# Patient Record
Sex: MALE | Race: WHITE
[De-identification: names, ages, dates, MRNs, and addresses within clinical notes are randomized per-mention and may not be internally consistent; named-entity substitution may affect disease eponyms.]

---

## 2018-08-15 ENCOUNTER — HOSPITAL ENCOUNTER (EMERGENCY)
Dept: HOSPITAL 97 - ER | Age: 58
Discharge: HOME | End: 2018-08-15
Payer: MEDICARE

## 2018-08-15 DIAGNOSIS — F17.210: ICD-10-CM

## 2018-08-15 DIAGNOSIS — J44.1: Primary | ICD-10-CM

## 2018-08-15 LAB
ALBUMIN SERPL BCP-MCNC: 3.5 G/DL (ref 3.4–5)
ALP SERPL-CCNC: 120 U/L (ref 45–117)
ALT SERPL W P-5'-P-CCNC: 35 U/L (ref 12–78)
AST SERPL W P-5'-P-CCNC: 49 U/L (ref 15–37)
BUN BLD-MCNC: 9 MG/DL (ref 7–18)
GLUCOSE SERPLBLD-MCNC: 108 MG/DL (ref 74–106)
HCT VFR BLD CALC: 45.9 % (ref 39.6–49)
INR BLD: 1.3
LYMPHOCYTES # SPEC AUTO: 1.3 K/UL (ref 0.7–4.9)
MAGNESIUM SERPL-MCNC: 1.6 MG/DL (ref 1.8–2.4)
MCH RBC QN AUTO: 33.2 PG (ref 27–35)
MCV RBC: 93.8 FL (ref 80–100)
NT-PROBNP SERPL-MCNC: 80 PG/ML (ref ?–125)
PMV BLD: 8.8 FL (ref 7.6–11.3)
POTASSIUM SERPL-SCNC: 4.1 MMOL/L (ref 3.5–5.1)
RBC # BLD: 4.89 M/UL (ref 4.33–5.43)

## 2018-08-15 PROCEDURE — 71045 X-RAY EXAM CHEST 1 VIEW: CPT

## 2018-08-15 PROCEDURE — 80048 BASIC METABOLIC PNL TOTAL CA: CPT

## 2018-08-15 PROCEDURE — 96374 THER/PROPH/DIAG INJ IV PUSH: CPT

## 2018-08-15 PROCEDURE — 83880 ASSAY OF NATRIURETIC PEPTIDE: CPT

## 2018-08-15 PROCEDURE — 94640 AIRWAY INHALATION TREATMENT: CPT

## 2018-08-15 PROCEDURE — 85610 PROTHROMBIN TIME: CPT

## 2018-08-15 PROCEDURE — 85025 COMPLETE CBC W/AUTO DIFF WBC: CPT

## 2018-08-15 PROCEDURE — 83735 ASSAY OF MAGNESIUM: CPT

## 2018-08-15 PROCEDURE — 84484 ASSAY OF TROPONIN QUANT: CPT

## 2018-08-15 PROCEDURE — 36415 COLL VENOUS BLD VENIPUNCTURE: CPT

## 2018-08-15 PROCEDURE — 99285 EMERGENCY DEPT VISIT HI MDM: CPT

## 2018-08-15 PROCEDURE — 93005 ELECTROCARDIOGRAM TRACING: CPT

## 2018-08-15 PROCEDURE — 80076 HEPATIC FUNCTION PANEL: CPT

## 2018-08-15 NOTE — EDPHYS
Physician Documentation                                                                           

 [facility]                                                                

Name: Omkar Chung                                                                                  

Age: 58 yrs                                                                                       

Sex: Male                                                                                         

: 1960                                                                                   

MRN: [id number]                                                                                   

Arrival Date: 08/15/2018                                                                          

Time: 06:10                                                                                       

Account#: [id number]                                                                            

Bed 16                                                                                            

Private MD:                                                                                       

ED Physician Bandar Sears                                                                             

HPI:                                                                                              

08/15                                                                                             

06:24 This 58 yrs old  Male presents to ER via Ambulatory with complaints of COPD    jr8 

      Exacerbation.                                                                               

06:24 The patient has shortness of breath at rest. Onset: The symptoms/episode began/occurred jr8 

      acutely, 2 day(s) ago. Duration: The symptoms are continuous. The patient's shortness       

      of breath is aggravated by talking, walking. Associated signs and symptoms: Pertinent       

      positives: productive cough. Severity of symptoms: At their worst the symptoms were         

      moderate in the emergency department the symptoms are unchanged. The patient has            

      experienced similar episodes in the past, a few times. The patient has not recently         

      seen a physician.                                                                           

                                                                                                  

Historical:                                                                                       

- Allergies:                                                                                      

06:13 No Known Allergies;                                                                     ak1 

- PMHx:                                                                                           

06:13 Cirrhosis; COPD;                                                                        ak1 

- PSHx:                                                                                           

06:13 Hernia repair;                                                                          ak1 

                                                                                                  

- Immunization history:: Adult Immunizations unknown.                                             

- Social history:: Smoking status: Patient uses tobacco products, smokes one-half pack            

  cigarettes per day.                                                                             

- Ebola Screening: : No symptoms or risks identified at this time.                                

                                                                                                  

                                                                                                  

ROS:                                                                                              

06:24 Eyes: Negative for injury, pain, redness, and discharge, ENT: Negative for injury,      jr8 

      pain, and discharge, Neck: Negative for injury, pain, and swelling, Cardiovascular:         

      Negative for chest pain, palpitations, and edema, Abdomen/GI: Negative for abdominal        

      pain, nausea, vomiting, diarrhea, and constipation, Back: Negative for injury and pain,     

      MS/Extremity: Negative for injury and deformity, Skin: Negative for injury, rash, and       

      discoloration, Neuro: Negative for headache, weakness, numbness, tingling, and seizure.     

06:24 Respiratory: Positive for cough, dyspnea on exertion, shortness of breath, wheezing.        

                                                                                                  

Exam:                                                                                             

06:24 Eyes:  Pupils equal round and reactive to light, extra-ocular motions intact.  Lids and jr8 

      lashes normal.  Conjunctiva and sclera are non-icteric and not injected.  Cornea within     

      normal limits.  Periorbital areas with no swelling, redness, or edema. ENT:  Nares          

      patent. No nasal discharge, no septal abnormalities noted.  Tympanic membranes are          

      normal and external auditory canals are clear.  Oropharynx with no redness, swelling,       

      or masses, exudates, or evidence of obstruction, uvula midline.  Mucous membranes           

      moist. Neck:  Trachea midline, no thyromegaly or masses palpated, and no cervical           

      lymphadenopathy.  Supple, full range of motion without nuchal rigidity, or vertebral        

      point tenderness.  No Meningismus. Cardiovascular:  Regular rate and rhythm with a          

      normal S1 and S2.  No gallops, murmurs, or rubs.  Normal PMI, no JVD.  No pulse             

      deficits. Abdomen/GI:  Soft, non-tender, with normal bowel sounds.  No distension or        

      tympany.  No guarding or rebound.  No evidence of tenderness throughout. Back:  No          

      spinal tenderness.  No costovertebral tenderness.  Full range of motion. Skin:  Warm,       

      dry with normal turgor.  Normal color with no rashes, no lesions, and no evidence of        

      cellulitis. MS/ Extremity:  Pulses equal, no cyanosis.  Neurovascular intact.  Full,        

      normal range of motion. Neuro:  Awake and alert, GCS 15, oriented to person, place,         

      time, and situation.  Cranial nerves II-XII grossly intact.  Motor strength 5/5 in all      

      extremities.  Sensory grossly intact.  Cerebellar exam normal.  Normal gait.                

06:24 Respiratory: the patient does not display signs of respiratory distress,  Respirations:     

      labored breathing, that is mild, Breath sounds: wheezing: expiratory that is moderate,      

      is heard diffusely.                                                                         

                                                                                                  

Vital Signs:                                                                                      

06:11  / 88; Pulse 104; Resp 22; Temp 98.; Pulse Ox 86% ; Weight 99.79 kg (R); Height 6 ak1 

      ft. 2 in. (187.96 cm); Pain 0/10;                                                           

06:11 Pulse Ox 92% on 2 lpm NC;                                                               ak1 

07:30  / 89; Pulse 101; Resp 20; Pulse Ox 95% on 2 lpm NC;                              ph  

08:40  / 87; Pulse 101; Resp 18; Temp 97.8; Pulse Ox 93% on R/A; Pain 0/10;             ph  

06:11 Body Mass Index 28.25 (99.79 kg, 187.96 cm)                                             ak1 

                                                                                                  

MDM:                                                                                              

06:11 Patient medically screened.                                                             jr8 

08:13 Differential diagnosis: Bronchitis CHF exacerbation, Chronic Obstructive Pulmonary      jr8 

      Disease Myocardial Infarction pneumonia, Pneumothorax pulmonary edema, Pulmonary            

      Embolism Sepsis. Data reviewed: vital signs, nurses notes, lab test result(s), EKG,         

      radiologic studies, plain films, and as a result, I will discharge patient. Data            

      interpreted: Pulse oximetry: on room air is 95 %. Interpretation: acceptable.               

      Counseling: I had a detailed discussion with the patient and/or guardian regarding: the     

      historical points, exam findings, and any diagnostic results supporting the                 

      discharge/admit diagnosis, lab results, radiology results, the need for outpatient          

      follow up, a family practitioner, to return to the emergency department if symptoms         

      worsen or persist or if there are any questions or concerns that arise at home.             

      Response to treatment: the patient's symptoms have resolved after treatment.                

                                                                                                  

08/15                                                                                             

06:24 Order name: Basic Metabolic Panel                                                       jr8 

08/15                                                                                             

06:24 Order name: CBC with Diff; Complete Time: 07:45                                         jr8 

08/15                                                                                             

06:24 Order name: LFT's                                                                       jr8 

08/15                                                                                             

06:24 Order name: Magnesium                                                                   jr8 

08/15                                                                                             

06:24 Order name: NT PRO-BNP; Complete Time: 08:15                                            jr8 

08/15                                                                                             

06:24 Order name: PT-INR; Complete Time: 07:45                                                jr8 

08/15                                                                                             

06:11 Order name: EKG; Complete Time: 06:11                                                   ak1 

08/15                                                                                             

06:24 Order name: Troponin (emerg Dept Use Only); Complete Time: 08:15                        jr8 

08/15                                                                                             

06:24 Order name: XRAY Chest (1 view); Complete Time: 08:28                                   jr8 

08/15                                                                                             

06:24 Order name: Basic Metabolic Panel; Complete Time: 08:15                                 EDMS

08/15                                                                                             

06:24 Order name: Liver (Hepatic) Function; Complete Time: 08:15                              EDMS

08/15                                                                                             

06:24 Order name: Magnesium; Complete Time: 08:15                                             EDMS

08/15                                                                                             

06:11 Order name: EKG - Nurse/Tech; Complete Time: 06:11                                      ak1 

08/15                                                                                             

06:24 Order name: Cardiac monitoring; Complete Time: 06:25                                    jr8 

08/15                                                                                             

06:24 Order name: IV Saline Lock; Complete Time: 06:45                                        jr8 

08/15                                                                                             

06:24 Order name: Labs collected and sent; Complete Time: 06:45                               jr8 

08/15                                                                                             

06:24 Order name: O2 Per Protocol; Complete Time: 06:25                                       jr8 

08/15                                                                                             

06:24 Order name: O2 Sat Monitoring; Complete Time:  

                                                                                                  

EC: Rate is 100 beats/min. Rhythm is regular, Normal Sinus Rhythm with Occasional PVCs. QRS jr8 

      Axis is Normal. AZ interval is normal at 180 msec. QRS interval is normal at 78 msec.       

      QT interval is normal at 454 msec. Q waves are Present in leads V1, V2, V3. T waves are     

      Normal. No ST changes noted. Clinical impression: NSR w/ Non-specific ST/T Changes.         

      Interpreted by me. Reviewed by me.                                                          

                                                                                                  

Administered Medications:                                                                         

06:26 Drug: Albuterol - atroVENT (3:1) (2.5 mg - 0.5 mg) 3 ml Route: Nebulizer;               ea  

08:41 Follow up: Response: No adverse reaction                                                ph  

06:40 Drug: SOLU-Medrol 125 mg Route: IVP; Site: left antecubital;                            ea  

08:41 Follow up: Response: No adverse reaction                                                ph  

                                                                                                  

                                                                                                  

Disposition:                                                                                      

08/15/18 08:14 Discharged to Home. Impression: Chronic obstructive pulmonary disease with         

  (acute) exacerbation.                                                                           

- Condition is Stable.                                                                            

- Discharge Instructions: Chronic Bronchitis, Chronic Obstructive Pulmonary Disease               

  Exacerbation.                                                                                   

- Prescriptions for ipratropium- albuterol 0.5 mg-3 mg(2.5 mg base)/3 mL Inhalation               

  solution for nebulization - inhale 3 milliliter by NEBULIZATION route 4 times per               

  day; 1 box. Prednisone 20 mg Oral Tablet - take 1 tablet by ORAL route once daily for           

  5 days; 5 tablet.                                                                               

- Medication Reconciliation Form, Thank You Letter, Antibiotic Education, Prescription            

  Opioid Use form.                                                                                

- Follow up: Private Physician; When: 5 - 6 days; Reason: Recheck today's complaints,             

  Continuance of care, Re-evaluation by your physician.                                           

- Problem is new.                                                                                 

- Symptoms have improved.                                                                         

                                                                                                  

                                                                                                  

                                                                                                  

Addendum:                                                                                         

2018                                                                                        

     19:04 Co-signature as Attending Physician, Bandar howe

                                                                                                  

Signatures:                                                                                       

Dispatcher [employer]                           EDMS                                                 

Bandar Sears MD MD pkl Roszak, Josh, PA PA   jr8                                                  

Mary Jane Pham RN                       RN   ak1                                                  

Dania Hudson, RN                      RN   Juli Valentine RN                      CAROL   ea                                                   

                                                                                                  

Corrections: (The following items were deleted from the chart)                                    

08/15                                                                                             

08:43 08:14 08/15/2018 08:14 Discharged to Home. Impression: Chronic obstructive pulmonary    ph  

      disease with (acute) exacerbation. Condition is Stable. Forms are Medication                

      Reconciliation Form, Thank You Letter, Antibiotic Education, Prescription Opioid Use.       

      Follow up: Private Physician; When: 5 - 6 days; Reason: Recheck today's complaints,         

      Continuance of care, Re-evaluation by your physician. Problem is new. Symptoms have         

      improved. jr8                                                                               

                                                                                                  

**************************************************************************************************

## 2018-08-15 NOTE — RAD REPORT
EXAM DESCRIPTION:  Markos Single View8/15/2018 6:47 am

 

CLINICAL HISTORY:  Shortness of breath

 

COMPARISON:  August 2017

 

FINDINGS:   The lungs are moderately hyperaerated consistent with COPD. The lungs appear clear of acu
te infiltrate. The heart is normal size

 

Blunting of the right lateral costophrenic sulcus probably represent pleural thickening. Small pleura
l effusion could be present.

## 2018-08-15 NOTE — ER
Nurse's Notes                                                                                     

 [facility]                                                                

Name: Omakr Chung                                                                                  

Age: 58 yrs                                                                                       

Sex: Male                                                                                         

: 1960                                                                                   

MRN: [id number]                                                                                   

Arrival Date: 08/15/2018                                                                          

Time: 06:10                                                                                       

Account#: [id number]                                                                            

Bed 16                                                                                            

Private MD:                                                                                       

Diagnosis: Chronic obstructive pulmonary disease with (acute) exacerbation                        

                                                                                                  

Presentation:                                                                                     

08/15                                                                                             

06:12 Presenting complaint: Patient states: productive cough and SOB x2 weeks PTA. pt with hx ak1 

      COPD. pt used albuterol neb treatment at home with no relief. Transition of care:           

      patient was not received from another setting of care. Onset of symptoms is unknown.        

      Risk Assessment: Do you want to hurt yourself or someone else? Patient reports no           

      desire to harm self or others. Care prior to arrival: None.                                 

06:12 Method Of Arrival: Ambulatory                                                           ak1 

06:12 Acuity: BROCK 3                                                                           ak1 

06:15 Initial Sepsis Screen: Does the patient meet any 2 criteria? No. Patient's initial      ea  

      sepsis screen is negative. Does the patient have a suspected source of infection? No.       

      Patient's initial sepsis screen is negative.                                                

                                                                                                  

Triage Assessment:                                                                                

06:13 General: Appears in no apparent distress. slender, Behavior is calm, cooperative. Pain: ak1 

      Denies pain. Respiratory: Reports shortness of breath cough that is productive.             

                                                                                                  

Historical:                                                                                       

- Allergies:                                                                                      

06:13 No Known Allergies;                                                                     ak1 

- PMHx:                                                                                           

06:13 Cirrhosis; COPD;                                                                        ak1 

- PSHx:                                                                                           

06:13 Hernia repair;                                                                          ak1 

                                                                                                  

- Immunization history:: Adult Immunizations unknown.                                             

- Social history:: Smoking status: Patient uses tobacco products, smokes one-half pack            

  cigarettes per day.                                                                             

- Ebola Screening: : No symptoms or risks identified at this time.                                

                                                                                                  

                                                                                                  

Screenin:14 Abuse screen: Denies threats or abuse. Denies injuries from another. Nutritional        ak1 

      screening: No deficits noted. Tuberculosis screening: No symptoms or risk factors           

      identified. Fall Risk None identified.                                                      

                                                                                                  

Assessment:                                                                                       

06:46 General: Appears uncomfortable, Behavior is calm, cooperative, appropriate for age.     ea  

      Pain: Denies pain. Neuro: Level of Consciousness is awake, alert, obeys commands,           

      Oriented to person, place, time, situation. Cardiovascular: Heart tones S1 S2 present       

      Patient's skin is warm and dry. Respiratory: Airway is patent Respiratory effort is         

      even, unlabored, Respiratory pattern is regular, symmetrical, Breath sounds are             

      diminished bilaterally. GI: Abdomen is flat, Bowel sounds present X 4 quads. : No         

      signs and/or symptoms were reported regarding the genitourinary system. Derm: Skin is       

      pink, warm \T\ dry.                                                                         

07:34 General: Appears in no apparent distress. comfortable, slender, Behavior is calm,       ph  

      cooperative, appropriate for age. Pain: Denies pain. Neuro: Level of Consciousness is       

      awake, alert, obeys commands, Oriented to person, place, time, situation.                   

      Cardiovascular: Capillary refill < 3 seconds Patient's skin is warm and dry.                

      Respiratory: Reports shortness of breath at rest but states that shortness of breath        

      has improved after nebulizer treatment. Airway is patent Respiratory effort is even,        

      unlabored, Respiratory pattern is regular, symmetrical, Breath sounds are clear             

      bilaterally. GI: No signs and/or symptoms were reported involving the gastrointestinal      

      system. Derm: Skin is intact, is healthy with good turgor, Skin is pink, warm \T\ dry.      

      Musculoskeletal: Circulation, motion, and sensation intact. Range of motion: intact in      

      all extremities.                                                                            

08:39 Reassessment: Patient appears in no apparent distress at this time. Patient and/or      ph  

      family updated on plan of care and expected duration. Pain level reassessed. Patient is     

      alert, oriented x 3, equal unlabored respirations, skin warm/dry/pink. Pt instructed on     

      use of new nebulizer medication and d/c home w/ SO Patient denies pain at this time.        

      Patient states symptoms have improved.                                                      

                                                                                                  

Vital Signs:                                                                                      

06:11  / 88; Pulse 104; Resp 22; Temp 98.; Pulse Ox 86% ; Weight 99.79 kg (R); Height 6 ak1 

      ft. 2 in. (187.96 cm); Pain 0/10;                                                           

06:11 Pulse Ox 92% on 2 lpm NC;                                                               ak1 

07:30  / 89; Pulse 101; Resp 20; Pulse Ox 95% on 2 lpm NC;                              ph  

08:40  / 87; Pulse 101; Resp 18; Temp 97.8; Pulse Ox 93% on R/A; Pain 0/10;             ph  

06:11 Body Mass Index 28.25 (99.79 kg, 187.96 cm)                                             ak1 

                                                                                                  

ED Course:                                                                                        

06:10 Patient arrived in ED.                                                                  ak1 

06:11 Jorge David PA is PHCP.                                                               [id number] 

06:11 Bandar Sears MD is Attending Physician.                                                    [id number] 

06:11 Arm band placed on Patient placed in an exam room, on a stretcher, on oxygen, on        ak1 

      cardiac monitor, on pulse oximetry, Patient notified of wait time.                          

06:13 Triage completed.                                                                       ak1 

06:14 Patient has correct armband on for positive identification. Bed in low position. Call   ak1 

      light in reach. Side rails up X 1. Adult w/ patient. Cardiac monitor on. Pulse ox on.       

      NIBP on.                                                                                    

06:30 Inserted saline lock: 20 gauge in left antecubital area, using aseptic technique. Blood ea  

      collected.                                                                                  

06:47 XRAY Chest (1 view) In Process Unspecified.                                             EDMS

07:06 Report given to Dania MEDINA.                                                            ea  

07:33 Dania Hudson, RN is Primary Nurse.                                                    ph  

07:36 No provider procedures requiring assistance completed.                                  ph  

08:42 IV discontinued.                                                                        ph  

                                                                                                  

Administered Medications:                                                                         

06:26 Drug: Albuterol - atroVENT (3:1) (2.5 mg - 0.5 mg) 3 ml Route: Nebulizer;               ea  

08:41 Follow up: Response: No adverse reaction                                                ph  

06:40 Drug: SOLU-Medrol 125 mg Route: IVP; Site: left antecubital;                            ea  

08:41 Follow up: Response: No adverse reaction                                                ph  

                                                                                                  

                                                                                                  

Outcome:                                                                                          

08:14 Discharge ordered by MD.                                                                [id number] 

08:42 Discharged to home ambulatory, with significant other.                                  ph  

08:42 Condition: improved                                                                         

08:42 Discharge instructions given to patient, significant other, Instructed on discharge         

      instructions, follow up and referral plans. medication usage, Demonstrated                  

      understanding of instructions, follow-up care, medications, Prescriptions given X 2.        

08:43 Patient left the ED.                                                                    ph  

                                                                                                  

Signatures:                                                                                       

Dispatcher [employer]                           EDMS                                                 

Jorge David PA PA   [id number]                                                  

Mary Jane Pham RN                       RN   akDania Byers, Juli Rubalcava RN, ph, RN RN ea                                                   

                                                                                                  

**************************************************************************************************

## 2018-08-15 NOTE — EKG
Test Date:    2018-08-15               Test Time:    06:05:27

Technician:   DERRICK                                    

                                                     

MEASUREMENT RESULTS:                                       

Intervals:                                           

Rate:         100                                    

LA:           180                                    

QRSD:         78                                     

QT:           352                                    

QTc:          454                                    

Axis:                                                

P:            87                                     

LA:           180                                    

QRS:          68                                     

T:            89                                     

                                                     

INTERPRETIVE STATEMENTS:                                       

                                                     

Sinus rhythm with frequent premature ventricular complexes

Anteroseptal infarct, age undetermined

Abnormal ECG

Compared to ECG 08/14/2017 17:02:47

Ventricular premature complex(es) now present

Myocardial infarct finding still present



Electronically Signed On 08-15-18 10:43:56 CDT by Otis Nichols

## 2018-10-01 ENCOUNTER — HOSPITAL ENCOUNTER (EMERGENCY)
Dept: HOSPITAL 97 - ER | Age: 58
LOS: 1 days | Discharge: HOME | End: 2018-10-02
Payer: MEDICARE

## 2018-10-01 DIAGNOSIS — J44.1: Primary | ICD-10-CM

## 2018-10-01 LAB
ALBUMIN SERPL BCP-MCNC: 3.3 G/DL (ref 3.4–5)
ALP SERPL-CCNC: 135 U/L (ref 45–117)
ALT SERPL W P-5'-P-CCNC: 37 U/L (ref 12–78)
AST SERPL W P-5'-P-CCNC: 39 U/L (ref 15–37)
BUN BLD-MCNC: 11 MG/DL (ref 7–18)
GLUCOSE SERPLBLD-MCNC: 86 MG/DL (ref 74–106)
HCT VFR BLD CALC: 42.6 % (ref 39.6–49)
INR BLD: 1.2
LYMPHOCYTES # SPEC AUTO: 1.6 K/UL (ref 0.7–4.9)
MAGNESIUM SERPL-MCNC: 1.8 MG/DL (ref 1.8–2.4)
MCH RBC QN AUTO: 33.2 PG (ref 27–35)
MCV RBC: 95 FL (ref 80–100)
NT-PROBNP SERPL-MCNC: 194 PG/ML (ref ?–125)
PMV BLD: 8.7 FL (ref 7.6–11.3)
POTASSIUM SERPL-SCNC: 3.6 MMOL/L (ref 3.5–5.1)
RBC # BLD: 4.49 M/UL (ref 4.33–5.43)
TROPONIN (EMERG DEPT USE ONLY): 0.02 NG/ML (ref 0–0.04)

## 2018-10-01 PROCEDURE — 80076 HEPATIC FUNCTION PANEL: CPT

## 2018-10-01 PROCEDURE — 83735 ASSAY OF MAGNESIUM: CPT

## 2018-10-01 PROCEDURE — 84484 ASSAY OF TROPONIN QUANT: CPT

## 2018-10-01 PROCEDURE — 99285 EMERGENCY DEPT VISIT HI MDM: CPT

## 2018-10-01 PROCEDURE — 36415 COLL VENOUS BLD VENIPUNCTURE: CPT

## 2018-10-01 PROCEDURE — 80048 BASIC METABOLIC PNL TOTAL CA: CPT

## 2018-10-01 PROCEDURE — 85025 COMPLETE CBC W/AUTO DIFF WBC: CPT

## 2018-10-01 PROCEDURE — 96374 THER/PROPH/DIAG INJ IV PUSH: CPT

## 2018-10-01 PROCEDURE — 85610 PROTHROMBIN TIME: CPT

## 2018-10-01 PROCEDURE — 94640 AIRWAY INHALATION TREATMENT: CPT

## 2018-10-01 PROCEDURE — 83880 ASSAY OF NATRIURETIC PEPTIDE: CPT

## 2018-10-01 PROCEDURE — 93005 ELECTROCARDIOGRAM TRACING: CPT

## 2018-10-01 PROCEDURE — 71045 X-RAY EXAM CHEST 1 VIEW: CPT

## 2018-10-02 NOTE — RAD REPORT
EXAM DESCRIPTION:  Karynat Single View10/1/2018 11:28 pm

 

CLINICAL HISTORY:  Shortness of breath

 

COMPARISON:  August 2018

 

FINDINGS:  The lungs are markedly hyperaerated.

 

Prominent interstitial markings are seen without significant change.

 

The heart is normal size

 

IMPRESSION:  COPD

 

Prominent interstitial markings may all be chronic or combination of acute process such as pneumoniti
s/atypical pneumonia superimposed over chronic changes

## 2018-10-02 NOTE — EKG
Test Date:    2018-10-01               Test Time:    23:31:21

Technician:                                          

                                                     

MEASUREMENT RESULTS:                                       

Intervals:                                           

Rate:         93                                     

MD:           184                                    

QRSD:         86                                     

QT:           358                                    

QTc:          445                                    

Axis:                                                

P:            75                                     

MD:           184                                    

QRS:          54                                     

T:            88                                     

                                                     

INTERPRETIVE STATEMENTS:                                       

                                                     

Sinus rhythm with occasional premature ventricular complexes

Septal infarct, age undetermined

Abnormal ECG

Compared to ECG 08/15/2018 06:05:27

No significant changes



Electronically Signed On 10-02-18 06:20:15 CDT by Otis Nichols

## 2018-10-02 NOTE — EDPHYS
Physician Documentation                                                                           

 [facility]                                                                

Name: Omkar Chung                                                                                  

Age: 58 yrs                                                                                       

Sex: Male                                                                                         

: 1960                                                                                   

MRN: [id number]                                                                                   

Arrival Date: 10/01/2018                                                                          

Time: 23:05                                                                                       

Account#: [id number]                                                                            

Bed 23                                                                                            

Private MD:                                                                                       

ED Physician Bandar Sears                                                                             

HPI:                                                                                              

10/01                                                                                             

23:38 This 58 yrs old  Male presents to ER via [facility] with complaints of         pm1 

      Shortness Of Breath.                                                                        

23:38 The patient has shortness of breath at rest. Onset: The symptoms/episode began/occurred pm1 

      this morning. Duration: The symptoms are continuous. The patient's shortness of breath      

      is aggravated by nothing, is alleviated by nothing. Associated signs and symptoms:          

      Pertinent positives: productive cough, no change in sputum from baseline COPD and           

      sputum, Pertinent negatives: chest pain, fever. Severity of symptoms: in the emergency      

      department the symptoms are worse. The patient has experienced similar episodes in the      

      past, several times, and the symptoms today are exactly the same, to previous COPD          

      exacerbation .                                                                              

                                                                                                  

Historical:                                                                                       

- Allergies:                                                                                      

23:49 No Known Allergies;                                                                     rv  

- PMHx:                                                                                           

23:49 Cirrhosis; COPD; Hernia;                                                                rv  

- PSHx:                                                                                           

23:49 Hernia repair;                                                                          rv  

                                                                                                  

- Immunization history:: Adult Immunizations up to date.                                          

- Social history:: Smoking status: Patient/guardian denies using tobacco, the patient             

  reports quitting approximately 15 years ago.                                                    

- Ebola Screening: : Patient negative for fever greater than or equal to 101.5 degrees            

  Fahrenheit, and additional compatible Ebola Virus Disease symptoms Patient denies               

  exposure to infectious person Patient denies travel to an Ebola-affected area in the            

  21 days before illness onset.                                                                   

                                                                                                  

                                                                                                  

ROS:                                                                                              

23:38 Constitutional: Negative for fever, chills, and weight loss, Eyes: Negative for injury, pm1 

      pain, redness, and discharge, ENT: Negative for injury, pain, and discharge, Neck:          

      Negative for injury, pain, and swelling, Cardiovascular: Negative for chest pain,           

      palpitations, and edema.                                                                    

23:38 Abdomen/GI: Negative for abdominal pain, nausea, vomiting, diarrhea, and constipation,      

      Back: Negative for injury and pain, : Negative for injury, bleeding, discharge, and       

      swelling, MS/Extremity: Negative for injury and deformity, Skin: Negative for injury,       

      rash, and discoloration, Neuro: Negative for headache, weakness, numbness, tingling,        

      and seizure.                                                                                

23:38 Respiratory: Positive for cough, with white sputum, shortness of breath.                    

                                                                                                  

Exam:                                                                                             

23:38 Constitutional:  This is a well developed, well nourished patient who is awake, alert,  pm1 

      and in no acute distress. Head/Face:  Normocephalic, atraumatic. Eyes:  Pupils equal        

      round and reactive to light, extra-ocular motions intact.  Lids and lashes normal.          

      Conjunctiva and sclera are non-icteric and not injected.  Cornea within normal limits.      

      Periorbital areas with no swelling, redness, or edema. ENT:  Nares patent. No nasal         

      discharge, no septal abnormalities noted.  Tympanic membranes are normal and external       

      auditory canals are clear.  Oropharynx with no redness, swelling, or masses, exudates,      

      or evidence of obstruction, uvula midline.  Mucous membranes moist. Neck:  Trachea          

      midline, no thyromegaly or masses palpated, and no cervical lymphadenopathy.  Supple,       

      full range of motion without nuchal rigidity, or vertebral point tenderness.  No            

      Meningismus. Chest/axilla:  Normal chest wall appearance and motion.  Nontender with no     

      deformity.  No lesions are appreciated. Cardiovascular:  Regular rate and rhythm with a     

      normal S1 and S2.  No gallops, murmurs, or rubs.  Normal PMI, no JVD.  No pulse             

      deficits.                                                                                   

23:38 Abdomen/GI:  Soft, non-tender, with normal bowel sounds.  No distension or tympany.  No     

      guarding or rebound.  No evidence of tenderness throughout. Back:  No spinal                

      tenderness.  No costovertebral tenderness.  Full range of motion. Skin:  Warm, dry with     

      normal turgor.  Normal color with no rashes, no lesions, and no evidence of cellulitis.     

      MS/ Extremity:  Pulses equal, no cyanosis.  Neurovascular intact.  Full, normal range       

      of motion.                                                                                  

23:38 Respiratory: the patient does not display signs of respiratory distress,  Respirations:     

      normal, Breath sounds: wheezing: that is mild, is heard diffusely.                          

23:38 Neuro: Orientation: is normal, Motor: moves all fours, strength is normal.                  

                                                                                                  

Vital Signs:                                                                                      

23:05  / 74; Pulse 99; Weight 92.99 kg (R);                                             rv  

23:05  / 88; Pulse 92; Pulse Ox 96% on 2 lpm NC;                                        rv  

1002                                                                                             

00:03  / 83; Pulse 89; Pulse Ox 100% ;                                                  rv  

                                                                                                  

MDM:                                                                                              

10/01                                                                                             

23:11 Patient medically screened.                                                             pm1 

10/02                                                                                             

00:36 Data reviewed: vital signs. Data interpreted: Pulse oximetry: on room air is 100 %.     pm1 

      Interpretation: normal. Counseling: I had a detailed discussion with the patient and/or     

      guardian regarding: the historical points, exam findings, and any diagnostic results        

      supporting the discharge/admit diagnosis, lab results, radiology results, the need for      

      outpatient follow up, to return to the emergency department if symptoms worsen or           

      persist or if there are any questions or concerns that arise at home.                       

                                                                                                  

10/01                                                                                             

23:12 Order name: Basic Metabolic Panel; Complete Time: 00:02                                 pm1 

10/01                                                                                             

23:12 Order name: CBC with Diff; Complete Time: 23:57                                         pm1 

10/01                                                                                             

23:12 Order name: LFT's; Complete Time: 00:02                                                 pm1 

10/01                                                                                             

23:12 Order name: Magnesium; Complete Time: 00:02                                             pm1 

10/01                                                                                             

23:12 Order name: NT PRO-BNP; Complete Time: 00:02                                            pm1 

10/01                                                                                             

23:12 Order name: PT-INR; Complete Time: 23:57                                                pm1 

10/01                                                                                             

23:12 Order name: Troponin (emerg Dept Use Only); Complete Time: 00:02                        pm1 

10/01                                                                                             

23:12 Order name: XRAY Chest (1 view)                                                         pm1 

10/01                                                                                             

23:12 Order name: EKG; Complete Time: 23:13                                                   pm1 

10/01                                                                                             

23:12 Order name: Cardiac monitoring; Complete Time: 23:50                                    pm1 

10/01                                                                                             

23:12 Order name: EKG - Nurse/Tech; Complete Time: 23:50                                      pm1 

10/01                                                                                             

23:12 Order name: IV Saline Lock; Complete Time: 23:22                                        pm1 

10/01                                                                                             

23:12 Order name: Labs collected and sent; Complete Time: 23:22                               pm1 

10/01                                                                                             

23:12 Order name: O2 Per Protocol; Complete Time: 23:22                                       pm1 

10/01                                                                                             

23:12 Order name: O2 Sat Monitoring; Complete Time: 23:22                                     pm1 

                                                                                                  

Administered Medications:                                                                         

10/01                                                                                             

23:42 Drug: SOLU-Medrol 125 mg Route: IVP; Site: right forearm;                               rv  

10/02                                                                                             

00:55 Follow up: Response: No adverse reaction                                                rv  

10/01                                                                                             

23:42 Drug: Albuterol - atroVENT (3:1) (2.5 mg - 0.5 mg) 3 ml Route: Nebulizer;               rv  

10/02                                                                                             

00:54 Follow up: Response: No adverse reaction                                                rv  

                                                                                                  

                                                                                                  

Disposition:                                                                                      

01:10 Co-signature as Attending Physician, Bandar Sears MD.                                        jessica 

                                                                                                  

Disposition:                                                                                      

10/02/18 00:37 Discharged to Home. Impression: Chronic obstructive pulmonary disease with         

  (acute) exacerbation.                                                                           

- Condition is Stable.                                                                            

- Discharge Instructions: Chronic Obstructive Pulmonary Disease Exacerbation.                     

- Prescriptions for Prednisone 20 mg Oral Tablet - take 2 tablet by ORAL route once               

  daily for 5 days; 10 tablet. ipratropium- albuterol 0.5 mg-3 mg(2.5 mg base)/3 mL               

  Inhalation solution for nebulization - inhale 3 milliliter by NEBULIZATION route 4              

  times per day As needed; 1 box.                                                                 

- Medication Reconciliation Form, Thank You Letter, Antibiotic Education, Prescription            

  Opioid Use form.                                                                                

- Follow up: Emergency Department; When: As needed; Reason: Worsening of condition.               

  Follow up: Private Physician; When: 2 - 3 days; Reason: Recheck today's complaints,             

  Continuance of care, Re-evaluation by your physician.                                           

- Problem is new.                                                                                 

- Symptoms have improved.                                                                         

                                                                                                  

                                                                                                  

                                                                                                  

Signatures:                                                                                       

Dispatcher [employer]                           EDBandar Preciado MD MD   pkl                                                  

Joon Gerard, NP                    NP   pm1                                                  

Carlyle Ghosh RN                    RN   rv                                                   

                                                                                                  

Corrections: (The following items were deleted from the chart)                                    

00:56 00:37 10/02/2018 00:37 Discharged to Home. Impression: Chronic obstructive pulmonary    rv  

      disease with (acute) exacerbation. Condition is Stable. Forms are Medication                

      Reconciliation Form, Thank You Letter, Antibiotic Education, Prescription Opioid Use.       

      Follow up: Emergency Department; When: As needed; Reason: Worsening of condition.           

      Follow up: Private Physician; When: 2 - 3 days; Reason: Recheck today's complaints,         

      Continuance of care, Re-evaluation by your physician. Problem is new. Symptoms have         

      improved. pm1                                                                               

                                                                                                  

**************************************************************************************************

## 2018-10-02 NOTE — ER
Nurse's Notes                                                                                     

 [facility]                                                                

Name: Omkar Chung                                                                                  

Age: 58 yrs                                                                                       

Sex: Male                                                                                         

: 1960                                                                                   

MRN: [id number]                                                                                   

Arrival Date: 10/01/2018                                                                          

Time: 23:05                                                                                       

Account#: [id number]                                                                            

Bed 23                                                                                            

Private MD:                                                                                       

Diagnosis: Chronic obstructive pulmonary disease with (acute) exacerbation                        

                                                                                                  

Presentation:                                                                                     

10/01                                                                                             

23:45 Presenting complaint: Patient states: "I AM HAVING DIFFICULTY OF BREATHING. I HAVE      rv  

      COPD.". Transition of care: patient was not received from another setting of care.          

      Onset of symptoms was 2018 at 08:00. Risk Assessment: Do you want to hurt       

      yourself or someone else? Patient reports no desire to harm self or others. Initial         

      Sepsis Screen: Does the patient meet any 2 criteria? No. Patient's initial sepsis           

      screen is negative. Does the patient have a suspected source of infection? No.              

      Patient's initial sepsis screen is negative. Care prior to arrival: None.                   

23:45 Method Of Arrival: Ambulatory                                                           rv  

23:45 Acuity: BROCK 3                                                                           rv  

                                                                                                  

Triage Assessment:                                                                                

23:50 General: Appears in no apparent distress. uncomfortable, Behavior is calm, cooperative. rv  

      Respiratory: Reports shortness of breath on exertion Onset: The symptoms/episode            

      began/occurred gradually, the patient has moderate shortness of breath.                     

                                                                                                  

Historical:                                                                                       

- Allergies:                                                                                      

23:49 No Known Allergies;                                                                     rv  

- PMHx:                                                                                           

23:49 Cirrhosis; COPD; Hernia;                                                                rv  

- PSHx:                                                                                           

23:49 Hernia repair;                                                                          rv  

                                                                                                  

- Immunization history:: Adult Immunizations up to date.                                          

- Social history:: Smoking status: Patient/guardian denies using tobacco, the patient             

  reports quitting approximately 15 years ago.                                                    

- Ebola Screening: : Patient negative for fever greater than or equal to 101.5 degrees            

  Fahrenheit, and additional compatible Ebola Virus Disease symptoms Patient denies               

  exposure to infectious person Patient denies travel to an Ebola-affected area in the            

  21 days before illness onset.                                                                   

                                                                                                  

                                                                                                  

Screenin:44 Abuse screen: Denies threats or abuse. Denies injuries from another. Nutritional        rv  

      screening: No deficits noted. Tuberculosis screening: No symptoms or risk factors           

      identified. Fall Risk None identified.                                                      

                                                                                                  

Assessment:                                                                                       

23:42 General: Appears in no apparent distress. uncomfortable, Behavior is calm, cooperative. rv  

      Pain: Denies pain. Neuro: Level of Consciousness is awake, alert, obeys commands,           

      Oriented to person, place, time, situation. Cardiovascular: Rhythm is regular.              

      Respiratory: Airway is compromised Respiratory effort is labored, Breath sounds with        

      wheezes bilaterally. GI: No signs and/or symptoms were reported involving the               

      gastrointestinal system. : No signs and/or symptoms were reported regarding the           

      genitourinary system. EENT: No signs and/or symptoms were reported regarding the EENT       

      system. Derm: Skin is intact.                                                               

10/02                                                                                             

00:03 Reassessment: Patient appears in no apparent distress at this time. Patient and/or      rv  

      family updated on plan of care and expected duration. Pain level reassessed. Patient is     

      alert, oriented x 3, equal unlabored respirations, skin warm/dry/pink.                      

                                                                                                  

Vital Signs:                                                                                      

10/01                                                                                             

23:05  / 74; Pulse 99; Weight 92.99 kg (R);                                             rv  

23:05  / 88; Pulse 92; Pulse Ox 96% on 2 lpm NC;                                        rv  

10/02                                                                                             

00:03  / 83; Pulse 89; Pulse Ox 100% ;                                                  rv  

                                                                                                  

ED Course:                                                                                        

10/01                                                                                             

23:05 Patient arrived in ED.                                                                  ag3 

23:07 Joon Gerard, MAYTE is PHCP.                                                           pm1 

23:07 Bandar Sears MD is Attending Physician.                                                    pm1 

23:15 Inserted saline lock: 20 gauge in right forearm, using aseptic technique. Blood         rv  

      collected.                                                                                  

23:26 X-ray completed. Portable x-ray completed in exam room. Patient tolerated procedure     kw  

      well.                                                                                       

23:28 XRAY Chest (1 view) In Process Unspecified.                                             EDMS

23:46 Triage completed.                                                                       rv  

23:48 Arm band placed on right wrist. EKG completed in triage. Results shown to MD.           rv  

10/02                                                                                             

00:03 Initial Neb Treatment Given as ordered Patient was instructed and evaluated on          rv  

      procedure.                                                                                  

00:04 Patient has correct armband on for positive identification. Bed in low position. Call   rv  

      light in reach. Side rails up X 1. Adult w/ patient. Cardiac monitor on. Pulse ox on.       

      NIBP on.                                                                                    

00:55 No provider procedures requiring assistance completed. IV discontinued, bleeding        rv  

      controlled, No redness/swelling at site. Pressure dressing applied.                         

                                                                                                  

Administered Medications:                                                                         

10/01                                                                                             

23:42 Drug: SOLU-Medrol 125 mg Route: IVP; Site: right forearm;                               rv  

10/02                                                                                             

00:55 Follow up: Response: No adverse reaction                                                rv  

10/01                                                                                             

23:42 Drug: Albuterol - atroVENT (3:1) (2.5 mg - 0.5 mg) 3 ml Route: Nebulizer;               rv  

10/02                                                                                             

00:54 Follow up: Response: No adverse reaction                                                rv  

                                                                                                  

                                                                                                  

Outcome:                                                                                          

00:37 Discharge ordered by MD.                                                                pm1 

00:55 Discharged to home ambulatory.                                                          rv  

00:55 Condition: improved                                                                         

00:55 Discharge instructions given to patient, family, Instructed on discharge instructions,      

      follow up and referral plans. medication usage, Demonstrated understanding of               

      instructions, follow-up care, medications, Prescriptions given X 2.                         

00:56 Patient left the ED.                                                                    rv  

                                                                                                  

Signatures:                                                                                       

Dispatcher [employer]                           EDMS                                                 

Kaitlin Hernandez Patrick, NP                    NP   pm1                                                  

Carlyle Ghosh, CAROL                    RN                                                      

Jaye Davis                                 3                                                  

                                                                                                  

**************************************************************************************************

## 2019-08-30 ENCOUNTER — HOSPITAL ENCOUNTER (INPATIENT)
Dept: HOSPITAL 97 - ER | Age: 59
LOS: 2 days | Discharge: HOME | DRG: 192 | End: 2019-09-01
Attending: HOSPITALIST | Admitting: HOSPITALIST
Payer: MEDICARE

## 2019-08-30 VITALS — BODY MASS INDEX: 26.6 KG/M2

## 2019-08-30 DIAGNOSIS — Z87.891: ICD-10-CM

## 2019-08-30 DIAGNOSIS — R09.02: ICD-10-CM

## 2019-08-30 DIAGNOSIS — J44.1: Primary | ICD-10-CM

## 2019-08-30 LAB
ALBUMIN SERPL BCP-MCNC: 3.9 G/DL (ref 3.4–5)
ALP SERPL-CCNC: 107 U/L (ref 45–117)
ALT SERPL W P-5'-P-CCNC: 31 U/L (ref 12–78)
AST SERPL W P-5'-P-CCNC: 33 U/L (ref 15–37)
BUN BLD-MCNC: 9 MG/DL (ref 7–18)
GLUCOSE SERPLBLD-MCNC: 135 MG/DL (ref 74–106)
HCT VFR BLD CALC: 48.6 % (ref 39.6–49)
INR BLD: 1.17
LYMPHOCYTES # SPEC AUTO: 1.5 K/UL (ref 0.7–4.9)
MAGNESIUM SERPL-MCNC: 2 MG/DL (ref 1.8–2.4)
NT-PROBNP SERPL-MCNC: 70 PG/ML (ref ?–125)
PMV BLD: 8.5 FL (ref 7.6–11.3)
POTASSIUM SERPL-SCNC: 4.3 MMOL/L (ref 3.5–5.1)
RBC # BLD: 5.11 M/UL (ref 4.33–5.43)
TROPONIN (EMERG DEPT USE ONLY): < 0.02 NG/ML (ref 0–0.04)

## 2019-08-30 PROCEDURE — 36415 COLL VENOUS BLD VENIPUNCTURE: CPT

## 2019-08-30 PROCEDURE — 93005 ELECTROCARDIOGRAM TRACING: CPT

## 2019-08-30 PROCEDURE — 81003 URINALYSIS AUTO W/O SCOPE: CPT

## 2019-08-30 PROCEDURE — 71045 X-RAY EXAM CHEST 1 VIEW: CPT

## 2019-08-30 PROCEDURE — 85610 PROTHROMBIN TIME: CPT

## 2019-08-30 PROCEDURE — 96374 THER/PROPH/DIAG INJ IV PUSH: CPT

## 2019-08-30 PROCEDURE — 82805 BLOOD GASES W/O2 SATURATION: CPT

## 2019-08-30 PROCEDURE — 87205 SMEAR GRAM STAIN: CPT

## 2019-08-30 PROCEDURE — 80053 COMPREHEN METABOLIC PANEL: CPT

## 2019-08-30 PROCEDURE — 83880 ASSAY OF NATRIURETIC PEPTIDE: CPT

## 2019-08-30 PROCEDURE — 80061 LIPID PANEL: CPT

## 2019-08-30 PROCEDURE — 87040 BLOOD CULTURE FOR BACTERIA: CPT

## 2019-08-30 PROCEDURE — 87184 SC STD DISK METHOD PER PLATE: CPT

## 2019-08-30 PROCEDURE — 87077 CULTURE AEROBIC IDENTIFY: CPT

## 2019-08-30 PROCEDURE — 80076 HEPATIC FUNCTION PANEL: CPT

## 2019-08-30 PROCEDURE — 94640 AIRWAY INHALATION TREATMENT: CPT

## 2019-08-30 PROCEDURE — 94760 N-INVAS EAR/PLS OXIMETRY 1: CPT

## 2019-08-30 PROCEDURE — 84484 ASSAY OF TROPONIN QUANT: CPT

## 2019-08-30 PROCEDURE — 87186 SC STD MICRODIL/AGAR DIL: CPT

## 2019-08-30 PROCEDURE — 99285 EMERGENCY DEPT VISIT HI MDM: CPT

## 2019-08-30 PROCEDURE — 84145 PROCALCITONIN (PCT): CPT

## 2019-08-30 PROCEDURE — 83735 ASSAY OF MAGNESIUM: CPT

## 2019-08-30 PROCEDURE — 83605 ASSAY OF LACTIC ACID: CPT

## 2019-08-30 PROCEDURE — 87070 CULTURE OTHR SPECIMN AEROBIC: CPT

## 2019-08-30 PROCEDURE — 84100 ASSAY OF PHOSPHORUS: CPT

## 2019-08-30 PROCEDURE — 80048 BASIC METABOLIC PNL TOTAL CA: CPT

## 2019-08-30 PROCEDURE — 85025 COMPLETE CBC W/AUTO DIFF WBC: CPT

## 2019-08-30 NOTE — EDPHYS
Physician Documentation                                                                           

 [facility]                                                                 

Name: Omkar Chung                                                                                  

Age: 59 yrs                                                                                       

Sex: Male                                                                                         

: 1960                                                                                   

MRN: [id number]                                                                                   

Arrival Date: 2019                                                                          

Time: 21:53                                                                                       

Account#: [id number]                                                                            

Bed 3                                                                                             

Private MD:                                                                                       

ED Physician Shawn Landa                                                                         

HPI:                                                                                              

                                                                                             

22:07 This 59 yrs old  Male presents to ER via Un[facility] with complaints of         jr8 

      Breathing Difficulty.                                                                       

22:07 The patient has shortness of breath at rest. Onset: The symptoms/episode began/occurred jr8 

      gradually, 2 month(s) ago, and became worse and became persistent. Duration: The            

      symptoms are continuous, and are steadily getting worse. The patient's shortness of         

      breath is aggravated by coughing, walking. Associated signs and symptoms: The patient       

      has no apparent associated signs or symptoms. Severity of symptoms: At their worst the      

      symptoms were moderate in the emergency department the symptoms are unchanged. The          

      patient has experienced similar episodes in the past, a few times. The patient has not      

      recently seen a physician. History of COPD. Stated that his breathing is now to the         

      point where his home medications are no longer helping. Patient on NC with an oxygen        

      saturation of 87%.                                                                          

                                                                                                  

Historical:                                                                                       

- Allergies:                                                                                      

22:12 No Known Allergies;                                                                     aj1 

- PMHx:                                                                                           

22:12 Cirrhosis; COPD; Hernia;                                                                aj1 

                                                                                                  

- Immunization history:: Flu vaccine is not up to date.                                           

- Social history:: Smoking status: Patient/guardian denies using tobacco.                         

- Ebola Screening: : Patient denies travel to an Ebola-affected area in the 21 days               

  before illness onset.                                                                           

                                                                                                  

                                                                                                  

ROS:                                                                                              

22:07 Eyes: Negative for injury, pain, redness, and discharge, ENT: Negative for injury,      jr8 

      pain, and discharge, Neck: Negative for injury, pain, and swelling, Cardiovascular:         

      Negative for chest pain, palpitations, and edema, Abdomen/GI: Negative for abdominal        

      pain, nausea, vomiting, diarrhea, and constipation, Back: Negative for injury and pain,     

      MS/Extremity: Negative for injury and deformity, Skin: Negative for injury, rash, and       

      discoloration, Neuro: Negative for headache, weakness, numbness, tingling, and seizure.     

22:07 Respiratory: Positive for cough, dyspnea on exertion, shortness of breath, wheezing.        

                                                                                                  

Exam:                                                                                             

22:07 Eyes:  Pupils equal round and reactive to light, extra-ocular motions intact.  Lids and jr8 

      lashes normal.  Conjunctiva and sclera are non-icteric and not injected.  Cornea within     

      normal limits.  Periorbital areas with no swelling, redness, or edema. ENT:  Nares          

      patent. No nasal discharge, no septal abnormalities noted.  Tympanic membranes are          

      normal and external auditory canals are clear.  Oropharynx with no redness, swelling,       

      or masses, exudates, or evidence of obstruction, uvula midline.  Mucous membranes           

      moist. Neck:  Trachea midline, no thyromegaly or masses palpated, and no cervical           

      lymphadenopathy.  Supple, full range of motion without nuchal rigidity, or vertebral        

      point tenderness.  No Meningismus. Abdomen/GI:  Soft, non-tender, with normal bowel         

      sounds.  No distension or tympany.  No guarding or rebound.  No evidence of tenderness      

      throughout. Back:  No spinal tenderness.  No costovertebral tenderness.  Full range of      

      motion. Skin:  Warm, dry with normal turgor.  Normal color with no rashes, no lesions,      

      and no evidence of cellulitis. MS/ Extremity:  Pulses equal, no cyanosis.                   

      Neurovascular intact.  Full, normal range of motion. Neuro:  Awake and alert, GCS 15,       

      oriented to person, place, time, and situation.  Cranial nerves II-XII grossly intact.      

      Motor strength 5/5 in all extremities.  Sensory grossly intact.  Cerebellar exam            

      normal.  Normal gait.                                                                       

22:07 Cardiovascular: Rate: tachycardic, Rhythm: regular, Pulses: Pulses are 2+ in right          

      radial artery and left radial artery. Heart sounds: normal, normal S1and S2, no S3 or       

      S4, no murmur, no rub, no gallop, Edema: is not appreciated, JVD: is not appreciated.       

22:07 Respiratory: mild respiratory distress is noted,  Respirations: intercostal                 

      retractions, that is mild, tachypnea, that is mild, Breath sounds: wheezing: expiratory     

      that is moderate, is heard diffusely.                                                       

                                                                                                  

Vital Signs:                                                                                      

22:00  / 84; Pulse 116; Resp 23; Temp 98.6(TE); Pulse Ox 82% on R/A; Weight 90.72 kg    aj1 

      (R); Height 6 ft. 2 in. (187.96 cm) (R); Pain 0/10;                                         

22:05 Pulse Ox 93% on 2 lpm NC;                                                               aj1 

22:15  / 87; Pulse 106; Resp 23; Pulse Ox 94% on 2 lpm NC;                              lp1 

22:30  / 89; Pulse 104; Resp 18; Pulse Ox 98% on 2 lpm NC;                              lp1 

23:00  / 80; Pulse 100; Resp 22; Pulse Ox 97% on Nebulizer Mask;                        lp1 

23:30  / 93; Pulse 101; Resp 19; Pulse Ox 96% on 2 lpm NC;                              lp1 

                                                                                             

00:00  / 77; Pulse 97; Resp 12; Pulse Ox 92% on R/A;                                    lp1 

00:30  / 75; Pulse 94; Resp 16; Pulse Ox 93% on R/A;                                    lp1 

01:02  / 79; Pulse 96; Resp 18; Pulse Ox 94% on R/A;                                    lp1 

                                                                                             

22:00 Body Mass Index 25.68 (90.72 kg, 187.96 cm)                                             aj1 

                                                                                                  

MDM:                                                                                              

                                                                                             

22:02 Patient medically screened.                                                             [facility] 

23:35 Data reviewed: vital signs, nurses notes, lab test result(s), radiologic studies, plain jr8 

      films. Data interpreted: Pulse oximetry: on room air is 83 %. Interpretation: hypoxia.      

      Counseling: I had a detailed discussion with the patient and/or guardian regarding: the     

      historical points, exam findings, and any diagnostic results supporting the                 

      discharge/admit diagnosis, lab results, radiology results, the need for further work-up     

      and treatment in the hospital. Physician consultation: Dara Houston MD was called at       

      23:40, was contacted at 23:40, regarding admission, to the telemetry unit. consult,         

      patient's condition, and will see patient. ED course: Patient feeling better after          

      breathing treatment. Still with light wheezing diffusely and will continue to drop his      

      oxygen saturation to around 87% off of oxygen. Patient does not have home oxygen .          

                                                                                                  

                                                                                             

22:02 Order name: Basic Metabolic Panel                                                       [facility] 

                                                                                             

22:02 Order name: CBC with Diff                                                               [facility] 

                                                                                             

22:02 Order name: LFT's                                                                       [facility] 

                                                                                             

22:02 Order name: Magnesium; Complete Time: 23:15                                             [facility] 

                                                                                             

22:02 Order name: NT PRO-BNP; Complete Time: 23:15                                            [facility] 

                                                                                             

22:02 Order name: PT-INR; Complete Time: 23:15                                                [facility] 

                                                                                             

22:02 Order name: Troponin (emerg Dept Use Only); Complete Time: 23:15                        8 

                                                                                             

22:02 Order name: Blood Culture Adult (2)                                                                                                                                                  

22:02 Order name: Procalcitonin; Complete Time: 23:35                                         8 

                                                                                             

22:02 Order name: Lactate; Complete Time: 23:15                                               8 

                                                                                             

22:04 Order name: Basic Metabolic Panel; Complete Time: 23:15                                 [facility]

                                                                                             

22:04 Order name: CBC with Automated Diff; Complete Time: 23:05                               [facility]

                                                                                             

22:04 Order name: Liver (Hepatic) Function; Complete Time: 23:15                              [facility]

                                                                                             

00:44 Order name: CBC with Automated Diff                                                     [facility]

                                                                                             

22:02 Order name: XRAY Chest (1 view)                                                          

                                                                                             

22:02 Order name: EKG; Complete Time: 22:05                                                   [facility] 

                                                                                             

00:43 Order name: Regular                                                                     [facility]

                                                                                             

00:44 Order name: CBC with Automated Diff                                                     [facility]

                                                                                             

00:44 Order name: Comprehensive Metabolic Panel                                               [facility]

                                                                                             

00:44 Order name: Comprehensive Metabolic Panel                                               [facility]

                                                                                             

00:44 Order name: Lactate                                                                     [facility]

                                                                                             

00:44 Order name: Lactate                                                                     [facility]

                                                                                             

00:44 Order name: Lipid Profile                                                               [facility]

                                                                                             

00:44 Order name: Lipid Profile                                                               [facility]

                                                                                             

00:44 Order name: Magnesium                                                                   [facility]

                                                                                             

00:44 Order name: Magnesium                                                                   [facility]

                                                                                             

00:44 Order name: Phosphorus                                                                  [facility]

                                                                                             

00:44 Order name: Phosphorus                                                                  [facility]

                                                                                             

22:02 Order name: Cardiac monitoring; Complete Time: 22:59                                    [facility] 

                                                                                             

22:02 Order name: EKG - Nurse/Tech; Complete Time: 22:59                                      [facility] 

                                                                                             

22:02 Order name: IV Saline Lock; Complete Time: 22:41                                        [facility] 

                                                                                             

22:02 Order name: Labs collected and sent; Complete Time: 22:41                               [facility] 

                                                                                             

22:02 Order name: O2 Per Protocol; Complete Time: 22:59                                       [facility] 

                                                                                             

22:02 Order name: O2 Sat Monitoring; Complete Time: 22:59                                      

                                                                                                  

Administered Medications:                                                                         

22:25 Drug: Albuterol - atroVENT (3:1) (2.5 mg - 0.5 mg) 3 ml Route: Nebulizer;               lp1 

                                                                                             

00:15 Follow up: Response: No adverse reaction; Marked relief of symptoms                     lp1 

                                                                                             

22:45 Drug: SOLU-Medrol 125 mg Route: IVP; Site: left antecubital;                            lp1 

                                                                                             

00:15 Follow up: Response: No adverse reaction                                                lp1 

                                                                                                  

                                                                                                  

Disposition:                                                                                      

02:34 Co-signature as Attending Physician, Shawn Landa MD.                                    rn  

                                                                                                  

Disposition:                                                                                      

19 23:41 Hospitalization ordered by Dara Houston for Observation. Preliminary             

  diagnosis is Chronic obstructive pulmonary disease with (acute) exacerbation.                   

- Bed requested for Telemetry/MedSurg (observation).                                              

- Status is Observation.                                                                      lp1 

- Condition is Stable.                                                                            

- Problem is new.                                                                                 

- Symptoms have improved.                                                                         

UTI on Admission? No                                                                              

                                                                                                  

                                                                                                  

                                                                                                  

Signatures:                                                                                       

Dispatcher [employer]                           EDMS                                                 

Ana Cody RN                     RN   aj1                                                  

Carolyn Grullon RN                        CAROL                                                      

Shawn Landa MD MD rn Pena, Laura, RN RN   lp1                                                  

Jorge David PA PA   jr8                                                  

                                                                                                  

Corrections: (The following items were deleted from the chart)                                    

00:18  23:41 Hospitalization Ordered by Dara Houston MD for Observation. Preliminary    mw  

      diagnosis is Chronic obstructive pulmonary disease with (acute) exacerbation. Bed           

      requested for Telemetry/MedSurg (observation). Status is Observation. Condition is          

      Stable. Problem is new. Symptoms have improved. UTI on Admission? No. jr8                   

                                                                                             

01:07 00:18 2019 23:41 Hospitalization Ordered by Dara Houston MD for Observation.     lp1 

      Preliminary diagnosis is Chronic obstructive pulmonary disease with (acute)                 

      exacerbation. Bed requested for Telemetry/MedSurg (observation). Status is Observation.     

      Condition is Stable. Problem is new. Symptoms have improved. UTI on Admission? No. mw       

                                                                                                  

**************************************************************************************************

## 2019-08-30 NOTE — ER
Nurse's Notes                                                                                     

 [facility]                                                                 

Name: Omkar Chung                                                                                  

Age: 59 yrs                                                                                       

Sex: Male                                                                                         

: 1960                                                                                   

MRN: [id number]                                                                                   

Arrival Date: 2019                                                                          

Time: 21:53                                                                                       

Account#: [id number]                                                                            

Bed 3                                                                                             

Private MD:                                                                                       

Diagnosis: Chronic obstructive pulmonary disease with (acute) exacerbation                        

                                                                                                  

Presentation:                                                                                     

                                                                                             

21:54 Presenting complaint:.                                                                  aj1 

22:09 Presenting complaint: Patient states: Shortness of breath that has gotten progressively aj1 

      worse over the past 2 to 3 weeks. Also reports productive cough. Patient is 82% on room     

      air. Transition of care: patient was not received from another setting of care. Onset       

      of symptoms was 2019. Risk Assessment: Do you want to hurt yourself or           

      someone else? Patient reports no desire to harm self or others. Initial Sepsis Screen:      

      Does the patient meet any 2 criteria? RR > 20 per min. HR > 90 bpm. Yes Does the            

      patient have a suspected source of infection? Yes: Productive cough/pneumonia. Care         

      prior to arrival: None.                                                                     

22:09 Method Of Arrival: Wheelchair                                                           aj1 

22:09 Acuity: BROCK 2                                                                           aj1 

                                                                                                  

Triage Assessment:                                                                                

22:00 General: Appears uncomfortable, Behavior is cooperative, anxious. Pain: Denies pain.    aj1 

      Neuro: Level of Consciousness is awake, alert, obeys commands, Oriented to person,          

      place, time, situation. Cardiovascular: Patient's skin is warm and dry. Respiratory:        

      Reports shortness of breath at rest cough that is productive, Airway is patent              

      Respiratory effort is even, labored, Respiratory pattern is regular, symmetrical,           

      tachypnea Breath sounds with wheezes bilaterally. Onset: The symptoms/episode               

      began/occurred gradually, the patient has moderate shortness of breath.                     

                                                                                                  

Historical:                                                                                       

- Allergies:                                                                                      

22:12 No Known Allergies;                                                                     aj1 

- PMHx:                                                                                           

22:12 Cirrhosis; COPD; Hernia;                                                                aj1 

                                                                                                  

- Immunization history:: Flu vaccine is not up to date.                                           

- Social history:: Smoking status: Patient/guardian denies using tobacco.                         

- Ebola Screening: : Patient denies travel to an Ebola-affected area in the 21 days               

  before illness onset.                                                                           

                                                                                                  

                                                                                                  

Screenin:01 Abuse screen: Denies threats or abuse. Denies injuries from another. Nutritional        lp1 

      screening: No deficits noted. Tuberculosis screening: No symptoms or risk factors           

      identified. Fall Risk None identified.                                                      

                                                                                                  

Assessment:                                                                                       

22:15 General: Appears uncomfortable, Behavior is appropriate for age. Pain: Denies pain.     lp1 

      Neuro: Level of Consciousness is awake, alert, obeys commands, Oriented to person,          

      place, time, situation. Cardiovascular: Patient's skin is warm and dry. Rhythm is sinus     

      tachycardia. Respiratory: Airway is patent Trachea midline Respiratory effort is            

      labored, Respiratory pattern is tachypnea Breath sounds are diminished in left              

      posterior lower lobe Breath sounds with wheezes in right posterior middle lobe and          

      right posterior lower lobe. GI: No signs and/or symptoms were reported involving the        

      gastrointestinal system. : No signs and/or symptoms were reported regarding the           

      genitourinary system. EENT: No deficits noted. Derm: Skin is intact, is thin, Skin is       

      dry, Skin is normal. Musculoskeletal: No deficits noted.                                    

23:18 Reassessment: Patient O2 sat at 89% on RA after nebulizer tx; O2 via NC at 2L applied,  lp1 

      O2 at 95%; Patient states "I felt short of breath again"; relief noted with NC in place.    

                                                                                             

00:30 Reassessment: Patient is alert, oriented x 3, equal unlabored respirations, skin        lp1 

      warm/dry/pink. Patient took off NC, states "I wanted to see how I would feel"; O2 at        

      93% on RA, unlabored respirations Patient states feeling better. Patient states             

      symptoms have improved.                                                                     

                                                                                                  

Vital Signs:                                                                                      

                                                                                             

22:00  / 84; Pulse 116; Resp 23; Temp 98.6(TE); Pulse Ox 82% on R/A; Weight 90.72 kg    aj1 

      (R); Height 6 ft. 2 in. (187.96 cm) (R); Pain 0/10;                                         

22:05 Pulse Ox 93% on 2 lpm NC;                                                               aj1 

22:15  / 87; Pulse 106; Resp 23; Pulse Ox 94% on 2 lpm NC;                              lp1 

22:30  / 89; Pulse 104; Resp 18; Pulse Ox 98% on 2 lpm NC;                              lp1 

23:00  / 80; Pulse 100; Resp 22; Pulse Ox 97% on Nebulizer Mask;                        lp1 

23:30  / 93; Pulse 101; Resp 19; Pulse Ox 96% on 2 lpm NC;                              lp1 

                                                                                             

00:00  / 77; Pulse 97; Resp 12; Pulse Ox 92% on R/A;                                    lp1 

00:30  / 75; Pulse 94; Resp 16; Pulse Ox 93% on R/A;                                    lp1 

01:02  / 79; Pulse 96; Resp 18; Pulse Ox 94% on R/A;                                    lp1 

                                                                                             

22:00 Body Mass Index 25.68 (90.72 kg, 187.96 cm)                                             aj1 

                                                                                                  

ED Course:                                                                                        

                                                                                             

21:53 Patient arrived in ED.                                                                  ag3 

22:00 Arm band placed on.                                                                     aj1 

22:02 Jorge David PA is PHCP.                                                               [id number] 

22:02 Shawn Landa MD is Attending Physician.                                                [id number] 

22:10 Triage completed.                                                                       aj1 

22:15 Patient has correct armband on for positive identification. Placed in gown. Bed in low  lp1 

      position. Call light in reach. Cardiac monitor on. Pulse ox on. NIBP on.                    

22:26 XRAY Chest (1 view) In Process Unspecified.                                             EDMS

22:40 Inserted saline lock: 20 gauge in left antecubital area, using aseptic technique. Blood oe  

      collected.                                                                                  

22:45 Earnestine Perkins, RN is Primary Nurse.                                                       lp1 

23:41 Dara Houston MD is Hospitalizing Provider.                                           [id number] 

                                                                                             

00:54 No provider procedures requiring assistance completed. Patient admitted, IV remains in  lp1 

      place.                                                                                      

                                                                                                  

Administered Medications:                                                                         

                                                                                             

22:25 Drug: Albuterol - atroVENT (3:1) (2.5 mg - 0.5 mg) 3 ml Route: Nebulizer;               lp1 

                                                                                             

00:15 Follow up: Response: No adverse reaction; Marked relief of symptoms                     lp1 

                                                                                             

22:45 Drug: SOLU-Medrol 125 mg Route: IVP; Site: left antecubital;                            lp1 

                                                                                             

00:15 Follow up: Response: No adverse reaction                                                lp1 

                                                                                                  

                                                                                                  

Outcome:                                                                                          

                                                                                             

23:41 Decision to Hospitalize by Provider.                                                    [id number] 

                                                                                             

00:53 Admitted to Tele accompanied by tech, via wheelchair, room 431, with oxygen, with       lp1 

      chart, Report called to  Griselda Sullivan RN                                                   

      Condition: stable                                                                           

      Instructed on the need for admit.                                                           

01:07 Patient left the ED.                                                                    lp1 

                                                                                                  

Signatures:                                                                                       

Dispatcher [employer]                           Ana Mullen RN                     RN   aj1                                                  

Earnestine Perkins RN                         RN   lp1                                                  

Jorge David PA PA   jr8                                                  

Neptali Ferrari Alice                                 ag3                                                  

                                                                                                  

**************************************************************************************************

## 2019-08-31 LAB
COHGB MFR BLDA: 1.6 % (ref 0–1.5)
OXYHGB MFR BLDA: 88.9 % (ref 94–97)
SAO2 % BLDA: 90.8 % (ref 92–98.5)
UA DIPSTICK W REFLEX MICRO PNL UR: (no result)

## 2019-08-31 RX ADMIN — METHYLPREDNISOLONE SODIUM SUCCINATE SCH MG: 40 INJECTION, POWDER, FOR SOLUTION INTRAMUSCULAR; INTRAVENOUS at 15:43

## 2019-08-31 RX ADMIN — ALBUTEROL SULFATE SCH MG: 2.5 SOLUTION RESPIRATORY (INHALATION) at 06:05

## 2019-08-31 RX ADMIN — ALBUTEROL SULFATE SCH MG: 2.5 SOLUTION RESPIRATORY (INHALATION) at 13:15

## 2019-08-31 RX ADMIN — IPRATROPIUM BROMIDE SCH MG: 0.5 SOLUTION RESPIRATORY (INHALATION) at 19:45

## 2019-08-31 RX ADMIN — Medication SCH ML: at 20:50

## 2019-08-31 RX ADMIN — ALBUTEROL SULFATE SCH MG: 2.5 SOLUTION RESPIRATORY (INHALATION) at 02:20

## 2019-08-31 RX ADMIN — IPRATROPIUM BROMIDE SCH: 0.5 SOLUTION RESPIRATORY (INHALATION) at 01:00

## 2019-08-31 RX ADMIN — IPRATROPIUM BROMIDE SCH MG: 0.5 SOLUTION RESPIRATORY (INHALATION) at 13:15

## 2019-08-31 RX ADMIN — IPRATROPIUM BROMIDE SCH MG: 0.5 SOLUTION RESPIRATORY (INHALATION) at 02:20

## 2019-08-31 RX ADMIN — IPRATROPIUM BROMIDE SCH MG: 0.5 SOLUTION RESPIRATORY (INHALATION) at 07:45

## 2019-08-31 RX ADMIN — ARFORMOTEROL TARTRATE SCH MCG: 15 SOLUTION RESPIRATORY (INHALATION) at 19:45

## 2019-08-31 RX ADMIN — Medication SCH ML: at 08:39

## 2019-08-31 RX ADMIN — NICOTINE SCH MG: 21 PATCH TRANSDERMAL at 08:39

## 2019-08-31 RX ADMIN — NICOTINE SCH MG: 21 PATCH TRANSDERMAL at 02:38

## 2019-08-31 RX ADMIN — IPRATROPIUM BROMIDE SCH MG: 0.5 SOLUTION RESPIRATORY (INHALATION) at 06:05

## 2019-08-31 NOTE — RAD REPORT
EXAM DESCRIPTION:  Markos Single View8/30/2019 10:27 pm

 

CLINICAL HISTORY:  sob

 

COMPARISON:  10/2018

 

FINDINGS:  Lungs are hyperaerated. The lungs appear clear of acute infiltrate. The heart is normal si
ze

 

IMPRESSION:   COPD without visualization acute abnormality

## 2019-08-31 NOTE — P.HP
Certification for Inpatient


Patient admitted to: Inpatient


With expected LOS: >2 Midnights


Patient will require the following post-hospital care: None


Practitioner: I am a practitioner with admitting privileges, knowledge of 

patient current condition, hospital course, and medical plan of care.


Services: Services provided to patient in accordance with Admission 

requirements found in Title 42 Section 412.3 of the Code of Federal Regulations





Patient History


Date of Service: 08/31/19


Reason for admission:  shortness of breath


History of Present Illness: 


.  Patient is a 59-year-old gentleman has a history of COPD.  He started 

drinking and smoking around the age of 15.  However, he decided to stop a 

couple years ago as his COPD progressively worsen.  He had a pulmonary function 

test and was told that he only had 25% of his lungs functioning.  He has severe 

COPD.  He takes nebulizer treatments including albuterol and Atrovent as well 

as daily Symbicort.  He  has a PCP in [place].  He also was seeing a 

pulmonologist in [place].  He has not followed up and about a year.  He wants 

to try the get set up with someone locally in [place].  As patient was 

hypoxic on arrival satting 80% on room air.  Patient was placed on 2 L and 

satting 91%.  Patient will probably need home oxygen arranged.  He will be 

admitted to the hospital for treatment of his COPD.


Allergies





No Known Allergies Allergy (Verified 08/14/17 16:33)


 





Home Medications: 








Fluticasone [Flovent Hfa 110] 2 sprays IH Q4HP PRN 08/14/17 


Furosemide [Lasix] 40 mg PO DAILY 08/14/17 


Spironolactone [Aldactone] 100 mg PO DAILY 08/14/17 


Albuterol Sulfate [Albuterol Sulfate 0.083% Neb Soln] 2.5 mg IH Q4HP PRN 08/31/ 19 


Ipratropium/Albuterol Sulfate [Iprat-Albut 0.5-3(2.5) mg/3 ml] 3 ml IH Q6HP PRN 

08/31/19 








- Past Medical/Surgical History


Has patient received pneumonia vaccine in the past: Yes


Diabetic: No


-: Cirrohsis


-: Copd


-: Asthma


-: Hernia sx





- Family History


  ** Father


Medical History: Hypertension, Lung disease





  ** Mother


Medical History: Diabetes





- Social History


Smoking Status: Former smoker


Alcohol use: No


CD- Drugs: No


Caffeine use: Yes


Place of Residence: Home





Review of Systems


10-point ROS is otherwise unremarkable





Physical Examination





- Vital Signs


Temperature: 98.3 F


Blood Pressure: 135/79


Pulse: 108


Respirations: 16


Pulse Ox (%): 91





- Physical Exam


General: Alert, In no apparent distress, Oriented x3


HEENT: Atraumatic, PERRLA, Mucous membr. moist/pink, EOMI, Sclerae nonicteric


Neck: Supple, 2+ carotid pulse no bruit, No LAD, Without JVD or thyroid 

abnormality


Respiratory: Diminished, Expiratory wheezes


Cardiovascular: Regular rate/rhythm, Normal S1 S2, No murmurs


Gastrointestinal: Normal bowel sounds, Soft and benign, Non-distended, No 

tenderness


Musculoskeletal: No swelling, No tenderness, Clubbing


Integumentary: No rashes


Neurological: Normal gait, Normal speech, Normal strength at 5/5 x4 extr, 

Normal tone, Sensation intact, Cranial nerves 3-12 intact, Normal affect


Lymphatics: No axilla or inguinal lymphadenopathy





- Studies


Laboratory Data (last 24 hrs)





08/30/19 22:35: PT 13.7 H, INR 1.17


08/30/19 22:35: WBC 8.2, Hgb 16.9, Hct 48.6, Plt Count 135 L


08/30/19 22:35: Sodium 138, Potassium 4.3, BUN 9, Creatinine 1.21, Glucose 135 H

, Magnesium 2.0, Total Bilirubin 2.0 H, AST 33, ALT 31, Alkaline Phosphatase 107








Assessment & Plan





- Problems (Diagnosis)


(1) COPD with acute exacerbation


Current Visit: Yes   Status: Acute   





(2) Hypoxemia


Current Visit: Yes   Status: Acute   





(3) Alcoholic cirrhosis of liver


Current Visit: Yes   Status: Acute   





- Plan





-nebs, steroids, and antibiotics


-O2 per protocol.


-outpatient spirometry or pulmonary function testing


-repeat chest x-ray


-pulmonary consultation





Discharge Plan: Home


Plan to discharge in: Greater than 2 days





- Advance Directives


Does patient have a Living Will: No


Does patient have a Durable POA for Healthcare: No





- Code Status/Comfort Care


Code Status Assessed: Yes


Code Status: Full Code


Critical Care: No


Time Spent Managing PTS Care (In Minutes): 45

## 2019-09-01 VITALS — TEMPERATURE: 98.2 F | SYSTOLIC BLOOD PRESSURE: 126 MMHG | DIASTOLIC BLOOD PRESSURE: 67 MMHG

## 2019-09-01 VITALS — OXYGEN SATURATION: 90 %

## 2019-09-01 LAB
ALBUMIN SERPL BCP-MCNC: 3.5 G/DL (ref 3.4–5)
ALP SERPL-CCNC: 89 U/L (ref 45–117)
ALT SERPL W P-5'-P-CCNC: 34 U/L (ref 12–78)
AST SERPL W P-5'-P-CCNC: 30 U/L (ref 15–37)
BLD SMEAR INTERP: (no result)
BUN BLD-MCNC: 15 MG/DL (ref 7–18)
GLUCOSE SERPLBLD-MCNC: 172 MG/DL (ref 74–106)
HCT VFR BLD CALC: 43.1 % (ref 39.6–49)
HDLC SERPL-MCNC: 107 MG/DL (ref 40–60)
LDLC SERPL CALC-MCNC: 68 MG/DL (ref ?–130)
LYMPHOCYTES # SPEC AUTO: 0.6 K/UL (ref 0.7–4.9)
MAGNESIUM SERPL-MCNC: 2 MG/DL (ref 1.8–2.4)
MORPHOLOGY BLD-IMP: (no result)
PMV BLD: 8.5 FL (ref 7.6–11.3)
POTASSIUM SERPL-SCNC: 4.3 MMOL/L (ref 3.5–5.1)
RBC # BLD: 4.54 M/UL (ref 4.33–5.43)

## 2019-09-01 RX ADMIN — METHYLPREDNISOLONE SODIUM SUCCINATE SCH MG: 40 INJECTION, POWDER, FOR SOLUTION INTRAMUSCULAR; INTRAVENOUS at 09:14

## 2019-09-01 RX ADMIN — IPRATROPIUM BROMIDE SCH MG: 0.5 SOLUTION RESPIRATORY (INHALATION) at 02:00

## 2019-09-01 RX ADMIN — SODIUM CHLORIDE SCH MG: 0.9 INJECTION, SOLUTION INTRAVENOUS at 09:03

## 2019-09-01 RX ADMIN — Medication SCH ML: at 09:15

## 2019-09-01 RX ADMIN — ARFORMOTEROL TARTRATE SCH: 15 SOLUTION RESPIRATORY (INHALATION) at 08:00

## 2019-09-01 RX ADMIN — ARFORMOTEROL TARTRATE SCH MCG: 15 SOLUTION RESPIRATORY (INHALATION) at 06:20

## 2019-09-01 RX ADMIN — IPRATROPIUM BROMIDE SCH MG: 0.5 SOLUTION RESPIRATORY (INHALATION) at 06:20

## 2019-09-01 RX ADMIN — IPRATROPIUM BROMIDE SCH: 0.5 SOLUTION RESPIRATORY (INHALATION) at 08:00

## 2019-09-01 RX ADMIN — SODIUM CHLORIDE SCH MG: 0.9 INJECTION, SOLUTION INTRAVENOUS at 01:54

## 2019-09-01 RX ADMIN — NICOTINE SCH MG: 21 PATCH TRANSDERMAL at 09:03

## 2019-09-01 RX ADMIN — METHYLPREDNISOLONE SODIUM SUCCINATE SCH MG: 40 INJECTION, POWDER, FOR SOLUTION INTRAMUSCULAR; INTRAVENOUS at 01:20

## 2019-09-01 NOTE — EKG
Test Date:    2019-08-30               Test Time:    22:33:10

Technician:   CATERINA                                     

                                                     

MEASUREMENT RESULTS:                                       

Intervals:                                           

Rate:         100                                    

AK:           176                                    

QRSD:         92                                     

QT:           354                                    

QTc:          456                                    

Axis:                                                

P:            84                                     

AK:           176                                    

QRS:          51                                     

T:            91                                     

                                                     

INTERPRETIVE STATEMENTS:                                       

                                                     

Normal sinus rhythm

Cannot rule out Anterior infarct, age undetermined

Abnormal ECG

Compared to ECG 10/01/2018 23:31:21

Ventricular premature complex(es) no longer present

Myocardial infarct finding still present



Electronically Signed On 09-01-19 08:07:13 CDT by Drew Stanley

## 2019-09-09 ENCOUNTER — HOSPITAL ENCOUNTER (INPATIENT)
Dept: HOSPITAL 97 - ER | Age: 59
LOS: 9 days | Discharge: ANOTHER HOSPITAL | DRG: 871 | End: 2019-09-18
Attending: FAMILY MEDICINE | Admitting: FAMILY MEDICINE
Payer: MEDICARE

## 2019-09-09 VITALS — BODY MASS INDEX: 26.6 KG/M2

## 2019-09-09 DIAGNOSIS — B96.5: ICD-10-CM

## 2019-09-09 DIAGNOSIS — A41.9: Primary | ICD-10-CM

## 2019-09-09 DIAGNOSIS — J15.1: ICD-10-CM

## 2019-09-09 DIAGNOSIS — E87.1: ICD-10-CM

## 2019-09-09 DIAGNOSIS — J96.21: ICD-10-CM

## 2019-09-09 DIAGNOSIS — D69.6: ICD-10-CM

## 2019-09-09 DIAGNOSIS — J91.8: ICD-10-CM

## 2019-09-09 DIAGNOSIS — J44.0: ICD-10-CM

## 2019-09-09 DIAGNOSIS — J44.1: ICD-10-CM

## 2019-09-09 DIAGNOSIS — N30.00: ICD-10-CM

## 2019-09-09 DIAGNOSIS — K70.30: ICD-10-CM

## 2019-09-09 LAB
ALBUMIN SERPL BCP-MCNC: 3 G/DL (ref 3.4–5)
ALP SERPL-CCNC: 106 U/L (ref 45–117)
ALT SERPL W P-5'-P-CCNC: 40 U/L (ref 12–78)
AST SERPL W P-5'-P-CCNC: 16 U/L (ref 15–37)
BUN BLD-MCNC: 36 MG/DL (ref 7–18)
GLUCOSE SERPLBLD-MCNC: 300 MG/DL (ref 74–106)
HCT VFR BLD CALC: 50.9 % (ref 39.6–49)
LIPASE SERPL-CCNC: 165 U/L (ref 73–393)
LYMPHOCYTES # SPEC AUTO: 0.5 K/UL (ref 0.7–4.9)
MORPHOLOGY BLD-IMP: (no result)
NT-PROBNP SERPL-MCNC: 336 PG/ML (ref ?–125)
PMV BLD: 8.9 FL (ref 7.6–11.3)
POTASSIUM SERPL-SCNC: 4.7 MMOL/L (ref 3.5–5.1)
RBC # BLD: 5.41 M/UL (ref 4.33–5.43)
TROPONIN (EMERG DEPT USE ONLY): < 0.02 NG/ML (ref 0–0.04)
UA COMPLETE W REFLEX CULTURE PNL UR: (no result)

## 2019-09-09 PROCEDURE — 94668 MNPJ CHEST WALL SBSQ: CPT

## 2019-09-09 PROCEDURE — 97530 THERAPEUTIC ACTIVITIES: CPT

## 2019-09-09 PROCEDURE — 87070 CULTURE OTHR SPECIMN AEROBIC: CPT

## 2019-09-09 PROCEDURE — 36415 COLL VENOUS BLD VENIPUNCTURE: CPT

## 2019-09-09 PROCEDURE — 84484 ASSAY OF TROPONIN QUANT: CPT

## 2019-09-09 PROCEDURE — 85025 COMPLETE CBC W/AUTO DIFF WBC: CPT

## 2019-09-09 PROCEDURE — 80053 COMPREHEN METABOLIC PANEL: CPT

## 2019-09-09 PROCEDURE — 71046 X-RAY EXAM CHEST 2 VIEWS: CPT

## 2019-09-09 PROCEDURE — 99285 EMERGENCY DEPT VISIT HI MDM: CPT

## 2019-09-09 PROCEDURE — 81001 URINALYSIS AUTO W/SCOPE: CPT

## 2019-09-09 PROCEDURE — 87186 SC STD MICRODIL/AGAR DIL: CPT

## 2019-09-09 PROCEDURE — 85610 PROTHROMBIN TIME: CPT

## 2019-09-09 PROCEDURE — 87088 URINE BACTERIA CULTURE: CPT

## 2019-09-09 PROCEDURE — 85027 COMPLETE CBC AUTOMATED: CPT

## 2019-09-09 PROCEDURE — 80048 BASIC METABOLIC PNL TOTAL CA: CPT

## 2019-09-09 PROCEDURE — 87804 INFLUENZA ASSAY W/OPTIC: CPT

## 2019-09-09 PROCEDURE — 83880 ASSAY OF NATRIURETIC PEPTIDE: CPT

## 2019-09-09 PROCEDURE — 76705 ECHO EXAM OF ABDOMEN: CPT

## 2019-09-09 PROCEDURE — 97161 PT EVAL LOW COMPLEX 20 MIN: CPT

## 2019-09-09 PROCEDURE — 93005 ELECTROCARDIOGRAM TRACING: CPT

## 2019-09-09 PROCEDURE — 71045 X-RAY EXAM CHEST 1 VIEW: CPT

## 2019-09-09 PROCEDURE — 83690 ASSAY OF LIPASE: CPT

## 2019-09-09 PROCEDURE — 96361 HYDRATE IV INFUSION ADD-ON: CPT

## 2019-09-09 PROCEDURE — 76604 US EXAM CHEST: CPT

## 2019-09-09 PROCEDURE — 82248 BILIRUBIN DIRECT: CPT

## 2019-09-09 PROCEDURE — 83605 ASSAY OF LACTIC ACID: CPT

## 2019-09-09 PROCEDURE — 84145 PROCALCITONIN (PCT): CPT

## 2019-09-09 PROCEDURE — 87077 CULTURE AEROBIC IDENTIFY: CPT

## 2019-09-09 PROCEDURE — 87086 URINE CULTURE/COLONY COUNT: CPT

## 2019-09-09 PROCEDURE — 82947 ASSAY GLUCOSE BLOOD QUANT: CPT

## 2019-09-09 PROCEDURE — 97116 GAIT TRAINING THERAPY: CPT

## 2019-09-09 PROCEDURE — 82962 GLUCOSE BLOOD TEST: CPT

## 2019-09-09 PROCEDURE — 80076 HEPATIC FUNCTION PANEL: CPT

## 2019-09-09 PROCEDURE — 94640 AIRWAY INHALATION TREATMENT: CPT

## 2019-09-09 PROCEDURE — 94760 N-INVAS EAR/PLS OXIMETRY 1: CPT

## 2019-09-09 PROCEDURE — 96375 TX/PRO/DX INJ NEW DRUG ADDON: CPT

## 2019-09-09 PROCEDURE — 94667 MNPJ CHEST WALL 1ST: CPT

## 2019-09-09 PROCEDURE — 87205 SMEAR GRAM STAIN: CPT

## 2019-09-09 PROCEDURE — 85730 THROMBOPLASTIN TIME PARTIAL: CPT

## 2019-09-09 PROCEDURE — 96365 THER/PROPH/DIAG IV INF INIT: CPT

## 2019-09-09 PROCEDURE — 87040 BLOOD CULTURE FOR BACTERIA: CPT

## 2019-09-09 RX ADMIN — RANITIDINE HYDROCHLORIDE SCH MG: 150 TABLET, FILM COATED ORAL at 21:17

## 2019-09-09 RX ADMIN — IPRATROPIUM BROMIDE PRN MG: 0.5 SOLUTION RESPIRATORY (INHALATION) at 20:15

## 2019-09-09 RX ADMIN — TEMAZEPAM PRN MG: 15 CAPSULE ORAL at 23:13

## 2019-09-09 RX ADMIN — SODIUM CHLORIDE SCH MLS: 9 INJECTION, SOLUTION INTRAVENOUS at 13:47

## 2019-09-09 RX ADMIN — Medication SCH ML: at 21:17

## 2019-09-09 RX ADMIN — SODIUM CHLORIDE SCH MLS: 0.9 INJECTION, SOLUTION INTRAVENOUS at 12:06

## 2019-09-09 RX ADMIN — RANITIDINE HYDROCHLORIDE SCH MG: 150 TABLET, FILM COATED ORAL at 14:39

## 2019-09-09 RX ADMIN — PIPERACILLIN SODIUM AND TAZOBACTAM SODIUM SCH MLS: 3; .375 INJECTION, POWDER, LYOPHILIZED, FOR SOLUTION INTRAVENOUS at 17:51

## 2019-09-09 RX ADMIN — ONDANSETRON PRN MG: 2 INJECTION INTRAMUSCULAR; INTRAVENOUS at 13:47

## 2019-09-09 RX ADMIN — ARFORMOTEROL TARTRATE SCH MCG: 15 SOLUTION RESPIRATORY (INHALATION) at 20:15

## 2019-09-09 NOTE — EDPHYS
Physician Documentation                                                                           

 [facility]                                                                 

Name: Omkar Chung                                                                                  

Age: 59 yrs                                                                                       

Sex: Male                                                                                         

: 1960                                                                                   

MRN: [id number]                                                                                   

Arrival Date: 2019                                                                          

Time: 07:49                                                                                       

Account#: [id number]                                                                            

Bed 20                                                                                            

Private MD:                                                                                       

ED Physician Shawn Landa                                                                         

HPI:                                                                                              

                                                                                             

08:06 This 59 yrs old  Male presents to ER via Un[facility] with complaints of         rn  

      Shortness Of Breath, Cough.                                                                 

08:06 The patient has shortness of breath at rest, with light activity. Onset: The            rn  

      symptoms/episode began/occurred 3 day(s) ago. Duration: The symptoms are continuous.        

      The patient's shortness of breath is aggravated by coughing, exertion, light activity.      

      Severity of symptoms: At their worst the symptoms were moderate in the emergency            

      department the symptoms are unchanged. The patient has experienced similar episodes in      

      the past. Reports cough, sob, worse over last 2-3 days, recently admitted for COPD          

      exacerbation, states told did not have pneumonia, finished steroids. + pink sputum.         

      Decreased appetite and + malaise..                                                          

                                                                                                  

Historical:                                                                                       

- Allergies:                                                                                      

08:08 No Known Allergies;                                                                     ss  

- PMHx:                                                                                           

08:08 Cirrhosis; COPD; Hernia;                                                                ss  

                                                                                                  

- Immunization history:: Adult Immunizations up to date.                                          

- Family history:: not pertinent.                                                                 

- Ebola Screening: : Patient denies exposure to infectious person Patient denies travel           

  to an Ebola-affected area in the 21 days before illness onset.                                  

- Social history:: Smoking status: Patient uses tobacco products, unknown amount.                 

- Hospitalizations: : The patient was recently seen at [facility].                                                                                         

                                                                                                  

                                                                                                  

ROS:                                                                                              

08:06 Constitutional: Negative for fever, chills, and weight loss, Eyes: Negative for injury, rn  

      pain, redness, and discharge, Neck: Negative for injury, pain, and swelling,                

      Cardiovascular: Negative for chest pain, palpitations, and edema, Respiratory: Negative     

      for pleuritic chest pain Abdomen/GI: Negative for abdominal pain, nausea, vomiting,         

      diarrhea, and constipation, MS/Extremity: Negative for injury and deformity, Skin:          

      Negative for injury, rash, and discoloration, Neuro: Negative for headache, weakness,       

      numbness, tingling, and seizure.                                                            

                                                                                                  

Exam:                                                                                             

08:06 Constitutional:  This is a well developed, well nourished patient who is awake, alert,  rn  

      and in no acute distress. Head/Face:  Normocephalic, atraumatic. ENT:  dry MM, no           

      stridor Respiratory:  + diminished bilateral breath sounds Abdomen/GI:  soft,               

      non-tender Skin:  Warm, dry MS/ Extremity:  Pulses equal, no cyanosis.  Neurovascular       

      intact.  Full, normal range of motion.  Equal circumference. Neuro:  Awake and alert,       

      GCS 15, oriented to person, place, time, and situation. Able to slowly ambulate to bed      

      from wheelchair                                                                             

                                                                                                  

Vital Signs:                                                                                      

08:05  / 88; Pulse 89; Resp 21; Temp 98.2(TE); Pulse Ox 81% on R/A;                     ss  

08:05 Pulse Ox 89% on R/A;                                                                    ss  

08:40  / 66; Pulse 85; Resp 20; Temp 98.1(TE); Pulse Ox 92% on 2 lpm NC;                mh5 

09:52  / 59; Pulse 87; Resp 22; Temp 98.4(TE); Pulse Ox 93% on 2 lpm NC;                mh5 

                                                                                                  

MDM:                                                                                              

07:50 Patient medically screened.                                                             rn  

09:00 Differential diagnosis: Chronic Obstructive Pulmonary Disease pneumonia, Pneumothorax   rn  

      pulmonary edema. Data reviewed: vital signs, nurses notes, lab test result(s), EKG,         

      radiologic studies, plain films. Test interpretation: by ED physician or midlevel           

      provider: ECG, plain radiologic studies, CXR with developing left basilar pneumonia, no     

      pneumothorax. Counseling: I had a detailed discussion with the patient and/or guardian      

      regarding: the historical points, exam findings, and any diagnostic results supporting      

      the discharge/admit diagnosis, radiology results, the need for further work-up and          

      treatment in the hospital. Response to treatment: the patient's symptoms have mildly        

      improved after treatment. Admission orders: after a detailed discussion of the              

      patient's condition and case, the admit orders are written by me.                           

                                                                                                  

                                                                                             

08:06 Order name: Hepatic Function; Complete Time: :28                                      rn  

                                                                                             

08:06 Order name: Blood Culture Adult (2)                                                     rn  

                                                                                             

08:06 Order name: BMP; Complete Time: :28                                                   rn  

                                                                                             

08:06 Order name: CBC with Diff; Complete Time: 10:48                                         rn  

                                                                                             

08:06 Order name: Lipase; Complete Time: :                                                rn  

                                                                                             

08:06 Order name: NT PRO-BNP; Complete Time: 09:28                                            rn  

                                                                                             

08:06 Order name: XRAY CXR (1 view); Complete Time: 08:53                                     rn  

                                                                                             

08:06 Order name: Troponin (emerg Dept Use Only); Complete Time: 09:28                        rn  

                                                                                             

08:06 Order name: Flu; Complete Time: 09:07                                                   rn  

                                                                                             

08:11 Order name: Procalcitonin; Complete Time: 09:41                                         rn  

                                                                                             

09:28 Order name: US Abdomen Limited                                                          rn  

                                                                                             

10:40 Order name: Manual Differential; Complete Time: 10:48                                   EDMS

                                                                                             

08:06 Order name: IV Start; Complete Time: 08:52                                              rn  

                                                                                             

08:06 Order name: EKG; Complete Time: 08:08                                                   rn  

                                                                                             

08:06 Order name: Cardiac monitoring; Complete Time: 08:08                                    rn  

                                                                                             

08:06 Order name: EKG - Nurse/Tech; Complete Time: 08:52                                      rn  

                                                                                             

08:06 Order name: Labs collected and sent; Complete Time: 08:52                               rn  

                                                                                             

08:06 Order name: O2 Per Protocol; Complete Time: 08:09                                       rn  

                                                                                             

08:06 Order name: O2 Sat Monitoring; Complete Time: 08:09                                     rn  

                                                                                             

09:32 Order name: CONS Physician Consult                                                      EDMS

                                                                                             

09:32 Order name: Dr Peggy Consult                                                          EDMS

                                                                                                  

Administered Medications:                                                                         

08:30 Drug: SOLU-Medrol 125 mg Route: IVP; Site: left antecubital;                            bp  

10:47 Follow up: Response: No adverse reaction                                                bp  

08:30 Drug: NS 0.9% 500 ml Route: IV; Rate: bolus; Site: left antecubital;                    bp  

09:00 Follow up: IV Intake: 500ml                                                             bp  

10:02 Follow up: IV Status: Completed infusion                                                bp  

08:30 Drug: Xopenex (3) 1.25 mg Route: Inhalation;                                            bp  

09:25 Drug: Rocephin - (cefTRIAXone) 1 grams Route: IVPB; Infused Over: 30 mins; Site: left   bp  

      antecubital;                                                                                

10:02 Follow up: IV Status: Completed infusion; IV Intake: 50ml                               bp  

                                                                                                  

                                                                                                  

Disposition:                                                                                      

19 09:03 Hospitalization ordered by Lou Newell for Inpatient Admission. Preliminary      

  diagnosis are Chronic obstructive pulmonary disease with acute lower                            

  respiratory infection, Chronic obstructive pulmonary disease with (acute)                       

  exacerbation, Pneumonia.                                                                        

- Bed requested for Telemetry/MedSurg (Inpatient).                                                

- Status is Inpatient Admission.                                                              bp  

- Condition is Stable.                                                                            

- Problem is new.                                                                                 

- Symptoms have improved.                                                                         

UTI on Admission? No                                                                              

                                                                                                  

                                                                                                  

                                                                                                  

Signatures:                                                                                       

Dispatcher [employer]                           EDMS                                                 

Kelsey Flowers                                                                                 

Shawn Landa MD MD rn Smirch, Shelby, RN RN   ss                                                   

Kvng Parekh RN                      RN   bp                                                   

                                                                                                  

Corrections: (The following items were deleted from the chart)                                    

09:04 09:03 Hospitalization Ordered by Jovany Ovalles DO for Inpatient Admission. Preliminary  rn  

      diagnosis is Chronic obstructive pulmonary disease with acute lower respiratory             

      infection; Chronic obstructive pulmonary disease with (acute) exacerbation; Pneumonia.      

      Bed requested for Telemetry/MedSurg (Inpatient). Status is Inpatient Admission.             

      Condition is Stable. Problem is new. Symptoms have improved. UTI on Admission? No. rn       

09:46 09:04 2019 09:03 Hospitalization Ordered by Lou Newell MD for Inpatient         bd  

      Admission. Preliminary diagnosis is Chronic obstructive pulmonary disease with acute        

      lower respiratory infection; Chronic obstructive pulmonary disease with (acute)             

      exacerbation; Pneumonia. Bed requested for Telemetry/MedSurg (Inpatient). Status is         

      Inpatient Admission. Condition is Stable. Problem is new. Symptoms have improved. UTI       

      on Admission? No. rn                                                                        

10:48 09:46 2019 09:03 Hospitalization Ordered by Lou Newell MD for Inpatient         bp  

      Admission. Preliminary diagnosis is Chronic obstructive pulmonary disease with acute        

      lower respiratory infection; Chronic obstructive pulmonary disease with (acute)             

      exacerbation; Pneumonia. Bed requested for Telemetry/MedSurg (Inpatient). Status is         

      Inpatient Admission. Condition is Stable. Problem is new. Symptoms have improved. UTI       

      on Admission? No. bd                                                                        

                                                                                                  

**************************************************************************************************

## 2019-09-09 NOTE — P.HP
Certification for Inpatient


Patient admitted to: Inpatient


With expected LOS: >2 Midnights


Patient will require the following post-hospital care: None


Practitioner: I am a practitioner with admitting privileges, knowledge of 

patient current condition, hospital course, and medical plan of care.


Services: Services provided to patient in accordance with Admission 

requirements found in Title 42 Section 412.3 of the Code of Federal Regulations





<Finn David - Last Filed: 09/09/19 09:46>





Patient History


Date of Service: 09/09/19


Primary Care Provider: None


Reason for admission: COPD, Pneumonia, Hypoxia


History of Present Illness: 





This is a 58 y/o M that presented to ED in respiratory distress. Stated that he 

has been feeling worse over the past few days. Could not take the shortness of 

breath any longer. Was admitted 10 days ago for COPD exacerbation at [facility]. Had 

improved and was seen by Dr. Kay who is his pulmonologist. Had started him 

on Trelegy Ellipta and a course of Dexamethasone at that time with improvement. 

Patient today was found to have 34 WBC count and a developing Left Lower Lobe 

pneumonia with brown productive sputum. Patient currently does not meet septic 

criteria although blood cultures were drawn. Elevated Bilirubin was also noted 

that is worse then his previous recent visit.


Home medications list reviewed: Yes





- Past Medical/Surgical History


Diabetic: No


-: Cirrohsis


-: Copd


-: Asthma


-: Hernia sx





- Family History


  ** Father


-: Hypertension, Lung disease





  ** Mother


-: Diabetes





- Social History


Smoking Status: Former smoker


Alcohol use: No


CD- Drugs: No


Caffeine use: Yes


Place of Residence: Home





<Leatha Davidshua - Last Filed: 09/09/19 09:46>


Date of Service: 09/09/19





<Lou Newell - Last Filed: 09/09/19 13:28>


Allergies





No Known Allergies Allergy (Verified 08/14/17 16:33)


 





Home Medications: 








Albuterol Neb [Proventil 0.083% Neb Soln] 2 inh IH Q4HP PRN 09/09/19 


Fluticasone/Umeclidin/Vilanter [Trelegy Ellipta 100-62.5-25] 1 puff IH DAILY 09/ 09/19 


Folic Acid 0.4 mg PO DAILY 09/09/19 


Furosemide 40 mg PO DAILY 09/09/19 


Spironolactone 100 mg PO DAILY 09/09/19 


dexAMETHasone [Dexamethasone] 4 mg PO BID 09/09/19 








Review of Systems


General: Unremarkable


Eyes: Unremarkable


ENT: Unremarkable


Respiratory: Cough, Shortness of Breath, SOB with Excertion, Pleuritic Pain, 

Sputum, Wheezing


Cardiovascular: Edema


Gastrointestinal: Unremarkable


Musculoskeletal: Unremarkable


Integumentary: Unremarkable


Neurological: Unremarkable


Lymphatics: Unremarkable





<Finn David - Last Filed: 09/09/19 09:46>





Physical Examination





- Vital Signs


Temperature: 98.2 F


Blood Pressure: 157/88


Pulse: 88


Respirations: 22


Pulse Ox (%): 81 (Initial RA saturation )





- Physical Exam


General: Alert, In no apparent distress, Oriented x3


HEENT: Normocephalic, PERRLA, Mucous membr. moist/pink, EOMI


Neck: Supple, 2+ carotid pulse no bruit, JVD not distended, No Thyromegaly, No 

LAD


Respiratory: Expiratory wheezes


Cardiovascular: Normal pulses, Regular rate/rhythm, Normal S1 S2, No gallops, 

No rubs, No murmurs, Edema


Capillary refill: <2 Seconds


Gastrointestinal: Normal bowel sounds, Soft and benign, Non-distended, No 

ascites, No tenderness, No masses, No rebound, No guarding


Musculoskeletal: No clubbing, No swelling, No contractures, No erythema, No 

tenderness, No warmth


Integumentary: No rashes, No breakdown, No significant lesion, No tenderness/

swelling, No erythema, No warmth, No cyanosis


Neurological: Normal speech, Normal strength at 5/5 x4 extr, Normal tone, 

Sensation intact, Cranial nerves 3-12 intact, Normal reflexes 2+, Normal affect


Lymphatics: No axilla or inguinal lymphadenopathy





- Studies


Laboratory Data (last 24 hrs)





09/09/19 08:45: WBC 34.9 H* D, Hgb 18.1 H D, Hct 50.9 H D, Plt Count 107 L D


09/09/19 08:45: Sodium 128 L, Potassium 4.7, BUN 36 H D, Creatinine 1.38 H, 

Glucose 300 H, Total Bilirubin 6.3 H*, AST 16, ALT 40, Alkaline Phosphatase 106

, Lipase 165





Microbiology Data (last 24 hrs): 








09/09/19 08:15   Nasopharnyx   Influenza Type A Antigen Screen - Final


09/09/19 08:15   Nasopharnyx   Influenza Type B Antigen Screen - Final








<Finn David - Last Filed: 09/09/19 09:46>





- Studies


Laboratory Data (last 24 hrs)





09/09/19 08:45: WBC 34.9 H* D, Hgb 18.1 H D, Hct 50.9 H D, Plt Count 107 L D


09/09/19 08:45: Sodium 128 L, Potassium 4.7, BUN 36 H D, Creatinine 1.38 H, 

Glucose 300 H, Total Bilirubin 6.3 H*, AST 16, ALT 40, Alkaline Phosphatase 106

, Lipase 165





Microbiology Data (last 24 hrs): 








09/09/19 08:15   Nasopharnyx   Influenza Type A Antigen Screen - Final


09/09/19 08:15   Nasopharnyx   Influenza Type B Antigen Screen - Final








<Lou Newell - Last Filed: 09/09/19 13:28>





Assessment and Plan





- Problems (Diagnosis)


(1) Pneumonia


Current Visit: Yes   Status: Acute   


Qualifiers: 


   Pneumonia type: due to unspecified organism   Laterality: left   Lung 

location: lower lobe of lung   Qualified Code(s): J18.1 - Lobar pneumonia, 

unspecified organism   





(2) Alcoholic cirrhosis of liver


Current Visit: No   Status: Chronic   


Qualifiers: 


   Ascites presence: without ascites   Qualified Code(s): K70.30 - Alcoholic 

cirrhosis of liver without ascites   





(3) COPD with acute exacerbation


Current Visit: No   Status: Acute   





(4) Hypoxemia


Current Visit: No   Status: Acute   





- Plan





Patient will be admitted with consult to both Dr. Kay for the COPD and 

Pneumonia along with Dr. Woods for elevated Bili. US ordered to assess liver. 

Abx initiated along with continuous breathing treatments and steroid therapy. 

Patient will have labs daily to assess WBC, H/H, and LFT's. WBC count most 

likely drastically elevated due to recent and continued steroid administration. 

Breathing and pneumonia will be monitored closely. Patient will continue his 

spironolactone and lasix for chronic cirrhosis of the liver. BP meds as needed 

for slight HTN. Patient will be on NC for stability of SPO2. 


Discharge Plan: Home


Plan to discharge in: Greater than 2 days





- Advance Directives


Does patient have a Living Will: No


Does patient have a Durable POA for Healthcare: No





- Code Status/Comfort Care


Code Status Assessed: Yes


Code Status: Full Code


Critical Care: No


Time Spent Managing Pts Care (In Minutes): 30





<Finn David - Last Filed: 09/09/19 09:46>


Physician Review Additional Text: 





Patient seen and examined. Agree with above plan. 





<Lou Newell - Last Filed: 09/09/19 13:28>

## 2019-09-09 NOTE — XMS REPORT
Patient Summary Document

 Created on:2019



Patient:ANAYELI HUDSON

Sex:Male

:1960

External Reference #:[id number]





Demographics







 Address  [address] [place] [address]

 

 Home Phone  [phone]

 

 Email Address  DECLINED

 

 Preferred Language  Unknown

 

 Marital Status  Unknown

 

 [employer] Affiliation  Unknown

 

 Race  Unknown

 

 Additional Race(s)  Unavailable

 

 Ethnic Group  Unknown









Author







 Organization  [facility]

 

 Address  [address]



   [place] [address]

 

 Phone  [phone]









Care Team Providers







 Name  Role  Phone

 

 Unavailable  Unavailable  Unavailable









Problems

This patient has no known problems.



Allergies, Adverse Reactions, Alerts

This patient has no known allergies or adverse reactions.



Medications

This patient has no known medications.

## 2019-09-09 NOTE — RAD REPORT
EXAM DESCRIPTION:  RAD - Chest Single View - 9/9/2019 8:24 am

 

CLINICAL HISTORY:  COPD;Cough

Chest pain.

 

COMPARISON:  Chest Single View dated 8/30/2019; Chest Single View dated 10/1/2018; Chest Single View 
dated 8/15/2018; Chest Single View dated 1/2/2017

 

FINDINGS:  Portable technique limits examination quality.

 

Prominent emphysematous changes are present throughout the lungs. Poorly defined left mid lung opacit
y has developed since the prior study, suspicious for pneumonia. The heart is normal in size. No disp
laced fractures.

 

IMPRESSION:  COPD with developing left mid lung pneumonia suspected.

## 2019-09-09 NOTE — ER
Nurse's Notes                                                                                     

 [facility]                                                                 

Name: Omkar Chung                                                                                  

Age: 59 yrs                                                                                       

Sex: Male                                                                                         

: 1960                                                                                   

MRN: [id number]                                                                                   

Arrival Date: 2019                                                                          

Time: 07:49                                                                                       

Account#: [id number]                                                                            

Bed 20                                                                                            

Private MD:                                                                                       

Diagnosis: Chronic obstructive pulmonary disease with acute lower respiratory infection;Chronic   

  obstructive pulmonary disease with (acute) exacerbation;Pneumonia                               

                                                                                                  

Presentation:                                                                                     

                                                                                             

07:50 Presenting complaint: Patient states: Admitted recently for COPD exacerbation. Patient  ss  

      states that he is not feeling much better and he feels like he has a "knot in my L          

      lung.". Transition of care: patient was not received from another setting of care.          

      Onset of symptoms is unknown. Risk Assessment: Do you want to hurt yourself or someone      

      else? Patient reports no desire to harm self or others. Initial Sepsis Screen: Does the     

      patient meet any 2 criteria? No. Patient's initial sepsis screen is negative. Does the      

      patient have a suspected source of infection? No. Patient's initial sepsis screen is        

      negative. Care prior to arrival: None.                                                      

07:50 Method Of Arrival: Ambulatory                                                           ss  

07:50 Acuity: BROCK 2                                                                           ss  

                                                                                                  

Triage Assessment:                                                                                

08:05 General: Appears in no apparent distress. comfortable, ill, slender, Behavior is        bp  

      cooperative, appropriate for age, anxious. Pain: Complains of pain in chest. EENT: No       

      deficits noted. Neuro: Level of Consciousness is awake, alert, obeys commands, Oriented     

      to Appropriate for age. Cardiovascular: No deficits noted. Respiratory: Reports             

      shortness of breath Onset: The symptoms/episode began/occurred suddenly, the patient        

      has moderate shortness of breath. GI: No signs and/or symptoms were reported involving      

      the gastrointestinal system. : No signs and/or symptoms were reported regarding the       

      genitourinary system. Derm: No deficits noted. Musculoskeletal: No deficits noted.          

                                                                                                  

Historical:                                                                                       

- Allergies:                                                                                      

08:08 No Known Allergies;                                                                     ss  

- PMHx:                                                                                           

08:08 Cirrhosis; COPD; Hernia;                                                                ss  

                                                                                                  

- Immunization history:: Adult Immunizations up to date.                                          

- Family history:: not pertinent.                                                                 

- Ebola Screening: : Patient denies exposure to infectious person Patient denies travel           

  to an Ebola-affected area in the 21 days before illness onset.                                  

- Social history:: Smoking status: Patient uses tobacco products, unknown amount.                 

- Hospitalizations: : The patient was recently seen at [facility].                                                                                         

                                                                                                  

                                                                                                  

Screenin:30 Abuse screen: Denies threats or abuse. Denies injuries from another. Nutritional        bp  

      screening: No deficits noted. Tuberculosis screening: No symptoms or risk factors           

      identified. Fall Risk None identified.                                                      

                                                                                                  

Assessment:                                                                                       

08:05 General: SEE TRIAGE NOTE. Cardiovascular: Rhythm is sinus rhythm. Respiratory: Airway   bp  

      is patent Respiratory effort is even, labored, Breath sounds are coarse bilaterally.        

09:00 Reassessment: ALL CURRENT ORDERS COMPLETED, RESULTS PENDING FOR DISPO.                  bp  

10:00 Reassessment: PT TO U/S, ADMIT ON HOLD.                                                 bp  

10:46 Reassessment: PT RETURNED FROM U/S, [place] WITH TECH.                                      bp  

                                                                                                  

Vital Signs:                                                                                      

08:05  / 88; Pulse 89; Resp 21; Temp 98.2(TE); Pulse Ox 81% on R/A;                     ss  

08:05 Pulse Ox 89% on R/A;                                                                    ss  

08:40  / 66; Pulse 85; Resp 20; Temp 98.1(TE); Pulse Ox 92% on 2 lpm NC;                mh5 

09:52  / 59; Pulse 87; Resp 22; Temp 98.4(TE); Pulse Ox 93% on 2 lpm NC;                mh5 

                                                                                                  

ED Course:                                                                                        

07:49 Patient arrived in ED.                                                                  mr  

07:50 Shawn Landa MD is Attending Physician.                                                rn  

07:50 Kvng Parekh, RN is Primary Nurse.                                                    bp  

08:05 Arm band placed on right wrist.                                                         ss  

08:11 Triage completed.                                                                       ss  

08:25 XRAY CXR (1 view) In Process Unspecified.                                               EDMS

08:38 Missed attempt(s): 20 gauge in left.                                                    mh5 

08:39 Patient has correct armband on for positive identification. Placed in gown. Bed in low  mh5 

      position. Call light in reach. Side rails up X2. Adult w/ patient. Warm blanket given.      

      Cardiac monitor on. Pulse ox on. NIBP on.                                                   

08:39 Flu Sent.                                                                               mh5 

08:40 Flu and/or RSV swab sent to lab.                                                        mh5 

09:03 Jovany Ovalles DO is Hospitalizing Provider.                                           rn  

09:04 Lou Newell MD is Hospitalizing Provider.                                            rn  

10:19 No provider procedures requiring assistance completed. Patient admitted, IV remains in  bp  

      place.                                                                                      

                                                                                                  

Administered Medications:                                                                         

08:30 Drug: SOLU-Medrol 125 mg Route: IVP; Site: left antecubital;                            bp  

10:47 Follow up: Response: No adverse reaction                                                bp  

08:30 Drug: NS 0.9% 500 ml Route: IV; Rate: bolus; Site: left antecubital;                    bp  

09:00 Follow up: IV Intake: 500ml                                                             bp  

10:02 Follow up: IV Status: Completed infusion                                                bp  

08:30 Drug: Xopenex (3) 1.25 mg Route: Inhalation;                                            bp  

09:25 Drug: Rocephin - (cefTRIAXone) 1 grams Route: IVPB; Infused Over: 30 mins; Site: left   bp  

      antecubital;                                                                                

10:02 Follow up: IV Status: Completed infusion; IV Intake: 50ml                               bp  

                                                                                                  

                                                                                                  

Intake:                                                                                           

09:00 IV: 500ml; Total: 500ml.                                                                bp  

10:02 IV: 50ml; Total: 550ml.                                                                 bp  

                                                                                                  

Outcome:                                                                                          

09:03 Decision to Hospitalize by Provider.                                                    rn  

10:18 Admitted to Med/surg accompanied by tech, via wheelchair, room 407, with oxygen, with   bp  

      chart, Report called to  OPAL MEDINA                                                           

10:19 Condition: stable                                                                       bp  

10:19 Instructed on the need for admit.                                                           

10:48 Patient left the ED.                                                                    bp  

                                                                                                  

Signatures:                                                                                       

Dispatcher [employer]                           EDMS                                                 

Kanchan Cristobal Roman, MD MD rn Smirch, Shelby, RN RN ss Martinez, Maria                              [facility]                                                  

Kvng Parekh RN                      RN   bp                                                   

                                                                                                  

**************************************************************************************************

## 2019-09-09 NOTE — EKG
Test Date:    2019-09-09               Test Time:    08:42:51

Technician:   KIERRA                                     

                                                     

MEASUREMENT RESULTS:                                       

Intervals:                                           

Rate:         82                                     

OH:           180                                    

QRSD:         84                                     

QT:           372                                    

QTc:          434                                    

Axis:                                                

P:            77                                     

OH:           180                                    

QRS:          51                                     

T:            87                                     

                                                     

INTERPRETIVE STATEMENTS:                                       

                                                     

Normal sinus rhythm

Possible Left atrial enlargement

Low voltage QRS

Septal infarct, age undetermined

Abnormal ECG

Compared to ECG 08/30/2019 22:33:10

Low QRS voltage now present

Myocardial infarct finding still present



Electronically Signed On 09-09-19 16:55:15 CDT by Drew Stanley

## 2019-09-09 NOTE — P.CNS
Date of Consult: 09/09/19


Primary Care Provider: None


Chief Complaint: Pneumonia


History of Present Illness: 





Patient is 59 years of age was recently admitted with an exacerbation of his 

underlying COPD according to the wife has not done any better in fact worse 

since discharge complaining of fever chills cough came back here to the 

emergency room was found to have a left lung pneumonia elevated white count 

patient is compliant with his medication has cirrhosis of the liver


Allergies





No Known Allergies Allergy (Verified 08/14/17 16:33)


 





Home Medications: 








Albuterol Neb [Proventil 0.083% Neb Soln] 2 inh IH Q4HP PRN 09/09/19 


Fluticasone/Umeclidin/Vilanter [Trelegy Ellipta 100-62.5-25] 1 puff IH DAILY 09/ 09/19 


Folic Acid 0.4 mg PO DAILY 09/09/19 


Furosemide 40 mg PO DAILY 09/09/19 


Spironolactone 100 mg PO DAILY 09/09/19 


dexAMETHasone [Dexamethasone] 4 mg PO BID 09/09/19 








- Past Medical/Surgical History


Diabetic: No


-: Liver Cirrohsis


-: COPD


-: Asthma


-: Paracentesis


-: Right Inguinal Hernia and Umbilical Hernia Repair





- Family History


  ** Father


Medical History: Hypertension, Lung disease





  ** Mother


Medical History: Diabetes





- Social History


Smoking Status: Current every day smoker


Alcohol use: No


CD- Drugs: No


Caffeine use: Yes


Place of Residence: Home





Review of Systems


10-point ROS is otherwise unremarkable


General: Weakness


Respiratory: Cough, Shortness of Breath





Physical Examination














Temp Pulse Resp BP Pulse Ox


 


 98.5 F   88   20   149/70 H  93 


 


 09/09/19 12:00  09/09/19 12:00  09/09/19 12:00  09/09/19 12:00  09/09/19 12:00








General: Alert, In no apparent distress, Oriented x3, Mild distress


HEENT: Atraumatic


Neck: Supple


Respiratory: Clear to auscultation bilaterally, Diminished


Cardiovascular: No edema, Regular rate/rhythm, Normal S1 S2


Gastrointestinal: Normal bowel sounds, Soft and benign


Laboratory Data (last 24 hrs)





09/09/19 08:45: WBC 34.9 H* D, Hgb 18.1 H D, Hct 50.9 H D, Plt Count 107 L D


09/09/19 08:45: Sodium 128 L, Potassium 4.7, BUN 36 H D, Creatinine 1.38 H, 

Glucose 300 H, Total Bilirubin 6.3 H*, AST 16, ALT 40, Alkaline Phosphatase 106

, Lipase 165








- Problems


(1) Pneumonia


Current Visit: Yes   Status: Acute   


Plan: 


Patient is 59 years of age with a history of COPD cirrhosis of the liver 

admitted with left lung pneumonia patient's white count is significantly 

elevated is also [id number] worsening I have also added vancomycin he is 

high risk for resistant infection patient is hypoxic Dc steroids cultures 

pending


Qualifiers: 


   Pneumonia type: due to unspecified organism   Laterality: left   Lung 

location: lower lobe of lung   Qualified Code(s): J18.1 - Lobar pneumonia, 

unspecified organism

## 2019-09-10 LAB
ALBUMIN SERPL BCP-MCNC: 2.5 G/DL (ref 3.4–5)
ALP SERPL-CCNC: 100 U/L (ref 45–117)
ALT SERPL W P-5'-P-CCNC: 34 U/L (ref 12–78)
AST SERPL W P-5'-P-CCNC: 12 U/L (ref 15–37)
BUN BLD-MCNC: 36 MG/DL (ref 7–18)
BUN BLD-MCNC: 40 MG/DL (ref 7–18)
GLUCOSE SERPLBLD-MCNC: 467 MG/DL (ref 74–106)
GLUCOSE SERPLBLD-MCNC: 495 MG/DL (ref 74–106)
HCT VFR BLD CALC: 44.7 % (ref 39.6–49)
LYMPHOCYTES # SPEC AUTO: 0.4 K/UL (ref 0.7–4.9)
NT-PROBNP SERPL-MCNC: 323 PG/ML (ref ?–125)
PMV BLD: 9.7 FL (ref 7.6–11.3)
POTASSIUM SERPL-SCNC: 5.4 MMOL/L (ref 3.5–5.1)
POTASSIUM SERPL-SCNC: 5.6 MMOL/L (ref 3.5–5.1)
RBC # BLD: 4.68 M/UL (ref 4.33–5.43)

## 2019-09-10 RX ADMIN — ONDANSETRON PRN MG: 2 INJECTION INTRAMUSCULAR; INTRAVENOUS at 16:30

## 2019-09-10 RX ADMIN — IPRATROPIUM BROMIDE PRN MG: 0.5 SOLUTION RESPIRATORY (INHALATION) at 08:00

## 2019-09-10 RX ADMIN — Medication SCH ML: at 08:44

## 2019-09-10 RX ADMIN — TRAMADOL HYDROCHLORIDE PRN MG: 50 TABLET, COATED ORAL at 11:14

## 2019-09-10 RX ADMIN — FUROSEMIDE SCH MG: 40 TABLET ORAL at 08:43

## 2019-09-10 RX ADMIN — ARFORMOTEROL TARTRATE SCH MCG: 15 SOLUTION RESPIRATORY (INHALATION) at 08:00

## 2019-09-10 RX ADMIN — ONDANSETRON PRN MG: 2 INJECTION INTRAMUSCULAR; INTRAVENOUS at 08:42

## 2019-09-10 RX ADMIN — TRAMADOL HYDROCHLORIDE PRN MG: 50 TABLET, COATED ORAL at 20:53

## 2019-09-10 RX ADMIN — TEMAZEPAM PRN MG: 15 CAPSULE ORAL at 21:33

## 2019-09-10 RX ADMIN — HUMAN INSULIN SCH UNIT: 100 INJECTION, SOLUTION SUBCUTANEOUS at 20:54

## 2019-09-10 RX ADMIN — SODIUM CHLORIDE SCH MLS: 9 INJECTION, SOLUTION INTRAVENOUS at 06:04

## 2019-09-10 RX ADMIN — SODIUM CHLORIDE SCH: 0.9 INJECTION, SOLUTION INTRAVENOUS at 06:04

## 2019-09-10 RX ADMIN — PIPERACILLIN SODIUM AND TAZOBACTAM SODIUM SCH MLS: 3; .375 INJECTION, POWDER, LYOPHILIZED, FOR SOLUTION INTRAVENOUS at 08:44

## 2019-09-10 RX ADMIN — PIPERACILLIN SODIUM AND TAZOBACTAM SODIUM SCH MLS: 3; .375 INJECTION, POWDER, LYOPHILIZED, FOR SOLUTION INTRAVENOUS at 00:41

## 2019-09-10 RX ADMIN — RANITIDINE HYDROCHLORIDE SCH MG: 150 TABLET, FILM COATED ORAL at 08:42

## 2019-09-10 RX ADMIN — RANITIDINE HYDROCHLORIDE SCH MG: 150 TABLET, FILM COATED ORAL at 20:54

## 2019-09-10 RX ADMIN — FOLIC ACID SCH MG: 1 TABLET ORAL at 08:42

## 2019-09-10 RX ADMIN — ARFORMOTEROL TARTRATE SCH MCG: 15 SOLUTION RESPIRATORY (INHALATION) at 20:00

## 2019-09-10 RX ADMIN — PIPERACILLIN SODIUM AND TAZOBACTAM SODIUM SCH MLS: 3; .375 INJECTION, POWDER, LYOPHILIZED, FOR SOLUTION INTRAVENOUS at 16:25

## 2019-09-10 RX ADMIN — HUMAN INSULIN SCH UNIT: 100 INJECTION, SOLUTION SUBCUTANEOUS at 17:13

## 2019-09-10 RX ADMIN — Medication SCH ML: at 20:54

## 2019-09-10 NOTE — RAD REPORT
EXAM DESCRIPTION:  US - Abdomen Exam Limited - 9/10/2019 7:01 am

 

CLINICAL HISTORY:  elevated bilirubin, known cirrhosis

 

Due to technical issues, images were delayed in availability.

 

COMPARISON:  No comparisons

 

FINDINGS:  Gallbladder is only partially filled. No gallstones, sludge or other abnormalities within 
the gallbladder lumen. Gallbladder wall is accentuated by the partially filled state. This could mask
 mild gallbladder wall thickening. No pericholecystic fluid. Active gallbladder process is not suspec
froilan.

 

No common duct stone or biliary tree dilatation identified.

 

IMPRESSION:  No acute or significant gallbladder or biliary tree finding.

## 2019-09-10 NOTE — P.PN
Subjective


Date of Service: 09/10/19


Primary Care Provider: None


Chief Complaint: Pneumonia


Subjective: Improving (Patient is improving still complaining of cough 

congestion)





Review of Systems


General: Weakness


Respiratory: Cough, Shortness of Breath





Physical Examination





- Vital Signs


Temperature: 98 F


Blood Pressure: 138/69


Pulse: 83


Respirations: 18


Pulse Ox (%): 94





- Physical Exam


General: Alert, Oriented x3


Respiratory: Clear to auscultation bilaterally


Cardiovascular: No edema, Regular rate/rhythm, Normal S1 S2


Gastrointestinal: Normal bowel sounds





- Studies


Microbiology Data (last 24 hrs): 








09/09/19 08:15   Nasopharnyx   Influenza Type A Antigen Screen - Final


09/09/19 08:15   Nasopharnyx   Influenza Type B Antigen Screen - Final








Assessment & Plan





- Problems (Diagnosis)


(1) Pneumonia


Current Visit: Yes   Status: Acute   


Plan: 


Patient admitted with pneumonia doing better patient has 3+ gram-negative rods 

in the sputum blood cultures are pending id pending continue with Zosyn vanc a 

probably Pseudomonas white count declining vital signs oxygenation stable 

hyperkalemia repeat Chem 7


Qualifiers: 


   Pneumonia type: due to unspecified organism   Laterality: left   Lung 

location: lower lobe of lung   Qualified Code(s): J18.1 - Lobar pneumonia, 

unspecified organism   


Physician Review Additional Text: 





Patient seen and examined. Agree with above plan.

## 2019-09-10 NOTE — P.PN
Subjective


Date of Service: 09/10/19


Primary Care Provider: None


Chief Complaint: Pneumonia


Subjective: Improving





Patient seen and examined at bedside. Chart reviewed and case discussed with 

nursing staff and Dr. Kay. 


Patient reports feeling better but continues to have reddish phlegm. Also 

complaining of left sided pain that is intermittent. 


No other complaints this am.





Review of Systems


10-point ROS is otherwise unremarkable





Physical Examination





- Vital Signs


Temperature: 98 F


Blood Pressure: 138/69


Pulse: 83


Respirations: 18


Pulse Ox (%): 94





- Physical Exam


General: Alert, In no apparent distress, Oriented x3


HEENT: Atraumatic, PERRLA, EOMI


Neck: Supple, JVD not distended


Respiratory: Clear to auscultation bilaterally, Normal air movement


Cardiovascular: Regular rate/rhythm, Normal S1 S2


Gastrointestinal: Normal bowel sounds, No tenderness


Musculoskeletal: No tenderness


Integumentary: No rashes


Neurological: Normal speech, Normal tone, Normal affect


Lymphatics: No axilla or inguinal lymphadenopathy





Assessment And Plan





- Current Problems (Diagnosis)


(1) Pneumonia


Current Visit: Yes   Status: Acute   


Plan: 


CXR with pneumonia


-Sputum cultures pending. Will adjust antibiotics accordingly. 


-Continue IV antibiotics; pt at high risk for resistant pna


-Continue steroids


-Pulmonology consulted.Recommendations appreciated


-Provide oxygen as needed. Patient currently on home oxygen


Qualifiers: 


   Pneumonia type: due to unspecified organism   Laterality: left   Lung 

location: lower lobe of lung   Qualified Code(s): J18.1 - Lobar pneumonia, 

unspecified organism   





(2) UTI (urinary tract infection)


Current Visit: Yes   Status: Acute   


Plan: 


Urine cultures pending


- Will continue antibiotics at this time. 


Qualifiers: 


   Urinary tract infection type: acute cystitis   Hematuria presence: without 

hematuria   Qualified Code(s): N30.00 - Acute cystitis without hematuria   





(3) Bacteremia


Current Visit: Yes   Status: Acute   


Plan: 


Cultures pending - will adjust antibiotics accordingly. 








(4) COPD with acute exacerbation


Current Visit: No   Status: Acute   





(5) Hypoxemia


Current Visit: No   Status: Acute   





(6) Alcoholic cirrhosis of liver


Current Visit: No   Status: Chronic   


Plan: 


Improving LFTs


-Pending GI recommendations. 


-He will likely need continued outpatient GI follow up





Qualifiers: 


   Ascites presence: without ascites   Qualified Code(s): K70.30 - Alcoholic 

cirrhosis of liver without ascites   


Physician Review Additional Text: 


.

## 2019-09-11 LAB
ALBUMIN SERPL BCP-MCNC: 2.6 G/DL (ref 3.4–5)
ALP SERPL-CCNC: 113 U/L (ref 45–117)
ALT SERPL W P-5'-P-CCNC: 32 U/L (ref 12–78)
AST SERPL W P-5'-P-CCNC: 17 U/L (ref 15–37)
HCT VFR BLD CALC: 46.1 % (ref 39.6–49)
INR BLD: 1.25
LYMPHOCYTES # SPEC AUTO: 0.7 K/UL (ref 0.7–4.9)
MORPHOLOGY BLD-IMP: (no result)
PMV BLD: 8.9 FL (ref 7.6–11.3)
RBC # BLD: 4.89 M/UL (ref 4.33–5.43)

## 2019-09-11 RX ADMIN — HUMAN INSULIN SCH UNIT: 100 INJECTION, SOLUTION SUBCUTANEOUS at 12:11

## 2019-09-11 RX ADMIN — TRAMADOL HYDROCHLORIDE PRN MG: 50 TABLET, COATED ORAL at 12:10

## 2019-09-11 RX ADMIN — Medication SCH ML: at 21:09

## 2019-09-11 RX ADMIN — PIPERACILLIN SODIUM AND TAZOBACTAM SODIUM SCH MLS: 3; .375 INJECTION, POWDER, LYOPHILIZED, FOR SOLUTION INTRAVENOUS at 00:07

## 2019-09-11 RX ADMIN — SPIRONOLACTONE SCH MG: 25 TABLET, FILM COATED ORAL at 08:48

## 2019-09-11 RX ADMIN — TRAMADOL HYDROCHLORIDE PRN MG: 50 TABLET, COATED ORAL at 04:36

## 2019-09-11 RX ADMIN — HUMAN INSULIN SCH UNIT: 100 INJECTION, SOLUTION SUBCUTANEOUS at 08:36

## 2019-09-11 RX ADMIN — RANITIDINE HYDROCHLORIDE SCH MG: 150 TABLET, FILM COATED ORAL at 21:04

## 2019-09-11 RX ADMIN — ARFORMOTEROL TARTRATE SCH MCG: 15 SOLUTION RESPIRATORY (INHALATION) at 12:07

## 2019-09-11 RX ADMIN — FUROSEMIDE SCH MG: 40 TABLET ORAL at 08:37

## 2019-09-11 RX ADMIN — LEVOFLOXACIN SCH MLS: 5 INJECTION, SOLUTION INTRAVENOUS at 08:48

## 2019-09-11 RX ADMIN — Medication SCH: at 08:38

## 2019-09-11 RX ADMIN — FOLIC ACID SCH MG: 1 TABLET ORAL at 08:37

## 2019-09-11 RX ADMIN — RANITIDINE HYDROCHLORIDE SCH MG: 150 TABLET, FILM COATED ORAL at 08:38

## 2019-09-11 RX ADMIN — SODIUM CHLORIDE SCH MLS: 0.9 INJECTION, SOLUTION INTRAVENOUS at 06:53

## 2019-09-11 RX ADMIN — HUMAN INSULIN SCH UNIT: 100 INJECTION, SOLUTION SUBCUTANEOUS at 17:16

## 2019-09-11 RX ADMIN — TRAMADOL HYDROCHLORIDE PRN MG: 50 TABLET, COATED ORAL at 21:04

## 2019-09-11 RX ADMIN — ARFORMOTEROL TARTRATE SCH: 15 SOLUTION RESPIRATORY (INHALATION) at 20:00

## 2019-09-11 RX ADMIN — HUMAN INSULIN SCH UNIT: 100 INJECTION, SOLUTION SUBCUTANEOUS at 21:05

## 2019-09-11 RX ADMIN — IPRATROPIUM BROMIDE PRN MG: 0.5 SOLUTION RESPIRATORY (INHALATION) at 01:27

## 2019-09-11 RX ADMIN — SODIUM CHLORIDE SCH MLS: 9 INJECTION, SOLUTION INTRAVENOUS at 01:08

## 2019-09-11 RX ADMIN — TEMAZEPAM PRN MG: 15 CAPSULE ORAL at 21:08

## 2019-09-11 NOTE — RAD REPORT
EXAM DESCRIPTION:  Markos Single View9/11/2019 6:39 am

 

CLINICAL HISTORY:  Chest pain

 

COMPARISON:  September 9, 2019

 

FINDINGS:  Mild right basilar opacities have developed. No significant change in the left lung opacit
ies.

 

Heart is normal size.

 

Lungs are hyperaerated

 

IMPRESSION:   Bilateral pneumonia

## 2019-09-11 NOTE — P.PN
Subjective


Date of Service: 09/11/19


Primary Care Provider: None


Chief Complaint: Pneumonia





Patient seen and examined at bedside. Chart reviewed and case discussed with 

nursing staff and Dr. Kay. 


Patient reports feeling better but continues to have reddish phlegm. Also 

complaining of left sided pain that is intermittent. 


No other complaints this am.





Review of Systems


10-point ROS is otherwise unremarkable





Physical Examination





- Vital Signs


Temperature: 98.3 F


Blood Pressure: 120/64


Pulse: 91


Respirations: 17


Pulse Ox (%): 92





- Physical Exam


General: Alert, In no apparent distress, Moderate distress


HEENT: Atraumatic, PERRLA, EOMI


Neck: Supple, JVD not distended


Respiratory: Clear to auscultation bilaterally, Normal air movement


Cardiovascular: Regular rate/rhythm, Normal S1 S2


Gastrointestinal: Normal bowel sounds, No tenderness


Musculoskeletal: No tenderness


Integumentary: No rashes


Neurological: Normal speech, Normal tone, Normal affect


Lymphatics: No axilla or inguinal lymphadenopathy





Assessment And Plan





- Current Problems (Diagnosis)


(1) Pneumonia


Current Visit: Yes   Status: Acute   


Plan: 


CXR with pneumonia


-Sputum cultures with Klebsiella pneumonia, sensitive to Levaquin.  Antibiotics 

adjusted. 


-Continue IV antibiotics; pt at high risk for resistant pna


-Continue steroids


-Pulmonology consulted.Recommendations appreciated


-Provide oxygen as needed. Patient currently on home oxygen


Qualifiers: 


   Pneumonia type: due to unspecified organism   Laterality: left   Lung 

location: lower lobe of lung   Qualified Code(s): J18.1 - Lobar pneumonia, 

unspecified organism   





(2) UTI (urinary tract infection)


Current Visit: Yes   Status: Acute   


Plan: 


Urine cultures pending


- Will continue antibiotics at this time. 


Qualifiers: 


   Urinary tract infection type: acute cystitis   Hematuria presence: without 

hematuria   Qualified Code(s): N30.00 - Acute cystitis without hematuria   





(3) Bacteremia


Current Visit: Yes   Status: Acute   


Plan: 


Cultures pending - will adjust antibiotics accordingly. 








(4) COPD with acute exacerbation


Current Visit: No   Status: Acute   





(5) Hypoxemia


Current Visit: No   Status: Acute   





(6) Alcoholic cirrhosis of liver


Current Visit: No   Status: Chronic   


Plan: 


Improving LFTs


-Pending GI recommendations. 


-He will likely need continued outpatient GI follow up





Qualifiers: 


   Ascites presence: without ascites   Qualified Code(s): K70.30 - Alcoholic 

cirrhosis of liver without ascites   





- Plan





Disposition:  Pending symptomatic improvement


Physician Review Additional Text: 


.

## 2019-09-12 LAB
ALBUMIN SERPL BCP-MCNC: 2.4 G/DL (ref 3.4–5)
ALP SERPL-CCNC: 105 U/L (ref 45–117)
ALT SERPL W P-5'-P-CCNC: 29 U/L (ref 12–78)
AST SERPL W P-5'-P-CCNC: 17 U/L (ref 15–37)
BUN BLD-MCNC: 23 MG/DL (ref 7–18)
GLUCOSE SERPLBLD-MCNC: 195 MG/DL (ref 74–106)
HCT VFR BLD CALC: 45.5 % (ref 39.6–49)
LYMPHOCYTES # SPEC AUTO: 0.9 K/UL (ref 0.7–4.9)
PMV BLD: 9.3 FL (ref 7.6–11.3)
POTASSIUM SERPL-SCNC: 4.8 MMOL/L (ref 3.5–5.1)
RBC # BLD: 4.81 M/UL (ref 4.33–5.43)

## 2019-09-12 RX ADMIN — IPRATROPIUM BROMIDE PRN MG: 0.5 SOLUTION RESPIRATORY (INHALATION) at 02:35

## 2019-09-12 RX ADMIN — Medication SCH: at 21:00

## 2019-09-12 RX ADMIN — FOLIC ACID SCH MG: 1 TABLET ORAL at 08:12

## 2019-09-12 RX ADMIN — TRAMADOL HYDROCHLORIDE PRN MG: 50 TABLET, COATED ORAL at 12:55

## 2019-09-12 RX ADMIN — RANITIDINE HYDROCHLORIDE SCH MG: 150 TABLET, FILM COATED ORAL at 08:11

## 2019-09-12 RX ADMIN — RANITIDINE HYDROCHLORIDE SCH MG: 150 TABLET, FILM COATED ORAL at 21:31

## 2019-09-12 RX ADMIN — LEVOFLOXACIN SCH MLS: 5 INJECTION, SOLUTION INTRAVENOUS at 08:12

## 2019-09-12 RX ADMIN — TRAMADOL HYDROCHLORIDE PRN MG: 50 TABLET, COATED ORAL at 04:29

## 2019-09-12 RX ADMIN — TRAMADOL HYDROCHLORIDE PRN MG: 50 TABLET, COATED ORAL at 21:30

## 2019-09-12 RX ADMIN — ARFORMOTEROL TARTRATE SCH: 15 SOLUTION RESPIRATORY (INHALATION) at 08:00

## 2019-09-12 RX ADMIN — HUMAN INSULIN SCH UNIT: 100 INJECTION, SOLUTION SUBCUTANEOUS at 17:00

## 2019-09-12 RX ADMIN — INSULIN GLARGINE SCH UNITS: 100 INJECTION, SOLUTION SUBCUTANEOUS at 08:13

## 2019-09-12 RX ADMIN — SODIUM CHLORIDE SCH MLS: 0.9 INJECTION, SOLUTION INTRAVENOUS at 12:41

## 2019-09-12 RX ADMIN — HUMAN INSULIN SCH UNIT: 100 INJECTION, SOLUTION SUBCUTANEOUS at 12:41

## 2019-09-12 RX ADMIN — Medication SCH ML: at 08:14

## 2019-09-12 RX ADMIN — HUMAN INSULIN SCH UNIT: 100 INJECTION, SOLUTION SUBCUTANEOUS at 08:13

## 2019-09-12 RX ADMIN — SPIRONOLACTONE SCH MG: 25 TABLET, FILM COATED ORAL at 08:16

## 2019-09-12 RX ADMIN — HUMAN INSULIN SCH: 100 INJECTION, SOLUTION SUBCUTANEOUS at 21:00

## 2019-09-12 RX ADMIN — FUROSEMIDE SCH MG: 40 TABLET ORAL at 08:12

## 2019-09-12 RX ADMIN — SODIUM CHLORIDE SCH MLS: 0.9 INJECTION, SOLUTION INTRAVENOUS at 21:32

## 2019-09-12 RX ADMIN — TEMAZEPAM PRN MG: 15 CAPSULE ORAL at 21:31

## 2019-09-12 RX ADMIN — ALBUTEROL SULFATE PRN MG: 2.5 SOLUTION RESPIRATORY (INHALATION) at 02:35

## 2019-09-12 RX ADMIN — ARFORMOTEROL TARTRATE SCH MCG: 15 SOLUTION RESPIRATORY (INHALATION) at 20:05

## 2019-09-12 NOTE — P.PN
Subjective


Date of Service: 09/12/19


Primary Care Provider: None


Chief Complaint: Pneumonia


Subjective: Improving





Patient seen and examined at bedside. Chart reviewed and case discussed with 

nursing staff and Dr. Kay. 


Patient reports feeling better, phelgm has decreased. cough has improved. Also 

complaining of left sided pain that is intermittent. 


No other complaints this am.





Review of Systems


10-point ROS is otherwise unremarkable





Physical Examination





- Vital Signs


Temperature: 98.0 F


Blood Pressure: 119/72


Pulse: 72


Respirations: 17


Pulse Ox (%): 98





- Physical Exam


General: Alert, In no apparent distress, Oriented x3


HEENT: Atraumatic, PERRLA, EOMI


Neck: Supple, JVD not distended


Respiratory: Clear to auscultation bilaterally, Normal air movement


Cardiovascular: Regular rate/rhythm, Normal S1 S2


Gastrointestinal: Normal bowel sounds, No tenderness


Musculoskeletal: No tenderness


Integumentary: No rashes


Neurological: Normal speech, Normal tone, Normal affect


Lymphatics: No axilla or inguinal lymphadenopathy





Assessment And Plan





- Current Problems (Diagnosis)


(1) Pneumonia


Current Visit: Yes   Status: Acute   


Plan: 


CXR with pneumonia


-Sputum cultures with Klebsiella pneumonia, sensitive to Levaquin.  Antibiotics 

adjusted. 


-Continue IV antibiotics; pt at high risk for resistant pna


-Continue steroids


-Pulmonology consulted. Recommendations appreciated


-Provide oxygen as needed. Patient currently on home oxygen


Qualifiers: 


   Pneumonia type: due to unspecified organism   Laterality: left   Lung 

location: lower lobe of lung   Qualified Code(s): J18.1 - Lobar pneumonia, 

unspecified organism   





(2) UTI (urinary tract infection)


Current Visit: Yes   Status: Acute   


Plan: 


Urine cultures positive for klebiella pneumonia.


- Will continue antibiotics at this time. 


Qualifiers: 


   Urinary tract infection type: acute cystitis   Hematuria presence: without 

hematuria   Qualified Code(s): N30.00 - Acute cystitis without hematuria   





(3) Bacteremia


Current Visit: Yes   Status: Acute   


Plan: 


Cultures with Klebsiella pneumonia. Continue levaquin. 








(4) COPD with acute exacerbation


Current Visit: No   Status: Acute   





(5) Hypoxemia


Current Visit: No   Status: Acute   





(6) Alcoholic cirrhosis of liver


Current Visit: No   Status: Chronic   


Plan: 


Improving LFTs


-Pending GI recommendations. 


-He will likely need continued outpatient GI follow up





Qualifiers: 


   Ascites presence: without ascites   Qualified Code(s): K70.30 - Alcoholic 

cirrhosis of liver without ascites   





- Plan





Disposition:  Pending symptomatic improvement


Physician Review Additional Text: 


.

## 2019-09-13 LAB
ALBUMIN SERPL BCP-MCNC: 2.1 G/DL (ref 3.4–5)
ALP SERPL-CCNC: 109 U/L (ref 45–117)
ALT SERPL W P-5'-P-CCNC: 26 U/L (ref 12–78)
AST SERPL W P-5'-P-CCNC: 18 U/L (ref 15–37)
BUN BLD-MCNC: 22 MG/DL (ref 7–18)
GLUCOSE SERPLBLD-MCNC: 189 MG/DL (ref 74–106)
HCT VFR BLD CALC: 45.7 % (ref 39.6–49)
LYMPHOCYTES # SPEC AUTO: 0.8 K/UL (ref 0.7–4.9)
MORPHOLOGY BLD-IMP: (no result)
PMV BLD: 9.2 FL (ref 7.6–11.3)
POTASSIUM SERPL-SCNC: 4.6 MMOL/L (ref 3.5–5.1)
RBC # BLD: 4.81 M/UL (ref 4.33–5.43)

## 2019-09-13 RX ADMIN — ARFORMOTEROL TARTRATE SCH MCG: 15 SOLUTION RESPIRATORY (INHALATION) at 09:00

## 2019-09-13 RX ADMIN — TEMAZEPAM PRN MG: 15 CAPSULE ORAL at 22:12

## 2019-09-13 RX ADMIN — INSULIN GLARGINE SCH UNITS: 100 INJECTION, SOLUTION SUBCUTANEOUS at 09:01

## 2019-09-13 RX ADMIN — HUMAN INSULIN SCH: 100 INJECTION, SOLUTION SUBCUTANEOUS at 07:30

## 2019-09-13 RX ADMIN — IPRATROPIUM BROMIDE PRN MG: 0.5 SOLUTION RESPIRATORY (INHALATION) at 09:00

## 2019-09-13 RX ADMIN — SODIUM CHLORIDE SCH MLS: 9 INJECTION, SOLUTION INTRAVENOUS at 17:49

## 2019-09-13 RX ADMIN — LEVOFLOXACIN SCH MLS: 5 INJECTION, SOLUTION INTRAVENOUS at 09:06

## 2019-09-13 RX ADMIN — TRAMADOL HYDROCHLORIDE PRN MG: 50 TABLET, COATED ORAL at 22:12

## 2019-09-13 RX ADMIN — HUMAN INSULIN SCH UNIT: 100 INJECTION, SOLUTION SUBCUTANEOUS at 17:49

## 2019-09-13 RX ADMIN — SPIRONOLACTONE SCH MG: 25 TABLET, FILM COATED ORAL at 10:43

## 2019-09-13 RX ADMIN — FOLIC ACID SCH MG: 1 TABLET ORAL at 09:02

## 2019-09-13 RX ADMIN — INSULIN GLARGINE SCH UNITS: 100 INJECTION, SOLUTION SUBCUTANEOUS at 12:14

## 2019-09-13 RX ADMIN — HUMAN INSULIN SCH UNIT: 100 INJECTION, SOLUTION SUBCUTANEOUS at 22:57

## 2019-09-13 RX ADMIN — SODIUM CHLORIDE SCH: 0.9 INJECTION, SOLUTION INTRAVENOUS at 07:00

## 2019-09-13 RX ADMIN — RANITIDINE HYDROCHLORIDE SCH MG: 150 TABLET, FILM COATED ORAL at 09:01

## 2019-09-13 RX ADMIN — Medication SCH: at 09:00

## 2019-09-13 RX ADMIN — SODIUM CHLORIDE SCH MLS: 9 INJECTION, SOLUTION INTRAVENOUS at 10:40

## 2019-09-13 RX ADMIN — RANITIDINE HYDROCHLORIDE SCH MG: 150 TABLET, FILM COATED ORAL at 22:12

## 2019-09-13 RX ADMIN — ARFORMOTEROL TARTRATE SCH: 15 SOLUTION RESPIRATORY (INHALATION) at 20:00

## 2019-09-13 RX ADMIN — Medication SCH: at 21:00

## 2019-09-13 RX ADMIN — TRAMADOL HYDROCHLORIDE PRN MG: 50 TABLET, COATED ORAL at 09:02

## 2019-09-13 RX ADMIN — FUROSEMIDE SCH MG: 40 TABLET ORAL at 09:02

## 2019-09-13 RX ADMIN — HUMAN INSULIN SCH UNIT: 100 INJECTION, SOLUTION SUBCUTANEOUS at 11:30

## 2019-09-13 NOTE — P.PN
Subjective


Date of Service: 09/13/19


Primary Care Provider: None


Chief Complaint: Pseudomonas bacteremia and pneumonia, EtOH cirrhosis


Subjective: No new changes (Septic with Klebsiella pneumonia and urosepsis.  

WBC 22k today despite days of antibiotics.  INR 1.3)





Review of Systems


10-point ROS is otherwise unremarkable


General: Weakness


Respiratory: Cough, Shortness of Breath





Physical Examination





- Vital Signs


Temperature: 98.4 F


Blood Pressure: 114/59


Pulse: 101


Respirations: 18


Pulse Ox (%): 92





- Physical Exam


General: Alert, In no apparent distress, Oriented x3, Cooperative


HEENT: Atraumatic, Normocephalic, PERRLA, EOMI


Neck: Supple


Respiratory: Diminished


Cardiovascular: Normal pulses


Gastrointestinal: Soft and benign (obese), No tenderness, No rebound, No 

guarding


Neurological: Normal speech, Normal strength at 5/5 x4 extr





- Studies


Microbiology Data (last 24 hrs): 








09/09/19 08:30   Blood  - Blood   Aerobic Blood Culture - Final


                            Pseudomonas Aeruginosa


09/09/19 08:30   Blood  - Blood   Gram Stain - Final


09/09/19 08:30   Blood  - Blood   Anaerobic Blood Culture - Final


                            Pseudomonas Aeruginosa


09/09/19 08:30   Blood  - Blood   Gram Stain - Final








Assessment And Plan





- Current Problems (Diagnosis)


(1) Sepsis


Current Visit: Yes   Status: Acute   





(2) Hyponatremia


Current Visit: Yes   Status: Acute   





(3) Pneumonia


Current Visit: Yes   Status: Acute   


Qualifiers: 


   Pneumonia type: due to Pseudomonas   Laterality: left   Lung location: lower 

lobe of lung   Qualified Code(s): J15.1 - Pneumonia due to Pseudomonas   





(4) UTI (urinary tract infection)


Current Visit: Yes   Status: Acute   


Qualifiers: 


   Urinary tract infection type: acute cystitis   Hematuria presence: without 

hematuria   Qualified Code(s): N30.00 - Acute cystitis without hematuria   





(5) Hypoxemia


Current Visit: No   Status: Acute   





(6) Alcoholic cirrhosis of liver


Current Visit: No   Status: Chronic   


Qualifiers: 


   Ascites presence: without ascites   Qualified Code(s): K70.30 - Alcoholic 

cirrhosis of liver without ascites   





- Plan


REC: 1) respiratory therapy with percussion 


2) continue IV antibiotics 


3) DT precautions 


4) po Thiamine & Folate 


5) prn BDZs if needed 


6) AA or rehab on discharge 





Physician Review Additional Text: 


.

## 2019-09-13 NOTE — P.PN
Subjective


Date of Service: 09/13/19


Primary Care Provider: None


Chief Complaint: Pseudomonas bacteremia and pneumonia


Subjective: Improving (Patient is improving still feeling a little weak white 

count elevated)





Review of Systems


General: Weakness


Respiratory: Cough, Shortness of Breath





Physical Examination





- Vital Signs


Temperature: 98.9 F


Blood Pressure: 118/64


Pulse: 85


Respirations: 20


Pulse Ox (%): 93





- Physical Exam


General: Alert, In no apparent distress, Oriented x3


Neck: Supple


Respiratory: Clear to auscultation bilaterally


Cardiovascular: No edema, Regular rate/rhythm, Normal S1 S2





- Studies


Microbiology Data (last 24 hrs): 








09/09/19 08:30   Blood  - Blood   Aerobic Blood Culture - Final


                            Pseudomonas Aeruginosa


09/09/19 08:30   Blood  - Blood   Gram Stain - Final


09/09/19 08:30   Blood  - Blood   Anaerobic Blood Culture - Final


                            Pseudomonas Aeruginosa


09/09/19 08:30   Blood  - Blood   Gram Stain - Final








Assessment & Plan





- Problems (Diagnosis)


(1) Pneumonia


Current Visit: Yes   Status: Acute   


Plan: 


Patient is 59 years of age admitted with a Pseudomonas bacteremia and pneumonia 

also cultured in the urine white count is still elevated I have started him on 

meropenem continue with levofloxacin labs reviewed hyperglycemia


Qualifiers: 


   Pneumonia type: due to Pseudomonas   Laterality: left   Lung location: lower 

lobe of lung   Qualified Code(s): J15.1 - Pneumonia due to Pseudomonas   


Physician Review Additional Text: 


.

## 2019-09-13 NOTE — P.PN
Subjective


Date of Service: 09/13/19


Primary Care Provider: None


Chief Complaint: Pseudomonas bacteremia and pneumonia


Subjective: Improving





Patient seen and examined at bedside. Chart reviewed and case discussed with 

nursing staff and Dr. Kay. 


Patient reports feeling better, phelgm has decreased. cough has improved. Also 

complaining of left sided pain that is intermittent. 


No other complaints this am.





Review of Systems


10-point ROS is otherwise unremarkable





Physical Examination





- Vital Signs


Temperature: 98.9 F


Blood Pressure: 118/64


Pulse: 85


Respirations: 20


Pulse Ox (%): 93





- Physical Exam


General: Alert, Oriented x3, Mild distress, Other (ill appearing)


HEENT: Atraumatic, PERRLA, EOMI


Neck: Supple, JVD not distended


Respiratory: Diminished, Crackles/rales


Cardiovascular: Regular rate/rhythm, Normal S1 S2


Gastrointestinal: Normal bowel sounds, No tenderness


Musculoskeletal: No tenderness


Integumentary: No rashes


Neurological: Normal speech, Normal tone, Normal affect


Lymphatics: No axilla or inguinal lymphadenopathy





- Studies


Microbiology Data (last 24 hrs): 








09/09/19 08:30   Blood  - Blood   Aerobic Blood Culture - Final


                            Pseudomonas Aeruginosa


09/09/19 08:30   Blood  - Blood   Gram Stain - Final


09/09/19 08:30   Blood  - Blood   Anaerobic Blood Culture - Final


                            Pseudomonas Aeruginosa


09/09/19 08:30   Blood  - Blood   Gram Stain - Final








Assessment And Plan





- Current Problems (Diagnosis)


(1) Pneumonia


Current Visit: Yes   Status: Acute   


Plan: 


CXR with pneumonia


-Sputum cultures with Klebsiella pneumonia.  Antibiotics adjusted to vancomycin 

and meropenem.


-Continue IV antibiotics; pt at high risk for resistant pna


-Continue steroids


-Pulmonology consulted. Recommendations appreciated


-Provide oxygen as needed. Patient currently on home oxygen


Qualifiers: 


   Pneumonia type: due to Pseudomonas   Laterality: left   Lung location: lower 

lobe of lung   Qualified Code(s): J15.1 - Pneumonia due to Pseudomonas   





(2) UTI (urinary tract infection)


Current Visit: Yes   Status: Acute   


Plan: 


Urine cultures positive for klebiella pneumonia.


- Will continue antibiotics at this time. 


Qualifiers: 


   Urinary tract infection type: acute cystitis   Hematuria presence: without 

hematuria   Qualified Code(s): N30.00 - Acute cystitis without hematuria   





(3) Bacteremia


Current Visit: Yes   Status: Acute   


Plan: 


Cultures with Klebsiella pneumonia. Continue antibiotics as above 








(4) COPD with acute exacerbation


Current Visit: No   Status: Acute   





(5) Hypoxemia


Current Visit: No   Status: Acute   





(6) Alcoholic cirrhosis of liver


Current Visit: No   Status: Chronic   


Plan: 


Improving LFTs


-Pending GI recommendations. 


-He will likely need continued outpatient GI follow up





Qualifiers: 


   Ascites presence: without ascites   Qualified Code(s): K70.30 - Alcoholic 

cirrhosis of liver without ascites   





- Plan





Disposition:  Pending symptomatic improvement


Physician Review Additional Text: 


.

## 2019-09-14 LAB
ALBUMIN SERPL BCP-MCNC: 1.9 G/DL (ref 3.4–5)
ALP SERPL-CCNC: 105 U/L (ref 45–117)
ALT SERPL W P-5'-P-CCNC: 23 U/L (ref 12–78)
AST SERPL W P-5'-P-CCNC: 22 U/L (ref 15–37)
BUN BLD-MCNC: 19 MG/DL (ref 7–18)
GLUCOSE SERPLBLD-MCNC: 180 MG/DL (ref 74–106)
HCT VFR BLD CALC: 43.1 % (ref 39.6–49)
LYMPHOCYTES # SPEC AUTO: 0.8 K/UL (ref 0.7–4.9)
PMV BLD: 8.9 FL (ref 7.6–11.3)
POTASSIUM SERPL-SCNC: 4.6 MMOL/L (ref 3.5–5.1)
RBC # BLD: 4.55 M/UL (ref 4.33–5.43)

## 2019-09-14 RX ADMIN — ALBUTEROL SULFATE PRN MG: 2.5 SOLUTION RESPIRATORY (INHALATION) at 14:42

## 2019-09-14 RX ADMIN — SODIUM CHLORIDE SCH MLS: 9 INJECTION, SOLUTION INTRAVENOUS at 17:37

## 2019-09-14 RX ADMIN — IPRATROPIUM BROMIDE PRN MG: 0.5 SOLUTION RESPIRATORY (INHALATION) at 14:42

## 2019-09-14 RX ADMIN — RANITIDINE HYDROCHLORIDE SCH MG: 150 TABLET, FILM COATED ORAL at 08:54

## 2019-09-14 RX ADMIN — TEMAZEPAM PRN MG: 15 CAPSULE ORAL at 21:44

## 2019-09-14 RX ADMIN — HUMAN INSULIN SCH UNIT: 100 INJECTION, SOLUTION SUBCUTANEOUS at 17:37

## 2019-09-14 RX ADMIN — TRAMADOL HYDROCHLORIDE PRN MG: 50 TABLET, COATED ORAL at 14:23

## 2019-09-14 RX ADMIN — ARFORMOTEROL TARTRATE SCH: 15 SOLUTION RESPIRATORY (INHALATION) at 20:00

## 2019-09-14 RX ADMIN — SODIUM CHLORIDE SCH MLS: 9 INJECTION, SOLUTION INTRAVENOUS at 09:08

## 2019-09-14 RX ADMIN — ARFORMOTEROL TARTRATE SCH MCG: 15 SOLUTION RESPIRATORY (INHALATION) at 09:30

## 2019-09-14 RX ADMIN — RANITIDINE HYDROCHLORIDE SCH MG: 150 TABLET, FILM COATED ORAL at 21:44

## 2019-09-14 RX ADMIN — Medication SCH ML: at 08:56

## 2019-09-14 RX ADMIN — Medication SCH ML: at 21:46

## 2019-09-14 RX ADMIN — IPRATROPIUM BROMIDE PRN MG: 0.5 SOLUTION RESPIRATORY (INHALATION) at 09:30

## 2019-09-14 RX ADMIN — LEVOFLOXACIN SCH MLS: 5 INJECTION, SOLUTION INTRAVENOUS at 08:54

## 2019-09-14 RX ADMIN — HUMAN INSULIN SCH: 100 INJECTION, SOLUTION SUBCUTANEOUS at 07:30

## 2019-09-14 RX ADMIN — TRAMADOL HYDROCHLORIDE PRN MG: 50 TABLET, COATED ORAL at 07:41

## 2019-09-14 RX ADMIN — HUMAN INSULIN SCH UNIT: 100 INJECTION, SOLUTION SUBCUTANEOUS at 21:43

## 2019-09-14 RX ADMIN — SPIRONOLACTONE SCH MG: 25 TABLET, FILM COATED ORAL at 08:55

## 2019-09-14 RX ADMIN — INSULIN GLARGINE SCH UNITS: 100 INJECTION, SOLUTION SUBCUTANEOUS at 09:06

## 2019-09-14 RX ADMIN — TRAMADOL HYDROCHLORIDE PRN MG: 50 TABLET, COATED ORAL at 21:44

## 2019-09-14 RX ADMIN — FOLIC ACID SCH MG: 1 TABLET ORAL at 08:54

## 2019-09-14 RX ADMIN — FUROSEMIDE SCH MG: 40 TABLET ORAL at 08:55

## 2019-09-14 RX ADMIN — SODIUM CHLORIDE SCH MLS: 9 INJECTION, SOLUTION INTRAVENOUS at 01:00

## 2019-09-14 RX ADMIN — ENOXAPARIN SODIUM SCH MG: 40 INJECTION SUBCUTANEOUS at 17:37

## 2019-09-14 RX ADMIN — ALBUTEROL SULFATE PRN MG: 2.5 SOLUTION RESPIRATORY (INHALATION) at 09:30

## 2019-09-14 RX ADMIN — HUMAN INSULIN SCH UNIT: 100 INJECTION, SOLUTION SUBCUTANEOUS at 12:40

## 2019-09-14 NOTE — P.PN
Subjective


Date of Service: 09/14/19


Primary Care Provider: None


Chief Complaint: Pseudomonas bacteremia and pneumonia, EtOH cirrhosis





Patient seen and examined at bedside. Chart reviewed and case discussed with 

nursing staff and Dr. Kay. 


Patient reports feeling better, phelgm has decreased. Phlegm less pink in 

color. cough has improved. Also complaining of left sided pain that is 

intermittent. 


Complains of feeling tired. 





Review of Systems


10-point ROS is otherwise unremarkable





Physical Examination





- Vital Signs


Temperature: 98.0 F


Blood Pressure: 108/60


Pulse: 101


Respirations: 18


Pulse Ox (%): 92





- Physical Exam


General: Alert, In no apparent distress, Oriented x3


HEENT: Atraumatic, PERRLA, EOMI


Neck: Supple, JVD not distended


Respiratory: Clear to auscultation bilaterally, Normal air movement


Cardiovascular: Normal S1 S2, Irregular heart rate/rhythm (Tachycardic)


Gastrointestinal: Normal bowel sounds, No tenderness


Musculoskeletal: No tenderness


Integumentary: No rashes


Neurological: Normal speech, Normal tone, Normal affect


Lymphatics: No axilla or inguinal lymphadenopathy





- Studies


Microbiology Data (last 24 hrs): 








09/09/19 08:45   Blood  - Blood   Aerobic Blood Culture - Final


                            No growth in 5 days.


09/09/19 08:45   Blood  - Blood   Anaerobic Blood Culture - Final


                            No growth in 5 days.


09/09/19 08:30   Blood  - Blood   Aerobic Blood Culture - Final


                            Pseudomonas Aeruginosa


09/09/19 08:30   Blood  - Blood   Gram Stain - Final


09/09/19 08:30   Blood  - Blood   Anaerobic Blood Culture - Final


                            Pseudomonas Aeruginosa


09/09/19 08:30   Blood  - Blood   Gram Stain - Final








Assessment And Plan





- Current Problems (Diagnosis)


(1) Pneumonia


Current Visit: Yes   Status: Acute   


Plan: 


CXR with pneumonia


-Sputum cultures with Klebsiella pneumonia.  Antibiotics adjusted to vancomycin 

and meropenem.


-Continue IV antibiotics; pt at high risk for resistant pna


-Continue steroids


-Pulmonology consulted. Recommendations appreciated


-Provide oxygen as needed. Patient currently on home oxygen


Qualifiers: 


   Pneumonia type: due to Pseudomonas   Laterality: left   Lung location: lower 

lobe of lung   Qualified Code(s): J15.1 - Pneumonia due to Pseudomonas   





(2) UTI (urinary tract infection)


Current Visit: Yes   Status: Acute   


Plan: 


Urine cultures positive for klebiella pneumonia.


- Will continue antibiotics at this time. 


Qualifiers: 


   Urinary tract infection type: acute cystitis   Hematuria presence: without 

hematuria   Qualified Code(s): N30.00 - Acute cystitis without hematuria   





(3) Bacteremia


Current Visit: Yes   Status: Acute   


Plan: 


Cultures with Klebsiella pneumonia. Continue antibiotics as above 








(4) COPD with acute exacerbation


Current Visit: No   Status: Acute   





(5) Hypoxemia


Current Visit: No   Status: Acute   





(6) Alcoholic cirrhosis of liver


Current Visit: No   Status: Chronic   


Plan: 


Improving LFTs


-GI recommendations appreciated.


-He will likely need continued outpatient GI follow up, AA on discharge for 

alcohol cessation. 





Qualifiers: 


   Ascites presence: without ascites   Qualified Code(s): K70.30 - Alcoholic 

cirrhosis of liver without ascites   





- Plan





Disposition:  Pending symptomatic improvement


Physician Review Additional Text: 


.

## 2019-09-14 NOTE — P.PN
Subjective


Date of Service: 09/14/19


Primary Care Provider: None


Chief Complaint: Pseudomonas bacteremia and pneumonia, EtOH cirrhosis


Subjective: Improving (Feels better today.)





Review of Systems


10-point ROS is otherwise unremarkable


General: Weakness, Malaise


Respiratory: Shortness of Breath (Improved.)





Physical Examination





- Vital Signs


Temperature: 97.5 F


Blood Pressure: 103/52


Pulse: 94


Respirations: 16


Pulse Ox (%): 91





- Physical Exam


General: Alert, In no apparent distress, Oriented x3, Cooperative


HEENT: Atraumatic, Normocephalic, PERRLA, EOMI


Neck: Supple


Respiratory: Normal air movement


Cardiovascular: Normal pulses


Gastrointestinal: Soft and benign (obese ), Ascites


Neurological: Normal speech, Normal strength at 5/5 x4 extr





- Studies


Microbiology Data (last 24 hrs): 








09/09/19 08:45   Blood  - Blood   Aerobic Blood Culture - Final


                            No growth in 5 days.


09/09/19 08:45   Blood  - Blood   Anaerobic Blood Culture - Final


                            No growth in 5 days.








Assessment And Plan





- Current Problems (Diagnosis)


(1) Sepsis


Current Visit: Yes   Status: Acute   





(2) Hyponatremia


Current Visit: Yes   Status: Acute   





(3) Pneumonia


Current Visit: Yes   Status: Acute   


Qualifiers: 


   Pneumonia type: due to Pseudomonas   Laterality: left   Lung location: lower 

lobe of lung   Qualified Code(s): J15.1 - Pneumonia due to Pseudomonas   





(4) UTI (urinary tract infection)


Current Visit: Yes   Status: Acute   


Qualifiers: 


   Urinary tract infection type: acute cystitis   Hematuria presence: without 

hematuria   Qualified Code(s): N30.00 - Acute cystitis without hematuria   





(5) Hypoxemia


Current Visit: No   Status: Acute   





(6) Alcoholic cirrhosis of liver


Current Visit: No   Status: Chronic   


Qualifiers: 


   Ascites presence: without ascites   Qualified Code(s): K70.30 - Alcoholic 

cirrhosis of liver without ascites   





- Plan


REC: 1) respiratory therapy with percussion 


2) continue IV antibiotics 


3) DT precautions 


4) po Thiamine & Folate 


5) prn BDZs if needed 


6) AA or rehab on discharge 





Physician Review Additional Text: 


.

## 2019-09-15 LAB
HCT VFR BLD CALC: 44.7 % (ref 39.6–49)
PMV BLD: 8.1 FL (ref 7.6–11.3)
RBC # BLD: 4.72 M/UL (ref 4.33–5.43)

## 2019-09-15 RX ADMIN — RANITIDINE HYDROCHLORIDE SCH MG: 150 TABLET, FILM COATED ORAL at 21:22

## 2019-09-15 RX ADMIN — TEMAZEPAM PRN MG: 15 CAPSULE ORAL at 21:22

## 2019-09-15 RX ADMIN — TRAMADOL HYDROCHLORIDE PRN MG: 50 TABLET, COATED ORAL at 03:28

## 2019-09-15 RX ADMIN — INSULIN GLARGINE SCH UNITS: 100 INJECTION, SOLUTION SUBCUTANEOUS at 09:00

## 2019-09-15 RX ADMIN — IPRATROPIUM BROMIDE PRN MG: 0.5 SOLUTION RESPIRATORY (INHALATION) at 08:39

## 2019-09-15 RX ADMIN — SPIRONOLACTONE SCH MG: 25 TABLET, FILM COATED ORAL at 09:00

## 2019-09-15 RX ADMIN — HUMAN INSULIN SCH UNIT: 100 INJECTION, SOLUTION SUBCUTANEOUS at 17:05

## 2019-09-15 RX ADMIN — ARFORMOTEROL TARTRATE SCH MCG: 15 SOLUTION RESPIRATORY (INHALATION) at 20:00

## 2019-09-15 RX ADMIN — Medication SCH ML: at 09:02

## 2019-09-15 RX ADMIN — TRAMADOL HYDROCHLORIDE PRN MG: 50 TABLET, COATED ORAL at 21:22

## 2019-09-15 RX ADMIN — ENOXAPARIN SODIUM SCH MG: 40 INJECTION SUBCUTANEOUS at 16:12

## 2019-09-15 RX ADMIN — TRAMADOL HYDROCHLORIDE PRN MG: 50 TABLET, COATED ORAL at 16:13

## 2019-09-15 RX ADMIN — Medication SCH ML: at 21:23

## 2019-09-15 RX ADMIN — RANITIDINE HYDROCHLORIDE SCH MG: 150 TABLET, FILM COATED ORAL at 08:59

## 2019-09-15 RX ADMIN — HUMAN INSULIN SCH: 100 INJECTION, SOLUTION SUBCUTANEOUS at 11:30

## 2019-09-15 RX ADMIN — ARFORMOTEROL TARTRATE SCH MCG: 15 SOLUTION RESPIRATORY (INHALATION) at 03:39

## 2019-09-15 RX ADMIN — SODIUM CHLORIDE SCH MLS: 9 INJECTION, SOLUTION INTRAVENOUS at 01:00

## 2019-09-15 RX ADMIN — SODIUM CHLORIDE SCH MLS: 9 INJECTION, SOLUTION INTRAVENOUS at 16:13

## 2019-09-15 RX ADMIN — FOLIC ACID SCH MG: 1 TABLET ORAL at 09:00

## 2019-09-15 RX ADMIN — TRAMADOL HYDROCHLORIDE PRN MG: 50 TABLET, COATED ORAL at 08:59

## 2019-09-15 RX ADMIN — HUMAN INSULIN SCH: 100 INJECTION, SOLUTION SUBCUTANEOUS at 07:30

## 2019-09-15 RX ADMIN — FUROSEMIDE SCH MG: 40 TABLET ORAL at 09:00

## 2019-09-15 RX ADMIN — HUMAN INSULIN SCH: 100 INJECTION, SOLUTION SUBCUTANEOUS at 21:00

## 2019-09-15 RX ADMIN — SODIUM CHLORIDE SCH MLS: 9 INJECTION, SOLUTION INTRAVENOUS at 09:01

## 2019-09-15 RX ADMIN — ARFORMOTEROL TARTRATE SCH MCG: 15 SOLUTION RESPIRATORY (INHALATION) at 08:39

## 2019-09-15 RX ADMIN — ALBUTEROL SULFATE PRN MG: 2.5 SOLUTION RESPIRATORY (INHALATION) at 08:39

## 2019-09-15 RX ADMIN — LEVOFLOXACIN SCH MLS: 5 INJECTION, SOLUTION INTRAVENOUS at 09:01

## 2019-09-15 NOTE — P.PN
Subjective


Date of Service: 09/15/19


Primary Care Provider: None


Chief Complaint: Pseudomonas bacteremia and pneumonia, EtOH cirrhosis





Patient seen and examined at bedside. Chart reviewed and case discussed with 

nursing staff and Dr. Kay. 


Patient reports feeling better, phelgm has decreased. Phlegm less pink in 

color. cough has improved. Also complaining of left sided pain that is 

intermittent. 


Complains of feeling tired. 





Review of Systems


10-point ROS is otherwise unremarkable





Physical Examination





- Vital Signs


Temperature: 98.0 F


Blood Pressure: 111/63


Pulse: 98


Respirations: 20


Pulse Ox (%): 96





- Physical Exam


General: Alert, In no apparent distress, Oriented x3


HEENT: Atraumatic, PERRLA, EOMI


Neck: Supple, JVD not distended


Respiratory: Clear to auscultation bilaterally, Normal air movement


Cardiovascular: Regular rate/rhythm, Normal S1 S2


Gastrointestinal: Normal bowel sounds, No tenderness


Musculoskeletal: No tenderness


Integumentary: No rashes


Neurological: Normal speech, Normal tone, Normal affect


Lymphatics: No axilla or inguinal lymphadenopathy





- Studies


Microbiology Data (last 24 hrs): 








09/09/19 08:45   Blood  - Blood   Aerobic Blood Culture - Final


                            No growth in 5 days.


09/09/19 08:45   Blood  - Blood   Anaerobic Blood Culture - Final


                            No growth in 5 days.








Assessment And Plan





- Current Problems (Diagnosis)


(1) Pneumonia


Current Visit: Yes   Status: Acute   


Plan: 


CXR with pneumonia


-Sputum cultures with Klebsiella pneumonia.  Antibiotics adjusted to vancomycin 

and meropenem.


-Continue IV antibiotics; pt at high risk for resistant pna


-Continue steroids


-Pulmonology consulted. Recommendations appreciated


-Provide oxygen as needed. Patient currently on home oxygen


Qualifiers: 


   Pneumonia type: due to Pseudomonas   Laterality: left   Lung location: lower 

lobe of lung   Qualified Code(s): J15.1 - Pneumonia due to Pseudomonas   





(2) UTI (urinary tract infection)


Current Visit: Yes   Status: Acute   


Plan: 


Urine cultures positive for klebiella pneumonia.


- Will continue antibiotics at this time. 


Qualifiers: 


   Urinary tract infection type: acute cystitis   Hematuria presence: without 

hematuria   Qualified Code(s): N30.00 - Acute cystitis without hematuria   





(3) Bacteremia


Current Visit: Yes   Status: Acute   


Plan: 


Cultures with Klebsiella pneumonia. Continue antibiotics as above 








(4) COPD with acute exacerbation


Current Visit: No   Status: Acute   





(5) Hypoxemia


Current Visit: No   Status: Acute   





(6) Alcoholic cirrhosis of liver


Current Visit: No   Status: Chronic   


Plan: 


Improving LFTs


-GI recommendations appreciated.


-He will likely need continued outpatient GI follow up, AA on discharge for 

alcohol cessation. 





Qualifiers: 


   Ascites presence: without ascites   Qualified Code(s): K70.30 - Alcoholic 

cirrhosis of liver without ascites   





- Plan





Disposition:  Pending symptomatic improvement


Physician Review Additional Text: 


.

## 2019-09-15 NOTE — P.PN
Subjective


Date of Service: 09/15/19


Primary Care Provider: None


Chief Complaint: Pseudomonas bacteremia and pneumonia, EtOH cirrhosis


Subjective: No new changes (Still with sepsis and elevated WBC at 28k.  Resp 

therapy still working with patient for pneumonia.)





Review of Systems


10-point ROS is otherwise unremarkable


General: Weakness, Malaise


Respiratory: Shortness of Breath





Physical Examination





- Vital Signs


Temperature: 98.0 F


Blood Pressure: 111/63


Pulse: 98


Respirations: 20


Pulse Ox (%): 96





- Physical Exam


General: Alert, In no apparent distress, Oriented x3, Cooperative


HEENT: Atraumatic, Normocephalic, PERRLA, EOMI


Neck: Supple


Respiratory: Normal air movement


Cardiovascular: Normal pulses


Gastrointestinal: Soft and benign, No tenderness, No rebound, No guarding


Neurological: Normal speech, Normal strength at 5/5 x4 extr





- Studies


Microbiology Data (last 24 hrs): 








09/09/19 08:45   Blood  - Blood   Aerobic Blood Culture - Final


                            No growth in 5 days.


09/09/19 08:45   Blood  - Blood   Anaerobic Blood Culture - Final


                            No growth in 5 days.








Assessment And Plan





- Current Problems (Diagnosis)


(1) Sepsis


Current Visit: Yes   Status: Acute   





(2) Hyponatremia


Current Visit: Yes   Status: Acute   





(3) Pneumonia


Current Visit: Yes   Status: Acute   


Qualifiers: 


   Pneumonia type: due to Pseudomonas   Laterality: left   Lung location: lower 

lobe of lung   Qualified Code(s): J15.1 - Pneumonia due to Pseudomonas   





(4) UTI (urinary tract infection)


Current Visit: Yes   Status: Acute   


Qualifiers: 


   Urinary tract infection type: acute cystitis   Hematuria presence: without 

hematuria   Qualified Code(s): N30.00 - Acute cystitis without hematuria   





(5) Hypoxemia


Current Visit: No   Status: Acute   





(6) Alcoholic cirrhosis of liver


Current Visit: No   Status: Chronic   


Qualifiers: 


   Ascites presence: without ascites   Qualified Code(s): K70.30 - Alcoholic 

cirrhosis of liver without ascites   





- Plan


REC: 1) respiratory therapy with percussion 


2) continue IV antibiotics 


3) DT precautions 


4) po Thiamine & Folate 


5) prn BDZs if needed 


6) AA or rehab on discharge 





Physician Review Additional Text: 


.

## 2019-09-16 LAB
HCT VFR BLD CALC: 41.4 % (ref 39.6–49)
PMV BLD: 8.3 FL (ref 7.6–11.3)
RBC # BLD: 4.43 M/UL (ref 4.33–5.43)

## 2019-09-16 PROCEDURE — 02HV33Z INSERTION OF INFUSION DEVICE INTO SUPERIOR VENA CAVA, PERCUTANEOUS APPROACH: ICD-10-PCS

## 2019-09-16 RX ADMIN — RANITIDINE HYDROCHLORIDE SCH MG: 150 TABLET, FILM COATED ORAL at 21:38

## 2019-09-16 RX ADMIN — TRAMADOL HYDROCHLORIDE PRN MG: 50 TABLET, COATED ORAL at 03:32

## 2019-09-16 RX ADMIN — FUROSEMIDE SCH MG: 40 TABLET ORAL at 09:15

## 2019-09-16 RX ADMIN — ALBUTEROL SULFATE PRN MG: 2.5 SOLUTION RESPIRATORY (INHALATION) at 04:20

## 2019-09-16 RX ADMIN — FOLIC ACID SCH MG: 1 TABLET ORAL at 09:15

## 2019-09-16 RX ADMIN — ENOXAPARIN SODIUM SCH MG: 40 INJECTION SUBCUTANEOUS at 18:01

## 2019-09-16 RX ADMIN — HUMAN INSULIN SCH: 100 INJECTION, SOLUTION SUBCUTANEOUS at 11:30

## 2019-09-16 RX ADMIN — SPIRONOLACTONE SCH MG: 25 TABLET, FILM COATED ORAL at 09:15

## 2019-09-16 RX ADMIN — Medication SCH ML: at 21:40

## 2019-09-16 RX ADMIN — Medication SCH ML: at 09:16

## 2019-09-16 RX ADMIN — HUMAN INSULIN SCH: 100 INJECTION, SOLUTION SUBCUTANEOUS at 16:30

## 2019-09-16 RX ADMIN — ALBUTEROL SULFATE PRN MG: 2.5 SOLUTION RESPIRATORY (INHALATION) at 18:50

## 2019-09-16 RX ADMIN — SODIUM CHLORIDE SCH MLS: 9 INJECTION, SOLUTION INTRAVENOUS at 18:01

## 2019-09-16 RX ADMIN — ARFORMOTEROL TARTRATE SCH MCG: 15 SOLUTION RESPIRATORY (INHALATION) at 21:00

## 2019-09-16 RX ADMIN — IPRATROPIUM BROMIDE PRN MG: 0.5 SOLUTION RESPIRATORY (INHALATION) at 18:50

## 2019-09-16 RX ADMIN — INSULIN GLARGINE SCH UNITS: 100 INJECTION, SOLUTION SUBCUTANEOUS at 09:14

## 2019-09-16 RX ADMIN — TEMAZEPAM PRN MG: 15 CAPSULE ORAL at 23:45

## 2019-09-16 RX ADMIN — LEVOFLOXACIN SCH MLS: 5 INJECTION, SOLUTION INTRAVENOUS at 09:15

## 2019-09-16 RX ADMIN — ARFORMOTEROL TARTRATE SCH: 15 SOLUTION RESPIRATORY (INHALATION) at 08:00

## 2019-09-16 RX ADMIN — TRAMADOL HYDROCHLORIDE PRN MG: 50 TABLET, COATED ORAL at 21:38

## 2019-09-16 RX ADMIN — HUMAN INSULIN SCH: 100 INJECTION, SOLUTION SUBCUTANEOUS at 21:00

## 2019-09-16 RX ADMIN — SODIUM CHLORIDE SCH MLS: 9 INJECTION, SOLUTION INTRAVENOUS at 01:14

## 2019-09-16 RX ADMIN — RANITIDINE HYDROCHLORIDE SCH MG: 150 TABLET, FILM COATED ORAL at 09:15

## 2019-09-16 RX ADMIN — SODIUM CHLORIDE SCH MLS: 9 INJECTION, SOLUTION INTRAVENOUS at 09:16

## 2019-09-16 RX ADMIN — TRAMADOL HYDROCHLORIDE PRN MG: 50 TABLET, COATED ORAL at 15:49

## 2019-09-16 RX ADMIN — HUMAN INSULIN SCH: 100 INJECTION, SOLUTION SUBCUTANEOUS at 07:30

## 2019-09-16 RX ADMIN — IPRATROPIUM BROMIDE PRN MG: 0.5 SOLUTION RESPIRATORY (INHALATION) at 04:20

## 2019-09-16 NOTE — RAD REPORT
EXAM DESCRIPTION:  RAD - Chest Single View - 9/16/2019 6:23 pm

 

CLINICAL HISTORY:  Picc line placement

 

COMPARISON:  Chest Single View dated 9/11/2019; Chest Single View dated 9/9/2019; Chest Single View d
ated 8/30/2019; Chest Single View dated 10/1/2018

 

FINDINGS:  Portable chest was obtained following placement of a right upper extremity PICC line. The 
catheter tip projects over the SVC..

## 2019-09-16 NOTE — P.PN
Subjective


Date of Service: 09/16/19


Primary Care Provider: None


Chief Complaint: Pseudomonas bacteremia and pneumonia, EtOH cirrhosis





Patient seen and examined at bedside. Chart reviewed and case discussed with 

nursing staff and Dr. Kay. 


Patient reports feeling better, phelgm has decreased. Phlegm less pink in 

color. cough has improved. Also complaining of left sided pain that is 

intermittent. 


Complains of feeling tired but worked with PT today. 





Review of Systems


10-point ROS is otherwise unremarkable





Physical Examination





- Vital Signs


Temperature: 97.6 F


Blood Pressure: 115/62


Pulse: 101


Respirations: 20


Pulse Ox (%): 94





- Physical Exam


General: Alert, In no apparent distress, Oriented x3


HEENT: Atraumatic, PERRLA, EOMI


Neck: Supple, JVD not distended


Respiratory: Clear to auscultation bilaterally, Normal air movement


Cardiovascular: Regular rate/rhythm, Normal S1 S2


Gastrointestinal: Normal bowel sounds, No tenderness


Musculoskeletal: No tenderness


Integumentary: No rashes


Neurological: Normal speech, Normal tone, Normal affect


Lymphatics: No axilla or inguinal lymphadenopathy





Assessment And Plan





- Current Problems (Diagnosis)


(1) Pneumonia


Current Visit: Yes   Status: Acute   


Plan: 


CXR with pneumonia


-Sputum cultures with Klebsiella pneumonia.  Antibiotics adjusted to vancomycin 

and meropenem.


-Continue IV antibiotics; pt at high risk for resistant pna


-Continue steroids


-Pulmonology consulted. Recommendations appreciated


-Provide oxygen as needed. Patient currently on home oxygen


Qualifiers: 


   Pneumonia type: due to Pseudomonas   Laterality: left   Lung location: lower 

lobe of lung   Qualified Code(s): J15.1 - Pneumonia due to Pseudomonas   





(2) UTI (urinary tract infection)


Current Visit: Yes   Status: Acute   


Plan: 


Urine cultures positive for klebiella pneumonia.


- Will continue antibiotics at this time. 


Qualifiers: 


   Urinary tract infection type: acute cystitis   Hematuria presence: without 

hematuria   Qualified Code(s): N30.00 - Acute cystitis without hematuria   





(3) Bacteremia


Current Visit: Yes   Status: Acute   


Plan: 


Cultures with Klebsiella pneumonia. Continue antibiotics as above 








(4) COPD with acute exacerbation


Current Visit: No   Status: Acute   





(5) Hypoxemia


Current Visit: No   Status: Acute   





(6) Alcoholic cirrhosis of liver


Current Visit: No   Status: Chronic   


Plan: 


Improving LFTs


-GI recommendations appreciated.


-He will likely need continued outpatient GI follow up, AA on discharge for 

alcohol cessation. 





Qualifiers: 


   Ascites presence: without ascites   Qualified Code(s): K70.30 - Alcoholic 

cirrhosis of liver without ascites   





- Plan





Disposition:  Pending symptomatic improvement


Physician Review Additional Text: 


.

## 2019-09-17 LAB
HCT VFR BLD CALC: 40.9 % (ref 39.6–49)
PMV BLD: 7.5 FL (ref 7.6–11.3)
RBC # BLD: 4.35 M/UL (ref 4.33–5.43)

## 2019-09-17 RX ADMIN — ARFORMOTEROL TARTRATE SCH MCG: 15 SOLUTION RESPIRATORY (INHALATION) at 08:23

## 2019-09-17 RX ADMIN — HUMAN INSULIN SCH: 100 INJECTION, SOLUTION SUBCUTANEOUS at 07:30

## 2019-09-17 RX ADMIN — FUROSEMIDE SCH MG: 40 TABLET ORAL at 08:08

## 2019-09-17 RX ADMIN — TRAMADOL HYDROCHLORIDE PRN MG: 50 TABLET, COATED ORAL at 21:44

## 2019-09-17 RX ADMIN — RANITIDINE HYDROCHLORIDE SCH MG: 150 TABLET, FILM COATED ORAL at 08:07

## 2019-09-17 RX ADMIN — Medication SCH ML: at 21:45

## 2019-09-17 RX ADMIN — ARFORMOTEROL TARTRATE SCH MCG: 15 SOLUTION RESPIRATORY (INHALATION) at 20:45

## 2019-09-17 RX ADMIN — RANITIDINE HYDROCHLORIDE SCH MG: 150 TABLET, FILM COATED ORAL at 21:44

## 2019-09-17 RX ADMIN — HUMAN INSULIN SCH: 100 INJECTION, SOLUTION SUBCUTANEOUS at 21:00

## 2019-09-17 RX ADMIN — LEVOFLOXACIN SCH MLS: 5 INJECTION, SOLUTION INTRAVENOUS at 08:06

## 2019-09-17 RX ADMIN — ENOXAPARIN SODIUM SCH MG: 40 INJECTION SUBCUTANEOUS at 17:53

## 2019-09-17 RX ADMIN — FOLIC ACID SCH MG: 1 TABLET ORAL at 08:07

## 2019-09-17 RX ADMIN — HUMAN INSULIN SCH: 100 INJECTION, SOLUTION SUBCUTANEOUS at 11:12

## 2019-09-17 RX ADMIN — INSULIN GLARGINE SCH UNITS: 100 INJECTION, SOLUTION SUBCUTANEOUS at 08:07

## 2019-09-17 RX ADMIN — SODIUM CHLORIDE SCH MLS: 9 INJECTION, SOLUTION INTRAVENOUS at 08:06

## 2019-09-17 RX ADMIN — ALBUTEROL SULFATE PRN MG: 2.5 SOLUTION RESPIRATORY (INHALATION) at 08:23

## 2019-09-17 RX ADMIN — TEMAZEPAM PRN MG: 15 CAPSULE ORAL at 21:45

## 2019-09-17 RX ADMIN — IPRATROPIUM BROMIDE PRN MG: 0.5 SOLUTION RESPIRATORY (INHALATION) at 08:23

## 2019-09-17 RX ADMIN — Medication SCH ML: at 08:09

## 2019-09-17 RX ADMIN — TRAMADOL HYDROCHLORIDE PRN MG: 50 TABLET, COATED ORAL at 08:15

## 2019-09-17 RX ADMIN — SODIUM CHLORIDE SCH MLS: 9 INJECTION, SOLUTION INTRAVENOUS at 00:54

## 2019-09-17 RX ADMIN — SPIRONOLACTONE SCH MG: 25 TABLET, FILM COATED ORAL at 08:08

## 2019-09-17 RX ADMIN — HUMAN INSULIN SCH: 100 INJECTION, SOLUTION SUBCUTANEOUS at 15:50

## 2019-09-17 RX ADMIN — TRAMADOL HYDROCHLORIDE PRN MG: 50 TABLET, COATED ORAL at 15:54

## 2019-09-17 NOTE — PN
Date of Progress Note:  09/17/2019



Subjective:  Patient seen and examined.  Chart reviewed and case discussed with RN.  Patient states vitaly villegas is continuing to spit up and is on oxygen at home, appears more lethargic.



Medications:  List reviewed.



Physical Examination:

Vital Signs:  Temperature 97.4, heart rate 98, blood pressure 111/63, respirations 18, O2 of 98% on 3
 L via nasal cannula. 

General:  Awake, alert, oriented x3, not in any acute distress, ill-appearing male. 

CV:  S1, S2.  Regular rate and rhythm.  Peripheral pulses present. 

Respiratory:  Diminished breath sounds.  No wheezing or stridor. 

Gastrointestinal:  Abdomen is soft, nontender, nondistended.  Positive bowel sounds.  No guarding or 
rigidity. 

Extremities:  No clubbing, cyanosis, or edema. 

Neuro:  Cranial nerves 2 through 12 intact grossly.  No focal neurological deficit.  Speech is normal
. 

Skin:  No rashes.  Normal skin turgor.



Laboratory Data:  WBC 21.2, H and H 14.6 and 40.9, platelets 121.  Blood culture, pseudomonas.  Sputu
m culture, pseudomonas and urine culture also growing pseudomonas.



Assessment:  A 59-year-old male with:

1.Pneumonia secondary to pseudomonas.  We will continue with IV antibiotics recently adjusted.  Pulm
onology on board.  Left lower lobe pneumonia.

2.Acute cystitis without hematuria secondary to pseudomonas.  Continue IV antibiotics.

3.Bacteremia secondary to pseudomonas.  Continue antibiotics.

4.Chronic obstructive pulmonary disease with acute exacerbation.  Continue breathing treatments.  We
an off steroids.  Patient is on oxygen at home.

5.Acute on chronic respiratory failure.  Patient currently on 3 L via nasal cannula.

6.Alcoholic liver cirrhosis.  Patient does have low platelets.  We will continue to monitor.

7.Thrombocytopenia secondary to liver cirrhosis.  No indication for transfusion at this time.

8.Deep vein thrombosis prophylaxis addressed.



Plan:  Set up IV antibiotics at home for bacteremia.  Discharged once antibiotics set up and cleared 
by Pulmonology.





/MELVI

DD:  09/17/2019 15:09:15Voice ID:  [id number]

DT:  09/17/2019 21:07:42Report ID:  [id number]

## 2019-09-18 VITALS — TEMPERATURE: 98.3 F | SYSTOLIC BLOOD PRESSURE: 97 MMHG | DIASTOLIC BLOOD PRESSURE: 58 MMHG

## 2019-09-18 VITALS — OXYGEN SATURATION: 96 %

## 2019-09-18 LAB
ALBUMIN SERPL BCP-MCNC: 1.5 G/DL (ref 3.4–5)
ALBUMIN SERPL BCP-MCNC: 1.5 G/DL (ref 3.4–5)
ALP SERPL-CCNC: 121 U/L (ref 45–117)
ALP SERPL-CCNC: 122 U/L (ref 45–117)
ALT SERPL W P-5'-P-CCNC: 20 U/L (ref 12–78)
ALT SERPL W P-5'-P-CCNC: 21 U/L (ref 12–78)
AST SERPL W P-5'-P-CCNC: 27 U/L (ref 15–37)
AST SERPL W P-5'-P-CCNC: 31 U/L (ref 15–37)
BUN BLD-MCNC: 20 MG/DL (ref 7–18)
BUN BLD-MCNC: 20 MG/DL (ref 7–18)
GLUCOSE SERPLBLD-MCNC: 126 MG/DL (ref 74–106)
GLUCOSE SERPLBLD-MCNC: 144 MG/DL (ref 74–106)
HCT VFR BLD CALC: 39 % (ref 39.6–49)
LYMPHOCYTES # SPEC AUTO: 0.6 K/UL (ref 0.7–4.9)
MORPHOLOGY BLD-IMP: (no result)
PMV BLD: 7.8 FL (ref 7.6–11.3)
POTASSIUM SERPL-SCNC: 5 MMOL/L (ref 3.5–5.1)
POTASSIUM SERPL-SCNC: 5.2 MMOL/L (ref 3.5–5.1)
RBC # BLD: 4.17 M/UL (ref 4.33–5.43)

## 2019-09-18 RX ADMIN — TRAMADOL HYDROCHLORIDE PRN MG: 50 TABLET, COATED ORAL at 03:22

## 2019-09-18 RX ADMIN — FOLIC ACID SCH MG: 1 TABLET ORAL at 07:45

## 2019-09-18 RX ADMIN — TRAMADOL HYDROCHLORIDE PRN MG: 50 TABLET, COATED ORAL at 18:06

## 2019-09-18 RX ADMIN — INSULIN GLARGINE SCH UNITS: 100 INJECTION, SOLUTION SUBCUTANEOUS at 07:43

## 2019-09-18 RX ADMIN — Medication SCH ML: at 07:46

## 2019-09-18 RX ADMIN — RANITIDINE HYDROCHLORIDE SCH MG: 150 TABLET, FILM COATED ORAL at 07:45

## 2019-09-18 RX ADMIN — FUROSEMIDE SCH MG: 40 TABLET ORAL at 07:45

## 2019-09-18 RX ADMIN — SODIUM CHLORIDE SCH MLS: 9 INJECTION, SOLUTION INTRAVENOUS at 16:19

## 2019-09-18 RX ADMIN — LEVOFLOXACIN SCH MLS: 5 INJECTION, SOLUTION INTRAVENOUS at 07:46

## 2019-09-18 RX ADMIN — ENOXAPARIN SODIUM SCH: 40 INJECTION SUBCUTANEOUS at 17:00

## 2019-09-18 RX ADMIN — HUMAN INSULIN SCH: 100 INJECTION, SOLUTION SUBCUTANEOUS at 16:29

## 2019-09-18 RX ADMIN — IPRATROPIUM BROMIDE PRN MG: 0.5 SOLUTION RESPIRATORY (INHALATION) at 10:18

## 2019-09-18 RX ADMIN — ARFORMOTEROL TARTRATE SCH MCG: 15 SOLUTION RESPIRATORY (INHALATION) at 10:18

## 2019-09-18 RX ADMIN — SPIRONOLACTONE SCH MG: 25 TABLET, FILM COATED ORAL at 07:44

## 2019-09-18 RX ADMIN — ALBUTEROL SULFATE PRN MG: 2.5 SOLUTION RESPIRATORY (INHALATION) at 10:18

## 2019-09-18 RX ADMIN — HUMAN INSULIN SCH: 100 INJECTION, SOLUTION SUBCUTANEOUS at 07:30

## 2019-09-18 RX ADMIN — HUMAN INSULIN SCH: 100 INJECTION, SOLUTION SUBCUTANEOUS at 11:30

## 2019-09-18 RX ADMIN — SODIUM CHLORIDE SCH MLS: 9 INJECTION, SOLUTION INTRAVENOUS at 09:00

## 2019-09-18 NOTE — RAD REPORT
EXAM DESCRIPTION:  RADChest Lateral Decubitus9/18/2019 10:05 am

 

CLINICAL HISTORY:  Pleural effusions/pneumonia

 

COMPARISON:  September 18th chest x-ray

 

FINDINGS:  Decubiti films obtained

 

Partial  layering of a moderate left pleural effusion

 

IMPRESSION:  Moderate loculated left pleural effusion

## 2019-09-18 NOTE — P.PN
Subjective


Date of Service: 09/18/19


Primary Care Provider: None


Chief Complaint: Pseudomonas bacteremia and pneumonia, left-sided pleural 

effusion


Subjective: New changes (Probable infected loculated pleural effusion with 

persistent elevated WBC at 19k.  To have thoracentesis and possible transfer to 

[facility].)





Review of Systems


10-point ROS is otherwise unremarkable


General: Weakness, Malaise


Respiratory: Shortness of Breath





Physical Examination





- Vital Signs


Temperature: 98.3 F


Blood Pressure: 97/58


Pulse: 98


Respirations: 17


Pulse Ox (%): 96





- Physical Exam


General: Alert, In no apparent distress, Oriented x3, Cooperative


HEENT: Atraumatic, Normocephalic, PERRLA, EOMI


Neck: Supple


Respiratory: Diminished


Cardiovascular: Normal pulses


Gastrointestinal: No tenderness, No rebound, No guarding


Neurological: Normal speech





Assessment And Plan





- Current Problems (Diagnosis)


(1) Sepsis


Current Visit: Yes   Status: Acute   





(2) Hyponatremia


Current Visit: Yes   Status: Acute   





(3) Pneumonia


Current Visit: Yes   Status: Acute   


Qualifiers: 


   Pneumonia type: due to Pseudomonas   Laterality: left   Lung location: lower 

lobe of lung   Qualified Code(s): J15.1 - Pneumonia due to Pseudomonas   





(4) UTI (urinary tract infection)


Current Visit: Yes   Status: Acute   


Qualifiers: 


   Urinary tract infection type: acute cystitis   Hematuria presence: without 

hematuria   Qualified Code(s): N30.00 - Acute cystitis without hematuria   





(5) Hypoxemia


Current Visit: No   Status: Acute   





(6) Alcoholic cirrhosis of liver


Current Visit: No   Status: Chronic   


Qualifiers: 


   Ascites presence: without ascites   Qualified Code(s): K70.30 - Alcoholic 

cirrhosis of liver without ascites   





- Plan


REC: 1) respiratory therapy with percussion & await thoracentesis


2) continue IV antibiotics 


3) DT precautions 


4) po Thiamine & Folate 


5) prn BDZs if needed 


6) AA or rehab on discharge 





Physician Review Additional Text: 


.

## 2019-09-18 NOTE — RAD REPORT
EXAM DESCRIPTION:  US - Chest - 9/18/2019 10:27 am

 

CLINICAL HISTORY:  Pleural effusion

 

COMPARISON:   x-ray September 18, 2019

 

FINDINGS:  Moderate left pleural effusion is present. It contains many thick septations

 

IMPRESSION:  Moderate loculated left pleural effusion

## 2019-09-18 NOTE — P.PN
Subjective


Date of Service: 09/18/19


Primary Care Provider: None


Chief Complaint: Pseudomonas bacteremia and pneumonia, left-sided pleural 

effusion





No changes still has some discomfort chest x-ray shows worsening with a 

loculated left-sided pleural effusion confirmed by decubitus x-rays and 

ultrasound possible he does loculated white count is declining is this patient 

still has lower extremity edema





Review of Systems


General: Weakness


Respiratory: Cough, Shortness of Breath


Cardiovascular: Chest Pain





Physical Examination





- Vital Signs


Temperature: 97.9 F


Blood Pressure: 99/54


Pulse: 88


Respirations: 20


Pulse Ox (%): 98





- Physical Exam


General: Alert, Oriented x3


Respiratory: Diminished (Diminished air entry in the left base)


Cardiovascular: Normal S1 S2, Edema (2+ lower extremity edema)





Assessment & Plan





- Problems (Diagnosis)


(1) Pneumonia


Current Visit: Yes   Status: Acute   


Plan: 


Patient is 59 years of age admitted with a Pseudomonas bacteremia and pneumonia 

also cultured in the urine white count is still elevated I have started him on 

meropenem continue with levofloxacin labs reviewed hyperglycemia


Qualifiers: 


   Pneumonia type: due to Pseudomonas   Laterality: left   Lung location: lower 

lobe of lung   Qualified Code(s): J15.1 - Pneumonia due to Pseudomonas   





(2) Pleural effusion


Current Visit: Yes   Status: Acute   


Plan: 


Patient now has a loculated left-sided pleural effusion confirmed by ultrasound 

recommend thoracentesis the pleural effusion is if infected he will need to be 

transferred to [place] of change him to meropenem Dc levofloxacin





Physician Review Additional Text: 


.

## 2019-09-18 NOTE — RAD REPORT
EXAM DESCRIPTION:  Markos Pa And Lat (2 Views)9/18/2019 10:06 am

 

CLINICAL HISTORY:  Cough

 

COMPARISON:  September 16th

 

FINDINGS:  Moderate left pleural effusion is mildly enlarged. Left basilar opacities could represent 
pneumonia or atelectasis.

 

Mild right lung opacities

 

Bb the heart remains enlarged. A PICC line remains in place

## 2019-09-18 NOTE — PN
Date of Progress Note:  09/18/2019



Subjective:  Patient seen and examined.  Chart reviewed and case discussed with 
RN and Dr. Kay.  Patient still appears somewhat lethargic, not working too 
well with physical therapy.



Medications:  List reviewed.



Physical Examination:

Vital Signs:  Temperature 97.8, heart rate 94, blood pressure 93/65, 
respirations 20, O2 96% on 3 L via nasal cannula. 

General:  Awake, alert, oriented x3.  Appears older than stated age, ill-
appearing male. 

CV:  S1, S2.  Regular rate and rhythm.  Peripheral pulses weak. 

Respiratory:  Diminished breath sounds.  No wheezing or stridor. 

Gastrointestinal:  Abdomen is soft, nontender, nondistended.  Positive bowel 
sounds. 

Extremities:  No clubbing or cyanosis.  Patient has trace pedal edema. 

Neurologic:  Nonfocal.



Laboratory Data:  Sodium 121, potassium 5.2, chloride 86, CO2 28, BUN 20, 
creatinine 0.84, glucose 126, lactate 1.6, calcium 7.5, albumin 1.5.  WBC 19.1, 
H and H 13.6 and 39, platelets 88, neutrophils 84%.  Blood cultures, 
pseudomonas.  Sputum culture, pseudomonas.  Urine culture, pseudomonas.  Chest x
-ray, moderate loculated left pleural effusion on the decubitus film.  PA and 
lateral view shows moderate left pleural effusion.  Mildly enlarged left 
basilar opacity could represent pneumonia or atelectasis.  Mild right lung 
opacities.  Heart remains enlarged.  PICC line remains in place.  Ultrasound of 
the chest shows moderate loculated left pleural effusion.



Assessment:  A 59-year-old male with;

1.   Pneumonia secondary to Pseudomonas.  IV antibiotics have been adjusted.  
Appreciate Dr. Kay's input.  We will continue to monitor WBC count, 
trending down, however, still very elevated, flagging for sepsis.  Lactate was 
checked this morning, was negative or rather less than 2.

2.   Moderate left pleural effusion.  Patient will need thoracentesis, has been 
ordered.  We will contact radiologist on-call.  If unable to be done at this 
facility, we will need to be transferred for CT Surgery or Interventional 
Radiology.

3.   Acute cystitis without hematuria secondary to pseudomonas.  Continue IV 
antibiotics.

4.   Bacteremia secondary to pseudomonas.  We will continue meropenem.

5.   Chronic obstructive pulmonary disease with acute exacerbation.  Continue 
breathing treatments.  Wean off steroids.  Patient is O2 dependent.

6.   Acute on chronic respiratory failure, currently on 3 L via nasal cannula 
secondary to chronic obstructive pulmonary disease and pleural effusion.

7.   Alcoholic liver cirrhosis.  Continue to monitor.  Place on fluid 
restrictions and sodium restriction.

8.   Thrombocytopenia secondary to alcoholic liver cirrhosis.  We will continue 
to monitor.

9.   Deep venous thrombosis prophylaxis with SCDs.  No chemical anticoagulation 
due to thrombocytopenia.



Plan:  Thoracentesis and subsequent fluid studies, pending Radiology approval.  
If not, may need to be transferred to [place].



17:13 ADDENDUM: Spoke to Dr. Cheng - unable to do thoracentesis due to 
loculations. [facility]. Patient and wife updated. 
They agree to transfer. 

Accepted by Dr. Burciaga. Also spoke to Dr. Shepard with CT Surgery. 



SA/MODL

DD:  09/18/2019 13:01:45   Voice ID:  [id number]

DT:  09/18/2019 16:46:19   Report ID:  [id number]

MTDD

## 2019-09-21 NOTE — DS
Date of Discharge:  09/18/2019



Consultants:  

1.Dr. Kay with Pulmonology.

2.Dr. Woods with GI.



Procedures:  None.



Admitting Diagnoses:  

1.Pneumonia, left lower lobe.

2.Alcoholic liver cirrhosis.

3.Chronic obstructive pulmonary disease with acute exacerbation.

4.Hypoxemia.



Discharge Diagnoses:  

1.Pneumonia secondary to pseudomonas.

2.Moderate left pleural effusion requiring thoracentesis or chest tube.

3.Bacteremia secondary to pseudomonas.

4.Acute cystitis without hematuria secondary to pseudomonas.

5.Chronic obstructive pulmonary disease with acute exacerbation.

6.Acute on chronic respiratory failure due to chronic obstructive pulmonary disease.

7.Alcoholic liver cirrhosis.

8.Thrombocytopenia secondary to liver cirrhosis.



Hospital Course:  The patient is a 59-year-old male with past medical history of alcoholic liver cirr
hosis, COPD, came in with respiratory distress.  Patient was found to have COPD exacerbation, had shayy
vated white blood cell count of 34,000, had recently been discharged from the hospital.  Patient was 
started on IV antibiotics.  Pulmonology was consulted.  Patient did have slow response to treatment d
ue to his liver cirrhosis.  GI was also consulted.  Abdominal ultrasound showed no significant gallbl
adder or biliary tree finding.  There was no common duct stone.  The patient's repeat chest x-ray clarissa
wed significant loculated left pleural effusion with septations on ultrasound.  Therefore, thoracente
sis was required, however, unable to be done at this facility.  Did speak with radiologist, Dr. Elisha sorto.  Loculated effusions were not able to be drained by IR.  Therefore, patient was referred to [facility] for CT surgery services for possible chest tube placement versus IR wi
th drainage.  Patient was accepted by hospitalist, Dr. Diego, also with consulting was CT surgeon on
-call.  The patient's cultures grew out pseudomonas from urine blood and sputum.  He was switched ove
r to meropenem.  Overall, the patient has a poor prognosis.  He did have multiple electrolyte abnorma
lities due to his liver dysfunction, namely his low sodium.  His potassium was also corrected.  He ha
d hyperkalemia.  Patient was counseled regarding discontinuing alcohol completely.  His white blood c
ell count did improve to 19,000, initially was 34.9.  His COPD exacerbation also improved.  His stero
ids were weaned.  Patient was then transferred to [facility] for higher level of care.  For physical 
exam findings please see progress note dictated on day of discharge. 



Total time transferring patient was 47 minutes.





/MELVI

DD:  09/20/2019 16:38:35Voice ID:  [id number]

DT:  09/21/2019 15:19:16Report ID:  [id number]

## 2019-11-08 ENCOUNTER — HOSPITAL ENCOUNTER (INPATIENT)
Dept: HOSPITAL 97 - ER | Age: 59
LOS: 7 days | Discharge: HOME | DRG: 190 | End: 2019-11-15
Attending: HOSPITALIST | Admitting: HOSPITALIST
Payer: MEDICARE

## 2019-11-08 VITALS — BODY MASS INDEX: 23.5 KG/M2

## 2019-11-08 DIAGNOSIS — Z91.19: ICD-10-CM

## 2019-11-08 DIAGNOSIS — J44.1: Primary | ICD-10-CM

## 2019-11-08 DIAGNOSIS — J90: ICD-10-CM

## 2019-11-08 DIAGNOSIS — F41.9: ICD-10-CM

## 2019-11-08 DIAGNOSIS — J96.01: ICD-10-CM

## 2019-11-08 DIAGNOSIS — K70.30: ICD-10-CM

## 2019-11-08 DIAGNOSIS — J96.02: ICD-10-CM

## 2019-11-08 LAB
ALBUMIN SERPL BCP-MCNC: 2.8 G/DL (ref 3.4–5)
ALP SERPL-CCNC: 130 U/L (ref 45–117)
ALT SERPL W P-5'-P-CCNC: 17 U/L (ref 12–78)
AST SERPL W P-5'-P-CCNC: 31 U/L (ref 15–37)
BUN BLD-MCNC: 4 MG/DL (ref 7–18)
COHGB MFR BLDA: 1.5 % (ref 0–1.5)
GLUCOSE SERPLBLD-MCNC: 125 MG/DL (ref 74–106)
HCT VFR BLD CALC: 42.7 % (ref 39.6–49)
INR BLD: 1.35
LYMPHOCYTES # SPEC AUTO: 1.9 K/UL (ref 0.7–4.9)
MAGNESIUM SERPL-MCNC: 1.6 MG/DL (ref 1.8–2.4)
MORPHOLOGY BLD-IMP: (no result)
NT-PROBNP SERPL-MCNC: 137 PG/ML (ref ?–125)
OXYHGB MFR BLDA: 92 % (ref 94–97)
PMV BLD: 7.2 FL (ref 7.6–11.3)
POTASSIUM SERPL-SCNC: 4.1 MMOL/L (ref 3.5–5.1)
RBC # BLD: 4.64 M/UL (ref 4.33–5.43)
SAO2 % BLDA: 94 % (ref 92–98.5)
TROPONIN (EMERG DEPT USE ONLY): < 0.02 NG/ML (ref 0–0.04)

## 2019-11-08 PROCEDURE — 96375 TX/PRO/DX INJ NEW DRUG ADDON: CPT

## 2019-11-08 PROCEDURE — 87040 BLOOD CULTURE FOR BACTERIA: CPT

## 2019-11-08 PROCEDURE — 93005 ELECTROCARDIOGRAM TRACING: CPT

## 2019-11-08 PROCEDURE — 96365 THER/PROPH/DIAG IV INF INIT: CPT

## 2019-11-08 PROCEDURE — 82805 BLOOD GASES W/O2 SATURATION: CPT

## 2019-11-08 PROCEDURE — 80048 BASIC METABOLIC PNL TOTAL CA: CPT

## 2019-11-08 PROCEDURE — 36415 COLL VENOUS BLD VENIPUNCTURE: CPT

## 2019-11-08 PROCEDURE — 97161 PT EVAL LOW COMPLEX 20 MIN: CPT

## 2019-11-08 PROCEDURE — 82947 ASSAY GLUCOSE BLOOD QUANT: CPT

## 2019-11-08 PROCEDURE — 85025 COMPLETE CBC W/AUTO DIFF WBC: CPT

## 2019-11-08 PROCEDURE — 85610 PROTHROMBIN TIME: CPT

## 2019-11-08 PROCEDURE — 87077 CULTURE AEROBIC IDENTIFY: CPT

## 2019-11-08 PROCEDURE — 93306 TTE W/DOPPLER COMPLETE: CPT

## 2019-11-08 PROCEDURE — 71275 CT ANGIOGRAPHY CHEST: CPT

## 2019-11-08 PROCEDURE — 84484 ASSAY OF TROPONIN QUANT: CPT

## 2019-11-08 PROCEDURE — 71045 X-RAY EXAM CHEST 1 VIEW: CPT

## 2019-11-08 PROCEDURE — 94760 N-INVAS EAR/PLS OXIMETRY 1: CPT

## 2019-11-08 PROCEDURE — 83605 ASSAY OF LACTIC ACID: CPT

## 2019-11-08 PROCEDURE — 87070 CULTURE OTHR SPECIMN AEROBIC: CPT

## 2019-11-08 PROCEDURE — 87205 SMEAR GRAM STAIN: CPT

## 2019-11-08 PROCEDURE — 80076 HEPATIC FUNCTION PANEL: CPT

## 2019-11-08 PROCEDURE — 94660 CPAP INITIATION&MGMT: CPT

## 2019-11-08 PROCEDURE — 84100 ASSAY OF PHOSPHORUS: CPT

## 2019-11-08 PROCEDURE — 83735 ASSAY OF MAGNESIUM: CPT

## 2019-11-08 PROCEDURE — 94640 AIRWAY INHALATION TREATMENT: CPT

## 2019-11-08 PROCEDURE — 99285 EMERGENCY DEPT VISIT HI MDM: CPT

## 2019-11-08 PROCEDURE — 87186 SC STD MICRODIL/AGAR DIL: CPT

## 2019-11-08 PROCEDURE — 84145 PROCALCITONIN (PCT): CPT

## 2019-11-08 PROCEDURE — 83880 ASSAY OF NATRIURETIC PEPTIDE: CPT

## 2019-11-08 RX ADMIN — IPRATROPIUM BROMIDE SCH MG: 0.5 SOLUTION RESPIRATORY (INHALATION) at 20:00

## 2019-11-08 RX ADMIN — HUMAN INSULIN SCH UNIT: 100 INJECTION, SOLUTION SUBCUTANEOUS at 21:42

## 2019-11-08 RX ADMIN — ALBUTEROL SULFATE SCH MG: 2.5 SOLUTION RESPIRATORY (INHALATION) at 13:10

## 2019-11-08 RX ADMIN — ARFORMOTEROL TARTRATE SCH MCG: 15 SOLUTION RESPIRATORY (INHALATION) at 20:00

## 2019-11-08 RX ADMIN — Medication SCH ML: at 21:31

## 2019-11-08 RX ADMIN — IPRATROPIUM BROMIDE SCH: 0.5 SOLUTION RESPIRATORY (INHALATION) at 14:00

## 2019-11-08 RX ADMIN — HUMAN INSULIN SCH: 100 INJECTION, SOLUTION SUBCUTANEOUS at 16:30

## 2019-11-08 RX ADMIN — METHYLPREDNISOLONE SODIUM SUCCINATE SCH MG: 40 INJECTION, POWDER, FOR SOLUTION INTRAMUSCULAR; INTRAVENOUS at 12:00

## 2019-11-08 RX ADMIN — HUMAN INSULIN SCH: 100 INJECTION, SOLUTION SUBCUTANEOUS at 11:30

## 2019-11-08 RX ADMIN — ALBUTEROL SULFATE SCH MG: 2.5 SOLUTION RESPIRATORY (INHALATION) at 20:00

## 2019-11-08 RX ADMIN — METHYLPREDNISOLONE SODIUM SUCCINATE SCH MG: 40 INJECTION, POWDER, FOR SOLUTION INTRAMUSCULAR; INTRAVENOUS at 17:11

## 2019-11-08 NOTE — EDPHYS
Physician Documentation                                                                           

 [facility]                                                                 

Name: Omkar Chung                                                                                  

Age: 59 yrs                                                                                       

Sex: Male                                                                                         

: 1960                                                                                   

MRN: [id number]                                                                                   

Arrival Date: 2019                                                                          

Time: 08:46                                                                                       

Account#: [id number]                                                                            

Bed 3                                                                                             

Private MD:                                                                                       

ED Physician Crhis Canales                                                                      

HPI:                                                                                              

                                                                                             

08:53 This 59 yrs old  Male presents to ER via EMS with complaints of copd           juliet 

      exacerbation.                                                                               

08:53 The patient has shortness of breath at rest, with light activity. Onset: The            juliet 

      symptoms/episode began/occurred 3 day(s) ago. Duration: The symptoms are continuous,        

      and are steadily getting worse. The patient's shortness of breath is aggravated by          

      nothing, is alleviated by nothing. The patient or guardian reports airway noise, cough,     

      difficulty breathing. Modifying factors: The symptoms are alleviated by changing            

      position, elevating head, remaining still, the symptoms are aggravated by activity,         

      cold environment, lying flat, talking. Associated signs and symptoms: Pertinent             

      positives: non-productive cough. Severity of symptoms: At their worst the symptoms were     

      moderate this morning.                                                                      

                                                                                                  

Historical:                                                                                       

- Allergies:                                                                                      

08:50 No Known Allergies;                                                                     bp  

- Home Meds:                                                                                      

08:50 Albuterol Nebulizer [Active]; Furosemide Oral [Active]; Spironolactone Oral [Active];   bp  

- PMHx:                                                                                           

08:50 Cirrhosis; COPD; Hernia;                                                                bp  

                                                                                                  

- Immunization history:: Adult Immunizations up to date.                                          

- Social history:: Smoking status: Patient/guardian denies using tobacco, but has a               

  distant history of tobacco abuse.                                                               

- Ebola Screening: : No symptoms or risks identified at this time.                                

- Family history:: not pertinent.                                                                 

                                                                                                  

                                                                                                  

ROS:                                                                                              

08:53 Constitutional: Negative for fever, chills, and weight loss, Eyes: Negative for injury, juliet 

      pain, redness, and discharge, ENT: Negative for injury, pain, and discharge, Neck:          

      Negative for injury, pain, and swelling, Abdomen/GI: Negative for abdominal pain,           

      nausea, vomiting, diarrhea, and constipation, Back: Negative for injury and pain, :       

      Negative for injury, bleeding, discharge, and swelling, MS/Extremity: Negative for          

      injury and deformity, Skin: Negative for injury, rash, and discoloration, Neuro:            

      Negative for headache, weakness, numbness, tingling, and seizure, Psych: Negative for       

      depression, anxiety, suicide ideation, homicidal ideation, and hallucinations,              

      Allergy/Immunology: Negative for hives, rash, and allergies, Endocrine: Negative for        

      neck swelling, polydipsia, polyuria, polyphagia, and marked weight changes,                 

      Hematologic/Lymphatic: Negative for swollen nodes, abnormal bleeding, and unusual           

      bruising.                                                                                   

08:53 Cardiovascular: Positive for palpitations.                                                  

08:53 Respiratory: Positive for cough, shortness of breath, wheezing, inspiratory, expiratory.    

                                                                                                  

Exam:                                                                                             

08:53 Constitutional:  This is a well developed, well nourished patient who is awake, alert,  juliet 

      and in no acute distress. Head/Face:  Normocephalic, atraumatic. Eyes:  Pupils equal        

      round and reactive to light, extra-ocular motions intact.  Lids and lashes normal.          

      Conjunctiva and sclera are non-icteric and not injected.  Cornea within normal limits.      

      Periorbital areas with no swelling, redness, or edema. ENT:  Nares patent. No nasal         

      discharge, no septal abnormalities noted.  Tympanic membranes are normal and external       

      auditory canals are clear.  Oropharynx with no redness, swelling, or masses, exudates,      

      or evidence of obstruction, uvula midline.  Mucous membranes moist. Neck:  Trachea          

      midline, no thyromegaly or masses palpated, and no cervical lymphadenopathy.  Supple,       

      full range of motion without nuchal rigidity, or vertebral point tenderness.  No            

      Meningismus. Chest/axilla:  Normal chest wall appearance and motion.  Nontender with no     

      deformity.  No lesions are appreciated. Abdomen/GI:  Soft, non-tender, with normal          

      bowel sounds.  No distension or tympany.  No guarding or rebound.  No evidence of           

      tenderness throughout. Back:  No spinal tenderness.  No costovertebral tenderness.          

      Full range of motion. Male :  Normal genitalia with no discharge or lesions. Skin:        

      Warm, dry with normal turgor.  Normal color with no rashes, no lesions, and no evidence     

      of cellulitis. MS/ Extremity:  Pulses equal, no cyanosis.  Neurovascular intact.  Full,     

      normal range of motion. Neuro:  Awake and alert, GCS 15, oriented to person, place,         

      time, and situation.  Cranial nerves II-XII grossly intact.  Motor strength 5/5 in all      

      extremities.  Sensory grossly intact.  Cerebellar exam normal.  Normal gait. Psych:         

      Awake, alert, with orientation to person, place and time.  Behavior, mood, and affect       

      are within normal limits.                                                                   

08:53 Cardiovascular: Rate: tachycardic, Rhythm: regular, Heart sounds: normal, Edema: is not     

      appreciated, JVD: is not appreciated.                                                       

                                                                                                  

Vital Signs:                                                                                      

08:50  / 103; Pulse 124; Resp 25; Temp 97.3; Pulse Ox 97% ; Weight 92.99 kg; Height 6   bp  

      ft. 2 in. (187.96 cm);                                                                      

09:11  / 95; Pulse 121; Resp 28; Pulse Ox 97% ;                                         bp  

10:04  / 84; Pulse 116; Resp 25; Pulse Ox 94% on 40% BiPAP;                             bp  

10:54  / 83; Pulse 107; Resp 18; Pulse Ox 95% ;                                         bp  

11:15  / 81; Pulse 104; Resp 16; Pulse Ox 95% ;                                         bp  

08:50 Body Mass Index 26.32 (92.99 kg, 187.96 cm)                                             bp  

                                                                                                  

MDM:                                                                                              

08:47 Patient medically screened.                                                             [facility] 

08:56 Data reviewed: vital signs, nurses notes, lab test result(s), EKG, radiologic studies,  juliet 

      plain films.                                                                                

                                                                                                  

                                                                                             

08:51 Order name: Basic Metabolic Panel; Complete Time: 10:09                                 [facility] 

                                                                                             

08:51 Order name: CBC with Diff                                                               juliet 

                                                                                             

08:51 Order name: LFT's; Complete Time: 10:                                                 [facility] 

                                                                                             

08:51 Order name: Magnesium; Complete Time: 10:                                             [facility] 

                                                                                             

08:51 Order name: NT PRO-BNP; Complete Time: 10:09                                            [facility] 

                                                                                             

08:51 Order name: PT-INR; Complete Time: 10:09                                                [facility] 

                                                                                             

08:51 Order name: Troponin (emerg Dept Use Only); Complete Time: 10:09                        [facility] 

                                                                                             

08:51 Order name: XRAY Chest (1 view)                                                         [facility] 

                                                                                             

08:51 Order name: Lactate; Complete Time: 10:09                                               [facility] 

                                                                                             

08:51 Order name: Procalcitonin; Complete Time: 10:09                                         [facility] 

                                                                                             

08:51 Order name: ABG; Complete Time: 10:09                                                   [facility] 

                                                                                             

08:51 Order name: BIPAP                                                                       [facility] 

                                                                                             

08:56 Order name: Blood Culture Adult (2)                                                     bp  

                                                                                             

08:51 Order name: EKG; Complete Time: 08:52                                                   [facility] 

                                                                                             

08:51 Order name: Cardiac monitoring; Complete Time: 09:02                                    [facility] 

                                                                                             

08:51 Order name: EKG - Nurse/Tech; Complete Time: 09:02                                      [facility] 

                                                                                             

08:51 Order name: IV Saline Lock; Complete Time: 09:02                                        [facility] 

                                                                                             

08:51 Order name: Labs collected and sent; Complete Time: 09:01                               [facility] 

                                                                                             

08:51 Order name: O2 Per Protocol; Complete Time: 09:                                       [facility] 

                                                                                             

08:51 Order name: O2 Sat Monitoring; Complete Time: 09:01                                     [facility] 

                                                                                                  

Administered Medications:                                                                         

09:04 Drug: NS 0.9% 1000 ml Route: IV; Rate: 1 bolus; Site: left antecubital;                 ss  

10:11 Follow up: IV Status: Completed infusion; IV Intake: 1000ml                             bp  

10:46 Follow up: IV Status: Completed infusion; IV Intake: 1000ml                             bp  

09:06 Drug: Decadron - Dexamethasone 10 mg Route: IVP; Site: left antecubital;                ss  

10:10 Follow up: Response: No adverse reaction; Marked relief of symptoms                     bp  

09:09 Drug: Pepcid 20 mg Route: IVP; Site: left antecubital;                                  ss  

10:10 Follow up: Response: No adverse reaction                                                bp  

09:12 Drug: Albuterol - atroVENT (3:1) (2.5 mg - 0.5 mg) 3 ml Route: Nebulizer;               ss  

10:10 Follow up: Response: No adverse reaction                                                bp  

09:30 Drug: LevaQUIN 500 mg Volume: 100 ml; Route: IVPB; Infused Over: 60 mins; Site: left    ss  

      antecubital;                                                                                

10:46 Follow up: IV Status: Completed infusion; IV Intake: 100ml                              bp  

10:46 Drug: Magnesium Sulfate 2 grams Route: IVPB; Infused Over: 2 hrs; Site: right           bp  

      antecubital;                                                                                

10:47 Follow up: IV Status: Infusion continued upon admission                                 bp  

                                                                                                  

                                                                                                  

Disposition:                                                                                      

19 08:57 Hospitalization ordered by Hal Jimenez for Inpatient Admission. Preliminary     

  diagnosis are Dyspnea, unspecified, Hypoxemia, Chronic obstructive pulmonary                    

  disease with (acute) exacerbation, Hypomagnesemia, Respiratory failure,                         

  unspecified with hypercapnia.                                                                   

- Bed requested for Intensive Care Unit.                                                          

- Status is Inpatient Admission.                                                              bp  

- Condition is Stable.                                                                            

- Problem is new.                                                                                 

- Symptoms have improved.                                                                         

UTI on Admission? No                                                                              

                                                                                                  

                                                                                                  

                                                                                                  

Signatures:                                                                                       

Dispatcher [employer]                           EDMS                                                 

Chris Canales MD MD cha Smirch, Shelby, RN                      RN                                                      

Kvng Parekh RN RN bp Botello, Elizabeth eb                                                   

                                                                                                  

Corrections: (The following items were deleted from the chart)                                    

10:03 08:57 Hospitalization Ordered by Hal Jimenez for Inpatient Admission. Preliminary     eb  

      diagnosis is Dyspnea, unspecified; Hypoxemia; Chronic obstructive pulmonary disease         

      with (acute) exacerbation. Bed requested for Telemetry/MedSurg (Inpatient). Status is       

      Inpatient Admission. Condition is Stable. Problem is new. Symptoms have improved. UTI       

      on Admission? No. juliet                                                                       

10:09 10:03 2019 08:57 Hospitalization Ordered by Hal Jimenez for Inpatient           juliet 

      Admission. Preliminary diagnosis is Dyspnea, unspecified; Hypoxemia; Chronic                

      obstructive pulmonary disease with (acute) exacerbation. Bed requested for Intensive        

      Care Unit. Status is Inpatient Admission. Condition is Stable. Problem is new. Symptoms     

      have improved. UTI on Admission? No. eb                                                     

10:13 10:09 2019 08:57 Hospitalization Ordered by Hal Jimenez for Inpatient           juliet 

      Admission. Preliminary diagnosis is Dyspnea, unspecified; Hypoxemia; Chronic                

      obstructive pulmonary disease with (acute) exacerbation; Hypomagnesemia. Bed requested      

      for Intensive Care Unit. Status is Inpatient Admission. Condition is Stable. Problem is     

      new. Symptoms have improved. UTI on Admission? No. juliet                                      

11:26 10:13 2019 08:57 Hospitalization Ordered by Hal Jimenez for Inpatient           bp  

      Admission. Preliminary diagnosis is Dyspnea, unspecified; Hypoxemia; Chronic                

      obstructive pulmonary disease with (acute) exacerbation; Hypomagnesemia; Respiratory        

      failure, unspecified with hypercapnia. Bed requested for Intensive Care Unit. Status is     

      Inpatient Admission. Condition is Stable. Problem is new. Symptoms have improved. UTI       

      on Admission? No. juliet                                                                       

                                                                                                  

**************************************************************************************************

## 2019-11-08 NOTE — RAD REPORT
EXAM DESCRIPTION:  CT - Chest Angio - 11/8/2019 2:32 pm

 

CLINICAL HISTORY:   Rule out PEchest pain, shortness of breath

 

COMPARISON:  Chest Single View dated 11/8/2019; Chest dated 9/18/2019; Chest Lateral Decubitus dated 
9/18/2019

 

TECHNIQUE:  Dynamically enhanced 3 mm thick images of the chest were obtained during administration o
f approximately 150mL Isovue 370 IV contrast. Coronal and oblique MIP reconstruction images were gene
rated and reviewed. Exam utilizes a protocol to evaluate the pulmonary arterial tree.

 

All CT scans are performed using dose optimization technique as appropriate and may include automated
 exposure control or mA/KV adjustment according to patient size.

 

FINDINGS:  No pulmonary emboli are identified.

 

The aorta as imaged shows no acute or suspicious finding. No pericardial thickening or effusion.

 

No infiltrate or acute right lung field finding. No right-sided pleural effusion or pleural thickenin
g. Both lung fields have scarring changes. In the posterior lower left lung field the patient has a 1
0 cm CC x 8 cm TR x 4 cm AP air-filled cystic cavity in the pleural space. There is surrounding pleur
al thickening. At the anterior superior aspect there is lung parenchymal opacification that could be 
chronic atelectasis or infiltrate. Trace left-sided pleural effusion.

 

No mediastinal or hilar suspicious masses. No chest wall masses or abnormal axillary lymphadenopathy.


 

IMPRESSION:  No pulmonary emboli identified.

 

The patient has a 10 x 8 x 4 centimeter air-filled cystic cavity in the posterior lower left lung fie
ld pleural space.Cystic cavity has a rim of thickened pleura. There is left lower lobe parenchyma at 
the anterior margin that could be infiltrate, chronic atelectasis or a combination.

 

There is a trace amount of pleural fluid in the left base as well.

 

Left base finding may be a chronic pneumothorax from prior intervention.

## 2019-11-08 NOTE — P.HP
Certification for Inpatient


Patient admitted to: Inpatient


With expected LOS: >2 Midnights


Practitioner: I am a practitioner with admitting privileges, knowledge of 

patient current condition, hospital course, and medical plan of care.


Services: Services provided to patient in accordance with Admission 

requirements found in Title 42 Section 412.3 of the Code of Federal Regulations





Patient History


Date of Service: 11/10/19


Reason for admission: Shortness of breath


History of Present Illness: 


 58 y/o man with a PMH of COPD, alcohol liver cirrhosis, hospitalized about 6 

weeks ago for COPD exacerbation and noted to have loculated pleural effusion 

which was drained after transfer to an outside facility.  Patient presents to 

the ED today with complaint of progressively worsening shortness of breath 

which has been present for 3 days.  Symptoms associated  with wheezing and 

nonproductive cough.  He states that he used his inhalers and nebulizers 

without improvement.  Per patient, he finished a course of IV antibiotics about 

2 weeks ago.


In the ED, patient was noted to be dyspneic, desaturated to the 60s and 70s.  

Noted to be tachypneic.  Patient placed on BiPAP and admitted to the ICU for 

further management of COPD exacerbation.





Allergies





No Known Allergies Allergy (Verified 11/08/19 11:36)


 





Home Medications: 








Albuterol Neb [Proventil 0.083% Neb Soln] 2 inh IH Q4HP PRN 09/09/19 


Folic Acid 0.4 mg PO DAILY 09/09/19 


Furosemide 40 mg PO DAILY 09/09/19 


Spironolactone 100 mg PO DAILY 09/09/19 


Tiotropium [Spiriva Handihaler*] 1 puff PO DAILY 11/08/19 








- Past Medical/Surgical History


Diabetic: No


-: Liver Cirrohsis


-: COPD


-: Asthma


-: Paracentesis


-: Right Inguinal Hernia and Umbilical Hernia Repair





- Family History


  ** Father


-: Hypertension, Lung disease





  ** Mother


-: Diabetes





- Social History


Alcohol use: No


CD- Drugs: No


Caffeine use: Yes





Review of Systems


Other: 


General:  No fever, no malaise, no unintentional weight loss.


Eyes:  No eye discharge, 


CVS:  No chest pain, no palpitation, no lightheadedness.


GI:  No abdominal pain, no nausea no vomit, no constipation, no diarrhea.


Genitourinary:  No dysuria, no urinary frequency, no incontinence, no hematuria.


Musculoskeletal:  No joint pains, or joint swelling, no gait instability.


Neurology:  No headache, no asymmetric, weakness, no problem with swallowing.





Except as documented, all other systems reviewed and negative.











Physical Examination





- Physical Exam


General: Alert, Oriented x3, Moderate distress (Secondary to labored breathing)


HEENT: Atraumatic, Normocephalic, PERRLA, Mucous membr. moist/pink, Sclerae 

nonicteric


Neck: Supple, JVD not distended, No Thyromegaly


Respiratory: Diminished (Diffuse), Expiratory wheezes (Diffuse)


Cardiovascular: No edema, Normal pulses, Normal S1 S2, Other (Tachycardic)


Capillary refill: <2 Seconds


Gastrointestinal: Normal bowel sounds, Soft and benign, Non-distended (

Periumbilical hernia), No tenderness


Musculoskeletal: No swelling, No erythema


Integumentary: No rashes, No erythema


Neurological: Normal speech, Normal strength at 5/5 x4 extr, Cranial nerves 3-

12 intact





- Studies


Laboratory Data (last 24 hrs)





11/08/19 09:00: WBC 8.5, Hgb 14.8, Hct 42.7, Plt Count 175








Assessment and Plan





- Problems (Diagnosis)


(1) Acute respiratory failure with hypoxemia


Current Visit: Yes   Status: Acute   





(2) COPD with acute exacerbation


Current Visit: No   Status: Acute   





(3) Pleural effusion


Current Visit: No   Status: Acute   





(4) Alcoholic cirrhosis of liver


Current Visit: No   Status: Chronic   


Qualifiers: 


   Ascites presence: without ascites   Qualified Code(s): K70.30 - Alcoholic 

cirrhosis of liver without ascites   





- Plan


Admit patient to the ICU


BiPAP therapy


Scheduled albuterol and Ipratropium nebulizers


IV Solu-Medrol


IV Levaquin


Titrate oxygen


Watch for steroid induced hyperglycemia.


Follow cultures.


Consult to pulmonary requested.








- Advance Directives


Does patient have a Living Will: No


Does patient have a Durable POA for Healthcare: No

## 2019-11-08 NOTE — ER
Nurse's Notes                                                                                     

 [facility]                                                                 

Name: Omkar Chung                                                                                  

Age: 59 yrs                                                                                       

Sex: Male                                                                                         

: 1960                                                                                   

MRN: [id number]                                                                                   

Arrival Date: 2019                                                                          

Time: 08:46                                                                                       

Account#: [id number]                                                                            

Bed 3                                                                                             

Private MD:                                                                                       

Diagnosis: Dyspnea, unspecified;Hypoxemia;Chronic obstructive pulmonary disease with (acute)      

  exacerbation;Hypomagnesemia;Respiratory failure, unspecified with hypercapnia                   

                                                                                                  

Presentation:                                                                                     

                                                                                             

08:48 Presenting complaint: EMS states: SOB x 3 DAYS, RECENT H/O PNA. Transition of care:     bp  

      patient was not received from another setting of care. Onset of symptoms is unknown.        

      Risk Assessment: Do you want to hurt yourself or someone else? Patient reports no           

      desire to harm self or others. Initial Sepsis Screen: Does the patient meet any 2           

      criteria? RR > 20 per min. HR > 90 bpm. Yes Does the patient have a suspected source of     

      infection? Yes: Productive cough/pneumonia. Care prior to arrival: IV initiated. 20 GA,     

      in the left antecubital area, Med neb given.                                                

08:48 Method Of Arrival: EMS: [facility]                                                    bp  

08:48 Acuity: BROCK 2                                                                           bp  

                                                                                                  

Triage Assessment:                                                                                

08:50 General: Appears distressed, uncomfortable, ill, Behavior is cooperative, appropriate   bp  

      for age, anxious. Pain: Denies pain. EENT: No deficits noted. Neuro: Level of               

      Consciousness is awake, alert, obeys commands, Oriented to person, place, time,             

      situation, Appropriate for age. Cardiovascular: Rhythm is sinus tachycardia.                

      Respiratory: Airway is patent Respiratory effort is labored, gasping, with retractions,     

      Respiratory pattern is tachypnea Breath sounds are coarse bilaterally. Breath sounds        

      with wheezes bilaterally. GI: No signs and/or symptoms were reported involving the          

      gastrointestinal system. : No signs and/or symptoms were reported regarding the           

      genitourinary system. Derm: No deficits noted. Musculoskeletal: No deficits noted.          

08:50 General: CODE SEPSIS STARTED.                                                           bp  

                                                                                                  

Historical:                                                                                       

- Allergies:                                                                                      

08:50 No Known Allergies;                                                                     bp  

- Home Meds:                                                                                      

08:50 Albuterol Nebulizer [Active]; Furosemide Oral [Active]; Spironolactone Oral [Active];   bp  

- PMHx:                                                                                           

08:50 Cirrhosis; COPD; Hernia;                                                                bp  

                                                                                                  

- Immunization history:: Adult Immunizations up to date.                                          

- Social history:: Smoking status: Patient/guardian denies using tobacco, but has a               

  distant history of tobacco abuse.                                                               

- Ebola Screening: : No symptoms or risks identified at this time.                                

- Family history:: not pertinent.                                                                 

                                                                                                  

                                                                                                  

Screenin:50 Abuse screen: Denies threats or abuse. Denies injuries from another. Nutritional        bp  

      screening: No deficits noted. Tuberculosis screening: No symptoms or risk factors           

      identified. Fall Risk None identified.                                                      

                                                                                                  

Assessment:                                                                                       

08:50 General: SEE TRIAGE NOTE.                                                               bp  

09:12 Reassessment: SECOND BLOOD CX DRAWN, PHLEBOTOMY AT B/S.                                 bp  

10:03 Reassessment: PT TBA ICU.                                                               bp  

                                                                                                  

Vital Signs:                                                                                      

08:50  / 103; Pulse 124; Resp 25; Temp 97.3; Pulse Ox 97% ; Weight 92.99 kg; Height 6   bp  

      ft. 2 in. (187.96 cm);                                                                      

09:11  / 95; Pulse 121; Resp 28; Pulse Ox 97% ;                                         bp  

10:04  / 84; Pulse 116; Resp 25; Pulse Ox 94% on 40% BiPAP;                             bp  

10:54  / 83; Pulse 107; Resp 18; Pulse Ox 95% ;                                         bp  

11:15  / 81; Pulse 104; Resp 16; Pulse Ox 95% ;                                         bp  

08:50 Body Mass Index 26.32 (92.99 kg, 187.96 cm)                                             bp  

                                                                                                  

ED Course:                                                                                        

08:46 Patient arrived in ED.                                                                  snw 

08:47 Chris Canales MD is Attending Physician.                                             juilet 

08:48 Kvng Parekh, RN is Primary Nurse.                                                    bp  

08:49 Triage completed.                                                                       bp  

08:50 Arm band placed on left wrist.                                                          bp  

08:50 Patient has correct armband on for positive identification. Bed in low position. Call   bp  

      light in reach. Side rails up X2. Cardiac monitor on. Pulse ox on. NIBP on.                 

08:50 Maintain EMS IV. Dressing intact. Good blood return noted. Site clean \T\ dry. Gauge \T\    bp

      site: 20 G LEFT AC.                                                                         

08:56 Hal Jimenez is Hospitalizing Provider.                                               juliet 

08:58 EKG done, by EKG tech. reviewed by Chris Canales MD.                                   [id number] 

09:27 XRAY Chest (1 view) In Process Unspecified.                                             EDMS

10:59 No provider procedures requiring assistance completed. Patient admitted, IV remains in  bp  

      place.                                                                                      

                                                                                                  

Administered Medications:                                                                         

09:04 Drug: NS 0.9% 1000 ml Route: IV; Rate: 1 bolus; Site: left antecubital;                 ss  

10:11 Follow up: IV Status: Completed infusion; IV Intake: 1000ml                             bp  

10:46 Follow up: IV Status: Completed infusion; IV Intake: 1000ml                             bp  

09:06 Drug: Decadron - Dexamethasone 10 mg Route: IVP; Site: left antecubital;                ss  

10:10 Follow up: Response: No adverse reaction; Marked relief of symptoms                     bp  

09:09 Drug: Pepcid 20 mg Route: IVP; Site: left antecubital;                                  ss  

10:10 Follow up: Response: No adverse reaction                                                bp  

09:12 Drug: Albuterol - atroVENT (3:1) (2.5 mg - 0.5 mg) 3 ml Route: Nebulizer;               ss  

10:10 Follow up: Response: No adverse reaction                                                bp  

09:30 Drug: LevaQUIN 500 mg Volume: 100 ml; Route: IVPB; Infused Over: 60 mins; Site: left    ss  

      antecubital;                                                                                

10:46 Follow up: IV Status: Completed infusion; IV Intake: 100ml                              bp  

10:46 Drug: Magnesium Sulfate 2 grams Route: IVPB; Infused Over: 2 hrs; Site: right           bp  

      antecubital;                                                                                

10:47 Follow up: IV Status: Infusion continued upon admission                                 bp  

                                                                                                  

                                                                                                  

Intake:                                                                                           

10:11 IV: 1000ml; Total: 1000ml.                                                              bp  

10:46 IV: 100ml; Total: 1100ml.                                                               bp  

10:46 IV: 1000ml; Total: 2100ml.                                                              bp  

                                                                                                  

Outcome:                                                                                          

08:57 Decision to Hospitalize by Provider.                                                    juliet 

10:56 Admitted to ICU accompanied by nurse, via stretcher, room 7, with oxygen, on monitor,   bp  

      with chart, Report called to  DARLENE MEDINA                                                      

10:56 Condition: stable                                                                           

10:56 Instructed on the need for admit.                                                           

11:26 Patient left the ED.                                                                    bp  

                                                                                                  

Signatures:                                                                                       

Dispatcher [employer]                           EDChris Silveira MD MD cha Therrien, Shelly, TOBYP-C                 TOBYP-Ofelia Baron, RN                      RN   ss                                                   

Kvng Parekh, CAROL                      RN   Laura Zambrano                              [facility]                                                  

                                                                                                  

**************************************************************************************************

## 2019-11-08 NOTE — P.CNS
Date of Consult: 11/08/19


Chief Complaint: Shortness of breath


History of Present Illness: 





Patient is 59 years of age with a history of COPD status post chest tube 

insertion for loculated pleural effusion on the left side.  In addition he 

received a PICC line with IV therapy for Pseudomonas infection.  He suddenly 

became worse started having more shortness of breath ended appeared the room is 

feeling much better he is not coughing up any productive mucoid to sputum no 

chest pain


Allergies





No Known Allergies Allergy (Verified 11/08/19 11:36)


 





Home Medications: 








Albuterol Neb [Proventil 0.083% Neb Soln] 2 inh IH Q4HP PRN 09/09/19 


Folic Acid 0.4 mg PO DAILY 09/09/19 


Furosemide 40 mg PO DAILY 09/09/19 


Spironolactone 100 mg PO DAILY 09/09/19 


Tiotropium [Spiriva Handihaler*] 1 puff PO DAILY 11/08/19 








- Past Medical/Surgical History


Diabetic: No


-: Liver Cirrohsis


-: COPD


-: Asthma


-: Paracentesis


-: History of Pseudomonas bacteremia


-: Right Inguinal Hernia and Umbilical Hernia Repair





- Family History


  ** Father


Medical History: Hypertension, Lung disease





  ** Mother


Medical History: Diabetes





- Social History


Smoking Status: Former smoker


Alcohol use: No


CD- Drugs: No


Caffeine use: Yes


Place of Residence: Home





Review of Systems


General: Weakness


Respiratory: Cough, Shortness of Breath





Physical Examination














Temp Pulse Resp BP Pulse Ox


 


 97.3 F   104 H  16   116/81    


 


 11/08/19 08:50  11/08/19 11:15  11/08/19 11:15  11/08/19 11:15   








General: Alert, Oriented x3


HEENT: Atraumatic


Neck: Supple


Respiratory: Clear to auscultation bilaterally, Diminished


Cardiovascular: No edema, Regular rate/rhythm, Normal S1 S2


Laboratory Data (last 24 hrs)





11/08/19 09:16: PT 15.8 H, INR 1.35


11/08/19 09:16: Sodium 138, Potassium 4.1, BUN 4 L, Creatinine 0.74, Glucose 

125 H, Magnesium 1.6 L, Total Bilirubin 2.1 H, AST 31, ALT 17, Alkaline 

Phosphatase 130 H


11/08/19 09:00: WBC 8.5, Hgb 14.8, Hct 42.7, Plt Count 175








- Problems


(1) COPD with acute exacerbation


Current Visit: No   Status: Acute   


Plan: 


Patient is 59 years of age admitted with worsening shortness of breath he was 

just recently admitted and diagnosed with a loculated pleural effusion on the 

left side was transferred to [place] subsequently treated with IV antibiotics 

for 28 days Pseudomonas was isolated as currently doing much better patient is 

mildly hypoxic hypercapnic chest x-ray shows minimal effusion on the left side 

he has severe COPD changes on the x-ray for calcitonin level negative he was 

very hypoxic I recommend a CT angiogram rule out PE and has Spiriva at home 

once repeat cultures and negative can Dc levofloxacin pro calcitonin level is 

negative

## 2019-11-09 LAB
BUN BLD-MCNC: 9 MG/DL (ref 7–18)
GLUCOSE SERPLBLD-MCNC: 168 MG/DL (ref 74–106)
HCT VFR BLD CALC: 36.1 % (ref 39.6–49)
LYMPHOCYTES # SPEC AUTO: 0.6 K/UL (ref 0.7–4.9)
MAGNESIUM SERPL-MCNC: 1.8 MG/DL (ref 1.8–2.4)
PMV BLD: 7.4 FL (ref 7.6–11.3)
POTASSIUM SERPL-SCNC: 3.7 MMOL/L (ref 3.5–5.1)
RBC # BLD: 3.96 M/UL (ref 4.33–5.43)

## 2019-11-09 RX ADMIN — METHYLPREDNISOLONE SODIUM SUCCINATE SCH MG: 40 INJECTION, POWDER, FOR SOLUTION INTRAMUSCULAR; INTRAVENOUS at 12:02

## 2019-11-09 RX ADMIN — ALBUTEROL SULFATE SCH MG: 2.5 SOLUTION RESPIRATORY (INHALATION) at 20:00

## 2019-11-09 RX ADMIN — ALBUTEROL SULFATE SCH MG: 2.5 SOLUTION RESPIRATORY (INHALATION) at 02:00

## 2019-11-09 RX ADMIN — Medication SCH ML: at 21:34

## 2019-11-09 RX ADMIN — ARFORMOTEROL TARTRATE SCH MCG: 15 SOLUTION RESPIRATORY (INHALATION) at 08:20

## 2019-11-09 RX ADMIN — Medication SCH ML: at 11:13

## 2019-11-09 RX ADMIN — IPRATROPIUM BROMIDE SCH MG: 0.5 SOLUTION RESPIRATORY (INHALATION) at 02:00

## 2019-11-09 RX ADMIN — IPRATROPIUM BROMIDE SCH MG: 0.5 SOLUTION RESPIRATORY (INHALATION) at 13:46

## 2019-11-09 RX ADMIN — Medication SCH: at 09:00

## 2019-11-09 RX ADMIN — ACETAMINOPHEN PRN MG: 500 TABLET, FILM COATED ORAL at 18:44

## 2019-11-09 RX ADMIN — SPIRONOLACTONE SCH MG: 100 TABLET, FILM COATED ORAL at 09:39

## 2019-11-09 RX ADMIN — METHYLPREDNISOLONE SODIUM SUCCINATE SCH MG: 40 INJECTION, POWDER, FOR SOLUTION INTRAMUSCULAR; INTRAVENOUS at 06:47

## 2019-11-09 RX ADMIN — METHYLPREDNISOLONE SODIUM SUCCINATE SCH MG: 40 INJECTION, POWDER, FOR SOLUTION INTRAMUSCULAR; INTRAVENOUS at 00:45

## 2019-11-09 RX ADMIN — METHYLPREDNISOLONE SODIUM SUCCINATE SCH MG: 40 INJECTION, POWDER, FOR SOLUTION INTRAMUSCULAR; INTRAVENOUS at 23:48

## 2019-11-09 RX ADMIN — HUMAN INSULIN SCH: 100 INJECTION, SOLUTION SUBCUTANEOUS at 20:08

## 2019-11-09 RX ADMIN — METHYLPREDNISOLONE SODIUM SUCCINATE SCH MG: 40 INJECTION, POWDER, FOR SOLUTION INTRAMUSCULAR; INTRAVENOUS at 17:39

## 2019-11-09 RX ADMIN — IPRATROPIUM BROMIDE SCH MG: 0.5 SOLUTION RESPIRATORY (INHALATION) at 08:20

## 2019-11-09 RX ADMIN — HUMAN INSULIN SCH UNIT: 100 INJECTION, SOLUTION SUBCUTANEOUS at 09:39

## 2019-11-09 RX ADMIN — IPRATROPIUM BROMIDE SCH MG: 0.5 SOLUTION RESPIRATORY (INHALATION) at 20:00

## 2019-11-09 RX ADMIN — ALBUTEROL SULFATE SCH MG: 2.5 SOLUTION RESPIRATORY (INHALATION) at 08:20

## 2019-11-09 RX ADMIN — FUROSEMIDE SCH MG: 40 TABLET ORAL at 09:40

## 2019-11-09 RX ADMIN — HUMAN INSULIN SCH: 100 INJECTION, SOLUTION SUBCUTANEOUS at 16:30

## 2019-11-09 RX ADMIN — ARFORMOTEROL TARTRATE SCH MCG: 15 SOLUTION RESPIRATORY (INHALATION) at 20:00

## 2019-11-09 RX ADMIN — LEVOFLOXACIN SCH MLS: 5 INJECTION, SOLUTION INTRAVENOUS at 09:41

## 2019-11-09 RX ADMIN — ALBUTEROL SULFATE SCH MG: 2.5 SOLUTION RESPIRATORY (INHALATION) at 13:46

## 2019-11-09 RX ADMIN — HUMAN INSULIN SCH UNIT: 100 INJECTION, SOLUTION SUBCUTANEOUS at 12:04

## 2019-11-09 RX ADMIN — ENOXAPARIN SODIUM SCH MG: 40 INJECTION SUBCUTANEOUS at 09:40

## 2019-11-09 NOTE — P.PN
Subjective


Date of Service: 11/09/19


Chief Complaint: COPD exacerbation


Subjective: Improving (Patient is doing much better and no evidence of 

pulmonary emboli evidence of sepsis)





Review of Systems


General: Weakness


Respiratory: Shortness of Breath





Physical Examination





- Vital Signs


Temperature: 97.9 F


Blood Pressure: 122/74


Pulse: 108


Respirations: 22


Pulse Ox (%): 91





- Physical Exam


General: Alert, In no apparent distress, Oriented x3


Respiratory: Clear to auscultation bilaterally, Diminished


Cardiovascular: No edema, Normal S1 S2





- Studies


Microbiology Data (last 24 hrs): 








11/08/19 09:16   Blood  - Blood   Anaerobic Blood Culture - Final








Assessment & Plan





- Problems (Diagnosis)


(1) COPD with acute exacerbation


Current Visit: No   Status: Acute   


Plan: 


Patient admitted with COPD exacerbation CT scan shows no evidence of pulmonary 

emboli he does have some chronic changes in his left lower lobe with a small 

pneumothorax from his prior procedure vital signs oxygenation satisfactory no 

evidence of sepsis can Dc levofloxacin patient has already being treated with 10

-14 days of IV therapy for a Pseudomonas infection change to p.o. prednisone 10 

mg twice a day consider discharge blood cultures and negative pro calcitonin 

level negative

## 2019-11-09 NOTE — P.PN
Subjective


Date of Service: 11/09/19


Chief Complaint: Shortness of breath


Patient doing much better today.  He has been weaned off BiPAP, breathing is 

not labored.  He reports occasional nonproductive cough.  He has been afebrile.








Physical Examination





- Vital Signs


Temperature: 98.0 F


Blood Pressure: 135/79


Pulse: 114


Respirations: 20


Pulse Ox (%): 90





- Physical Exam


General: Alert, In no apparent distress, Oriented x3


HEENT: Mucous membr. moist/pink


Neck: Supple, JVD not distended


Respiratory: Normal air movement, Expiratory wheezes (Mild scattered rhonchi)


Cardiovascular: No edema, Regular rate/rhythm, Normal S1 S2, No murmurs


Capillary refill: <2 Seconds


Gastrointestinal: Normal bowel sounds, Soft and benign, Non-distended, No 

tenderness


Musculoskeletal: No swelling


Integumentary: No rashes


Neurological: Normal speech, Normal strength at 5/5 x4 extr





- Studies


Laboratory Data (last 24 hrs)





11/08/19 09:00: WBC 8.5, Hgb 14.8, Hct 42.7, Plt Count 175





Microbiology Data (last 24 hrs): 








11/08/19 09:16   Blood  - Blood   Anaerobic Blood Culture - Final








Assessment And Plan





- Current Problems (Diagnosis)


(1) Acute respiratory failure with hypoxemia


Current Visit: Yes   Status: Acute   





(2) COPD with acute exacerbation


Current Visit: No   Status: Acute   





(3) Pleural effusion


Current Visit: No   Status: Acute   





(4) Alcoholic cirrhosis of liver


Current Visit: No   Status: Chronic   


Qualifiers: 


   Ascites presence: without ascites   Qualified Code(s): K70.30 - Alcoholic 

cirrhosis of liver without ascites   





- Plan


CTA thorax:  No PE, left posterior air filled cyst-likely from prior loculated 

pleural effusion status chest tube drainage


Continue scheduled albuterol and Ipratropium nebulizers


IV Solu-Medrol


IV Levaquin


Titrate oxygen.


Watch for steroid induced hyperglycemia.


Cultures:  No growth to date


Dr. Kay input appreciated.

## 2019-11-10 LAB
BUN BLD-MCNC: 13 MG/DL (ref 7–18)
GLUCOSE SERPLBLD-MCNC: 185 MG/DL (ref 74–106)
MAGNESIUM SERPL-MCNC: 2 MG/DL (ref 1.8–2.4)
POTASSIUM SERPL-SCNC: 3.5 MMOL/L (ref 3.5–5.1)

## 2019-11-10 RX ADMIN — ALBUTEROL SULFATE SCH MG: 2.5 SOLUTION RESPIRATORY (INHALATION) at 08:10

## 2019-11-10 RX ADMIN — HUMAN INSULIN SCH: 100 INJECTION, SOLUTION SUBCUTANEOUS at 16:30

## 2019-11-10 RX ADMIN — ENOXAPARIN SODIUM SCH MG: 40 INJECTION SUBCUTANEOUS at 09:14

## 2019-11-10 RX ADMIN — IPRATROPIUM BROMIDE SCH MG: 0.5 SOLUTION RESPIRATORY (INHALATION) at 14:00

## 2019-11-10 RX ADMIN — ALBUTEROL SULFATE SCH MG: 2.5 SOLUTION RESPIRATORY (INHALATION) at 02:00

## 2019-11-10 RX ADMIN — ALBUTEROL SULFATE SCH MG: 2.5 SOLUTION RESPIRATORY (INHALATION) at 20:00

## 2019-11-10 RX ADMIN — HUMAN INSULIN SCH: 100 INJECTION, SOLUTION SUBCUTANEOUS at 11:30

## 2019-11-10 RX ADMIN — LEVOFLOXACIN SCH MLS: 5 INJECTION, SOLUTION INTRAVENOUS at 09:18

## 2019-11-10 RX ADMIN — ACETAMINOPHEN PRN MG: 500 TABLET, FILM COATED ORAL at 02:35

## 2019-11-10 RX ADMIN — HUMAN INSULIN SCH UNIT: 100 INJECTION, SOLUTION SUBCUTANEOUS at 21:01

## 2019-11-10 RX ADMIN — POTASSIUM & SODIUM PHOSPHATES POWDER PACK 280-160-250 MG SCH PKT: 280-160-250 PACK at 09:18

## 2019-11-10 RX ADMIN — ARFORMOTEROL TARTRATE SCH MCG: 15 SOLUTION RESPIRATORY (INHALATION) at 08:10

## 2019-11-10 RX ADMIN — Medication SCH ML: at 21:02

## 2019-11-10 RX ADMIN — ARFORMOTEROL TARTRATE SCH MCG: 15 SOLUTION RESPIRATORY (INHALATION) at 20:00

## 2019-11-10 RX ADMIN — ROFLUMILAST SCH MCG: 500 TABLET ORAL at 11:38

## 2019-11-10 RX ADMIN — FUROSEMIDE SCH MG: 40 TABLET ORAL at 09:15

## 2019-11-10 RX ADMIN — Medication SCH: at 09:00

## 2019-11-10 RX ADMIN — Medication SCH ML: at 09:18

## 2019-11-10 RX ADMIN — ALBUTEROL SULFATE SCH MG: 2.5 SOLUTION RESPIRATORY (INHALATION) at 14:00

## 2019-11-10 RX ADMIN — METHYLPREDNISOLONE SODIUM SUCCINATE SCH MG: 40 INJECTION, POWDER, FOR SOLUTION INTRAMUSCULAR; INTRAVENOUS at 05:54

## 2019-11-10 RX ADMIN — ACETAMINOPHEN PRN MG: 500 TABLET, FILM COATED ORAL at 19:19

## 2019-11-10 RX ADMIN — POTASSIUM & SODIUM PHOSPHATES POWDER PACK 280-160-250 MG SCH PKT: 280-160-250 PACK at 11:09

## 2019-11-10 RX ADMIN — IPRATROPIUM BROMIDE SCH MG: 0.5 SOLUTION RESPIRATORY (INHALATION) at 02:00

## 2019-11-10 RX ADMIN — HUMAN INSULIN SCH: 100 INJECTION, SOLUTION SUBCUTANEOUS at 07:30

## 2019-11-10 RX ADMIN — IPRATROPIUM BROMIDE SCH MG: 0.5 SOLUTION RESPIRATORY (INHALATION) at 20:00

## 2019-11-10 RX ADMIN — POTASSIUM & SODIUM PHOSPHATES POWDER PACK 280-160-250 MG SCH PKT: 280-160-250 PACK at 12:29

## 2019-11-10 RX ADMIN — SPIRONOLACTONE SCH MG: 100 TABLET, FILM COATED ORAL at 09:15

## 2019-11-10 RX ADMIN — IPRATROPIUM BROMIDE SCH MG: 0.5 SOLUTION RESPIRATORY (INHALATION) at 08:10

## 2019-11-10 NOTE — P.PN
Subjective


Date of Service: 11/10/19


Chief Complaint: COPD exacerbation





No changes still very hypoxic requiring BiPAP no cough sputum hemoptysis





Review of Systems


General: Weakness


Respiratory: Shortness of Breath





Physical Examination





- Vital Signs


Temperature: 97.5 F


Blood Pressure: 137/77


Pulse: 127


Respirations: 21


Pulse Ox (%): 94





- Physical Exam


General: Alert, In no apparent distress, Oriented x3


Respiratory: Clear to auscultation bilaterally, Diminished





- Studies


Microbiology Data (last 24 hrs): 








11/08/19 09:16   Blood  - Blood   Anaerobic Blood Culture - Final








Assessment & Plan





- Problems (Diagnosis)


(1) COPD with acute exacerbation


Current Visit: No   Status: Acute   


Plan: 


Patient admitted with COPD exacerbation is still very hypoxic no evidence of 

thromboembolism 2D echo ordered Dc levofloxacin no evidence of sepsis change 

over to p.o. prednisone added p.o. Dalresp he is on maximum bronchodilator 

therapy vital signs stable

## 2019-11-10 NOTE — EKG
Test Date:    2019-11-08               Test Time:    08:52:21

Technician:   DHARA                                     

                                                     

MEASUREMENT RESULTS:                                       

Intervals:                                           

Rate:         117                                    

IA:           168                                    

QRSD:         90                                     

QT:           304                                    

QTc:          424                                    

Axis:                                                

P:            86                                     

IA:           168                                    

QRS:          50                                     

T:            100                                    

                                                     

INTERPRETIVE STATEMENTS:                                       

                                                     

Sinus tachycardia with premature atrial complexes

Nonspecific ST and T wave abnormality

Abnormal ECG

Compared to ECG 09/09/2019 08:42:51

Atrial premature complex(es) now present

ST (T wave) deviation now present

Sinus rhythm no longer present

Myocardial infarct finding no longer present



Electronically Signed On 11-10-19 12:45:57 CST by Drew Stanley

## 2019-11-10 NOTE — P.PN
Subjective


Date of Service: 11/10/19


Chief Complaint: COPD exacerbation


Noted to desaturate on Ventimask today.  He was placed in BiPAP briefly and 

weaned back to Ventimask.  He still wheezing significantly.  Breathing is not 

labored.








Physical Examination





- Vital Signs


Temperature: 97.5 F


Blood Pressure: 137/77


Pulse: 127


Respirations: 21


Pulse Ox (%): 94





- Physical Exam


General: Alert, In no apparent distress, Oriented x3


HEENT: Mucous membr. moist/pink, Sclerae nonicteric


Neck: Supple, JVD not distended


Respiratory: Expiratory wheezes


Cardiovascular: No edema, No murmurs, Other (Tachycardic)


Gastrointestinal: Normal bowel sounds, Soft and benign, Non-distended, No 

tenderness


Musculoskeletal: No swelling


Integumentary: No rashes


Neurological: Normal speech, Normal strength at 5/5 x4 extr





- Studies


Microbiology Data (last 24 hrs): 








11/08/19 09:16   Blood  - Blood   Anaerobic Blood Culture - Final








Assessment And Plan





- Current Problems (Diagnosis)


(1) Acute respiratory failure with hypoxemia


Current Visit: Yes   Status: Acute   





(2) COPD with acute exacerbation


Current Visit: No   Status: Acute   





(3) Pleural effusion


Current Visit: No   Status: Acute   





(4) Alcoholic cirrhosis of liver


Current Visit: No   Status: Chronic   


Qualifiers: 


   Ascites presence: without ascites   Qualified Code(s): K70.30 - Alcoholic 

cirrhosis of liver without ascites   





- Plan


CTA thorax:  No PE, left posterior air filled cyst-likely from prior loculated 

pleural effusion status chest tube drainage


Continue scheduled albuterol and Ipratropium nebulizers


Patient transitioned from IV Solu-Medrol to oral prednisone and Daliresp added 

by Pulmonologist.


Antibiotics discontinued


Titrate oxygen.


Cultures:  No growth to date


Insulin sliding scale for steroid induced hyperglycemia.


Dr. Kay input appreciated.

## 2019-11-11 LAB
BUN BLD-MCNC: 15 MG/DL (ref 7–18)
COHGB MFR BLDA: 1.6 % (ref 0–1.5)
GLUCOSE SERPLBLD-MCNC: 177 MG/DL (ref 74–106)
MAGNESIUM SERPL-MCNC: 1.7 MG/DL (ref 1.8–2.4)
OXYHGB MFR BLDA: 87.2 % (ref 94–97)
POTASSIUM SERPL-SCNC: 3.4 MMOL/L (ref 3.5–5.1)
SAO2 % BLDA: 89.1 % (ref 92–98.5)

## 2019-11-11 RX ADMIN — Medication SCH ML: at 20:27

## 2019-11-11 RX ADMIN — IPRATROPIUM BROMIDE SCH: 0.5 SOLUTION RESPIRATORY (INHALATION) at 14:00

## 2019-11-11 RX ADMIN — IPRATROPIUM BROMIDE SCH MG: 0.5 SOLUTION RESPIRATORY (INHALATION) at 02:00

## 2019-11-11 RX ADMIN — IPRATROPIUM BROMIDE SCH MG: 0.5 SOLUTION RESPIRATORY (INHALATION) at 07:45

## 2019-11-11 RX ADMIN — ALBUTEROL SULFATE SCH MG: 2.5 SOLUTION RESPIRATORY (INHALATION) at 02:00

## 2019-11-11 RX ADMIN — SPIRONOLACTONE SCH MG: 100 TABLET, FILM COATED ORAL at 08:10

## 2019-11-11 RX ADMIN — HUMAN INSULIN SCH: 100 INJECTION, SOLUTION SUBCUTANEOUS at 11:30

## 2019-11-11 RX ADMIN — AZITHROMYCIN SCH MG: 250 TABLET, FILM COATED ORAL at 13:46

## 2019-11-11 RX ADMIN — Medication SCH: at 08:11

## 2019-11-11 RX ADMIN — ALBUTEROL SULFATE SCH: 2.5 SOLUTION RESPIRATORY (INHALATION) at 14:00

## 2019-11-11 RX ADMIN — FUROSEMIDE SCH MG: 40 TABLET ORAL at 08:10

## 2019-11-11 RX ADMIN — IPRATROPIUM BROMIDE SCH MG: 0.5 SOLUTION RESPIRATORY (INHALATION) at 20:00

## 2019-11-11 RX ADMIN — ARFORMOTEROL TARTRATE SCH MCG: 15 SOLUTION RESPIRATORY (INHALATION) at 20:00

## 2019-11-11 RX ADMIN — ACETAMINOPHEN PRN MG: 500 TABLET, FILM COATED ORAL at 03:20

## 2019-11-11 RX ADMIN — Medication SCH ML: at 08:12

## 2019-11-11 RX ADMIN — ENOXAPARIN SODIUM SCH MG: 40 INJECTION SUBCUTANEOUS at 08:11

## 2019-11-11 RX ADMIN — ARFORMOTEROL TARTRATE SCH MCG: 15 SOLUTION RESPIRATORY (INHALATION) at 07:45

## 2019-11-11 RX ADMIN — HUMAN INSULIN SCH: 100 INJECTION, SOLUTION SUBCUTANEOUS at 07:30

## 2019-11-11 RX ADMIN — ROFLUMILAST SCH MCG: 500 TABLET ORAL at 08:10

## 2019-11-11 RX ADMIN — ALBUTEROL SULFATE SCH MG: 2.5 SOLUTION RESPIRATORY (INHALATION) at 20:00

## 2019-11-11 RX ADMIN — ALBUTEROL SULFATE SCH MG: 2.5 SOLUTION RESPIRATORY (INHALATION) at 07:45

## 2019-11-11 NOTE — PN
Date of Progress Note:  11/11/2019



Subjective:  Code status, full.  The patient is seen and examined.  Chart 
reviewed and case discussed with RN.  Patient has been desaturating as he takes 
his BiPAP mask on.  Has poor health literacy.  Does not believe in steroids.  
Case also discussed with Dr. Kay, Pulmonology.



Medications:  List reviewed.



Physical Examination:

Vital Signs:  Temperature 97.9, heart rate 113, respirations 22, blood pressure 
142/87, O2 saturations 92% on 15 L Venturi mask. 

General:  Awake, alert, oriented x3, in some mild respiratory distress.  
Appears older than stated age. 

CVS:  S1, S2.  Sinus tachycardia. 

Respiratory:  Diminished breath sounds, diffuse wheezing. 

Gastrointestinal:  Abdomen is soft, nontender, nondistended.  Positive bowel 
sounds. 

Extremities:  No clubbing, cyanosis, or edema. 

Neurologic:  Cranial nerves 2 through 12 intact grossly.  No focal neurological 
deficit.  Speech is normal.



Laboratory Data:  Sodium 135, potassium 3.4, chloride 98, CO2 of 31, BUN 15, 
creatinine 0.81, glucose 177, calcium 8.2, phosphorus 2.9, magnesium 1.7.  WBC 
pending.  ABG:  PH 7.52, pCO2 is 35.4, pO2 is 56.9, bicarb is 28.5. 



Blood cultures, no growth to date.  Chest x-ray shows stable chest, lungs are 
emphysematous with small left pleural effusion unchanged.  Echocardiogram shows 
EF of 60% to 69%, impaired left ventricular relaxation, technically difficult 
study.  No parasternal views.



Assessment And Plan:  A 59-year-old male with:

1.   Acute respiratory failure, currently on BiPAP with hypercapnia and hypoxia 
secondary to chronic obstructive pulmonary disease.  We will continue to wean 
off as tolerated, however, currently requiring BiPAP.  This switch to Ventimask 
as tolerated.  ABG shows in some improvement in hypercapnia with metabolic 
compensation.

2.   Acute chronic obstructive pulmonary disease exacerbation.  The patient now 
switched over to oral steroids.  We will continue nebulizer treatments.  
Appreciate Pulmonology input.  Still having significant amount of wheezing.  No 
growth on blood cultures.

3.   Pleural effusion, small, left-sided.  No significant change from previous 
chest x-ray.  We will continue to monitor.  Echocardiogram shows normal EF.

4.   Alcoholic liver cirrhosis, without ascites or coma, chronic.  We will 
continue home medications.

5.   Noncompliance, counseled.





SA/MODL

DD:  11/11/2019 12:34:41   Voice ID:  [id number]

DT:  11/11/2019 17:50:23   Report ID:  [id number]

[email or web address]

## 2019-11-11 NOTE — XMS REPORT
Patient Summary Document

 Created on:2019



Patient:ANAYELI HUDSON

Sex:Male

:1960

External Reference #:[id number]





Demographics







 Address  [address] [place] [address]

 

 Home Phone  [phone]

 

 Preferred Language  Unknown

 

 Marital Status  Unknown

 

 Scientologist Affiliation  Unknown

 

 Race  Unknown

 

 Additional Race(s)  Unavailable

 

 Ethnic Group  Unknown









Author







 Organization  [facility]

 

 Address  [address] [place] [address] [address]. Nails [address] [place] [address]

 

 Phone  [phone]









Care Team Providers







 Name  Role  Phone

 

 MARICHUYMEI ELVIN CASE  Unavailable  Unavailable









Problems

This patient has no known problems.



Allergies, Adverse Reactions, Alerts

This patient has no known allergies or adverse reactions.



Medications

This patient has no known medications.



Results







 Test Description  Test Time  Test Comments  Text Results  Atomic Results  
Result Comments









 (CELLAVISION MANUAL DIFF)  2019 09:14:00    









   Test Item  Value  Reference Range  Comments









 NEUTROPHILS - REL (CELLAVISION)(BEAKER) (test code=2816)  81 %    

 

 LYMPHOCYTES - REL (CELLAVISION)(BEAKER) (test code=2817)  7 %    

 

 MONOCYTES - REL (CELLAVISION)(BEAKER) (test code=2818)  7 %    

 

 EOSINOPHILS - REL (CELLAVISION)(BEAKER) (test code=2819)  2 %    

 

 MYELOCYTES - REL (CELLAVISION)(BEAKER) (test code=2822)  2 %  0-0  

 

 ATYPICAL LYMPHOCYTES - REL (CELLAVISION)(BEAKER) (test  1 %  0-0  



 code=2829)      

 

 NEUTROPHILS - ABS (CELLAVISION)(BEAKER) (test code=2830)  10.85 K/ul  1.78-
5.38  

 

 LYMPHOCYTES - ABS (CELLAVISION)(BEAKER) (test code=2831)  0.94 K/ul  1.32-3.57
  

 

 MONOCYTES - ABS (CELLAVISION)(BEAKER) (test code=2832)  0.94 K/uL  0.30-0.82  

 

 EOSINOPHILS - ABS (CELLAVISION)(BEAKER) (test code=2834)  0.27 K/uL  0.04-0.54
  

 

 MYELOCYTES-ABS (CELLAVISION)(BEAKER) (test code=2837)  0.27 K/uL  0.00-0.00  

 

 ATYPICAL LYMPHOCYTES - ABS (CELLAVISION)(BEAKER) (test  0.13 K/uL  0.00-0.00  



 code=2858)      

 

 TOTAL COUNTED (BEAKER) (test code=1351)  100    

 

 RBC MORPHOLOGY (BEAKER) (test code=762)  Normal    

 

 SMUDGE CELLS (BEAKER) (test code=1371)  Present    

 

 GIANT PLATELETS (BEAKER) (test code=313)  Present    

 

 PLATELET CONCENTRATION (CELLAVISION)(BEAKER) (test code=3438)  Decreased    



Received comment: User comments: Slide comments:RAD, CHEST, 1 VIEW, NON [id number]
-09-25 08:25:00Reason for exam:-&gt;left effusionShould this be performed at 
the bedside?-&gt;YesFINAL REPORT PATIENT ID:   [id number] Chest AP portable 
Comparison exam: 2019 History provided: Follow-up pleural effusion Left 
chest tube unchanged in place. No pneumothorax. No significant effusions are 
evident. Nonspecific coarsening of markings in the left lower lobe. PICC line 
in good position. Signed: Lobo Morris MDReport Verified Date/Time:  2019 
08:25:12 Reading Location: [facility] Radiology Reading Room        
Electronically signed by: LOBO MORRIS on 2019 08:25 AMCOMPREHENSIVE 
METABOLIC [id number] 06:40:00





 Test Item  Value  Reference Range  Comments

 

 TOTAL PROTEIN (BEAKER)  4.8 gm/dL  6.0-8.3  



 (test code=770)      

 

 ALBUMIN (BEAKER) (test  2.4 g/dL  3.5-5.0  



 code=1145)      

 

 ALKALINE PHOSPHATASE  141 U/L    



 (BEAKER) (test code=346)      

 

 BILIRUBIN TOTAL (BEAKER)  3.9 mg/dL  0.2-1.2  



 (test code=377)      

 

 SODIUM (BEAKER) (test  123 meq/L  136-145  



 jizm=058)      

 

 POTASSIUM (BEAKER) (test  3.6 meq/L  3.5-5.1  



 code=379)      

 

 CHLORIDE (BEAKER) (test  90 meq/L    



 code=382)      

 

 CO2 (BEAKER) (test  29 meq/L  22-29  



 code=355)      

 

 BLOOD UREA NITROGEN  14 mg/dL  7-21  



 (BEAKER) (test code=354)      

 

 CREATININE (BEAKER) (test  0.80 mg/dL  0.57-1.25  



 code=358)      

 

 GLUCOSE RANDOM (BEAKER)  152 mg/dL    



 (test code=652)      

 

 CALCIUM (BEAKER) (test  7.3 mg/dL  8.4-10.2  



 code=697)      

 

 AST (SGOT) (BEAKER) (test  39 U/L  5-34  



 code=353)      

 

 ALT (SGPT) (BEAKER) (test  23 U/L  6-55  



 code=347)      

 

 EGFR (BEAKER) (test  99 mL/min/1.73 sq m    ESTIMATED GFR IS NOT AS



 code=1092)      ACCURATE AS CREATININE



       CLEARANCE IN PREDICTING



       GLOMERULAR FILTRATION



       RATE. ESTIMATED GFR IS



       NOT APPLICABLE FOR



       DIALYSIS PATIENTS.



Specimen slightly [id number] 06:39:00





 Test Item  Value  Reference Range  Comments

 

 PHOSPHORUS (BEAKER) (test code=604)  2.1 mg/dL  2.3-4.7  



[id number] 06:39:00





 Test Item  Value  Reference Range  Comments

 

 MAGNESIUM (BEAKER) (test code=627)  1.8 mg/dL  1.6-2.6  



B-TYPE NATRIURETIC FACTOR (BNP)2019 06:04:00





 Test Item  Value  Reference Range  Comments

 

 B-TYPE NATRIURETIC PEPTIDE (BEAKER) (test code=700)  97 pg/mL  0-100  



CBC W/PLT COUNT &amp; AUTO [id number] 05:57:00





 Test Item  Value  Reference Range  Comments

 

 WHITE BLOOD CELL COUNT (BEAKER) (test code=775)  13.4 K/ L  3.5-10.5  

 

 RED BLOOD CELL COUNT (BEAKER) (test code=761)  3.17 M/ L  4.63-6.08  

 

 HEMOGLOBIN (BEAKER) (test code=410)  10.3 GM/DL  13.7-17.5  

 

 HEMATOCRIT (BEAKER) (test code=411)  29.4 %  40.1-51.0  

 

 MEAN CORPUSCULAR VOLUME (BEAKER) (test code=753)  92.7 fL  79.0-92.2  

 

 MEAN CORPUSCULAR HEMOGLOBIN (BEAKER) (test  32.5 pg  25.7-32.2  



 zsms=194)      

 

 MEAN CORPUSCULAR HEMOGLOBIN CONC (BEAKER) (test  35.0 GM/DL  32.3-36.5  



 code=752)      

 

 RED CELL DISTRIBUTION WIDTH (BEAKER) (test  13.9 %  11.6-14.4  



 code=412)      

 

 PLATELET COUNT (BEAKER) (test code=756)  75 K/CU MM  150-450  

 

 MEAN PLATELET VOLUME (BEAKER) (test code=754)  8.9 fL  9.4-12.4  

 

 NUCLEATED RED BLOOD CELLS (BEAKER) (test  0 /100 WBC  0-0  



 code=413)      



CALCIUM, [id number] 05:44:00





 Test Item  Value  Reference Range  Comments

 

 CALCIUM IONIZED (BEAKER) (test code=698)  0.97 mmol/L  1.12-1.27  

 

 PH, BLOOD (BEAKER) (test code=1810)  7.50    



BODY FLUID CULTURE + GRAM [id number] 11:10:00





 Test Item  Value  Reference Range  Comments

 

 CULTURE (BEAKER) (test      <1+ Coagulase negative



 code=1095)      Staphylococcus

 

 CULTURE (BEAKER) (test  COAGULASE NEGATIVE    <1+ Pseudomonas



 code=1095)  STAPHYLOCOCCUS    aeruginosa

 

 Clindamycin (test      



 code=10)      

 

 Erythromycin (test      



 code=4)      

 

 Linezolid (test code=40)      

 

 Oxacillin (test code=14)      

 

 Rifampin (test code=43)      

 

 Tetracycline (test      



 code=2)      

 

 Trimethoprim +      



 Sulfamethoxazole (test      



 code=47)      

 

 Vancomycin (test code=13)      

 

 GRAM STAIN RESULT  3+ WBCs    



 (BEAKER) (test code=1123)      

 

 GRAM STAIN RESULT  <1+ gram positive    



 (BEAKER) (test  cocci in pairs and    



 code=112319)  clusters    



CBC W/PLT COUNT &amp; AUTO [id number] 09:58:00





 Test Item  Value  Reference Range  Comments

 

 WHITE BLOOD CELL COUNT (BEAKER) (test code=775)  10.7 K/ L  3.5-10.5  

 

 RED BLOOD CELL COUNT (BEAKER) (test code=761)  3.15 M/ L  4.63-6.08  

 

 HEMOGLOBIN (BEAKER) (test code=410)  10.2 GM/DL  13.7-17.5  

 

 HEMATOCRIT (BEAKER) (test code=411)  28.9 %  40.1-51.0  

 

 MEAN CORPUSCULAR VOLUME (BEAKER) (test code=753)  91.7 fL  79.0-92.2  

 

 MEAN CORPUSCULAR HEMOGLOBIN (BEAKER) (test  32.4 pg  25.7-32.2  



 vqsq=346)      

 

 MEAN CORPUSCULAR HEMOGLOBIN CONC (BEAKER) (test  35.3 GM/DL  32.3-36.5  



 code=752)      

 

 RED CELL DISTRIBUTION WIDTH (BEAKER) (test  13.7 %  11.6-14.4  



 code=412)      

 

 PLATELET COUNT (BEAKER) (test code=756)  57 K/CU MM  150-450  

 

 MEAN PLATELET VOLUME (BEAKER) (test code=754)  8.8 fL  9.4-12.4  

 

 NUCLEATED RED BLOOD CELLS (BEAKER) (test  0 /100 WBC  0-0  



 code=413)      



(CELLAVISION MANUAL DIFF)2019 09:58:00





 Test Item  Value  Reference Range  Comments

 

 NEUTROPHILS - REL (CELLAVISION)(BEAKER) (test  78 %    



 code=2816)      

 

 LYMPHOCYTES - REL (CELLAVISION)(BEAKER) (test  5 %    



 code=2817)      

 

 MONOCYTES - REL (CELLAVISION)(BEAKER) (test  9 %    



 code=2818)      

 

 EOSINOPHILS - REL (CELLAVISION)(BEAKER) (test  2 %    



 code=2819)      

 

 METAMYELOCYTES - REL (CELLAVISION)(BEAKER) (test  1 %  0-0  



 code=2821)      

 

 BANDS - REL (CELLAVISION)(BEAKER) (test code=2826)  4 %  0-10  

 

 BLASTS - REL (CELLAVISION)(BEAKER) (test  1 %  0-0  



 code=2827)      

 

 NEUTROPHILS - ABS (CELLAVISION)(BEAKER) (test  8.35 K/ul  1.78-5.38  



 code=2830)      

 

 LYMPHOCYTES - ABS (CELLAVISION)(BEAKER) (test  0.54 K/ul  1.32-3.57  



 code=2831)      

 

 MONOCYTES - ABS (CELLAVISION)(BEAKER) (test  0.96 K/uL  0.30-0.82  



 code=2832)      

 

 EOSINOPHILS - ABS (CELLAVISION)(BEAKER) (test  0.21 K/uL  0.04-0.54  



 code=2834)      

 

 METAMYELOCYTES - ABS (CELLAVISION)(BEAKER) (test  0.11 K/uL  0.00-0.00  



 code=2836)      

 

 BANDS - ABS (CELLAVISION)(BEAKER) (test code=2840)  0.43 K/uL  0.00-0.80  

 

 BLASTS - ABS (CELLAVISION)(BEAKER) (test  0.11 K/uL  0.00-0.00  



 code=2845)      

 

 TOTAL COUNTED (BEAKER) (test code=1351)  100    

 

 WBC MORPHOLOGY (BEAKER) (test code=487)  Normal    

 

 PLT MORPHOLOGY (BEAKER) (test code=486)  Normal    

 

 ANISOCYTOSIS (BEAKER) (test code=961)  1+ few    

 

 POIKILOCYTES (BEAKER) (test code=966)  1+ few    

 

 OVALOCYTES (BEAKER) (test code=477)  1+ few    

 

 BASOPHILIC STIPPLING (BEAKER) (test code=473)  Present    

 

 ARTIFACT (CELLAVISION)(BEAKER) (test code=3432)  Present    

 

 PLATELET CONCENTRATION (CELLAVISION)(BEAKER) (test  Decreased    



 code=3438)      



Received comment: User comments: Slide comments: WBC: SEGMENTED WITH TOXIC 
GRANULATIONS [id number] 08:30:00





 Test Item  Value  Reference Range  Comments

 

 PHOSPHORUS (BEAKER) (test code=604)  2.0 mg/dL  2.3-4.7  



RAD, CHEST, 1 VIEW, NON [id number] 08:21:00Reason for exam:-&gt;left 
effusionShould this be performed at the bedside?-&gt;YesFINAL REPORT PATIENT ID
:   [id number]  TECHNIQUE: Frontal chest radiograph dated 2019. CLINICAL 
HISTORY: Left effusion COMPARISON STUDY: Chest radiographs and chest CT both 
dated 2019  IMPRESSION:Right-sided PICC and left-sided chest tube are 
unchanged. No change in small left hydropneumothorax. Multiple rounded 
densities project over the left lower lobe of unclear etiology possibly 
somethingexternal to the patient. Cardiomediastinal silhouette is normal in 
size. No pulmonary edema. No fracture.  Signed: Ann Kingeport 
Verified Date/Time:  2019 08:21:33 Reading Location:[facility] Reading 
Room  Electronically signed by: ANN KING MD on 2019 08:
21 AMCOMPREHENSIVE METABOLIC [id number] 05:34:00





 Test Item  Value  Reference Range  Comments

 

 TOTAL PROTEIN (BEAKER)  4.9 gm/dL  6.0-8.3  



 (test code=770)      

 

 ALBUMIN (BEAKER) (test  2.7 g/dL  3.5-5.0  



 code=1145)      

 

 ALKALINE PHOSPHATASE  137 U/L    



 (BEAKER) (test code=346)      

 

 BILIRUBIN TOTAL (BEAKER)  5.5 mg/dL  0.2-1.2  



 (test code=377)      

 

 SODIUM (BEAKER) (test  123 meq/L  136-145  



 qhst=885)      

 

 POTASSIUM (BEAKER) (test  3.9 meq/L  3.5-5.1  



 code=379)      

 

 CHLORIDE (BEAKER) (test  89 meq/L    



 code=382)      

 

 CO2 (BEAKER) (test  31 meq/L  22-29  



 code=355)      

 

 BLOOD UREA NITROGEN  15 mg/dL  7-21  



 (BEAKER) (test code=354)      

 

 CREATININE (BEAKER) (test  0.68 mg/dL  0.57-1.25  



 code=358)      

 

 GLUCOSE RANDOM (BEAKER)  104 mg/dL    



 (test code=652)      

 

 CALCIUM (BEAKER) (test  7.7 mg/dL  8.4-10.2  



 code=697)      

 

 AST (SGOT) (BEAKER) (test  47 U/L  5-34  



 code=353)      

 

 ALT (SGPT) (BEAKER) (test  25 U/L  6-55  



 code=347)      

 

 EGFR (BEAKER) (test  119 mL/min/1.73 sq    ESTIMATED GFR IS NOT AS



 code=1092)  m    ACCURATE AS CREATININE



       CLEARANCE IN PREDICTING



       GLOMERULAR FILTRATION



       RATE. ESTIMATED GFR IS



       NOT APPLICABLE FOR



       DIALYSIS PATIENTS.



Specimen moderately [id number] 05:04:00





 Test Item  Value  Reference Range  Comments

 

 MAGNESIUM (BEAKER) (test code=627)  1.8 mg/dL  1.6-2.6  



CT, CHEST, WITH [id number] 15:37:00Reason for exam:-&gt;reevaluate 
pleural effusion and left lung abscessFINAL REPORT PATIENT ID:   [id number]  CT 
of the Chest dated 2019 COMPARISON: 2019 CLINICAL INFORMATION
: Pleural effusionreevaluate pleural effusion and left lung abscess Comment:  
Axial images of the chest were obtained from thoracic inlet to the upper 
abdomen with intravenous contrast.     This exam was performed according to our 
departmental dose-optimization program, which includes automated exposure 
control, adjustment of the mA and/or kV according to patient size and/or use 
ofinteractive reconstruction technique. Heart is normal in size.  There is 
small pericardial effusion.Great vessels are unremarkable. No adenopathy in the 
mediastinum or perihilar region. Trachea and mainstem bronchi are patent.  
Trace bilateral pleural effusion is present. There is interval significant 
decrease in the amount of left pleural effusion. There is small amount of air 
present in the left pleural space. The pigtail catheter is seen in the left 
pleural space. Atelectasis is seen in the lingula, right mid, and both lower 
lobes. Cavitary lesion is seen in the superior segment of the left lower lobe 
measuring approximately 2.8 x 3.3 cm. Visualized upper abdomen demonstrates 
cirrhotic liver with trace ascites. Spleen is minimally enlarged. Impression: 
1. Interval decrease in the amount of left pleural effusion with residual left 
hydropneumothorax.2. Trace right pleural effusion.3. Cavitary lesion in the 
superior segment of the left lower lobe may represent abscess.4. Cirrhosis with 
splenomegaly and ascites. Signed: Lucita Nails MDReport Verified Date/Time:   15:37:27 Reading Location: [facility] C013Y CT Body Reading Room      
Electronically signed by: LUCITA NAILS M.D. on 2019 03:37 
[id number] 13:41:00





 Test Item  Value  Reference Range  Comments

 

 SODIUM (BEAKER) (test code=381)  119 meq/L  136-145  

 

 POTASSIUM (BEAKER) (test code=379)  4.1 meq/L  3.5-5.1  

 

 CHLORIDE (BEAKER) (test code=382)  85 meq/L    

 

 CO2 (BEAKER) (test code=355)  29 meq/L  22-29  



Page [phone] with results. Thank you.CBC W/PLT COUNT &amp; AUTO 
[id number] 10:30:00





 Test Item  Value  Reference Range  Comments

 

 WHITE BLOOD CELL COUNT (BEAKER) (test code=775)  11.3 K/ L  3.5-10.5  

 

 RED BLOOD CELL COUNT (BEAKER) (test code=761)  3.25 M/ L  4.63-6.08  

 

 HEMOGLOBIN (BEAKER) (test code=410)  10.6 GM/DL  13.7-17.5  

 

 HEMATOCRIT (BEAKER) (test code=411)  29.8 %  40.1-51.0  

 

 MEAN CORPUSCULAR VOLUME (BEAKER) (test code=753)  91.7 fL  79.0-92.2  

 

 MEAN CORPUSCULAR HEMOGLOBIN (BEAKER) (test  32.6 pg  25.7-32.2  



 aopn=623)      

 

 MEAN CORPUSCULAR HEMOGLOBIN CONC (BEAKER) (test  35.6 GM/DL  32.3-36.5  



 code=752)      

 

 RED CELL DISTRIBUTION WIDTH (BEAKER) (test  13.5 %  11.6-14.4  



 code=412)      

 

 PLATELET COUNT (BEAKER) (test code=756)  51 K/CU MM  150-450  

 

 MEAN PLATELET VOLUME (BEAKER) (test code=754)  8.3 fL  9.4-12.4  

 

 NUCLEATED RED BLOOD CELLS (BEAKER) (test  0 /100 WBC  0-0  



 code=413)      



(CELLAVISION MANUAL DIFF)2019 10:30:00





 Test Item  Value  Reference Range  Comments

 

 NEUTROPHILS - REL (CELLAVISION)(BEAKER) (test  82 %    



 code=2816)      

 

 LYMPHOCYTES - REL (CELLAVISION)(BEAKER) (test  2 %    



 code=2817)      

 

 MONOCYTES - REL (CELLAVISION)(BEAKER) (test  9 %    



 code=2818)      

 

 METAMYELOCYTES - REL (CELLAVISION)(BEAKER) (test  2 %  0-0  



 code=2821)      

 

 PROMYELOCYTES - REL (CELLAVSION)(BEAKER) (test  2 %  0-0  



 code=2825)      

 

 BANDS - REL (CELLAVISION)(BEAKER) (test code=2826)  2 %  0-10  

 

 ATYPICAL LYMPHOCYTES - REL (CELLAVISION)(BEAKER)  1 %  0-0  



 (test code=2829)      

 

 NEUTROPHILS - ABS (CELLAVISION)(BEAKER) (test  9.27 K/ul  1.78-5.38  



 code=2830)      

 

 LYMPHOCYTES - ABS (CELLAVISION)(BEAKER) (test  0.23 K/ul  1.32-3.57  



 code=2831)      

 

 MONOCYTES - ABS (CELLAVISION)(BEAKER) (test  1.02 K/uL  0.30-0.82  



 code=2832)      

 

 METAMYELOCYTES - ABS (CELLAVISION)(BEAKER) (test  0.23 K/uL  0.00-0.00  



 code=2836)      

 

 PROMYELOCYTES - ABS (CELLAVISION)(BEAKER) (test  0.23 K/uL  0.00-0.00  



 code=2838)      

 

 BANDS - ABS (CELLAVISION)(BEAKER) (test code=2840)  0.23 K/uL  0.00-0.80  

 

 ATYPICAL LYMPHOCYTES - ABS (CELLAVISION)(BEAKER)  0.11 K/uL  0.00-0.00  



 (test code=2858)      

 

 TOTAL COUNTED (BEAKER) (test code=1351)  100    

 

 WBC MORPHOLOGY (BEAKER) (test code=487)  Normal    

 

 LARGE PLT(BEAKER) (test code=2156)  Present    

 

 BASOPHILIC STIPPLING (BEAKER) (test code=473)  Present    

 

 ARTIFACT (CELLAVISION)(BEAKER) (test code=3432)  Present    

 

 PLATELET CONCENTRATION (CELLAVISION)(BEAKER) (test  Decreased    



 code=3438)      



Received comment: User comments: Slide comments: WBC: SEGMENTED WITH TOXIC 
GRANULATIONS PRESENTRAD, CHEST, 1 VIEW, NON [id number] 07:48:00Reason for 
exam:-&gt;left effusionShould this be performed at the bedside?-&gt;YesFINAL 
REPORT PATIENT ID:   [id number] RAD, CHEST, 1 VIEW, NON DEPT INDICATION: left 
effusion COMPARISON: Prior day's exam FINDINGS: Portable frontal view of the 
chest.   IMPRESSION:  Support Lines: PICC tip overlies the SVC.  Left pleural 
catheter is again noted. Lungs and pleura: Bibasilar airspace disease is 
unchanged.  Superimposed trace left effusion. Left apical pneumothorax is 
decreased in the interim.    Heart and mediastinum: Stable contours.  
Additional findings: None. Signed: Sasha CrockerMDReport Verified Date/Time:  
2019 07:48:14 Reading Location: [facility] Radiology Reading Room  
Electronically signed by: SASHA CROCKER MD on 2019 07:48 
AMCALCIUM, [id number] 05:58:00





 Test Item  Value  Reference Range  Comments

 

 CALCIUM IONIZED (BEAKER) (test code=698)  0.96 mmol/L  1.12-1.27  

 

 PH, BLOOD (BEAKER) (test code=1810)  7.53    



COMPREHENSIVE METABOLIC [id number] 05:51:00





 Test Item  Value  Reference Range  Comments

 

 TOTAL PROTEIN (BEAKER)  5.2 gm/dL  6.0-8.3  



 (test code=770)      

 

 ALBUMIN (BEAKER) (test  2.6 g/dL  3.5-5.0  



 code=1145)      

 

 ALKALINE PHOSPHATASE  144 U/L    



 (BEAKER) (test code=346)      

 

 BILIRUBIN TOTAL (BEAKER)  6.3 mg/dL  0.2-1.2  



 (test code=377)      

 

 SODIUM (BEAKER) (test  119 meq/L  136-145  



 fsla=651)      

 

 POTASSIUM (BEAKER) (test  4.2 meq/L  3.5-5.1  



 code=379)      

 

 CHLORIDE (BEAKER) (test  86 meq/L    



 code=382)      

 

 CO2 (BEAKER) (test  26 meq/L  22-29  



 code=355)      

 

 BLOOD UREA NITROGEN  23 mg/dL  7-21  



 (BEAKER) (test code=354)      

 

 CREATININE (BEAKER) (test  0.81 mg/dL  0.57-1.25  



 code=358)      

 

 GLUCOSE RANDOM (BEAKER)  103 mg/dL    



 (test code=652)      

 

 CALCIUM (BEAKER) (test  7.6 mg/dL  8.4-10.2  



 code=697)      

 

 AST (SGOT) (BEAKER) (test  46 U/L  5-34  



 code=353)      

 

 ALT (SGPT) (BEAKER) (test  21 U/L  6-55  



 code=347)      

 

 EGFR (BEAKER) (test  97 mL/min/1.73 sq m    ESTIMATED GFR IS NOT AS



 code=1092)      ACCURATE AS CREATININE



       CLEARANCE IN PREDICTING



       GLOMERULAR FILTRATION



       RATE. ESTIMATED GFR IS



       NOT APPLICABLE FOR



       DIALYSIS PATIENTS.



Specimen moderately [id number] 05:07:00





 Test Item  Value  Reference Range  Comments

 

 CORTISOL, TOTAL (BEAKER) (test code=2755)  9.6 ug/dL  3.7-19.4  



[id number] 04:57:00





 Test Item  Value  Reference Range  Comments

 

 PHOSPHORUS (BEAKER) (test code=604)  2.0 mg/dL  2.3-4.7  



[id number] 04:57:00





 Test Item  Value  Reference Range  Comments

 

 MAGNESIUM (BEAKER) (test code=627)  1.7 mg/dL  1.6-2.6  



[id number] 22:08:00





 Test Item  Value  Reference Range  Comments

 

 SODIUM (BEAKER) (test code=381)  119 meq/L  136-145  

 

 POTASSIUM (BEAKER) (test  4.6 meq/L  3.5-5.1  Specimen slightly hemolyzed



 code=379)      

 

 CHLORIDE (BEAKER) (test  86 meq/L    



 code=382)      

 

 CO2 (BEAKER) (test code=355)  27 meq/L  22-29  



Call K &gt; 5.5POCT-GLUCOSE [id number] 12:59:00





 Test Item  Value  Reference Range  Comments

 

 POC-GLUCOSE METER (BEAKER)  95 mg/dL    TESTED AT [facility] [address] [place] [address]



 (test gauk=9252)      [facility] [address]



T4, [id number] 10:25:00





 Test Item  Value  Reference Range  Comments

 

 FREE T4 (BEAKER) (test code=655)  0.65 ng/dL  0.70-1.48  



CBC W/PLT COUNT &amp; AUTO [id number] 10:21:00





 Test Item  Value  Reference Range  Comments

 

 WHITE BLOOD CELL COUNT (BEAKER) (test code=775)  11.8 K/ L  3.5-10.5  

 

 RED BLOOD CELL COUNT (BEAKER) (test code=761)  3.74 M/ L  4.63-6.08  

 

 HEMOGLOBIN (BEAKER) (test code=410)  12.2 GM/DL  13.7-17.5  

 

 HEMATOCRIT (BEAKER) (test code=411)  33.9 %  40.1-51.0  

 

 MEAN CORPUSCULAR VOLUME (BEAKER) (test code=753)  90.6 fL  79.0-92.2  

 

 MEAN CORPUSCULAR HEMOGLOBIN (BEAKER) (test  32.6 pg  25.7-32.2  



 zxll=513)      

 

 MEAN CORPUSCULAR HEMOGLOBIN CONC (BEAKER) (test  36.0 GM/DL  32.3-36.5  



 code=752)      

 

 RED CELL DISTRIBUTION WIDTH (BEAKER) (test  13.7 %  11.6-14.4  



 code=412)      

 

 PLATELET COUNT (BEAKER) (test code=756)  79 K/CU MM  150-450  

 

 MEAN PLATELET VOLUME (BEAKER) (test code=754)  8.9 fL  9.4-12.4  

 

 NUCLEATED RED BLOOD CELLS (BEAKER) (test  0 /100 WBC  0-0  



 code=413)      



(CELLAVISION MANUAL DIFF)2019 10:21:00





 Test Item  Value  Reference Range  Comments

 

 NEUTROPHILS - REL (CELLAVISION)(BEAKER) (test  82 %    



 code=2816)      

 

 LYMPHOCYTES - REL (CELLAVISION)(BEAKER) (test  4 %    



 code=2817)      

 

 MONOCYTES - REL (CELLAVISION)(BEAKER) (test  13 %    



 code=2818)      

 

 ATYPICAL LYMPHOCYTES - REL (CELLAVISION)(BEAKER)  1 %  0-0  



 (test code=2829)      

 

 NEUTROPHILS - ABS (CELLAVISION)(BEAKER) (test  9.68 K/ul  1.78-5.38  



 code=2830)      

 

 LYMPHOCYTES - ABS (CELLAVISION)(BEAKER) (test  0.47 K/ul  1.32-3.57  



 code=2831)      

 

 MONOCYTES - ABS (CELLAVISION)(BEAKER) (test  1.53 K/uL  0.30-0.82  



 code=2832)      

 

 ATYPICAL LYMPHOCYTES - ABS (CELLAVISION)(BEAKER)  0.12 K/uL  0.00-0.00  



 (test code=2858)      

 

 TOTAL COUNTED (BEAKER) (test code=1351)  100    

 

 RBC MORPHOLOGY (BEAKER) (test code=762)  Normal    

 

 WBC MORPHOLOGY (BEAKER) (test code=487)  Normal    

 

 PLT MORPHOLOGY (BEAKER) (test code=486)  Normal    

 

 ARTIFACT (CELLAVISION)(BEAKER) (test code=3432)  Present    

 

 PLATELET CONCENTRATION (CELLAVISION)(BEAKER) (test  Decreased    



 code=3438)      



Received comment: User comments: Slide comments:POCT-GLUCOSE [id number] 09:
58:00





 Test Item  Value  Reference Range  Comments

 

 POC-GLUCOSE METER (BEAKER)  104 mg/dL    TESTED AT [address]



 (test jytu=7587)      [place] [address]



RAD, CHEST, 1 VIEW, NON [id number] 09:33:00Reason for exam:-&gt;
effusionShould this be performed at the bedside?-&gt;YesFINAL REPORT PATIENT ID
:   [id number] Comparison: 2019 TECHNIQUE: Single view of the chest FINDINGS
: Bilateral interstitial and airspace opacities are stable. Small left pleural 
thickening with adjacent airspace disease identified. A previous left-sided 
pneumothorax has decreased. Left pleural drainage catheters again noted. Right-
sided PICC line is stable. Signed: Ronaldo Tim MDReport Verified Date/Time:   09:33:21 Reading Location: [address] Transitional Reading Room      
Electronically signed by: RONALDO TIM M.D. on 2019 09:33 
AMCOMPREHENSIVE METABOLIC [id number] 08:59:00





 Test Item  Value  Reference Range  Comments

 

 TOTAL PROTEIN (BEAKER)  5.4 gm/dL  6.0-8.3  Specimen slightly



 (test code=770)      hemolyzed

 

 ALBUMIN (BEAKER) (test  2.1 g/dL  3.5-5.0  Specimen slightly



 code=1145)      hemolyzed

 

 ALKALINE PHOSPHATASE  153 U/L    



 (BEAKER) (test code=346)      

 

 BILIRUBIN TOTAL (BEAKER)  5.0 mg/dL  0.2-1.2  Specimen slightly



 (test code=377)      hemolyzed

 

 SODIUM (BEAKER) (test  117 meq/L  136-145  



 xxel=050)      

 

 POTASSIUM (BEAKER) (test  5.5 meq/L  3.5-5.1  Specimen slightly



 code=379)      hemolyzed

 

 CHLORIDE (BEAKER) (test  84 meq/L    



 code=382)      

 

 CO2 (BEAKER) (test  26 meq/L  22-29  



 code=355)      

 

 BLOOD UREA NITROGEN  32 mg/dL  7-21  



 (BEAKER) (test code=354)      

 

 CREATININE (BEAKER) (test  0.85 mg/dL  0.57-1.25  Specimen slightly



 code=358)      hemolyzed

 

 GLUCOSE RANDOM (BEAKER)  104 mg/dL    



 (test code=652)      

 

 CALCIUM (BEAKER) (test  7.6 mg/dL  8.4-10.2  



 code=697)      

 

 AST (SGOT) (BEAKER) (test  51 U/L  5-34  Specimen slightly



 code=353)      hemolyzed

 

 ALT (SGPT) (BEAKER) (test  23 U/L  6-55  Specimen slightly



 code=347)      hemolyzed

 

 EGFR (BEAKER) (test  92 mL/min/1.73 sq m    ESTIMATED GFR IS NOT AS



 code=1092)      ACCURATE AS CREATININE



       CLEARANCE IN PREDICTING



       GLOMERULAR FILTRATION



       RATE. ESTIMATED GFR IS



       NOT APPLICABLE FOR



       DIALYSIS PATIENTS.



Specimen moderately [id number] 08:55:00





 Test Item  Value  Reference Range  Comments

 

 MAGNESIUM (BEAKER) (test  1.9 mg/dL  1.6-2.6  Specimen slightly hemolyzed



 code=627)      



[id number] 08:55:00





 Test Item  Value  Reference Range  Comments

 

 PHOSPHORUS (BEAKER) (test  2.5 mg/dL  2.3-4.7  Specimen slightly hemolyzed



 code=604)      



TSH/FREE T4 IF [id number] 08:39:00





 Test Item  Value  Reference Range  Comments

 

 THYROID STIMULATING HORMONE (BEAKER) (test  8.07 uIU/mL  0.35-4.94  



 code=772)      



CALCIUM, [id number] 08:06:00





 Test Item  Value  Reference Range  Comments

 

 CALCIUM IONIZED (BEAKER) (test code=698)  1.00 mmol/L  1.12-1.27  

 

 PH, BLOOD (BEAKER) (test code=1810)  7.48    



[id number] 23:47:00





 Test Item  Value  Reference Range  Comments

 

 SODIUM (BEAKER) (test code=381)  117 meq/L  136-145  

 

 POTASSIUM (BEAKER) (test code=379)  5.3 meq/L  3.5-5.1  

 

 CHLORIDE (BEAKER) (test code=382)  84 meq/L    

 

 CO2 (BEAKER) (test code=355)  27 meq/L  22-29  



Call K &gt; [id number]-GLUCOSE [id number] 21:18:00





 Test Item  Value  Reference Range  Comments

 

 POC-GLUCOSE METER (BEAKER)  145 mg/dL    TESTED AT [facility] [address] [facility]



 (test guso=2852)      [facility] [address]



[id number] 18:57:00





 Test Item  Value  Reference Range  Comments

 

 CORTISOL, TOTAL (BEAKER) (test code=2755)  18.1 ug/dL  3.7-19.4  



[id number] 18:34:00





 Test Item  Value  Reference Range  Comments

 

 SODIUM (BEAKER) (test code=381)  116 meq/L  136-145  

 

 POTASSIUM (BEAKER) (test code=379)  6.0 meq/L  3.5-5.1  

 

 CHLORIDE (BEAKER) (test code=382)  82 meq/L    

 

 CO2 (BEAKER) (test code=355)  30 meq/L  22-29  



Call [id number]-GLUCOSE [id number] 18:20:00





 Test Item  Value  Reference Range  Comments

 

 POC-GLUCOSE METER (BEAKER)  141 mg/dL    TESTED AT [address]



 (test qcnb=8038)      [place] [id number]



POCT-GLUCOSE [id number] 13:00:00





 Test Item  Value  Reference Range  Comments

 

 POC-GLUCOSE METER (BEAKER)  122 mg/dL    TESTED AT [address]



 (test mtci=7794)      [facility] [address]



CBC W/PLT COUNT &amp; AUTO [id number] 10:34:00





 Test Item  Value  Reference Range  Comments

 

 WHITE BLOOD CELL COUNT (BEAKER) (test code=775)  15.5 K/ L  3.5-10.5  

 

 RED BLOOD CELL COUNT (BEAKER) (test code=761)  4.04 M/ L  4.63-6.08  

 

 HEMOGLOBIN (BEAKER) (test code=410)  13.3 GM/DL  13.7-17.5  

 

 HEMATOCRIT (BEAKER) (test code=411)  36.5 %  40.1-51.0  

 

 MEAN CORPUSCULAR VOLUME (BEAKER) (test code=753)  90.3 fL  79.0-92.2  

 

 MEAN CORPUSCULAR HEMOGLOBIN (BEAKER) (test  32.9 pg  25.7-32.2  



 bviy=888)      

 

 MEAN CORPUSCULAR HEMOGLOBIN CONC (BEAKER) (test  36.4 GM/DL  32.3-36.5  



 code=752)      

 

 RED CELL DISTRIBUTION WIDTH (BEAKER) (test  13.5 %  11.6-14.4  



 code=412)      

 

 PLATELET COUNT (BEAKER) (test code=756)  65 K/CU MM  150-450  

 

 MEAN PLATELET VOLUME (BEAKER) (test code=754)  9.0 fL  9.4-12.4  

 

 NUCLEATED RED BLOOD CELLS (BEAKER) (test  0 /100 WBC  0-0  



 code=413)      



(CELLAVISION MANUAL DIFF)2019 10:34:00





 Test Item  Value  Reference Range  Comments

 

 NEUTROPHILS - REL (CELLAVISION)(BEAKER) (test  78 %    



 code=2816)      

 

 LYMPHOCYTES - REL (CELLAVISION)(BEAKER) (test  4 %    



 code=2817)      

 

 MONOCYTES - REL (CELLAVISION)(BEAKER) (test  14 %    



 code=2818)      

 

 EOSINOPHILS - REL (CELLAVISION)(BEAKER) (test  2 %    



 code=2819)      

 

 BANDS - REL (CELLAVISION)(BEAKER) (test  2 %  0-10  



 code=2826)      

 

 NEUTROPHILS - ABS (CELLAVISION)(BEAKER) (test  12.09 K/ul  1.78-5.38  



 code=2830)      

 

 LYMPHOCYTES - ABS (CELLAVISION)(BEAKER) (test  0.62 K/ul  1.32-3.57  



 code=2831)      

 

 MONOCYTES - ABS (CELLAVISION)(BEAKER) (test  2.17 K/uL  0.30-0.82  



 code=2832)      

 

 EOSINOPHILS - ABS (CELLAVISION)(BEAKER) (test  0.31 K/uL  0.04-0.54  



 code=2834)      

 

 BANDS - ABS (CELLAVISION)(BEAKER) (test  0.31 K/uL  0.00-0.80  



 code=2840)      

 

 TOTAL COUNTED (BEAKER) (test code=1351)  100    

 

 WBC MORPHOLOGY (BEAKER) (test code=487)  Normal    

 

 PLT MORPHOLOGY (BEAKER) (test code=486)  Normal    

 

 POLYCHROMATOPHILLIC RBCS(BEAKER) (test code=478)  1+ few    

 

 POIKILOCYTES (BEAKER) (test code=966)  2+ moderate    

 

 KARISSA CELLS (BEAKER) (test code=474)  2+ moderate    

 

 BASOPHILIC STIPPLING (BEAKER) (test code=473)  Present    

 

 ARTIFACT (CELLAVISION)(BEAKER) (test code=3432)  Present    

 

 PLATELET CONCENTRATION (CELLAVISION)(BEAKER)  Decreased    



 (test code=3438)      



Received comment: User comments: Slide comments:RAD, CHEST, 1 VIEW, NON [id number] 08:40:00Reason for exam:-&gt;left effusionShould this be performed at 
the bedside?-&gt;YesFINAL REPORT PATIENT ID:   [id number] Portable chest. 
CLINICAL HISTORY: left effusion. COMPARISON STUDY: Chest x-ray from yesterday. 
FINDINGS: The cardiac silhouette is unremarkable. The pulmonary parenchyma 
demonstrates increased interstitial markings with atelectatic changes in the 
lung bases. A left-sided pigtail catheter chest tube remains and there has been 
development of a loculated small basilarpneumothorax. A right PICC line 
remains. Degenerative changes are noted. IMPRESSION: Development a small left-
sided basilar pneumothorax. No other significant change. Signed: Mk Martinezeport Verified Date/Time:  2019 08:40:30 Reading Location: [place] [id number] Ortho Consult Reading Room Electronically signed by: MK MARTINEZ M.D. 
on 2019 08:40 AMPOCT-GLUCOSE [id number] 08:39:00





 Test Item  Value  Reference Range  Comments

 

 POC-GLUCOSE METER (BEAKER)  110 mg/dL    TESTED AT [address]



 (test mxwu=9561)      [facility] [address]



COMPREHENSIVE METABOLIC [id number] 06:32:00





 Test Item  Value  Reference Range  Comments

 

 TOTAL PROTEIN (BEAKER)  5.5 gm/dL  6.0-8.3  



 (test code=770)      

 

 ALBUMIN (BEAKER) (test  1.9 g/dL  3.5-5.0  



 code=1145)      

 

 ALKALINE PHOSPHATASE  134 U/L    



 (BEAKER) (test code=346)      

 

 BILIRUBIN TOTAL (BEAKER)  4.4 mg/dL  0.2-1.2  



 (test code=377)      

 

 SODIUM (BEAKER) (test  116 meq/L  136-145  



 xjrw=107)      

 

 POTASSIUM (BEAKER) (test  5.6 meq/L  3.5-5.1  



 code=379)      

 

 CHLORIDE (BEAKER) (test  83 meq/L    



 code=382)      

 

 CO2 (BEAKER) (test  27 meq/L  22-29  



 code=355)      

 

 BLOOD UREA NITROGEN  34 mg/dL  7-21  



 (BEAKER) (test code=354)      

 

 CREATININE (BEAKER) (test  0.89 mg/dL  0.57-1.25  



 code=358)      

 

 GLUCOSE RANDOM (BEAKER)  109 mg/dL    



 (test code=652)      

 

 CALCIUM (BEAKER) (test  7.4 mg/dL  8.4-10.2  



 code=697)      

 

 AST (SGOT) (BEAKER) (test  31 U/L  5-34  



 code=353)      

 

 ALT (SGPT) (BEAKER) (test  18 U/L  6-55  



 code=347)      

 

 EGFR (BEAKER) (test  87 mL/min/1.73 sq m    ESTIMATED GFR IS NOT AS



 code=1092)      ACCURATE AS CREATININE



       CLEARANCE IN PREDICTING



       GLOMERULAR FILTRATION



       RATE. ESTIMATED GFR IS



       NOT APPLICABLE FOR



       DIALYSIS PATIENTS.



Specimen moderately ictericPOCT-GLUCOSE [id number] 21:12:00





 Test Item  Value  Reference Range  Comments

 

 POC-GLUCOSE METER (BEAKER)  114 mg/dL    TESTED AT [address]



 (test naxm=2035)      [facility] [address]



OSMOLALITY, [id number] 18:43:00





 Test Item  Value  Reference Range  Comments

 

 OSMOLALITY URINE (BEAKER) (test code=614)  694 mOsm/kg  40-1,400  



SODIUM, RANDOM [id number] 18:02:00





 Test Item  Value  Reference Range  Comments

 

 SODIUM URINE (BEAKER) (test code=243)  < meq/L    



Reference Range: No NormalsPOCT-GLUCOSE [id number] 16:06:00





 Test Item  Value  Reference Range  Comments

 

 POC-GLUCOSE METER (BEAKER)  145 mg/dL    TESTED AT [address]



 (test xqfg=1174)      [facility] [address]



RAD, ABDOMEN/KUB, 1 VIEW [id number] 12:51:00Reason for exam:-&gt;no BM in 13 
days. abdominal distension.  concern for ileusFINAL REPORT PATIENT ID:   
[id number] RAD, ABDOMEN/KUB, 1 VIEW AP CLINICAL INDICATION:  no BM in 13 days. 
abdominal distension.  concern for ileus   COMPARISON: None TECHNIQUE: 24 
radiographs of the abdomen. IMPRESSION:  The bowel gas pattern demonstrates 
gaseous distention without dilation. No pneumatosis. Appearance may reflect 
ileus. Excreted contrast is present in the urinary bladder. The regional 
skeleton is intact. Signed: JR Mendoza Robert H[facility] Divisioneport Verified Date/Time
:  2019 12:51:21 Reading Location: [facility] Reading [facility]  
  Electronically signed by: KULWINDER MENDOZA on 2019 12:51 
PMOSMOLALITY, [id number] 12:47:00





 Test Item  Value  Reference Range  Comments

 

 OSMOLALITY, SERUM (BEAKER) (test code=615)  258 mOsm/kg  275-295  



POCT-GLUCOSE [id number] 12:35:00





 Test Item  Value  Reference Range  Comments

 

 POC-GLUCOSE METER (BEAKER)  93 mg/dL    TESTED AT [facility] [address] [facility]



 (test qnio=6059)      [facility] [address]



CBC W/PLT COUNT &amp; AUTO [id number] 10:10:00





 Test Item  Value  Reference Range  Comments

 

 WHITE BLOOD CELL COUNT (BEAKER) (test code=775)  19.5 K/ L  3.5-10.5  

 

 RED BLOOD CELL COUNT (BEAKER) (test code=761)  4.18 M/ L  4.63-6.08  

 

 HEMOGLOBIN (BEAKER) (test code=410)  13.3 GM/DL  13.7-17.5  

 

 HEMATOCRIT (BEAKER) (test code=411)  37.8 %  40.1-51.0  

 

 MEAN CORPUSCULAR VOLUME (BEAKER) (test code=753)  90.4 fL  79.0-92.2  

 

 MEAN CORPUSCULAR HEMOGLOBIN (BEAKER) (test  31.8 pg  25.7-32.2  



 dkgd=866)      

 

 MEAN CORPUSCULAR HEMOGLOBIN CONC (BEAKER) (test  35.2 GM/DL  32.3-36.5  



 code=752)      

 

 RED CELL DISTRIBUTION WIDTH (BEAKER) (test  13.5 %  11.6-14.4  



 code=412)      

 

 PLATELET COUNT (BEAKER) (test code=756)  60 K/CU MM  150-450  

 

 MEAN PLATELET VOLUME (BEAKER) (test code=754)  9.4 fL  9.4-12.4  

 

 NUCLEATED RED BLOOD CELLS (BEAKER) (test  0 /100 WBC  0-0  



 code=413)      



(CELLAVISION MANUAL DIFF)2019 10:10:00





 Test Item  Value  Reference Range  Comments

 

 NEUTROPHILS - REL (CELLAVISION)(BEAKER) (test  86 %    



 code=2816)      

 

 LYMPHOCYTES - REL (CELLAVISION)(BEAKER) (test  1 %    



 code=2817)      

 

 MONOCYTES - REL (CELLAVISION)(BEAKER) (test  6 %    



 code=2818)      

 

 EOSINOPHILS - REL (CELLAVISION)(BEAKER) (test  3 %    



 code=2819)      

 

 BANDS - REL (CELLAVISION)(BEAKER) (test  3 %  0-10  



 code=2826)      

 

 BLASTS - REL (CELLAVISION)(BEAKER) (test  1 %  0-0  



 code=2827)      

 

 NEUTROPHILS - ABS (CELLAVISION)(BEAKER) (test  16.77 K/ul  1.78-5.38  



 code=2830)      

 

 LYMPHOCYTES - ABS (CELLAVISION)(BEAKER) (test  0.20 K/ul  1.32-3.57  



 code=2831)      

 

 MONOCYTES - ABS (CELLAVISION)(BEAKER) (test  1.17 K/uL  0.30-0.82  



 code=2832)      

 

 EOSINOPHILS - ABS (CELLAVISION)(BEAKER) (test  0.59 K/uL  0.04-0.54  



 code=2834)      

 

 BANDS - ABS (CELLAVISION)(BEAKER) (test  0.59 K/uL  0.00-0.80  



 code=2840)      

 

 BLASTS - ABS (CELLAVISION)(BEAKER) (test  0.20 K/uL  0.00-0.00  



 code=2845)      

 

 TOTAL COUNTED (BEAKER) (test code=1351)  100    

 

 PLT MORPHOLOGY (BEAKER) (test code=486)  Normal    

 

 SMUDGE CELLS (BEAKER) (test code=1371)  Present    

 

 POIKILOCYTES (BEAKER) (test code=966)  1+ few    

 

 PLATELET CONCENTRATION (CELLAVISION)(BEAKER)  Decreased    



 (test code=3438)      



Received comment: User comments: Slide comments:RAD, CHEST, 1 VIEW, NON [id number]
-[id number]- 08:53:00Reason for exam:-&gt;left effusionShould this be performed at 
the bedside?-&gt;YesFINAL REPORT PATIENT ID:   [id number] RAD, CHEST, 1 VIEW, NON 
DEPT INDICATION: left effusion COMPARISON: Prior day's exam FINDINGS: Portable 
frontal view of the chest.   IMPRESSION: Limited by patient positioning.Support 
Lines: Stable. Lungs and pleura: Interstitial congestion on the left slightly 
greater than the right and unchanged from prior. Small left effusion. No 
visible pneumothorax.Heart and mediastinum: Stable contours. Additional findings
: None. Signed: JR Reji, Kulwinder MCKEONeport Verified Date/Time:  2019 08:53:19 Reading Location: [facility] Radiology Reading Room    
Electronically signed by: KULWINDER MENDOZA on 2019 08:53 AMPOCT-
GLUCOSE [id number] 07:58:00





 Test Item  Value  Reference Range  Comments

 

 POC-GLUCOSE METER (BEAKER)  95 mg/dL    TESTED AT [facility] [address] [facility]



 (test tfzm=3623)      [facility] [address]



COMPREHENSIVE METABOLIC [id number] 05:41:00





 Test Item  Value  Reference Range  Comments

 

 TOTAL PROTEIN (BEAKER)  5.4 gm/dL  6.0-8.3  



 (test code=770)      

 

 ALBUMIN (BEAKER) (test  1.9 g/dL  3.5-5.0  



 code=1145)      

 

 ALKALINE PHOSPHATASE  126 U/L    



 (BEAKER) (test code=346)      

 

 BILIRUBIN TOTAL (BEAKER)  4.9 mg/dL  0.2-1.2  



 (test code=377)      

 

 SODIUM (BEAKER) (test  117 meq/L  136-145  



 xbni=035)      

 

 POTASSIUM (BEAKER) (test  5.3 meq/L  3.5-5.1  



 code=379)      

 

 CHLORIDE (BEAKER) (test  83 meq/L    



 code=382)      

 

 CO2 (BEAKER) (test  28 meq/L  22-29  



 code=355)      

 

 BLOOD UREA NITROGEN  29 mg/dL  7-21  



 (BEAKER) (test code=354)      

 

 CREATININE (BEAKER) (test  0.90 mg/dL  0.57-1.25  



 code=358)      

 

 GLUCOSE RANDOM (BEAKER)  99 mg/dL    



 (test code=652)      

 

 CALCIUM (BEAKER) (test  7.7 mg/dL  8.4-10.2  



 code=697)      

 

 AST (SGOT) (BEAKER) (test  28 U/L  5-34  



 code=353)      

 

 ALT (SGPT) (BEAKER) (test  18 U/L  6-55  



 code=347)      

 

 EGFR (BEAKER) (test  86 mL/min/1.73 sq m    ESTIMATED GFR IS NOT AS



 code=1092)      ACCURATE AS CREATININE



       CLEARANCE IN PREDICTING



       GLOMERULAR FILTRATION



       RATE. ESTIMATED GFR IS



       NOT APPLICABLE FOR



       DIALYSIS PATIENTS.



Specimen moderately ictericPOCT-GLUCOSE [id number] 21:08:00





 Test Item  Value  Reference Range  Comments

 

 POC-GLUCOSE METER (BEAKER)  92 mg/dL    TESTED AT [address]



 (test gymf=8342)      [place] [address]



RAD, CHEST, 1 VIEW, NON [id number] 17:40:00Reason for exam:-&gt;post chest 
tube placementFINAL REPORT PATIENT ID:   [id number] INDICATION: post chest tube 
placement TECHNIQUE: Chest radiograph, single view, portable technique. 
FINDINGS / IMPRESSION: Left pleural effusion has decreased in size, since 10:25 
AM, after pleural tube placement. No pneumothorax demonstrated. Right PICC line 
again noted terminating at the cavoatrial junction. Signed: Giovani Ordonez 
MDReport Verified Date/Time:  2019 17:40:02 Reading Location: [address] Consult Reading Room Electronically signed by: GIOVANI ORDONEZ M.D. on 2019 05:40 PMPOCT-GLUCOSE [id number] 17:33:00





 Test Item  Value  Reference Range  Comments

 

 POC-GLUCOSE METER (BEAKER)  116 mg/dL    TESTED AT [address]



 (test xycj=9350)      [place] [address]



POCT-GLUCOSE [id number] 15:19:00





 Test Item  Value  Reference Range  Comments

 

 POC-GLUCOSE METER (BEAKER)  85 mg/dL    TESTED AT [address]



 (test zzqq=3143)      [place] [address]



CT, DRAINAGE, CHEST TUBE [id number] 14:49:00Reason for exam:-&gt;
loculated left pleural effusion, please place large bore pigtail if effusion 
noted on CT scanAnesthesia:-&gt;NoneFINAL REPORT PATIENT ID:   [id number] 
PROCEDURE: CT-guided placement of a left pleural catheter. Dose modulation, 
iterative reconstruction, and/or weight-based adjustment of the mA/kV was 
utilized to reduce the radiation dose to as low as reasonably achievable. 
INDICATION: Loculated left pleural effusion. COMPARISON: CT from earlier today. 
SEDATION: Intravenous moderate sedation was administered by radiology nursing 
and monitored under the direction of the undersigned radiologist. The patient's 
vital signs were monitored throughout the procedure and recorded in the patient'
s medical record by radiology nursing. Total intraservice time of sedation was 
25 minutes. MEDICATIONS: 0.5 mg Versed, 25 mcg fentanyl DESCRIPTION: After 
obtaining informed written consent, the patient was brought to the procedure 
room and placed in the supine position.  Preliminary CT scan revealed a large 
left pleural effusion. A clear path to the collection was identified. The 
overlying skin was prepped and draped in the usual, sterile fashion and local 2
% lidocaine anesthesia was administered. Under CT guidance, a 19-gaugeneedle 
was placed. After placement of a wire and serial dilation, a 12 Vincentian catheter 
was placed into the pleural fluid. The catheter was affixed to the skin and 
connected to a vacuum container. 1000 mL of clear red fluid was aspirated. 
Samples were placed on this side table and no clotting was noted. Samples were 
sent for analysis. Post procedure scan showed decrease in size with left 
effusion and no immediate complications. IMPRESSION: Successful placement of a 
12 Vincentian left chest tube. Signed: Abner Aguilera MDReport Verified Date/Time:   14:49:30 Reading Location: [place] [address]Y CT Body Reading Room      
Electronically signed by: ABNER AGUILERA MD on 2019 02:49 PMCT, CHEST, 
WITH [id number] 13:11:00Reason for exam:-&gt;evaluate loculated left 
pleural effusion seen on xrayFINAL REPORT PATIENT ID:   [id number] TECHNIQUE: CT 
scan of the chest WITH intravenous contrast. Dose modulation, iterative 
reconstruction, and/or weight-based adjustment of the mA/kV was utilized to 
reduce the radiation dose to as low as reasonably achievable. INDICATION: 
evaluate loculated left pleural effusion seen on xrayevaluate loculated left 
pleural effusion seen on xray. COMPARISON: None.  FINDINGS:  LINES/TUBES: A 
right PICC has its tip at the superior cavoatrial junction. LUNGS AND AIRWAYS: 
Mild right basilar subsegmental atelectasis. There is an area of cavitation 
with a fluid fluid level in superior segment of the left lower lobe which 
measures 4 cm PLEURA: Trace right pleural effusion. HEART AND MEDIASTINUM: The 
visualized thyroid gland is normal. No significant mediastinal, hilar, or 
axillary lymphadenopathy. The heart and pericardium are within normal limits. 
Severe coronary arterial calcifications. SOFT TISSUES AND BONES: Bilateral 
gynecomastia. Old, healed right 10th and 12th ribfractures. Old, healed left 
10th rib fracture. UPPER ABDOMEN: Nodular, cirrhotic liver. Partially 
visualized upper abdominal varices. A periumbilical vein is recanalized.  
IMPRESSION: 1.There is a large left sided loculated pleural effusion with a 
mildly thickened pleura and some intermixed gas. This is concerning for an 
empyema. 2.The air-fluid level with surrounding lung in the superior segment of 
the left lower lobe is most likely a lung abscess. A cavitary lung nodule (
either from malignancy or fungal infection) is considered less likely. A follow-
up chest CT is recommended in three months to document decrease in size and 
exclude cavitary malignancy. 3.Cirrhosis with findings of portal hypertension. 
Signed: Abner Aguilera Verified Date/Time:  2019 13:11:18 Reading 
Location: [facility] C0[facility] CT Body Reading Room      Electronically signed by: ABNER AGUILERA MD on 2019 01:11 PMPOCT-GLUCOSE [id number] 11:22:00





 Test Item  Value  Reference Range  Comments

 

 POC-GLUCOSE METER (BEAKER)  81 mg/dL    TESTED AT [address]



 (test ikpf=6163)      [facility] [address]



RAD, CHEST, 1 VIEW, NON [id number] 10:59:00Reason for exam:-&gt;eval 
effusionShould this be performed at the bedside?-&gt;YesFINAL REPORT PATIENT ID
:   [id number] RAD, CHEST, 1 VIEW, NON DEPT INDICATION: eval effusion COMPARISON: 
Prior day's exam FINDINGS: Portable frontal view of the chest.   IMPRESSION: 
Limited by patient rotation.Support Lines: A right PICC terminates over the 
superior vena cava. Lungs and pleura: Large left effusion. Right costophrenic 
sulcus is excluded from view. No pneumothorax.Heart and mediastinum: 
Unremarkable where visible.Additional findings: None. A CT evaluation is 
currently pending. Signed: JR Mendoza Robert MDReport Verified Date/Time
:  2019 10:59:34 Reading Location: [facility] Radiology Reading Room  
  Electronically signed by: KULWINDER MENDOZA on 2019 10:59 
AMCOMPREHENSIVE METABOLIC [id number] 07:33:00





 Test Item  Value  Reference Range  Comments

 

 TOTAL PROTEIN (BEAKER)  5.8 gm/dL  6.0-8.3  Specimen slightly



 (test code=770)      hemolyzed

 

 ALBUMIN (BEAKER) (test  2.0 g/dL  3.5-5.0  Specimen slightly



 code=1145)      hemolyzed

 

 ALKALINE PHOSPHATASE  130 U/L    



 (BEAKER) (test code=346)      

 

 BILIRUBIN TOTAL (BEAKER)  4.6 mg/dL  0.2-1.2  Specimen slightly



 (test code=377)      hemolyzed

 

 SODIUM (BEAKER) (test  117 meq/L  136-145  



 tgwu=673)      

 

 POTASSIUM (BEAKER) (test  5.3 meq/L  3.5-5.1  Specimen slightly



 code=379)      hemolyzed

 

 CHLORIDE (BEAKER) (test  84 meq/L    



 code=382)      

 

 CO2 (BEAKER) (test  28 meq/L  22-29  



 code=355)      

 

 BLOOD UREA NITROGEN  22 mg/dL  7-21  



 (BEAKER) (test code=354)      

 

 CREATININE (BEAKER) (test  0.86 mg/dL  0.57-1.25  Specimen slightly



 code=358)      hemolyzed

 

 GLUCOSE RANDOM (BEAKER)  90 mg/dL    



 (test code=652)      

 

 CALCIUM (BEAKER) (test  7.9 mg/dL  8.4-10.2  



 code=697)      

 

 AST (SGOT) (BEAKER) (test  33 U/L  5-34  Specimen slightly



 code=353)      hemolyzed

 

 ALT (SGPT) (BEAKER) (test  20 U/L  6-55  Specimen slightly



 code=347)      hemolyzed

 

 EGFR (BEAKER) (test  91 mL/min/1.73 sq m    ESTIMATED GFR IS NOT AS



 code=1092)      ACCURATE AS CREATININE



       CLEARANCE IN PREDICTING



       GLOMERULAR FILTRATION



       RATE. ESTIMATED GFR IS



       NOT APPLICABLE FOR



       DIALYSIS PATIENTS.



Specimen moderately ictericPOCT-GLUCOSE [id number] 05:46:00





 Test Item  Value  Reference Range  Comments

 

 POC-GLUCOSE METER (BEAKER)  93 mg/dL    TESTED AT [facility] [address] [facility] [address] [facility]



 (test meml=3837)      [place] [address]



PROTHROMBIN TIME/[id number] 05:30:00





 Test Item  Value  Reference Range  Comments

 

 PROTIME (BEAKER) (test code=759)  16.9 seconds  11.9-14.2  

 

 INR (BEAKER) (test code=370)  1.4  <=5.9  



Effective 2019: PT Reference Range ChangeNew: 11.9-14.2  Previous: 11.7-
14.7RECOMMENDED COUMADIN/WARFARIN INR THERAPY RANGESSTANDARD DOSE: 2.0-3.0  
Includes: PROPHYLAXIS for venous thrombosis, systemic embolization; TREATMENT 
for venous thrombosis and/or pulmonary embolus.HIGH RISK: Target INR is2.5-3.5 
for patients wiht mechanical heart valves.CBC W/PLT COUNT &amp; AUTO 
[id number] 05:27:00





 Test Item  Value  Reference Range  Comments

 

 WHITE BLOOD CELL COUNT (BEAKER) (test code=775)  23.2 K/ L  3.5-10.5  

 

 RED BLOOD CELL COUNT (BEAKER) (test code=761)  4.75 M/ L  4.63-6.08  

 

 HEMOGLOBIN (BEAKER) (test code=410)  15.3 GM/DL  13.7-17.5  

 

 HEMATOCRIT (BEAKER) (test code=411)  43.1 %  40.1-51.0  

 

 MEAN CORPUSCULAR VOLUME (BEAKER) (test code=753)  90.7 fL  79.0-92.2  

 

 MEAN CORPUSCULAR HEMOGLOBIN (BEAKER) (test  32.2 pg  25.7-32.2  



 aowq=156)      

 

 MEAN CORPUSCULAR HEMOGLOBIN CONC (BEAKER) (test  35.5 GM/DL  32.3-36.5  



 code=752)      

 

 RED CELL DISTRIBUTION WIDTH (BEAKER) (test  13.3 %  11.6-14.4  



 code=412)      

 

 PLATELET COUNT (BEAKER) (test code=756)  87 K/CU MM  150-450  

 

 MEAN PLATELET VOLUME (BEAKER) (test code=754)  8.9 fL  9.4-12.4  

 

 NUCLEATED RED BLOOD CELLS (BEAKER) (test  0 /100 WBC  0-0  



 code=413)      

 

 NEUTROPHILS RELATIVE PERCENT (BEAKER) (test  86 %    



 code=429)      

 

 LYMPHOCYTES RELATIVE PERCENT (BEAKER) (test  3 %    



 code=430)      

 

 MONOCYTES RELATIVE PERCENT (BEAKER) (test  9 %    



 code=431)      

 

 EOSINOPHILS RELATIVE PERCENT (BEAKER) (test  0 %    



 code=432)      

 

 BASOPHILS RELATIVE PERCENT (BEAKER) (test  0 %    



 code=437)      

 

 NEUTROPHILS ABSOLUTE COUNT (BEAKER) (test  19.87 K/ L  1.78-5.38  



 code=670)      

 

 LYMPHOCYTES ABSOLUTE COUNT (BEAKER) (test  0.73 K/ L  1.32-3.57  



 code=414)      

 

 MONOCYTES ABSOLUTE COUNT (BEAKER) (test code=415)  2.16 K/ L  0.30-0.82  

 

 EOSINOPHILS ABSOLUTE COUNT (BEAKER) (test  0.06 K/ L  0.04-0.54  



 code=416)      

 

 BASOPHILS ABSOLUTE COUNT (BEAKER) (test code=417)  0.04 K/ L  0.01-0.08  

 

 IMMATURE GRANULOCYTES-RELATIVE PERCENT (BEAKER)  1 %  0-1  



 (test code=2801)      



POCT-GLUCOSE [id number] 23:34:00





 Test Item  Value  Reference Range  Comments

 

 POC-GLUCOSE METER (BEAKER)  94 mg/dL    TESTED AT [facility] [address]



 (test itwr=6558)      [facility] [address]

## 2019-11-11 NOTE — ECHO
HEIGHT: 6 ft 2 in   WEIGHT: 184 lb 0 oz   DATE OF STUDY: 11/11/2019   REFER DR: Geraldo Kay MD

2-DIMENSIONAL: YES

     M.MODE: YES

 DOPPLER: YES

COLOR FLOW: YES



                    TDS:  YES

PORTABLE: YES

 DEFINITY:  NO

BUBBLE STUDY: NO





DIAGNOSIS:  RULE OUT CONGESTIVE HEART FAILURE



CARDIAC HISTORY:  

CATHERIZATION: NO

SURGERY: NO

PROSTHETIC VALVE: NO

PACEMAKER: NO





MEASUREMENTS (cm)

    DIASTOLIC (NORMALS)                 SYSTOLIC (NORMALS)

IVSd                       (0.6-1.2)                    LA Diam  (1.9-4.0)     LVEF        
 60-69%  

LVIDd                     (3.5-5.7)                        LVIDs       (2.0-3.5)     %FS   
       %

LVPWd                   (0.6-1.2)

Ao Diam           2.5 (2.0-3.7)



2 DIMENSIONAL ASSESSMENT:

RIGHT ATRIUM:                   NORMAL

LEFT ATRIUM:       NORMAL



RIGHT VENTRICLE:            NORMAL

LEFT VENTRICLE: NORMAL



TRICUSPID VALVE:             NORMAL

MITRAL VALVE:     NORMAL



PULMONIC VALVE:             NORMAL

AORTIC VALVE:     NORMAL



PERICARDIAL EFFUSION: NONE

AORTIC ROOT:      NORMAL





LEFT VENTRICULAR WALL MOTION:     NORMAL



DOPPLER/COLOR FLOW:     IMPAIRED LEFT VENTRICULAR RELAXATION.



COMMENTS:      NORMAL 2D ECHOCARDIOGRAM. IMPAIRED LEFT VENTRICULAR RELAXATION. TECHNICALLY 
DIFFICULT STUDY NO PARASTERNAL VIEWS.



TECHNOLOGIST:   JUSTIN HUGHES

## 2019-11-11 NOTE — P.PN
Subjective


Date of Service: 11/11/19


Chief Complaint: Respiratory failure


No change still complaining of shortness of breath he has hypoxic requiring 

BiPAP





Review of Systems


General: Weakness


Respiratory: Shortness of Breath





Physical Examination





- Vital Signs


Temperature: 97.9 F


Blood Pressure: 142/87


Pulse: 113


Respirations: 22


Pulse Ox (%): 92





- Physical Exam


General: Alert, In no apparent distress, Oriented x3


Respiratory: Clear to auscultation bilaterally, Diminished





Assessment & Plan





- Problems (Diagnosis)


(1) COPD with acute exacerbation


Current Visit: No   Status: Acute   


Plan: 


Patient admitted with respiratory failure still requiring BiPAP at maximum 

bronchodilator therapy and low-dose Zithromax for anti inflammatory component 

titrate sat to 90% repeat ABGs tomorrow chest x-ray no change no evidence of 

sepsis

## 2019-11-11 NOTE — RAD REPORT
EXAM DESCRIPTION:  RAD - Chest Single View - 11/11/2019 8:52 am

 

CLINICAL HISTORY:  Resp Distress

Chest pain.

 

COMPARISON:  Chest Single View dated 11/8/2019; Chest Pa And Lat (2 Views) dated 9/18/2019; Chest Sin
gle View dated 9/16/2019; Chest Single View dated 9/11/2019; Chest Angio dated 11/8/2019

 

FINDINGS:  Portable technique limits examination quality.

 

The lungs are emphysematous with a small left pleural effusion, unchanged. The heart is normal in siz
e. No displaced fractures.

 

IMPRESSION:  Stable chest.

## 2019-11-12 LAB
BUN BLD-MCNC: 15 MG/DL (ref 7–18)
COHGB MFR BLDA: 1.6 % (ref 0–1.5)
GLUCOSE SERPLBLD-MCNC: 167 MG/DL (ref 74–106)
HCT VFR BLD CALC: 38.8 % (ref 39.6–49)
LYMPHOCYTES # SPEC AUTO: 0.9 K/UL (ref 0.7–4.9)
MAGNESIUM SERPL-MCNC: 2 MG/DL (ref 1.8–2.4)
OXYHGB MFR BLDA: 91.8 % (ref 94–97)
PMV BLD: 7.7 FL (ref 7.6–11.3)
POTASSIUM SERPL-SCNC: 4.3 MMOL/L (ref 3.5–5.1)
RBC # BLD: 4.28 M/UL (ref 4.33–5.43)
SAO2 % BLDA: 93.8 % (ref 92–98.5)

## 2019-11-12 RX ADMIN — ALBUTEROL SULFATE SCH MG: 2.5 SOLUTION RESPIRATORY (INHALATION) at 08:12

## 2019-11-12 RX ADMIN — IPRATROPIUM BROMIDE SCH MG: 0.5 SOLUTION RESPIRATORY (INHALATION) at 08:12

## 2019-11-12 RX ADMIN — SPIRONOLACTONE SCH MG: 100 TABLET, FILM COATED ORAL at 08:42

## 2019-11-12 RX ADMIN — Medication SCH ML: at 21:11

## 2019-11-12 RX ADMIN — IPRATROPIUM BROMIDE SCH: 0.5 SOLUTION RESPIRATORY (INHALATION) at 14:28

## 2019-11-12 RX ADMIN — IPRATROPIUM BROMIDE SCH MG: 0.5 SOLUTION RESPIRATORY (INHALATION) at 20:00

## 2019-11-12 RX ADMIN — POTASSIUM & SODIUM PHOSPHATES POWDER PACK 280-160-250 MG SCH PKT: 280-160-250 PACK at 08:43

## 2019-11-12 RX ADMIN — POTASSIUM & SODIUM PHOSPHATES POWDER PACK 280-160-250 MG SCH PKT: 280-160-250 PACK at 13:26

## 2019-11-12 RX ADMIN — FUROSEMIDE SCH MG: 40 TABLET ORAL at 08:43

## 2019-11-12 RX ADMIN — ARFORMOTEROL TARTRATE SCH MCG: 15 SOLUTION RESPIRATORY (INHALATION) at 08:27

## 2019-11-12 RX ADMIN — IPRATROPIUM BROMIDE SCH MG: 0.5 SOLUTION RESPIRATORY (INHALATION) at 02:00

## 2019-11-12 RX ADMIN — FOLIC ACID SCH MG: 1 TABLET ORAL at 08:43

## 2019-11-12 RX ADMIN — ROFLUMILAST SCH MCG: 500 TABLET ORAL at 08:43

## 2019-11-12 RX ADMIN — ENOXAPARIN SODIUM SCH MG: 40 INJECTION SUBCUTANEOUS at 08:42

## 2019-11-12 RX ADMIN — Medication SCH ML: at 08:44

## 2019-11-12 RX ADMIN — POTASSIUM & SODIUM PHOSPHATES POWDER PACK 280-160-250 MG SCH PKT: 280-160-250 PACK at 12:47

## 2019-11-12 RX ADMIN — AZITHROMYCIN SCH MG: 250 TABLET, FILM COATED ORAL at 08:43

## 2019-11-12 RX ADMIN — ALBUTEROL SULFATE SCH MG: 2.5 SOLUTION RESPIRATORY (INHALATION) at 02:00

## 2019-11-12 RX ADMIN — ARFORMOTEROL TARTRATE SCH MCG: 15 SOLUTION RESPIRATORY (INHALATION) at 20:00

## 2019-11-12 NOTE — P.PN
Subjective


Date of Service: 11/12/19


Chief Complaint: Respiratory failure


Patient is improving slowly his arterial blood gases have improved no was 

rounding this morning patient was fast asleep





Review of Systems


is unable to be obtained





Physical Examination





- Vital Signs


Temperature: 98.4 F


Blood Pressure: 128/88


Pulse: 97


Respirations: 15


Pulse Ox (%): 93





- Physical Exam


General: Other (Patient is sleeping)


Respiratory: Clear to auscultation bilaterally, Diminished


Cardiovascular: No edema, Regular rate/rhythm





Assessment & Plan





- Problems (Diagnosis)


(1) COPD with acute exacerbation


Current Visit: No   Status: Acute   


Plan: 


Admitted with respiratory failure improving slowly maximum bronchodilator 

therapy oxygenation has improved titrate sat to 90% repeat another arterial 

blood gas currently on BiPAP white count mildly elevated chemistries 

unremarkable

## 2019-11-12 NOTE — PN
Date of Progress Note:  11/12/2019



Subjective:  Patient seen and examined.  Chart reviewed and case discussed with 
RN.  Patient is doing well this morning, still on BiPAP, was weaned down to non-
rebreather, however.  After breathing treatment, the patient went into the 130s 
to 140s heart rate, became tachypneic, and was turned up to 80% FiO2 on the 
BiPAP.  Did receive some Ativan, improved shortly thereafter.  Albuterol will 
be switched to Xopenex.



Medications List:  Reviewed.



Physical Examination:

Vital Signs:  Temperature 98.4, heart rate 97, blood pressure 128/88, 
respirations 15, O2 93%.  Vital signs during the acute episode showed 
tachycardia and tachypnea. 

General:  Awake, alert, and oriented x3.  Some mild respiratory distress.  Ill-
appearing male, appears older than stated age. 

CV:  S1, S2.  Sinus tachycardia.  Peripheral pulses present. 

Respiratory:  Diminished breath sounds.  Wheezing heard.  No stridor.  Patient 
is tachypneic with use of accessory muscles. 

Gastrointestinal:  Abdomen is soft, nontender, nondistended.  Positive bowel 
sounds. 

Extremities:  No clubbing, cyanosis, or edema. 

Neurologic:  Nonfocal.



Laboratory Data:  Sodium 135, potassium 4.3, chloride 100, CO2 of 30, BUN 15, 
creatinine 0.72, glucose 167, calcium 8.4, phosphorus 2.2, magnesium 2.  WBC 
11.2, H and H of 13.3 and 38.8, platelets 150, neutrophils 80%.  Blood cultures
, no growth to date.



Assessment And Plan:  A 59-year-old male with:

1.   Acute respiratory failure with hypoxia and hypercapnia secondary to 
chronic obstructive pulmonary disease.  ABGs are improving.  We will try to 
wean off BiPAP, had to be turned up to 80% while the patient was tachycardic 
after breathing treatment, now back down to 50%, still was very tachypneic with 
use of accessory muscles.  Patient is agreeable to intubation as a very last 
resort.  He understands that if his respiratory status declines any further, he 
may need to be intubated.

2.   Acute chronic obstructive pulmonary disease exacerbation.  Patient on oral 
steroids.  Continue ipratropium.  We will switch albuterol to Xopenex to avoid 
tachycardia.  Appreciate Dr. Kay's input.  No growth from cultures.

3.   Small left pleural effusion.  We will continue to monitor.  Patient has 
normal EF on echocardiogram.

4.   Alcoholic liver cirrhosis without ascites, chronic.

5.   Noncompliance.  Counseled.  Patient is able to tolerate BiPAP better and 
keeping it on.  Repeat ABG.  Wean off BiPAP.  Likely discharge in the next 48 
hours or so depending on clinical response.  Continue to monitor in ICU setting.

6.   Sinus tachycardia. Likely worsening from side effect of albuterol nebs. 
Switch to xopenex. 





/MELVI

DD:  11/12/2019 11:42:15   Voice ID:  [id number]

DT:  11/12/2019 15:47:54   Report ID:  [id number]

[email or web address]

## 2019-11-12 NOTE — EKG
Test Date:    2019-11-12               Test Time:    09:46:15

Technician:   KIERRA                                     

                                                     

MEASUREMENT RESULTS:                                       

Intervals:                                           

Rate:         135                                    

PA:           150                                    

QRSD:         96                                     

QT:           282                                    

QTc:          423                                    

Axis:                                                

P:            86                                     

PA:           150                                    

QRS:          37                                     

T:            97                                     

                                                     

INTERPRETIVE STATEMENTS:                                       

                                                     

Sinus tachycardia with fusion complexes

Nonspecific T wave abnormality

Abnormal ECG

Compared to ECG 11/08/2019 08:52:21

Fusion complex(es) now present

T-wave abnormality now present

Atrial premature complex(es) no longer present

ST (T wave) deviation no longer present



Electronically Signed On 11-12-19 11:52:40 CST by Drew Stanley

## 2019-11-13 LAB
BUN BLD-MCNC: 18 MG/DL (ref 7–18)
GLUCOSE SERPLBLD-MCNC: 154 MG/DL (ref 74–106)
HCT VFR BLD CALC: 39.5 % (ref 39.6–49)
LYMPHOCYTES # SPEC AUTO: 0.9 K/UL (ref 0.7–4.9)
PMV BLD: 7.8 FL (ref 7.6–11.3)
POTASSIUM SERPL-SCNC: 4.2 MMOL/L (ref 3.5–5.1)
RBC # BLD: 4.37 M/UL (ref 4.33–5.43)

## 2019-11-13 RX ADMIN — RANITIDINE HYDROCHLORIDE SCH MG: 150 TABLET, FILM COATED ORAL at 11:36

## 2019-11-13 RX ADMIN — FOLIC ACID SCH MG: 1 TABLET ORAL at 08:33

## 2019-11-13 RX ADMIN — ENOXAPARIN SODIUM SCH MG: 40 INJECTION SUBCUTANEOUS at 08:33

## 2019-11-13 RX ADMIN — IPRATROPIUM BROMIDE SCH MG: 0.5 SOLUTION RESPIRATORY (INHALATION) at 13:40

## 2019-11-13 RX ADMIN — Medication SCH ML: at 20:18

## 2019-11-13 RX ADMIN — ARFORMOTEROL TARTRATE SCH MCG: 15 SOLUTION RESPIRATORY (INHALATION) at 19:50

## 2019-11-13 RX ADMIN — AZITHROMYCIN SCH MG: 250 TABLET, FILM COATED ORAL at 08:32

## 2019-11-13 RX ADMIN — IPRATROPIUM BROMIDE SCH MG: 0.5 SOLUTION RESPIRATORY (INHALATION) at 19:50

## 2019-11-13 RX ADMIN — IPRATROPIUM BROMIDE SCH MG: 0.5 SOLUTION RESPIRATORY (INHALATION) at 07:58

## 2019-11-13 RX ADMIN — IPRATROPIUM BROMIDE SCH MG: 0.5 SOLUTION RESPIRATORY (INHALATION) at 02:00

## 2019-11-13 RX ADMIN — Medication SCH ML: at 08:33

## 2019-11-13 RX ADMIN — FUROSEMIDE SCH MG: 40 TABLET ORAL at 08:33

## 2019-11-13 RX ADMIN — ROFLUMILAST SCH MCG: 500 TABLET ORAL at 08:33

## 2019-11-13 RX ADMIN — RANITIDINE HYDROCHLORIDE SCH MG: 150 TABLET, FILM COATED ORAL at 11:39

## 2019-11-13 RX ADMIN — SPIRONOLACTONE SCH MG: 100 TABLET, FILM COATED ORAL at 08:32

## 2019-11-13 RX ADMIN — ARFORMOTEROL TARTRATE SCH MCG: 15 SOLUTION RESPIRATORY (INHALATION) at 07:58

## 2019-11-13 NOTE — P.PN
Subjective


Date of Service: 11/13/19


Chief Complaint: Respiratory failure


Patient has improved since the nebulized the schedule albuterol was changed to 

Xopenex he is doing better





Review of Systems


General: Weakness


Respiratory: Shortness of Breath





Physical Examination





- Vital Signs


Temperature: 98.7 F


Blood Pressure: 123/74


Pulse: 111


Respirations: 18


Pulse Ox (%): 93





- Physical Exam


General: Alert, In no apparent distress, Oriented x3


Neck: Supple


Respiratory: Clear to auscultation bilaterally, Diminished


Cardiovascular: No edema, Regular rate/rhythm, Normal S1 S2





- Studies


Microbiology Data (last 24 hrs): 








11/08/19 09:16   Blood  - Blood   Aerobic Blood Culture - Final


                            No growth in 5 days.


11/08/19 09:16   Blood  - Blood   Anaerobic Blood Culture - Final


11/08/19 09:00   Blood  - Blood   Aerobic Blood Culture - Final


                            No growth in 5 days.


11/08/19 09:00   Blood  - Blood   Anaerobic Blood Culture - Final


                            No growth in 5 days.








Assessment & Plan





- Problems (Diagnosis)


(1) COPD with acute exacerbation


Current Visit: No   Status: Acute   


Plan: 


Patient admitted with respiratory failure is currently improving off nebulize 

albuterol patient is hypoxic is not retaining CO2 no change in current 

medication titrate sat to 90%

## 2019-11-13 NOTE — PN
Date of Progress Note:  11/13/2019



Subjective:  Patient seen and examined.  Chart reviewed and case discussed with 
RN and Dr. Kay.  Patient is doing better.  Heart rate under better control
, yesterday had an episode of elevated heart rate and sinus tachycardia after 
breathing treatment, now improved with being switched over to Xopenex. 



Medications list reviewed.



Physical Examination:

Vital Signs:  Temperature 98.7, heart rate 111, blood pressure 123/74, 
respirations 18, O2 98% on BiPAP, 50% FiO2. 

General:  Awake, alert, oriented x3, ill-appearing male in some mild 
respiratory distress. 

CV:  S1, S2.  Sinus tachycardia.  Peripheral pulses present. 

Respiratory:  Diminished breath sounds, some wheezing heard throughout.  No 
stridor.  No use of accessory muscles. 

Gastrointestinal:  Abdomen is soft, nontender, nondistended.  Positive bowel 
sounds.  No guarding or rigidity. 

Extremities:  No clubbing, cyanosis, or edema. 

Neurologic:  Nonfocal. 

Skin:  No rashes.  Normal skin turgor.



Laboratory Data:  Sodium 134, potassium 4.2, chloride 97, CO2 of 30, BUN 18, 
creatinine 0.86, glucose 154, calcium 8.6.  WBC 10.2, H and H 13.9 and 39.5, 
platelets 153, neutrophils 80%.  Blood cultures, no growth to date.  Final 
sputum cultures pending.



Assessment And Plan:  A 59-year-old male with.

1.   Acute respiratory failure with hypoxia and hypercapnia secondary to 
chronic obstructive pulmonary disease, improving.  We will continue to wean off 
BiPAP.  Appreciate Dr. Kay's input.

2.   Acute chronic obstructive pulmonary disease exacerbation.  Continue 
steroids, breathing treatments, has been switched over to Xopenex.  Cultures 
are negative to date, improving, still requiring BiPAP.

3.   Small left pleural effusion, improving.

4.   Alcoholic liver cirrhosis without ascites, chronic.

5.   Deep vein thrombosis prophylaxis with Lovenox.



Plan:  Step down from ICU.  Continue azithromycin and Aldactone as well as 
Lasix.  Continue with continuous pulse oximetry monitoring, likely discharge in 
the next 24 to 48 hours once off BiPAP.





SA/MODL

DD:  11/13/2019 10:36:40   Voice ID:  [id number]

DT:  11/13/2019 15:17:20   Report ID:  [id number]

TAMIKO

## 2019-11-14 LAB
BLD SMEAR INTERP: (no result)
BUN BLD-MCNC: 20 MG/DL (ref 7–18)
GLUCOSE SERPLBLD-MCNC: 155 MG/DL (ref 74–106)
HCT VFR BLD CALC: 41.3 % (ref 39.6–49)
LYMPHOCYTES # SPEC AUTO: 1 K/UL (ref 0.7–4.9)
MORPHOLOGY BLD-IMP: (no result)
PMV BLD: 7.7 FL (ref 7.6–11.3)
POTASSIUM SERPL-SCNC: 3.8 MMOL/L (ref 3.5–5.1)
RBC # BLD: 4.57 M/UL (ref 4.33–5.43)

## 2019-11-14 RX ADMIN — IPRATROPIUM BROMIDE SCH MG: 0.5 SOLUTION RESPIRATORY (INHALATION) at 14:05

## 2019-11-14 RX ADMIN — AZITHROMYCIN SCH MG: 250 TABLET, FILM COATED ORAL at 09:35

## 2019-11-14 RX ADMIN — Medication SCH ML: at 09:37

## 2019-11-14 RX ADMIN — FOLIC ACID SCH MG: 1 TABLET ORAL at 09:35

## 2019-11-14 RX ADMIN — ARFORMOTEROL TARTRATE SCH MCG: 15 SOLUTION RESPIRATORY (INHALATION) at 20:00

## 2019-11-14 RX ADMIN — SPIRONOLACTONE SCH: 100 TABLET, FILM COATED ORAL at 09:00

## 2019-11-14 RX ADMIN — IPRATROPIUM BROMIDE SCH MG: 0.5 SOLUTION RESPIRATORY (INHALATION) at 02:00

## 2019-11-14 RX ADMIN — IPRATROPIUM BROMIDE SCH MG: 0.5 SOLUTION RESPIRATORY (INHALATION) at 08:00

## 2019-11-14 RX ADMIN — ARFORMOTEROL TARTRATE SCH MCG: 15 SOLUTION RESPIRATORY (INHALATION) at 08:00

## 2019-11-14 RX ADMIN — ROFLUMILAST SCH MCG: 500 TABLET ORAL at 09:35

## 2019-11-14 RX ADMIN — Medication SCH ML: at 20:44

## 2019-11-14 RX ADMIN — ENOXAPARIN SODIUM SCH MG: 40 INJECTION SUBCUTANEOUS at 09:36

## 2019-11-14 RX ADMIN — ACETAMINOPHEN PRN MG: 500 TABLET, FILM COATED ORAL at 20:54

## 2019-11-14 RX ADMIN — FUROSEMIDE SCH MG: 40 TABLET ORAL at 09:33

## 2019-11-14 RX ADMIN — RANITIDINE HYDROCHLORIDE SCH MG: 150 TABLET, FILM COATED ORAL at 21:14

## 2019-11-14 RX ADMIN — IPRATROPIUM BROMIDE SCH MG: 0.5 SOLUTION RESPIRATORY (INHALATION) at 20:00

## 2019-11-14 NOTE — PN
Date of Progress Note:  11/14/2019



Subjective:  Patient is seen and examined.  Chart reviewed and case discussed with RN.  Patient was s
tepped down from the ICU yesterday, doing well.  Has been off BiPAP for over 24 hours.  Medications l
ist reviewed.



Physical Examination:

Vital Signs:  Temperature 97.7, heart rate 101, blood pressure 97/65, respirations 20, O2 95% via Kirill
turi mask. 

General:  Awake, alert, oriented x3.  Appears older than stated age, in some mild respiratory distres
s. 

CV:  S1, S2.  Sinus tachycardia.  Peripheral pulses present. 

Respiratory:  Diminished breath sounds, wheezing heard.  No stridor.  No use of accessory muscles. 

Gastrointestinal:  Abdomen is soft, nontender, nondistended.  Positive bowel sounds. 

Extremities:  No clubbing, cyanosis, or edema. 

Neurologic:  Nonfocal.



Laboratory Data:  Sodium 135, potassium 3.8, chloride 99, CO2 of 30, BUN 20, creatinine 1.15, glucose
 155, calcium 8.6, phosphorus 2.9.  WBC 11.2, H and H 14.3 and 41.3, platelets 156, neutrophils 74%. 
 Cultures are negative.



Assessment:  59-year-old male with:

1.Acute respiratory failure with hypoxia and hypercapnia secondary to chronic obstructive pulmonary 
disease, improving off BiPAP, currently on Venturi mask.  Pulmonology on board.  Appreciate Dr. Valencia fatima's input.  Continue to wean as tolerated.

2.Acute chronic obstructive pulmonary disease exacerbation.  Continue breathing treatments.  Patient
 is now on Xopenex.  Continue oral steroids.  Cultures are negative.

3.Small left pleural effusion.

4.Alcoholic liver cirrhosis without ascites, chronic.

5.Deep venous thrombosis prophylaxis with Lovenox.



Plan:  Up to chair at bedside.  Continue to wean as tolerated.  We will follow up with Pulmonology re
commendations.  Likely discharge in the next 24 to 48 hours depending on clinical response.





SA/MODL

DD:  11/14/2019 13:54:04Voice ID:  [id number]

DT:  11/14/2019 18:35:39Report ID:  [id number]

## 2019-11-14 NOTE — P.PN
Subjective


Date of Service: 11/14/19


Chief Complaint: Respiratory failure


Improving. still requiring high conc of O2. Feeling better





Review of Systems


General: Weakness


Respiratory: Shortness of Breath





Physical Examination





- Vital Signs


Temperature: 97.6 F


Blood Pressure: 112/63


Pulse: 102


Respirations: 20


Pulse Ox (%): 95





- Physical Exam


General: Alert, Oriented x3


Respiratory: Clear to auscultation bilaterally, Diminished


Cardiovascular: No edema, Regular rate/rhythm





- Studies


Microbiology Data (last 24 hrs): 








11/08/19 09:16   Blood  - Blood   Aerobic Blood Culture - Final


                            No growth in 5 days.


11/08/19 09:16   Blood  - Blood   Anaerobic Blood Culture - Final


11/08/19 09:00   Blood  - Blood   Aerobic Blood Culture - Final


                            No growth in 5 days.


11/08/19 09:00   Blood  - Blood   Anaerobic Blood Culture - Final


                            No growth in 5 days.








Assessment & Plan





- Problems (Diagnosis)


(1) COPD with acute exacerbation


Current Visit: No   Status: Acute   


Plan: 


Resp failure. Requiring high conc of O2. Feeling better. Bedside PT. Titrate O2 

sat 88-90%

## 2019-11-15 VITALS — TEMPERATURE: 97.6 F | DIASTOLIC BLOOD PRESSURE: 75 MMHG | SYSTOLIC BLOOD PRESSURE: 128 MMHG

## 2019-11-15 VITALS — OXYGEN SATURATION: 88 %

## 2019-11-15 LAB
BUN BLD-MCNC: 19 MG/DL (ref 7–18)
GLUCOSE SERPLBLD-MCNC: 143 MG/DL (ref 74–106)
POTASSIUM SERPL-SCNC: 3.9 MMOL/L (ref 3.5–5.1)

## 2019-11-15 RX ADMIN — FUROSEMIDE SCH MG: 40 TABLET ORAL at 09:25

## 2019-11-15 RX ADMIN — IPRATROPIUM BROMIDE SCH MG: 0.5 SOLUTION RESPIRATORY (INHALATION) at 10:29

## 2019-11-15 RX ADMIN — ARFORMOTEROL TARTRATE SCH MCG: 15 SOLUTION RESPIRATORY (INHALATION) at 10:29

## 2019-11-15 RX ADMIN — FOLIC ACID SCH MG: 1 TABLET ORAL at 09:24

## 2019-11-15 RX ADMIN — Medication SCH ML: at 09:25

## 2019-11-15 RX ADMIN — ROFLUMILAST SCH MCG: 500 TABLET ORAL at 09:23

## 2019-11-15 RX ADMIN — SPIRONOLACTONE SCH MG: 100 TABLET, FILM COATED ORAL at 09:24

## 2019-11-15 RX ADMIN — IPRATROPIUM BROMIDE SCH: 0.5 SOLUTION RESPIRATORY (INHALATION) at 01:44

## 2019-11-15 RX ADMIN — AZITHROMYCIN SCH MG: 250 TABLET, FILM COATED ORAL at 09:23

## 2019-11-15 RX ADMIN — RANITIDINE HYDROCHLORIDE SCH MG: 150 TABLET, FILM COATED ORAL at 09:24

## 2019-11-15 RX ADMIN — ENOXAPARIN SODIUM SCH MG: 40 INJECTION SUBCUTANEOUS at 09:25

## 2019-11-15 NOTE — P.PN
Subjective


Date of Service: 11/15/19


Chief Complaint: COPD exacerbation


Patient's condition is improving he requests to go home.  On 4 L he is deciding 

on 88-90% denies any fever chills sputum culture positive for strep toe monos





Review of Systems


Respiratory: Shortness of Breath





Physical Examination





- Vital Signs


Temperature: 97.7 F


Blood Pressure: 124/73


Pulse: 99


Respirations: 24


Pulse Ox (%): 88





- Physical Exam


General: Alert, In no apparent distress, Oriented x3


Neck: No Thyromegaly


Respiratory: Diminished


Cardiovascular: No edema, Regular rate/rhythm





Assessment & Plan





- Problems (Diagnosis)


(1) COPD with acute exacerbation


Current Visit: No   Status: Acute   


Plan: 


Patient admitted with COPD exacerbation he has improved patient really wants to 

go home and be discharged home on 2-4 L of nasal cannula oxygen Daliresp, low-

dose prednisone Dc albuterol change him over to ipratropium nebulizer continue 

with Spiriva he has S. maltophilia in his sputum.  Sensitive to Bactrim


.  Resume is diuretics plan to follow up with me in 1-2 weeks does not qualify 

for a BiPAP patient is not hypercapnic

## 2019-11-16 NOTE — DS
Date of Discharge:  11/15/2019



Consultants:  Dr. Kay with Pulmonology.



Admitting Diagnoses:  

1.Acute respiratory failure with hypoxemia.

2.Chronic obstructive pulmonary disease with acute exacerbation.

3.Pleural effusion.

4.Alcoholic liver cirrhosis.



Discharge Diagnoses:  

1.Acute respiratory failure with hypoxia and hypercapnia secondary to chronic obstructive pulmonary 
disease, improved.

2.Acute chronic obstructive pulmonary disease exacerbation, improving.

3.Small left pleural effusion.

4.Alcoholic liver cirrhosis without ascites, chronic.



Hospital Course:  Patient is a 59-year-old male with past medical history of COPD, alcoholic liver ci
rrhosis, comes in with COPD exacerbation, pleural effusion, which was drained previously at an [facility] facility.  Patient was in acute respiratory distress, desaturating 60% to 70%, tachypneic, tachycar
dic.  Patient was admitted to the hospital for further evaluation.  His CT angio chest was negative f
or PE, it did show a 10 x 8 x 4 cm cavity in the posterior lower left lung, pleural space, possibly i
nfiltrate, atelectasis, or combination.  Patient was also seen by Pulmonology, Dr. Kay.  Patient
 initially required BiPAP and was difficult to wean.  He also became very tachycardic.  Echocardiogra
m was checked, which showed EF of 60% to 69%.  Patient's repeat imaging did show some improvement, ho
wever the small left pleural effusion was unchanged.  Patient had some mild electrolyte abnormalities
, which were corrected including potassium, magnesium, and phosphorus.  His white count remained stab
le.  He did not show any signs of sepsis.  Patient had blood cultures that were negative.  His sputum
 cultures grew out Stenotrophomonas maltophilia, which was sensitive to Bactrim.  The patient's medic
ations were adjusted.  He was switched over to Xopenex due to the sinus tachycardia, this started on 
steroids and Daliresp.  He also had significant amount of anxiety, which responded to benzodiazepines
.  For long-term anxiety, he will be on BuSpar.  Patient was then able to be weaned off the BiPAP to 
Venturi mask and then subsequently to 4 L of nasal cannula.  He was still desaturating and was around
 86% to 87% on 4 L.  However, he was stable for discharge.  He was cleared for discharge from Pulmono
logy standpoint.  His wheezing had significantly improved.  He was able to go to the bathroom without
 significant dyspnea and ambulating well.  Patient was then discharged home in a stable condition.



Activity:  No strenuous activity.



Diet:  Low-sodium, 1500 mL fluid restriction.



Followup:  Follow up with primary care physician in 2-3 days.  Follow up with pulmonologist, Dr. Elmo garrett in 2 weeks.  Return to ER for worsening condition.



Medications:  As per medication reconciliation list.



Physical Examination:

General:  Awake, alert, oriented x3.  Appears older than stated age. 

CV:  S1, S2.  Sinus tachycardia. 

Respiratory:  Diminished breath sounds at the left base.  Minimal wheezing. 

Gastrointestinal:  Abdomen is soft, nontender, nondistended.  Positive bowel sounds. 

Extremities:  No clubbing, cyanosis, or edema. 

Neurologic:  Nonfocal. 



Total time spent discharging the patient was 41 minutes.





/MELVI

DD:  11/15/2019 12:26:04Voice ID:  [id number]

DT:  11/16/2019 03:57:03Report ID:  [id number]

## 2019-11-25 ENCOUNTER — HOSPITAL ENCOUNTER (EMERGENCY)
Dept: HOSPITAL 97 - ER | Age: 59
Discharge: LEFT BEFORE DISCHARGE | End: 2019-11-25
Payer: MEDICARE

## 2019-11-25 VITALS — TEMPERATURE: 97.6 F

## 2019-11-25 VITALS — SYSTOLIC BLOOD PRESSURE: 146 MMHG | DIASTOLIC BLOOD PRESSURE: 86 MMHG

## 2019-11-25 VITALS — OXYGEN SATURATION: 97 %

## 2019-11-25 DIAGNOSIS — R42: ICD-10-CM

## 2019-11-25 DIAGNOSIS — K74.60: ICD-10-CM

## 2019-11-25 DIAGNOSIS — R00.0: ICD-10-CM

## 2019-11-25 DIAGNOSIS — E72.20: Primary | ICD-10-CM

## 2019-11-25 DIAGNOSIS — J44.9: ICD-10-CM

## 2019-11-25 LAB
ALBUMIN SERPL BCP-MCNC: 3.2 G/DL (ref 3.4–5)
ALP SERPL-CCNC: 119 U/L (ref 45–117)
ALT SERPL W P-5'-P-CCNC: 35 U/L (ref 12–78)
ANISOCYTOSIS BLD QL: (no result)
AST SERPL W P-5'-P-CCNC: 22 U/L (ref 15–37)
BLD SMEAR INTERP: (no result)
BUN BLD-MCNC: 16 MG/DL (ref 7–18)
GLUCOSE SERPLBLD-MCNC: 236 MG/DL (ref 74–106)
HCT VFR BLD CALC: 43.4 % (ref 39.6–49)
INR BLD: 1.23
LYMPHOCYTES # SPEC AUTO: 0.5 K/UL (ref 0.7–4.9)
MORPHOLOGY BLD-IMP: (no result)
PMV BLD: 7.9 FL (ref 7.6–11.3)
POTASSIUM SERPL-SCNC: 4.3 MMOL/L (ref 3.5–5.1)
RBC # BLD: 4.75 M/UL (ref 4.33–5.43)

## 2019-11-25 PROCEDURE — 85610 PROTHROMBIN TIME: CPT

## 2019-11-25 PROCEDURE — 80329 ANALGESICS NON-OPIOID 1 OR 2: CPT

## 2019-11-25 PROCEDURE — 82947 ASSAY GLUCOSE BLOOD QUANT: CPT

## 2019-11-25 PROCEDURE — 80320 DRUG SCREEN QUANTALCOHOLS: CPT

## 2019-11-25 PROCEDURE — 71045 X-RAY EXAM CHEST 1 VIEW: CPT

## 2019-11-25 PROCEDURE — 80048 BASIC METABOLIC PNL TOTAL CA: CPT

## 2019-11-25 PROCEDURE — 85025 COMPLETE CBC W/AUTO DIFF WBC: CPT

## 2019-11-25 PROCEDURE — 82140 ASSAY OF AMMONIA: CPT

## 2019-11-25 PROCEDURE — 36415 COLL VENOUS BLD VENIPUNCTURE: CPT

## 2019-11-25 PROCEDURE — 99285 EMERGENCY DEPT VISIT HI MDM: CPT

## 2019-11-25 PROCEDURE — 80076 HEPATIC FUNCTION PANEL: CPT

## 2019-11-25 PROCEDURE — 93005 ELECTROCARDIOGRAM TRACING: CPT

## 2019-11-25 PROCEDURE — 96360 HYDRATION IV INFUSION INIT: CPT

## 2019-11-25 PROCEDURE — 96361 HYDRATE IV INFUSION ADD-ON: CPT

## 2019-11-25 NOTE — RAD REPORT
EXAM DESCRIPTION:  RAD - Chest Single View - 11/25/2019 8:27 am

 

CLINICAL HISTORY:  Altered mental status, weakness, shortness of breath, cirrhosis and COPD history

 

COMPARISON:  November 11

 

TECHNIQUE:  AP portable chest image was obtained 0822 hours .

 

FINDINGS:  Diaphragm is flattened. Pleural fluid or pleural thickening changes are present in the lef
t lung base similar to comparison. Interstitial pattern is not significantly different. No peripheral
 mass or consolidation. Heart size and upper lobe vasculature within normal limits. Trachea is midlin
e. No pneumothorax. No acute bony abnormality seen. No acute aortic findings suspected.

 

IMPRESSION:  COPD changes are present similar to comparison.

 

Left costophrenic angle blunting has not changed since November 11 and could be pleural fluid or 
benjamin left base pleural changes.

## 2019-11-25 NOTE — EKG
Test Date:    2019-11-25               Test Time:    08:21:17

Technician:   DHARA                                     

                                                     

MEASUREMENT RESULTS:                                       

Intervals:                                           

Rate:         110                                    

AR:           154                                    

QRSD:         74                                     

QT:           344                                    

QTc:          465                                    

Axis:                                                

P:            33                                     

AR:           154                                    

QRS:          1                                      

T:            72                                     

                                                     

INTERPRETIVE STATEMENTS:                                       

                                                     

Sinus tachycardia

Low voltage QRS

Septal infarct, age undetermined

Abnormal ECG

Compared to ECG 11/12/2019 09:46:15

Low QRS voltage now present

Myocardial infarct finding now present

Fusion complex(es) no longer present

T-wave abnormality no longer present



Electronically Signed On 11-25-19 09:31:40 CST by Otis Nichols

## 2019-11-25 NOTE — XMS REPORT
Patient Summary Document

 Created on:2019



Patient:ANAYELI HUDSON

Sex:Male

:1960

External Reference #:[id number]





Demographics







 Address  [address] [place] [address]

 

 Home Phone  [phone]

 

 Email Address  NONE

 

 Preferred Language  Unknown

 

 Marital Status  Unknown

 

 Jewish Affiliation  Unknown

 

 Race  Unknown

 

 Additional Race(s)  Unavailable

 

 Ethnic Group  Unknown









Author







 Organization  [facility]

 

 Address  [facility] [address] [place] [id number]

 

 Phone  [phone]









Care Team Providers







 Name  Role  Phone

 

 ELVIN HUGO  Unavailable  Unavailable









Problems

This patient has no known problems.



Allergies, Adverse Reactions, Alerts

This patient has no known allergies or adverse reactions.



Medications

This patient has no known medications.



Results







 Test Description  Test Time  Test Comments  Text Results  Atomic Results  
Result Comments









 (CELLAVISION MANUAL DIFF)  2019 09:14:00    









   Test Item  Value  Reference Range  Comments









 NEUTROPHILS - REL (CELLAVISION)(BEAKER) (test code=2816)  81 %    

 

 LYMPHOCYTES - REL (CELLAVISION)(BEAKER) (test code=2817)  7 %    

 

 MONOCYTES - REL (CELLAVISION)(BEAKER) (test code=2818)  7 %    

 

 EOSINOPHILS - REL (CELLAVISION)(BEAKER) (test code=2819)  2 %    

 

 MYELOCYTES - REL (CELLAVISION)(BEAKER) (test code=2822)  2 %  0-0  

 

 ATYPICAL LYMPHOCYTES - REL (CELLAVISION)(BEAKER) (test  1 %  0-0  



 code=2829)      

 

 NEUTROPHILS - ABS (CELLAVISION)(BEAKER) (test code=2830)  10.85 K/ul  1.78-
5.38  

 

 LYMPHOCYTES - ABS (CELLAVISION)(BEAKER) (test code=2831)  0.94 K/ul  1.32-3.57
  

 

 MONOCYTES - ABS (CELLAVISION)(BEAKER) (test code=2832)  0.94 K/uL  0.30-0.82  

 

 EOSINOPHILS - ABS (CELLAVISION)(BEAKER) (test code=2834)  0.27 K/uL  0.04-0.54
  

 

 MYELOCYTES-ABS (CELLAVISION)(BEAKER) (test code=2837)  0.27 K/uL  0.00-0.00  

 

 ATYPICAL LYMPHOCYTES - ABS (CELLAVISION)(BEAKER) (test  0.13 K/uL  0.00-0.00  



 code=2858)      

 

 TOTAL COUNTED (BEAKER) (test code=1351)  100    

 

 RBC MORPHOLOGY (BEAKER) (test code=762)  Normal    

 

 SMUDGE CELLS (BEAKER) (test code=1371)  Present    

 

 GIANT PLATELETS (BEAKER) (test code=313)  Present    

 

 PLATELET CONCENTRATION (CELLAVISION)(BEAKER) (test code=3438)  Decreased    



Received comment: User comments: Slide comments:RAD, CHEST, 1 VIEW, NON [id number]
-09-25 08:25:00Reason for exam:-&gt;left effusionShould this be performed at 
the bedside?-&gt;YesFINAL REPORT PATIENT ID:   [id number] Chest AP portable 
Comparison exam: 2019 History provided: Follow-up pleural effusion Left 
chest tube unchanged in place. No pneumothorax. No significant effusions are 
evident. Nonspecific coarsening of markings in the left lower lobe. PICC line 
in good position. Signed: Lobo Morris MDReport Verified Date/Time:  2019 
08:25:12 Reading Location: [facility] Radiology Reading Room        
Electronically signed by: LOBO MORRIS on 2019 08:25 AMCOMPREHENSIVE 
METABOLIC [id number] 06:40:00





 Test Item  Value  Reference Range  Comments

 

 TOTAL PROTEIN (BEAKER)  4.8 gm/dL  6.0-8.3  



 (test code=770)      

 

 ALBUMIN (BEAKER) (test  2.4 g/dL  3.5-5.0  



 code=1145)      

 

 ALKALINE PHOSPHATASE  141 U/L    



 (BEAKER) (test code=346)      

 

 BILIRUBIN TOTAL (BEAKER)  3.9 mg/dL  0.2-1.2  



 (test code=377)      

 

 SODIUM (BEAKER) (test  123 meq/L  136-145  



 nhla=078)      

 

 POTASSIUM (BEAKER) (test  3.6 meq/L  3.5-5.1  



 code=379)      

 

 CHLORIDE (BEAKER) (test  90 meq/L    



 code=382)      

 

 CO2 (BEAKER) (test  29 meq/L  22-29  



 code=355)      

 

 BLOOD UREA NITROGEN  14 mg/dL  7-21  



 (BEAKER) (test code=354)      

 

 CREATININE (BEAKER) (test  0.80 mg/dL  0.57-1.25  



 code=358)      

 

 GLUCOSE RANDOM (BEAKER)  152 mg/dL    



 (test code=652)      

 

 CALCIUM (BEAKER) (test  7.3 mg/dL  8.4-10.2  



 code=697)      

 

 AST (SGOT) (BEAKER) (test  39 U/L  5-34  



 code=353)      

 

 ALT (SGPT) (BEAKER) (test  23 U/L  6-55  



 code=347)      

 

 EGFR (BEAKER) (test  99 mL/min/1.73 sq m    ESTIMATED GFR IS NOT AS



 code=1092)      ACCURATE AS CREATININE



       CLEARANCE IN PREDICTING



       GLOMERULAR FILTRATION



       RATE. ESTIMATED GFR IS



       NOT APPLICABLE FOR



       DIALYSIS PATIENTS.



Specimen slightly [id number] 06:39:00





 Test Item  Value  Reference Range  Comments

 

 PHOSPHORUS (BEAKER) (test code=604)  2.1 mg/dL  2.3-4.7  



[id number] 06:39:00





 Test Item  Value  Reference Range  Comments

 

 MAGNESIUM (BEAKER) (test code=627)  1.8 mg/dL  1.6-2.6  



B-TYPE NATRIURETIC FACTOR (BNP)2019 06:04:00





 Test Item  Value  Reference Range  Comments

 

 B-TYPE NATRIURETIC PEPTIDE (BEAKER) (test code=700)  97 pg/mL  0-100  



CBC W/PLT COUNT &amp; AUTO [id number] 05:57:00





 Test Item  Value  Reference Range  Comments

 

 WHITE BLOOD CELL COUNT (BEAKER) (test code=775)  13.4 K/ L  3.5-10.5  

 

 RED BLOOD CELL COUNT (BEAKER) (test code=761)  3.17 M/ L  4.63-6.08  

 

 HEMOGLOBIN (BEAKER) (test code=410)  10.3 GM/DL  13.7-17.5  

 

 HEMATOCRIT (BEAKER) (test code=411)  29.4 %  40.1-51.0  

 

 MEAN CORPUSCULAR VOLUME (BEAKER) (test code=753)  92.7 fL  79.0-92.2  

 

 MEAN CORPUSCULAR HEMOGLOBIN (BEAKER) (test  32.5 pg  25.7-32.2  



 ixek=308)      

 

 MEAN CORPUSCULAR HEMOGLOBIN CONC (BEAKER) (test  35.0 GM/DL  32.3-36.5  



 code=752)      

 

 RED CELL DISTRIBUTION WIDTH (BEAKER) (test  13.9 %  11.6-14.4  



 code=412)      

 

 PLATELET COUNT (BEAKER) (test code=756)  75 K/CU MM  150-450  

 

 MEAN PLATELET VOLUME (BEAKER) (test code=754)  8.9 fL  9.4-12.4  

 

 NUCLEATED RED BLOOD CELLS (BEAKER) (test  0 /100 WBC  0-0  



 code=413)      



CALCIUM, [id number] 05:44:00





 Test Item  Value  Reference Range  Comments

 

 CALCIUM IONIZED (BEAKER) (test code=698)  0.97 mmol/L  1.12-1.27  

 

 PH, BLOOD (BEAKER) (test code=1810)  7.50    



BODY FLUID CULTURE + GRAM [id number] 11:10:00





 Test Item  Value  Reference Range  Comments

 

 CULTURE (BEAKER) (test      <1+ Coagulase negative



 code=1095)      Staphylococcus

 

 CULTURE (BEAKER) (test  COAGULASE NEGATIVE    <1+ Pseudomonas



 code=1095)  STAPHYLOCOCCUS    aeruginosa

 

 Clindamycin (test      



 code=10)      

 

 Erythromycin (test      



 code=4)      

 

 Linezolid (test code=40)      

 

 Oxacillin (test code=14)      

 

 Rifampin (test code=43)      

 

 Tetracycline (test      



 code=2)      

 

 Trimethoprim +      



 Sulfamethoxazole (test      



 code=47)      

 

 Vancomycin (test code=13)      

 

 GRAM STAIN RESULT  3+ WBCs    



 (BEAKER) (test code=1123)      

 

 GRAM STAIN RESULT  <1+ gram positive    



 (BEAKER) (test  cocci in pairs and    



 code=112319)  clusters    



CBC W/PLT COUNT &amp; AUTO [id number] 09:58:00





 Test Item  Value  Reference Range  Comments

 

 WHITE BLOOD CELL COUNT (BEAKER) (test code=775)  10.7 K/ L  3.5-10.5  

 

 RED BLOOD CELL COUNT (BEAKER) (test code=761)  3.15 M/ L  4.63-6.08  

 

 HEMOGLOBIN (BEAKER) (test code=410)  10.2 GM/DL  13.7-17.5  

 

 HEMATOCRIT (BEAKER) (test code=411)  28.9 %  40.1-51.0  

 

 MEAN CORPUSCULAR VOLUME (BEAKER) (test code=753)  91.7 fL  79.0-92.2  

 

 MEAN CORPUSCULAR HEMOGLOBIN (BEAKER) (test  32.4 pg  25.7-32.2  



 ixxk=542)      

 

 MEAN CORPUSCULAR HEMOGLOBIN CONC (BEAKER) (test  35.3 GM/DL  32.3-36.5  



 code=752)      

 

 RED CELL DISTRIBUTION WIDTH (BEAKER) (test  13.7 %  11.6-14.4  



 code=412)      

 

 PLATELET COUNT (BEAKER) (test code=756)  57 K/CU MM  150-450  

 

 MEAN PLATELET VOLUME (BEAKER) (test code=754)  8.8 fL  9.4-12.4  

 

 NUCLEATED RED BLOOD CELLS (BEAKER) (test  0 /100 WBC  0-0  



 code=413)      



(CELLAVISION MANUAL DIFF)2019 09:58:00





 Test Item  Value  Reference Range  Comments

 

 NEUTROPHILS - REL (CELLAVISION)(BEAKER) (test  78 %    



 code=2816)      

 

 LYMPHOCYTES - REL (CELLAVISION)(BEAKER) (test  5 %    



 code=2817)      

 

 MONOCYTES - REL (CELLAVISION)(BEAKER) (test  9 %    



 code=2818)      

 

 EOSINOPHILS - REL (CELLAVISION)(BEAKER) (test  2 %    



 code=2819)      

 

 METAMYELOCYTES - REL (CELLAVISION)(BEAKER) (test  1 %  0-0  



 code=2821)      

 

 BANDS - REL (CELLAVISION)(BEAKER) (test code=2826)  4 %  0-10  

 

 BLASTS - REL (CELLAVISION)(BEAKER) (test  1 %  0-0  



 code=2827)      

 

 NEUTROPHILS - ABS (CELLAVISION)(BEAKER) (test  8.35 K/ul  1.78-5.38  



 code=2830)      

 

 LYMPHOCYTES - ABS (CELLAVISION)(BEAKER) (test  0.54 K/ul  1.32-3.57  



 code=2831)      

 

 MONOCYTES - ABS (CELLAVISION)(BEAKER) (test  0.96 K/uL  0.30-0.82  



 code=2832)      

 

 EOSINOPHILS - ABS (CELLAVISION)(BEAKER) (test  0.21 K/uL  0.04-0.54  



 code=2834)      

 

 METAMYELOCYTES - ABS (CELLAVISION)(BEAKER) (test  0.11 K/uL  0.00-0.00  



 code=2836)      

 

 BANDS - ABS (CELLAVISION)(BEAKER) (test code=2840)  0.43 K/uL  0.00-0.80  

 

 BLASTS - ABS (CELLAVISION)(BEAKER) (test  0.11 K/uL  0.00-0.00  



 code=2845)      

 

 TOTAL COUNTED (BEAKER) (test code=1351)  100    

 

 WBC MORPHOLOGY (BEAKER) (test code=487)  Normal    

 

 PLT MORPHOLOGY (BEAKER) (test code=486)  Normal    

 

 ANISOCYTOSIS (BEAKER) (test code=961)  1+ few    

 

 POIKILOCYTES (BEAKER) (test code=966)  1+ few    

 

 OVALOCYTES (BEAKER) (test code=477)  1+ few    

 

 BASOPHILIC STIPPLING (BEAKER) (test code=473)  Present    

 

 ARTIFACT (CELLAVISION)(BEAKER) (test code=3432)  Present    

 

 PLATELET CONCENTRATION (CELLAVISION)(BEAKER) (test  Decreased    



 code=3438)      



Received comment: User comments: Slide comments: WBC: SEGMENTED WITH TOXIC 
GRANULATIONS [id number] 08:30:00





 Test Item  Value  Reference Range  Comments

 

 PHOSPHORUS (BEAKER) (test code=604)  2.0 mg/dL  2.3-4.7  



RAD, CHEST, 1 VIEW, NON [id number] 08:21:00Reason for exam:-&gt;left 
effusionShould this be performed at the bedside?-&gt;YesFINAL REPORT PATIENT ID
:   [id number]  TECHNIQUE: Frontal chest radiograph dated 2019. CLINICAL 
HISTORY: Left effusion COMPARISON STUDY: Chest radiographs and chest CT both 
dated 2019  IMPRESSION:Right-sided PICC and left-sided chest tube are 
unchanged. No change in small left hydropneumothorax. Multiple rounded 
densities project over the left lower lobe of unclear etiology possibly 
somethingexternal to the patient. Cardiomediastinal silhouette is normal in 
size. No pulmonary edema. No fracture.  Signed: Ann King MDReport 
Verified Date/Time:  2019 08:21:33 Reading Location:[facility] Reading 
Room  Electronically signed by: ANN KING MD on 2019 08:
21 AMCOMPREHENSIVE METABOLIC [id number] 05:34:00





 Test Item  Value  Reference Range  Comments

 

 TOTAL PROTEIN (BEAKER)  4.9 gm/dL  6.0-8.3  



 (test code=770)      

 

 ALBUMIN (BEAKER) (test  2.7 g/dL  3.5-5.0  



 code=1145)      

 

 ALKALINE PHOSPHATASE  137 U/L    



 (BEAKER) (test code=346)      

 

 BILIRUBIN TOTAL (BEAKER)  5.5 mg/dL  0.2-1.2  



 (test code=377)      

 

 SODIUM (BEAKER) (test  123 meq/L  136-145  



 hrxv=273)      

 

 POTASSIUM (BEAKER) (test  3.9 meq/L  3.5-5.1  



 code=379)      

 

 CHLORIDE (BEAKER) (test  89 meq/L    



 code=382)      

 

 CO2 (BEAKER) (test  31 meq/L  22-29  



 code=355)      

 

 BLOOD UREA NITROGEN  15 mg/dL  7-21  



 (BEAKER) (test code=354)      

 

 CREATININE (BEAKER) (test  0.68 mg/dL  0.57-1.25  



 code=358)      

 

 GLUCOSE RANDOM (BEAKER)  104 mg/dL    



 (test code=652)      

 

 CALCIUM (BEAKER) (test  7.7 mg/dL  8.4-10.2  



 code=697)      

 

 AST (SGOT) (BEAKER) (test  47 U/L  5-34  



 code=353)      

 

 ALT (SGPT) (BEAKER) (test  25 U/L  6-55  



 code=347)      

 

 EGFR (BEAKER) (test  119 mL/min/1.73 sq    ESTIMATED GFR IS NOT AS



 code=1092)  m    ACCURATE AS CREATININE



       CLEARANCE IN PREDICTING



       GLOMERULAR FILTRATION



       RATE. ESTIMATED GFR IS



       NOT APPLICABLE FOR



       DIALYSIS PATIENTS.



Specimen moderately [id number] 05:04:00





 Test Item  Value  Reference Range  Comments

 

 MAGNESIUM (BEAKER) (test code=627)  1.8 mg/dL  1.6-2.6  



CT, CHEST, WITH [id number] 15:37:00Reason for exam:-&gt;reevaluate 
pleural effusion and left lung abscessFINAL REPORT PATIENT ID:   [id number]  CT 
of the Chest dated 2019 COMPARISON: 2019 CLINICAL INFORMATION
: Pleural effusionreevaluate pleural effusion and left lung abscess Comment:  
Axial images of the chest were obtained from thoracic inlet to the upper 
abdomen with intravenous contrast.     This exam was performed according to our 
departmental dose-optimization program, which includes automated exposure 
control, adjustment of the mA and/or kV according to patient size and/or use 
ofinteractive reconstruction technique. Heart is normal in size.  There is 
small pericardial effusion.Great vessels are unremarkable. No adenopathy in the 
mediastinum or perihilar region. Trachea and mainstem bronchi are patent.  
Trace bilateral pleural effusion is present. There is interval significant 
decrease in the amount of left pleural effusion. There is small amount of air 
present in the left pleural space. The pigtail catheter is seen in the left 
pleural space. Atelectasis is seen in the lingula, right mid, and both lower 
lobes. Cavitary lesion is seen in the superior segment of the left lower lobe 
measuring approximately 2.8 x 3.3 cm. Visualized upper abdomen demonstrates 
cirrhotic liver with trace ascites. Spleen is minimally enlarged. Impression: 
1. Interval decrease in the amount of left pleural effusion with residual left 
hydropneumothorax.2. Trace right pleural effusion.3. Cavitary lesion in the 
superior segment of the left lower lobe may represent abscess.4. Cirrhosis with 
splenomegaly and ascites. Signed: Lucita Nails MDReport Verified Date/Time:   15:37:27 Reading Location: [facility] C013Y CT Body Reading Room      
Electronically signed by: LUCITA NAILS M.D. on 2019 03:37 
[id number] 13:41:00





 Test Item  Value  Reference Range  Comments

 

 SODIUM (BEAKER) (test code=381)  119 meq/L  136-145  

 

 POTASSIUM (BEAKER) (test code=379)  4.1 meq/L  3.5-5.1  

 

 CHLORIDE (BEAKER) (test code=382)  85 meq/L    

 

 CO2 (BEAKER) (test code=355)  29 meq/L  22-  



Page [phone] with results. Thank you.CBC W/PLT COUNT &amp; AUTO 
[id number] 10:30:00





 Test Item  Value  Reference Range  Comments

 

 WHITE BLOOD CELL COUNT (BEAKER) (test code=775)  11.3 K/ L  3.5-10.5  

 

 RED BLOOD CELL COUNT (BEAKER) (test code=761)  3.25 M/ L  4.63-6.08  

 

 HEMOGLOBIN (BEAKER) (test code=410)  10.6 GM/DL  13.7-17.5  

 

 HEMATOCRIT (BEAKER) (test code=411)  29.8 %  40.1-51.0  

 

 MEAN CORPUSCULAR VOLUME (BEAKER) (test code=753)  91.7 fL  79.0-92.2  

 

 MEAN CORPUSCULAR HEMOGLOBIN (BEAKER) (test  32.6 pg  25.7-32.2  



 sjli=049)      

 

 MEAN CORPUSCULAR HEMOGLOBIN CONC (BEAKER) (test  35.6 GM/DL  32.3-36.5  



 code=752)      

 

 RED CELL DISTRIBUTION WIDTH (BEAKER) (test  13.5 %  11.6-14.4  



 code=412)      

 

 PLATELET COUNT (BEAKER) (test code=756)  51 K/CU MM  150-450  

 

 MEAN PLATELET VOLUME (BEAKER) (test code=754)  8.3 fL  9.4-12.4  

 

 NUCLEATED RED BLOOD CELLS (BEAKER) (test  0 /100 WBC  0-0  



 code=413)      



(CELLAVISION MANUAL DIFF)2019 10:30:00





 Test Item  Value  Reference Range  Comments

 

 NEUTROPHILS - REL (CELLAVISION)(BEAKER) (test  82 %    



 code=2816)      

 

 LYMPHOCYTES - REL (CELLAVISION)(BEAKER) (test  2 %    



 code=2817)      

 

 MONOCYTES - REL (CELLAVISION)(BEAKER) (test  9 %    



 code=2818)      

 

 METAMYELOCYTES - REL (CELLAVISION)(BEAKER) (test  2 %  0-0  



 code=2821)      

 

 PROMYELOCYTES - REL (CELLAVSION)(BEAKER) (test  2 %  0-0  



 code=2825)      

 

 BANDS - REL (CELLAVISION)(BEAKER) (test code=2826)  2 %  0-10  

 

 ATYPICAL LYMPHOCYTES - REL (CELLAVISION)(BEAKER)  1 %  0-0  



 (test code=2829)      

 

 NEUTROPHILS - ABS (CELLAVISION)(BEAKER) (test  9.27 K/ul  1.78-5.38  



 code=2830)      

 

 LYMPHOCYTES - ABS (CELLAVISION)(BEAKER) (test  0.23 K/ul  1.32-3.57  



 code=2831)      

 

 MONOCYTES - ABS (CELLAVISION)(BEAKER) (test  1.02 K/uL  0.30-0.82  



 code=2832)      

 

 METAMYELOCYTES - ABS (CELLAVISION)(BEAKER) (test  0.23 K/uL  0.00-0.00  



 code=2836)      

 

 PROMYELOCYTES - ABS (CELLAVISION)(BEAKER) (test  0.23 K/uL  0.00-0.00  



 code=2838)      

 

 BANDS - ABS (CELLAVISION)(BEAKER) (test code=2840)  0.23 K/uL  0.00-0.80  

 

 ATYPICAL LYMPHOCYTES - ABS (CELLAVISION)(BEAKER)  0.11 K/uL  0.00-0.00  



 (test code=2858)      

 

 TOTAL COUNTED (BEAKER) (test code=1351)  100    

 

 WBC MORPHOLOGY (BEAKER) (test code=487)  Normal    

 

 LARGE PLT(BEAKER) (test code=2156)  Present    

 

 BASOPHILIC STIPPLING (BEAKER) (test code=473)  Present    

 

 ARTIFACT (CELLAVISION)(BEAKER) (test code=3432)  Present    

 

 PLATELET CONCENTRATION (CELLAVISION)(BEAKER) (test  Decreased    



 code=3438)      



Received comment: User comments: Slide comments: WBC: SEGMENTED WITH TOXIC 
GRANULATIONS PRESENTRAD, CHEST, 1 VIEW, NON [id number] 07:48:00Reason for 
exam:-&gt;left effusionShould this be performed at the bedside?-&gt;YesFINAL 
REPORT PATIENT ID:   [id number] RAD, CHEST, 1 VIEW, NON DEPT INDICATION: left 
effusion COMPARISON: Prior day's exam FINDINGS: Portable frontal view of the 
chest.   IMPRESSION:  Support Lines: PICC tip overlies the SVC.  Left pleural 
catheter is again noted. Lungs and pleura: Bibasilar airspace disease is 
unchanged.  Superimposed trace left effusion. Left apical pneumothorax is 
decreased in the interim.    Heart and mediastinum: Stable contours.  
Additional findings: None. Signed: Sasha CrockerMDReport Verified Date/Time:  
2019 07:48:14 Reading Location: [facility] Radiology Reading Room  
Electronically signed by: SASHA CROCKER MD on 2019 07:48 
AMCALCIUM, [id number] 05:58:00





 Test Item  Value  Reference Range  Comments

 

 CALCIUM IONIZED (BEAKER) (test code=698)  0.96 mmol/L  1.12-1.27  

 

 PH, BLOOD (BEAKER) (test code=1810)  7.53    



COMPREHENSIVE METABOLIC [id number] 05:51:00





 Test Item  Value  Reference Range  Comments

 

 TOTAL PROTEIN (BEAKER)  5.2 gm/dL  6.0-8.3  



 (test code=770)      

 

 ALBUMIN (BEAKER) (test  2.6 g/dL  3.5-5.0  



 code=1145)      

 

 ALKALINE PHOSPHATASE  144 U/L    



 (BEAKER) (test code=346)      

 

 BILIRUBIN TOTAL (BEAKER)  6.3 mg/dL  0.2-1.2  



 (test code=377)      

 

 SODIUM (BEAKER) (test  119 meq/L  136-145  



 sfpq=312)      

 

 POTASSIUM (BEAKER) (test  4.2 meq/L  3.5-5.1  



 code=379)      

 

 CHLORIDE (BEAKER) (test  86 meq/L    



 code=382)      

 

 CO2 (BEAKER) (test  26 meq/L  22-29  



 code=355)      

 

 BLOOD UREA NITROGEN  23 mg/dL  7-21  



 (BEAKER) (test code=354)      

 

 CREATININE (BEAKER) (test  0.81 mg/dL  0.57-1.25  



 code=358)      

 

 GLUCOSE RANDOM (BEAKER)  103 mg/dL    



 (test code=652)      

 

 CALCIUM (BEAKER) (test  7.6 mg/dL  8.4-10.2  



 code=697)      

 

 AST (SGOT) (BEAKER) (test  46 U/L  5-34  



 code=353)      

 

 ALT (SGPT) (BEAKER) (test  21 U/L  6-55  



 code=347)      

 

 EGFR (BEAKER) (test  97 mL/min/1.73 sq m    ESTIMATED GFR IS NOT AS



 code=1092)      ACCURATE AS CREATININE



       CLEARANCE IN PREDICTING



       GLOMERULAR FILTRATION



       RATE. ESTIMATED GFR IS



       NOT APPLICABLE FOR



       DIALYSIS PATIENTS.



Specimen moderately [id number] 05:07:00





 Test Item  Value  Reference Range  Comments

 

 CORTISOL, TOTAL (BEAKER) (test code=2755)  9.6 ug/dL  3.7-19.4  



[id number] 04:57:00





 Test Item  Value  Reference Range  Comments

 

 PHOSPHORUS (BEAKER) (test code=604)  2.0 mg/dL  2.3-4.7  



[id number] 04:57:00





 Test Item  Value  Reference Range  Comments

 

 MAGNESIUM (BEAKER) (test code=627)  1.7 mg/dL  1.6-2.6  



[id number] 22:08:00





 Test Item  Value  Reference Range  Comments

 

 SODIUM (BEAKER) (test code=381)  119 meq/L  136-145  

 

 POTASSIUM (BEAKER) (test  4.6 meq/L  3.5-5.1  Specimen slightly hemolyzed



 code=379)      

 

 CHLORIDE (BEAKER) (test  86 meq/L    



 code=382)      

 

 CO2 (BEAKER) (test code=355)  27 meq/L  22-29  



Call K &gt; 5.5POCT-GLUCOSE [id number] 12:59:00





 Test Item  Value  Reference Range  Comments

 

 POC-GLUCOSE METER (BEAKER)  95 mg/dL    TESTED AT [address]



 (test tzdu=8793)      [facility] [address]



T4, [id number] 10:25:00





 Test Item  Value  Reference Range  Comments

 

 FREE T4 (BEAKER) (test code=655)  0.65 ng/dL  0.70-1.48  



CBC W/PLT COUNT &amp; AUTO [id number] 10:21:00





 Test Item  Value  Reference Range  Comments

 

 WHITE BLOOD CELL COUNT (BEAKER) (test code=775)  11.8 K/ L  3.5-10.5  

 

 RED BLOOD CELL COUNT (BEAKER) (test code=761)  3.74 M/ L  4.63-6.08  

 

 HEMOGLOBIN (BEAKER) (test code=410)  12.2 GM/DL  13.7-17.5  

 

 HEMATOCRIT (BEAKER) (test code=411)  33.9 %  40.1-51.0  

 

 MEAN CORPUSCULAR VOLUME (BEAKER) (test code=753)  90.6 fL  79.0-92.2  

 

 MEAN CORPUSCULAR HEMOGLOBIN (BEAKER) (test  32.6 pg  25.7-32.2  



 tvje=124)      

 

 MEAN CORPUSCULAR HEMOGLOBIN CONC (BEAKER) (test  36.0 GM/DL  32.3-36.5  



 code=752)      

 

 RED CELL DISTRIBUTION WIDTH (BEAKER) (test  13.7 %  11.6-14.4  



 code=412)      

 

 PLATELET COUNT (BEAKER) (test code=756)  79 K/CU MM  150-450  

 

 MEAN PLATELET VOLUME (BEAKER) (test code=754)  8.9 fL  9.4-12.4  

 

 NUCLEATED RED BLOOD CELLS (BEAKER) (test  0 /100 WBC  0-0  



 code=413)      



(CELLAVISION MANUAL DIFF)2019 10:21:00





 Test Item  Value  Reference Range  Comments

 

 NEUTROPHILS - REL (CELLAVISION)(BEAKER) (test  82 %    



 code=2816)      

 

 LYMPHOCYTES - REL (CELLAVISION)(BEAKER) (test  4 %    



 code=2817)      

 

 MONOCYTES - REL (CELLAVISION)(BEAKER) (test  13 %    



 code=2818)      

 

 ATYPICAL LYMPHOCYTES - REL (CELLAVISION)(BEAKER)  1 %  0-0  



 (test code=2829)      

 

 NEUTROPHILS - ABS (CELLAVISION)(BEAKER) (test  9.68 K/ul  1.78-5.38  



 code=2830)      

 

 LYMPHOCYTES - ABS (CELLAVISION)(BEAKER) (test  0.47 K/ul  1.32-3.57  



 code=2831)      

 

 MONOCYTES - ABS (CELLAVISION)(BEAKER) (test  1.53 K/uL  0.30-0.82  



 code=2832)      

 

 ATYPICAL LYMPHOCYTES - ABS (CELLAVISION)(BEAKER)  0.12 K/uL  0.00-0.00  



 (test code=2858)      

 

 TOTAL COUNTED (BEAKER) (test code=1351)  100    

 

 RBC MORPHOLOGY (BEAKER) (test code=762)  Normal    

 

 WBC MORPHOLOGY (BEAKER) (test code=487)  Normal    

 

 PLT MORPHOLOGY (BEAKER) (test code=486)  Normal    

 

 ARTIFACT (CELLAVISION)(BEAKER) (test code=3432)  Present    

 

 PLATELET CONCENTRATION (CELLAVISION)(BEAKER) (test  Decreased    



 code=3438)      



Received comment: User comments: Slide comments:POCT-GLUCOSE [id number] 09:
58:00





 Test Item  Value  Reference Range  Comments

 

 POC-GLUCOSE METER (BEAKER)  104 mg/dL    TESTED AT [address]



 (test uqrr=2269)      [place] [address]



RAD, CHEST, 1 VIEW, NON [id number] 09:33:00Reason for exam:-&gt;
effusionShould this be performed at the bedside?-&gt;YesFINAL REPORT PATIENT ID
:   [id number] Comparison: 2019 TECHNIQUE: Single view of the chest FINDINGS
: Bilateral interstitial and airspace opacities are stable. Small left pleural 
thickening with adjacent airspace disease identified. A previous left-sided 
pneumothorax has decreased. Left pleural drainage catheters again noted. Right-
sided PICC line is stable. Signed: Ronaldo Tim MDReport Verified Date/Time:   09:33:21 Reading Location: [address] Transitional Reading Room      
Electronically signed by: RONALDO TIM M.D. on 2019 09:33 
AMCOMPREHENSIVE METABOLIC [id number] 08:59:00





 Test Item  Value  Reference Range  Comments

 

 TOTAL PROTEIN (BEAKER)  5.4 gm/dL  6.0-8.3  Specimen slightly



 (test code=770)      hemolyzed

 

 ALBUMIN (BEAKER) (test  2.1 g/dL  3.5-5.0  Specimen slightly



 code=1145)      hemolyzed

 

 ALKALINE PHOSPHATASE  153 U/L    



 (BEAKER) (test code=346)      

 

 BILIRUBIN TOTAL (BEAKER)  5.0 mg/dL  0.2-1.2  Specimen slightly



 (test code=377)      hemolyzed

 

 SODIUM (BEAKER) (test  117 meq/L  136-145  



 nizf=492)      

 

 POTASSIUM (BEAKER) (test  5.5 meq/L  3.5-5.1  Specimen slightly



 code=379)      hemolyzed

 

 CHLORIDE (BEAKER) (test  84 meq/L    



 code=382)      

 

 CO2 (BEAKER) (test  26 meq/L  22-29  



 code=355)      

 

 BLOOD UREA NITROGEN  32 mg/dL  7-21  



 (BEAKER) (test code=354)      

 

 CREATININE (BEAKER) (test  0.85 mg/dL  0.57-1.25  Specimen slightly



 code=358)      hemolyzed

 

 GLUCOSE RANDOM (BEAKER)  104 mg/dL    



 (test code=652)      

 

 CALCIUM (BEAKER) (test  7.6 mg/dL  8.4-10.2  



 code=697)      

 

 AST (SGOT) (BEAKER) (test  51 U/L  5-34  Specimen slightly



 code=353)      hemolyzed

 

 ALT (SGPT) (BEAKER) (test  23 U/L  6-55  Specimen slightly



 code=347)      hemolyzed

 

 EGFR (BEAKER) (test  92 mL/min/1.73 sq m    ESTIMATED GFR IS NOT AS



 code=1092)      ACCURATE AS CREATININE



       CLEARANCE IN PREDICTING



       GLOMERULAR FILTRATION



       RATE. ESTIMATED GFR IS



       NOT APPLICABLE FOR



       DIALYSIS PATIENTS.



Specimen moderately [id number] 08:55:00





 Test Item  Value  Reference Range  Comments

 

 MAGNESIUM (BEAKER) (test  1.9 mg/dL  1.6-2.6  Specimen slightly hemolyzed



 code=627)      



[id number] 08:55:00





 Test Item  Value  Reference Range  Comments

 

 PHOSPHORUS (BEAKER) (test  2.5 mg/dL  2.3-4.7  Specimen slightly hemolyzed



 code=604)      



TSH/FREE T4 IF [id number] 08:39:00





 Test Item  Value  Reference Range  Comments

 

 THYROID STIMULATING HORMONE (BEAKER) (test  8.07 uIU/mL  0.35-4.94  



 code=772)      



CALCIUM, [id number] 08:06:00





 Test Item  Value  Reference Range  Comments

 

 CALCIUM IONIZED (BEAKER) (test code=698)  1.00 mmol/L  1.12-1.27  

 

 PH, BLOOD (BEAKER) (test code=1810)  7.48    



[id number] 23:47:00





 Test Item  Value  Reference Range  Comments

 

 SODIUM (BEAKER) (test code=381)  117 meq/L  136-145  

 

 POTASSIUM (BEAKER) (test code=379)  5.3 meq/L  3.5-5.1  

 

 CHLORIDE (BEAKER) (test code=382)  84 meq/L    

 

 CO2 (BEAKER) (test code=355)  27 meq/L  22-29  



Call K &gt; [id number]-GLUCOSE [id number] 21:18:00





 Test Item  Value  Reference Range  Comments

 

 POC-GLUCOSE METER (BEAKER)  145 mg/dL    TESTED AT [facility] [address] [facility]



 (test egde=6470)      [facility] [address]



[id number] 18:57:00





 Test Item  Value  Reference Range  Comments

 

 CORTISOL, TOTAL (BEAKER) (test code=2755)  18.1 ug/dL  3.7-19.4  



[id number] 18:34:00





 Test Item  Value  Reference Range  Comments

 

 SODIUM (BEAKER) (test code=381)  116 meq/L  136-145  

 

 POTASSIUM (BEAKER) (test code=379)  6.0 meq/L  3.5-5.1  

 

 CHLORIDE (BEAKER) (test code=382)  82 meq/L    

 

 CO2 (BEAKER) (test code=355)  30 meq/L  22-29  



Call [id number]-GLUCOSE [id number] 18:20:00





 Test Item  Value  Reference Range  Comments

 

 POC-GLUCOSE METER (BEAKER)  141 mg/dL    TESTED AT [address]



 (test acxx=9646)      [place] [id number]



POCT-GLUCOSE [id number] 13:00:00





 Test Item  Value  Reference Range  Comments

 

 POC-GLUCOSE METER (BEAKER)  122 mg/dL    TESTED AT [address]



 (test shal=1287)      [facility] [address]



CBC W/PLT COUNT &amp; AUTO [id number] 10:34:00





 Test Item  Value  Reference Range  Comments

 

 WHITE BLOOD CELL COUNT (BEAKER) (test code=775)  15.5 K/ L  3.5-10.5  

 

 RED BLOOD CELL COUNT (BEAKER) (test code=761)  4.04 M/ L  4.63-6.08  

 

 HEMOGLOBIN (BEAKER) (test code=410)  13.3 GM/DL  13.7-17.5  

 

 HEMATOCRIT (BEAKER) (test code=411)  36.5 %  40.1-51.0  

 

 MEAN CORPUSCULAR VOLUME (BEAKER) (test code=753)  90.3 fL  79.0-92.2  

 

 MEAN CORPUSCULAR HEMOGLOBIN (BEAKER) (test  32.9 pg  25.7-32.2  



 ilpb=629)      

 

 MEAN CORPUSCULAR HEMOGLOBIN CONC (BEAKER) (test  36.4 GM/DL  32.3-36.5  



 code=752)      

 

 RED CELL DISTRIBUTION WIDTH (BEAKER) (test  13.5 %  11.6-14.4  



 code=412)      

 

 PLATELET COUNT (BEAKER) (test code=756)  65 K/CU MM  150-450  

 

 MEAN PLATELET VOLUME (BEAKER) (test code=754)  9.0 fL  9.4-12.4  

 

 NUCLEATED RED BLOOD CELLS (BEAKER) (test  0 /100 WBC  0-0  



 code=413)      



(CELLAVISION MANUAL DIFF)2019 10:34:00





 Test Item  Value  Reference Range  Comments

 

 NEUTROPHILS - REL (CELLAVISION)(BEAKER) (test  78 %    



 code=2816)      

 

 LYMPHOCYTES - REL (CELLAVISION)(BEAKER) (test  4 %    



 code=2817)      

 

 MONOCYTES - REL (CELLAVISION)(BEAKER) (test  14 %    



 code=2818)      

 

 EOSINOPHILS - REL (CELLAVISION)(BEAKER) (test  2 %    



 code=2819)      

 

 BANDS - REL (CELLAVISION)(BEAKER) (test  2 %  0-10  



 code=2826)      

 

 NEUTROPHILS - ABS (CELLAVISION)(BEAKER) (test  12.09 K/ul  1.78-5.38  



 code=2830)      

 

 LYMPHOCYTES - ABS (CELLAVISION)(BEAKER) (test  0.62 K/ul  1.32-3.57  



 code=2831)      

 

 MONOCYTES - ABS (CELLAVISION)(BEAKER) (test  2.17 K/uL  0.30-0.82  



 code=2832)      

 

 EOSINOPHILS - ABS (CELLAVISION)(BEAKER) (test  0.31 K/uL  0.04-0.54  



 code=2834)      

 

 BANDS - ABS (CELLAVISION)(BEAKER) (test  0.31 K/uL  0.00-0.80  



 code=2840)      

 

 TOTAL COUNTED (BEAKER) (test code=1351)  100    

 

 WBC MORPHOLOGY (BEAKER) (test code=487)  Normal    

 

 PLT MORPHOLOGY (BEAKER) (test code=486)  Normal    

 

 POLYCHROMATOPHILLIC RBCS(BEAKER) (test code=478)  1+ few    

 

 POIKILOCYTES (BEAKER) (test code=966)  2+ moderate    

 

 KARISSA CELLS (BEAKER) (test code=474)  2+ moderate    

 

 BASOPHILIC STIPPLING (BEAKER) (test code=473)  Present    

 

 ARTIFACT (CELLAVISION)(BEAKER) (test code=3432)  Present    

 

 PLATELET CONCENTRATION (CELLAVISION)(BEAKER)  Decreased    



 (test code=3438)      



Received comment: User comments: Slide comments:RAD, CHEST, 1 VIEW, NON [id number]
-09-21 08:40:00Reason for exam:-&gt;left effusionShould this be performed at 
the bedside?-&gt;YesFINAL REPORT PATIENT ID:   [id number] Portable chest. 
CLINICAL HISTORY: left effusion. COMPARISON STUDY: Chest x-ray from yesterday. 
FINDINGS: The cardiac silhouette is unremarkable. The pulmonary parenchyma 
demonstrates increased interstitial markings with atelectatic changes in the 
lung bases. A left-sided pigtail catheter chest tube remains and there has been 
development of a loculated small basilarpneumothorax. A right PICC line 
remains. Degenerative changes are noted. IMPRESSION: Development a small left-
sided basilar pneumothorax. No other significant change. Signed: Mk Martinez MDReport Verified Date/Time:  2019 08:40:30 Reading Location: [place] [id number] Ortho Consult Reading Room Electronically signed by: MK MARTINEZ M.D. 
on 2019 08:40 AMPOCT-GLUCOSE [id number] 08:39:00





 Test Item  Value  Reference Range  Comments

 

 POC-GLUCOSE METER (BEAKER)  110 mg/dL    TESTED AT [address]



 (test qhzc=3434)      [facility] [address]



COMPREHENSIVE METABOLIC [id number] 06:32:00





 Test Item  Value  Reference Range  Comments

 

 TOTAL PROTEIN (BEAKER)  5.5 gm/dL  6.0-8.3  



 (test code=770)      

 

 ALBUMIN (BEAKER) (test  1.9 g/dL  3.5-5.0  



 code=1145)      

 

 ALKALINE PHOSPHATASE  134 U/L    



 (BEAKER) (test code=346)      

 

 BILIRUBIN TOTAL (BEAKER)  4.4 mg/dL  0.2-1.2  



 (test code=377)      

 

 SODIUM (BEAKER) (test  116 meq/L  136-145  



 txaf=725)      

 

 POTASSIUM (BEAKER) (test  5.6 meq/L  3.5-5.1  



 code=379)      

 

 CHLORIDE (BEAKER) (test  83 meq/L    



 code=382)      

 

 CO2 (BEAKER) (test  27 meq/L  22-29  



 code=355)      

 

 BLOOD UREA NITROGEN  34 mg/dL  7-21  



 (BEAKER) (test code=354)      

 

 CREATININE (BEAKER) (test  0.89 mg/dL  0.57-1.25  



 code=358)      

 

 GLUCOSE RANDOM (BEAKER)  109 mg/dL    



 (test code=652)      

 

 CALCIUM (BEAKER) (test  7.4 mg/dL  8.4-10.2  



 code=697)      

 

 AST (SGOT) (BEAKER) (test  31 U/L  5-34  



 code=353)      

 

 ALT (SGPT) (BEAKER) (test  18 U/L  6-55  



 code=347)      

 

 EGFR (BEAKER) (test  87 mL/min/1.73 sq m    ESTIMATED GFR IS NOT AS



 code=1092)      ACCURATE AS CREATININE



       CLEARANCE IN PREDICTING



       GLOMERULAR FILTRATION



       RATE. ESTIMATED GFR IS



       NOT APPLICABLE FOR



       DIALYSIS PATIENTS.



Specimen moderately ictericPOCT-GLUCOSE [id number] 21:12:00





 Test Item  Value  Reference Range  Comments

 

 POC-GLUCOSE METER (BEAKER)  114 mg/dL    TESTED AT [address]



 (test wfla=0329)      [facility] [address]



OSMOLALITY, [id number] 18:43:00





 Test Item  Value  Reference Range  Comments

 

 OSMOLALITY URINE (BEAKER) (test code=614)  694 mOsm/kg  40-1,400  



SODIUM, RANDOM [id number] 18:02:00





 Test Item  Value  Reference Range  Comments

 

 SODIUM URINE (BEAKER) (test code=243)  < meq/L    



Reference Range: No NormalsPOCT-GLUCOSE [id number] 16:06:00





 Test Item  Value  Reference Range  Comments

 

 POC-GLUCOSE METER (BEAKER)  145 mg/dL    TESTED AT [address]



 (test vira=5727)      [facility] [address]



RAD, ABDOMEN/KUB, 1 VIEW [id number] 12:51:00Reason for exam:-&gt;no BM in 13 
days. abdominal distension.  concern for ileusFINAL REPORT PATIENT ID:   
[id number] RAD, ABDOMEN/KUB, 1 VIEW AP CLINICAL INDICATION:  no BM in 13 days. 
abdominal distension.  concern for ileus   COMPARISON: None TECHNIQUE: 24 
radiographs of the abdomen. IMPRESSION:  The bowel gas pattern demonstrates 
gaseous distention without dilation. No pneumatosis. Appearance may reflect 
ileus. Excreted contrast is present in the urinary bladder. The regional 
skeleton is intact. Signed: JR Mendoza Robert MDReport Verified Date/Time
:  2019 12:51:21 Reading Location: [facility] Reading [facility]  
  Electronically signed by: KULWINDER MENDOZA on 2019 12:51 
PMOSMOLALITY, [id number] 12:47:00





 Test Item  Value  Reference Range  Comments

 

 OSMOLALITY, SERUM (BEAKER) (test code=615)  258 mOsm/kg  275-295  



POCT-GLUCOSE [id number] 12:35:00





 Test Item  Value  Reference Range  Comments

 

 POC-GLUCOSE METER (BEAKER)  93 mg/dL    TESTED AT [facility] [address] [facility]



 (test esmf=2269)      [place] [address]



CBC W/PLT COUNT &amp; AUTO [id number] 10:10:00





 Test Item  Value  Reference Range  Comments

 

 WHITE BLOOD CELL COUNT (BEAKER) (test code=775)  19.5 K/ L  3.5-10.5  

 

 RED BLOOD CELL COUNT (BEAKER) (test code=761)  4.18 M/ L  4.63-6.08  

 

 HEMOGLOBIN (BEAKER) (test code=410)  13.3 GM/DL  13.7-17.5  

 

 HEMATOCRIT (BEAKER) (test code=411)  37.8 %  40.1-51.0  

 

 MEAN CORPUSCULAR VOLUME (BEAKER) (test code=753)  90.4 fL  79.0-92.2  

 

 MEAN CORPUSCULAR HEMOGLOBIN (BEAKER) (test  31.8 pg  25.7-32.2  



 wazk=245)      

 

 MEAN CORPUSCULAR HEMOGLOBIN CONC (BEAKER) (test  35.2 GM/DL  32.3-36.5  



 code=752)      

 

 RED CELL DISTRIBUTION WIDTH (BEAKER) (test  13.5 %  11.6-14.4  



 code=412)      

 

 PLATELET COUNT (BEAKER) (test code=756)  60 K/CU MM  150-450  

 

 MEAN PLATELET VOLUME (BEAKER) (test code=754)  9.4 fL  9.4-12.4  

 

 NUCLEATED RED BLOOD CELLS (BEAKER) (test  0 /100 WBC  0-0  



 code=413)      



(CELLAVISION MANUAL DIFF)2019 10:10:00





 Test Item  Value  Reference Range  Comments

 

 NEUTROPHILS - REL (CELLAVISION)(BEAKER) (test  86 %    



 code=2816)      

 

 LYMPHOCYTES - REL (CELLAVISION)(BEAKER) (test  1 %    



 code=2817)      

 

 MONOCYTES - REL (CELLAVISION)(BEAKER) (test  6 %    



 code=2818)      

 

 EOSINOPHILS - REL (CELLAVISION)(BEAKER) (test  3 %    



 code=2819)      

 

 BANDS - REL (CELLAVISION)(BEAKER) (test  3 %  0-10  



 code=2826)      

 

 BLASTS - REL (CELLAVISION)(BEAKER) (test  1 %  0-0  



 code=2827)      

 

 NEUTROPHILS - ABS (CELLAVISION)(BEAKER) (test  16.77 K/ul  1.78-5.38  



 code=2830)      

 

 LYMPHOCYTES - ABS (CELLAVISION)(BEAKER) (test  0.20 K/ul  1.32-3.57  



 code=2831)      

 

 MONOCYTES - ABS (CELLAVISION)(BEAKER) (test  1.17 K/uL  0.30-0.82  



 code=2832)      

 

 EOSINOPHILS - ABS (CELLAVISION)(BEAKER) (test  0.59 K/uL  0.04-0.54  



 code=2834)      

 

 BANDS - ABS (CELLAVISION)(BEAKER) (test  0.59 K/uL  0.00-0.80  



 code=2840)      

 

 BLASTS - ABS (CELLAVISION)(BEAKER) (test  0.20 K/uL  0.00-0.00  



 code=2845)      

 

 TOTAL COUNTED (BEAKER) (test code=1351)  100    

 

 PLT MORPHOLOGY (BEAKER) (test code=486)  Normal    

 

 SMUDGE CELLS (BEAKER) (test code=1371)  Present    

 

 POIKILOCYTES (BEAKER) (test code=966)  1+ few    

 

 PLATELET CONCENTRATION (CELLAVISION)(BEAKER)  Decreased    



 (test code=3438)      



Received comment: User comments: Slide comments:RAD, CHEST, 1 VIEW, NON [id number]
-09- 08:53:00Reason for exam:-&gt;left effusionShould this be performed at 
the bedside?-&gt;YesFINAL REPORT PATIENT ID:   [id number] RAD, CHEST, 1 VIEW, NON 
DEPT INDICATION: left effusion COMPARISON: Prior day's exam FINDINGS: Portable 
frontal view of the chest.   IMPRESSION: Limited by patient positioning.Support 
Lines: Stable. Lungs and pleura: Interstitial congestion on the left slightly 
greater than the right and unchanged from prior. Small left effusion. No 
visible pneumothorax.Heart and mediastinum: Stable contours. Additional findings
: None. Signed: JR Mendoza Robert MDReport Verified Date/Time:  2019 08:53:19 Reading Location: [facility] Radiology Reading Room    
Electronically signed by: KULWINDER MENDOZA on 2019 08:53 AMPOCT-
GLUCOSE [id number] 07:58:00





 Test Item  Value  Reference Range  Comments

 

 POC-GLUCOSE METER (BEAKER)  95 mg/dL    TESTED AT [facility] [address] [facility]



 (test fjpd=9284)      [facility] [address]



COMPREHENSIVE METABOLIC [id number] 05:41:00





 Test Item  Value  Reference Range  Comments

 

 TOTAL PROTEIN (BEAKER)  5.4 gm/dL  6.0-8.3  



 (test code=770)      

 

 ALBUMIN (BEAKER) (test  1.9 g/dL  3.5-5.0  



 code=1145)      

 

 ALKALINE PHOSPHATASE  126 U/L    



 (BEAKER) (test code=346)      

 

 BILIRUBIN TOTAL (BEAKER)  4.9 mg/dL  0.2-1.2  



 (test code=377)      

 

 SODIUM (BEAKER) (test  117 meq/L  136-145  



 xvgk=820)      

 

 POTASSIUM (BEAKER) (test  5.3 meq/L  3.5-5.1  



 code=379)      

 

 CHLORIDE (BEAKER) (test  83 meq/L    



 code=382)      

 

 CO2 (BEAKER) (test  28 meq/L  22-29  



 code=355)      

 

 BLOOD UREA NITROGEN  29 mg/dL  7-21  



 (BEAKER) (test code=354)      

 

 CREATININE (BEAKER) (test  0.90 mg/dL  0.57-1.25  



 code=358)      

 

 GLUCOSE RANDOM (BEAKER)  99 mg/dL    



 (test code=652)      

 

 CALCIUM (BEAKER) (test  7.7 mg/dL  8.4-10.2  



 code=697)      

 

 AST (SGOT) (BEAKER) (test  28 U/L  5-34  



 code=353)      

 

 ALT (SGPT) (BEAKER) (test  18 U/L  6-55  



 code=347)      

 

 EGFR (BEAKER) (test  86 mL/min/1.73 sq m    ESTIMATED GFR IS NOT AS



 code=1092)      ACCURATE AS CREATININE



       CLEARANCE IN PREDICTING



       GLOMERULAR FILTRATION



       RATE. ESTIMATED GFR IS



       NOT APPLICABLE FOR



       DIALYSIS PATIENTS.



Specimen moderately ictericPOCT-GLUCOSE [id number] 21:08:00





 Test Item  Value  Reference Range  Comments

 

 POC-GLUCOSE METER (BEAKER)  92 mg/dL    TESTED AT [address]



 (test gsaj=6001)      [place] [address]



RAD, CHEST, 1 VIEW, NON [id number] 17:40:00Reason for exam:-&gt;post chest 
tube placementFINAL REPORT PATIENT ID:   [id number] INDICATION: post chest tube 
placement TECHNIQUE: Chest radiograph, single view, portable technique. 
FINDINGS / IMPRESSION: Left pleural effusion has decreased in size, since 10:25 
AM, after pleural tube placement. No pneumothorax demonstrated. Right PICC line 
again noted terminating at the cavoatrial junction. Signed: Giovani Ordonez 
MDReport Verified Date/Time:  2019 17:40:02 Reading Location: [address] Consult Reading Room Electronically signed by: GIOVANI ORDONEZ M.D. on 2019 05:40 PMPOCT-GLUCOSE [id number] 17:33:00





 Test Item  Value  Reference Range  Comments

 

 POC-GLUCOSE METER (BEAKER)  116 mg/dL    TESTED AT [address]



 (test gneq=2049)      [place] [address]



POCT-GLUCOSE [id number] 15:19:00





 Test Item  Value  Reference Range  Comments

 

 POC-GLUCOSE METER (BEAKER)  85 mg/dL    TESTED AT [address]



 (test tcoz=4705)      [place] [address]



CT, DRAINAGE, CHEST TUBE [id number] 14:49:00Reason for exam:-&gt;
loculated left pleural effusion, please place large bore pigtail if effusion 
noted on CT scanAnesthesia:-&gt;NoneFINAL REPORT PATIENT ID:   [id number] 
PROCEDURE: CT-guided placement of a left pleural catheter. Dose modulation, 
iterative reconstruction, and/or weight-based adjustment of the mA/kV was 
utilized to reduce the radiation dose to as low as reasonably achievable. 
INDICATION: Loculated left pleural effusion. COMPARISON: CT from earlier today. 
SEDATION: Intravenous moderate sedation was administered by radiology nursing 
and monitored under the direction of the undersigned radiologist. The patient's 
vital signs were monitored throughout the procedure and recorded in the patient'
s medical record by radiology nursing. Total intraservice time of sedation was 
25 minutes. MEDICATIONS: 0.5 mg Versed, 25 mcg fentanyl DESCRIPTION: After 
obtaining informed written consent, the patient was brought to the procedure 
room and placed in the supine position.  Preliminary CT scan revealed a large 
left pleural effusion. A clear path to the collection was identified. The 
overlying skin was prepped and draped in the usual, sterile fashion and local 2
% lidocaine anesthesia was administered. Under CT guidance, a 19-gaugeneedle 
was placed. After placement of a wire and serial dilation, a 12 Azeri catheter 
was placed into the pleural fluid. The catheter was affixed to the skin and 
connected to a vacuum container. 1000 mL of clear red fluid was aspirated. 
Samples were placed on this side table and no clotting was noted. Samples were 
sent for analysis. Post procedure scan showed decrease in size with left 
effusion and no immediate complications. IMPRESSION: Successful placement of a 
12 Azeri left chest tube. Signed: Abner Aguilera MDReport Verified Date/Time:   14:49:30 Reading Location: [address] CT Body Reading Room      
Electronically signed by: ABNER AGUILERA MD on 2019 02:49 PMCT, CHEST, 
WITH [id number] 13:11:00Reason for exam:-&gt;evaluate loculated left 
pleural effusion seen on xrayFINAL REPORT PATIENT ID:   [id number] TECHNIQUE: CT 
scan of the chest WITH intravenous contrast. Dose modulation, iterative 
reconstruction, and/or weight-based adjustment of the mA/kV was utilized to 
reduce the radiation dose to as low as reasonably achievable. INDICATION: 
evaluate loculated left pleural effusion seen on xrayevaluate loculated left 
pleural effusion seen on xray. COMPARISON: None.  FINDINGS:  LINES/TUBES: A 
right PICC has its tip at the superior cavoatrial junction. LUNGS AND AIRWAYS: 
Mild right basilar subsegmental atelectasis. There is an area of cavitation 
with a fluid fluid level in superior segment of the left lower lobe which 
measures 4 cm PLEURA: Trace right pleural effusion. HEART AND MEDIASTINUM: The 
visualized thyroid gland is normal. No significant mediastinal, hilar, or 
axillary lymphadenopathy. The heart and pericardium are within normal limits. 
Severe coronary arterial calcifications. SOFT TISSUES AND BONES: Bilateral 
gynecomastia. Old, healed right 10th and 12th ribfractures. Old, healed left 
10th rib fracture. UPPER ABDOMEN: Nodular, cirrhotic liver. Partially 
visualized upper abdominal varices. A periumbilical vein is recanalized.  
IMPRESSION: 1.There is a large left sided loculated pleural effusion with a 
mildly thickened pleura and some intermixed gas. This is concerning for an 
empyema. 2.The air-fluid level with surrounding lung in the superior segment of 
the left lower lobe is most likely a lung abscess. A cavitary lung nodule (
either from malignancy or fungal infection) is considered less likely. A follow-
up chest CT is recommended in three months to document decrease in size and 
exclude cavitary malignancy. 3.Cirrhosis with findings of portal hypertension. 
Signed: Abner Aguilera Verified Date/Time:  2019 13:11:18 Reading 
Location: [facility] C013Y CT Body Reading Room      Electronically signed by: ABNER AGUILERA MD on 2019 01:11 PMPOCT-GLUCOSE [id number] 11:22:00





 Test Item  Value  Reference Range  Comments

 

 POC-GLUCOSE METER (BEAKER)  81 mg/dL    TESTED AT [address]



 (test iktj=1198)      [facility] [address]



RAD, CHEST, 1 VIEW, NON [id number] 10:59:00Reason for exam:-&gt;eval 
effusionShould this be performed at the bedside?-&gt;YesFINAL REPORT PATIENT ID
:   [id number] RAD, CHEST, 1 VIEW, NON DEPT INDICATION: eval effusion COMPARISON: 
Prior day's exam FINDINGS: Portable frontal view of the chest.   IMPRESSION: 
Limited by patient rotation.Support Lines: A right PICC terminates over the 
superior vena cava. Lungs and pleura: Large left effusion. Right costophrenic 
sulcus is excluded from view. No pneumothorax.Heart and mediastinum: 
Unremarkable where visible.Additional findings: None. A CT evaluation is 
currently pending. Signed: JR Mendoza Robert MDReport Verified Date/Time
:  2019 10:59:34 Reading Location: [facility] Radiology Reading Room  
  Electronically signed by: KULWINDER MENDOZA on 2019 10:59 
AMCOMPREHENSIVE METABOLIC [id number] 07:33:00





 Test Item  Value  Reference Range  Comments

 

 TOTAL PROTEIN (BEAKER)  5.8 gm/dL  6.0-8.3  Specimen slightly



 (test code=770)      hemolyzed

 

 ALBUMIN (BEAKER) (test  2.0 g/dL  3.5-5.0  Specimen slightly



 code=1145)      hemolyzed

 

 ALKALINE PHOSPHATASE  130 U/L    



 (BEAKER) (test code=346)      

 

 BILIRUBIN TOTAL (BEAKER)  4.6 mg/dL  0.2-1.2  Specimen slightly



 (test code=377)      hemolyzed

 

 SODIUM (BEAKER) (test  117 meq/L  136-145  



 yehk=992)      

 

 POTASSIUM (BEAKER) (test  5.3 meq/L  3.5-5.1  Specimen slightly



 code=379)      hemolyzed

 

 CHLORIDE (BEAKER) (test  84 meq/L    



 code=382)      

 

 CO2 (BEAKER) (test  28 meq/L  22-29  



 code=355)      

 

 BLOOD UREA NITROGEN  22 mg/dL  7-21  



 (BEAKER) (test code=354)      

 

 CREATININE (BEAKER) (test  0.86 mg/dL  0.57-1.25  Specimen slightly



 code=358)      hemolyzed

 

 GLUCOSE RANDOM (BEAKER)  90 mg/dL    



 (test code=652)      

 

 CALCIUM (BEAKER) (test  7.9 mg/dL  8.4-10.2  



 code=697)      

 

 AST (SGOT) (BEAKER) (test  33 U/L  5-34  Specimen slightly



 code=353)      hemolyzed

 

 ALT (SGPT) (BEAKER) (test  20 U/L  6-55  Specimen slightly



 code=347)      hemolyzed

 

 EGFR (BEAKER) (test  91 mL/min/1.73 sq m    ESTIMATED GFR IS NOT AS



 code=1092)      ACCURATE AS CREATININE



       CLEARANCE IN PREDICTING



       GLOMERULAR FILTRATION



       RATE. ESTIMATED GFR IS



       NOT APPLICABLE FOR



       DIALYSIS PATIENTS.



Specimen moderately ictericPOCT-GLUCOSE [id number] 05:46:00





 Test Item  Value  Reference Range  Comments

 

 POC-GLUCOSE METER (BEAKER)  93 mg/dL    TESTED AT [facility] [address]



 (test safl=0982)      [place] [address]



PROTHROMBIN [id number] 05:30:00





 Test Item  Value  Reference Range  Comments

 

 PROTIME (BEAKER) (test code=759)  16.9 seconds  11.9-14.2  

 

 INR (BEAKER) (test code=370)  1.4  <=5.9  



Effective 2019: PT Reference Range ChangeNew: 11.9-14.2  Previous: 11.7-
14.7RECOMMENDED COUMADIN/WARFARIN INR THERAPY RANGESSTANDARD DOSE: 2.0-3.0  
Includes: PROPHYLAXIS for venous thrombosis, systemic embolization; TREATMENT 
for venous thrombosis and/or pulmonary embolus.HIGH RISK: Target INR is2.5-3.5 
for patients wiht mechanical heart valves.CBC W/PLT COUNT &amp; AUTO 
[id number] 05:27:00





 Test Item  Value  Reference Range  Comments

 

 WHITE BLOOD CELL COUNT (BEAKER) (test code=775)  23.2 K/ L  3.5-10.5  

 

 RED BLOOD CELL COUNT (BEAKER) (test code=761)  4.75 M/ L  4.63-6.08  

 

 HEMOGLOBIN (BEAKER) (test code=410)  15.3 GM/DL  13.7-17.5  

 

 HEMATOCRIT (BEAKER) (test code=411)  43.1 %  40.1-51.0  

 

 MEAN CORPUSCULAR VOLUME (BEAKER) (test code=753)  90.7 fL  79.0-92.2  

 

 MEAN CORPUSCULAR HEMOGLOBIN (BEAKER) (test  32.2 pg  25.7-32.2  



 ksze=884)      

 

 MEAN CORPUSCULAR HEMOGLOBIN CONC (BEAKER) (test  35.5 GM/DL  32.3-36.5  



 code=752)      

 

 RED CELL DISTRIBUTION WIDTH (BEAKER) (test  13.3 %  11.6-14.4  



 code=412)      

 

 PLATELET COUNT (BEAKER) (test code=756)  87 K/CU MM  150-450  

 

 MEAN PLATELET VOLUME (BEAKER) (test code=754)  8.9 fL  9.4-12.4  

 

 NUCLEATED RED BLOOD CELLS (BEAKER) (test  0 /100 WBC  0-0  



 code=413)      

 

 NEUTROPHILS RELATIVE PERCENT (BEAKER) (test  86 %    



 code=429)      

 

 LYMPHOCYTES RELATIVE PERCENT (BEAKER) (test  3 %    



 code=430)      

 

 MONOCYTES RELATIVE PERCENT (BEAKER) (test  9 %    



 code=431)      

 

 EOSINOPHILS RELATIVE PERCENT (BEAKER) (test  0 %    



 code=432)      

 

 BASOPHILS RELATIVE PERCENT (BEAKER) (test  0 %    



 code=437)      

 

 NEUTROPHILS ABSOLUTE COUNT (BEAKER) (test  19.87 K/ L  1.78-5.38  



 code=670)      

 

 LYMPHOCYTES ABSOLUTE COUNT (BEAKER) (test  0.73 K/ L  1.32-3.57  



 code=414)      

 

 MONOCYTES ABSOLUTE COUNT (BEAKER) (test code=415)  2.16 K/ L  0.30-0.82  

 

 EOSINOPHILS ABSOLUTE COUNT (BEAKER) (test  0.06 K/ L  0.04-0.54  



 code=416)      

 

 BASOPHILS ABSOLUTE COUNT (BEAKER) (test code=417)  0.04 K/ L  0.01-0.08  

 

 IMMATURE GRANULOCYTES-RELATIVE PERCENT (BEAKER)  1 %  0-1  



 (test code=2801)      



POCT-GLUCOSE [id number] 23:34:00





 Test Item  Value  Reference Range  Comments

 

 POC-GLUCOSE METER (BEAKER)  94 mg/dL    TESTED AT [facility] [address]



 (test pnwf=5158)      [facility] [address]

## 2019-11-25 NOTE — EDPHYS
Physician Documentation                                                                           

 [facility]                                                                 

Name: Omkar Chung                                                                                  

Age: 59 yrs                                                                                       

Sex: Male                                                                                         

: 1960                                                                                   

MRN: [id number]                                                                                   

Arrival Date: 2019                                                                          

Time: 07:39                                                                                       

Account#: [id number]                                                                            

Bed 19                                                                                            

Private MD:                                                                                       

ED Physician Joshua Gill                                                                      

HPI:                                                                                              

                                                                                             

07:56 This 59 yrs old  Male presents to ER via EMS with complaints of Dizziness.     ps1 

07:56 Patient has a history ofr DM2, COPD, Cirrhosis. States that he has been lightheaded,    ps1 

      dizzy, and fatigued. Appears to be somnolent. States that he takes lactulose and has        

      been regular. Reportedly had BM this morning. No other complaints. .                        

                                                                                                  

Historical:                                                                                       

- Allergies:                                                                                      

07:42 No Known Allergies;                                                                     bp  

- Home Meds:                                                                                      

07:42 Furosemide Oral [Active];                                                               bp  

- PMHx:                                                                                           

07:42 Cirrhosis; COPD; Hernia;                                                                bp  

                                                                                                  

- Immunization history:: Adult Immunizations up to date.                                          

- Social history:: Smoking status: unknown.                                                       

- Ebola Screening: : No symptoms or risks identified at this time.                                

                                                                                                  

                                                                                                  

ROS:                                                                                              

07:56 Constitutional: Negative for fever, chills, and weight loss, Eyes: Negative for injury, ps1 

      pain, redness, and discharge, Cardiovascular: Negative for chest pain, palpitations,        

      and edema, Respiratory: Negative for shortness of breath, cough, wheezing, and              

      pleuritic chest pain, MS/Extremity: Negative for injury and deformity, Skin: Negative       

      for injury, rash, and discoloration.                                                        

07:56 Neuro: Positive for fatigue, lightheaded, and dizziness.                                    

                                                                                                  

Exam:                                                                                             

10:19 Constitutional:  This is a well developed, well nourished patient who is awake, alert,  ps1 

      and in no acute distress. Head/Face:  Normocephalic, atraumatic. Eyes:  Pupils equal        

      round and reactive to light, extra-ocular motions intact.  Lids and lashes normal.          

      Conjunctiva and sclera are non-icteric and not injected. Chest/axilla:  Normal chest        

      wall appearance and motion.  Nontender with no deformity.  No lesions are appreciated.      

10:19 Cardiovascular: Rate: tachycardic, Rhythm: regular, Pulses: no pulse deficits are           

      appreciated.                                                                                

10:19 Abdomen/GI: Inspection: distension, that is mild, Bowel sounds: normal, Palpation:          

      abdomen is soft and non-tender, Liver: is enlarged.                                         

                                                                                                  

Vital Signs:                                                                                      

07:43  / 86; Pulse 107; Resp 15; Temp 97.6(O); Pulse Ox 96% on R/A; Weight 81.65 kg;    dh3 

      Height 6 ft. 2 in. (187.96 cm); Pain 0/10;                                                  

09:30  / 82; Pulse 109; Resp 17; Pulse Ox 97% ;                                         bp  

10:35  / 88; Pulse 108; Resp 23; Pulse Ox 97% ;                                         bp  

11:32  / 86; Pulse 108; Resp 20; Pulse Ox 97% ;                                         bp  

07:43 Body Mass Index 23.11 (81.65 kg, 187.96 cm)                                             3 

                                                                                                  

MDM:                                                                                              

08:51 Patient medically screened.                                                             ps1 

10:41 Data reviewed: vital signs, nurses notes, lab test result(s), and as a result, I will   ps1 

      give lactulose. Attempt to get urine. Patient refusing to give sample. Tried multiple       

      times to address patient refusal but not giving good answer. Will give fluids and           

      reassess. .                                                                                 

12:37 Counseling: I had a detailed discussion with the patient and/or guardian regarding: the ps1 

      historical points, exam findings, and any diagnostic results supporting the                 

      discharge/admit diagnosis, the presence of at least one elevated blood pressure reading     

      (>120/80) during this emergency department visit, lab results, the need for further         

      work-up and treatment in the hospital, to return to the emergency department if             

      symptoms worsen or persist or if there are any questions or concerns that arise at          

      home. Refusal of service: The patient/guardian displays adequate decision making            

      capability and despite a detailed discussion of alternatives, benefits, risks, and          

      consequences refuses: Admission to the hospital for further work-up and treatment, all      

      lab tests, Had Dr. Canales evaluate the patient for capacity. Patient is leaving [facility]       

      despite discussing at length disability up and to death from medical conditions. Family     

      at bedside to take patient. Encouraged the patient to return if he changes his mind. .      

                                                                                                  

                                                                                             

07:52 Order name: Glucose, Ancillary Testing; Complete Time: 07:55                            EDMS

                                                                                             

07:55 Order name: Acetaminophen                                                               ps1 

                                                                                             

07:55 Order name: Basic Metabolic Panel                                                       ps1 

                                                                                             

07:55 Order name: CBC with Diff                                                               ps1 

                                                                                             

07:55 Order name: ETOH Level; Complete Time: 10:38                                            ps1 

                                                                                             

07:55 Order name: Hepatic Function; Complete Time: 09:33                                      [facility] [place] 

                                                                                             

07:55 Order name: PT-INR; Complete Time: 08:50                                                [facility] [place] 

                                                                                             

07:55 Order name: Salicylate; Complete Time: 09:33                                            ps1 

                                                                                             

07:56 Order name: AMMONIA; Complete Time: 10:38                                               ps1 

                                                                                             

07:57 Order name: Acetaminophen Level; Complete Time: 09:33                                   EDMS

                                                                                             

07:57 Order name: Basic Metabolic Panel; Complete Time: 09:33                                 EDMS

                                                                                             

07:57 Order name: CBC with Automated Diff                                                     EDMS

                                                                                             

08:51 Order name: CBC Smear Scan                                                              EDMS

                                                                                             

07:55 Order name: EKG; Complete Time: 07:57                                                   [facility] [place] 

                                                                                             

07:55 Order name: EKG - Nurse/Tech; Complete Time: 08:18                                      ps1 

                                                                                             

07:55 Order name: IV Saline Lock; Complete Time: 08:18                                        [facility] [place] 

                                                                                             

07:55 Order name: Labs collected and sent; Complete Time: 08:18                               [facility] [place] 

                                                                                             

07:56 Order name: CXR XRAY; Complete Time: 10:49                                              ps1 

                                                                                                  

Administered Medications:                                                                         

10:34 Drug: NS 0.9% 1000 ml Route: IV; Rate: 1000 ml; Site: right antecubital;                bp  

12:41 Follow up: IV Status: Completed infusion; IV Intake: 1000ml                             la1 

10:35 Drug: Lactulose 20 grams Volume: 30 ml; Route: PO;                                      bp  

12:41 Follow up: Response: No adverse reaction                                                la1 

                                                                                                  

                                                                                                  

Disposition:                                                                                      

19 12:41 Patient has left against medical advice. Impression: Lightheaded, Dizziness,       

  Elevated Ammonia Level, Tachycardia. - Patients states they are going to Home.                  

- Condition is Fair.                                                                              

- Discharge Instructions: Hepatic Encephalopathy.                                                 

                                                                                                  

                                                                                                  

Follow up: Private Physician; When: As needed; Reason: Recheck today's complaints,                

  Continuance of care, Re-evaluation by your physician. Follow up: Emergency                      

  Department; When: As needed; Reason: Worsening of condition, Further diagnostic                 

  work-up, Continuance of care.                                                                   

- Problem is an ongoing problem.                                                                  

- Symptoms are unchanged.                                                                         

                                                                                                  

                                                                                                  

                                                                                                  

Signatures:                                                                                       

[facility]                           EDMS                                                 

Vipul Nunn RN                         RN   la1                                                  

Kvng Parekh RN                      RN   bp                                                   

Joshua Gill MD MD   ps1                                                  

                                                                                                  

Corrections: (The following items were deleted from the chart)                                    

12:42 12:41 2019 12:41 Patients has left against medical advice. Impression:            la1 

      Lightheaded; Dizziness; Elevated Ammonia Level; Tachycardia. Patient states they are        

      going to Home. Condition is Fair. Follow up: Private Physician; When: As needed;            

      Reason: Recheck today's complaints, Continuance of care, Re-evaluation by your              

      physician. Follow up: Emergency Department; When: As needed; Reason: Worsening of           

      condition, Further diagnostic work-up, Continuance of care. Problem is an ongoing           

      problem. Symptoms are unchanged. ps1                                                        

                                                                                                  

**************************************************************************************************

## 2019-11-25 NOTE — ER
Nurse's Notes                                                                                     

 [facility]                                                                 

Name: Omkar Chung                                                                                  

Age: 59 yrs                                                                                       

Sex: Male                                                                                         

: 1960                                                                                   

MRN: [id number]                                                                                   

Arrival Date: 2019                                                                          

Time: 07:39                                                                                       

Account#: [id number]                                                                            

Bed 19                                                                                            

Private MD:                                                                                       

Diagnosis: Lightheaded;Dizziness;Elevated Ammonia Level;Tachycardia                               

                                                                                                  

Presentation:                                                                                     

                                                                                             

07:39 Presenting complaint: EMS states: SUBJECTIVELY LOW BGL, GENERALIZED WEAKNESS.           bp  

      Transition of care: patient was not received from another setting of care. Onset of         

      symptoms was 2019 at 06:00. Risk Assessment: Do you want to hurt yourself      

      or someone else? Patient reports no desire to harm self or others. Initial Sepsis           

      Screen: Does the patient meet any 2 criteria? HR > 90 bpm. Does the patient have a          

      suspected source of infection? Yes: Productive cough/pneumonia. Care prior to arrival:      

      Glucose check: 225.                                                                         

07:39 Method Of Arrival: EMS: [facility]                                                    bp  

07:39 Acuity: BROCK 3                                                                           bp  

                                                                                                  

Triage Assessment:                                                                                

07:42 General: Appears in no apparent distress. comfortable, Behavior is cooperative,         bp  

      appropriate for age, anxious. Pain: Denies pain. EENT: No deficits noted. Neuro: Level      

      of Consciousness is awake, alert, obeys commands, Oriented to person, place, time,          

      situation, Appropriate for age Reports dizziness, weakness GENERALIZED. Cardiovascular:     

      No deficits noted. Respiratory: No deficits noted. GI: No signs and/or symptoms were        

      reported involving the gastrointestinal system. : No signs and/or symptoms were           

      reported regarding the genitourinary system. Derm: No deficits noted. Musculoskeletal:      

      No deficits noted.                                                                          

                                                                                                  

Historical:                                                                                       

- Allergies:                                                                                      

07:42 No Known Allergies;                                                                     bp  

- Home Meds:                                                                                      

07:42 Furosemide Oral [Active];                                                               bp  

- PMHx:                                                                                           

07:42 Cirrhosis; COPD; Hernia;                                                                bp  

                                                                                                  

- Immunization history:: Adult Immunizations up to date.                                          

- Social history:: Smoking status: unknown.                                                       

- Ebola Screening: : No symptoms or risks identified at this time.                                

                                                                                                  

                                                                                                  

Screenin:44 Abuse screen: Denies threats or abuse. Denies injuries from another. Nutritional        bp  

      screening: No deficits noted. Tuberculosis screening: No symptoms or risk factors           

      identified. Fall Risk None identified.                                                      

                                                                                                  

Assessment:                                                                                       

07:44 General: SEE TRIAGE NOTE.                                                               bp  

10:07 Reassessment: PT REFUSING MEDICAL CARE AT THIS TIME, MD AT B/S.                         bp  

10:37 Reassessment: PT CONTINUING TO REFUSE TO PROVIDE URINE SPECIMEN, IVF INFUSING.          bp  

11:31 Reassessment: PT REFUSING VERBAL REDIRECTION, EXITED BED AND D/C OWN PIV AND MONITOR.   bp  

11:46 Reassessment: PT REFUSING TO STAY IN BED AND ROOM, REQUIRING REDIRECTION.               bp  

12:12 Reassessment: PT REFUSING FURTHER HEALTH CARE, MD INFORMED. PT MOTHER CONTACTED FOR     bp  

      RIDE HOME.                                                                                  

12:38 Reassessment: PER ATTENDING, PT HAS CAPACITY TO REFUSE TREATMENT. PT SIGNED OUT AMA,    la1 

      REFUSING FURTHER TREATMENT. PT URGED TO STAY BUT REFUSED. PT AMBULATED TO FAMILY WITH       

      STEADY GAIT, AOx4, NO ATAXIA.                                                               

                                                                                                  

Vital Signs:                                                                                      

07:43  / 86; Pulse 107; Resp 15; Temp 97.6(O); Pulse Ox 96% on R/A; Weight 81.65 kg;    3 

      Height 6 ft. 2 in. (187.96 cm); Pain 0/10;                                                  

09:30  / 82; Pulse 109; Resp 17; Pulse Ox 97% ;                                         bp  

10:35  / 88; Pulse 108; Resp 23; Pulse Ox 97% ;                                         bp  

11:32  / 86; Pulse 108; Resp 20; Pulse Ox 97% ;                                         bp  

07:43 Body Mass Index 23.11 (81.65 kg, 187.96 cm)                                             3 

                                                                                                  

ED Course:                                                                                        

07:39 Patient arrived in ED.                                                                  bp  

07:40 Joshua Gill MD is Attending Physician.                                             ps1 

07:41 Triage completed.                                                                       bp  

07:42 Arm band placed on.                                                                     bp  

07:44 Patient has correct armband on for positive identification. Bed in low position. Call   bp  

      light in reach. Side rails up X2. Cardiac monitor on. Pulse ox on. NIBP on.                 

08:08 Kvng Parekh, RN is Primary Nurse.                                                    bp  

08:15 Initial lab(s) drawn, by me, sent to lab. Inserted saline lock: 20 gauge in right       3 

      antecubital area, using aseptic technique. Blood collected.                                 

08:28 CXR XRAY In Process Unspecified.                                                        EDMS

08:35 EKG done, by EKG tech. reviewed by Joshua Gill MD.                                   3 

08:40 Missed attempt(s): 20 gauge in left antecubital area. Bleeding controlled, band aid     dh3 

      applied, catheter tip intact.                                                               

10:20 Bladder scan completed. 347mL.                                                          dh3 

10:36 IV discontinued, intact, bleeding controlled, No redness/swelling at site. Pressure     bp  

      dressing applied, INFILTRATED.                                                              

10:44 Inserted saline lock: 22 gauge in right hand, using aseptic technique.                  bp  

12:40 No provider procedures requiring assistance completed. IV discontinued, intact,         la1 

      bleeding controlled, No redness/swelling at site. Pressure dressing applied.                

                                                                                                  

Administered Medications:                                                                         

10:34 Drug: NS 0.9% 1000 ml Route: IV; Rate: 1000 ml; Site: right antecubital;                bp  

12:41 Follow up: IV Status: Completed infusion; IV Intake: 1000ml                             la1 

10:35 Drug: Lactulose 20 grams Volume: 30 ml; Route: PO;                                      bp  

12:41 Follow up: Response: No adverse reaction                                                la1 

                                                                                                  

                                                                                                  

Intake:                                                                                           

12:41 IV: 1000ml; Total: 1000ml.                                                              la1 

                                                                                                  

Outcome:                                                                                          

12:40 AMA AMA form signed                                                                     la1 

12:40 Condition: stable                                                                           

12:40 Instructed on follow up and referral plans.                                                 

12:42 Patient left the ED.                                                                    la1 

                                                                                                  

Signatures:                                                                                       

Dispatcher [employer]                           EDMS                                                 

Vipul Nunn RN                         RN   la1                                                  

Elodia Whiting                              3                                                  

Kvng Parekh RN                      RN   bp                                                   

Joshua Gill MD MD   [facility]                                                  

Laura Guzman                              3                                                  

                                                                                                  

Corrections: (The following items were deleted from the chart)                                    

09:16 08:15 Inserted saline lock: 20 gauge in left antecubital area, using aseptic technique. dh3 

      Blood collected. dh3                                                                        

                                                                                                  

**************************************************************************************************

## 2019-11-26 NOTE — CON
Reason For Consultation:  Patient is a 26-year-old  male, who was brought to the ER on accou
nt of worsening bizarre behavior, hallucination, and __________ change.  Psychiatry has been consulte
d to evaluate patient for possible first psychotic break.



History Of Present Illness:  This is a young 26-year-old  male __________ by mom, who states
 that probably about 6 months ago, she started observing changes in patient's personality.  She state
s that patient has become more withdrawn, spending time all by himself in his room with bizarre behav
ior.  At times, she observed __________ to imaginary friends when he is in his room __________ rebecca nunes the room.  She states that patient hardly goes out of the house __________ socially.  Patient is __
________.  She states that twice at night while she was asleep she was woken up __________ by the bed
side and that has really freaked her out.  She states she is worried and afraid noting that her young
est son __________ mom and dad  mysteriously __________ and his brother was __________ diagnosed 
with __________ psychiatric physician and is on medication.  Patient __________ psychiatric issues.  
Denies auditory/visual hallucinations.  Denies depressive symptoms.  __________ history of __________
 symptoms.  Patient denies substance abuse __________ no history of head injury.  No history of _____
_____ medical conditions.  Patient is not on any psychrotrophic medication.  __________ with his brot
her.  He was admitted as acute psychiatric inpatient __________ and was started on __________, which 
he refused to take and was discharged.  No history of __________.  Patient is not , has no kid
s, lives with his mom.



Physical Examination:

Vital Signs:  Blood pressure 147/97, respiratory rate is 18, pulse rate is 102, temperature is 98.3, 
O2 saturation on room air is 90%.  Weight is 81.65 kg, height is 5 feet __________.



Laboratory Data:  CT brain negative for any acute bleed.  __________ is negative for __________.



Mental Status Examination:  Patient is a well-nourished  male __________ laughing inappropri
ately __________ fasciculations __________ alert and oriented x3 __________ eye contact.  No stereoty
pic movements observed.  Concentration and memory limited.  Mood __________.  Affect is dysphoric and
 congruent.  Thought process is linear, tangential thoughts.  Thought content __________ thought cont
ent __________.  Patient also has bizarre behavior __________ with no acute change __________ prior t
o discharge __________ he had diagnosis of schizophrenia __________.



Diagnoses:  Schizophrenia __________ psychotic disorder, unspecified.



Plan:  

1.Recommend hospitalization for __________.

2.Recommend __________.

3.Discussed recommendation with __________ physician as well as patient's mom __________.





JILLIAN/MODL

DD:  2019 20:28:09Voice ID:  [id number]

DT:  2019 00:01:27Report ID:  [id number]

## 2019-11-28 ENCOUNTER — HOSPITAL ENCOUNTER (INPATIENT)
Dept: HOSPITAL 97 - ER | Age: 59
LOS: 2 days | Discharge: HOME | DRG: 442 | End: 2019-11-30
Attending: INTERNAL MEDICINE | Admitting: FAMILY MEDICINE
Payer: MEDICARE

## 2019-11-28 VITALS — BODY MASS INDEX: 25.3 KG/M2

## 2019-11-28 DIAGNOSIS — D69.6: ICD-10-CM

## 2019-11-28 DIAGNOSIS — R00.0: ICD-10-CM

## 2019-11-28 DIAGNOSIS — R73.03: ICD-10-CM

## 2019-11-28 DIAGNOSIS — K72.00: Primary | ICD-10-CM

## 2019-11-28 DIAGNOSIS — Z79.52: ICD-10-CM

## 2019-11-28 DIAGNOSIS — F41.9: ICD-10-CM

## 2019-11-28 DIAGNOSIS — K70.30: ICD-10-CM

## 2019-11-28 DIAGNOSIS — J44.1: ICD-10-CM

## 2019-11-28 DIAGNOSIS — N39.0: ICD-10-CM

## 2019-11-28 LAB
ALBUMIN SERPL BCP-MCNC: 3.1 G/DL (ref 3.4–5)
ALP SERPL-CCNC: 116 U/L (ref 45–117)
ALT SERPL W P-5'-P-CCNC: 43 U/L (ref 12–78)
AST SERPL W P-5'-P-CCNC: 28 U/L (ref 15–37)
BLD SMEAR INTERP: (no result)
BUN BLD-MCNC: 13 MG/DL (ref 7–18)
CKMB CREATINE KINASE MB: 11.4 NG/ML (ref 0.3–3.6)
COHGB MFR BLDA: 1.6 % (ref 0–1.5)
GLUCOSE SERPLBLD-MCNC: 179 MG/DL (ref 74–106)
HCT VFR BLD CALC: 42.5 % (ref 39.6–49)
INR BLD: 1.24
LIPASE SERPL-CCNC: 221 U/L (ref 73–393)
LYMPHOCYTES # SPEC AUTO: 0.7 K/UL (ref 0.7–4.9)
METHAMPHET UR QL SCN: NEGATIVE
MORPHOLOGY BLD-IMP: (no result)
OXYHGB MFR BLDA: 93.7 % (ref 94–97)
PMV BLD: 8 FL (ref 7.6–11.3)
POTASSIUM SERPL-SCNC: 3.9 MMOL/L (ref 3.5–5.1)
RBC # BLD: 4.67 M/UL (ref 4.33–5.43)
SAO2 % BLDA: 95.6 % (ref 92–98.5)
THC SERPL-MCNC: NEGATIVE NG/ML
TROPONIN (EMERG DEPT USE ONLY): < 0.02 NG/ML (ref 0–0.04)
UA COMPLETE W REFLEX CULTURE PNL UR: (no result)
UA DIPSTICK W REFLEX MICRO PNL UR: (no result)

## 2019-11-28 PROCEDURE — 83690 ASSAY OF LIPASE: CPT

## 2019-11-28 PROCEDURE — 71045 X-RAY EXAM CHEST 1 VIEW: CPT

## 2019-11-28 PROCEDURE — 80048 BASIC METABOLIC PNL TOTAL CA: CPT

## 2019-11-28 PROCEDURE — 96361 HYDRATE IV INFUSION ADD-ON: CPT

## 2019-11-28 PROCEDURE — 84484 ASSAY OF TROPONIN QUANT: CPT

## 2019-11-28 PROCEDURE — 80320 DRUG SCREEN QUANTALCOHOLS: CPT

## 2019-11-28 PROCEDURE — 96365 THER/PROPH/DIAG IV INF INIT: CPT

## 2019-11-28 PROCEDURE — 83605 ASSAY OF LACTIC ACID: CPT

## 2019-11-28 PROCEDURE — 96366 THER/PROPH/DIAG IV INF ADDON: CPT

## 2019-11-28 PROCEDURE — 80307 DRUG TEST PRSMV CHEM ANLYZR: CPT

## 2019-11-28 PROCEDURE — 84439 ASSAY OF FREE THYROXINE: CPT

## 2019-11-28 PROCEDURE — 93005 ELECTROCARDIOGRAM TRACING: CPT

## 2019-11-28 PROCEDURE — 81015 MICROSCOPIC EXAM OF URINE: CPT

## 2019-11-28 PROCEDURE — 80329 ANALGESICS NON-OPIOID 1 OR 2: CPT

## 2019-11-28 PROCEDURE — 85730 THROMBOPLASTIN TIME PARTIAL: CPT

## 2019-11-28 PROCEDURE — 80076 HEPATIC FUNCTION PANEL: CPT

## 2019-11-28 PROCEDURE — 81003 URINALYSIS AUTO W/O SCOPE: CPT

## 2019-11-28 PROCEDURE — 80053 COMPREHEN METABOLIC PANEL: CPT

## 2019-11-28 PROCEDURE — 87040 BLOOD CULTURE FOR BACTERIA: CPT

## 2019-11-28 PROCEDURE — 36415 COLL VENOUS BLD VENIPUNCTURE: CPT

## 2019-11-28 PROCEDURE — 94640 AIRWAY INHALATION TREATMENT: CPT

## 2019-11-28 PROCEDURE — 84145 PROCALCITONIN (PCT): CPT

## 2019-11-28 PROCEDURE — 99285 EMERGENCY DEPT VISIT HI MDM: CPT

## 2019-11-28 PROCEDURE — 82947 ASSAY GLUCOSE BLOOD QUANT: CPT

## 2019-11-28 PROCEDURE — 96375 TX/PRO/DX INJ NEW DRUG ADDON: CPT

## 2019-11-28 PROCEDURE — 96360 HYDRATION IV INFUSION INIT: CPT

## 2019-11-28 PROCEDURE — 82550 ASSAY OF CK (CPK): CPT

## 2019-11-28 PROCEDURE — 83735 ASSAY OF MAGNESIUM: CPT

## 2019-11-28 PROCEDURE — 83010 ASSAY OF HAPTOGLOBIN QUANT: CPT

## 2019-11-28 PROCEDURE — 80061 LIPID PANEL: CPT

## 2019-11-28 PROCEDURE — 84443 ASSAY THYROID STIM HORMONE: CPT

## 2019-11-28 PROCEDURE — 82805 BLOOD GASES W/O2 SATURATION: CPT

## 2019-11-28 PROCEDURE — 85610 PROTHROMBIN TIME: CPT

## 2019-11-28 PROCEDURE — 83615 LACTATE (LD) (LDH) ENZYME: CPT

## 2019-11-28 PROCEDURE — 82553 CREATINE MB FRACTION: CPT

## 2019-11-28 PROCEDURE — 87205 SMEAR GRAM STAIN: CPT

## 2019-11-28 PROCEDURE — 70450 CT HEAD/BRAIN W/O DYE: CPT

## 2019-11-28 PROCEDURE — 83036 HEMOGLOBIN GLYCOSYLATED A1C: CPT

## 2019-11-28 PROCEDURE — 85025 COMPLETE CBC W/AUTO DIFF WBC: CPT

## 2019-11-28 PROCEDURE — 82140 ASSAY OF AMMONIA: CPT

## 2019-11-28 RX ADMIN — IPRATROPIUM BROMIDE SCH MG: 0.5 SOLUTION RESPIRATORY (INHALATION) at 15:10

## 2019-11-28 RX ADMIN — LACTULOSE SCH GM: 20 SOLUTION ORAL at 22:01

## 2019-11-28 RX ADMIN — ALBUTEROL SULFATE SCH MG: 2.5 SOLUTION RESPIRATORY (INHALATION) at 19:56

## 2019-11-28 RX ADMIN — LACTULOSE SCH: 20 SOLUTION ORAL at 14:00

## 2019-11-28 RX ADMIN — SODIUM CHLORIDE SCH MLS: 0.45 INJECTION, SOLUTION INTRAVENOUS at 14:55

## 2019-11-28 RX ADMIN — ALBUTEROL SULFATE SCH MG: 2.5 SOLUTION RESPIRATORY (INHALATION) at 15:10

## 2019-11-28 RX ADMIN — LACTULOSE SCH GM: 20 SOLUTION ORAL at 17:41

## 2019-11-28 RX ADMIN — METHYLPREDNISOLONE SODIUM SUCCINATE SCH MG: 40 INJECTION, POWDER, FOR SOLUTION INTRAMUSCULAR; INTRAVENOUS at 17:42

## 2019-11-28 RX ADMIN — IPRATROPIUM BROMIDE SCH MG: 0.5 SOLUTION RESPIRATORY (INHALATION) at 19:56

## 2019-11-28 RX ADMIN — MOMETASONE FUROATE AND FORMOTEROL FUMARATE DIHYDRATE SCH PUFF: 100; 5 AEROSOL RESPIRATORY (INHALATION) at 20:53

## 2019-11-28 RX ADMIN — CEFEPIME SCH MLS: 1 INJECTION, POWDER, FOR SOLUTION INTRAMUSCULAR; INTRAVENOUS at 20:53

## 2019-11-28 RX ADMIN — Medication SCH ML: at 20:54

## 2019-11-28 NOTE — RAD REPORT
EXAM DESCRIPTION:  Markos Single View11/28/2019 9:44 am

 

CLINICAL HISTORY:  Chest pain

 

COMPARISON:  November 25, 2019

 

FINDINGS:  No change in a small left pleural effusion with left basilar atelectasis

 

Lungs are hyperaerated.

 

Right lung appears clear

 

The heart is normal size

## 2019-11-28 NOTE — ER
Nurse's Notes                                                                                     

 [facility]                                                                 

Name: Omkar Chung                                                                                  

Age: 59 yrs                                                                                       

Sex: Male                                                                                         

: 1960                                                                                   

MRN: [id number]                                                                                   

Arrival Date: 2019                                                                          

Time: 09:05                                                                                       

Account#: [id number]                                                                            

Bed 3                                                                                             

Private MD:                                                                                       

Diagnosis: Altered mental status, unspecified;Alkalosis                                           

                                                                                                  

Presentation:                                                                                     

                                                                                             

09:05 Presenting complaint: EMS states: pt from home, wife states this morning he is altered. tw2 

      pt refused to come with us at first, after 20 minutes he agreed to come with us, he is      

      alert to name only. hx: liver disease, copd, hernia, does use home 02 at home but was       

      not on it when we arrived, o2 sat 97%, bgl 178. Transition of care: patient was not         

      received from another setting of care. Onset of symptoms was 2019. Risk        

      Assessment: Do you want to hurt yourself or someone else? Patient reports no desire to      

      harm self or others. Care prior to arrival: None.                                           

09:05 Method Of Arrival: EMS: [facility]                                                    tw2 

09:05 Acuity: BROCK 2                                                                           tw2 

09:08 Initial Sepsis Screen: Does the patient meet any 2 criteria? Altered Mental Status. HR  tw2 

      > 90 bpm. Initial Sepsis Screen: Does the patient meet any 2 criteria? Does the patient     

      have a suspected source of infection?                                                       

                                                                                                  

Triage Assessment:                                                                                

09:09 General: Appears in no apparent distress. Behavior is calm. Pain: Denies pain. Neuro:   tw2 

      Level of Consciousness is awake, Oriented to person.                                        

                                                                                                  

Historical:                                                                                       

- Allergies:                                                                                      

09:20 No Known Allergies;                                                                     tw2 

- Home Meds:                                                                                      

09:20 Furosemide Oral [Active];                                                               tw2 

- PMHx:                                                                                           

09:20 COPD; Hernia; Cirrhosis;                                                                tw2 

                                                                                                  

- Immunization history:: Adult Immunizations.                                                     

- Social history:: Smoking status: .                                                              

- Ebola Screening: : Patient denies exposure to infectious person.                                

                                                                                                  

                                                                                                  

Screenin:38 Abuse screen: Denies threats or abuse. Nutritional screening: No deficits noted.        tw2 

      Tuberculosis screening: No symptoms or risk factors identified. Fall Risk Secondary         

      diagnosis (15 points) impaired mobility.                                                    

                                                                                                  

Assessment:                                                                                       

09:15 General: Appears ill, well groomed, well developed, well nourished, Behavior is         sg  

      cooperative, drowsy, quiet. Pain: Denies pain. Neuro: Level of Consciousness is awake,      

      obeys commands, confused, obtunded, Oriented to person, situation. Cardiovascular:          

      Heart tones S1 S2 present Patient's skin is warm and dry. Chest pain is denied.             

      Respiratory: Airway is patent Respiratory effort is even, unlabored, Respiratory            

      pattern is regular, symmetrical. Respiratory: Reports cough that is productive, dry,        

      Breath sounds are diminished in right posterior lower lobe. GI: Abdomen is round            

      distended, Parent/caregiver reports the patient having tolerance of food, tolerance of      

      fluids. : No signs and/or symptoms were reported regarding the genitourinary system.      

      EENT: No signs and/or symptoms were reported regarding the EENT system. Derm: Skin is       

      pale, bruised to BUE,BLE. Derm: Bruising that is dark purple, green, yellow,                

      Parent/caregiver reports the patient having frequent falls at home for a week or two,       

      has abrasions and skin tears to feet, hands, and arms, dressings noted applied by           

      family member and EMS at home per pt family. Musculoskeletal: Circulation, motion, and      

      sensation intact.                                                                           

10:01 Reassessment: Patient appears in no apparent distress at this time. Patient and/or      tw2 

      family updated on plan of care and expected duration. Pain level reassessed.                

11:10 Reassessment: Patient appears in no apparent distress at this time. Katelin MEDINA at        sg  

      bedside for updated on need for straight cath to obtain urine sample, pt reports " I do     

      not want a tube in my penis. That's not happening, I dont need it, I can pee when Im        

      ready." Chavo BA notified, pt receiving IV fluids at this time will attempt to           

      obtain a urine specimen when pt reports needing to pee.                                     

11:14 Reassessment: Patient appears in no apparent distress at this time. RT at bedside.      ca1 

11:49 Reassessment: Patient appears in no apparent distress at this time. pt reports feeling  sg  

      anxious and would like to leave, pt offered medication, pt medicated, reports will try      

      and lay in bed, will continue to monitor. General: Behavior is cooperative, agitated,       

      anxious.                                                                                    

12:30 Reassessment: Patient appears in no apparent distress at this time. pt drowsy but       sg  

      reports feeling relaxed, awaiting a bed assignment at this time.                            

12:50 Reassessment: Patient appears in no apparent distress at this time. pt spouse reports   sg  

      need to leave for errands, contact number for Olga is [phone].                        

13:30 Reassessment: Patient appears in no apparent distress at this time. Patient and/or      sg  

      family updated on plan of care and expected duration. Pain level reassessed. pt supine      

      in bed at this time, eyes closed with resp that are even and unlabored, pt tolerating       

      IV abx at this time, no s/s of reaction noted, pt to go to ICU.                             

                                                                                                  

Vital Signs:                                                                                      

09:05  / 90; Pulse 103; Resp 20; Temp 97.5(TE); Pulse Ox 96% on R/A; Weight 83.91 kg    tw2 

      (R);                                                                                        

10:00  / 88; Pulse 100; Resp 17; Pulse Ox 98% on R/A;                                   tw2 

11:10  / 74; Pulse 100; Resp 22; Pulse Ox 99% on R/A;                                   ca1 

12:56 BP 98 / 64; Pulse 94; Resp 17; Pulse Ox 97% on R/A;                                     sg  

13:32 BP 92 / 69; Pulse 92; Resp 16; Pulse Ox 97% on R/A;                                     sg  

                                                                                                  

ED Course:                                                                                        

09:05 Patient arrived in ED.                                                                  ss  

09:05 Bed in low position. Call light in reach. Side rails up X2. Cardiac monitor on. Pulse   tw2 

      ox on. NIBP on. Warm blanket given.                                                         

09:07 Triage completed.                                                                       tw2 

09:07 Arm band placed on.                                                                     tw2 

09:08 Jorge David PA is PHCP.                                                               jr8 

09:08 Chris Canales MD is Attending Physician.                                             jr8 

09:12 Missed attempt(s): 22 gauge in right forearm. Bleeding controlled, band aid applied,    sg  

      catheter tip intact.                                                                        

09:13 Missed attempt(s): 20 gauge in left antecubital area. Bleeding controlled, band aid     sg  

      applied, catheter tip intact.                                                               

09:35 CT completed. Patient tolerated procedure well. Patient moved back from CT.             mw3 

09:35 EKG done, by ED staff, reviewed by Jorge BA.                                      jb1 

09:39 Chidi Oneill, CAROL is Primary Nurse.                                                       sg  

10:05 Missed attempt(s): 22 gauge in left hand. Bleeding controlled, band aid applied,        tw2 

      catheter tip intact. Inserted saline lock: 20 gauge in left antecubital area, using         

      aseptic technique. ,using aseptic technique. flushes well, no blood return, charge CAROL Gilbert RN notified of need for blood at this time.                                          

11:15 Jovany Ovalles DO is Hospitalizing Provider.                                           jr8 

12:56 Admitting physician to see patient.  at bedside with patient at this time.     sg  

14:00 Notified primary nurse of 2 hr follow up lactate is 3.1.                                sg  

14:13 No provider procedures requiring assistance completed. Patient admitted, IV remains in  ss  

      place. intact, No redness/swelling at site.                                                 

                                                                                                  

Administered Medications:                                                                         

09:09 Not Given (Physician Discretion): NS 0.9% (30 ml/kg) 30 ml/kg IV at bolus once; Sepsis  jr8 

      Protocol                                                                                    

11:00 Drug: NS 0.9% (30 ml/kg) 30 ml/kg Route: IV; Rate: bolus; Site: left antecubital;       ca1 

13:45 Follow up: Response: No adverse reaction; IV Status: Completed infusion; IV Intake:     sg  

      2517ml                                                                                      

11:02 Drug: Lactulose 20 grams Volume: 30 ml; Route: PO;                                      ca1 

12:30 Follow up: Response: No adverse reaction                                                sg  

11:25 Drug: Cefepime 1 grams Route: IVPB; Rate: 200 ml/hr; Infused Over: 30 mins; Site: left  sg  

      antecubital;                                                                                

11:30 Follow up: Response: No adverse reaction; IV Status: Completed infusion; given slow IVP sg  

      as instructed per pharmacy                                                                  

11:42 Drug: vancoMYCIN 1 grams Route: IVPB; Infused Over: 2 hrs; Site: left antecubital;      sg  

13:45 Follow up: Response: No adverse reaction; IV Status: Completed infusion                 sg  

11:42 Drug: Ativan 2 mg Route: IVP; Site: left antecubital;                                   sg  

12:43 Follow up: Response: No adverse reaction                                                sg  

                                                                                                  

                                                                                                  

Intake:                                                                                           

13:45 IV: 2517ml; Total: 2517ml.                                                              sg  

                                                                                                  

Outcome:                                                                                          

11:16 Decision to Hospitalize by Provider.                                                    jr8 

14:13 Admitted to ICU accompanied by nurse, family with patient, via stretcher, room 2, on    ss  

      monitor, with chart, Report called to  Farzaneh MEDINA                                              

14:13 Condition: good                                                                             

14:13 Instructed on the need for admit, safety practices, Demonstrated understanding of           

      instructions.                                                                               

14:38 Patient left the ED.                                                                    sg  

                                                                                                  

Signatures:                                                                                       

Michael Nicolas                                 jb1                                                  

Chidi Oneill, RN                         RN   sg                                                   

Ofelia Yañez RN                      RN   ss                                                   

Jorge David PA PA   jr8                                                  

Dianne Monroe RN                          RN   tw2                                                  

Leslie Rosas                             mw3                                                  

Kaetlin Villatoro RN                        RN   ca1                                                  

                                                                                                  

Corrections: (The following items were deleted from the chart)                                    

09:07 09:05 Acuity: BROCK 3 tw2                                                                 tw2 

09:09 09:05 Initial Sepsis Screen: Does the patient meet any 2 criteria? No. Patient's        tw2 

      initial sepsis screen is negative. Does the patient have a suspected source of              

      infection? No. Patient's initial sepsis screen is negative. tw2                             

09:40 09:05 Presenting complaint: EMS states: pt from home, wife states this morning he is    tw2 

      altered. pt refused to come with us at first, after 20 minutes he agreed to come with       

      us, he is alert to name only. hx: liver disease, copd, hernia, does use home 02 at home     

      but was not on it when we arrived, o2 sat 97% tw2                                           

                                                                                                  

**************************************************************************************************

## 2019-11-28 NOTE — RAD REPORT
EXAM DESCRIPTION:  CT - Head Brain Wo Cont - 11/28/2019 9:32 am

 

CLINICAL HISTORY:  Alteration of awareness/confusion

 

COMPARISON:  None

 

TECHNIQUE:  Computed axial tomography of the head was obtained. IV contrast was not requested.

 

All CT scans are performed using dose optimization technique as appropriate and may include automated
 exposure control or mA/KV adjustment according to patient size.

 

FINDINGS:  An intracranial  bleed is not seen .

 

The ventricles are normal in caliber.

 

No extra-axial fluid collection is noted.

 

Low-density area within the left basal ganglia may indicate an old lacunar infarction or prominent Vi
rchow-Tom space.

 

Mild opacification left mastoid. Fluid within the sinuses is not seen

 

IMPRESSION:  No acute intracranial abnormality is seen. If patient's symptoms persist  MRI of the bra
in would be recommended.

 

Mild opacification left mastoid may indicate chronic mastoiditis

## 2019-11-28 NOTE — XMS REPORT
Patient Summary Document

 Created on:2019



Patient:ANAYELI HUDSON

Sex:Male

:1960

External Reference #:[id number]





Demographics







 Address  [address] [place] [address]

 

 Home Phone  [phone]

 

 Email Address  NONE

 

 Preferred Language  Unknown

 

 Marital Status  Unknown

 

 Amish Affiliation  Unknown

 

 Race  Unknown

 

 Additional Race(s)  Unavailable

 

 Ethnic Group  Unknown









Author







 Organization  [facility]

 

 Address  [facility] [address] [place] [address]

 

 Phone  [phone]









Care Team Providers







 Name  Role  Phone

 

 ELVIN HUGO  Unavailable  Unavailable









Problems

This patient has no known problems.



Allergies, Adverse Reactions, Alerts

This patient has no known allergies or adverse reactions.



Medications

This patient has no known medications.



Results







 Test Description  Test Time  Test Comments  Text Results  Atomic Results  
Result Comments









 (CELLAVISION MANUAL DIFF)  2019 09:14:00    









   Test Item  Value  Reference Range  Comments









 NEUTROPHILS - REL (CELLAVISION)(BEAKER) (test code=2816)  81 %    

 

 LYMPHOCYTES - REL (CELLAVISION)(BEAKER) (test code=2817)  7 %    

 

 MONOCYTES - REL (CELLAVISION)(BEAKER) (test code=2818)  7 %    

 

 EOSINOPHILS - REL (CELLAVISION)(BEAKER) (test code=2819)  2 %    

 

 MYELOCYTES - REL (CELLAVISION)(BEAKER) (test code=2822)  2 %  0-0  

 

 ATYPICAL LYMPHOCYTES - REL (CELLAVISION)(BEAKER) (test  1 %  0-0  



 code=2829)      

 

 NEUTROPHILS - ABS (CELLAVISION)(BEAKER) (test code=2830)  10.85 K/ul  1.78-
5.38  

 

 LYMPHOCYTES - ABS (CELLAVISION)(BEAKER) (test code=2831)  0.94 K/ul  1.32-3.57
  

 

 MONOCYTES - ABS (CELLAVISION)(BEAKER) (test code=2832)  0.94 K/uL  0.30-0.82  

 

 EOSINOPHILS - ABS (CELLAVISION)(BEAKER) (test code=2834)  0.27 K/uL  0.04-0.54
  

 

 MYELOCYTES-ABS (CELLAVISION)(BEAKER) (test code=2837)  0.27 K/uL  0.00-0.00  

 

 ATYPICAL LYMPHOCYTES - ABS (CELLAVISION)(BEAKER) (test  0.13 K/uL  0.00-0.00  



 code=2858)      

 

 TOTAL COUNTED (BEAKER) (test code=1351)  100    

 

 RBC MORPHOLOGY (BEAKER) (test code=762)  Normal    

 

 SMUDGE CELLS (BEAKER) (test code=1371)  Present    

 

 GIANT PLATELETS (BEAKER) (test code=313)  Present    

 

 PLATELET CONCENTRATION (CELLAVISION)(BEAKER) (test code=3438)  Decreased    



Received comment: User comments: Slide comments:RAD, CHEST, 1 VIEW, NON [id number]
-09-25 08:25:00Reason for exam:-&gt;left effusionShould this be performed at 
the bedside?-&gt;YesFINAL REPORT PATIENT ID:   [id number] Chest AP portable 
Comparison exam: 2019 History provided: Follow-up pleural effusion Left 
chest tube unchanged in place. No pneumothorax. No significant effusions are 
evident. Nonspecific coarsening of markings in the left lower lobe. PICC line 
in good position. Signed: Lobo Morris MDReport Verified Date/Time:  2019 
08:25:12 Reading Location: [facility] Radiology Reading Room        
Electronically signed by: LOBO MORRIS on 2019 08:25 AMCOMPREHENSIVE 
METABOLIC [id number] 06:40:00





 Test Item  Value  Reference Range  Comments

 

 TOTAL PROTEIN (BEAKER)  4.8 gm/dL  6.0-8.3  



 (test code=770)      

 

 ALBUMIN (BEAKER) (test  2.4 g/dL  3.5-5.0  



 code=1145)      

 

 ALKALINE PHOSPHATASE  141 U/L    



 (BEAKER) (test code=346)      

 

 BILIRUBIN TOTAL (BEAKER)  3.9 mg/dL  0.2-1.2  



 (test code=377)      

 

 SODIUM (BEAKER) (test  123 meq/L  136-145  



 pgqo=713)      

 

 POTASSIUM (BEAKER) (test  3.6 meq/L  3.5-5.1  



 code=379)      

 

 CHLORIDE (BEAKER) (test  90 meq/L    



 code=382)      

 

 CO2 (BEAKER) (test  29 meq/L  22-29  



 code=355)      

 

 BLOOD UREA NITROGEN  14 mg/dL  7-21  



 (BEAKER) (test code=354)      

 

 CREATININE (BEAKER) (test  0.80 mg/dL  0.57-1.25  



 code=358)      

 

 GLUCOSE RANDOM (BEAKER)  152 mg/dL    



 (test code=652)      

 

 CALCIUM (BEAKER) (test  7.3 mg/dL  8.4-10.2  



 code=697)      

 

 AST (SGOT) (BEAKER) (test  39 U/L  5-34  



 code=353)      

 

 ALT (SGPT) (BEAKER) (test  23 U/L  6-55  



 code=347)      

 

 EGFR (BEAKER) (test  99 mL/min/1.73 sq m    ESTIMATED GFR IS NOT AS



 code=1092)      ACCURATE AS CREATININE



       CLEARANCE IN PREDICTING



       GLOMERULAR FILTRATION



       RATE. ESTIMATED GFR IS



       NOT APPLICABLE FOR



       DIALYSIS PATIENTS.



Specimen slightly [id number] 06:39:00





 Test Item  Value  Reference Range  Comments

 

 PHOSPHORUS (BEAKER) (test code=604)  2.1 mg/dL  2.3-4.7  



[id number] 06:39:00





 Test Item  Value  Reference Range  Comments

 

 MAGNESIUM (BEAKER) (test code=627)  1.8 mg/dL  1.6-2.6  



B-TYPE NATRIURETIC FACTOR (BNP)2019 06:04:00





 Test Item  Value  Reference Range  Comments

 

 B-TYPE NATRIURETIC PEPTIDE (BEAKER) (test code=700)  97 pg/mL  0-100  



CBC W/PLT COUNT &amp; AUTO [id number] 05:57:00





 Test Item  Value  Reference Range  Comments

 

 WHITE BLOOD CELL COUNT (BEAKER) (test code=775)  13.4 K/ L  3.5-10.5  

 

 RED BLOOD CELL COUNT (BEAKER) (test code=761)  3.17 M/ L  4.63-6.08  

 

 HEMOGLOBIN (BEAKER) (test code=410)  10.3 GM/DL  13.7-17.5  

 

 HEMATOCRIT (BEAKER) (test code=411)  29.4 %  40.1-51.0  

 

 MEAN CORPUSCULAR VOLUME (BEAKER) (test code=753)  92.7 fL  79.0-92.2  

 

 MEAN CORPUSCULAR HEMOGLOBIN (BEAKER) (test  32.5 pg  25.7-32.2  



 zetw=562)      

 

 MEAN CORPUSCULAR HEMOGLOBIN CONC (BEAKER) (test  35.0 GM/DL  32.3-36.5  



 code=752)      

 

 RED CELL DISTRIBUTION WIDTH (BEAKER) (test  13.9 %  11.6-14.4  



 code=412)      

 

 PLATELET COUNT (BEAKER) (test code=756)  75 K/CU MM  150-450  

 

 MEAN PLATELET VOLUME (BEAKER) (test code=754)  8.9 fL  9.4-12.4  

 

 NUCLEATED RED BLOOD CELLS (BEAKER) (test  0 /100 WBC  0-0  



 code=413)      



CALCIUM, [id number] 05:44:00





 Test Item  Value  Reference Range  Comments

 

 CALCIUM IONIZED (BEAKER) (test code=698)  0.97 mmol/L  1.12-1.27  

 

 PH, BLOOD (BEAKER) (test code=1810)  7.50    



BODY FLUID CULTURE + GRAM [id number] 11:10:00





 Test Item  Value  Reference Range  Comments

 

 CULTURE (BEAKER) (test      <1+ Coagulase negative



 code=1095)      Staphylococcus

 

 CULTURE (BEAKER) (test  COAGULASE NEGATIVE    <1+ Pseudomonas



 code=1095)  STAPHYLOCOCCUS    aeruginosa

 

 Clindamycin (test      



 code=10)      

 

 Erythromycin (test      



 code=4)      

 

 Linezolid (test code=40)      

 

 Oxacillin (test code=14)      

 

 Rifampin (test code=43)      

 

 Tetracycline (test      



 code=2)      

 

 Trimethoprim +      



 Sulfamethoxazole (test      



 code=47)      

 

 Vancomycin (test code=13)      

 

 GRAM STAIN RESULT  3+ WBCs    



 (BEAKER) (test code=1123)      

 

 GRAM STAIN RESULT  <1+ gram positive    



 (BEAKER) (test  cocci in pairs and    



 code=112319)  clusters    



CBC W/PLT COUNT &amp; AUTO [id number] 09:58:00





 Test Item  Value  Reference Range  Comments

 

 WHITE BLOOD CELL COUNT (BEAKER) (test code=775)  10.7 K/ L  3.5-10.5  

 

 RED BLOOD CELL COUNT (BEAKER) (test code=761)  3.15 M/ L  4.63-6.08  

 

 HEMOGLOBIN (BEAKER) (test code=410)  10.2 GM/DL  13.7-17.5  

 

 HEMATOCRIT (BEAKER) (test code=411)  28.9 %  40.1-51.0  

 

 MEAN CORPUSCULAR VOLUME (BEAKER) (test code=753)  91.7 fL  79.0-92.2  

 

 MEAN CORPUSCULAR HEMOGLOBIN (BEAKER) (test  32.4 pg  25.7-32.2  



 nnmz=013)      

 

 MEAN CORPUSCULAR HEMOGLOBIN CONC (BEAKER) (test  35.3 GM/DL  32.3-36.5  



 code=752)      

 

 RED CELL DISTRIBUTION WIDTH (BEAKER) (test  13.7 %  11.6-14.4  



 code=412)      

 

 PLATELET COUNT (BEAKER) (test code=756)  57 K/CU MM  150-450  

 

 MEAN PLATELET VOLUME (BEAKER) (test code=754)  8.8 fL  9.4-12.4  

 

 NUCLEATED RED BLOOD CELLS (BEAKER) (test  0 /100 WBC  0-0  



 code=413)      



(CELLAVISION MANUAL DIFF)2019 09:58:00





 Test Item  Value  Reference Range  Comments

 

 NEUTROPHILS - REL (CELLAVISION)(BEAKER) (test  78 %    



 code=2816)      

 

 LYMPHOCYTES - REL (CELLAVISION)(BEAKER) (test  5 %    



 code=2817)      

 

 MONOCYTES - REL (CELLAVISION)(BEAKER) (test  9 %    



 code=2818)      

 

 EOSINOPHILS - REL (CELLAVISION)(BEAKER) (test  2 %    



 code=2819)      

 

 METAMYELOCYTES - REL (CELLAVISION)(BEAKER) (test  1 %  0-0  



 code=2821)      

 

 BANDS - REL (CELLAVISION)(BEAKER) (test code=2826)  4 %  0-10  

 

 BLASTS - REL (CELLAVISION)(BEAKER) (test  1 %  0-0  



 code=2827)      

 

 NEUTROPHILS - ABS (CELLAVISION)(BEAKER) (test  8.35 K/ul  1.78-5.38  



 code=2830)      

 

 LYMPHOCYTES - ABS (CELLAVISION)(BEAKER) (test  0.54 K/ul  1.32-3.57  



 code=2831)      

 

 MONOCYTES - ABS (CELLAVISION)(BEAKER) (test  0.96 K/uL  0.30-0.82  



 code=2832)      

 

 EOSINOPHILS - ABS (CELLAVISION)(BEAKER) (test  0.21 K/uL  0.04-0.54  



 code=2834)      

 

 METAMYELOCYTES - ABS (CELLAVISION)(BEAKER) (test  0.11 K/uL  0.00-0.00  



 code=2836)      

 

 BANDS - ABS (CELLAVISION)(BEAKER) (test code=2840)  0.43 K/uL  0.00-0.80  

 

 BLASTS - ABS (CELLAVISION)(BEAKER) (test  0.11 K/uL  0.00-0.00  



 code=2845)      

 

 TOTAL COUNTED (BEAKER) (test code=1351)  100    

 

 WBC MORPHOLOGY (BEAKER) (test code=487)  Normal    

 

 PLT MORPHOLOGY (BEAKER) (test code=486)  Normal    

 

 ANISOCYTOSIS (BEAKER) (test code=961)  1+ few    

 

 POIKILOCYTES (BEAKER) (test code=966)  1+ few    

 

 OVALOCYTES (BEAKER) (test code=477)  1+ few    

 

 BASOPHILIC STIPPLING (BEAKER) (test code=473)  Present    

 

 ARTIFACT (CELLAVISION)(BEAKER) (test code=3432)  Present    

 

 PLATELET CONCENTRATION (CELLAVISION)(BEAKER) (test  Decreased    



 code=3438)      



Received comment: User comments: Slide comments: WBC: SEGMENTED WITH TOXIC 
GRANULATIONS [id number] 08:30:00





 Test Item  Value  Reference Range  Comments

 

 PHOSPHORUS (BEAKER) (test code=604)  2.0 mg/dL  2.3-4.7  



RAD, CHEST, 1 VIEW, NON [id number] 08:21:00Reason for exam:-&gt;left 
effusionShould this be performed at the bedside?-&gt;YesFINAL REPORT PATIENT ID
:   [id number]  TECHNIQUE: Frontal chest radiograph dated 2019. CLINICAL 
HISTORY: Left effusion COMPARISON STUDY: Chest radiographs and chest CT both 
dated 2019  IMPRESSION:Right-sided PICC and left-sided chest tube are 
unchanged. No change in small left hydropneumothorax. Multiple rounded 
densities project over the left lower lobe of unclear etiology possibly 
somethingexternal to the patient. Cardiomediastinal silhouette is normal in 
size. No pulmonary edema. No fracture.  Signed: Ann King MDReport 
Verified Date/Time:  2019 08:21:33 Reading Location:[facility] Reading 
Room  Electronically signed by: ANN KIGN MD on 2019 08:
21 AMCOMPREHENSIVE METABOLIC [id number] 05:34:00





 Test Item  Value  Reference Range  Comments

 

 TOTAL PROTEIN (BEAKER)  4.9 gm/dL  6.0-8.3  



 (test code=770)      

 

 ALBUMIN (BEAKER) (test  2.7 g/dL  3.5-5.0  



 code=1145)      

 

 ALKALINE PHOSPHATASE  137 U/L    



 (BEAKER) (test code=346)      

 

 BILIRUBIN TOTAL (BEAKER)  5.5 mg/dL  0.2-1.2  



 (test code=377)      

 

 SODIUM (BEAKER) (test  123 meq/L  136-145  



 pxog=968)      

 

 POTASSIUM (BEAKER) (test  3.9 meq/L  3.5-5.1  



 code=379)      

 

 CHLORIDE (BEAKER) (test  89 meq/L    



 code=382)      

 

 CO2 (BEAKER) (test  31 meq/L  22-29  



 code=355)      

 

 BLOOD UREA NITROGEN  15 mg/dL  7-21  



 (BEAKER) (test code=354)      

 

 CREATININE (BEAKER) (test  0.68 mg/dL  0.57-1.25  



 code=358)      

 

 GLUCOSE RANDOM (BEAKER)  104 mg/dL    



 (test code=652)      

 

 CALCIUM (BEAKER) (test  7.7 mg/dL  8.4-10.2  



 code=697)      

 

 AST (SGOT) (BEAKER) (test  47 U/L  5-34  



 code=353)      

 

 ALT (SGPT) (BEAKER) (test  25 U/L  6-55  



 code=347)      

 

 EGFR (BEAKER) (test  119 mL/min/1.73 sq    ESTIMATED GFR IS NOT AS



 code=1092)  m    ACCURATE AS CREATININE



       CLEARANCE IN PREDICTING



       GLOMERULAR FILTRATION



       RATE. ESTIMATED GFR IS



       NOT APPLICABLE FOR



       DIALYSIS PATIENTS.



Specimen moderately [id number] 05:04:00





 Test Item  Value  Reference Range  Comments

 

 MAGNESIUM (BEAKER) (test code=627)  1.8 mg/dL  1.6-2.6  



CT, CHEST, WITH [id number] 15:37:00Reason for exam:-&gt;reevaluate 
pleural effusion and left lung abscessFINAL REPORT PATIENT ID:   [id number]  CT 
of the Chest dated 2019 COMPARISON: 2019 CLINICAL INFORMATION
: Pleural effusionreevaluate pleural effusion and left lung abscess Comment:  
Axial images of the chest were obtained from thoracic inlet to the upper 
abdomen with intravenous contrast.     This exam was performed according to our 
departmental dose-optimization program, which includes automated exposure 
control, adjustment of the mA and/or kV according to patient size and/or use 
ofinteractive reconstruction technique. Heart is normal in size.  There is 
small pericardial effusion.Great vessels are unremarkable. No adenopathy in the 
mediastinum or perihilar region. Trachea and mainstem bronchi are patent.  
Trace bilateral pleural effusion is present. There is interval significant 
decrease in the amount of left pleural effusion. There is small amount of air 
present in the left pleural space. The pigtail catheter is seen in the left 
pleural space. Atelectasis is seen in the lingula, right mid, and both lower 
lobes. Cavitary lesion is seen in the superior segment of the left lower lobe 
measuring approximately 2.8 x 3.3 cm. Visualized upper abdomen demonstrates 
cirrhotic liver with trace ascites. Spleen is minimally enlarged. Impression: 
1. Interval decrease in the amount of left pleural effusion with residual left 
hydropneumothorax.2. Trace right pleural effusion.3. Cavitary lesion in the 
superior segment of the left lower lobe may represent abscess.4. Cirrhosis with 
splenomegaly and ascites. Signed: Lucita Nails MDReport Verified Date/Time:   15:37:27 Reading Location: [facility] C013Y CT Body Reading Room      
Electronically signed by: LUCITA NAILS M.D. on 2019 03:37 
[id number] 13:41:00





 Test Item  Value  Reference Range  Comments

 

 SODIUM (BEAKER) (test code=381)  119 meq/L  136-145  

 

 POTASSIUM (BEAKER) (test code=379)  4.1 meq/L  3.5-5.1  

 

 CHLORIDE (BEAKER) (test code=382)  85 meq/L    

 

 CO2 (BEAKER) (test code=355)  29 meq/L  22-  



Page [phone] with results. Thank you.CBC W/PLT COUNT &amp; AUTO 
[id number] 10:30:00





 Test Item  Value  Reference Range  Comments

 

 WHITE BLOOD CELL COUNT (BEAKER) (test code=775)  11.3 K/ L  3.5-10.5  

 

 RED BLOOD CELL COUNT (BEAKER) (test code=761)  3.25 M/ L  4.63-6.08  

 

 HEMOGLOBIN (BEAKER) (test code=410)  10.6 GM/DL  13.7-17.5  

 

 HEMATOCRIT (BEAKER) (test code=411)  29.8 %  40.1-51.0  

 

 MEAN CORPUSCULAR VOLUME (BEAKER) (test code=753)  91.7 fL  79.0-92.2  

 

 MEAN CORPUSCULAR HEMOGLOBIN (BEAKER) (test  32.6 pg  25.7-32.2  



 pjhp=582)      

 

 MEAN CORPUSCULAR HEMOGLOBIN CONC (BEAKER) (test  35.6 GM/DL  32.3-36.5  



 code=752)      

 

 RED CELL DISTRIBUTION WIDTH (BEAKER) (test  13.5 %  11.6-14.4  



 code=412)      

 

 PLATELET COUNT (BEAKER) (test code=756)  51 K/CU MM  150-450  

 

 MEAN PLATELET VOLUME (BEAKER) (test code=754)  8.3 fL  9.4-12.4  

 

 NUCLEATED RED BLOOD CELLS (BEAKER) (test  0 /100 WBC  0-0  



 code=413)      



(CELLAVISION MANUAL DIFF)2019 10:30:00





 Test Item  Value  Reference Range  Comments

 

 NEUTROPHILS - REL (CELLAVISION)(BEAKER) (test  82 %    



 code=2816)      

 

 LYMPHOCYTES - REL (CELLAVISION)(BEAKER) (test  2 %    



 code=2817)      

 

 MONOCYTES - REL (CELLAVISION)(BEAKER) (test  9 %    



 code=2818)      

 

 METAMYELOCYTES - REL (CELLAVISION)(BEAKER) (test  2 %  0-0  



 code=2821)      

 

 PROMYELOCYTES - REL (CELLAVSION)(BEAKER) (test  2 %  0-0  



 code=2825)      

 

 BANDS - REL (CELLAVISION)(BEAKER) (test code=2826)  2 %  0-10  

 

 ATYPICAL LYMPHOCYTES - REL (CELLAVISION)(BEAKER)  1 %  0-0  



 (test code=2829)      

 

 NEUTROPHILS - ABS (CELLAVISION)(BEAKER) (test  9.27 K/ul  1.78-5.38  



 code=2830)      

 

 LYMPHOCYTES - ABS (CELLAVISION)(BEAKER) (test  0.23 K/ul  1.32-3.57  



 code=2831)      

 

 MONOCYTES - ABS (CELLAVISION)(BEAKER) (test  1.02 K/uL  0.30-0.82  



 code=2832)      

 

 METAMYELOCYTES - ABS (CELLAVISION)(BEAKER) (test  0.23 K/uL  0.00-0.00  



 code=2836)      

 

 PROMYELOCYTES - ABS (CELLAVISION)(BEAKER) (test  0.23 K/uL  0.00-0.00  



 code=2838)      

 

 BANDS - ABS (CELLAVISION)(BEAKER) (test code=2840)  0.23 K/uL  0.00-0.80  

 

 ATYPICAL LYMPHOCYTES - ABS (CELLAVISION)(BEAKER)  0.11 K/uL  0.00-0.00  



 (test code=2858)      

 

 TOTAL COUNTED (BEAKER) (test code=1351)  100    

 

 WBC MORPHOLOGY (BEAKER) (test code=487)  Normal    

 

 LARGE PLT(BEAKER) (test code=2156)  Present    

 

 BASOPHILIC STIPPLING (BEAKER) (test code=473)  Present    

 

 ARTIFACT (CELLAVISION)(BEAKER) (test code=3432)  Present    

 

 PLATELET CONCENTRATION (CELLAVISION)(BEAKER) (test  Decreased    



 code=3438)      



Received comment: User comments: Slide comments: WBC: SEGMENTED WITH TOXIC 
GRANULATIONS PRESENTRAD, CHEST, 1 VIEW, NON [id number] 07:48:00Reason for 
exam:-&gt;left effusionShould this be performed at the bedside?-&gt;YesFINAL 
REPORT PATIENT ID:   [id number] RAD, CHEST, 1 VIEW, NON DEPT INDICATION: left 
effusion COMPARISON: Prior day's exam FINDINGS: Portable frontal view of the 
chest.   IMPRESSION:  Support Lines: PICC tip overlies the SVC.  Left pleural 
catheter is again noted. Lungs and pleura: Bibasilar airspace disease is 
unchanged.  Superimposed trace left effusion. Left apical pneumothorax is 
decreased in the interim.    Heart and mediastinum: Stable contours.  
Additional findings: None. Signed: Sasha CrockerMDReport Verified Date/Time:  
2019 07:48:14 Reading Location: [facility] Radiology Reading Room  
Electronically signed by: SASHA CROCKER MD on 2019 07:48 
AMCALCIUM, [id number] 05:58:00





 Test Item  Value  Reference Range  Comments

 

 CALCIUM IONIZED (BEAKER) (test code=698)  0.96 mmol/L  1.12-1.27  

 

 PH, BLOOD (BEAKER) (test code=1810)  7.53    



COMPREHENSIVE METABOLIC [id number] 05:51:00





 Test Item  Value  Reference Range  Comments

 

 TOTAL PROTEIN (BEAKER)  5.2 gm/dL  6.0-8.3  



 (test code=770)      

 

 ALBUMIN (BEAKER) (test  2.6 g/dL  3.5-5.0  



 code=1145)      

 

 ALKALINE PHOSPHATASE  144 U/L    



 (BEAKER) (test code=346)      

 

 BILIRUBIN TOTAL (BEAKER)  6.3 mg/dL  0.2-1.2  



 (test code=377)      

 

 SODIUM (BEAKER) (test  119 meq/L  136-145  



 jskh=422)      

 

 POTASSIUM (BEAKER) (test  4.2 meq/L  3.5-5.1  



 code=379)      

 

 CHLORIDE (BEAKER) (test  86 meq/L    



 code=382)      

 

 CO2 (BEAKER) (test  26 meq/L  22-29  



 code=355)      

 

 BLOOD UREA NITROGEN  23 mg/dL  7-21  



 (BEAKER) (test code=354)      

 

 CREATININE (BEAKER) (test  0.81 mg/dL  0.57-1.25  



 code=358)      

 

 GLUCOSE RANDOM (BEAKER)  103 mg/dL    



 (test code=652)      

 

 CALCIUM (BEAKER) (test  7.6 mg/dL  8.4-10.2  



 code=697)      

 

 AST (SGOT) (BEAKER) (test  46 U/L  5-34  



 code=353)      

 

 ALT (SGPT) (BEAKER) (test  21 U/L  6-55  



 code=347)      

 

 EGFR (BEAKER) (test  97 mL/min/1.73 sq m    ESTIMATED GFR IS NOT AS



 code=1092)      ACCURATE AS CREATININE



       CLEARANCE IN PREDICTING



       GLOMERULAR FILTRATION



       RATE. ESTIMATED GFR IS



       NOT APPLICABLE FOR



       DIALYSIS PATIENTS.



Specimen moderately [id number] 05:07:00





 Test Item  Value  Reference Range  Comments

 

 CORTISOL, TOTAL (BEAKER) (test code=2755)  9.6 ug/dL  3.7-19.4  



[id number] 04:57:00





 Test Item  Value  Reference Range  Comments

 

 PHOSPHORUS (BEAKER) (test code=604)  2.0 mg/dL  2.3-4.7  



[id number] 04:57:00





 Test Item  Value  Reference Range  Comments

 

 MAGNESIUM (BEAKER) (test code=627)  1.7 mg/dL  1.6-2.6  



[id number] 22:08:00





 Test Item  Value  Reference Range  Comments

 

 SODIUM (BEAKER) (test code=381)  119 meq/L  136-145  

 

 POTASSIUM (BEAKER) (test  4.6 meq/L  3.5-5.1  Specimen slightly hemolyzed



 code=379)      

 

 CHLORIDE (BEAKER) (test  86 meq/L    



 code=382)      

 

 CO2 (BEAKER) (test code=355)  27 meq/L  22-29  



Call K &gt; 5.5POCT-GLUCOSE [id number] 12:59:00





 Test Item  Value  Reference Range  Comments

 

 POC-GLUCOSE METER (BEAKER)  95 mg/dL    TESTED AT [address]



 (test lsoe=2006)      [facility] [address]



T4, [id number] 10:25:00





 Test Item  Value  Reference Range  Comments

 

 FREE T4 (BEAKER) (test code=655)  0.65 ng/dL  0.70-1.48  



CBC W/PLT COUNT &amp; AUTO [id number] 10:21:00





 Test Item  Value  Reference Range  Comments

 

 WHITE BLOOD CELL COUNT (BEAKER) (test code=775)  11.8 K/ L  3.5-10.5  

 

 RED BLOOD CELL COUNT (BEAKER) (test code=761)  3.74 M/ L  4.63-6.08  

 

 HEMOGLOBIN (BEAKER) (test code=410)  12.2 GM/DL  13.7-17.5  

 

 HEMATOCRIT (BEAKER) (test code=411)  33.9 %  40.1-51.0  

 

 MEAN CORPUSCULAR VOLUME (BEAKER) (test code=753)  90.6 fL  79.0-92.2  

 

 MEAN CORPUSCULAR HEMOGLOBIN (BEAKER) (test  32.6 pg  25.7-32.2  



 eqid=057)      

 

 MEAN CORPUSCULAR HEMOGLOBIN CONC (BEAKER) (test  36.0 GM/DL  32.3-36.5  



 code=752)      

 

 RED CELL DISTRIBUTION WIDTH (BEAKER) (test  13.7 %  11.6-14.4  



 code=412)      

 

 PLATELET COUNT (BEAKER) (test code=756)  79 K/CU MM  150-450  

 

 MEAN PLATELET VOLUME (BEAKER) (test code=754)  8.9 fL  9.4-12.4  

 

 NUCLEATED RED BLOOD CELLS (BEAKER) (test  0 /100 WBC  0-0  



 code=413)      



(CELLAVISION MANUAL DIFF)2019 10:21:00





 Test Item  Value  Reference Range  Comments

 

 NEUTROPHILS - REL (CELLAVISION)(BEAKER) (test  82 %    



 code=2816)      

 

 LYMPHOCYTES - REL (CELLAVISION)(BEAKER) (test  4 %    



 code=2817)      

 

 MONOCYTES - REL (CELLAVISION)(BEAKER) (test  13 %    



 code=2818)      

 

 ATYPICAL LYMPHOCYTES - REL (CELLAVISION)(BEAKER)  1 %  0-0  



 (test code=2829)      

 

 NEUTROPHILS - ABS (CELLAVISION)(BEAKER) (test  9.68 K/ul  1.78-5.38  



 code=2830)      

 

 LYMPHOCYTES - ABS (CELLAVISION)(BEAKER) (test  0.47 K/ul  1.32-3.57  



 code=2831)      

 

 MONOCYTES - ABS (CELLAVISION)(BEAKER) (test  1.53 K/uL  0.30-0.82  



 code=2832)      

 

 ATYPICAL LYMPHOCYTES - ABS (CELLAVISION)(BEAKER)  0.12 K/uL  0.00-0.00  



 (test code=2858)      

 

 TOTAL COUNTED (BEAKER) (test code=1351)  100    

 

 RBC MORPHOLOGY (BEAKER) (test code=762)  Normal    

 

 WBC MORPHOLOGY (BEAKER) (test code=487)  Normal    

 

 PLT MORPHOLOGY (BEAKER) (test code=486)  Normal    

 

 ARTIFACT (CELLAVISION)(BEAKER) (test code=3432)  Present    

 

 PLATELET CONCENTRATION (CELLAVISION)(BEAKER) (test  Decreased    



 code=3438)      



Received comment: User comments: Slide comments:POCT-GLUCOSE [id number] 09:
58:00





 Test Item  Value  Reference Range  Comments

 

 POC-GLUCOSE METER (BEAKER)  104 mg/dL    TESTED AT [address]



 (test qnzb=3605)      [place] [address]



RAD, CHEST, 1 VIEW, NON [id number] 09:33:00Reason for exam:-&gt;
effusionShould this be performed at the bedside?-&gt;YesFINAL REPORT PATIENT ID
:   [id number] Comparison: 2019 TECHNIQUE: Single view of the chest FINDINGS
: Bilateral interstitial and airspace opacities are stable. Small left pleural 
thickening with adjacent airspace disease identified. A previous left-sided 
pneumothorax has decreased. Left pleural drainage catheters again noted. Right-
sided PICC line is stable. Signed: Ronaldo Tim MDReport Verified Date/Time:   09:33:21 Reading Location: [address] Transitional Reading Room      
Electronically signed by: RONALDO TIM M.D. on 2019 09:33 
AMCOMPREHENSIVE METABOLIC [id number] 08:59:00





 Test Item  Value  Reference Range  Comments

 

 TOTAL PROTEIN (BEAKER)  5.4 gm/dL  6.0-8.3  Specimen slightly



 (test code=770)      hemolyzed

 

 ALBUMIN (BEAKER) (test  2.1 g/dL  3.5-5.0  Specimen slightly



 code=1145)      hemolyzed

 

 ALKALINE PHOSPHATASE  153 U/L    



 (BEAKER) (test code=346)      

 

 BILIRUBIN TOTAL (BEAKER)  5.0 mg/dL  0.2-1.2  Specimen slightly



 (test code=377)      hemolyzed

 

 SODIUM (BEAKER) (test  117 meq/L  136-145  



 gtbr=632)      

 

 POTASSIUM (BEAKER) (test  5.5 meq/L  3.5-5.1  Specimen slightly



 code=379)      hemolyzed

 

 CHLORIDE (BEAKER) (test  84 meq/L    



 code=382)      

 

 CO2 (BEAKER) (test  26 meq/L  22-29  



 code=355)      

 

 BLOOD UREA NITROGEN  32 mg/dL  7-21  



 (BEAKER) (test code=354)      

 

 CREATININE (BEAKER) (test  0.85 mg/dL  0.57-1.25  Specimen slightly



 code=358)      hemolyzed

 

 GLUCOSE RANDOM (BEAKER)  104 mg/dL    



 (test code=652)      

 

 CALCIUM (BEAKER) (test  7.6 mg/dL  8.4-10.2  



 code=697)      

 

 AST (SGOT) (BEAKER) (test  51 U/L  5-34  Specimen slightly



 code=353)      hemolyzed

 

 ALT (SGPT) (BEAKER) (test  23 U/L  6-55  Specimen slightly



 code=347)      hemolyzed

 

 EGFR (BEAKER) (test  92 mL/min/1.73 sq m    ESTIMATED GFR IS NOT AS



 code=1092)      ACCURATE AS CREATININE



       CLEARANCE IN PREDICTING



       GLOMERULAR FILTRATION



       RATE. ESTIMATED GFR IS



       NOT APPLICABLE FOR



       DIALYSIS PATIENTS.



Specimen moderately [id number] 08:55:00





 Test Item  Value  Reference Range  Comments

 

 MAGNESIUM (BEAKER) (test  1.9 mg/dL  1.6-2.6  Specimen slightly hemolyzed



 code=627)      



[id number] 08:55:00





 Test Item  Value  Reference Range  Comments

 

 PHOSPHORUS (BEAKER) (test  2.5 mg/dL  2.3-4.7  Specimen slightly hemolyzed



 code=604)      



TSH/FREE T4 IF [id number] 08:39:00





 Test Item  Value  Reference Range  Comments

 

 THYROID STIMULATING HORMONE (BEAKER) (test  8.07 uIU/mL  0.35-4.94  



 code=772)      



CALCIUM, [id number] 08:06:00





 Test Item  Value  Reference Range  Comments

 

 CALCIUM IONIZED (BEAKER) (test code=698)  1.00 mmol/L  1.12-1.27  

 

 PH, BLOOD (BEAKER) (test code=1810)  7.48    



[id number] 23:47:00





 Test Item  Value  Reference Range  Comments

 

 SODIUM (BEAKER) (test code=381)  117 meq/L  136-145  

 

 POTASSIUM (BEAKER) (test code=379)  5.3 meq/L  3.5-5.1  

 

 CHLORIDE (BEAKER) (test code=382)  84 meq/L    

 

 CO2 (BEAKER) (test code=355)  27 meq/L  22-29  



Call K &gt; [id number]-GLUCOSE [id number] 21:18:00





 Test Item  Value  Reference Range  Comments

 

 POC-GLUCOSE METER (BEAKER)  145 mg/dL    TESTED AT [facility] [address] [facility]



 (test wdkf=9343)      [facility] [address]



[id number] 18:57:00





 Test Item  Value  Reference Range  Comments

 

 CORTISOL, TOTAL (BEAKER) (test code=2755)  18.1 ug/dL  3.7-19.4  



[id number] 18:34:00





 Test Item  Value  Reference Range  Comments

 

 SODIUM (BEAKER) (test code=381)  116 meq/L  136-145  

 

 POTASSIUM (BEAKER) (test code=379)  6.0 meq/L  3.5-5.1  

 

 CHLORIDE (BEAKER) (test code=382)  82 meq/L    

 

 CO2 (BEAKER) (test code=355)  30 meq/L  22-29  



Call [id number]-GLUCOSE [id number] 18:20:00





 Test Item  Value  Reference Range  Comments

 

 POC-GLUCOSE METER (BEAKER)  141 mg/dL    TESTED AT [address]



 (test cjki=8795)      [place] [id number]



POCT-GLUCOSE [id number] 13:00:00





 Test Item  Value  Reference Range  Comments

 

 POC-GLUCOSE METER (BEAKER)  122 mg/dL    TESTED AT [address]



 (test nxeu=8626)      [facility] [address]



CBC W/PLT COUNT &amp; AUTO [id number] 10:34:00





 Test Item  Value  Reference Range  Comments

 

 WHITE BLOOD CELL COUNT (BEAKER) (test code=775)  15.5 K/ L  3.5-10.5  

 

 RED BLOOD CELL COUNT (BEAKER) (test code=761)  4.04 M/ L  4.63-6.08  

 

 HEMOGLOBIN (BEAKER) (test code=410)  13.3 GM/DL  13.7-17.5  

 

 HEMATOCRIT (BEAKER) (test code=411)  36.5 %  40.1-51.0  

 

 MEAN CORPUSCULAR VOLUME (BEAKER) (test code=753)  90.3 fL  79.0-92.2  

 

 MEAN CORPUSCULAR HEMOGLOBIN (BEAKER) (test  32.9 pg  25.7-32.2  



 szhw=232)      

 

 MEAN CORPUSCULAR HEMOGLOBIN CONC (BEAKER) (test  36.4 GM/DL  32.3-36.5  



 code=752)      

 

 RED CELL DISTRIBUTION WIDTH (BEAKER) (test  13.5 %  11.6-14.4  



 code=412)      

 

 PLATELET COUNT (BEAKER) (test code=756)  65 K/CU MM  150-450  

 

 MEAN PLATELET VOLUME (BEAKER) (test code=754)  9.0 fL  9.4-12.4  

 

 NUCLEATED RED BLOOD CELLS (BEAKER) (test  0 /100 WBC  0-0  



 code=413)      



(CELLAVISION MANUAL DIFF)2019 10:34:00





 Test Item  Value  Reference Range  Comments

 

 NEUTROPHILS - REL (CELLAVISION)(BEAKER) (test  78 %    



 code=2816)      

 

 LYMPHOCYTES - REL (CELLAVISION)(BEAKER) (test  4 %    



 code=2817)      

 

 MONOCYTES - REL (CELLAVISION)(BEAKER) (test  14 %    



 code=2818)      

 

 EOSINOPHILS - REL (CELLAVISION)(BEAKER) (test  2 %    



 code=2819)      

 

 BANDS - REL (CELLAVISION)(BEAKER) (test  2 %  0-10  



 code=2826)      

 

 NEUTROPHILS - ABS (CELLAVISION)(BEAKER) (test  12.09 K/ul  1.78-5.38  



 code=2830)      

 

 LYMPHOCYTES - ABS (CELLAVISION)(BEAKER) (test  0.62 K/ul  1.32-3.57  



 code=2831)      

 

 MONOCYTES - ABS (CELLAVISION)(BEAKER) (test  2.17 K/uL  0.30-0.82  



 code=2832)      

 

 EOSINOPHILS - ABS (CELLAVISION)(BEAKER) (test  0.31 K/uL  0.04-0.54  



 code=2834)      

 

 BANDS - ABS (CELLAVISION)(BEAKER) (test  0.31 K/uL  0.00-0.80  



 code=2840)      

 

 TOTAL COUNTED (BEAKER) (test code=1351)  100    

 

 WBC MORPHOLOGY (BEAKER) (test code=487)  Normal    

 

 PLT MORPHOLOGY (BEAKER) (test code=486)  Normal    

 

 POLYCHROMATOPHILLIC RBCS(BEAKER) (test code=478)  1+ few    

 

 POIKILOCYTES (BEAKER) (test code=966)  2+ moderate    

 

 KARISSA CELLS (BEAKER) (test code=474)  2+ moderate    

 

 BASOPHILIC STIPPLING (BEAKER) (test code=473)  Present    

 

 ARTIFACT (CELLAVISION)(BEAKER) (test code=3432)  Present    

 

 PLATELET CONCENTRATION (CELLAVISION)(BEAKER)  Decreased    



 (test code=3438)      



Received comment: User comments: Slide comments:RAD, CHEST, 1 VIEW, NON [id number]
-09-21 08:40:00Reason for exam:-&gt;left effusionShould this be performed at 
the bedside?-&gt;YesFINAL REPORT PATIENT ID:   [id number] Portable chest. 
CLINICAL HISTORY: left effusion. COMPARISON STUDY: Chest x-ray from yesterday. 
FINDINGS: The cardiac silhouette is unremarkable. The pulmonary parenchyma 
demonstrates increased interstitial markings with atelectatic changes in the 
lung bases. A left-sided pigtail catheter chest tube remains and there has been 
development of a loculated small basilarpneumothorax. A right PICC line 
remains. Degenerative changes are noted. IMPRESSION: Development a small left-
sided basilar pneumothorax. No other significant change. Signed: Mk Martinez MDReport Verified Date/Time:  2019 08:40:30 Reading Location: [place] [id number] Ortho Consult Reading Room Electronically signed by: MK MARTINEZ M.D. 
on 2019 08:40 AMPOCT-GLUCOSE [id number] 08:39:00





 Test Item  Value  Reference Range  Comments

 

 POC-GLUCOSE METER (BEAKER)  110 mg/dL    TESTED AT [address]



 (test tynv=8702)      [facility] [address]



COMPREHENSIVE METABOLIC [id number] 06:32:00





 Test Item  Value  Reference Range  Comments

 

 TOTAL PROTEIN (BEAKER)  5.5 gm/dL  6.0-8.3  



 (test code=770)      

 

 ALBUMIN (BEAKER) (test  1.9 g/dL  3.5-5.0  



 code=1145)      

 

 ALKALINE PHOSPHATASE  134 U/L    



 (BEAKER) (test code=346)      

 

 BILIRUBIN TOTAL (BEAKER)  4.4 mg/dL  0.2-1.2  



 (test code=377)      

 

 SODIUM (BEAKER) (test  116 meq/L  136-145  



 annq=165)      

 

 POTASSIUM (BEAKER) (test  5.6 meq/L  3.5-5.1  



 code=379)      

 

 CHLORIDE (BEAKER) (test  83 meq/L    



 code=382)      

 

 CO2 (BEAKER) (test  27 meq/L  22-29  



 code=355)      

 

 BLOOD UREA NITROGEN  34 mg/dL  7-21  



 (BEAKER) (test code=354)      

 

 CREATININE (BEAKER) (test  0.89 mg/dL  0.57-1.25  



 code=358)      

 

 GLUCOSE RANDOM (BEAKER)  109 mg/dL    



 (test code=652)      

 

 CALCIUM (BEAKER) (test  7.4 mg/dL  8.4-10.2  



 code=697)      

 

 AST (SGOT) (BEAKER) (test  31 U/L  5-34  



 code=353)      

 

 ALT (SGPT) (BEAKER) (test  18 U/L  6-55  



 code=347)      

 

 EGFR (BEAKER) (test  87 mL/min/1.73 sq m    ESTIMATED GFR IS NOT AS



 code=1092)      ACCURATE AS CREATININE



       CLEARANCE IN PREDICTING



       GLOMERULAR FILTRATION



       RATE. ESTIMATED GFR IS



       NOT APPLICABLE FOR



       DIALYSIS PATIENTS.



Specimen moderately ictericPOCT-GLUCOSE [id number] 21:12:00





 Test Item  Value  Reference Range  Comments

 

 POC-GLUCOSE METER (BEAKER)  114 mg/dL    TESTED AT [address]



 (test ezip=7062)      [facility] [address]



OSMOLALITY, [id number] 18:43:00





 Test Item  Value  Reference Range  Comments

 

 OSMOLALITY URINE (BEAKER) (test code=614)  694 mOsm/kg  40-1,400  



SODIUM, RANDOM [id number] 18:02:00





 Test Item  Value  Reference Range  Comments

 

 SODIUM URINE (BEAKER) (test code=243)  < meq/L    



Reference Range: No NormalsPOCT-GLUCOSE [id number] 16:06:00





 Test Item  Value  Reference Range  Comments

 

 POC-GLUCOSE METER (BEAKER)  145 mg/dL    TESTED AT [address]



 (test ists=5794)      [facility] [address]



RAD, ABDOMEN/KUB, 1 VIEW [id number] 12:51:00Reason for exam:-&gt;no BM in 13 
days. abdominal distension.  concern for ileusFINAL REPORT PATIENT ID:   
[id number] RAD, ABDOMEN/KUB, 1 VIEW AP CLINICAL INDICATION:  no BM in 13 days. 
abdominal distension.  concern for ileus   COMPARISON: None TECHNIQUE: 24 
radiographs of the abdomen. IMPRESSION:  The bowel gas pattern demonstrates 
gaseous distention without dilation. No pneumatosis. Appearance may reflect 
ileus. Excreted contrast is present in the urinary bladder. The regional 
skeleton is intact. Signed: JR Mendoza Robert MDReport Verified Date/Time
:  2019 12:51:21 Reading Location: [facility] Reading [facility]  
  Electronically signed by: KULWINDER MENDOZA on 2019 12:51 
PMOSMOLALITY, [id number] 12:47:00





 Test Item  Value  Reference Range  Comments

 

 OSMOLALITY, SERUM (BEAKER) (test code=615)  258 mOsm/kg  275-295  



POCT-GLUCOSE [id number] 12:35:00





 Test Item  Value  Reference Range  Comments

 

 POC-GLUCOSE METER (BEAKER)  93 mg/dL    TESTED AT [facility] [address] [facility]



 (test gghy=1692)      [place] [address]



CBC W/PLT COUNT &amp; AUTO [id number] 10:10:00





 Test Item  Value  Reference Range  Comments

 

 WHITE BLOOD CELL COUNT (BEAKER) (test code=775)  19.5 K/ L  3.5-10.5  

 

 RED BLOOD CELL COUNT (BEAKER) (test code=761)  4.18 M/ L  4.63-6.08  

 

 HEMOGLOBIN (BEAKER) (test code=410)  13.3 GM/DL  13.7-17.5  

 

 HEMATOCRIT (BEAKER) (test code=411)  37.8 %  40.1-51.0  

 

 MEAN CORPUSCULAR VOLUME (BEAKER) (test code=753)  90.4 fL  79.0-92.2  

 

 MEAN CORPUSCULAR HEMOGLOBIN (BEAKER) (test  31.8 pg  25.7-32.2  



 gums=646)      

 

 MEAN CORPUSCULAR HEMOGLOBIN CONC (BEAKER) (test  35.2 GM/DL  32.3-36.5  



 code=752)      

 

 RED CELL DISTRIBUTION WIDTH (BEAKER) (test  13.5 %  11.6-14.4  



 code=412)      

 

 PLATELET COUNT (BEAKER) (test code=756)  60 K/CU MM  150-450  

 

 MEAN PLATELET VOLUME (BEAKER) (test code=754)  9.4 fL  9.4-12.4  

 

 NUCLEATED RED BLOOD CELLS (BEAKER) (test  0 /100 WBC  0-0  



 code=413)      



(CELLAVISION MANUAL DIFF)2019 10:10:00





 Test Item  Value  Reference Range  Comments

 

 NEUTROPHILS - REL (CELLAVISION)(BEAKER) (test  86 %    



 code=2816)      

 

 LYMPHOCYTES - REL (CELLAVISION)(BEAKER) (test  1 %    



 code=2817)      

 

 MONOCYTES - REL (CELLAVISION)(BEAKER) (test  6 %    



 code=2818)      

 

 EOSINOPHILS - REL (CELLAVISION)(BEAKER) (test  3 %    



 code=2819)      

 

 BANDS - REL (CELLAVISION)(BEAKER) (test  3 %  0-10  



 code=2826)      

 

 BLASTS - REL (CELLAVISION)(BEAKER) (test  1 %  0-0  



 code=2827)      

 

 NEUTROPHILS - ABS (CELLAVISION)(BEAKER) (test  16.77 K/ul  1.78-5.38  



 code=2830)      

 

 LYMPHOCYTES - ABS (CELLAVISION)(BEAKER) (test  0.20 K/ul  1.32-3.57  



 code=2831)      

 

 MONOCYTES - ABS (CELLAVISION)(BEAKER) (test  1.17 K/uL  0.30-0.82  



 code=2832)      

 

 EOSINOPHILS - ABS (CELLAVISION)(BEAKER) (test  0.59 K/uL  0.04-0.54  



 code=2834)      

 

 BANDS - ABS (CELLAVISION)(BEAKER) (test  0.59 K/uL  0.00-0.80  



 code=2840)      

 

 BLASTS - ABS (CELLAVISION)(BEAKER) (test  0.20 K/uL  0.00-0.00  



 code=2845)      

 

 TOTAL COUNTED (BEAKER) (test code=1351)  100    

 

 PLT MORPHOLOGY (BEAKER) (test code=486)  Normal    

 

 SMUDGE CELLS (BEAKER) (test code=1371)  Present    

 

 POIKILOCYTES (BEAKER) (test code=966)  1+ few    

 

 PLATELET CONCENTRATION (CELLAVISION)(BEAKER)  Decreased    



 (test code=3438)      



Received comment: User comments: Slide comments:RAD, CHEST, 1 VIEW, NON [id number]
-09- 08:53:00Reason for exam:-&gt;left effusionShould this be performed at 
the bedside?-&gt;YesFINAL REPORT PATIENT ID:   [id number] RAD, CHEST, 1 VIEW, NON 
DEPT INDICATION: left effusion COMPARISON: Prior day's exam FINDINGS: Portable 
frontal view of the chest.   IMPRESSION: Limited by patient positioning.Support 
Lines: Stable. Lungs and pleura: Interstitial congestion on the left slightly 
greater than the right and unchanged from prior. Small left effusion. No 
visible pneumothorax.Heart and mediastinum: Stable contours. Additional findings
: None. Signed: JR Mendoza Robert MDReport Verified Date/Time:  2019 08:53:19 Reading Location: [facility] Radiology Reading Room    
Electronically signed by: KULWINDER MENDOZA on 2019 08:53 AMPOCT-
GLUCOSE [id number] 07:58:00





 Test Item  Value  Reference Range  Comments

 

 POC-GLUCOSE METER (BEAKER)  95 mg/dL    TESTED AT [facility] [address] [facility]



 (test jrac=5167)      [facility] [address]



COMPREHENSIVE METABOLIC [id number] 05:41:00





 Test Item  Value  Reference Range  Comments

 

 TOTAL PROTEIN (BEAKER)  5.4 gm/dL  6.0-8.3  



 (test code=770)      

 

 ALBUMIN (BEAKER) (test  1.9 g/dL  3.5-5.0  



 code=1145)      

 

 ALKALINE PHOSPHATASE  126 U/L    



 (BEAKER) (test code=346)      

 

 BILIRUBIN TOTAL (BEAKER)  4.9 mg/dL  0.2-1.2  



 (test code=377)      

 

 SODIUM (BEAKER) (test  117 meq/L  136-145  



 enru=640)      

 

 POTASSIUM (BEAKER) (test  5.3 meq/L  3.5-5.1  



 code=379)      

 

 CHLORIDE (BEAKER) (test  83 meq/L    



 code=382)      

 

 CO2 (BEAKER) (test  28 meq/L  22-29  



 code=355)      

 

 BLOOD UREA NITROGEN  29 mg/dL  7-21  



 (BEAKER) (test code=354)      

 

 CREATININE (BEAKER) (test  0.90 mg/dL  0.57-1.25  



 code=358)      

 

 GLUCOSE RANDOM (BEAKER)  99 mg/dL    



 (test code=652)      

 

 CALCIUM (BEAKER) (test  7.7 mg/dL  8.4-10.2  



 code=697)      

 

 AST (SGOT) (BEAKER) (test  28 U/L  5-34  



 code=353)      

 

 ALT (SGPT) (BEAKER) (test  18 U/L  6-55  



 code=347)      

 

 EGFR (BEAKER) (test  86 mL/min/1.73 sq m    ESTIMATED GFR IS NOT AS



 code=1092)      ACCURATE AS CREATININE



       CLEARANCE IN PREDICTING



       GLOMERULAR FILTRATION



       RATE. ESTIMATED GFR IS



       NOT APPLICABLE FOR



       DIALYSIS PATIENTS.



Specimen moderately ictericPOCT-GLUCOSE [id number] 21:08:00





 Test Item  Value  Reference Range  Comments

 

 POC-GLUCOSE METER (BEAKER)  92 mg/dL    TESTED AT [address]



 (test gfqh=6709)      [place] [address]



RAD, CHEST, 1 VIEW, NON [id number] 17:40:00Reason for exam:-&gt;post chest 
tube placementFINAL REPORT PATIENT ID:   [id number] INDICATION: post chest tube 
placement TECHNIQUE: Chest radiograph, single view, portable technique. 
FINDINGS / IMPRESSION: Left pleural effusion has decreased in size, since 10:25 
AM, after pleural tube placement. No pneumothorax demonstrated. Right PICC line 
again noted terminating at the cavoatrial junction. Signed: Giovani Ordonez 
MDReport Verified Date/Time:  2019 17:40:02 Reading Location: [address] Consult Reading Room Electronically signed by: GIOVANI ORDONEZ M.D. on 2019 05:40 PMPOCT-GLUCOSE [id number] 17:33:00





 Test Item  Value  Reference Range  Comments

 

 POC-GLUCOSE METER (BEAKER)  116 mg/dL    TESTED AT [address]



 (test pidw=3026)      [place] [address]



POCT-GLUCOSE [id number] 15:19:00





 Test Item  Value  Reference Range  Comments

 

 POC-GLUCOSE METER (BEAKER)  85 mg/dL    TESTED AT [address]



 (test ufoa=4635)      [place] [address]



CT, DRAINAGE, CHEST TUBE [id number] 14:49:00Reason for exam:-&gt;
loculated left pleural effusion, please place large bore pigtail if effusion 
noted on CT scanAnesthesia:-&gt;NoneFINAL REPORT PATIENT ID:   [id number] 
PROCEDURE: CT-guided placement of a left pleural catheter. Dose modulation, 
iterative reconstruction, and/or weight-based adjustment of the mA/kV was 
utilized to reduce the radiation dose to as low as reasonably achievable. 
INDICATION: Loculated left pleural effusion. COMPARISON: CT from earlier today. 
SEDATION: Intravenous moderate sedation was administered by radiology nursing 
and monitored under the direction of the undersigned radiologist. The patient's 
vital signs were monitored throughout the procedure and recorded in the patient'
s medical record by radiology nursing. Total intraservice time of sedation was 
25 minutes. MEDICATIONS: 0.5 mg Versed, 25 mcg fentanyl DESCRIPTION: After 
obtaining informed written consent, the patient was brought to the procedure 
room and placed in the supine position.  Preliminary CT scan revealed a large 
left pleural effusion. A clear path to the collection was identified. The 
overlying skin was prepped and draped in the usual, sterile fashion and local 2
% lidocaine anesthesia was administered. Under CT guidance, a 19-gaugeneedle 
was placed. After placement of a wire and serial dilation, a 12 Thai catheter 
was placed into the pleural fluid. The catheter was affixed to the skin and 
connected to a vacuum container. 1000 mL of clear red fluid was aspirated. 
Samples were placed on this side table and no clotting was noted. Samples were 
sent for analysis. Post procedure scan showed decrease in size with left 
effusion and no immediate complications. IMPRESSION: Successful placement of a 
12 Thai left chest tube. Signed: Abner Aguilera MDReport Verified Date/Time:   14:49:30 Reading Location: [address] CT Body Reading Room      
Electronically signed by: ABNER AGUILERA MD on 2019 02:49 PMCT, CHEST, 
WITH [id number] 13:11:00Reason for exam:-&gt;evaluate loculated left 
pleural effusion seen on xrayFINAL REPORT PATIENT ID:   [id number] TECHNIQUE: CT 
scan of the chest WITH intravenous contrast. Dose modulation, iterative 
reconstruction, and/or weight-based adjustment of the mA/kV was utilized to 
reduce the radiation dose to as low as reasonably achievable. INDICATION: 
evaluate loculated left pleural effusion seen on xrayevaluate loculated left 
pleural effusion seen on xray. COMPARISON: None.  FINDINGS:  LINES/TUBES: A 
right PICC has its tip at the superior cavoatrial junction. LUNGS AND AIRWAYS: 
Mild right basilar subsegmental atelectasis. There is an area of cavitation 
with a fluid fluid level in superior segment of the left lower lobe which 
measures 4 cm PLEURA: Trace right pleural effusion. HEART AND MEDIASTINUM: The 
visualized thyroid gland is normal. No significant mediastinal, hilar, or 
axillary lymphadenopathy. The heart and pericardium are within normal limits. 
Severe coronary arterial calcifications. SOFT TISSUES AND BONES: Bilateral 
gynecomastia. Old, healed right 10th and 12th ribfractures. Old, healed left 
10th rib fracture. UPPER ABDOMEN: Nodular, cirrhotic liver. Partially 
visualized upper abdominal varices. A periumbilical vein is recanalized.  
IMPRESSION: 1.There is a large left sided loculated pleural effusion with a 
mildly thickened pleura and some intermixed gas. This is concerning for an 
empyema. 2.The air-fluid level with surrounding lung in the superior segment of 
the left lower lobe is most likely a lung abscess. A cavitary lung nodule (
either from malignancy or fungal infection) is considered less likely. A follow-
up chest CT is recommended in three months to document decrease in size and 
exclude cavitary malignancy. 3.Cirrhosis with findings of portal hypertension. 
Signed: Abner Aguilera Verified Date/Time:  2019 13:11:18 Reading 
Location: [facility] C013Y CT Body Reading Room      Electronically signed by: ABNER AGUILERA MD on 2019 01:11 PMPOCT-GLUCOSE [id number] 11:22:00





 Test Item  Value  Reference Range  Comments

 

 POC-GLUCOSE METER (BEAKER)  81 mg/dL    TESTED AT [address]



 (test wwtw=3133)      [facility] [address]



RAD, CHEST, 1 VIEW, NON [id number] 10:59:00Reason for exam:-&gt;eval 
effusionShould this be performed at the bedside?-&gt;YesFINAL REPORT PATIENT ID
:   [id number] RAD, CHEST, 1 VIEW, NON DEPT INDICATION: eval effusion COMPARISON: 
Prior day's exam FINDINGS: Portable frontal view of the chest.   IMPRESSION: 
Limited by patient rotation.Support Lines: A right PICC terminates over the 
superior vena cava. Lungs and pleura: Large left effusion. Right costophrenic 
sulcus is excluded from view. No pneumothorax.Heart and mediastinum: 
Unremarkable where visible.Additional findings: None. A CT evaluation is 
currently pending. Signed: JR Mendoza Robert MDReport Verified Date/Time
:  2019 10:59:34 Reading Location: [facility] Radiology Reading Room  
  Electronically signed by: KULWINDER MENDOZA on 2019 10:59 
AMCOMPREHENSIVE METABOLIC [id number] 07:33:00





 Test Item  Value  Reference Range  Comments

 

 TOTAL PROTEIN (BEAKER)  5.8 gm/dL  6.0-8.3  Specimen slightly



 (test code=770)      hemolyzed

 

 ALBUMIN (BEAKER) (test  2.0 g/dL  3.5-5.0  Specimen slightly



 code=1145)      hemolyzed

 

 ALKALINE PHOSPHATASE  130 U/L    



 (BEAKER) (test code=346)      

 

 BILIRUBIN TOTAL (BEAKER)  4.6 mg/dL  0.2-1.2  Specimen slightly



 (test code=377)      hemolyzed

 

 SODIUM (BEAKER) (test  117 meq/L  136-145  



 bflp=962)      

 

 POTASSIUM (BEAKER) (test  5.3 meq/L  3.5-5.1  Specimen slightly



 code=379)      hemolyzed

 

 CHLORIDE (BEAKER) (test  84 meq/L    



 code=382)      

 

 CO2 (BEAKER) (test  28 meq/L  22-29  



 code=355)      

 

 BLOOD UREA NITROGEN  22 mg/dL  7-21  



 (BEAKER) (test code=354)      

 

 CREATININE (BEAKER) (test  0.86 mg/dL  0.57-1.25  Specimen slightly



 code=358)      hemolyzed

 

 GLUCOSE RANDOM (BEAKER)  90 mg/dL    



 (test code=652)      

 

 CALCIUM (BEAKER) (test  7.9 mg/dL  8.4-10.2  



 code=697)      

 

 AST (SGOT) (BEAKER) (test  33 U/L  5-34  Specimen slightly



 code=353)      hemolyzed

 

 ALT (SGPT) (BEAKER) (test  20 U/L  6-55  Specimen slightly



 code=347)      hemolyzed

 

 EGFR (BEAKER) (test  91 mL/min/1.73 sq m    ESTIMATED GFR IS NOT AS



 code=1092)      ACCURATE AS CREATININE



       CLEARANCE IN PREDICTING



       GLOMERULAR FILTRATION



       RATE. ESTIMATED GFR IS



       NOT APPLICABLE FOR



       DIALYSIS PATIENTS.



Specimen moderately ictericPOCT-GLUCOSE [id number] 05:46:00





 Test Item  Value  Reference Range  Comments

 

 POC-GLUCOSE METER (BEAKER)  93 mg/dL    TESTED AT [facility] [address] [facility]



 (test indd=9001)      [place] [address]



PROTHROMBIN [id number] 05:30:00





 Test Item  Value  Reference Range  Comments

 

 PROTIME (BEAKER) (test code=759)  16.9 seconds  11.9-14.2  

 

 INR (BEAKER) (test code=370)  1.4  <=5.9  



Effective 2019: PT Reference Range ChangeNew: 11.9-14.2  Previous: 11.7-
14.7RECOMMENDED COUMADIN/WARFARIN INR THERAPY RANGESSTANDARD DOSE: 2.0-3.0  
Includes: PROPHYLAXIS for venous thrombosis, systemic embolization; TREATMENT 
for venous thrombosis and/or pulmonary embolus.HIGH RISK: Target INR is2.5-3.5 
for patients wiht mechanical heart valves.CBC W/PLT COUNT &amp; AUTO 
[id number] 05:27:00





 Test Item  Value  Reference Range  Comments

 

 WHITE BLOOD CELL COUNT (BEAKER) (test code=775)  23.2 K/ L  3.5-10.5  

 

 RED BLOOD CELL COUNT (BEAKER) (test code=761)  4.75 M/ L  4.63-6.08  

 

 HEMOGLOBIN (BEAKER) (test code=410)  15.3 GM/DL  13.7-17.5  

 

 HEMATOCRIT (BEAKER) (test code=411)  43.1 %  40.1-51.0  

 

 MEAN CORPUSCULAR VOLUME (BEAKER) (test code=753)  90.7 fL  79.0-92.2  

 

 MEAN CORPUSCULAR HEMOGLOBIN (BEAKER) (test  32.2 pg  25.7-32.2  



 uwan=195)      

 

 MEAN CORPUSCULAR HEMOGLOBIN CONC (BEAKER) (test  35.5 GM/DL  32.3-36.5  



 code=752)      

 

 RED CELL DISTRIBUTION WIDTH (BEAKER) (test  13.3 %  11.6-14.4  



 code=412)      

 

 PLATELET COUNT (BEAKER) (test code=756)  87 K/CU MM  150-450  

 

 MEAN PLATELET VOLUME (BEAKER) (test code=754)  8.9 fL  9.4-12.4  

 

 NUCLEATED RED BLOOD CELLS (BEAKER) (test  0 /100 WBC  0-0  



 code=413)      

 

 NEUTROPHILS RELATIVE PERCENT (BEAKER) (test  86 %    



 code=429)      

 

 LYMPHOCYTES RELATIVE PERCENT (BEAKER) (test  3 %    



 code=430)      

 

 MONOCYTES RELATIVE PERCENT (BEAKER) (test  9 %    



 code=431)      

 

 EOSINOPHILS RELATIVE PERCENT (BEAKER) (test  0 %    



 code=432)      

 

 BASOPHILS RELATIVE PERCENT (BEAKER) (test  0 %    



 code=437)      

 

 NEUTROPHILS ABSOLUTE COUNT (BEAKER) (test  19.87 K/ L  1.78-5.38  



 code=670)      

 

 LYMPHOCYTES ABSOLUTE COUNT (BEAKER) (test  0.73 K/ L  1.32-3.57  



 code=414)      

 

 MONOCYTES ABSOLUTE COUNT (BEAKER) (test code=415)  2.16 K/ L  0.30-0.82  

 

 EOSINOPHILS ABSOLUTE COUNT (BEAKER) (test  0.06 K/ L  0.04-0.54  



 code=416)      

 

 BASOPHILS ABSOLUTE COUNT (BEAKER) (test code=417)  0.04 K/ L  0.01-0.08  

 

 IMMATURE GRANULOCYTES-RELATIVE PERCENT (BEAKER)  1 %  0-1  



 (test code=2801)      



POCT-GLUCOSE [id number] 23:34:00





 Test Item  Value  Reference Range  Comments

 

 POC-GLUCOSE METER (BEAKER)  94 mg/dL    TESTED AT [facility] [address]



 (test yxwp=1472)      [facility] [address]

## 2019-11-28 NOTE — P.HP
Certification for Inpatient


Patient admitted to: Inpatient


With expected LOS: >2 Midnights


Patient will require the following post-hospital care: None


Practitioner: I am a practitioner with admitting privileges, knowledge of 

patient current condition, hospital course, and medical plan of care.


Services: Services provided to patient in accordance with Admission 

requirements found in Title 42 Section 412.3 of the Code of Federal Regulations





Patient History


Date of Service: 11/28/19


Primary Care Provider: unknown


Reason for admission: Final stat


History of Present Illness: 





59-year-old  male with history of COPD and alcoholic cirrhosis 

presented emergency room with altered mental status.





Most of the information came from the ER.  Family not present.  Patient sedated 

at this time.  ER reports patient was having altered mental status.  Patient 

was seen and evaluated by EMS.  He was brought in for further assessment.  

There was no mention of fever, chills.  No chest pain noted. Patient recently 

hospitalized several weeks ago for acute respiratory failure related to COPD.  

Patient not in any respiratory distress at this time.





In the ER patient evaluated.  Blood pressure stable.  Heart rate slightly 

tachycardic.  White count within normal range at 8.9, hemoglobin 14.9.  

Platelet count 88. Sodium 133, potassium 3.9, bicarb 22, with BUN of 13, 

creatinine 0.95 with a GFR 60 blood gas shows pH is 7.52.  Bicarb 26. Head CT 

unremarkable for any acute changes.  Chest x-ray showed small chronic left 

pleural effusion with atelectasis.  Lactic acid elevated at 3.9.  Pro 

calcitonin normal at 0.14.  Ammonia level 53. This is just below the high 

range.  Sepsis protocol initiated.  Patient given IV fluid bolus.  Vancomycin 

and cefepime given.  Blood cultures obtained.  Urine culture pending.





When I saw the patient in the ER, patient appeared sedated.  Patient had 

received Ativan due to agitation.


Allergies





No Known Allergies Allergy (Verified 11/08/19 11:36)


 





Home medications list reviewed: Yes


Home Medications: 








Folic Acid 0.4 mg PO DAILY 09/09/19 


Furosemide 40 mg PO DAILY 09/09/19 


Spironolactone 100 mg PO DAILY 09/09/19 


Buspirone HCl [Buspar] 7.5 mg PO BID #30 tablet 11/15/19 


Ipratropium Neb [Atrovent Neb] 0.5 mg IH TID PRN 30 Days  amp 11/15/19 


Levalbuterol [Xopenex*] 1.25 mg NEB Q6HP PRN #60 vial 11/15/19 


Roflumilast [Daliresp*] 500 mcg PO DAILY #30 tablet 11/15/19 


Smz./Tmp. [Bactrim Ds 800 MG/160 MG] 1 each PO BID #20 tab 11/15/19 


predniSONE [Prednisone*] 10 mg PO BID #30 tab 11/15/19 








- Past Medical/Surgical History


Diabetic: No


-: Alcoholic liver cirrhosis


-: COPD


-: Chronic steroid use


-: Paracentesis


-: History of Pseudomonas bacteremia


-: Right Inguinal Hernia and Umbilical Hernia Repair


Psychosocial/ Personal History: Patient is at home





- Family History


Family History: Reviewed- Non-Contributory





- Family History


  ** Father


-: Hypertension, Lung disease





  ** Mother


-: Diabetes





- Social History


Smoking Status: Unknown if ever smoked


Alcohol use: No


CD- Drugs: No


Caffeine use: Yes


Place of Residence: Home





Review of Systems


is unable to be obtained





Physical Examination





- Physical Exam


General: Other (Patient is sedated.)


HEENT: Atraumatic, Normocephalic, Mucous membr. moist/pink


Neck: Supple


Respiratory: Expiratory wheezes (Bilateral)


Cardiovascular: Normal pulses, Regular rate/rhythm


Gastrointestinal: Normal bowel sounds, Soft and benign, Non-distended, No 

tenderness, No masses, No rebound, No guarding


Musculoskeletal: No erythema, No tenderness, No warmth


Integumentary: No erythema, No warmth, No cyanosis, Tenderness/swelling (Mild 

edema to the lower extremities, nonpitting)


Neurological: Normal tone, Other (Patient received sedation medication)





- Studies


Laboratory Data (last 24 hrs)





11/28/19 10:13: PT 14.5 H, INR 1.24, APTT 19.8 L


11/28/19 10:13: WBC 8.9  D, Hgb 14.9, Hct 42.5, Plt Count 88 L D


11/28/19 10:13: Sodium 133 L, Potassium 3.9, BUN 13, Creatinine 0.98, Glucose 

179 H, Total Bilirubin 4.3 H, AST 28, ALT 43, Alkaline Phosphatase 116, Lipase 

221








Assessment and Plan





- Plan





Impression: 


Altered mental status likely hepatic encephalopathy with possible underlying UTI

, doubt sepsis


COPD on chronic steroids


Alcoholic cirrhosis


Thrombocytopenia secondary to cirrhosis





Plan:


Altered mental status likely hepatic encephalopathy with possible underlying UTI

, doubt sepsis:  Patient be admitted to ICU for further evaluation.  Patient 

given sepsis bolus in the emergency room.  I doubt sepsis as pro calcitonin 

negative but lactic acid elevated likely from dehydration.  Will continue to 

monitor closely.  Continue IV fluids.  Possible underlying UTI is suspected.  

UA and urine culture pending.  Chest x-ray shows no pneumonia.  CT head 

unremarkable.  Will continue with scheduled lactulose.  Ammonia within normal 

range but in the higher end of abnormal.  Will continue monitor electrolytes 

closely.  Will provide SCDs for DVT prophylaxis.  Will continue to monitor for 

sepsis.


COPD on chronic steroids:  Continue COPD medication.  Will start IV steroids.  

Will consult pulmonology to further evaluate.


Alcoholic cirrhosis:  Continue as above


Thrombocytopenia secondary to cirrhosis:  Will monitor closely.








Discharge Plan: Home


Plan to discharge in: Greater than 2 days





- Advance Directives


Does patient have a Living Will: Yes


Does patient have a Durable POA for Healthcare: No





- Code Status/Comfort Care


Code Status Assessed: No (Code status could not be obtained)


Time Spent Managing Pts Care (In Minutes): 55

## 2019-11-28 NOTE — P.INFCA
Sepsis Focused Assessment





- Focused Assessment Complete?


Sepsis Focused Assessment Completed?: Yes





- Evaluation


Current stage of sepsis: Ruled out


Reason for ruling out sepsis: No apparent sign of infection. AMS due to ativan





- Vital Signs


Reviewed: Yes


Temperature: 98.6 F


Heart rate: 101


Blood Pressure: 105/74


Respiratory Rate: 15


O2 Sat by Pulse Oximetry: 95





- Examination


Date exam was performed: 11/28/19


Time exam was performed: 15:25


Heart: S1, S2, Tachycardia


Lungs: Diminished air movement


Peripheral pulses: 2+ Slightly diminished


Peripheral pulse location: Radial, Pedal


Capillary refill: <2 Seconds


Skin examination: Normal turgor

## 2019-11-28 NOTE — EDPHYS
Physician Documentation                                                                           

 [facility]                                                                 

Name: Omkar Chung                                                                                  

Age: 59 yrs                                                                                       

Sex: Male                                                                                         

: 1960                                                                                   

MRN: [id number]                                                                                   

Arrival Date: 2019                                                                          

Time: 09:05                                                                                       

Account#: [id number]                                                                            

Bed 3                                                                                             

Private MD:                                                                                       

ED Physician Chris Canales                                                                      

HPI:                                                                                              

                                                                                             

09:55 This 59 yrs old  Male presents to ER via EMS with complaints of Altered Mental jr8 

      Status.                                                                                     

09:55 The patient presents with confusion. Onset: The symptoms/episode began/occurred         jr8 

      acutely, yesterday. Possible causes: unknown. Associated signs and symptoms: The            

      patient has no apparent associated signs or symptoms. Current symptoms: In the              

      emergency department the patient's symptoms are unchanged from the initial                  

      presentation. Patient's baseline: Neuro: alert and fully oriented, Motor: no deficits,      

      Ambulation: walks without assistance, Speech: normal. It is unknown whether or not the      

      patient has had similar symptoms in the past. It is unknown whether or not the patient      

      has recently seen a physician. EMS called by wife after patient started to act confused     

      yesterday without improvement, called EMS this AM. Patient currently alert to person.       

      Confused as to date, time, and location. History of COPD and Cirrhosis .                    

                                                                                                  

Historical:                                                                                       

- Allergies:                                                                                      

09:20 No Known Allergies;                                                                     tw2 

- Home Meds:                                                                                      

09:20 Furosemide Oral [Active];                                                               tw2 

- PMHx:                                                                                           

09:20 COPD; Hernia; Cirrhosis;                                                                tw2 

                                                                                                  

- Immunization history:: Adult Immunizations.                                                     

- Social history:: Smoking status: .                                                              

- Ebola Screening: : Patient denies exposure to infectious person.                                

                                                                                                  

                                                                                                  

ROS:                                                                                              

09:55 Unable to obtain ROS due to altered mental status.                                      jr8 

                                                                                                  

Exam:                                                                                             

09:55 Eyes:  Pupils equal round and reactive to light, extra-ocular motions intact.  Lids and jr8 

      lashes normal.  Conjunctiva and sclera are non-icteric and not injected.  Cornea within     

      normal limits.  Periorbital areas with no swelling, redness, or edema. ENT:  Nares          

      patent. No nasal discharge, no septal abnormalities noted.  Tympanic membranes are          

      normal and external auditory canals are clear.  Oropharynx with no redness, swelling,       

      or masses, exudates, or evidence of obstruction, uvula midline.  Mucous membranes           

      moist. Neck:  Trachea midline, no thyromegaly or masses palpated, and no cervical           

      lymphadenopathy.  Supple, full range of motion without nuchal rigidity, or vertebral        

      point tenderness.  No Meningismus. Cardiovascular:  Regular rate and rhythm with a          

      normal S1 and S2.  No gallops, murmurs, or rubs.  Normal PMI, no JVD.  No pulse             

      deficits. Respiratory:  Mild wheezing bilaterally.  No increased work of breathing, no      

      retractions or nasal flaring. Abdomen/GI:  Soft, non-tender, with normal bowel sounds.      

      No distension or tympany.  No guarding or rebound.  No evidence of tenderness               

      throughout. Back:  No spinal tenderness.  No costovertebral tenderness.  Full range of      

      motion. Skin:  Warm, dry with normal turgor.  Normal color with no rashes, no lesions,      

      and no evidence of cellulitis. MS/ Extremity:  Pulses equal, no cyanosis.                   

      Neurovascular intact.  Full, normal range of motion. Neuro:  Awake and alert, GCS 14,       

      oriented to person.  Cranial nerves II-XII grossly intact.  Motor strength 5/5 in all       

      extremities.  Sensory grossly intact.  Cerebellar exam normal.                              

                                                                                                  

Vital Signs:                                                                                      

09:05  / 90; Pulse 103; Resp 20; Temp 97.5(TE); Pulse Ox 96% on R/A; Weight 83.91 kg    tw2 

      (R);                                                                                        

10:00  / 88; Pulse 100; Resp 17; Pulse Ox 98% on R/A;                                   tw2 

11:10  / 74; Pulse 100; Resp 22; Pulse Ox 99% on R/A;                                   ca1 

12:56 BP 98 / 64; Pulse 94; Resp 17; Pulse Ox 97% on R/A;                                     sg  

13:32 BP 92 / 69; Pulse 92; Resp 16; Pulse Ox 97% on R/A;                                     sg  

                                                                                                  

MDM:                                                                                              

09:08 Patient medically screened.                                                             [facility] 

11:14 Data reviewed: vital signs, nurses notes, lab test result(s), EKG, radiologic studies,  [facility] 

      CT scan, plain films. Data interpreted: Pulse oximetry: on room air is 99 %.                

      Interpretation: normal. Counseling: I had a detailed discussion with the patient and/or     

      guardian regarding: the historical points, exam findings, and any diagnostic results        

      supporting the discharge/admit diagnosis, lab results, radiology results, the need for      

      further work-up and treatment in the hospital.                                              

                                                                                                  

                                                                                             

09:09 Order name: Basic Metabolic Panel                                                       [facility] 

                                                                                             

09:09 Order name: Blood Culture Adult (2)                                                     [facility] 

                                                                                             

09:09 Order name: CBC with Diff                                                               [facility] 

                                                                                             

09:09 Order name: Ckmb                                                                        [facility] 

                                                                                             

09:09 Order name: CPK                                                                         [facility] 

                                                                                             

09:09 Order name: Lactate                                                                     [facility] 

                                                                                             

09:09 Order name: LFT's                                                                       [facility] 

                                                                                             

09:09 Order name: Lipase                                                                      [facility] 

                                                                                             

09:09 Order name: Procalcitonin                                                               [facility] 

                                                                                             

09:09 Order name: Protime (+inr)                                                              [facility] 

                                                                                             

09:09 Order name: Ptt, Activated                                                              [facility] 

                                                                                             

09:09 Order name: Troponin (emerg Dept Use Only)                                              [facility] 

                                                                                             

09:09 Order name: Urine Microscopic Only                                                      [facility] 

                                                                                             

09:09 Order name: AMMONIA                                                                     [facility] 

                                                                                             

10:05 Order name: Glucose, Ancillary Testing; Complete Time: 10:09                            [facility]

                                                                                             

10:41 Order name: Protime (+INR); Complete Time: 10:45                                        [facility]

                                                                                             

10:41 Order name: PTT, Activated Partial Thromb; Complete Time: 10:45                         [facility]

                                                                                             

10:45 Order name: CBC with Automated Diff; Complete Time: 11:50                               [facility]

                                                                                             

10:47 Order name: Ammonia; Complete Time: 10:54                                               [facility]

                                                                                             

10:53 Order name: Lactate; Complete Time: 10:54                                               [facility]

                                                                                             

10:56 Order name: ABG                                                                         [facility] 

                                                                                             

11:08 Order name: Procalcitonin; Complete Time: 11:13                                         [facility]

                                                                                             

11:10 Order name: Basic Metabolic Panel; Complete Time: 11:13                                 [facility]

                                                                                             

11:11 Order name: Liver (Hepatic) Function; Complete Time: 11:13                              [facility]

                                                                                             

11:11 Order name: Creatine Phosphokinase; Complete Time: 11:13                                [facility]

                                                                                             

11:11 Order name: CKMB Creatine Kinase MB; Complete Time: 11:13                               [facility]MS

                                                                                             

11:11 Order name: Troponin (Emerg Dept Use Only); Complete Time: 11:13                        EDMS

                                                                                             

11:11 Order name: Lipase; Complete Time: 11:13                                                [facility]MS

                                                                                             

11:36 Order name: ABG Arterial Blood Gas; Complete Time: 11:50                                [facility]MS

                                                                                             

11:49 Order name: CBC Smear Scan; Complete Time: 11:50                                        [facility]MS

                                                                                             

09:09 Order name: Chest Single View XRAY                                                      [facility] 

                                                                                             

09:09 Order name: Accucheck; Complete Time: 11:49                                             8 

                                                                                             

09:09 Order name: Cardiac monitoring; Complete Time: 09:18                                    8 

                                                                                             

09:09 Order name: EKG - Nurse/Tech; Complete Time: 09:20                                      [facility] 

                                                                                             

09:09 Order name: IV Saline Lock - Large Bore; Complete Time: 11:49                            

                                                                                             

09:09 Order name: Labs collected and sent; Complete Time: 09:19                                

                                                                                             

09:09 Order name: O2 Per Protocol; Complete Time: 09:19                                       [facility] 

                                                                                             

09:09 Order name: O2 Sat Monitoring; Complete Time: 09:19                                     [facility] 

                                                                                             

09:10 Order name: CT Head Brain wo Cont                                                       [facility] 

                                                                                             

09:52 Order name: CT; Complete Time: 09:55                                                    EDMS

                                                                                             

09:54 Order name: RAD; Complete Time: 09:55                                                   [facility]MS

                                                                                             

14:30 Order name: Alcohol Serum/Plasma; Complete Time: 14:31                                  [facility]MS

                                                                                             

14:34 Order name: Lactate Sepsis 2 HR Follow-up                                               [facility]

                                                                                             

14:35 Order name: Acetaminophen Level                                                         [facility]

                                                                                                  

Administered Medications:                                                                         

09:09 Not Given (Physician Discretion): NS 0.9% (30 ml/kg) 30 ml/kg IV at bolus once; Sepsis  [facility] 

      Protocol                                                                                    

11:00 Drug: NS 0.9% (30 ml/kg) 30 ml/kg Route: IV; Rate: bolus; Site: left antecubital;       ca1 

13:45 Follow up: Response: No adverse reaction; IV Status: Completed infusion; IV Intake:     sg  

      2517ml                                                                                      

11:02 Drug: Lactulose 20 grams Volume: 30 ml; Route: PO;                                      ca1 

12:30 Follow up: Response: No adverse reaction                                                sg  

11:25 Drug: Cefepime 1 grams Route: IVPB; Rate: 200 ml/hr; Infused Over: 30 mins; Site: left  sg  

      antecubital;                                                                                

11:30 Follow up: Response: No adverse reaction; IV Status: Completed infusion; given slow IVP sg  

      as instructed per pharmacy                                                                  

11:42 Drug: vancoMYCIN 1 grams Route: IVPB; Infused Over: 2 hrs; Site: left antecubital;        

13:45 Follow up: Response: No adverse reaction; IV Status: Completed infusion                 sg  

11:42 Drug: Ativan 2 mg Route: IVP; Site: left antecubital;                                   sg  

12:43 Follow up: Response: No adverse reaction                                                sg  

                                                                                                  

                                                                                                  

Disposition:                                                                                      

19 11:16 Hospitalization ordered by Jovany Ovalles for Inpatient Admission. Preliminary     

  diagnosis are Altered mental status, unspecified, Alkalosis.                                    

- Bed requested for Intensive Care Unit.                                                          

- Status is Inpatient Admission.                                                              sg  

- Condition is Stable.                                                                            

- Problem is new.                                                                                 

- Symptoms are unchanged.                                                                         

UTI on Admission? No                                                                              

                                                                                                  

                                                                                                  

                                                                                                  

Addendum:                                                                                         

2019                                                                                        

     10:35 Co-signature as Attending Physician, Chris Canales MD I agree with the assessment and  c
ha

           plan of care.                                                                          

                                                                                                  

Signatures:                                                                                       

Dispatcher [employer]                           EDChidi Fry, RN                         RN   Chris Whitt MD MD cha Roszak, Josh, PA                        PA   jr8                                                  

Dianne Monroe RN                          RN   tw2                                                  

Araceli Heard Cheryl RN                        RN   ca1                                                  

                                                                                                  

Corrections: (The following items were deleted from the chart)                                    

                                                                                             

11:20 11:16 Hospitalization Ordered by Jovany Ovalles DO for Inpatient Admission. Preliminary  jr8 

      diagnosis is Altered mental status, unspecified. Bed requested for Telemetry/MedSurg        

      (Inpatient). Status is Inpatient Admission. Condition is Stable. Problem is new.            

      Symptoms are unchanged. UTI on Admission? No. jr8                                           

11:46 11:20 2019 11:16 Hospitalization Ordered by Jovany Ovalles DO for Inpatient        eb  

      Admission. Preliminary diagnosis is Altered mental status, unspecified; Alkalosis. Bed      

      requested for Telemetry/MedSurg (Inpatient). Status is Inpatient Admission. Condition       

      is Stable. Problem is new. Symptoms are unchanged. UTI on Admission? No. jr8                

13:13 11:46 2019 11:16 Hospitalization Ordered by Jovany Ovalles DO for Inpatient        jr8 

      Admission. Preliminary diagnosis is Altered mental status, unspecified; Alkalosis. Bed      

      requested for Telemetry/MedSurg (Inpatient). Status is Inpatient Admission. Condition       

      is Stable. Problem is new. Symptoms are unchanged. UTI on Admission? No. eb                 

13:33 13:13 2019 11:16 Hospitalization Ordered by Jovany Ovalles DO for Inpatient        eb  

      Admission. Preliminary diagnosis is Altered mental status, unspecified; Alkalosis. Bed      

      requested for Intensive Care Unit. Status is Inpatient Admission. Condition is Stable.      

      Problem is new. Symptoms are unchanged. UTI on Admission? No. jr8                           

14:38 13:33 2019 11:16 Hospitalization Ordered by Jovany Ovalles DO for Inpatient        sg  

      Admission. Preliminary diagnosis is Altered mental status, unspecified; Alkalosis. Bed      

      requested for Intensive Care Unit. Status is Inpatient Admission. Condition is Stable.      

      Problem is new. Symptoms are unchanged. UTI on Admission? No. eb                            

                                                                                                  

**************************************************************************************************

## 2019-11-29 LAB
ALBUMIN SERPL BCP-MCNC: 2.6 G/DL (ref 3.4–5)
ALP SERPL-CCNC: 93 U/L (ref 45–117)
ALT SERPL W P-5'-P-CCNC: 36 U/L (ref 12–78)
AST SERPL W P-5'-P-CCNC: 24 U/L (ref 15–37)
BUN BLD-MCNC: 13 MG/DL (ref 7–18)
GLUCOSE SERPLBLD-MCNC: 270 MG/DL (ref 74–106)
HCT VFR BLD CALC: 40.6 % (ref 39.6–49)
HDLC SERPL-MCNC: 85 MG/DL (ref 40–60)
INR BLD: 1.33
LDLC SERPL CALC-MCNC: 79 MG/DL (ref ?–130)
LYMPHOCYTES # SPEC AUTO: 0.2 K/UL (ref 0.7–4.9)
MAGNESIUM SERPL-MCNC: 1.8 MG/DL (ref 1.8–2.4)
PMV BLD: 7.8 FL (ref 7.6–11.3)
POTASSIUM SERPL-SCNC: 4.7 MMOL/L (ref 3.5–5.1)
RBC # BLD: 4.37 M/UL (ref 4.33–5.43)
TSH SERPL DL<=0.05 MIU/L-ACNC: 1.32 UIU/ML (ref 0.36–3.74)

## 2019-11-29 RX ADMIN — HUMAN INSULIN SCH UNIT: 100 INJECTION, SOLUTION SUBCUTANEOUS at 08:06

## 2019-11-29 RX ADMIN — SODIUM CHLORIDE SCH MLS: 0.45 INJECTION, SOLUTION INTRAVENOUS at 04:38

## 2019-11-29 RX ADMIN — CEFEPIME SCH MLS: 1 INJECTION, POWDER, FOR SOLUTION INTRAMUSCULAR; INTRAVENOUS at 21:17

## 2019-11-29 RX ADMIN — LACTULOSE SCH GM: 20 SOLUTION ORAL at 09:01

## 2019-11-29 RX ADMIN — HUMAN INSULIN SCH: 100 INJECTION, SOLUTION SUBCUTANEOUS at 20:59

## 2019-11-29 RX ADMIN — VANCOMYCIN HYDROCHLORIDE SCH MLS: 1 INJECTION, POWDER, LYOPHILIZED, FOR SOLUTION INTRAVENOUS at 21:48

## 2019-11-29 RX ADMIN — Medication SCH ML: at 09:03

## 2019-11-29 RX ADMIN — Medication SCH MG: at 09:01

## 2019-11-29 RX ADMIN — VANCOMYCIN HYDROCHLORIDE SCH MLS: 1 INJECTION, POWDER, LYOPHILIZED, FOR SOLUTION INTRAVENOUS at 09:38

## 2019-11-29 RX ADMIN — IPRATROPIUM BROMIDE SCH MG: 0.5 SOLUTION RESPIRATORY (INHALATION) at 02:00

## 2019-11-29 RX ADMIN — FUROSEMIDE SCH MG: 40 TABLET ORAL at 09:01

## 2019-11-29 RX ADMIN — MOMETASONE FUROATE AND FORMOTEROL FUMARATE DIHYDRATE SCH PUFF: 100; 5 AEROSOL RESPIRATORY (INHALATION) at 09:01

## 2019-11-29 RX ADMIN — RANITIDINE HYDROCHLORIDE SCH MG: 150 TABLET, FILM COATED ORAL at 20:58

## 2019-11-29 RX ADMIN — LACTULOSE SCH GM: 20 SOLUTION ORAL at 20:59

## 2019-11-29 RX ADMIN — BUSPIRONE HYDROCHLORIDE SCH MG: 15 TABLET ORAL at 20:59

## 2019-11-29 RX ADMIN — LACTULOSE SCH GM: 20 SOLUTION ORAL at 14:09

## 2019-11-29 RX ADMIN — CEFEPIME SCH MLS: 1 INJECTION, POWDER, FOR SOLUTION INTRAMUSCULAR; INTRAVENOUS at 09:38

## 2019-11-29 RX ADMIN — SPIRONOLACTONE SCH MG: 25 TABLET, FILM COATED ORAL at 09:02

## 2019-11-29 RX ADMIN — HUMAN INSULIN SCH UNIT: 100 INJECTION, SOLUTION SUBCUTANEOUS at 11:51

## 2019-11-29 RX ADMIN — MOMETASONE FUROATE AND FORMOTEROL FUMARATE DIHYDRATE SCH: 100; 5 AEROSOL RESPIRATORY (INHALATION) at 21:00

## 2019-11-29 RX ADMIN — ALBUTEROL SULFATE SCH MG: 2.5 SOLUTION RESPIRATORY (INHALATION) at 02:00

## 2019-11-29 RX ADMIN — HUMAN INSULIN SCH UNIT: 100 INJECTION, SOLUTION SUBCUTANEOUS at 16:29

## 2019-11-29 RX ADMIN — BUSPIRONE HYDROCHLORIDE SCH MG: 15 TABLET ORAL at 09:01

## 2019-11-29 RX ADMIN — Medication SCH: at 09:00

## 2019-11-29 RX ADMIN — ROFLUMILAST SCH MCG: 500 TABLET ORAL at 09:02

## 2019-11-29 RX ADMIN — Medication SCH ML: at 20:59

## 2019-11-29 RX ADMIN — METHYLPREDNISOLONE SODIUM SUCCINATE SCH MG: 40 INJECTION, POWDER, FOR SOLUTION INTRAMUSCULAR; INTRAVENOUS at 01:20

## 2019-11-29 RX ADMIN — NICOTINE SCH MG: 14 PATCH TRANSDERMAL at 09:01

## 2019-11-29 NOTE — P.PN
Subjective


Date of Service: 11/29/19


Primary Care Provider: unknown


Chief Complaint: Final stat


Subjective: Improving, Doing well





Physical Examination





- Vital Signs


Temperature: 98.9 F


Blood Pressure: 137/82


Pulse: 116


Respirations: 18


Pulse Ox (%): 96





- Studies


Laboratory Data (last 24 hrs)





11/28/19 10:13: PT 14.5 H, INR 1.24, APTT 19.8 L


11/28/19 10:13: WBC 8.9  D, Hgb 14.9, Hct 42.5, Plt Count 88 L D


11/28/19 10:13: Sodium 133 L, Potassium 3.9, BUN 13, Creatinine 0.98, Glucose 

179 H, Total Bilirubin 4.3 H, AST 28, ALT 43, Alkaline Phosphatase 116, Lipase 

221





Microbiology Data (last 24 hrs): 








11/28/19 10:18   Blood  - Blood   Anaerobic Blood Culture - Final








Assessment & Plan


Discharge Plan: Home


Plan to discharge in: Greater than 2 days


Physician Review Additional Text: 





Impression: 


Altered mental status likely related to hepatic encephalopathy with bacteremia, 

blood cultures positive for gram positive cocci, along with possible sepsis


COPD on chronic steroids


Alcoholic cirrhosis


Thrombocytopenia secondary to cirrhosis


Elevated blood sugar related to pre diabetes


Anxiety





Plan:


Altered mental status likely related to hepatic encephalopathy with bacteremia, 

blood cultures positive for gram positive cocci, along with possible sepsis:  

Patient has improved. Now back to baseline mental status. BP improved. Will DC 

IV fluids. Ammonia level WNL. Blood cultures positive. Will continue with 

Vancomycin/Cefepime. He mentioned that he was hospitalized for severe infection 

in September. Seen by Pulmonary. Will consult Pulmonary to further address. 

Await recommendations. Will decrease Aldactone and continue with Lasix. 

Continue with Lactulose.  Maintain 3-4 bowel movements per day.  Will plan to 

move to the floor.  Will order physical therapy.  Will continue with SCDs for 

DVT prophylaxis. I will turn the service over to the Hospitalist for tomorrow. 

I will go over the plan of care. Likely home in the next 1-3 days pending 

clinical improvement and culture results. 


COPD on chronic steroids:  Continue COPD medication.  Will transition to oral 

steroid which he takes long term. 


Alcoholic cirrhosis:  Continue as above


Thrombocytopenia secondary to cirrhosis:  Will monitor closely.  Will obtain 

lab to further evaluate.


Elevated blood sugar related to pre diabetes:  A1c 5.8.  Continue diet control.

  Will provide Accu-Cheks and sliding scale while in the hospital.


Anxiety:  Continue with medication.


Time Spent Managing Pts Care (In Minutes): 55

## 2019-11-29 NOTE — EKG
Test Date:    2019-11-28               Test Time:    09:21:30

Technician:   FILI                                    

                                                     

MEASUREMENT RESULTS:                                       

Intervals:                                           

Rate:         102                                    

MD:           150                                    

QRSD:         68                                     

QT:           346                                    

QTc:          450                                    

Axis:                                                

P:            20                                     

MD:           150                                    

QRS:          -5                                     

T:            81                                     

                                                     

INTERPRETIVE STATEMENTS:                                       

                                                     

Sinus tachycardia

Low voltage QRS

Cannot rule out Anteroseptal infarct, age undetermined

Abnormal ECG

Compared to ECG 11/25/2019 08:21:17

No significant changes



Electronically Signed On 11-29-19 09:41:11 CST by Otis Nichols

## 2019-11-29 NOTE — P.CNS
Date of Consult: 11/29/19


Primary Care Provider: unknown


Chief Complaint: Altered mental status


History of Present Illness: 





Patient is 59 years of age was recently discharged he did have a loculated 

effusion and was found to have a status Pseudomonas infection patient was 

treated with IV antibiotics as recently in the hospital discharge again 

according to the wife he had a recent fall became confused disoriented and was 

admitted from the emergency room he is very alert responsive cooperative 

compliant with his bronchodilators blood cultures are positive id pending


Allergies





No Known Allergies Allergy (Verified 11/28/19 14:51)


 





Home Medications: 








Folic Acid 0.4 mg PO DAILY 09/09/19 


Furosemide 40 mg PO DAILY 09/09/19 


Spironolactone 100 mg PO DAILY 09/09/19 


Buspirone HCl [Buspar] 7.5 mg PO BID #30 tablet 11/15/19 


Ipratropium Neb [Atrovent Neb] 0.5 mg IH TID PRN 30 Days  amp 11/15/19 


Levalbuterol [Xopenex*] 1.25 mg NEB Q6HP PRN #60 vial 11/15/19 


Roflumilast [Daliresp*] 500 mcg PO DAILY #30 tablet 11/15/19 


Smz./Tmp. [Bactrim Ds 800 MG/160 MG] 1 each PO BID #20 tab 11/15/19 


predniSONE [Prednisone*] 10 mg PO BID #30 tab 11/15/19 








- Past Medical/Surgical History


Diabetic: No


-: Alcoholic liver cirrhosis


-: COPD


-: Chronic steroid use


-: Paracentesis


-: History of Pseudomonas bacteremia


-: Right Inguinal Hernia and Umbilical Hernia Repair


Psychosocial/ Personal History: Patient is at home





- Family History


  ** Father


Medical History: Hypertension, Lung disease





  ** Mother


Medical History: Diabetes





- Social History


Smoking Status: Unknown if ever smoked


Alcohol use: No


CD- Drugs: No


Caffeine use: No


Place of Residence: Home





Review of Systems


General: Weakness


Respiratory: Cough, Shortness of Breath





Physical Examination














Temp Pulse Resp BP Pulse Ox


 


 98.9 F   116 H  18   137/82   96 


 


 11/29/19 09:19  11/29/19 09:19  11/29/19 09:19  11/29/19 09:19  11/29/19 09:19








General: Alert, In no apparent distress, Oriented x3


Neck: Supple


Cardiovascular: No edema, Normal pulses


Gastrointestinal: Normal bowel sounds, Soft and benign


Musculoskeletal: No clubbing, No swelling


Laboratory Data (last 24 hrs)





11/28/19 10:13: WBC 8.9  D, Hgb 14.9, Hct 42.5, Plt Count 88 L D


11/28/19 10:13: Sodium 133 L, Potassium 3.9, BUN 13, Creatinine 0.98, Glucose 

179 H, Total Bilirubin 4.3 H, AST 28, ALT 43, Alkaline Phosphatase 116, Lipase 

221








- Problems


(1) COPD with acute exacerbation


Current Visit: No   Status: Acute   


Plan: 


Patient is 59 years of age with a history of COPD recently diagnosed with a 

loculated pleural effusion was treated with IV antibiotics for the Pseudomonas 

infection with recently admitted discharged and became worse again according to 

the wife he became disoriented was admitted from the emergency room blood 

cultures positive IV pending possible contaminant chest x-ray shows COPD 

changes white count is normal patient has cirrhosis of the liver probably 

hepatic encephalopathy await cultures labs reviewed vital signs stable agree 

with lactulose is not on his home medications patient was discharged home on 

Bactrim insurance

## 2019-11-30 VITALS — TEMPERATURE: 98.2 F | SYSTOLIC BLOOD PRESSURE: 119 MMHG | DIASTOLIC BLOOD PRESSURE: 67 MMHG

## 2019-11-30 VITALS — OXYGEN SATURATION: 94 %

## 2019-11-30 LAB
ALBUMIN SERPL BCP-MCNC: 2.4 G/DL (ref 3.4–5)
ALP SERPL-CCNC: 112 U/L (ref 45–117)
ALT SERPL W P-5'-P-CCNC: 29 U/L (ref 12–78)
AST SERPL W P-5'-P-CCNC: 19 U/L (ref 15–37)
BUN BLD-MCNC: 17 MG/DL (ref 7–18)
GLUCOSE SERPLBLD-MCNC: 223 MG/DL (ref 74–106)
HCT VFR BLD CALC: 37.2 % (ref 39.6–49)
LYMPHOCYTES # SPEC AUTO: 0.6 K/UL (ref 0.7–4.9)
MAGNESIUM SERPL-MCNC: 1.9 MG/DL (ref 1.8–2.4)
PMV BLD: 7.9 FL (ref 7.6–11.3)
POTASSIUM SERPL-SCNC: 4.4 MMOL/L (ref 3.5–5.1)
RBC # BLD: 4.03 M/UL (ref 4.33–5.43)

## 2019-11-30 RX ADMIN — MOMETASONE FUROATE AND FORMOTEROL FUMARATE DIHYDRATE SCH: 100; 5 AEROSOL RESPIRATORY (INHALATION) at 09:00

## 2019-11-30 RX ADMIN — BUSPIRONE HYDROCHLORIDE SCH MG: 15 TABLET ORAL at 08:40

## 2019-11-30 RX ADMIN — LACTULOSE SCH GM: 20 SOLUTION ORAL at 08:40

## 2019-11-30 RX ADMIN — CEFEPIME SCH MLS: 1 INJECTION, POWDER, FOR SOLUTION INTRAMUSCULAR; INTRAVENOUS at 08:40

## 2019-11-30 RX ADMIN — NICOTINE SCH MG: 14 PATCH TRANSDERMAL at 08:40

## 2019-11-30 RX ADMIN — ROFLUMILAST SCH MCG: 500 TABLET ORAL at 08:40

## 2019-11-30 RX ADMIN — Medication SCH MG: at 08:40

## 2019-11-30 RX ADMIN — FUROSEMIDE SCH MG: 40 TABLET ORAL at 08:35

## 2019-11-30 RX ADMIN — Medication SCH ML: at 09:41

## 2019-11-30 RX ADMIN — HUMAN INSULIN SCH: 100 INJECTION, SOLUTION SUBCUTANEOUS at 07:30

## 2019-11-30 RX ADMIN — SPIRONOLACTONE SCH MG: 25 TABLET, FILM COATED ORAL at 08:41

## 2019-11-30 RX ADMIN — RANITIDINE HYDROCHLORIDE SCH MG: 150 TABLET, FILM COATED ORAL at 08:33

## 2019-11-30 RX ADMIN — Medication SCH: at 09:00

## 2019-11-30 NOTE — P.DS
Admission Date: 11/28/19 (Hospitalist)


Discharge Date: 11/30/19


Primary Care Provider: unknown


Disposition: ROUTINE DISCHARGE


Discharge Condition: GOOD


Reason for Admission: Altered mental status





- Problems


(1) COPD with acute exacerbation


Current Visit: No   Status: Acute   


Brief History of Present Illness: 


Patient is doing much better today no new complaints will plan to ambulate 

blood cultures most likely contaminant no evidence of active sepsis discharge 

today in no antibiotics patient probably has an underlying encephalopathy from 

his liver cirrhosis will advise him to take some lactulose resume is inhalers 

there Dc all antibiotics ambulate lab work reviewed


On examination patient is alert oriented responsive cooperative vital signs all 

stable chest bilateral rhonchi diminished air entry cardiovascular system heart 

sounds normal


Hospital Course: 





And was admitted for observation did well see my progress note from today 

stable to be discharged home are advise patient to take some lactulose daily 

and resume his inhalers follow up with me in 2 weeks


Vital Signs/Physical Exam: 














Temp Pulse Resp BP Pulse Ox


 


 98.2 F   106 H  20   119/67   95 


 


 11/30/19 08:00  11/30/19 08:41  11/30/19 08:00  11/30/19 08:41  11/30/19 08:00








Laboratory Data at Discharge: 














WBC  12.6 K/uL (4.3-10.9)  H D 11/30/19  04:56    


 


Hgb  12.7 g/dL (13.6-17.9)  L  11/30/19  04:56    


 


Hct  37.2 % (39.6-49.0)  L  11/30/19  04:56    


 


Plt Count  83 K/uL (152-406)  L D 11/30/19  04:56    


 


PT  15.5 SECONDS (9.5-12.5)  H  11/29/19  09:36    


 


INR  1.33   11/29/19  09:36    


 


APTT  19.8 SECONDS (24.3-36.9)  L  11/28/19  10:13    


 


Sodium  131 mmol/L (136-145)  L  11/30/19  04:56    


 


Potassium  4.4 mmol/L (3.5-5.1)   11/30/19  04:56    


 


BUN  17 mg/dL (7-18)   11/30/19  04:56    


 


Creatinine  0.87 mg/dL (0.55-1.3)   11/30/19  04:56    


 


Glucose  223 mg/dL ()  H  11/30/19  04:56    


 


Magnesium  1.9 mg/dL (1.8-2.4)   11/30/19  04:56    


 


Total Bilirubin  1.9 mg/dL (0.2-1.0)  H  11/30/19  04:56    


 


AST  19 U/L (15-37)   11/30/19  04:56    


 


ALT  29 U/L (12-78)   11/30/19  04:56    


 


Alkaline Phosphatase  112 U/L ()   11/30/19  04:56    


 


Triglycerides  47 mg/dL (<150)   11/29/19  04:39    


 


Cholesterol  173 mg/dL (<200)   11/29/19  04:39    


 


HDL Cholesterol  85 mg/dL (40-60)  H  11/29/19  04:39    


 


Cholesterol/HDL Ratio  2.04   11/29/19  04:39    


 


Lipase  221 U/L ()   11/28/19  10:13    








Home Medications: 








Folic Acid 0.4 mg PO DAILY 09/09/19 


Furosemide 40 mg PO DAILY 09/09/19 


Spironolactone 100 mg PO DAILY 09/09/19 


Buspirone HCl [Buspar] 7.5 mg PO BID #30 tablet 11/15/19 


Ipratropium Neb [Atrovent Neb] 0.5 mg IH TID PRN 30 Days  amp 11/15/19 


Levalbuterol [Xopenex*] 1.25 mg NEB Q6HP PRN #60 vial 11/15/19 


Roflumilast [Daliresp*] 500 mcg PO DAILY #30 tablet 11/15/19 


predniSONE [Prednisone*] 10 mg PO BID #30 tab 11/15/19 


Lactulose 30 ml PO DAILY 30 Days  ml 11/30/19 





New Medications: 


Lactulose 30 ml PO DAILY 30 Days  ml


Patient Discharge Instructions: Patient to take Symbicort 2 puffs twice a day 

nebulizers p.r.n.


Diet: Regular


Activity: Ad melva


Followup: 


Geraldo Kay MD [ACTIVE - CAN ADMIT] -

## 2019-11-30 NOTE — P.DS
Admission Date: 11/28/19 (Hospitalist)


Discharge Date: 11/30/19


Primary Care Provider: unknown


Disposition: ROUTINE DISCHARGE


Discharge Condition: GOOD


Reason for Admission: Altered mental status





- Problems


(1) COPD with acute exacerbation


Current Visit: No   Status: Acute   


Brief History of Present Illness: 


Patient is doing much better today no new complaints will plan to ambulate 

blood cultures most likely contaminant no evidence of active sepsis discharge 

today in no antibiotics patient probably has an underlying encephalopathy from 

his liver cirrhosis will advise him to take some lactulose resume is inhalers 

there Dc all antibiotics ambulate lab work reviewed


On examination patient is alert oriented responsive cooperative vital signs all 

stable chest bilateral rhonchi diminished air entry cardiovascular system heart 

sounds normal


Vital Signs/Physical Exam: 














Temp Pulse Resp BP Pulse Ox


 


 98.2 F   106 H  18   119/67   96 


 


 11/30/19 04:00  11/30/19 08:41  11/30/19 04:00  11/30/19 08:41  11/30/19 04:00








Laboratory Data at Discharge: 














WBC  12.6 K/uL (4.3-10.9)  H D 11/30/19  04:56    


 


Hgb  12.7 g/dL (13.6-17.9)  L  11/30/19  04:56    


 


Hct  37.2 % (39.6-49.0)  L  11/30/19  04:56    


 


Plt Count  83 K/uL (152-406)  L D 11/30/19  04:56    


 


PT  15.5 SECONDS (9.5-12.5)  H  11/29/19  09:36    


 


INR  1.33   11/29/19  09:36    


 


APTT  19.8 SECONDS (24.3-36.9)  L  11/28/19  10:13    


 


Sodium  131 mmol/L (136-145)  L  11/30/19  04:56    


 


Potassium  4.4 mmol/L (3.5-5.1)   11/30/19  04:56    


 


BUN  17 mg/dL (7-18)   11/30/19  04:56    


 


Creatinine  0.87 mg/dL (0.55-1.3)   11/30/19  04:56    


 


Glucose  223 mg/dL ()  H  11/30/19  04:56    


 


Magnesium  1.9 mg/dL (1.8-2.4)   11/30/19  04:56    


 


Total Bilirubin  1.9 mg/dL (0.2-1.0)  H  11/30/19  04:56    


 


AST  19 U/L (15-37)   11/30/19  04:56    


 


ALT  29 U/L (12-78)   11/30/19  04:56    


 


Alkaline Phosphatase  112 U/L ()   11/30/19  04:56    


 


Triglycerides  47 mg/dL (<150)   11/29/19  04:39    


 


Cholesterol  173 mg/dL (<200)   11/29/19  04:39    


 


HDL Cholesterol  85 mg/dL (40-60)  H  11/29/19  04:39    


 


Cholesterol/HDL Ratio  2.04   11/29/19  04:39    


 


Lipase  221 U/L ()   11/28/19  10:13    








Home Medications: 








Folic Acid 0.4 mg PO DAILY 09/09/19 


Furosemide 40 mg PO DAILY 09/09/19 


Spironolactone 100 mg PO DAILY 09/09/19 


Buspirone HCl [Buspar] 7.5 mg PO BID #30 tablet 11/15/19 


Ipratropium Neb [Atrovent Neb] 0.5 mg IH TID PRN 30 Days  amp 11/15/19 


Levalbuterol [Xopenex*] 1.25 mg NEB Q6HP PRN #60 vial 11/15/19 


Roflumilast [Daliresp*] 500 mcg PO DAILY #30 tablet 11/15/19 


predniSONE [Prednisone*] 10 mg PO BID #30 tab 11/15/19 


Lactulose 30 ml PO DAILY 30 Days  ml 11/30/19 





New Medications: 


Lactulose 30 ml PO DAILY 30 Days  ml


Followup: 


Geraldo Kay MD [ACTIVE - CAN ADMIT] -

## 2019-12-07 ENCOUNTER — HOSPITAL ENCOUNTER (EMERGENCY)
Dept: HOSPITAL 97 - ER | Age: 59
Discharge: ANOTHER HOSPITAL | End: 2019-12-07
Payer: MEDICARE

## 2019-12-07 VITALS — TEMPERATURE: 98 F

## 2019-12-07 VITALS — DIASTOLIC BLOOD PRESSURE: 76 MMHG | OXYGEN SATURATION: 100 % | SYSTOLIC BLOOD PRESSURE: 129 MMHG

## 2019-12-07 DIAGNOSIS — K70.31: Primary | ICD-10-CM

## 2019-12-07 DIAGNOSIS — R60.9: ICD-10-CM

## 2019-12-07 DIAGNOSIS — R53.1: ICD-10-CM

## 2019-12-07 LAB
ALBUMIN SERPL BCP-MCNC: 2.7 G/DL (ref 3.4–5)
ALP SERPL-CCNC: 123 U/L (ref 45–117)
ALT SERPL W P-5'-P-CCNC: 34 U/L (ref 12–78)
AST SERPL W P-5'-P-CCNC: 29 U/L (ref 15–37)
BLD SMEAR INTERP: (no result)
BUN BLD-MCNC: 15 MG/DL (ref 7–18)
GLUCOSE SERPLBLD-MCNC: 148 MG/DL (ref 74–106)
HCT VFR BLD CALC: 40 % (ref 39.6–49)
INR BLD: 1.21
LIPASE SERPL-CCNC: 120 U/L (ref 73–393)
LYMPHOCYTES # SPEC AUTO: 1 K/UL (ref 0.7–4.9)
MAGNESIUM SERPL-MCNC: 1.9 MG/DL (ref 1.8–2.4)
MORPHOLOGY BLD-IMP: (no result)
NT-PROBNP SERPL-MCNC: 597 PG/ML (ref ?–125)
PMV BLD: 8.4 FL (ref 7.6–11.3)
POTASSIUM SERPL-SCNC: 3.7 MMOL/L (ref 3.5–5.1)
RBC # BLD: 4.41 M/UL (ref 4.33–5.43)
TROPONIN (EMERG DEPT USE ONLY): 0.04 NG/ML (ref 0–0.04)

## 2019-12-07 PROCEDURE — 36415 COLL VENOUS BLD VENIPUNCTURE: CPT

## 2019-12-07 PROCEDURE — 80076 HEPATIC FUNCTION PANEL: CPT

## 2019-12-07 PROCEDURE — 82140 ASSAY OF AMMONIA: CPT

## 2019-12-07 PROCEDURE — 83690 ASSAY OF LIPASE: CPT

## 2019-12-07 PROCEDURE — 96374 THER/PROPH/DIAG INJ IV PUSH: CPT

## 2019-12-07 PROCEDURE — 83880 ASSAY OF NATRIURETIC PEPTIDE: CPT

## 2019-12-07 PROCEDURE — 85610 PROTHROMBIN TIME: CPT

## 2019-12-07 PROCEDURE — 85025 COMPLETE CBC W/AUTO DIFF WBC: CPT

## 2019-12-07 PROCEDURE — 71045 X-RAY EXAM CHEST 1 VIEW: CPT

## 2019-12-07 PROCEDURE — 84484 ASSAY OF TROPONIN QUANT: CPT

## 2019-12-07 PROCEDURE — 99285 EMERGENCY DEPT VISIT HI MDM: CPT

## 2019-12-07 PROCEDURE — 83735 ASSAY OF MAGNESIUM: CPT

## 2019-12-07 PROCEDURE — 80048 BASIC METABOLIC PNL TOTAL CA: CPT

## 2019-12-07 PROCEDURE — 93005 ELECTROCARDIOGRAM TRACING: CPT

## 2019-12-07 NOTE — RAD REPORT
EXAM DESCRIPTION:  Markos Single View12/7/2019 7:45 am

 

CLINICAL HISTORY:  Cough

 

COMPARISON:  November 28, 2019

 

FINDINGS:  Left basilar opacities appear mostly resolved

 

Right lung appears clear

 

The heart is normal size

 

IMPRESSION:  Left basilar opacities appear mostly resolved. If the patient's symptoms persist PA and 
lateral chest series would be recommended for further evaluation

## 2019-12-07 NOTE — EDPHYS
Physician Documentation                                                                           

 [facility]                                                                 

Name: Omkar Chung                                                                                  

Age: 59 yrs                                                                                       

Sex: Male                                                                                         

: 1960                                                                                   

MRN: [id number]                                                                                   

Arrival Date: 2019                                                                          

Time: 05:17                                                                                       

Account#: [id number]                                                                            

Bed 5                                                                                             

Private MD:                                                                                       

JOEL Physician Chris Canales                                                                      

HPI:                                                                                              

                                                                                             

06:06 This 59 yrs old  Male presents to ER via Ambulatory with complaints of Enlarge.juliet 

06:06 The patient presents with abdominal pain in the upper abdomen, in the lower abdomen,    juliet 

      abdominal distention in the upper abdomen, in the lower abdomen. Onset: The                 

      symptoms/episode began/occurred 3 day(s) ago. The patient presents with swelling,           

      tenderness, that is moderate. Onset: The symptoms/episode began/occurred 3 day(s) ago.      

      Modifying factors: The symptoms are alleviated by nothing, remaining still, the             

      symptoms are aggravated by movement, pressure. Associated signs and symptoms: Pertinent     

      positives: abdominal pain. Associated signs and symptoms: Pertinent positives:              

      anorexia, testicular pain.                                                                  

06:09 weak, sob, abd swelling, weakness.                                                      juliet 

                                                                                                  

Historical:                                                                                       

- Allergies:                                                                                      

05:27 No Known Allergies;                                                                     bb  

- Home Meds:                                                                                      

05:27 Furosemide Oral [Active];                                                               bb  

- PMHx:                                                                                           

05:27 Cirrhosis; COPD; Hernia;                                                                bb  

                                                                                                  

- Immunization history:: Adult Immunizations up to date.                                          

- Social history:: Smoking status: Patient/guardian denies using tobacco.                         

- Ebola Screening: : No symptoms or risks identified at this time.                                

- Family history:: not pertinent.                                                                 

                                                                                                  

                                                                                                  

ROS:                                                                                              

06:06 Constitutional: Negative for fever, chills, and weight loss, Eyes: Negative for injury, juliet 

      pain, redness, and discharge, ENT: Negative for injury, pain, and discharge, Neck:          

      Negative for injury, pain, and swelling, Cardiovascular: Negative for chest pain,           

      palpitations, and edema, Respiratory: Negative for shortness of breath, cough,              

      wheezing, and pleuritic chest pain, Back: Negative for injury and pain, Neuro: Negative     

      for headache, weakness, numbness, tingling, and seizure, Psych: Negative for                

      depression, anxiety, suicide ideation, homicidal ideation, and hallucinations,              

      Allergy/Immunology: Negative for hives, rash, and allergies, Endocrine: Negative for        

      neck swelling, polydipsia, polyuria, polyphagia, and marked weight changes,                 

      Hematologic/Lymphatic: Negative for swollen nodes, abnormal bleeding, and unusual           

      bruising.                                                                                   

06:06 Abdomen/GI: Positive for abdominal pain, abdominal distension.                              

06:06 MS/extremity: Positive for pain, swelling, of the right leg and left leg.                   

06:06 Skin: Positive for pallor, swelling.                                                        

06:10 : Positive for testicular pain of the head of penis, shaft of penis, left testicle    juliet 

      and right testicle.                                                                         

                                                                                                  

Exam:                                                                                             

06:10 Constitutional:  This is a well developed, well nourished patient who is awake, alert,  juliet 

      and in no acute distress. Head/Face:  Normocephalic, atraumatic. Eyes:  Pupils equal        

      round and reactive to light, extra-ocular motions intact.  Lids and lashes normal.          

      Conjunctiva and sclera are non-icteric and not injected.  Cornea within normal limits.      

      Periorbital areas with no swelling, redness, or edema. ENT:  Nares patent. No nasal         

      discharge, no septal abnormalities noted.  Tympanic membranes are normal and external       

      auditory canals are clear.  Oropharynx with no redness, swelling, or masses, exudates,      

      or evidence of obstruction, uvula midline.  Mucous membranes moist. Neck:  Trachea          

      midline, no thyromegaly or masses palpated, and no cervical lymphadenopathy.  Supple,       

      full range of motion without nuchal rigidity, or vertebral point tenderness.  No            

      Meningismus. Chest/axilla:  Normal chest wall appearance and motion.  Nontender with no     

      deformity.  No lesions are appreciated. Cardiovascular:  Regular rate and rhythm with a     

      normal S1 and S2.  No gallops, murmurs, or rubs.  Normal PMI, no JVD.  No pulse             

      deficits. Back:  No spinal tenderness.  No costovertebral tenderness.  Full range of        

      motion. Male :  Normal genitalia with no discharge or lesions. Skin:  Warm, dry with      

      normal turgor.  Normal color with no rashes, no lesions, and no evidence of cellulitis.     

      Neuro:  Awake and alert, GCS 15, oriented to person, place, time, and situation.            

      Cranial nerves II-XII grossly intact.  Motor strength 5/5 in all extremities.  Sensory      

      grossly intact.  Cerebellar exam normal.  Normal gait. Psych:  Awake, alert, with           

      orientation to person, place and time.  Behavior, mood, and affect are within normal        

      limits.                                                                                     

06:10 Respiratory: the patient does not display signs of respiratory distress,  Respirations:     

      no acute changes, labored breathing, is not present, Breath sounds: decreased breath        

      sounds, rhonchi, that are mild, Respiratory rate:  16                                       

                                                                                                  

Vital Signs:                                                                                      

05:27  / 81; Pulse 111; Resp 16 S; Temp 98(TE); Pulse Ox 96% on R/A; Weight 92.99 kg    bb  

      (R); Height 6 ft. 3 in. (190.50 cm) (R); Pain 7/10;                                         

07:15  / 78; Pulse 105; Resp 15; Pulse Ox 97% on R/A;                                   hb  

08:30  / 80; Pulse 100; Resp 17; Pulse Ox 97% on R/A; Pain 4/10;                        hb  

10:00  / 76; Pulse 92; Resp 15 S; Temp 98; Pulse Ox 100% on R/A; Pain 4/10;             sg  

05:27 Body Mass Index 25.62 (92.99 kg, 190.50 cm)                                             bb  

                                                                                                  

Procedures:                                                                                       

07:21 Peripheral line: by aseptic technique a peripheral line was placed in the left external juliet 

      jugular vein.                                                                               

                                                                                                  

MDM:                                                                                              

05:24 Patient medically screened.                                                             [facility] 

06:12 Data reviewed: vital signs, nurses notes, lab test result(s), EKG, radiologic studies,  juliet 

      plain films.                                                                                

                                                                                                  

                                                                                             

06:06 Order name: Basic Metabolic Panel; Complete Time: 07:43                                 [facility] 

                                                                                             

06:06 Order name: CBC with Diff                                                               [facility] 

                                                                                             

06:06 Order name: LFT's; Complete Time: 07:43                                                 [facility] 

                                                                                             

06:06 Order name: Magnesium; Complete Time: 07:43                                             [facility] 

                                                                                             

06:06 Order name: NT PRO-BNP; Complete Time: 07:43                                            [facility] 

                                                                                             

06:06 Order name: PT-INR; Complete Time: 07:43                                                [facility] 

                                                                                             

06:06 Order name: Troponin (emerg Dept Use Only); Complete Time: 07:43                        [facility] 

                                                                                             

06:06 Order name: XRAY Chest (1 view)                                                         [facility] 

                                                                                             

06:06 Order name: Lipase; Complete Time: 07:43                                                [facility] 

                                                                                             

06:06 Order name: AMMONIA; Complete Time: 08:07                                               [facility] 

                                                                                             

10:20 Order name: CBC Smear Scan                                                              [facility]

                                                                                             

06:06 Order name: EKG; Complete Time: 06:07                                                   [facility] 

                                                                                             

06:06 Order name: Cardiac monitoring; Complete Time: 06:35                                    [facility] 

                                                                                             

06:06 Order name: EKG - Nurse/Tech; Complete Time: 06:35                                      [facility] 

                                                                                             

06:06 Order name: IV Saline Lock; Complete Time: 07:21                                        [facility] 

                                                                                             

06:06 Order name: Labs collected and sent; Complete Time: 07:21                               [facility] 

                                                                                             

06:06 Order name: O2 Per Protocol; Complete Time: 06:35                                       [facility] 

                                                                                             

06:06 Order name: O2 Sat Monitoring; Complete Time: 06:35                                     [facility] 

                                                                                                  

Administered Medications:                                                                         

07:33 Drug: NS 0.9% 1000 ml Route: IV; Rate: 75 ml/hr; Site: left jugular;                    hb  

07:33 Drug: ProTONIX 40 mg Route: IVP; Site: left jugular;                                    hb  

08:09 Follow up: Response: No adverse reaction                                                hb  

                                                                                                  

                                                                                                  

Disposition:                                                                                      

19 07:49 Transfer ordered to [facility]. Diagnosis are Ascites - DECOMPENSATED,         

  Edema, unspecified, Weakness, Alcoholic cirrhosis of liver with ascites.                        

- Reason for transfer: Higher level of care.                                                      

- Accepting physician is TO [facility].                                                       

- Condition is Fair.                                                                              

- Problem is new.                                                                                 

- Symptoms are unchanged.                                                                         

                                                                                                  

                                                                                                  

                                                                                                  

Signatures:                                                                                       

Dispatcher [employer]                           EDChris Silveira MD MD cha Ballard, Brenda, RN                     RN   Joanna Grant RN                     RN                                                      

                                                                                                  

Corrections: (The following items were deleted from the chart)                                    

11:05 07:49 2019 07:49 Transfer ordered to [facility]. Diagnosis is Ascites -       hb  

      DECOMPENSATED; Edema, unspecified; Weakness; Alcoholic cirrhosis of liver with ascites.     

      Reason for transfer: Higher level of care. Accepting physician is TO [facility].        

      Condition is Fair. Problem is new. Symptoms are unchanged. juliet                              

                                                                                                  

**************************************************************************************************

## 2019-12-07 NOTE — XMS REPORT
Patient Summary Document

 Created on:2019



Patient:ANAYELI HUDSON

Sex:Male

:1960

External Reference #:[id number]





Demographics







 Address  [address] [place] [address]

 

 Home Phone  [phone]

 

 Email Address  NONE

 

 Preferred Language  Unknown

 

 Marital Status  Unknown

 

 Oriental orthodox Affiliation  Unknown

 

 Race  Unknown

 

 Additional Race(s)  Unavailable

 

 Ethnic Group  Unknown









Author







 [facility]

 

 Address  [facility] [address] [place] [address]

 

 Phone  [phone]









Care Team Providers







 Name  Role  Phone

 

 ELVIN HUGO  Unavailable  Unavailable









Problems

This patient has no known problems.



Allergies, Adverse Reactions, Alerts

This patient has no known allergies or adverse reactions.



Medications

This patient has no known medications.



Results







 Test Description  Test Time  Test Comments  Text Results  Atomic Results  
Result Comments









 (CELLAVISION MANUAL DIFF)  2019 09:14:00    









   Test Item  Value  Reference Range  Comments









 NEUTROPHILS - REL (CELLAVISION)(BEAKER) (test code=2816)  81 %    

 

 LYMPHOCYTES - REL (CELLAVISION)(BEAKER) (test code=2817)  7 %    

 

 MONOCYTES - REL (CELLAVISION)(BEAKER) (test code=2818)  7 %    

 

 EOSINOPHILS - REL (CELLAVISION)(BEAKER) (test code=2819)  2 %    

 

 MYELOCYTES - REL (CELLAVISION)(BEAKER) (test code=2822)  2 %  0-0  

 

 ATYPICAL LYMPHOCYTES - REL (CELLAVISION)(BEAKER) (test  1 %  0-0  



 code=2829)      

 

 NEUTROPHILS - ABS (CELLAVISION)(BEAKER) (test code=2830)  10.85 K/ul  1.78-
5.38  

 

 LYMPHOCYTES - ABS (CELLAVISION)(BEAKER) (test code=2831)  0.94 K/ul  1.32-3.57
  

 

 MONOCYTES - ABS (CELLAVISION)(BEAKER) (test code=2832)  0.94 K/uL  0.30-0.82  

 

 EOSINOPHILS - ABS (CELLAVISION)(BEAKER) (test code=2834)  0.27 K/uL  0.04-0.54
  

 

 MYELOCYTES-ABS (CELLAVISION)(BEAKER) (test code=2837)  0.27 K/uL  0.00-0.00  

 

 ATYPICAL LYMPHOCYTES - ABS (CELLAVISION)(BEAKER) (test  0.13 K/uL  0.00-0.00  



 code=2858)      

 

 TOTAL COUNTED (BEAKER) (test code=1351)  100    

 

 RBC MORPHOLOGY (BEAKER) (test code=762)  Normal    

 

 SMUDGE CELLS (BEAKER) (test code=1371)  Present    

 

 GIANT PLATELETS (BEAKER) (test code=313)  Present    

 

 PLATELET CONCENTRATION (CELLAVISION)(BEAKER) (test code=3438)  Decreased    



Received comment: User comments: Slide comments:RAD, CHEST, 1 VIEW, NON APLE3262
-09-25 08:25:00Reason for exam:-&gt;left effusionShould this be performed at 
the bedside?-&gt;YesFINAL REPORT PATIENT ID:   [id number] Chest AP portable 
Comparison exam: 2019 History provided: Follow-up pleural effusion Left 
chest tube unchanged in place. No pneumothorax. No significant effusions are 
evident. Nonspecific coarsening of markings in the left lower lobe. PICC line 
in good position. Signed: Lobo Morris MDReport Verified Date/Time:  2019 
08:25:12 Reading Location: [facility] Radiology Reading Room        
Electronically signed by: LOBO MORRIS on 2019 08:25 AMCOMPREHENSIVE 
METABOLIC [id number] 06:40:00





 Test Item  Value  Reference Range  Comments

 

 TOTAL PROTEIN (BEAKER)  4.8 gm/dL  6.0-8.3  



 (test code=770)      

 

 ALBUMIN (BEAKER) (test  2.4 g/dL  3.5-5.0  



 code=1145)      

 

 ALKALINE PHOSPHATASE  141 U/L    



 (BEAKER) (test code=346)      

 

 BILIRUBIN TOTAL (BEAKER)  3.9 mg/dL  0.2-1.2  



 (test code=377)      

 

 SODIUM (BEAKER) (test  123 meq/L  136-145  



 xwgm=693)      

 

 POTASSIUM (BEAKER) (test  3.6 meq/L  3.5-5.1  



 code=379)      

 

 CHLORIDE (BEAKER) (test  90 meq/L    



 code=382)      

 

 CO2 (BEAKER) (test  29 meq/L  22-29  



 code=355)      

 

 BLOOD UREA NITROGEN  14 mg/dL  7-21  



 (BEAKER) (test code=354)      

 

 CREATININE (BEAKER) (test  0.80 mg/dL  0.57-1.25  



 code=358)      

 

 GLUCOSE RANDOM (BEAKER)  152 mg/dL    



 (test code=652)      

 

 CALCIUM (BEAKER) (test  7.3 mg/dL  8.4-10.2  



 code=697)      

 

 AST (SGOT) (BEAKER) (test  39 U/L  5-34  



 code=353)      

 

 ALT (SGPT) (BEAKER) (test  23 U/L  6-55  



 code=347)      

 

 EGFR (BEAKER) (test  99 mL/min/1.73 sq m    ESTIMATED GFR IS NOT AS



 code=1092)      ACCURATE AS CREATININE



       CLEARANCE IN PREDICTING



       GLOMERULAR FILTRATION



       RATE. ESTIMATED GFR IS



       NOT APPLICABLE FOR



       DIALYSIS PATIENTS.



Specimen slightly [id number] 06:39:00





 Test Item  Value  Reference Range  Comments

 

 PHOSPHORUS (BEAKER) (test code=604)  2.1 mg/dL  2.3-4.7  



[id number] 06:39:00





 Test Item  Value  Reference Range  Comments

 

 MAGNESIUM (BEAKER) (test code=627)  1.8 mg/dL  1.6-2.6  



B-TYPE NATRIURETIC FACTOR (BNP)2019 06:04:00





 Test Item  Value  Reference Range  Comments

 

 B-TYPE NATRIURETIC PEPTIDE (BEAKER) (test code=700)  97 pg/mL  0-100  



CBC W/PLT COUNT &amp; AUTO [id number] 05:57:00





 Test Item  Value  Reference Range  Comments

 

 WHITE BLOOD CELL COUNT (BEAKER) (test code=775)  13.4 K/ L  3.5-10.5  

 

 RED BLOOD CELL COUNT (BEAKER) (test code=761)  3.17 M/ L  4.63-6.08  

 

 HEMOGLOBIN (BEAKER) (test code=410)  10.3 GM/DL  13.7-17.5  

 

 HEMATOCRIT (BEAKER) (test code=411)  29.4 %  40.1-51.0  

 

 MEAN CORPUSCULAR VOLUME (BEAKER) (test code=753)  92.7 fL  79.0-92.2  

 

 MEAN CORPUSCULAR HEMOGLOBIN (BEAKER) (test  32.5 pg  25.7-32.2  



 rkon=948)      

 

 MEAN CORPUSCULAR HEMOGLOBIN CONC (BEAKER) (test  35.0 GM/DL  32.3-36.5  



 code=752)      

 

 RED CELL DISTRIBUTION WIDTH (BEAKER) (test  13.9 %  11.6-14.4  



 code=412)      

 

 PLATELET COUNT (BEAKER) (test code=756)  75 K/CU MM  150-450  

 

 MEAN PLATELET VOLUME (BEAKER) (test code=754)  8.9 fL  9.4-12.4  

 

 NUCLEATED RED BLOOD CELLS (BEAKER) (test  0 /100 WBC  0-0  



 code=413)      



CALCIUM, [id number] 05:44:00





 Test Item  Value  Reference Range  Comments

 

 CALCIUM IONIZED (BEAKER) (test code=698)  0.97 mmol/L  1.12-1.27  

 

 PH, BLOOD (BEAKER) (test code=1810)  7.50    



BODY FLUID CULTURE + GRAM [id number] 11:10:00





 Test Item  Value  Reference Range  Comments

 

 CULTURE (BEAKER) (test      <1+ Coagulase negative



 code=1095)      Staphylococcus

 

 CULTURE (BEAKER) (test  COAGULASE NEGATIVE    <1+ Pseudomonas



 code=1095)  STAPHYLOCOCCUS    aeruginosa

 

 Clindamycin (test      



 code=10)      

 

 Erythromycin (test      



 code=4)      

 

 Linezolid (test code=40)      

 

 Oxacillin (test code=14)      

 

 Rifampin (test code=43)      

 

 Tetracycline (test      



 code=2)      

 

 Trimethoprim +      



 Sulfamethoxazole (test      



 code=47)      

 

 Vancomycin (test code=13)      

 

 GRAM STAIN RESULT  3+ WBCs    



 (BEAKER) (test code=1123)      

 

 GRAM STAIN RESULT  <1+ gram positive    



 (BEAKER) (test  cocci in pairs and    



 code=112319)  clusters    



CBC W/PLT COUNT &amp; AUTO [id number] 09:58:00





 Test Item  Value  Reference Range  Comments

 

 WHITE BLOOD CELL COUNT (BEAKER) (test code=775)  10.7 K/ L  3.5-10.5  

 

 RED BLOOD CELL COUNT (BEAKER) (test code=761)  3.15 M/ L  4.63-6.08  

 

 HEMOGLOBIN (BEAKER) (test code=410)  10.2 GM/DL  13.7-17.5  

 

 HEMATOCRIT (BEAKER) (test code=411)  28.9 %  40.1-51.0  

 

 MEAN CORPUSCULAR VOLUME (BEAKER) (test code=753)  91.7 fL  79.0-92.2  

 

 MEAN CORPUSCULAR HEMOGLOBIN (BEAKER) (test  32.4 pg  25.7-32.2  



 ckpi=835)      

 

 MEAN CORPUSCULAR HEMOGLOBIN CONC (BEAKER) (test  35.3 GM/DL  32.3-36.5  



 code=752)      

 

 RED CELL DISTRIBUTION WIDTH (BEAKER) (test  13.7 %  11.6-14.4  



 code=412)      

 

 PLATELET COUNT (BEAKER) (test code=756)  57 K/CU MM  150-450  

 

 MEAN PLATELET VOLUME (BEAKER) (test code=754)  8.8 fL  9.4-12.4  

 

 NUCLEATED RED BLOOD CELLS (BEAKER) (test  0 /100 WBC  0-0  



 code=413)      



(CELLAVISION MANUAL DIFF)2019 09:58:00





 Test Item  Value  Reference Range  Comments

 

 NEUTROPHILS - REL (CELLAVISION)(BEAKER) (test  78 %    



 code=2816)      

 

 LYMPHOCYTES - REL (CELLAVISION)(BEAKER) (test  5 %    



 code=2817)      

 

 MONOCYTES - REL (CELLAVISION)(BEAKER) (test  9 %    



 code=2818)      

 

 EOSINOPHILS - REL (CELLAVISION)(BEAKER) (test  2 %    



 code=2819)      

 

 METAMYELOCYTES - REL (CELLAVISION)(BEAKER) (test  1 %  0-0  



 code=2821)      

 

 BANDS - REL (CELLAVISION)(BEAKER) (test code=2826)  4 %  0-10  

 

 BLASTS - REL (CELLAVISION)(BEAKER) (test  1 %  0-0  



 code=2827)      

 

 NEUTROPHILS - ABS (CELLAVISION)(BEAKER) (test  8.35 K/ul  1.78-5.38  



 code=2830)      

 

 LYMPHOCYTES - ABS (CELLAVISION)(BEAKER) (test  0.54 K/ul  1.32-3.57  



 code=2831)      

 

 MONOCYTES - ABS (CELLAVISION)(BEAKER) (test  0.96 K/uL  0.30-0.82  



 code=2832)      

 

 EOSINOPHILS - ABS (CELLAVISION)(BEAKER) (test  0.21 K/uL  0.04-0.54  



 code=2834)      

 

 METAMYELOCYTES - ABS (CELLAVISION)(BEAKER) (test  0.11 K/uL  0.00-0.00  



 code=2836)      

 

 BANDS - ABS (CELLAVISION)(BEAKER) (test code=2840)  0.43 K/uL  0.00-0.80  

 

 BLASTS - ABS (CELLAVISION)(BEAKER) (test  0.11 K/uL  0.00-0.00  



 code=2845)      

 

 TOTAL COUNTED (BEAKER) (test code=1351)  100    

 

 WBC MORPHOLOGY (BEAKER) (test code=487)  Normal    

 

 PLT MORPHOLOGY (BEAKER) (test code=486)  Normal    

 

 ANISOCYTOSIS (BEAKER) (test code=961)  1+ few    

 

 POIKILOCYTES (BEAKER) (test code=966)  1+ few    

 

 OVALOCYTES (BEAKER) (test code=477)  1+ few    

 

 BASOPHILIC STIPPLING (BEAKER) (test code=473)  Present    

 

 ARTIFACT (CELLAVISION)(BEAKER) (test code=3432)  Present    

 

 PLATELET CONCENTRATION (CELLAVISION)(BEAKER) (test  Decreased    



 code=3438)      



Received comment: User comments: Slide comments: WBC: SEGMENTED WITH TOXIC 
GRANULATIONS [id number] 08:30:00





 Test Item  Value  Reference Range  Comments

 

 PHOSPHORUS (BEAKER) (test code=604)  2.0 mg/dL  2.3-4.7  



RAD, CHEST, 1 VIEW, NON [id number] 08:21:00Reason for exam:-&gt;left 
effusionShould this be performed at the bedside?-&gt;YesFINAL REPORT PATIENT ID
:   [id number]  TECHNIQUE: Frontal chest radiograph dated 2019. CLINICAL 
HISTORY: Left effusion COMPARISON STUDY: Chest radiographs and chest CT both 
dated 2019  IMPRESSION:Right-sided PICC and left-sided chest tube are 
unchanged. No change in small left hydropneumothorax. Multiple rounded 
densities project over the left lower lobe of unclear etiology possibly 
somethingexternal to the patient. Cardiomediastinal silhouette is normal in 
size. No pulmonary edema. No fracture.  Signed: Ann King MDReport 
Verified Date/Time:  2019 08:21:33 Reading Location:[facility] Reading 
Room  Electronically signed by: ANN KING MD on 2019 08:
21 AMCOMPREHENSIVE METABOLIC [id number] 05:34:00





 Test Item  Value  Reference Range  Comments

 

 TOTAL PROTEIN (BEAKER)  4.9 gm/dL  6.0-8.3  



 (test code=770)      

 

 ALBUMIN (BEAKER) (test  2.7 g/dL  3.5-5.0  



 code=1145)      

 

 ALKALINE PHOSPHATASE  137 U/L    



 (BEAKER) (test code=346)      

 

 BILIRUBIN TOTAL (BEAKER)  5.5 mg/dL  0.2-1.2  



 (test code=377)      

 

 SODIUM (BEAKER) (test  123 meq/L  136-145  



 eylh=257)      

 

 POTASSIUM (BEAKER) (test  3.9 meq/L  3.5-5.1  



 code=379)      

 

 CHLORIDE (BEAKER) (test  89 meq/L    



 code=382)      

 

 CO2 (BEAKER) (test  31 meq/L  22-29  



 code=355)      

 

 BLOOD UREA NITROGEN  15 mg/dL  7-21  



 (BEAKER) (test code=354)      

 

 CREATININE (BEAKER) (test  0.68 mg/dL  0.57-1.25  



 code=358)      

 

 GLUCOSE RANDOM (BEAKER)  104 mg/dL    



 (test code=652)      

 

 CALCIUM (BEAKER) (test  7.7 mg/dL  8.4-10.2  



 code=697)      

 

 AST (SGOT) (BEAKER) (test  47 U/L  5-34  



 code=353)      

 

 ALT (SGPT) (BEAKER) (test  25 U/L  6-55  



 code=347)      

 

 EGFR (BEAKER) (test  119 mL/min/1.73 sq    ESTIMATED GFR IS NOT AS



 code=1092)  m    ACCURATE AS CREATININE



       CLEARANCE IN PREDICTING



       GLOMERULAR FILTRATION



       RATE. ESTIMATED GFR IS



       NOT APPLICABLE FOR



       DIALYSIS PATIENTS.



Specimen moderately [id number] 05:04:00





 Test Item  Value  Reference Range  Comments

 

 MAGNESIUM (BEAKER) (test code=627)  1.8 mg/dL  1.6-2.6  



CT, CHEST, WITH [id number] 15:37:00Reason for exam:-&gt;reevaluate 
pleural effusion and left lung abscessFINAL REPORT PATIENT ID:   [id number]  CT 
of the Chest dated 2019 COMPARISON: 2019 CLINICAL INFORMATION
: Pleural effusionreevaluate pleural effusion and left lung abscess Comment:  
Axial images of the chest were obtained from thoracic inlet to the upper 
abdomen with intravenous contrast.     This exam was performed according to our 
departmental dose-optimization program, which includes automated exposure 
control, adjustment of the mA and/or kV according to patient size and/or use 
ofinteractive reconstruction technique. Heart is normal in size.  There is 
small pericardial effusion.Great vessels are unremarkable. No adenopathy in the 
mediastinum or perihilar region. Trachea and mainstem bronchi are patent.  
Trace bilateral pleural effusion is present. There is interval significant 
decrease in the amount of left pleural effusion. There is small amount of air 
present in the left pleural space. The pigtail catheter is seen in the left 
pleural space. Atelectasis is seen in the lingula, right mid, and both lower 
lobes. Cavitary lesion is seen in the superior segment of the left lower lobe 
measuring approximately 2.8 x 3.3 cm. Visualized upper abdomen demonstrates 
cirrhotic liver with trace ascites. Spleen is minimally enlarged. Impression: 
1. Interval decrease in the amount of left pleural effusion with residual left 
hydropneumothorax.2. Trace right pleural effusion.3. Cavitary lesion in the 
superior segment of the left lower lobe may represent abscess.4. Cirrhosis with 
splenomegaly and ascites. Signed: Lucita Nails MDReport Verified Date/Time:   15:37:27 Reading Location: [facility] C013Y CT Body Reading Room      
Electronically signed by: LUCITA NAILS M.D. on 2019 03:37 
[id number] 13:41:00





 Test Item  Value  Reference Range  Comments

 

 SODIUM (BEAKER) (test code=381)  119 meq/L  136-145  

 

 POTASSIUM (BEAKER) (test code=379)  4.1 meq/L  3.5-5.1  

 

 CHLORIDE (BEAKER) (test code=382)  85 meq/L    

 

 CO2 (BEAKER) (test code=355)  29 meq/L  22-  



Page [phone] with results. Thank you.CBC W/PLT COUNT &amp; AUTO 
[id number] 10:30:00





 Test Item  Value  Reference Range  Comments

 

 WHITE BLOOD CELL COUNT (BEAKER) (test code=775)  11.3 K/ L  3.5-10.5  

 

 RED BLOOD CELL COUNT (BEAKER) (test code=761)  3.25 M/ L  4.63-6.08  

 

 HEMOGLOBIN (BEAKER) (test code=410)  10.6 GM/DL  13.7-17.5  

 

 HEMATOCRIT (BEAKER) (test code=411)  29.8 %  40.1-51.0  

 

 MEAN CORPUSCULAR VOLUME (BEAKER) (test code=753)  91.7 fL  79.0-92.2  

 

 MEAN CORPUSCULAR HEMOGLOBIN (BEAKER) (test  32.6 pg  25.7-32.2  



 iegu=357)      

 

 MEAN CORPUSCULAR HEMOGLOBIN CONC (BEAKER) (test  35.6 GM/DL  32.3-36.5  



 code=752)      

 

 RED CELL DISTRIBUTION WIDTH (BEAKER) (test  13.5 %  11.6-14.4  



 code=412)      

 

 PLATELET COUNT (BEAKER) (test code=756)  51 K/CU MM  150-450  

 

 MEAN PLATELET VOLUME (BEAKER) (test code=754)  8.3 fL  9.4-12.4  

 

 NUCLEATED RED BLOOD CELLS (BEAKER) (test  0 /100 WBC  0-0  



 code=413)      



(CELLAVISION MANUAL DIFF)2019 10:30:00





 Test Item  Value  Reference Range  Comments

 

 NEUTROPHILS - REL (CELLAVISION)(BEAKER) (test  82 %    



 code=2816)      

 

 LYMPHOCYTES - REL (CELLAVISION)(BEAKER) (test  2 %    



 code=2817)      

 

 MONOCYTES - REL (CELLAVISION)(BEAKER) (test  9 %    



 code=2818)      

 

 METAMYELOCYTES - REL (CELLAVISION)(BEAKER) (test  2 %  0-0  



 code=2821)      

 

 PROMYELOCYTES - REL (CELLAVSION)(BEAKER) (test  2 %  0-0  



 code=2825)      

 

 BANDS - REL (CELLAVISION)(BEAKER) (test code=2826)  2 %  0-10  

 

 ATYPICAL LYMPHOCYTES - REL (CELLAVISION)(BEAKER)  1 %  0-0  



 (test code=2829)      

 

 NEUTROPHILS - ABS (CELLAVISION)(BEAKER) (test  9.27 K/ul  1.78-5.38  



 code=2830)      

 

 LYMPHOCYTES - ABS (CELLAVISION)(BEAKER) (test  0.23 K/ul  1.32-3.57  



 code=2831)      

 

 MONOCYTES - ABS (CELLAVISION)(BEAKER) (test  1.02 K/uL  0.30-0.82  



 code=2832)      

 

 METAMYELOCYTES - ABS (CELLAVISION)(BEAKER) (test  0.23 K/uL  0.00-0.00  



 code=2836)      

 

 PROMYELOCYTES - ABS (CELLAVISION)(BEAKER) (test  0.23 K/uL  0.00-0.00  



 code=2838)      

 

 BANDS - ABS (CELLAVISION)(BEAKER) (test code=2840)  0.23 K/uL  0.00-0.80  

 

 ATYPICAL LYMPHOCYTES - ABS (CELLAVISION)(BEAKER)  0.11 K/uL  0.00-0.00  



 (test code=2858)      

 

 TOTAL COUNTED (BEAKER) (test code=1351)  100    

 

 WBC MORPHOLOGY (BEAKER) (test code=487)  Normal    

 

 LARGE PLT(BEAKER) (test code=2156)  Present    

 

 BASOPHILIC STIPPLING (BEAKER) (test code=473)  Present    

 

 ARTIFACT (CELLAVISION)(BEAKER) (test code=3432)  Present    

 

 PLATELET CONCENTRATION (CELLAVISION)(BEAKER) (test  Decreased    



 code=3438)      



Received comment: User comments: Slide comments: WBC: SEGMENTED WITH TOXIC 
GRANULATIONS PRESENTRAD, CHEST, 1 VIEW, NON [id number] 07:48:00Reason for 
exam:-&gt;left effusionShould this be performed at the bedside?-&gt;YesFINAL 
REPORT PATIENT ID:   [id number] RAD, CHEST, 1 VIEW, NON DEPT INDICATION: left 
effusion COMPARISON: Prior day's exam FINDINGS: Portable frontal view of the 
chest.   IMPRESSION:  Support Lines: PICC tip overlies the SVC.  Left pleural 
catheter is again noted. Lungs and pleura: Bibasilar airspace disease is 
unchanged.  Superimposed trace left effusion. Left apical pneumothorax is 
decreased in the interim.    Heart and mediastinum: Stable contours.  
Additional findings: None. Signed: Sasha CrockerMDReport Verified Date/Time:  
2019 07:48:14 Reading Location: [facility] Radiology Reading Room  
Electronically signed by: SASHA CROCKER MD on 2019 07:48 
AMCALCIUM, [id number] 05:58:00





 Test Item  Value  Reference Range  Comments

 

 CALCIUM IONIZED (BEAKER) (test code=698)  0.96 mmol/L  1.12-1.27  

 

 PH, BLOOD (BEAKER) (test code=1810)  7.53    



COMPREHENSIVE METABOLIC [id number] 05:51:00





 Test Item  Value  Reference Range  Comments

 

 TOTAL PROTEIN (BEAKER)  5.2 gm/dL  6.0-8.3  



 (test code=770)      

 

 ALBUMIN (BEAKER) (test  2.6 g/dL  3.5-5.0  



 code=1145)      

 

 ALKALINE PHOSPHATASE  144 U/L    



 (BEAKER) (test code=346)      

 

 BILIRUBIN TOTAL (BEAKER)  6.3 mg/dL  0.2-1.2  



 (test code=377)      

 

 SODIUM (BEAKER) (test  119 meq/L  136-145  



 apjd=488)      

 

 POTASSIUM (BEAKER) (test  4.2 meq/L  3.5-5.1  



 code=379)      

 

 CHLORIDE (BEAKER) (test  86 meq/L    



 code=382)      

 

 CO2 (BEAKER) (test  26 meq/L  22-29  



 code=355)      

 

 BLOOD UREA NITROGEN  23 mg/dL  7-21  



 (BEAKER) (test code=354)      

 

 CREATININE (BEAKER) (test  0.81 mg/dL  0.57-1.25  



 code=358)      

 

 GLUCOSE RANDOM (BEAKER)  103 mg/dL    



 (test code=652)      

 

 CALCIUM (BEAKER) (test  7.6 mg/dL  8.4-10.2  



 code=697)      

 

 AST (SGOT) (BEAKER) (test  46 U/L  5-34  



 code=353)      

 

 ALT (SGPT) (BEAKER) (test  21 U/L  6-55  



 code=347)      

 

 EGFR (BEAKER) (test  97 mL/min/1.73 sq m    ESTIMATED GFR IS NOT AS



 code=1092)      ACCURATE AS CREATININE



       CLEARANCE IN PREDICTING



       GLOMERULAR FILTRATION



       RATE. ESTIMATED GFR IS



       NOT APPLICABLE FOR



       DIALYSIS PATIENTS.



Specimen moderately [id number] 05:07:00





 Test Item  Value  Reference Range  Comments

 

 CORTISOL, TOTAL (BEAKER) (test code=2755)  9.6 ug/dL  3.7-19.4  



[id number] 04:57:00





 Test Item  Value  Reference Range  Comments

 

 PHOSPHORUS (BEAKER) (test code=604)  2.0 mg/dL  2.3-4.7  



[id number] 04:57:00





 Test Item  Value  Reference Range  Comments

 

 MAGNESIUM (BEAKER) (test code=627)  1.7 mg/dL  1.6-2.6  



[id number] 22:08:00





 Test Item  Value  Reference Range  Comments

 

 SODIUM (BEAKER) (test code=381)  119 meq/L  136-145  

 

 POTASSIUM (BEAKER) (test  4.6 meq/L  3.5-5.1  Specimen slightly hemolyzed



 code=379)      

 

 CHLORIDE (BEAKER) (test  86 meq/L    



 code=382)      

 

 CO2 (BEAKER) (test code=355)  27 meq/L  22-29  



Call K &gt; 5.5POCT-GLUCOSE [id number] 12:59:00





 Test Item  Value  Reference Range  Comments

 

 POC-GLUCOSE METER (BEAKER)  95 mg/dL    TESTED AT [address]



 (test mgvh=2541)      [facility] [address]



T4, [id number] 10:25:00





 Test Item  Value  Reference Range  Comments

 

 FREE T4 (BEAKER) (test code=655)  0.65 ng/dL  0.70-1.48  



CBC W/PLT COUNT &amp; AUTO [id number] 10:21:00





 Test Item  Value  Reference Range  Comments

 

 WHITE BLOOD CELL COUNT (BEAKER) (test code=775)  11.8 K/ L  3.5-10.5  

 

 RED BLOOD CELL COUNT (BEAKER) (test code=761)  3.74 M/ L  4.63-6.08  

 

 HEMOGLOBIN (BEAKER) (test code=410)  12.2 GM/DL  13.7-17.5  

 

 HEMATOCRIT (BEAKER) (test code=411)  33.9 %  40.1-51.0  

 

 MEAN CORPUSCULAR VOLUME (BEAKER) (test code=753)  90.6 fL  79.0-92.2  

 

 MEAN CORPUSCULAR HEMOGLOBIN (BEAKER) (test  32.6 pg  25.7-32.2  



 ytqs=490)      

 

 MEAN CORPUSCULAR HEMOGLOBIN CONC (BEAKER) (test  36.0 GM/DL  32.3-36.5  



 code=752)      

 

 RED CELL DISTRIBUTION WIDTH (BEAKER) (test  13.7 %  11.6-14.4  



 code=412)      

 

 PLATELET COUNT (BEAKER) (test code=756)  79 K/CU MM  150-450  

 

 MEAN PLATELET VOLUME (BEAKER) (test code=754)  8.9 fL  9.4-12.4  

 

 NUCLEATED RED BLOOD CELLS (BEAKER) (test  0 /100 WBC  0-0  



 code=413)      



(CELLAVISION MANUAL DIFF)2019 10:21:00





 Test Item  Value  Reference Range  Comments

 

 NEUTROPHILS - REL (CELLAVISION)(BEAKER) (test  82 %    



 code=2816)      

 

 LYMPHOCYTES - REL (CELLAVISION)(BEAKER) (test  4 %    



 code=2817)      

 

 MONOCYTES - REL (CELLAVISION)(BEAKER) (test  13 %    



 code=2818)      

 

 ATYPICAL LYMPHOCYTES - REL (CELLAVISION)(BEAKER)  1 %  0-0  



 (test code=2829)      

 

 NEUTROPHILS - ABS (CELLAVISION)(BEAKER) (test  9.68 K/ul  1.78-5.38  



 code=2830)      

 

 LYMPHOCYTES - ABS (CELLAVISION)(BEAKER) (test  0.47 K/ul  1.32-3.57  



 code=2831)      

 

 MONOCYTES - ABS (CELLAVISION)(BEAKER) (test  1.53 K/uL  0.30-0.82  



 code=2832)      

 

 ATYPICAL LYMPHOCYTES - ABS (CELLAVISION)(BEAKER)  0.12 K/uL  0.00-0.00  



 (test code=2858)      

 

 TOTAL COUNTED (BEAKER) (test code=1351)  100    

 

 RBC MORPHOLOGY (BEAKER) (test code=762)  Normal    

 

 WBC MORPHOLOGY (BEAKER) (test code=487)  Normal    

 

 PLT MORPHOLOGY (BEAKER) (test code=486)  Normal    

 

 ARTIFACT (CELLAVISION)(BEAKER) (test code=3432)  Present    

 

 PLATELET CONCENTRATION (CELLAVISION)(BEAKER) (test  Decreased    



 code=3438)      



Received comment: User comments: Slide comments:POCT-GLUCOSE [id number] 09:
58:00





 Test Item  Value  Reference Range  Comments

 

 POC-GLUCOSE METER (BEAKER)  104 mg/dL    TESTED AT [address]



 (test xuvi=8044)      [place] [address]



RAD, CHEST, 1 VIEW, NON [id number] 09:33:00Reason for exam:-&gt;
effusionShould this be performed at the bedside?-&gt;YesFINAL REPORT PATIENT ID
:   [id number] Comparison: 2019 TECHNIQUE: Single view of the chest FINDINGS
: Bilateral interstitial and airspace opacities are stable. Small left pleural 
thickening with adjacent airspace disease identified. A previous left-sided 
pneumothorax has decreased. Left pleural drainage catheters again noted. Right-
sided PICC line is stable. Signed: Ronaldo Tim MDReport Verified Date/Time:   09:33:21 Reading Location: [address] Transitional Reading Room      
Electronically signed by: RONALDO TIM M.D. on 2019 09:33 
AMCOMPREHENSIVE METABOLIC [id number] 08:59:00





 Test Item  Value  Reference Range  Comments

 

 TOTAL PROTEIN (BEAKER)  5.4 gm/dL  6.0-8.3  Specimen slightly



 (test code=770)      hemolyzed

 

 ALBUMIN (BEAKER) (test  2.1 g/dL  3.5-5.0  Specimen slightly



 code=1145)      hemolyzed

 

 ALKALINE PHOSPHATASE  153 U/L    



 (BEAKER) (test code=346)      

 

 BILIRUBIN TOTAL (BEAKER)  5.0 mg/dL  0.2-1.2  Specimen slightly



 (test code=377)      hemolyzed

 

 SODIUM (BEAKER) (test  117 meq/L  136-145  



 hkoe=457)      

 

 POTASSIUM (BEAKER) (test  5.5 meq/L  3.5-5.1  Specimen slightly



 code=379)      hemolyzed

 

 CHLORIDE (BEAKER) (test  84 meq/L    



 code=382)      

 

 CO2 (BEAKER) (test  26 meq/L  22-29  



 code=355)      

 

 BLOOD UREA NITROGEN  32 mg/dL  7-21  



 (BEAKER) (test code=354)      

 

 CREATININE (BEAKER) (test  0.85 mg/dL  0.57-1.25  Specimen slightly



 code=358)      hemolyzed

 

 GLUCOSE RANDOM (BEAKER)  104 mg/dL    



 (test code=652)      

 

 CALCIUM (BEAKER) (test  7.6 mg/dL  8.4-10.2  



 code=697)      

 

 AST (SGOT) (BEAKER) (test  51 U/L  5-34  Specimen slightly



 code=353)      hemolyzed

 

 ALT (SGPT) (BEAKER) (test  23 U/L  6-55  Specimen slightly



 code=347)      hemolyzed

 

 EGFR (BEAKER) (test  92 mL/min/1.73 sq m    ESTIMATED GFR IS NOT AS



 code=1092)      ACCURATE AS CREATININE



       CLEARANCE IN PREDICTING



       GLOMERULAR FILTRATION



       RATE. ESTIMATED GFR IS



       NOT APPLICABLE FOR



       DIALYSIS PATIENTS.



Specimen moderately [id number] 08:55:00





 Test Item  Value  Reference Range  Comments

 

 MAGNESIUM (BEAKER) (test  1.9 mg/dL  1.6-2.6  Specimen slightly hemolyzed



 code=627)      



[id number] 08:55:00





 Test Item  Value  Reference Range  Comments

 

 PHOSPHORUS (BEAKER) (test  2.5 mg/dL  2.3-4.7  Specimen slightly hemolyzed



 code=604)      



TSH/FREE T4 IF [id number] 08:39:00





 Test Item  Value  Reference Range  Comments

 

 THYROID STIMULATING HORMONE (BEAKER) (test  8.07 uIU/mL  0.35-4.94  



 code=772)      



CALCIUM, [id number] 08:06:00





 Test Item  Value  Reference Range  Comments

 

 CALCIUM IONIZED (BEAKER) (test code=698)  1.00 mmol/L  1.12-1.27  

 

 PH, BLOOD (BEAKER) (test code=1810)  7.48    



[id number] 23:47:00





 Test Item  Value  Reference Range  Comments

 

 SODIUM (BEAKER) (test code=381)  117 meq/L  136-145  

 

 POTASSIUM (BEAKER) (test code=379)  5.3 meq/L  3.5-5.1  

 

 CHLORIDE (BEAKER) (test code=382)  84 meq/L    

 

 CO2 (BEAKER) (test code=355)  27 meq/L  22-29  



Call K &gt; [id number]-GLUCOSE [id number] 21:18:00





 Test Item  Value  Reference Range  Comments

 

 POC-GLUCOSE METER (BEAKER)  145 mg/dL    TESTED AT [facility] [address] [facility]



 (test sqae=5460)      [facility] [address]



[id number] 18:57:00





 Test Item  Value  Reference Range  Comments

 

 CORTISOL, TOTAL (BEAKER) (test code=2755)  18.1 ug/dL  3.7-19.4  



[id number] 18:34:00





 Test Item  Value  Reference Range  Comments

 

 SODIUM (BEAKER) (test code=381)  116 meq/L  136-145  

 

 POTASSIUM (BEAKER) (test code=379)  6.0 meq/L  3.5-5.1  

 

 CHLORIDE (BEAKER) (test code=382)  82 meq/L    

 

 CO2 (BEAKER) (test code=355)  30 meq/L  22-29  



Call [id number]-GLUCOSE [id number] 18:20:00





 Test Item  Value  Reference Range  Comments

 

 POC-GLUCOSE METER (BEAKER)  141 mg/dL    TESTED AT [address]



 (test ffvn=3892)      [place] [id number]



POCT-GLUCOSE [id number] 13:00:00





 Test Item  Value  Reference Range  Comments

 

 POC-GLUCOSE METER (BEAKER)  122 mg/dL    TESTED AT [address]



 (test nwfk=4891)      [facility] [address]



CBC W/PLT COUNT &amp; AUTO [id number] 10:34:00





 Test Item  Value  Reference Range  Comments

 

 WHITE BLOOD CELL COUNT (BEAKER) (test code=775)  15.5 K/ L  3.5-10.5  

 

 RED BLOOD CELL COUNT (BEAKER) (test code=761)  4.04 M/ L  4.63-6.08  

 

 HEMOGLOBIN (BEAKER) (test code=410)  13.3 GM/DL  13.7-17.5  

 

 HEMATOCRIT (BEAKER) (test code=411)  36.5 %  40.1-51.0  

 

 MEAN CORPUSCULAR VOLUME (BEAKER) (test code=753)  90.3 fL  79.0-92.2  

 

 MEAN CORPUSCULAR HEMOGLOBIN (BEAKER) (test  32.9 pg  25.7-32.2  



 jgmx=355)      

 

 MEAN CORPUSCULAR HEMOGLOBIN CONC (BEAKER) (test  36.4 GM/DL  32.3-36.5  



 code=752)      

 

 RED CELL DISTRIBUTION WIDTH (BEAKER) (test  13.5 %  11.6-14.4  



 code=412)      

 

 PLATELET COUNT (BEAKER) (test code=756)  65 K/CU MM  150-450  

 

 MEAN PLATELET VOLUME (BEAKER) (test code=754)  9.0 fL  9.4-12.4  

 

 NUCLEATED RED BLOOD CELLS (BEAKER) (test  0 /100 WBC  0-0  



 code=413)      



(CELLAVISION MANUAL DIFF)2019 10:34:00





 Test Item  Value  Reference Range  Comments

 

 NEUTROPHILS - REL (CELLAVISION)(BEAKER) (test  78 %    



 code=2816)      

 

 LYMPHOCYTES - REL (CELLAVISION)(BEAKER) (test  4 %    



 code=2817)      

 

 MONOCYTES - REL (CELLAVISION)(BEAKER) (test  14 %    



 code=2818)      

 

 EOSINOPHILS - REL (CELLAVISION)(BEAKER) (test  2 %    



 code=2819)      

 

 BANDS - REL (CELLAVISION)(BEAKER) (test  2 %  0-10  



 code=2826)      

 

 NEUTROPHILS - ABS (CELLAVISION)(BEAKER) (test  12.09 K/ul  1.78-5.38  



 code=2830)      

 

 LYMPHOCYTES - ABS (CELLAVISION)(BEAKER) (test  0.62 K/ul  1.32-3.57  



 code=2831)      

 

 MONOCYTES - ABS (CELLAVISION)(BEAKER) (test  2.17 K/uL  0.30-0.82  



 code=2832)      

 

 EOSINOPHILS - ABS (CELLAVISION)(BEAKER) (test  0.31 K/uL  0.04-0.54  



 code=2834)      

 

 BANDS - ABS (CELLAVISION)(BEAKER) (test  0.31 K/uL  0.00-0.80  



 code=2840)      

 

 TOTAL COUNTED (BEAKER) (test code=1351)  100    

 

 WBC MORPHOLOGY (BEAKER) (test code=487)  Normal    

 

 PLT MORPHOLOGY (BEAKER) (test code=486)  Normal    

 

 POLYCHROMATOPHILLIC RBCS(BEAKER) (test code=478)  1+ few    

 

 POIKILOCYTES (BEAKER) (test code=966)  2+ moderate    

 

 KARISSA CELLS (BEAKER) (test code=474)  2+ moderate    

 

 BASOPHILIC STIPPLING (BEAKER) (test code=473)  Present    

 

 ARTIFACT (CELLAVISION)(BEAKER) (test code=3432)  Present    

 

 PLATELET CONCENTRATION (CELLAVISION)(BEAKER)  Decreased    



 (test code=3438)      



Received comment: User comments: Slide comments:RAD, CHEST, 1 VIEW, NON [id number]
-09-21 08:40:00Reason for exam:-&gt;left effusionShould this be performed at 
the bedside?-&gt;YesFINAL REPORT PATIENT ID:   [id number] Portable chest. 
CLINICAL HISTORY: left effusion. COMPARISON STUDY: Chest x-ray from yesterday. 
FINDINGS: The cardiac silhouette is unremarkable. The pulmonary parenchyma 
demonstrates increased interstitial markings with atelectatic changes in the 
lung bases. A left-sided pigtail catheter chest tube remains and there has been 
development of a loculated small basilarpneumothorax. A right PICC line 
remains. Degenerative changes are noted. IMPRESSION: Development a small left-
sided basilar pneumothorax. No other significant change. Signed: Mk Martinez MDReport Verified Date/Time:  2019 08:40:30 Reading Location: [place] [id number] Ortho Consult Reading Room Electronically signed by: MK MARTINEZ M.D. 
on 2019 08:40 AMPOCT-GLUCOSE [id number] 08:39:00





 Test Item  Value  Reference Range  Comments

 

 POC-GLUCOSE METER (BEAKER)  110 mg/dL    TESTED AT [address]



 (test tdfk=5399)      [facility] [address]



COMPREHENSIVE METABOLIC [id number] 06:32:00





 Test Item  Value  Reference Range  Comments

 

 TOTAL PROTEIN (BEAKER)  5.5 gm/dL  6.0-8.3  



 (test code=770)      

 

 ALBUMIN (BEAKER) (test  1.9 g/dL  3.5-5.0  



 code=1145)      

 

 ALKALINE PHOSPHATASE  134 U/L    



 (BEAKER) (test code=346)      

 

 BILIRUBIN TOTAL (BEAKER)  4.4 mg/dL  0.2-1.2  



 (test code=377)      

 

 SODIUM (BEAKER) (test  116 meq/L  136-145  



 ediv=497)      

 

 POTASSIUM (BEAKER) (test  5.6 meq/L  3.5-5.1  



 code=379)      

 

 CHLORIDE (BEAKER) (test  83 meq/L    



 code=382)      

 

 CO2 (BEAKER) (test  27 meq/L  22-29  



 code=355)      

 

 BLOOD UREA NITROGEN  34 mg/dL  7-21  



 (BEAKER) (test code=354)      

 

 CREATININE (BEAKER) (test  0.89 mg/dL  0.57-1.25  



 code=358)      

 

 GLUCOSE RANDOM (BEAKER)  109 mg/dL    



 (test code=652)      

 

 CALCIUM (BEAKER) (test  7.4 mg/dL  8.4-10.2  



 code=697)      

 

 AST (SGOT) (BEAKER) (test  31 U/L  5-34  



 code=353)      

 

 ALT (SGPT) (BEAKER) (test  18 U/L  6-55  



 code=347)      

 

 EGFR (BEAKER) (test  87 mL/min/1.73 sq m    ESTIMATED GFR IS NOT AS



 code=1092)      ACCURATE AS CREATININE



       CLEARANCE IN PREDICTING



       GLOMERULAR FILTRATION



       RATE. ESTIMATED GFR IS



       NOT APPLICABLE FOR



       DIALYSIS PATIENTS.



Specimen moderately ictericPOCT-GLUCOSE [id number] 21:12:00





 Test Item  Value  Reference Range  Comments

 

 POC-GLUCOSE METER (BEAKER)  114 mg/dL    TESTED AT [address]



 (test cqxm=3342)      [facility] [address]



OSMOLALITY, [id number] 18:43:00





 Test Item  Value  Reference Range  Comments

 

 OSMOLALITY URINE (BEAKER) (test code=614)  694 mOsm/kg  40-1,400  



SODIUM, RANDOM [id number] 18:02:00





 Test Item  Value  Reference Range  Comments

 

 SODIUM URINE (BEAKER) (test code=243)  < meq/L    



Reference Range: No NormalsPOCT-GLUCOSE [id number] 16:06:00





 Test Item  Value  Reference Range  Comments

 

 POC-GLUCOSE METER (BEAKER)  145 mg/dL    TESTED AT [address]



 (test zhiu=7627)      [facility] [address]



RAD, ABDOMEN/KUB, 1 VIEW [id number] 12:51:00Reason for exam:-&gt;no BM in 13 
days. abdominal distension.  concern for ileusFINAL REPORT PATIENT ID:   
[id number] RAD, ABDOMEN/KUB, 1 VIEW AP CLINICAL INDICATION:  no BM in 13 days. 
abdominal distension.  concern for ileus   COMPARISON: None TECHNIQUE: 24 
radiographs of the abdomen. IMPRESSION:  The bowel gas pattern demonstrates 
gaseous distention without dilation. No pneumatosis. Appearance may reflect 
ileus. Excreted contrast is present in the urinary bladder. The regional 
skeleton is intact. Signed: JR Mendoza Robert MDReport Verified Date/Time
:  2019 12:51:21 Reading Location: [facility] Reading [facility]  
  Electronically signed by: KULWINDER MENDOZA on 2019 12:51 
PMOSMOLALITY, [id number] 12:47:00





 Test Item  Value  Reference Range  Comments

 

 OSMOLALITY, SERUM (BEAKER) (test code=615)  258 mOsm/kg  275-295  



POCT-GLUCOSE [id number] 12:35:00





 Test Item  Value  Reference Range  Comments

 

 POC-GLUCOSE METER (BEAKER)  93 mg/dL    TESTED AT [facility] [address] [facility]



 (test bpmp=4484)      [place] [address]



CBC W/PLT COUNT &amp; AUTO [id number] 10:10:00





 Test Item  Value  Reference Range  Comments

 

 WHITE BLOOD CELL COUNT (BEAKER) (test code=775)  19.5 K/ L  3.5-10.5  

 

 RED BLOOD CELL COUNT (BEAKER) (test code=761)  4.18 M/ L  4.63-6.08  

 

 HEMOGLOBIN (BEAKER) (test code=410)  13.3 GM/DL  13.7-17.5  

 

 HEMATOCRIT (BEAKER) (test code=411)  37.8 %  40.1-51.0  

 

 MEAN CORPUSCULAR VOLUME (BEAKER) (test code=753)  90.4 fL  79.0-92.2  

 

 MEAN CORPUSCULAR HEMOGLOBIN (BEAKER) (test  31.8 pg  25.7-32.2  



 agmt=362)      

 

 MEAN CORPUSCULAR HEMOGLOBIN CONC (BEAKER) (test  35.2 GM/DL  32.3-36.5  



 code=752)      

 

 RED CELL DISTRIBUTION WIDTH (BEAKER) (test  13.5 %  11.6-14.4  



 code=412)      

 

 PLATELET COUNT (BEAKER) (test code=756)  60 K/CU MM  150-450  

 

 MEAN PLATELET VOLUME (BEAKER) (test code=754)  9.4 fL  9.4-12.4  

 

 NUCLEATED RED BLOOD CELLS (BEAKER) (test  0 /100 WBC  0-0  



 code=413)      



(CELLAVISION MANUAL DIFF)2019 10:10:00





 Test Item  Value  Reference Range  Comments

 

 NEUTROPHILS - REL (CELLAVISION)(BEAKER) (test  86 %    



 code=2816)      

 

 LYMPHOCYTES - REL (CELLAVISION)(BEAKER) (test  1 %    



 code=2817)      

 

 MONOCYTES - REL (CELLAVISION)(BEAKER) (test  6 %    



 code=2818)      

 

 EOSINOPHILS - REL (CELLAVISION)(BEAKER) (test  3 %    



 code=2819)      

 

 BANDS - REL (CELLAVISION)(BEAKER) (test  3 %  0-10  



 code=2826)      

 

 BLASTS - REL (CELLAVISION)(BEAKER) (test  1 %  0-0  



 code=2827)      

 

 NEUTROPHILS - ABS (CELLAVISION)(BEAKER) (test  16.77 K/ul  1.78-5.38  



 code=2830)      

 

 LYMPHOCYTES - ABS (CELLAVISION)(BEAKER) (test  0.20 K/ul  1.32-3.57  



 code=2831)      

 

 MONOCYTES - ABS (CELLAVISION)(BEAKER) (test  1.17 K/uL  0.30-0.82  



 code=2832)      

 

 EOSINOPHILS - ABS (CELLAVISION)(BEAKER) (test  0.59 K/uL  0.04-0.54  



 code=2834)      

 

 BANDS - ABS (CELLAVISION)(BEAKER) (test  0.59 K/uL  0.00-0.80  



 code=2840)      

 

 BLASTS - ABS (CELLAVISION)(BEAKER) (test  0.20 K/uL  0.00-0.00  



 code=2845)      

 

 TOTAL COUNTED (BEAKER) (test code=1351)  100    

 

 PLT MORPHOLOGY (BEAKER) (test code=486)  Normal    

 

 SMUDGE CELLS (BEAKER) (test code=1371)  Present    

 

 POIKILOCYTES (BEAKER) (test code=966)  1+ few    

 

 PLATELET CONCENTRATION (CELLAVISION)(BEAKER)  Decreased    



 (test code=3438)      



Received comment: User comments: Slide comments:RAD, CHEST, 1 VIEW, NON [id number]
-09- 08:53:00Reason for exam:-&gt;left effusionShould this be performed at 
the bedside?-&gt;YesFINAL REPORT PATIENT ID:   [id number] RAD, CHEST, 1 VIEW, NON 
DEPT INDICATION: left effusion COMPARISON: Prior day's exam FINDINGS: Portable 
frontal view of the chest.   IMPRESSION: Limited by patient positioning.Support 
Lines: Stable. Lungs and pleura: Interstitial congestion on the left slightly 
greater than the right and unchanged from prior. Small left effusion. No 
visible pneumothorax.Heart and mediastinum: Stable contours. Additional findings
: None. Signed: JR Mendoza Robert MDReport Verified Date/Time:  2019 08:53:19 Reading Location: [facility] Radiology Reading Room    
Electronically signed by: KULWINDER MENDOZA on 2019 08:53 AMPOCT-
GLUCOSE [id number] 07:58:00





 Test Item  Value  Reference Range  Comments

 

 POC-GLUCOSE METER (BEAKER)  95 mg/dL    TESTED AT [facility] [address] [facility]



 (test rjpj=1186)      [facility] [address]



COMPREHENSIVE METABOLIC [id number] 05:41:00





 Test Item  Value  Reference Range  Comments

 

 TOTAL PROTEIN (BEAKER)  5.4 gm/dL  6.0-8.3  



 (test code=770)      

 

 ALBUMIN (BEAKER) (test  1.9 g/dL  3.5-5.0  



 code=1145)      

 

 ALKALINE PHOSPHATASE  126 U/L    



 (BEAKER) (test code=346)      

 

 BILIRUBIN TOTAL (BEAKER)  4.9 mg/dL  0.2-1.2  



 (test code=377)      

 

 SODIUM (BEAKER) (test  117 meq/L  136-145  



 eiar=768)      

 

 POTASSIUM (BEAKER) (test  5.3 meq/L  3.5-5.1  



 code=379)      

 

 CHLORIDE (BEAKER) (test  83 meq/L    



 code=382)      

 

 CO2 (BEAKER) (test  28 meq/L  22-29  



 code=355)      

 

 BLOOD UREA NITROGEN  29 mg/dL  7-21  



 (BEAKER) (test code=354)      

 

 CREATININE (BEAKER) (test  0.90 mg/dL  0.57-1.25  



 code=358)      

 

 GLUCOSE RANDOM (BEAKER)  99 mg/dL    



 (test code=652)      

 

 CALCIUM (BEAKER) (test  7.7 mg/dL  8.4-10.2  



 code=697)      

 

 AST (SGOT) (BEAKER) (test  28 U/L  5-34  



 code=353)      

 

 ALT (SGPT) (BEAKER) (test  18 U/L  6-55  



 code=347)      

 

 EGFR (BEAKER) (test  86 mL/min/1.73 sq m    ESTIMATED GFR IS NOT AS



 code=1092)      ACCURATE AS CREATININE



       CLEARANCE IN PREDICTING



       GLOMERULAR FILTRATION



       RATE. ESTIMATED GFR IS



       NOT APPLICABLE FOR



       DIALYSIS PATIENTS.



Specimen moderately ictericPOCT-GLUCOSE [id number] 21:08:00





 Test Item  Value  Reference Range  Comments

 

 POC-GLUCOSE METER (BEAKER)  92 mg/dL    TESTED AT [address]



 (test sfhh=1670)      [place] [address]



RAD, CHEST, 1 VIEW, NON [id number] 17:40:00Reason for exam:-&gt;post chest 
tube placementFINAL REPORT PATIENT ID:   [id number] INDICATION: post chest tube 
placement TECHNIQUE: Chest radiograph, single view, portable technique. 
FINDINGS / IMPRESSION: Left pleural effusion has decreased in size, since 10:25 
AM, after pleural tube placement. No pneumothorax demonstrated. Right PICC line 
again noted terminating at the cavoatrial junction. Signed: Giovani Ordonez 
MDReport Verified Date/Time:  2019 17:40:02 Reading Location: [address] Consult Reading Room Electronically signed by: GIOVANI ORDONEZ M.D. on 2019 05:40 PMPOCT-GLUCOSE [id number] 17:33:00





 Test Item  Value  Reference Range  Comments

 

 POC-GLUCOSE METER (BEAKER)  116 mg/dL    TESTED AT [address]



 (test qzge=1798)      [place] [address]



POCT-GLUCOSE [id number] 15:19:00





 Test Item  Value  Reference Range  Comments

 

 POC-GLUCOSE METER (BEAKER)  85 mg/dL    TESTED AT [address]



 (test kmtp=9690)      [place] [address]



CT, DRAINAGE, CHEST TUBE [id number] 14:49:00Reason for exam:-&gt;
loculated left pleural effusion, please place large bore pigtail if effusion 
noted on CT scanAnesthesia:-&gt;NoneFINAL REPORT PATIENT ID:   [id number] 
PROCEDURE: CT-guided placement of a left pleural catheter. Dose modulation, 
iterative reconstruction, and/or weight-based adjustment of the mA/kV was 
utilized to reduce the radiation dose to as low as reasonably achievable. 
INDICATION: Loculated left pleural effusion. COMPARISON: CT from earlier today. 
SEDATION: Intravenous moderate sedation was administered by radiology nursing 
and monitored under the direction of the undersigned radiologist. The patient's 
vital signs were monitored throughout the procedure and recorded in the patient'
s medical record by radiology nursing. Total intraservice time of sedation was 
25 minutes. MEDICATIONS: 0.5 mg Versed, 25 mcg fentanyl DESCRIPTION: After 
obtaining informed written consent, the patient was brought to the procedure 
room and placed in the supine position.  Preliminary CT scan revealed a large 
left pleural effusion. A clear path to the collection was identified. The 
overlying skin was prepped and draped in the usual, sterile fashion and local 2
% lidocaine anesthesia was administered. Under CT guidance, a 19-gaugeneedle 
was placed. After placement of a wire and serial dilation, a 12 Qatari catheter 
was placed into the pleural fluid. The catheter was affixed to the skin and 
connected to a vacuum container. 1000 mL of clear red fluid was aspirated. 
Samples were placed on this side table and no clotting was noted. Samples were 
sent for analysis. Post procedure scan showed decrease in size with left 
effusion and no immediate complications. IMPRESSION: Successful placement of a 
12 Qatari left chest tube. Signed: Abner Aguilera MDReport Verified Date/Time:   14:49:30 Reading Location: [address] CT Body Reading Room      
Electronically signed by: ABNER AGUILERA MD on 2019 02:49 PMCT, CHEST, 
WITH [id number] 13:11:00Reason for exam:-&gt;evaluate loculated left 
pleural effusion seen on xrayFINAL REPORT PATIENT ID:   [id number] TECHNIQUE: CT 
scan of the chest WITH intravenous contrast. Dose modulation, iterative 
reconstruction, and/or weight-based adjustment of the mA/kV was utilized to 
reduce the radiation dose to as low as reasonably achievable. INDICATION: 
evaluate loculated left pleural effusion seen on xrayevaluate loculated left 
pleural effusion seen on xray. COMPARISON: None.  FINDINGS:  LINES/TUBES: A 
right PICC has its tip at the superior cavoatrial junction. LUNGS AND AIRWAYS: 
Mild right basilar subsegmental atelectasis. There is an area of cavitation 
with a fluid fluid level in superior segment of the left lower lobe which 
measures 4 cm PLEURA: Trace right pleural effusion. HEART AND MEDIASTINUM: The 
visualized thyroid gland is normal. No significant mediastinal, hilar, or 
axillary lymphadenopathy. The heart and pericardium are within normal limits. 
Severe coronary arterial calcifications. SOFT TISSUES AND BONES: Bilateral 
gynecomastia. Old, healed right 10th and 12th ribfractures. Old, healed left 
10th rib fracture. UPPER ABDOMEN: Nodular, cirrhotic liver. Partially 
visualized upper abdominal varices. A periumbilical vein is recanalized.  
IMPRESSION: 1.There is a large left sided loculated pleural effusion with a 
mildly thickened pleura and some intermixed gas. This is concerning for an 
empyema. 2.The air-fluid level with surrounding lung in the superior segment of 
the left lower lobe is most likely a lung abscess. A cavitary lung nodule (
either from malignancy or fungal infection) is considered less likely. A follow-
up chest CT is recommended in three months to document decrease in size and 
exclude cavitary malignancy. 3.Cirrhosis with findings of portal hypertension. 
Signed: Abner Aguilera Verified Date/Time:  2019 13:11:18 Reading 
Location: [facility] C013Y CT Body Reading Room      Electronically signed by: ABNER AGUILERA MD on 2019 01:11 PMPOCT-GLUCOSE [id number] 11:22:00





 Test Item  Value  Reference Range  Comments

 

 POC-GLUCOSE METER (BEAKER)  81 mg/dL    TESTED AT [address]



 (test rwkc=6638)      [facility] [address]



RAD, CHEST, 1 VIEW, NON [id number] 10:59:00Reason for exam:-&gt;eval 
effusionShould this be performed at the bedside?-&gt;YesFINAL REPORT PATIENT ID
:   [id number] RAD, CHEST, 1 VIEW, NON DEPT INDICATION: eval effusion COMPARISON: 
Prior day's exam FINDINGS: Portable frontal view of the chest.   IMPRESSION: 
Limited by patient rotation.Support Lines: A right PICC terminates over the 
superior vena cava. Lungs and pleura: Large left effusion. Right costophrenic 
sulcus is excluded from view. No pneumothorax.Heart and mediastinum: 
Unremarkable where visible.Additional findings: None. A CT evaluation is 
currently pending. Signed: JR Mendoza Robert MDReport Verified Date/Time
:  2019 10:59:34 Reading Location: [facility] Radiology Reading Room  
  Electronically signed by: KULWINDER MENDOZA on 2019 10:59 
AMCOMPREHENSIVE METABOLIC [id number] 07:33:00





 Test Item  Value  Reference Range  Comments

 

 TOTAL PROTEIN (BEAKER)  5.8 gm/dL  6.0-8.3  Specimen slightly



 (test code=770)      hemolyzed

 

 ALBUMIN (BEAKER) (test  2.0 g/dL  3.5-5.0  Specimen slightly



 code=1145)      hemolyzed

 

 ALKALINE PHOSPHATASE  130 U/L    



 (BEAKER) (test code=346)      

 

 BILIRUBIN TOTAL (BEAKER)  4.6 mg/dL  0.2-1.2  Specimen slightly



 (test code=377)      hemolyzed

 

 SODIUM (BEAKER) (test  117 meq/L  136-145  



 bxiy=369)      

 

 POTASSIUM (BEAKER) (test  5.3 meq/L  3.5-5.1  Specimen slightly



 code=379)      hemolyzed

 

 CHLORIDE (BEAKER) (test  84 meq/L    



 code=382)      

 

 CO2 (BEAKER) (test  28 meq/L  22-29  



 code=355)      

 

 BLOOD UREA NITROGEN  22 mg/dL  7-21  



 (BEAKER) (test code=354)      

 

 CREATININE (BEAKER) (test  0.86 mg/dL  0.57-1.25  Specimen slightly



 code=358)      hemolyzed

 

 GLUCOSE RANDOM (BEAKER)  90 mg/dL    



 (test code=652)      

 

 CALCIUM (BEAKER) (test  7.9 mg/dL  8.4-10.2  



 code=697)      

 

 AST (SGOT) (BEAKER) (test  33 U/L  5-34  Specimen slightly



 code=353)      hemolyzed

 

 ALT (SGPT) (BEAKER) (test  20 U/L  6-55  Specimen slightly



 code=347)      hemolyzed

 

 EGFR (BEAKER) (test  91 mL/min/1.73 sq m    ESTIMATED GFR IS NOT AS



 code=1092)      ACCURATE AS CREATININE



       CLEARANCE IN PREDICTING



       GLOMERULAR FILTRATION



       RATE. ESTIMATED GFR IS



       NOT APPLICABLE FOR



       DIALYSIS PATIENTS.



Specimen moderately ictericPOCT-GLUCOSE [id number] 05:46:00





 Test Item  Value  Reference Range  Comments

 

 POC-GLUCOSE METER (BEAKER)  93 mg/dL    TESTED AT [facility] [address] [facility]



 (test jbrd=7496)      [place] [address]



PROTHROMBIN [id number] 05:30:00





 Test Item  Value  Reference Range  Comments

 

 PROTIME (BEAKER) (test code=759)  16.9 seconds  11.9-14.2  

 

 INR (BEAKER) (test code=370)  1.4  <=5.9  



Effective 2019: PT Reference Range ChangeNew: 11.9-14.2  Previous: 11.7-
14.7RECOMMENDED COUMADIN/WARFARIN INR THERAPY RANGESSTANDARD DOSE: 2.0-3.0  
Includes: PROPHYLAXIS for venous thrombosis, systemic embolization; TREATMENT 
for venous thrombosis and/or pulmonary embolus.HIGH RISK: Target INR is2.5-3.5 
for patients wiht mechanical heart valves.CBC W/PLT COUNT &amp; AUTO 
[id number] 05:27:00





 Test Item  Value  Reference Range  Comments

 

 WHITE BLOOD CELL COUNT (BEAKER) (test code=775)  23.2 K/ L  3.5-10.5  

 

 RED BLOOD CELL COUNT (BEAKER) (test code=761)  4.75 M/ L  4.63-6.08  

 

 HEMOGLOBIN (BEAKER) (test code=410)  15.3 GM/DL  13.7-17.5  

 

 HEMATOCRIT (BEAKER) (test code=411)  43.1 %  40.1-51.0  

 

 MEAN CORPUSCULAR VOLUME (BEAKER) (test code=753)  90.7 fL  79.0-92.2  

 

 MEAN CORPUSCULAR HEMOGLOBIN (BEAKER) (test  32.2 pg  25.7-32.2  



 vgfy=017)      

 

 MEAN CORPUSCULAR HEMOGLOBIN CONC (BEAKER) (test  35.5 GM/DL  32.3-36.5  



 code=752)      

 

 RED CELL DISTRIBUTION WIDTH (BEAKER) (test  13.3 %  11.6-14.4  



 code=412)      

 

 PLATELET COUNT (BEAKER) (test code=756)  87 K/CU MM  150-450  

 

 MEAN PLATELET VOLUME (BEAKER) (test code=754)  8.9 fL  9.4-12.4  

 

 NUCLEATED RED BLOOD CELLS (BEAKER) (test  0 /100 WBC  0-0  



 code=413)      

 

 NEUTROPHILS RELATIVE PERCENT (BEAKER) (test  86 %    



 code=429)      

 

 LYMPHOCYTES RELATIVE PERCENT (BEAKER) (test  3 %    



 code=430)      

 

 MONOCYTES RELATIVE PERCENT (BEAKER) (test  9 %    



 code=431)      

 

 EOSINOPHILS RELATIVE PERCENT (BEAKER) (test  0 %    



 code=432)      

 

 BASOPHILS RELATIVE PERCENT (BEAKER) (test  0 %    



 code=437)      

 

 NEUTROPHILS ABSOLUTE COUNT (BEAKER) (test  19.87 K/ L  1.78-5.38  



 code=670)      

 

 LYMPHOCYTES ABSOLUTE COUNT (BEAKER) (test  0.73 K/ L  1.32-3.57  



 code=414)      

 

 MONOCYTES ABSOLUTE COUNT (BEAKER) (test code=415)  2.16 K/ L  0.30-0.82  

 

 EOSINOPHILS ABSOLUTE COUNT (BEAKER) (test  0.06 K/ L  0.04-0.54  



 code=416)      

 

 BASOPHILS ABSOLUTE COUNT (BEAKER) (test code=417)  0.04 K/ L  0.01-0.08  

 

 IMMATURE GRANULOCYTES-RELATIVE PERCENT (BEAKER)  1 %  0-1  



 (test code=2801)      



POCT-GLUCOSE [id number] 23:34:00





 Test Item  Value  Reference Range  Comments

 

 POC-GLUCOSE METER (BEAKER)  94 mg/dL    TESTED AT [facility] [address]



 (test gdup=3862)      [facility] [address]

## 2019-12-07 NOTE — ER
Nurse's Notes                                                                                     

 [facility]                                                                 

Name: Omkar Chung                                                                                  

Age: 59 yrs                                                                                       

Sex: Male                                                                                         

: 1960                                                                                   

MRN: [id number]                                                                                   

Arrival Date: 2019                                                                          

Time: 05:17                                                                                       

Account#: [id number]                                                                            

Bed 5                                                                                             

Private MD:                                                                                       

Diagnosis: Ascites-DECOMPENSATED;Edema, unspecified;Weakness;Alcoholic cirrhosis of liver with    

  ascites                                                                                         

                                                                                                  

Presentation:                                                                                     

                                                                                             

05:25 Presenting complaint: Patient states: he has been having scrotal swelling x 1 week.     bb  

      Transition of care: patient was not received from another setting of care. Onset of         

      symptoms was 2019. Risk Assessment: Do you want to hurt yourself or someone     

      else? Patient reports no desire to harm self or others. Initial Sepsis Screen: Does the     

      patient meet any 2 criteria? No. Patient's initial sepsis screen is negative. Does the      

      patient have a suspected source of infection? No. Patient's initial sepsis screen is        

      negative. Care prior to arrival: None.                                                      

05:25 Method Of Arrival: Ambulatory                                                           bb  

05:25 Acuity: BROCK 3                                                                           bb  

                                                                                                  

Historical:                                                                                       

- Allergies:                                                                                      

05:27 No Known Allergies;                                                                     bb  

- Home Meds:                                                                                      

05:27 Furosemide Oral [Active];                                                               bb  

- PMHx:                                                                                           

05:27 Cirrhosis; COPD; Hernia;                                                                bb  

                                                                                                  

- Immunization history:: Adult Immunizations up to date.                                          

- Social history:: Smoking status: Patient/guardian denies using tobacco.                         

- Ebola Screening: : No symptoms or risks identified at this time.                                

- Family history:: not pertinent.                                                                 

                                                                                                  

                                                                                                  

Screenin:37 Abuse screen: Denies threats or abuse. Denies injuries from another. Nutritional        lp1 

      screening: No deficits noted. Tuberculosis screening: No symptoms or risk factors           

      identified. Fall Risk None identified.                                                      

                                                                                                  

Assessment:                                                                                       

05:36 General: Appears in no apparent distress. Behavior is calm, cooperative. Pain:          lp1 

      Complains of pain in scrotum Pain currently is 8 out of 10 on a pain scale. Quality of      

      pain is described as pressure, Pain began 1 week ago. Neuro: Level of Consciousness is      

      awake, alert, obeys commands, Oriented to person, place, situation. Cardiovascular:         

      Patient's skin is warm and dry. Respiratory: Respiratory effort is even, unlabored. GI:     

      Abdomen is round noted to have ascites. : Reports pain scrotum. EENT: No signs and/or     

      symptoms were reported regarding the EENT system. Derm: Skin is fragile, is thin, Skin      

      is dry, Skin is normal. Musculoskeletal: No deficits noted.                                 

07:15 Reassessment: Patient appears in no apparent distress at this time. Patient and/or      hb  

      family updated on plan of care and expected duration. Pain level reassessed. Patient is     

      alert, oriented x 3, equal unlabored respirations, skin warm/dry/pink.                      

08:00 Reassessment: Patient appears in no apparent distress at this time. No changes from       

      previously documented assessment. Patient and/or family updated on plan of care and         

      expected duration. Pain level reassessed. Patient is alert, oriented x 3, equal             

      unlabored respirations, skin warm/dry/pink.                                                 

08:32 Reassessment: Attempted to call report to [facility], nurse not assigned at this, left call     

      back info with charge nurse.                                                                

09:02 Reassessment: attempted to call report to [facility], receiving nurse unavailable.              

09:30 Reassessment: Patient appears in no apparent distress at this time. Patient and/or      hb  

      family updated on plan of care and expected duration. Pain level reassessed. Patient is     

      alert, oriented x 3, equal unlabored respirations, skin warm/dry/pink.                      

09:35 Reassessment: attempted to call report to [facility], receiving nurse unavailable.              

10:00 Reassessment: Patient appears in no apparent distress at this time. Patient and/or      hb  

      family updated on plan of care and expected duration. Pain level reassessed. Patient is     

      alert, oriented x 3, equal unlabored respirations, skin warm/dry/pink.                      

10:02 Reassessment: Report called to Tonia MEDINA at [facility].                                          

                                                                                                  

Vital Signs:                                                                                      

05:27  / 81; Pulse 111; Resp 16 S; Temp 98(TE); Pulse Ox 96% on R/A; Weight 92.99 kg    bb  

      (R); Height 6 ft. 3 in. (190.50 cm) (R); Pain 7/10;                                         

07:15  / 78; Pulse 105; Resp 15; Pulse Ox 97% on R/A;                                   hb  

08:30  / 80; Pulse 100; Resp 17; Pulse Ox 97% on R/A; Pain 4/10;                        hb  

10:00  / 76; Pulse 92; Resp 15 S; Temp 98; Pulse Ox 100% on R/A; Pain 4/10;             sg  

05:27 Body Mass Index 25.62 (92.99 kg, 190.50 cm)                                             bb  

                                                                                                  

ED Course:                                                                                        

05:17 Patient arrived in ED.                                                                  cl3 

05:20 Chris Canales MD is Attending Physician.                                             juliet 

05:26 Triage completed.                                                                       bb  

05:27 Arm band placed on Patient placed in an exam room, on a stretcher, on pulse oximetry.   bb  

      Family accompanied patient.                                                                 

05:36 Earnestine Perkins, RN is Primary Nurse.                                                       lp1 

05:37 Patient has correct armband on for positive identification. Placed in gown. Bed in low  lp1 

      position. Cardiac monitor on. Pulse ox on. NIBP on.                                         

06:55 Missed attempt(s): 22 gauge in right antecubital area. Missed attempt(s): 22 gauge in   lp1 

      right forearm.                                                                              

07:44 transfer initiated by Dr. Canales with [facility] from the [facility] transfer center.              eb  

07:46 XRAY Chest (1 view) In Process Unspecified.                                             EDMS

08:05 connected the hospitalist on call for [facility] with Dr. Canales for patient      eb  

      transfer consultation.                                                                      

08:23 administrative approval given by Gabby Watts/ patient has been accepted to to [address] room 1152/ Dr. Josette Weems has accepted the patient in       

      transfer / report to be called to [phone].                                             

10:10 No provider procedures requiring assistance completed. Patient transferred, IV remains  hb  

      in place.                                                                                   

                                                                                                  

Administered Medications:                                                                         

07:33 Drug: NS 0.9% 1000 ml Route: IV; Rate: 75 ml/hr; Site: left jugular;                    hb  

07:33 Drug: ProTONIX 40 mg Route: IVP; Site: left jugular;                                    hb  

08:09 Follow up: Response: No adverse reaction                                                hb  

                                                                                                  

                                                                                                  

Outcome:                                                                                          

07:49 ER care complete, transfer ordered by MD.                                               juliet 

10:12 Transferred by ground EMS to [facility].                        hb  

10:12 Condition: stable                                                                           

10:12 Instructed on the need for transfer, Demonstrated understanding of instructions.            

11:05 Patient left the ED.                                                                    hb  

                                                                                                  

Signatures:                                                                                       

Dispatcher [employer]                           EDMS                                                 

Chidi Oneill, Chris Hastings RN, MD MD cha Ballard, Brenda, RN                     RN                                                      

Earnestine Perkins, CAROL                         RN   lp1                                                  

Joanna Whitt RN RN hb Botello, Elizabeth eb Lewis, Charde                                cl3                                                  

                                                                                                  

Corrections: (The following items were deleted from the chart)                                    

10:19 10:00  / 76; Pulse 92bpm; Resp 95bpm; Pulse Ox 100% RA; Temp 98F; Pain 4/10; hb   sg  

                                                                                                  

**************************************************************************************************

## 2019-12-08 NOTE — EKG
Test Date:    2019-12-07               Test Time:    06:11:50

Technician:   CATERINA                                     

                                                     

MEASUREMENT RESULTS:                                       

Intervals:                                           

Rate:         106                                    

CT:           174                                    

QRSD:         62                                     

QT:           338                                    

QTc:          448                                    

Axis:                                                

P:            70                                     

CT:           174                                    

QRS:          46                                     

T:            94                                     

                                                     

INTERPRETIVE STATEMENTS:                                       

                                                     

Sinus tachycardia

Possible Left atrial enlargement

Low voltage QRS

Abnormal ECG

Compared to ECG 11/28/2019 09:21:30

Myocardial infarct finding is no longer present

Electronically Signed On 12-08-19 06:06:09 CST by Otis Nichols

## 2020-07-16 ENCOUNTER — HOSPITAL ENCOUNTER (EMERGENCY)
Dept: HOSPITAL 97 - ER | Age: 60
Discharge: HOME | End: 2020-07-16
Payer: MEDICARE

## 2020-07-16 VITALS — SYSTOLIC BLOOD PRESSURE: 112 MMHG | DIASTOLIC BLOOD PRESSURE: 76 MMHG | OXYGEN SATURATION: 99 % | TEMPERATURE: 98.8 F

## 2020-07-16 DIAGNOSIS — K74.60: ICD-10-CM

## 2020-07-16 DIAGNOSIS — J44.9: ICD-10-CM

## 2020-07-16 DIAGNOSIS — R73.9: Primary | ICD-10-CM

## 2020-07-16 LAB
BUN BLD-MCNC: 14 MG/DL (ref 7–18)
GLUCOSE SERPLBLD-MCNC: 458 MG/DL (ref 74–106)
HCT VFR BLD CALC: 41.2 % (ref 39.6–49)
INR BLD: 1.1
LYMPHOCYTES # SPEC AUTO: 1.3 K/UL (ref 0.7–4.9)
PMV BLD: 9.2 FL (ref 7.6–11.3)
POTASSIUM SERPL-SCNC: 4.1 MMOL/L (ref 3.5–5.1)
RBC # BLD: 4.55 M/UL (ref 4.33–5.43)

## 2020-07-16 PROCEDURE — 99283 EMERGENCY DEPT VISIT LOW MDM: CPT

## 2020-07-16 PROCEDURE — 70450 CT HEAD/BRAIN W/O DYE: CPT

## 2020-07-16 PROCEDURE — 85025 COMPLETE CBC W/AUTO DIFF WBC: CPT

## 2020-07-16 PROCEDURE — 36415 COLL VENOUS BLD VENIPUNCTURE: CPT

## 2020-07-16 PROCEDURE — 85730 THROMBOPLASTIN TIME PARTIAL: CPT

## 2020-07-16 PROCEDURE — 80048 BASIC METABOLIC PNL TOTAL CA: CPT

## 2020-07-16 PROCEDURE — 85610 PROTHROMBIN TIME: CPT

## 2020-07-16 PROCEDURE — 70551 MRI BRAIN STEM W/O DYE: CPT

## 2020-07-16 NOTE — XMS REPORT
[facility]

                            Created on:2020



Patient:Omkar Chung

Sex:Male

:1960

External Reference #:[id number]





Demographics







                          Address                   [address]



                                                    [place] [address]

 

                          Phone                     [phone]

 

                          Preferred Language        en

 

                          Marital Status            Unknown

 

                          Confucianism Affiliation     Unknown

 

                          Race                      Unknown

 

                          Ethnic Group              Unknown









Author







                          Organization              [employer]









Care Team Providers







                    Name                Role                Phone

 

                    Longo Jhon       Provider Role       Unavailable









Allergies, Adverse Reactions, Alerts







                    Substance           Reaction            Event Type

 

                    N.K.D.A.            Info Not Available  Non Drug Allergy







Problems







             Problem Type Condition    Code         Onset Dates  Condition Statu

s

 

             Assessment   Medicare annual wellness visit, Z00.00                

    Active



                          subsequent                             

 

             Problem      On supplemental oxygen therapy Z99.81                 

   Active

 

             Problem      History of alcohol abuse F10.11                    Act

pablo

 

             Problem      Hypothyroidism, unspecified type E03.9                

     Active

 

             Problem      Chronic obstructive pulmonary J44.9                   

  Active



                          disease, unspecified COPD type                        

   

 

             Problem      Generalized anxiety disorder F41.1                    

 Active

 

             Problem      Alcoholic cirrhosis of liver with K70.31              

      Active



                          ascites                                







Medications







        Medication Code    Code    Instructions Start   End     Status  Dosage



                System                  Date    Date            

 

        Aspir-Low NDC     [id number] 81 MG Orally                 Active  1 tab

let



                                Once a day                         

 

        Symbicort NDC     26397531692 80-4.5 MCG/ACT                 Active  2 p

uffs



                                Inhalation Once                         



                                a day                           

 

        Furosemide NDC     [id number] 40 MG Orally                 Active  1 ta

blet



                                Twice a day                         

 

        Folic Acid NDC     56218790714 1 MG Orally                 Active  1 tab

let



                                Once a day                         

 

        Ipratropium NDC     00348117498 0.02 %                  Active  2.5 ml



        Bromide                 Inhalation                         



                                every 8 hrs                         

 

        ProAir HFA NDC     66872545793 108 (90 Base)                 Active  1 p

uff as



                                MCG/ACT                         needed



                                Inhalation                         



                                every 4 hrs                         

 

        Spironolactone NDC     [id number] 100 MG Orally                 Active 

 1 tablet



                                Twice a day                         







Results

No Known Results



Summary Purpose

[employer] Submission

## 2020-07-16 NOTE — XMS REPORT
[facility]

                            Created on:2020



Patient:Omkar Chung

Sex:Male

:1960

External Reference #:[id number]





Demographics







                          Address                   [address]



                                                    [place] [address]

 

                          Phone                     [phone]

 

                          Preferred Language        en

 

                          Marital Status            Unknown

 

                          Pentecostalism Affiliation     Unknown

 

                          Race                      Unknown

 

                          Ethnic Group              Unknown









Author







                          Organization              [employer]Clinical[employer]









Care Team Providers







                    Name                Role                Phone

 

                    [facility]       Provider Role       Unavailable









Allergies, Adverse Reactions, Alerts







                    Substance           Reaction            Event Type

 

                    N.K.D.A.            Info Not Available  Non Drug Allergy







Problems







             Problem Type Condition    Code         Onset Dates  Condition Statu

s

 

             Assessment   Hypothyroidism, unspecified type E03.9                

     Active

 

             Assessment   Alcoholic cirrhosis of liver with K70.31              

      Active



                          ascites                                

 

             Assessment   Chronic obstructive pulmonary J44.9                   

  Active



                          disease, unspecified COPD type                        

   

 

             Problem      On supplemental oxygen therapy Z99.81                 

   Active

 

             Problem      History of alcohol abuse F10.11                    Act

pablo

 

             Problem      Hypothyroidism, unspecified type E03.9                

     Active

 

             Problem      Chronic obstructive pulmonary J44.9                   

  Active



                          disease, unspecified COPD type                        

   

 

             Problem      Generalized anxiety disorder F41.1                    

 Active

 

             Problem      Alcoholic cirrhosis of liver with K70.31              

      Active



                          ascites                                

 

             Assessment   History of alcohol abuse F10.11                    Act

pablo

 

             Assessment   Umbilical hernia without K42.9                     Act

pablo



                          obstruction and without gangrene                      

     

 

             Assessment   Increased ammonia level R79.89                    Acti

ve

 

             Assessment   On supplemental oxygen therapy Z99.81                 

   Active

 

             Assessment   Former heavy tobacco smoker Z87.891                   

Active

 

             Assessment   Generalized anxiety disorder F41.1                    

 Active







Medications







        Medication Code    Code    Instructions Start   End     Status  Dosage



                System                  Date    Date            

 

        Spironolactone NDC     19692826248 100 MG Orally                 Active 

 1 tablet



                                Twice a day                         

 

        Aspir-Low NDC     90899121554 81 MG Orally                 Active  1 tab

let



                                Once a day                         

 

        ProAir HFA ND     18498781055 108 (90 Base)                 Active  1 p

uff as



                                MCG/ACT                         needed



                                Inhalation                         



                                every 4 hrs                         

 

        Constulose NDC     37509112069 10 GM/15ML Oral                 Active  1

5 ml



                                Once a day                         

 

        Folic Acid NDC     83675737611 1 MG Orally                 Active  1 tab

let



                                Once a day                         

 

        Furosemide NDC     63326057930 40 MG Orally                 Active  1 ta

blet



                                Twice a day                         

 

        Ipratropium NDC     06551001698 0.02 %                  Active  2.5 ml



        Bromide                 Inhalation                         



                                every 8 hrs                         

 

        Symbicort NDC     31910480809 80-4.5 MCG/ACT                 Active  2 p

uffs



                                Inhalation Once                         



                                a day                           







Results

No Known Results



Summary Purpose

eClinicalWorks Submission

## 2020-07-16 NOTE — XMS REPORT
Clinical Summary

                            Created on:2020



Patient:Omkar Chung

Sex:Male

:1960

External Reference #:[id number]





Demographics







                          Address                   [address] [place] [address]

 

                          Home Phone                [phone]

 

                          Preferred Language        English

 

                          Marital Status            Unknown

 

                          Pentecostal Affiliation     Unknown

 

                          Race                      White

 

                          Ethnic Group              Unknown









Author







                          Organization              [facility]

 

                          Address                   [address] [place] [address]









Support







                Name            Relationship    Address         Phone

 

                Olga Villatoro    Unavailable     Unavailable     [phone]

 

                Mi Wheeler   Unavailable     Unavailable     [phone]









Care Team Providers







                    Name                Role                Phone

 

                    Vi Ro         Primary Care Provider [phone]









Allergies

No Known Allergies



Medications







          Medication Sig       Dispensed Refills   Start     End Date  Status



                                                  Date                

 

          furosemide (LASIX) Take 40 mg by           0                          

   Active



          40 MG tablet mouth daily.                                         

 

          folic acid Take 1 mg by           0                             Active



          (FOLVITE) 1 MG mouth daily.                                         



          tablet                                                      

 

          albuterol HFA Inhale 1 puff by           0                            

 Active



          (VENTOLIN HFA) 90 mouth via inhaler                                   

      



          mcg/actuation every 6 (six)                                         



          inhaler   hours as needed                                         



                    for Wheezing or                                         



                    Shortness of                                         



                    Breath.                                           

 

          levothyroxine Take 1 tablet (25 30 tablet 0                  

  Active



          (SYNTHROID, mcg total) by                     9                   



          LEVOTHROID) 25 MCG mouth Every                                        

 



          tabletIndications: morning on an                                      

   



          Hypothyroidism, empty stomach.                                        

 



          unspecified type                                                   

 

          lactulose Take 30 mLs (20 g 150 mL    0                    Ac

tive



          (CHRONULAC) 20 total) by mouth 2                     9                

   



          gram/30 mL (two) times daily                                         



          solutionIndications as needed                                         



          : Alcoholic (constipation).                                         



          cirrhosis of liver                                                   



          with ascites (HCC),                                                   



          Constipation,                                                   



          unspecified                                                   



          constipation type                                                   

 

          tiotropium Inhale 1 capsule 30 capsule 0                    A

ctive



          (SPIRIVA) 18 mcg (18 mcg total) by                     9              

     



          inhalation mouth via inhaler                                         



          capsuleIndications: daily.                                            



          Chronic obstructive                                                   



          pulmonary disease                                                   



          with acute                                                   



          exacerbation (HCC)                                                   

 

          spironolactone Take 100 mg by           0                   20  

Discontinued



          (ALDACTONE) 100 MG mouth daily.                               19      

  



          tablet                                                      

 

          doxycycline Take 1 capsule 50 capsule 0         09/24/201 10/19/20  Ex

pired



          (MONODOX) 100 MG (100 mg total) by                     9         19   

     



          capsuleIndications: mouth every 12                                    

     



          Parapneumonic (twelve) hours                                         



          effusion, Empyema for 25 days.                                        

 



          lung (HCC)                                                   

 

          levoFLOXacin Take 1 tablet 24 tablet 0         20  Dis

continued



          (LEVAQUIN) 750 MG (750 mg total) by                       

      



          tabletIndications: mouth daily for                                    

     



          Parapneumonic 24 days.                                          



          effusion, Empyema                                                   



          lung (HCC)                                                   

 

          sodium chloride 1 Take 1 tablet (1 42 tablet 0           Discontinued



          gram      g total) by mouth                             



          tabletIndications: 3 (three) times                                    

     



          Hyponatremia daily with meals                                         



                    for 14 days.                                         

 

          cefePIME (MAXIPIME) Inject 2 g           0         09/25/201 10/18/20 

 



          MBP 2g in 100 mL intravenously                            

 



          NSIndications: every 8 (eight)                                        

 



          Empyema lung (HCC) hours for 23                                       

  



                    days.                                             







Active Problems







                          Problem                   Noted Date

 

                          Loculated pleural effusion 2019

 

                          Pseudomonal pneumonia     2019

 

                          Hyponatremia              2019

 

                          Parapneumonic effusion    2019







Encounters







             Date         Type         Specialty    Care Team    Description

 

             2019   Hospital Encounter Cardiology   [facility]       Empyema l

sulma ([facility]) (Primary Dx);



             Anusha Jacobo Parapneumonic

 effusion;



                2019                                      MD Malick



                                        Chronic obstructive pulmonary disease wi

th acute exacerbation ([facility]);



                                                    Changela,    Hyponatremia;



                                                                Dilcia MATUTE MD



                                        Loculated pleural effusion;



                                                    Pantera Joy Pneumonia due

 to Pseudomonas species, unspecified laterality, 

unspecified part of lung ([facility]);



                                                                MD DEMETRIA



                                        Alcoholic cirrhosis of liver with ascite

s ([facility]);



                                                    Addie Meza Hyperkalemia;



                                                    MD Nilda      Thrombocytopeni

a ([facility]);



                                                                 Hypothyroidism,

 unspecified type;



                                                                 Constipation, u

nspecified constipation type

 

             2019   Travel                                 

 

             2019   Documentation Internal Medicine [facility]       



                                                    Veronica, Anusha Teresa MD    



after 2019



Social History







             Tobacco Use  Types        Packs/Day    Years Used   Date

 

             Former Smoker              1            44           Quit: 20









                Smokeless Tobacco: Never Used                                 









                                        Tobacco Cessation: Counseling Given: Yes









                Alcohol Use     Drinks/Week     oz/Week         Comments

 

                No                                              









                    Alcohol Habits      Answer              Date Recorded

 

                    How often do you have a drink containing alcohol? Never     

          2019

 

                    How many drinks containing alcohol do you have on a typical 

Not asked           



                    day when you are drinking?                     

 

                    How often do you have six or more drinks on one occasion? No

t asked           









                          Sex Assigned at Birth     Date Recorded

 

                          Not on file               









                    Job Start Date      Occupation          Industry

 

                    Not on file         Not on file         Not on file









                    Travel History      Travel Start        Travel End









                                        No recent travel history available.







Last Filed Vital Signs







                    Vital Sign          Reading             Time Taken

 

                    Blood Pressure      99/51               2019  8:00 AM 

CDT

 

                    Pulse               92                  2019  8:00 AM 

CDT

 

                    Temperature         36.2 C (97.1 F) 2019  8:00 AM 

CDT

 

                    Respiratory Rate    18                  2019  8:00 AM 

CDT

 

                    Oxygen Saturation   94%                 2019  8:00 AM 

CDT

 

                    Inhaled Oxygen Concentration -                   -

 

                    Weight              98.5 kg (217 lb 1.6 oz) 2019 10:00

 AM CDT

 

                    Height              190.5 cm (6' 3")    2019  3:32 AM 

CDT

 

                    Body Mass Index     27.14               2019 10:00 AM 

CDT







Plan of Treatment

Not on file



Procedures







             Procedure Name Priority     Date/Time    Associated   Comments



                                                    Diagnosis    

 

             RHYTHM STRIP - SCAN              2019 11:34              



                                       AM CDT                    

 

             RHYTHM STRIP - SCAN              2019  9:50              



                                       AM CDT                    

 

             XR CHEST 1 VIEW Routine      2019  6:23              Results 

for this



             PORTABLE/BEDSIDE              AM CDT                    procedure a

re in



                                                                 the results



                                                                 section.

 

             (CELLAVISION MANUAL Routine      2019  5:15              Resu

lts for this



             DIFF)                     AM CDT                    procedure are i

n



                                                                 the results



                                                                 section.

 

             CBC W/PLT COUNT & AUTO Routine      2019  5:15              R

esults for this



             DIFFERENTIAL              AM CDT                    procedure are i

n



                                                                 the results



                                                                 section.

 

             PHOSPHORUS   Routine      2019  5:15              Results for

 this



                                       AM CDT                    procedure are i

n



                                                                 the results



                                                                 section.

 

             MAGNESIUM    Routine      2019  5:15              Results for

 this



                                       AM CDT                    procedure are i

n



                                                                 the results



                                                                 section.

 

             CALCIUM, IONIZED Routine      2019  5:15              Results

 for this



                                       AM CDT                    procedure are i

n



                                                                 the results



                                                                 section.

 

             B-TYPE NATRIURETIC Routine      2019  5:15              Resul

ts for this



             FACTOR (BNP)              AM CDT                    procedure are i

n



                                                                 the results



                                                                 section.

 

             COMPREHENSIVE Routine      2019  5:15              Results fo

r this



             METABOLIC PANEL              AM CDT                    procedure ar

e in



                                                                 the results



                                                                 section.

 

             CBC W/PLT COUNT & AUTO Routine      2019  5:15              R

esults for this



             DIFFERENTIAL              AM CDT                    procedure are i

n



                                                                 the results



                                                                 section.

 

             XR CHEST 1 VIEW Routine      2019  7:20              Results 

for this



             PORTABLE/BEDSIDE              AM CDT                    procedure a

re in



                                                                 the results



                                                                 section.

 

             (CELLAVISION MANUAL Routine      2019  3:49              Resu

lts for this



             DIFF)                     AM CDT                    procedure are i

n



                                                                 the results



                                                                 section.

 

             CBC W/PLT COUNT & AUTO Routine      2019  3:49              R

esults for this



             DIFFERENTIAL              AM CDT                    procedure are i

n



                                                                 the results



                                                                 section.

 

             PHOSPHORUS   Add-On       2019  3:49              Results for

 this



                                       AM CDT                    procedure are i

n



                                                                 the results



                                                                 section.

 

             MAGNESIUM    Routine      2019  3:49              Results for

 this



                                       AM CDT                    procedure are i

n



                                                                 the results



                                                                 section.

 

             COMPREHENSIVE Routine      2019  3:49              Results fo

r this



             METABOLIC PANEL              AM CDT                    procedure ar

e in



                                                                 the results



                                                                 section.

 

             CBC W/PLT COUNT & AUTO Routine      2019  3:49              R

esults for this



             DIFFERENTIAL              AM CDT                    procedure are i

n



                                                                 the results



                                                                 section.

 

             ELECTROLYTE PANEL STAT         2019  1:13              Result

s for this



                                       PM CDT                    procedure are i

n



                                                                 the results



                                                                 section.

 

             CT CHEST WITH IV Routine      2019 12:24              Results

 for this



             CONTRAST                  PM CDT                    procedure are i

n



                                                                 the results



                                                                 section.

 

             XR CHEST 1 VIEW Routine      2019  6:36              Results 

for this



             PORTABLE/BEDSIDE              AM CDT                    procedure a

re in



                                                                 the results



                                                                 section.

 

             (CELLAVISION MANUAL Routine      2019  4:02              Resu

lts for this



             DIFF)                     AM CDT                    procedure are i

n



                                                                 the results



                                                                 section.

 

             CBC W/PLT COUNT & AUTO Routine      2019  4:02              R

esults for this



             DIFFERENTIAL              AM CDT                    procedure are i

n



                                                                 the results



                                                                 section.

 

             CALCIUM, IONIZED Routine      2019  4:02              Results

 for this



                                       AM CDT                    procedure are i

n



                                                                 the results



                                                                 section.

 

             PHOSPHORUS   Routine      2019  4:02              Results for

 this



                                       AM CDT                    procedure are i

n



                                                                 the results



                                                                 section.

 

             MAGNESIUM    Routine      2019  4:02              Results for

 this



                                       AM CDT                    procedure are i

n



                                                                 the results



                                                                 section.

 

             CBC W/PLT COUNT & AUTO Routine      2019  4:02              R

esults for this



             DIFFERENTIAL              AM CDT                    procedure are i

n



                                                                 the results



                                                                 section.

 

             COMPREHENSIVE Routine      2019  4:02              Results fo

r this



             METABOLIC PANEL              AM CDT                    procedure ar

e in



                                                                 the results



                                                                 section.

 

             CORTISOL     Routine      2019  4:01              Results for

 this



                                       AM CDT                    procedure are i

n



                                                                 the results



                                                                 section.

 

             ELECTROLYTE PANEL STAT         2019  9:03              Result

s for this



                                       PM CDT                    procedure are i

n



                                                                 the results



                                                                 section.

 

             POCT-GLUCOSE METER Routine      2019 12:57              Resul

ts for this



                                       PM CDT                    procedure are i

n



                                                                 the results



                                                                 section.

 

             POCT-GLUCOSE METER Routine      2019  9:45              Resul

ts for this



                                       AM CDT                    procedure are i

n



                                                                 the results



                                                                 section.

 

             XR CHEST 1 VIEW Routine      2019  7:19              Results 

for this



             PORTABLE/BEDSIDE              AM CDT                    procedure a

re in



                                                                 the results



                                                                 section.

 

             (CELLAVISION MANUAL Routine      2019  6:56              Resu

lts for this



             DIFF)                     AM CDT                    procedure are i

n



                                                                 the results



                                                                 section.

 

             CBC W/PLT COUNT & AUTO Routine      2019  6:56              R

esults for this



             DIFFERENTIAL              AM CDT                    procedure are i

n



                                                                 the results



                                                                 section.

 

             T4, FREE     Routine      2019  6:56              Results for

 this



                                       AM CDT                    procedure are i

n



                                                                 the results



                                                                 section.

 

             TSH/FREE T4 IF Routine      2019  6:56              Results f

or this



             INDICATED                 AM CDT                    procedure are i

n



                                                                 the results



                                                                 section.

 

             PHOSPHORUS   Routine      2019  6:56              Results for

 this



                                       AM CDT                    procedure are i

n



                                                                 the results



                                                                 section.

 

             MAGNESIUM    Routine      2019  6:56              Results for

 this



                                       AM CDT                    procedure are i

n



                                                                 the results



                                                                 section.

 

             COMPREHENSIVE Routine      2019  6:56              Results fo

r this



             METABOLIC PANEL              AM CDT                    procedure ar

e in



                                                                 the results



                                                                 section.

 

             CBC W/PLT COUNT & AUTO Routine      2019  6:56              R

esults for this



             DIFFERENTIAL              AM CDT                    procedure are i

n



                                                                 the results



                                                                 section.

 

             CALCIUM, IONIZED Routine      2019  5:00              Results

 for this



                                       AM CDT                    procedure are i

n



                                                                 the results



                                                                 section.

 

             ELECTROLYTE PANEL Routine      2019 11:10              Result

s for this



                                       PM CDT                    procedure are i

n



                                                                 the results



                                                                 section.

 

             POCT-GLUCOSE METER Routine      2019  9:08              Resul

ts for this



                                       PM CDT                    procedure are i

n



                                                                 the results



                                                                 section.

 

             POCT-GLUCOSE METER Routine      2019  6:18              Resul

ts for this



                                       PM CDT                    procedure are i

n



                                                                 the results



                                                                 section.

 

             ELECTROLYTE PANEL STAT         2019  5:54              Result

s for this



                                       PM CDT                    procedure are i

n



                                                                 the results



                                                                 section.

 

             CORTISOL     Routine      2019  5:54              Results for

 this



                                       PM CDT                    procedure are i

n



                                                                 the results



                                                                 section.

 

             POCT-GLUCOSE METER Routine      2019 12:53              Resul

ts for this



                                       PM CDT                    procedure are i

n



                                                                 the results



                                                                 section.

 

             POCT-GLUCOSE METER Routine      2019  8:36              Resul

ts for this



                                       AM CDT                    procedure are i

n



                                                                 the results



                                                                 section.

 

             XR CHEST 1 VIEW Routine      2019  6:49              Results 

for this



             PORTABLE/BEDSIDE              AM CDT                    procedure a

re in



                                                                 the results



                                                                 section.

 

             (CELLAVISION MANUAL Routine      2019  4:20              Resu

lts for this



             DIFF)                     AM CDT                    procedure are i

n



                                                                 the results



                                                                 section.

 

             CBC W/PLT COUNT & AUTO Routine      2019  4:20              R

esults for this



             DIFFERENTIAL              AM CDT                    procedure are i

n



                                                                 the results



                                                                 section.

 

             COMPREHENSIVE Routine      2019  4:20              Results fo

r this



             METABOLIC PANEL              AM CDT                    procedure ar

e in



                                                                 the results



                                                                 section.

 

             CBC W/PLT COUNT & AUTO Routine      2019  4:20              R

esults for this



             DIFFERENTIAL              AM CDT                    procedure are i

n



                                                                 the results



                                                                 section.

 

             POCT-GLUCOSE METER Routine      2019  8:59              Resul

ts for this



                                       PM CDT                    procedure are i

n



                                                                 the results



                                                                 section.

 

             SODIUM, RANDOM URINE Routine      2019  4:40              Res

ults for this



                                       PM CDT                    procedure are i

n



                                                                 the results



                                                                 section.

 

             OSMOLALITY, URINE Routine      2019  4:40              Result

s for this



                                       PM CDT                    procedure are i

n



                                                                 the results



                                                                 section.

 

             POCT-GLUCOSE METER Routine      2019  3:49              Resul

ts for this



                                       PM CDT                    procedure are i

n



                                                                 the results



                                                                 section.

 

             XR ABDOMEN / KUB 1 STAT         2019 12:42              Resul

ts for this



             VIEW                      PM CDT                    procedure are i

n



                                                                 the results



                                                                 section.

 

             POCT-GLUCOSE METER Routine      2019 12:15              Resul

ts for this



                                       PM CDT                    procedure are i

n



                                                                 the results



                                                                 section.

 

             OSMOLALITY, SERUM Routine      2019 11:28              Result

s for this



                                       AM CDT                    procedure are i

n



                                                                 the results



                                                                 section.

 

             XR CHEST 1 VIEW Routine      2019  8:46              Results 

for this



             PORTABLE/BEDSIDE              AM CDT                    procedure a

re in



                                                                 the results



                                                                 section.

 

             POCT-GLUCOSE METER Routine      2019  7:36              Resul

ts for this



                                       AM CDT                    procedure are i

n



                                                                 the results



                                                                 section.

 

             (CELLAVISION MANUAL Routine      2019  4:11              Resu

lts for this



             DIFF)                     AM CDT                    procedure are i

n



                                                                 the results



                                                                 section.

 

             CBC W/PLT COUNT & AUTO Routine      2019  4:11              R

esults for this



             DIFFERENTIAL              AM CDT                    procedure are i

n



                                                                 the results



                                                                 section.

 

             COMPREHENSIVE Routine      2019  4:11              Results fo

r this



             METABOLIC PANEL              AM CDT                    procedure ar

e in



                                                                 the results



                                                                 section.

 

             CBC W/PLT COUNT & AUTO Routine      2019  4:11              R

esults for this



             DIFFERENTIAL              AM CDT                    procedure are i

n



                                                                 the results



                                                                 section.

 

             POCT-GLUCOSE METER Routine      2019  9:06              Resul

ts for this



                                       PM CDT                    procedure are i

n



                                                                 the results



                                                                 section.

 

             POCT-GLUCOSE METER Routine      2019  5:31              Resul

ts for this



                                       PM CDT                    procedure are i

n



                                                                 the results



                                                                 section.

 

             XR CHEST 1 VIEW ASAP         2019  4:48              Results 

for this



             PORTABLE/BEDSIDE              PM CDT                    procedure a

re in



                                                                 the results



                                                                 section.

 

             POCT-GLUCOSE METER Routine      2019  3:18              Resul

ts for this



                                       PM CDT                    procedure are i

n



                                                                 the results



                                                                 section.

 

             CT DRAINAGE WITH CHEST ASAP         2019  3:01              R

esults for this



             TUBE INSERTION              PM CDT                    procedure are

 in



                                                                 the results



                                                                 section.

 

             BODY FLUID CULTURE + Routine      2019  2:32              Res

ults for this



             GRAM STAIN                PM CDT                    procedure are i

n



                                                                 the results



                                                                 section.

 

             POCT-GLUCOSE METER Routine      2019 11:20              Resul

ts for this



                                       AM CDT                    procedure are i

n



                                                                 the results



                                                                 section.

 

             XR CHEST 1 VIEW STAT         2019 10:25              Results 

for this



             PORTABLE/BEDSIDE              AM CDT                    procedure a

re in



                                                                 the results



                                                                 section.

 

             CT CHEST WITH IV Routine      2019 10:17              Results

 for this



             CONTRAST                  AM CDT                    procedure are i

n



                                                                 the results



                                                                 section.

 

             COMPREHENSIVE Routine      2019  6:31              Results fo

r this



             METABOLIC PANEL              AM CDT                    procedure ar

e in



                                                                 the results



                                                                 section.

 

             POCT-GLUCOSE METER Routine      2019  5:44              Resul

ts for this



                                       AM CDT                    procedure are i

n



                                                                 the results



                                                                 section.

 

             CBC W/PLT COUNT & AUTO Routine      2019  4:49              R

esults for this



             DIFFERENTIAL              AM CDT                    procedure are i

n



                                                                 the results



                                                                 section.

 

             PROTHROMBIN TIME/INR Routine      2019  4:49              Res

ults for this



                                       AM CDT                    procedure are i

n



                                                                 the results



                                                                 section.

 

             CBC W/PLT COUNT & AUTO Routine      2019  4:49              R

esults for this



             DIFFERENTIAL              AM CDT                    procedure are i

n



                                                                 the results



                                                                 section.

 

             POCT-GLUCOSE METER Routine      2019 11:31              Resul

ts for this



                                       PM CDT                    procedure are i

n



                                                                 the results



                                                                 section.



after 2019



Results

RHYTHM STRIP - SCAN (2019 11:34 AM CDT)Only the most recent of2 results
within the time period is included.





                          Narrative                 Performed At

 

                                        This result has an attachment that is no

t available.



                                        



XR chest 1 view portable / bedside (2019  6:23 AM CDT)Only the most recent
of8 resultswithin the time period is included.





                                        Specimen

 

                                        









                          Narrative                 Performed At

 

                                        FINAL REPORT



                                        [employer]



                                         



                                        



                                        PATIENT ID: [id number]



                                        



                                         



                                        



                                        Chest AP portable



                                        



                                         



                                        



                                        Comparison exam: 2019



                                        



                                         



                                        



                                        History provided: Follow-up pleural effu

sylvie



                                        



                                         



                                        



                                        Left chest tube unchanged in place. No p

neumothorax. No significant 



                                        



                                        effusions are evident. Nonspecific coars

ening of markings in the left 



                                        



                                        lower lobe. PICC line in good position.



                                        



                                         



                                        



                                        Signed: Lobo Morris MD



                                        



                                        Report Verified Date/Time:2019

 08:25:12



                                        



                                         



                                        



                                        Reading Location: [facility]

y Reading Room



                                        



                          Electronically signed by: LOBO BERNARDO on 2019 



                                        08:25 AM



                                        



                                                    









                                        Procedure Note

 

                                        Interface, External Ris In - 2019 

 8:27 AM CDT



FINAL REPORT



                                        



                                        PATIENT ID:   [id number]



                                        



                                        Chest AP portable



                                        



                                        Comparison exam: 2019



                                        



                                        History provided: Follow-up pleural effu

sylvie



                                        



                                        Left chest tube unchanged in place. No p

neumothorax. No significant



                                        effusions are evident. Nonspecific coars

ening of markings in the left



                                        lower lobe. PICC line in good position.



                                        



                                        Signed: Lobo Morris MD



                                        Report Verified Date/Time:  2019 0

8:25:12



                                        



                                        [facility]

y Reading Room



                                                Electronically signed by: LOBO MORRIS on 2019 08:25 AM



                                        









                Performing Organization Address         City/State/Zipcode Phone

 Number

 

                [facility] [place]                                          



Manual Differential (2019  5:15 AM CDT)Only the most recent of6 results
within the time period is included.





                Component       Value           Ref Range       Performed At

 

                % Neutros       81              %               [facility] 

[facility]

 

                % Lymphs        7               %               [facility] 

[facility]

 

                % Monos         7               %               [facility] 

[facility]

 

                % Eos           2               %               [facility]

 

                % Myelo         2 (H)           0 - 0 %         [facility]

[facility]

 

                % Atypical Lymphs 1 (H)           0 - 0 %         [facility]

 

                # Neutros       10.85 (H)       1.78 - 5.38 K/ul [facility]

 

                # Lymphs        0.94 (L)        1.32 - 3.57 K/ul [facility]

 

                # Monos         0.94 (H)        0.30 - 0.82 K/uL [facility]

 

                # Eos           0.27            0.04 - 0.54 K/uL [facility]

 

                # Myelo         0.27 (H)        0.00 - 0.00 K/uL [facility]

 

                # Atypical Lymphs 0.13 (H)        0.00 - 0.00 K/uL [facility]

 

                Total Counted   100                             [facility]

 

                RBC Morphology  Normal                          [facility]

[facility]

 

                Smudge Cells    Present                         [facility]

[facility]

 

                Giant Platelet  Present                         [facility]

[facility]

 

                Platelet Conc   Decreased                       [facility]

[facility]









                                        Specimen

 

                                        Blood









                          Narrative                 Performed At

 

                                        Received comment: 



                                        [facility]



                                        User comments: 



                                        



                          Slide comments:           









                Performing Organization Address         City/State/Zipcode Phone

 Number

 

                [facility] [address] [place]

 [address] [phone]



                CENTER                                          



Calcium, Ionized (2019  5:15 AM CDT)Only the most recent of3 resultswithin
the time period is included.





                Component       Value           Ref Range       Performed At

 

                Calcium, Ion    0.97 (L)        1.12 - 1.27 mmol/L [facility]

 

                pH, Blood       7.50                            [facility]









                                        Specimen

 

                                        Blood









                Performing Organization Address         City/State/Zipcode Phone

 Number

 

                [facility] [address] [place]

 [address] [phone]



                CENTER                                          



CBC with platelet count + automated diff (2019  5:15 AM CDT)Only the most 
recent of7 resultswithin the time period is included.





                Component       Value           Ref Range       Performed At

 

                WBC             13.4 (H)        3.5 - 10.5 K/L [facility]

 

                RBC             3.17 (L)        4.63 - 6.08 M/L [facility]

 

                Hemoglobin      10.3 (L)        13.7 - 17.5 GM/DL [facility]

 

                Hematocrit      29.4 (L)        40.1 - 51.0 %   [facility]

 

                MCV             92.7 (H)        79.0 - 92.2 fL  [facility]

 

                MCH             32.5 (H)        25.7 - 32.2 pg  [facility]

[facility]

 

                MCHC            35.0            32.3 - 36.5 GM/DL [facility]

 

                RDW             13.9            11.6 - 14.4 %   [facility]

 

                Platelets       75 (L)          150 - 450 K/CU MM [facility]

 

                MPV             8.9 (L)         9.4 - 12.4 fL   [facility]

 

                nRBC            0               0 - 0 /100 WBC  [facility]









                                        Specimen

 

                                        Blood









                Performing Organization Address         City/State/Zipcode Phone

 Number

 

                [facility] [address] [place]

 [address] [phone]



                CENTER                                          



Phosphorus (2019  5:15 AM CDT)Only the most recent of4 resultswithin the 
time period is included.





                Component       Value           Ref Range       Performed At

 

                Phosphorus      2.1 (L)         2.3 - 4.7 mg/dL [facility]









                                        Specimen

 

                                        Blood









                Performing Organization Address         City/[facility]/Zipcode Phone

 Number

 

                [address]

 [address] [phone]



                CENTER                                          



B-type Natriuretic Factor (BNP) (2019  5:15 AM CDT)





                Component       Value           Ref Range       Performed At

 

                BNP             97              0 - 100 pg/mL   [facility] 

ALTH [facility]









                                        Specimen

 

                                        Blood









                Performing Organization Address         City/[facility]/Zipcode Phone

 Number

 

                [address]

 [address] [phone]



                CENTER                                          



Magnesium (2019  5:15 AM CDT)Only the most recent of4 resultswithin the 
time period is included.





                Component       Value           Ref Range       Performed At

 

                Magnesium       1.8             1.6 - 2.6 mg/dL [facility]









                                        Specimen

 

                                        Blood









                Performing Organization Address         City/[facility]

 Number

 

                [address]

 [address] [phone]



                [place]                                          



Comprehensive metabolic panel (2019  5:15 AM CDT)Only the most recent of7 
resultswithin the time period is included.





                Component       Value           Ref Range       Performed At

 

                Protein, Total  4.8 (L)         6.0 - 8.3 gm/dL [facility]

ALTH



                                                                [facility]

ER

 

                Albumin         2.4 (L)         3.5 - 5.0 g/dL  [facility]

ALTH



                                                                [facility]

ER

 

                Alkaline Phosphatase 141             40 - 150 U/L    [facility]

'S HEALTH



                                                                [facility]

ER

 

                Total Bilirubin 3.9 (H)         0.2 - 1.2 mg/dL [facility]

ALTH



                                                                [facility]

ER

 

                Sodium          123 (L)         136 - 145 meq/L [facility]

ALTH



                                                                [facility]

ER

 

                Potassium       3.6             3.5 - 5.1 meq/L [facility]

ALTH



                                                                [facility]

ER

 

                Chloride        90 (L)          98 - 107 meq/L  [facility]

ALTH



                                                                [facility]

ER

 

                CO2             29              22 - 29 meq/L   [facility]

ALTH



                                                                [facility]

ER

 

                BUN             14              7 - 21 mg/dL    [facility]

ALTH



                                                                [facility]

ER

 

                Creatinine      0.80            0.57 - 1.25 mg/dL [facility]

ER

 

                Glucose         152 (H)         70 - 105 mg/dL  [facility]

ALTH



                                                                [facility]

ER

 

                Calcium         7.3 (L)         8.4 - 10.2 mg/dL [facility]

EALTH



                                                                [facility]

ER

 

                AST             39 (H)          5 - 34 U/L      [facility]

ALTH



                                                                [facility]

ER

 

                ALT             23              6 - 55 U/L      [facility]

ALTH



                                                                [facility]

ER

 

                EGFR            99Comment: ESTIMATED GFR mL/min/1.73 sq m [facility]



                                IS NOT AS ACCURATE AS                 [facility]



                                CREATININE CLEARANCE IN                 



                                PREDICTING GLOMERULAR                 



                                FILTRATION RATE.                 



                                ESTIMATED GFR IS NOT                 



                                APPLICABLE FOR DIALYSIS                 



                                PATIENTS.                       









                                        Specimen

 

                                        Blood









                          Narrative                 Performed At

 

                          Specimen slightly icteric [facility]









                Performing Organization Address         City/State/Zipcode Phone

 Number

 

                [address]

 [address] [phone]



                CENTER                                          



Electrolytes (2019  1:13 PM CDT)Only the most recent of4 resultswithin the
time period is included.





                Component       Value           Ref Range       Performed At

 

                Sodium          119 (LL)        136 - 145 meq/L [facility]

 

                Potassium       4.1             3.5 - 5.1 meq/L [facility]

 

                Chloride        85 (L)          98 - 107 meq/L  [facility]

 

                CO2             29              22 - 29 meq/L   [facility]









                                        Specimen

 

                                        Blood









                          Narrative                 Performed At

 

                          Page [phone] with results. Thank you. [facility]









                Performing Organization Address         City/[facility]/Zipcode Phone

 Number

 

                [facility] [address] [place] [address]

 [address] [phone]



                CENTER                                          



CT chest with IV contrast (2019 12:24 PM CDT)Only the most recent of2 
resultswithin the time period is included.





                                        Specimen

 

                                        









                          Narrative                 Performed At

 

                                        FINAL REPORT



                                        [facility] [place]



                                         



                                        



                                        PATIENT ID: [id number]



                                        



                                         



                                        



                                         



                                        



                                        CT of the Chest dated 2019



                                        



                                         



                                        



                                        COMPARISON: 2019



                                        



                                         



                                        



                                        CLINICAL INFORMATION: Pleural effusion



                                        



                                        reevaluate pleural effusion and left chiki

g abscess



                                        



                                         



                                        



                                        Comment:Axial images of the chest we

re obtained from thoracic inlet 



                                        



                                        to the upper abdomen with intravenous co

ntrast.



                                        



                                         



                                        



                                        This exam was performed according to our

 departmental 



                                        



                                        dose-optimization program, which include

s automated exposure control, 



                                        



                                        adjustment of the mA and/or kV according

 to patient size and/or use 



                                        



                                        of interactive reconstruction technique.



                                        



                                         



                                        



                                        Heart is normal in size.There is sma

ll pericardial effusion. Great 



                                        



                                        vessels are unremarkable. No adenopathy 

in the mediastinum or 



                                        



                                        perihilar region. Trachea and mainstem b

ronchi are patent. 



                                        



                                         



                                        



                                        Trace bilateral pleural effusion is pres

ent. There is interval 



                                        



                                        significant decrease in the amount of le

ft pleural effusion. There is 



                                        



                                        small amount of air present in the left 

pleural space. The pigtail 



                                        



                                        catheter is seen in the left pleural spa

ce. Atelectasis is seen in 



                                        



                                        the lingula, right mid, and both lower l

obes. Cavitary lesion is seen 



                                        



                                        in the superior segment of the left lowe

r lobe measuring 



                                        



                                        approximately 2.8 x 3.3 cm.



                                        



                                         



                                        



                                        Visualized upper abdomen demonstrates ci

rrhotic liver with trace 



                                        



                                        ascites. Spleen is minimally enlarged.



                                        



                                         



                                        



                                        Impression: 



                                        



                                        1. Interval decrease in the amount of le

ft pleural effusion with 



                                        



                                        residual left hydropneumothorax.



                                        



                                        2. Trace right pleural effusion.



                                        



                                        3. Cavitary lesion in the superior segme

nt of the left lower lobe may 



                                        



                                        represent abscess.



                                        



                                        4. Cirrhosis with splenomegaly and ascit

es.



                                        



                                         



                                        



                                        Signed: Lucita Nails MD



                                        



                                        Report Verified Date/Time:2019

 15:37:27



                                        



                                         



                                        



                                        Reading Location: [facility] C013Y CT Body R

[facility] Room



                                        



                          Electronically signed by: JUJU SHIELDS on 2019 



                                        03:37 PM



                                        



                                                    









                                        Procedure Note

 

                                        Interface, External Ris In - 2019 

 3:39 PM CDT



FINAL REPORT



                                        



                                        PATIENT ID:   [id number]



                                        



                                        



                                        CT of the Chest dated 2019



                                        



                                        COMPARISON: 2019



                                        



                                        CLINICAL INFORMATION: Pleural effusion



                                        reevaluate pleural effusion and left chiki

g abscess



                                        



                                        Comment:  Axial images of the chest were

 obtained from thoracic inlet



                                        to the upper abdomen with intravenous co

ntrast.



                                        



                                        This exam was performed according to our

 departmental



                                        dose-optimization program, which include

s automated exposure control,



                                        adjustment of the mA and/or kV according

 to patient size and/or use



                                        of interactive reconstruction technique.



                                        



                                        Heart is normal in size.  There is small

 pericardial effusion. Great



                                        vessels are unremarkable. No adenopathy 

in the mediastinum or



                                        perihilar region. Trachea and mainstem b

ronchi are patent.



                                        



                                        Trace bilateral pleural effusion is pres

ent. There is interval



                                        significant decrease in the amount of le

ft pleural effusion. There is



                                        small amount of air present in the left 

pleural space. The pigtail



                                        catheter is seen in the left pleural spa

ce. Atelectasis is seen in



                                        the lingula, right mid, and both lower l

obes. Cavitary lesion is seen



                                        in the superior segment of the left lowe

r lobe measuring



                                        approximately 2.8 x 3.3 cm.



                                        



                                        Visualized upper abdomen demonstrates ci

rrhotic liver with trace



                                        ascites. Spleen is minimally enlarged.



                                        



                                        Impression:



                                        1. Interval decrease in the amount of le

ft pleural effusion with



                                        residual left hydropneumothorax.



                                        2. Trace right pleural effusion.



                                        3. Cavitary lesion in the superior segme

nt of the left lower lobe may



                                        represent abscess.



                                        4. Cirrhosis with splenomegaly and ascit

es.



                                        



                                        Signed: Lucita Nails MD



                                        Report Verified Date/Time:  2019 1

5:37:27



                                        



                                        Reading Location: [facility] B1 C013Y CT Body R

[facility] Room



                                              Electronically signed by: LUCITA NAILS M.D. on 2019 03:37 PM



                                        









                Performing Organization Address         City/[facility]

 Number

 

                [facility]                                          



Cortisol (2019  4:01 AM CDT)Only the most recent of2 resultswithin the 
time period is included.





                Component       Value           Ref Range       Performed At

 

                Cortisol, Total 9.6             3.7 - 19.4 ug/dL [facility] CENTER









                                        Specimen

 

                                        Blood









                Performing Organization Address         City/[facility]/Zipcode Phone

 Number

 

                [address]

 [address] [phone]



                CENTER                                          



POC-Glucose meter (2019 12:57 PM CDT)Only the most recent of16 results
within the time period is included.





                Component       Value           Ref Range       Performed At

 

                POC-Glucose Meter 95Comment: TESTED AT 70 - 110 mg/dL  [facility] [address] [facility] [address]                 









                                        Specimen

 

                                        Blood









                Performing Organization Address         City/[facility]/Zipcode Phone

 Number

 

                [address]

 [address] 

[phone]



                [place]                                          



TSH/Free T4 If Indicated (2019  6:56 AM CDT)





                Component       Value           Ref Range       Performed At

 

                TSH             8.07 (H)        0.35 - 4.94 uIU/mL [facility]









                                        Specimen

 

                                        Blood









                Performing Organization Address         City/[facility]

 Number

 

                [address]

 [address] 

[phone]



                [place]                                          



T4, free (2019  6:56 AM CDT)





                Component       Value           Ref Range       Performed At

 

                Free T4         0.65 (L)        0.70 - 1.48 ng/dL [facility]









                                        Specimen

 

                                        Blood









                Performing Organization Address         City/[facility]

 Number

 

                [place] [address] 

[phone]



                [place]                                          



Sodium, random urine (2019  4:40 PM CDT)





                Component       Value           Ref Range       Performed At

 

                Sodium Urine    <20             meq/L           [facility]









                                        Specimen

 

                                        Urine









                          Narrative                 Performed At

 

                          Reference Range: No [facility]

EDICAL CENTER









                Performing Organization Address         City/[facility]

 Number

 

                [place] [address] 

[phone]



                [place]                                          



Osmolality, urine (2019  4:40 PM CDT)





                Component       Value           Ref Range       Performed At

 

                Osmolality, Ur  694             40-1,400 mOsm/kg [facility]









                                        Specimen

 

                                        Urine









                Performing Organization Address         City/[facility]

 Number

 

                [address]

 [id number] [phone]



                [place]                                          



XR abdomen / KUB 1 view (2019 12:42 PM CDT)





                                        Specimen

 

                                        









                          Narrative                 Performed At

 

                                        FINAL REPORT



                                        [facility]



                                         



                                        



                                        PATIENT ID: [id number]



                                        



                                         



                                        



                                        RAD, ABDOMEN/KUB, 1 VIEW AP



                                        



                                         



                                        



                                        CLINICAL INDICATION:no BM in 13 days

. abdominal distension.



                                        



                                        concern for ileus



                                        



                                         



                                        



                                        COMPARISON: None



                                        



                                         



                                        



                                        TECHNIQUE: 24 radiographs of the abdomen

.



                                        



                                         



                                        



                                        IMPRESSION:



                                        



                                        The bowel gas pattern demonstrates gaseo

us distention without 



                                        



                                        dilation. No pneumatosis. Appearance may

 reflect ileus. Excreted 



                                        



                                        contrast is present in the urinary bladd

er. The regional skeleton is 



                                        



                                        intact.



                                        



                                         



                                        



                                        Signed: JR Mendoza Robert MD



                                        



                                        Report Verified Date/Time:2019

 12:51:21



                                        



                                         



                                        



                                        Reading Location: [facility] Reading Room



                                        



                          Electronically signed by: KULWINDER ESPINOZA

 on 2019 



                                        12:51 PM



                                        



                                                    









                                        Procedure Note

 

                                        Interface, External Ris In - 2019 

12:53 PM CDT



FINAL REPORT



                                        



                                        PATIENT ID:   [id number]



                                        



                                        RAD, ABDOMEN/KUB, 1 VIEW AP



                                        



                                        CLINICAL INDICATION:  no BM in 13 days. 

abdominal distension.



                                        concern for ileus



                                        



                                        COMPARISON: None



                                        



                                        TECHNIQUE: 24 radiographs of the abdomen

.



                                        



                                        IMPRESSION:



                                        The bowel gas pattern demonstrates gaseo

us distention without



                                        dilation. No pneumatosis. Appearance may

 reflect ileus. Excreted



                                        contrast is present in the urinary bladd

er. The regional skeleton is



                                        intact.



                                        



                                        Signed: JR Mendoza Robert MD



                                        Report Verified Date/Time:  2019 1

2:51:21



                                        



                                        Reading Location: [facility] Reading Room



                                            Electronically signed by: KULWINDER MENDOZA on 2019 12:51 PM



                                        









                Performing Organization Address         City/State/Zipcode Phone

 Number

 

                [employer]                                          



Osmolality, serum (2019 11:28 AM CDT)





                Component       Value           Ref Range       Performed At

 

                Osmolality Serum 258 (L)         275 - 295 mOsm/kg [facility] 

CENTER









                                        Specimen

 

                                        Blood









                Performing Organization Address         City/[facility]/Zipcode Phone

 Number

 

                [address] [id number] [place] [address] 

[phone]



                CENTER                                          



CT drainage with chest tube insertion (2019  3:01 PM CDT)





                                        Specimen

 

                                        









                          Narrative                 Performed At

 

                                        FINAL REPORT



                                        [employer]



                                         



                                        



                                        PATIENT ID: [id number]



                                        



                                         



                                        



                                        PROCEDURE: CT-guided placement of a left

 pleural catheter. Dose 



                                        



                                        modulation, iterative reconstruction, an

d/or weight-based adjustment 



                                        



                                        of the mA/kV was utilized to reduce the 

radiation dose to as low as 



                                        



                                        reasonably achievable.



                                        



                                         



                                        



                                        INDICATION: Loculated left pleural effus

ion.



                                        



                                         



                                        



                                        COMPARISON: CT from earlier today.



                                        



                                         



                                        



                                        SEDATION: Intravenous moderate sedation 

was administered by radiology 



                                        



                                        nursing and monitored under the directio

n of the undersigned 



                                        



                                        radiologist. The patient's vital signs w

ere monitored throughout the 



                                        



                                        procedure and recorded in the patient's 

medical record by radiology 



                                        



                                        nursing. Total intraservice time of jovanni

tion was 25 minutes.



                                        



                                         



                                        



                                        MEDICATIONS: 0.5 mg Versed, 25 mcg fenta

nyl



                                        



                                         



                                        



                                        DESCRIPTION: After obtaining informed wr

itten consent, the patient 



                                        



                                        was brought to the procedure room and pl

aced in the supine position.



                                        



                                        Preliminary CT scan revealed a large lef

t pleural effusion. A clear 



                                        



                                        path to the collection was identified.



                                        



                                         



                                        



                                        The overlying skin was prepped and drape

d in the usual, sterile 



                                        



                                        fashion and local 2% lidocaine anesthesi

a was administered.



                                        



                                         



                                        



                                        Under CT guidance, a 19-gauge needle was

 placed. After placement of a 



                                        



                                        wire and serial dilation, a 12 French ca

theter was placed into the 



                                        



                                        pleural fluid. The catheter was affixed 

to the skin and connected to 



                                        



                                        a vacuum container. 1000 mL of clear red

 fluid was aspirated. Samples 



                                        



                                        were placed on this side table and no cl

otting was noted. Samples 



                                        



                                        were sent for analysis.



                                        



                                         



                                        



                                        Post procedure scan showed decrease in s

ize with left effusion and no 



                                        



                                        immediate complications.



                                        



                                         



                                        



                                        IMPRESSION:



                                        



                                         



                                        



                                        Successful placement of a 12 French left

 chest tube.



                                        



                                         



                                        



                                        Signed: Abner Aguilera MD



                                        



                                        Report Verified Date/Time:2019

 14:49:30



                                        



                                         



                                        



                                        Reading Location: [facility] C013Y CT Body R

[facility] Room



                                        



                          Electronically signed by: ABNER AGUILERA MD on 2019 



                                        02:49 PM



                                        



                                                    









                                        Procedure Note

 

                                        Interface, External Ris In - 2019 

 3:02 PM CDT



FINAL REPORT



                                        



                                        PATIENT ID:   [id number]



                                        



                                        PROCEDURE: CT-guided placement of a left

 pleural catheter. Dose



                                        modulation, iterative reconstruction, an

d/or weight-based adjustment



                                        of the mA/kV was utilized to reduce the 

radiation dose to as low as



                                        reasonably achievable.



                                        



                                        INDICATION: Loculated left pleural effus

ion.



                                        



                                        COMPARISON: CT from earlier today.



                                        



                                        SEDATION: Intravenous moderate sedation 

was administered by radiology



                                        nursing and monitored under the directio

n of the undersigned



                                        radiologist. The patient's vital signs w

ere monitored throughout the



                                        procedure and recorded in the patient's 

medical record by radiology



                                        nursing. Total intraservice time of jovanni

tion was 25 minutes.



                                        



                                        MEDICATIONS: 0.5 mg Versed, 25 mcg fenta

nyl



                                        



                                        DESCRIPTION: After obtaining informed wr

itten consent, the patient



                                        was brought to the procedure room and pl

aced in the supine position.



                                        Preliminary CT scan revealed a large lef

t pleural effusion. A clear



                                        path to the collection was identified.



                                        



                                        The overlying skin was prepped and drape

d in the usual, sterile



                                        fashion and local 2% lidocaine anesthesi

a was administered.



                                        



                                        Under CT guidance, a 19-gauge needle was

 placed. After placement of a



                                        wire and serial dilation, a 12 French ca

theter was placed into the



                                        pleural fluid. The catheter was affixed 

to the skin and connected to



                                        a vacuum container. 1000 mL of clear red

 fluid was aspirated. Samples



                                        were placed on this side table and no cl

otting was noted. Samples



                                        were sent for analysis.



                                        



                                        Post procedure scan showed decrease in s

ize with left effusion and no



                                        immediate complications.



                                        



                                        IMPRESSION:



                                        



                                        Successful placement of a 12 French left

 chest tube.



                                        



                                        Signed: Abner Aguilera MD



                                        Report Verified Date/Time:  2019 1

4:49:30



                                        



                                        Reading Location: [facility] B1 C013Y CT Body R

eading Room



                                              Electronically signed by: ABNER AGUILERA MD on 2019 02:49 PM



                                        









                Performing Organization Address         City/State/Zipcode Phone

 Number

 

                [facility]                                          



Body fluid culture + gram stain (2019  2:32 PM CDT)





                Component       Value           Ref Range       Performed At

 

                Result          <1+ Coagulase negative                 [facility]



                                Staphylococcus (A)                 [facility]

R

 

                Result          <1+ Pseudomonas aeruginosa (A)                 C

[facility]

 

                Gram Stain Result 3+ WBCs                         [facility]

 

                Gram Stain Result <1+ gram positive cocci in                 [facility]



                                pairs and clusters                 [facility]

R









                                        Specimen

 

                                        Body Fluid









                Organism        Antibiotic      Method          Susceptibility

 

                Coagulase negative Clindamycin                     0.25: Suscept

ible



                Staphylococcus                                  

 

                Coagulase negative Erythromycin                    >=8: Resistan

t



                Staphylococcus                                  

 

                Coagulase negative Linezolid                       1: Susceptibl

e



                Staphylococcus                                  

 

                Coagulase negative Oxacillin                       Resistant



                Staphylococcus                                  

 

                Coagulase negative Rifampin                        <=0.5: Suscep

tible



                Staphylococcus                                  

 

                Coagulase negative Tetracycline                    <=1: Suscepti

ble



                Staphylococcus                                  

 

                Coagulase negative Trimethoprim +                  <=10: Suscept

ible



                Staphylococcus  Sulfamethoxazole                 

 

                Coagulase negative Vancomycin                      1: Susceptibl

e



                Staphylococcus                                  

 

                Pseudomonas aeruginosa Amikacin                        <=8: Susc

eptible

 

                Pseudomonas aeruginosa Aztreonam                       >16: Resi

stant

 

                Pseudomonas aeruginosa Cefepime                        8: Suscep

tible

 

                Pseudomonas aeruginosa Ceftazidime                     8: Suscep

tible

 

                Pseudomonas aeruginosa Ciprofloxacin                   1: Resist

ant

 

                Pseudomonas aeruginosa Gentamicin                      <=2: Susc

eptible

 

                Pseudomonas aeruginosa Levofloxacin                    4: Resist

ant

 

                Pseudomonas aeruginosa Meropenem                       >8: Resis

tant

 

                Pseudomonas aeruginosa Piperacillin                    32: Resis

tant

 

                Pseudomonas aeruginosa Piperacillin + Tazobactam                

 32: Resistant

 

                Pseudomonas aeruginosa Tobramycin                      <=2: Susc

eptible









                Performing Organization Address         City/[facility]/Zipcode Phone

 Number

 

                [facility] [address] [place], [place]

 [id number] [phone]



                [place]                                          



Prothrombin time/INR (2019  4:49 AM CDT)





                Component       Value           Ref Range       Performed At

 

                Protime         16.9 (H)        11.9 - 14.2 seconds [facility]

 

                INR             1.4             <=5.9           [facility]









                                        Specimen

 

                                        Blood









                          Narrative                 Performed At

 

                          Effective 2019: PT Reference Range [facility]



                                        Change



                                        



                                        New: 11.9-14.2Previous: 11.7-14.7



                                        



                                         



                                        



                          RECOMMENDED COUMADIN/WARFARIN INR THERAPY 



                                        RANGES



                                        



                          STANDARD DOSE: 2.0-3.0Includes: 



                          PROPHYLAXIS for venous thrombosis, systemic 



                          embolization; TREATMENT for venous thrombosis 



                                        and/or pulmonary embolus.



                                        



                          HIGH RISK: Target INR is 2.5-3.5 for patients 



                          wiht mechanical heart valves. 









                Performing Organization Address         City/[facility] Phone

 Number

 

                [address]

 [address] [phone]



                [place]                                          



after 2019



Insurance







           Payer      Benefit Plan / Group Subscriber ID Type       Phone      A

ddress

 

           [facility] - MEDICARE AARP/MEDICARE COMPLETE xxxxxxxxx        

                



           [employer]                                               









           Guarantor Name Account Type Relation to Date of    Phone      Billing



                                 Patient    Birth                 Address

 

           Omkar Chung Personal/Family Self       1960 [phone]



           [address]                                     (Home)     [place], [place]



                                                                  [address]







Advance Directives

For more information, please contact:[address] [phone]





                Code Status     Date Activated  Date Inactivated Comments

 

                Full Code       2019 10:01 PM 2019  1:05 PM 









                    This code status was determined by: Patient

## 2020-07-16 NOTE — RAD REPORT
EXAM DESCRIPTION:  CT - Head Brain Wo Cont - 7/16/2020 9:10 am

 

CLINICAL HISTORY:  SLURRED SPEECH

 

COMPARISON:  Head Brain Wo Cont dated 11/28/2019

 

TECHNIQUE:  Axial 5 mm thick images of the head were obtained without IV contrast.

 

All CT scans are performed using dose optimization technique as appropriate and may include automated
 exposure control or mA/KV adjustment according to patient size.

 

FINDINGS:  No intracranial hemorrhage, mass, edema or shift of mid-line structures. No acute infarcti
on changes seen. No cortical edema or sulcal effacement. Atrophy and chronic ischemic changes are pre
sent mild to moderate for age. Patient has a prominent prevascular space along the lateral margin of 
the left internal capsule. Arterial and physiologic calcifications are present.

 

Mastoid air cells and visualized portions of the paranasal sinuses are clear.

 

No acute bony findings.

 

 

IMPRESSION:  No intracranial hemorrhage. No acute cortical based infarction identifiable.

 

Patient has chronic ischemic changes present similar to November 2019.

 

 Chronic ischemic changes can mask nonhemorrhagic acute infarction. MR brain followup can be obtained
 if there is ongoing concern for acute ischemia.

## 2020-07-16 NOTE — RAD REPORT
EXAM DESCRIPTION:  MRI - Brain Wo Cont - 7/16/2020 10:39 am

 

CLINICAL HISTORY:  speech difficulty

 

COMPARISON:  Head Brain Wo Cont dated 7/16/2020

 

TECHNIQUE:  Sagittal T1-weighted images were obtained along with axial PD, heavily T2-weighted and T2
-FLAIR images. Axial DWI and ADC mapping sequences were also obtained along with coronal heavily T2-w
eighted images.

 

FINDINGS:  No intracranial hemorrhage or mass. No acute or subacute infarction changes identified. Th
ere is no edema or shift of midline structures. No extra-axial fluid collections. Gray-matter/white m
atter junction is preserved. Signal voids are seen as a normal finding in the major intracranial vess
els.

 

Patient does have mild atrophy for age. On MR imaging there is no significant or measurable chronic i
schemic change. No white matter signal abnormality of significance. Ventricles are normal for the tad
unt of volume loss.

 

Mastoid air cells and paranasal sinuses are clear.

 

No globe or orbital content abnormality. No tonsillar ectopia. No sella or supra sella abnormality. P
atient has a normal prominent perivascular space near the left basal ganglia.

 

IMPRESSION:  No acute or subacute infarction changes. No acute intracranial finding seen.

 

Patient does have atrophy with ventricles in proportion. Little or no chronic ischemic changes presen
t.

## 2020-07-16 NOTE — ER
Nurse's Notes                                                                                     

 [facility]                                                                 

Name: Omkar Chung                                                                                  

Age: 60 yrs                                                                                       

Sex: Male                                                                                         

: 1960                                                                                   

MRN: [id number]                                                                                   

Arrival Date: 2020                                                                          

Time: 08:43                                                                                       

Account#: [id number]                                                                            

Bed 20                                                                                            

Private MD: Jhon Longo                                                                         

Diagnosis: Hyperglycemia, unspecified                                                             

                                                                                                  

Presentation:                                                                                     

                                                                                             

09:01 Chief complaint: Patient states: sent over by PCP for a CT of head, pt reports          em  

      difficulty forming words/sentences, denies N/V, headache, or weakness, symptoms started     

      about 2 weeks ago. Coronavirus screen: Proceed with normal triage. Patient denies a         

      cough. Patient denies shortness of breath or difficulty breathing. Patient denies           

      measured and/or subjective temperature greater than 100.4F prior to today's visit.          

      Patient denies travel on a cruise ship or to a country the [facility] currently lists as an        

      affected area. Patient denies contact with known and/or suspected case of COVID-19.         

      Ebola Screen: Patient negative for fever greater than or equal to 101.5 degrees             

      Fahrenheit, and additional compatible Ebola Virus Disease symptoms Patient denies           

      exposure to infectious person. Patient denies travel to an Ebola-affected area in the       

      21 days before illness onset. No symptoms or risks identified at this time. Initial         

      Sepsis Screen: Does the patient meet any 2 criteria? No. Patient's initial sepsis           

      screen is negative. Does the patient have a suspected source of infection? No.              

      Patient's initial sepsis screen is negative. Risk Assessment: Do you want to hurt           

      yourself or someone else? Patient reports no desire to harm self or others. Onset of        

      symptoms was 2020.                                                                 

09:01 Method Of Arrival: Ambulatory                                                           em  

09:01 Acuity: BROCK 3                                                                           em  

                                                                                                  

Historical:                                                                                       

- Allergies:                                                                                      

09:05 No Known Allergies;                                                                     em  

- Home Meds:                                                                                      

09:05 Furosemide Oral [Active];                                                               em  

- PMHx:                                                                                           

09:05 Cirrhosis; COPD; Hernia;                                                                em  

- PSHx:                                                                                           

09:05 None;                                                                                   em  

                                                                                                  

- Immunization history:: Adult Immunizations up to date.                                          

- Social history:: Smoking status: Patient denies any tobacco usage or history of.                

- Family history:: not pertinent.                                                                 

- Hospitalizations: : No recent hospitalization is reported.                                      

                                                                                                  

                                                                                                  

Screenin:05 Abuse screen: Denies threats or abuse. Nutritional screening: No deficits noted.        em  

      Tuberculosis screening: No symptoms or risk factors identified. Fall Risk None              

      identified.                                                                                 

                                                                                                  

Assessment:                                                                                       

09:05 General: Appears in no apparent distress. uncomfortable, Behavior is calm, cooperative, em  

      appropriate for age. Pain: Denies pain. Neuro: Level of Consciousness is awake, alert,      

      obeys commands, Oriented to person, place, time, situation, Appropriate for age        

      are equal bilaterally Moves all extremities. Speech with expressive aphasia noted,          

      Facial symmetry appears normal, Intact. Cardiovascular: Capillary refill < 3 seconds        

      Patient's skin is warm and dry. Respiratory: Airway is patent Respiratory effort is         

      even, unlabored, Respiratory pattern is regular, symmetrical. GI: Patient currently         

      denies nausea, vomiting. Derm: Skin is intact, is healthy with good turgor, Skin is         

      pink, warm \T\ dry. Musculoskeletal: Capillary refill < 3 seconds, Range of motion:         

      intact in all extremities.                                                                  

09:15 Reassessment: pt refuses IV, provider notified.                                         em  

10:10 Reassessment: Dr. Landa at bedside.                                                     em  

10:15 Reassessment: MRI tech. at bedside.                                                     em  

11:10 Reassessment: Patient appears in no apparent distress at this time. Patient and/or      em  

      family updated on plan of care and expected duration. Pain level reassessed. Patient is     

      alert, oriented x 3, equal unlabored respirations, skin warm/dry/pink.                      

11:20 Reassessment: Dr. Landa at bedside discussing results with pt.                          em  

                                                                                                  

Vital Signs:                                                                                      

09:01  / 72; Pulse 92; Resp 18; Temp 99.5(O); Pulse Ox 96% on R/A; Weight 102.06 kg;    em  

      Pain 0/10;                                                                                  

11:10  / 76; Pulse 87; Resp 16; Temp 98.8(O); Pulse Ox 99% on R/A;                      em  

                                                                                                  

ED Course:                                                                                        

08:43 Patient arrived in ED.                                                                  ag5 

08:44 Jhon Longo DO is Private Physician.                                                 ag5 

08:55 Shawn Landa MD is Attending Physician.                                                rn  

09:01 Gustavo Herbert RN is Primary Nurse.                                                      em  

09:04 Triage completed.                                                                       em  

09:05 Arm band placed on.                                                                     em  

09:05 Patient has correct armband on for positive identification. Bed in low position. Call   em  

      light in reach. Side rails up X2. Pulse ox on. NIBP on.                                     

09:09 CT Head Brain wo Cont In Process Unspecified.                                           EDMS

09:17 Initial lab(s) drawn, by me, sent to lab.                                               em  

10:33 Brain Wo Cont MRI In Process Unspecified.                                               EDMS

10:52 Jhon Longo DO is Referral Physician.                                                rn  

11:24 No provider procedures requiring assistance completed. Patient did not have IV access   em  

      during this emergency room visit.                                                           

                                                                                                  

Administered Medications:                                                                         

No medications were administered                                                                  

                                                                                                  

                                                                                                  

Outcome:                                                                                          

10:52 Discharge ordered by MD.                                                                rn  

11:24 Discharged to home ambulatory.                                                          em  

11:24 Condition: good                                                                             

11:24 Discharge instructions given to patient, Instructed on discharge instructions, follow       

      up and referral plans. Demonstrated understanding of instructions, follow-up care.          

11:25 Patient left the ED.                                                                    em  

                                                                                                  

Signatures:                                                                                       

Dispatcher [employer]                           Gustavo Floyd RN                        RN   Shawn Rios MD MD rn Gaskin, Ajare                                ag5                                                  

                                                                                                  

Corrections: (The following items were deleted from the chart)                                    

09:10 09:01 Chief complaint: Patient states: sent over by PCP for a CT of head, pt reports    em  

      difficulty forming words/sentences, denies N/V, headache, or weakness em                    

                                                                                                  

**************************************************************************************************

## 2020-07-16 NOTE — XMS REPORT
[facility]

                            Created on:July 15, 2020



Patient:Omkar Chung

Sex:Male

:1960

External Reference #:[id number]





Demographics







                          Address                   [address]



                                                    [place] [address]

 

                          Phone                     [phone]

 

                          Preferred Language        en

 

                          Marital Status            Unknown

 

                          Muslim Affiliation     Unknown

 

                          Race                      Unknown

 

                          Ethnic Group              Unknown









Author







                          Organization              [employer]









Care Team Providers







                    Name                Role                Phone

 

                    Donta Jhon       Provider Role       Unavailable









Allergies, Adverse Reactions, Alerts







                    Substance           Reaction            Event Type

 

                    N.K.D.A.            Info Not Available  Non Drug Allergy







Problems







             Problem Type Condition    Code         Onset Dates  Condition Statu

s

 

             Assessment   Unable to verbally communicate R68.89                 

   Active



                          needs                                  

 

             Assessment   Aphasia      R47.01                    Active

 

             Problem      Hypothyroidism, unspecified type E03.9                

     Active

 

             Problem      On supplemental oxygen therapy Z99.81                 

   Active

 

             Problem      Aphasia      R47.01                    Active

 

             Problem      Chronic obstructive pulmonary J44.9                   

  Active



                          disease, unspecified COPD type                        

   

 

             Problem      Generalized anxiety disorder F41.1                    

 Active

 

             Problem      History of alcohol abuse F10.11                    Act

pablo

 

             Problem      Alcoholic cirrhosis of liver with K70.31              

      Active



                          ascites                                

 

             Assessment   Former heavy tobacco smoker Z87.891                   

Active

 

             Assessment   History of alcohol abuse F10.11                    Act

pablo

 

             Assessment   Increased ammonia level R79.89                    Acti

ve

 

             Assessment   Generalized anxiety disorder F41.1                    

 Active

 

             Assessment   Hypothyroidism, unspecified type E03.9                

     Active

 

             Assessment   Umbilical hernia without K42.9                     Act

pablo



                          obstruction and without gangrene                      

     

 

             Assessment   Chronic obstructive pulmonary J44.9                   

  Active



                          disease, unspecified COPD type                        

   

 

             Assessment   On supplemental oxygen therapy Z99.81                 

   Active

 

             Assessment   Alcoholic cirrhosis of liver with K70.31              

      Active



                          ascites                                







Medications







        Medication Code    Code    Instructions Start   End     Status  Dosage



                System                  Date    Date            

 

        Constulose NDC     47102991741 10 GM/15ML Oral                 Active  1

5 ml



                                Once a day                         

 

        Symbicort NDC     67857142843 80-4.5 MCG/ACT                 Active  2 p

uffs



                                Inhalation Once                         



                                a day                           

 

        Spironolactone NDC     94499915324 100 MG Orally                 Active 

 1 tablet



                                Twice a day                         

 

        Folic Acid NDC     02825614717 1 MG Orally                 Active  1 tab

let



                                Once a day                         

 

        ProAir HFA NDC     33199850664 108 (90 Base)                 Active  1 p

uff as



                                MCG/ACT                         needed



                                Inhalation                         



                                every 4 hrs                         

 

        Furosemide NDC     22952411230 40 MG Orally                 Active  1 ta

blet



                                Twice a day                         

 

        Ipratropium NDC     03173161573 0.02 %                  Active  2.5 ml



        Bromide                 Inhalation                         



                                every 8 hrs                         

 

        Aspir-Low NDC     21822545557 81 MG Orally                 Active  1 tab

let



                                Once a day                         







Results

No Known Results



Summary Purpose

[employer] Submission

## 2020-07-16 NOTE — EDPHYS
Physician Documentation                                                                           

 [facility]                                                                 

Name: Omkar Chung                                                                                  

Age: 60 yrs                                                                                       

Sex: Male                                                                                         

: 1960                                                                                   

MRN: [id number]                                                                                   

Arrival Date: 2020                                                                          

Time: 08:43                                                                                       

Account#: [id number]                                                                            

Bed 20                                                                                            

Private MD: [facility]                                                                         

ED Physician Shawn Landa                                                                         

HPI:                                                                                              

                                                                                             

09:09 This 60 yrs old  Male presents to ER via Ambulatory with complaints of Sent by rn  

      Dr. Longo - Speech Problem.                                                                 

09:09 The patient presents to the emergency department with a speech or higher order brain    rn  

      function problem. Onset: The symptoms/episode began/occurred 9 day(s) ago. Context:.        

      Severity of symptoms: At their worst the symptoms were mild in the emergency department     

      the symptoms are unchanged. The patient has not experienced similar symptoms in the         

      past. Reports sent by pcp for speech problem, present for 9 days, feels like                

      intermittent, no other focal neurological complaints, no head injury, knows what he         

      wants to say but feels like speech is broken. .                                             

                                                                                                  

Historical:                                                                                       

- Allergies:                                                                                      

09:05 No Known Allergies;                                                                     em  

- Home Meds:                                                                                      

09:05 Furosemide Oral [Active];                                                               em  

- PMHx:                                                                                           

09:05 Cirrhosis; COPD; Hernia;                                                                em  

- PSHx:                                                                                           

09:05 None;                                                                                   em  

                                                                                                  

- Immunization history:: Adult Immunizations up to date.                                          

- Social history:: Smoking status: Patient denies any tobacco usage or history of.                

- Family history:: not pertinent.                                                                 

- Hospitalizations: : No recent hospitalization is reported.                                      

                                                                                                  

                                                                                                  

ROS:                                                                                              

09:09 Constitutional: Negative for fever, chills, and weight loss, Eyes: Negative for injury, rn  

      pain, redness, and discharge, Neck: Negative for injury, pain, and swelling,                

      Cardiovascular: Negative for chest pain, palpitations, and edema, Respiratory: Negative     

      for shortness of breath, cough, wheezing, and pleuritic chest pain, Abdomen/GI:             

      Negative for abdominal pain, nausea, vomiting, diarrhea, and constipation,                  

      MS/Extremity: Negative for injury and deformity, Skin: Negative for injury, rash, and       

      discoloration, Neuro: Negative for headache, weakness, numbness, tingling, and seizure.     

                                                                                                  

Exam:                                                                                             

09:09 Constitutional:  This is a well developed, well nourished patient who is awake, alert,  rn  

      and in no acute distress. Head/Face:  Normocephalic, atraumatic. Eyes:  Pupils equal        

      round and reactive to light, extra-ocular motions intact. Cardiovascular:  Regular rate     

      and rhythm.  No pulse deficits. Respiratory:  No increased work of breathing, no            

      retractions or nasal flaring. Abdomen/GI:  Soft, non-tender MS/ Extremity:  Pulses          

      equal, no cyanosis.  Neurovascular intact.  Full, normal range of motion.  Equal            

      circumference. Neuro:  Awake and alert, GCS 15, oriented to person, place, time, and        

      situation.  Cranial nerves II-XII grossly intact.  Motor strength 5/5 in all                

      extremities.  Sensory grossly intact.  Speech ok at times, and sometimes seems              

      frustrated and broken.                                                                      

                                                                                                  

Vital Signs:                                                                                      

09:01  / 72; Pulse 92; Resp 18; Temp 99.5(O); Pulse Ox 96% on R/A; Weight 102.06 kg;    em  

      Pain 0/10;                                                                                  

11:10  / 76; Pulse 87; Resp 16; Temp 98.8(O); Pulse Ox 99% on R/A;                      em  

                                                                                                  

MDM:                                                                                              

08:56 Patient medically screened.                                                             rn  

10:51 Data reviewed: vital signs, nurses notes, lab test result(s), radiologic studies, CT    rn  

      scan, MRI, and as a result, I will discharge patient. Counseling: I had a detailed          

      discussion with the patient and/or guardian regarding: the historical points, exam          

      findings, and any diagnostic results supporting the discharge/admit diagnosis, lab          

      results, radiology results, the need for outpatient follow up, to return to the             

      emergency department if symptoms worsen or persist or if there are any questions or         

      concerns that arise at home. Special discussion: I discussed with the patient/guardian      

      in detail that at this point there is no indication for admission to the hospital. It       

      is understood, however, that if the symptoms persist or worsen the patient needs to         

      return immediately for re-evaluation. ED course: Pt improved, CT head and MRI brain neg     

      for acute infarct, + elevated glucose, patient denies diabetes, states just had             

      bloodwork done and told glucose ok, wants to go home, plans to f/u with pcp for glucose     

      recheck. No acidosis or AG..                                                                

                                                                                                  

                                                                                             

09:00 Order name: CBC with Diff; Complete Time: 10:                                         rn  

                                                                                             

09:00 Order name: Basic Metabolic Panel; Complete Time: 10:                                 rn  

                                                                                             

09:00 Order name: Protime (+inr); Complete Time: 10:                                        rn  

                                                                                             

09:00 Order name: Ptt, Activated; Complete Time: 10:                                        rn  

                                                                                             

09:00 Order name: CT Head Brain wo Cont; Complete Time: 09:                                 rn  

                                                                                             

09:26 Order name: Brain Wo Cont MRI; Complete Time: 10:50                                     rn  

                                                                                                  

Administered Medications:                                                                         

No medications were administered                                                                  

                                                                                                  

                                                                                                  

Disposition:                                                                                      

20 10:52 Discharged to Home. Impression: Hyperglycemia, unspecified.                        

- Condition is Stable.                                                                            

- Discharge Instructions: Hyperglycemia.                                                          

                                                                                                  

- Medication Reconciliation Form, Thank You Letter, Antibiotic Education, Prescription            

  Opioid Use form.                                                                                

- Follow up: Jhon Longo DO; When: As needed; Reason: Recheck today's complaints,              

  Re-evaluation by your physician.                                                                

- Problem is an ongoing problem.                                                                  

- Symptoms have improved.                                                                         

                                                                                                  

                                                                                                  

                                                                                                  

Signatures:                                                                                       

Dispatcher [employer]                           Gustavo Floyd RN RN em Nieto, Roman, MD MD   rn                                                   

                                                                                                  

Corrections: (The following items were deleted from the chart)                                    

11:23 09:00 IV Saline Lock ordered. rn                                                        em  

11:25 10:52 2020 10:52 Discharged to Home. Impression: Hyperglycemia, unspecified.      em  

      Condition is Stable. Forms are Medication Reconciliation Form, Thank You Letter,            

      Antibiotic Education, Prescription Opioid Use. Follow up: Jhon Longo; When: As            

      needed; Reason: Recheck today's complaints, Re-evaluation by your physician. Problem is     

      an ongoing problem. Symptoms have improved. rn                                              

                                                                                                  

**************************************************************************************************

## 2020-07-16 NOTE — XMS REPORT
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                 [facility]

 

             Heart rate   2019 08:00:00 92 /min                   [facility]

 

             Body temperature 2019 08:00:00 36.17 Renée                 [facility]

 

             Respiratory rate 2019 08:00:00 18 /min                   [facility]

 

             Oxygen saturation in 2019 08:00:00 94 /min                   

[facility] -



             Arterial blood by                                        Medical Ce

nter



             Pulse oximetry                                        

 

             Body weight Measured 2019 10:00:00 98.476 kg                 

[facility]

 

             BMI          2019 10:00:00 27.14 kg/m2               [facility]

 

             Body height  2019 03:32:00 190.5 cm                  [facility]







Procedures







                Procedure       Date / Time Performed Performing Clinician [facility]

e

 

                RHYTHM STRIP - SCAN 2019 11:34:27 Provider, Default [facility]

 

                RHYTHM STRIP - SCAN 2019 09:50:33 Provider, Default [facility]

 

                XR CHEST 1 VIEW 2019 06:23:00 Andrew Adams [facility]



                PORTABLE/BEDSIDE                                 [facility] Center

 

                COMPREHENSIVE METABOLIC 2019 05:15:00 Addie Meza [facility]

[facility]

 

                B-TYPE NATRIURETIC FACTOR 2019 05:15:00 Gilberto Melara [facility]



                (BNP)                                           [facility]

 

                CALCIUM, IONIZED 2019 05:15:00 Gilberto Melara [facility]

 

                MAGNESIUM       2019 05:15:00 Gilberto Melara [facility]

 

                PHOSPHORUS      2019 05:15:00 Gilberto Melara [facility]

 

                CBC W/PLT COUNT & AUTO 2019 05:15:00 Addie Meza [facility]



                DIFFERENTIAL                                    [facility]

 

                (CELLAVISION MANUAL DIFF) 2019 05:15:00 Addie Meza 

[facility]

 

                XR CHEST 1 VIEW 2019 07:20:00 Andrew Adams [facility]



                PORTABLE/BEDSIDE                                 [facility]

 

                COMPREHENSIVE METABOLIC 2019 03:49:00 Meza, Addie Munguia [facility]

 

                MAGNESIUM       2019 03:49:00 Meza, Addie Nilda [facility]

 

                PHOSPHORUS      2019 03:49:00 Meza, Addie Nilda [facility]

 

                CBC W/PLT COUNT & AUTO 2019 03:49:00 Meza, Addie Nilda [facility]

 

                (CELLAVISION MANUAL DIFF) 2019 03:49:00 Meza, [facility]

 

                ELECTROLYTE PANEL 2019 13:13:00 Gilberto Melara [facility]

 

                CT CHEST WITH IV CONTRAST 2019 12:24:00 [facility] 

[facility]

 

                XR CHEST 1 VIEW 2019 06:36:00 Andrew Adams [facility]



                PORTABLE/BEDSIDE                                 [facility]

 

                COMPREHENSIVE METABOLIC 2019 04:02:00 Pantera Joy [facility]

[facility]

 

                MAGNESIUM       2019 04:02:00 Pantera Joy [facility]

 

                PHOSPHORUS      2019 04:02:00 Pantera Joy [facility]

 

                CALCIUM, IONIZED 2019 04:02:00 Edinson Vergara  [facility]

 

                CBC W/PLT COUNT & AUTO 2019 04:02:00 Pantera Joy [facility]

 

                (CELLAVISION MANUAL DIFF) 2019 04:02:00 Pantera Joy 

[facility]

 

                CORTISOL        2019 04:01:00 Pantera Joy [facility]

 

                ELECTROLYTE PANEL 2019 21:03:00 Edinson Vergara  [facility]

 

                POCT-GLUCOSE METER 2019 12:57:00 Pantera Joy [facility]

 

                POCT-GLUCOSE METER 2019 09:45:00 Pantera Joy [facility]

 

                XR CHEST 1 VIEW 2019 07:19:00 Sasha Quiroga [facility]



                PORTABLE/BEDSIDE                 [facility]

 

                COMPREHENSIVE METABOLIC 2019 06:56:00 [facility]

 

                [facility]NESIUM       2019 06[facility]56:00 [facility]

 

                PHOSPHORUS      2019 06:56:00 [facility]

 

                TSH/FREE T4 IF INDICATED 2019 06:56:00 [facility]

 

                T4, FREE        2019 06:56:00 [facility]

 

                CBC W/PLT COUNT & AUTO 2019 06:56:00 Pantera Joy [facility]

 

                (CELLAVISION MANUAL DIFF) 2019 06:56:00 Pantera Joy 

[facility]

 

                CALCIUM, IONIZED 2019 05:00:00 [facility] [place]

 

                ELECTROLYTE PANEL 2019 23:10:00 [facility]

 

                POCT-GLUCOSE METER 2019 21:08:00 Pantera Joy [facility]

 

                POCT-GLUCOSE METER 2019 18:18:00 Pantera Joy [facility]

 

                CORTISOL        2019 17:54:00 [facility]

 

                ELECTROLYTE PANEL 2019 17:54:00 [facility]

 

                POCT-GLUCOSE METER 2019 12:53:00 Pantera Joy [facility]

 

                POCT-GLUCOSE METER 2019 08:36:00 Pantera Joy [facility]

 

                XR CHEST 1 VIEW 2019 06:49:00 Andrew Adams [facility]



                PORTABLE/BEDSIDE                                 [facility]

 

                COMPREHENSIVE METABOLIC 2019 04:20:00 Dilcia Slois 

[facility]

 

                CBC W/PLT COUNT & AUTO 2019 04:20:00 Dilcia Solis

[facility]

 

                (CELLAVISION MANUAL DIFF) 2019 04:20:00 Dilcia Solis [facility]

 

                POCT-GLUCOSE METER 2019 20:59:00 Pantera Joy [facility]

 

                OSMOLALITY, URINE 2019 16:40:00 Pantera Joy [facility]

 

                SODIUM, RANDOM URINE 2019 16:40:00 Pantera Joy [facility]

 

                POCT-GLUCOSE METER 2019 15:49:00 Pantera Joy [facility]

 

                XR ABDOMEN / KUB 1 VIEW 2019 12:42:00 Pantera Joy [facility]

I [facility]

 

                POCT-GLUCOSE METER 2019 12:15:00 Pantera Joy [facility]

 

                OSMOLALITY, SERUM 2019 11:28:00 Pantera Joy [facility]

 

                XR CHEST 1 VIEW 2019 08:46:00 Andrew Adams [facility]/BEDSIDE                                 [facility]

 

                POCT-GLUCOSE METER 2019 07:36:00 Pantera Joy [facility]

 

                COMPREHENSIVE METABOLIC 2019 04:11:00 Dilcia Solis 

[facility]

 

                CBC W/PLT COUNT & AUTO 2019 04:11:00 Dilcia Solis

[facility]

 

                (CELLAVISION MANUAL DIFF) 2019 04:11:00 Dilcia Solis [facility]

 

                POCT-GLUCOSE METER 2019 21:06:00 Pantera Joy [facility]

 

                POCT-GLUCOSE METER 2019 17:31:00 Pantera Joy [facility]

 

                XR CHEST 1 VIEW 2019 16:48:00 AdamsMinisterioy Kirby [facility]

 

                POCT-GLUCOSE METER 2019 15:18:00 Pantera Joy [facility]

 

                CT DRAINAGE WITH CHEST 2019 15:01:00 Sasha Quiroga [facility] -



                TUBE INSERTION                  [facility]

 

                BODY FLUID CULTURE + GRAM 2019 14:32:00 Abner Aguilera

[facility]

 

                POCT-GLUCOSE METER 2019 11:20:00 Pantera Joy [facility]

 

                XR CHEST 1 VIEW 2019 10:25:00 Sasha Quiroga [facility]



                PORTABLE/BEDSIDE                 [facility]

 

                CT CHEST WITH IV CONTRAST 2019 10:17:00 Dilcia Solis [facility]

 

                COMPREHENSIVE METABOLIC 2019 06:31:00 Dilcia Solis 

[facility]

 

                POCT-GLUCOSE METER 2019 05:44:00 Patrica Martin [facility]

 

                PROTHROMBIN TIME/INR 2019 04:49:00 Dilcia Solis [facility]

 

                CBC W/PLT COUNT & AUTO 2019 04:49:00 Dilcia Solis

[facility]

 

                POCT-GLUCOSE METER 2019 23:31:00 [facility]







Encounters







        Start   End     Encounter Admission Attending Care    Care    Encounter 

Source



        Date/Time Date/Time Type    Type    Clinicians Facility Department ID   

   

 

        2020-07-15 2020-07-15 Outpatient                 Leno Slaughter [address]

[id number] [facility] [address]



        15:30:00 15:30:00                         [employer]         [place]

s 



                                                [employer]   [facility]



                                                Medicine                 [facility]



                                                                        ent



                                                                        Clinics

 

        2020 Outpatient                 Leno Burrt [address]

[id number] [facility] [address]



        15:00:00 15:00:00                         t [employer]

s -



                                                Drive   [facility]                 [facility]



                                                                        ent



                                                                        Clinics

 

        2020 Outpatient                 [facility] [address]

[id number] [facility]



        15:00:00 15:00:00                         t [place]   [employer]

s 



                                                Drive   [facility]                 [facility]



                                                                        ent



                                                                        Clinics

 

        2020 Outpatient                 [facility]

[id number] [facility]



        10:00:00 10:00:00                         t [place]   [employer]         [employer]

s 



                                                Drive   [facility]                 [facility]



                                                                        ent



                                                                        Clinics

 

        2020 Outpatient                 [facility] [address]

[id number] [facility]



        13:17:00 13:17:00                         t [employer]         [employer]

s [facility]



                                                                        ent



                                                                        Clinics

 

        2020 Outpatient                 [facility] [address]

[id number] [facility]



        11:00:00 11:00:00                         [facility]   [employer]         [employer]

s [facility]



                                                                        ent



                                                                        [facility]

 

        2019-10-17 2019-10-17 Office          PADMINI Weston     [id number]

[id number] 



        09:26:46 14:06:09 Visit           Lobo ANGUIANO AMBULATOR [id number]        

 



                                                Y       [id number]         



                                                        [id number]         



                                                        840             







Results







           Test Description Test Time  Test Comments Results    Result Comments 

Source









                    Manual Differential 2019 09:14:00 









                      Test Item  Value      Reference Range Interpretation Comme

nts









             % Neutros (test code = 2816) 81 %                                  

 

 

             % Lymphs (test code = 2817) 7 %                                    

 

             % Monos (test code = 2818) 7 %                                    

 

             % Eos (test code = 2819) 2 %                                    

 

             % Myelo (test code = 2822) 2 %          0-0          H            

 

             % Atypical Lymphs (test code = 2829) 1 %          0-0          H   

         

 

             # Neutros (test code = 2830) 10.85 K/ul   1.78-5.38    H           

 

 

             # Lymphs (test code = 2831) 0.94 K/ul    1.32-3.57    L            

 

             # Monos (test code = 2832) 0.94 K/uL    0.3-0.82     H            

 

             # Eos (test code = 2834) 0.27 K/uL    0.04-0.54                 

 

             # Myelo (test code = 2837) 0.27 K/uL    0-0          H            

 

             # Atypical Lymphs (test code = 2858) 0.13 K/uL    0-0          H   

         

 

             Total Counted (test code = 1351) 100                               

     

 

             RBC Morphology (test code = 762) Normal                            

     

 

             Smudge Cells (test code = 1371) Present                            

    

 

             Giant Platelet (test code = 313) Present                           

     

 

             Platelet Conc (test code = 3438) Decreased                         

     

 

             KEEGAN (test code = KEEGAN) Received comment: User comments:             

              



                          Slide comments:                           

 

             Lab Interpretation (test code = Abnormal                           

    



             70630-4)                                            



[facility](CELLAVISION MANUAL DIFF)2019 09:14:00





             Test Item    Value        Reference Range Interpretation Comments

 

             NEUTROPHILS - REL 81 %                                   



             (CELLAVISION)(BEAKER) (test code =                                 

       



             2816)                                               

 

             LYMPHOCYTES - REL 7 %                                    



             (CELLAVISION)(BEAKER) (test code =                                 

       



             2817)                                               

 

             MONOCYTES - REL 7 %                                    



             (CELLAVISION)(BEAKER) (test code =                                 

       



             2818)                                               

 

             EOSINOPHILS - REL 2 %                                    



             (CELLAVISION)(BEAKER) (test code =                                 

       



             2819)                                               

 

             MYELOCYTES - REL 2 %          0-0          H            



             (CELLAVISION)(BEAKER) (test code =                                 

       



             2822)                                               

 

             ATYPICAL LYMPHOCYTES - REL 1 %          0-0          H            



             (CELLAVISION)(BEAKER) (test code =                                 

       



             2829)                                               

 

             NEUTROPHILS - ABS 10.85 K/ul   1.78-5.38    H            



             (CELLAVISION)(BEAKER) (test code =                                 

       



             2830)                                               

 

             LYMPHOCYTES - ABS 0.94 K/ul    1.32-3.57    L            



             (CELLAVISION)(BEAKER) (test code =                                 

       



             2831)                                               

 

             MONOCYTES - ABS 0.94 K/uL    0.30-0.82    H            



             (CELLAVISION)(BEAKER) (test code =                                 

       



             2832)                                               

 

             EOSINOPHILS - ABS 0.27 K/uL    0.04-0.54                 



             (CELLAVISION)(BEAKER) (test code =                                 

       



             2834)                                               

 

             MYELOCYTES-ABS 0.27 K/uL    0.00-0.00    H            



             (CELLAVISION)(BEAKER) (test code =                                 

       



             2837)                                               

 

             ATYPICAL LYMPHOCYTES - ABS 0.13 K/uL    0.00-0.00    H            



             (CELLAVISION)(BEAKER) (test code =                                 

       



             2858)                                               

 

             TOTAL COUNTED (BEAKER) (test code 100                              

      



             = 1351)                                             

 

             RBC MORPHOLOGY (BEAKER) (test code Normal                          

       



             = 762)                                              

 

             SMUDGE CELLS (BEAKER) (test code = Present                         

       



             1371)                                               

 

             GIANT PLATELETS (BEAKER) (test Present                             

   



             code = 313)                                         

 

             PLATELET CONCENTRATION Decreased                              



             (CELLAVISION)(BEAKER) (test code =                                 

       



             3438)                                               



Received comment: User comments: Slide comments:RAD, CHEST, 1 VIEW, NON DEPT
2019 08:25:00Reason for exam:-&gt;left effusionShould this be performed at
 the bedside?-&gt;YesFINAL REPORT PATIENT ID:   [id number] Chest AP portable 
Comparison exam: 2019 History provided: Follow-up pleural effusion Left 
chest tube unchanged in place. No pneumothorax. No significant effusions are 
evident. Nonspecific coarsening of markings in the left lower lobe. PICC line in
 good position. Signed: Lobo Morris Verified Date/Time:  2019 
08:25:12 Reading Location: [facility] Radiology Reading Room        
Electronically signed by: LOBO MORRIS on 2019 08:25 AMXR chest 1 view 
portable / [id number] 08:25:00Interface, External Ris In - 2019  
8:27 AM CDTFINAL REPORT PATIENT ID:   [id number] Chest AP portable Comparison 
exam: 2019 History provided: Follow-up pleural effusion Left chest tube 
unchanged in place. No pneumothorax. No significant effusions are evident. 
Nonspecific coarsening of markings in the left lower lobe. PICC line in good 
position. Signed: Lobo Morris Verified Date/Time:  2019 08:25:12
 Reading Location: [facility] Radiology Reading Room        Electronically s
igned by: LOBO MORRIS on 2019 08:25 [facility]
Comprehensive metabolic [id number] 06:40:00





             Test Item    Value        Reference Range Interpretation Comments

 

             Protein, Total (test 4.8          6.0- 8.3 gm/dL L            



             code = 2885-2)                                        

 

             Albumin (test code = 2.4 g/dL     3.5-5        L            



             04054-0)                                            

 

             Alkaline Phosphatase 141 U/L                          



             (test code = 6768-6)                                        

 

             Total Bilirubin (test 3.9 mg/dL    0.2-1.2      H            



             code = -2)                                        

 

             Sodium (test code = 123 meq/L    136-145      L            



             2951-2)                                             

 

             Potassium (test code = 3.6 meq/L    3.5-5.1                   



             2823-3)                                             

 

             Chloride (test code = 90 meq/L            L            



             5-0)                                             

 

             CO2 (test code = 29 meq/L     22-29                     



             8-9)                                             

 

             BUN (test code = 14 mg/dL     7-21                      



             3094-0)                                             

 

             Creatinine (test code 0.80 mg/dL   0.57-1.25                 



             = 2160-0)                                           

 

             Glucose (test code = 152 mg/dL           H            



             2345-7)                                             

 

             Calcium (test code = 7.3 mg/dL    8.4-10.2     L            



             07510-5)                                            

 

             AST (test code = 39 U/L       5-34         H            



             1920-8)                                             

 

             ALT (test code = 23 U/L       6-55                      



             1742-6)                                             

 

             EGFR (test code = 99           mL/min/1.73 sq m              ESTIMA

[place] GFR IS



             33914-3)                                            NOT AS ACCURATE



                                                                 AS CREATININE



                                                                 CLEARANCE IN



                                                                 PREDICTING



                                                                 GLOMERULAR



                                                                 FILTRATION RATE

.



                                                                 ESTIMATED GFR I

S



                                                                 NOT APPLICABLE



                                                                 FOR DIALYSIS



                                                                 PATIENTS.

 

             KEEGAN (test code = KEEGAN) Specimen                               



                          slightly                               



                          icteric                                

 

             Lab Interpretation Abnormal                               



             (test code = 41979-4)                                        



[facility] [id number] 06:40:00





             Test Item    Value        Reference Range Interpretation Comments

 

             TOTAL PROTEIN 4.8 gm/dL    6.0-8.3      L            



             (BEAKER) (test code =                                        



             770)                                                

 

             ALBUMIN (BEAKER) 2.4 g/dL     3.5-5.0      L            



             (test code = 1145)                                        

 

             ALKALINE PHOSPHATASE 141 U/L                          



             (BEAKER) (test code =                                        



             346)                                                

 

             BILIRUBIN TOTAL 3.9 mg/dL    0.2-1.2      H            



             (BEAKER) (test code =                                        



             377)                                                

 

             SODIUM (BEAKER) (test 123 meq/L    136-145      L            



             code = 381)                                         

 

             POTASSIUM (BEAKER) 3.6 meq/L    3.5-5.1                   



             (test code = 379)                                        

 

             CHLORIDE (BEAKER) 90 meq/L            L            



             (test code = 382)                                        

 

             CO2 (BEAKER) (test 29 meq/L     22-29                     



             code = 355)                                         

 

             BLOOD UREA NITROGEN 14 mg/dL     7-21                      



             (BEAKER) (test code =                                        



             354)                                                

 

             CREATININE (BEAKER) 0.80 mg/dL   0.57-1.25                 



             (test code = 358)                                        

 

             GLUCOSE RANDOM 152 mg/dL           H            



             (BEAKER) (test code =                                        



             652)                                                

 

             CALCIUM (BEAKER) 7.3 mg/dL    8.4-10.2     L            



             (test code = 697)                                        

 

             AST (SGOT) (BEAKER) 39 U/L       5-34         H            



             (test code = 353)                                        

 

             ALT (SGPT) (BEAKER) 23 U/L       6-55                      



             (test code = 347)                                        

 

             EGFR (BEAKER) (test 99 mL/min/1.73                           ESTIMA

[place] GFR IS



             code = 1092) sq m                                   NOT AS ACCURATE

 AS



                                                                 CREATININE



                                                                 CLEARANCE IN



                                                                 PREDICTING



                                                                 GLOMERULAR



                                                                 FILTRATION RATE

.



                                                                 ESTIMATED GFR I

S



                                                                 NOT APPLICABLE 

FOR



                                                                 DIALYSIS PATIEN

TS.



Specimen slightly [id number] 06:39:00





             Test Item    Value        Reference Range Interpretation Comments

 

             Magnesium (test code = 16972-1) 1.8 mg/dL    1.6-2.6               

    

 

             Lab Interpretation (test code = Normal                             

    



             94161-6)                                            



[facility]





             Test Item    Value        Reference Range Interpretation Comments

 

             Phosphorus (test code = 2777-1) 2.1 mg/dL    2.3-4.7      L        

    

 

             Lab Interpretation (test code = Abnormal                           

    



             91403-9)                                            



[facility] 06[facility]39:00





             Test Item    Value        Reference Range Interpretation Comments

 

             PHOSPHORUS (BEAKER) (test code = 2.1 mg/dL    2.3-4.7      L       

     



             604)                                                



[id number] 06:39:00





             Test Item    Value        Reference Range Interpretation Comments

 

             MAGNESIUM (BEAKER) (test code = 1.8 mg/dL    1.6-2.6               

    



             627)                                                



B-type Natriuretic Factor (BNP)2019 06:04:00





             Test Item    Value        Reference Range Interpretation Comments

 

             BNP (test code = 62940-6) 97 pg/mL     0-100                     

 

             Lab Interpretation (test code = Normal                             

    



             13124-1)                                            



[facility] 06:04:00





             Test Item    Value        Reference Range Interpretation Comments

 

             B-TYPE NATRIURETIC PEPTIDE (BEAKER) 97 pg/mL     0-100             

        



             (test code = 700)                                        



CBC with platelet count + automated [id number] 05:57:00





             Test Item    Value        Reference Range Interpretation Comments

 

             WBC (test code = 6690-2) 13.4         3.5- 10.5 K/L H            

 

             RBC (test code = 789-8) 3.17         4.63- 6.08 M/L L            

 

             MCHC (test code = 786-4) 35.0         32.3- 36.5 GM/DL L           

 

 

             Hematocrit (test code = 4544-3) 29.4 %       40.1-51      L        

    

 

             MCV (test code = 787-2) 92.7 fL      79-92.2      H            

 

             MCH (test code = 785-6) 32.5 pg      25.7-32.2    H            

 

             RDW (test code = 788-0) 13.9 %       11.6-14.4                 

 

             Platelets (test code = 777-3) 75           150- 450 K/CU MM L      

      

 

             MPV (test code = 02608-4) 8.9 fL       9.4-12.4     L            

 

             nRBC (test code = 413) 0            0- 0 /100 WBC              

 

             Lab Interpretation (test code = Abnormal                           

    



             17743-8)                                            



[facility] W/PLT COUNT &amp; AUTO [id number] 
05:57:00





             Test Item    Value        Reference Range Interpretation Comments

 

             WHITE BLOOD CELL COUNT (BEAKER) 13.4 K/ L    3.5-10.5     H        

    



             (test code = 775)                                        

 

             RED BLOOD CELL COUNT (BEAKER) 3.17 M/ L    4.63-6.08    L          

  



             (test code = 761)                                        

 

             HEMOGLOBIN (BEAKER) (test code = 10.3 GM/DL   13.7-17.5    L       

     



             410)                                                

 

             HEMATOCRIT (BEAKER) (test code = 29.4 %       40.1-51.0    L       

     



             411)                                                

 

             MEAN CORPUSCULAR VOLUME (BEAKER) 92.7 fL      79.0-92.2    H       

     



             (test code = 753)                                        

 

             MEAN CORPUSCULAR HEMOGLOBIN 32.5 pg      25.7-32.2    H            



             (BEAKER) (test code = 751)                                        

 

             MEAN CORPUSCULAR HEMOGLOBIN CONC 35.0 GM/DL   32.3-36.5            

     



             (BEAKER) (test code = 752)                                        

 

             RED CELL DISTRIBUTION WIDTH 13.9 %       11.6-14.4                 



             (BEAKER) (test code = 412)                                        

 

             PLATELET COUNT (BEAKER) (test code 75 K/CU MM   150-450      L     

       



             = 756)                                              

 

             MEAN PLATELET VOLUME (BEAKER) 8.9 fL       9.4-12.4     L          

  



             (test code = 754)                                        

 

             NUCLEATED RED BLOOD CELLS (BEAKER) 0 /100 WBC   0-0                

       



             (test code = 413)                                        



Calcium, [id number] 05:44:00





             Test Item    Value        Reference Range Interpretation Comments

 

             Calcium, Ion (test code = -3) 0.97 mmol/L  1.12-1.27    L      

      

 

             pH, Blood (test code = 96352-3) 7.50                               

    

 

             Lab Interpretation (test code = Abnormal                           

    



             34478-2)                                            



[facility], [id number] 05:44:00





             Test Item    Value        Reference Range Interpretation Comments

 

             CALCIUM IONIZED (BEAKER) (test 0.97 mmol/L  1.12-1.27    L         

   



             code = 698)                                         

 

             PH, BLOOD (BEAKER) (test code = 7.50                               

    



             1810)                                               



BODY FLUID CULTURE + GRAM [id number] 11:10:00





             Test Item    Value        Reference    Interpretation Comments



                                       Range                     

 

             CULTURE (BEAKER)                           A            <1+ Coagula

se



             (test code = 1095)                                        negative



                                                                 Staphylococcus

 

             CULTURE (BEAKER) COAGULASE NEGATIVE              A            <1+ P

seudomonas



             (test code = 1095) STAPHYLOCOCCUS                           aerugin

jennifer

 

             Clindamycin (test                           S            



             code = 10)                                          

 

             Erythromycin (test                           R            



             code = 4)                                           

 

             Linezolid (test code                           S            



             = 40)                                               

 

             Oxacillin (test code                           R            



             = 14)                                               

 

             Rifampin (test code =                           S            



             43)                                                 

 

             Tetracycline (test                           S            



             code = 2)                                           

 

             Trimethoprim +                           S            



             Sulfamethoxazole                                        



             (test code = 47)                                        

 

             Vancomycin (test code                           S            



             = 13)                                               

 

             GRAM STAIN RESULT 3+ WBCs                                



             (BEAKER) (test code =                                        



             1123)                                               

 

             GRAM STAIN RESULT <1+ gram positive                           



             (BEAKER) (test code = cocci in pairs and                           



             729364)      clusters                               



CBC W/PLT COUNT &amp; AUTO [id number] 09:58:00





             Test Item    Value        Reference Range Interpretation Comments

 

             WHITE BLOOD CELL COUNT (BEAKER) 10.7 K/ L    3.5-10.5     H        

    



             (test code = 775)                                        

 

             RED BLOOD CELL COUNT (BEAKER) 3.15 M/ L    4.63-6.08    L          

  



             (test code = 761)                                        

 

             HEMOGLOBIN (BEAKER) (test code = 10.2 GM/DL   13.7-17.5    L       

     



             410)                                                

 

             HEMATOCRIT (BEAKER) (test code = 28.9 %       40.1-51.0    L       

     



             411)                                                

 

             MEAN CORPUSCULAR VOLUME (BEAKER) 91.7 fL      79.0-92.2            

     



             (test code = 753)                                        

 

             MEAN CORPUSCULAR HEMOGLOBIN 32.4 pg      25.7-32.2    H            



             (BEAKER) (test code = 751)                                        

 

             MEAN CORPUSCULAR HEMOGLOBIN CONC 35.3 GM/DL   32.3-36.5            

     



             (BEAKER) (test code = 752)                                        

 

             RED CELL DISTRIBUTION WIDTH 13.7 %       11.6-14.4                 



             (BEAKER) (test code = 412)                                        

 

             PLATELET COUNT (BEAKER) (test code 57 K/CU MM   150-450      L     

       



             = 756)                                              

 

             MEAN PLATELET VOLUME (BEAKER) 8.8 fL       9.4-12.4     L          

  



             (test code = 754)                                        

 

             NUCLEATED RED BLOOD CELLS (BEAKER) 0 /100 WBC   0-0                

       



             (test code = 413)                                        



(CELLAVISION MANUAL DIFF)2019 09:58:00





             Test Item    Value        Reference Range Interpretation Comments

 

             NEUTROPHILS - REL 78 %                                   



             (CELLAVISION)(BEAKER) (test code =                                 

       



             2816)                                               

 

             LYMPHOCYTES - REL 5 %                                    



             (CELLAVISION)(BEAKER) (test code =                                 

       



             2817)                                               

 

             MONOCYTES - REL 9 %                                    



             (CELLAVISION)(BEAKER) (test code =                                 

       



             2818)                                               

 

             EOSINOPHILS - REL 2 %                                    



             (CELLAVISION)(BEAKER) (test code =                                 

       



             2819)                                               

 

             METAMYELOCYTES - REL 1 %          0-0          H            



             (CELLAVISION)(BEAKER) (test code =                                 

       



             2821)                                               

 

             BANDS - REL (CELLAVISION)(BEAKER) 4 %          0-10                

      



             (test code = 2826)                                        

 

             BLASTS - REL (CELLAVISION)(BEAKER) 1 %          0-0          H     

       



             (test code = 2827)                                        

 

             NEUTROPHILS - ABS 8.35 K/ul    1.78-5.38    H            



             (CELLAVISION)(BEAKER) (test code =                                 

       



             2830)                                               

 

             LYMPHOCYTES - ABS 0.54 K/ul    1.32-3.57    L            



             (CELLAVISION)(BEAKER) (test code =                                 

       



             2831)                                               

 

             MONOCYTES - ABS 0.96 K/uL    0.30-0.82    H            



             (CELLAVISION)(BEAKER) (test code =                                 

       



             2832)                                               

 

             EOSINOPHILS - ABS 0.21 K/uL    0.04-0.54                 



             (CELLAVISION)(BEAKER) (test code =                                 

       



             2834)                                               

 

             METAMYELOCYTES - ABS 0.11 K/uL    0.00-0.00    H            



             (CELLAVISION)(BEAKER) (test code =                                 

       



             2836)                                               

 

             BANDS - ABS (CELLAVISION)(BEAKER) 0.43 K/uL    0.00-0.80           

      



             (test code = 2840)                                        

 

             BLASTS - ABS (CELLAVISION)(BEAKER) 0.11 K/uL    0.00-0.00    H     

       



             (test code = 2845)                                        

 

             TOTAL COUNTED (BEAKER) (test code = 100                            

        



             1351)                                               

 

             WBC MORPHOLOGY (BEAKER) (test code Normal                          

       



             = 487)                                              

 

             PLT MORPHOLOGY (BEAKER) (test code Normal                          

       



             = 486)                                              

 

             ANISOCYTOSIS (BEAKER) (test code = 1+ few                          

       



             961)                                                

 

             POIKILOCYTES (BEAKER) (test code = 1+ few                          

       



             966)                                                

 

             OVALOCYTES (BEAKER) (test code = 1+ few                            

     



             477)                                                

 

             BASOPHILIC STIPPLING (BEAKER) (test Present                        

        



             code = 473)                                         

 

             ARTIFACT (CELLAVISION)(BEAKER) Present                             

   



             (test code = 3432)                                        

 

             PLATELET CONCENTRATION Decreased                              



             (CELLAVISION)(BEAKER) (test code =                                 

       



             3438)                                               



Received comment: User comments: Slide comments: WBC: SEGMENTED WITH TOXIC 
GRANULATIONS [id number] 08:30:00





             Test Item    Value        Reference Range Interpretation Comments

 

             PHOSPHORUS (BEAKER) (test code = 2.0 mg/dL    2.3-4.7      L       

     



             604)                                                



RAD, CHEST, 1 VIEW, NON [id number] 08:21:00Reason for exam:-&gt;left 
effusionShould this be performed at the bedside?-&gt;YesFINAL REPORT PATIENT ID:
   [id number]  TECHNIQUE: Frontal chest radiograph dated 2019. CLINICAL HI
STORY: Left effusion COMPARISON STUDY: Chest radiographs and chest CT both dated
 2019  IMPRESSION:Right-sided PICC and left-sided chest tube are unchanged.
 No change in small left hydropneumothorax. Multiple rounded densities project 
over the left lower lobe of unclear etiology possibly somethingexternal to the 
patient. Cardiomediastinal silhouette is normal in size. No pulmonary edema. No 
fracture.  Signed: Ann King MDReport Verified Date/Time:  2019 
08:21:33 Reading Location:[facility] Reading Room  Electronically signed by: 
ANN KING MD on 2019 08:21 AMCOMPREHENSIVE METABOLIC PANEL
2019 05:34:00





             Test Item    Value        Reference Range Interpretation Comments

 

             TOTAL PROTEIN 4.9 gm/dL    6.0-8.3      L            



             (BEAKER) (test code =                                        



             770)                                                

 

             ALBUMIN (BEAKER) 2.7 g/dL     3.5-5.0      L            



             (test code = 1145)                                        

 

             ALKALINE PHOSPHATASE 137 U/L                          



             (BEAKER) (test code =                                        



             346)                                                

 

             BILIRUBIN TOTAL 5.5 mg/dL    0.2-1.2      H            



             (BEAKER) (test code =                                        



             377)                                                

 

             SODIUM (BEAKER) (test 123 meq/L    136-145      L            



             code = 381)                                         

 

             POTASSIUM (BEAKER) 3.9 meq/L    3.5-5.1                   



             (test code = 379)                                        

 

             CHLORIDE (BEAKER) 89 meq/L            L            



             (test code = 382)                                        

 

             CO2 (BEAKER) (test 31 meq/L     22-29        H            



             code = 355)                                         

 

             BLOOD UREA NITROGEN 15 mg/dL     7-21                      



             (BEAKER) (test code =                                        



             354)                                                

 

             CREATININE (BEAKER) 0.68 mg/dL   0.57-1.25                 



             (test code = 358)                                        

 

             GLUCOSE RANDOM 104 mg/dL                        



             (BEAKER) (test code =                                        



             652)                                                

 

             CALCIUM (BEAKER) 7.7 mg/dL    8.4-10.2     L            



             (test code = 697)                                        

 

             AST (SGOT) (BEAKER) 47 U/L       5-34         H            



             (test code = 353)                                        

 

             ALT (SGPT) (BEAKER) 25 U/L       6-55                      



             (test code = 347)                                        

 

             EGFR (BEAKER) (test 119                                    ESTIMATE

D GFR IS



             code = 1092) mL/min/1.73 sq                           NOT AS ACCURA

TE AS



                          m                                      CREATININE



                                                                 CLEARANCE IN



                                                                 PREDICTING



                                                                 GLOMERULAR



                                                                 FILTRATION RATE

.



                                                                 ESTIMATED GFR I

S



                                                                 NOT APPLICABLE 

FOR



                                                                 DIALYSIS PATIEN

TS.



Specimen moderately [id number] 05:04:00





             Test Item    Value        Reference Range Interpretation Comments

 

             MAGNESIUM (BEAKER) (test code = 1.8 mg/dL    1.6-2.6               

    



             627)                                                



CT, CHEST, WITH [id number] 15:37:00Reason for exam:-&gt;reevaluate 
pleural effusion and left lung abscessFINAL REPORT PATIENT ID:   [id number]  CT of
 the Chest dated 2019 COMPARISON: 2019 CLINICAL INFORMATION: 
Pleural effusionreevaluate pleural effusion and left lung abscess Comment:  Axia
l images of the chest were obtained from thoracic inlet to the upper abdomen 
with intravenous contrast.     This exam was performed according to our 
departmental dose-optimization program, which includes automated exposure 
control, adjustment of the mA and/or kV according to patient size and/or use of
interactive reconstruction technique. Heart is normal in size.  There is small 
pericardial effusion.Great vessels are unremarkable. No adenopathy in the 
mediastinum or perihilar region. Trachea and mainstem bronchi are patent.  Trace
 bilateral pleural effusion is present. There is interval significant decrease 
in the amount of left pleural effusion. There is small amount of air present in 
the left pleural space. The pigtail catheter is seen in the left pleural space. 
Atelectasis is seen in the lingula, right mid, and both lower lobes. Cavitary 
lesion is seen in the superior segment of the left lower lobe measuring 
approximately 2.8 x 3.3 cm. Visualized upper abdomen demonstrates cirrhotic 
liver with trace ascites. Spleen is minimally enlarged. Impression: 1. Interval 
decrease in the amount of left pleural effusion with residual left 
hydropneumothorax.2. Trace right pleural effusion.3. Cavitary lesion in the 
superior segment of the left lower lobe may represent abscess.4. Cirrhosis with 
splenomegaly and ascites. Signed: Lucita Nails Verified Date/Time:  
2019 15:37:27 Reading Location: [facility] C013Y CT Body Reading Room      
Electronically signed by: LUCITA NAILS M.D. on 2019 03:37 PMCT chest with
 IV [id number] 15:37:00Interface, External Ris In - 2019  3:39 PM 
CDTFINAL REPORT PATIENT ID:   [id number]  CT of the Chest dated 2019 
COMPARISON: 2019 CLINICAL INFORMATION: Pleural effusionreevaluate 
pleural effusion and left lung abscess Comment:  Axial images of the chest were 
obtained from thoracic inlet to the upper abdomen with intravenous contrast.    
 This exam was performed according to our departmental dose-optimization 
program, which includes automated exposure control, adjustment of the mA and/or 
kV according to patient size and/or use of interactive reconstruction technique.
 Heart is normal in size.  There is small pericardial effusion. Great vessels 
are unremarkable. No adenopathy inthe mediastinum or perihilar region. Trachea 
and mainstem bronchi are patent.  Trace bilateral pleural effusion is present. 
There is interval significant decrease in the amount of left pleural effusion. 
There is small amount of air present in the left pleural space. The pigtail 
catheter is seen in theleft pleural space. Atelectasis is seen in the lingula, 
right mid, and both lower lobes. Cavitary lesion is seen in the superior segment
 of the left lower lobe measuring approximately 2.8 x 3.3 cm. Visualized upper 
abdomen demonstrates cirrhotic liver with trace ascites. Spleen is minimally 
enlarged. Impression: 1. Interval decrease in the amount of left pleural 
effusion with residual left hydropneumothorax.2. Trace right pleural effusion.3.
 Cavitary lesion in the superior segment of the left lower lobe may represent 
abscess.4. Cirrhosis with splenomegaly and ascites. Signed: Lucita Nails
 Verified Date/Time:  2019 15:37:27 Reading Location: [facility] B1 C013Y CT Body
 Reading Room      Electronically signed by: LUCITA NAILS M.D. on 2019 
03:37 [facility]





             Test Item    Value        Reference Range Interpretation Comments

 

             Sodium (test code = 119 meq/L    136-145      LL           



             2951-2)                                             

 

             Potassium (test code = 4.1 meq/L    3.5-5.1                   



             2823-3)                                             

 

             Chloride (test code = 85 meq/L            L            



             2075-0)                                             

 

             CO2 (test code = -9) 29 meq/L     22-29                     

 

             KEEGAN (test code = KEEGAN) Page [phone]                           



                          with results. Thank                           



                          you.                                   

 

             Lab Interpretation (test Abnormal                               



             code = 78186-5)                                        



[facility] 13[facility]41:00





             Test Item    Value        Reference Range Interpretation Comments

 

             SODIUM (BEAKER) (test code = 381) 119 meq/L    136-145      LL     

      

 

             POTASSIUM (BEAKER) (test code = 4.1 meq/L    3.5-5.1               

    



             379)                                                

 

             CHLORIDE (BEAKER) (test code = 382) 85 meq/L            L    

        

 

             CO2 (BEAKER) (test code = 355) 29 meq/L     22-29                  

   



Page [phone] with results. Thank you.CBC W/PLT COUNT &amp; AUTO 
[id number] 10:30:00





             Test Item    Value        Reference Range Interpretation Comments

 

             WHITE BLOOD CELL COUNT (BEAKER) 11.3 K/ L    3.5-10.5     H        

    



             (test code = 775)                                        

 

             RED BLOOD CELL COUNT (BEAKER) 3.25 M/ L    4.63-6.08    L          

  



             (test code = 761)                                        

 

             HEMOGLOBIN (BEAKER) (test code = 10.6 GM/DL   13.7-17.5    L       

     



             410)                                                

 

             HEMATOCRIT (BEAKER) (test code = 29.8 %       40.1-51.0    L       

     



             411)                                                

 

             MEAN CORPUSCULAR VOLUME (BEAKER) 91.7 fL      79.0-92.2            

     



             (test code = 753)                                        

 

             MEAN CORPUSCULAR HEMOGLOBIN 32.6 pg      25.7-32.2    H            



             (BEAKER) (test code = 751)                                        

 

             MEAN CORPUSCULAR HEMOGLOBIN CONC 35.6 GM/DL   32.3-36.5            

     



             (BEAKER) (test code = 752)                                        

 

             RED CELL DISTRIBUTION WIDTH 13.5 %       11.6-14.4                 



             (BEAKER) (test code = 412)                                        

 

             PLATELET COUNT (BEAKER) (test code 51 K/CU MM   150-450      L     

       



             = 756)                                              

 

             MEAN PLATELET VOLUME (BEAKER) 8.3 fL       9.4-12.4     L          

  



             (test code = 754)                                        

 

             NUCLEATED RED BLOOD CELLS (BEAKER) 0 /100 WBC   0-0                

       



             (test code = 413)                                        



(CELLAVISION MANUAL DIFF)2019 10:30:00





             Test Item    Value        Reference Range Interpretation Comments

 

             NEUTROPHILS - REL 82 %                                   



             (CELLAVISION)(BEAKER) (test code =                                 

       



             2816)                                               

 

             LYMPHOCYTES - REL 2 %                                    



             (CELLAVISION)(BEAKER) (test code =                                 

       



             2817)                                               

 

             MONOCYTES - REL 9 %                                    



             (CELLAVISION)(BEAKER) (test code =                                 

       



             2818)                                               

 

             METAMYELOCYTES - REL 2 %          0-0          H            



             (CELLAVISION)(BEAKER) (test code =                                 

       



             2821)                                               

 

             PROMYELOCYTES - REL 2 %          0-0          H            



             (CELLAVSION)(BEAKER) (test code =                                  

      



             2825)                                               

 

             BANDS - REL (CELLAVISION)(BEAKER) 2 %          0-10                

      



             (test code = 2826)                                        

 

             ATYPICAL LYMPHOCYTES - REL 1 %          0-0          H            



             (CELLAVISION)(BEAKER) (test code =                                 

       



             2829)                                               

 

             NEUTROPHILS - ABS 9.27 K/ul    1.78-5.38    H            



             (CELLAVISION)(BEAKER) (test code =                                 

       



             2830)                                               

 

             LYMPHOCYTES - ABS 0.23 K/ul    1.32-3.57    L            



             (CELLAVISION)(BEAKER) (test code =                                 

       



             2831)                                               

 

             MONOCYTES - ABS 1.02 K/uL    0.30-0.82    H            



             (CELLAVISION)(BEAKER) (test code =                                 

       



             2832)                                               

 

             METAMYELOCYTES - ABS 0.23 K/uL    0.00-0.00    H            



             (CELLAVISION)(BEAKER) (test code =                                 

       



             2836)                                               

 

             PROMYELOCYTES - ABS 0.23 K/uL    0.00-0.00    H            



             (CELLAVISION)(BEAKER) (test code =                                 

       



             2838)                                               

 

             BANDS - ABS (CELLAVISION)(BEAKER) 0.23 K/uL    0.00-0.80           

      



             (test code = 2840)                                        

 

             ATYPICAL LYMPHOCYTES - ABS 0.11 K/uL    0.00-0.00    H            



             (CELLAVISION)(BEAKER) (test code =                                 

       



             2858)                                               

 

             TOTAL COUNTED (BEAKER) (test code = 100                            

        



             1351)                                               

 

             WBC MORPHOLOGY (BEAKER) (test code Normal                          

       



             = 487)                                              

 

             LARGE PLT(BEAKER) (test code = Present                             

   



             2156)                                               

 

             BASOPHILIC STIPPLING (BEAKER) (test Present                        

        



             code = 473)                                         

 

             ARTIFACT (CELLAVISION)(BEAKER) Present                             

   



             (test code = 3432)                                        

 

             PLATELET CONCENTRATION Decreased                              



             (CELLAVISION)(BEAKER) (test code =                                 

       



             3438)                                               



Received comment: User comments: Slide comments: WBC: SEGMENTED WITH TOXIC 
GRANULATIONS PRESENTRAD, CHEST, 1 VIEW, NON [id number] 07:48:00Reason for 
exam:-&gt;left effusionShould this be performed at the bedside?-&gt;YesFINAL 
REPORT PATIENT ID:   [id number] RAD, CHEST, 1 VIEW, NON DEPT INDICATION: left 
effusion COMPARISON: Prior day's exam FINDINGS: Portable frontal view of the 
chest.   IMPRESSION:  Support Lines: PICC tip overlies the SVC.  Left pleural 
catheter is again noted. Lungs and pleura: Bibasilar airspace disease is 
unchanged.  Superimposed trace left effusion. Left apical pneumothorax is 
decreased in the interim.    Heart and mediastinum: Stable contours.  Additional
 findings: None. Signed: Sasha CrockerMDReport Verified Date/Time:  2019
 07:48:14 Reading Location: [facility] Radiology Reading Room  Electronically
 signed by: SASHA CROCKER MD on 2019 07:48 AMCALCIUM, IONIZED
2019 05:58:00





             Test Item    Value        Reference Range Interpretation Comments

 

             CALCIUM IONIZED (BEAKER) (test 0.96 mmol/L  1.12-1.27    L         

   



             code = 698)                                         

 

             PH, BLOOD (BEAKER) (test code = 7.53                               

    



             1810)                                               



COMPREHENSIVE METABOLIC [id number] 05:51:00





             Test Item    Value        Reference Range Interpretation Comments

 

             TOTAL PROTEIN 5.2 gm/dL    6.0-8.3      L            



             (BEAKER) (test code =                                        



             770)                                                

 

             ALBUMIN (BEAKER) 2.6 g/dL     3.5-5.0      L            



             (test code = 1145)                                        

 

             ALKALINE PHOSPHATASE 144 U/L                          



             (BEAKER) (test code =                                        



             346)                                                

 

             BILIRUBIN TOTAL 6.3 mg/dL    0.2-1.2      H            



             (BEAKER) (test code =                                        



             377)                                                

 

             SODIUM (BEAKER) (test 119 meq/L    136-145      LL           



             code = 381)                                         

 

             POTASSIUM (BEAKER) 4.2 meq/L    3.5-5.1                   



             (test code = 379)                                        

 

             CHLORIDE (BEAKER) 86 meq/L            L            



             (test code = 382)                                        

 

             CO2 (BEAKER) (test 26 meq/L     22-29                     



             code = 355)                                         

 

             BLOOD UREA NITROGEN 23 mg/dL     7-21         H            



             (BEAKER) (test code =                                        



             354)                                                

 

             CREATININE (BEAKER) 0.81 mg/dL   0.57-1.25                 



             (test code = 358)                                        

 

             GLUCOSE RANDOM 103 mg/dL                        



             (BEAKER) (test code =                                        



             652)                                                

 

             CALCIUM (BEAKER) 7.6 mg/dL    8.4-10.2     L            



             (test code = 697)                                        

 

             AST (SGOT) (BEAKER) 46 U/L       5-34         H            



             (test code = 353)                                        

 

             ALT (SGPT) (BEAKER) 21 U/L       6-55                      



             (test code = 347)                                        

 

             EGFR (BEAKER) (test 97 mL/min/1.73                           ESTIMA

[place] GFR IS



             code = 1092) sq m                                   NOT AS ACCURATE

 AS



                                                                 CREATININE



                                                                 CLEARANCE IN



                                                                 PREDICTING



                                                                 GLOMERULAR



                                                                 FILTRATION RATE

.



                                                                 ESTIMATED GFR I

S



                                                                 NOT APPLICABLE 

FOR



                                                                 DIALYSIS PATIEN

TS.



Specimen moderately [id number] 05:07:00





             Test Item    Value        Reference Range Interpretation Comments

 

             Cortisol, Total (test code = 2755) 9.6 ug/dL    3.7-19.4           

       

 

             Lab Interpretation (test code = Normal                             

    



             32824-1)                                            



[facility] 05:07:00





             Test Item    Value        Reference Range Interpretation Comments

 

             CORTISOL, TOTAL (BEAKER) (test code 9.6 ug/dL    3.7-19.4          

        



             = 2755)                                             



[id number] 04:57:00





             Test Item    Value        Reference Range Interpretation Comments

 

             PHOSPHORUS (BEAKER) (test code = 2.0 mg/dL    2.3-4.7      L       

     



             604)                                                



[id number] 04:57:00





             Test Item    Value        Reference Range Interpretation Comments

 

             MAGNESIUM (BEAKER) (test code = 1.7 mg/dL    1.6-2.6               

    



             627)                                                



[id number] 22:08:00





             Test Item    Value        Reference Range Interpretation Comments

 

             SODIUM (BEAKER) (test 119 meq/L    136-145      LL           



             code = 381)                                         

 

             POTASSIUM (BEAKER) 4.6 meq/L    3.5-5.1                   Specimen 

slightly



             (test code = 379)                                        hemolyzed

 

             CHLORIDE (BEAKER) 86 meq/L            L            



             (test code = 382)                                        

 

             CO2 (BEAKER) (test 27 meq/L     22-29                     



             code = 355)                                         



Call K &gt; 5.5POC-Glucose [id number] 12:59:00





             Test Item    Value        Reference Range Interpretation Comments

 

             POC-Glucose Meter (test 95 mg/dL                         TEST

ED AT [facility]



             code = 1538)                                        [address] [facility] [address]

0

 

             Lab Interpretation (test Normal                                 



             code = 44651-2)                                        



[facility] [id number] 12:59:00





             Test Item    Value        Reference Range Interpretation Comments

 

             POC-GLUCOSE METER 95 mg/dL                         TESTED AT 

[facility] [address]



             (BEAKER) (test code =                                        [address] [facility] [address]



             1538)                                               



T4, [id number] 10:25:00





             Test Item    Value        Reference Range Interpretation Comments

 

             Free T4 (test code = 3024-7) 0.65 ng/dL   0.7-1.48     L           

 

 

             Lab Interpretation (test code = Abnormal                           

    



             21939-2)                                            



[facility] Area[facility], [id number] 10:25:00





             Test Item    Value        Reference Range Interpretation Comments

 

             FREE T4 (BEAKER) (test code = 655) 0.65 ng/dL   0.70-1.48    L     

       



CBC W/PLT COUNT &amp; AUTO [id number] 10:21:00





             Test Item    Value        Reference Range Interpretation Comments

 

             WHITE BLOOD CELL COUNT (BEAKER) 11.8 K/ L    3.5-10.5     H        

    



             (test code = 775)                                        

 

             RED BLOOD CELL COUNT (BEAKER) 3.74 M/ L    4.63-6.08    L          

  



             (test code = 761)                                        

 

             HEMOGLOBIN (BEAKER) (test code = 12.2 GM/DL   13.7-17.5    L       

     



             410)                                                

 

             HEMATOCRIT (BEAKER) (test code = 33.9 %       40.1-51.0    L       

     



             411)                                                

 

             MEAN CORPUSCULAR VOLUME (BEAKER) 90.6 fL      79.0-92.2            

     



             (test code = 753)                                        

 

             MEAN CORPUSCULAR HEMOGLOBIN 32.6 pg      25.7-32.2    H            



             (BEAKER) (test code = 751)                                        

 

             MEAN CORPUSCULAR HEMOGLOBIN CONC 36.0 GM/DL   32.3-36.5            

     



             (BEAKER) (test code = 752)                                        

 

             RED CELL DISTRIBUTION WIDTH 13.7 %       11.6-14.4                 



             (BEAKER) (test code = 412)                                        

 

             PLATELET COUNT (BEAKER) (test code 79 K/CU MM   150-450      L     

       



             = 756)                                              

 

             MEAN PLATELET VOLUME (BEAKER) 8.9 fL       9.4-12.4     L          

  



             (test code = 754)                                        

 

             NUCLEATED RED BLOOD CELLS (BEAKER) 0 /100 WBC   0-0                

       



             (test code = 413)                                        



(CELLAVISION MANUAL DIFF)2019 10:21:00





             Test Item    Value        Reference Range Interpretation Comments

 

             NEUTROPHILS - REL 82 %                                   



             (CELLAVISION)(BEAKER) (test code =                                 

       



             2816)                                               

 

             LYMPHOCYTES - REL 4 %                                    



             (CELLAVISION)(BEAKER) (test code =                                 

       



             2817)                                               

 

             MONOCYTES - REL 13 %                                   



             (CELLAVISION)(BEAKER) (test code =                                 

       



             2818)                                               

 

             ATYPICAL LYMPHOCYTES - REL 1 %          0-0          H            



             (CELLAVISION)(BEAKER) (test code =                                 

       



             2829)                                               

 

             NEUTROPHILS - ABS 9.68 K/ul    1.78-5.38    H            



             (CELLAVISION)(BEAKER) (test code =                                 

       



             2830)                                               

 

             LYMPHOCYTES - ABS 0.47 K/ul    1.32-3.57    L            



             (CELLAVISION)(BEAKER) (test code =                                 

       



             2831)                                               

 

             MONOCYTES - ABS 1.53 K/uL    0.30-0.82    H            



             (CELLAVISION)(BEAKER) (test code =                                 

       



             2832)                                               

 

             ATYPICAL LYMPHOCYTES - ABS 0.12 K/uL    0.00-0.00    H            



             (CELLAVISION)(BEAKER) (test code =                                 

       



             2858)                                               

 

             TOTAL COUNTED (BEAKER) (test code = 100                            

        



             1351)                                               

 

             RBC MORPHOLOGY (BEAKER) (test code Normal                          

       



             = 762)                                              

 

             WBC MORPHOLOGY (BEAKER) (test code Normal                          

       



             = 487)                                              

 

             PLT MORPHOLOGY (BEAKER) (test code Normal                          

       



             = 486)                                              

 

             ARTIFACT (CELLAVISION)(BEAKER) Present                             

   



             (test code = 3432)                                        

 

             PLATELET CONCENTRATION Decreased                              



             (CELLAVISION)(BEAKER) (test code =                                 

       



             3438)                                               



Received comment: User comments: Slide comments:POCT-GLUCOSE [id number] 
09:58:00





             Test Item    Value        Reference Range Interpretation Comments

 

             POC-GLUCOSE METER 104 mg/dL                        TESTED AT 

[facility] [address]



             (BEAKER) (test code =                                        [address]



             1538)                                               [address]



RAD, CHEST, 1 VIEW, NON [id number] 09:33:00Reason for exam:-
&gt;effusionShould this be performed at the bedside?-&gt;YesFINAL REPORT PATIENT
 ID:   [id number] Comparison: 2019 TECHNIQUE: Single view of the chest 
FINDINGS: Bilateral interstitial and airspace opacities are stable. Small left 
pleural thickening with adjacent airspace disease identified. A previous left-
sided pneumothorax has decreased. Left pleural drainage catheters again noted. 
Right-sided PICC line is stable. Signed: Ronlado Mireles MDReport Verified Angel
e/Time:  2019 09:33:21 Reading Location: [address] Transitional Reading
 Room      Electronically signed by: RONALDO MIRELES M.D. on 2019 09:33 AM
COMPREHENSIVE METABOLIC [id number] 08:59:00





             Test Item    Value        Reference Range Interpretation Comments

 

             TOTAL PROTEIN 5.4 gm/dL    6.0-8.3      L            Specimen sligh

tly



             (BEAKER) (test code =                                        hemoly

zed



             770)                                                

 

             ALBUMIN (BEAKER) 2.1 g/dL     3.5-5.0      L            Specimen sl

ightly



             (test code = 1145)                                        hemolyzed

 

             ALKALINE PHOSPHATASE 153 U/L             H            



             (BEAKER) (test code =                                        



             346)                                                

 

             BILIRUBIN TOTAL 5.0 mg/dL    0.2-1.2      H            Specimen sli

ghtly



             (BEAKER) (test code =                                        hemoly

zed



             377)                                                

 

             SODIUM (BEAKER) (test 117 meq/L    136-145      LL           



             code = 381)                                         

 

             POTASSIUM (BEAKER) 5.5 meq/L    3.5-5.1      H            Specimen 

slightly



             (test code = 379)                                        hemolyzed

 

             CHLORIDE (BEAKER) 84 meq/L            L            



             (test code = 382)                                        

 

             CO2 (BEAKER) (test 26 meq/L     22-29                     



             code = 355)                                         

 

             BLOOD UREA NITROGEN 32 mg/dL     7-21         H            



             (BEAKER) (test code =                                        



             354)                                                

 

             CREATININE (BEAKER) 0.85 mg/dL   0.57-1.25                 Specimen

 slightly



             (test code = 358)                                        hemolyzed

 

             GLUCOSE RANDOM 104 mg/dL                        



             (BEAKER) (test code =                                        



             652)                                                

 

             CALCIUM (BEAKER) 7.6 mg/dL    8.4-10.2     L            



             (test code = 697)                                        

 

             AST (SGOT) (BEAKER) 51 U/L       5-34         H            Specimen

 slightly



             (test code = 353)                                        hemolyzed

 

             ALT (SGPT) (BEAKER) 23 U/L       6-55                      Specimen

 slightly



             (test code = 347)                                        hemolyzed

 

             EGFR (BEAKER) (test 92 mL/min/1.73                           ESTIMA

[place] GFR IS



             code = 1092) sq m                                   NOT AS ACCURATE

 AS



                                                                 CREATININE



                                                                 CLEARANCE IN



                                                                 PREDICTING



                                                                 GLOMERULAR



                                                                 FILTRATION RATE

.



                                                                 ESTIMATED GFR I

S



                                                                 NOT APPLICABLE 

FOR



                                                                 DIALYSIS PATIEN

TS.



Specimen moderately [id number] 08:55:00





             Test Item    Value        Reference Range Interpretation Comments

 

             MAGNESIUM (BEAKER) 1.9 mg/dL    1.6-2.6                   Specimen 

slightly



             (test code = 627)                                        hemolyzed



[id number] 08:55:00





             Test Item    Value        Reference Range Interpretation Comments

 

             PHOSPHORUS (BEAKER) 2.5 mg/dL    2.3-4.7                   Specimen

 slightly



             (test code = 604)                                        hemolyzed



TSH/Free T4 If [id number] 08:39:00





             Test Item    Value        Reference Range Interpretation Comments

 

             TSH (test code = 80794-8) 8.07         0.35- 4.94 uIU/mL H         

   

 

             Lab Interpretation (test code = Abnormal                           

    



             72836-5)                                            



[facility] T[facility] [id number] 08:39:00





             Test Item    Value        Reference Range Interpretation Comments

 

             THYROID STIMULATING HORMONE 8.07 uIU/mL  0.35-4.94    H            



             (BEAKER) (test code = 772)                                        



CALCIUM, [id number] 08:06:00





             Test Item    Value        Reference Range Interpretation Comments

 

             CALCIUM IONIZED (BEAKER) (test 1.00 mmol/L  1.12-1.27    L         

   



             code = 698)                                         

 

             PH, BLOOD (BEAKER) (test code = 7.48                               

    



             1810)                                               



[id number] 23:47:00





             Test Item    Value        Reference Range Interpretation Comments

 

             SODIUM (BEAKER) (test code = 381) 117 meq/L    136-145      LL     

      

 

             POTASSIUM (BEAKER) (test code = 5.3 meq/L    3.5-5.1      H        

    



             379)                                                

 

             CHLORIDE (BEAKER) (test code = 382) 84 meq/L            L    

        

 

             CO2 (BEAKER) (test code = 355) 27 meq/L     22-29                  

   



Call K &gt; [id number]-GLUCOSE [id number] 21:18:00





             Test Item    Value        Reference Range Interpretation Comments

 

             POC-GLUCOSE METER 145 mg/dL           H            TESTED AT 

[facility] [address]



             (BEAKER) (test code =                                        [place]



             1538)                                               [address]



[id number] 18:57:00





             Test Item    Value        Reference Range Interpretation Comments

 

             CORTISOL, TOTAL (BEAKER) (test 18.1 ug/dL   3.7-19.4               

   



             code = 2755)                                        



[id number] 18:34:00





             Test Item    Value        Reference Range Interpretation Comments

 

             SODIUM (BEAKER) (test code = 381) 116 meq/L    136-145      LL     

      

 

             POTASSIUM (BEAKER) (test code = 6.0 meq/L    3.5-5.1      HH       

    



             379)                                                

 

             CHLORIDE (BEAKER) (test code = 382) 82 meq/L            L    

        

 

             CO2 (BEAKER) (test code = 355) 30 meq/L     22-29        H         

   



Call [id number]-GLUCOSE [id number] 18:20:00





             Test Item    Value        Reference Range Interpretation Comments

 

             POC-GLUCOSE METER 141 mg/dL           H            TESTED AT 

[facility] [address]



             (BEAKER) (test code =                                        [place] [facility]



             1538)                                               [address]



POCT-GLUCOSE [id number] 13:00:00





             Test Item    Value        Reference Range Interpretation Comments

 

             POC-GLUCOSE METER 122 mg/dL           H            TESTED AT 

[facility] [address]



             (BEAKER) (test code =                                        [place] [facility]



             1538)                                               [address]



CBC W/PLT COUNT &amp; AUTO [id number] 10:34:00





             Test Item    Value        Reference Range Interpretation Comments

 

             WHITE BLOOD CELL COUNT (BEAKER) 15.5 K/ L    3.5-10.5     H        

    



             (test code = 775)                                        

 

             RED BLOOD CELL COUNT (BEAKER) 4.04 M/ L    4.63-6.08    L          

  



             (test code = 761)                                        

 

             HEMOGLOBIN (BEAKER) (test code = 13.3 GM/DL   13.7-17.5    L       

     



             410)                                                

 

             HEMATOCRIT (BEAKER) (test code = 36.5 %       40.1-51.0    L       

     



             411)                                                

 

             MEAN CORPUSCULAR VOLUME (BEAKER) 90.3 fL      79.0-92.2            

     



             (test code = 753)                                        

 

             MEAN CORPUSCULAR HEMOGLOBIN 32.9 pg      25.7-32.2    H            



             (BEAKER) (test code = 751)                                        

 

             MEAN CORPUSCULAR HEMOGLOBIN CONC 36.4 GM/DL   32.3-36.5            

     



             (BEAKER) (test code = 752)                                        

 

             RED CELL DISTRIBUTION WIDTH 13.5 %       11.6-14.4                 



             (BEAKER) (test code = 412)                                        

 

             PLATELET COUNT (BEAKER) (test code 65 K/CU MM   150-450      L     

       



             = 756)                                              

 

             MEAN PLATELET VOLUME (BEAKER) 9.0 fL       9.4-12.4     L          

  



             (test code = 754)                                        

 

             NUCLEATED RED BLOOD CELLS (BEAKER) 0 /100 WBC   0-0                

       



             (test code = 413)                                        



(CELLAVISION MANUAL DIFF)2019 10:34:00





             Test Item    Value        Reference Range Interpretation Comments

 

             NEUTROPHILS - REL 78 %                                   



             (CELLAVISION)(BEAKER) (test code                                   

     



             = 2816)                                             

 

             LYMPHOCYTES - REL 4 %                                    



             (CELLAVISION)(BEAKER) (test code                                   

     



             = 2817)                                             

 

             MONOCYTES - REL 14 %                                   



             (CELLAVISION)(BEAKER) (test code                                   

     



             = 2818)                                             

 

             EOSINOPHILS - REL 2 %                                    



             (CELLAVISION)(BEAKER) (test code                                   

     



             = 2819)                                             

 

             BANDS - REL (CELLAVISION)(BEAKER) 2 %          0-10                

      



             (test code = 2826)                                        

 

             NEUTROPHILS - ABS 12.09 K/ul   1.78-5.38    H            



             (CELLAVISION)(BEAKER) (test code                                   

     



             = 2830)                                             

 

             LYMPHOCYTES - ABS 0.62 K/ul    1.32-3.57    L            



             (CELLAVISION)(BEAKER) (test code                                   

     



             = 2831)                                             

 

             MONOCYTES - ABS 2.17 K/uL    0.30-0.82    H            



             (CELLAVISION)(BEAKER) (test code                                   

     



             = 2832)                                             

 

             EOSINOPHILS - ABS 0.31 K/uL    0.04-0.54                 



             (CELLAVISION)(BEAKER) (test code                                   

     



             = 2834)                                             

 

             BANDS - ABS (CELLAVISION)(BEAKER) 0.31 K/uL    0.00-0.80           

      



             (test code = 2840)                                        

 

             TOTAL COUNTED (BEAKER) (test code 100                              

      



             = 1351)                                             

 

             WBC MORPHOLOGY (BEAKER) (test Normal                               

  



             code = 487)                                         

 

             PLT MORPHOLOGY (BEAKER) (test Normal                               

  



             code = 486)                                         

 

             POLYCHROMATOPHILLIC RBCS(BEAKER) 1+ few                            

     



             (test code = 478)                                        

 

             POIKILOCYTES (BEAKER) (test code 2+ moderate                       

     



             = 966)                                              

 

             KARISSA CELLS (BEAKER) (test code = 2+ moderate                       

     



             474)                                                

 

             BASOPHILIC STIPPLING (BEAKER) Present                              

  



             (test code = 473)                                        

 

             ARTIFACT (CELLAVISION)(BEAKER) Present                             

   



             (test code = 3432)                                        

 

             PLATELET CONCENTRATION Decreased                              



             (CELLAVISION)(BEAKER) (test code                                   

     



             = 3438)                                             



Received comment: User comments: Slide comments:RAD, CHEST, 1 VIEW, NON DEPT
2019 08:40:00Reason for exam:-&gt;left effusionShould this be performed at
 the bedside?-&gt;YesFINAL REPORT PATIENT ID:   [id number] Portable chest. 
CLINICAL HISTORY: left effusion. COMPARISON STUDY: Chest x-ray from yesterday. 
FINDINGS: The cardiac silhouette is unremarkable. The pulmonary parenchyma 
demonstrates increased interstitial markings with atelectatic changes in the 
lung bases. A left-sided pigtail catheter chest tube remains and there has been 
development of a loculated small basilarpneumothorax. A right PICC line remains.
 Degenerative changes are noted. IMPRESSION: Development a small left-sided 
basilar pneumothorax. No other significant change. Signed: Mk Martinez 
MDRepSac-Os[facility] Hospital Verified Date/Time:  2019 08:40:30 Reading Location: [facility] C013X
 Ortho Consult Reading Room Electronically signed by: MK MARTINEZ M.D. on 
2019 08:40 AMPOCT-GLUCOSE [id number] 08:39:00





             Test Item    Value        Reference Range Interpretation Comments

 

             POC-GLUCOSE METER 110 mg/dL                        TESTED AT 

[facility] [address]



             (BEAKER) (test code =                                        [place] [facility]



             1538)                                               [address]



COMPREHENSIVE METABOLIC [id number] 06:32:00





             Test Item    Value        Reference Range Interpretation Comments

 

             TOTAL PROTEIN 5.5 gm/dL    6.0-8.3      L            



             (BEAKER) (test code =                                        



             770)                                                

 

             ALBUMIN (BEAKER) 1.9 g/dL     3.5-5.0      L            



             (test code = 1145)                                        

 

             ALKALINE PHOSPHATASE 134 U/L                          



             (BEAKER) (test code =                                        



             346)                                                

 

             BILIRUBIN TOTAL 4.4 mg/dL    0.2-1.2      H            



             (BEAKER) (test code =                                        



             377)                                                

 

             SODIUM (BEAKER) (test 116 meq/L    136-145      LL           



             code = 381)                                         

 

             POTASSIUM (BEAKER) 5.6 meq/L    3.5-5.1      H            



             (test code = 379)                                        

 

             CHLORIDE (BEAKER) 83 meq/L            L            



             (test code = 382)                                        

 

             CO2 (BEAKER) (test 27 meq/L     22-29                     



             code = 355)                                         

 

             BLOOD UREA NITROGEN 34 mg/dL     7-21         H            



             (BEAKER) (test code =                                        



             354)                                                

 

             CREATININE (BEAKER) 0.89 mg/dL   0.57-1.25                 



             (test code = 358)                                        

 

             GLUCOSE RANDOM 109 mg/dL           H            



             (AKER) (test code =                                        



             652)                                                

 

             CALCIUM (BEAKER) 7.4 mg/dL    8.4-10.2     L            



             (test code = 697)                                        

 

             AST (SGOT) (BEAKER) 31 U/L       5-34                      



             (test code = 353)                                        

 

             ALT (SGPT) (AKER) 18 U/L       6-55                      



             (test code = 347)                                        

 

             EGFR (BEAKER) (test 87 mL/min/1.73                           ESTIMA

[place] GFR IS



             code = 1092) sq m                                   NOT AS ACCURATE

 AS



                                                                 CREATININE



                                                                 CLEARANCE IN



                                                                 PREDICTING



                                                                 GLOMERULAR



                                                                 FILTRATION RATE

.



                                                                 ESTIMATED GFR I

S



                                                                 NOT APPLICABLE 

FOR



                                                                 DIALYSIS PATIEN

TS.



Specimen moderately ictericPOCT-GLUCOSE [id number] 21:12:00





             Test Item    Value        Reference Range Interpretation Comments

 

             POC-GLUCOSE METER 114 mg/dL           H            TESTED AT 

[facility] [address]



             ([facility]) (test code =                                        [place]

[place]



             1538)                                               [address]



Osmolality, [id number] 18:43:00





             Test Item    Value        Reference Range Interpretation Comments

 

             Osmolality, Ur (test code = 2695-5) 694          40-1,400 mOsm/kg  

            

 

             Lab Interpretation (test code = Normal                             

    



             60472-1)                                            



[facility] AreaOSMOLAL[facility], [id number] 18:43:00





             Test Item    Value        Reference Range Interpretation Comments

 

             OSMOLALITY URINE (BEAKER) (test 694 mOsm/kg  40-1,400              

    



             code = 614)                                         



Sodium, random [id number] 18:02:00





             Test Item    Value        Reference Range Interpretation Comments

 

             Sodium Urine (test <20          meq/L                     



             code = 2955-3)                                        

 

             KEEGAN (test code = KEEGAN) Reference Range: No                          

 



                          Normals                                



[facility] [id number] 18:02:00





             Test Item    Value        Reference Range Interpretation Comments

 

             SODIUM URINE (AKER) (test code = < meq/L                         

       



             243)                                                



Reference Range: No NormalsPOCT-GLUCOSE [id number] 16:06:00





             Test Item    Value        Reference Range Interpretation Comments

 

             POC-GLUCOSE METER 145 mg/dL           H            TESTED AT 

[facility] [address]



             ([place]) (test code =                                        [place]



             1538)                                               [address]



RAD, ABDOMEN/KUB, 1 VIEW [id number] 12:51:00Reason for exam:-&gt;no BM in 13 
days. abdominal distension.  concern for ileusFINAL REPORT PATIENT ID:   
[id number] RAD, ABDOMEN/KUB, 1 VIEW AP CLINICAL INDICATION:  no BM in 13 days. 
abdominal distension.  concern for ileus   COMPARISON: None TECHNIQUE: 24 
radiographs of the abdomen. IMPRESSION:  The bowel gas pattern demonstrates 
gaseous distention without dilation. No pneumatosis. Appearance may reflect 
ileus. Excreted contrast is present in the urinary bladder. The regional s
keleton is intact. Signed: JR Mendoza Robert MDReport Verified Date/Time:
  2019 12:51:21 Reading Location: [facility] Radiology Reading Room    
Electronically signed by: KULWINDER MENDOZA on 2019 12:51 PMXR 
abdomen / KUB 1 [id number] 12:51:00Interface, External Ris In - 2019 
12:53 PM CDTFINAL REPORT PATIENT ID:   [id number] RAD, ABDOMEN/KUB, 1 VIEW AP 
CLINICAL INDICATION:  no BM in 13 days. abdominal distension.  concern for ileus
   COMPARISON: None TECHNIQUE: 24 radiographs of the abdomen. IMPRESSION:  The 
bowel gas pattern demonstrates gaseous distention without dilation. No 
pneumatosis. Appearance may reflect ileus. Excreted contrast is present in the 
urinary bladder. The regional skeleton is intact. Signed: JR Mendoza Robe rt MDReport Verified Date/Time:  2019 12:51:21 Reading Location: [facility] Radiology Reading Room    Electronically signed by: KULWINDER MENDOZA 
on 2019 12:51 [facility] HospitalOsmolality, [id number] 
12:47:00





             Test Item    Value        Reference Range Interpretation Comments

 

             Osmolality Serum (test code = 258          275- 295 mOsm/kg L      

      



             2692-2)                                             

 

             Lab Interpretation (test code = Abnormal                           

    



             49045-6)                                            



[facility] AreaOSMOLALITY, [id number] 12:47:00





             Test Item    Value        Reference Range Interpretation Comments

 

             OSMOLALITY, SERUM (BEAKER) (test 258 mOsm/kg  275-295      L       

     



             code = 615)                                         



POCT-GLUCOSE [id number] 12:35:00





             Test Item    Value        Reference Range Interpretation Comments

 

             POC-GLUCOSE METER 93 mg/dL                         TESTED AT 

[facility] [address]



             (BEAKER) (test code =                                        [place] [address]



             1538)                                               



CBC W/PLT COUNT &amp; AUTO [id number] 10:10:00





             Test Item    Value        Reference Range Interpretation Comments

 

             WHITE BLOOD CELL COUNT (BEAKER) 19.5 K/ L    3.5-10.5     H        

    



             (test code = 775)                                        

 

             RED BLOOD CELL COUNT (BEAKER) 4.18 M/ L    4.63-6.08    L          

  



             (test code = 761)                                        

 

             HEMOGLOBIN (BEAKER) (test code = 13.3 GM/DL   13.7-17.5    L       

     



             410)                                                

 

             HEMATOCRIT (BEAKER) (test code = 37.8 %       40.1-51.0    L       

     



             411)                                                

 

             MEAN CORPUSCULAR VOLUME (BEAKER) 90.4 fL      79.0-92.2            

     



             (test code = 753)                                        

 

             MEAN CORPUSCULAR HEMOGLOBIN 31.8 pg      25.7-32.2                 



             (BEAKER) (test code = 751)                                        

 

             MEAN CORPUSCULAR HEMOGLOBIN CONC 35.2 GM/DL   32.3-36.5            

     



             (BEAKER) (test code = 752)                                        

 

             RED CELL DISTRIBUTION WIDTH 13.5 %       11.6-14.4                 



             (BEAKER) (test code = 412)                                        

 

             PLATELET COUNT (BEAKER) (test code 60 K/CU MM   150-450      L     

       



             = 756)                                              

 

             MEAN PLATELET VOLUME (BEAKER) 9.4 fL       9.4-12.4                

  



             (test code = 754)                                        

 

             NUCLEATED RED BLOOD CELLS (BEAKER) 0 /100 WBC   0-0                

       



             (test code = 413)                                        



(CELLAVISION MANUAL DIFF)2019 10:10:00





             Test Item    Value        Reference Range Interpretation Comments

 

             NEUTROPHILS - REL 86 %                                   



             (CELLAVISION)(BEAKER) (test code =                                 

       



             2816)                                               

 

             LYMPHOCYTES - REL 1 %                                    



             (CELLAVISION)(BEAKER) (test code =                                 

       



             2817)                                               

 

             MONOCYTES - REL 6 %                                    



             (CELLAVISION)(BEAKER) (test code =                                 

       



             2818)                                               

 

             EOSINOPHILS - REL 3 %                                    



             (CELLAVISION)(BEAKER) (test code =                                 

       



             2819)                                               

 

             BANDS - REL (CELLAVISION)(BEAKER) 3 %          0-10                

      



             (test code = 2826)                                        

 

             BLASTS - REL (CELLAVISION)(BEAKER) 1 %          0-0          H     

       



             (test code = 2827)                                        

 

             NEUTROPHILS - ABS 16.77 K/ul   1.78-5.38    H            



             (CELLAVISION)(BEAKER) (test code =                                 

       



             2830)                                               

 

             LYMPHOCYTES - ABS 0.20 K/ul    1.32-3.57    L            



             (CELLAVISION)(BEAKER) (test code =                                 

       



             2831)                                               

 

             MONOCYTES - ABS 1.17 K/uL    0.30-0.82    H            



             (CELLAVISION)(BEAKER) (test code =                                 

       



             2832)                                               

 

             EOSINOPHILS - ABS 0.59 K/uL    0.04-0.54    H            



             (CELLAVISION)(BEAKER) (test code =                                 

       



             2834)                                               

 

             BANDS - ABS (CELLAVISION)(BEAKER) 0.59 K/uL    0.00-0.80           

      



             (test code = 2840)                                        

 

             BLASTS - ABS (CELLAVISION)(BEAKER) 0.20 K/uL    0.00-0.00    H     

       



             (test code = 2845)                                        

 

             TOTAL COUNTED (BEAKER) (test code 100                              

      



             = 1351)                                             

 

             PLT MORPHOLOGY (BEAKER) (test code Normal                          

       



             = 486)                                              

 

             SMUDGE CELLS (BEAKER) (test code = Present                         

       



             1371)                                               

 

             POIKILOCYTES (BEAKER) (test code = 1+ few                          

       



             966)                                                

 

             PLATELET CONCENTRATION Decreased                              



             (CELLAVISION)(BEAKER) (test code =                                 

       



             3438)                                               



Received comment: User comments: Slide comments:RAD, CHEST, 1 VIEW, NON DEPT
2019 08:53:00Reason for exam:-&gt;left effusionShould this be performed at
 the bedside?-&gt;YesFINAL REPORT PATIENT ID:   [id number] RAD, CHEST, 1 VIEW, NON
 DEPT INDICATION: left effusion COMPARISON: Prior day's exam FINDINGS: Portable 
frontal view of the chest.   IMPRESSION: Limited by patient positioning.Support 
Lines: Stable. Lungs and pleura: Interstitial congestion on the left slightly 
greater than the right and unchanged from prior. Small left effusion. No visible
 pneumothorax.Heart and mediastinum: Stable contours. Additional findings: None.
 Signed: JR Reji, Kulwinder Zaidi Verified Date/Time:  2019 
08:53:19 Reading Location:  [facility]    Electro
nically signed by: KULWINDER MENDOZA on 2019 08:53 AMPOCT-GLUCOSE 
[id number] 07:58:00





             Test Item    Value        Reference Range Interpretation Comments

 

             POC-GLUCOSE METER 95 mg/dL                         TESTED AT 

[facility] [address]



             (BEAKER) (test code =                                        [place] [address]



             1538)                                               



COMPREHENSIVE METABOLIC [id number] 05:41:00





             Test Item    Value        Reference Range Interpretation Comments

 

             TOTAL PROTEIN 5.4 gm/dL    6.0-8.3      L            



             (BEAKER) (test code =                                        



             770)                                                

 

             ALBUMIN (BEAKER) 1.9 g/dL     3.5-5.0      L            



             (test code = 1145)                                        

 

             ALKALINE PHOSPHATASE 126 U/L                          



             (BEAKER) (test code =                                        



             346)                                                

 

             BILIRUBIN TOTAL 4.9 mg/dL    0.2-1.2      H            



             (BEAKER) (test code =                                        



             377)                                                

 

             SODIUM (BEAKER) (test 117 meq/L    136-145      LL           



             code = 381)                                         

 

             POTASSIUM (BEAKER) 5.3 meq/L    3.5-5.1      H            



             (test code = 379)                                        

 

             CHLORIDE (BEAKER) 83 meq/L            L            



             (test code = 382)                                        

 

             CO2 (BEAKER) (test 28 meq/L     22-29                     



             code = 355)                                         

 

             BLOOD UREA NITROGEN 29 mg/dL     7-21         H            



             (BEAKER) (test code =                                        



             354)                                                

 

             CREATININE (BEAKER) 0.90 mg/dL   0.57-1.25                 



             (test code = 358)                                        

 

             GLUCOSE RANDOM 99 mg/dL                         



             (BEAKER) (test code =                                        



             652)                                                

 

             CALCIUM (BEAKER) 7.7 mg/dL    8.4-10.2     L            



             (test code = 697)                                        

 

             AST (SGOT) (BEAKER) 28 U/L       5-34                      



             (test code = 353)                                        

 

             ALT (SGPT) (BEAKER) 18 U/L       6-55                      



             (test code = 347)                                        

 

             EGFR (BEAKER) (test 86 mL/min/1.73                           ESTIMA

[place] GFR IS



             code = 1092) sq m                                   NOT AS ACCURATE

 AS



                                                                 CREATININE



                                                                 CLEARANCE IN



                                                                 PREDICTING



                                                                 GLOMERULAR



                                                                 FILTRATION RATE

.



                                                                 ESTIMATED GFR I

S



                                                                 NOT APPLICABLE 

FOR



                                                                 DIALYSIS PATIEN

TS.



Specimen moderately ictericPOCT-GLUCOSE [id number] 21:08:00





             Test Item    Value        Reference Range Interpretation Comments

 

             POC-GLUCOSE METER 92 mg/dL                         TESTED AT 

[place] [address]



             ([facility]) (test code =                                        [place] [facility] [address]



             1538)                                               



RAD, CHEST, 1 VIEW, NON [id number] 17:40:00Reason for exam:-&gt;post chest 
tube placementFINAL REPORT PATIENT ID:   [id number] INDICATION: post chest tube 
placement TECHNIQUE: Chest radiograph, single view, portable technique. FINDINGS
 / IMPRESSION: Left pleural effusion has decreased in size, since 10:25 AM, 
after pleural tube placement. No pneumothorax demonstrated. Right PICC line 
again noted terminating at the cavoatrial junction. Signed: Giovani Ordonez 
MDReport Verified Date/Time:  2019 17:40:02 Reading Location: [address]
 Consult Reading Room Electronically signed by: GIOVANI ORDONEZ M.D. on
 2019 05:40 PMPOCT-GLUCOSE [id number] 17:33:00





             Test Item    Value        Reference Range Interpretation Comments

 

             POC-GLUCOSE METER 116 mg/dL           H            TESTED AT 

[place] [address]



             ([facility]) (test code =                                        [place] [facility]



             1538)                                               [id number]



POCT-GLUCOSE [id number] 15:19:00





             Test Item    Value        Reference Range Interpretation Comments

 

             POC-GLUCOSE METER 85 mg/dL                         TESTED AT 

[place] [address]



             ([facility]) (test code =                                        [place] [facility] [address]



             1538)                                               



CT drainage with chest tube [id number] 14:49:00Interface, External Ris 
In - 2019  3:02 PM CDTFINAL REPORT PATIENT ID:   [id number] PROCEDURE: CT-
guided placement of a left pleural catheter. Dose modulation, iterative 
reconstruction, and/or weight-based adjustment of the mA/kV was utilized to 
reduce the radiation dose to as low as reasonably achievable. INDICATION: 
Loculated left pleural effusion. COMPARISON: CT from earlier today. SEDATION: In
[facility] moderate sedation was administered by radiology nursing and monitored 
under the direction of the undersigned radiologist. The patient's vital signs 
were monitored throughout the procedure andrecorded in the patient's medical 
record by radiology nursing. Total intraservice time of sedation was 25 minutes.
 MEDICATIONS: 0.5 mg Versed, 25 mcg fentanyl DESCRIPTION: After obtaining 
informed written consent, the patient was brought to the procedure room and 
placed in the supine position.  Preliminary CT scan revealed a large left 
pleural effusion. A clear path to the collection was identified. The overlying 
skin was prepped and draped in the usual, sterile fashion and local 2% lidocaine
 anesthesia was administered. Under CT guidance, a 19-gauge needle was placed. 
After placement of a wire andserial dilation, a 12 French catheter was placed 
into the pleural fluid. The catheter was affixed tothe skin and connected to a 
vacuum container. 1000 mL of clear red fluid was aspirated. Samples wereplaced 
on this side table and no clotting was noted. Samples were sent for analysis. 
Post procedure scan showed decrease in size with left effusion and no immediate 
complications. IMPRESSION: Successful placement of a 12 French left chest tube. 
Signed: Abner AguileraCharlotte [facility] Verified Date/Time:  2019 14:49:30 Reading 
Location: [facility] B1 C013Y CT Body Reading Room      Electronically signed by: ABNER AGUILERA MD on 2019 02:49 [facility] TU[facility] [id number] 14:49:00Reason for exam:-&gt;loculated left 
pleural effusion, please place large bore pigtail if effusion noted on CT 
scanAnesthesia:-&gt;NoneFINAL REPORT PATIENT ID:   [id number] PROCEDURE: CT-guided
 placement of a left pleural catheter. Dose modulation, iterative 
reconstruction, and/or weight-based adjustment of the mA/kV was utilized to redu
ce the radiation dose to as low as reasonably achievable. INDICATION: Loculated 
left pleural effusion. COMPARISON: CT from earlier today. SEDATION: Intravenous 
moderate sedation was administered by radiology nursing and monitored under the 
direction of the undersigned radiologist. The patient's vital signs were 
monitored throughout the procedure and recorded in the patient's medical record 
by radiology nursing. Total intraservice time of sedation was 25 minutes. 
MEDICATIONS: 0.5 mg Versed, 25 mcg fentanyl DESCRIPTION: After obtaining 
informed written consent, the patient was brought to the procedure room and 
placed in the supine position.  Preliminary CT scan revealed a large left 
pleural effusion. A clear path to the collection was identified. The overlying 
skin was prepped and draped in the usual, sterile fashion and local 2% lidocaine
 anesthesia was administered. Under CT guidance, a 19-gaugeneedle was placed. 
After placement of a wire and serial dilation, a 12 French catheter was placed 
into the pleural fluid. The catheter was affixed to the skin and connected to a 
vacuum container. 1000 mL of clear red fluid was aspirated. Samples were placed 
on this side table and no clotting was noted. Samples were sent for analysis. 
Post procedure scan showed decrease in size with left effusion and no immediate 
complications. IMPRESSION: Successful placement of a 12 French left chest tube. 
Signed: Abner Aguilera MDReport Verified Date/Time:  2019 14:49:30 Reading 
Location: [facility] C013Y CT Body Reading Room      Electronically signed by: ABNER AGUILERA MD on 2019 02:49 PMCT, CHEST, WITH [id number] 13:11:00
Reason for exam:-&gt;evaluate loculated left pleural effusion seen on xrayFINAL 
REPORT PATIENT ID:   [id number] TECHNIQUE: CT scan of the chest WITH intravenous 
contrast. Dose modulation, iterative reconstruction, and/or weight-based 
adjustment of the mA/kV was utilized to reduce the radiation dose to as low as 
reasonably achievable. INDICATION: evaluate loculated left pleural effusion seen
 on xrayevaluate loculated left pleural effusion seen on xray. COMPARISON: None.
  FINDINGS:  LINES/TUBES: A right PICC has its tip at the superior cavoatrial 
junction. LUNGS AND AIRWAYS: Mild right basilar subsegmental atelectasis. There 
is an area of cavitation with a fluid fluid level in superior segment of the 
left lower lobe which measures 4 cm PLEURA: Trace right pleural effusion. HEART 
AND MEDIASTINUM: The visualized thyroid gland is normal. No significant 
mediastinal, hilar, or axillary lymphadenopathy. The heart and pericardium are 
within normal limits. Severe coronary arterial calcifications. SOFT TISSUES AND 
BONES: Bilateral gynecomastia. Old, healed right 10th and 12th ribfractures. 
Old, healed left 10th rib fracture. UPPER ABDOMEN: Nodular, cirrhotic liver. 
Partially visualized upper abdominal varices. A periumbilical vein is 
recanalized.  IMPRESSION: 1.There is a large left sided loculated pleural 
effusion with a mildly thickened pleura and some intermixed gas. This is 
concerning for an empyema. 2.The air-fluid level with surrounding lung in the 
superior segment of the left lower lobe is most likely a lung abscess. A 
cavitary lung nodule (either from malignancy or fungal infection) is considered 
less likely. A follow-up chest CT is recommended in three months to document 
decrease in size and exclude cavitary malignancy. 3.Cirrhosis with findings of 
portal hypertension. Signed: Abner Aguilera Verified Date/Time:  2019
 13:11:18 Reading Location: [facility] B1 C013Y CT Body Reading Room      
Electronically signed by: ABNER AGUILERA MD on 2019 01:11 PMPOCT-GLUCOSE
 [id number] 11:22:00





             Test Item    Value        Reference Range Interpretation Comments

 

             POC-GLUCOSE METER 81 mg/dL                         TESTED AT 

[facility] [address]



             ([facility]) (test code =                                        [place] [address]



             1538)                                               



RAD, CHEST, 1 VIEW, NON [id number] 10:59:00Reason for exam:-&gt;eval 
effusionShould this be performed at the bedside?-&gt;YesFINAL REPORT PATIENT ID:
   [id number] RAD, CHEST, 1 VIEW, NON DEPT INDICATION: eval effusion COMPARISON: 
Prior day's exam FINDINGS: Portable frontal view of the chest.   IMPRESSION: 
Limited by patient rotation.Support Lines: A right PICC terminates over the 
superior vena cava. Lungs and pleura: Large left effusion. Right costophrenic 
sulcus is excluded from view. No pneumothorax.Heart and mediastinum: U
nremarkable where visible.Additional findings: None. A CT evaluation is 
currently pending. Signed: JR Mendoza Robert MDReport Verified Date/Time:
  2019 10:59:34 Reading Location: [facility] Radiology Reading Room    
Electronically signed by: KULWINDER MENDOZA on 2019 10:59 AM
COMPREHENSIVE METABOLIC [id number] 07:33:00





             Test Item    Value        Reference Range Interpretation Comments

 

             TOTAL PROTEIN 5.8 gm/dL    6.0-8.3      L            Specimen [facility]



             ([facility]) (test code =                                        hemoly

zed



             770)                                                

 

             ALBUMIN (BEAKER) 2.0 g/dL     3.5-5.0      L            Specimen sl

ightly



             (test code = 1145)                                        hemolyzed

 

             ALKALINE PHOSPHATASE 130 U/L                          



             (BEAKER) (test code =                                        



             346)                                                

 

             BILIRUBIN TOTAL 4.6 mg/dL    0.2-1.2      H            Specimen sli

ghtly



             (BEAKER) (test code =                                        hemoly

zed



             377)                                                

 

             SODIUM (BEAKER) (test 117 meq/L    136-145      LL           



             code = 381)                                         

 

             POTASSIUM (BEAKER) 5.3 meq/L    3.5-5.1      H            Specimen 

slightly



             (test code = 379)                                        hemolyzed

 

             CHLORIDE (BEAKER) 84 meq/L            L            



             (test code = 382)                                        

 

             CO2 (BEAKER) (test 28 meq/L     22-29                     



             code = 355)                                         

 

             BLOOD UREA NITROGEN 22 mg/dL     7-21         H            



             (BEAKER) (test code =                                        



             354)                                                

 

             CREATININE (BEAKER) 0.86 mg/dL   0.57-1.25                 Specimen

 slightly



             (test code = 358)                                        hemolyzed

 

             GLUCOSE RANDOM 90 mg/dL                         



             (BEAKER) (test code =                                        



             652)                                                

 

             CALCIUM (BEAKER) 7.9 mg/dL    8.4-10.2     L            



             (test code = 697)                                        

 

             AST (SGOT) (BEAKER) 33 U/L       5-34                      Specimen

 slightly



             (test code = 353)                                        hemolyzed

 

             ALT (SGPT) (BEAKER) 20 U/L       6-55                      Specimen

 slightly



             (test code = 347)                                        hemolyzed

 

             EGFR (BEAKER) (test 91 mL/min/1.73                           ESTIMA

[place] GFR IS



             code = 1092) sq m                                   NOT AS ACCURATE

 AS



                                                                 CREATININE



                                                                 CLEARANCE IN



                                                                 PREDICTING



                                                                 GLOMERULAR



                                                                 FILTRATION RATE

.



                                                                 ESTIMATED GFR I

S



                                                                 NOT APPLICABLE 

FOR



                                                                 DIALYSIS PATIEN

TS.



Specimen moderately ictericPOCT-GLUCOSE [id number] 05:46:00





             Test Item    Value        Reference Range Interpretation Comments

 

             POC-GLUCOSE METER 93 mg/dL                         TESTED AT 

[facility] [address]



             (BEAKER) (test code =                                        [place] [address]



             1538)                                               



Prothrombin time/[id number] 05:30:00





             Test Item    Value        Reference Range Interpretation Comments

 

             Protime (test code = 16.9         11.9- 14.2   H            



             5902-2)                   seconds                   

 

             INR (test code = 1.4          <=5.9                     



             6301-6)                                             

 

             KEEGAN (test code = KEEGAN) Effective 2019:                         

  



                          PT Reference Range                           



                          ChangeNew: 11.9-14.2                           



                          Previous: 11.7-14.7                           



                          RECOMMENDED                            



                          COUMADIN/WARFARIN INR                           



                          THERAPY RANGESSTANDARD                           



                          DOSE: 2.0-3.0                           



                          Includes: PROPHYLAXIS                           



                          for venous thrombosis,                           



                          systemic embolization;                           



                          TREATMENT for venous                           



                          thrombosis and/or                           



                          pulmonary embolus.HIGH                           



                          RISK: Target INR is                           



                          2.5-3.5 for patients                           



                          wiht mechanical heart                           



                          valves.                                

 

             Lab Interpretation Abnormal                               



             (test code = 71187-4)                                        



[id number] 05:30:00





             Test Item    Value        Reference Range Interpretation Comments

 

             PROTIME (BEAKER) (test code = 16.9 seconds 11.9-14.2    H          

  



             759)                                                

 

             INR (BEAKER) (test code = 370) 1.4          <=5.9                  

   



Effective 2019: PT Reference Range ChangeNew: 11.9-14.2  Previous: 11.7-
14.7RECOMMENDED COUMADIN/WARFARIN INR THERAPY RANGESSTANDARD DOSE: 2.0-3.0  
Includes: PROPHYLAXIS for venous thrombosis, systemic embolization; TREATMENT 
for venous thrombosis and/or pulmonary embolus.HIGH RISK: Target INR is2.5-3.5 
for patients wiht mechanical heart valves.CBC W/PLT COUNT &amp; AUTO 
[id number] 05:27:00





             Test Item    Value        Reference Range Interpretation Comments

 

             WHITE BLOOD CELL COUNT (BEAKER) 23.2 K/ L    3.5-10.5     H        

    



             (test code = 775)                                        

 

             RED BLOOD CELL COUNT (BEAKER) 4.75 M/ L    4.63-6.08               

  



             (test code = 761)                                        

 

             HEMOGLOBIN (BEAKER) (test code = 15.3 GM/DL   13.7-17.5            

     



             410)                                                

 

             HEMATOCRIT (BEAKER) (test code = 43.1 %       40.1-51.0            

     



             411)                                                

 

             MEAN CORPUSCULAR VOLUME (BEAKER) 90.7 fL      79.0-92.2            

     



             (test code = 753)                                        

 

             MEAN CORPUSCULAR HEMOGLOBIN 32.2 pg      25.7-32.2                 



             (BEAKER) (test code = 751)                                        

 

             MEAN CORPUSCULAR HEMOGLOBIN CONC 35.5 GM/DL   32.3-36.5            

     



             (BEAKER) (test code = 752)                                        

 

             RED CELL DISTRIBUTION WIDTH 13.3 %       11.6-14.4                 



             (BEAKER) (test code = 412)                                        

 

             PLATELET COUNT (BEAKER) (test code 87 K/CU MM   150-450      L     

       



             = 756)                                              

 

             MEAN PLATELET VOLUME (BEAKER) 8.9 fL       9.4-12.4     L          

  



             (test code = 754)                                        

 

             NUCLEATED RED BLOOD CELLS (BEAKER) 0 /100 WBC   0-0                

       



             (test code = 413)                                        

 

             NEUTROPHILS RELATIVE PERCENT 86 %                                  

 



             (BEAKER) (test code = 429)                                        

 

             LYMPHOCYTES RELATIVE PERCENT 3 %                                   

 



             (BEAKER) (test code = 430)                                        

 

             MONOCYTES RELATIVE PERCENT 9 %                                    



             (BEAKER) (test code = 431)                                        

 

             EOSINOPHILS RELATIVE PERCENT 0 %                                   

 



             (BEAKER) (test code = 432)                                        

 

             BASOPHILS RELATIVE PERCENT 0 %                                    



             (BEAKER) (test code = 437)                                        

 

             NEUTROPHILS ABSOLUTE COUNT 19.87 K/ L   1.78-5.38    H            



             (BEAKER) (test code = 670)                                        

 

             LYMPHOCYTES ABSOLUTE COUNT 0.73 K/ L    1.32-3.57    L            



             (BEAKER) (test code = 414)                                        

 

             MONOCYTES ABSOLUTE COUNT (BEAKER) 2.16 K/ L    0.30-0.82    H      

      



             (test code = 415)                                        

 

             EOSINOPHILS ABSOLUTE COUNT 0.06 K/ L    0.04-0.54                 



             (BEAKER) (test code = 416)                                        

 

             BASOPHILS ABSOLUTE COUNT (BEAKER) 0.04 K/ L    0.01-0.08           

      



             (test code = 417)                                        

 

             IMMATURE GRANULOCYTES-RELATIVE 1 %          0-1                    

   



             PERCENT (BEAKER) (test code =                                      

  



             2801)                                               



POCT-GLUCOSE [id number] 23:34:00





             Test Item    Value        Reference Range Interpretation Comments

 

             POC-GLUCOSE METER 94 mg/dL                         TESTED AT 

[facility] [address]



             ([facility]) (test code =                                        [place] [address]



             1538)

## 2021-11-22 ENCOUNTER — HOSPITAL ENCOUNTER (EMERGENCY)
Dept: HOSPITAL 97 - ER | Age: 61
Discharge: HOME | End: 2021-11-22
Payer: MEDICARE

## 2021-11-22 VITALS — OXYGEN SATURATION: 98 % | DIASTOLIC BLOOD PRESSURE: 77 MMHG | SYSTOLIC BLOOD PRESSURE: 129 MMHG

## 2021-11-22 VITALS — TEMPERATURE: 98.4 F

## 2021-11-22 DIAGNOSIS — L03.113: Primary | ICD-10-CM

## 2021-11-22 PROCEDURE — 99283 EMERGENCY DEPT VISIT LOW MDM: CPT

## 2021-11-22 PROCEDURE — 93971 EXTREMITY STUDY: CPT

## 2021-11-22 NOTE — ER
Nurse's Notes                                                                                     

 [facility]                                                                 

Name: Omkar Chung                                                                                  

Age: 61 yrs                                                                                       

Sex: Male                                                                                         

: 1960                                                                                   

MRN: [id number]                                                                                   

Arrival Date: 2021                                                                          

Time: 09:56                                                                                       

Account#: [id number]                                                                            

Bed 7                                                                                             

Private MD: Jhon Longo                                                                         

Diagnosis: Cellulitis of right upper limb                                                         

                                                                                                  

Presentation:                                                                                     

                                                                                             

10:21 Chief complaint: Patient states: redness to right arm that began 4 to 5 days ago. Pt    aa5 

      reports his doctor sent him here to r/o blood clot. Coronavirus screen: At this time,       

      the client does not indicate any symptoms associated with coronavirus-19. Ebola Screen:     

      No symptoms or risks identified at this time. Initial Sepsis Screen: Does the patient       

      meet any 2 criteria? No. Patient's initial sepsis screen is negative. Does the patient      

      have a suspected source of infection? No. Patient's initial sepsis screen is negative.      

      Risk Assessment: Do you want to hurt yourself or someone else? Patient reports no           

      desire to harm self or others. Onset of symptoms was 2021.                         

10:21 Method Of Arrival: Wheelchair                                                           aa5 

10:21 Acuity: BROCK 3                                                                           aa5 

                                                                                                  

Historical:                                                                                       

- Allergies:                                                                                      

10:22 No Known Allergies;                                                                     aa5 

- PMHx:                                                                                           

10:21 Cirrhosis; COPD; Hernia;                                                                aa5 

                                                                                                  

- Immunization history:: Client reports receiving the Escobar \T\ Escobar single-dose             

  vaccine.                                                                                        

- Social history:: Smoking status: Patient denies any tobacco usage or history of.                

                                                                                                  

                                                                                                  

Screening:                                                                                        

10:36 Abuse screen: Denies threats or abuse. Nutritional screening: No deficits noted.        as6 

      Tuberculosis screening: No symptoms or risk factors identified. Fall Risk None              

      identified.                                                                                 

                                                                                                  

Assessment:                                                                                       

10:33 General: Appears in no apparent distress. comfortable, Behavior is calm, cooperative.   as6 

      Pain: Complains of pain in right arm. Neuro: Level of Consciousness is awake, alert,        

      obeys commands, Oriented to person, place, time, situation. Cardiovascular: Capillary       

      refill < 3 seconds Patient's skin is warm and dry. Pulses are all present. Respiratory:     

      Airway is patent Trachea midline Respiratory effort is even, unlabored, Respiratory         

      pattern is regular, symmetrical. Derm: Skin is intact. Derm: Reports redness to r           

      forearm. Musculoskeletal: Swelling present in right forearm.                                

                                                                                                  

Vital Signs:                                                                                      

10:21  / 80; Pulse 92; Resp 16 S; Temp 98.4(TE); Pulse Ox 97% on R/A; Weight 99.79 kg   aa5 

      (R); Height 6 ft. 3 in. (190.50 cm) (R);                                                    

11:13  / 77; Pulse 95; Resp 20 S; Pulse Ox 98% on R/A;                                  as6 

10:21 Body Mass Index 27.50 (99.79 kg, 190.50 cm)                                             aa5 

                                                                                                  

ED Course:                                                                                        

09:56 Patient arrived in ED.                                                                  aa5 

10:03 Jhon Longo DO is Private Physician.                                                 kc5 

10:21 Triage completed.                                                                       aa5 

10:21 Arm band placed on.                                                                     aa5 

10:23 [facility]                                                        kb  

10:23 Job Hyman MD is Attending Physician.                                              kb  

10:29 Dianne Monroe, RN is Primary Nurse.                                                        tw2 

10:30 Pola Calderón, CAROL is Primary Nurse.                                                    as6 

10:37 Bed in low position. Call light in reach. Side rails up X 1. Adult w/ patient. Pulse ox as6 

      on. NIBP on.                                                                                

11:00 UPPER EXTREMITY VENOUS UNILATE In Process Unspecified.                                  EDMS

11:16 No provider procedures requiring assistance completed. Patient did not have IV access   as6 

      during this emergency room visit.                                                           

                                                                                                  

Administered Medications:                                                                         

No medications were administered                                                                  

                                                                                                  

                                                                                                  

Outcome:                                                                                          

11:11 Discharge ordered by MD.                                                                kb  

11:16 Discharged to home ambulatory, with significant other.                                  as6 

11:16 Condition: stable                                                                           

11:16 Discharge instructions given to patient, significant other, Instructed on discharge         

      instructions, follow up and referral plans. medication usage, Demonstrated                  

      understanding of instructions, follow-up care, medications, Prescriptions given X 1.        

11:17 Patient left the ED.                                                                    as6 

                                                                                                  

Signatures:                                                                                       

Dispatcher [employer]                           EDMS                                                 

Edie Cardoza FNP-C FNP-Ckb Calderon, Audri, RN                     RN   aa5                                                  

Dianne Monroe, CAROL                          RN   tw2                                                  

Pola Calderón, CAROL                      RN   as6                                                  

Taryn Villatoro                                 kc5                                                  

                                                                                                  

Corrections: (The following items were deleted from the chart)                                    

10:23 10:21 Acuity: BROCK 4 aa5                                                                 aa5 

                                                                                                  

**************************************************************************************************

## 2021-11-22 NOTE — XMS REPORT
Continuity of Care Document

                          Created on:2021



Patient:ANAYELI HUDSON

Sex:Male

:1960

External Reference #:[id number]





Demographics







                          Address                   [address]



                                                    [place] [address]

 

                          Home Phone                [phone]

 

                          Mobile Phone              [phone]

 

                          Email Address             NONE

 

                          Preferred Language        English

 

                          Marital Status            Unknown

 

                          Yarsani Affiliation     Unknown

 

                          Race                      Unknown

 

                          Additional Race(s)        White



                                                    Unavailable

 

                          Ethnic Group              Unknown









Author







                          Organization              [facility]

t

 

                          Address                   [address]



                                                    [place] [address]

 

                          Phone                     [phone]









Support







                Name            Relationship    Address         Phone

 

                Irving           Designated Contact Unavailable     [phone]

 

                Liam          Mother          Unavailable     [phone]

 

                Liam          Mother          Unavailable     [phone]

 

                            Unavailable     [phone]



                                                [place] [address] 

 

                Irving           Unavailable     Unavailable     [phone]









Care Team Providers







                    Name                Role                Phone

 

                    Trista LANGSTON  W      Attending Clinician [phone]

 

                    ANDRIY HUGO Attending Clinician Unavailable

 

                    JUJU WHITMORE       Admitting Clinician Unavailable









Payers







           Payer Name Policy Type Policy Number Effective Date Expiration Date S

ource







Problems







       Condition Condition Condition Status Onset  Resolution Last   Treating Co

mments 

Source



       Name   Details Category        Date   Date   Treatment Clinician        



                                                 Date                 

 

       Cirrhosis Cirrhosis Disease Active 2019                             [facility]

[facility]ianne



       of liver of liver               0-                               [facility]

e



       with   with                 00:00:                             of



       ascites ascites               00                                 [facility]



       (HCCode) (HCCode)                                                  e

 

       Hyponatrem Hyponatrem Disease Active                              [facility]

[facility]



       ia     ia                                                  [facility]



                                   00:00:                             of



                                   00                                 [facility]



                                                                      e

 

       Pseudomona Pseudomona Disease Active                              [facility]

[facility]



       l      l                                                   [facility]



       pneumonia pneumonia               00:00:                             of



       (HCCode) (HCCode)               00                                 Medici

n



                                                                      e

 

       Inflammato Inflammato Disease Active                              [facility]

[facility]



       ry liver ry liver                                              [facility]

e



       disease disease               00:00:                             of



                                   00                                 [facility]



                                                                      e

 

       Ascites Ascites Disease Active                              [facility]



                                                                  [facility]



                                   00:00:                             of



                                   00                                 [facility]



                                                                      e

 

       Pleural Pleural Disease Active                                    [facility]



       effusion effusion                                                  [facility]

e



       on left on left                                                  of



                                                                      [facility]



                                                                      e







Allergies, Adverse Reactions, Alerts







       Allergy Allergy Status Severity Reaction(s) Onset  Inactive Treating Comm

ents 

Source



       Name   Type                        Date   Date   Clinician        

 

       NO KNOWN Allergy Active                                           [facility]



       [facility]







Social History







           Social Habit Start Date Stop Date  Quantity   Comments   Source

 

           History of tobacco                       Current smoker            [facility]



           use                                                    of Medicine

 

           Alcohol intake                                             [facility]

lege



                                                                  of Medicine

 

           Sex Assigned At                                             [facility] Co

llege



           Birth                                                  of Medicine

 

           Cigarettes smoked 2019-10-08 2019-10-08                       [facility]



           current (pack per 00:00:00   00:00:00                         of Medi





           day) - Reported                                             

 

           Cigarette  2019-10-08 2019-10-08                       [facility]



           pack-years 00:00:00   00:00:00                         of Medicine









                Smoking Status  Start Date      Stop Date       Source

 

                Former smoker   2019-10-08 00:00:00 2019-10-08 00:00:00 [facility]

ollege of Medicine







Medications







       Ordered Filled Start  Stop   Current Ordering Indication Dosage Frequency

 Signature

                    Comments            Components          Source



     Medication Medication Date Date Medication? Clinician                (SIG) 

          



     Name Name                                                   

 

     Tradjenta Tradjenta       Yes  Jhon                1 tablet        

   [facility]



               7-20           [facility]                               [facility]



               [facility]



               00                                                l



                                                                 Outpati



                                                                 ent



                                                                 Clinics

 

     Tresiba Tresiba       Yes  Jhon                Inject 15           

[facility]



     FlexTouch FlexTouch 7-20           [facility]                units           [facility]

es -



               00:00:                                              [facility]



               [facility]                                                [facility]



                                                                 [facility]



                                                                 [facility]

 

     OXYGEN GAS      2019      Yes            2L        2 L daily.           B

aydianne



               0-17                                              [facility]



               15:24:                                              of



               51                                                Medicin



                                                                 e

 

     tramadol            Yes       [id number] 50mg      Take 1 Tab         

  [facility]



     (ULTRAM) 50      9-30                               by mouth           [facility]

e[facility]



     MG tablet      00:00:                               every 6           of



               00                                 hours as           [facility]



                                                  needed for           e



                                                  Pain.           

 

     tiotropium            Yes            18ug      18 mcg by           [facility]



     (SPIRIVA)                                     Inhalation           [facility]

ege



     18 MCG      00:00:                               route.           of



     inhalation      00                                                [facility]



     capsule                                                        e

 

     Cefepime       2019- No             2g        Inject 2 g           

[facility]



     HCl 2 g      9-25 10-19                          into the           [facility]



     SOLR      00:00: 04:59                          vein Every           of



               00   :00                           8 hours.           [facility]



                                                                 e

 

     sodium       2019- No                       TAKE 1           [facility]



     chloride 1      -25 10-17                          TABLET BY           [facility]



     g tablet      00:00: 00:00                          MOUTH           of



               00   :00                           THREE           [facility]icin



                                                  TIMES           e



                                                  DAILY WITH           



                                                  MEALS FOR           



                                                  14 DAYS           

 

     levofloxaci            Yes                      TAKE 1           [facility]

or



     n         9-24                               TABLET BY           [facility]



     (LEVAQUIN)      00:00:                               MOUTH ONCE           o

f



     750 MG      00                                 DAILY FOR           [facility]



     tablet                                         24 DAYS           e

 

     doxycycline      - 2019- No             100mg      Take 100           

[facility]



     (MONODOX)      9-24 10-20                          mg by           [facility]



     100 MG      00:00: 04:59                          mouth.           of



     capsule      00   :00                                          Medicin



                                                                 e

 

     dexamethaso            Yes                      TAKE 2           [facility]

or



     ne        9-01                               TABLETS BY           [facility]



     (DECADRON)      00:00:                               MOUTH           of



     4 MG tablet      00                                 TWICE           [facility]icin



                                                  DAILY           e

 

     albuterol            Yes                      USE 1 VIAL           [facility]

ylor



     (PROVENTIL)      8-12                               IN             [facility]



     (2.5 mg/3      00:00:                               NEBULIZER           of



     mL) 0.083%      00                                 EVERY 4 TO           Med

icin



     nebulizer                                         6 HOURS AS           e



     solution                                         NEEDED           

 

     furosemide            Yes            40mg      Take 40 mg           B

[facility]



     (LASIX) 40      6-14                               by mouth           Col[facility]

ge



     MG tablet      00:00:                               daily.           of



                                                               Medicin



                                                                 e

 

     spironolact            Yes            100mg      Take 100           B

aylor



     one       6-14                               mg by           [facility]



     (ALDACTONE)      00:00:                               mouth           of



     100 MG      00                                 daily.           [facility]



     tablet                                                        e

 

     folic acid            Yes            1mg       Take 1 mg           [facility]

ylor



     (FOLVITE) 1      6-14                               by mouth           [facility]

ege



     MG tablet      00:00:                               daily.           of



               00                                                Medicin



                                                                 e

 

     Symbicort Symbicort           Yes  Jhon                2 puffs           

[facility]



                              [facility]                               [facility]



                                                                 [facility]



                                                                 l



                                                                 Outpati



                                                                 ent



                                                                 Clinics

 

     Ipratropium Ipratropium           Yes  Jhon                2.5 ml        

   [facility]



     Hines Hines                [facility]                               [facility]



                                                                 [facility]



                                                                 l



                                                                 Outpati



                                                                 ent



                                                                 Clinics

 

     ProAir HFA ProAir HFA           Yes  Jhon                1 puff as       

    [facility]



                              [facility]                needed           [facility]



                                                                 [facility]



                                                                 l



                                                                 Outpati



                                                                 ent



                                                                 Clinics

 

     Furosemide Furosemide           Yes  Jhon                1 tablet        

   [facility]



                              [facility]                               [facility]



                                                                 [facility]



                                                                 l



                                                                 Outpati



                                                                 ent



                                                                 Clinics

 

     Spironolact Spironolact           Yes  Jhon                1 tablet      

     [facility]



     one  one                 [facility]                               [facility]



                                                                 [facility]



                                                                 l



                                                                 Outpati



                                                                 ent



                                                                 Clinics

 

     Aspir-Low Aspir-Low           Yes  Jhon                1 tablet          

 [facility]



                              [facility]                               [facility]



                                                                 l



                                                                 Outpati



                                                                 ent



                                                                 Clinics

 

     Folic Acid Folic Acid           Yes  Jhon                1 tablet        

   [facility]



                              [facility]                               [facility]



                                                                 [facility]



                                                                 l



                                                                 Outpati



                                                                 ent



                                                                 Clinics

 

     Constulose Constulose           Yes  Jhon                15 ml           

[facility]



                              [facility]                               [facility]



                                                                 [facility]



                                                                 l



                                                                 Outpati



                                                                 ent



                                                                 Clinics







Vital Signs







             Vital Name   Observation Time Observation Value Comments     Source

 

             Systolic blood 2019-10-17 15:21:00 115 mm[Hg]                [facility]                                            Medicine

 

             Diastolic blood 2019-10-17 15:21:00 67 mm[Hg]                 [facility]                                            Medicine

 

             Heart rate   2019-10-17 15:21:00 96 /min                   [facility]

 

             Body temperature 2019-10-17 15:21:00 36.28 Renée                 [facility]

 

             Body height  2019-10-17 15:21:00 188 cm                    [facility]

 

             Body weight  2019-10-17 15:21:00 102.059 [facility]                [facility]



                                                                 [facility]

 

             BMI          2019-10-17 15:21:00 28.89 kg/m2               [facility]

 

             Systolic blood 2019-10-17 15:21:00 115 mm[Hg]                [facility]                                            Medicine

 

             Diastolic blood 2019-10-17 15:21:00 67 mm[Hg]                 [facility]                                            Medicine

 

             Heart rate   2019-10-17 15:21:00 96 /min                   [facility]

 

             Body temperature 2019-10-17 15:21:00 36.28 Renée                 [facility]

 

             Body height  2019-10-17 15:21:00 188 cm                    [facility]

 

             Body weight  2019-10-17 15:21:00 102.059 kg                [facility]

 

             BMI          2019-10-17 15:21:00 28.89 kg/m2               [facility]







Procedures

This patient has no known procedures.



Plan of Care







             Planned Activity Planned Date Details      Comments     Source

 

             Future Scheduled Test              COLON CANCER SCREENING:         

     [facility]



                                       COLONOSCOPY [code =              Medicine



                                       COLON CANCER SCREENING:              



                                       COLONOSCOPY]              

 

             Future Scheduled Test              MEDICARE AWV [code =            

  [facility]



                                       MEDICARE AWV]              Medicine

 

             Future Scheduled Test              TETANUS SHOT (ADULT)            

  [facility]



                                       [code = TETANUS SHOT              Medicin

e



                                       (ADULT)]                  

 

             Future Scheduled Test              BMI FOLLOW UP PLAN              

[facility]



                                       [code = BMI FOLLOW UP              Medici

ne



                                       PLAN]                     

 

             Future Scheduled Test              HEPATITIS C SCREENING           

   [facility]



                                       [code = HEPATITIS C              Medicine



                                       SCREENING]                

 

             Future Scheduled Test              HIV SCREENING [code =           

   [facility]



                                       HIV SCREENING]              Medicine

 

             Future Scheduled Test              FLU VACCINE > 6 MONTHS          

    [facility]



                                       [code = FLU VACCINE > 6              Medi

cine



                                       MONTHS]                   







Encounters







        Start   End     Encounter Admission Attending Care    Care    Encounter 

Source



        Date/Time Date/Time Type    Type    Clinicians Facility Department ID   

   

 

        2021 ambulatory                 [facility]  [facility]  [id number]

 [facility]



                                                                        [facility]



                                                                        Outpati



                                                                        ent



                                                                        Clinics

 

        2021-10-06 2021-10-06 Outpatient                 [facility]  [facility]  [id number]

 [facility]



                                                                        [facility]



                                                                        Outpati



                                                                        ent



                                                                        Clinics

 

        2021 Outpatient                 [facility]  [facility]  [id number]

 [facility]



                                                                        [facility]



                                                                        Outpati



                                                                        ent



                                                                        Clinics

 

        2021 Outpatient                 [facility]  [facility]C  [id number]

 [facility]



        [facility]



                                                                        [facility]



                                                                        Outpati



                                                                        ent



                                                                        Clinics

 

        2021 Outpatient                 [facility]  STL[facility]C  [id number]

 [facility]



        [facility]



                                                                        [facility]



                                                                        l



                                                                        Outpati



                                                                        ent



                                                                        Clinics

 

        2021 Outpatient                 [facility]  [facility]  [id number]

 [facility]



        00[facility]00 [facility]00                                                 [facility]



                                                                        [facility]



                                                                        Outpati



                                                                        ent



                                                                        Clinics

 

        2021 Outpatient                 [facility]LML[facility]  STLMLC  [id number]

 [facility]



        00[facility]00[facility]00 [facility]00[facility]00                                                 [facility]



                                                                        l



                                                                        Outpati



                                                                        ent



                                                                        Clinics

 

        2021 Outpatient                 [facility]LML[facility]  STLMLC  [id number]

 [facility]



        00:00[facility]00 [facility]00[facility]00                                                 [facility]



                                                                        l



                                                                        Outpati



                                                                        ent



                                                                        Clinics

 

        2021 Outpatient                 [facility]LML[facility]  STLMLC  [id number]

 [facility]



        00:00[facility]00 [facility]00[facility]00                                                 [facility]



                                                                        l



                                                                        Outpati



                                                                        ent



                                                                        Clinics

 

        2021-01-15 2021-01-15 Outpatient                 [facility]  STLMLC  [id number]

 [facility]



        00:00[facility]00 [facility]00                                                 [facility]



                                                                        l



                                                                        Outpati



                                                                        ent



                                                                        Clinics

 

        2021 Outpatient                 [facility]  STLMLC  [id number]

 [facility]



        00:00[facility]00 [facility]00[facility]00                                                 [facility]



                                                                        l



                                                                        Outpati



                                                                        ent



                                                                        Clinics

 

        2020-10-21 2020-10-21 Outpatient                 [facility]  [facility]  [id number]

 [facility]



        00:00[facility]00 [facility]00[facility]00                                                 [facility]



                                                                        l



                                                                        Outpati



                                                                        ent



                                                                        Clinics

 

        2020-10-19 2020-10-19 Outpatient                 [facility]  [facility]  [id number]

 [facility]



        00:00[facility]00 [facility]00[facility]00                                                 [facility]



                                                                        l



                                                                        Outpati



                                                                        ent



                                                                        Clinics

 

        2020-10-08 2020-10-08 [facility]                 [id number]



        00:[facility]00 [facility]00[facility]00                                                 [facility]



                                                                        l



                                                                        Outpati



                                                                        ent



                                                                        Clinics

 

        2020 Outpatient                 [facility]

[id number] [facility]



        13:15:00 13:15:00                         [facility]   [employer]

s -



                                                ecomom   [facility]  Medicine         l



                                                Medicine                 Outpati



                                                                        ent



                                                                        Clinics

 

        2020 Outpatient                 [facility] [address]

[id number] [facility]



        13:45:00 13:45:00                         t [employer]

s -



                                                ecomom   [facility]  Medicine         l



                                                Medicine                 Outpati



                                                                        ent



                                                                        Clinics

 

        2020 Outpatient                 [facility] [address]

[id number] [facility]



        08:33:00 08:33:00                         [facility] [place]   [employer]

s -



                                                ecomom   [facility]  Medicine         l



                                                Medicine                 Outpati



                                                                        ent



                                                                        Clinics

 

        2020-07-15 2020-07-15 Outpatient                 [facility] [address]

[id number] [facility]



        15:30:00 15:30:00                         t [employer]

s -



                                                ecomom   [facility]  Medicine         l



                                                Medicine                 Outpati



                                                                        ent



                                                                        Clinics

 

        2020 Outpatient                 [facility] [address]

[id number] [facility]



        15:00:00 15:00:00                         [facility] [place]   [employer]

s -



                                                ecomom   [facility]  Medicine         l



                                                Medicine                 Outpati



                                                                        ent



                                                                        Clinics

 

        2020 Outpatient                 [facility] [address]

[id number] [facility]



        15:00:00 15:00:00                         [place]   [employer]

s -



                                                ecomom   [facility]  Medicine         l



                                                Medicine                 Outpati



                                                                        ent



                                                                        Clinics

 

        2020 Outpatient                 [facility] [address]

[id number] [facility]



        10:00:00 10:00:00                         [facility] [employer]   [employer]

s -



                                                ecomom   [facility]  Medicine         l



                                                Medicine                 Outpati



                                                                        ent



                                                                        Clinics

 

        2020 Outpatient                 [facility] [address]

[id number] [facility]



        13:17:00 13:17:00                         [facility]



                                                [facility]



                                                                        [facility]



                                                                        [facility]

 

        2020 Outpatient                 [facility] [address]

[id number] [facility]



        11:00:00 11:00:00                         [facility]

s [facility]



                                                Medicine                 [facility]



                                                                        ent



                                                                        [facility]

 

        2019-10-17 2019-10-17 Office          PADMINI Weston     [id number]

[address] [facility]



        09:26:46 14:06:09 Visit           Lobo ANGUIANO AMBULATOR [id number]        

 College



                                                Y       0.2.7.2.686         of



                                                        [phone]         [facility]



                                                        840             e

 

        2019-10-17 2019-10-17 Office          PADMINI Weston     [id number]

[id number] 



        09:26:46 14:06:09 Visit           Lobo ANGUIANO AMBULATOR [id number]        

 



                                                Y       0.2.7.2.686         



                                                        [phone]         



                                                        840             







Results







           Test Description Test Time  Test Comments Results    Result Comments 

Source









                    (CELLAVISION MANUAL DIFF) 2019 09:14:00 









                      Test Item  Value      Reference Range Interpretation Comme

nts









             NEUTROPHILS - REL (CELLAVISION)(BEAKER) (test code = 2816) 81 %    

                               

 

             LYMPHOCYTES - REL (CELLAVISION)(BEAKER) (test code = 2817) 7 %     

                               

 

             MONOCYTES - REL (CELLAVISION)(BEAKER) (test code = 2818) 7 %       

                             

 

             EOSINOPHILS - REL (CELLAVISION)(BEAKER) (test code = 2819) 2 %     

                               

 

             MYELOCYTES - REL (CELLAVISION)(BEAKER) (test code = 2822) 2 %      

    0-0          H            

 

             ATYPICAL LYMPHOCYTES - REL (CELLAVISION)(BEAKER) (test code 1 %    

      0-0          H            



             = 2829)                                             

 

                NEUTROPHILS - ABS (CELLAVISION)(BEAKER) (test code = 2830) 10.85

 K/ul      1.78-5.38       

H                                       

 

                LYMPHOCYTES - ABS (CELLAVISION)(BEAKER) (test code = 2831) 0.94 

K/ul       1.32-3.57       L

                                        

 

             MONOCYTES - ABS (CELLAVISION)(BEAKER) (test code = 2832) 0.94 K/uL 

   0.30-0.82    H            

 

             EOSINOPHILS - ABS (CELLAVISION)(BEAKER) (test code = 2834) 0.27 K/u

L    0.04-0.54                 

 

             MYELOCYTES-ABS (CELLAVISION)(BEAKER) (test code = 2837) 0.27 K/uL  

  0.00-0.00    H            

 

                ATYPICAL LYMPHOCYTES - ABS (CELLAVISION)(BEAKER) (test code 0.13

 K/uL       0.00-0.00       

H                                       



             = 2858)                                             

 

             TOTAL COUNTED (BEAKER) (test code = 1351) 100                      

              

 

             RBC MORPHOLOGY (BEAKER) (test code = 762) Normal                   

              

 

             SMUDGE CELLS (BEAKER) (test code = 1371) Present                   

             

 

             GIANT PLATELETS (BEAKER) (test code = 313) Present                 

               

 

             PLATELET CONCENTRATION (CELLAVISION)(BEAKER) (test code = Decreased

                              



             3438)                                               



Received comment: User comments: Slide comments:RAD, CHEST, 1 VIEW, NON DEPT
2019 08:25:00Reason for exam:-&gt;left effusionShould this be performed at
the bedside?-&gt;YesFINAL REPORT PATIENT ID:   [id number] Chest AP portable 
Comparison exam: 2019 History provided: Follow-up pleural effusion Left 
chest tube unchanged in place. No pneumothorax. No significant effusions are 
evident. Nonspecific coarsening of markings in the left lower lobe. PICC line in
good position. Signed: Lobo Morris MDReport Verified Date/Time:  2019 
08:25:12 Reading Location: [facility] Radiology Reading Room        
Electronically signed by: LOBO MORRIS on 2019 08:25 AMCOMPREHENSIVE 
METABOLIC [id number] 06:40:00





             Test Item    Value        Reference Range Interpretation Comments

 

             TOTAL PROTEIN 4.8 gm/dL    6.0-8.3      L            



             (BEAKER) (test code =                                        



             770)                                                

 

             ALBUMIN (BEAKER) 2.4 g/dL     3.5-5.0      L            



             (test code = 1145)                                        

 

             ALKALINE PHOSPHATASE 141 U/L                          



             (BEAKER) (test code =                                        



             346)                                                

 

             BILIRUBIN TOTAL 3.9 mg/dL    0.2-1.2      H            



             (BEAKER) (test code =                                        



             377)                                                

 

             SODIUM (BEAKER) (test 123 meq/L    136-145      L            



             code = 381)                                         

 

             POTASSIUM (BEAKER) 3.6 meq/L    3.5-5.1                   



             (test code = 379)                                        

 

             CHLORIDE (BEAKER) 90 meq/L            L            



             (test code = 382)                                        

 

             CO2 (BEAKER) (test 29 meq/L     22-29                     



             code = 355)                                         

 

             BLOOD UREA NITROGEN 14 mg/dL     7-21                      



             (BEAKER) (test code =                                        



             354)                                                

 

             CREATININE (BEAKER) 0.80 mg/dL   0.57-1.25                 



             (test code = 358)                                        

 

             GLUCOSE RANDOM 152 mg/dL           H            



             (BEAKER) (test code =                                        



             652)                                                

 

             CALCIUM (BEAKER) 7.3 mg/dL    8.4-10.2     L            



             (test code = 697)                                        

 

             AST (SGOT) (BEAKER) 39 U/L       5-34         H            



             (test code = 353)                                        

 

             ALT (SGPT) (BEAKER) 23 U/L       6-55                      



             (test code = 347)                                        

 

             EGFR (BEAKER) (test 99 mL/min/1.73                           ESTIMA

[place] GFR IS



             code = 1092) sq m                                   NOT AS ACCURATE

 AS



                                                                 CREATININE



                                                                 CLEARANCE IN



                                                                 PREDICTING



                                                                 GLOMERULAR



                                                                 FILTRATION RATE

.



                                                                 ESTIMATED GFR I

S



                                                                 NOT APPLICABLE 

FOR



                                                                 DIALYSIS PATIEN

TS.



Specimen slightly [id number] 06:39:00





             Test Item    Value        Reference Range Interpretation Comments

 

             PHOSPHORUS (BEAKER) (test code = 2.1 mg/dL    2.3-4.7      L       

     



             604)                                                



[id number] 06:39:00





             Test Item    Value        Reference Range Interpretation Comments

 

             MAGNESIUM (BEAKER) (test code = 1.8 mg/dL    1.6-2.6               

    



             627)                                                



B-TYPE NATRIURETIC FACTOR (BNP)2019 06:04:00





             Test Item    Value        Reference Range Interpretation Comments

 

             B-TYPE NATRIURETIC PEPTIDE (BEAKER) 97 pg/mL     0-100             

        



             (test code = 700)                                        



CBC W/PLT COUNT &amp; AUTO [id number] 05:57:00





             Test Item    Value        Reference Range Interpretation Comments

 

             WHITE BLOOD CELL COUNT (BEAKER) 13.4 K/ L    3.5-10.5     H        

    



             (test code = 775)                                        

 

             RED BLOOD CELL COUNT (BEAKER) 3.17 M/ L    4.63-6.08    L          

  



             (test code = 761)                                        

 

             HEMOGLOBIN (BEAKER) (test code = 10.3 GM/DL   13.7-17.5    L       

     



             410)                                                

 

             HEMATOCRIT (BEAKER) (test code = 29.4 %       40.1-51.0    L       

     



             411)                                                

 

             MEAN CORPUSCULAR VOLUME (BEAKER) 92.7 fL      79.0-92.2    H       

     



             (test code = 753)                                        

 

             MEAN CORPUSCULAR HEMOGLOBIN 32.5 pg      25.7-32.2    H            



             (BEAKER) (test code = 751)                                        

 

             MEAN CORPUSCULAR HEMOGLOBIN CONC 35.0 GM/DL   32.3-36.5            

     



             (BEAKER) (test code = 752)                                        

 

             RED CELL DISTRIBUTION WIDTH 13.9 %       11.6-14.4                 



             (BEAKER) (test code = 412)                                        

 

             PLATELET COUNT (BEAKER) (test code 75 K/CU MM   150-450      L     

       



             = 756)                                              

 

             MEAN PLATELET VOLUME (BEAKER) 8.9 fL       9.4-12.4     L          

  



             (test code = 754)                                        

 

             NUCLEATED RED BLOOD CELLS (BEAKER) 0 /100 WBC   0-0                

       



             (test code = 413)                                        



CALCIUM, [id number] 05:44:00





             Test Item    Value        Reference Range Interpretation Comments

 

             CALCIUM IONIZED (BEAKER) (test 0.97 mmol/L  1.12-1.27    L         

   



             code = 698)                                         

 

             PH, BLOOD (BEAKER) (test code = 7.50                               

    



             1810)                                               



BODY FLUID CULTURE + GRAM [id number] 11:10:00





             Test Item    Value        Reference    Interpretation Comments



                                       Range                     

 

             CULTURE (BEAKER)                           A            <1+ Coagula

se



             (test code = 1095)                                        negative



                                                                 Staphylococcus

 

             CULTURE (BEAKER) COAGULASE NEGATIVE              A            <1+ P

seudomonas



             (test code = 1095) STAPHYLOCOCCUS                           aerugin

jennifer

 

             Clindamycin (test                           S            



             code = 10)                                          

 

             Erythromycin (test                           R            



             code = 4)                                           

 

             Linezolid (test code                           S            



             = 40)                                               

 

             Oxacillin (test code                           R            



             = 14)                                               

 

             Rifampin (test code =                           S            



             43)                                                 

 

             Tetracycline (test                           S            



             code = 2)                                           

 

             Trimethoprim +                           S            



             Sulfamethoxazole                                        



             (test code = 47)                                        

 

             Vancomycin (test code                           S            



             = 13)                                               

 

             GRAM STAIN RESULT 3+ WBCs                                



             (BEAKER) (test code =                                        



             1123)                                               

 

             GRAM STAIN RESULT <1+ gram positive                           



             (BEAKER) (test code = cocci in pairs and                           



             864213)      clusters                               



CBC W/PLT COUNT &amp; AUTO [id number] 09:58:00





             Test Item    Value        Reference Range Interpretation Comments

 

             WHITE BLOOD CELL COUNT (BEAKER) 10.7 K/ L    3.5-10.5     H        

    



             (test code = 775)                                        

 

             RED BLOOD CELL COUNT (BEAKER) 3.15 M/ L    4.63-6.08    L          

  



             (test code = 761)                                        

 

             HEMOGLOBIN (BEAKER) (test code = 10.2 GM/DL   13.7-17.5    L       

     



             410)                                                

 

             HEMATOCRIT (BEAKER) (test code = 28.9 %       40.1-51.0    L       

     



             411)                                                

 

             MEAN CORPUSCULAR VOLUME (BEAKER) 91.7 fL      79.0-92.2            

     



             (test code = 753)                                        

 

             MEAN CORPUSCULAR HEMOGLOBIN 32.4 pg      25.7-32.2    H            



             (BEAKER) (test code = 751)                                        

 

             MEAN CORPUSCULAR HEMOGLOBIN CONC 35.3 GM/DL   32.3-36.5            

     



             (BEAKER) (test code = 752)                                        

 

             RED CELL DISTRIBUTION WIDTH 13.7 %       11.6-14.4                 



             (BEAKER) (test code = 412)                                        

 

             PLATELET COUNT (BEAKER) (test code 57 K/CU MM   150-450      L     

       



             = 756)                                              

 

             MEAN PLATELET VOLUME (BEAKER) 8.8 fL       9.4-12.4     L          

  



             (test code = 754)                                        

 

             NUCLEATED RED BLOOD CELLS (BEAKER) 0 /100 WBC   0-0                

       



             (test code = 413)                                        



(CELLAVISION MANUAL DIFF)2019 09:58:00





             Test Item    Value        Reference Range Interpretation Comments

 

             NEUTROPHILS - REL 78 %                                   



             (CELLAVISION)(BEAKER) (test code =                                 

       



             2816)                                               

 

             LYMPHOCYTES - REL 5 %                                    



             (CELLAVISION)(BEAKER) (test code =                                 

       



             2817)                                               

 

             MONOCYTES - REL 9 %                                    



             (CELLAVISION)(BEAKER) (test code =                                 

       



             2818)                                               

 

             EOSINOPHILS - REL 2 %                                    



             (CELLAVISION)(BEAKER) (test code =                                 

       



             2819)                                               

 

             METAMYELOCYTES - REL 1 %          0-0          H            



             (CELLAVISION)(BEAKER) (test code =                                 

       



             2821)                                               

 

             BANDS - REL (CELLAVISION)(BEAKER) 4 %          0-10                

      



             (test code = 2826)                                        

 

             BLASTS - REL (CELLAVISION)(BEAKER) 1 %          0-0          H     

       



             (test code = 2827)                                        

 

             NEUTROPHILS - ABS 8.35 K/ul    1.78-5.38    H            



             (CELLAVISION)(BEAKER) (test code =                                 

       



             2830)                                               

 

             LYMPHOCYTES - ABS 0.54 K/ul    1.32-3.57    L            



             (CELLAVISION)(BEAKER) (test code =                                 

       



             2831)                                               

 

             MONOCYTES - ABS 0.96 K/uL    0.30-0.82    H            



             (CELLAVISION)(BEAKER) (test code =                                 

       



             2832)                                               

 

             EOSINOPHILS - ABS 0.21 K/uL    0.04-0.54                 



             (CELLAVISION)(BEAKER) (test code =                                 

       



             2834)                                               

 

             METAMYELOCYTES - ABS 0.11 K/uL    0.00-0.00    H            



             (CELLAVISION)(BEAKER) (test code =                                 

       



             2836)                                               

 

             BANDS - ABS (CELLAVISION)(BEAKER) 0.43 K/uL    0.00-0.80           

      



             (test code = 2840)                                        

 

             BLASTS - ABS (CELLAVISION)(BEAKER) 0.11 K/uL    0.00-0.00    H     

       



             (test code = 2845)                                        

 

             TOTAL COUNTED (BEAKER) (test code = 100                            

        



             1351)                                               

 

             WBC MORPHOLOGY (BEAKER) (test code Normal                          

       



             = 487)                                              

 

             PLT MORPHOLOGY (BEAKER) (test code Normal                          

       



             = 486)                                              

 

             ANISOCYTOSIS (BEAKER) (test code = 1+ few                          

       



             961)                                                

 

             POIKILOCYTES (BEAKER) (test code = 1+ few                          

       



             966)                                                

 

             OVALOCYTES (BEAKER) (test code = 1+ few                            

     



             477)                                                

 

             BASOPHILIC STIPPLING (BEAKER) (test Present                        

        



             code = 473)                                         

 

             ARTIFACT (CELLAVISION)(BEAKER) Present                             

   



             (test code = 3432)                                        

 

             PLATELET CONCENTRATION Decreased                              



             (CELLAVISION)(BEAKER) (test code =                                 

       



             3438)                                               



Received comment: User comments: Slide comments: WBC: SEGMENTED WITH TOXIC 
GRANULATIONS [id number] 08:30:00





             Test Item    Value        Reference Range Interpretation Comments

 

             PHOSPHORUS (BEAKER) (test code = 2.0 mg/dL    2.3-4.7      L       

     



             604)                                                



RAD, CHEST, 1 VIEW, NON [id number] 08:21:00Reason for exam:-&gt;left 
effusionShould this be performed at the bedside?-&gt;YesFINAL REPORT PATIENT ID:
  [id number]  TECHNIQUE: Frontal chest radiograph dated 2019. CLINICAL HI
STORY: Left effusion COMPARISON STUDY: Chest radiographs and chest CT both dated
2019  IMPRESSION:Right-sided PICC and left-sided chest tube are unchanged. 
No change in small left hydropneumothorax. Multiple rounded densities project 
over the left lower lobe of unclear etiology possibly somethingexternal to the 
patient. Cardiomediastinal silhouette is normal in size. No pulmonary edema. No 
fracture.  Signed: Ann King MDReport Verified Date/Time:  2019 
08:21:33 Reading Location:[facility] Reading Room  Electronically signed by: 
ANN KING MD on 2019 08:21 AMCOMPREHENSIVE METABOLIC PANEL
2019 05:34:00





             Test Item    Value        Reference Range Interpretation Comments

 

             TOTAL PROTEIN 4.9 gm/dL    6.0-8.3      L            



             (BEAKER) (test code =                                        



             770)                                                

 

             ALBUMIN (BEAKER) 2.7 g/dL     3.5-5.0      L            



             (test code = 1145)                                        

 

             ALKALINE PHOSPHATASE 137 U/L                          



             (BEAKER) (test code =                                        



             346)                                                

 

             BILIRUBIN TOTAL 5.5 mg/dL    0.2-1.2      H            



             (BEAKER) (test code =                                        



             377)                                                

 

             SODIUM (BEAKER) (test 123 meq/L    136-145      L            



             code = 381)                                         

 

             POTASSIUM (BEAKER) 3.9 meq/L    3.5-5.1                   



             (test code = 379)                                        

 

             CHLORIDE (BEAKER) 89 meq/L            L            



             (test code = 382)                                        

 

             CO2 (BEAKER) (test 31 meq/L     22-29        H            



             code = 355)                                         

 

             BLOOD UREA NITROGEN 15 mg/dL     7-21                      



             (BEAKER) (test code =                                        



             354)                                                

 

             CREATININE (BEAKER) 0.68 mg/dL   0.57-1.25                 



             (test code = 358)                                        

 

             GLUCOSE RANDOM 104 mg/dL                        



             (BEAKER) (test code =                                        



             652)                                                

 

             CALCIUM (BEAKER) 7.7 mg/dL    8.4-10.2     L            



             (test code = 697)                                        

 

             AST (SGOT) (BEAKER) 47 U/L       5-34         H            



             (test code = 353)                                        

 

             ALT (SGPT) (BEAKER) 25 U/L       6-55                      



             (test code = 347)                                        

 

             EGFR (BEAKER) (test 119                                    ESTIMATE

D GFR IS



             code = 1092) mL/min/1.73 sq                           NOT AS ACCURA

TE AS



                          m                                      CREATININE



                                                                 CLEARANCE IN



                                                                 PREDICTING



                                                                 GLOMERULAR



                                                                 FILTRATION RATE

.



                                                                 ESTIMATED GFR I

S



                                                                 NOT APPLICABLE 

FOR



                                                                 DIALYSIS PATIEN

TS.



Specimen moderately [id number] 05:04:00





             Test Item    Value        Reference Range Interpretation Comments

 

             MAGNESIUM (BEAKER) (test code = 1.8 mg/dL    1.6-2.6               

    



             627)                                                



CT, CHEST, WITH [id number] 15:37:00Reason for exam:-&gt;reevaluate 
pleural effusion and left lung abscessFINAL REPORT PATIENT ID:   [id number]  CT of
the Chest dated 2019 COMPARISON: 2019 CLINICAL INFORMATION: 
Pleural effusionreevaluate pleural effusion and left lung abscess Comment:  Axia
l images of the chest were obtained from thoracic inlet to the upper abdomen 
with intravenous contrast.     This exam was performed according to our 
departmental dose-optimization program, which includes automated exposure 
control, adjustment of the mA and/or kV according to patient size and/or use of
interactive reconstruction technique. Heart is normal in size.  There is small 
pericardial effusion.Great vessels are unremarkable. No adenopathy in the 
mediastinum or perihilar region. Trachea and mainstem bronchi are patent.  Trace
bilateral pleural effusion is present. There is interval significant decrease in
the amount of left pleural effusion. There is small amount of air present in the
left pleural space. The pigtail catheter is seen in the left pleural space. 
Atelectasis is seen in the lingula, right mid, and both lower lobes. Cavitary 
lesion is seen in the superior segment of the left lower lobe measuring 
approximately 2.8 x 3.3 cm. Visualized upper abdomen demonstrates cirrhotic 
liver with trace ascites. Spleen is minimally enlarged. Impression: 1. Interval 
decrease in the amount of left pleural effusion with residual left 
hydropneumothorax.2. Trace right pleural effusion.3. Cavitary lesion in the 
superior segment of the left lower lobe may represent abscess.4. Cirrhosis with 
splenomegaly and ascites. Signed: Lucita Nailseport Verified Date/Time:  
2019 15:37:27 Reading Location: [facility] B1 C013Y CT Body Reading Room      
Electronically signed by: LUCITA NAILS M.D. on 2019 03:37 PMELECTROLYTES
2019 13:41:00





             Test Item    Value        Reference Range Interpretation Comments

 

             SODIUM (BEAKER) (test code = 381) 119 meq/L    136-145      LL     

      

 

             POTASSIUM (BEAKER) (test code = 4.1 meq/L    3.5-5.1               

    



             379)                                                

 

             CHLORIDE (BEAKER) (test code = 382) 85 meq/L            L    

        

 

             CO2 (BEAKER) (test code = 355) 29 meq/L     22-29                  

   



Page [phone] with results. Thank you.CBC W/PLT COUNT &amp; AUTO 
[id number] 10:30:00





             Test Item    Value        Reference Range Interpretation Comments

 

             WHITE BLOOD CELL COUNT (BEAKER) 11.3 K/ L    3.5-10.5     H        

    



             (test code = 775)                                        

 

             RED BLOOD CELL COUNT (BEAKER) 3.25 M/ L    4.63-6.08    L          

  



             (test code = 761)                                        

 

             HEMOGLOBIN (BEAKER) (test code = 10.6 GM/DL   13.7-17.5    L       

     



             410)                                                

 

             HEMATOCRIT (BEAKER) (test code = 29.8 %       40.1-51.0    L       

     



             411)                                                

 

             MEAN CORPUSCULAR VOLUME (BEAKER) 91.7 fL      79.0-92.2            

     



             (test code = 753)                                        

 

             MEAN CORPUSCULAR HEMOGLOBIN 32.6 pg      25.7-32.2    H            



             (BEAKER) (test code = 751)                                        

 

             MEAN CORPUSCULAR HEMOGLOBIN CONC 35.6 GM/DL   32.3-36.5            

     



             (BEAKER) (test code = 752)                                        

 

             RED CELL DISTRIBUTION WIDTH 13.5 %       11.6-14.4                 



             (BEAKER) (test code = 412)                                        

 

             PLATELET COUNT (BEAKER) (test code 51 K/CU MM   150-450      L     

       



             = 756)                                              

 

             MEAN PLATELET VOLUME (BEAKER) 8.3 fL       9.4-12.4     L          

  



             (test code = 754)                                        

 

             NUCLEATED RED BLOOD CELLS (BEAKER) 0 /100 WBC   0-0                

       



             (test code = 413)                                        



(CELLAVISION MANUAL DIFF)2019 10:30:00





             Test Item    Value        Reference Range Interpretation Comments

 

             NEUTROPHILS - REL 82 %                                   



             (CELLAVISION)(BEAKER) (test code =                                 

       



             2816)                                               

 

             LYMPHOCYTES - REL 2 %                                    



             (CELLAVISION)(BEAKER) (test code =                                 

       



             2817)                                               

 

             MONOCYTES - REL 9 %                                    



             (CELLAVISION)(BEAKER) (test code =                                 

       



             2818)                                               

 

             METAMYELOCYTES - REL 2 %          0-0          H            



             (CELLAVISION)(BEAKER) (test code =                                 

       



             2821)                                               

 

             PROMYELOCYTES - REL 2 %          0-0          H            



             (CELLAVSION)(BEAKER) (test code =                                  

      



             2825)                                               

 

             BANDS - REL (CELLAVISION)(BEAKER) 2 %          0-10                

      



             (test code = 2826)                                        

 

             ATYPICAL LYMPHOCYTES - REL 1 %          0-0          H            



             (CELLAVISION)(BEAKER) (test code =                                 

       



             2829)                                               

 

             NEUTROPHILS - ABS 9.27 K/ul    1.78-5.38    H            



             (CELLAVISION)(BEAKER) (test code =                                 

       



             2830)                                               

 

             LYMPHOCYTES - ABS 0.23 K/ul    1.32-3.57    L            



             (CELLAVISION)(BEAKER) (test code =                                 

       



             2831)                                               

 

             MONOCYTES - ABS 1.02 K/uL    0.30-0.82    H            



             (CELLAVISION)(BEAKER) (test code =                                 

       



             2832)                                               

 

             METAMYELOCYTES - ABS 0.23 K/uL    0.00-0.00    H            



             (CELLAVISION)(BEAKER) (test code =                                 

       



             2836)                                               

 

             PROMYELOCYTES - ABS 0.23 K/uL    0.00-0.00    H            



             (CELLAVISION)(BEAKER) (test code =                                 

       



             2838)                                               

 

             BANDS - ABS (CELLAVISION)(BEAKER) 0.23 K/uL    0.00-0.80           

      



             (test code = 2840)                                        

 

             ATYPICAL LYMPHOCYTES - ABS 0.11 K/uL    0.00-0.00    H            



             (CELLAVISION)(BEAKER) (test code =                                 

       



             2858)                                               

 

             TOTAL COUNTED (BEAKER) (test code = 100                            

        



             1351)                                               

 

             WBC MORPHOLOGY (BEAKER) (test code Normal                          

       



             = 487)                                              

 

             LARGE PLT(BEAKER) (test code = Present                             

   



             2156)                                               

 

             BASOPHILIC STIPPLING (BEAKER) (test Present                        

        



             code = 473)                                         

 

             ARTIFACT (CELLAVISION)(BEAKER) Present                             

   



             (test code = 3432)                                        

 

             PLATELET CONCENTRATION Decreased                              



             (CELLAVISION)(BEAKER) (test code =                                 

       



             3438)                                               



Received comment: User comments: Slide comments: WBC: SEGMENTED WITH TOXIC 
GRANULATIONS PRESENTRAD, CHEST, 1 VIEW, NON [id number] 07:48:00Reason for 
exam:-&gt;left effusionShould this be performed at the bedside?-&gt;YesFINAL 
REPORT PATIENT ID:   [id number] RAD, CHEST, 1 VIEW, NON DEPT INDICATION: left 
effusion COMPARISON: Prior day's exam FINDINGS: Portable frontal view of the 
chest.   IMPRESSION:  Support Lines: PICC tip overlies the SVC.  Left pleural 
catheter is again noted. Lungs and pleura: Bibasilar airspace disease is 
unchanged.  Superimposed trace left effusion. Left apical pneumothorax is 
decreased in the interim.    Heart and mediastinum: Stable contours.  Additional
findings: None. Signed: Sasha CrockerMDReport Verified Date/Time:  2019 
07:48:14 Reading Location: [facility] Radiology Reading Room  Electronically 
signed by: SASHA CROCKER MD on 2019 07:48 AMCALCIUM, IONIZED
2019 05:58:00





             Test Item    Value        Reference Range Interpretation Comments

 

             CALCIUM IONIZED (BEAKER) (test 0.96 mmol/L  1.12-1.27    L         

   



             code = 698)                                         

 

             PH, BLOOD (BEAKER) (test code = 7.53                               

    



             1810)                                               



COMPREHENSIVE METABOLIC [id number] 05:51:00





             Test Item    Value        Reference Range Interpretation Comments

 

             TOTAL PROTEIN 5.2 gm/dL    6.0-8.3      L            



             (BEAKER) (test code =                                        



             770)                                                

 

             ALBUMIN (BEAKER) 2.6 g/dL     3.5-5.0      L            



             (test code = 1145)                                        

 

             ALKALINE PHOSPHATASE 144 U/L                          



             (BEAKER) (test code =                                        



             346)                                                

 

             BILIRUBIN TOTAL 6.3 mg/dL    0.2-1.2      H            



             (BEAKER) (test code =                                        



             377)                                                

 

             SODIUM (BEAKER) (test 119 meq/L    136-145      LL           



             code = 381)                                         

 

             POTASSIUM (BEAKER) 4.2 meq/L    3.5-5.1                   



             (test code = 379)                                        

 

             CHLORIDE (BEAKER) 86 meq/L            L            



             (test code = 382)                                        

 

             CO2 (BEAKER) (test 26 meq/L     22-29                     



             code = 355)                                         

 

             BLOOD UREA NITROGEN 23 mg/dL     7-21         H            



             (BEAKER) (test code =                                        



             354)                                                

 

             CREATININE (BEAKER) 0.81 mg/dL   0.57-1.25                 



             (test code = 358)                                        

 

             GLUCOSE RANDOM 103 mg/dL                        



             (BEAKER) (test code =                                        



             652)                                                

 

             CALCIUM (BEAKER) 7.6 mg/dL    8.4-10.2     L            



             (test code = 697)                                        

 

             AST (SGOT) (BEAKER) 46 U/L       5-34         H            



             (test code = 353)                                        

 

             ALT (SGPT) (BEAKER) 21 U/L       6-55                      



             (test code = 347)                                        

 

             EGFR (BEAKER) (test 97 mL/min/1.73                           ESTIMA

[place] GFR IS



             code = 1092) sq m                                   NOT AS ACCURATE

 AS



                                                                 CREATININE



                                                                 CLEARANCE IN



                                                                 PREDICTING



                                                                 GLOMERULAR



                                                                 FILTRATION RATE

.



                                                                 ESTIMATED GFR I

S



                                                                 NOT APPLICABLE 

FOR



                                                                 DIALYSIS PATIEN

TS.



Specimen moderately [id number] 05:07:00





             Test Item    Value        Reference Range Interpretation Comments

 

             CORTISOL, TOTAL (BEAKER) (test code 9.6 ug/dL    3.7-19.4          

        



             = 2755)                                             



[id number] 04:57:00





             Test Item    Value        Reference Range Interpretation Comments

 

             PHOSPHORUS (BEAKER) (test code = 2.0 mg/dL    2.3-4.7      L       

     



             604)                                                



[id number] 04:57:00





             Test Item    Value        Reference Range Interpretation Comments

 

             MAGNESIUM (BEAKER) (test code = 1.7 mg/dL    1.6-2.6               

    



             627)                                                



[id number] 22:08:00





             Test Item    Value        Reference Range Interpretation Comments

 

             SODIUM (BEAKER) (test 119 meq/L    136-145      LL           



             code = 381)                                         

 

             POTASSIUM (BEAKER) 4.6 meq/L    3.5-5.1                   Specimen 

slightly



             (test code = 379)                                        hemolyzed

 

             CHLORIDE (BEAKER) 86 meq/L            L            



             (test code = 382)                                        

 

             CO2 (BEAKER) (test 27 meq/L     22-29                     



             code = 355)                                         



Call K &gt; 5.5POCT-GLUCOSE [id number] 12:59:00





             Test Item    Value        Reference Range Interpretation Comments

 

             POC-GLUCOSE METER 95 mg/dL                         TESTED AT 

[facility] [address]



             (BEAKER) (test code =                                        [place] [address]



             1538)                                               



T4, [id number] 10:25:00





             Test Item    Value        Reference Range Interpretation Comments

 

             FREE T4 (BEAKER) (test code = 655) 0.65 ng/dL   0.70-1.48    L     

       



CBC W/PLT COUNT &amp; AUTO [id number] 10:21:00





             Test Item    Value        Reference Range Interpretation Comments

 

             WHITE BLOOD CELL COUNT (BEAKER) 11.8 K/ L    3.5-10.5     H        

    



             (test code = 775)                                        

 

             RED BLOOD CELL COUNT (BEAKER) 3.74 M/ L    4.63-6.08    L          

  



             (test code = 761)                                        

 

             HEMOGLOBIN (BEAKER) (test code = 12.2 GM/DL   13.7-17.5    L       

     



             410)                                                

 

             HEMATOCRIT (BEAKER) (test code = 33.9 %       40.1-51.0    L       

     



             411)                                                

 

             MEAN CORPUSCULAR VOLUME (BEAKER) 90.6 fL      79.0-92.2            

     



             (test code = 753)                                        

 

             MEAN CORPUSCULAR HEMOGLOBIN 32.6 pg      25.7-32.2    H            



             (BEAKER) (test code = 751)                                        

 

             MEAN CORPUSCULAR HEMOGLOBIN CONC 36.0 GM/DL   32.3-36.5            

     



             (BEAKER) (test code = 752)                                        

 

             RED CELL DISTRIBUTION WIDTH 13.7 %       11.6-14.4                 



             (BEAKER) (test code = 412)                                        

 

             PLATELET COUNT (BEAKER) (test code 79 K/CU MM   150-450      L     

       



             = 756)                                              

 

             MEAN PLATELET VOLUME (BEAKER) 8.9 fL       9.4-12.4     L          

  



             (test code = 754)                                        

 

             NUCLEATED RED BLOOD CELLS (BEAKER) 0 /100 WBC   0-0                

       



             (test code = 413)                                        



(CELLAVISION MANUAL DIFF)2019 10:21:00





             Test Item    Value        Reference Range Interpretation Comments

 

             NEUTROPHILS - REL 82 %                                   



             (CELLAVISION)(BEAKER) (test code =                                 

       



             2816)                                               

 

             LYMPHOCYTES - REL 4 %                                    



             (CELLAVISION)(BEAKER) (test code =                                 

       



             2817)                                               

 

             MONOCYTES - REL 13 %                                   



             (CELLAVISION)(BEAKER) (test code =                                 

       



             2818)                                               

 

             ATYPICAL LYMPHOCYTES - REL 1 %          0-0          H            



             (CELLAVISION)(BEAKER) (test code =                                 

       



             2829)                                               

 

             NEUTROPHILS - ABS 9.68 K/ul    1.78-5.38    H            



             (CELLAVISION)(BEAKER) (test code =                                 

       



             2830)                                               

 

             LYMPHOCYTES - ABS 0.47 K/ul    1.32-3.57    L            



             (CELLAVISION)(BEAKER) (test code =                                 

       



             2831)                                               

 

             MONOCYTES - ABS 1.53 K/uL    0.30-0.82    H            



             (CELLAVISION)(BEAKER) (test code =                                 

       



             2832)                                               

 

             ATYPICAL LYMPHOCYTES - ABS 0.12 K/uL    0.00-0.00    H            



             (CELLAVISION)(BEAKER) (test code =                                 

       



             2858)                                               

 

             TOTAL COUNTED (BEAKER) (test code = 100                            

        



             1351)                                               

 

             RBC MORPHOLOGY (BEAKER) (test code Normal                          

       



             = 762)                                              

 

             WBC MORPHOLOGY (BEAKER) (test code Normal                          

       



             = 487)                                              

 

             PLT MORPHOLOGY (BEAKER) (test code Normal                          

       



             = 486)                                              

 

             ARTIFACT (CELLAVISION)(BEAKER) Present                             

   



             (test code = 3432)                                        

 

             PLATELET CONCENTRATION Decreased                              



             (CELLAVISION)(BEAKER) (test code =                                 

       



             3438)                                               



Received comment: User comments: Slide comments:POCT-GLUCOSE [id number] 
09:58:00





             Test Item    Value        Reference Range Interpretation Comments

 

             POC-GLUCOSE METER 104 mg/dL                        TESTED AT 

[facility] [address]



             (BEAKER) (test code =                                        [address] [place]



             1538)                                               [address]



RAD, CHEST, 1 VIEW, NON [id number] 09:33:00Reason for exam:-
&gt;effusionShould this be performed at the bedside?-&gt;YesFINAL REPORT PATIENT
ID:   [id number] Comparison: 2019 TECHNIQUE: Single view of the chest FINDING
S: Bilateral interstitial and airspace opacities are stable. Small left pleural 
thickening with adjacent airspace disease identified. A previous left-sided 
pneumothorax has decreased. Left pleural drainage catheters again noted. Right-
sided PICC line is stable. Signed: Ronaldo Mireles MDReport Verified Date/Time:  
2019 09:33:21 Reading Location: [address] Transitional Reading Room    
 Electronically signed by: RONALDO MIRELES M.D. on 2019 09:33 AM
COMPREHENSIVE METABOLIC [id number] 08:59:00





             Test Item    Value        Reference Range Interpretation Comments

 

             TOTAL PROTEIN 5.4 gm/dL    6.0-8.3      L            Specimen sligh

tly



             (BEAKER) (test code =                                        hemoly

zed



             770)                                                

 

             ALBUMIN (BEAKER) 2.1 g/dL     3.5-5.0      L            Specimen sl

ightly



             (test code = 1145)                                        hemolyzed

 

             ALKALINE PHOSPHATASE 153 U/L             H            



             (BEAKER) (test code =                                        



             346)                                                

 

             BILIRUBIN TOTAL 5.0 mg/dL    0.2-1.2      H            Specimen sli

ghtly



             (BEAKER) (test code =                                        hemoly

zed



             377)                                                

 

             SODIUM (BEAKER) (test 117 meq/L    136-145      LL           



             code = 381)                                         

 

             POTASSIUM (BEAKER) 5.5 meq/L    3.5-5.1      H            Specimen 

slightly



             (test code = 379)                                        hemolyzed

 

             CHLORIDE (BEAKER) 84 meq/L            L            



             (test code = 382)                                        

 

             CO2 (BEAKER) (test 26 meq/L     22-29                     



             code = 355)                                         

 

             BLOOD UREA NITROGEN 32 mg/dL     7-21         H            



             (BEAKER) (test code =                                        



             354)                                                

 

             CREATININE (BEAKER) 0.85 mg/dL   0.57-1.25                 Specimen

 slightly



             (test code = 358)                                        hemolyzed

 

             GLUCOSE RANDOM 104 mg/dL                        



             (BEAKER) (test code =                                        



             652)                                                

 

             CALCIUM (BEAKER) 7.6 mg/dL    8.4-10.2     L            



             (test code = 697)                                        

 

             AST (SGOT) (BEAKER) 51 U/L       5-34         H            Specimen

 slightly



             (test code = 353)                                        hemolyzed

 

             ALT (SGPT) (BEAKER) 23 U/L       6-55                      Specimen

 slightly



             (test code = 347)                                        hemolyzed

 

             EGFR (BEAKER) (test 92 mL/min/1.73                           ESTIMA

[place] GFR IS



             code = 1092) sq m                                   NOT AS ACCURATE

 AS



                                                                 CREATININE



                                                                 CLEARANCE IN



                                                                 PREDICTING



                                                                 GLOMERULAR



                                                                 FILTRATION RATE

.



                                                                 ESTIMATED GFR I

S



                                                                 NOT APPLICABLE 

FOR



                                                                 DIALYSIS PATIEN

TS.



Specimen moderately [id number] 08:55:00





             Test Item    Value        Reference Range Interpretation Comments

 

             MAGNESIUM (BEAKER) 1.9 mg/dL    1.6-2.6                   Specimen 

slightly



             (test code = 627)                                        hemolyzed



[id number] 08:55:00





             Test Item    Value        Reference Range Interpretation Comments

 

             PHOSPHORUS (BEAKER) 2.5 mg/dL    2.3-4.7                   Specimen

 slightly



             (test code = 604)                                        hemolyzed



TSH/FREE T4 IF [id number] 08:39:00





             Test Item    Value        Reference Range Interpretation Comments

 

             THYROID STIMULATING HORMONE 8.07 uIU/mL  0.35-4.94    H            



             (BEAKER) (test code = 772)                                        



CALCIUM, [id number] 08:06:00





             Test Item    Value        Reference Range Interpretation Comments

 

             CALCIUM IONIZED (BEAKER) (test 1.00 mmol/L  1.12-1.27    L         

   



             code = 698)                                         

 

             PH, BLOOD (BEAKER) (test code = 7.48                               

    



             1810)                                               



[id number] 23:47:00





             Test Item    Value        Reference Range Interpretation Comments

 

             SODIUM (BEAKER) (test code = 381) 117 meq/L    136-145      LL     

      

 

             POTASSIUM (BEAKER) (test code = 5.3 meq/L    3.5-5.1      H        

    



             379)                                                

 

             CHLORIDE (BEAKER) (test code = 382) 84 meq/L            L    

        

 

             CO2 (BEAKER) (test code = 355) 27 meq/L     22-29                  

   



Call K &gt; [id number]-GLUCOSE [id number] 21:18:00





             Test Item    Value        Reference Range Interpretation Comments

 

             POC-GLUCOSE METER 145 mg/dL           H            TESTED AT 

[place] [address]



             (BEAKER) (test code =                                        [place]



             1538)                                               [address]



[id number] 18:57:00





             Test Item    Value        Reference Range Interpretation Comments

 

             CORTISOL, TOTAL (BEAKER) (test 18.1 ug/dL   3.7-19.4               

   



             code = 2755)                                        



[id number] 18:34:00





             Test Item    Value        Reference Range Interpretation Comments

 

             SODIUM (BEAKER) (test code = 381) 116 meq/L    136-145      LL     

      

 

             POTASSIUM (BEAKER) (test code = 6.0 meq/L    3.5-5.1      HH       

    



             379)                                                

 

             CHLORIDE (BEAKER) (test code = 382) 82 meq/L            L    

        

 

             CO2 (BEAKER) (test code = 355) 30 meq/L     22-29        H         

   



Call [id number]-GLUCOSE [id number] 18:20:00





             Test Item    Value        Reference Range Interpretation Comments

 

             POC-GLUCOSE METER 141 mg/dL           H            TESTED AT 

[place] [address]



             (BEAKER) (test code =                                        [place] [facility]



             1538)                                               [address]



POCT-GLUCOSE [id number] 13:00:00





             Test Item    Value        Reference Range Interpretation Comments

 

             POC-GLUCOSE METER 122 mg/dL           H            TESTED AT 

[place] [address]



             (BEAKER) (test code =                                        [place] [facility]



             1538)                                               [address]



CBC W/PLT COUNT &amp; AUTO [id number] 10:34:00





             Test Item    Value        Reference Range Interpretation Comments

 

             WHITE BLOOD CELL COUNT (BEAKER) 15.5 K/ L    3.5-10.5     H        

    



             (test code = 775)                                        

 

             RED BLOOD CELL COUNT (BEAKER) 4.04 M/ L    4.63-6.08    L          

  



             (test code = 761)                                        

 

             HEMOGLOBIN (BEAKER) (test code = 13.3 GM/DL   13.7-17.5    L       

     



             410)                                                

 

             HEMATOCRIT (BEAKER) (test code = 36.5 %       40.1-51.0    L       

     



             411)                                                

 

             MEAN CORPUSCULAR VOLUME (BEAKER) 90.3 fL      79.0-92.2            

     



             (test code = 753)                                        

 

             MEAN CORPUSCULAR HEMOGLOBIN 32.9 pg      25.7-32.2    H            



             (BEAKER) (test code = 751)                                        

 

             MEAN CORPUSCULAR HEMOGLOBIN CONC 36.4 GM/DL   32.3-36.5            

     



             (BEAKER) (test code = 752)                                        

 

             RED CELL DISTRIBUTION WIDTH 13.5 %       11.6-14.4                 



             (BEAKER) (test code = 412)                                        

 

             PLATELET COUNT (BEAKER) (test code 65 K/CU MM   150-450      L     

       



             = 756)                                              

 

             MEAN PLATELET VOLUME (BEAKER) 9.0 fL       9.4-12.4     L          

  



             (test code = 754)                                        

 

             NUCLEATED RED BLOOD CELLS (BEAKER) 0 /100 WBC   0-0                

       



             (test code = 413)                                        



(CELLAVISION MANUAL DIFF)2019 10:34:00





             Test Item    Value        Reference Range Interpretation Comments

 

             NEUTROPHILS - REL 78 %                                   



             (CELLAVISION)(BEAKER) (test code                                   

     



             = 2816)                                             

 

             LYMPHOCYTES - REL 4 %                                    



             (CELLAVISION)(BEAKER) (test code                                   

     



             = 2817)                                             

 

             MONOCYTES - REL 14 %                                   



             (CELLAVISION)(BEAKER) (test code                                   

     



             = 2818)                                             

 

             EOSINOPHILS - REL 2 %                                    



             (CELLAVISION)(BEAKER) (test code                                   

     



             = 2819)                                             

 

             BANDS - REL (CELLAVISION)(BEAKER) 2 %          0-10                

      



             (test code = 2826)                                        

 

             NEUTROPHILS - ABS 12.09 K/ul   1.78-5.38    H            



             (CELLAVISION)(BEAKER) (test code                                   

     



             = 2830)                                             

 

             LYMPHOCYTES - ABS 0.62 K/ul    1.32-3.57    L            



             (CELLAVISION)(BEAKER) (test code                                   

     



             = 2831)                                             

 

             MONOCYTES - ABS 2.17 K/uL    0.30-0.82    H            



             (CELLAVISION)(BEAKER) (test code                                   

     



             = 2832)                                             

 

             EOSINOPHILS - ABS 0.31 K/uL    0.04-0.54                 



             (CELLAVISION)(BEAKER) (test code                                   

     



             = 2834)                                             

 

             BANDS - ABS (CELLAVISION)(BEAKER) 0.31 K/uL    0.00-0.80           

      



             (test code = 2840)                                        

 

             TOTAL COUNTED (BEAKER) (test code 100                              

      



             = 1351)                                             

 

             WBC MORPHOLOGY (BEAKER) (test Normal                               

  



             code = 487)                                         

 

             PLT MORPHOLOGY (BEAKER) (test Normal                               

  



             code = 486)                                         

 

             POLYCHROMATOPHILLIC RBCS(BEAKER) 1+ few                            

     



             (test code = 478)                                        

 

             POIKILOCYTES (BEAKER) (test code 2+ moderate                       

     



             = 966)                                              

 

             KARISSA CELLS (BEAKER) (test code = 2+ moderate                       

     



             474)                                                

 

             BASOPHILIC STIPPLING (BEAKER) Present                              

  



             (test code = 473)                                        

 

             ARTIFACT (CELLAVISION)(BEAKER) Present                             

   



             (test code = 3432)                                        

 

             PLATELET CONCENTRATION Decreased                              



             (CELLAVISION)(BEAKER) (test code                                   

     



             = 3438)                                             



Received comment: User comments: Slide comments:RAD, CHEST, 1 VIEW, NON DEPT
2019 08:40:00Reason for exam:-&gt;left effusionShould this be performed at
the bedside?-&gt;YesFINAL REPORT PATIENT ID:   [id number] Portable chest. CLINICAL
HISTORY: left effusion. COMPARISON STUDY: Chest x-ray from yesterday. FINDINGS: 
The cardiac silhouette is unremarkable. The pulmonary parenchyma demonstrates 
increased interstitial markings with atelectatic changes in the lung bases. A 
left-sided pigtail catheter chest tube remains and there has been development of
a loculated small basilarpneumothorax. A right PICC line remains. Degenerative 
changes are noted. IMPRESSION: Development a small left-sided basilar 
pneumothorax. No other significant change. Signed: Mk Martinezort Kenyetta miguel Date/Time:  2019 08:40:30 Reading Location: [facility] C0X [facility] 
Consult Reading Room Electronically signed by: MK MARTINEZ M.D. on 
2019 08:40 AMPOCT-GLUCOSE [id number] 08:39:00





             Test Item    Value        Reference Range Interpretation Comments

 

             POC-GLUCOSE METER 110 mg/dL                        TESTED AT 

[place] [address]



             ([facility]) (test code =                                        [place]



             1538)                                               [address]



COMPREHENSIVE METABOLIC [id number] 06:32:00





             Test Item    Value        Reference Range Interpretation Comments

 

             TOTAL PROTEIN 5.5 gm/dL    6.0-8.3      L            



             (BEAKER) (test code =                                        



             770)                                                

 

             ALBUMIN (BEAKER) 1.9 g/dL     3.5-5.0      L            



             (test code = 1145)                                        

 

             ALKALINE PHOSPHATASE 134 U/L                          



             (BEAKER) (test code =                                        



             346)                                                

 

             BILIRUBIN TOTAL 4.4 mg/dL    0.2-1.2      H            



             (BEAKER) (test code =                                        



             377)                                                

 

             SODIUM (BEAKER) (test 116 meq/L    136-145      LL           



             code = 381)                                         

 

             POTASSIUM (BEAKER) 5.6 meq/L    3.5-5.1      H            



             (test code = 379)                                        

 

             CHLORIDE (BEAKER) 83 meq/L            L            



             (test code = 382)                                        

 

             CO2 (BEAKER) (test 27 meq/L     22-29                     



             code = 355)                                         

 

             BLOOD UREA NITROGEN 34 mg/dL     7-21         H            



             (BEAKER) (test code =                                        



             354)                                                

 

             CREATININE (BEAKER) 0.89 mg/dL   0.57-1.25                 



             (test code = 358)                                        

 

             GLUCOSE RANDOM 109 mg/dL           H            



             (BEAKER) (test code =                                        



             652)                                                

 

             CALCIUM (BEAKER) 7.4 mg/dL    8.4-10.2     L            



             (test code = 697)                                        

 

             AST (SGOT) (BEAKER) 31 U/L       5-34                      



             (test code = 353)                                        

 

             ALT (SGPT) (BEAKER) 18 U/L       6-55                      



             (test code = 347)                                        

 

             EGFR (BEAKER) (test 87 mL/min/1.73                           ESTIMA

[place] GFR IS



             code = 1092) sq m                                   NOT AS ACCURATE

 AS



                                                                 CREATININE



                                                                 CLEARANCE IN



                                                                 PREDICTING



                                                                 GLOMERULAR



                                                                 FILTRATION RATE

.



                                                                 ESTIMATED GFR I

S



                                                                 NOT APPLICABLE 

FOR



                                                                 DIALYSIS PATIEN

TS.



Specimen moderately ictericPOCT-GLUCOSE [id number] 21:12:00





             Test Item    Value        Reference Range Interpretation Comments

 

             POC-GLUCOSE METER 114 mg/dL           H            TESTED AT 

[facility] [address]



             [facility]                                        [facility]



             1538)                                               [id number]



OSMOLALITY, [id number] 18:43:00





             Test Item    Value        Reference Range Interpretation Comments

 

             OSMOLALITY URINE (BEAKER) (test 694 mOsm/kg  40-1,400              

    



             code = 614)                                         



SODIUM, RANDOM [id number] 18:02:00





             Test Item    Value        Reference Range Interpretation Comments

 

             SODIUM URINE (BEAKER) (test code = < meq/L                         

       



             243)                                                



Reference Range: No NormalsPOCT-GLUCOSE [id number] 16:06:00





             Test Item    Value        Reference Range Interpretation Comments

 

             POC-GLUCOSE METER 145 mg/dL           H            TESTED AT 

[place] [address]



             ([facility]) (test code =                                        [facility]



             1538)                                               [id number]



RAD, ABDOMEN/KUB, 1 VIEW [id number] 12:51:00Reason for exam:-&gt;no BM in 13 
days. abdominal distension.  concern for ileusFINAL REPORT PATIENT ID:   
[id number] RAD, ABDOMEN/KUB, 1 VIEW AP CLINICAL INDICATION:  no BM in 13 days. 
abdominal distension.  concern for ileus   COMPARISON: None TECHNIQUE: 24 
radiographs of the abdomen. IMPRESSION:  The bowel gas pattern demonstrates 
gaseous distention without dilation. No pneumatosis. Appearance may reflect 
ileus. Excreted contrast is present in the urinary bladder. The regional s
keleton is intact. Signed: JR Mendoza Robert MDReport Verified Date/Time:
 2019 12:51:21 Reading Location: [facility] Radiology Reading Room    
Electronically signed by: KULWINDER MENDOZA on 2019 12:51 PM
OSMOLALITY, [id number] 12:47:00





             Test Item    Value        Reference Range Interpretation Comments

 

             OSMOLALITY, SERUM (BEAKER) (test 258 mOsm/kg  275-295      L       

     



             code = 615)                                         



POCT-GLUCOSE [id number] 12:35:00





             Test Item    Value        Reference Range Interpretation Comments

 

             POC-GLUCOSE METER 93 mg/dL                         TESTED AT 

[place] [address]



             ([facility]) (test code =                                        [facility] [address]



             1538)                                               



CBC W/PLT COUNT &amp; AUTO [id number] 10:10:00





             Test Item    Value        Reference Range Interpretation Comments

 

             WHITE BLOOD CELL COUNT (BEAKER) 19.5 K/ L    3.5-10.5     H        

    



             (test code = 775)                                        

 

             RED BLOOD CELL COUNT (BEAKER) 4.18 M/ L    4.63-6.08    L          

  



             (test code = 761)                                        

 

             HEMOGLOBIN (BEAKER) (test code = 13.3 GM/DL   13.7-17.5    L       

     



             410)                                                

 

             HEMATOCRIT (BEAKER) (test code = 37.8 %       40.1-51.0    L       

     



             411)                                                

 

             MEAN CORPUSCULAR VOLUME (BEAKER) 90.4 fL      79.0-92.2            

     



             (test code = 753)                                        

 

             MEAN CORPUSCULAR HEMOGLOBIN 31.8 pg      25.7-32.2                 



             (BEAKER) (test code = 751)                                        

 

             MEAN CORPUSCULAR HEMOGLOBIN CONC 35.2 GM/DL   32.3-36.5            

     



             (BEAKER) (test code = 752)                                        

 

             RED CELL DISTRIBUTION WIDTH 13.5 %       11.6-14.4                 



             (BEAKER) (test code = 412)                                        

 

             PLATELET COUNT (BEAKER) (test code 60 K/CU MM   150-450      L     

       



             = 756)                                              

 

             MEAN PLATELET VOLUME (BEAKER) 9.4 fL       9.4-12.4                

  



             (test code = 754)                                        

 

             NUCLEATED RED BLOOD CELLS (BEAKER) 0 /100 WBC   0-0                

       



             (test code = 413)                                        



(CELLAVISION MANUAL DIFF)2019 10:10:00





             Test Item    Value        Reference Range Interpretation Comments

 

             NEUTROPHILS - REL 86 %                                   



             (CELLAVISION)(BEAKER) (test code =                                 

       



             2816)                                               

 

             LYMPHOCYTES - REL 1 %                                    



             (CELLAVISION)(BEAKER) (test code =                                 

       



             2817)                                               

 

             MONOCYTES - REL 6 %                                    



             (CELLAVISION)(BEAKER) (test code =                                 

       



             2818)                                               

 

             EOSINOPHILS - REL 3 %                                    



             (CELLAVISION)(BEAKER) (test code =                                 

       



             2819)                                               

 

             BANDS - REL (CELLAVISION)(BEAKER) 3 %          0-10                

      



             (test code = 2826)                                        

 

             BLASTS - REL (CELLAVISION)(BEAKER) 1 %          0-0          H     

       



             (test code = 2827)                                        

 

             NEUTROPHILS - ABS 16.77 K/ul   1.78-5.38    H            



             (CELLAVISION)(BEAKER) (test code =                                 

       



             2830)                                               

 

             LYMPHOCYTES - ABS 0.20 K/ul    1.32-3.57    L            



             (CELLAVISION)(BEAKER) (test code =                                 

       



             2831)                                               

 

             MONOCYTES - ABS 1.17 K/uL    0.30-0.82    H            



             (CELLAVISION)(BEAKER) (test code =                                 

       



             2832)                                               

 

             EOSINOPHILS - ABS 0.59 K/uL    0.04-0.54    H            



             (CELLAVISION)(BEAKER) (test code =                                 

       



             2834)                                               

 

             BANDS - ABS (CELLAVISION)(BEAKER) 0.59 K/uL    0.00-0.80           

      



             (test code = 2840)                                        

 

             BLASTS - ABS (CELLAVISION)(BEAKER) 0.20 K/uL    0.00-0.00    H     

       



             (test code = 2845)                                        

 

             TOTAL COUNTED (BEAKER) (test code 100                              

      



             = 1351)                                             

 

             PLT MORPHOLOGY (BEAKER) (test code Normal                          

       



             = 486)                                              

 

             SMUDGE CELLS (BEAKER) (test code = Present                         

       



             1371)                                               

 

             POIKILOCYTES (BEAKER) (test code = 1+ few                          

       



             966)                                                

 

             PLATELET CONCENTRATION Decreased                              



             (CELLAVISION)(BEAKER) (test code =                                 

       



             3438)                                               



Received comment: User comments: Slide comments:RAD, CHEST, 1 VIEW, NON DEPT
2019 08:53:00Reason for exam:-&gt;left effusionShould this be performed at
the bedside?-&gt;YesFINAL REPORT PATIENT ID:   [id number] RAD, CHEST, 1 VIEW, NON 
DEPT INDICATION: left effusion COMPARISON: Prior day's exam FINDINGS: Portable 
frontal view of the chest.   IMPRESSION: Limited by patient positioning.Support 
Lines: Stable. Lungs and pleura: Interstitial congestion on the left slightly 
greater than the right and unchanged from prior. Small left effusion. No visible
pneumothorax.Heart and mediastinum: Stable contours. Additional findings: None. 
Signed: JR Mendoza Robert MDReport Verified Date/Time:  2019 
08:53:19 Reading Location: [facility] Radiology Reading Room    Electro
nically signed by: KULWINDER MENDOZA on 2019 08:53 AMPOCT-GLUCOSE 
[id number] 07:58:00





             Test Item    Value        Reference Range Interpretation Comments

 

             POC-GLUCOSE METER 95 mg/dL                         TESTED AT 

[facility] [address]



             (BEAKER) (test code =                                        [address]

[place] [address]



             1538)                                               



COMPREHENSIVE METABOLIC [id number] 05:41:00





             Test Item    Value        Reference Range Interpretation Comments

 

             TOTAL PROTEIN 5.4 gm/dL    6.0-8.3      L            



             (BEAKER) (test code =                                        



             770)                                                

 

             ALBUMIN (BEAKER) 1.9 g/dL     3.5-5.0      L            



             (test code = 1145)                                        

 

             ALKALINE PHOSPHATASE 126 U/L                          



             (BEAKER) (test code =                                        



             346)                                                

 

             BILIRUBIN TOTAL 4.9 mg/dL    0.2-1.2      H            



             (BEAKER) (test code =                                        



             377)                                                

 

             SODIUM (BEAKER) (test 117 meq/L    136-145      LL           



             code = 381)                                         

 

             POTASSIUM (BEAKER) 5.3 meq/L    3.5-5.1      H            



             (test code = 379)                                        

 

             CHLORIDE (BEAKER) 83 meq/L            L            



             (test code = 382)                                        

 

             CO2 (BEAKER) (test 28 meq/L     22-29                     



             code = 355)                                         

 

             BLOOD UREA NITROGEN 29 mg/dL     7-21         H            



             (BEAKER) (test code =                                        



             354)                                                

 

             CREATININE (BEAKER) 0.90 mg/dL   0.57-1.25                 



             (test code = 358)                                        

 

             GLUCOSE RANDOM 99 mg/dL                         



             (BEAKER) (test code =                                        



             652)                                                

 

             CALCIUM (BEAKER) 7.7 mg/dL    8.4-10.2     L            



             (test code = 697)                                        

 

             AST (SGOT) (BEAKER) 28 U/L       5-34                      



             (test code = 353)                                        

 

             ALT (SGPT) (BEAKER) 18 U/L       6-55                      



             (test code = 347)                                        

 

             EGFR (BEAKER) (test 86 mL/min/1.73                           ESTIMA

[place] GFR IS



             code = 1092) sq m                                   NOT AS ACCURATE

 AS



                                                                 CREATININE



                                                                 CLEARANCE IN



                                                                 PREDICTING



                                                                 GLOMERULAR



                                                                 FILTRATION RATE

.



                                                                 ESTIMATED GFR I

S



                                                                 NOT APPLICABLE 

FOR



                                                                 DIALYSIS PATIEN

TS.



Specimen moderately ictericPOCT-GLUCOSE [id number] 21:08:00





             Test Item    Value        Reference Range Interpretation Comments

 

             POC-GLUCOSE METER 92 mg/dL                         TESTED AT 

[facility] [address]



             ([facility]) (test code =                                        [place] [address]



             1538)                                               



RAD, CHEST, 1 VIEW, NON [id number] 17:40:00Reason for exam:-&gt;post chest 
tube placementFINAL REPORT PATIENT ID:   [id number] INDICATION: post chest tube 
placement TECHNIQUE: Chest radiograph, single view, portable technique. FINDINGS
/ IMPRESSION: Left pleural effusion has decreased in size, since 10:25 AM, after
pleural tube placement. No pneumothorax demonstrated. Right PICC line again n
oted terminating at the cavoatrial junction. Signed: Giovani Ordonez MDReport 
Verified Date/Time:  2019 17:40:02 Reading Location: [facility] B1 C013W Consult 
Reading Room Electronically signed by: GIOVANI ORDONEZ M.D. on 
2019 05:40 PMPOCT-GLUCOSE [id number] 17:33:00





             Test Item    Value        Reference Range Interpretation Comments

 

             POC-GLUCOSE METER 116 mg/dL           H            TESTED AT 

[place] [address]



             ([facility]) (test code =                                        [facility]

[place] [facility]



             1538)                                               [address]



POCT-GLUCOSE [id number] 15:19:00





             Test Item    Value        Reference Range Interpretation Comments

 

             POC-GLUCOSE METER 85 mg/dL                         TESTED AT 

[place] [address]



             ([facility]) (test code =                                        [facility]

[place] [facility] [address]



             1538)                                               



CT, DRAINAGE, CHEST TUBE [id number] 14:49:00Reason for exam:-
&gt;loculated left pleural effusion, please place large bore pigtail if effusion
noted on CT scanAnesthesia:-&gt;NoneFINAL REPORT PATIENT ID:   [id number] 
PROCEDURE: CT-guided placement of a left pleural catheter. Dose modulation, 
iterative reconstruction, and/or weight-based adjustment of the mA/kV was 
utilized to reduce the radiation dose to as low as reasonably achievable. 
INDICATION: Loculated left pleural effusion. COMPARISON: CT from earlier today. 
SEDATION: Intravenous moderate sedation was administered by radiology nursing 
and monitored under the direction of the undersigned radiologist. The patient's 
vital signs were monitored throughout the procedure and recorded in the 
patient's medical record by radiology nursing. Total intraservice time of 
sedation was 25 minutes. MEDICATIONS: 0.5 mg Versed, 25 mcg fentanyl 
DESCRIPTION: After obtaining informed written consent, the patient was brought 
to the procedure room and placed in the supine position.  Preliminary CT scan 
revealed a large left pleural effusion. A clear path to the collection was 
identified. The overlying skin was prepped and draped in the usual, sterile 
fashion and local 2% lidocaine anesthesia was administered. Under CT guidance, a
19-gaugeneedle was placed. After placement of a wire and serial dilation, a 12 
Macanese catheter was placed into the pleural fluid. The catheter was affixed to 
the skin and connected to a vacuum container. 1000 mL of clear red fluid was 
aspirated. Samples were placed on this side table and no clotting was noted. 
Samples were sent for analysis. Post procedure scan showed decrease in size with
left effusion and no immediate complications. IMPRESSION: Successful placement 
of a 12 Macanese left chest tube. Signed: Abner Aguilera MDReport Verified Date/Time: 
2019 14:49:30 Reading Location: [facility] B1 C013Y CT Body Reading Room      
Electronically signed by: ABNER AGUILERA MD on 2019 02:49 PMCT, CHEST, 
WITH [id number] 13:11:00Reason for exam:-&gt;evaluate loculated left 
pleural effusion seen on xrayFINAL REPORT PATIENT ID:   [id number] TECHNIQUE: CT 
scan of the chest WITH intravenous contrast. Dose modulation, iterative 
reconstruction, and/or weight-based adjustment of the mA/kV was utilized to redu
ce the radiation dose to as low as reasonably achievable. INDICATION: evaluate 
loculated left pleural effusion seen on xrayevaluate loculated left pleural 
effusion seen on xray. COMPARISON: None.  FINDINGS:  LINES/TUBES: A right PICC 
has its tip at the superior cavoatrial junction. LUNGS AND AIRWAYS: Mild right 
basilar subsegmental atelectasis. There is an area of cavitation with a fluid 
fluid level in superior segment of the left lower lobe which measures 4 cm 
PLEURA: Trace right pleural effusion. HEART AND MEDIASTINUM: The visualized 
thyroid gland is normal. No significant mediastinal, hilar, or axillary 
lymphadenopathy. The heart and pericardium are within normal limits. Severe 
coronary arterial calcifications. SOFT TISSUES AND BONES: Bilateral 
gynecomastia. Old, healed right 10th and 12th ribfractures. Old, healed left 
10th rib fracture. UPPER ABDOMEN: Nodular, cirrhotic liver. Partially visualized
upper abdominal varices. A periumbilical vein is recanalized.  IMPRESSION: 
1.There is a large left sided loculated pleural effusion with a mildly thickened
pleura and some intermixed gas. This is concerning for an empyema. 2.The air-
fluid level with surrounding lung in the superior segment of the left lower lobe
is most likely a lung abscess. A cavitary lung nodule (either from malignancy or
fungal infection) is considered less likely. A follow-up chest CT is recommended
in three months to document decrease in size and exclude cavitary malignancy. 
3.Cirrhosis with findings of portal hypertension. Signed: Abner Aguilera 
Verified Date/Time:  2019 13:11:18 Reading Location: [facility] B1 C013Y CT Body 
Reading Room      Electronically signed by: ABNER AGUILERA MD on 2019 
01:11 PMPOCT-GLUCOSE [id number] 11:22:00





             Test Item    Value        Reference Range Interpretation Comments

 

             POC-GLUCOSE METER 81 mg/dL                         TESTED AT 

[facility] [address]



             ([facility]) (test code =                                        [place] [address]



             1538)                                               



RAD, CHEST, 1 VIEW, NON [id number] 10:59:00Reason for exam:-&gt;eval 
effusionShould this be performed at the bedside?-&gt;YesFINAL REPORT PATIENT ID:
  [id number] RAD, CHEST, 1 VIEW, NON DEPT INDICATION: eval effusion COMPARISON: 
Prior day's exam FINDINGS: Portable frontal view of the chest.   IMPRESSION: 
Limited by patient rotation.Support Lines: A right PICC terminates over the 
superior vena cava. Lungs and pleura: Large left effusion. Right costophrenic 
sulcus is excluded from view. No pneumothorax.Heart and mediastinum: U
nremarkable where visible.Additional findings: None. A CT evaluation is 
currently pending. Signed: JR Mendoza Robert MDReport Verified Date/Time:
 2019 10:59:34 Reading Location: [facility] Radiology Reading Room    
Electronically signed by: KULWINDER MENDOZA on 2019 10:59 AM
COMPREHENSIVE METABOLIC [id number] 07:33:00





             Test Item    Value        Reference Range Interpretation Comments

 

             TOTAL PROTEIN 5.8 gm/dL    6.0-8.3      L            Specimen sligh

tly



             (BEAKER) (test code =                                        hemoly

zed



             770)                                                

 

             ALBUMIN (BEAKER) 2.0 g/dL     3.5-5.0      L            Specimen sl

ightly



             (test code = 1145)                                        hemolyzed

 

             ALKALINE PHOSPHATASE 130 U/L                          



             (BEAKER) (test code =                                        



             346)                                                

 

             BILIRUBIN TOTAL 4.6 mg/dL    0.2-1.2      H            Specimen sli

ghtly



             (BEAKER) (test code =                                        hemoly

zed



             377)                                                

 

             SODIUM (BEAKER) (test 117 meq/L    136-145      LL           



             code = 381)                                         

 

             POTASSIUM (BEAKER) 5.3 meq/L    3.5-5.1      H            Specimen 

slightly



             (test code = 379)                                        hemolyzed

 

             CHLORIDE (BEAKER) 84 meq/L            L            



             (test code = 382)                                        

 

             CO2 (BEAKER) (test 28 meq/L     22-29                     



             code = 355)                                         

 

             BLOOD UREA NITROGEN 22 mg/dL     7-21         H            



             (BEAKER) (test code =                                        



             354)                                                

 

             CREATININE (BEAKER) 0.86 mg/dL   0.57-1.25                 Specimen

 slightly



             (test code = 358)                                        hemolyzed

 

             GLUCOSE RANDOM 90 mg/dL                         



             (BEAKER) (test code =                                        



             652)                                                

 

             CALCIUM (BEAKER) 7.9 mg/dL    8.4-10.2     L            



             (test code = 697)                                        

 

             AST (SGOT) (BEAKER) 33 U/L       5-34                      Specimen

 slightly



             (test code = 353)                                        hemolyzed

 

             ALT (SGPT) (BEAKER) 20 U/L       6-55                      Specimen

 slightly



             (test code = 347)                                        hemolyzed

 

             EGFR (BEAKER) (test 91 mL/min/1.73                           ESTIMA

[place] GFR IS



             code = 1092) sq m                                   NOT AS ACCURATE

 AS



                                                                 CREATININE



                                                                 CLEARANCE IN



                                                                 PREDICTING



                                                                 GLOMERULAR



                                                                 FILTRATION RATE

.



                                                                 ESTIMATED GFR I

S



                                                                 NOT APPLICABLE 

FOR



                                                                 DIALYSIS PATIEN

TS.



Specimen moderately ictericPOCT-GLUCOSE [id number] 05:46:00





             Test Item    Value        Reference Range Interpretation Comments

 

             POC-GLUCOSE METER 93 mg/dL                         TESTED AT 

[facility] [address]



             [facility] ([facility]                                        [place] [facility] [address]



             1538)                                               



PROTHROMBIN [id number] 05:30:00





             Test Item    Value        Reference Range Interpretation Comments

 

             PROTIME (BEAKER) (test code = 16.9 seconds 11.9-14.2    H          

  



             759)                                                

 

             INR (BEAKER) (test code = 370) 1.4          <=5.9                  

   



Effective 2019: PT Reference Range ChangeNew: 11.9-14.2  Previous: 11.7-
14.7RECOMMENDED COUMADIN/WARFARIN INR THERAPY RANGESSTANDARD DOSE: 2.0-3.0  
Includes: PROPHYLAXIS for venous thrombosis, systemic embolization; TREATMENT 
for venous thrombosis and/or pulmonary embolus.HIGH RISK: Target INR is2.5-3.5 
for patients wiht mechanical heart valves.CBC W/PLT COUNT &amp; AUTO 
[id number] 05:27:00





             Test Item    Value        Reference Range Interpretation Comments

 

             WHITE BLOOD CELL COUNT (BEAKER) 23.2 K/ L    3.5-10.5     H        

    



             (test code = 775)                                        

 

             RED BLOOD CELL COUNT (BEAKER) 4.75 M/ L    4.63-6.08               

  



             (test code = 761)                                        

 

             HEMOGLOBIN (BEAKER) (test code = 15.3 GM/DL   13.7-17.5            

     



             410)                                                

 

             HEMATOCRIT (BEAKER) (test code = 43.1 %       40.1-51.0            

     



             411)                                                

 

             MEAN CORPUSCULAR VOLUME (BEAKER) 90.7 fL      79.0-92.2            

     



             (test code = 753)                                        

 

             MEAN CORPUSCULAR HEMOGLOBIN 32.2 pg      25.7-32.2                 



             (BEAKER) (test code = 751)                                        

 

             MEAN CORPUSCULAR HEMOGLOBIN CONC 35.5 GM/DL   32.3-36.5            

     



             (BEAKER) (test code = 752)                                        

 

             RED CELL DISTRIBUTION WIDTH 13.3 %       11.6-14.4                 



             (BEAKER) (test code = 412)                                        

 

             PLATELET COUNT (BEAKER) (test code 87 K/CU MM   150-450      L     

       



             = 756)                                              

 

             MEAN PLATELET VOLUME (BEAKER) 8.9 fL       9.4-12.4     L          

  



             (test code = 754)                                        

 

             NUCLEATED RED BLOOD CELLS (BEAKER) 0 /100 WBC   0-0                

       



             (test code = 413)                                        

 

             NEUTROPHILS RELATIVE PERCENT 86 %                                  

 



             (BEAKER) (test code = 429)                                        

 

             LYMPHOCYTES RELATIVE PERCENT 3 %                                   

 



             (BEAKER) (test code = 430)                                        

 

             MONOCYTES RELATIVE PERCENT 9 %                                    



             (BEAKER) (test code = 431)                                        

 

             EOSINOPHILS RELATIVE PERCENT 0 %                                   

 



             (BEAKER) (test code = 432)                                        

 

             BASOPHILS RELATIVE PERCENT 0 %                                    



             (BEAKER) (test code = 437)                                        

 

             NEUTROPHILS ABSOLUTE COUNT 19.87 K/ L   1.78-5.38    H            



             (BEAKER) (test code = 670)                                        

 

             LYMPHOCYTES ABSOLUTE COUNT 0.73 K/ L    1.32-3.57    L            



             (BEAKER) (test code = 414)                                        

 

             MONOCYTES ABSOLUTE COUNT (BEAKER) 2.16 K/ L    0.30-0.82    H      

      



             (test code = 415)                                        

 

             EOSINOPHILS ABSOLUTE COUNT 0.06 K/ L    0.04-0.54                 



             ([facility]) (test code = 416)                                        

 

             BASOPHILS ABSOLUTE COUNT ([facility]) 0.04 K/ L    0.01-0.08           

      



             (test code = 417)                                        

 

             IMMATURE GRANULOCYTES-RELATIVE 1 %          0-1                    

   



             PERCENT ([facility]) (test code =                                      

  



             2801)                                               



POCT-GLUCOSE [id number] 23:34:00





             Test Item    Value        Reference Range Interpretation Comments

 

             POC-GLUCOSE METER 94 mg/dL                         TESTED AT 

[facility] [address]



             ([facility]) (test code =                                        [place] [address]



             1538)

## 2021-11-22 NOTE — EDPHYS
Physician Documentation                                                                           

 [facility]                                                                 

Name: Omkar Chung                                                                                  

Age: 61 yrs                                                                                       

Sex: Male                                                                                         

: 1960                                                                                   

MRN: [id number]                                                                                   

Arrival Date: 2021                                                                          

Time: 09:56                                                                                       

Account#: [id number]                                                                            

Bed 7                                                                                             

Private MD: Donta [facility]                                                                         

ED Physician Job Hyman                                                                       

HPI:                                                                                              

                                                                                             

10:29 This 61 yrs old Male presents to ER via Wheelchair with complaints of Redness On Arm.   kb  

10:29 redness to right forearm. Description: erythematous. Onset: The symptoms/episode        kb  

      began/occurred 1 week(s) ago. Possible cause(s): unknown. Associated signs and              

      symptoms: Pertinent positives: erythema, Pertinent negatives: discharge, drainage,          

      foreign body sensation, fever, headache, nausea, shortness of breath, swelling,             

      vomiting. Modifying factors: the symptoms are alleviated by nothing, the symptoms are       

      aggravated by nothing. Severity of symptoms: At their worst the symptoms were mild, in      

      the emergency department the symptoms are unchanged. The patient has not experienced        

      similar symptoms in the past. The patient has been recently seen by a physician:. Pt        

      reports redness to right forearm that started a week ago. States it was more painful        

      last week. REports redness has spread. Went to Dr Longo and was sent to the ER to rule      

      out DVT.                                                                                    

                                                                                                  

Historical:                                                                                       

- Allergies:                                                                                      

10:22 No Known Allergies;                                                                     aa5 

- PMHx:                                                                                           

10:21 Cirrhosis; COPD; Hernia;                                                                aa5 

                                                                                                  

- Immunization history:: Client reports receiving the Escobar \T\ Escobar single-dose             

  vaccine.                                                                                        

- Social history:: Smoking status: Patient denies any tobacco usage or history of.                

                                                                                                  

                                                                                                  

ROS:                                                                                              

10:28 Constitutional: Negative for fever, chills, and weight loss.                            kb  

10:28 Skin: Positive for erythema, of the right forearm.                                          

10:28 All other systems are negative.                                                             

                                                                                                  

Exam:                                                                                             

10:28 Constitutional:  This is a well developed, well nourished patient who is awake, alert,  kb  

      and in no acute distress. Head/Face:  Normocephalic, atraumatic. ENT:  Moist Mucous         

      membranes Respiratory:  Respirations even and unlabored. No increased work of               

      breathing, no retractions or nasal flaring. MS/ Extremity:  Pulses equal, no cyanosis.      

      Neurovascular intact.  Full, normal range of motion. Neuro:  Awake and alert, GCS 15,       

      oriented to person, place, time, and situation. Moves all extremities. Normal gait.         

      Psych:  Awake, alert, with orientation to person, place and time.  Behavior, mood, and      

      affect are within normal limits.                                                            

10:28 Skin: Appearance: normal except for affected area, Color: erythematous.                     

                                                                                                  

Vital Signs:                                                                                      

10:21  / 80; Pulse 92; Resp 16 S; Temp 98.4(TE); Pulse Ox 97% on R/A; Weight 99.79 kg   aa5 

      (R); Height 6 ft. 3 in. (190.50 cm) (R);                                                    

11:13  / 77; Pulse 95; Resp 20 S; Pulse Ox 98% on R/A;                                  as6 

10:21 Body Mass Index 27.50 (99.79 kg, 190.50 cm)                                             aa5 

                                                                                                  

MDM:                                                                                              

10:23 Patient medically screened.                                                             kb  

10:28 Data reviewed: vital signs, nurses notes. Data interpreted: Pulse oximetry: on room air kb  

      is 97 %. Interpretation: normal.                                                            

11:10 Counseling: I had a detailed discussion with the patient and/or guardian regarding: the kb  

      historical points, exam findings, and any diagnostic results supporting the                 

      discharge/admit diagnosis, radiology results, the need for outpatient follow up, a          

      family practitioner, to return to the emergency department if symptoms worsen or            

      persist or if there are any questions or concerns that arise at home.                       

                                                                                                  

                                                                                             

10:30 Order name: UPPER EXTREMITY VENOUS UNILATE; Complete Time: 11:10                        EDMS

                                                                                                  

Administered Medications:                                                                         

No medications were administered                                                                  

                                                                                                  

                                                                                                  

Disposition:                                                                                      

13:38 Co-signature as Attending Physician, Job Hyman MD I agree with the assessment and   kdr 

      plan of care.                                                                               

                                                                                                  

Disposition Summary:                                                                              

21 11:11                                                                                    

Discharge Ordered                                                                                 

      Location: Home                                                                          kb  

      Condition: Stable                                                                       kb  

      Diagnosis                                                                                   

        - Cellulitis of right upper limb                                                      kb  

      Followup:                                                                               kb  

        - With: Emergency Department                                                               

        - When: As needed                                                                          

        - Reason: Worsening of condition                                                           

      Followup:                                                                               kb  

        - With: Private Physician                                                                  

        - When: 2 - 3 days                                                                         

        - Reason: Recheck today's complaints, Continuance of care, Re-evaluation by your           

      physician                                                                                   

      Discharge Instructions:                                                                     

        - Discharge Summary Sheet                                                             kb  

        - Cellulitis, Adult, Easy-to-Read                                                     kb  

      Forms:                                                                                      

        - Medication Reconciliation Form                                                      kb  

        - Thank You Letter                                                                    kb  

        - Antibiotic Education                                                                kb  

        - Prescription Opioid Use                                                             kb  

      Prescriptions:                                                                              

        - Bactrim -160 mg Oral Tablet                                                        

            - take 1 tablet by ORAL route every 12 hours for 10 days; 20 tablet; Refills: 0,  kb  

      Product Selection Permitted                                                                 

Signatures:                                                                                       

Dispatcher [employer]                           EDMS                                                 

Edie Cardoza, TOBYP-C                 FNP-Job Frost MD MD kdr Calderon, Audri, RN                     RN   aa5                                                  

                                                                                                  

Corrections: (The following items were deleted from the chart)                                    

10:30 10:29 Extremity Venous Uni Ltd+US.RAD.BRZ ordered. EDMS                                 EDMS

                                                                                                  

**************************************************************************************************

## 2021-11-22 NOTE — RAD REPORT
EXAM DESCRIPTION:  US - UPPER EXTREMITY VENOUS UNILATE - 11/22/2021 11:00 am

 

CLINICAL HISTORY:  redness;Pain

Arm swelling and redness.

 

COMPARISON:  No comparisons

 

FINDINGS:  Right upper extremity venous system was interrogated with Doppler technique. Normal flow, 
compressibility and augmentation was noted. There is no DVT present.

 

IMPRESSION:  No evidence of right upper extremity deep venous thrombosis.

## 2022-03-04 ENCOUNTER — HOSPITAL ENCOUNTER (OUTPATIENT)
Dept: HOSPITAL 97 - FNA | Age: 62
End: 2022-03-04
Attending: SURGERY
Payer: COMMERCIAL

## 2022-03-04 DIAGNOSIS — R16.0: Primary | ICD-10-CM

## 2022-03-04 PROCEDURE — 76942 ECHO GUIDE FOR BIOPSY: CPT

## 2022-03-04 PROCEDURE — 88305 TISSUE EXAM BY PATHOLOGIST: CPT

## 2022-03-04 PROCEDURE — 49180 BIOPSY ABDOMINAL MASS: CPT

## 2022-03-04 NOTE — RAD REPORT
EXAM DESCRIPTION:  US - BX ABD/RETRO PERC/US GUIDE - 3/4/2022 11:25 am

 

CLINICAL HISTORY:  R16.0

 

COMPARISON:  No comparisons

 

FINDINGS:  Preoperative diagnosis: Omental nodule.

Post operative diagnosis: Same.

Conscious Sedation: None

Fluoroscopy time: None

Contrast used: None

Estimated blood loss: Minimal

Specimens:2 x 18 gauge core samples obtained.

 

The abdomen was prepped and draped in the usual sterile fashion. 1% lidocaine was infiltrated into th
e subcutaneous tissues for local anesthesia. Real time ultrasound scanning of the abdomen demonstrate
d the omental implant within the patient's ventral hernia. Under ultrasound guidance, using a 18-gaug
e, 6 cm long, 2 cm throw core biopsy gun, 2 specimens were obtained of this lesion and [facility]. There were no complications.

 

IMPRESSION:  1. Technically successful ultrasound guided core biopsy of an omental implant located in
 the patient's ventral hernia.

2. No immediate complications.

## 2022-05-01 ENCOUNTER — HOSPITAL ENCOUNTER (INPATIENT)
Dept: HOSPITAL 97 - ER | Age: 62
LOS: 2 days | Discharge: HOME | DRG: 871 | End: 2022-05-03
Attending: INTERNAL MEDICINE | Admitting: INTERNAL MEDICINE
Payer: MEDICARE

## 2022-05-01 VITALS — BODY MASS INDEX: 24 KG/M2

## 2022-05-01 DIAGNOSIS — E86.0: ICD-10-CM

## 2022-05-01 DIAGNOSIS — Z20.822: ICD-10-CM

## 2022-05-01 DIAGNOSIS — J98.11: ICD-10-CM

## 2022-05-01 DIAGNOSIS — Z53.29: ICD-10-CM

## 2022-05-01 DIAGNOSIS — K70.30: ICD-10-CM

## 2022-05-01 DIAGNOSIS — J44.9: ICD-10-CM

## 2022-05-01 DIAGNOSIS — R65.20: ICD-10-CM

## 2022-05-01 DIAGNOSIS — N39.0: ICD-10-CM

## 2022-05-01 DIAGNOSIS — C16.9: ICD-10-CM

## 2022-05-01 DIAGNOSIS — E87.1: ICD-10-CM

## 2022-05-01 DIAGNOSIS — N17.9: ICD-10-CM

## 2022-05-01 DIAGNOSIS — C18.9: ICD-10-CM

## 2022-05-01 DIAGNOSIS — E11.9: ICD-10-CM

## 2022-05-01 DIAGNOSIS — Z99.81: ICD-10-CM

## 2022-05-01 DIAGNOSIS — G92.8: ICD-10-CM

## 2022-05-01 DIAGNOSIS — A41.9: Primary | ICD-10-CM

## 2022-05-01 LAB
ALBUMIN SERPL BCP-MCNC: 3.6 G/DL (ref 3.4–5)
ALP SERPL-CCNC: 256 U/L (ref 45–117)
ALT SERPL W P-5'-P-CCNC: 67 U/L (ref 12–78)
AST SERPL W P-5'-P-CCNC: 74 U/L (ref 15–37)
BUN BLD-MCNC: 26 MG/DL (ref 7–18)
COHGB MFR BLDA: 1.4 % (ref 0–1.5)
GLUCOSE SERPLBLD-MCNC: 234 MG/DL (ref 74–106)
HCT VFR BLD CALC: 46.3 % (ref 39.6–49)
INR BLD: 1.26
LYMPHOCYTES # SPEC AUTO: 0.5 K/UL (ref 0.7–4.9)
MAGNESIUM SERPL-MCNC: 2.2 MG/DL (ref 1.8–2.4)
METHAMPHET UR QL SCN: NEGATIVE
METHAMPHET UR QL SCN: NEGATIVE
NT-PROBNP SERPL-MCNC: 183 PG/ML (ref ?–125)
OXYHGB MFR BLDA: 94.6 % (ref 94–97)
PMV BLD: 7.6 FL (ref 7.6–11.3)
POTASSIUM SERPL-SCNC: 4.5 MMOL/L (ref 3.5–5.1)
RBC # BLD: 5.31 M/UL (ref 4.33–5.43)
SAO2 % BLDA: 96.9 % (ref 92–98.5)
SARS-COV-2 RNA RESP QL NAA+PROBE: NEGATIVE
THC SERPL-MCNC: NEGATIVE NG/ML
THC SERPL-MCNC: NEGATIVE NG/ML
TROPONIN I SERPL HS-MCNC: 11.6 PG/ML (ref ?–58.9)

## 2022-05-01 PROCEDURE — 96417 CHEMO IV INFUS EACH ADDL SEQ: CPT

## 2022-05-01 PROCEDURE — 97116 GAIT TRAINING THERAPY: CPT

## 2022-05-01 PROCEDURE — 85610 PROTHROMBIN TIME: CPT

## 2022-05-01 PROCEDURE — 80048 BASIC METABOLIC PNL TOTAL CA: CPT

## 2022-05-01 PROCEDURE — 99214 OFFICE O/P EST MOD 30 MIN: CPT

## 2022-05-01 PROCEDURE — 80076 HEPATIC FUNCTION PANEL: CPT

## 2022-05-01 PROCEDURE — 84145 PROCALCITONIN (PCT): CPT

## 2022-05-01 PROCEDURE — 87186 SC STD MICRODIL/AGAR DIL: CPT

## 2022-05-01 PROCEDURE — 70450 CT HEAD/BRAIN W/O DYE: CPT

## 2022-05-01 PROCEDURE — 71045 X-RAY EXAM CHEST 1 VIEW: CPT

## 2022-05-01 PROCEDURE — 84484 ASSAY OF TROPONIN QUANT: CPT

## 2022-05-01 PROCEDURE — 87088 URINE BACTERIA CULTURE: CPT

## 2022-05-01 PROCEDURE — 84443 ASSAY THYROID STIM HORMONE: CPT

## 2022-05-01 PROCEDURE — 81003 URINALYSIS AUTO W/O SCOPE: CPT

## 2022-05-01 PROCEDURE — 80307 DRUG TEST PRSMV CHEM ANLYZR: CPT

## 2022-05-01 PROCEDURE — 36415 COLL VENOUS BLD VENIPUNCTURE: CPT

## 2022-05-01 PROCEDURE — 94640 AIRWAY INHALATION TREATMENT: CPT

## 2022-05-01 PROCEDURE — 82140 ASSAY OF AMMONIA: CPT

## 2022-05-01 PROCEDURE — 83880 ASSAY OF NATRIURETIC PEPTIDE: CPT

## 2022-05-01 PROCEDURE — 0240U: CPT

## 2022-05-01 PROCEDURE — 87086 URINE CULTURE/COLONY COUNT: CPT

## 2022-05-01 PROCEDURE — 84439 ASSAY OF FREE THYROXINE: CPT

## 2022-05-01 PROCEDURE — 96375 TX/PRO/DX INJ NEW DRUG ADDON: CPT

## 2022-05-01 PROCEDURE — 97161 PT EVAL LOW COMPLEX 20 MIN: CPT

## 2022-05-01 PROCEDURE — 96365 THER/PROPH/DIAG IV INF INIT: CPT

## 2022-05-01 PROCEDURE — 85025 COMPLETE CBC W/AUTO DIFF WBC: CPT

## 2022-05-01 PROCEDURE — 87040 BLOOD CULTURE FOR BACTERIA: CPT

## 2022-05-01 PROCEDURE — 96413 CHEMO IV INFUSION 1 HR: CPT

## 2022-05-01 PROCEDURE — 80053 COMPREHEN METABOLIC PANEL: CPT

## 2022-05-01 PROCEDURE — 82947 ASSAY GLUCOSE BLOOD QUANT: CPT

## 2022-05-01 PROCEDURE — 80329 ANALGESICS NON-OPIOID 1 OR 2: CPT

## 2022-05-01 PROCEDURE — 96523 IRRIG DRUG DELIVERY DEVICE: CPT

## 2022-05-01 PROCEDURE — 80320 DRUG SCREEN QUANTALCOHOLS: CPT

## 2022-05-01 PROCEDURE — 81015 MICROSCOPIC EXAM OF URINE: CPT

## 2022-05-01 PROCEDURE — 96415 CHEMO IV INFUSION ADDL HR: CPT

## 2022-05-01 PROCEDURE — 82805 BLOOD GASES W/O2 SATURATION: CPT

## 2022-05-01 PROCEDURE — 96416 CHEMO PROLONG INFUSE W/PUMP: CPT

## 2022-05-01 PROCEDURE — 99285 EMERGENCY DEPT VISIT HI MDM: CPT

## 2022-05-01 PROCEDURE — 83735 ASSAY OF MAGNESIUM: CPT

## 2022-05-01 PROCEDURE — 83605 ASSAY OF LACTIC ACID: CPT

## 2022-05-01 PROCEDURE — 96367 TX/PROPH/DG ADDL SEQ IV INF: CPT

## 2022-05-01 PROCEDURE — 87077 CULTURE AEROBIC IDENTIFY: CPT

## 2022-05-01 PROCEDURE — 93005 ELECTROCARDIOGRAM TRACING: CPT

## 2022-05-01 RX ADMIN — ENOXAPARIN SODIUM SCH MG: 40 INJECTION SUBCUTANEOUS at 09:17

## 2022-05-01 RX ADMIN — HUMAN INSULIN SCH: 100 INJECTION, SOLUTION SUBCUTANEOUS at 20:28

## 2022-05-01 RX ADMIN — HUMAN INSULIN SCH: 100 INJECTION, SOLUTION SUBCUTANEOUS at 16:30

## 2022-05-01 RX ADMIN — HUMAN INSULIN SCH: 100 INJECTION, SOLUTION SUBCUTANEOUS at 11:30

## 2022-05-01 RX ADMIN — SODIUM CHLORIDE PRN ML: 3 INJECTION, SOLUTION INTRAVENOUS at 08:33

## 2022-05-01 RX ADMIN — Medication SCH ML: at 20:28

## 2022-05-01 RX ADMIN — LACTULOSE SCH GM: 20 SOLUTION ORAL at 09:17

## 2022-05-01 RX ADMIN — Medication SCH: at 08:46

## 2022-05-01 RX ADMIN — CEFTRIAXONE SCH MLS: 2 INJECTION, POWDER, FOR SOLUTION INTRAMUSCULAR; INTRAVENOUS at 20:28

## 2022-05-01 RX ADMIN — HUMAN INSULIN SCH: 100 INJECTION, SOLUTION SUBCUTANEOUS at 07:30

## 2022-05-01 RX ADMIN — LACTULOSE SCH: 20 SOLUTION ORAL at 09:00

## 2022-05-01 NOTE — EDPHYS
Physician Documentation                                                                           

 [facility]                                                                 

Name: Omkar Chung                                                                                  

Age: 61 yrs                                                                                       

Sex: Male                                                                                         

: 1960                                                                                   

MRN: [id number]                                                                                   

Arrival Date: 2022                                                                          

Time: 01:31                                                                                       

Account#: [id number]                                                                            

Bed 6                                                                                             

Private MD:                                                                                       

ED Physician Ezra Arriaga                                                                      

HPI:                                                                                              

                                                                                             

02:00 This 61 yrs old Male presents to ER via EMS with complaints of Altered Mental Status.   [facility] 

02:00 The patient presents with confusion. Onset: The symptoms/episode began/occurred at an   [facility] 

      unknown time. Possible causes: unknown. Associated signs and symptoms: Pertinent            

      positives: confusion, shortness of breath. Current symptoms: In the emergency               

      department the patient's symptoms are unchanged from the initial presentation.              

      According to EMS, called for complaint of SOB but on arrival patient appeared to be         

      confused..                                                                                  

                                                                                                  

Historical:                                                                                       

- Allergies:                                                                                      

:39 No Known Allergies;                                                                     jb4 

- PMHx:                                                                                           

:39 Cirrhosis; COPD; Hernia; DM type 2; colon cancer;                                       jb4 

                                                                                                  

- Immunization history:: Adult Immunizations up to date.                                          

- Social history:: Smoking status: Patient/guardian denies using tobacco, the patient             

  reports quitting approximately 4 years ago.                                                     

                                                                                                  

                                                                                                  

ROS:                                                                                              

02:00 Unable to obtain ROS due to altered mental status.                                      7 

                                                                                                  

Exam:                                                                                             

02:00 Constitutional:  This is a well developed, well nourished patient who is awake, alert,  mh7 

      and in no acute distress. Head/Face:  Normocephalic, atraumatic. Eyes:  Pupils equal        

      round and reactive to light, extra-ocular motions intact.  Lids and lashes normal.          

      Conjunctiva and sclera are non-icteric and not injected.  Cornea within normal limits.      

      Periorbital areas with no swelling, redness, or edema. Neck:  Trachea midline, no           

      thyromegaly or masses palpated, and no cervical lymphadenopathy.  Supple, full range of     

      motion without nuchal rigidity, or vertebral point tenderness.  No Meningismus.             

      Chest/axilla:  Normal chest wall appearance and motion.  Nontender with no deformity.       

      No lesions are appreciated.                                                                 

02:00 Abdomen/GI:  Soft, non-tender, with normal bowel sounds.  No distension or tympany.  No     

      guarding or rebound.  No evidence of tenderness throughout. Back:  No spinal                

      tenderness.  No costovertebral tenderness.  Full range of motion. Skin:  Warm, dry with     

      normal turgor.  Normal color with no rashes, no lesions, and no evidence of cellulitis.     

      MS/ Extremity:  Pulses equal, no cyanosis.  Neurovascular intact.  Full, normal range       

      of motion.                                                                                  

02:00 Cardiovascular: Rate: tachycardic, Rhythm: regular, Pulses: no pulse deficits are           

      appreciated, Heart sounds: normal, normal S1and S2, Edema: is not appreciated, JVD: is      

      not appreciated.                                                                            

02:00 Respiratory: the patient does not display signs of respiratory distress,  Respirations:     

      normal, Breath sounds: rhonchi, that are mild, are scattered, Respiratory rate:  18         

02:00 Neuro: Orientation: to person, place, Mentation: confused, Memory: unable to test, AMS,     

      Cranial nerves: is grossly normal based on the patient's age, Cerebellar function: is       

      grossly normal based on the patient's age, Motor: is normal, Sensation: no obvious          

      gross deficits, Gait: not tested. seizure activity, is not displayed by the patient,        

      Abnormal movements: there are no abnormal movements.                                        

                                                                                                  

Vital Signs:                                                                                      

01:31  / 97; Pulse 110; Resp 20; Temp 98.3(TE); Pulse Ox 98% on R/A; Weight 102.06 kg   jb4 

      (R); Height 6 ft. 2 in. (187.96 cm); Pain 0/10;                                             

02:45  / 95; Pulse 110; Resp 24; Pulse Ox 97% on 2 lpm NC;                              lp1 

03:50  / 86; Pulse 91; Resp 30; Pulse Ox 98% on 2 lpm NC;                               tw5 

04:25  / 82; Pulse 93; Resp 16; Pulse Ox 96% on 2 lpm NC;                               lp1 

01:31 Body Mass Index 28.89 (102.06 kg, 187.96 cm)                                            jb4 

                                                                                                  

MDM:                                                                                              

03:33 Differential Diagnosis: CVA, electrolyte abnormality, alcohol intoxication,             mh7 

      hypoglycemia, intracranial bleed, overdose, pneumonia, sepsis, UTI, volume depletion.       

      Data reviewed: vital signs, nurses notes, EMS record, lab test result(s), cardiac           

      enzymes, CBC, electrolytes, EKG, radiologic studies, CT scan, plain films. Data             

      interpreted: Pulse oximetry: on room air is 97 %. Interpretation: normal. Counseling: I     

      had a detailed discussion with the patient and/or guardian regarding: the historical        

      points, exam findings, and any diagnostic results supporting the discharge/admit            

      diagnosis, the presence of at least one elevated blood pressure reading (>120/80)           

      during this emergency department visit, lab results, radiology results, the need for        

      further work-up and treatment in the hospital. Response to treatment: the patient's         

      symptoms have mildly improved after treatment.                                              

03:34 Patient medically screened.                                                                                                                                                          

01:34 Order name: Basic Metabolic Panel; Complete Time: 02:56                                 7 

                                                                                             

01:34 Order name: CBC with Diff; Complete Time: 02:32                                                                                                                                      

01:34 Order name: LFT's; Complete Time: 02:56                                                                                                                                              

01:34 Order name: Magnesium; Complete Time: 02:56                                                                                                                                          

01:34 Order name: NT PRO-BNP; Complete Time: 02:56                                                                                                                                         

01:34 Order name: PT-INR; Complete Time: 02:32                                                                                                                                             

01:34 Order name: Troponin HS; Complete Time: 02:56                                                                                                                                        

01:34 Order name: COVID-19/FLU A+B (Document "Date of Onset" if Symptomatic); Complete Time:   

      03:19                                                                                       

                                                                                             

01:35 Order name: AMMONIA; Complete Time: 02:56                                                                                                                                            

02:27 Order name: Glucose, Ancillary Testing; Complete Time: 02:32                            [facility]

                                                                                             

02:32 Order name: Arterial Blood Gas; Complete Time: 03:19                                    7 

                                                                                             

03:20 Order name: ETOH Level; Complete Time: 04:03                                            7 

                                                                                             

03:20 Order name: Acetaminophen; Complete Time: 04:44                                         7 

                                                                                             

03:20 Order name: Salicylate; Complete Time: 04:44                                                                                                                                         

01:34 Order name: XRAY Chest (1 view)                                                                                                                                                      

01:34 Order name: EKG; Complete Time: 01:34                                                                                                                                                

01:34 Order name: Cardiac monitoring; Complete Time: 01:59                                                                                                                                 

01:34 Order name: EKG - Nurse/Tech; Complete Time: 02:03                                      7 

                                                                                             

01:34 Order name: IV Saline Lock; Complete Time: 02:18                                        7 

                                                                                             

01:34 Order name: Labs collected and sent; Complete Time: 02:21                               [facility] 

                                                                                             

01:36 Order name: CT Head Brain wo Cont                                                       [facility] 

                                                                                             

03:20 Order name: UDS; Complete Time: 04:03                                                   [facility] 

                                                                                             

03:27 Order name: Urine Microscopic Only; Complete Time: 04:44                                [facility] 

                                                                                             

03:44 Order name: Urine Dipstick-Ancillary; Complete Time: 03:45                              [facility]

                                                                                             

03:51 Order name: Blood Culture Adult (2)                                                     [facility] 

                                                                                             

03:51 Order name: Lactate; Complete Time: 04:44                                               [facility] 

                                                                                             

03:52 Order name: Procalcitonin; Complete Time: 04:44                                         [facility] 

                                                                                             

01:34 Order name: O2 Per Protocol; Complete Time: 02:03                                       [facility] 

                                                                                             

01:34 Order name: O2 Sat Monitoring; Complete Time: 02:03                                     [facility] 

                                                                                             

03:20 Order name: Urine Dipstick-Ancillary (obtain specimen); Complete Time: 03:44            [facility] 

                                                                                                  

Administered Medications:                                                                         

03:15 Drug: Ativan (LORazepam) 2 mg Route: IVP; Site: left antecubital;                       tw5 

03:51 Follow up: Response: No adverse reaction; RASS: Light sedation (-2)                     tw5 

03:49 Drug: Rocephin (cefTRIAXone) 1 grams Route: IV; Rate: calculated rate; Site: left       tw5 

      antecubital;                                                                                

04:34 Follow up: IV Status: Completed infusion; IV Intake: 50ml                               lp1 

03:49 Drug: NS 0.9% 1000 ml Route: IV; Rate: 100 ml/hr; Site: left antecubital;               tw5 

04:34 Follow up: IV Status: Infusion continued upon admission                                 lp1 

                                                                                                  

                                                                                                  

Disposition Summary:                                                                              

22 03:34                                                                                    

Hospitalization Ordered                                                                           

      Hospitalization Status: Inpatient Admission                                             7 

      Provider: Palak Correa 

      Condition: Stable                                                                       [facility] 

      Problem: new                                                                            mh7 

      Symptoms: have improved                                                                 [facility] 

      Bed/Room Type: Standard                                                                 [facility] 

      Location: Intensive Care Unit(22 03:58)                                           cg  

      Room Assignment: 2-(22 03:58)                                                     cg  

      Diagnosis                                                                                   

        - Altered mental status, unspecified                                                  [facility] 

      Forms:                                                                                      

        - Medication Reconciliation Form                                                      [facility] 

        - SBAR form                                                                           [facility] 

Signatures:                                                                                       

Dispatcher [employer]                           EDEarnestine Gomez RN                         RN   lp1                                                  

Attema, Vipul, FNP-C                      FNP-Cla1                                                  

Ines Solis RN                       RN   cg                                                   

Denys Garcia RN RN   jb4                                                  

Ezra Arriaga MD MD   [facility]                                                  

Dior Driscoll                                5                                                  

                                                                                                  

Corrections: (The following items were deleted from the chart)                                    

03:58 03:34 Telemetry/MedSurg (Inpatient) [facility]  

03:58 03:34 [facility]  

                                                                                                  

**************************************************************************************************

## 2022-05-01 NOTE — XMS REPORT
Continuity of Care Document

                             Created on:May 1, 2022



Patient:ANAYELI HUDSON

Sex:Male

:1960

External Reference #:[id number]





Demographics







                          Address                   [address]



                                                    [place] [address]

 

                          Home Phone                [phone]

 

                          Mobile Phone              [phone]

 

                          Email Address             NONE

 

                          Preferred Language        English

 

                          Marital Status            Unknown

 

                          Advent Affiliation     Unknown

 

                          Race                      Unknown

 

                          Additional Race(s)        Unavailable



                                                    Unavailable



                                                    White

 

                          Ethnic Group              Unknown









Author







                          Organization              [facility]

t

 

                          Address                   [address]



                                                    [place] [address]

 

                          Phone                     [phone]









Support







                Name            Relationship    Address         Phone

 

                IRVING MATHIS               Unavailable     [phone]

 

                KORIN PANDYA               Unavailable     [phone]

 

                KORIN PANDYA               Unavailable     [phone]

 

                            Unavailable     [address] [phone]



                                                [place] [address] 

 

                Irving BISHOP               [address]    [phone]



                                                [place] [address] 

 

                Vernor          Sister          Unavailable     [phone]



                                                [place], [place]    









Care Team Providers







                    Name                Role                Phone

 

                    GLORIA LONGO      Primary Care Physician Unavailable

 

                    MUMTAZ [facility]           Attending Clinician Unavailable

 

                    SYSTEM,  NOT IN     Attending Clinician Unavailable

 

                    ALICIA LEDESMA Attending Clinician Unavailable

 

                    ROSELYN               Attending Clinician Unavailable

 

                    RUBI LONGO        Attending Clinician Unavailable

 

                    PATRICIA              Attending Clinician Unavailable

 

                    PATRICIA              Attending Clinician Unavailable

 

                    INGE            Attending Clinician Unavailable

 

                    SCHOENSTEIN         Attending Clinician Unavailable

 

                    Trista LANGSTON,  W      Attending Clinician [phone]

 

                    ANDRIY HUGO Attending Clinician Unavailable

 

                    RUBI LONGO        Admitting Clinician Unavailable

 

                    PATRICIA              Admitting Clinician Unavailable

 

                    SCHOENSTEIN         Admitting Clinician Unavailable

 

                    JUJU WHITMORE       Admitting Clinician Unavailable









Payers







           Payer Name Policy Type Policy Number Effective Date Expiration Date S

tccorey

 

           [facility]              [id number]  2019            



           ADVANTAGE                        00:00:00              



           [employer] MEDICARE            [id number]  2022            



                                            00:00:00              

 

           [facility]            [id number]                        MD [place]ales



           [facility]                                             



           [id number]                                             



           52Effective for                                             



           [address]                                             



           [address], [place]                                               



           [address]Medicare                                             

 

           [employer] MEDICARE            [id number]  2017            



           COMPLETE                         00:00:00              







Problems







       Condition Condition Condition Status Onset  Resolution Last   Treating Co

mments 

Source



       Name   Details Category        Date   Date   Treatment Clinician        



                                                 Date                 

 

       Metastatic [facility] Disease [facility]                              [facility]



       aden[facility]i adenocarci               3-27                               Co

llege



       noma   noma                 00:00:                             of



                                   00                                 Medicin



                                                                      e

 

       Colon  Colon  Disease Active                              [facility]



       adenocarci adenocarci               3-27                               Co

llege



       noma   noma                 00:00:                             of



                                   00                                 Medicin



                                                                      e

 

       Cirrhosis Cirrhosis Disease Active 2019                             [facility]

dianne



       of liver of liver               0-09                               Colleg

e



       with   with                 00:00:                             of



       ascites ascites               00                                 [facility]icin



       (HCCode) (HCCode)                                                  e

 

       Hyponatrem Hyponatrem Disease Active                              [facility]

[facility]



       ia     ia                                                  College



                                   00:00:                             of



                                   00                                 Medicin



                                                                      e

 

       Pseudomona Pseudomona Disease Active                              [facility]

[facility]



       l      l                                                   College



       pneumonia pneumonia               00:00:                             of



       (HCCode) (HCCode)               00                                 Medici

n



                                                                      e

 

       Inflammato Inflammato Disease Active                              [facility]

aylor



       ry liver ry liver               6-13                               Colleg

e



       disease disease               00:00:                             of



                                   00                                 Medicin



                                                                      e

 

       Ascites Ascites Disease Active                              [facility]



                                   9                               [facility]



                                   00:00:                             of



                                   00                                 Medicin



                                                                      e

 

       Pleural Pleural Disease Active                                    [facility]



       effusion effusion                                                  Colleg

e



       on left on left                                                  of



                                                                      Medicin



                                                                      e







Allergies, Adverse Reactions, Alerts







       Allergy Allergy Status Severity Reaction(s) Onset  Inactive Treating Comm

ents 

Source



       Name   Type                        Date   Date   Clinician        

 

       NO KNOWN Allergy Active                                           [facility]



       ALLERGIE                                                         



       S                                                              

 

       NO KNOWN Drug   Active                                           [facility]



       ALLERGIE Class                                                   ity of



       S                                                              [facility]







Social History







           Social Habit Start Date Stop Date  Quantity   Comments   Source

 

           History of tobacco                       Cigarette Smoker            

[facility]



           use                                                    [facility]

 

           [facility]



           Al[facility] Frequency                                             of Medi

cine

 

           [facility]



           Alcohol Std Drinks                                             of Med

icine

 

           History [facility]



           Alcohol Binge                                             of Medicine

 

           Alcohol Comment 2022 heavy drinker,            [facility]



                      00:00:00   00:00:00   beer/liquor            of Medicine

 

           Alcohol intake 2022 Ex-drinker            [facility]

lege



                      00:00:00   00:00:00   (finding)             of Medicine

 

           Exposure to 2022 Not sure              [facility]



           SARS-CoV-2 (event) 00:00:00   17:54:00                         of Med

icine

 

           Cigarettes smoked 2019-10-08 2019-10-08                       [facility]



           current (pack per 00:00:00   00:00:00                         of Medi

cine



           day) - Reported                                             

 

           Tobacco use and 2019-10-08 2019-10-08 Smokeless tobacco            [facility]

or [facility]



           exposure   00:00:00   00:00:00   non-user              of Medicine

 

           Cigarette  2019-10-08 2019-10-08                       [facility]



           pack-years 00:00:00   00:00:00                         of Medicine

 

           Sex Assigned At 1960                       [facility]

on



           Birth      00:00:00   00:00:00                         









                Smoking Status  Start Date      Stop Date       Source

 

                Ex-smoker       2019-10-08 00:00:00 2019-10-08 00:00:00 [facility]

[facility] of [facility]







Medications







       Ordered Filled Start  Stop   Current Ordering Indication Dosage Frequency

 Signature

                    Comments            Components          Source



     Medication Medication Date Date Medication? Clinician                (SIG) 

          



     Name Name                                                   

 

     acetaminoph            Yes       [id number] 1{tbl}      Take 1      

     [facility]



     en-codeine      3-25                102            Tablet by           [facility]

ege



     (TYLENOL      00:00:                               mouth           of



     #3) 300-30      00                                 every 6           Medici

n



     MG per                                         hours as           e



     tablet                                         needed for           



                                                  Pain.           

 

     Ascorbic            Yes                      Take  by           [facility]

r



     Acid      3-24                               mouth.           [facility]



     (VITAMIN C      13:25:                                              of



     ER OR)      20                                                [facility]



                                                                 e

 

     OXYGEN GAS            Yes            2L        2 L daily.           [facility]

[facility]



               3-24                                              [facility]



               13:24:                                              of



               18                                                [facility]icin



                                                                 e

 

     NP THYROID            Yes            60mg      Take 60 mg           [facility]

[facility]



     60 MG      2-22                               by mouth           [facility]



     tablet      00:00:                               daily.           of



               00                                                [facility]icin



                                                                 e

 

     Tradjenta Tradjenta       Yes  Jhon                1 tablet        

   [facility]



               7-20           [facility]                               [facility]



               00[facility]                                              [facility]



               00                                                l



                                                                 Outpati



                                                                 ent



                                                                 Clinics

 

     Tresiba Tresiba       Yes  Jhon                Inject 15           

[facility]



     FlexTouch FlexTouch 7-20           [facility]                units           [facility]

es -



               00:00:                                              [facility]



               00                                                l



                                                                 Outpati



                                                                 ent



                                                                 Clinics

 

     OXYGEN GAS      2019      Yes            2L        2 L daily.           [facility]

[facility]



               0-17                                              [facility]



               15:24:                                              of



               51                                                Medicin



                                                                 e

 

     tramadol            Yes       [id number] 50mg      Take 1 Tab         

  [facility]



     (ULTRAM) 50      9-30                               by mouth           [facility]

ege



     MG tablet      00:00:                               every 6           of



               00                                 hours as           [facility]icin



                                                  needed for           e



                                                  Pain.           

 

     tramadol      2022- No        [id number] 50mg      Take 1 Tab        

   [facility]



     (ULTRAM) 50       03-25                          by mouth           Col

lege



     MG tablet      00:00: 00:00                          every 6           of



               00   :00                           hours as           [facility]icin



                                                  needed for           e



                                                  Pain.           

 

     tiotropium            Yes            18ug      18 mcg by           [facility]

[facility]



     (SPIRIVA)                                     Inhalation           [facility]

ege



     18 MCG      00:00:                               route.           of



     inhalation      00                                                [facility]



     capsule                                                        e

 

     tiotropium            Yes            18ug      18 mcg by           [facility]

[facility]



     (SPIRIVA)                                     Inhalation           [facility]

e[facility]



     18 MCG      00:00:                               route.           of



     inhalation      00                                                [facility]



     capsule                                                        e

 

     Cefepime       2019- No             2g        Inject 2 g           [facility]

yl[facility]



     HCl 2 g      9-25 10-19                          into the           College



     SOLR      00:00: 04:59                          vein Every           of



               00   :00                           8 hours.           [facility]



                                                                 e

 

     sodium      2019-                        TAKE 1           [facility]



     chloride 1      - 10-17                          TABLET BY           Col

lege



     g tablet      00:00: 00:00                          MOUTH           of



               00   :00                           THREE           [facility]



                                                  TIMES           e



                                                  DAILY WITH           



                                                  MEALS FOR           



                                                  14 DAYS           

 

     levofloxaci            Yes                      TAKE 1           [facility]

or



     n         9-24                               TABLET BY           [facility]



     (LEVAQUIN)      00:00:                               MOUTH ONCE           o

f



     750 MG      00                                 DAILY FOR           [facility]



     tablet                                         24 DAYS           e

 

     levofloxaci            Yes                      TAKE 1           [facility]

or



     n         9-24                               TABLET BY           [facility]



     (LEVAQUIN)      00:00:                               MOUTH ONCE           o

f



     750 MG      00                                 DAILY FOR           [facility]



     tablet                                         24 DAYS           e

 

     doxycycline      2019- No             100mg      Take 100           

[facility]



     (MONODOX)      9-24 10-20                          mg by           [facility]



     100 MG      00:00: 04:59                          mouth.           of



     capsule      00   :00                                          Medicin



                                                                 e

 

     dexamethaso            Yes                      TAKE 2           [facility]

or



     ne        9-01                               TABLETS BY           [facility]



     (DECADRON)      00:00:                               MOUTH           of



     4 MG tablet      00                                 TWICE           [facility]



                                                  DAILY           e

 

     dexamethaso            Yes                      TAKE 2           [facility]

or



     ne        9-01                               TABLETS BY           [facility]



     (DECADRON)      00:00:                               MOUTH           of



     4 MG tablet      00                                 TWICE           [facility]icin



                                                  DAILY           e

 

     albuterol            Yes                      USE 1 VIAL           [facility]

[facility]



     (PROVENTIL)      8-12                               IN             College



     (2.5 mg/3      00:00:                               NEBULIZER           of



     mL) 0.083%      00                                 EVERY 4 TO           Med

icin



     nebulizer                                         6 HOURS AS           e



     solution                                         NEEDED           

 

     albuterol            Yes                      USE 1 VIAL           [facility]

[facility]



     (PROVENTIL)      8-12                               IN             College



     (2.5 mg/3      00:00:                               NEBULIZER           of



     mL) 0.083%      00                                 EVERY 4 TO           Med

icin



     nebulizer                                         6 HOURS AS           e



     solution                                         NEEDED           

 

     furosemide            Yes            40mg      Take 40 mg           [facility]

aylor



     (LASIX) 40      6-14                               by mouth           Colle

ge



     MG tablet      00:00:                               daily.           of



                                                               [facility]icin



                                                                 e

 

     spironolact            Yes            100mg      Take 100           [facility]

[facility]



     one       6-14                               mg by           [facility]



     (ALDACTONE)      00:00:                               mouth           of



     100 MG      00                                 daily.           [facility]



     tablet                                                        e

 

     folic acid            Yes            1mg       Take 1 mg           [facility]

[facility]



     (FOLVITE) 1      6-14                               by mouth           [facility]

e[facility]



     MG tablet      00:00:                               daily.           of



                                                               Medicin



                                                                 e

 

     furosemide            Yes            40mg      Take 40 mg           [facility]

[facility]



     (LASIX) 40      6-14                               by mouth           Col[facility]

ge



     MG tablet      00:00:                               daily.           of



                                                               Medicin



                                                                 e

 

     spironolact            Yes            100mg      Take 100           [facility]

[facility]



     one       6-14                               mg by           [facility]



     (ALDACTONE)      00:00:                               mouth           of



     100 MG      00                                 daily.           [facility]



     tablet                                                        e

 

     folic acid            Yes            1mg       Take 1 mg           [facility]

[facility]



     (FOLVITE) 1      6-14                               by mouth           [facility]

e[facility]



     MG tablet      00:00:                               daily.           of



               00                                                [facility]icin



                                                                 e

 

     Ipratropium Ipratropium           Yes  Jhon                2.5 ml        

   [facility]



     [place]                [place]                               [facility]



                                                                 [facility]



                                                                 l



                                                                 Outpati



                                                                 ent



                                                                 Clinics

 

     ProAir HFA ProAir HFA           Yes  Jhon                1 puff as       

    [facility]



                              [facility]                needed           [facility]



                                                                 [facility]



                                                                 l



                                                                 Outpati



                                                                 ent



                                                                 Clinics

 

     Furosemide Furosemide           Yes  John                1 tablet        

   [facility]



                              [facility]                               [facility]



                                                                 [facility]



                                                                 l



                                                                 Outpati



                                                                 ent



                                                                 Clinics

 

     Spironolact Spironolact           Yes  Jhon                1 tablet      

     [facility]



     one  one                 [facility]                               [facility]



                                                                 l



                                                                 Outpati



                                                                 ent



                                                                 Clinics

 

     Aspir-Low Aspir-Low           Yes  Jhon                1 tablet          

 [facility]



                              [place]                               [facility]



                                                                 l



                                                                 Outpati



                                                                 ent



                                                                 Clinics

 

     Folic Acid Folic Acid           Yes  Jhon                1 tablet        

   [facility]



                              [place]                               [facility]



                                                                 l



                                                                 Outpati



                                                                 ent



                                                                 Clinics

 

     Constulose Constulose           Yes  Jhon                15 ml           

[facility]



                              [place]                               [facility]



                                                                 l



                                                                 Outpati



                                                                 ent



                                                                 Clinics

 

     Symbicort Symbicort           Yes  Jhon                2 puffs           

[facility]



                              [facility]



                                                                 [facility]



                                                                 l



                                                                 Outpati



                                                                 ent



                                                                 Clinics







Vital Signs







             Vital Name   Observation Time Observation Value Comments     Source

 

             HEIGHT       2022 08:26:00 188 cm                    

 

             WEIGHT       2022 08:26:00 96.843 kg                 

 

             HEIGHT       2022 08:26:00 188 cm                    

 

             WEIGHT       2022 08:26:00 96.843 kg                 

 

             HEIGHT       2022 08:10:00 188 cm                    

 

             WEIGHT       2022 08:10:00 96.752 kg                 

 

             HEIGHT       2022 13:44:00 188 cm                    

 

             WEIGHT       2022 13:44:00 92.987 kg                 

 

             HEIGHT       2022 08:10:00 188 cm                    

 

             WEIGHT       2022 08:10:00 96.752 kg                 

 

             HEIGHT       2022 13:44:00 188 cm                    

 

             WEIGHT       2022 13:44:00 92.987 kg                 

 

             Systolic blood 2022 18:17:00 146 mm[Hg]                [facility]



             pressure                                            Medicine

 

             Diastolic blood 2022 18:17:00 75 mm[Hg]                 [facility]



             pressure                                            Medicine

 

             Heart rate   2022 18:17:00 95 /min                   [facility]

 

             Body temperature 2022 18:17:00 37.06 Renée                 [facility]

 

             Body height  2022 18:17:00 188 cm                    [facility]

 

             Body weight  2022 18:17:00 101.969 kg                [facility]

 

             BMI          2022 18:17:00 28.86 kg/m2               [facility]

 

             Systolic blood 2019-10-17 15:21:00 115 mm[Hg]                [facility]



             pressure                                            Medicine

 

             Diastolic blood 2019-10-17 15:21:00 67 mm[Hg]                 [facility]



             pressure                                            Medicine

 

             Heart rate   2019-10-17 15:21:00 96 /min                   [facility]

 

             Body temperature 2019-10-17 15:21:00 36.28 Renée                 [facility]

 

             Body height  2019-10-17 15:21:00 188 cm                    [facility]

 

             Body weight  2019-10-17 15:21:00 102.059 kg                [facility]

 

             BMI          2019-10-17 15:21:00 28.89 kg/m2               [facility]

 

             Systolic blood 2019-10-17 15:21:00 115 mm[Hg]                [facility]



             pressure                                            Medicine

 

             Diastolic blood 2019-10-17 15:21:00 67 mm[Hg]                 [facility]                                            Medicine

 

             Heart rate   2019-10-17 15:21:00 96 /min                   [facility]

 

             Body temperature 2019-10-17 15:21:00 36.28 Renée                 [facility]

 

             Body height  2019-10-17 15:21:00 188 cm                    [facility]

 

             Body weight  2019-10-17 15:21:00 102.059 kg                [facility]

 

             BMI          2019-10-17 15:21:00 28.89 kg/m2               [facility]







Procedures







                Procedure       Date / Time     Performing Clinician Source



                                Performed                       

 

                COMPREHENSIVE METABOLIC 2022 20:06:00 Ingrid Linton [facility]                                           Medicine

 

                CEA             2022 20:06:00 [facility]grid [facility]inton [facility]

 

                CBC W/AUTO DIFF WITH 2022 20:06:00 Ingrid Linton [facility]

 

                AFP TUMOR MARKER 2022 20:06:00 Ingrid Linton [facility]

 

                CANCER ANTIGEN 19-9 2022 20:06:00 Inge Ingrid [facility]







Plan of Care







             Planned Activity Planned Date Details      Comments     Source

 

             Future Scheduled 2022   Screening for malignant              

[facility]



             Test         11:27:55     neoplasm of colon              of Medicin

e



                                       (procedure) [code =              



                                       961638373]                

 

             Future Scheduled 2022   TETANUS SHOT (ADULT)              [facility]



             Test         11:27:55     [code = TETANUS SHOT              of Medi

cine



                                       (ADULT)]                  

 

             Future Scheduled 2022   BMI FOLLOW UP PLAN              [facility]



             Test         11:27:55     [code = BMI FOLLOW UP              of Med

icine



                                       PLAN]                     

 

             Future Scheduled 2022   Hepatitis C screening              [facility]

[employer]



             Test         11:27:55     (procedure) [code =              of Medic

ine



                                       435161182]                

 

             Future Scheduled 2022   Human immunodeficiency              B

[facility] [employer] [facility]



             Test         11:27:55     virus screening              of Medicine



                                       (procedure) [code =              



                                       382152205]                

 

             Future Scheduled 2022   Screening for malignant              

[facility]



             Test         11:27:55     neoplasm of lung              of Medicine



                                       (procedure) [code =              



                                       432963815]                

 

             Future Scheduled 2022   ZOSTER VACCINE (1 of 2)              

[facility]



             Test         11:27:55     [code = ZOSTER VACCINE              of Me

dicine



                                       (1 of 2)]                 

 

             Future Scheduled 2022   COVID-19 Vaccine (2 -              Ba

[facility]



             Test         11:27:55     Booster for [employer]              of Medic

ine



                                       series) [code =              



                                       COVID-19 Vaccine (2 -              



                                       Booster for [employer]              



                                       series)]                  

 

             Future Scheduled 2022   FLU VACCINE > 6 MONTHS              B

[facility]



             Test         11:27:55     [code = FLU VACCINE > 6              of M

edicine



                                       MONTHS]                   

 

             Future Scheduled 2022   IR PORT PLACEMENT EQUAL 1 Occurrences

 [facility]



             Test         14:40:38     OR > 5 YEARS [code = starting     of Medi

cine



                                       04398]       2022 until 



                                                    2023   

 

             Future Scheduled 2022   CT CHEST ABDOMEN PELVIS 1 Occurrences

 [facility]



             Test         14:19:01     W CONTRAST [code = starting     of Medici

ne



                                       54514-4]     2022 until 



                                                    2023   

 

             Future Scheduled 2022   WILD 1 [code = NOCPT] Ordered:     [facility]

[employer]



             Test         14:18:00                  2022   of Medicine

 

             Future Scheduled 2022   TEMPUS XF LIQUID BIOPSY Ordered:     

[facility]



             Test         14:17:42     NGS [code = 41334570] 2022   of Med

icine

 

             Future Scheduled 2022   TEMPUS XT TISSUE NGS Ordered:     [facility]



             Test         14:17:42     [code = 49215743] 2022   of Medicin

e

 

             Future Scheduled              COLON CANCER SCREENING:              

[facility]



             Test                      COLONOSCOPY [code =              of Medic

ine



                                       COLON CANCER SCREENING:              



                                       COLONOSCOPY]              

 

             Future Scheduled              MEDICARE AWV [code =              [facility]



             Test                      MEDICARE AWV]              of Medicine

 

             Future Scheduled              TETANUS SHOT (ADULT)              [facility]



             Test                      [code = TETANUS SHOT              of Medi

cine



                                       (ADULT)]                  

 

             Future Scheduled              BMI FOLLOW UP PLAN              [facility]



             Test                      [code = BMI FOLLOW UP              of Med

icine



                                       PLAN]                     

 

             Future Scheduled              HEPATITIS C SCREENING              [facility]

[employer]



             Test                      [code = HEPATITIS C              of Medic

ine



                                       SCREENING]                

 

             Future Scheduled              HIV SCREENING [code =              [facility]

[employer]



             Test                      HIV SCREENING]              of Medicine

 

             Future Scheduled              FLU VACCINE > 6 MONTHS              B

[employer]



             Test                      [code = FLU VACCINE > 6              of M

edicine



                                       MONTHS]                   







Encounters







        Start   End     Encounter Admission Attending Care    Care    Encounter 

Source



        Date/Time Date/Time Type    Type    Clinicians Facility Department ID   

   

 

        2022         Outpatient         [place],  [facility]  [id number]

 [facility]



        08:18:01                         Jhon                     [facility]



                                                                        l



                                                                        Outpati



                                                                        ent



                                                                        Clinics

 

        2022         Outpatient         [facility], [facility]     [facility]     [id number]

 MD



        14:48:15                         PROVIDER                         Andrew negron

 

        2022         Outpatient         [place],  [facility]  [id number]

 [facility]



        09:46:02                         Jhon                     [facility]



                                                                        l



                                                                        Outpati



                                                                        ent



                                                                        Clinics

 

        2022         Outpatient         [place],  [facility]  [id number]

 [facility]



        14:39:33                         Jhon                     [facility]



                                                                        l



                                                                        Outpati



                                                                        ent



                                                                        Clinics

 

        2022         Outpatient         [place],  [facility]  [id number]

 [facility]



        14:38:53                         Jhon                     [facility]



                                                                        l



                                                                        Outpati



                                                                        ent



                                                                        Clinics

 

        2022         Outpatient         [place],  [facility]  [id number]

 [facility]



        13:10:59                         Jhon                  [id number]   [facility]



                                                                        l



                                                                        Outpati



                                                                        ent



                                                                        Clinics

 

        2022         Outpatient         [place],  [facility]  [id number]

 [facility]



        12:52:47                         Jhon                  [id number]   [facility]



                                                                        l



                                                                        Outpati



                                                                        ent



                                                                        Clinics

 

        2022         Outpatient         [place],  [facility]  [id number]

 [facility]



        11:32:03                         Jhon                  [id number]   [facility]



                                                                        l



                                                                        Outpati



                                                                        ent



                                                                        Clinics

 

        2022         Outpatient         [place],  [facility]  [id number]

 [facility]



        11:25:36                         Jhon                  [id number]   [facility]



                                                                        l



                                                                        Outpati



                                                                        ent



                                                                        Clinics

 

        2022         Outpatient         [place],  [place] [id number]

 [facility]



        11:16:16                         Jhon                  [id number]   [facility]



                                                                        l



                                                                        Outpati



                                                                        ent



                                                                        Clinics

 

        2022         Outpatient         [facility],  [facility]  [id number]

 [facility]



        11:07:38                         Jhon                  [id number]   [facility]



                                                                        l



                                                                        Outpati



                                                                        ent



                                                                        Clinics

 

        20[facility]  [id number]

 [facility]



        00:00:00 00:00:00                                                 [facility]



                                                                        l



                                                                        Outpati



                                                                        ent



                                                                        Clinics

 

        2022 Outpatient         LORENZO LEDESMA    MED     [id number]

[id number]    [id number]



        13:34:00 23:59:00                 ROCHELLE                           

 

        2022 Outpatient         LONGO,  [facility]     [id number]

[id number] [facility]



        09:01:46 09:03:16                 ELAYNE                         Colleg

e



                                                                        of



                                                                        Medicin



                                                                        e

 

        2022 Outpatient [facility]    Surgery [id number]

[id number] SLE



        05:57:00 11:20:00                 ELAYNE                         

 

        2022 Outpatient                 [facility]     [id number]

[id number] [facility]



        06:06:00 23:59:00                                                 Colleg

e



                                                                        of



                                                                        Medicin



                                                                        e

 

        2022 Outpatient [facility]    Surgery [id number]

[id number] SLE



        06:06:00 11:15:00                 KATRINA                           

 

        2022 Outpatient         [facility]     [id number]

[id number] [facility]



        10:19:37 10:19:37                 KATRINA                           Colleg

e



                                                                        of



                                                                        Medicin



                                                                        e

 

        2022 Outpatient         [facility]     [id number]

[id number] [facility]



        10:17:26 10:17:26                 KATRINA                           Colleg

e



                                                                        of



                                                                        Medicin



                                                                        e

 

        2022 Outpatient EL              SLE    SLE    [id number]

[id number] SLE



        14:20:19 23:59:00                                                 

 

        2022 Office          INGE [facility]   [phone] [id number]

[id number] [facility]



        11:37:02 15:01:03 Visit           INGRID Tavares  [id number]         Co

llegbakari



                                                        [id number]         



                                                        [id number]         Medi

eulogio



                                                        504             e

 

        2022 ambulatory                 [facility]  [facility]  [id number]

 [facility]



        00:00:00 00:00:00                                                 [facility]



                                                                        l



                                                                        Outpati



                                                                        ent



                                                                        Clinics

 

        2022 ambulatory                 [facility]  [facility]  [id number]

 [facility]



        00:00:00 00:00:00                                                 [facility]



                                                                        l



                                                                        Outpati



                                                                        ent



                                                                        Clinics

 

        2022 ambulatory                 [facility]  [facility]  [id number]

 [facility]



        00:00:00 00:00:00                                                 [facility]



                                                                        l



                                                                        Outpati



                                                                        ent



                                                                        Clinics

 

        2022 ambulatory                 STLMLC  STLMLC  [id number]

 [facility]



        00:[facility]00 [facility]00                                                 [facility]



                                                                        [facility]



                                                                        Outpati



                                                                        ent



                                                                        Clinics

 

        2022 ambulatory                 STLMLC  STLMLC  [id number]

 [facility]



        00:00[facility]00 00[facility]00[facility]00                                                 [facility]



                                                                        [facility]



                                                                        Outpati



                                                                        ent



                                                                        Clinics

 

        2022 ambulatory                 STLMLC  STLMLC  [id number]

 [facility]



        00:00[facility]00 00[facility]00[facility]00                                                 [facility]



                                                                        [facility]



                                                                        Outpati



                                                                        ent



                                                                        Clinics

 

        2022 ambulatory                 STLMLC  STLMLC  [id number]

 [facility]



        00:00[facility]00 00[facility]00[facility]00                                                 [facility]



                                                                        [facility]



                                                                        Outpati



                                                                        ent



                                                                        Clinics

 

        2021 ambulatory                 STLMLC  STLMLC  [id number]

 [facility]



        00:00[facility]00 00[facility]00[facility]00                                                 [facility]



                                                                        [facility]



                                                                        Outpati



                                                                        ent



                                                                        Clinics

 

        2021 ambulatory                 STLMLC  STLMLC  [id number]

 [facility]



        00:00[facility]00 [facility]00[facility]00                                                 [facility]



                                                                        [facility]



                                                                        Outpati



                                                                        ent



                                                                        Clinics

 

        2021-10-06 2021-10-06 Outpatient                 STLMLC  STLMLC  [id number]

 [facility]



        00:00[facility]00 [facility]00[facility]00                                                 [facility]



                                                                        [facility]



                                                                        Outpati



                                                                        ent



                                                                        Clinics

 

        2021 Outpatient                 STLMLC  STLMLC  [id number]

 [facility]



        00:00[facility]00 [facility]00[facility]00                                                 [facility]



                                                                        [facility]



                                                                        Outpati



                                                                        ent



                                                                        Clinics

 

        2021 Outpatient                 STLMLC  STLMLC  [id number]

 [facility]



        00:00[facility]00 [facility]00[facility]00                                                 [facility]



                                                                        [facility]



                                                                        Outpati



                                                                        ent



                                                                        Clinics

 

        2021 Outpatient                 STLMLC  STLMLC  [id number]

 [facility]



        00:00[facility]00 00[facility]00[facility]00                                                 [facility]



                                                                        [facility]



                                                                        Outpati



                                                                        ent



                                                                        Clinics

 

        2021 Outpatient                 STLMLC  STLMLC  [id number]

 [facility]



        00:00:00 00[facility]00:00                                                 [facility]



                                                                        [facility]



                                                                        Outpati



                                                                        ent



                                                                        Clinics

 

        2021 Outpatient                 STLMLC  STLMLC  [id number]

 [facility]



        00:00[facility]00 00[facility]00[facility]00                                                 [facility]



                                                                        [facility]



                                                                        Outpati



                                                                        ent



                                                                        Clinics

 

        2021 Outpatient                 STLMLC  STLMLC  [id number]

 [facility]



        00:00[facility]00 00[facility]00:00                                                 [facility]



                                                                        [facility]



                                                                        Outpati



                                                                        ent



                                                                        Clinics

 

        2021 Outpatient                 STLMLC  STLMLC  [id number]

 [facility]



        00:00[facility]00 00[facility]00[facility]00                                                 [facility]



                                                                        [facility]



                                                                        Outpati



                                                                        ent



                                                                        Clinics

 

        2021-01-15 2021-01-15 Outpatient                 STLMLC  STLMLC  [id number]

 [facility]



        00:00[facility]00 00[facility]00[facility]00                                                 [facility]



                                                                        [facility]



                                                                        Outpati



                                                                        ent



                                                                        Clinics

 

        2021 Outpatient                 STLMLC  STLMLC  [id number]

 [facility]



        00:00[facility]00 00[facility]00[facility]00                                                 [facility]



                                                                        [facility]



                                                                        Outpati



                                                                        ent



                                                                        Clinics

 

        2020-10-21 2020-10-21 Outpatient                 STLMLC  STLMLC  [id number]

 [facility]



        00:00[facility]00 [facility]00[facility]00                                                 [facility]



                                                                        [facility]



                                                                        Outpati



                                                                        ent



                                                                        Clinics

 

        2020-10-19 2020-10-19 Outpatient                 STLMLC  STLMLC  [id number]

 [facility]



        00:00[facility]00 [facility]00[facility]00                                                 [facility]



                                                                        [facility]



                                                                        Outpati



                                                                        ent



                                                                        Clinics

 

        2020-10-08 2020-10-08 Outpatient                 STLMLC  STLMLC  [id number]

 [facility]



        00:00[facility]00 [facility]00[facility]00                                                 [facility]



                                                                        [facility]



                                                                        Outpati



                                                                        ent



                                                                        Clinics

 

        2020 Outpatient                 [facility] [address]

[id number] [facility]



        13:15:00 13:15:00                         [facility]   [employer]

s -



                                                [employer]   [facility]  Medicine         l



                                                Medicine                 Outpati



                                                                        ent



                                                                        Clinics

 

        2020 Outpatient                 [facility] [address]

[id number] [facility]



        13:45:00 13:45:00                         [facility]   [employer]

s -



                                                Drive   [facility]         l



                                                Medicine                 Outpati



                                                                        ent



                                                                        Clinics

 

        2020 Outpatient                 [facility] [address]

[id number] [facility]



        08:33:00 08:33:00                         [facility]   [employer]

s -



                                                [employer]   [facility]



                                                Medicine                 Outpati



                                                                        ent



                                                                        Clinics

 

        2020-07-15 2020-07-15 Outpatient                 [facility] [address]

[id number] [facility]



        15:30:00 15:30:00                         [facility] [place]   [employer]

s [employer]   [facility]



                                                Medicine                 Outpati



                                                                        ent



                                                                        Clinics

 

        2020 Outpatient                 [facility] [address]

[id number] [facility]



        15:00:00 15:00:00                         t [employer]

s [employer]   [facility]



                                                Medicine                 Outpati



                                                                        ent



                                                                        Clinics

 

        2020 Outpatient                 [facility] [address]

[id number] [facility]



        15:00:00 15:00:00                         [employer] [facility]   [employer]

s -



                                                [employer]   [facility]



                                                Medicine                 Outpati



                                                                        ent



                                                                        Clinics

 

        2020 Outpatient                 [facility] [address]

[id number] [facility]



        10:00:00 10:00:00                         t [employer]

s [employer]   [facility]



                                                Medicine                 Outpati



                                                                        ent



                                                                        Clinics

 

        2020 Outpatient                 [facility] [address]

[id number] [facility]



        13:17:00 13:17:00                         t [place]   [employer]

s [employer]   [facility]



                                                Medicine                 Outpati



                                                                        ent



                                                                        Clinics

 

        2020 Outpatient                 [facility] [address]

[id number] [facility]



        11:00:00 11:00:00                         t [employer]

s [employer]   [facility]



                                                Medicine                 Outpati



                                                                        ent



                                                                        Clinics

 

        2019 Emergency [facility]     1025

[id number] [facility]



        07:09:33 11:41:00                 , JACEK ontiveros [facility]

 

        2019-10-17 2019-10-17 Office          PAMDINI Weston     [id number]

[address] [facility]



        09:26:46 14:06:09 Visit           Alex ANGUIANO AMBULATOR [id number]        

 College



                                                Y       [id number]         of



                                                        [phone]         [place]



                                                        840             e

 

        2019-10-17 2019-10-17 Office          PADMINI Weston     [id number]

598 



        09:26:46 14:06:09 Visit           Alex ANGUIANO AMBULATOR [id number]        

 



                                                Y       [id number]         



                                                        [phone]         



                                                        840             







Results







           Test Description Test Time  Test Comments Results    Result     [facility]

e



                                                       Comments   

 

           TISSUE EXAM 2022            Surgical Pathology Report          

  



                      08:01:38                                    



                                            Case: [id number]            



                                                                  



                                            Authorizing Provider:            



                                            Elayne Longo MD            



                                             Collected:            



                                            2022 10:01 AM            



                                             Ordering Location:            



                                            [facility]            



                                             Received:            



                                            2022 11:57 AM            



                                                                  



                                            Services              



                                                                  



                                                                  



                                            Pathologist:            



                                            Homer Thomas MD            



                                                                  



                                                                  



                                            Specimens:   A) - Large            



                                            Intestine, Colon -            



                                            Transverse, Bx mass            



                                                                  



                                                            B) -            



                                            Polyp, Colon -            



                                            Left/Descending, taken by           

 



                                            hot snare             



                                                                  



                                            C) - Polyp, Colon -            



                                            Left/Descending,  x3            



                                            taken by hot snare            



                                                                  



                                                 D) - Polyp, Colon -            



                                            Sigmoid,  x5  taken by            



                                            hot snare             



                                                            A. LARGE            



                                            INTESTINE, TRANSVERSE            



                                            COLON MASS, BIOPSY:  -            



                                            INVASIVE ADENOCARCINOMA            



                                            - SEE MICROSCOPIC            



                                            DESCRIPTION B. LARGE            



                                            INTESTINE, DESCENDING            



                                            COLON POLYP, BIOPSY:  -            



                                            TUBULAR ADENOMA,            



                                            FRAGMENTED C. LARGE            



                                            INTESTINE, DESCENDING            



                                            COLON POLYP x 3, BIOPSY:            



                                            - TUBULAR ADENOMA,            



                                            FRAGMENTED  - SESSILE            



                                            SERRATED LESION D. LARGE            



                                            INTESTINE, SIGMOID COLON            



                                            POLYP x 5, BIOPSY:  -            



                                            TUBULOVILLOUS ADENOMA,            



                                            FRAGMENTED  -            



                                            HYPERPLASTIC POLYP            



                                            Signing Pathologist            



                                            Direct Phone Line:            



                                            [phone]           

 



                                            y signed by Homer Thomas MD on 2022            



                                            at  8:01 AMBlock A1 has            



                                            adequate tumor            



                                            cellularity for future            



                                            ancillary studies.88305 x           

 



                                            4, 54698, 20137 x 5A.            



                                            Large Intestine, Colon -            



                                            Transverse.Received in            



                                            formalin labeled with the           

 



                                            patient's name, medical            



                                            record number and "large            



                                            intestine-colon-transvers           

 



                                            e biopsy mass" and            



                                            consists of multiple            



                                            tan-pink, irregular soft            



                                            tissue fragments (2.4 x            



                                            2.1 x 0.3 cm in            



                                            aggregate).  The specimen           

 



                                            is submitted in toto in            



                                            A1.B. Polyp, Colon -            



                                            Left/Descending.Received            



                                            in formalin labeled with            



                                            the patient's name,            



                                            medical record number and           

 



                                            "polyp, colon-left            



                                            descending-taken by hot            



                                            snare" and consists of            



                                            multiple tan-pink,            



                                            irregular, ovoid soft            



                                            tissue fragment (2.4 x            



                                            2.1 x 0.4 cm in            



                                            aggregate).  The specimen           

 



                                            is submitted in toto in            



                                            B1.C. Polyp, Colon -            



                                            Left/Descending.Received            



                                            in formalin labeled with            



                                            the patient's name,            



                                            medical record number and           

 



                                            "polyp, colon-left            



                                            descending x3 taken by            



                                            hot snare" and consists            



                                            of multiple tan-yellow,            



                                            ovoid soft tissue            



                                            fragments (3.0 x 2.1 x            



                                            0.4 cm in aggregate).            



                                            The largest ovoid soft            



                                            tissue fragment is            



                                            bisected to show tan-pink           

 



                                            cut surface.  The            



                                            specimen is submitted            



                                            entirely in C1-C2.Ink            



                                            code:Blue-resection            



                                            marginSection code:C1:            



                                            Soft tissue fragmentsC2:            



                                            Ovoid soft tissue,            



                                            bisectedD. Polyp, Colon -           

 



                                            Sigmoid.Received in            



                                            formalin labeled with the           

 



                                            patient's name, medical            



                                            record number and "polyp,           

 



                                            colon-sigmoid x5 taken by           

 



                                            hot snare" and consists            



                                            of multiple tan-pink,            



                                            ovoid, pedunculated soft            



                                            tissue fragments (ranging           

 



                                            from 0.1 cm - 1.8 cm in            



                                            greatest dimension, 7.5 x           

 



                                            2.1 x 0.4 cm in            



                                            aggregate).  The largest            



                                            pedunculated soft tissue            



                                            fragment is quadrisected            



                                            to show tan-pink, focal            



                                            hemorrhagic cut surface.            



                                            The specimen is submitted           

 



                                            entirely in D1-D3.JESENIA Braswell            



                                            studentSynaptophysin and            



                                            chromogranin were            



                                            performed on part A and            



                                            were negative in tumor            



                                            cells (all stains are            



                                            with appropriate control            



                                            staining). MISMATCH            



                                            REPAIR (MMR) PROTEINS on            



                                            Part                  



                                            A:Immunohistochemistry            



                                            results:MSH-2: Intact            



                                            nuclear expressionMSH-6:            



                                            Intact nuclear            



                                            expressionMLH-1: Intact            



                                            nuclear expressionPMS-2:            



                                            Intact nuclear            



                                            expressionInterpretation:           

 



                                            No loss of nuclear            



                                            expression of MMR            



                                            proteins: low probability           

 



                                            of microsattelite            



                                            instability-high (MSI-H).           

 



                                            The interpretation of            



                                            this case included the            



                                            use of                



                                            immunohistochemistry or            



                                            special stains.Control            



                                            Slides Examined:            



                                            In-house known positive            



                                            controls were evaluated            



                                            along with the test            



                                            tissue.  These control            



                                            slides run alongside of            



                                            the patients sample show            



                                            appropriate staining.            



                                            Internal positive and            



                                            negative controls when            



                                            available are evaluated            



                                            Immunohistochemistry            



                                            technical testing was            



                                            performed at [facility],            



                                            Pathology Laboratory            



                                            where it was developed            



                                            and its performance            



                                            characteristics were            



                                            determined. It has not            



                                            been cleared or approved            



                                            by the U.S. Food and Drug           

 



                                            Administration. The FDA            



                                            has determined that such            



                                            clearance or approval is            



                                            not necessary. The test            



                                            is used for clinical            



                                            purposes. It should not            



                                            be regarded as            



                                            investigational or for            



                                            research. This laboratory           

 



                                            is certified under the            



                                            Clinical Laboratory            



                                            Improvement Amendments of           

 



                                            1988 (CLIA-88) as            



                                            qualified to perform high           

 



                                            complexity clinical            



                                            laboratory testing.[facility], Department of            



                                            Pathology, [address] [place] [address]            



                                            [address], Tel            



                                            [phone]            



                                            [facility] of [facility],            



                                            [address] [place] [address], Tel            



                                            [phone],            



                                            Department of Pathology,            



                                            [address] [place] [address], Tel            



                                            [phone]            









                    POCT-GLUCOSE METER  2022 13:15:36 









                      Test Item  Value      Reference Range Interpretation Comme

nts









             POC-GLUCOSE METER ([employer]) 121 mg/dL           H            :

 TESTED AT [address]



             (test code = 1538)                                        [place]

X, [address]:



                                                                 /Techni

shelbi ID = [id number] for



                                                                 Brendan Fuller



POCT-GLUCOSE [id number] 09:02:57





             Test Item    Value        Reference Range Interpretation Comments

 

             POC-GLUCOSE METER 125 mg/dL           H            : TESTED A

T [place] [address]



             ([facility] (test code =                                        ARMAND FARNSWORTH [facility],



             1538)                                               [address]:



                                                                 /Techni

shelbi ID



                                                                 = [id number] for MAYANK OWUSU



SARS-COV2/RT-PCR ([facility])2022 07:59:31





             Test Item    Value        Reference Range Interpretation Comments

 

             SARS-COV2/RT-PCR Negative     Negative                  The SARS-Co

V-2 target



             (test code =                                        nucleic acids a

re not



             1120745)                                            detected in thi

s specimen.



                                                                 Negative result

s do not



                                                                 preclude SARS-C

oV-2



                                                                 infection and s

hould not be



                                                                 used as the sol

e basis for



                                                                 patient managem

ent



                                                                 decisions. Nega

tive results



                                                                 must be combine

d with



                                                                 clinical observ

ations,



                                                                 patient history

, and



                                                                 epidemiological

 information.



                                                                 A false negativ

e result may



                                                                 occur if a spec

imen is



                                                                 improperly pina

ected,



                                                                 transported or 

handled.



                                                                 This SARS CoV-2

 test is a



                                                                 rapid, real-roxanne

e RT-PCR test



                                                                 intended for th

e qualitative



                                                                 detection of nu

cleic acid



                                                                 from SARS-CoV-2

 in a



                                                                 nasopharyngeal 

swab specimen



                                                                 collected from 

individuals



                                                                 suspected of CO

VID-19 by



                                                                 their healthcar

e provider.



This test has been authorized by FDA under an EUA for use by authorized 
laboratories.  This test is only authorized for the duration of the declaration 
that circumstances exist justifying the authorization of emergency use of in 
vitro diagnostic tests for detection and/or diagnosis of COVID-19 under Section 
564(b)(1) of the Federal Food, Drug and Cosmetic Act, 21 U.S.C.   360bbb-
3(b)(1), unless the authorization is terminated or revoked sooner.   Fact Sheet 
for Healthcare Providers: https://www.[employer]/Documents/Xpert%20Xpress%20SARS%20CoV-2/Fact%20Sheets/302-3802%20SARS-COV

-2%20HEALTHCARE%20PROVIDERS%20FACT%20SHEET.pdf Fact Sheet for Healthcare 
Patients: https://www.[employer]/Documents/Xpert
%20Xpress%20SARS%20CoV-2/Fact%20Sheets/302-3801%94JEMJ-JMV-6%20PATIENT%20FACT%20

SHEET.pdfPOCT-GLUCOSE [id number] 08:23:46





             Test Item    Value        Reference Range Interpretation Comments

 

             POC-GLUCOSE METER 106 mg/dL                        : TESTED A

T [facility] [address]



             (BEAKER) (test code                                        [place]

E BLDG A,



             = 1538)                                             [place] [address]

0:



                                                                 /Techni

shelbi ID =



                                                                 [id number] for RICHELLE DIMAS



[id number] 09:23:02





             Test Item    Value        Reference    Interpretation Comments



                                       Range                     

 

             CEA (test code              See_Commen   H             In otherwise

 healthy smokers, 95%



             = 2039-6)                 t                         of patients fal

l in the rangeof



                                                                 0.0-5.5 ng/mL. 

NOTE: Methodology is



                                                                 Roche Renan



                                                                 Electrochemilum

inescenceImmunoassay



                                                                 (ECLIA).       

  Unless Otherwise



                                                                 Indicated, All 

Testing Performed At:



                                                                        Clinical

 Pathology



                                                                 Laboratories, [address] [place] [address]       

 :



                                                                 Bonifacio negron M.D.        CLIA



                                                                 Number [id number]

03  [employer]



                                                                 No. [id number]  [

Automated message]



                                                                 The system [employer] generated this



                                                                 result transmit

froilan reference range:



                                                                 <=3.8 NG/ML. Th

e reference range was



                                                                 not used to int

erpret this result as



                                                                 normal/abnormal

.

 

             Lab          Abnormal                               



             Interpretation                                        



             (test code =                                        



             88582-7)                                            



[facility] [id number] 09:23:02





             Test Item    Value        Reference Range Interpretation Comments

 

             AFP-TUMOR MARKER              See_Comment                        Un

less Otherwise



             (test code = 88822-2)                                        [facility]

froilan, All Testing



                                                                 Performed At:



                                                                 [facility]

Consumr



                                                                 [facility], [address] [place] [address]



                                                                  Laboratory Dir

paulina: JUJU Scanlon



                                                                 CLIA Number [id number]

[id number]  Cap



                                                                 Accreditation N

o. [id number]



                                                                 [Automated mess

age] The



                                                                 system which ge

nerated this



                                                                 result transmit

froilan



                                                                 reference range

: <=8.30



                                                                 NG/ML. The refe

rence range



                                                                 was not used to

 interpret



                                                                 this result as



                                                                 normal/abnormal

.



[facility] [id number] 09:23:02





             Test Item    Value        Reference    Interpretation Comments



                                       Range                     

 

             CA 19-9 (test code >80500       See_Comment  H                NOTE:

 Methodology is



             = 24108-3)                                          Roche Renan



                                                                 Electrochemilum

inescence



                                                                 Immunoassay (EC

PIETER).  Values



                                                                 obtained with d

ifferent



                                                                 assays/manufact

urers cannot



                                                                 be used interch

angeably.



                                                                 Results should 

not be used



                                                                 as sole basis t

o establish



                                                                 the   presence 

or absence of



                                                                 malignancy.    

      Unless



                                                                 Otherwise Indic

ated, All



                                                                 Testing Perform

ed At:



                                                                 [facility], [address] [place] [address]

4



                                                                 Laboratory Dire

ctor: Bonifacio Carey M.D.

        CLIA



                                                                 Number [id number]

03  Cap



                                                                 Accreditation N

o. [id number]



                                                                 [Automated mess

age] The



                                                                 system which ge

nerated this



                                                                 result transmit

froilan reference



                                                                 range: <35 U/ML

. The



                                                                 reference range

 was not used



                                                                 to interpret th

is result as



                                                                 normal/abnormal

.

 

             Lab Interpretation Abnormal                               



             (test code =                                        



             67129-2)                                            



[facility] [id number] 08:41:12





             Test Item    Value        Reference Range Interpretation Comments

 

             GLUCOSE (test code =              See_Comment  H             [Autom

ated message]



             2345-7)                                             The system [employer]



                                                                 generated this 

result



                                                                 transmitted ref

erence



                                                                 range: 70 - 99 

MG/DL.



                                                                 The reference r

veena



                                                                 was not used to



                                                                 interpret this 

result



                                                                 as normal/abnor

mal.

 

             BLOOD UREA NITROGEN              See_Comment                [Automa

froilan message]



             (test code = 3091-6)                                        The sys

tem which



                                                                 generated this 

result



                                                                 transmitted ref

erence



                                                                 range: 8 - 23 M

G/DL.



                                                                 The reference r

veena



                                                                 was not used to



                                                                 interpret this 

result



                                                                 as normal/abnor

mal.

 

             CREATININE (test code =              See_Comment                [Au

tomated message]



             2160-0)                                             The system [employer]



                                                                 generated this 

result



                                                                 transmitted ref

erence



                                                                 range: 0.80 - 1

.40



                                                                 MG/DL. The refe

rence



                                                                 range was not u

sed to



                                                                 interpret this 

result



                                                                 as normal/abnor

mal.

 

             EGFR (test code =              See_Comment  L             [Automate

d message]



             14714-3)                                            The system [employer]



                                                                 generated this 

result



                                                                 transmitted ref

erence



                                                                 range: >60



                                                                 ML/MIN/1.73. Th

e



                                                                 reference range

 was



                                                                 not used to int

erpret



                                                                 this result as



                                                                 normal/abnormal

.

 

             BUN/CREAT RATIO (test              See_Comment                [Auto

mated message]



             code = 3097-3)                                        The system [facility]



                                                                 generated this 

result



                                                                 transmitted ref

erence



                                                                 range: 6 - 28 R

ATIO.



                                                                 The reference r

veena



                                                                 was not used to



                                                                 interpret this 

result



                                                                 as normal/abnor

mal.

 

             SODIUM (test code =              See_Comment                [Automa

froilan message]



             2951-2)                                             The system [employer]



                                                                 generated this 

result



                                                                 transmitted ref

erence



                                                                 range: 133 - 14

6



                                                                 MEQ/L. The refe

rence



                                                                 range was not u

sed to



                                                                 interpret this 

result



                                                                 as normal/abnor

mal.

 

             POTASSIUM (test code =              See_Comment                [Aut

omated message]



             2583-3)                                             The system [employer]



                                                                 generated this 

result



                                                                 transmitted ref

erence



                                                                 range: 3.5 - 5.

4



                                                                 MEQ/L. The refe

rence



                                                                 range was not u

sed to



                                                                 interpret this 

result



                                                                 as normal/abnor

mal.

 

             CHLORIDE (test code =              See_Comment                [Auto

mated message]



             5875-0)                                             The system [employer]

[employer]



                                                                 generated this 

result



                                                                 transmitted ref

erence



                                                                 range: 95 - 107

 MEQ/L.



                                                                 The reference r

veena



                                                                 was not used to



                                                                 interpret this 

result



                                                                 as normal/abnor

mal.

 

             CO2 (test code =              See_Comment                [Automated

 message]



             1963-8)                                             The system [facility]



                                                                 generated this 

result



                                                                 transmitted ref

erence



                                                                 range: 19 - 31 

MEQ/L.



                                                                 The reference r

veena



                                                                 was not used to



                                                                 interpret this 

result



                                                                 as normal/abnor

mal.

 

             CALCIUM (test code =              See_Comment                [Autom

ated message]



             96481-3)                                            The system [facility]



                                                                 generated this 

result



                                                                 transmitted ref

erence



                                                                 range: 8.5 - 10

.5



                                                                 MG/DL. The refe

rence



                                                                 range was not u

sed to



                                                                 interpret this 

result



                                                                 as normal/abnor

mal.

 

             PROTEIN TOTAL (test              See_Comment                [Automa

froilan message]



             code = 2885-2)                                        The system [facility]



                                                                 generated this 

result



                                                                 transmitted ref

erence



                                                                 range: 6.1 - 8.

3 G/DL.



                                                                 The reference r

veena



                                                                 was not used to



                                                                 interpret this 

result



                                                                 as normal/abnor

mal.

 

             ALBUMIN (test code =              See_Comment                [Autom

ated message]



             32572-8)                                            The system [facility]



                                                                 generated this 

result



                                                                 transmitted ref

erence



                                                                 range: 3.5 - 5.

2 G/DL.



                                                                 The reference r

veena



                                                                 was not used to



                                                                 interpret this 

result



                                                                 as normal/abnor

mal.

 

             GLOBULINS, SERUM, TOTAL              See_Comment                [Au

tomated message]



             (test code = 98362-7)                                        The sy

stem which



                                                                 generated this 

result



                                                                 transmitted ref

erence



                                                                 range: 1.9 - 3.

7 G/DL.



                                                                 The reference r

veena



                                                                 was not used to



                                                                 interpret this 

result



                                                                 as normal/abnor

mal.

 

             A/G RATIO (test code =              See_Comment                [Aut

omated message]



             1759-0)                                             The system [facility]



                                                                 generated this 

result



                                                                 transmitted ref

erence



                                                                 range: 1.0 - 2.

6



                                                                 RATIO. The refe

rence



                                                                 range was not u

sed to



                                                                 interpret this 

result



                                                                 as normal/abnor

mal.

 

             BILIRUBIN TOTAL (test              See_Comment                [Auto

mated message]



             code = 1975-2)                                        The system [facility]



                                                                 generated this 

result



                                                                 transmitted ref

erence



                                                                 range: <=1.2 MG

/DL.



                                                                 The reference r

veena



                                                                 was not used to



                                                                 interpret this 

result



                                                                 as normal/abnor

mal.

 

             ALKALINE PHOSPHATASE 210 U/L             H            



             (test code = 6768-6)                                        

 

             AST (SGOT) (test code = 56 U/L       9-50         H            



             1920-8)                                             

 

             ALT (SGPT) (test code = 30 U/L       5-50                          

     Unless



             1744-2)                                             Otherwise Indic

ated,



                                                                 All Testing Per

formed



                                                                 At:        Clin

[facility]



                                                                 Pathology



                                                                 Laboratories, [address]



                                                                 [address] [facility]

[place], [place]



                                                                 [address]



                                                                 Laboratory Dire

ctor:



                                                                 Bonifacio negron M.D.



                                                                        GUNJAN Num

nanci



                                                                 [id number]  Cap



                                                                 Accreditation N

o.



                                                                 [id number]

 

             Lab Interpretation Abnormal                               



             (test code = 84627-9)                                        



[facility] [id number] 07:37:08





             Test Item    Value        Reference Range Interpretation Comments

 

             WHITE BLOOD CELL COUNT              See_Comment                [Aut

omated message]



             (test code = 43860-3)                                        The sy

stem which



                                                                 generated this 

result



                                                                 transmitted ref

erence



                                                                 range: 3.5 - 11

.0



                                                                 K/UL. The refer

ence



                                                                 range was not u

sed to



                                                                 interpret this 

result



                                                                 as normal/abnor

mal.

 

             RED BLOOD CELL COUNT              See_Comment                [Autom

ated message]



             (test code = 00913-3)                                        The sy

stem which



                                                                 generated this 

result



                                                                 transmitted ref

erence



                                                                 range: 4.50 - 6

.10



                                                                 M/UL. The refer

ence



                                                                 range was not u

sed to



                                                                 interpret this 

result



                                                                 as normal/abnor

mal.

 

             HEMOGLOBIN (test code =              See_Comment                [Au

tomated message]



             718-7)                                              The system whic

h



                                                                 generated this 

result



                                                                 transmitted ref

erence



                                                                 range: 13.5 - 1

7.0



                                                                 G/DL. The refer

ence



                                                                 range was not u

sed to



                                                                 interpret this 

result



                                                                 as normal/abnor

mal.

 

             HEMATOCRIT (test code = 40.7 %       40.0-51.0                 



             17892-5)                                            

 

             MEAN CORPUSCULAR VOLUME 82.7 fL      80.0-99.0                 



             (test code = 13973-3)                                        

 

             MEAN CORPUSCULAR 28.0 PG      25.0-33.0                 



             HEMOGLOBIN (test code =                                        



             85508-5)                                            

 

             MEAN CORPUSCULAR              See_Comment                [Automated

 message]



             HEMOGLOBIN CONC (test                                        The sy

stem which



             code = 28540-3)                                        generated th

is result



                                                                 transmitted ref

erence



                                                                 range: 31.0 - 3

6.0



                                                                 G/DL. The refer

ence



                                                                 range was not u

sed to



                                                                 interpret this 

result



                                                                 as normal/abnor

mal.

 

             RED CELL DISTRIBUTION 13.9 %       11.5-15.0                 



             WIDTH (test code =                                        



             63140-0)                                            

 

             NEUTROPHILS % (test 78.6 %                                 



             code = 63941-2)                                        

 

             LYMPHOCYTES % (test 7.7 %                                  



             code = 25209-3)                                        

 

             MONOCYTES % (test code 11.1 %                                 



             = 39676-4)                                          

 

             EOSINOPHILS % (test 1.4 %                                  



             code = 73616-8)                                        

 

             BASOPHILS % (test code 0.8 %                                  



             = 09540-5)                                          

 

             IMMATURE GRANULOCYTES 0.4 %                                  



             (test code = 40707-2)                                        

 

             NUCLEATED RBC'S              See_Comment                        Unl

ess



             MYELOPEROX STAIN (test                                        Other

wise Indicated,



             code = 80767-1)                                        All Testing 

Performed



                                                                 At:        [facility]



                                                                 Pathology



                                                                 Laboratories, [address] [place] [address], [place]



                                                                 [address]



                                                                 Laboratory Dire

ctor:



                                                                 Bonifacio negron M.D.



                                                                        CLIA Num

nanci



                                                                 [id number]  Cap



                                                                 Accreditation N

o.



                                                                 [id number]  [Auto

mated



                                                                 message] The sy

stem



                                                                 which generated

 this



                                                                 result transmit

froilan



                                                                 reference range

: 0.00



                                                                 - 0.11 K/UL. Th

e



                                                                 reference range

 was



                                                                 not used to int

erpret



                                                                 this result as



                                                                 normal/abnormal

.

 

             PLATELET COUNT (test              See_Comment                [Autom

ated message]



             code = 96731-0)                                        The system w

[employer]



                                                                 generated this 

result



                                                                 transmitted ref

erence



                                                                 range: 130 - 40

0 K/UL.



                                                                 The reference r

veena



                                                                 was not used to



                                                                 interpret this 

result



                                                                 as normal/abnor

mal.

 

             NEUTROPHILS ABSOLUTE              See_Comment  H             [Autom

ated message]



             COUNT (test code =                                        [facility]



             24121-4)                                            generated this 

result



                                                                 transmitted ref

erence



                                                                 range: 1.50 - 7

.50



                                                                 K/UL. The refer

ence



                                                                 range was not u

sed to



                                                                 interpret this 

result



                                                                 as normal/abnor

mal.

 

             LYMPHOCYTES ABSOLUTE              See_Comment  L             [Autom

ated message]



             COUNT (test code =                                        The [facility] which



             90835-7)                                            generated this 

result



                                                                 transmitted ref

erence



                                                                 range: 1.00 - 4

.00



                                                                 K/UL. The refer

ence



                                                                 range was not u

sed to



                                                                 interpret this 

result



                                                                 as normal/abnor

mal.

 

             MONOCYTES ABSOLUTE              See_Comment  H             [Automat

ed message]



             COUNT (test code =                                        [facility] which



             67742-3)                                            generated this 

result



                                                                 transmitted ref

erence



                                                                 range: 0.20 - 1

.00



                                                                 K/UL. The refer

ence



                                                                 range was not u

sed to



                                                                 interpret this 

result



                                                                 as normal/abnor

mal.

 

             BASOPHILS ABSOLUTE              See_Comment                [Automat

ed message]



             COUNT (test code =                                        [facility] which



             53219-5)                                            generated this 

result



                                                                 transmitted ref

erence



                                                                 range: 0.00 - 0

.20



                                                                 K/UL. The refer

ence



                                                                 range was not u

sed to



                                                                 interpret this 

result



                                                                 as normal/abnor

mal.

 

             IMMATURE GRANS (ABS)              See_Comment                [Autom

ated message]



             (test code = 9987)                                        The syste

m which



                                                                 generated this 

result



                                                                 transmitted ref

erence



                                                                 range: 0.00 - 0

.10



                                                                 K/UL. The refer

ence



                                                                 range was not u

sed to



                                                                 interpret this 

result



                                                                 as normal/abnor

mal.

 

             Lab Interpretation Abnormal                               



             (test code = 91653-7)                                        



[facility](CELLAVISION MANUAL DIFF)2019 09:14:00





             Test Item    Value        Reference Range Interpretation Comments

 

             NEUTROPHILS - REL 81 %                                   



             (CELLAVISION)(BEAKER) (test code =                                 

       



             2816)                                               

 

             LYMPHOCYTES - REL 7 %                                    



             (CELLAVISION)(BEAKER) (test code =                                 

       



             2817)                                               

 

             MONOCYTES - REL 7 %                                    



             (CELLAVISION)(BEAKER) (test code =                                 

       



             2818)                                               

 

             EOSINOPHILS - REL 2 %                                    



             (CELLAVISION)(BEAKER) (test code =                                 

       



             2819)                                               

 

             MYELOCYTES - REL 2 %          0-0          H            



             (CELLAVISION)(BEAKER) (test code =                                 

       



             2822)                                               

 

             ATYPICAL LYMPHOCYTES - REL 1 %          0-0          H            



             (CELLAVISION)(BEAKER) (test code =                                 

       



             2829)                                               

 

             NEUTROPHILS - ABS 10.85 K/ul   1.78-5.38    H            



             (CELLAVISION)(BEAKER) (test code =                                 

       



             2830)                                               

 

             LYMPHOCYTES - ABS 0.94 K/ul    1.32-3.57    L            



             (CELLAVISION)(BEAKER) (test code =                                 

       



             2831)                                               

 

             MONOCYTES - ABS 0.94 K/uL    0.30-0.82    H            



             (CELLAVISION)(BEAKER) (test code =                                 

       



             2832)                                               

 

             EOSINOPHILS - ABS 0.27 K/uL    0.04-0.54                 



             (CELLAVISION)(BEAKER) (test code =                                 

       



             2834)                                               

 

             MYELOCYTES-ABS 0.27 K/uL    0.00-0.00    H            



             (CELLAVISION)(BEAKER) (test code =                                 

       



             2837)                                               

 

             ATYPICAL LYMPHOCYTES - ABS 0.13 K/uL    0.00-0.00    H            



             (CELLAVISION)(BEAKER) (test code =                                 

       



             2858)                                               

 

             TOTAL COUNTED (BEAKER) (test code 100                              

      



             = 1351)                                             

 

             RBC MORPHOLOGY (BEAKER) (test code Normal                          

       



             = 762)                                              

 

             SMUDGE CELLS (BEAKER) (test code = Present                         

       



             1371)                                               

 

             GIANT PLATELETS (BEAKER) (test Present                             

   



             code = 313)                                         

 

             PLATELET CONCENTRATION Decreased                              



             (CELLAVISION)(BEAKER) (test code =                                 

       



             3438)                                               



Received comment: User comments: Slide comments:RAD, CHEST, 1 VIEW, NON DEPT
2019 08:25:00Reason for exam:-&gt;left effusionShould this be performed at
the bedside?-&gt;YesFINAL REPORT PATIENT ID:   [id number] Chest AP portable 
Comparison exam: 2019 History provided: Follow-up pleural effusion Left 
chest tube unchanged in place. No pneumothorax. No significant effusions are 
evident. Nonspecific coarsening of markings in the left lower lobe. PICC line in
good position. Signed: Alex Morris MDReport Verified Date/Time:  2019 
08:25:12 Reading Location: [facility] Radiology Reading Room        
Electronically signed by: ALEX MORRIS on 2019 08:25 AMCOMPREHENSIVE 
METABOLIC [id number] 06:40:00





             Test Item    Value        Reference Range Interpretation Comments

 

             TOTAL PROTEIN 4.8 gm/dL    6.0-8.3      L            



             (BEAKER) (test code =                                        



             770)                                                

 

             ALBUMIN (BEAKER) 2.4 g/dL     3.5-5.0      L            



             (test code = 1145)                                        

 

             ALKALINE PHOSPHATASE 141 U/L                          



             (BEAKER) (test code =                                        



             346)                                                

 

             BILIRUBIN TOTAL 3.9 mg/dL    0.2-1.2      H            



             (BEAKER) (test code =                                        



             377)                                                

 

             SODIUM (BEAKER) (test 123 meq/L    136-145      L            



             code = 381)                                         

 

             POTASSIUM (BEAKER) 3.6 meq/L    3.5-5.1                   



             (test code = 379)                                        

 

             CHLORIDE (BEAKER) 90 meq/L            L            



             (test code = 382)                                        

 

             CO2 (BEAKER) (test 29 meq/L     22-29                     



             code = 355)                                         

 

             BLOOD UREA NITROGEN 14 mg/dL     7-21                      



             (BEAKER) (test code =                                        



             354)                                                

 

             CREATININE (BEAKER) 0.80 mg/dL   0.57-1.25                 



             (test code = 358)                                        

 

             GLUCOSE RANDOM 152 mg/dL           H            



             (BEAKER) (test code =                                        



             652)                                                

 

             CALCIUM (BEAKER) 7.3 mg/dL    8.4-10.2     L            



             (test code = 697)                                        

 

             AST (SGOT) (BEAKER) 39 U/L       5-34         H            



             (test code = 353)                                        

 

             ALT (SGPT) (BEAKER) 23 U/L       6-55                      



             (test code = 347)                                        

 

             EGFR (BEAKER) (test 99 mL/min/1.73                           ESTIMA

FROILAN GFR IS



             code = 1092) sq m                                   NOT AS ACCURATE

 AS



                                                                 CREATININE



                                                                 CLEARANCE IN



                                                                 PREDICTING



                                                                 GLOMERULAR



                                                                 FILTRATION RATE

.



                                                                 ESTIMATED GFR I

S



                                                                 NOT APPLICABLE 

FOR



                                                                 DIALYSIS PATIEN

TS.



Specimen slightly [id number] 06:39:00





             Test Item    Value        Reference Range Interpretation Comments

 

             PHOSPHORUS (BEAKER) (test code = 2.1 mg/dL    2.3-4.7      L       

     



             604)                                                



[id number] 06:39:00





             Test Item    Value        Reference Range Interpretation Comments

 

             MAGNESIUM (BEAKER) (test code = 1.8 mg/dL    1.6-2.6               

    



             627)                                                



B-TYPE NATRIURETIC FACTOR (BNP)2019 06:04:00





             Test Item    Value        Reference Range Interpretation Comments

 

             B-TYPE NATRIURETIC PEPTIDE (BEAKER) 97 pg/mL     0-100             

        



             (test code = 700)                                        



CBC W/PLT COUNT &amp; AUTO [id number] 05:57:00





             Test Item    Value        Reference Range Interpretation Comments

 

             WHITE BLOOD CELL COUNT (BEAKER) 13.4 K/ L    3.5-10.5     H        

    



             (test code = 775)                                        

 

             RED BLOOD CELL COUNT (BEAKER) 3.17 M/ L    4.63-6.08    L          

  



             (test code = 761)                                        

 

             HEMOGLOBIN (BEAKER) (test code = 10.3 GM/DL   13.7-17.5    L       

     



             410)                                                

 

             HEMATOCRIT (BEAKER) (test code = 29.4 %       40.1-51.0    L       

     



             411)                                                

 

             MEAN CORPUSCULAR VOLUME (BEAKER) 92.7 fL      79.0-92.2    H       

     



             (test code = 753)                                        

 

             MEAN CORPUSCULAR HEMOGLOBIN 32.5 pg      25.7-32.2    H            



             (BEAKER) (test code = 751)                                        

 

             MEAN CORPUSCULAR HEMOGLOBIN CONC 35.0 GM/DL   32.3-36.5            

     



             (BEAKER) (test code = 752)                                        

 

             RED CELL DISTRIBUTION WIDTH 13.9 %       11.6-14.4                 



             (BEAKER) (test code = 412)                                        

 

             PLATELET COUNT (BEAKER) (test code 75 K/CU MM   150-450      L     

       



             = 756)                                              

 

             MEAN PLATELET VOLUME (BEAKER) 8.9 fL       9.4-12.4     L          

  



             (test code = 754)                                        

 

             NUCLEATED RED BLOOD CELLS (BEAKER) 0 /100 WBC   0-0                

       



             (test code = 413)                                        



CALCIUM, [id number] 05:44:00





             Test Item    Value        Reference Range Interpretation Comments

 

             CALCIUM IONIZED (BEAKER) (test 0.97 mmol/L  1.12-1.27    L         

   



             code = 698)                                         

 

             PH, BLOOD (BEAKER) (test code = 7.50                               

    



             1810)                                               



BODY FLUID CULTURE + GRAM [id number] 11:10:00





             Test Item    Value        Reference    Interpretation Comments



                                       Range                     

 

             CULTURE (BEAKER)                           A            <1+ Coagula

se



             (test code = 1095)                                        negative



                                                                 Staphylococcus

 

             CULTURE (BEAKER) COAGULASE NEGATIVE              A            <1+ P

seudomonas



             (test code = 1095) STAPHYLOCOCCUS                           aerugin

jennifer

 

             Clindamycin (test                           S            



             code = 10)                                          

 

             Erythromycin (test                           R            



             code = 4)                                           

 

             Linezolid (test code                           S            



             = 40)                                               

 

             Oxacillin (test code                           R            



             = 14)                                               

 

             Rifampin (test code =                           S            



             43)                                                 

 

             Tetracycline (test                           S            



             code = 2)                                           

 

             Trimethoprim +                           S            



             Sulfamethoxazole                                        



             (test code = 47)                                        

 

             Vancomycin (test code                           S            



             = 13)                                               

 

             GRAM STAIN RESULT 3+ WBCs                                



             (BEAKER) (test code =                                        



             1123)                                               

 

             GRAM STAIN RESULT <1+ gram positive                           



             (BEAKER) (test code = cocci in pairs and                           



             192308)      clusters                               



CBC W/PLT COUNT &amp; AUTO [id number] 09:58:00





             Test Item    Value        Reference Range Interpretation Comments

 

             WHITE BLOOD CELL COUNT (BEAKER) 10.7 K/ L    3.5-10.5     H        

    



             (test code = 775)                                        

 

             RED BLOOD CELL COUNT (BEAKER) 3.15 M/ L    4.63-6.08    L          

  



             (test code = 761)                                        

 

             HEMOGLOBIN (BEAKER) (test code = 10.2 GM/DL   13.7-17.5    L       

     



             410)                                                

 

             HEMATOCRIT (BEAKER) (test code = 28.9 %       40.1-51.0    L       

     



             411)                                                

 

             MEAN CORPUSCULAR VOLUME (BEAKER) 91.7 fL      79.0-92.2            

     



             (test code = 753)                                        

 

             MEAN CORPUSCULAR HEMOGLOBIN 32.4 pg      25.7-32.2    H            



             (BEAKER) (test code = 751)                                        

 

             MEAN CORPUSCULAR HEMOGLOBIN CONC 35.3 GM/DL   32.3-36.5            

     



             (BEAKER) (test code = 752)                                        

 

             RED CELL DISTRIBUTION WIDTH 13.7 %       11.6-14.4                 



             (BEAKER) (test code = 412)                                        

 

             PLATELET COUNT (BEAKER) (test code 57 K/CU MM   150-450      L     

       



             = 756)                                              

 

             MEAN PLATELET VOLUME (BEAKER) 8.8 fL       9.4-12.4     L          

  



             (test code = 754)                                        

 

             NUCLEATED RED BLOOD CELLS (BEAKER) 0 /100 WBC   0-0                

       



             (test code = 413)                                        



(CELLAVISION MANUAL DIFF)2019 09:58:00





             Test Item    Value        Reference Range Interpretation Comments

 

             NEUTROPHILS - REL 78 %                                   



             (CELLAVISION)(BEAKER) (test code =                                 

       



             2816)                                               

 

             LYMPHOCYTES - REL 5 %                                    



             (CELLAVISION)(BEAKER) (test code =                                 

       



             2817)                                               

 

             MONOCYTES - REL 9 %                                    



             (CELLAVISION)(BEAKER) (test code =                                 

       



             2818)                                               

 

             EOSINOPHILS - REL 2 %                                    



             (CELLAVISION)(BEAKER) (test code =                                 

       



             2819)                                               

 

             METAMYELOCYTES - REL 1 %          0-0          H            



             (CELLAVISION)(BEAKER) (test code =                                 

       



             2821)                                               

 

             BANDS - REL (CELLAVISION)(BEAKER) 4 %          0-10                

      



             (test code = 2826)                                        

 

             BLASTS - REL (CELLAVISION)(BEAKER) 1 %          0-0          H     

       



             (test code = 2827)                                        

 

             NEUTROPHILS - ABS 8.35 K/ul    1.78-5.38    H            



             (CELLAVISION)(BEAKER) (test code =                                 

       



             2830)                                               

 

             LYMPHOCYTES - ABS 0.54 K/ul    1.32-3.57    L            



             (CELLAVISION)(BEAKER) (test code =                                 

       



             2831)                                               

 

             MONOCYTES - ABS 0.96 K/uL    0.30-0.82    H            



             (CELLAVISION)(BEAKER) (test code =                                 

       



             2832)                                               

 

             EOSINOPHILS - ABS 0.21 K/uL    0.04-0.54                 



             (CELLAVISION)(BEAKER) (test code =                                 

       



             2834)                                               

 

             METAMYELOCYTES - ABS 0.11 K/uL    0.00-0.00    H            



             (CELLAVISION)(BEAKER) (test code =                                 

       



             2836)                                               

 

             BANDS - ABS (CELLAVISION)(BEAKER) 0.43 K/uL    0.00-0.80           

      



             (test code = 2840)                                        

 

             BLASTS - ABS (CELLAVISION)(BEAKER) 0.11 K/uL    0.00-0.00    H     

       



             (test code = 2845)                                        

 

             TOTAL COUNTED (BEAKER) (test code = 100                            

        



             1351)                                               

 

             WBC MORPHOLOGY (BEAKER) (test code Normal                          

       



             = 487)                                              

 

             PLT MORPHOLOGY (BEAKER) (test code Normal                          

       



             = 486)                                              

 

             ANISOCYTOSIS (BEAKER) (test code = 1+ few                          

       



             961)                                                

 

             POIKILOCYTES (BEAKER) (test code = 1+ few                          

       



             966)                                                

 

             OVALOCYTES (BEAKER) (test code = 1+ few                            

     



             477)                                                

 

             BASOPHILIC STIPPLING (BEAKER) (test Present                        

        



             code = 473)                                         

 

             ARTIFACT (CELLAVISION)(BEAKER) Present                             

   



             (test code = 3432)                                        

 

             PLATELET CONCENTRATION Decreased                              



             (CELLAVISION)(BEAKER) (test code =                                 

       



             3438)                                               



Received comment: User comments: Slide comments: WBC: SEGMENTED WITH TOXIC 
GRANULATIONS [id number] 08:30:00





             Test Item    Value        Reference Range Interpretation Comments

 

             PHOSPHORUS (BEAKER) (test code = 2.0 mg/dL    2.3-4.7      L       

     



             604)                                                



RAD, CHEST, 1 VIEW, NON [id number] 08:21:00Reason for exam:-&gt;left 
effusionShould this be performed at the bedside?-&gt;YesFINAL REPORT PATIENT ID:
  [id number]  TECHNIQUE: Frontal chest radiograph dated 2019. CLINICAL HI
STORY: Left effusion COMPARISON STUDY: Chest radiographs and chest CT both dated
2019  IMPRESSION:Right-sided PICC and left-sided chest tube are unchanged. 
No change in small left hydropneumothorax. Multiple rounded densities project 
over the left lower lobe of unclear etiology possibly somethingexternal to the 
patient. Cardiomediastinal silhouette is normal in size. No pulmonary edema. No 
fracture.  Signed: Ann Kingeport Verified Date/Time:  2019 
08:21:33 Reading Location:[facility] Reading Room  Electronically signed by: 
ANN KING MD on 2019 08:21 AMCOMPREHENSIVE METABOLIC PANEL
2019 05:34:00





             Test Item    Value        Reference Range Interpretation Comments

 

             TOTAL PROTEIN 4.9 gm/dL    6.0-8.3      L            



             (BEAKER) (test code =                                        



             770)                                                

 

             ALBUMIN (BEAKER) 2.7 g/dL     3.5-5.0      L            



             (test code = 1145)                                        

 

             ALKALINE PHOSPHATASE 137 U/L                          



             (BEAKER) (test code =                                        



             346)                                                

 

             BILIRUBIN TOTAL 5.5 mg/dL    0.2-1.2      H            



             (BEAKER) (test code =                                        



             377)                                                

 

             SODIUM (BEAKER) (test 123 meq/L    136-145      L            



             code = 381)                                         

 

             POTASSIUM (BEAKER) 3.9 meq/L    3.5-5.1                   



             (test code = 379)                                        

 

             CHLORIDE (BEAKER) 89 meq/L            L            



             (test code = 382)                                        

 

             CO2 (BEAKER) (test 31 meq/L     22-29        H            



             code = 355)                                         

 

             BLOOD UREA NITROGEN 15 mg/dL     7-21                      



             (BEAKER) (test code =                                        



             354)                                                

 

             CREATININE (BEAKER) 0.68 mg/dL   0.57-1.25                 



             (test code = 358)                                        

 

             GLUCOSE RANDOM 104 mg/dL                        



             (BEAKER) (test code =                                        



             652)                                                

 

             CALCIUM (BEAKER) 7.7 mg/dL    8.4-10.2     L            



             (test code = 697)                                        

 

             AST (SGOT) (BEAKER) 47 U/L       5-34         H            



             (test code = 353)                                        

 

             ALT (SGPT) (BEAKER) 25 U/L       6-55                      



             (test code = 347)                                        

 

             EGFR (BEAKER) (test 119                                    ESTIMATE

D GFR IS



             code = 1092) mL/min/1.73 sq                           NOT AS ACCURA

TE AS



                          m                                      CREATININE



                                                                 CLEARANCE IN



                                                                 PREDICTING



                                                                 GLOMERULAR



                                                                 FILTRATION RATE

.



                                                                 ESTIMATED GFR I

S



                                                                 NOT APPLICABLE 

FOR



                                                                 DIALYSIS PATIEN

TS.



Specimen moderately [id number] 05:04:00





             Test Item    Value        Reference Range Interpretation Comments

 

             MAGNESIUM (BEAKER) (test code = 1.8 mg/dL    1.6-2.6               

    



             627)                                                



CT, CHEST, WITH [id number] 15:37:00Reason for exam:-&gt;reevaluate 
pleural effusion and left lung abscessFINAL REPORT PATIENT ID:   [id number]  CT of
the Chest dated 2019 COMPARISON: 2019 CLINICAL INFORMATION: 
Pleural effusionreevaluate pleural effusion and left lung abscess Comment:  Axia
l images of the chest were obtained from thoracic inlet to the upper abdomen 
with intravenous contrast.     This exam was performed according to our 
departmental dose-optimization program, which includes automated exposure 
control, adjustment of the mA and/or kV according to patient size and/or use of
interactive reconstruction technique. Heart is normal in size.  There is small 
pericardial effusion.Great vessels are unremarkable. No adenopathy in the 
mediastinum or perihilar region. Trachea and mainstem bronchi are patent.  Trace
bilateral pleural effusion is present. There is interval significant decrease in
the amount of left pleural effusion. There is small amount of air present in the
left pleural space. The pigtail catheter is seen in the left pleural space. 
Atelectasis is seen in the lingula, right mid, and both lower lobes. Cavitary 
lesion is seen in the superior segment of the left lower lobe measuring 
approximately 2.8 x 3.3 cm. Visualized upper abdomen demonstrates cirrhotic 
liver with trace ascites. Spleen is minimally enlarged. Impression: 1. Interval 
decrease in the amount of left pleural effusion with residual left 
hydropneumothorax.2. Trace right pleural effusion.3. Cavitary lesion in the 
superior segment of the left lower lobe may represent abscess.4. Cirrhosis with 
splenomegaly and ascites. Signed: Lucita Nails Verified Date/Time:  
2019 15:37:27 Reading Location: [address] Body Reading Room      
Electronically signed by: LUCITA NAILS M.D. on 2019 03:37 PMELECTROLYTES
2019 13:41:00





             Test Item    Value        Reference Range Interpretation Comments

 

             SODIUM (BEAKER) (test code = 381) 119 meq/L    136-145      LL     

      

 

             POTASSIUM (BEAKER) (test code = 4.1 meq/L    3.5-5.1               

    



             379)                                                

 

             CHLORIDE (BEAKER) (test code = 382) 85 meq/L            L    

        

 

             CO2 (BEAKER) (test code = 355) 29 meq/L     22-29                  

   



Page [phone] with results. Thank you.CBC W/PLT COUNT &amp; AUTO 
[id number] 10:30:00





             Test Item    Value        Reference Range Interpretation Comments

 

             WHITE BLOOD CELL COUNT (BEAKER) 11.3 K/ L    3.5-10.5     H        

    



             (test code = 775)                                        

 

             RED BLOOD CELL COUNT (BEAKER) 3.25 M/ L    4.63-6.08    L          

  



             (test code = 761)                                        

 

             HEMOGLOBIN (BEAKER) (test code = 10.6 GM/DL   13.7-17.5    L       

     



             410)                                                

 

             HEMATOCRIT (BEAKER) (test code = 29.8 %       40.1-51.0    L       

     



             411)                                                

 

             MEAN CORPUSCULAR VOLUME (BEAKER) 91.7 fL      79.0-92.2            

     



             (test code = 753)                                        

 

             MEAN CORPUSCULAR HEMOGLOBIN 32.6 pg      25.7-32.2    H            



             (BEAKER) (test code = 751)                                        

 

             MEAN CORPUSCULAR HEMOGLOBIN CONC 35.6 GM/DL   32.3-36.5            

     



             (BEAKER) (test code = 752)                                        

 

             RED CELL DISTRIBUTION WIDTH 13.5 %       11.6-14.4                 



             (BEAKER) (test code = 412)                                        

 

             PLATELET COUNT (BEAKER) (test code 51 K/CU MM   150-450      L     

       



             = 756)                                              

 

             MEAN PLATELET VOLUME (BEAKER) 8.3 fL       9.4-12.4     L          

  



             (test code = 754)                                        

 

             NUCLEATED RED BLOOD CELLS (BEAKER) 0 /100 WBC   0-0                

       



             (test code = 413)                                        



(CELLAVISION MANUAL DIFF)2019 10:30:00





             Test Item    Value        Reference Range Interpretation Comments

 

             NEUTROPHILS - REL 82 %                                   



             (CELLAVISION)(BEAKER) (test code =                                 

       



             2816)                                               

 

             LYMPHOCYTES - REL 2 %                                    



             (CELLAVISION)(BEAKER) (test code =                                 

       



             2817)                                               

 

             MONOCYTES - REL 9 %                                    



             (CELLAVISION)(BEAKER) (test code =                                 

       



             2818)                                               

 

             METAMYELOCYTES - REL 2 %          0-0          H            



             (CELLAVISION)(BEAKER) (test code =                                 

       



             2821)                                               

 

             PROMYELOCYTES - REL 2 %          0-0          H            



             (CELLAVSION)(BEAKER) (test code =                                  

      



             2825)                                               

 

             BANDS - REL (CELLAVISION)(BEAKER) 2 %          0-10                

      



             (test code = 2826)                                        

 

             ATYPICAL LYMPHOCYTES - REL 1 %          0-0          H            



             (CELLAVISION)(BEAKER) (test code =                                 

       



             2829)                                               

 

             NEUTROPHILS - ABS 9.27 K/ul    1.78-5.38    H            



             (CELLAVISION)(BEAKER) (test code =                                 

       



             2830)                                               

 

             LYMPHOCYTES - ABS 0.23 K/ul    1.32-3.57    L            



             (CELLAVISION)(BEAKER) (test code =                                 

       



             2831)                                               

 

             MONOCYTES - ABS 1.02 K/uL    0.30-0.82    H            



             (CELLAVISION)(BEAKER) (test code =                                 

       



             2832)                                               

 

             METAMYELOCYTES - ABS 0.23 K/uL    0.00-0.00    H            



             (CELLAVISION)(BEAKER) (test code =                                 

       



             2836)                                               

 

             PROMYELOCYTES - ABS 0.23 K/uL    0.00-0.00    H            



             (CELLAVISION)(BEAKER) (test code =                                 

       



             2838)                                               

 

             BANDS - ABS (CELLAVISION)(BEAKER) 0.23 K/uL    0.00-0.80           

      



             (test code = 2840)                                        

 

             ATYPICAL LYMPHOCYTES - ABS 0.11 K/uL    0.00-0.00    H            



             (CELLAVISION)(BEAKER) (test code =                                 

       



             2858)                                               

 

             TOTAL COUNTED (BEAKER) (test code = 100                            

        



             1351)                                               

 

             WBC MORPHOLOGY (BEAKER) (test code Normal                          

       



             = 487)                                              

 

             LARGE PLT(BEAKER) (test code = Present                             

   



             2156)                                               

 

             BASOPHILIC STIPPLING (BEAKER) (test Present                        

        



             code = 473)                                         

 

             ARTIFACT (CELLAVISION)(BEAKER) Present                             

   



             (test code = 3432)                                        

 

             PLATELET CONCENTRATION Decreased                              



             (CELLAVISION)(BEAKER) (test code =                                 

       



             3438)                                               



Received comment: User comments: Slide comments: WBC: SEGMENTED WITH TOXIC 
GRANULATIONS PRESENTRAD, CHEST, 1 VIEW, NON [id number] 07:48:00Reason for 
exam:-&gt;left effusionShould this be performed at the bedside?-&gt;YesFINAL 
REPORT PATIENT ID:   [id number] RAD, CHEST, 1 VIEW, NON DEPT INDICATION: left 
effusion COMPARISON: Prior day's exam FINDINGS: Portable frontal view of the 
chest.   IMPRESSION:  Support Lines: PICC tip overlies the SVC.  Left pleural 
catheter is again noted. Lungs and pleura: Bibasilar airspace disease is 
unchanged.  Superimposed trace left effusion. Left apical pneumothorax is 
decreased in the interim.    Heart and mediastinum: Stable contours.  Additional
findings: None. Signed: Jossy, SarahMDReport Verified Date/Time:  2019 
07:48:14 Reading Location: [facility] Reading [facility]  Electronically 
signed by: SASHA CROCKER MD on 2019 07:48 AMCALCIUM, IONIZED
2019 05:58:00





             Test Item    Value        Reference Range Interpretation Comments

 

             CALCIUM IONIZED (BEAKER) (test 0.96 mmol/L  1.12-1.27    L         

   



             code = 698)                                         

 

             PH, BLOOD (BEAKER) (test code = 7.53                               

    



             1810)                                               



COMPREHENSIVE METABOLIC [id number] 05:51:00





             Test Item    Value        Reference Range Interpretation Comments

 

             TOTAL PROTEIN 5.2 gm/dL    6.0-8.3      L            



             (BEAKER) (test code =                                        



             770)                                                

 

             ALBUMIN (BEAKER) 2.6 g/dL     3.5-5.0      L            



             (test code = 1145)                                        

 

             ALKALINE PHOSPHATASE 144 U/L                          



             (BEAKER) (test code =                                        



             346)                                                

 

             BILIRUBIN TOTAL 6.3 mg/dL    0.2-1.2      H            



             (BEAKER) (test code =                                        



             377)                                                

 

             SODIUM (BEAKER) (test 119 meq/L    136-145      LL           



             code = 381)                                         

 

             POTASSIUM (BEAKER) 4.2 meq/L    3.5-5.1                   



             (test code = 379)                                        

 

             CHLORIDE (BEAKER) 86 meq/L            L            



             (test code = 382)                                        

 

             CO2 (BEAKER) (test 26 meq/L     22-29                     



             code = 355)                                         

 

             BLOOD UREA NITROGEN 23 mg/dL     7-21         H            



             (BEAKER) (test code =                                        



             354)                                                

 

             CREATININE (BEAKER) 0.81 mg/dL   0.57-1.25                 



             (test code = 358)                                        

 

             GLUCOSE RANDOM 103 mg/dL                        



             (BEAKER) (test code =                                        



             652)                                                

 

             CALCIUM (BEAKER) 7.6 mg/dL    8.4-10.2     L            



             (test code = 697)                                        

 

             AST (SGOT) (BEAKER) 46 U/L       5-34         H            



             (test code = 353)                                        

 

             ALT (SGPT) (BEAKER) 21 U/L       6-55                      



             (test code = 347)                                        

 

             EGFR (BEAKER) (test 97 mL/min/1.73                           ESTIMA

FROILAN GFR IS



             code = 1092) sq m                                   NOT AS ACCURATE

 AS



                                                                 CREATININE



                                                                 CLEARANCE IN



                                                                 PREDICTING



                                                                 GLOMERULAR



                                                                 FILTRATION RATE

.



                                                                 ESTIMATED GFR I

S



                                                                 NOT APPLICABLE 

FOR



                                                                 DIALYSIS PATIEN

TS.



Specimen moderately [id number] 05:07:00





             Test Item    Value        Reference Range Interpretation Comments

 

             CORTISOL, TOTAL (BEAKER) (test code 9.6 ug/dL    3.7-19.4          

        



             = 2755)                                             



[id number] 04:57:00





             Test Item    Value        Reference Range Interpretation Comments

 

             PHOSPHORUS (BEAKER) (test code = 2.0 mg/dL    2.3-4.7      L       

     



             604)                                                



[id number] 04:57:00





             Test Item    Value        Reference Range Interpretation Comments

 

             MAGNESIUM (BEAKER) (test code = 1.7 mg/dL    1.6-2.6               

    



             627)                                                



[id number] 22:08:00





             Test Item    Value        Reference Range Interpretation Comments

 

             SODIUM (BEAKER) (test 119 meq/L    136-145      LL           



             code = 381)                                         

 

             POTASSIUM (BEAKER) 4.6 meq/L    3.5-5.1                   Specimen 

slightly



             (test code = 379)                                        hemolyzed

 

             CHLORIDE (BEAKER) 86 meq/L            L            



             (test code = 382)                                        

 

             CO2 (BEAKER) (test 27 meq/L     22-29                     



             code = 355)                                         



Call K &gt; 5.5POCT-GLUCOSE [id number] 12:59:00





             Test Item    Value        Reference Range Interpretation Comments

 

             POC-GLUCOSE METER 95 mg/dL                         TESTED AT 

[facility] [address]



             (BEAKER) (test code =                                        [place] [address]



             1538)                                               



T4, [id number] 10:25:00





             Test Item    Value        Reference Range Interpretation Comments

 

             FREE T4 (BEAKER) (test code = 655) 0.65 ng/dL   0.70-1.48    L     

       



CBC W/PLT COUNT &amp; AUTO [id number] 10:21:00





             Test Item    Value        Reference Range Interpretation Comments

 

             WHITE BLOOD CELL COUNT (BEAKER) 11.8 K/ L    3.5-10.5     H        

    



             (test code = 775)                                        

 

             RED BLOOD CELL COUNT (BEAKER) 3.74 M/ L    4.63-6.08    L          

  



             (test code = 761)                                        

 

             HEMOGLOBIN (BEAKER) (test code = 12.2 GM/DL   13.7-17.5    L       

     



             410)                                                

 

             HEMATOCRIT (BEAKER) (test code = 33.9 %       40.1-51.0    L       

     



             411)                                                

 

             MEAN CORPUSCULAR VOLUME (BEAKER) 90.6 fL      79.0-92.2            

     



             (test code = 753)                                        

 

             MEAN CORPUSCULAR HEMOGLOBIN 32.6 pg      25.7-32.2    H            



             (BEAKER) (test code = 751)                                        

 

             MEAN CORPUSCULAR HEMOGLOBIN CONC 36.0 GM/DL   32.3-36.5            

     



             (BEAKER) (test code = 752)                                        

 

             RED CELL DISTRIBUTION WIDTH 13.7 %       11.6-14.4                 



             (BEAKER) (test code = 412)                                        

 

             PLATELET COUNT (BEAKER) (test code 79 K/CU MM   150-450      L     

       



             = 756)                                              

 

             MEAN PLATELET VOLUME (BEAKER) 8.9 fL       9.4-12.4     L          

  



             (test code = 754)                                        

 

             NUCLEATED RED BLOOD CELLS (BEAKER) 0 /100 WBC   0-0                

       



             (test code = 413)                                        



(CELLAVISION MANUAL DIFF)2019 10:21:00





             Test Item    Value        Reference Range Interpretation Comments

 

             NEUTROPHILS - REL 82 %                                   



             (CELLAVISION)(BEAKER) (test code =                                 

       



             2816)                                               

 

             LYMPHOCYTES - REL 4 %                                    



             (CELLAVISION)(BEAKER) (test code =                                 

       



             2817)                                               

 

             MONOCYTES - REL 13 %                                   



             (CELLAVISION)(BEAKER) (test code =                                 

       



             2818)                                               

 

             ATYPICAL LYMPHOCYTES - REL 1 %          0-0          H            



             (CELLAVISION)(BEAKER) (test code =                                 

       



             2829)                                               

 

             NEUTROPHILS - ABS 9.68 K/ul    1.78-5.38    H            



             (CELLAVISION)(BEAKER) (test code =                                 

       



             2830)                                               

 

             LYMPHOCYTES - ABS 0.47 K/ul    1.32-3.57    L            



             (CELLAVISION)(BEAKER) (test code =                                 

       



             2831)                                               

 

             MONOCYTES - ABS 1.53 K/uL    0.30-0.82    H            



             (CELLAVISION)(BEAKER) (test code =                                 

       



             2832)                                               

 

             ATYPICAL LYMPHOCYTES - ABS 0.12 K/uL    0.00-0.00    H            



             (CELLAVISION)(BEAKER) (test code =                                 

       



             2858)                                               

 

             TOTAL COUNTED (BEAKER) (test code = 100                            

        



             1351)                                               

 

             RBC MORPHOLOGY (BEAKER) (test code Normal                          

       



             = 762)                                              

 

             WBC MORPHOLOGY (BEAKER) (test code Normal                          

       



             = 487)                                              

 

             PLT MORPHOLOGY (BEAKER) (test code Normal                          

       



             = 486)                                              

 

             ARTIFACT (CELLAVISION)(BEAKER) Present                             

   



             (test code = 3432)                                        

 

             PLATELET CONCENTRATION Decreased                              



             (CELLAVISION)(BEAKER) (test code =                                 

       



             3438)                                               



Received comment: User comments: Slide comments:POCT-GLUCOSE [id number] 
09:58:00





             Test Item    Value        Reference Range Interpretation Comments

 

             POC-GLUCOSE METER 104 mg/dL                        TESTED AT 

[facility] [address]



             (BEAKER) (test code =                                        [place]



             1538)                                               [address]



RAD, CHEST, 1 VIEW, NON [id number] 09:33:00Reason for exam:-
&gt;effusionShould this be performed at the bedside?-&gt;YesFINAL REPORT PATIENT
ID:   [id number] Comparison: 2019 TECHNIQUE: Single view of the chest FINDING
S: Bilateral interstitial and airspace opacities are stable. Small left pleural 
thickening with adjacent airspace disease identified. A previous left-sided 
pneumothorax has decreased. Left pleural drainage catheters again noted. Right-
sided PICC line is stable. Signed: Ronaldo Mireles MDReport Verified Date/Time:  
2019 09:33:21 Reading Location: [address] Transitional Reading Room    
 Electronically signed by: RONALDO MIRELES M.D. on 2019 09:33 AM
COMPREHENSIVE METABOLIC [id number] 08:59:00





             Test Item    Value        Reference Range Interpretation Comments

 

             TOTAL PROTEIN 5.4 gm/dL    6.0-8.3      L            Specimen sligh

tly



             (BEAKER) (test code =                                        hemoly

zed



             770)                                                

 

             ALBUMIN (BEAKER) 2.1 g/dL     3.5-5.0      L            Specimen sl

ightly



             (test code = 1145)                                        hemolyzed

 

             ALKALINE PHOSPHATASE 153 U/L             H            



             (BEAKER) (test code =                                        



             346)                                                

 

             BILIRUBIN TOTAL 5.0 mg/dL    0.2-1.2      H            Specimen sli

ghtly



             (BEAKER) (test code =                                        hemoly

zed



             377)                                                

 

             SODIUM (BEAKER) (test 117 meq/L    136-145      LL           



             code = 381)                                         

 

             POTASSIUM (BEAKER) 5.5 meq/L    3.5-5.1      H            Specimen 

slightly



             (test code = 379)                                        hemolyzed

 

             CHLORIDE (BEAKER) 84 meq/L            L            



             (test code = 382)                                        

 

             CO2 (BEAKER) (test 26 meq/L     22-29                     



             code = 355)                                         

 

             BLOOD UREA NITROGEN 32 mg/dL     7-21         H            



             (BEAKER) (test code =                                        



             354)                                                

 

             CREATININE (BEAKER) 0.85 mg/dL   0.57-1.25                 Specimen

 slightly



             (test code = 358)                                        hemolyzed

 

             GLUCOSE RANDOM 104 mg/dL                        



             (BEAKER) (test code =                                        



             652)                                                

 

             CALCIUM (BEAKER) 7.6 mg/dL    8.4-10.2     L            



             (test code = 697)                                        

 

             AST (SGOT) (BEAKER) 51 U/L       5-34         H            Specimen

 slightly



             (test code = 353)                                        hemolyzed

 

             ALT (SGPT) (BEAKER) 23 U/L       6-55                      Specimen

 slightly



             (test code = 347)                                        hemolyzed

 

             EGFR (BEAKER) (test 92 mL/min/1.73                           ESTIMA

FROILAN GFR IS



             code = 1092) sq m                                   NOT AS ACCURATE

 AS



                                                                 CREATININE



                                                                 CLEARANCE IN



                                                                 PREDICTING



                                                                 GLOMERULAR



                                                                 FILTRATION RATE

.



                                                                 ESTIMATED GFR I

S



                                                                 NOT APPLICABLE 

FOR



                                                                 DIALYSIS PATIEN

TS.



Specimen moderately [id number] 08:55:00





             Test Item    Value        Reference Range Interpretation Comments

 

             MAGNESIUM (BEAKER) 1.9 mg/dL    1.6-2.6                   Specimen 

slightly



             (test code = 627)                                        hemolyzed



[id number] 08:55:00





             Test Item    Value        Reference Range Interpretation Comments

 

             PHOSPHORUS (BEAKER) 2.5 mg/dL    2.3-4.7                   Specimen

 slightly



             (test code = 604)                                        hemolyzed



TSH/FREE T4 IF [id number] 08:39:00





             Test Item    Value        Reference Range Interpretation Comments

 

             THYROID STIMULATING HORMONE 8.07 uIU/mL  0.35-4.94    H            



             (BEAKER) (test code = 772)                                        



CALCIUM, [id number] 08:06:00





             Test Item    Value        Reference Range Interpretation Comments

 

             CALCIUM IONIZED (BEAKER) (test 1.00 mmol/L  1.12-1.27    L         

   



             code = 698)                                         

 

             PH, BLOOD (BEAKER) (test code = 7.48                               

    



             1810)                                               



[id number] 23:47:00





             Test Item    Value        Reference Range Interpretation Comments

 

             SODIUM (BEAKER) (test code = 381) 117 meq/L    136-145      LL     

      

 

             POTASSIUM (BEAKER) (test code = 5.3 meq/L    3.5-5.1      H        

    



             379)                                                

 

             CHLORIDE (BEAKER) (test code = 382) 84 meq/L            L    

        

 

             CO2 (BEAKER) (test code = 355) 27 meq/L     22-29                  

   



Call K &gt; [id number]-GLUCOSE [id number] 21:18:00





             Test Item    Value        Reference Range Interpretation Comments

 

             POC-GLUCOSE METER 145 mg/dL           H            TESTED AT 

[place] [address]



             (BEAKER) (test code =                                        [facility]



             [address])                                               [address]



[id number] 18:57:00





             Test Item    Value        Reference Range Interpretation Comments

 

             CORTISOL, TOTAL (BEAKER) (test 18.1 ug/dL   3.7-19.4               

   



             code = 2755)                                        



[id number] 18:34:00





             Test Item    Value        Reference Range Interpretation Comments

 

             SODIUM (BEAKER) (test code = 381) 116 meq/L    136-145      LL     

      

 

             POTASSIUM (BEAKER) (test code = 6.0 meq/L    3.5-5.1      HH       

    



             379)                                                

 

             CHLORIDE (BEAKER) (test code = 382) 82 meq/L            L    

        

 

             CO2 (BEAKER) (test code = 355) 30 meq/L     22-29        H         

   



Call [id number]-GLUCOSE [id number] 18:20:00





             Test Item    Value        Reference Range Interpretation Comments

 

             POC-GLUCOSE METER 141 mg/dL           H            TESTED AT 

[place] [address]



             [facility] (test code =                                        [facility]



             [address]38)                                               [address]



POCT-GLUCOSE [id number] 13:00:00





             Test Item    Value        Reference Range Interpretation Comments

 

             POC-GLUCOSE METER 122 mg/dL           H            TESTED AT 

[place] [address]



             [facility] (test code =                                        [facility]



             1538)                                               [address]



CBC W/PLT COUNT &amp; AUTO [id number] 10:34:00





             Test Item    Value        Reference Range Interpretation Comments

 

             WHITE BLOOD CELL COUNT (BEAKER) 15.5 K/ L    3.5-10.5     H        

    



             (test code = 775)                                        

 

             RED BLOOD CELL COUNT (BEAKER) 4.04 M/ L    4.63-6.08    L          

  



             (test code = 761)                                        

 

             HEMOGLOBIN (BEAKER) (test code = 13.3 GM/DL   13.7-17.5    L       

     



             410)                                                

 

             HEMATOCRIT (BEAKER) (test code = 36.5 %       40.1-51.0    L       

     



             411)                                                

 

             MEAN CORPUSCULAR VOLUME (BEAKER) 90.3 fL      79.0-92.2            

     



             (test code = 753)                                        

 

             MEAN CORPUSCULAR HEMOGLOBIN 32.9 pg      25.7-32.2    H            



             (BEAKER) (test code = 751)                                        

 

             MEAN CORPUSCULAR HEMOGLOBIN CONC 36.4 GM/DL   32.3-36.5            

     



             (BEAKER) (test code = 752)                                        

 

             RED CELL DISTRIBUTION WIDTH 13.5 %       11.6-14.4                 



             (BEAKER) (test code = 412)                                        

 

             PLATELET COUNT (BEAKER) (test code 65 K/CU MM   150-450      L     

       



             = 756)                                              

 

             MEAN PLATELET VOLUME (BEAKER) 9.0 fL       9.4-12.4     L          

  



             (test code = 754)                                        

 

             NUCLEATED RED BLOOD CELLS (BEAKER) 0 /100 WBC   0-0                

       



             (test code = 413)                                        



(CELLAVISION MANUAL DIFF)2019 10:34:00





             Test Item    Value        Reference Range Interpretation Comments

 

             NEUTROPHILS - REL 78 %                                   



             (CELLAVISION)(BEAKER) (test code                                   

     



             = 2816)                                             

 

             LYMPHOCYTES - REL 4 %                                    



             (CELLAVISION)(BEAKER) (test code                                   

     



             = 2817)                                             

 

             MONOCYTES - REL 14 %                                   



             (CELLAVISION)(BEAKER) (test code                                   

     



             = 2818)                                             

 

             EOSINOPHILS - REL 2 %                                    



             (CELLAVISION)(BEAKER) (test code                                   

     



             = 2819)                                             

 

             BANDS - REL (CELLAVISION)(BEAKER) 2 %          0-10                

      



             (test code = 2826)                                        

 

             NEUTROPHILS - ABS 12.09 K/ul   1.78-5.38    H            



             (CELLAVISION)(BEAKER) (test code                                   

     



             = 2830)                                             

 

             LYMPHOCYTES - ABS 0.62 K/ul    1.32-3.57    L            



             (CELLAVISION)(BEAKER) (test code                                   

     



             = 2831)                                             

 

             MONOCYTES - ABS 2.17 K/uL    0.30-0.82    H            



             (CELLAVISION)(BEAKER) (test code                                   

     



             = 2832)                                             

 

             EOSINOPHILS - ABS 0.31 K/uL    0.04-0.54                 



             (CELLAVISION)(BEAKER) (test code                                   

     



             = 2834)                                             

 

             BANDS - ABS (CELLAVISION)(BEAKER) 0.31 K/uL    0.00-0.80           

      



             (test code = 2840)                                        

 

             TOTAL COUNTED (BEAKER) (test code 100                              

      



             = 1351)                                             

 

             WBC MORPHOLOGY (BEAKER) (test Normal                               

  



             code = 487)                                         

 

             PLT MORPHOLOGY (BEAKER) (test Normal                               

  



             code = 486)                                         

 

             POLYCHROMATOPHILLIC RBCS(BEAKER) 1+ few                            

     



             (test code = 478)                                        

 

             POIKILOCYTES (BEAKER) (test code 2+ moderate                       

     



             = 966)                                              

 

             KARISSA CELLS (BEAKER) (test code = 2+ moderate                       

     



             474)                                                

 

             BASOPHILIC STIPPLING (BEAKER) Present                              

  



             (test code = 473)                                        

 

             ARTIFACT (CELLAVISION)(BEAKER) Present                             

   



             (test code = 3432)                                        

 

             PLATELET CONCENTRATION Decreased                              



             (CELLAVISION)(BEAKER) (test code                                   

     



             = 3438)                                             



Received comment: User comments: Slide comments:RAD, CHEST, 1 VIEW, NON DEPT
2019 08:40:00Reason for exam:-&gt;left effusionShould this be performed at
the bedside?-&gt;YesFINAL REPORT PATIENT ID:   [id number] Portable chest. CLINICAL
HISTORY: left effusion. COMPARISON STUDY: Chest x-ray from yesterday. FINDINGS: 
The cardiac silhouette is unremarkable. The pulmonary parenchyma demonstrates 
increased interstitial markings with atelectatic changes in the lung bases. A 
left-sided pigtail catheter chest tube remains and there has been development of
a loculated small basilarpneumothorax. A right PICC line remains. Degenerative 
changes are noted. IMPRESSION: Development a small left-sided basilar 
pneumothorax. No other significant change. Signed: Beckie Martinez Date/Time:  2019 08:40:30 Reading Location: [address] 
Consult Reading Room Electronically signed by: BECKIE MARTINEZ M.D. on 
2019 08:40 AMPOCT-GLUCOSE [id number] 08:39:00





             Test Item    Value        Reference Range Interpretation Comments

 

             POC-GLUCOSE METER 110 mg/dL                        TESTED AT 

[facility] [address]



             (BEAKER) (test code =                                        [facility]



             [address])                                               [address]



COMPREHENSIVE METABOLIC [id number] 06:32:00





             Test Item    Value        Reference Range Interpretation Comments

 

             TOTAL PROTEIN 5.5 gm/dL    6.0-8.3      L            



             (BEAKER) (test code =                                        



             770)                                                

 

             ALBUMIN (BEAKER) 1.9 g/dL     3.5-5.0      L            



             (test code = 1145)                                        

 

             ALKALINE PHOSPHATASE 134 U/L                          



             (BEAKER) (test code =                                        



             346)                                                

 

             BILIRUBIN TOTAL 4.4 mg/dL    0.2-1.2      H            



             (BEAKER) (test code =                                        



             377)                                                

 

             SODIUM (BEAKER) (test 116 meq/L    136-145      LL           



             code = 381)                                         

 

             POTASSIUM (BEAKER) 5.6 meq/L    3.5-5.1      H            



             (test code = 379)                                        

 

             CHLORIDE (BEAKER) 83 meq/L            L            



             (test code = 382)                                        

 

             CO2 (BEAKER) (test 27 meq/L     22-29                     



             code = 355)                                         

 

             BLOOD UREA NITROGEN 34 mg/dL     7-21         H            



             (BEAKER) (test code =                                        



             354)                                                

 

             CREATININE (BEAKER) 0.89 mg/dL   0.57-1.25                 



             (test code = 358)                                        

 

             GLUCOSE RANDOM 109 mg/dL           H            



             (BEAKER) (test code =                                        



             652)                                                

 

             CALCIUM (BEAKER) 7.4 mg/dL    8.4-10.2     L            



             (test code = 697)                                        

 

             AST (SGOT) (BEAKER) 31 U/L       5-34                      



             (test code = 353)                                        

 

             ALT (SGPT) (BEAKER) 18 U/L       6-55                      



             (test code = 347)                                        

 

             EGFR (BEAKER) (test 87 mL/min/1.73                           ESTIMA

FROILAN GFR IS



             code = 1092) sq m                                   NOT AS ACCURATE

 AS



                                                                 CREATININE



                                                                 CLEARANCE IN



                                                                 PREDICTING



                                                                 GLOMERULAR



                                                                 FILTRATION RATE

.



                                                                 ESTIMATED GFR I

S



                                                                 NOT APPLICABLE 

FOR



                                                                 DIALYSIS PATIEN

TS.



Specimen moderately ictericPOCT-GLUCOSE [id number] 21:12:00





             Test Item    Value        Reference Range Interpretation Comments

 

             POC-GLUCOSE METER 114 mg/dL           H            TESTED AT 

[facility] [address]



             (BEAKER) (test code =                                        [facility]



             [address])                                               [address]



OSMOLALITY, [id number] 18:43:00





             Test Item    Value        Reference Range Interpretation Comments

 

             OSMOLALITY URINE (BEAKER) (test 694 mOsm/kg  40-1,400              

    



             code = 614)                                         



SODIUM, RANDOM [id number] 18:02:00





             Test Item    Value        Reference Range Interpretation Comments

 

             SODIUM URINE (BEAKER) (test code = < meq/L                         

       



             243)                                                



Reference Range: No NormalsPOCT-GLUCOSE [id number] 16:06:00





             Test Item    Value        Reference Range Interpretation Comments

 

             POC-GLUCOSE METER 145 mg/dL           H            TESTED AT 

[facility] [address]



             ([facility]) (test code =                                        [facility]



             [address])                                               [address]



RAD, ABDOMEN/KUB, 1 VIEW [id number] 12:51:00Reason for exam:-&gt;no BM in 13 
days. abdominal distension.  concern for ileusFINAL REPORT PATIENT ID:   
[id number] RAD, ABDOMEN/KUB, 1 VIEW AP CLINICAL INDICATION:  no BM in 13 days. 
abdominal distension.  concern for ileus   COMPARISON: None TECHNIQUE: 24 
radiographs of the abdomen. IMPRESSION:  The bowel gas pattern demonstrates 
gaseous distention without dilation. No pneumatosis. Appearance may reflect 
ileus. Excreted contrast is present in the urinary bladder. The regional s
keleton is intact. Signed: JR Mendoza Robert MDReport Verified Date/Time:
 2019 12:51:21 Reading Location: [facility] Radiology Reading Room    
Electronically signed by: KULWINDER MENDOZA on 2019 12:51 PM
OSMOLALITY, [id number] 12:47:00





             Test Item    Value        Reference Range Interpretation Comments

 

             OSMOLALITY, SERUM (BEAKER) (test 258 mOsm/kg  275-295      L       

     



             code = 615)                                         



POCT-GLUCOSE [id number] 12:35:00





             Test Item    Value        Reference Range Interpretation Comments

 

             POC-GLUCOSE METER 93 mg/dL                         TESTED AT 

[facility] [address]



             [facility] (test code =                                        [facility] [address]



             [address]38)                                               



CBC W/PLT COUNT &amp; AUTO [id number] 10:10:00





             Test Item    Value        Reference Range Interpretation Comments

 

             WHITE BLOOD CELL COUNT (BEAKER) 19.5 K/ L    3.5-10.5     H        

    



             (test code = 775)                                        

 

             RED BLOOD CELL COUNT (BEAKER) 4.18 M/ L    4.63-6.08    L          

  



             (test code = 761)                                        

 

             HEMOGLOBIN (BEAKER) (test code = 13.3 GM/DL   13.7-17.5    L       

     



             410)                                                

 

             HEMATOCRIT (BEAKER) (test code = 37.8 %       40.1-51.0    L       

     



             411)                                                

 

             MEAN CORPUSCULAR VOLUME (BEAKER) 90.4 fL      79.0-92.2            

     



             (test code = 753)                                        

 

             MEAN CORPUSCULAR HEMOGLOBIN 31.8 pg      25.7-32.2                 



             (BEAKER) (test code = 751)                                        

 

             MEAN CORPUSCULAR HEMOGLOBIN CONC 35.2 GM/DL   32.3-36.5            

     



             (BEAKER) (test code = 752)                                        

 

             RED CELL DISTRIBUTION WIDTH 13.5 %       11.6-14.4                 



             (BEAKER) (test code = 412)                                        

 

             PLATELET COUNT (BEAKER) (test code 60 K/CU MM   150-450      L     

       



             = 756)                                              

 

             MEAN PLATELET VOLUME (BEAKER) 9.4 fL       9.4-12.4                

  



             (test code = 754)                                        

 

             NUCLEATED RED BLOOD CELLS (BEAKER) 0 /100 WBC   0-0                

       



             (test code = 413)                                        



(CELLAVISION MANUAL DIFF)2019 10:10:00





             Test Item    Value        Reference Range Interpretation Comments

 

             NEUTROPHILS - REL 86 %                                   



             (CELLAVISION)(BEAKER) (test code =                                 

       



             2816)                                               

 

             LYMPHOCYTES - REL 1 %                                    



             (CELLAVISION)(BEAKER) (test code =                                 

       



             2817)                                               

 

             MONOCYTES - REL 6 %                                    



             (CELLAVISION)(BEAKER) (test code =                                 

       



             2818)                                               

 

             EOSINOPHILS - REL 3 %                                    



             (CELLAVISION)(BEAKER) (test code =                                 

       



             2819)                                               

 

             BANDS - REL (CELLAVISION)(BEAKER) 3 %          0-10                

      



             (test code = 2826)                                        

 

             BLASTS - REL (CELLAVISION)(BEAKER) 1 %          0-0          H     

       



             (test code = 2827)                                        

 

             NEUTROPHILS - ABS 16.77 K/ul   1.78-5.38    H            



             (CELLAVISION)(BEAKER) (test code =                                 

       



             2830)                                               

 

             LYMPHOCYTES - ABS 0.20 K/ul    1.32-3.57    L            



             (CELLAVISION)(BEAKER) (test code =                                 

       



             2831)                                               

 

             MONOCYTES - ABS 1.17 K/uL    0.30-0.82    H            



             (CELLAVISION)(BEAKER) (test code =                                 

       



             2832)                                               

 

             EOSINOPHILS - ABS 0.59 K/uL    0.04-0.54    H            



             (CELLAVISION)(BEAKER) (test code =                                 

       



             2834)                                               

 

             BANDS - ABS (CELLAVISION)(BEAKER) 0.59 K/uL    0.00-0.80           

      



             (test code = 2840)                                        

 

             BLASTS - ABS (CELLAVISION)(BEAKER) 0.20 K/uL    0.00-0.00    H     

       



             (test code = 2845)                                        

 

             TOTAL COUNTED (BEAKER) (test code 100                              

      



             = 1351)                                             

 

             PLT MORPHOLOGY (BEAKER) (test code Normal                          

       



             = 486)                                              

 

             SMUDGE CELLS (BEAKER) (test code = Present                         

       



             1371)                                               

 

             POIKILOCYTES (BEAKER) (test code = 1+ few                          

       



             966)                                                

 

             PLATELET CONCENTRATION Decreased                              



             (CELLAVISION)(BEAKER) (test code =                                 

       



             3438)                                               



Received comment: User comments: Slide comments:RAD, CHEST, 1 VIEW, NON DEPT
2019 08:53:00Reason for exam:-&gt;left effusionShould this be performed at
the bedside?-&gt;YesFINAL REPORT PATIENT ID:   [id number] RAD, CHEST, 1 VIEW, NON 
DEPT INDICATION: left effusion COMPARISON: Prior day's exam FINDINGS: Portable 
frontal view of the chest.   IMPRESSION: Limited by patient positioning.Support 
Lines: Stable. Lungs and pleura: Interstitial congestion on the left slightly 
greater than the right and unchanged from prior. Small left effusion. No visible
pneumothorax.Heart and mediastinum: Stable contours. Additional findings: None. 
Signed: JR Mendoza Robert MDReport Verified Date/Time:  2019 
08:53:19 Reading Location: [facility] Radiology Reading Room    Electro
nically signed by: KULWINDER MENDOZA on 2019 08:53 AMPOCT-GLUCOSE 
[id number] 07:58:00





             Test Item    Value        Reference Range Interpretation Comments

 

             POC-GLUCOSE METER 95 mg/dL                         TESTED AT 

[facility] [address]



             (BEAKER) (test code =                                        [place] [facility] [address]



             1538)                                               



COMPREHENSIVE METABOLIC [id number] 05:41:00





             Test Item    Value        Reference Range Interpretation Comments

 

             TOTAL PROTEIN 5.4 gm/dL    6.0-8.3      L            



             (BEAKER) (test code =                                        



             770)                                                

 

             ALBUMIN (BEAKER) 1.9 g/dL     3.5-5.0      L            



             (test code = 1145)                                        

 

             ALKALINE PHOSPHATASE 126 U/L                          



             (BEAKER) (test code =                                        



             346)                                                

 

             BILIRUBIN TOTAL 4.9 mg/dL    0.2-1.2      H            



             (BEAKER) (test code =                                        



             377)                                                

 

             SODIUM (BEAKER) (test 117 meq/L    136-145      LL           



             code = 381)                                         

 

             POTASSIUM (BEAKER) 5.3 meq/L    3.5-5.1      H            



             (test code = 379)                                        

 

             CHLORIDE (BEAKER) 83 meq/L            L            



             (test code = 382)                                        

 

             CO2 (BEAKER) (test 28 meq/L     22-29                     



             code = 355)                                         

 

             BLOOD UREA NITROGEN 29 mg/dL     7-21         H            



             (BEAKER) (test code =                                        



             354)                                                

 

             CREATININE (BEAKER) 0.90 mg/dL   0.57-1.25                 



             (test code = 358)                                        

 

             GLUCOSE RANDOM 99 mg/dL                         



             (BEAKER) (test code =                                        



             652)                                                

 

             CALCIUM (BEAKER) 7.7 mg/dL    8.4-10.2     L            



             (test code = 697)                                        

 

             AST (SGOT) (BEAKER) 28 U/L       5-34                      



             (test code = 353)                                        

 

             ALT (SGPT) (BEAKER) 18 U/L       6-55                      



             (test code = 347)                                        

 

             EGFR (BEAKER) (test 86 mL/min/1.73                           ESTIMA

FROILAN GFR IS



             code = 1092) sq m                                   NOT AS ACCURATE

 AS



                                                                 CREATININE



                                                                 CLEARANCE IN



                                                                 PREDICTING



                                                                 GLOMERULAR



                                                                 FILTRATION RATE

.



                                                                 ESTIMATED GFR I

S



                                                                 NOT APPLICABLE 

FOR



                                                                 DIALYSIS PATIEN

TS.



Specimen moderately ictericPOCT-GLUCOSE [id number] 21:08:00





             Test Item    Value        Reference Range Interpretation Comments

 

             POC-GLUCOSE METER 92 mg/dL                         TESTED AT 

[facility] [address]



             ([facility]) (test code =                                        [place] [address]



             1538)                                               



RAD, CHEST, 1 VIEW, NON [id number] 17:40:00Reason for exam:-&gt;post chest 
tube placementFINAL REPORT PATIENT ID:   [id number] INDICATION: post chest tube 
placement TECHNIQUE: Chest radiograph, single view, portable technique. FINDINGS
/ IMPRESSION: Left pleural effusion has decreased in size, since 10:25 AM, after
pleural tube placement. No pneumothorax demonstrated. Right PICC line again n
oted terminating at the cavoatrial junction. Signed: Bertram Ordonez MDReport 
Verified Date/Time:  2019 17:40:02 Reading Location: [facility] C013W Consult 
Reading Room Electronically signed by: BERTRAM ORDONEZ M.D. on 
2019 05:40 PMPOCT-GLUCOSE [id number] 17:33:00





             Test Item    Value        Reference Range Interpretation Comments

 

             POC-GLUCOSE METER 116 mg/dL           H            TESTED AT 

[facility] [address]



             ([facility]) (test code =                                        [place]



             1538)                                               [address]



POCT-GLUCOSE [id number] 15:19:00





             Test Item    Value        Reference Range Interpretation Comments

 

             POC-GLUCOSE METER 85 mg/dL                         TESTED AT 

[facility] [address]



             ([place]) (test code =                                        [place] [address]



             1538)                                               



CT, DRAINAGE, CHEST TUBE [id number] 14:49:00Reason for exam:-
&gt;loculated left pleural effusion, please place large bore pigtail if effusion
noted on CT scanAnesthesia:-&gt;NoneFINAL REPORT PATIENT ID:   [id number] 
PROCEDURE: CT-guided placement of a left pleural catheter. Dose modulation, 
iterative reconstruction, and/or weight-based adjustment of the mA/kV was 
utilized to reduce the radiation dose to as low as reasonably achievable. 
INDICATION: Loculated left pleural effusion. COMPARISON: CT from earlier today. 
SEDATION: Intravenous moderate sedation was administered by radiology nursing 
and monitored under the direction of the undersigned radiologist. The patient's 
vital signs were monitored throughout the procedure and recorded in the 
patient's medical record by radiology nursing. Total intraservice time of 
sedation was 25 minutes. MEDICATIONS: 0.5 mg Versed, 25 mcg fentanyl 
DESCRIPTION: After obtaining informed written consent, the patient was brought 
to the procedure room and placed in the supine position.  Preliminary CT scan 
revealed a large left pleural effusion. A clear path to the collection was 
identified. The overlying skin was prepped and draped in the usual, sterile 
fashion and local 2% lidocaine anesthesia was administered. Under CT guidance, a
19-gaugeneedle was placed. After placement of a wire and serial dilation, a 12 
Burmese catheter was placed into the pleural fluid. The catheter was affixed to 
the skin and connected to a vacuum container. 1000 mL of clear red fluid was 
aspirated. Samples were placed on this side table and no clotting was noted. 
Samples were sent for analysis. Post procedure scan showed decrease in size with
left effusion and no immediate complications. IMPRESSION: Successful placement 
of a 12 Burmese left chest tube. Signed: Vivien Aguilera MDRerickaort Verified Date/Time: 
2019 14:49:30 Reading Location: [facility] C0Y CT Body Reading Room      
Electronically signed by: VIVIEN AGUILERA MD on 2019 02:49 PMCT, CHEST, 
WITH [id number] 13:11:00Reason for exam:-&gt;evaluate loculated left 
pleural effusion seen on xrayFINAL REPORT PATIENT ID:   [id number] TECHNIQUE: CT 
scan of the chest WITH intravenous contrast. Dose modulation, iterative 
reconstruction, and/or weight-based adjustment of the mA/kV was utilized to redu
ce the radiation dose to as low as reasonably achievable. INDICATION: evaluate 
loculated left pleural effusion seen on xrayevaluate loculated left pleural 
effusion seen on xray. COMPARISON: None.  FINDINGS:  LINES/TUBES: A right PICC 
has its tip at the superior cavoatrial junction. LUNGS AND AIRWAYS: Mild right 
basilar subsegmental atelectasis. There is an area of cavitation with a fluid 
fluid level in superior segment of the left lower lobe which measures 4 cm 
PLEURA: Trace right pleural effusion. HEART AND MEDIASTINUM: The visualized 
thyroid gland is normal. No significant mediastinal, hilar, or axillary 
lymphadenopathy. The heart and pericardium are within normal limits. Severe 
coronary arterial calcifications. SOFT TISSUES AND BONES: Bilateral 
gynecomastia. Old, healed right 10th and 12th ribfractures. Old, healed left 
10th rib fracture. UPPER ABDOMEN: Nodular, cirrhotic liver. Partially visualized
upper abdominal varices. A periumbilical vein is recanalized.  IMPRESSION: 
1.There is a large left sided loculated pleural effusion with a mildly thickened
pleura and some intermixed gas. This is concerning for an empyema. 2.The air-
fluid level with surrounding lung in the superior segment of the left lower lobe
is most likely a lung abscess. A cavitary lung nodule (either from malignancy or
fungal infection) is considered less likely. A follow-up chest CT is recommended
in three months to document decrease in size and exclude cavitary malignancy. 
3.Cirrhosis with findings of portal hypertension. Signed: Vivien Aguilera MDReport 
Verified Date/Time:  2019 13:11:18 Reading Location: [facility] C013Y CT Body 
Reading Room      Electronically signed by: VIVIEN AGUILERA MD on 2019 
01:11 PMPOCT-GLUCOSE [id number] 11:22:00





             Test Item    Value        Reference Range Interpretation Comments

 

             POC-GLUCOSE METER 81 mg/dL                         TESTED AT 

[facility] [address]



             (BEAKER) (test code =                                        [place] [address]



             1538)                                               



RAD, CHEST, 1 VIEW, NON [id number] 10:59:00Reason for exam:-&gt;eval 
effusionShould this be performed at the bedside?-&gt;YesFINAL REPORT PATIENT ID:
  [id number] RAD, CHEST, 1 VIEW, NON DEPT INDICATION: eval effusion COMPARISON: 
Prior day's exam FINDINGS: Portable frontal view of the chest.   IMPRESSION: 
Limited by patient rotation.Support Lines: A right PICC terminates over the 
superior vena cava. Lungs and pleura: Large left effusion. Right costophrenic 
sulcus is excluded from view. No pneumothorax.Heart and mediastinum: U
nremarkable where visible.Additional findings: None. A CT evaluation is 
currently pending. Signed: JR Mendoza Robert MDReport Verified Date/Time:
 2019 10:59:34 Reading Location: [facility] Radiology Reading Room    
Electronically signed by: KULWINDER MENDOZA on 2019 10:59 AM
COMPREHENSIVE METABOLIC [id number] 07:33:00





             Test Item    Value        Reference Range Interpretation Comments

 

             TOTAL PROTEIN 5.8 gm/dL    6.0-8.3      L            Specimen sligh

tly



             (BEAKER) (test code =                                        hemoly

zed



             770)                                                

 

             ALBUMIN (BEAKER) 2.0 g/dL     3.5-5.0      L            Specimen sl

ightly



             (test code = 1145)                                        hemolyzed

 

             ALKALINE PHOSPHATASE 130 U/L                          



             (BEAKER) (test code =                                        



             346)                                                

 

             BILIRUBIN TOTAL 4.6 mg/dL    0.2-1.2      H            Specimen sli

ghtly



             (BEAKER) (test code =                                        hemoly

zed



             377)                                                

 

             SODIUM (BEAKER) (test 117 meq/L    136-145      LL           



             code = 381)                                         

 

             POTASSIUM (BEAKER) 5.3 meq/L    3.5-5.1      H            Specimen 

slightly



             (test code = 379)                                        hemolyzed

 

             CHLORIDE (BEAKER) 84 meq/L            L            



             (test code = 382)                                        

 

             CO2 (BEAKER) (test 28 meq/L     22-29                     



             code = 355)                                         

 

             BLOOD UREA NITROGEN 22 mg/dL     7-21         H            



             (BEAKER) (test code =                                        



             354)                                                

 

             CREATININE (BEAKER) 0.86 mg/dL   0.57-1.25                 Specimen

 slightly



             (test code = 358)                                        hemolyzed

 

             GLUCOSE RANDOM 90 mg/dL                         



             (BEAKER) (test code =                                        



             652)                                                

 

             CALCIUM (BEAKER) 7.9 mg/dL    8.4-10.2     L            



             (test code = 697)                                        

 

             AST (SGOT) (BEAKER) 33 U/L       5-34                      Specimen

 slightly



             (test code = 353)                                        hemolyzed

 

             ALT (SGPT) (BEAKER) 20 U/L       6-55                      Specimen

 slightly



             (test code = 347)                                        hemolyzed

 

             EGFR (BEAKER) (test 91 mL/min/1.73                           ESTIMA

FROILAN GFR IS



             code = 1092) sq m                                   NOT AS ACCURATE

 AS



                                                                 CREATININE



                                                                 CLEARANCE IN



                                                                 PREDICTING



                                                                 GLOMERULAR



                                                                 FILTRATION RATE

.



                                                                 ESTIMATED GFR I

S



                                                                 NOT APPLICABLE 

FOR



                                                                 DIALYSIS PATIEN

TS.



Specimen moderately ictericPOCT-GLUCOSE [id number] 05:46:00





             Test Item    Value        Reference Range Interpretation Comments

 

             POC-GLUCOSE METER 93 mg/dL                         TESTED AT 

[facility] [address]



             ([facility]) (test code =                                        [place] [facility] [address]



             1538)                                               



PROTHROMBIN [id number] 05:30:00





             Test Item    Value        Reference Range Interpretation Comments

 

             PROTIME (BEAKER) (test code = 16.9 seconds 11.9-14.2    H          

  



             759)                                                

 

             INR (BEAKER) (test code = 370) 1.4          <=5.9                  

   



Effective 2019: PT Reference Range ChangeNew: 11.9-14.2  Previous: 11.7-
14.7RECOMMENDED COUMADIN/WARFARIN INR THERAPY RANGESSTANDARD DOSE: 2.0-3.0  
Includes: PROPHYLAXIS for venous thrombosis, systemic embolization; TREATMENT 
for venous thrombosis and/or pulmonary embolus.HIGH RISK: Target INR is2.5-3.5 
for patients wiht mechanical heart valves.CBC W/PLT COUNT &amp; AUTO 
[id number] 05:27:00





             Test Item    Value        Reference Range Interpretation Comments

 

             WHITE BLOOD CELL COUNT (BEAKER) 23.2 K/ L    3.5-10.5     H        

    



             (test code = 775)                                        

 

             RED BLOOD CELL COUNT (BEAKER) 4.75 M/ L    4.63-6.08               

  



             (test code = 761)                                        

 

             HEMOGLOBIN (BEAKER) (test code = 15.3 GM/DL   13.7-17.5            

     



             410)                                                

 

             HEMATOCRIT (BEAKER) (test code = 43.1 %       40.1-51.0            

     



             411)                                                

 

             MEAN CORPUSCULAR VOLUME (BEAKER) 90.7 fL      79.0-92.2            

     



             (test code = 753)                                        

 

             MEAN CORPUSCULAR HEMOGLOBIN 32.2 pg      25.7-32.2                 



             (BEAKER) (test code = 751)                                        

 

             MEAN CORPUSCULAR HEMOGLOBIN CONC 35.5 GM/DL   32.3-36.5            

     



             (BEAKER) (test code = 752)                                        

 

             RED CELL DISTRIBUTION WIDTH 13.3 %       11.6-14.4                 



             (BEAKER) (test code = 412)                                        

 

             PLATELET COUNT (BEAKER) (test code 87 K/CU MM   150-450      L     

       



             = 756)                                              

 

             MEAN PLATELET VOLUME (BEAKER) 8.9 fL       9.4-12.4     L          

  



             (test code = 754)                                        

 

             NUCLEATED RED BLOOD CELLS (BEAKER) 0 /100 WBC   0-0                

       



             (test code = 413)                                        

 

             NEUTROPHILS RELATIVE PERCENT 86 %                                  

 



             (BEAKER) (test code = 429)                                        

 

             LYMPHOCYTES RELATIVE PERCENT 3 %                                   

 



             (BEAKER) (test code = 430)                                        

 

             MONOCYTES RELATIVE PERCENT 9 %                                    



             (BEAKER) (test code = 431)                                        

 

             EOSINOPHILS RELATIVE PERCENT 0 %                                   

 



             (BEAKER) (test code = 432)                                        

 

             BASOPHILS RELATIVE PERCENT 0 %                                    



             (BEAKER) (test code = 437)                                        

 

             NEUTROPHILS ABSOLUTE COUNT 19.87 K/ L   1.78-5.38    H            



             (BEAKER) (test code = 670)                                        

 

             LYMPHOCYTES ABSOLUTE COUNT 0.73 K/ L    1.32-3.57    L            



             (BEAKER) (test code = 414)                                        

 

             MONOCYTES ABSOLUTE COUNT (BEAKER) 2.16 K/ L    0.30-0.82    H      

      



             (test code = 415)                                        

 

             EOSINOPHILS ABSOLUTE COUNT 0.06 K/ L    0.04-0.54                 



             (BEAKER) (test code = 416)                                        

 

             BASOPHILS ABSOLUTE COUNT (BEAKER) 0.04 K/ L    0.01-0.08           

      



             (test code = 417)                                        

 

             IMMATURE GRANULOCYTES-RELATIVE 1 %          0-1                    

   



             PERCENT (BEAKER) (test code =                                      

  



             2801)                                               



POCT-GLUCOSE [id number] 23:34:00





             Test Item    Value        Reference Range Interpretation Comments

 

             POC-GLUCOSE METER 94 mg/dL                         TESTED AT 

[facility] [address]



             ([facility]) (test code =                                        [facility] [address]



             1478)

## 2022-05-01 NOTE — ER
Nurse's Notes                                                                                     

 [facility]                                                                 

Name: Omkar Chung                                                                                  

Age: 61 yrs                                                                                       

Sex: Male                                                                                         

: 1960                                                                                   

MRN: [id number]                                                                                   

Arrival Date: 2022                                                                          

Time: :31                                                                                       

Account#: [id number]                                                                            

Bed 6                                                                                             

Private MD:                                                                                       

Diagnosis: Altered mental status, unspecified                                                     

                                                                                                  

Presentation:                                                                                     

                                                                                             

01:31 Chief complaint: EMS states: We were initially called out for SOB. Upon arrival as we   jb4 

      were talking to the pt we noticed he was more confused as we spoke with him. He knows       

      his name. BGL was 266. Coronavirus screen: At this time, the client does not indicate       

      any symptoms associated with coronavirus-19. Ebola Screen: No symptoms or risks             

      identified at this time. Initial Sepsis Screen: Does the patient meet any 2 criteria?       

      HR > 90 bpm. Yes Does the patient have a suspected source of infection? No. Patient's       

      initial sepsis screen is negative. Risk Assessment: Do you want to hurt yourself or         

      someone else? Patient reports no desire to harm self or others. Onset of symptoms was       

      May 01, 2022. Transition of care: patient was not received from another setting of care.    

01:31 Method Of Arrival: EMS: [facility]                                                    jb4 

01:31 Acuity: BROCK 3                                                                           jb4 

                                                                                                  

Historical:                                                                                       

- Allergies:                                                                                      

01:39 No Known Allergies;                                                                     jb4 

- PMHx:                                                                                           

01:39 Cirrhosis; COPD; Hernia; DM type 2; colon cancer;                                       jb4 

                                                                                                  

- Immunization history:: Adult Immunizations up to date.                                          

- Social history:: Smoking status: Patient/guardian denies using tobacco, the patient             

  reports quitting approximately 4 years ago.                                                     

                                                                                                  

                                                                                                  

Screenin:03 Abuse screen: Denies threats or abuse. Denies injuries from another. Nutritional        lp1 

      screening: No deficits noted. Tuberculosis screening: No symptoms or risk factors           

      identified. Fall Risk Total Cabrera Fall Scale indicates High Risk Score (45 or more          

      points). Fall prevention measures have been instituted. Side Rails Up X 2 Frequent          

      Obs/Assessments Occuring Family Present and informed to notify staff if the need to         

      leave the bedside As available patient and family educated on Fall Prevention Program       

      and Strategies.                                                                             

                                                                                                  

Assessment:                                                                                       

02:15 General: Appears in no apparent distress. Behavior is cooperative. Pain: Denies pain.   lp1 

      Neuro: Level of Consciousness is awake, obeys commands, Oriented to person, place,          

      Moves all extremities. Full function. Cardiovascular: Patient's skin is warm and dry.       

      Rhythm is sinus tachycardia. Respiratory: Reports shortness of breath Airway is patent      

      Respiratory effort is even, Respiratory pattern is regular, Breath sounds are               

      diminished in left posterior lower lobe and right posterior lower lobe. GI: Abdomen is      

      round. : No signs and/or symptoms were reported regarding the genitourinary system.       

      EENT: No signs and/or symptoms were reported regarding the EENT system. Derm: Skin is       

      intact, Skin is dry, Skin is normal. Musculoskeletal: Range of motion: intact in all        

      extremities.                                                                                

03:10 Reassessment: Patient attempting to get out of bed, able to be redirected, repositioned lp1 

      self back in bed.                                                                           

03:25 Reassessment: Patient's wife reports attempting to get out of bed; patient able to be   lp1 

      redirected to position self back in bed; Reports "I just wanted to breathe better";         

      patient sitting up at 90 degrees in bed.                                                    

04:30 Reassessment: Patient appears to be resting comfortably, no distress; O2 via NC at 2L   lp1 

      remains in place; arousable with voice.                                                     

                                                                                                  

Vital Signs:                                                                                      

01:31  / 97; Pulse 110; Resp 20; Temp 98.3(TE); Pulse Ox 98% on R/A; Weight 102.06 kg   jb4 

      (R); Height 6 ft. 2 in. (187.96 cm); Pain 0/10;                                             

02:45  / 95; Pulse 110; Resp 24; Pulse Ox 97% on 2 lpm NC;                              lp1 

03:50  / 86; Pulse 91; Resp 30; Pulse Ox 98% on 2 lpm NC;                               tw5 

04:25  / 82; Pulse 93; Resp 16; Pulse Ox 96% on 2 lpm NC;                               lp1 

01:31 Body Mass Index 28.89 (102.06 kg, 187.96 cm)                                            jb4 

                                                                                                  

ED Course:                                                                                        

01:31 Patient arrived in ED.                                                                  jb4 

01:33 Ezra Arriaga MD is Attending Physician.                                             mh7 

01:39 Triage completed.                                                                       jb4 

01:39 Arm band placed on right wrist.                                                         jb4 

01:53 Earnestine Perkins, RN is Primary Nurse.                                                       lp1 

02:03 Patient has correct armband on for positive identification. Placed in gown. Bed in low  lp1 

      position. Call light in reach. Side rails up X2. Cardiac monitor on. Pulse ox on. NIBP      

      on.                                                                                         

02:06 CT Head Brain wo Cont In Process Unspecified.                                           EDMS

02:12 XRAY Chest (1 view) In Process Unspecified.                                             EDMS

02:15 Missed attempt(s): 20 gauge in right antecubital area.                                  lp1 

02:15 Initial lab(s) drawn, by me, sent to lab.                                               lp1 

02:17 Inserted saline lock: 18 gauge in left antecubital area, using aseptic technique.       jb4 

03:27 No provider procedures requiring assistance completed.                                  lp1 

03:34 Palak Correa MD is Hospitalizing Provider.                                          mh7 

03:38 Urine Microscopic Only Sent.                                                            tw5 

03:38 Salicylate Sent.                                                                        tw5 

03:38 UDS Sent.                                                                               tw5 

03:38 Acetaminophen Sent.                                                                     tw5 

03:38 ETOH Level Sent.                                                                        tw5 

03:39 Urine collected: straight cath specimen, cloudy, Amount Returned: 200mL.                tw5 

04:12 Lactate Sent.                                                                           tw5 

04:12 Procalcitonin Sent.                                                                     tw5 

04:13 Blood Culture Adult (2) Sent.                                                           tw5 

04:14 Patient admitted, IV remains in place.                                                  lp1 

                                                                                                  

Administered Medications:                                                                         

03:15 Drug: Ativan (LORazepam) 2 mg Route: IVP; Site: left antecubital;                       tw5 

03:51 Follow up: Response: No adverse reaction; RASS: Light sedation (-2)                     tw5 

03:49 Drug: Rocephin (cefTRIAXone) 1 grams Route: IV; Rate: calculated rate; Site: left       tw5 

      antecubital;                                                                                

04:34 Follow up: IV Status: Completed infusion; IV Intake: 50ml                               lp1 

03:49 Drug: NS 0.9% 1000 ml Route: IV; Rate: 100 ml/hr; Site: left antecubital;               tw5 

04:34 Follow up: IV Status: Infusion continued upon admission                                 lp1 

                                                                                                  

                                                                                                  

Intake:                                                                                           

04:34 IV: 50ml; Total: 50ml.                                                                  lp1 

                                                                                                  

Outcome:                                                                                          

03:34 Decision to Hospitalize by Provider.                                                    mh7 

04:14 Condition: stable                                                                       lp1 

04:14 Instructed on the need for admit.                                                           

04:35 Admitted to ICU room 2, with oxygen, on monitor, with chart, Report called to  darlene Matos 

      RN                                                                                          

04:42 Patient left the ED.                                                                    lp1 

                                                                                                  

Signatures:                                                                                       

Dispatcher [employer]                           EDEarnestine Gomez RN                         RN   lp1                                                  

Denys Garcia RN                       RN   jb4                                                  

Ezra Arriaga MD MD   mh7                                                  

Dior Driscoll                                tw5                                                  

                                                                                                  

Corrections: (The following items were deleted from the chart)                                    

03:27 03:27 Patient admitted, IV remains in place. lp1                                        lp1 

                                                                                                  

**************************************************************************************************

## 2022-05-01 NOTE — P.HP
Certification for Inpatient


Patient admitted to: Inpatient


With expected LOS: >2 Midnights


Patient will require the following post-hospital care: None


Practitioner: I am a practitioner with admitting privileges, knowledge of 

patient current condition, hospital course, and medical plan of care.


Services: Services provided to patient in accordance with Admission requirements

found in Title 42 Section 412.3 of the Code of Federal Regulations





Patient History


Date of Service: 05/01/22


Primary Care Provider: Dr. Longo


Reason for admission: Altered mental status


History of Present Illness: 


61-year-old male patient with history of stage IV colon cancer, stomach cancer, 

COPD on home oxygen, alcoholic cirrhosis of liver, diabetes mellitus type 

2insulin-dependent presents the emergency department for altered mental status 

and shortness of breath.  Wife at bedside reports patient is on chemotherapy, 

had recent and prescribed Decadron on Wednesday noticed last couple of days he 

had increasing altered mental status similar to how he is when his ammonia has 

been elevated in the past.  He was evaluated in the emergency department his 

labs are significant for mild hyponatremia, mild acute kidney injury ammonia 

level leukocytosis.  CT brain without contrast negative for acute things, chest 

x-ray demonstrates minimal linear densities within the left lung base could 

correspond to atelectasis.  Patient afebrile, mildly tachycardic was given 

Ativan in the ER agitation.  ER provider wishes to admit for further evaluation 

and management of altered mental status.





Allergies





No Known Allergies Allergy (Verified 11/28/19 14:51)


   





Home Medications: 








Folic Acid 0.4 mg PO DAILY 09/09/19 


Furosemide 40 mg PO DAILY 09/09/19 


Spironolactone 100 mg PO DAILY 09/09/19 


Buspirone HCl [Buspar] 7.5 mg PO BID #30 tablet 11/15/19 


Ipratropium Neb [Atrovent Neb] 0.5 mg IH TID PRN 30 Days  amp 11/15/19 


Levalbuterol [Xopenex*] 1.25 mg NEB Q6HP PRN #60 vial 11/15/19 


Roflumilast [Daliresp*] 500 mcg PO DAILY #30 tablet 11/15/19 


predniSONE [Prednisone*] 10 mg PO BID #30 tab 11/15/19 


Lactulose [Cephulac*] 30 ml PO DAILY 30 Days #30 ucup 11/30/19 








- Past Medical/Surgical History


Diabetic: No


-: Alcoholic liver cirrhosis


-: COPD


-: Chronic steroid use


-: Paracentesis


-: History of Pseudomonas bacteremia


-: Stage IV colon/lung cancer on chemo


-: Right Inguinal Hernia and Umbilical Hernia Repair


Psychosocial/ Personal History: Patient is at home





- Family History


  ** Father


-: Hypertension, Lung disease





  ** Mother


-: Diabetes





- Social History


Smoking Status: Former smoker


Alcohol use: No


CD- Drugs: No


Caffeine use: No


Place of Residence: Home





Review of Systems


10-point ROS is otherwise unremarkable


Respiratory: Shortness of Breath


Neurological: Other (Altered mental status)





Physical Examination





- Physical Exam


General: Alert, In no apparent distress, Oriented x2


HEENT: Atraumatic, PERRLA, Other (mucous membranes dry), EOMI, Sclerae 

nonicteric


Neck: Supple, 2+ carotid pulse no bruit, No LAD, Without JVD or thyroid abnorm

ality


Respiratory: Normal air movement, Diminished


Cardiovascular: Regular rate/rhythm, Normal S1 S2


Capillary refill: <2 Seconds


Gastrointestinal: Normal bowel sounds, No tenderness


Musculoskeletal: No tenderness


Integumentary: No rashes


Neurological: Normal gait, Normal speech, Normal strength at 5/5 x4 extr, Normal

 tone


Lymphatics: No axilla or inguinal lymphadenopathy





- Studies


Laboratory Data (last 24 hrs)





05/01/22 02:14: PT 13.9 H, INR 1.26


05/01/22 02:14: WBC 18.5 H D, Hgb 15.1  D, Hct 46.3  D, Plt Count 206  D


05/01/22 02:14: Sodium 129 L, Potassium 4.5, BUN 26 H, Creatinine 1.90 H, 

Glucose 234 H, Magnesium 2.2, Total Bilirubin 1.9 H, AST 74 H, ALT 67, Alkaline 

Phosphatase 256 H








Assessment and Plan





- Plan


Assessment:


Altered mental status


Rule out sepsis/severe sepsis


Acute kidney injury


Diabetes mellitus type 2insulin-dependent


COPD on home oxygen


Stage IV colon/stomach cancer chemo


Alcoholic cirrhosis of the liver


Mild hyponatremia





Plan:


Altered mental status: Possibly steroid-induced as he was recently started on 

Decadron on Wednesday by his oncologist, patient also noted to have elevated 

white blood cell count, tachycardia and tachypnea will need to rule out 

sepsis/severe sepsis as his altered mental status could be related to sepsis.  

Patient started on Rocephin for the time being, urine microscopic pending urine 

dip was negative chest x-ray showed possible atelectasis left lower lobe.  

Patient not hypoxic saturating well on 2 L per nasal cannula which he wears at 

home.


Rule out sepsis/severe sepsis: Ordered blood cultures, lactate level.  Urine dip

 negative microscopic analysis pending chest x-ray demonstrates left mild 

atelectasis.  Patient given dose of Rocephin for the time being patient also 

with mild acute kidney injury if this is sepsis he would qualify for severe 

sepsis.  Could all be related to steroid use.


Acute kidney injury: Continue IV fluids, recheck chemistry in the morning.  

Consult nephrology as necessary.  If patient is deemed to have sepsis this could

 be related


Diabetes mellitus type 2insulin-dependent: ACHS accucheck, sliding scale 

insulin.  Restart home medication


COPD on home oxygen: As needed nebulizer treatments


Stage IV colon/stomach cancer chemo: Outpatient management


Alcoholic cirrhosis of the liver: Patient has not drank for the last 4 to 5 

years, ammonia currently normal recheck daily.  Continue lactulose.


Mild hyponatremia: Gentle IV fluids





DVT PPX: Lovenox


Code status: Full


Discharge Plan: Home


Plan to discharge in: 72 Hours





- Advance Directives


Does patient have a Living Will: No


Does patient have a Durable POA for Healthcare: No





- Code Status/Comfort Care


Code Status Assessed: Yes (Full code)


Critical Care: No


Time Spent Managing Pts Care (In Minutes): 55

## 2022-05-01 NOTE — P.PN
Pt and pt's spouse refuses MRI, verified with Dr. Tomasa De La Paz, pt is A/O X4, vitals remains stable. Will hold the MRI for now. Subjective


Date of Service: 05/01/22


Primary Care Provider: Dr. Longo


Chief Complaint: Altered mental status


Subjective: No new changes, No C/O voiced (Sleepy this a.m. Not hypoxic, staff 

reports agitation overnight)





Physical Examination





- Vital Signs


Temperature: 97.0 F


Blood Pressure: 118/68


Pulse: 89


Respirations: 18


Pulse Ox (%): 96





- Studies


Laboratory Data (last 24 hrs)





05/01/22 02:14: PT 13.9 H, INR 1.26


05/01/22 02:14: WBC 18.5 H D, Hgb 15.1  D, Hct 46.3  D, Plt Count 206  D


05/01/22 02:14: Sodium 129 L, Potassium 4.5, BUN 26 H, Creatinine 1.90 H, 

Glucose 234 H, Magnesium 2.2, Total Bilirubin 1.9 H, AST 74 H, ALT 67, Alkaline 

Phosphatase 256 H








Assessment And Plan


Physician Review: Patient Assessed, Agree with Above Assessment and Plan


Physician Review Additional Text: 





05/01/22 12:30





- Physical Exam


General: Alert, In no apparent distress, Oriented x2


HEENT: Atraumatic, PERRLA, Other (mucous membranes dry), EOMI, Sclerae 

nonicteric


Neck: Supple, 2+ carotid pulse no bruit, No LAD, Without JVD or thyroid 

abnormality


Respiratory: Normal air movement, Diminished


Cardiovascular: Regular rate/rhythm, Normal S1 S2


Capillary refill: <2 Seconds


Gastrointestinal: Normal bowel sounds, No tenderness


Musculoskeletal: No tenderness


Integumentary: No rashes


Neurological: Normal gait, Normal speech, Normal strength at 5/5 x4 extr, Normal

tone


Lymphatics: No axilla or inguinal lymphadenopathy





- Studies


Laboratory Data (last 24 hrs)





05/01/22 02:14: PT 13.9 H, INR 1.26


05/01/22 02:14: WBC 18.5 H D, Hgb 15.1  D, Hct 46.3  D, Plt Count 206  D


05/01/22 02:14: Sodium 129 L, Potassium 4.5, BUN 26 H, Creatinine 1.90 H, 

Glucose 234 H, Magnesium 2.2, Total Bilirubin 1.9 H, AST 74 H, ALT 67, Alkaline 

Phosphatase 256 H








Assessment and Plan





- Plan


Assessment:


Altered mental status/metabolic encephalopathy


Rule out sepsis/severe sepsis


Acute kidney injury


Diabetes mellitus type 2insulin-dependent


COPD on home oxygen


Stage IV colon/stomach cancer chemo


Alcoholic cirrhosis of the liver


Mild hyponatremia





Plan:





Still recurrent agitation, started on Geodon as needed


Continue Ativan as needed


 UA reviewed, no clear indication of UTI, follow for culture


Follow blood culture


May be due to Decadron induced encephalopathy since initiated on chemo first 

time this week


Continue to rule out sepsis work-up


Lactic acid level trending down


Start p.o. intake when more awake





Continue other management 








5/1/22 Earlier plan :


Altered mental status: Possibly steroid-induced as he was recently started on 

Decadron on Wednesday by his oncologist, patient also noted to have elevated 

white blood cell count, tachycardia and tachypnea will need to rule out 

sepsis/severe sepsis as his altered mental status could be related to sepsis.  

Patient started on Rocephin for the time being, urine microscopic pending urine 

dip was negative chest x-ray showed possible atelectasis left lower lobe.  

Patient not hypoxic saturating well on 2 L per nasal cannula which he wears at 

home.


Rule out sepsis/severe sepsis: Ordered blood cultures, lactate level.  Urine dip

negative microscopic analysis pending chest x-ray demonstrates left mild 

atelectasis.  Patient given dose of Rocephin for the time being patient also 

with mild acute kidney injury if this is sepsis he would qualify for severe 

sepsis.  Could all be related to steroid use.


Acute kidney injury: Continue IV fluids, recheck chemistry in the morning.  

Consult nephrology as necessary.  If patient is deemed to have sepsis this could

be related


Diabetes mellitus type 2insulin-dependent: ACHS accucheck, sliding scale in

sulin.  Restart home medication


COPD on home oxygen: As needed nebulizer treatments


Stage IV colon/stomach cancer chemo: Outpatient management


Alcoholic cirrhosis of the liver: Patient has not drank for the last 4 to 5 

years, ammonia currently normal recheck daily.  Continue lactulose.


Mild hyponatremia: Gentle IV fluids





DVT PPX: Lovenox


Code status: Full


Discharge Plan: Home


Plan to discharge in: 72 Hours





- Advance Directives


Does patient have a Living Will: No


Does patient have a Durable POA for Healthcare: No





- Code Status/Comfort Care


Code Status Assessed: Yes (Full code)


Critical Care: No


Time Spent Managing Pts Care (In Minutes): 55


05/01/22 12:31

## 2022-05-01 NOTE — XMS REPORT
Clinical Summary

                             Created on:May 1, 2022



Patient:Omkar Chung

Sex:Male

:1960

External Reference #:[id number]





Demographics







                          Address                   [address]



                                                    [place] [address]

 

                          Mobile Phone              [phone]

 

                          Home Phone                [phone]

 

                          Email Address             [email or web address]

 

                          Preferred Language        English

 

                          Marital Status            

 

                          Advent Affiliation     Unknown

 

                          Race                      White

 

                          Ethnic Group              Not  or 









Author







                          Organization              [facility]

[facility]

 

                          Address                   [address] [place] [address]









Support







                Name            Relationship    Address         Phone

 

                Gabby Villatoro    Unavailable     Unavailable     [phone]









Care Team Providers







                    Name                Role                Phone

 

                    Moris Guajardo MD Unavailable         [phone]









Allergies

Not on File



Medications

Not on file



Active Problems

Not on file



Encounters







             Date         Type         Specialty    Care Team    Description

 

             2022   Travel                                 



after 2021



Social History







             Tobacco Use  Types        Packs/Day    Years Used   Date

 

             Never Assessed                                        









                          Sex Assigned at Birth     Date Recorded

 

                          Not on file               







Last Filed Vital Signs

Not on file



Plan of Treatment

Not on file



Results

Not on fileafter 2021



Insurance







          Payer     Benefit Plan / Subscriber ID Effective Dates Phone     Addre

ss   Type



                    Group                                             

 

           [employer] [id number]  Effective for            [address] 

[address]



                                        Medicare



          [facility] MEDICARE           all dates           [place], [place]  



                                                            [address]     









           Guarantor Name Account Type Relation to Date of Birth Phone      Bill

ing



                                 Patient                          Address

 

           Omkar Chung Personal/Family Self       1960 [phone]



                                                       (Home)     [address]







                                                                  [place]



                                                                  [address]

 

           Omkar Chung Personal/Family Self       1960 [phone]



                                                       (Home)     [place]



                                                                  [address]







Care Teams







             Team Member  Relationship Specialty    Start Date   End Date

 

                                        Moris Guajardo MD



                PCP - External Referring General Surgery 3/11/22         



                                        [address]



                                                                



                                        [address]



                                                                



             [place], [place]                                        



                                        [address] [phone] (Work)



                                                                



             [phone] (Fax)

## 2022-05-02 LAB
ALBUMIN SERPL BCP-MCNC: 2.6 G/DL (ref 3.4–5)
ALP SERPL-CCNC: 169 U/L (ref 45–117)
ALT SERPL W P-5'-P-CCNC: 57 U/L (ref 12–78)
AST SERPL W P-5'-P-CCNC: 69 U/L (ref 15–37)
BUN BLD-MCNC: 30 MG/DL (ref 7–18)
GLUCOSE SERPLBLD-MCNC: 148 MG/DL (ref 74–106)
HCT VFR BLD CALC: 38.1 % (ref 39.6–49)
LYMPHOCYTES # SPEC AUTO: 0.5 K/UL (ref 0.7–4.9)
MAGNESIUM SERPL-MCNC: 2.4 MG/DL (ref 1.8–2.4)
MORPHOLOGY BLD-IMP: (no result)
PMV BLD: 8.3 FL (ref 7.6–11.3)
POTASSIUM SERPL-SCNC: 4.5 MMOL/L (ref 3.5–5.1)
RBC # BLD: 4.38 M/UL (ref 4.33–5.43)
TSH SERPL DL<=0.05 MIU/L-ACNC: 2.5 UIU/ML (ref 0.36–3.74)
UA DIPSTICK W REFLEX MICRO PNL UR: (no result)

## 2022-05-02 RX ADMIN — HUMAN INSULIN SCH: 100 INJECTION, SOLUTION SUBCUTANEOUS at 21:00

## 2022-05-02 RX ADMIN — Medication SCH ML: at 21:03

## 2022-05-02 RX ADMIN — HUMAN INSULIN SCH: 100 INJECTION, SOLUTION SUBCUTANEOUS at 16:28

## 2022-05-02 RX ADMIN — HUMAN INSULIN SCH: 100 INJECTION, SOLUTION SUBCUTANEOUS at 11:30

## 2022-05-02 RX ADMIN — CEFTRIAXONE SCH MLS: 2 INJECTION, POWDER, FOR SOLUTION INTRAMUSCULAR; INTRAVENOUS at 21:03

## 2022-05-02 RX ADMIN — LACTULOSE SCH GM: 20 SOLUTION ORAL at 08:27

## 2022-05-02 RX ADMIN — HUMAN INSULIN SCH: 100 INJECTION, SOLUTION SUBCUTANEOUS at 07:30

## 2022-05-02 RX ADMIN — SODIUM CHLORIDE PRN ML: 3 INJECTION, SOLUTION INTRAVENOUS at 21:02

## 2022-05-02 RX ADMIN — ENOXAPARIN SODIUM SCH MG: 40 INJECTION SUBCUTANEOUS at 08:27

## 2022-05-02 RX ADMIN — Medication SCH ML: at 08:27

## 2022-05-02 NOTE — RAD REPORT
EXAM DESCRIPTION:  Chest Single View 5/1/2022 2:37 AM CDT

 

CLINICAL HISTORY:  61 years, Male, SOB

 

COMPARISON:  None.

 

FINDINGS:  Single view of the chest was obtained portable. No prior films are available for compariso
n.   There is mild hyperinflation. There is a right IJ venous port in place. The cardiomediastinal si
lhouette demonstrate to be unremarkable. The heart is not enlarged. The thoracic aorta is unremarkabl
e. Minimal linear densities within the left lung base could correspond to atelectasis. No pleural eff
usions/or focal areas of consolidation. The rest of the soft tissue and bony structures demonstrate t
o be unremarkable.

 

IMPRESSION  Minimal linear densities within the left lung base could correspond to atelectasis.

 

Electronically signed by:   Michael Nettles MD   5/1/2022 2:38 AM CDT Workstation: [id number]

 

 

Due to temporary technical issues with the PACS/Fluency reporting system, reports are being signed by
 the in house radiologist without review as a courtesy to ensure prompt reporting. The interpreting r
adiologist is fully responsible for the content of the report.

## 2022-05-02 NOTE — P.CNS
Date of Consult: 05/02/22


Reason for Consult: DARREL, Hyponatremia


Requesting Physician: Vipul Nunn


Primary Care Provider: Dr. Longo


Chief Complaint: Altered mental status


History of Present Illness: 


61-year-old male patient with history of stage IV colon cancer, stomach cancer, 

COPD on home oxygen, alcoholic cirrhosis of liver, diabetes mellitus type 

2insulin-dependent presents the emergency department for altered mental status 

and shortness of breath.  Wife at bedside reports patient is on chemotherapy, 

had recent and prescribed Decadron on Wednesday noticed last couple of days he 

had increasing altered mental status similar to how he is when his ammonia has 

been elevated in the past.  He was evaluated in the emergency department his 

labs are significant for mild hyponatremia, mild acute kidney injury ammonia 

level leukocytosis.  CT brain without contrast negative for acute things, chest 

x-ray demonstrates minimal linear densities within the left lung base could 

correspond to atelectasis.  Patient afebrile, mildly tachycardic was given 

Ativan in the ER agitation.  ER provider wishes to admit for further evaluation 

and management of altered mental status.


Allergies





No Known Allergies Allergy (Verified 11/28/19 14:51)


   





Home Medications: 








Folic Acid 0.4 mg PO DAILY 09/09/19 


Furosemide 40 mg PO DAILY 09/09/19 


Spironolactone 100 mg PO DAILY 09/09/19 


Ipratropium Neb [Atrovent Neb] 0.5 mg IH TID PRN 30 Days  amp 11/15/19 


Levalbuterol [Xopenex*] 1.25 mg NEB Q6HP PRN #60 vial 11/15/19 


Lactulose [Cephulac*] 30 ml PO DAILY 30 Days #30 ucup 11/30/19 


Codeine/APAP [Tylenol W/Codeine #3 tab] 1 tab PO Q6HP PRN 05/01/22 


Hydrocodone 5/APAP 325 [Norco 5/325] 1 tab PO Q8H PRN 05/01/22 


Thyroid,Pork [Np Thyroid 120] 60 mg PO DAILY 05/01/22 








- Past Medical/Surgical History


Diabetic: No


-: Alcoholic liver cirrhosis


-: COPD


-: Chronic steroid use


-: Paracentesis


-: History of Pseudomonas bacteremia


-: Stage IV colon/lung cancer on chemo


-: Right Inguinal Hernia and Umbilical Hernia Repair


-: pracentesis


Psychosocial/ Personal History: Patient is at home





- Family History


  ** Father


Medical History: Hypertension, Lung disease





  ** Mother


Medical History: Diabetes





- Social History


Smoking Status: Unknown if ever smoked


Alcohol use: No


CD- Drugs: No


Caffeine use: No


Place of Residence: Home





Review of Systems


General: Weakness, Malaise


Eyes: Unremarkable


ENT: Unremarkable


Respiratory: Unremarkable


Cardiovascular: Unremarkable


Gastrointestinal: Unremarkable


Genitourinary: Unremarkable


Musculoskeletal: Unremarkable


Integumentary: Unremarkable


Neurological: Unremarkable





Physical Examination














Temp Pulse Resp BP Pulse Ox


 


 97.9 F   97 H  18   141/84 H  92 


 


 05/02/22 16:00  05/02/22 16:00  05/02/22 16:00  05/02/22 16:00  05/02/22 10:00








General: Alert


HEENT: Atraumatic, Normocephalic


Neck: Supple


Respiratory: Normal air movement


Cardiovascular: Normal pulses, Regular rate/rhythm


Gastrointestinal: Soft and benign


Musculoskeletal: No swelling


Neurological: Normal speech


Conclusions/Impression: 


DARREL-resolved.


Hyponatremia-resolved.


Volume depletion


Sepsis


Toxic metabolic encephalopathy








Plan:


His kidney injury is improved.


Creatinine is better at 0.9 from 1.9 yesterday.


Will follow closely.


Will continue volume repletion.


We will follow renal function closely.


Sodium is much improved at 135 from 129 on admission.


Will monitor serum sodium closely.

## 2022-05-02 NOTE — RAD REPORT
EXAM DESCRIPTION:  Head Brain Wo Cont 5/1/2022 3:01 AM CDT

 

CLINICAL HISTORY:  34 years, Female, Mental status change, unknown cause

 

COMPARISON:  None.

 

FINDINGS:  Multiple transaxial tomograms of the brain were obtained from the base of the skull to the
 vertex without contrast. 2-D multiplanar reformats and the coronal and sagittal plane were performed
 and reviewed.

This exam was performed according to our departmental dose-optimization protocol, which includes auto
mated exposure control, adjustment of the mA and/or kV according to patient size and/or use of iterat
pablo reconstruction technique.

Brain parenchyma as well as the gray and white matter differentiation demonstrate to be unremarkable.
 There is no midline shift and/or mass effect. There is no evidence for acute hemorrhage. No focal ar
eas of hypodensities.   Lateral ventricles and cisterns displace normal appearance.   No intra or ext
ra axial fluid collections were seen. The calvarium is intact with no evidence for fracture. The visu
alized portions of the paranasal sinuses and orbits demonstrate to be clear.

 

IMPRESSION:  NO ACUTE INTRACRANIAL HEMORRHAGE.

UNREMARKABLE CT SCAN OF THE HEAD WITHOUT CONTRAST.

 

Electronically signed by:   Michael Nettles MD   5/1/2022 3:02 AM CDT Workstation: [id number]

 

 

Due to temporary technical issues with the PACS/Fluency reporting system, reports are being signed by
 the in house radiologist without review as a courtesy to ensure prompt reporting. The interpreting r
adiologist is fully responsible for the content of the report.

## 2022-05-03 VITALS — OXYGEN SATURATION: 98 %

## 2022-05-03 VITALS — TEMPERATURE: 97.2 F | SYSTOLIC BLOOD PRESSURE: 124 MMHG | DIASTOLIC BLOOD PRESSURE: 70 MMHG

## 2022-05-03 LAB
ALBUMIN SERPL BCP-MCNC: 2.8 G/DL (ref 3.4–5)
ALP SERPL-CCNC: 180 U/L (ref 45–117)
ALT SERPL W P-5'-P-CCNC: 63 U/L (ref 12–78)
AST SERPL W P-5'-P-CCNC: 77 U/L (ref 15–37)
BLD SMEAR INTERP: (no result)
BUN BLD-MCNC: 27 MG/DL (ref 7–18)
GIANT PLATELETS BLD QL SMEAR: (no result)
GLUCOSE SERPLBLD-MCNC: 98 MG/DL (ref 74–106)
HCT VFR BLD CALC: 42.9 % (ref 39.6–49)
LYMPHOCYTES # SPEC AUTO: 0.6 K/UL (ref 0.7–4.9)
MAGNESIUM SERPL-MCNC: 2.2 MG/DL (ref 1.8–2.4)
MORPHOLOGY BLD-IMP: (no result)
PMV BLD: 8.1 FL (ref 7.6–11.3)
POTASSIUM SERPL-SCNC: 4.1 MMOL/L (ref 3.5–5.1)
RBC # BLD: 4.97 M/UL (ref 4.33–5.43)

## 2022-05-03 RX ADMIN — HUMAN INSULIN SCH: 100 INJECTION, SOLUTION SUBCUTANEOUS at 07:30

## 2022-05-03 RX ADMIN — Medication SCH ML: at 07:52

## 2022-05-03 RX ADMIN — ENOXAPARIN SODIUM SCH: 40 INJECTION SUBCUTANEOUS at 07:51

## 2022-05-15 ENCOUNTER — HOSPITAL ENCOUNTER (INPATIENT)
Dept: HOSPITAL 97 - ER | Age: 62
LOS: 2 days | Discharge: HOME | DRG: 871 | End: 2022-05-17
Attending: HOSPITALIST | Admitting: HOSPITALIST
Payer: MEDICARE

## 2022-05-15 VITALS — BODY MASS INDEX: 25.7 KG/M2

## 2022-05-15 DIAGNOSIS — C78.7: ICD-10-CM

## 2022-05-15 DIAGNOSIS — G93.41: ICD-10-CM

## 2022-05-15 DIAGNOSIS — K72.00: ICD-10-CM

## 2022-05-15 DIAGNOSIS — N39.0: ICD-10-CM

## 2022-05-15 DIAGNOSIS — R65.20: ICD-10-CM

## 2022-05-15 DIAGNOSIS — J44.9: ICD-10-CM

## 2022-05-15 DIAGNOSIS — K70.30: ICD-10-CM

## 2022-05-15 DIAGNOSIS — Z20.822: ICD-10-CM

## 2022-05-15 DIAGNOSIS — C18.9: ICD-10-CM

## 2022-05-15 DIAGNOSIS — Z79.52: ICD-10-CM

## 2022-05-15 DIAGNOSIS — A41.9: Primary | ICD-10-CM

## 2022-05-15 LAB
ALBUMIN SERPL BCP-MCNC: 3 G/DL (ref 3.4–5)
ALP SERPL-CCNC: 232 U/L (ref 45–117)
ALT SERPL W P-5'-P-CCNC: 74 U/L (ref 12–78)
AST SERPL W P-5'-P-CCNC: 54 U/L (ref 15–37)
BUN BLD-MCNC: 20 MG/DL (ref 7–18)
GLUCOSE SERPLBLD-MCNC: 171 MG/DL (ref 74–106)
HCT VFR BLD CALC: 39.2 % (ref 39.6–49)
LIPASE SERPL-CCNC: 102 U/L (ref 73–393)
LYMPHOCYTES # SPEC AUTO: 1 K/UL (ref 0.7–4.9)
PMV BLD: 7.1 FL (ref 7.6–11.3)
POTASSIUM SERPL-SCNC: 4.4 MMOL/L (ref 3.5–5.1)
RBC # BLD: 4.55 M/UL (ref 4.33–5.43)

## 2022-05-15 PROCEDURE — 82746 ASSAY OF FOLIC ACID SERUM: CPT

## 2022-05-15 PROCEDURE — 96374 THER/PROPH/DIAG INJ IV PUSH: CPT

## 2022-05-15 PROCEDURE — 51702 INSERT TEMP BLADDER CATH: CPT

## 2022-05-15 PROCEDURE — 82947 ASSAY GLUCOSE BLOOD QUANT: CPT

## 2022-05-15 PROCEDURE — 80048 BASIC METABOLIC PNL TOTAL CA: CPT

## 2022-05-15 PROCEDURE — 83540 ASSAY OF IRON: CPT

## 2022-05-15 PROCEDURE — 96375 TX/PRO/DX INJ NEW DRUG ADDON: CPT

## 2022-05-15 PROCEDURE — 82607 VITAMIN B-12: CPT

## 2022-05-15 PROCEDURE — 87086 URINE CULTURE/COLONY COUNT: CPT

## 2022-05-15 PROCEDURE — 70450 CT HEAD/BRAIN W/O DYE: CPT

## 2022-05-15 PROCEDURE — 74177 CT ABD & PELVIS W/CONTRAST: CPT

## 2022-05-15 PROCEDURE — 70552 MRI BRAIN STEM W/DYE: CPT

## 2022-05-15 PROCEDURE — 85025 COMPLETE CBC W/AUTO DIFF WBC: CPT

## 2022-05-15 PROCEDURE — 87040 BLOOD CULTURE FOR BACTERIA: CPT

## 2022-05-15 PROCEDURE — 83690 ASSAY OF LIPASE: CPT

## 2022-05-15 PROCEDURE — 36415 COLL VENOUS BLD VENIPUNCTURE: CPT

## 2022-05-15 PROCEDURE — 84145 PROCALCITONIN (PCT): CPT

## 2022-05-15 PROCEDURE — 80053 COMPREHEN METABOLIC PANEL: CPT

## 2022-05-15 PROCEDURE — 94640 AIRWAY INHALATION TREATMENT: CPT

## 2022-05-15 PROCEDURE — 83880 ASSAY OF NATRIURETIC PEPTIDE: CPT

## 2022-05-15 PROCEDURE — 99285 EMERGENCY DEPT VISIT HI MDM: CPT

## 2022-05-15 PROCEDURE — 81003 URINALYSIS AUTO W/O SCOPE: CPT

## 2022-05-15 PROCEDURE — 82140 ASSAY OF AMMONIA: CPT

## 2022-05-15 PROCEDURE — 87088 URINE BACTERIA CULTURE: CPT

## 2022-05-15 PROCEDURE — 81015 MICROSCOPIC EXAM OF URINE: CPT

## 2022-05-15 PROCEDURE — 80061 LIPID PANEL: CPT

## 2022-05-15 RX ADMIN — HUMAN INSULIN SCH: 100 INJECTION, SOLUTION SUBCUTANEOUS at 20:15

## 2022-05-15 RX ADMIN — SODIUM CHLORIDE SCH MLS: 0.9 INJECTION, SOLUTION INTRAVENOUS at 12:03

## 2022-05-15 RX ADMIN — LACTULOSE SCH: 20 SOLUTION ORAL at 20:14

## 2022-05-15 RX ADMIN — LACTULOSE SCH GM: 20 SOLUTION ORAL at 23:50

## 2022-05-15 RX ADMIN — Medication SCH ML: at 20:14

## 2022-05-15 RX ADMIN — HUMAN INSULIN SCH: 100 INJECTION, SOLUTION SUBCUTANEOUS at 11:30

## 2022-05-15 RX ADMIN — LACTULOSE SCH GM: 20 SOLUTION ORAL at 14:00

## 2022-05-15 RX ADMIN — CEFTRIAXONE SCH MLS: 1 INJECTION, POWDER, FOR SOLUTION INTRAMUSCULAR; INTRAVENOUS at 11:00

## 2022-05-15 RX ADMIN — HUMAN INSULIN SCH: 100 INJECTION, SOLUTION SUBCUTANEOUS at 16:30

## 2022-05-15 NOTE — ER
Nurse's Notes                                                                                     

 [facility]                                                                 

Name: Omkar Chung                                                                                  

Age: 61 yrs                                                                                       

Sex: Male                                                                                         

: 1960                                                                                   

MRN: [id number]                                                                                   

Arrival Date: 05/15/2022                                                                          

Time: 03:44                                                                                       

Account#: [id number]                                                                            

Bed 17                                                                                            

Private MD:                                                                                       

Diagnosis: UTI/ Urinary tract infection, site not specified-Sepsis;Altered mental status,         

  unspecified                                                                                     

                                                                                                  

Presentation:                                                                                     

05/15                                                                                             

03:58 Chief complaint: Patient states: "I am not really hurting, I am just really nauseous. I tw5 

      feel like I am about to get to sick to my stomach.". Coronavirus screen: Vaccine            

      status: Patient reports receiving the 1st dose of the Covid vaccine. J and J. Ebola         

      Screen: Patient negative for fever greater than or equal to 101.5 degrees Fahrenheit,       

      and additional compatible Ebola Virus Disease symptoms Patient denies exposure to           

      infectious person. Patient denies travel to an Ebola-affected area in the 21 days           

      before illness onset. Initial Sepsis Screen: Does the patient meet any 2 criteria? No.      

      Patient's initial sepsis screen is negative. Does the patient have a suspected source       

      of infection? No. Patient's initial sepsis screen is negative. Risk Assessment: Do you      

      want to hurt yourself or someone else? Patient reports no desire to harm self or            

      others. Onset of symptoms was May 15, 2022 at 01:00.                                        

03:58 Method Of Arrival: Wheelchair                                                           tw5 

03:58 Acuity: BROCK 3                                                                           tw5 

                                                                                                  

Triage Assessment:                                                                                

04:00 General: Appears ill, Behavior is calm, cooperative, appropriate for age, quiet. Pain:  tw5 

      Denies pain. GI: Reports nausea, vomiting.                                                  

04:00 General: Wife states " he is on chemotherapy, his last dose was last Tuesday.".         tw5 

                                                                                                  

Historical:                                                                                       

- Allergies:                                                                                      

04:00 No Known Allergies;                                                                     tw5 

- Home Meds:                                                                                      

05:44 Furosemide Oral [Active];                                                               javan  

- PMHx:                                                                                           

04:00 Cirrhosis; colon cancer; COPD; DM type 2; Hernia;                                       tw5 

                                                                                                  

- Immunization history:: Flu vaccine is not up to date.                                           

- Social history:: Smoking status: Patient/guardian denies using tobacco, the patient             

  reports quitting approximately 4 years ago.                                                     

                                                                                                  

                                                                                                  

Screenin:42 Abuse screen: Denies threats or abuse. Denies injuries from another. Nutritional        javan  

      screening: No deficits noted. Tuberculosis screening: No symptoms or risk factors           

      identified. Fall Risk Fall in past 12 months (25 points). Ambulatory Aid-.                  

                                                                                                  

Assessment:                                                                                       

05:40 Reassessment: The pt has been sleeping, awakening and making a retching noise,          javan  

      occasionally, spitting into the emesis bag. VS are stable. His wife needed to go to         

      work, as soon as he was brought to the room. She said that she will return. GI:.            

05:43 GI: obese.                                                                              javan  

06:12 Reassessment: Pt sleeping and remains on the monitor. VS stable. He has not vomited,    javan  

      only retched, seldom, since being in the room.                                              

06:41 Reassessment: The pt did vomit, as he slept, all over his shirt and bed linen. His      javan  

      shirt was discarded and the linen was changed. The pt is very sleepy and only responded     

      with "No! No!" when he was turned. He remains on the bedside monitor.                       

07:00 Reassessment: RECD REPORT FROM [facility]. 60YO WM P/W N/V WITH DX OF CANCER.            bp  

07:43 Reassessment: PT RETURNED FROM CT.                                                      bp  

09:38 Reassessment: No changes from previously documented assessment. Patient and/or family   bp  

      updated on plan of care and expected duration. Pain level reassessed.                       

11:26 Reassessment: No changes from previously documented assessment. ADMIT INITIATED.        bp  

      Patient states symptoms have not improved.                                                  

12:48 Reassessment: PT ON ER HOLD.                                                            bp  

                                                                                                  

Vital Signs:                                                                                      

03:58  / 68; Pulse 83; Resp 18; Temp 97.7(O); Pulse Ox 96% on R/A; Weight 90.72 kg;     tw5 

      Height 6 ft. 2 in. (187.96 cm); Pain 0/10;                                                  

05:41  / 72; Pulse 84; Resp 16; Temp 98.3; Pulse Ox 97% on 2 lpm NC; Pain 0/10;         javan  

06:13  / 71; Pulse 80; Resp 14; Temp 98.2; Pulse Ox 97% on 2 lpm NC; Pain 0/10;         javan  

07:00 BP 97 / 67; Pulse 80; Resp 23; Pulse Ox 96% ;                                           bp  

08:30  / 80; Pulse 89; Resp 17; Pulse Ox 97% ;                                          bp  

09:30  / 83; Pulse 94; Resp 18; Pulse Ox 97% ;                                          bp  

11:26  / 86; Pulse 95; Resp 14; Pulse Ox 96% ;                                          bp  

03:58 Body Mass Index 25.68 (90.72 kg, 187.96 cm)                                             tw5 

                                                                                                  

ED Course:                                                                                        

03:44 Patient arrived in ED.                                                                  bp1 

04:00 Triage completed.                                                                       tw5 

04:00 Arm band placed on right wrist.                                                         tw5 

04:21 Binta Lyon RN is Primary Nurse.                                                 javan  

04:21 Anthony Krishnan DO is Attending Physician.                                                ms3 

05:07 Lipase Sent.                                                                            javan  

05:08 CMP Sent.                                                                               javan  

05:08 CBC with Diff Sent.                                                                     javan  

05:28 CBC with Diff Sent.                                                                     javan  

05:28 CMP Sent.                                                                               javan  

05:28 Lipase Sent.                                                                            javan  

05:43 No provider procedures requiring assistance completed. Inserted saline lock: 20 gauge   javan  

      in right antecubital area, using aseptic technique. Blood collected.                        

05:44 Bed in low position. Call light in reach. Side rails up X2. Cardiac monitor on. Pulse   javan  

      ox on. NIBP on.                                                                             

07:06 Primary Nurse role handed off by Binta Lyon, CAROL                                  bp  

07:06 Kvng Parekh, RN is Primary Nurse.                                                    bp  

07:20 Attending Physician role handed off by Anthony Krishnan DO                                 ms3 

07:20 Dara Chapman MD is Attending Physician.                                           ms3 

07:39 CT Abd/Pelvis - IV Contrast Only In Process Unspecified.                                EDMS

09:10 CT Head Brain wo Cont In Process Unspecified.                                           EDMS

09:38 An cath inserted, using sterile technique, 18 Fr., by me, balloon inflated, to       bp  

      gravity drainage, urine specimen collected. returned clear yellow urine. Patient            

      tolerated well.                                                                             

09:44 Santy Brown MD is Hospitalizing Provider.                                          ma2 

18:02 Patient admitted, IV remains in place.                                                  bp  

                                                                                                  

Administered Medications:                                                                         

05:27 Drug: Zofran (Ondansetron) 4 mg Route: IVP; Site: right antecubital;                    jaavn  

09:08 Follow up: Response: No adverse reaction                                                bp  

09:30 Drug: NS 0.9% 1000 ml Route: IV; Rate: 1 bolus; Site: right antecubital;                bp  

09:30 Drug: Rocephin (cefTRIAXone) 1 grams Route: IV; Rate: calculated rate; Site: right      bp  

      antecubital;                                                                                

                                                                                                  

                                                                                                  

Medication:                                                                                       

05:45 VIS not applicable for this client.                                                     javan  

                                                                                                  

Outcome:                                                                                          

05:44 Condition: stable                                                                       javan  

09:45 Decision to Hospitalize by Provider.                                                    ma2 

18:02 Admitted to ER Hold.  Please see [facility] for further documentation.                    bp  

18:02 Instructed on the need for admit.                                                           

18:49 Patient left the ED.                                                                    bp  

                                                                                                  

Signatures:                                                                                       

Dispatcher [employer]                           EDKvng Bolanos, RN                      RN   bp                                                   

Dara Chapman MD MD   ma2                                                  

Anthony Krishnan DO                        DO   ms3                                                  

Patricia Graham Tiffany                                tw5                                                  

Binta Lyon RN                   RN   javan                                                   

                                                                                                  

**************************************************************************************************

## 2022-05-15 NOTE — RAD REPORT
EXAM DESCRIPTION:  CT - Head Brain Wo Cont - 5/15/2022 9:09 am

 

CLINICAL HISTORY:  Alteration of awareness/confusion /Delirium

 

COMPARISON:  May 1, 2022

 

TECHNIQUE:  Computed axial tomography of the head was obtained. IV contrast was not requested.

 

All CT scans are performed using dose optimization technique as appropriate and may include automated
 exposure control or mA/KV adjustment according to patient size.

 

FINDINGS:  An intracranial  bleed is not seen .

 

The ventricles are normal in caliber.

 

No extra-axial fluid collection is noted.

 

No significant hypodense areas within the brain

 

Fluid within the sinuses/ mastoids is not seen.

 

IMPRESSION:  No acute intracranial abnormality is seen. If patient's symptoms persist  MRI of the bra
in would be recommended.

## 2022-05-15 NOTE — EDPHYS
Physician Documentation                                                                           

 [facility]                                                                 

Name: Omkar Chung                                                                                  

Age: 61 yrs                                                                                       

Sex: Male                                                                                         

: 1960                                                                                   

MRN: [id number]                                                                                   

Arrival Date: 05/15/2022                                                                          

Time: 03:44                                                                                       

Account#: [id number]                                                                            

Bed 17                                                                                            

Private MD:                                                                                       

ED Physician Dara Chapman                                                                    

HPI:                                                                                              

05/15                                                                                             

06:01 This 61 yrs old Male presents to ER via Wheelchair with complaints of Nausea/Vomiting.  ms3 

06:01 The patient presents to the emergency department with nausea, vomiting. Onset: The      ms3 

      symptoms/episode began/occurred 2 hour(s) ago. Possible causes: unknown. The symptoms       

      are aggravated by nothing. The symptoms are alleviated by nothing. Associated signs and     

      symptoms: Pertinent negatives: abdominal pain, fever. Severity of symptoms: At their        

      worst the symptoms were moderate in the emergency department the symptoms have improved     

      Pain is currently a 0 / 10. Patient with Hx of Stage IV colon cancer.                       

                                                                                                  

Historical:                                                                                       

- Allergies:                                                                                      

04:00 No Known Allergies;                                                                     tw5 

- Home Meds:                                                                                      

05:44 Furosemide Oral [Active];                                                               javan  

- PMHx:                                                                                           

04:00 Cirrhosis; colon cancer; COPD; DM type 2; Hernia;                                       tw5 

                                                                                                  

- Immunization history:: Flu vaccine is not up to date.                                           

- Social history:: Smoking status: Patient/guardian denies using tobacco, the patient             

  reports quitting approximately 4 years ago.                                                     

                                                                                                  

                                                                                                  

ROS:                                                                                              

06:01 Constitutional: Negative for fever, and chills. Eyes: Negative for injury, pain,        ms3 

      redness, and discharge, Neck: Negative for injury, pain, and swelling, Cardiovascular:      

      Negative for chest pain, and palpitations. Respiratory: Negative for shortness of           

      breath, cough, wheezing, and pleuritic chest pain, MS/Extremity: Negative for injury        

      and deformity, Skin: Negative for injury, rash, and discoloration, Psych: Negative for      

      depression, anxiety, suicide ideation, homicidal ideation, and hallucinations.              

06:01 Abdomen/GI: Positive for nausea and vomiting.                                               

06:01 All other systems are negative.                                                             

                                                                                                  

Exam:                                                                                             

06:01 Constitutional:  This is a well developed, well nourished patient who is awake, alert,  ms3 

      and in no acute distress. Head/Face:  Normocephalic, atraumatic. Eyes:  Pupils equal        

      round and reactive to light, extra-ocular motions intact.  Lids and lashes normal.          

      Conjunctiva and sclera are non-icteric and not injected.  Periorbital areas with no         

      swelling, redness, or edema. Neck:  Trachea midline, no cervical lymphadenopathy.           

      Supple, full range of motion without nuchal rigidity, or vertebral point tenderness.        

      No Meningismus. Chest/axilla:  Normal chest wall appearance and motion.  Nontender with     

      no deformity.   Cardiovascular:  Regular rate and rhythm with a normal S1 and S2.  No       

      gallops, murmurs, or rubs.  Normal PMI, no JVD.  No pulse deficits. Respiratory:  Lungs     

      have equal breath sounds bilaterally, clear to auscultation and percussion.  No rales,      

      rhonchi or wheezes noted.  No increased work of breathing, no retractions or nasal          

      flaring. Abdomen/GI:  Soft, non-tender, with normal bowel sounds.  No distension or         

      tympany.  No guarding or rebound.  No evidence of tenderness throughout. Skin:  Warm,       

      dry with normal turgor.  Normal color with no rashes, no lesions, and no evidence of        

      cellulitis. Psych:  Awake, alert, with orientation to person, place and time.               

      Behavior, mood, and affect are within normal limits.                                        

                                                                                                  

Vital Signs:                                                                                      

03:58  / 68; Pulse 83; Resp 18; Temp 97.7(O); Pulse Ox 96% on R/A; Weight 90.72 kg;     tw5 

      Height 6 ft. 2 in. (187.96 cm); Pain 0/10;                                                  

05:41  / 72; Pulse 84; Resp 16; Temp 98.3; Pulse Ox 97% on 2 lpm NC; Pain 0/10;         javan  

06:13  / 71; Pulse 80; Resp 14; Temp 98.2; Pulse Ox 97% on 2 lpm NC; Pain 0/10;         javan  

07:00 BP 97 / 67; Pulse 80; Resp 23; Pulse Ox 96% ;                                           bp  

08:30  / 80; Pulse 89; Resp 17; Pulse Ox 97% ;                                          bp  

09:30  / 83; Pulse 94; Resp 18; Pulse Ox 97% ;                                          bp  

11:26  / 86; Pulse 95; Resp 14; Pulse Ox 96% ;                                          bp  

03:58 Body Mass Index 25.68 (90.72 kg, 187.96 cm)                                             tw5 

                                                                                                  

MDM:                                                                                              

04:21 Patient medically screened.                                                             ms3 

06:01 Differential diagnosis: Nonspecific abd pain, Bowel obstruction.                        ms3 

07:17 Transition of care: After a detail discussion of the patient's case, care is            ms3 

      transferred to Dara Chapman MD.                                                         

08:25 Data reviewed: vital signs, nurses notes. Counseling: I had a detailed discussion with  [facility] 

      the patient and/or guardian regarding: the historical points, exam findings, and any        

      diagnostic results supporting the discharge/admit diagnosis, the presence of at least       

      one elevated blood pressure reading (>120/80) during this emergency department visit,       

      the need for outpatient follow up. Response to treatment: the patient's symptoms have       

      resolved after treatment.                                                                   

08:53 ED course: Received signed out from Dr. Krishnan on this patient he was pending work-up. At [facility] 

      this time I advised the patient he is altered had lengthy discussion with the wife she      

      said that this is not his usual state of health, he presented last night of nausea          

      however at this time patient is only responsive to painful stimulus, urine shows            

      possible UTI with, mild elevation white count, will give him IV fluid IV antibiotics,       

      CT head and admit for UTI delirium and altered mental status with sepsis.                   

                                                                                                  

05/15                                                                                             

04:41 Order name: CBC with Diff; Complete Time: 08:25                                         ms3 

05/15                                                                                             

04:41 Order name: CMP; Complete Time: 08:25                                                   ms3 

05/15                                                                                             

04:41 Order name: Lipase; Complete Time: 08:25                                                ms3 

05/15                                                                                             

04:41 Order name: Urine Microscopic Only                                                      ms3 

05/15                                                                                             

08:53 Order name: Blood Culture Adult (2)                                                     ma2 

05/15                                                                                             

08:53 Order name: Urine Culture                                                               ma2 

05/15                                                                                             

08:53 Order name: AMMONIA                                                                     ma2 

05/15                                                                                             

08:53 Order name: SARS-COV-2 RT PCR (Document "Date of Onset" if Symptomatic)                 ma2 

05/15                                                                                             

09:40 Order name: Urine Dipstick-Ancillary                                                    EDMS

05/15                                                                                             

09:58 Order name: Glucose, Ancillary Testing                                                  EDMS

05/15                                                                                             

10:11 Order name: Basic Metabolic Panel                                                       EDMS

05/15                                                                                             

10:11 Order name: Basic Metabolic Panel                                                       EDMS

05/15                                                                                             

10:11 Order name: CBC with Automated Diff                                                     EDMS

05/15                                                                                             

10:11 Order name: CBC with Automated Diff                                                     EDMS

05/15                                                                                             

04:41 Order name: CT Abd/Pelvis - IV Contrast Only; Complete Time: 08:25                      ms3 

05/15                                                                                             

04:41 Order name: IV Saline Lock; Complete Time: 05:07                                        ms3 

05/15                                                                                             

04:41 Order name: Labs collected and sent; Complete Time: 05:07                               ms3 

05/15                                                                                             

08:53 Order name: CT Head Brain wo Cont; Complete Time: 09:38                                 ma2 

05/15                                                                                             

08:53 Order name: Accucheck; Complete Time: 10:00                                             ma2 

05/15                                                                                             

10:11 Order name: 60g Consistent Carbohydrate (ADA )                                 EDMS

                                                                                                  

Administered Medications:                                                                         

05:27 Drug: Zofran (Ondansetron) 4 mg Route: IVP; Site: right antecubital;                    javan  

09:08 Follow up: Response: No adverse reaction                                                bp  

09:30 Drug: NS 0.9% 1000 ml Route: IV; Rate: 1 bolus; Site: right antecubital;                bp  

09:30 Drug: Rocephin (cefTRIAXone) 1 grams Route: IV; Rate: calculated rate; Site: right      bp  

      antecubital;                                                                                

                                                                                                  

                                                                                                  

Disposition Summary:                                                                              

05/15/22 09:45                                                                                    

Hospitalization Ordered                                                                           

      Hospitalization Status: Inpatient Admission                                             ma2 

      Provider: Santy Brown                                                               ma2 

      Condition: Stable                                                                       ma2 

      Problem: new                                                                            ma2 

      Symptoms: are unchanged                                                                 ma2 

      Bed/Room Type: Standard                                                                 ma2 

      Location: Telemetry/MedSurg (Inpatient)(05/15/22 15:37)                                   

      Room Assignment: 208(05/15/22 15:37)                                                    iw  

      Diagnosis                                                                                   

        - UTI/ Urinary tract infection, site not specified - Sepsis                           ma2 

        - Altered mental status, unspecified                                                  ma2 

      Forms:                                                                                      

        - Medication Reconciliation Form                                                      ma2 

        - SBAR form                                                                           ma2 

Signatures:                                                                                       

Dispatcher [employer]                           Babs Coleman RN RN   iw                                                   

Kvng Parekh RN                      RN   bp                                                   

Dara Chapman MD MD   ma2                                                  

Anthony Krishnan DO                        DO   ms3                                                  

Dior Driscoll                                tw5                                                  

Binta Lyon RN                   CAROL   javan                                                   

                                                                                                  

Corrections: (The following items were deleted from the chart)                                    

12:48 09:45 Telemetry/MedSurg (observation) ma2                                               bp  

12:48 09:45 ma2                                                                               bp  

15:37 12:48 [facility] ER HOLD bp                                                                   iw  

15:37 12:48 ERHOLD- bp                                                                        iw  

                                                                                                  

**************************************************************************************************

## 2022-05-15 NOTE — P.HP
Certification for Inpatient


Patient admitted to: Observation


With expected LOS: <2 Midnights


Practitioner: I am a practitioner with admitting privileges, knowledge of 

patient current condition, hospital course, and medical plan of care.


Services: Services provided to patient in accordance with Admission requirements

found in Title 42 Section 412.3 of the Code of Federal Regulations





Patient History


Date of Service: 05/15/22


Reason for admission: UTI, hepatic encephalopathy


History of Present Illness: 


61-year-old female patient with medical history significant for history of m

etastatic colon cancer, liver cirrhosis, recurrent hepatic encephalopathy was 

evaluated for episode of mentation.  He was recently diagnosed with UTI and was 

on outpatient antibiotic therapy.  Family noted he was altered for the past 

couple of days so he was brought to the ER for evaluation.  In the ED, lab 

reviewed with him on a more than 300.


He was asked to be admitted in observation due to his altered mentation.





Allergies





No Known Allergies Allergy (Verified 11/28/19 14:51)


   





Home Medications: 








Folic Acid 0.4 mg PO DAILY 09/09/19 


Furosemide 40 mg PO DAILY 09/09/19 


Spironolactone 100 mg PO DAILY 09/09/19 


Ipratropium Neb [Atrovent*] 0.5 mg IH TID PRN 30 Days  amp 11/15/19 


Levalbuterol [Xopenex*] 1.25 mg NEB Q6HP PRN #60 vial 11/15/19 


Lactulose [Cephulac*] 30 ml PO DAILY 30 Days #30 ucup 11/30/19 


Codeine/APAP [Tylenol #3*] 1 tab PO Q6HP PRN 05/01/22 


Hydrocodone 5/APAP 325 [Norco 5/325*] 1 tab PO Q8H PRN 05/01/22 


Thyroid,Pork [Np Thyroid] 60 mg PO DAILY 05/01/22 


Metoprolol Tartrate [Lopressor] 25 mg PO BID #60 tab 05/03/22 


Smz./Tmp. [Bactrim Ds 800 MG/160 MG*] 1 tab PO BID #14 tab 05/03/22 


Tamsulosin [Flomax] 0.4 mg PO BEDTIME #30 cap 05/03/22 








- Past Medical/Surgical History


Diabetic: No


-: Alcoholic liver cirrhosis


-: COPD


-: Chronic steroid use


-: Paracentesis


-: History of Pseudomonas bacteremia


-: Right Inguinal Hernia and Umbilical Hernia Repair


-: pracentesis


Psychosocial/ Personal History: Patient is at home





- Family History


  ** Father


-: Hypertension, Lung disease





  ** Mother


-: Diabetes





- Social History


Alcohol use: No


CD- Drugs: No


Caffeine use: No





Review of Systems


is unable to be obtained





Physical Examination





- Physical Exam


General: Delirious, Unresponsive


HEENT: Atraumatic, Normocephalic


Neck: Supple


Respiratory: Normal air movement


Cardiovascular: Regular rate/rhythm, Normal S1 S2


Gastrointestinal: Soft and benign, Distended


Musculoskeletal: No swelling





- Studies


Laboratory Data (last 24 hrs)





05/15/22 05:00: Sodium 130 L, Potassium 4.4, BUN 20 H, Creatinine 1.18, Glucose 

171 H, Total Bilirubin 1.6 H, AST 54 H, ALT 74, Alkaline Phosphatase 232 H, 

Lipase 102


05/15/22 05:00: WBC 12.8 H D, Hgb 13.2 L, Hct 39.2 L, Plt Count 126 L D








Assessment and Plan





- Plan


Toxic metabolic encephalopathy:


Due to hyperammonemia.


There is also concern for UTI.


Have started lactulose enema.


Will switch to oral lactulose once patient mentation improved.


Will follow symptomatology.








Hepatic encephalopathy:


Deemed primary cause of altered mentation.


Lactulose enema started.


Follow closely.





UTI chronic


UA slightly concerning however patient has been on antibiotic and outpatient.


Will continue.  Rocephin pending urine culture finalization.


Will monitor closely.








Prophylaxis: Lovenox and SCD for DVT prophylaxis.





CODE STATUS: Full code peripheral IV.





Disposition: We will monitor mentation closely for possible discharge in next 48

 hours.





- Advance Directives


Does patient have a Living Will: No


Does patient have a Durable POA for Healthcare: No

## 2022-05-15 NOTE — XMS REPORT
Continuity of Care Document

                             Created on:May 15, 2022



Patient:ANAYELI HUDSON

Sex:Male

:1960

External Reference #:[id number]





Demographics







                          Address                   [address]



                                                    [place] [address]

 

                          Home Phone                [phone]

 

                          Mobile Phone              [phone]

 

                          Email Address             NONE

 

                          Preferred Language        English

 

                          Marital Status            Unknown

 

                          Orthodox Affiliation     Unknown

 

                          Race                      Unknown

 

                          Additional Race(s)        Unavailable



                                                    Unavailable



                                                    White

 

                          Ethnic Group              Unknown









Author







                          Organization              [facility]

t

 

                          Address                   [address]



                                                    [place] [address]

 

                          Phone                     [phone]









Support







                Name            Relationship    Address         Phone

 

                IRVING MATHIS               Unavailable     [phone]

 

                KORIN PANDYA               Unavailable     [phone]

 

                KORIN PANDYA               Unavailable     [phone]

 

                            Unavailable     [address] [phone]



                                                [place] [address] 

 

                Irving BISHOP               [address]    [phone]



                                                [place] [address] 

 

                Vernor          Sister          Unavailable     [phone]



                                                [place], [place]    









Care Team Providers







                    Name                Role                Phone

 

                    GLORIA LONGO      Primary Care Physician Unavailable

 

                    MUMTAZ Longo           Attending Clinician Unavailable

 

                    SYSTEM,  NOT IN     Attending Clinician Unavailable

 

                    ALICIA LEDESMA Attending Clinician Unavailable

 

                    ROSELYN               Attending Clinician Unavailable

 

                    RUBI LONGO        Attending Clinician Unavailable

 

                    PATRICIA              Attending Clinician Unavailable

 

                    PATRICIA              Attending Clinician Unavailable

 

                    INGE            Attending Clinician Unavailable

 

                    SCHOENSTEIN         Attending Clinician Unavailable

 

                    Trista LANGSTON,  W      Attending Clinician [phone]

 

                    ANDRIY HUGO Attending Clinician Unavailable

 

                    RUBI LONGO        Admitting Clinician Unavailable

 

                    PATRICIA              Admitting Clinician Unavailable

 

                    SCHOENSTEIN         Admitting Clinician Unavailable

 

                    JUJU WHITMORE       Admitting Clinician Unavailable









Payers







           Payer Name Policy Type Policy Number Effective Date Expiration Date S

yovanny

 

           [facility] ADVANTAGE            [id number]  2019            



           [facility]                          00:00:00              

 

           [facility] MEDICARE            [id number]  2022            



                                            00:00:00              

 

           [employer] MEDICARE            [id number]  2017            



           COMPLETE                         00:00:00              







Problems







       Condition Condition Condition Status Onset  Resolution Last   Treating Co

mments 

Source



       Name   Details Category        Date   Date   Treatment Clinician        



                                                 Date                 

 

       Metastatic Metastatic Disease Active                              [facility]

[facility]



       adenocarci adenocarci               3-27                               Co

llege



       noma   noma                 00:00:                             of



                                   00                                 Medicin



                                                                      e

 

       Colon  Colon  Disease Active                              [facility]



       adenocarci adenocarci               3-27                               Co

llege



       noma   noma                 00:00:                             of



                                   00                                 [facility]icin



                                                                      e

 

       Cirrhosis Cirrhosis Disease Active 2019                             [facility]

[facility]ianne



       of liver of liver               0-09                               Colleg

e



       with   with                 00:00:                             of



       ascites ascites               00                                 [facility]



       (HCCode) (HCCode)                                                  e

 

       Hyponatrem Hyponatrem Disease Active                              [facility]

[facility]



       ia     ia                                                  [facility]



                                   00:00:                             of



                                   00                                 [facility]



                                                                      e

 

       Pseudomona Pseudomona Disease Active                              [facility]

[facility]



       l      l                                                   [facility]



       pneumonia pneumonia               00:00:                             of



       (HCCode) (HCCode)               00                                 Medici

n



                                                                      e

 

       Inflammato Inflammato Disease Active                              [facility]

[facility]



       ry liver ry liver               6-                               Colleg

e



       disease disease               00:00:                             of



                                   00                                 [facility]



                                                                      e

 

       Ascites Ascites Disease Active                              [facility]



                                                                  [facility]



                                   00:00:                             of



                                   00                                 [facility]



                                                                      e

 

       Pleural Pleural Disease Active                                    [facility]



       effusion effusion                                                  Col[facility]

e



       on left on left                                                  of



                                                                      [facility]



                                                                      e







Allergies, Adverse Reactions, Alerts







       Allergy Allergy Status Severity Reaction(s) Onset  Inactive Treating Comm

ents 

Source



       Name   Type                        Date   Date   Clinician        

 

       NO KNOWN Allergy Active                                           [facility]



       ALLERGIE                                                         



       S                                                              

 

       NO KNOWN Drug   Active                                           [facility]



       ALLERGIE Class                                                   ity of



       S                                                              [facility]







Social History







           Social Habit Start Date Stop Date  Quantity   Comments   Source

 

           History of tobacco                       Cigarette Smoker            

[facility]



           use                                                    of Medicine

 

           History [facility]

ge



           Alcohol Frequency                                             of Medi

cine

 

           History [facility]



           Alcohol Std Drinks                                             of Med

icine

 

           History [facility]



           Alcohol Binge                                             of Medicine

 

           Alcohol Comment 2022 heavy drinker,            [facility]



                      00:00:00   00:00:00   beer/liquor            of Medicine

 

           Alcohol intake 2022 Ex-drinker            [facility] Col

leg



                      00:00:00   00:00:00   (finding)             of Medicine

 

           Exposure to 2022 Not sure              [facility]

bakari



           SARS-CoV-2 (event) 00:00:00   17:54:00                         of Med

icine

 

           Cigarettes smoked 2019-10-08 2019-10-08                       [facility]



           current (pack per 00:00:00   00:00:00                         of Medi

cine



           day) - Reported                                             

 

           Tobacco use and 2019-10-08 2019-10-08 Smokeless tobacco            [facility]



           exposure   00:00:00   00:00:00   non-user              of Medicine

 

           Cigarette  2019-10-08 2019-10-08                       [facility]



           pack-years 00:00:00   00:00:00                         of Medicine

 

           Sex Assigned At 1960 M                     [facility] Co

llege



           Birth      00:00:00   00:00:00                         of Medicine









                Smoking Status  Start Date      Stop Date       Source

 

                Ex-smoker       2019-10-08 00:00:00 2019-10-08 00:00:00 [facility]

ollege of Medicine







Medications







       Ordered Filled Start  Stop   Current Ordering Indication Dosage Frequency

 Signature

                    Comments            Components          Source



     Medication Medication Date Date Medication? Clinician                (SIG) 

          



     Name Name                                                   

 

     acetaminoph            Yes       [id number] 1{tbl}      Take 1      

     [facility]



     en-codeine      3-25                102            Tablet by           [facility]

e[facility]



     (TYLENOL      00:00:                               mouth           of



     #3) 300-30      00                                 every 6           Medici

n



     MG per                                         hours as           e



     tablet                                         needed for           



                                                  Pain.           

 

     Ascorbic            Yes                      Take  by           [facility]

r



     Acid      3-24                               mouth.           [facility]



     (VITAMIN C      13:25:                                              of



     ER OR)      20                                                [facility]



                                                                 e

 

     OXYGEN GAS            Yes            2L        2 L daily.           [facility]

[facility]



               3-24                                              [facility] [facility]



               13:24:                                              of



               18                                                [facility]



                                                                 e

 

     NP THYROID            Yes            60mg      Take 60 mg           [facility]

[facility]



     60 MG      2-22                               by mouth           [facility]



     tablet      00:00:                               daily.           of



               00                                                [facility]



                                                                 e

 

     Tradjenta Tradjenta       Yes  Jhon                1 tablet        

   [facility]



               7-20           Long[facility]                               [facility]



               00:00:                                              - [facility]



               00                                                [facility]

 

     Tresiba Tresiba       Yes  Jhon                Inject 15           

Common



     FlexTouch FlexTouch 7-20           Longo                units           Spi

rit



               00:00:                                              - [facility]



               00                                                [facility]

 

     OXYGEN GAS      2019      Yes            2L        2 L daily.           [facility]

aylor



               0-17                                              [place]



               15:24:                                              of



               51                                                [facility]



                                                                 e

 

     tramadol            Yes       [id number] 50mg      Take 1 Tab         

  [facility]



     (ULTRAM) 50      9-30                               by mouth           [facility]

e[facility]



     MG tablet      00:00:                               every 6           of



               00                                 hours as           [facility]



                                                  needed for           e



                                                  Pain.           

 

     tramadol      2022- No        [id number] 50mg      Take 1 Tab        

   [facility]



     (ULTRAM) 50      9-30 03-25                          by mouth           Col

lege



     MG tablet      00:00: 00:00                          every 6           of



               00   :00                           hours as           [facility]icin



                                                  needed for           e



                                                  Pain.           

 

     tiotropium      -      Yes            18ug      18 mcg by           [facility]

[facility]



     (SPIRIVA)      9-                               Inhalation           [facility]

ege



     18 MCG      00:00:                               route.           of



     inhalation      00                                                [facility]



     capsule                                                        e

 

     tiotropium            Yes            18ug      18 mcg by           [facility]

[facility]



     (SPIRIVA)      9-                               Inhalation           [facility]

e[facility]



     18 MCG      00:00:                               route.           of



     inhalation      00                                                [facility]



     capsule                                                        e

 

     Cefepime       2019- No             2g        Inject 2 g           [facility]

ylor



     HCl 2 g      9-25 10-19                          into the           [facility]



     SOLR      00:00: 04:59                          vein Every           of



               00   :00                           8 hours.           [facility]



                                                                 e

 

     sodium      2019- No                       TAKE 1           [facility]



     chloride 1      - 10-17                          TABLET BY           Col

lege



     g tablet      00:00: 00:00                          MOUTH           of



               00   :00                           THREE           [facility]icin



                                                  TIMES           e



                                                  DAILY WITH           



                                                  MEALS FOR           



                                                  14 DAYS           

 

     levofloxaci            Yes                      TAKE 1           [facility]

or



     n         9-24                               TABLET BY           [facility]



     (LEVAQUIN)      00:00:                               MOUTH ONCE           o

f



     750 MG      00                                 DAILY FOR           [facility]



     tablet                                         24 DAYS           e

 

     levofloxaci            Yes                      TAKE 1           [facility]

or



     n         9-24                               TABLET BY           [facility]



     (LEVAQUIN)      00:00:                               MOUTH ONCE           o

f



     750 MG      00                                 DAILY FOR           [facility]



     tablet                                         24 DAYS           e

 

     doxycycline      2019-              100mg      Take 100           

[facility]



     (MONODOX)      9-24 10-20                          mg by           [facility]



     100 MG      00:00: 04:59                          mouth.           of



     capsule      00   :00                                          [facility]icin



                                                                 e

 

     dexamethaso      -      Yes                      TAKE 2           [facility]

or



     ne        9-01                               TABLETS BY           [facility]



     (DECADRON)      00:00:                               MOUTH           of



     4 MG tablet      00                                 TWICE           [facility]



                                                  DAILY           e

 

     dexamethaso      -      Yes                      TAKE 2           [facility]

or



     ne        9-01                               TABLETS BY           [facility]



     (DECADRON)      00:00:                               MOUTH           of



     4 MG tablet      00                                 TWICE           [facility]



                                                  DAILY           e

 

     albuterol            Yes                      USE 1 VIAL           [facility]

yl[facility]



     (PROVENTIL)      8-12                               IN             [facility]



     (2.5 mg/3      00:00:                               NEBULIZER           of



     mL) 0.083%      00                                 EVERY 4 TO           Med

icin



     nebulizer                                         6 HOURS AS           e



     solution                                         NEEDED           

 

     albuterol            Yes                      USE 1 VIAL           [facility]

[facility]



     (PROVENTIL)      8-12                               IN             [facility]



     (2.5 mg/3      00:00:                               NEBULIZER           of



     mL) 0.083%      00                                 EVERY 4 TO           Med

icin



     nebulizer                                         6 HOURS AS           e



     solution                                         NEEDED           

 

     furosemide            Yes            40mg      Take 40 mg           [facility]

[facility]



     (LASIX) 40      6-14                               by mouth           [facility]

ge



     MG tablet      00:00:                               daily.           of



                                                               [facility]



                                                                 e

 

     spironolact            Yes            100mg      Take 100           [facility]

[facility]



     one       6-14                               mg by           [facility] [facility]



     (ALDACTONE)      00:00:                               mouth           of



     100 MG      00                                 daily.           [facility]



     tablet                                                        e

 

     folic acid            Yes            1mg       Take 1 mg           [facility]

[facility]



     (FOLVITE) 1      6-14                               by mouth           [facility]

e[facility]



     MG tablet      00:00:                               daily.           of



                                                               [facility]



                                                                 e

 

     furosemide            Yes            40mg      Take 40 mg           [facility]

[facility]



     (LASIX) 40      6-14                               by mouth           [facility]

ge



     MG tablet      00:00:                               daily.           of



                                                               [facility]



                                                                 e

 

     spironolact            Yes            100mg      Take 100           [facility]

[facility]



     one       6-14                               mg by           [facility] [facility]



     (ALDACTONE)      00:00:                               mouth           of



     100 MG      00                                 daily.           [facility]



     tablet                                                        e

 

     folic acid            Yes            1mg       Take 1 mg           [facility]

[facility]



     (FOLVITE) 1      6-14                               by mouth           [facility]

e[facility]



     MG tablet      00:00:                               daily.           of



               00                                                [facility]



                                                                 e

 

     Symbicort Symbicort           Yes  Jhon                2 puffs           

Common



                              [facility]

 

     Ipratropium Ipratropium           Yes  Jhon                2.5 ml        

   [facility]



     Bromide Bromide                [facility]

 

     ProAir HFA ProAir HFA           Yes  Jhon                1 puff as       

    Common



                              [facility]                needed           [facility]

 

     Furosemide Furosemide           Yes  Jhon                1 tablet        

   Common



                              [facility]

 

     Spironolact Spironolact           Yes  Jhon                1 tablet      

     Common



     one  one                 [facility]

 

     Aspir-Low Aspir-Low           Yes  Jhon                1 tablet          

 Common



                              [facility]

 

     Folic Acid Folic Acid           Yes  Jhon                1 tablet        

   Common



                              [facility]

 

     Constulose Constulose           Yes  Jhon                15 ml           

[facility]







Vital Signs







             Vital Name   Observation Time Observation Value Comments     Source

 

             HEIGHT       2022 08:26:00 188 cm                    

 

             WEIGHT       2022 08:26:00 96.843 kg                 

 

             HEIGHT       2022 08:26:00 188 cm                    

 

             WEIGHT       2022 08:26:00 96.843 kg                 

 

             HEIGHT       2022 08:10:00 188 cm                    

 

             WEIGHT       2022 08:10:00 96.752 kg                 

 

             HEIGHT       2022 13:44:00 188 cm                    

 

             WEIGHT       2022 13:44:00 92.987 kg                 

 

             HEIGHT       2022 08:10:00 188 cm                    

 

             WEIGHT       2022 08:10:00 96.752 kg                 

 

             HEIGHT       2022 13:44:00 188 cm                    

 

             WEIGHT       2022 13:44:00 92.987 kg                 

 

             Systolic blood 2022 18:17:00 146 mm[Hg]                [facility]



             pressure                                            Medicine

 

             Diastolic blood 2022 18:17:00 75 mm[Hg]                 [facility]



             pressure                                            Medicine

 

             Heart rate   2022 18:17:00 95 /min                   [facility]

ollege of



                                                                 Medicine

 

             Body temperature 2022 18:17:00 37.06 Renée                 [facility]

 

             Body height  2022 18:17:00 188 cm                    [facility]

ollege of



                                                                 Medicine

 

             Body weight  2022 18:17:00 101.969 kg                [facility]

ollege of



                                                                 Medicine

 

             BMI          2022 18:17:00 28.86 kg/m2               [facility]

 

             Systolic blood 2019-10-17 15:21:00 115 mm[Hg]                [facility]



             pressure                                            Medicine

 

             Diastolic blood 2019-10-17 15:21:00 67 mm[Hg]                 [facility]



             pressure                                            Medicine

 

             Heart rate   2019-10-17 15:21:00 96 /min                   [facility]

ollege of



                                                                 Medicine

 

             Body temperature 2019-10-17 15:21:00 36.28 Renée                 [facility]

 

             Body height  2019-10-17 15:21:00 188 cm                    [facility]

ollege of



                                                                 Medicine

 

             Body weight  2019-10-17 15:21:00 102.059 kg                [facility]

[facility]lege of



                                                                 Medicine

 

             BMI          2019-10-17 15:21:00 28.89 kg/m2               [facility]

[facility] of



                                                                 Medicine

 

             Systolic blood 2019-10-17 15:21:00 115 mm[Hg]                [facility]

 [facility] of



             pressure                                            Medicine

 

             Diastolic blood 2019-10-17 15:21:00 67 mm[Hg]                 [facility]



             pressure                                            Medicine

 

             Heart rate   2019-10-17 15:21:00 96 /min                   [facility]

 

             Body temperature 2019-10-17 15:21:00 36.28 Renée                 [facility]

 

             Body height  2019-10-17 15:21:00 188 cm                    [facility]

 

             Body weight  2019-10-17 15:21:00 102.059 kg                [facility]

 

             BMI          2019-10-17 15:21:00 28.89 kg/m2               [facility]







Procedures







                Procedure       Date / Time     Performing Clinician Source



                                Performed                       

 

                COMPREHENSIVE METABOLIC 2022 20:06:00 Ingrid Alcazar [facility]                                           Medicine

 

                CEA             2022 20:06:00 [facility]grid [facility]azar [facility]

 

                CBC W/AUTO DIFF WITH 2022 20:06:00 Ingrid Alcazar [facility]

 

                AFP TUMOR MARKER 2022 20:06:00 Ingrid Alcazar [facility]

 

                CANCER ANTIGEN 19-9 2022 20:06:00 Inge [facility]







Plan of Care







             Planned Activity Planned Date Details      Comments     Source

 

             Future Scheduled 2022   Screening for malignant              

[facility]



             Test         11:27:55     neoplasm of colon              of Medicin

e



                                       (procedure) [code =              



                                       775381560]                

 

             Future Scheduled 2022   TETANUS SHOT (ADULT)              [facility]



             Test         11:27:55     [code = TETANUS SHOT              of Medi

cine



                                       (ADULT)]                  

 

             Future Scheduled 2022   BMI FOLLOW UP PLAN              [facility]



             Test         11:27:55     [code = BMI FOLLOW UP              of Med

icine



                                       PLAN]                     

 

             Future Scheduled 2022   Hepatitis C screening              [facility]



             Test         11:27:55     (procedure) [code =              of Medic

ine



                                       152931451]                

 

             Future Scheduled 2022   Human immunodeficiency              B

[facility]



             Test         11:27:55     virus screening              of Medicine



                                       (procedure) [code =              



                                       226284778]                

 

             Future Scheduled 2022   Screening for malignant              

[facility]



             Test         11:27:55     neoplasm of lung              of Medicine



                                       (procedure) [code =              



                                       306283505]                

 

             Future Scheduled 2022   ZOSTER VACCINE (1 of 2)              

[facility]



             Test         11:27:55     [code = ZOSTER VACCINE              of Me

dicine



                                       (1 of 2)]                 

 

             Future Scheduled 2022   COVID-19 Vaccine (2 -              Ba

[facility]



             Test         11:27:55     Booster for [employer]              of Medic

ine



                                       series) [code =              



                                       COVID-19 Vaccine (2 -              



                                       Booster for [employer]              



                                       series)]                  

 

             Future Scheduled 2022   FLU VACCINE > 6 MONTHS              [facility]

[facility]



             Test         11:27:55     [code = FLU VACCINE > 6              of M

edicine



                                       MONTHS]                   

 

             Future Scheduled 2022   IR PORT PLACEMENT EQUAL 1 Occurrences

 [facility]



             Test         14:40:38     OR > 5 YEARS [code = starting     of Medi

cine



                                       42656]       2022 until 



                                                    2023   

 

             Future Scheduled 2022   CT CHEST ABDOMEN PELVIS 1 Occurrences

 [facility]



             Test         14:19:01     W CONTRAST [code = starting     of Medici

ne



                                       70968-3]     2022 until 



                                                    2023   

 

             Future Scheduled 2022   WILD 1 [code = NOCPT] Ordered:     [facility]

[employer]



             Test         14:18:00                  2022   of Medicine

 

             Future Scheduled 2022   TEMPUS XF LIQUID BIOPSY Ordered:     

[facility]



             Test         14:17:42     NGS [code = 60174296] 2022   of Med

icine

 

             Future Scheduled 2022   TEMPUS XT TISSUE NGS Ordered:     [facility]



             Test         14:17:42     [code = 41248748] 2022   of Medicin

e

 

             Future Scheduled              COLON CANCER SCREENING:              

[facility]



             Test                      COLONOSCOPY [code =              of Medic

ine



                                       COLON CANCER SCREENING:              



                                       COLONOSCOPY]              

 

             Future Scheduled              MEDICARE AWV [code =              [place]

[employer]



             Test                      MEDICARE AWV]              of Medicine

 

             Future Scheduled              TETANUS SHOT (ADULT)              [place]

[employer]



             Test                      [code = TETANUS SHOT              of Medi

cine



                                       (ADULT)]                  

 

             Future Scheduled              BMI FOLLOW UP PLAN              [facility]

[facility]



             Test                      [code = BMI FOLLOW UP              of Med

icine



                                       PLAN]                     

 

             Future Scheduled              HEPATITIS C SCREENING              [facility]

[employer]



             Test                      [code = HEPATITIS C              of Medic

ine



                                       SCREENING]                

 

             Future Scheduled              HIV SCREENING [code =              [facility]

[employer]



             Test                      HIV SCREENING]              of Medicine

 

             Future Scheduled              FLU VACCINE > 6 MONTHS              [facility]

[facility]



             Test                      [code = FLU VACCINE > 6              of M

edicine



                                       MONTHS]                   







Encounters







        Start   End     Encounter Admission Attending Care    Care    Encounter 

Source



        Date/Time Date/Time Type    Type    Clinicians Facility Department ID   

   

 

        2022         Outpatient         Lonog,  STLMLC  ST[facility]  [id number]

 Common



        08:18:01                         Jhon                     [facility]

 

        2022         Outpatient         SYSTEM, MDA     [facility]     [id number]

 MD



        14:48:15                         PROVIDER                         Andrew negron

 

        2022         Outpatient         Longo,  STLMLC  STLC  [id number]

 Common



        09:46:02                         Jhon                     [facility]

 

        2022         Outpatient         Longo,  STLMLC  STLC  [id number]

 Common



        14:39:33                         Jhon                     [facility]

 

        2022         Outpatient         Longo,  STLMLC  STLC  [id number]

 Common



        14:38:53                         Jhon                     [facility]

 

        2022         Outpatient         Longo,  STLMLC  STLC  [id number]

 Common



        13:10:59                         Jhon                  [id number]   [facility]

 

        2022         Outpatient         Longo,  STLMLC  STLC  [id number]

 Common



        12:52:47                         Jhon                  [id number]   [facility]

 

        2022         Outpatient         Longo,  STLMLC  STLC  [id number]

 Common



        11:32:03                         Jhon                  [id number]   [facility]

 

        2022         Outpatient         Longo,  STLMLC  STLC  [id number]

 Common



        11:25:36                         Jhon                  [id number]   [facility]

 

        2022         Outpatient         Longo,  STLMLC  STLC  [id number]

 Common



        11:16:16                         Jhon                  [id number]   [facility]

 

        2022         Outpatient         Longo,  STLMLC  STLC  [id number]

 Common



        11:07:38                         Jhon                  [id number]   [facility]

 

        2022 ambulatory                 STLMLC  STLC  [id number]

 Common



        00:00:00 00:00:00                                                 [facility]

 

        2022 ambulatory                 STLMLC  STLMLC  [id number]

 Common



        00:00:00 00:00:00                                                 [facility]

 

        2022 ambulatory                 STLMLC  STLMLC  [id number]

 Common



        00:00:00 00:00:00                                                 [facility]

 

        2022 Outpatient         LORENZO LEDESMA    MED     [id number]

[id number]    [facility]



        13:34:00 23:59:00                 ROCHELLE                           

 

        2022 Outpatient         ROSELYN  [facility]     [id number]

[id number] [facility]



        09:01:46 09:03:16                 [facility]                         Colleg

e



                                                                        of



                                                                        Medicin



                                                                        e

 

        2022 Outpatient [facility]    Surgery [id number]

[id number] [facility]



        05:57:00 11:20:00                 ELAYNE                         

 

        2022 Outpatient                 [facility]     [id number]

[id number] [facility]



        06:06:00 23:59:00                                                 Colleg

e



                                                                        of



                                                                        Medicin



                                                                        e

 

        2022 Outpatient [facility]    Surgery [id number]

[id number] [facility]



        06:06:00 11:15:00                 KATRINA                           

 

        2022 Outpatient         PATRICIA [facility]     [id number]

[id number] [facility]



        10:19:37 10:19:37                 KATRINA                           Colleg

e



                                                                        of



                                                                        Medicin



                                                                        e

 

        2022 Outpatient         PATRICIA [facility]     [id number]

[id number] [facility]



        10:17:26 10:17:26                 KATRINA                           Colleg

e



                                                                        of



                                                                        Medicin



                                                                        e

 

        2022 Outpatient [facility]    SLE    [id number]

[id number] [facility]



        14:20:19 23:59:00                                                 

 

        2022 Office          MARILEE ALCAZAR   [id number]

[id number] [facility]



        11:37:02 15:01:03 Visit           INGRID Tavares  350.1.13.21         Co

llege



                                                        0.2.7.2.686         of



                                                        [phone]         [facility]

euloigo



                                                        504             e

 

        2022 ambulatory                 STLMLC  STLMLC  [id number]

 Common



        00:00:00 00:00:00                                                 [facility]

 

        2022 ambulatory                 STLMLC  STLMLC  [id number]

 Common



        00:00:00 00:00:00                                                 [facility]

 

        2022 ambulatory                 STLMLC  STLMLC  [id number]

 Common



        00:00:00 00:00:00                                                 [facility]

 

        2022 ambulatory                 STLMLC  STLMLC  [id number]

 Common



        00:00:00 00:00:00                                                 [facility]

 

        2022 ambulatory                 STLMLC  STLMLC  [id number]

 Common



        00:00:00 00:00:00                                                 [facility]

 

        2022 ambulatory                 STLMLC  STLMLC  [id number]

 Common



        00:00:00 00:00:00                                                 [facility]

 

        2022 ambulatory                 STLMLC  STLMLC  [id number]

 Common



        00:00:00 00:00:00                                                 [facility]

 

        2021 ambulatory                 STLMLC  STLMLC  [id number]

 Common



        00:00:00 00:00:00                                                 [facility]

 

        2021 ambulatory                 STLMLC  STLMLC  [id number]

 Common



        00:00:00 00:00:00                                                 [facility]

 

        2021-10-06 2021-10-06 Outpatient                 STLMLC  STLMLC  [id number]

 Common



        00:00:00 00:00:00                                                 [facility]

 

        2021 Outpatient                 STLMLC  STLMLC  [id number]

 Common



        00:00:00 00:00:00                                                 [facility]

 

        2021 Outpatient                 STLMLC  STLMLC  [id number]

 Common



        00:00:00 00:00:00                                                 [facility]

 

        2021 Outpatient                 STLMLC  STLMLC  [id number]

 Common



        00:00:00 00:00:00                                                 [facility]

 

        2021 Outpatient                 STLMLC  STLMLC  [id number]

 Common



        00:00:00 00:00:00                                                 [facility]

 

        2021 Outpatient                 STLMLC  STLMLC  [id number]

 Common



        00:00:00 00:00:00                                                 [facility]

 

        2021 Outpatient                 STLMLC  STLMLC  [id number]

 Common



        00:00:00 00:00:00                                                 [facility]

 

        2021 Outpatient                 STLMLC  STLMLC  [id number]

 Common



        00:00:00 00:00:00                                                 [facility]

 

        2021-01-15 2021-01-15 Outpatient                 STLMLC  STLMLC  [id number]

 Common



        00:00:00 00:00:00                                                 [facility]

 

        2021 Outpatient                 STLMLC  STLMLC  [id number]

 Common



        00:00:00 00:00:00                                                 [facility]

 

        2020-10-21 2020-10-21 Outpatient                 STLMLC  STLMLC  [id number]

 Common



        00:00:00 00:00:00                                                 [facility]

 

        2020-10-19 2020-10-19 Outpatient                 STLMLC  STLMLC  [id number]

 Common



        00:00:00 00:00:00                                                 [facility]

 

        2020-10-08 2020-10-08 Outpatient                 STLMLC  STLMLC  [id number]

 Common



        00:00:00 00:00:00                                                 [facility]

 

        2020 Outpatient                 [facility] [address]

[id number] Common



        13:15:00 13:15:00                         t [address]         Spir

it



                                                Drive   [facility]

 

        2020 Outpatient                 [facility] [address]

[id number] Common



        13:45:00 13:45:00                         t [address]         Spir

it



                                                Drive   [facility]

 

        2020 Outpatient                 [facility] [address]

[id number] Common



        08:33:00 08:33:00                         t [address]   [address]         Spir

it



                                                Drive   [facility]

 

        2020-07-15 2020-07-15 Outpatient                 [facility] [address]

[id number] Common



        15:30:00 15:30:00                         t [address]   [address]         Spir

it



                                                Drive   [facility]

 

        2020 Outpatient                 [facility] [address]

[id number] Common



        15:00:00 15:00:00                         t [place]   [address]         Spir

it



                                                Drive   [facility]

 

        2020 Outpatient                 [facility] [address]

[id number] Common



        15:00:00 15:00:00                         t [place]   [address]         Spir

it



                                                Drive   [facility]

 

        2020 Outpatient                 [facility] [address]

[id number] Common



        10:00:00 10:00:00                         t [place]   [address]         Spir

it



                                                Drive   [facility]

 

        2020 Outpatient                 [facility] [address]

[id number] Common



        13:17:00 13:17:00                         t [address]   [address]         Spir

it



                                                Drive   [facility]

 

        2020 Outpatient                 [facility] [address]

[id number] Common



        11:00:00 11:00:00                         t [place]   [address]         Spir

it



                                                Drive   [facility]

 

        2019 Emergency X [facility]    ERT     [id number]

[id number] [facility]



        07:09:33 11:41:00                 , JACEK ontiveros [facility]

 

        2019-10-17 2019-10-17 Office          PADMINI Weston     [id number]

[id number] 



        09:26:46 14:06:09 Visit           Alex ANGUIANO AMBULATOR 350.1.13.21        

 



                                                Y       0.2.7.2.686         



                                                        [phone]         



                                                        [facility]             

 

        2019-10-17 2019-10-17 Office          PADMINI Weston     [id number]

598 [facility]



        09:26:46 14:06:09 Visit           Alex W AMBULATOR 350.1.13.21        

 College



                                                Y       0.2.7.2.686         



                                                        [phone]         Medi

eulogio



                                                        840             e







Results







           Test Description Test Time  Test Comments Results    Result     [facility]

e



                                                       Comments   

 

           TISSUE EXAM 2022            Surgical Pathology Report          

  



                      08:01:38                                    



                                            Case: [id number]            



                                                                  



                                            Authorizing Provider:            



                                            Elayne Longo MD            



                                             Collected:            



                                            2022 10:01 AM            



                                             Ordering Location:            



                                            [facility]            



                                             Received:            



                                            2022 11:57 AM            



                                                                  



                                            Services              



                                                                  



                                                                  



                                            Pathologist:            



                                            Homer Thomas MD            



                                                                  



                                                                  



                                            Specimens:   A) - Large            



                                            Intestine, Colon -            



                                            Transverse, Bx mass            



                                                                  



                                                            B) -            



                                            Polyp, Colon -            



                                            Left/Descending, taken by           

 



                                            hot snare             



                                                                  



                                            C) - Polyp, Colon -            



                                            Left/Descending,  x3            



                                            taken by hot snare            



                                                                  



                                                 D) - Polyp, Colon -            



                                            Sigmoid,  x5  taken by            



                                            hot snare             



                                                            A. LARGE            



                                            INTESTINE, TRANSVERSE            



                                            COLON MASS, BIOPSY:  -            



                                            INVASIVE ADENOCARCINOMA            



                                            - SEE MICROSCOPIC            



                                            DESCRIPTION B. LARGE            



                                            INTESTINE, DESCENDING            



                                            COLON POLYP, BIOPSY:  -            



                                            TUBULAR ADENOMA,            



                                            FRAGMENTED C. LARGE            



                                            INTESTINE, DESCENDING            



                                            COLON POLYP x 3, BIOPSY:            



                                            - TUBULAR ADENOMA,            



                                            FRAGMENTED  - SESSILE            



                                            SERRATED LESION D. LARGE            



                                            INTESTINE, SIGMOID COLON            



                                            POLYP x 5, BIOPSY:  -            



                                            TUBULOVILLOUS ADENOMA,            



                                            FRAGMENTED  -            



                                            HYPERPLASTIC POLYP            



                                            Signing Pathologist            



                                            Direct Phone Line:            



                                            [phone]           

 



                                            y signed by Homer Thomas MD on 2022            



                                            at  8:01 AMBlock A1 has            



                                            adequate tumor            



                                            cellularity for future            



                                            ancillary studies.88305 x           

 



                                            4, 66306, 30164 x 5A.            



                                            Large Intestine, Colon -            



                                            Transverse.Received in            



                                            formalin labeled with the           

 



                                            patient's name, medical            



                                            record number and "large            



                                            intestine-colon-transvers           

 



                                            e biopsy mass" and            



                                            consists of multiple            



                                            tan-pink, irregular soft            



                                            tissue fragments (2.4 x            



                                            2.1 x 0.3 cm in            



                                            aggregate).  The specimen           

 



                                            is submitted in toto in            



                                            A1.B. Polyp, Colon -            



                                            Left/Descending.Received            



                                            in formalin labeled with            



                                            the patient's name,            



                                            medical record number and           

 



                                            "polyp, colon-left            



                                            descending-taken by hot            



                                            snare" and consists of            



                                            multiple tan-pink,            



                                            irregular, ovoid soft            



                                            tissue fragment (2.4 x            



                                            2.1 x 0.4 cm in            



                                            aggregate).  The specimen           

 



                                            is submitted in toto in            



                                            B1.C. Polyp, Colon -            



                                            Left/Descending.Received            



                                            in formalin labeled with            



                                            the patient's name,            



                                            medical record number and           

 



                                            "polyp, colon-left            



                                            descending x3 taken by            



                                            hot snare" and consists            



                                            of multiple tan-yellow,            



                                            ovoid soft tissue            



                                            fragments (3.0 x 2.1 x            



                                            0.4 cm in aggregate).            



                                            The largest ovoid soft            



                                            tissue fragment is            



                                            bisected to show tan-pink           

 



                                            cut surface.  The            



                                            specimen is submitted            



                                            entirely in C1-C2.Ink            



                                            code:Blue-resection            



                                            marginSection code:C1:            



                                            Soft tissue fragmentsC2:            



                                            Ovoid soft tissue,            



                                            bisectedD. Polyp, Colon -           

 



                                            Sigmoid.Received in            



                                            formalin labeled with the           

 



                                            patient's name, medical            



                                            record number and "polyp,           

 



                                            colon-sigmoid x5 taken by           

 



                                            hot snare" and consists            



                                            of multiple tan-pink,            



                                            ovoid, pedunculated soft            



                                            tissue fragments (ranging           

 



                                            from 0.1 cm - 1.8 cm in            



                                            greatest dimension, 7.5 x           

 



                                            2.1 x 0.4 cm in            



                                            aggregate).  The largest            



                                            pedunculated soft tissue            



                                            fragment is quadrisected            



                                            to show tan-pink, focal            



                                            hemorrhagic cut surface.            



                                            The specimen is submitted           

 



                                            entirely in D1-D3.JESENIA Braswell            



                                            studentSynaptophysin and            



                                            chromogranin were            



                                            performed on part A and            



                                            were negative in tumor            



                                            cells (all stains are            



                                            with appropriate control            



                                            staining). MISMATCH            



                                            REPAIR (MMR) PROTEINS on            



                                            Part                  



                                            A:Immunohistochemistry            



                                            results:MSH-2: Intact            



                                            nuclear expressionMSH-6:            



                                            Intact nuclear            



                                            expressionMLH-1: Intact            



                                            nuclear expressionPMS-2:            



                                            Intact nuclear            



                                            expressionInterpretation:           

 



                                            No loss of nuclear            



                                            expression of MMR            



                                            proteins: low probability           

 



                                            of microsattelite            



                                            instability-high (MSI-H).           

 



                                            The interpretation of            



                                            this case included the            



                                            use of                



                                            immunohistochemistry or            



                                            special stains.Control            



                                            Slides Examined:            



                                            In-house known positive            



                                            controls were evaluated            



                                            along with the test            



                                            tissue.  These control            



                                            slides run alongside of            



                                            the patients sample show            



                                            appropriate staining.            



                                            Internal positive and            



                                            negative controls when            



                                            available are evaluated            



                                            Immunohistochemistry            



                                            technical testing was            



                                            performed at [facility],            



                                            Pathology Laboratory            



                                            where it was developed            



                                            and its performance            



                                            characteristics were            



                                            determined. It has not            



                                            been cleared or approved            



                                            by the U.S. Food and Drug           

 



                                            Administration. The FDA            



                                            has determined that such            



                                            clearance or approval is            



                                            not necessary. The test            



                                            is used for clinical            



                                            purposes. It should not            



                                            be regarded as            



                                            investigational or for            



                                            research. This laboratory           

 



                                            is certified under the            



                                            Clinical Laboratory            



                                            Improvement Amendments of           

 



                                            1988 (CLIA-88) as            



                                            qualified to perform high           

 



                                            complexity clinical            



                                            laboratory testing.[facility], Department of            



                                            Pathology, [address] [place], [place] [address], Tel            



                                            [phone] [facility],            



                                            Department of Pathology,            



                                            [address] [place] [address], Tel            



                                            [phone] [facility],            



                                            Department of Pathology,            



                                            [address] [place] [address], Tel            



                                            [phone]            









                    POCT-GLUCOSE METER  2022 13:15:36 









                      Test Item  Value      Reference Range Interpretation Comme

nts









             POC-GLUCOSE METER ([employer]) 121 mg/dL           H            :

 TESTED AT [address]



             (test code = 1538)                                        [facility]

X, [address]:



                                                                 /Techni

shelbi ID = [id number] for



                                                                 Brendan Fuller



POCT-GLUCOSE [id number] 09:02:57





             Test Item    Value        Reference Range Interpretation Comments

 

             POC-GLUCOSE METER 125 mg/dL           H            : TESTED A

[place] [address]



             ([facility]) (test code =                                        MARCIASHAMIKA FARNSWORTH [facility],



             1538)                                               [address]:



                                                                 /Techni

shelbi ID



                                                                 = [id number] for MAYANK OWUSU



SARS-COV2/RT-PCR ([facility])2022 07:59:31





             Test Item    Value        Reference Range Interpretation Comments

 

             SARS-COV2/RT-PCR Negative     Negative                  The SARS-Co

V-2 target



             (test code =                                        nucleic acids a

re not



             4292356)                                            detected in thi

s specimen.



                                                                 Negative result

s do not



                                                                 preclude SARS-C

oV-2



                                                                 infection and s

hould not be



                                                                 used as the sol

e basis for



                                                                 patient managem

ent



                                                                 decisions. Nega

tive results



                                                                 must be combine

d with



                                                                 clinical observ

ations,



                                                                 patient history

, and



                                                                 epidemiological

 information.



                                                                 A false negativ

e result may



                                                                 occur if a spec

imen is



                                                                 improperly pina

ected,



                                                                 transported or 

handled.



                                                                 This SARS CoV-2

 test is a



                                                                 rapid, real-roxanne

e RT-PCR test



                                                                 intended for th

e qualitative



                                                                 detection of nu

cleic acid



                                                                 from SARS-CoV-2

 in a



                                                                 nasopharyngeal 

swab specimen



                                                                 collected from 

individuals



                                                                 suspected of CO

VID-19 by



                                                                 their healthcar

e provider.



This test has been authorized by FDA under an EUA for use by authorized 
laboratories.  This test is only authorized for the duration of the declaration 
that circumstances exist justifying the authorization of emergency use of in 
vitro diagnostic tests for detection and/or diagnosis of COVID-19 under Section 
564(b)(1) of the Federal Food, Drug and Cosmetic Act, 21 U.S.C.   360bbb-
3(b)(1), unless the authorization is terminated or revoked sooner.   Fact Sheet 
for Healthcare Providers: https://www.[employer].Kenzei/Documents/Xpert%20Xpress%20SARS%20CoV-2/Fact%20Sheets/302-3802%20SARS-COV

-2%20HEALTHCARE%20PROVIDERS%20FACT%20SHEET.pdf Fact Sheet for Healthcare 
Patients: https://www.[employer]/Documents/Xpert
%20Xpress%20SARS%20CoV-2/Fact%20Sheets/302-3801%15DSVY-TTX-0%20PATIENT%20FACT%20

SHEET.pdfPOCT-GLUCOSE [id number] 08:23:46





             Test Item    Value        Reference Range Interpretation Comments

 

             POC-GLUCOSE METER 106 mg/dL                        : TESTED A

T [facility] [address]



             (BEAKER) (test code                                        [facility]

E BLDG A,



             = 1538)                                             [place] [address]

0:



                                                                 /Techni

shelbi ID =



                                                                 [id number] for RICHELLE DIMAS



[id number] 09:23:02





             Test Item    Value        Reference    Interpretation Comments



                                       Range                     

 

             CEA (test code              See_Commen   H             In otherwise

 healthy smokers, 95%



             = 2039-6)                 t                         of patients fal

l in the rangeof



                                                                 0.0-5.5 ng/mL. 

NOTE: Methodology is



                                                                 [employer]



                                                                 Electrochemilum

inescenceImmunoassay



                                                                 (ECLIA).       

  Unless Otherwise



                                                                 Indicated, All 

Testing Performed At:



                                                                        Clinical

 Pathology



                                                                 Laboratories, [address] [place] [address]       

 :



                                                                 Bonifacio negron M.D.        CLIA



                                                                 Number [id number]

03  [employer]



                                                                 No. [id number]  [

Automated message]



                                                                 The system [employer] generated this



                                                                 result transmit

froilan reference range:



                                                                 <=3.8 NG/ML. Th

e reference range was



                                                                 not used to int

erpret this result as



                                                                 normal/abnormal

.

 

             Lab          Abnormal                               



             Interpretation                                        



             (test code =                                        



             27909-8)                                            



[id number] 09:23:02





             Test Item    Value        Reference Range Interpretation Comments

 

             AFP-TUMOR MARKER              See_Comment                        Un

less Otherwise



             (test code = 25102-5)                                        [facility]

froilan, All Testing



                                                                 Performed At:



                                                                 [facility], [address] [place] [address]

 [address]



                                                                  Laboratory Dir

paulina: JUJU Scanlon



                                                                 CLIA Number [id number]

[id number]  [employer]



                                                                 Accreditation N

o. [id number]



                                                                 [Automated mess

age] The



                                                                 system which ge

nerated this



                                                                 result transmit

froilan



                                                                 reference range

: <=8.30



                                                                 NG/ML. The refe

rence range



                                                                 was not used to

 interpret



                                                                 this result as



                                                                 normal/abnormal

.



[id number] 09:23:02





             Test Item    Value        Reference    Interpretation Comments



                                       Range                     

 

             CA 19-9 (test code >19753       See_Comment  H                NOTE:

 Methodology is



             = 24108-3)                                          [employer]



                                                                 Electrochemilum

inescence



                                                                 Immunoassay (EC

PIETER).  Values



                                                                 obtained with d

ifferent



                                                                 assays/manufact

urers cannot



                                                                 be used interch

angeably.



                                                                 Results should 

not be used



                                                                 as sole basis t

o establish



                                                                 the   presence 

or absence of



                                                                 malignancy.    

      Unless



                                                                 Otherwise Indic

ated, All



                                                                 Testing Perform

ed At:



                                                                 [facility] [employer], [address]

[address] [place] [address]

4



                                                                 Laboratory Dire

ctor: Bonifacio Carey M.D.

        CLIA



                                                                 Number [id number]

03  Cap



                                                                 Accreditation N

o. [id number]



                                                                 [Automated mess

age] The



                                                                 system which ge

nerated this



                                                                 result transmit

froilan reference



                                                                 range: <35 U/ML

. The



                                                                 reference range

 was not used



                                                                 to interpret th

is result as



                                                                 normal/abnormal

.

 

             Lab Interpretation Abnormal                               



             (test code =                                        



             53910-6)                                            



[facility] [id number] 08:41:12





             Test Item    Value        Reference Range Interpretation Comments

 

             GLUCOSE (test code =              See_Comment  H             [Autom

ated message]



             2345-7)                                             The system whic

h



                                                                 generated this 

result



                                                                 transmitted ref

erence



                                                                 range: 70 - 99 

MG/DL.



                                                                 The reference r

veena



                                                                 was not used to



                                                                 interpret this 

result



                                                                 as normal/abnor

mal.

 

             BLOOD UREA NITROGEN              See_Comment                [Automa

froilan message]



             (test code = 3091-6)                                        The sys

tem which



                                                                 generated this 

result



                                                                 transmitted ref

erence



                                                                 range: 8 - 23 M

G/DL.



                                                                 The reference r

veena



                                                                 was not used to



                                                                 interpret this 

result



                                                                 as normal/abnor

mal.

 

             CREATININE (test code =              See_Comment                [Au

tomated message]



             2160-0)                                             The system [facility]



                                                                 generated this 

result



                                                                 transmitted ref

erence



                                                                 range: 0.80 - 1

.40



                                                                 MG/DL. The refe

rence



                                                                 range was not u

sed to



                                                                 interpret this 

result



                                                                 as normal/abnor

mal.

 

             EGFR (test code =              See_Comment  L             [Automate

d message]



             33914-3)                                            The system [facility]



                                                                 generated this 

result



                                                                 transmitted ref

erence



                                                                 range: >60



                                                                 ML/MIN/1.73. Th

e



                                                                 reference range

 was



                                                                 not used to int

erpret



                                                                 this result as



                                                                 normal/abnormal

.

 

             BUN/CREAT RATIO (test              See_Comment                [Auto

mated message]



             code = 3097-3)                                        The system [facility]



                                                                 generated this 

result



                                                                 transmitted ref

erence



                                                                 range: 6 - 28 R

ATIO.



                                                                 The reference r

veena



                                                                 was not used to



                                                                 interpret this 

result



                                                                 as normal/abnor

mal.

 

             SODIUM (test code =              See_Comment                [Automa

froilan message]



             6911-2)                                             The system [facility]



                                                                 generated this 

result



                                                                 transmitted ref

erence



                                                                 range: 133 - 14

6



                                                                 MEQ/L. The refe

rence



                                                                 range was not u

sed to



                                                                 interpret this 

result



                                                                 as normal/abnor

mal.

 

             POTASSIUM (test code =              See_Comment                [Aut

omated message]



             5653-3)                                             The system [employer]





                                                                 generated this 

result



                                                                 transmitted ref

erence



                                                                 range: 3.5 - 5.

4



                                                                 MEQ/L. The refe

rence



                                                                 range was not u

sed to



                                                                 interpret this 

result



                                                                 as normal/abnor

mal.

 

             CHLORIDE (test code =              See_Comment                [Auto

mated message]



             5925-0)                                             The system [facility]



                                                                 generated this 

result



                                                                 transmitted ref

erence



                                                                 range: 95 - 107

 MEQ/L.



                                                                 The reference r

veena



                                                                 was not used to



                                                                 interpret this 

result



                                                                 as normal/abnor

mal.

 

             CO2 (test code =              See_Comment                [Automated

 message]



             1963-8)                                             The system [facility]



                                                                 generated this 

result



                                                                 transmitted ref

erence



                                                                 range: 19 - 31 

MEQ/L.



                                                                 The reference r

veena



                                                                 was not used to



                                                                 interpret this 

result



                                                                 as normal/abnor

mal.

 

             CALCIUM (test code =              See_Comment                [Autom

ated message]



             39435-5)                                            The system [facility]



                                                                 generated this 

result



                                                                 transmitted ref

erence



                                                                 range: 8.5 - 10

.5



                                                                 MG/DL. The refe

rence



                                                                 range was not u

sed to



                                                                 interpret this 

result



                                                                 as normal/abnor

mal.

 

             PROTEIN TOTAL (test              See_Comment                [Automa

froilan message]



             code = 2885-2)                                        The system 

[employer]



                                                                 generated this 

result



                                                                 transmitted ref

erence



                                                                 range: 6.1 - 8.

3 G/DL.



                                                                 The reference r

veena



                                                                 was not used to



                                                                 interpret this 

result



                                                                 as normal/abnor

mal.

 

             ALBUMIN (test code =              See_Comment                [Autom

ated message]



             89410-9)                                            The system [facility]

[employer]



                                                                 generated this 

result



                                                                 transmitted ref

erence



                                                                 range: 3.5 - 5.

2 G/DL.



                                                                 The reference r

veena



                                                                 was not used to



                                                                 interpret this 

result



                                                                 as normal/abnor

mal.

 

             GLOBULINS, SERUM, TOTAL              See_Comment                [Au

tomated message]



             (test code = 90832-9)                                        The sy

stem which



                                                                 generated this 

result



                                                                 transmitted ref

erence



                                                                 range: 1.9 - 3.

7 G/DL.



                                                                 The reference r

veena



                                                                 was not used to



                                                                 interpret this 

result



                                                                 as normal/abnor

mal.

 

             A/G RATIO (test code =              See_Comment                [Aut

omated message]



             1759-0)                                             The system [facility]

[employer]



                                                                 generated this 

result



                                                                 transmitted ref

erence



                                                                 range: 1.0 - 2.

6



                                                                 RATIO. The refe

rence



                                                                 range was not u

sed to



                                                                 interpret this 

result



                                                                 as normal/abnor

mal.

 

             BILIRUBIN TOTAL (test              See_Comment                [Auto

mated message]



             code = 1975-2)                                        The system 

[employer]



                                                                 generated this 

result



                                                                 transmitted ref

erence



                                                                 range: <=1.2 MG

/DL.



                                                                 The reference r

veena



                                                                 was not used to



                                                                 interpret this 

result



                                                                 as normal/abnor

mal.

 

             ALKALINE PHOSPHATASE 210 U/L             H            



             (test code = 6768-6)                                        

 

             AST (SGOT) (test code = 56 U/L       9-50         H            



             1920-8)                                             

 

             ALT (SGPT) (test code = 30 U/L       5-50                          

     Unless



             1744-2)                                             Otherwise Indic

ated,



                                                                 All Testing Per

formed



                                                                 At:        Clin

[facility]



                                                                 Pathology



                                                                 Laboratories, [address] [place] [address]



                                                                 [id number]



                                                                 Laboratory Dire

ctor:



                                                                 JOE FloresIA Num

nanci



                                                                 [id number]  [employer]



                                                                 Accreditation N

o.



                                                                 [id number]

 

             Lab Interpretation Abnormal                               



             (test code = 03136-2)                                        



[facility] W/AUTO DIFF WITH [id number] 07:37:08





             Test Item    Value        Reference Range Interpretation Comments

 

             WHITE BLOOD CELL COUNT              See_Comment                [Aut

omated message]



             (test code = 82571-1)                                        The sy

stem which



                                                                 generated this 

result



                                                                 transmitted ref

erence



                                                                 range: 3.5 - 11

.0



                                                                 K/UL. The refer

ence



                                                                 range was not u

sed to



                                                                 interpret this 

result



                                                                 as normal/abnor

mal.

 

             RED BLOOD CELL COUNT              See_Comment                [Autom

ated message]



             (test code = 37795-3)                                        The sy

stem which



                                                                 generated this 

result



                                                                 transmitted ref

erence



                                                                 range: 4.50 - 6

.10



                                                                 M/UL. The refer

ence



                                                                 range was not u

sed to



                                                                 interpret this 

result



                                                                 as normal/abnor

mal.

 

             HEMOGLOBIN (test code =              See_Comment                [Au

tomated message]



             718-7)                                              The system whic

h



                                                                 generated this 

result



                                                                 transmitted ref

erence



                                                                 range: 13.5 - 1

7.0



                                                                 G/DL. The refer

ence



                                                                 range was not u

sed to



                                                                 interpret this 

result



                                                                 as normal/abnor

mal.

 

             HEMATOCRIT (test code = 40.7 %       40.0-51.0                 



             88525-1)                                            

 

             MEAN CORPUSCULAR VOLUME 82.7 fL      80.0-99.0                 



             (test code = 13265-8)                                        

 

             MEAN CORPUSCULAR 28.0 PG      25.0-33.0                 



             HEMOGLOBIN (test code =                                        



             30931-3)                                            

 

             MEAN CORPUSCULAR              See_Comment                [Automated

 message]



             HEMOGLOBIN CONC (test                                        The sy

stem which



             code = 28540-3)                                        generated th

is result



                                                                 transmitted ref

erence



                                                                 range: 31.0 - 3

6.0



                                                                 G/DL. The refer

ence



                                                                 range was not u

sed to



                                                                 interpret this 

result



                                                                 as normal/abnor

mal.

 

             RED CELL DISTRIBUTION 13.9 %       11.5-15.0                 



             WIDTH (test code =                                        



             62986-0)                                            

 

             NEUTROPHILS % (test 78.6 %                                 



             code = 13834-4)                                        

 

             LYMPHOCYTES % (test 7.7 %                                  



             code = 84879-7)                                        

 

             MONOCYTES % (test code 11.1 %                                 



             = 85951-0)                                          

 

             EOSINOPHILS % (test 1.4 %                                  



             code = 24570-0)                                        

 

             BASOPHILS % (test code 0.8 %                                  



             = 02424-9)                                          

 

             IMMATURE GRANULOCYTES 0.4 %                                  



             (test code = 00323-7)                                        

 

             NUCLEATED RBC'S              See_Comment                        Unl

ess



             MYELOPEROX STAIN (test                                        Other

wise Indicated,



             code = 73322-1)                                        All Testing 

Performed



                                                                 At:        [facility] [address]



                                                                 Pathology



                                                                 Laboratories, [address] [place] [address] [place]



                                                                 [id number]



                                                                 Laboratory Dire

ctor:



                                                                 Bonifacio negron M.D.



                                                                        CLIA Num

nanci



                                                                 [id number]  [employer]



                                                                 Accreditation N

o.



                                                                 [id number]  [Auto

mated



                                                                 message] The sy

stem



                                                                 which generated

 this



                                                                 result transmit

froilan



                                                                 reference range

: 0.00



                                                                 - 0.11 K/UL. Th

e



                                                                 reference range

 was



                                                                 not used to int

erpret



                                                                 this result as



                                                                 normal/abnormal

.

 

             PLATELET COUNT (test              See_Comment                [Autom

ated message]



             code = 05652-0)                                        The system w

hich



                                                                 generated this 

result



                                                                 transmitted ref

erence



                                                                 range: 130 - 40

0 K/UL.



                                                                 The reference r

veena



                                                                 was not used to



                                                                 interpret this 

result



                                                                 as normal/abnor

mal.

 

             NEUTROPHILS ABSOLUTE              See_Comment  H             [Autom

ated message]



             COUNT (test code =                                        The syste

m which



             29338-4)                                            generated this 

result



                                                                 transmitted ref

erence



                                                                 range: 1.50 - 7

.50



                                                                 K/UL. The refer

ence



                                                                 range was not u

sed to



                                                                 interpret this 

result



                                                                 as normal/abnor

mal.

 

             LYMPHOCYTES ABSOLUTE              See_Comment  L             [Autom

ated message]



             COUNT (test code =                                        The syste

m which



             50296-9)                                            generated this 

result



                                                                 transmitted ref

erence



                                                                 range: 1.00 - 4

.00



                                                                 K/UL. The refer

ence



                                                                 range was not u

sed to



                                                                 interpret this 

result



                                                                 as normal/abnor

mal.

 

             MONOCYTES ABSOLUTE              See_Comment  H             [Automat

ed message]



             COUNT (test code =                                        The syste

m which



             42780-3)                                            generated this 

result



                                                                 transmitted ref

erence



                                                                 range: 0.20 - 1

.00



                                                                 K/UL. The refer

ence



                                                                 range was not u

sed to



                                                                 interpret this 

result



                                                                 as normal/abnor

mal.

 

             BASOPHILS ABSOLUTE              See_Comment                [Automat

ed message]



             COUNT (test code =                                        The syste

m which



             10188-5)                                            generated this 

result



                                                                 transmitted ref

erence



                                                                 range: 0.00 - 0

.20



                                                                 K/UL. The refer

ence



                                                                 range was not u

sed to



                                                                 interpret this 

result



                                                                 as normal/abnor

mal.

 

             IMMATURE GRANS (ABS)              See_Comment                [Autom

ated message]



             (test code = 9987)                                        The syste

m which



                                                                 generated this 

result



                                                                 transmitted ref

erence



                                                                 range: 0.00 - 0

.10



                                                                 K/UL. The refer

ence



                                                                 range was not u

sed to



                                                                 interpret this 

result



                                                                 as normal/abnor

mal.

 

             Lab Interpretation Abnormal                               



             (test code = 17818-5)                                        



[facility](CELLAVISION MANUAL DIFF)2019 09:14:00





             Test Item    Value        Reference Range Interpretation Comments

 

             NEUTROPHILS - REL 81 %                                   



             (CELLAVISION)(BEAKER) (test code =                                 

       



             2816)                                               

 

             LYMPHOCYTES - REL 7 %                                    



             (CELLAVISION)(BEAKER) (test code =                                 

       



             2817)                                               

 

             MONOCYTES - REL 7 %                                    



             (CELLAVISION)(BEAKER) (test code =                                 

       



             2818)                                               

 

             EOSINOPHILS - REL 2 %                                    



             (CELLAVISION)(BEAKER) (test code =                                 

       



             2819)                                               

 

             MYELOCYTES - REL 2 %          0-0          H            



             (CELLAVISION)(BEAKER) (test code =                                 

       



             2822)                                               

 

             ATYPICAL LYMPHOCYTES - REL 1 %          0-0          H            



             (CELLAVISION)(BEAKER) (test code =                                 

       



             2829)                                               

 

             NEUTROPHILS - ABS 10.85 K/ul   1.78-5.38    H            



             (CELLAVISION)(BEAKER) (test code =                                 

       



             2830)                                               

 

             LYMPHOCYTES - ABS 0.94 K/ul    1.32-3.57    L            



             (CELLAVISION)(BEAKER) (test code =                                 

       



             2831)                                               

 

             MONOCYTES - ABS 0.94 K/uL    0.30-0.82    H            



             (CELLAVISION)(BEAKER) (test code =                                 

       



             2832)                                               

 

             EOSINOPHILS - ABS 0.27 K/uL    0.04-0.54                 



             (CELLAVISION)(BEAKER) (test code =                                 

       



             2834)                                               

 

             MYELOCYTES-ABS 0.27 K/uL    0.00-0.00    H            



             (CELLAVISION)(BEAKER) (test code =                                 

       



             2837)                                               

 

             ATYPICAL LYMPHOCYTES - ABS 0.13 K/uL    0.00-0.00    H            



             (CELLAVISION)(BEAKER) (test code =                                 

       



             2858)                                               

 

             TOTAL COUNTED (BEAKER) (test code 100                              

      



             = 1351)                                             

 

             RBC MORPHOLOGY (BEAKER) (test code Normal                          

       



             = 762)                                              

 

             SMUDGE CELLS (BEAKER) (test code = Present                         

       



             1371)                                               

 

             GIANT PLATELETS (BEAKER) (test Present                             

   



             code = 313)                                         

 

             PLATELET CONCENTRATION Decreased                              



             (CELLAVISION)(BEAKER) (test code =                                 

       



             3438)                                               



Received comment: User comments: Slide comments:RAD, CHEST, 1 VIEW, NON DEPT
2019 08:25:00Reason for exam:-&gt;left effusionShould this be performed at
the bedside?-&gt;YesFINAL REPORT PATIENT ID:   [id number] Chest AP portable 
Comparison exam: 2019 History provided: Follow-up pleural effusion Left 
chest tube unchanged in place. No pneumothorax. No significant effusions are 
evident. Nonspecific coarsening of markings in the left lower lobe. PICC line in
good position. Signed: Alex Morris MDReport Verified Date/Time:  2019 
08:25:12 Reading Location: [facility]iology Reading Room        
Electronically signed by: ALEX MORRIS on 2019 08:25 AMCOMPREHENSIVE 
METABOLIC [id number] 06:40:00





             Test Item    Value        Reference Range Interpretation Comments

 

             TOTAL PROTEIN 4.8 gm/dL    6.0-8.3      L            



             (BEAKER) (test code =                                        



             770)                                                

 

             ALBUMIN (BEAKER) 2.4 g/dL     3.5-5.0      L            



             (test code = 1145)                                        

 

             ALKALINE PHOSPHATASE 141 U/L                          



             (BEAKER) (test code =                                        



             346)                                                

 

             BILIRUBIN TOTAL 3.9 mg/dL    0.2-1.2      H            



             (BEAKER) (test code =                                        



             377)                                                

 

             SODIUM (BEAKER) (test 123 meq/L    136-145      L            



             code = 381)                                         

 

             POTASSIUM (BEAKER) 3.6 meq/L    3.5-5.1                   



             (test code = 379)                                        

 

             CHLORIDE (BEAKER) 90 meq/L            L            



             (test code = 382)                                        

 

             CO2 (BEAKER) (test 29 meq/L     22-29                     



             code = 355)                                         

 

             BLOOD UREA NITROGEN 14 mg/dL     7-21                      



             (BEAKER) (test code =                                        



             354)                                                

 

             CREATININE (BEAKER) 0.80 mg/dL   0.57-1.25                 



             (test code = 358)                                        

 

             GLUCOSE RANDOM 152 mg/dL           H            



             (BEAKER) (test code =                                        



             652)                                                

 

             CALCIUM (BEAKER) 7.3 mg/dL    8.4-10.2     L            



             (test code = 697)                                        

 

             AST (SGOT) (BEAKER) 39 U/L       5-34         H            



             (test code = 353)                                        

 

             ALT (SGPT) (BEAKER) 23 U/L       6-55                      



             (test code = 347)                                        

 

             EGFR (BEAKER) (test 99 mL/min/1.73                           ESTIMA

FROILAN GFR IS



             code = 1092) sq m                                   NOT AS ACCURATE

 AS



                                                                 CREATININE



                                                                 CLEARANCE IN



                                                                 PREDICTING



                                                                 GLOMERULAR



                                                                 FILTRATION RATE

.



                                                                 ESTIMATED GFR I

S



                                                                 NOT APPLICABLE 

FOR



                                                                 DIALYSIS PATIEN

TS.



Specimen slightly [id number] 06:39:00





             Test Item    Value        Reference Range Interpretation Comments

 

             PHOSPHORUS (BEAKER) (test code = 2.1 mg/dL    2.3-4.7      L       

     



             604)                                                



[id number] 06:39:00





             Test Item    Value        Reference Range Interpretation Comments

 

             MAGNESIUM (BEAKER) (test code = 1.8 mg/dL    1.6-2.6               

    



             627)                                                



B-TYPE NATRIURETIC FACTOR (BNP)2019 06:04:00





             Test Item    Value        Reference Range Interpretation Comments

 

             B-TYPE NATRIURETIC PEPTIDE (BEAKER) 97 pg/mL     0-100             

        



             (test code = 700)                                        



CBC W/PLT COUNT &amp; AUTO [id number] 05:57:00





             Test Item    Value        Reference Range Interpretation Comments

 

             WHITE BLOOD CELL COUNT (BEAKER) 13.4 K/ L    3.5-10.5     H        

    



             (test code = 775)                                        

 

             RED BLOOD CELL COUNT (BEAKER) 3.17 M/ L    4.63-6.08    L          

  



             (test code = 761)                                        

 

             HEMOGLOBIN (BEAKER) (test code = 10.3 GM/DL   13.7-17.5    L       

     



             410)                                                

 

             HEMATOCRIT (BEAKER) (test code = 29.4 %       40.1-51.0    L       

     



             411)                                                

 

             MEAN CORPUSCULAR VOLUME (BEAKER) 92.7 fL      79.0-92.2    H       

     



             (test code = 753)                                        

 

             MEAN CORPUSCULAR HEMOGLOBIN 32.5 pg      25.7-32.2    H            



             (BEAKER) (test code = 751)                                        

 

             MEAN CORPUSCULAR HEMOGLOBIN CONC 35.0 GM/DL   32.3-36.5            

     



             (BEAKER) (test code = 752)                                        

 

             RED CELL DISTRIBUTION WIDTH 13.9 %       11.6-14.4                 



             (BEAKER) (test code = 412)                                        

 

             PLATELET COUNT (BEAKER) (test code 75 K/CU MM   150-450      L     

       



             = 756)                                              

 

             MEAN PLATELET VOLUME (BEAKER) 8.9 fL       9.4-12.4     L          

  



             (test code = 754)                                        

 

             NUCLEATED RED BLOOD CELLS (BEAKER) 0 /100 WBC   0-0                

       



             (test code = 413)                                        



CALCIUM, [id number] 05:44:00





             Test Item    Value        Reference Range Interpretation Comments

 

             CALCIUM IONIZED (BEAKER) (test 0.97 mmol/L  1.12-1.27    L         

   



             code = 698)                                         

 

             PH, BLOOD (BEAKER) (test code = 7.50                               

    



             1810)                                               



BODY FLUID CULTURE + GRAM [id number] 11:10:00





             Test Item    Value        Reference    Interpretation Comments



                                       Range                     

 

             CULTURE (BEAKER)                           A            <1+ Coagula

se



             (test code = 1095)                                        negative



                                                                 Staphylococcus

 

             CULTURE (BEAKER) COAGULASE NEGATIVE              A            <1+ P

seudomonas



             (test code = 1095) STAPHYLOCOCCUS                           aerugin

jennifer

 

             Clindamycin (test                           S            



             code = 10)                                          

 

             Erythromycin (test                           R            



             code = 4)                                           

 

             Linezolid (test code                           S            



             = 40)                                               

 

             Oxacillin (test code                           R            



             = 14)                                               

 

             Rifampin (test code =                           S            



             43)                                                 

 

             Tetracycline (test                           S            



             code = 2)                                           

 

             Trimethoprim +                           S            



             Sulfamethoxazole                                        



             (test code = 47)                                        

 

             Vancomycin (test code                           S            



             = 13)                                               

 

             GRAM STAIN RESULT 3+ WBCs                                



             (BEAKER) (test code =                                        



             1123)                                               

 

             GRAM STAIN RESULT <1+ gram positive                           



             (BEAKER) (test code = cocci in pairs and                           



             077254)      clusters                               



CBC W/PLT COUNT &amp; AUTO [id number] 09:58:00





             Test Item    Value        Reference Range Interpretation Comments

 

             WHITE BLOOD CELL COUNT (BEAKER) 10.7 K/ L    3.5-10.5     H        

    



             (test code = 775)                                        

 

             RED BLOOD CELL COUNT (BEAKER) 3.15 M/ L    4.63-6.08    L          

  



             (test code = 761)                                        

 

             HEMOGLOBIN (BEAKER) (test code = 10.2 GM/DL   13.7-17.5    L       

     



             410)                                                

 

             HEMATOCRIT (BEAKER) (test code = 28.9 %       40.1-51.0    L       

     



             411)                                                

 

             MEAN CORPUSCULAR VOLUME (BEAKER) 91.7 fL      79.0-92.2            

     



             (test code = 753)                                        

 

             MEAN CORPUSCULAR HEMOGLOBIN 32.4 pg      25.7-32.2    H            



             (BEAKER) (test code = 751)                                        

 

             MEAN CORPUSCULAR HEMOGLOBIN CONC 35.3 GM/DL   32.3-36.5            

     



             (BEAKER) (test code = 752)                                        

 

             RED CELL DISTRIBUTION WIDTH 13.7 %       11.6-14.4                 



             (BEAKER) (test code = 412)                                        

 

             PLATELET COUNT (BEAKER) (test code 57 K/CU MM   150-450      L     

       



             = 756)                                              

 

             MEAN PLATELET VOLUME (BEAKER) 8.8 fL       9.4-12.4     L          

  



             (test code = 754)                                        

 

             NUCLEATED RED BLOOD CELLS (BEAKER) 0 /100 WBC   0-0                

       



             (test code = 413)                                        



(CELLAVISION MANUAL DIFF)2019 09:58:00





             Test Item    Value        Reference Range Interpretation Comments

 

             NEUTROPHILS - REL 78 %                                   



             (CELLAVISION)(BEAKER) (test code =                                 

       



             2816)                                               

 

             LYMPHOCYTES - REL 5 %                                    



             (CELLAVISION)(BEAKER) (test code =                                 

       



             2817)                                               

 

             MONOCYTES - REL 9 %                                    



             (CELLAVISION)(BEAKER) (test code =                                 

       



             2818)                                               

 

             EOSINOPHILS - REL 2 %                                    



             (CELLAVISION)(BEAKER) (test code =                                 

       



             2819)                                               

 

             METAMYELOCYTES - REL 1 %          0-0          H            



             (CELLAVISION)(BEAKER) (test code =                                 

       



             2821)                                               

 

             BANDS - REL (CELLAVISION)(BEAKER) 4 %          0-10                

      



             (test code = 2826)                                        

 

             BLASTS - REL (CELLAVISION)(BEAKER) 1 %          0-0          H     

       



             (test code = 2827)                                        

 

             NEUTROPHILS - ABS 8.35 K/ul    1.78-5.38    H            



             (CELLAVISION)(BEAKER) (test code =                                 

       



             2830)                                               

 

             LYMPHOCYTES - ABS 0.54 K/ul    1.32-3.57    L            



             (CELLAVISION)(BEAKER) (test code =                                 

       



             2831)                                               

 

             MONOCYTES - ABS 0.96 K/uL    0.30-0.82    H            



             (CELLAVISION)(BEAKER) (test code =                                 

       



             2832)                                               

 

             EOSINOPHILS - ABS 0.21 K/uL    0.04-0.54                 



             (CELLAVISION)(BEAKER) (test code =                                 

       



             2834)                                               

 

             METAMYELOCYTES - ABS 0.11 K/uL    0.00-0.00    H            



             (CELLAVISION)(BEAKER) (test code =                                 

       



             2836)                                               

 

             BANDS - ABS (CELLAVISION)(BEAKER) 0.43 K/uL    0.00-0.80           

      



             (test code = 2840)                                        

 

             BLASTS - ABS (CELLAVISION)(BEAKER) 0.11 K/uL    0.00-0.00    H     

       



             (test code = 2845)                                        

 

             TOTAL COUNTED (BEAKER) (test code = 100                            

        



             1351)                                               

 

             WBC MORPHOLOGY (BEAKER) (test code Normal                          

       



             = 487)                                              

 

             PLT MORPHOLOGY (BEAKER) (test code Normal                          

       



             = 486)                                              

 

             ANISOCYTOSIS (BEAKER) (test code = 1+ few                          

       



             961)                                                

 

             POIKILOCYTES (BEAKER) (test code = 1+ few                          

       



             966)                                                

 

             OVALOCYTES (BEAKER) (test code = 1+ few                            

     



             477)                                                

 

             BASOPHILIC STIPPLING (BEAKER) (test Present                        

        



             code = 473)                                         

 

             ARTIFACT (CELLAVISION)(BEAKER) Present                             

   



             (test code = 3432)                                        

 

             PLATELET CONCENTRATION Decreased                              



             (CELLAVISION)(BEAKER) (test code =                                 

       



             3438)                                               



Received comment: User comments: Slide comments: WBC: SEGMENTED WITH TOXIC 
GRANULATIONS [id number] 08:30:00





             Test Item    Value        Reference Range Interpretation Comments

 

             PHOSPHORUS (BEAKER) (test code = 2.0 mg/dL    2.3-4.7      L       

     



             604)                                                



RAD, CHEST, 1 VIEW, NON [id number] 08:21:00Reason for exam:-&gt;left 
effusionShould this be performed at the bedside?-&gt;YesFINAL REPORT PATIENT ID:
  [id number]  TECHNIQUE: Frontal chest radiograph dated 2019. CLINICAL HI
STORY: Left effusion COMPARISON STUDY: Chest radiographs and chest CT both dated
2019  IMPRESSION:Right-sided PICC and left-sided chest tube are unchanged. 
No change in small left hydropneumothorax. Multiple rounded densities project 
over the left lower lobe of unclear etiology possibly somethingexternal to the 
patient. Cardiomediastinal silhouette is normal in size. No pulmonary edema. No 
fracture.  Signed: Ann Kingeport Verified Date/Time:  2019 
08:21:33 Reading Location:[facility] Reading Room  Electronically signed by: 
ANN KING MD on 2019 08:21 AMCOMPREHENSIVE METABOLIC PANEL
2019 05:34:00





             Test Item    Value        Reference Range Interpretation Comments

 

             TOTAL PROTEIN 4.9 gm/dL    6.0-8.3      L            



             (BEAKER) (test code =                                        



             770)                                                

 

             ALBUMIN (BEAKER) 2.7 g/dL     3.5-5.0      L            



             (test code = 1145)                                        

 

             ALKALINE PHOSPHATASE 137 U/L                          



             (BEAKER) (test code =                                        



             346)                                                

 

             BILIRUBIN TOTAL 5.5 mg/dL    0.2-1.2      H            



             (BEAKER) (test code =                                        



             377)                                                

 

             SODIUM (BEAKER) (test 123 meq/L    136-145      L            



             code = 381)                                         

 

             POTASSIUM (BEAKER) 3.9 meq/L    3.5-5.1                   



             (test code = 379)                                        

 

             CHLORIDE (BEAKER) 89 meq/L            L            



             (test code = 382)                                        

 

             CO2 (BEAKER) (test 31 meq/L     22-29        H            



             code = 355)                                         

 

             BLOOD UREA NITROGEN 15 mg/dL     7-21                      



             (BEAKER) (test code =                                        



             354)                                                

 

             CREATININE (BEAKER) 0.68 mg/dL   0.57-1.25                 



             (test code = 358)                                        

 

             GLUCOSE RANDOM 104 mg/dL                        



             (BEAKER) (test code =                                        



             652)                                                

 

             CALCIUM (BEAKER) 7.7 mg/dL    8.4-10.2     L            



             (test code = 697)                                        

 

             AST (SGOT) (BEAKER) 47 U/L       5-34         H            



             (test code = 353)                                        

 

             ALT (SGPT) (BEAKER) 25 U/L       6-55                      



             (test code = 347)                                        

 

             EGFR (BEAKER) (test 119                                    ESTIMATE

D GFR IS



             code = 1092) mL/min/1.73 sq                           NOT AS ACCURA

TE AS



                          m                                      CREATININE



                                                                 CLEARANCE IN



                                                                 PREDICTING



                                                                 GLOMERULAR



                                                                 FILTRATION RATE

.



                                                                 ESTIMATED GFR I

S



                                                                 NOT APPLICABLE 

FOR



                                                                 DIALYSIS PATIEN

TS.



Specimen moderately [id number] 05:04:00





             Test Item    Value        Reference Range Interpretation Comments

 

             MAGNESIUM (BEAKER) (test code = 1.8 mg/dL    1.6-2.6               

    



             627)                                                



CT, CHEST, WITH [id number] 15:37:00Reason for exam:-&gt;reevaluate 
pleural effusion and left lung abscessFINAL REPORT PATIENT ID:   [id number]  CT of
the Chest dated 2019 COMPARISON: 2019 CLINICAL INFORMATION: 
Pleural effusionreevaluate pleural effusion and left lung abscess Comment:  Axia
l images of the chest were obtained from thoracic inlet to the upper abdomen 
with intravenous contrast.     This exam was performed according to our 
departmental dose-optimization program, which includes automated exposure 
control, adjustment of the mA and/or kV according to patient size and/or use of
interactive reconstruction technique. Heart is normal in size.  There is small 
pericardial effusion.Great vessels are unremarkable. No adenopathy in the 
mediastinum or perihilar region. Trachea and mainstem bronchi are patent.  Trace
bilateral pleural effusion is present. There is interval significant decrease in
the amount of left pleural effusion. There is small amount of air present in the
left pleural space. The pigtail catheter is seen in the left pleural space. 
Atelectasis is seen in the lingula, right mid, and both lower lobes. Cavitary 
lesion is seen in the superior segment of the left lower lobe measuring 
approximately 2.8 x 3.3 cm. Visualized upper abdomen demonstrates cirrhotic 
liver with trace ascites. Spleen is minimally enlarged. Impression: 1. Interval 
decrease in the amount of left pleural effusion with residual left 
hydropneumothorax.2. Trace right pleural effusion.3. Cavitary lesion in the 
superior segment of the left lower lobe may represent abscess.4. Cirrhosis with 
splenomegaly and ascites. Signed: Lucita Nails Verified Date/Time:  
2019 15:37:27 Reading Location: [address] Body Reading Room      
Electronically signed by: LUCITA NAILS M.D. on 2019 03:37 PMELECTROLYTES
2019 13:41:00





             Test Item    Value        Reference Range Interpretation Comments

 

             SODIUM (BEAKER) (test code = 381) 119 meq/L    136-145      LL     

      

 

             POTASSIUM (BEAKER) (test code = 4.1 meq/L    3.5-5.1               

    



             379)                                                

 

             CHLORIDE (BEAKER) (test code = 382) 85 meq/L            L    

        

 

             CO2 (BEAKER) (test code = 355) 29 meq/L     22-29                  

   



Page [phone] with results. Thank you.CBC W/PLT COUNT &amp; AUTO 
[id number] 10:30:00





             Test Item    Value        Reference Range Interpretation Comments

 

             WHITE BLOOD CELL COUNT (BEAKER) 11.3 K/ L    3.5-10.5     H        

    



             (test code = 775)                                        

 

             RED BLOOD CELL COUNT (BEAKER) 3.25 M/ L    4.63-6.08    L          

  



             (test code = 761)                                        

 

             HEMOGLOBIN (BEAKER) (test code = 10.6 GM/DL   13.7-17.5    L       

     



             410)                                                

 

             HEMATOCRIT (BEAKER) (test code = 29.8 %       40.1-51.0    L       

     



             411)                                                

 

             MEAN CORPUSCULAR VOLUME (BEAKER) 91.7 fL      79.0-92.2            

     



             (test code = 753)                                        

 

             MEAN CORPUSCULAR HEMOGLOBIN 32.6 pg      25.7-32.2    H            



             (BEAKER) (test code = 751)                                        

 

             MEAN CORPUSCULAR HEMOGLOBIN CONC 35.6 GM/DL   32.3-36.5            

     



             (BEAKER) (test code = 752)                                        

 

             RED CELL DISTRIBUTION WIDTH 13.5 %       11.6-14.4                 



             (BEAKER) (test code = 412)                                        

 

             PLATELET COUNT (BEAKER) (test code 51 K/CU MM   150-450      L     

       



             = 756)                                              

 

             MEAN PLATELET VOLUME (BEAKER) 8.3 fL       9.4-12.4     L          

  



             (test code = 754)                                        

 

             NUCLEATED RED BLOOD CELLS (BEAKER) 0 /100 WBC   0-0                

       



             (test code = 413)                                        



(CELLAVISION MANUAL DIFF)2019 10:30:00





             Test Item    Value        Reference Range Interpretation Comments

 

             NEUTROPHILS - REL 82 %                                   



             (CELLAVISION)(BEAKER) (test code =                                 

       



             2816)                                               

 

             LYMPHOCYTES - REL 2 %                                    



             (CELLAVISION)(BEAKER) (test code =                                 

       



             2817)                                               

 

             MONOCYTES - REL 9 %                                    



             (CELLAVISION)(BEAKER) (test code =                                 

       



             2818)                                               

 

             METAMYELOCYTES - REL 2 %          0-0          H            



             (CELLAVISION)(BEAKER) (test code =                                 

       



             2821)                                               

 

             PROMYELOCYTES - REL 2 %          0-0          H            



             (CELLAVSION)(BEAKER) (test code =                                  

      



             2825)                                               

 

             BANDS - REL (CELLAVISION)(BEAKER) 2 %          0-10                

      



             (test code = 2826)                                        

 

             ATYPICAL LYMPHOCYTES - REL 1 %          0-0          H            



             (CELLAVISION)(BEAKER) (test code =                                 

       



             2829)                                               

 

             NEUTROPHILS - ABS 9.27 K/ul    1.78-5.38    H            



             (CELLAVISION)(BEAKER) (test code =                                 

       



             2830)                                               

 

             LYMPHOCYTES - ABS 0.23 K/ul    1.32-3.57    L            



             (CELLAVISION)(BEAKER) (test code =                                 

       



             2831)                                               

 

             MONOCYTES - ABS 1.02 K/uL    0.30-0.82    H            



             (CELLAVISION)(BEAKER) (test code =                                 

       



             2832)                                               

 

             METAMYELOCYTES - ABS 0.23 K/uL    0.00-0.00    H            



             (CELLAVISION)(BEAKER) (test code =                                 

       



             2836)                                               

 

             PROMYELOCYTES - ABS 0.23 K/uL    0.00-0.00    H            



             (CELLAVISION)(BEAKER) (test code =                                 

       



             2838)                                               

 

             BANDS - ABS (CELLAVISION)(BEAKER) 0.23 K/uL    0.00-0.80           

      



             (test code = 2840)                                        

 

             ATYPICAL LYMPHOCYTES - ABS 0.11 K/uL    0.00-0.00    H            



             (CELLAVISION)(BEAKER) (test code =                                 

       



             2858)                                               

 

             TOTAL COUNTED (BEAKER) (test code = 100                            

        



             1351)                                               

 

             WBC MORPHOLOGY (BEAKER) (test code Normal                          

       



             = 487)                                              

 

             LARGE PLT(BEAKER) (test code = Present                             

   



             2156)                                               

 

             BASOPHILIC STIPPLING (BEAKER) (test Present                        

        



             code = 473)                                         

 

             ARTIFACT (CELLAVISION)(BEAKER) Present                             

   



             (test code = 3432)                                        

 

             PLATELET CONCENTRATION Decreased                              



             (CELLAVISION)(BEAKER) (test code =                                 

       



             3438)                                               



Received comment: User comments: Slide comments: WBC: SEGMENTED WITH TOXIC 
GRANULATIONS PRESENTRAD, CHEST, 1 VIEW, NON [id number] 07:48:00Reason for 
exam:-&gt;left effusionShould this be performed at the bedside?-&gt;YesFINAL 
REPORT PATIENT ID:   [id number] RAD, CHEST, 1 VIEW, NON DEPT INDICATION: left 
effusion COMPARISON: Prior day's exam FINDINGS: Portable frontal view of the 
chest.   IMPRESSION:  Support Lines: PICC tip overlies the SVC.  Left pleural 
catheter is again noted. Lungs and pleura: Bibasilar airspace disease is 
unchanged.  Superimposed trace left effusion. Left apical pneumothorax is 
decreased in the interim.    Heart and mediastinum: Stable contours.  Additional
findings: None. Signed: Trina CrockerMDReport Verified Date/Time:  2019 
07:48:14 Reading Location: [facility] Radiology Reading Room  Electronically 
signed by: TRINA CROCKER MD on 2019 07:48 AMCALCIUM, IONIZED
2019 05:58:00





             Test Item    Value        Reference Range Interpretation Comments

 

             CALCIUM IONIZED (BEAKER) (test 0.96 mmol/L  1.12-1.27    L         

   



             code = 698)                                         

 

             PH, BLOOD (BEAKER) (test code = 7.53                               

    



             1810)                                               



COMPREHENSIVE METABOLIC [id number] 05:51:00





             Test Item    Value        Reference Range Interpretation Comments

 

             TOTAL PROTEIN 5.2 gm/dL    6.0-8.3      L            



             (BEAKER) (test code =                                        



             770)                                                

 

             ALBUMIN (BEAKER) 2.6 g/dL     3.5-5.0      L            



             (test code = 1145)                                        

 

             ALKALINE PHOSPHATASE 144 U/L                          



             (BEAKER) (test code =                                        



             346)                                                

 

             BILIRUBIN TOTAL 6.3 mg/dL    0.2-1.2      H            



             (BEAKER) (test code =                                        



             377)                                                

 

             SODIUM (BEAKER) (test 119 meq/L    136-145      LL           



             code = 381)                                         

 

             POTASSIUM (BEAKER) 4.2 meq/L    3.5-5.1                   



             (test code = 379)                                        

 

             CHLORIDE (BEAKER) 86 meq/L            L            



             (test code = 382)                                        

 

             CO2 (BEAKER) (test 26 meq/L     22-29                     



             code = 355)                                         

 

             BLOOD UREA NITROGEN 23 mg/dL     7-21         H            



             (BEAKER) (test code =                                        



             354)                                                

 

             CREATININE (BEAKER) 0.81 mg/dL   0.57-1.25                 



             (test code = 358)                                        

 

             GLUCOSE RANDOM 103 mg/dL                        



             (BEAKER) (test code =                                        



             652)                                                

 

             CALCIUM (BEAKER) 7.6 mg/dL    8.4-10.2     L            



             (test code = 697)                                        

 

             AST (SGOT) (BEAKER) 46 U/L       5-34         H            



             (test code = 353)                                        

 

             ALT (SGPT) (BEAKER) 21 U/L       6-55                      



             (test code = 347)                                        

 

             EGFR (BEAKER) (test 97 mL/min/1.73                           ESTIMA

FROILAN GFR IS



             code = 1092) sq m                                   NOT AS ACCURATE

 AS



                                                                 CREATININE



                                                                 CLEARANCE IN



                                                                 PREDICTING



                                                                 GLOMERULAR



                                                                 FILTRATION RATE

.



                                                                 ESTIMATED GFR I

S



                                                                 NOT APPLICABLE 

FOR



                                                                 DIALYSIS PATIEN

TS.



Specimen moderately [id number] 05:07:00





             Test Item    Value        Reference Range Interpretation Comments

 

             CORTISOL, TOTAL (BEAKER) (test code 9.6 ug/dL    3.7-19.4          

        



             = 8775)                                             



[id number] 04:57:00





             Test Item    Value        Reference Range Interpretation Comments

 

             PHOSPHORUS (BEAKER) (test code = 2.0 mg/dL    2.3-4.7      L       

     



             604)                                                



[id number] 04:57:00





             Test Item    Value        Reference Range Interpretation Comments

 

             MAGNESIUM (BEAKER) (test code = 1.7 mg/dL    1.6-2.6               

    



             627)                                                



[id number] 22:08:00





             Test Item    Value        Reference Range Interpretation Comments

 

             SODIUM (BEAKER) (test 119 meq/L    136-145      LL           



             code = 381)                                         

 

             POTASSIUM (BEAKER) 4.6 meq/L    3.5-5.1                   Specimen 

slightly



             (test code = 379)                                        hemolyzed

 

             CHLORIDE (BEAKER) 86 meq/L            L            



             (test code = 382)                                        

 

             CO2 (BEAKER) (test 27 meq/L     22-                     



             code = 355)                                         



Call K &gt; 5.5POCT-GLUCOSE [id number] 12:59:00





             Test Item    Value        Reference Range Interpretation Comments

 

             POC-GLUCOSE METER 95 mg/dL                         TESTED AT 

[facility] [address]



             (BEAKER) (test code =                                        [place] [address]



             1538)                                               



T4, [id number] 10:25:00





             Test Item    Value        Reference Range Interpretation Comments

 

             FREE T4 (BEAKER) (test code = 655) 0.65 ng/dL   0.70-1.48    L     

       



CBC W/PLT COUNT &amp; AUTO [id number] 10:21:00





             Test Item    Value        Reference Range Interpretation Comments

 

             WHITE BLOOD CELL COUNT (BEAKER) 11.8 K/ L    3.5-10.5     H        

    



             (test code = 775)                                        

 

             RED BLOOD CELL COUNT (BEAKER) 3.74 M/ L    4.63-6.08    L          

  



             (test code = 761)                                        

 

             HEMOGLOBIN (BEAKER) (test code = 12.2 GM/DL   13.7-17.5    L       

     



             410)                                                

 

             HEMATOCRIT (BEAKER) (test code = 33.9 %       40.1-51.0    L       

     



             411)                                                

 

             MEAN CORPUSCULAR VOLUME (BEAKER) 90.6 fL      79.0-92.2            

     



             (test code = 753)                                        

 

             MEAN CORPUSCULAR HEMOGLOBIN 32.6 pg      25.7-32.2    H            



             (BEAKER) (test code = 751)                                        

 

             MEAN CORPUSCULAR HEMOGLOBIN CONC 36.0 GM/DL   32.3-36.5            

     



             (BEAKER) (test code = 752)                                        

 

             RED CELL DISTRIBUTION WIDTH 13.7 %       11.6-14.4                 



             (BEAKER) (test code = 412)                                        

 

             PLATELET COUNT (BEAKER) (test code 79 K/CU MM   150-450      L     

       



             = 756)                                              

 

             MEAN PLATELET VOLUME (BEAKER) 8.9 fL       9.4-12.4     L          

  



             (test code = 754)                                        

 

             NUCLEATED RED BLOOD CELLS (BEAKER) 0 /100 WBC   0-0                

       



             (test code = 413)                                        



(CELLAVISION MANUAL DIFF)2019 10:21:00





             Test Item    Value        Reference Range Interpretation Comments

 

             NEUTROPHILS - REL 82 %                                   



             (CELLAVISION)(BEAKER) (test code =                                 

       



             2816)                                               

 

             LYMPHOCYTES - REL 4 %                                    



             (CELLAVISION)(BEAKER) (test code =                                 

       



             2817)                                               

 

             MONOCYTES - REL 13 %                                   



             (CELLAVISION)(BEAKER) (test code =                                 

       



             2818)                                               

 

             ATYPICAL LYMPHOCYTES - REL 1 %          0-0          H            



             (CELLAVISION)(BEAKER) (test code =                                 

       



             2829)                                               

 

             NEUTROPHILS - ABS 9.68 K/ul    1.78-5.38    H            



             (CELLAVISION)(BEAKER) (test code =                                 

       



             2830)                                               

 

             LYMPHOCYTES - ABS 0.47 K/ul    1.32-3.57    L            



             (CELLAVISION)(BEAKER) (test code =                                 

       



             2831)                                               

 

             MONOCYTES - ABS 1.53 K/uL    0.30-0.82    H            



             (CELLAVISION)(BEAKER) (test code =                                 

       



             2832)                                               

 

             ATYPICAL LYMPHOCYTES - ABS 0.12 K/uL    0.00-0.00    H            



             (CELLAVISION)(BEAKER) (test code =                                 

       



             2858)                                               

 

             TOTAL COUNTED (BEAKER) (test code = 100                            

        



             1351)                                               

 

             RBC MORPHOLOGY (BEAKER) (test code Normal                          

       



             = 762)                                              

 

             WBC MORPHOLOGY (BEAKER) (test code Normal                          

       



             = 487)                                              

 

             PLT MORPHOLOGY (BEAKER) (test code Normal                          

       



             = 486)                                              

 

             ARTIFACT (CELLAVISION)(BEAKER) Present                             

   



             (test code = 3432)                                        

 

             PLATELET CONCENTRATION Decreased                              



             (CELLAVISION)(BEAKER) (test code =                                 

       



             3438)                                               



Received comment: User comments: Slide comments:POCT-GLUCOSE [id number] 
09:58:00





             Test Item    Value        Reference Range Interpretation Comments

 

             POC-GLUCOSE METER 104 mg/dL                        TESTED AT 

[facility] [address]



             (BEAKER) (test code =                                        [address]



             [address])                                               [address]



RAD, CHEST, 1 VIEW, NON [id number] 09:33:00Reason for exam:-
&gt;effusionShould this be performed at the bedside?-&gt;YesFINAL REPORT PATIENT
ID:   [id number] Comparison: 2019 TECHNIQUE: Single view of the chest FINDING
S: Bilateral interstitial and airspace opacities are stable. Small left pleural 
thickening with adjacent airspace disease identified. A previous left-sided 
pneumothorax has decreased. Left pleural drainage catheters again noted. Right-
sided PICC line is stable. Signed: Ronaldo Mireles MDReport Verified Date/Time:  
2019 09:33:21 Reading Location: [facility] C0[facility] Transitional Reading Room    
 Electronically signed by: RONALDO MIRELES M.D. on 2019 09:33 AM
COMPREHENSIVE METABOLIC [id number] 08:59:00





             Test Item    Value        Reference Range Interpretation Comments

 

             TOTAL PROTEIN 5.4 gm/dL    6.0-8.3      L            Specimen sligh

tly



             (BEAKER) (test code =                                        hemoly

zed



             770)                                                

 

             ALBUMIN (BEAKER) 2.1 g/dL     3.5-5.0      L            Specimen sl

ightly



             (test code = 1145)                                        hemolyzed

 

             ALKALINE PHOSPHATASE 153 U/L             H            



             (BEAKER) (test code =                                        



             346)                                                

 

             BILIRUBIN TOTAL 5.0 mg/dL    0.2-1.2      H            Specimen sli

ghtly



             (BEAKER) (test code =                                        hemoly

zed



             377)                                                

 

             SODIUM (BEAKER) (test 117 meq/L    136-145      LL           



             code = 381)                                         

 

             POTASSIUM (BEAKER) 5.5 meq/L    3.5-5.1      H            Specimen 

slightly



             (test code = 379)                                        hemolyzed

 

             CHLORIDE (BEAKER) 84 meq/L            L            



             (test code = 382)                                        

 

             CO2 (BEAKER) (test 26 meq/L     22-29                     



             code = 355)                                         

 

             BLOOD UREA NITROGEN 32 mg/dL     7-21         H            



             (BEAKER) (test code =                                        



             354)                                                

 

             CREATININE (BEAKER) 0.85 mg/dL   0.57-1.25                 Specimen

 slightly



             (test code = 358)                                        hemolyzed

 

             GLUCOSE RANDOM 104 mg/dL                        



             (BEAKER) (test code =                                        



             652)                                                

 

             CALCIUM (BEAKER) 7.6 mg/dL    8.4-10.2     L            



             (test code = 697)                                        

 

             AST (SGOT) (BEAKER) 51 U/L       5-34         H            Specimen

 slightly



             (test code = 353)                                        hemolyzed

 

             ALT (SGPT) (BEAKER) 23 U/L       6-55                      Specimen

 slightly



             (test code = 347)                                        hemolyzed

 

             EGFR (BEAKER) (test 92 mL/min/1.73                           ESTIMA

FROILAN GFR IS



             code = 1092) sq m                                   NOT AS ACCURATE

 AS



                                                                 CREATININE



                                                                 CLEARANCE IN



                                                                 PREDICTING



                                                                 GLOMERULAR



                                                                 FILTRATION RATE

.



                                                                 ESTIMATED GFR I

S



                                                                 NOT APPLICABLE 

FOR



                                                                 DIALYSIS PATIEN

TS.



Specimen moderately [id number] 08:55:00





             Test Item    Value        Reference Range Interpretation Comments

 

             MAGNESIUM (BEAKER) 1.9 mg/dL    1.6-2.6                   Specimen 

slightly



             (test code = 627)                                        hemolyzed



[id number] 08:55:00





             Test Item    Value        Reference Range Interpretation Comments

 

             PHOSPHORUS (BEAKER) 2.5 mg/dL    2.3-4.7                   Specimen

 slightly



             (test code = 604)                                        hemolyzed



TSH/FREE T4 IF [id number] 08:39:00





             Test Item    Value        Reference Range Interpretation Comments

 

             THYROID STIMULATING HORMONE 8.07 uIU/mL  0.35-4.94    H            



             (BEAKER) (test code = 772)                                        



CALCIUM, [id number] 08:06:00





             Test Item    Value        Reference Range Interpretation Comments

 

             CALCIUM IONIZED (BEAKER) (test 1.00 mmol/L  1.12-1.27    L         

   



             code = 698)                                         

 

             PH, BLOOD (BEAKER) (test code = 7.48                               

    



             1810)                                               



[id number] 23:47:00





             Test Item    Value        Reference Range Interpretation Comments

 

             SODIUM (BEAKER) (test code = 381) 117 meq/L    136-145      LL     

      

 

             POTASSIUM (BEAKER) (test code = 5.3 meq/L    3.5-5.1      H        

    



             379)                                                

 

             CHLORIDE (BEAKER) (test code = 382) 84 meq/L            L    

        

 

             CO2 (BEAKER) (test code = 355) 27 meq/L     22-29                  

   



Call K &gt; [id number]-GLUCOSE [id number] 21:18:00





             Test Item    Value        Reference Range Interpretation Comments

 

             POC-GLUCOSE METER 145 mg/dL           H            TESTED AT 

[facility] [address]



             (BEAKER) (test code =                                        [address]



             [address])                                               [address]



[id number] 18:57:00





             Test Item    Value        Reference Range Interpretation Comments

 

             CORTISOL, TOTAL (BEAKER) (test 18.1 ug/dL   3.7-19.4               

   



             code = 2755)                                        



[id number] 18:34:00





             Test Item    Value        Reference Range Interpretation Comments

 

             SODIUM (BEAKER) (test code = 381) 116 meq/L    136-145      LL     

      

 

             POTASSIUM (BEAKER) (test code = 6.0 meq/L    3.5-5.1      HH       

    



             379)                                                

 

             CHLORIDE (BEAKER) (test code = 382) 82 meq/L            L    

        

 

             CO2 (BEAKER) (test code = 355) 30 meq/L     22-29        H         

   



Call [id number]-GLUCOSE [id number] 18:20:00





             Test Item    Value        Reference Range Interpretation Comments

 

             POC-GLUCOSE METER 141 mg/dL           H            TESTED AT 

[place] [address]



             [facility] (test [facility] =                                        [facility]



             [address])                                               [address]



POCT-GLUCOSE [id number] 13:00:00





             Test Item    Value        Reference Range Interpretation Comments

 

             POC-GLUCOSE METER 122 mg/dL           H            TESTED AT 

[place] [address]



             [facility] (test code =                                        [facility]



             1538)                                               [address]



CBC W/PLT COUNT &amp; AUTO [id number] 10:34:00





             Test Item    Value        Reference Range Interpretation Comments

 

             WHITE BLOOD CELL COUNT (BEAKER) 15.5 K/ L    3.5-10.5     H        

    



             (test code = 775)                                        

 

             RED BLOOD CELL COUNT (BEAKER) 4.04 M/ L    4.63-6.08    L          

  



             (test code = 761)                                        

 

             HEMOGLOBIN (BEAKER) (test code = 13.3 GM/DL   13.7-17.5    L       

     



             410)                                                

 

             HEMATOCRIT (BEAKER) (test code = 36.5 %       40.1-51.0    L       

     



             411)                                                

 

             MEAN CORPUSCULAR VOLUME (BEAKER) 90.3 fL      79.0-92.2            

     



             (test code = 753)                                        

 

             MEAN CORPUSCULAR HEMOGLOBIN 32.9 pg      25.7-32.2    H            



             (BEAKER) (test code = 751)                                        

 

             MEAN CORPUSCULAR HEMOGLOBIN CONC 36.4 GM/DL   32.3-36.5            

     



             (BEAKER) (test code = 752)                                        

 

             RED CELL DISTRIBUTION WIDTH 13.5 %       11.6-14.4                 



             (BEAKER) (test code = 412)                                        

 

             PLATELET COUNT (BEAKER) (test code 65 K/CU MM   150-450      L     

       



             = 756)                                              

 

             MEAN PLATELET VOLUME (BEAKER) 9.0 fL       9.4-12.4     L          

  



             (test code = 754)                                        

 

             NUCLEATED RED BLOOD CELLS (BEAKER) 0 /100 WBC   0-0                

       



             (test code = 413)                                        



(CELLAVISION MANUAL DIFF)2019 10:34:00





             Test Item    Value        Reference Range Interpretation Comments

 

             NEUTROPHILS - REL 78 %                                   



             (CELLAVISION)(BEAKER) (test code                                   

     



             = 2816)                                             

 

             LYMPHOCYTES - REL 4 %                                    



             (CELLAVISION)(BEAKER) (test code                                   

     



             = 2817)                                             

 

             MONOCYTES - REL 14 %                                   



             (CELLAVISION)(BEAKER) (test code                                   

     



             = 2818)                                             

 

             EOSINOPHILS - REL 2 %                                    



             (CELLAVISION)(BEAKER) (test code                                   

     



             = 2819)                                             

 

             BANDS - REL (CELLAVISION)(BEAKER) 2 %          0-10                

      



             (test code = 2826)                                        

 

             NEUTROPHILS - ABS 12.09 K/ul   1.78-5.38    H            



             (CELLAVISION)(BEAKER) (test code                                   

     



             = 2830)                                             

 

             LYMPHOCYTES - ABS 0.62 K/ul    1.32-3.57    L            



             (CELLAVISION)(BEAKER) (test code                                   

     



             = 2831)                                             

 

             MONOCYTES - ABS 2.17 K/uL    0.30-0.82    H            



             (CELLAVISION)(BEAKER) (test code                                   

     



             = 2832)                                             

 

             EOSINOPHILS - ABS 0.31 K/uL    0.04-0.54                 



             (CELLAVISION)(BEAKER) (test code                                   

     



             = 2834)                                             

 

             BANDS - ABS (CELLAVISION)(BEAKER) 0.31 K/uL    0.00-0.80           

      



             (test code = 2840)                                        

 

             TOTAL COUNTED (BEAKER) (test code 100                              

      



             = 1351)                                             

 

             WBC MORPHOLOGY (BEAKER) (test Normal                               

  



             code = 487)                                         

 

             PLT MORPHOLOGY (BEAKER) (test Normal                               

  



             code = 486)                                         

 

             POLYCHROMATOPHILLIC RBCS(BEAKER) 1+ few                            

     



             (test code = 478)                                        

 

             POIKILOCYTES (BEAKER) (test code 2+ moderate                       

     



             = 966)                                              

 

             KARISSA CELLS (BEAKER) (test code = 2+ moderate                       

     



             474)                                                

 

             BASOPHILIC STIPPLING (BEAKER) Present                              

  



             (test code = 473)                                        

 

             ARTIFACT (CELLAVISION)(BEAKER) Present                             

   



             (test code = 3432)                                        

 

             PLATELET CONCENTRATION Decreased                              



             (CELLAVISION)(BEAKER) (test code                                   

     



             = 3438)                                             



Received comment: User comments: Slide comments:RAD, CHEST, 1 VIEW, NON DEPT
2019 08:40:00Reason for exam:-&gt;left effusionShould this be performed at
the bedside?-&gt;YesFINAL REPORT PATIENT ID:   [id number] Portable chest. CLINICAL
HISTORY: left effusion. COMPARISON STUDY: Chest x-ray from yesterday. FINDINGS: 
The cardiac silhouette is unremarkable. The pulmonary parenchyma demonstrates 
increased interstitial markings with atelectatic changes in the lung bases. A 
left-sided pigtail catheter chest tube remains and there has been development of
a loculated small basilarpneumothorax. A right PICC line remains. Degenerative 
changes are noted. IMPRESSION: Development a small left-sided basilar 
pneumothorax. No other significant change. Signed: Beckie Martinez
rified Date/Time:  2019 08:40:30 Reading Location: [place] [address]X Ortho 
Consult Reading Room Electronically signed by: BECKIE MARTINEZ M.D. on 
2019 08:40 AMPOCT-GLUCOSE [id number] 08:39:00





             Test Item    Value        Reference Range Interpretation Comments

 

             POC-GLUCOSE METER 110 mg/dL                        TESTED AT 

[facility] [address]



             (BEAKER) (test code =                                        [address] [facility]



             [address])                                               [id number]



COMPREHENSIVE METABOLIC [id number] 06:32:00





             Test Item    Value        Reference Range Interpretation Comments

 

             TOTAL PROTEIN 5.5 gm/dL    6.0-8.3      L            



             (BEAKER) (test code =                                        



             770)                                                

 

             ALBUMIN (BEAKER) 1.9 g/dL     3.5-5.0      L            



             (test code = 1145)                                        

 

             ALKALINE PHOSPHATASE 134 U/L                          



             (BEAKER) (test code =                                        



             346)                                                

 

             BILIRUBIN TOTAL 4.4 mg/dL    0.2-1.2      H            



             (BEAKER) (test code =                                        



             377)                                                

 

             SODIUM (BEAKER) (test 116 meq/L    136-145      LL           



             code = 381)                                         

 

             POTASSIUM (BEAKER) 5.6 meq/L    3.5-5.1      H            



             (test code = 379)                                        

 

             CHLORIDE (BEAKER) 83 meq/L            L            



             (test code = 382)                                        

 

             CO2 (BEAKER) (test 27 meq/L     22-29                     



             code = 355)                                         

 

             BLOOD UREA NITROGEN 34 mg/dL     7-21         H            



             (BEAKER) (test code =                                        



             354)                                                

 

             CREATININE (BEAKER) 0.89 mg/dL   0.57-1.25                 



             (test code = 358)                                        

 

             GLUCOSE RANDOM 109 mg/dL           H            



             (BEAKER) (test code =                                        



             652)                                                

 

             CALCIUM (BEAKER) 7.4 mg/dL    8.4-10.2     L            



             (test code = 697)                                        

 

             AST (SGOT) (BEAKER) 31 U/L       5-34                      



             (test code = 353)                                        

 

             ALT (SGPT) (BEAKER) 18 U/L       6-55                      



             (test code = 347)                                        

 

             EGFR (BEAKER) (test 87 mL/min/1.73                           ESTIMA

FROILAN GFR IS



             code = 1092) sq m                                   NOT AS ACCURATE

 AS



                                                                 CREATININE



                                                                 CLEARANCE IN



                                                                 PREDICTING



                                                                 GLOMERULAR



                                                                 FILTRATION RATE

.



                                                                 ESTIMATED GFR I

S



                                                                 NOT APPLICABLE 

FOR



                                                                 DIALYSIS PATIEN

TS.



Specimen moderately ictericPOCT-GLUCOSE [id number] 21:12:00





             Test Item    Value        Reference Range Interpretation Comments

 

             POC-GLUCOSE METER 114 mg/dL           H            TESTED AT 

[facility] [address]



             ([facility]) (test code =                                        [facility]



             1538)                                               [address]



OSMOLALITY, [id number] 18:43:00





             Test Item    Value        Reference Range Interpretation Comments

 

             OSMOLALITY URINE (BEAKER) (test 694 mOsm/kg  40-1,400              

    



             code = 614)                                         



SODIUM, RANDOM [id number] 18:02:00





             Test Item    Value        Reference Range Interpretation Comments

 

             SODIUM URINE (BEAKER) (test code = < meq/L                         

       



             243)                                                



Reference Range: No NormalsPOCT-GLUCOSE [id number] 16:06:00





             Test Item    Value        Reference Range Interpretation Comments

 

             POC-GLUCOSE METER 145 mg/dL           H            TESTED AT 

[facility] [address]



             ([facility]) (test code =                                        [facility]



             [address]38)                                               [address]



RAD, ABDOMEN/KUB, 1 VIEW [id number] 12:51:00Reason for exam:-&gt;no BM in 13 
days. abdominal distension.  concern for ileusFINAL REPORT PATIENT ID:   
[id number] RAD, ABDOMEN/KUB, 1 VIEW AP CLINICAL INDICATION:  no BM in 13 days. 
abdominal distension.  concern for ileus   COMPARISON: None TECHNIQUE: 24 
radiographs of the abdomen. IMPRESSION:  The bowel gas pattern demonstrates 
gaseous distention without dilation. No pneumatosis. Appearance may reflect 
ileus. Excreted contrast is present in the urinary bladder. The regional s
keleton is intact. Signed: JR Reji, Kulwinder Zaidi Verified Date/Time:
 2019 12:51:21 Reading Location: [facility] Radiology Reading Room    
Electronically signed by: KULWINDER MENDOZA on 2019 12:51 PM
OSMOLALITY, [id number] 12:47:00





             Test Item    Value        Reference Range Interpretation Comments

 

             OSMOLALITY, SERUM (BEAKER) (test 258 mOsm/kg  275-295      L       

     



             code = 615)                                         



POCT-GLUCOSE [id number] 12:35:00





             Test Item    Value        Reference Range Interpretation Comments

 

             POC-GLUCOSE METER 93 mg/dL                         TESTED AT 

[facility] [address]



             (BEAKER) (test code =                                        [address] [facility] [address]



             1538)                                               



CBC W/PLT COUNT &amp; AUTO [id number] 10:10:00





             Test Item    Value        Reference Range Interpretation Comments

 

             WHITE BLOOD CELL COUNT (BEAKER) 19.5 K/ L    3.5-10.5     H        

    



             (test code = 775)                                        

 

             RED BLOOD CELL COUNT (BEAKER) 4.18 M/ L    4.63-6.08    L          

  



             (test code = 761)                                        

 

             HEMOGLOBIN (BEAKER) (test code = 13.3 GM/DL   13.7-17.5    L       

     



             410)                                                

 

             HEMATOCRIT (BEAKER) (test code = 37.8 %       40.1-51.0    L       

     



             411)                                                

 

             MEAN CORPUSCULAR VOLUME (BEAKER) 90.4 fL      79.0-92.2            

     



             (test code = 753)                                        

 

             MEAN CORPUSCULAR HEMOGLOBIN 31.8 pg      25.7-32.2                 



             (BEAKER) (test code = 751)                                        

 

             MEAN CORPUSCULAR HEMOGLOBIN CONC 35.2 GM/DL   32.3-36.5            

     



             (BEAKER) (test code = 752)                                        

 

             RED CELL DISTRIBUTION WIDTH 13.5 %       11.6-14.4                 



             (BEAKER) (test code = 412)                                        

 

             PLATELET COUNT (BEAKER) (test code 60 K/CU MM   150-450      L     

       



             = 756)                                              

 

             MEAN PLATELET VOLUME (BEAKER) 9.4 fL       9.4-12.4                

  



             (test code = 754)                                        

 

             NUCLEATED RED BLOOD CELLS (BEAKER) 0 /100 WBC   0-0                

       



             (test code = 413)                                        



(CELLAVISION MANUAL DIFF)2019 10:10:00





             Test Item    Value        Reference Range Interpretation Comments

 

             NEUTROPHILS - REL 86 %                                   



             (CELLAVISION)(BEAKER) (test code =                                 

       



             2816)                                               

 

             LYMPHOCYTES - REL 1 %                                    



             (CELLAVISION)(BEAKER) (test code =                                 

       



             2817)                                               

 

             MONOCYTES - REL 6 %                                    



             (CELLAVISION)(BEAKER) (test code =                                 

       



             2818)                                               

 

             EOSINOPHILS - REL 3 %                                    



             (CELLAVISION)(BEAKER) (test code =                                 

       



             2819)                                               

 

             BANDS - REL (CELLAVISION)(BEAKER) 3 %          0-10                

      



             (test code = 2826)                                        

 

             BLASTS - REL (CELLAVISION)(BEAKER) 1 %          0-0          H     

       



             (test code = 2827)                                        

 

             NEUTROPHILS - ABS 16.77 K/ul   1.78-5.38    H            



             (CELLAVISION)(BEAKER) (test code =                                 

       



             2830)                                               

 

             LYMPHOCYTES - ABS 0.20 K/ul    1.32-3.57    L            



             (CELLAVISION)(BEAKER) (test code =                                 

       



             2831)                                               

 

             MONOCYTES - ABS 1.17 K/uL    0.30-0.82    H            



             (CELLAVISION)(BEAKER) (test code =                                 

       



             2832)                                               

 

             EOSINOPHILS - ABS 0.59 K/uL    0.04-0.54    H            



             (CELLAVISION)(BEAKER) (test code =                                 

       



             2834)                                               

 

             BANDS - ABS (CELLAVISION)(BEAKER) 0.59 K/uL    0.00-0.80           

      



             (test code = 2840)                                        

 

             BLASTS - ABS (CELLAVISION)(BEAKER) 0.20 K/uL    0.00-0.00    H     

       



             (test code = 2845)                                        

 

             TOTAL COUNTED (BEAKER) (test code 100                              

      



             = 1351)                                             

 

             PLT MORPHOLOGY (BEAKER) (test code Normal                          

       



             = 486)                                              

 

             SMUDGE CELLS (BEAKER) (test code = Present                         

       



             1371)                                               

 

             POIKILOCYTES (BEAKER) (test code = 1+ few                          

       



             966)                                                

 

             PLATELET CONCENTRATION Decreased                              



             (CELLAVISION)(BEAKER) (test code =                                 

       



             3438)                                               



Received comment: User comments: Slide comments:RAD, CHEST, 1 VIEW, NON DEPT
2019 08:53:00Reason for exam:-&gt;left effusionShould this be performed at
the bedside?-&gt;YesFINAL REPORT PATIENT ID:   [id number] RAD, CHEST, 1 VIEW, NON 
DEPT INDICATION: left effusion COMPARISON: Prior day's exam FINDINGS: Portable 
frontal view of the chest.   IMPRESSION: Limited by patient positioning.Support 
Lines: Stable. Lungs and pleura: Interstitial congestion on the left slightly 
greater than the right and unchanged from prior. Small left effusion. No visible
pneumothorax.Heart and mediastinum: Stable contours. Additional findings: None. 
Signed: JR Mendoza Robert MDReport Verified Date/Time:  2019 
08:53:19 Reading Location: [facility] Radiology Reading Room    Electro
nically signed by: KULWINDER MENDOZA on 2019 08:53 AMPOCT-GLUCOSE 
[id number] 07:58:00





             Test Item    Value        Reference Range Interpretation Comments

 

             POC-GLUCOSE METER 95 mg/dL                         TESTED AT 

[facility] [address]



             ([facility]) (test code =                                        [address] [facility] [address]



             1538)                                               



COMPREHENSIVE METABOLIC [id number] 05:41:00





             Test Item    Value        Reference Range Interpretation Comments

 

             TOTAL PROTEIN 5.4 gm/dL    6.0-8.3      L            



             (BEAKER) (test code =                                        



             770)                                                

 

             ALBUMIN (BEAKER) 1.9 g/dL     3.5-5.0      L            



             (test code = 1145)                                        

 

             ALKALINE PHOSPHATASE 126 U/L                          



             (BEAKER) (test code =                                        



             346)                                                

 

             BILIRUBIN TOTAL 4.9 mg/dL    0.2-1.2      H            



             (BEAKER) (test code =                                        



             377)                                                

 

             SODIUM (BEAKER) (test 117 meq/L    136-145      LL           



             code = 381)                                         

 

             POTASSIUM (BEAKER) 5.3 meq/L    3.5-5.1      H            



             (test code = 379)                                        

 

             CHLORIDE (BEAKER) 83 meq/L            L            



             (test code = 382)                                        

 

             CO2 (BEAKER) (test 28 meq/L     22-29                     



             code = 355)                                         

 

             BLOOD UREA NITROGEN 29 mg/dL     7-21         H            



             (BEAKER) (test code =                                        



             354)                                                

 

             CREATININE (BEAKER) 0.90 mg/dL   0.57-1.25                 



             (test code = 358)                                        

 

             GLUCOSE RANDOM 99 mg/dL                         



             (BEAKER) (test code =                                        



             652)                                                

 

             CALCIUM ([facility]) 7.7 mg/dL    8.4-10.2     L            



             (test code = 697)                                        

 

             AST (SGOT) ([facility]) 28 U/L       5-34                      



             (test code = 353)                                        

 

             ALT (SGPT) ([facility]) 18 U/L       6-55                      



             (test code = 347)                                        

 

             EGFR ([facility]) (test 86 mL/min/1.73                           ESTIMA

FROILAN GFR IS



             code = 1092) sq m                                   NOT AS ACCURATE

 AS



                                                                 CREATININE



                                                                 CLEARANCE IN



                                                                 PREDICTING



                                                                 GLOMERULAR



                                                                 FILTRATION RATE

.



                                                                 ESTIMATED GFR I

S



                                                                 NOT APPLICABLE 

FOR



                                                                 DIALYSIS PATIEN

TS.



Specimen moderately ictericPOCT-GLUCOSE [id number] 21:08:00





             Test Item    Value        Reference Range Interpretation Comments

 

             POC-GLUCOSE METER 92 mg/dL                         TESTED AT 

[place] [address]



             ([facility]) (test code =                                        [address] [facility] [address]



             1538)                                               



RAD, CHEST, 1 VIEW, NON [id number] 17:40:00Reason for exam:-&gt;post chest 
tube placementFINAL REPORT PATIENT ID:   [id number] INDICATION: post chest tube 
placement TECHNIQUE: Chest radiograph, single view, portable technique. FINDINGS
/ IMPRESSION: Left pleural effusion has decreased in size, since 10:25 AM, after
pleural tube placement. No pneumothorax demonstrated. Right PICC line again n
oted terminating at the cavoatrial junction. Signed: Bertram Ordonez MDReport 
Verified Date/Time:  2019 17:40:02 Reading Location: [address] Consult 
Reading Room Electronically signed by: BERTRAM ORDONEZ M.D. on 
2019 05:40 PMPOCT-GLUCOSE [id number] 17:33:00





             Test Item    Value        Reference Range Interpretation Comments

 

             POC-GLUCOSE METER 116 mg/dL           H            TESTED AT 

[place] [address]



             ([facility] (test [facility] =                                        [facility]



             1538)                                               [address]



POCT-GLUCOSE [id number] 15:19:00





             Test Item    Value        Reference Range Interpretation Comments

 

             POC-GLUCOSE METER 85 mg/dL                         TESTED AT 

[place] [address]



             ([facility]) (test code =                                        [facility]

[place] [facility] [address]



             1538)                                               



CT, DRAINAGE, CHEST TUBE [id number] 14:49:00Reason for exam:-
&gt;loculated left pleural effusion, please place large bore pigtail if effusion
noted on CT scanAnesthesia:-&gt;NoneFINAL REPORT PATIENT ID:   [id number] 
PROCEDURE: CT-guided placement of a left pleural catheter. Dose modulation, 
iterative reconstruction, and/or weight-based adjustment of the mA/kV was 
utilized to reduce the radiation dose to as low as reasonably achievable. 
INDICATION: Loculated left pleural effusion. COMPARISON: CT from earlier today. 
SEDATION: Intravenous moderate sedation was administered by radiology nursing 
and monitored under the direction of the undersigned radiologist. The patient's 
vital signs were monitored throughout the procedure and recorded in the 
patient's medical record by radiology nursing. Total intraservice time of 
sedation was 25 minutes. MEDICATIONS: 0.5 mg Versed, 25 mcg fentanyl 
DESCRIPTION: After obtaining informed written consent, the patient was brought 
to the procedure room and placed in the supine position.  Preliminary CT scan 
revealed a large left pleural effusion. A clear path to the collection was 
identified. The overlying skin was prepped and draped in the usual, sterile 
fashion and local 2% lidocaine anesthesia was administered. Under CT guidance, a
19-gaugeneedle was placed. After placement of a wire and serial dilation, a 12 
Beninese catheter was placed into the pleural fluid. The catheter was affixed to 
the skin and connected to a vacuum container. 1000 mL of clear red fluid was 
aspirated. Samples were placed on this side table and no clotting was noted. 
Samples were sent for analysis. Post procedure scan showed decrease in size with
left effusion and no immediate complications. IMPRESSION: Successful placement 
of a 12 Beninese left chest tube. Signed: Vivien Aguilera MDRepNorth [facility] Verified Date/Time: 
2019 14:49:30 Reading Location: [facility] B1 C013Y CT Body Reading Room      
Electronically signed by: VIVIEN AGUILERA MD on 2019 02:49 PMCT, CHEST, 
WITH [id number] 13:11:00Reason for exam:-&gt;evaluate loculated left 
pleural effusion seen on xrayFINAL REPORT PATIENT ID:   [id number] TECHNIQUE: CT 
scan of the chest WITH intravenous contrast. Dose modulation, iterative 
reconstruction, and/or weight-based adjustment of the mA/kV was utilized to redu
ce the radiation dose to as low as reasonably achievable. INDICATION: evaluate 
loculated left pleural effusion seen on xrayevaluate loculated left pleural 
effusion seen on xray. COMPARISON: None.  FINDINGS:  LINES/TUBES: A right PICC 
has its tip at the superior cavoatrial junction. LUNGS AND AIRWAYS: Mild right 
basilar subsegmental atelectasis. There is an area of cavitation with a fluid 
fluid level in superior segment of the left lower lobe which measures 4 cm 
PLEURA: Trace right pleural effusion. HEART AND MEDIASTINUM: The visualized 
thyroid gland is normal. No significant mediastinal, hilar, or axillary 
lymphadenopathy. The heart and pericardium are within normal limits. Severe 
coronary arterial calcifications. SOFT TISSUES AND BONES: Bilateral 
gynecomastia. Old, healed right 10th and 12th ribfractures. Old, healed left 
10th rib fracture. UPPER ABDOMEN: Nodular, cirrhotic liver. Partially visualized
upper abdominal varices. A periumbilical vein is recanalized.  IMPRESSION: 
1.There is a large left sided loculated pleural effusion with a mildly thickened
pleura and some intermixed gas. This is concerning for an empyema. 2.The air-
fluid level with surrounding lung in the superior segment of the left lower lobe
is most likely a lung abscess. A cavitary lung nodule (either from malignancy or
fungal infection) is considered less likely. A follow-up chest CT is recommended
in three months to document decrease in size and exclude cavitary malignancy. 
3.Cirrhosis with findings of portal hypertension. Signed: Vivien Aguilera 
Verified Date/Time:  2019 13:11:18 Reading Location: [address] Body 
Reading Room      Electronically signed by: VIVIEN AGUILERA MD on 2019 
01:11 PMPOCT-GLUCOSE [id number] 11:22:00





             Test Item    Value        Reference Range Interpretation Comments

 

             POC-GLUCOSE METER 81 mg/dL                         TESTED AT 

[place] [address]



             ([facility]) (test code =                                        [address] [facility] [address]



             1538)                                               



RAD, CHEST, 1 VIEW, NON [id number] 10:59:00Reason for exam:-&gt;eval 
effusionShould this be performed at the bedside?-&gt;YesFINAL REPORT PATIENT ID:
  [id number] RAD, CHEST, 1 VIEW, NON DEPT INDICATION: eval effusion COMPARISON: 
Prior day's exam FINDINGS: Portable frontal view of the chest.   IMPRESSION: 
Limited by patient rotation.Support Lines: A right PICC terminates over the 
superior vena cava. Lungs and pleura: Large left effusion. Right costophrenic 
sulcus is excluded from view. No pneumothorax.Heart and mediastinum: U
nremarkable where visible.Additional findings: None. A CT evaluation is 
currently pending. Signed: JR Reji, Kulwinder Zaidi Verified Date/Time:
 2019 10:59:34 Reading Location: [facility] Radiology Reading Room    
Electronically signed by: KULWINDER MENDOZA on 2019 10:59 AM
COMPREHENSIVE METABOLIC [id number] 07:33:00





             Test Item    Value        Reference Range Interpretation Comments

 

             TOTAL PROTEIN 5.8 gm/dL    6.0-8.3      L            Specimen sligh

tly



             (BEAKER) (test code =                                        hemoly

zed



             770)                                                

 

             ALBUMIN (BEAKER) 2.0 g/dL     3.5-5.0      L            Specimen sl

ightly



             (test code = 1145)                                        hemolyzed

 

             ALKALINE PHOSPHATASE 130 U/L                          



             (BEAKER) (test code =                                        



             346)                                                

 

             BILIRUBIN TOTAL 4.6 mg/dL    0.2-1.2      H            Specimen sli

ghtly



             (BEAKER) (test code =                                        hemoly

zed



             377)                                                

 

             SODIUM (BEAKER) (test 117 meq/L    136-145      LL           



             code = 381)                                         

 

             POTASSIUM (BEAKER) 5.3 meq/L    3.5-5.1      H            Specimen 

slightly



             (test code = 379)                                        hemolyzed

 

             CHLORIDE (BEAKER) 84 meq/L            L            



             (test code = 382)                                        

 

             CO2 (BEAKER) (test 28 meq/L     22-29                     



             code = 355)                                         

 

             BLOOD UREA NITROGEN 22 mg/dL     7-21         H            



             (BEAKER) (test code =                                        



             354)                                                

 

             CREATININE (BEAKER) 0.86 mg/dL   0.57-1.25                 Specimen

 slightly



             (test code = 358)                                        hemolyzed

 

             GLUCOSE RANDOM 90 mg/dL                         



             (BEAKER) (test code =                                        



             652)                                                

 

             CALCIUM (BEAKER) 7.9 mg/dL    8.4-10.2     L            



             (test code = 697)                                        

 

             AST (SGOT) (BEAKER) 33 U/L       5-34                      Specimen

 slightly



             (test code = 353)                                        hemolyzed

 

             ALT (SGPT) (BEAKER) 20 U/L       6-55                      Specimen

 slightly



             (test code = 347)                                        hemolyzed

 

             EGFR (BEAKER) (test 91 mL/min/1.73                           ESTIMA

FROILAN GFR IS



             code = 1092) sq m                                   NOT AS ACCURATE

 AS



                                                                 CREATININE



                                                                 CLEARANCE IN



                                                                 PREDICTING



                                                                 GLOMERULAR



                                                                 FILTRATION RATE

.



                                                                 ESTIMATED GFR I

S



                                                                 NOT APPLICABLE 

FOR



                                                                 DIALYSIS PATIEN

TS.



Specimen moderately ictericPOCT-GLUCOSE [id number] 05:46:00





             Test Item    Value        Reference Range Interpretation Comments

 

             POC-GLUCOSE METER 93 mg/dL                         TESTED AT 

[facility] [address]



             ([facility]) (test code =                                        [place] [address]



             1538)                                               



PROTHROMBIN [id number] 05:30:00





             Test Item    Value        Reference Range Interpretation Comments

 

             PROTIME (BEAKER) (test code = 16.9 seconds 11.9-14.2    H          

  



             759)                                                

 

             INR (BEAKER) (test code = 370) 1.4          <=5.9                  

   



Effective 2019: PT Reference Range ChangeNew: 11.9-14.2  Previous: 11.7-
14.7RECOMMENDED COUMADIN/WARFARIN INR THERAPY RANGESSTANDARD DOSE: 2.0-3.0  
Includes: PROPHYLAXIS for venous thrombosis, systemic embolization; TREATMENT 
for venous thrombosis and/or pulmonary embolus.HIGH RISK: Target INR is2.5-3.5 
for patients wiht mechanical heart valves.CBC W/PLT COUNT &amp; AUTO 
[id number] 05:27:00





             Test Item    Value        Reference Range Interpretation Comments

 

             WHITE BLOOD CELL COUNT (BEAKER) 23.2 K/ L    3.5-10.5     H        

    



             (test code = 775)                                        

 

             RED BLOOD CELL COUNT (BEAKER) 4.75 M/ L    4.63-6.08               

  



             (test code = 761)                                        

 

             HEMOGLOBIN (BEAKER) (test code = 15.3 GM/DL   13.7-17.5            

     



             410)                                                

 

             HEMATOCRIT (BEAKER) (test code = 43.1 %       40.1-51.0            

     



             411)                                                

 

             MEAN CORPUSCULAR VOLUME (BEAKER) 90.7 fL      79.0-92.2            

     



             (test code = 753)                                        

 

             MEAN CORPUSCULAR HEMOGLOBIN 32.2 pg      25.7-32.2                 



             (BEAKER) (test code = 751)                                        

 

             MEAN CORPUSCULAR HEMOGLOBIN CONC 35.5 GM/DL   32.3-36.5            

     



             (BEAKER) (test code = 752)                                        

 

             RED CELL DISTRIBUTION WIDTH 13.3 %       11.6-14.4                 



             (BEAKER) (test code = 412)                                        

 

             PLATELET COUNT (BEAKER) (test code 87 K/CU MM   150-450      L     

       



             = 756)                                              

 

             MEAN PLATELET VOLUME (BEAKER) 8.9 fL       9.4-12.4     L          

  



             (test code = 754)                                        

 

             NUCLEATED RED BLOOD CELLS (BEAKER) 0 /100 WBC   0-0                

       



             (test code = 413)                                        

 

             NEUTROPHILS RELATIVE PERCENT 86 %                                  

 



             (BEAKER) (test code = 429)                                        

 

             LYMPHOCYTES RELATIVE PERCENT 3 %                                   

 



             (BEAKER) (test code = 430)                                        

 

             MONOCYTES RELATIVE PERCENT 9 %                                    



             (BEAKER) (test code = 431)                                        

 

             EOSINOPHILS RELATIVE PERCENT 0 %                                   

 



             (BEAKER) (test code = 432)                                        

 

             BASOPHILS RELATIVE PERCENT 0 %                                    



             (BEAKER) (test code = 437)                                        

 

             NEUTROPHILS ABSOLUTE COUNT 19.87 K/ L   1.78-5.38    H            



             (BEAKER) (test code = 670)                                        

 

             LYMPHOCYTES ABSOLUTE COUNT 0.73 K/ L    1.32-3.57    L            



             (BEAKER) (test code = 414)                                        

 

             MONOCYTES ABSOLUTE COUNT (BEAKER) 2.16 K/ L    0.30-0.82    H      

      



             (test code = 415)                                        

 

             EOSINOPHILS ABSOLUTE COUNT 0.06 K/ L    0.04-0.54                 



             (BEAKER) (test code = 416)                                        

 

             BASOPHILS ABSOLUTE COUNT (BEAKER) 0.04 K/ L    0.01-0.08           

      



             (test code = 417)                                        

 

             IMMATURE GRANULOCYTES-RELATIVE 1 %          0-1                    

   



             PERCENT (BEAKER) (test code =                                      

  



             2801)                                               



POCT-GLUCOSE [id number] 23:34:00





             Test Item    Value        Reference Range Interpretation Comments

 

             POC-GLUCOSE METER 94 mg/dL                         TESTED AT 

[facility] [address]



             [facility] (test code =                                        [address] [facility] [address]



             1538)

## 2022-05-15 NOTE — RAD REPORT
EXAM DESCRIPTION:  CT - Abdomen   Pelvis W Contrast - 5/15/2022 7:37 am

 

CLINICAL HISTORY:  Abdominal pain /vomitiing

 

COMPARISON:  April 2022

 

TECHNIQUE:  Computed axial tomography of the abdomen pelvis was obtained. 100 cc Isovue-300 was admin
istered intravenously. Oral contrast was not requested which limits evaluation of bowel.

 

All CT scans are performed using dose optimization technique as appropriate and may include automated
 exposure control or mA/KV adjustment according to patient size.

 

FINDINGS:  Extensive hepatic metastases without significant change. Cirrhosis. 15 millimeter pericard
iac lymph node unchanged. Pulmonary nodules unchanged

 

Small umbilical hernia

 

Mild splenomegaly. Varices.

 

Pancreas, adrenals and kidneys unremarkable

 

Moderate-to-marked abdominal lymphadenopathy without significant change. 2.2 centimeter right iliac l
ymph node without significant change.

 

Large umbilical hernia containing lymph nodes.

 

Wall thickening mid transverse colon neoplasm versus incomplete distention

 

Moderate amount stool within the colon. No obstruction.

 

Small amount of ascites.

 

 

IMPRESSION:  Hepatic, pulmonary metastases and abdominal and pelvic lymphadenopathy without significa
nt

 

No bowel obstruction

## 2022-05-15 NOTE — XMS REPORT
Clinical Summary

                             Created on:May 15, 2022



Patient:Omkar Chung

Sex:Male

:1960

External Reference #:[id number]





Demographics







                          Address                   [address]



                                                    [place] [address]

 

                          Mobile Phone              [phone]

 

                          Home Phone                [phone]

 

                          Email Address             [email or web address]

 

                          Preferred Language        English

 

                          Marital Status            

 

                          Jain Affiliation     Unknown

 

                          Race                      White

 

                          Ethnic Group              Not  or 









Author







                          Organization              [facility]

[facility]

 

                          Address                   [address] [place] [address]









Support







                Name            Relationship    Address         Phone

 

                Gabby Villatoro    Unavailable     Unavailable     [phone]









Care Team Providers







                    Name                Role                Phone

 

                    Moris Guajardo MD Unavailable         [phone]









Allergies

Not on File



Medications

Not on file



Active Problems

Not on file



Encounters







             Date         Type         Specialty    Care Team    Description

 

             2022   Travel                                 



after 05/15/2021



Social History







             Tobacco Use  Types        Packs/Day    Years Used   Date

 

             Never Assessed                                        









                          Sex Assigned at Birth     Date Recorded

 

                          Not on file               







Last Filed Vital Signs

Not on file



Plan of Treatment

Not on file



Results

Not on fileafter 05/15/2021



Insurance







          Payer     Benefit Plan / Subscriber ID Effective Dates Phone     Addre

ss   Type



                    Group                                             

 

           [employer] [id number]  Effective for            [address] 

[address]



                                        Medicare



          [facility] MEDICARE           all dates           [place], [place]  



                                                            [address]     









           Guarantor Name Account Type Relation to Date of Birth Phone      Bill

ing



                                 Patient                          Address

 

           Omkar Chung Personal/Family Self       1960 [phone] [address]



                                                       (Home)     [address]







                                                                  [place]



                                                                  [address]

 

           Omkar Chung Personal/Family Self       1960 [phone] [address]



                                                       (Home)     [place], [place]



                                                                  [address]







Care Teams







             Team Member  Relationship Specialty    Start Date   End Date

 

                                        Moris Guajardo MD



                PCP - External Referring General Surgery 3/11/22         



                                        [address]



                                                                



                                        [address]



                                                                



             [place], [place]                                        



                                        [address] [phone] (Work)



                                                                



             [phone] (Fax)

## 2022-05-16 VITALS — OXYGEN SATURATION: 97 %

## 2022-05-16 LAB
BLD SMEAR INTERP: (no result)
BUN BLD-MCNC: 21 MG/DL (ref 7–18)
GLUCOSE SERPLBLD-MCNC: 107 MG/DL (ref 74–106)
HCT VFR BLD CALC: 34.1 % (ref 39.6–49)
LYMPHOCYTES # SPEC AUTO: 0.8 K/UL (ref 0.7–4.9)
MORPHOLOGY BLD-IMP: (no result)
PMV BLD: 7.5 FL (ref 7.6–11.3)
POTASSIUM SERPL-SCNC: 4.3 MMOL/L (ref 3.5–5.1)
RBC # BLD: 3.93 M/UL (ref 4.33–5.43)

## 2022-05-16 RX ADMIN — SODIUM CHLORIDE SCH MLS: 0.9 INJECTION, SOLUTION INTRAVENOUS at 10:08

## 2022-05-16 RX ADMIN — METOPROLOL TARTRATE SCH MG: 25 TABLET ORAL at 21:31

## 2022-05-16 RX ADMIN — LEVALBUTEROL HYDROCHLORIDE PRN MG: 1.25 SOLUTION RESPIRATORY (INHALATION) at 09:20

## 2022-05-16 RX ADMIN — Medication SCH ML: at 09:00

## 2022-05-16 RX ADMIN — HUMAN INSULIN SCH: 100 INJECTION, SOLUTION SUBCUTANEOUS at 21:00

## 2022-05-16 RX ADMIN — LEVALBUTEROL HYDROCHLORIDE SCH: 1.25 SOLUTION RESPIRATORY (INHALATION) at 18:00

## 2022-05-16 RX ADMIN — HUMAN INSULIN SCH UNIT: 100 INJECTION, SOLUTION SUBCUTANEOUS at 12:43

## 2022-05-16 RX ADMIN — ENOXAPARIN SODIUM SCH MG: 40 INJECTION SUBCUTANEOUS at 10:08

## 2022-05-16 RX ADMIN — LEVALBUTEROL HYDROCHLORIDE PRN MG: 1.25 SOLUTION RESPIRATORY (INHALATION) at 17:20

## 2022-05-16 RX ADMIN — LACTULOSE SCH GM: 20 SOLUTION ORAL at 09:00

## 2022-05-16 RX ADMIN — LACTULOSE SCH GM: 20 SOLUTION ORAL at 14:00

## 2022-05-16 RX ADMIN — IPRATROPIUM BROMIDE SCH: 0.5 SOLUTION RESPIRATORY (INHALATION) at 18:00

## 2022-05-16 RX ADMIN — CEFTRIAXONE SCH MLS: 1 INJECTION, POWDER, FOR SOLUTION INTRAMUSCULAR; INTRAVENOUS at 10:09

## 2022-05-16 RX ADMIN — LACTULOSE SCH: 20 SOLUTION ORAL at 08:00

## 2022-05-16 RX ADMIN — HUMAN INSULIN SCH: 100 INJECTION, SOLUTION SUBCUTANEOUS at 07:30

## 2022-05-16 RX ADMIN — Medication SCH ML: at 21:00

## 2022-05-16 RX ADMIN — IPRATROPIUM BROMIDE SCH MG: 0.5 SOLUTION RESPIRATORY (INHALATION) at 19:30

## 2022-05-16 RX ADMIN — HUMAN INSULIN SCH UNIT: 100 INJECTION, SOLUTION SUBCUTANEOUS at 15:48

## 2022-05-16 RX ADMIN — LACTULOSE SCH GM: 20 SOLUTION ORAL at 21:00

## 2022-05-16 RX ADMIN — LEVALBUTEROL HYDROCHLORIDE SCH MG: 1.25 SOLUTION RESPIRATORY (INHALATION) at 19:30

## 2022-05-16 NOTE — RAD REPORT
EXAM DESCRIPTION:  MRI - Brain With Cont - 5/16/2022 2:57 pm

 

CLINICAL HISTORY:  brain mets

Headache, drowsiness

 

COMPARISON:  Head Brain Wo Cont dated 5/15/2022; Brain Wo Cont dated 7/16/2020

 

TECHNIQUE:  Multi-sequence, multiplanar MR imaging of the brain was performed with contrast.

 

FINDINGS:  No intracranial hemorrhage, hydrocephalus, or extra-axial fluid collection. No edema or sh
ift of midline structures. Mild generalized brain atrophy is present with periventricular and deep wh
ite matter chronic microvascular ischemic changes.No intracranial mass. DWI is negative for acute CVA
.

 

The midline structures are normally formed. Mastoid air cells and paranasal sinuses are clear.

 

Post-contrast images show no abnormal enhancement to suggest tumor or infection.

 

IMPRESSION:  No evidence of intracranial metastatic disease.

## 2022-05-17 VITALS — DIASTOLIC BLOOD PRESSURE: 67 MMHG | SYSTOLIC BLOOD PRESSURE: 115 MMHG

## 2022-05-17 VITALS — TEMPERATURE: 97.7 F

## 2022-05-17 LAB
ALBUMIN SERPL BCP-MCNC: 2.6 G/DL (ref 3.4–5)
ALP SERPL-CCNC: 215 U/L (ref 45–117)
ALT SERPL W P-5'-P-CCNC: 52 U/L (ref 12–78)
AST SERPL W P-5'-P-CCNC: 52 U/L (ref 15–37)
BUN BLD-MCNC: 17 MG/DL (ref 7–18)
GLUCOSE SERPLBLD-MCNC: 104 MG/DL (ref 74–106)
HCT VFR BLD CALC: 36.3 % (ref 39.6–49)
HDLC SERPL-MCNC: 71 MG/DL (ref 40–60)
IRON SERPL-MCNC: 32 UG/DL (ref 65–175)
LDLC SERPL CALC-MCNC: 61 MG/DL (ref ?–130)
LYMPHOCYTES # SPEC AUTO: 0.9 K/UL (ref 0.7–4.9)
NT-PROBNP SERPL-MCNC: 222 PG/ML (ref ?–125)
PMV BLD: 7.6 FL (ref 7.6–11.3)
POTASSIUM SERPL-SCNC: 4 MMOL/L (ref 3.5–5.1)
RBC # BLD: 4.17 M/UL (ref 4.33–5.43)

## 2022-05-17 RX ADMIN — CEFTRIAXONE SCH MLS: 1 INJECTION, POWDER, FOR SOLUTION INTRAMUSCULAR; INTRAVENOUS at 09:20

## 2022-05-17 RX ADMIN — Medication SCH ML: at 09:00

## 2022-05-17 RX ADMIN — IPRATROPIUM BROMIDE SCH MG: 0.5 SOLUTION RESPIRATORY (INHALATION) at 01:45

## 2022-05-17 RX ADMIN — LEVALBUTEROL HYDROCHLORIDE SCH MG: 1.25 SOLUTION RESPIRATORY (INHALATION) at 01:45

## 2022-05-17 RX ADMIN — SODIUM CHLORIDE SCH MLS: 0.9 INJECTION, SOLUTION INTRAVENOUS at 06:21

## 2022-05-17 RX ADMIN — METOPROLOL TARTRATE SCH MG: 25 TABLET ORAL at 09:18

## 2022-05-17 RX ADMIN — HUMAN INSULIN SCH: 100 INJECTION, SOLUTION SUBCUTANEOUS at 07:30

## 2022-05-17 RX ADMIN — LACTULOSE SCH GM: 20 SOLUTION ORAL at 09:19

## 2022-05-17 RX ADMIN — ENOXAPARIN SODIUM SCH MG: 40 INJECTION SUBCUTANEOUS at 09:19

## 2022-05-17 NOTE — P.PN
Subjective


Date of Service: 05/16/22


Subjective: No new changes, No C/O voiced, Improving





Review of Systems


10-point ROS is otherwise unremarkable





Physical Examination





- Vital Signs


Temperature: 97.7 F


Blood Pressure: 115/67


Pulse: 93


Respirations: 18


Pulse Ox (%): 98





- Physical Exam


General: Alert, In no apparent distress


HEENT: Atraumatic, PERRLA, EOMI


Neck: Supple, JVD not distended


Respiratory: Clear to auscultation bilaterally, Normal air movement


Cardiovascular: Regular rate/rhythm, Normal S1 S2


Gastrointestinal: Normal bowel sounds, No tenderness


Musculoskeletal: No tenderness


Integumentary: No rashes


Neurological: Normal speech, Normal tone, Normal affect


Lymphatics: No axilla or inguinal lymphadenopathy





- Studies


Microbiology Data (last 24 hrs): 








05/15/22 09:30   Clean Catch Urine   [place] Count - Final


                            No growth.


05/15/22 09:30   Clean Catch Urine    - Final


                            No growth.





Medications List Reviewed: Yes





Assessment & Plan





- Problems (Diagnosis)


(1) Altered mental status


Status: Acute   





(2) Hepatic encephalopathy


Status: Acute   





(3) Colon cancer metastasized to liver


Status: Acute   





- Plan





1.  Gentle hydration


2.  Panculture


3.  IV antibiotic prophylactically


4.  Imaging studies including MRI of the brain. 


5.  Monitor electrolytes


6.  Check thyroid studies as well as cortisol level


7.  Review medications


8.  Neurochecks every 4 hours


9.  GI DVT prophylaxis


Discharge Plan: Home


Plan to discharge in: Greater than 2 days





- Advance Directives


Does patient have a Living Will: No


Does patient have a Durable POA for Healthcare: No





- Code Status/Comfort Care


Code Status Assessed: Yes


Code Status: Full Code


Critical Care: No


Time Spent Managing PTS Care (In Minutes): 45

## 2022-05-17 NOTE — P.DS
Discharge Date: 05/17/22


Disposition: ROUTINE DISCHARGE


Discharge Condition: GOOD


Reason for Admission: UTI, hepatic encephalopathy





- Problems


(1) Altered mental status


Status: Acute   





(2) Hepatic encephalopathy


Status: Acute   





(3) Colon cancer metastasized to liver


Status: Acute   


Brief History of Present Illness: 





Patient is a 61-year-old gentleman who came to the hospital with altered mental 

status and was found to have hepatic encephalopathy from liver metastasis from 

the colon.  Patient was given lactulose and clinical symptoms are improved.


Hospital Course: 





MRI was unremarkable.  Patient is doing much better.  Patient does have a colon 

cancer with diffuse metastasis.  Patient will be admitted patient was treated 

with IV antibiotic therapy blood cultures are negative.  At this time patient 

will need to continue with lactulose and follow-up with oncology.


Vital Signs/Physical Exam: 














Temp Pulse Resp BP Pulse Ox


 


 97.7 F   93 H  18   115/67   98 


 


 05/17/22 10:47  05/17/22 10:47  05/17/22 10:47  05/17/22 10:47  05/17/22 10:47








General: Alert, In no apparent distress, Oriented x3


Laboratory Data at Discharge: 














WBC  11.1 K/uL (4.3-10.9)  H  05/17/22  06:00    


 


Hgb  12.2 g/dL (13.6-17.9)  L  05/17/22  06:00    


 


Hct  36.3 % (39.6-49.0)  L  05/17/22  06:00    


 


Plt Count  76 K/uL (152-406)  L  05/17/22  06:00    


 


Sodium  137 mmol/L (136-145)   05/17/22  06:00    


 


Potassium  4.0 mmol/L (3.5-5.1)   05/17/22  06:00    


 


BUN  17 mg/dL (7-18)   05/17/22  06:00    


 


Creatinine  0.90 mg/dL (0.55-1.3)   05/17/22  06:00    


 


Glucose  104 mg/dL ()   05/17/22  06:00    


 


Total Bilirubin  1.2 mg/dL (0.2-1.0)  H  05/17/22  06:00    


 


AST  52 U/L (15-37)  H  05/17/22  06:00    


 


ALT  52 U/L (12-78)   05/17/22  06:00    


 


Alkaline Phosphatase  215 U/L ()  H  05/17/22  06:00    


 


Triglycerides  67 mg/dL (<150)   05/17/22  06:00    


 


Cholesterol  145 mg/dL (<200)   05/17/22  06:00    


 


HDL Cholesterol  71 mg/dL (40-60)  H  05/17/22  06:00    


 


Cholesterol/HDL Ratio  2.04   05/17/22  06:00    


 


Lipase  102 U/L ()   05/15/22  05:00    








Home Medications: 








Folic Acid 0.4 mg PO DAILY 09/09/19 


Furosemide 40 mg PO DAILY 09/09/19 


Spironolactone 100 mg PO DAILY 09/09/19 


Ipratropium Neb [Atrovent*] 0.5 mg IH TID PRN 30 Days  amp 11/15/19 


Levalbuterol [Xopenex*] 1.25 mg NEB Q6HP PRN #60 vial 11/15/19 


Codeine/APAP [Tylenol #3*] 1 tab PO Q6HP PRN 05/01/22 


Hydrocodone 5/APAP 325 [Norco 5/325*] 1 tab PO Q8H PRN 05/01/22 


Thyroid,Pork [Np Thyroid] 60 mg PO DAILY 05/01/22 


Metoprolol Tartrate [Lopressor*] 25 mg PO BID #60 tab 05/03/22 


Tamsulosin [Flomax*] 0.4 mg PO BEDTIME #30 cap 05/03/22 


Simethicone 125 mg PO DAILYPRN PRN 05/16/22 


Lactulose [Cephulac*] 30 ml PO BID 30 Days #2000 ml 05/17/22 





New Medications: 


Lactulose [Cephulac*] 30 ml PO BID 30 Days #2000 ml


Physician Discharge Instructions: 


-DC IV and DC home


-Follow-up with PCP in 1 to 2 weeks


-Follow-up with Oncology in 1 to 2 weeks


-Please call Dr. Houston at [phone] if any questions regarding hospital stay


-Please call nursing station at [phone] if any nursing or medication 

questions


-Return to the emergency room if symptoms worsen 


Diet: Regular


Activity: Fall precautions


Followup: 


Jhon Longo,  [Primary Care Provider] -  (Call to schedule appointment)


Time spent managing pt's care (in minutes): 35

## 2022-09-11 ENCOUNTER — HOSPITAL ENCOUNTER (INPATIENT)
Dept: HOSPITAL 97 - ER | Age: 62
LOS: 3 days | Discharge: HOME | DRG: 189 | End: 2022-09-14
Attending: HOSPITALIST | Admitting: HOSPITALIST
Payer: MEDICARE

## 2022-09-11 VITALS — BODY MASS INDEX: 27.7 KG/M2

## 2022-09-11 DIAGNOSIS — Z20.822: ICD-10-CM

## 2022-09-11 DIAGNOSIS — C78.7: ICD-10-CM

## 2022-09-11 DIAGNOSIS — Z87.891: ICD-10-CM

## 2022-09-11 DIAGNOSIS — C18.9: ICD-10-CM

## 2022-09-11 DIAGNOSIS — J96.21: Primary | ICD-10-CM

## 2022-09-11 DIAGNOSIS — J44.1: ICD-10-CM

## 2022-09-11 DIAGNOSIS — K70.30: ICD-10-CM

## 2022-09-11 LAB
ALBUMIN SERPL BCP-MCNC: 2.9 G/DL (ref 3.4–5)
ALP SERPL-CCNC: 161 U/L (ref 45–117)
ALT SERPL W P-5'-P-CCNC: 50 U/L (ref 12–78)
AST SERPL W P-5'-P-CCNC: 56 U/L (ref 15–37)
BLD SMEAR INTERP: (no result)
BUN BLD-MCNC: 8 MG/DL (ref 7–18)
GLUCOSE SERPLBLD-MCNC: 97 MG/DL (ref 74–106)
HCT VFR BLD CALC: 41.5 % (ref 39.6–49)
INR BLD: 1.19
LYMPHOCYTES # SPEC AUTO: 0.8 K/UL (ref 0.7–4.9)
MAGNESIUM SERPL-MCNC: 1.6 MG/DL (ref 1.8–2.4)
MCV RBC: 88.9 FL (ref 80–100)
MORPHOLOGY BLD-IMP: (no result)
PMV BLD: 7.8 FL (ref 7.6–11.3)
POIKILOCYTOSIS BLD QL SMEAR: (no result)
POTASSIUM SERPL-SCNC: 3.9 MMOL/L (ref 3.5–5.1)
RBC # BLD: 4.67 M/UL (ref 4.33–5.43)
TROPONIN I SERPL HS-MCNC: 13.8 PG/ML (ref ?–58.9)

## 2022-09-11 PROCEDURE — 36415 COLL VENOUS BLD VENIPUNCTURE: CPT

## 2022-09-11 PROCEDURE — 71275 CT ANGIOGRAPHY CHEST: CPT

## 2022-09-11 PROCEDURE — 80061 LIPID PANEL: CPT

## 2022-09-11 PROCEDURE — 94640 AIRWAY INHALATION TREATMENT: CPT

## 2022-09-11 PROCEDURE — 84484 ASSAY OF TROPONIN QUANT: CPT

## 2022-09-11 PROCEDURE — 85025 COMPLETE CBC W/AUTO DIFF WBC: CPT

## 2022-09-11 PROCEDURE — 87186 SC STD MICRODIL/AGAR DIL: CPT

## 2022-09-11 PROCEDURE — 82805 BLOOD GASES W/O2 SATURATION: CPT

## 2022-09-11 PROCEDURE — 80048 BASIC METABOLIC PNL TOTAL CA: CPT

## 2022-09-11 PROCEDURE — 87811 SARS-COV-2 COVID19 W/OPTIC: CPT

## 2022-09-11 PROCEDURE — 87205 SMEAR GRAM STAIN: CPT

## 2022-09-11 PROCEDURE — 85730 THROMBOPLASTIN TIME PARTIAL: CPT

## 2022-09-11 PROCEDURE — 87804 INFLUENZA ASSAY W/OPTIC: CPT

## 2022-09-11 PROCEDURE — 83735 ASSAY OF MAGNESIUM: CPT

## 2022-09-11 PROCEDURE — 80076 HEPATIC FUNCTION PANEL: CPT

## 2022-09-11 PROCEDURE — 93970 EXTREMITY STUDY: CPT

## 2022-09-11 PROCEDURE — 96361 HYDRATE IV INFUSION ADD-ON: CPT

## 2022-09-11 PROCEDURE — 85379 FIBRIN DEGRADATION QUANT: CPT

## 2022-09-11 PROCEDURE — 83880 ASSAY OF NATRIURETIC PEPTIDE: CPT

## 2022-09-11 PROCEDURE — 87040 BLOOD CULTURE FOR BACTERIA: CPT

## 2022-09-11 PROCEDURE — 87077 CULTURE AEROBIC IDENTIFY: CPT

## 2022-09-11 PROCEDURE — 83605 ASSAY OF LACTIC ACID: CPT

## 2022-09-11 PROCEDURE — 93005 ELECTROCARDIOGRAM TRACING: CPT

## 2022-09-11 PROCEDURE — 96365 THER/PROPH/DIAG IV INF INIT: CPT

## 2022-09-11 PROCEDURE — 99285 EMERGENCY DEPT VISIT HI MDM: CPT

## 2022-09-11 PROCEDURE — 71045 X-RAY EXAM CHEST 1 VIEW: CPT

## 2022-09-11 PROCEDURE — 82140 ASSAY OF AMMONIA: CPT

## 2022-09-11 PROCEDURE — 96375 TX/PRO/DX INJ NEW DRUG ADDON: CPT

## 2022-09-11 PROCEDURE — 80053 COMPREHEN METABOLIC PANEL: CPT

## 2022-09-11 PROCEDURE — 85610 PROTHROMBIN TIME: CPT

## 2022-09-11 PROCEDURE — 96372 THER/PROPH/DIAG INJ SC/IM: CPT

## 2022-09-11 RX ADMIN — SODIUM CHLORIDE SCH MLS: 0.9 INJECTION, SOLUTION INTRAVENOUS at 21:01

## 2022-09-11 RX ADMIN — Medication SCH ML: at 21:02

## 2022-09-11 RX ADMIN — ZOLPIDEM TARTRATE PRN MG: 5 TABLET, FILM COATED ORAL at 22:50

## 2022-09-11 RX ADMIN — SODIUM CHLORIDE SCH: 0.9 INJECTION, SOLUTION INTRAVENOUS at 18:00

## 2022-09-11 RX ADMIN — IPRATROPIUM BROMIDE SCH MG: 0.5 SOLUTION RESPIRATORY (INHALATION) at 20:50

## 2022-09-11 RX ADMIN — ALBUTEROL SULFATE SCH MG: 2.5 SOLUTION RESPIRATORY (INHALATION) at 20:50

## 2022-09-11 RX ADMIN — CEFTRIAXONE SCH MLS: 1 INJECTION, POWDER, FOR SOLUTION INTRAMUSCULAR; INTRAVENOUS at 21:01

## 2022-09-11 NOTE — P.HP
Certification for Inpatient


Patient admitted to: Observation


With expected LOS: <2 Midnights


Patient will require the following post-hospital care: None


Practitioner: I am a practitioner with admitting privileges, knowledge of 

patient current condition, hospital course, and medical plan of care.


Services: Services provided to patient in accordance with Admission requirements

found in Title 42 Section 412.3 of the Code of Federal Regulations





Patient History


Date of Service: 09/11/22


Reason for admission: Shortness of breath


History of Present Illness: 





Patient is a 62-year-old gentleman who came to the hospital with difficulty 

breathing.  Patient has a history of colon cancer with metastatic liver lesions.

 She was here a few months ago with hepatic encephalopathy.  At that time, he 

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  His liver lesions have been 

resolving.  He is following up with local oncologist for chemotherapy.  He 

presented to the emergency room with shortness of breath.  His D-dimer was 

elevated.  He had a CT PE protocol as well as venous Doppler which were 

negative.  He is feeling a little bit better.  He will be admitted to the 

hospital for acute COPD exacerbation.


Allergies





No Known Allergies Allergy (Verified 09/11/22 20:25)


   





Home Medications: 








Folic Acid 0.4 mg PO DAILY 09/12/22 


Furosemide [Lasix*] 40 mg PO DAILY 09/12/22 


Hydrocodone 5/APAP 325 [Norco 5/325*] 1 tab PO Q8HP PRN 09/12/22 


Ipratropium Neb [Atrovent*] 1 amp NEB Q8HP PRN 09/12/22 


Lactulose 20 g PO DAILY 09/12/22 


Spironolactone [Aldactone*] 100 mg PO DAILY 09/12/22 


Thyroid,Pork [[place] Thyroid] 60 mg PO DAILY 09/12/22 








- Past Medical/Surgical History


Diabetic: No


-: Alcoholic liver cirrhosis


-: COPD


-: Chronic steroid use


-: Paracentesis


-: History of Pseudomonas bacteremia


-: Right Inguinal Hernia and Umbilical Hernia Repair


-: pracentesis


Psychosocial/ Personal History: Patient is at home





- Family History


  ** Father


Medical History: Hypertension, Lung disease





  ** Mother


Medical History: Diabetes





- Social History


Smoking Status: Former smoker


Alcohol use: No


CD- Drugs: No


Caffeine use: No





Review of Systems


10-point ROS is otherwise unremarkable





Physical Examination





- Physical Exam


General: Alert, In no apparent distress


HEENT: Atraumatic, PERRLA, Mucous membr. moist/pink, EOMI, Sclerae nonicteric


Neck: Supple, 2+ carotid pulse no bruit, No LAD, Without JVD or thyroid 

abnormality


Respiratory: Clear to auscultation bilaterally, Normal air movement


Cardiovascular: Regular rate/rhythm, Normal S1 S2


Gastrointestinal: Normal bowel sounds, No tenderness


Musculoskeletal: No tenderness


Integumentary: No rashes


Neurological: Normal gait, Normal speech, Normal strength at 5/5 x4 extr, Normal

tone, Normal affect


Lymphatics: No axilla or inguinal lymphadenopathy





- Studies


Laboratory Data (last 24 hrs)





09/11/22 14:02: PT 13.1 H, INR 1.19


09/11/22 14:02: WBC 7.20, Hgb 14.0, Hct 41.5, Plt Count 99 L


09/11/22 14:02: Sodium 138, Potassium 3.9, BUN 8, Creatinine 0.82, Glucose 97, 

Magnesium 1.6 L D, Total Bilirubin 1.4 H, AST 56 H, ALT 50, Alkaline Phosphatase

161 H





Microbiology Data (last 24 hrs): 








09/11/22 14:02   Nasopharnyx   Influenza Type A Antigen Screen - Final


09/11/22 14:02   Nasopharnyx   Influenza Type B Antigen Screen - Final








Assessment & Plan





- Problems (Diagnosis)


(1) Shortness of breath


Current Visit: Yes   Status: Acute   





(2) COPD with acute exacerbation


Current Visit: No   Status: Acute   





(3) Colon cancer metastasized to liver


Current Visit: No   Status: Acute   





- Plan





-nebs, steroids, and antibiotics


-O2 per protocol.


-repeat chest x-ray


-pulmonary consultation  As an outpatient


-Patient will follow up with Oncology on Tuesday for possible chemo if he is not

feeling well then he will postpone until next week.  His tumors are shrinking 

according to the patient and they have had really good results.


-continue monitoring labs and anticipate discharge over the next 48 hours





Discharge Plan: Home


Plan to discharge in: 48 Hours





- Advance Directives


Does patient have a Living Will: No


Does patient have a Durable POA for Healthcare: No





- Code Status/Comfort Care


Code Status Assessed: Yes


Code Status: Full Code


Critical Care: No


Time Spent Managing PTS Care (In Minutes): 45

## 2022-09-11 NOTE — XMS REPORT
Continuity of Care Document

                          Created on:2022



Patient:ANAYELI CHUNG

Sex:Male

:1960

External Reference #:[id number]





Demographics







                          Address                   [address]



                                                    [place] [address]

 

                          Home Phone                [phone]

 

                          Work Phone                [phone]

 

                          Mobile Phone              [phone]

 

                          Email Address             [email or web address]

 

                          Preferred Language        English

 

                          Marital Status            Unknown

 

                          Yarsani Affiliation     Unknown

 

                          Race                      Unknown

 

                          Additional Race(s)        Unavailable



                                                    Unavailable



                                                    White

 

                          Ethnic Group              Unknown









Author







                          Organization              [facility]

t

 

                          Address                   [address]



                                                    [place] [address]

 

                          Phone                     [phone]









Support







                Name            Relationship    Address         Phone

 

                CARLENE GONSALVES               Unavailable     [phone]

 

                YOSELIN LANGLEY               Unavailable     [phone]

 

                YOSELIN LANGLEY               Unavailable     [phone]

 

                Anayeli Chung     Unavailable     [address] [phone]



                                                [place] [address] 

 

                Gabby Gonsalves   DARIO               [address]    [phone]



                                                [place] [address] 

 

                Ebony Avila   Sister          Unavailable     [phone]



                                                [place], [place]    









Care Team Providers







                    Name                Role                Phone

 

                    MONICA LONGO Primary Care Physician Unavailable

 

                    Monica Longo     Attending Clinician Unavailable

 

                    SYSTEM, PROVIDER NOT IN Attending Clinician Unavailable

 

                    ROCHELLE LEDESMA Attending Clinician Unavailable

 

                    ELAYNE LONGO      Attending Clinician Unavailable

 

                    ELAYNE LONGO Attending Clinician Unavailable

 

                    Elayne Longo MD Attending Clinician [phone]

 

                    Teresa Belle MD Attending Clinician [phone]

 

                    Katrina Mullins MD    Attending Clinician [phone]

 

                    KATRINA MULLINS       Attending Clinician Unavailable

 

                    KATRINA MULLINS       Attending Clinician Unavailable

 

                    Leonarda Bang MD Attending Clinician Unavailable

 

                    INGRID ALCAZAR   Attending Clinician Unavailable

 

                    SCHOENSTEIN, LYNDA  Attending Clinician Unavailable

 

                    Alex Weston MD Attending Clinician [phone]

 

                    ELVIN HUGO Attending Clinician Unavailable

 

                    ELAYNE LONGO Admitting Clinician Unavailable

 

                    KATRINA MULLINS       Admitting Clinician Unavailable

 

                    SCHJACEK GUAN  Admitting Clinician Unavailable

 

                    ESTELLA WHITMORE Admitting Clinician Unavailable









Payers







           Payer Name Policy Type Policy Number Effective Date Expiration Date RADHA hairston

 

           [facility]            [id number]  2019            



           [facility]                          00:00:00              

 

           [employer] MEDICARE            [id number]  2017            



           COMPLETE                         00:00:00              







Problems







       Condition Condition Condition Status Onset  Resolution Last   Treating Co

mments 

Source



       Name   Details Category        Date   Date   Treatment Clinician        



                                                 Date                 

 

       Metastatic Metastatic Disease Active                              [facility]

[facility]



       adenocarci adenocarci               3-27                               Co

llege



       noma   noma                 00:00:                             of



                                   00                                 Medicin



                                                                      e

 

       Colon  Colon  Disease Active                              [facility]



       adenocarci adenocarci               3-27                               Co

llege



       noma   noma                 00:00:                             of



                                   00                                 Medicin



                                                                      e

 

       Cirrhosis Cirrhosis Disease Active 2019                             [facility]

[facility]



       of liver of liver               0-09                               Col[facility]

e



       with   with                 00:00:                             of



       ascites ascites               00                                 [facility]



       (HCCode) (HCCode)                                                  e

 

       Loculated Loculated Disease Active                              [facility]



       pleural pleural               9-19                               [facility]



       effusion effusion               00:00:                             Medica

l



                                   00                                 Center

 

       Pseudomona Pseudomona Disease Active                              [facility]

[facility]



       l      l                    9-19                               [facility]



       pneumonia pneumonia               00:00:                             Medi

alexus



                                   00                                 Center

 

       Hyponatrem Hyponatrem Disease Active                              [facility]

[facility]



       ia     ia                   9-19                               [facility]



                                   00:00:                             Medical



                                   00                                 Center

 

       Parapneumo Parapneumo Disease Active                              [facility]

[facility]



       benjamin    benjamin                  9-18                               [facility]



       effusion effusion               00:00:                             Medica

l



                                   00                                 Center

 

       Inflammato Inflammato Disease Active                              [facility]

[facility]



       ry liver ry liver               6-13                               [facility]

e



       disease disease               00:00:                             of



                                   00                                 [facility]



                                                                      e

 

       Ascites Ascites Disease Active                              [facility]



                                   9-27                               [facility]



                                   00:00:                             of



                                   00                                 [facility]



                                                                      e

 

       Pleural Pleural Disease Active                                    [facility]



       effusion effusion                                                  Colleg

e



       on left on left                                                  of



                                                                      [facility]



                                                                      e







Allergies, Adverse Reactions, Alerts







       Allergy Allergy Status Severity Reaction(s) Onset  Inactive Treating Comm

ents 

Source



       Name   Type                        Date   Date   Clinician        

 

       NO KNOWN Allergy Active                                           [facility]



       ALLERGIE                                                         



       S                                                              

 

       NO KNOWN Drug   Active                                           [facility]



       ALLERGIE Class                                                   ity of



       S                                                              [facility]







Social History







           Social Habit Start Date Stop Date  Quantity   Comments   Source

 

           History of tobacco                       Cigarette Smoker            

[facility]



           use                                                    of Medicine

 

           History SDOH                                             [facility]



           Alcohol Std Drinks                                             [facility]ica

l Center

 

           History SDOH                                             [facility]



           Alcohol Binge                                             [facility]

ter

 

           Alcohol intake 2022 Current               [facility]

es



                      00:00:00   00:00:00   non-drinker of            Medical Ce

nter



                                            alcohol (finding)            

 

           History [facility] 2022 Quit 2017             [facility]



           Alcohol Comment 00:00:00   00:00:00                         Medical C

enter

 

           Exposure to 2022 Not sure              [facility]

e



           SARS-CoV-2 (event) 00:00:00   17:54:00                         of Med

icine

 

           History [facility] 2019 1                     [facility]



           Alcohol Frequency 00:00:00   00:00:00                         [facility]

 Center

 

           Cigarettes smoked 2019                       [facility]



           current (pack per 00:00:00   00:00:00                         [facility]

 Center



           day) - Reported                                             

 

           Cigarette  2019                       [facility]



           pack-years 00:00:00   00:00:00                         [facility]

 

           Tobacco use and 2019 Never used            [facility]

[facility]



           exposure   00:00:00   00:00:00                         [facility]

 

           Sex Assigned At 1960                       [facility]

kes



           Birth      00:00:00   00:00:00                         [facility]









                Smoking Status  Start Date      Stop Date       Source

 

                Former smoker   2019 00:00:00 2019 00:00:00 [facility]







Medications







       Ordered Filled Start  Stop   Current Ordering Indication Dosage Frequency

 Signature

                    Comments            Components          Source



     Medication Medication Date Date Medication? Clinician                (SIG) 

          



     Name Name                                                   

 

     insulin            Yes            45U  QD   Inject 45           [facility]

t



     degludec      4-12                               Units           [facility]



     (TRESIBA      11:20:                               subcutaneo           Med

ical



     U-100      03                                 [facility]



     INSULIN                                         daily.           



     SUBQ)                                                        

 

     furosemide            Yes            40mg QD   Take 40 mg           C

[facility]



     (LASIX) 40      4-11                               by mouth           [facility]



     MG tablet      11:34:                               daily.           Medica

l



               01                                                [place]

 

     folic acid            Yes            1mg  QD   Take 1 mg           [facility]

I [facility]



     (FOLVITE) 1      4-11                               by mouth           [facility]

s



     MG tablet      11:34:                               daily.           Medica

l



               01                                                [place]

 

     albuterol            Yes            1{puff}      Inhale 1           C

[facility]



     HFA       4-11                               puff by           [facility]



     (VENTOLIN      11:34:                               mouth via           Med

ical



     HFA) 90      01                                 inhaler           Center



     mcg/actuati                                         every 6           



     on inhaler                                         (six)           



                                                  hours as           



                                                  needed for           



                                                  Wheezing           



                                                  or             



                                                  Shortness           



                                                  of Breath.           

 

     acetaminoph            Yes            1{tbl}      Take 1           [facility]

I [facility]



     en-codeine      4-11                               tablet by           [facility]

s



     (TYLENOL      11:34:                               mouth           Medical



     #3) 300-30      01                                 every 4           Center



     mg per                                         (four)           



     tablet                                         hours as           



                                                  needed for           



                                                  Pain.           

 

     ascorbic            Yes            1000mg QD   Take 1,000           C

[facility]



     acid,      4-11                               mg by           [facility]



     vitamin C,      11:34:                               mouth           Medica

l



     (vitamin C)      01                                 daily.           Center



     1000 MG                                                        



     tablet                                                        

 

     thyroid,            Yes            60mg QD   Take 60 mg           [facility]

 St



     pork, 60 mg      4-11                               by mouth           [facility]

s



     Tab       11:34:                               every           Medical



               01                                 morning.           Center

 

     spironolact            Yes            100mg QD   Take 100           C

[facility]



     one       4-11                               mg by           [facility]



     (ALDACTONE)      11:34:                               mouth           Medic

al



     100 MG      01                                 daily.           Center



     tablet                                                        

 

     budesonide/            Yes                 Q.5D Inhale by           C

[facility]



     formoterol      4-11                               mouth via           [facility]

s



     fumarate      11:34:                               inhaler 2           Medi

alexus



     (SYMBICORT      01                                 (two)           Center



     INHL)                                         times           



                                                  daily.           

 

     ondansetron            Yes                      Take by           [facility]

 [facility]



     (ZOFRAN) 8      4-11                               mouth           [facility]



     MG tablet      11:34:                               every 8           Medic

al



               01                                 (eight)           Center



                                                  hours as           



                                                  needed for           



                                                  Nausea.           

 

     albuterol      2022- No             1{puff}      Inhale 1           

[facility]



     HFA (ProAir      4-11 04-11                          puff by           [facility]

s



     HFA) 90      08:38: 00:00                          mouth via           Medi

alexus



     mcg/actuati      32   :00                           inhaler           [facility]

r



     on inhaler                                         every 6           



                                                  (six)           



                                                  hours as           



                                                  needed for           



                                                  Wheezing.           

 

     insulin      -0 2022- No                  QD   Inject           [facility]



     degludec      4-11 04-11                          subcutaneo           [facility]

s



     (TRESIBA      08:38: 00:00                          [facility]           Medical



     FLEXTOUCH      32   :00                           daily.           Center



     U-100 SUBQ)                                                        

 

     acetaminoph            Yes       [id number] 1{tbl}      Take 1      

     [facility]



     en-codeine      3-25                102            Tablet by           [facility]

[facility]



     (TYLENOL      00:00:                               mouth           of



     #3) 300-30      00                                 every 6           Medici

n



     MG per                                         hours as           e



     tablet                                         needed for           



                                                  Pain.           

 

     Ascorbic            Yes                      Take by           [facility]



     Acid      3-24                               mouth.           [facility]



     (VITAMIN C      13:25:                                              of



     ER OR)      20                                                [facility]



                                                                 e

 

     OXYGEN GAS            Yes            2L        2 L daily.           [facility]

[facility]



               3-24                                              [facility]



               13:24:                                              of



               18                                                [facility]



                                                                 e

 

     NP THYROID            Yes            60mg      Take 60 mg           [facility]

[facility]



     60 MG      2-22                               by mouth           [facility]



     tablet      00:00:                               daily.           of



               00                                                [facility]



                                                                 e

 

     Tradjenta Tradjenta       Yes  [facility]                1 tablet        

   Common



               7-20           [facility]                               Spirit



               00:00:                                              - [facility]



               00                                                [facility]

 

     Tresiba Tresiba       Yes  Monica                Inject 15           

Common



     FlexTouch FlexTouch 7-20           [facility]                units           Spi

rit



               00:00:                                              - [facility]



               00                                                [facility]

 

     OXYGEN GAS      2019      Yes            2L        2 L daily.           B

aylor



               0-17                                              [facility]



               15:24:                                              of



               51                                                [facility]icin



                                                                 e

 

     tramadol            Yes       [id number] 50mg      Take 1 Tab         

  [facility]



     (ULTRAM) 50      9-30                               by mouth           [facility]

ege



     MG tablet      00:00:                               every 6           of



               00                                 hours as           [facility]



                                                  needed for           e



                                                  Pain.           

 

     tramadol      2022- No        [id number] 50mg      Take 1 Tab        

   [facility]



     (ULTRAM) 50      9-30 03-25                          by mouth           Col

lege



     MG tablet      00:00: 00:00                          every 6           of



               00   :00                           hours as           Medicin



                                                  needed for           e



                                                  Pain.           

 

     tiotropium            Yes            18ug      18 mcg by           [facility]

ylor



     (SPIRIVA)      9-25                               Inhalation           [facility]

ege



     18 MCG      00:00:                               route.           of



     inhalation      00                                                [facility]



     capsule                                                        e

 

     tiotropium            Yes            18ug      18 mcg by           [facility]

ylor



     (SPIRIVA)      9-25                               Inhalation           [facility]

ege



     18 MCG      00:00:                               route.           of



     inhalation      00                                                [facility]



     capsule                                                        e

 

     tiotropium      2022- No        Chronic 18ug QD   Inhale 1          

 [facility]



     (SPIRIVA)      9- 04-11           obstructive           capsule          

 [facility]



     18 mcg      00:00: 00:00           pulmonary           (18 mcg           Me

dical



     inhalation      00   :00            disease           total) by           C

enter



     capsule                          with acute           mouth via           



                                   exacerbatio           inhaler           



                                   n (HCC)           daily.           

 

     Cefepime      2019- No             2g        Inject 2 g           [facility]

ylor



     HCl 2 g      9-25 10-19                          into the           [facility]



     SOLR      00:00: 04:59                          vein Every           of



               00   :00                           8 hours.           [facility]



                                                                 e

 

     sodium      2019- No                       TAKE 1           [facility]



     chloride 1       10-17                          TABLET BY           Col

lege



     g tablet      00:00: 00:00                          MOUTH           of



               00   :00                           THREE           [facility]icin



                                                  TIMES           e



                                                  DAILY WITH           



                                                  MEALS FOR           



                                                  14 DAYS           

 

     levofloxaci            Yes                      TAKE 1           [facility]

or



     n         -                               TABLET BY           [facility]



     (LEVAQUIN)      00:00:                               MOUTH ONCE           o

f



     750 MG      00                                 DAILY FOR           [facility]



     tablet                                         24 DAYS           e

 

     levofloxaci            Yes                      TAKE 1           [facility]

or



     n         -24                               TABLET BY           [facility]



     (LEVAQUIN)      00:00:                               MOUTH ONCE           o

f



     750 MG      00                                 DAILY FOR           [facility]



     tablet                                         24 DAYS           e

 

     lactulose            Yes       Constipatio 20g       Take 30         

  [facility]



     (CHRONULAC)                      n,             mLs (20 g           [facility]

es



     20 gram/30      00:00:                unspecified           total) by      

     Medical



     mL solution      00                  constipatio           mouth 2         

  Center



                                   n type           (two)           



                                                  times           



                                                  daily as           



                                                  needed           



                                                  (constipat           



                                                  ion).           

 

     levothyroxi      2022- No        Hypothyroid 25ug      Take 1       

    [facility]



     ne         04-11           ism,           tablet (25           [facility]



     (SYNTHROID,      00:00: 00:00           unspecified           mcg total)   

        Medical



     LEVOTHROID)      00   :00            type           by mouth           Cent

er



     25 MCG                                         Every           



     tablet                                         morning on           



                                                  an empty           



                                                  stomach.           

 

     doxycycline      2019- No             100mg      Take 100           

[facility]



     (MONODOX)       10-20                          mg by           [facility]



     100 MG      00:00: 04:59                          mouth.           of



     capsule      00   :00                                          Medicin



                                                                 e

 

     dexamethaso            Yes                      TAKE 2           [facility]

or



     ne        9-                               TABLETS BY           [facility]



     (DECADRON)      00:00:                               MOUTH           of



     4 MG tablet      00                                 TWICE           [facility]



                                                  DAILY           e

 

     dexamethaso            Yes                      TAKE 2           [facility]

or



     ne        9-01                               TABLETS BY           [facility]



     (DECADRON)      00:00:                               MOUTH           of



     4 MG tablet      00                                 TWICE           [facility]



                                                  DAILY           e

 

     albuterol            Yes                      USE 1 VIAL           [facility]

[facility]



     (PROVENTIL)      8-12                               IN             [facility]



     (2.5 mg/3      00:00:                               NEBULIZER           of



     mL) 0.083%      00                                 EVERY 4 TO           Med

icin



     nebulizer                                         6 HOURS AS           e



     solution                                         NEEDED           

 

     albuterol            Yes                      USE 1 VIAL           [facility]

[facility]



     (PROVENTIL)      8-12                               IN             [facility]



     (2.5 mg/3      00:00:                               NEBULIZER           of



     mL) 0.083%      00                                 EVERY 4 TO           Med

icin



     nebulizer                                         6 HOURS AS           e



     solution                                         NEEDED           

 

     furosemide            Yes            40mg      Take 40 mg           [facility]

[facility]



     (LASIX) 40      6-14                               by mouth           Col[facility]

ge



     MG tablet      00:00:                               daily.           of



                                                               Medicin



                                                                 e

 

     spironolact            Yes            100mg      Take 100           [facility]

[facility]



     one       6-14                               mg by           [facility]



     (ALDACTONE)      00:00:                               mouth           of



     100 MG      00                                 daily.           [facility]



     tablet                                                        e

 

     folic acid            Yes            1mg       Take 1 mg           [facility]

[facility]



     (FOLVITE) 1      6-14                               by mouth           [facility]

e[facility]



     MG tablet      00:00:                               daily.           of



                                                               Medicin



                                                                 e

 

     furosemide            Yes            40mg      Take 40 mg           [facility]

[facility]



     (LASIX) 40      6-14                               by mouth           Col[facility]

ge



     MG tablet      00:00:                               daily.           of



                                                               Medicin



                                                                 e

 

     spironolact            Yes            100mg      Take 100           [facility]

[facility]



     one       6-14                               mg by           [facility]



     (ALDACTONE)      00:00:                               mouth           of



     100 MG      00                                 daily.           [facility]



     tablet                                                        e

 

     folic acid            Yes            1mg       Take 1 mg           [facility]

[facility]



     (FOLVITE) 1      6-14                               by mouth           [facility]

e[facility]



     MG tablet      00:00:                               daily.           of



               00                                                [facility]icin



                                                                 e

 

     Symbicort Symbicort           Yes  [facility]                2 puffs           

Common



                              [facility]

 

     Ipratropium Ipratropium           Yes  [facility]                2.5 ml        

   Common



     Bromide Bromide                [facility]

 

     ProAir HFA ProAir HFA           Yes  [facility]                1 puff as       

    Common



                              [facility]o                needed           [facility]

 

     Furosemide Furosemide           Yes  [facility]                1 tablet        

   Common



                              [facility]

 

     Spironolact Spironolact           Yes  [facility]                1 tablet      

     Common



     one  one                 [facility]

 

     Aspir-Low Aspir-Low           Yes  [facility]                1 tablet          

 Common



                              [facility]

 

     Folic Acid Folic Acid           Yes  [facility]                1 tablet        

   Common



                              [facility]

 

     Constulose Constulose           Yes  [facility]                15 ml           

Common



                              [facility]                               [facility]



                                                                 - [facility]



                                                                 [facility]







Vital Signs







             Vital Name   Observation Time Observation Value Comments     Source

 

             HEIGHT       2022 08:26:00 188 cm                    

 

             WEIGHT       2022 08:26:00 96.843 kg                 

 

             HEIGHT       2022 08:26:00 188 cm                    

 

             WEIGHT       2022 08:26:00 96.843 kg                 

 

             HEIGHT       2022 08:26:00 188 cm                    

 

             WEIGHT       2022 08:26:00 96.843 kg                 

 

             HEIGHT       2022 08:10:00 188 cm                    

 

             WEIGHT       2022 08:10:00 96.752 kg                 

 

             HEIGHT       2022 13:44:00 188 cm                    

 

             WEIGHT       2022 13:44:00 92.987 kg                 

 

             HEIGHT       2022 08:10:00 188 cm                    

 

             WEIGHT       2022 08:10:00 96.752 kg                 

 

             HEIGHT       2022 13:44:00 188 cm                    

 

             WEIGHT       2022 13:44:00 92.987 kg                 

 

             HEIGHT       2022 08:10:00 188 cm                    

 

             WEIGHT       2022 08:10:00 96.752 kg                 

 

             HEIGHT       2022 13:44:00 188 cm                    

 

             WEIGHT       2022 13:44:00 92.987 kg                 

 

             Systolic blood 2022 18:17:00 146 mm[Hg]                [facility]



             pressure                                            Medicine

 

             Diastolic blood 2022 18:17:00 75 mm[Hg]                 [facility]                                            Medicine

 

             Heart rate   2022 18:17:00 95 /min                   [facility]

 

             Body temperature 2022 18:17:00 37.06 Renée                 [place]

 

             Body height  2022 18:17:00 188 cm                    [facility]

 

             Body weight  2022 18:17:00 101.969 kg                [facility]

 

             BMI          2022 18:17:00 28.86 kg/m2               [facility]

 

             Systolic blood 2019-10-17 15:21:00 115 mm[Hg]                [facility]



             pressure                                            Medicine

 

             Diastolic blood 2019-10-17 15:21:00 67 mm[Hg]                 [facility]                                            Medicine

 

             Heart rate   2019-10-17 15:21:00 96 /min                   [facility]

 

             Body temperature 2019-10-17 15:21:00 36.28 Renée                 [facility]

 

             Body height  2019-10-17 15:21:00 188 cm                    [facility]

 

             Body weight  2019-10-17 15:21:00 102.059 kg                [facility]

 

             BMI          2019-10-17 15:21:00 28.89 kg/m2               [facility]

 

             Systolic blood 2019-10-17 15:21:00 115 mm[Hg]                [facility]

 

             Diastolic blood 2019-10-17 15:21:00 67 mm[Hg]                 [facility]

 

             Heart rate   2019-10-17 15:21:00 96 /min                   [facility]

 

             Body temperature 2019-10-17 15:21:00 36.28 Renée                 [facility]

 

             Body height  2019-10-17 15:21:00 188 cm                    [facility]

 

             Body weight  2019-10-17 15:21:00 102.059 kg                [facility]

 

             BMI          2019-10-17 15:21:00 28.89 kg/m2               [facility]

 

             Systolic blood 2022 11:09:00 136 mm[Hg]                [facility]

 

             Diastolic blood 2022 11:09:00 69 mm[Hg]                 [facility]

 

             Heart rate   2022 11:09:00 98 /min                   [facility]

 

             Body temperature 2022 11:09:00 36.67 Renée                 [facility]

 

             Respiratory rate 2022 11:09:00 18 /min                   [facility]

 

             Oxygen saturation in 2022 11:09:00 96 /min                   

[facility]



             Arterial blood by                                        Medical 

nter



             Pulse oximetry                                        

 

             Body height  2022 08:26:00 188 cm                    [facility]

 

             Body weight  2022 08:26:00 96.843 kg                 [facility]

 

             BMI          2022 08:26:00 27.41 kg/m2               [facility]







Procedures







                Procedure       Date / Time     Performing      Source



                                Performed       Clinician       

 

                POCT-GLUCOSE METER 2022      Elayne Longo  [facility]



                                10:43:00        [facility]

 

                REPORT OF PROCEDURE - ENDOSCOPY 2022      Elayne Longo [facility]



                URL             10:33:46        [facility]

 

                TISSUE EXAM     2022      Elayne oLngo  [facility]



                                10:01:00        [facility]

 

                COLONOSCOPY     2022      Elayne Longo  [facility]



                                09:22:00        [facility]

 

                COLONOSCOPY, WITH POLYPECTOMY 2022      [facility]ayne [facility]o [facility]



                                09:22:00        [facility]

 

                POCT-GLUCOSE METER 2022      Elayne Longo [facility]



                                08:51:00        [facility]

 

                SARS-COV2/RT-PCR ([facility]) 2022      Khris Longo  [facility]



                                06:52:31        [facility]

 

                REPORT OF PROCEDURE - ENDOSCOPY 2022      Katrina Mullins   

[facility]



                URL             09:44:13                        [facility]

 

                REPORT OF PROCEDURE - ENDOSCOPY 2022      Pablo Mullinsa   

[facility]



                URL             09:43:23                        [facility]

 

                COLONOSCOPY     2022      Pablo Mullinsa   [facility]



                                08:47:00                        [facility]

 

                ESOPHAGOGASTRODUODENOSCOPY 2022      [facility]



                                08:47:00                        [facility]

 

                POCT-GLUCOSE METER 2022      Pablo Mullinsa   [facility]



                                08:11:00                        [facility]

 

                COMPREHENSIVE METABOLIC PANEL 2022      [facility]



                                20:06:00                        of Medicine

 

                CEA             2022      [facility]



                                20:06:00                        of Medicine

 

                CBC W/AUTO DIFF WITH PLATELETS 2022      [facility]



                                20:06:00                        of Medicine

 

                AFP TUMOR MARKER 2022      [facility]

e



                                20:06:00                        of Medicine

 

                CANCER ANTIGEN 19-9 2022      Tyree Alcazarlini [facility]

lege



                                20:06:00                        of Medicine







Plan of Care







             Planned Activity Planned Date Details      Comments     Source

 

             Future Scheduled 2032   Screening for malignant              

[facility]



             Test         00:00:00     neoplasm of colon              Medical Ce

nter



                                       (procedure) [code =              



                                       072191472]                

 

             Future Scheduled 2032   Screening for malignant              

[facility]



             Test         00:00:00     neoplasm of colon              Medical Ce

nter



                                       (procedure) [code =              



                                       892057350]                

 

             Future Scheduled 2022   INFLUENZA VACCINE (#1)              C

[facility]



             Test         00:00:00     [code = INFLUENZA              Medical Ce

nter



                                       VACCINE (#1)]              

 

             Future Scheduled 2022   Screening for malignant              

[facility]



             Test         11:27:55     neoplasm of colon              of Medicin

e



                                       (procedure) [code =              



                                       540602945]                

 

             Future Scheduled 2022   TETANUS SHOT (ADULT)              [facility]



             Test         11:27:55     [code = TETANUS SHOT              of Medi

cine



                                       (ADULT)]                  

 

             Future Scheduled 2022   BMI FOLLOW UP PLAN              [facility]

r College



             Test         11:27:55     [code = BMI FOLLOW UP              of Med

icine



                                       PLAN]                     

 

             Future Scheduled 2022   Hepatitis C screening              Ba

[facility]



             Test         11:27:55     (procedure) [code =              of Medic

ine



                                       145909264]                

 

             Future Scheduled 2022   Human immunodeficiency              B

[facility]



             Test         11:27:55     virus screening              of Medicine



                                       (procedure) [code =              



                                       861960307]                

 

             Future Scheduled 2022   Screening for malignant              

[facility]



             Test         11:27:55     neoplasm of lung              of Medicine



                                       (procedure) [code =              



                                       287005043]                

 

             Future Scheduled 2022   ZOSTER VACCINE (1 of 2)              

[facility]



             Test         11:27:55     [code = ZOSTER VACCINE              of Me

dicine



                                       (1 of 2)]                 

 

             Future Scheduled 2022   COVID-19 Vaccine (2 -              Ba

[facility]



             Test         11:27:55     Booster for Navdeep              of Medic

ine



                                       series) [code =              



                                       COVID-19 Vaccine (2 -              



                                       Booster for Navdeep              



                                       series)]                  

 

             Future Scheduled 2022   FLU VACCINE > 6 MONTHS              [facility]



             Test         11:27:55     [code = FLU VACCINE > 6              of M

edicine



                                       MONTHS]                   

 

             Future Scheduled 2022   IR PORT PLACEMENT EQUAL 1 Occurrences

 [facility]



             Test         14:40:38     OR > 5 YEARS [code = starting     of Medi

cine



                                       52446]       2022 until 



                                                    2023   

 

             Future Scheduled 2022   CT CHEST ABDOMEN PELVIS 1 Occurrences

 [facility]



             Test         14:19:01     W CONTRAST [code = starting     of Medici

ne



                                       97999-5]     2022 until 



                                                    2023   

 

             Future Scheduled 2022   WILD 1 [code = NOCPT] Ordered:     Ba

[facility]



             Test         14:18:00                  2022   of Medicine

 

             Future Scheduled 2022   TEMPUS XF LIQUID BIOPSY Ordered:     

[facility]



             Test         14:17:42     NGS [code = 13635275] 2022   of Med

icine

 

             Future Scheduled 2022   TEMPUS XT TISSUE NGS Ordered:     [facility]



             Test         14:17:42     [code = 38302961] 2022   of Medicin

e

 

             Future Scheduled 2022   DEPRESSION SCREENING              [facility]



             Test         00:00:00     (12+) [code =              Medical Center



                                       DEPRESSION SCREENING              



                                       (12+)]                    

 

             Future Scheduled 2020   MEDICARE ANNUAL              [facility]

[facility]



             Test         00:00:00     WELLNESS (YEAR 2 or              Medical 

Center



                                       FIRST YEAR if no IPPE)              



                                       [code = MEDICARE ANNUAL              



                                       WELLNESS (YEAR 2 or              



                                       FIRST YEAR if no IPPE)]              

 

             Future Scheduled 2009   SHINGLES VACCINES (1 of              

[facility]



             Test         00:00:00     2) [code = SHINGLES              Medical 

Center



                                       VACCINES (1 of 2)]              

 

             Future Scheduled 1994   Lipid panel (procedure)              

[facility]



             Test         00:00:00     [code = [id number]]              Medical Ce

nter

 

             Future Scheduled 1978   DTAP/TDAP/TD VACCINES              [facility]

[facility]



             Test         00:00:00     (1 - Tdap) [code =              Medical C

enter



                                       DTAP/TDAP/TD VACCINES              



                                       (1 - Tdap)]               

 

             Future Scheduled 1977   HEPATITIS C SCREENING              [facility]

[facility]



             Test         00:00:00     [code = HEPATITIS C              Medical 

Center



                                       SCREENING]                

 

             Future Scheduled 1965   PNEUMOCOCCAL VACCINE              [facility]



             Test         00:00:00     0-64 YRS (1 - PCV)              Medical C

enter



                                       [code = PNEUMOCOCCAL              



                                       VACCINE 0-64 YRS (1 -              



                                       PCV)]                     

 

             Future Scheduled 1959   COVID-19 VACCINE (#1)              [facility]

[facility]



             Test         00:00:00     [code = COVID-19              Medical Tripp

ter



                                       VACCINE (#1)]              

 

             Future Scheduled 1959   CT Colonography (combo)              

[facility]



             Test         00:00:00     [code = CT Colonography              [facility]



                                       (combo)]                  

 

             Future Scheduled 1959   Screening for malignant              

[facility]



             Test         00:00:00     neoplasm of colon              Medical Ce

nter



                                       (procedure) [code =              



                                       [id number]]                

 

             Future Scheduled 1959   Screening for malignant              

[facility]



             Test         00:00:00     neoplasm of colon              Medical Ce

nter



                                       (procedure) [code =              



                                       [id number]]                

 

             Future Scheduled 1959   Sigmoidoscopy [code =              [facility]

[facility]



             Test         00:00:00     Sigmoidoscopy]              [facility] Cent[facility]

r

 

             Future Scheduled              COLON CANCER SCREENING:              

[facility] [employer]



             Test                      COLONOSCOPY [code =              of Medic

ine



                                       COLON CANCER SCREENING:              



                                       COLONOSCOPY]              

 

             Future Scheduled              MEDICARE AWV [code =              [employer]

[employer]



             Test                      MEDICARE AWV]              of Medicine

 

             Future Scheduled              TETANUS SHOT (ADULT)              [employer]

[employer]



             Test                      [code = TETANUS SHOT              of Medi

cine



                                       (ADULT)]                  

 

             Future Scheduled              BMI FOLLOW UP PLAN              [employer]

[employer]



             Test                      [code = BMI FOLLOW UP              of Med

icine



                                       PLAN]                     

 

             Future Scheduled              HEPATITIS C SCREENING              [facility]

[employer]



             Test                      [code = HEPATITIS C              of Medic

ine



                                       SCREENING]                

 

             Future Scheduled              HIV SCREENING [code =              [facility]

[employer]



             Test                      HIV SCREENING]              of Medicine

 

             Future Scheduled              FLU VACCINE > 6 MONTHS              [facility]

[employer]



             Test                      [code = FLU VACCINE > 6              of M

edicine



                                       MONTHS]                   







Encounters







        Start   End     Encounter Admission Attending Care    Care    Encounter 

Source



        Date/Time Date/Time Type    Type    Clinicians Facility Department ID   

   

 

        2022         Outpatient         Longo,  [facility]  [id number]

 Common



        08:39:00                         Monica                     [facility]

 

        2022         Outpatient         Longo,  [facility]  [id number]

 Common



        13:39:01                         Monica                     [facility]

 

        2022         Outpatient         Longo,  [facility]  [id number]

 Common



        08:18:01                         Monica                     [facility]

 

        2022         Outpatient         SYSTEM, [facility]     GIUSE[facility]     [id number]

 MD



        14:48:15                         PROVIDER                         Andrew negron

 

        2022         Outpatient         Longo,  [facility]  [facility]  [id number]

 Common



        09:46:02                         Monica                     [facility]

 

        2022         Outpatient         Longo,  [facility]  [facility]  [id number]

 Common



        14:39:33                         Monica                     [facility]

 

        2022         Outpatient         Longo,  [facility]  [facility]  [id number]

 Common



        14:38:53                         Monica                     [facility]

 

        2022         Outpatient         Longo,  [facility]  [facility]  [id number]

 Common



        13:10:59                         Moinca                  [id number]   [facility]

 

        2022         Outpatient         Longo,  [facility]  [facility]  [id number]

 Common



        12:52:47                         Monica                  [id number]   [facility]

 

        2022         Outpatient         Longo,  [facility]  [facility]  [id number]

 Common



        11:32:03                         Monica                  [id number]   [facility]

 

        2022         Outpatient         Longo,  [facility]  ST[facility]  [id number]

 Common



        11:25:36                         Monica                  [id number]   [facility]

 

        2022         Outpatient         Longo,  [facility]  ST[facility]  [id number]

 Common



        11:16:16                         Monica                  [address]   [facility]

 

        2022         Outpatient         Longo,  [facility]  [facility]  [id number]

 Common



        11:07:38                         Monica                  [id number]   [facility]

 

        2022 ambulatory                 [facility]C  [facility]  [id number]

 Common



        00:00:00 00:00:00                                                 [facility]

 

        2022 ambulatory                 STLMLC  ST[facility]C  [id number]

 Common



        00:00:00 00:00:00                                                 [facility]

 

        2022 ambulatory                 STLMLC  STLMLC  [id number]

 Common



        00:00:00 00:00:00                                                 [facility]

 

        2022-06-15 2022-06-15 ambulatory                 STLMLC  ST[facility]C  [id number]

 Common



        00:00:00 00:00:00                                                 [facility]

 

        2022 ambulatory                 STLMLC  STLMLC  [id number]

 Common



        00:00:00 00:00:00                                                 [facility]

 

        2022 ambulatory                 STLMLC  STLMLC  [id number]

 Common



        00:00:00 00:00:00                                                 [facility]

 

        2022 ambulatory                 STLMLC  STLMLC  [id number]

 Common



        00:00:00 00:00:00                                                 [facility]

 

        2022 ambulatory                 STLMLC  STLMLC  [id number]

 Common



        00:00:00 00:00:00                                                 [facility]

 

        2022 ambulatory                 STLMLC  STLMLC  [id number]

 Common



        00:00:00 00:00:00                                                 [facility]

 

        2022 ambulatory                 STLMLC  STLMLC  [id number]

 Common



        00:00:00 00:00:00                                                 [facility]

 

        2022 ambulatory                 STLMLC  STLMLC  [id number]

 Common



        00:00:00 00:00:00                                                 [facility]

 

        2022 Outpatient         LORENZO LEDESMA    MED     [id number]

[id number]    LORENZO



        13:34:00 23:59:00                 ROCHELLE                           

 

        2022 Outpatient         ROSELYN  [facility]     [id number]

[id number] [facility]



        09:01:46 09:03:16                 [facility]                         Zulema villegas



                                                                        of



                                                                        Medicin



                                                                        e

 

        2022 [facility]         Roselyn  [facility]   [id number]

[id number] [facility]



        05:57:00 11:20:00 Encounter         [facility]

 

        2022 Outpatient       ROSELYN  [facility]    Surgery [id number]

[id number] [facility]



        05:57:00 11:20:00                 [facility]                         

 

        2022 Anesthesia         Yoshi [facility]   [id number]

[id number] [facility]



        09:27:00 10:40:00 Event           Teresa                          [facility]

 

        2022 Surgery         Roselyn,  [facility]   [id number]

[id number] [facility]



        09:41:00 10:11:00                 Elayne                         [facility]

 

        2022 Travel                  [facility]   [id number]

 [facility]



        00:00:00 00:00:00                                                 [facility]

 

        2022 Outpatient                 BCM     [facility]     [id number]

[id number] [facility]



        06:06:00 23:59:00                                                 Colleg

e



                                                                        of



                                                                        Medicin



                                                                        e

 

        2022 [facility]         Harpreet [facility]   [id number]

[id number] [facility]



        06:06:00 11:15:00 Encounter         [facility]

 

        [facility]    Surgery [id number]

[id number] [facility]



        06:06:00 11:15:00                 [facility]                           

 

        2022 Outpatient         HARPREET, [facility]     [id number]

[id number] [facility]



        10:19:37 10:19:37                 KATRINA                           Colleg

e



                                                                        of



                                                                        Medicin



                                                                        e

 

        2022 Outpatient         HARPREET, [facility]     [id number]

[id number] [facility]



        10:17:26 10:17:26                 KATRINA                           Colleg

e



                                                                        of



                                                                        Medicin



                                                                        e

 

        2022 Anesthesia         Merritt [facility]   [id number]

[id number] [facility]



        08:52:00 09:46:00 Event           Leonarda                          [facility]

 

        2022 Surgery         Harpreet, [facility]   [id number]

[id number] [facility]



        08:45:00 09:30:00                 [facility]

 

        2022 Travel                  [facility]   [id number]

 [facility]



        00:00:00 00:00:00                                                 [facility]

 

        2022 Outpatient EL              SLE    SLE    [id number]

[id number] [facility]



        14:20:19 23:59:00                                                 

 

        2022 [facility]   [id number]

[id number] [facility]



        13:00:00 23:59:00 [facility]

 

        2022 Travel                  [facility]   [id number]

 [facility]



        00:00:00 00:00:00                                                 [facility]

 

        2022 Office          INGE [facility]   [id number]

[id number] [facility]



        11:37:02 15:01:03 Visit           INGRID Tavares  350.1.13.21         Co

llege



                                                        0.2.7.2.686         



                                                        [phone]         [facility]



                                                        504             e

 

        2022 ambulatory                 STLMLC  [facility]  [id number]

 Common



        00:00:00 00:00:00                                                 [facility]

 

        2022 ambulatory                 STLMLC  STLMLC  [id number]

 Common



        00:00:00 00:00:00                                                 [facility]

 

        2022 Travel                  1.2.840.1 [id number]

[id number] [facility]



        00:00:00 00:00:00                         [id number] 350.1.13.41         

ity of



                                                3.412.2.7 2.2.7.3.698         Te

[place]



                                                .[id number] 084.8           MD



                                                .8                      [facility]



                                                                        Cancer



                                                                        [place]

 

        2022 ambulatory                 STLMLC  STLMLC  [id number]

 Common



        00:00:00 00:00:00                                                 [facility]

 

        2022 ambulatory                 STLMLC  STLMLC  [id number]

 Common



        00:00:00 00:00:00                                                 [facility]

 

        2022 ambulatory                 STLMLC  STLMLC  [id number]

 Common



        00:00:00 00:00:00                                                 [facility]

 

        2022 ambulatory                 STLMLC  STLMLC  [id number]

 Common



        00:00:00 00:00:00                                                 [facility]

 

        2022 ambulatory                 STLMLC  STLMLC  [id number]

 Common



        00:00:00 00:00:00                                                 [facility]

 

        2021 ambulatory                 STLMLC  STLMLC  [id number]

 Common



        00:00:00 00:00:00                                                 [facility]

 

        2021 ambulatory                 STLMLC  STLMLC  [id number]

 Common



        00:00:00 00:00:00                                                 [facility]

 

        2021-10-06 2021-10-06 Outpatient                 STLMLC  STLMLC  [id number]

 Common



        00:00:00 00:00:00                                                 [facility]

 

        2021 Outpatient                 STLMLC  STLMLC  [id number]

 Common



        00:00:00 00:00:00                                                 [facility]

 

        2021 Outpatient                 STLMLC  STLMLC  [id number]

 Common



        00:00:00 00:00:00                                                 [facility]

 

        2021 Outpatient                 STLMLC  STLMLC  [id number]

 Common



        00:00:00 00:00:00                                                 [facility]

 

        2021 Outpatient                 STLMLC  STLMLC  [id number]

 Common



        00:00:00 00:00:00                                                 [facility]

 

        2021 Outpatient                 STLMLC  STLMLC  [id number]

 Common



        00:00:00 00:00:00                                                 [facility]

 

        2021 Outpatient                 STLMLC  STLMLC  [id number]

 Common



        00:00:00 00:00:00                                                 [facility]

 

        2021 Outpatient                 STLMLC  STLMLC  [id number]

 Common



        00:00:00 00:00:00                                                 [facility]

 

        2021-01-15 2021-01-15 Outpatient                 STLMLC  STLMLC  [id number]

 Common



        00:00:00 00:00:00                                                 [facility]

 

        2021 Outpatient                 STLMLC  STLMLC  [id number]

 Common



        00:00:00 00:00:00                                                 [facility]

 

        2020-10-21 2020-10-21 Outpatient                 STLMLC  STLMLC  [id number]

 Common



        00:00:00 00:00:00                                                 [facility]

 

        2020-10-19 2020-10-19 Outpatient                 STLMLC  STLMLC  [id number]

 Common



        00:00:00 00:00:00                                                 [facility]

 

        2020-10-08 2020-10-08 Outpatient                 STLMLC  STLMLC  [id number]

 Common



        00:00:00 00:00:00                                                 [facility]

 

        2020 Outpatient                 [facility] [id number]

[id number] Common



        13:15:00 13:15:00                         t [place]   [address]         Spir

it



                                                Drive   [facility]

 

        2020 Outpatient                 [facility] [address]

[id number] Common



        13:45:00 13:45:00                         [place]   [address]         Spir

it



                                                Drive   [facility]

 

        2020 Outpatient                 [facility] [address]

[id number] Common



        08:33:00 08:33:00                         [place]   [address]         Spir

it



                                                Drive   [facility]

 

        2020-07-15 2020-07-15 Outpatient                 [facility] [address]

[id number] Common



        15:30:00 15:30:00                         [place]   [address]         Spir

it



                                                Drive   [facility]

 

        2020 Outpatient                 [facility] [address]

[id number] Common



        15:00:00 15:00:00                         [place]   [address]         Spir

it



                                                Drive   [facility]

 

        2020 Outpatient                 [facility] [address]

[id number] Common



        15:00:00 15:00:00                         [place]   [address]         Spir

it



                                                Drive   [facility]

 

        2020 Outpatient                 [facility] [address]

[id number] Common



        10:00:00 10:00:00                         [place]   [address]         Spir

it



                                                Drive   [facility]

 

        2020 Outpatient                 [facility] [address]

[id number] Common



        13:17:00 13:17:00                         t [place]   [address]         Spir

it



                                                Drive   [facility]

 

        2020 Outpatient                 [facility] [address]

[id number] Common



        11:00:00 11:00:00                         t [place]   [address]         Spir

it



                                                Drive   [facility]

 

        2019 Emergency X [facility]    ERT     [id number]

[id number] Univers



        07:09:33 11:41:00                 , JACEK ontiveros [facility]

 

        2019-10-17 2019-10-17 Office          PADMINI Weston     1.2.840.114 [id number]

[id number] [facility]



        09:26:46 14:06:09 Visit           Alex ANGUIANO AMBULATOR 350.1.13.21        

 College



                                                Y       0.2.7.2.686         of



                                                        [phone]         [facility]



                                                        840             e

 

        2019-10-17 2019-10-17 Office          PADMINI Weston     1.2.840.114 [id number]

[id number] 



        09:26:46 14:06:09 Visit           Alex W AMBULATOR 350.1.13.21        

 



                                                Y       0.2.7.2.686         



                                                        [phone]         



                                                        840             







Results







           Test Description Test Time  Test Comments Results    Result Comments 

Source









                    Tissue Exam         2022 08:01:38 









                      Test Item  Value      Reference Range Interpretation Comme

nts









             Case Report (test code = 104) Surgical Pathology Report Case: [id number]

[id number]                           



                          Authorizing Provider: Elayne Longo MD Collected: 2022 10:01 AM               

            



                          Ordering Location: [facility]               

            



                          Received: 2022 11:57 AM Services                

           



                          Pathologist: Homer Thomas MD                    

       



                          Specimens: A) - Large Intestine, Colon -              

             



                          Transverse, Bx mass B) - Polyp, Colon -               

            



                          Left/Descending, taken by hot snare C) -              

             



                          Polyp, Colon - Left/Descending, x3 taken              

             



                          by hot snare D) - Polyp, Colon - Sigmoid,             

              



                          x5 taken by hot snare                           

 

             DIAGNOSIS (test code = 3220) [id number]

pcd                           



                          WMxIHtccnRmMVxlcGljOTYwMVxhbnNpXHNwbHRwZ3             

              



                          HeujnxUGjdIY3kID3iwHajmMHbwJRvYAClFcZrd9d             

              



                          fk360iKCor4luEMVUotevfKh3dMpvR09mo3V6Djld             

              



                          B99dlISgRJA9ICGwZXUvxXAjSJWaPCI1YXDqkUKyE             

              



                          7nfWCVeBE9uceemMAepSCioPZTnzSD4CPXcqXNsX9             

              



                          TdDQOwLWrqLVCkidg8GvEmLm1ujPHemNntLMxdUYX             

              



                          pUSHoKDznJWOoCcGoTZ2gFOBTO4SpDJ3IGXDJXI3A             

              



                          BEDNVrQDR2EFYcKIJLNXZO2SQM7RY9RjGKVJC2HIB             

              



                          EcyxPGoHMNvTFTGOwAJU5aNFDDIUPHGD2RDLeCNQs             

              



                          6FNGviKBVmWSTqCKFMYSFTOTDCL8JEW2XZGePLZRC             

              



                          DUklQVElPTiBccGFyXHBhciBCLiBMQVJHRSBJTlRF             

              



                          V1MCAyGjXTPBW3NEJwUKHoyaI08NY74rZK9NGSQsZ             

              



                          KCFH9XROHobeJSqYVIzEKFIQJGSESLTEXIYYS3AZA             

              



                          QtOVNMUEvAPT3KYFMaPRSssbrjXZWzNb2uWUBRO1I             

              



                          qEC3KPXKURJ6GNTCGNBPNTU0FHA6HMCKYII2QHKTW             

              



                          TFlQIHggMywgQklPUFNZOlxwYXIgICAtIFRVQlVMQ             

              



                          OIpZKGPUq5GUWlfKcWCV60OXiDNRJpxERPnWFTuSS             

              



                          NYG5DKBSUdE4SYPcDMEJVtQPJUKU8NCKhfNUYrqUO             

              



                          oUYVdCWuIKlwCRByXCPEKWPnQBNguW2kLRG6NEEIC             

              



                          M4cGZbGLQ9vITKZ0JSCpLGVHL4RPHUjwgJEgJIHyW             

              



                          LXRUIGGFT8AZKxZE9ESVIGBCT0MCBQiROCCVQtHYS             

              



                          6TXFFifVYqTFQrAAHVHNVCDiKAMTUXNDMcGM6XSSQ             

              



                          zEKWhsk30ZRK5UpBoa1Q8AJW1PVEjMFQew6jyOWPi             

              



                          dNGcWcKfHqHdQuCkSprgjGRxZSLvMfTht5mxy854w             

              



                          WFcc2erOGGgVlF1gUEhZGGrsFFoY841LVLgZQxev0             

              



                          sly1YcHGNriXJsv8Z6SXXRjouloQa2iCwaD27vc1S             

              



                          0YftmY7asUWJrRQEgZ6OkMQ2qXCHnDuk8NCR8UOQ4             

              



                          TLAvDAWdR5EmFO8hBQEhwOChTCs9k0zntVszDWZsH             

              



                          NZ9w5oeKTqkpyWzXL5ucl5jrRt5a0jwmjKgADWbZI             

              



                          EieWBALNCgZ1UrvKmuFa5vgIw0lVacBimnTUK9Fkb             

              



                          2HZ5btv58rxy6dGjwCIFgefncVsO4YAanCBWylmyc             

              



                          OHy6MShmPQDouYA2UCRikVViV8GeONEkZS6fmte6M             

              



                          HJ9CFhyKYRyVtL0ENUkbRXlCBBapKtbLZvva180DR             

              



                          C0OaFqMV4wB8Gyq9M4kX8igGJlQNQkjMAiYuBgBVP             

              



                          kcf2arNVfHEfro6FfGNA7zpE0ePPmpYRsQEUhQdP0             

              



                          PLbsAO6kzw61NNKkUSZ7bw7dcKZmrYvrcsPubJBbQ             

              



                          SotX9TnWMGxa895OXIuD6YdNXMga8B9gfAmCkFkBJ             

              



                          WdxEV5nzL2BBEuXX0tycgmp8hxKGxxHSdbSSBvqtH             

              



                          6apN5RWSvpMWsR3WwsI0uVFQbQY6gjqrfa1hsNUO6             

              



                          PEotASYvNZX7RiBlYFUku8Dgflg2LmJpn3EefOFaO             

              



                          TodJ68vy117OTDeikIxZ9ncoBOrgjtmvPVovyszNX             

              



                          oavtE1BWNdGQewroyeHSSxSVwlP3dxLpXnRLMrmWv             

              



                          yATpbe8RnBDGlMEZeTaRhbYPjFMKfTtf9VKTvaXRb             

              



                          ACPdMyQdQ5aakbfuFqBASIRpp5foJ9nqaXYDuPYmB             

              



                          3ZiNZklkbAkOXofTIcyHwQhUKl4XK31ZHjyNKBvre             

              



                          19                                     

 

             COMMENT (test code = 3359) g3jksOBwDJZmeVJ9XzLrAGXar0hzr4EwbMYvbNQq

X                           



                          TdvcWUfhcYnaz42dUE4kL69OR6gXPEhPlL0FHYamz             

              



                          P1Wwk5ZLYoWZCifGXqD197a1bvk5hdxiWziRO3nZv             

              



                          sHZQqakglKnL7PClnIHRjrtjyKBh4BKyaDVHoqLN0             

              



                          TEKnrCPmG9ByVPWbRG1rusa5DDA9DPlwYVRfPaU8N             

              



                          QEeeDQdLVEvvJzwXRrtd207GYI5BuQzLTMmvhDawD             

              



                          yztY8jJxKlWXHUlT8gkmKXADUdOSHtYUCroPPhvXN             

              



                          ldPNhd6GrB7EkfAVyBCOpqFgyJi8jMHC8xANwFDZh             

              



                          jpGweDoefnfkh8L8SJgnbo2coRExhW==                      

     

 

             CPT Code(s) (test code = 6900) b5vexIOkKGImhOL0GzDfQLXqx5bel7RbuAPx

cGFyX                           



                          LfipIUvhhNcso11fLI9iT07GD8vCCFeYtH9MAPsrv             

              



                          Z7Eqv4ZBEdPQZrlFRoM090d2apn0oonyIdpYG3mFm             

              



                          eVAQdzfjhKcA1QBcdTLDuhaniFCh7EVxdFBXisYG5             

              



                          HUDmcOXkO4IwUJGhQE8unnr1WAM8SIzpOMOvWuB5X             

              



                          XKdxCLdMNThpCrbVQscx870BGE4EsAuEWRdnbTivJ             

              



                          pdbT4uKfSmPRX7KHOhGYP8NRWqCZl9RbMjDRX9FDG             

              



                          0EEE6ARImxQNkzY==                           

 

             GROSS DESCRIPTION (test code = c1nfrWGuPTMhvES7DhTbREKyg2zqf3SafLFx

cGFyX                           



             [id number])  GvipOSgfdHchh40bZE8zN40SI5fTJZsPjF4UTJnon             

              



                          J9Epj2PQPePEHcdSMqT088q5tio8lisgXaaHY2FGN             

              



                          hKIOyQ9SpAB3rQHAbqFGvO95qzSUzJBE4RHVuCURv             

              



                          iETbCCRkUWA7OQDtuOUnS7swTEFwYC3sezppWUlbO             

              



                          TouOAVleJQ8WXMvwGHcY2NcSIKuAHfiIQUapcw6Nn             

              



                          MkUr9ckVCsyDhlSJotSLDto9ngNGXemWZcWXF6MQb             

              



                          vnFTiRVGkWTTmGHn2MWOdKVaaaWSsNX5zxUrnVffd             

              



                          bIlxj1IxwKTmHYtrYIMtXIQcNUvgVFNmR3TYIYIlI             

              



                          pU6ItC8FrFaVMc3EIu5JN7DYkNzIRWwEEMgYTV9XN             

              



                          ApNUy6UVxlDU4MLUE6DrR8VXr1NGQ0IXOjMQEfKIE             

              



                          hLmXbALMdBLunBgWUqcfyxFAhYP3stLkutCTpqguc             

              



                          rkMsTGKdPByrxkznFXnxlFQosPahFSlzN98gv47lW             

              



                          DZPnpExh0MihmXwNnerZNCuUbTxAFdzYcHzKaXqUR             

              



                          q6YMPpwZ1kUg8urTBqtA6ytQYoUEuvWYX3cVCcVXT             

              



                          iQKThCRVzIX04XYszZGUkmeAjQLkppVCgmSXwsVFi             

              



                          AEDwweJzvtUrUgErDXSfIHAamExrMiVhESb4H7qfi             

              



                          ucgUEfcpSAjqOrhGL7hf9qblc41eaYgm5ZtlnDwHI             

              



                          Tei4SsjZXrZHOcFsVmueFzY42bk8dwaAAga9KpcGO             

              



                          acKcehWLwxDIeFHWbaenqLHblzqPvyWzlkrYmz8I9             

              



                          XPBgu9C3LDYxlgVgwZHgyPTrSUWfPSS4VNRcRSB7O             

              



                          LEtIcMedGUrloZzD1xyMAvudAVzRcXzOXqtYVBfIW             

              



                          ZbcKQeEWwvXSH4Jj2siGWtMWNioaX8k0NvCIpjPZC             

              



                          vXyolHZUqU6DyR7JlbtHpaZYkYIXmaiWfe6ftCTB4             

              



                          QWXtdVBxjWQgXzExWeshNAQ2n0vcPLYxcLIhFYV3Z             

              



                          QfykWKaSYHfNLVzOMrrNiAQCqDzVqViRyZqCTV2Wo             

              



                          DiISc2TUbvQ6JJINSeQOO4KCA1HihhImI3BDc2YOR             

              



                          FAc0cKNL9AMsiFRE3CLS7OeJbTDajsKDtHNvuHkql             

              



                          FJzdGAStoGCtDRdxdgN9TWGhUjPxD0ZaBPYfPYUbo             

              



                          QtkKMEMb2vydbXsIDkoKqVgVNQdV4MhQVggOy3mlC             

              



                          ZaYCFiJeXbG0VfSGOuI8VukiOyCJoyYUHerx1elAf             

              



                          dVQfrNxWkOYUxb7d8eAZ3cPPqhZO2bBPgrKaeHkKm             

              



                          RU5whQJlOJ5dBVxlWAegtpZwr4BdIA12yMLiszHpk             

              



                          aShUGJ6CkUnIWuqXGLbb8u7nFkmU86vj52wePCwaS             

              



                          KwLVDtXX0moQ6qQSCmw2UgVMD2NQarlHQxprKcBHD             

              



                          tLJ7qVLQzbjVgm7RjTD5lII42yYCvyYdiBJCkwd3k             

              



                          mV9wSNVsvmYuZ7CyKNLvYB62z4vqPHUnBqCksAxhv             

              



                          5ZeCBPoNRtjRR85NFopWeKorUKdPqBjsMJvBnWzL7             

              



                          3asA8pHSdhlsPoAZCqYH7fVXPhOZWzmEQtxD8jddX             

              



                          kyuRzoAIooKU9CYPtlA9ryW51feHuynKWGK70TDTd             

              



                          tMNeUBJ0LT0mVRXaxsupETCwKSFkQQC7WLydlK05r             

              



                          YRbXGNrMOGlnBKgdMgyVcwsjNjub6SvgJQsFDmuLB             

              



                          ZpAHHoWRkbJUWyW8JCUNGkMzH9YtU8XmNgETn1UHq             

              



                          7HJ2ENnHqWJBuHACnXHD2NvXtKJs3WQaoBS9PWCT1             

              



                          PtR9BUboAMV1QNRqHWWqIXFsQrNvYJUtCKexCySLw             

              



                          cmhoUDxGB9bhMfomdPfSLQhQBXDZlAQq6i9sCiiH7             

              



                          5rg64fHXJNYFI6B9Fkr4CyqyXwjnooPAXkmntvoiA             

              



                          gWXXyVACBFYOzzITiGGCqmsOro0XaQHbyvmUaYPTa             

              



                          wHEaVFnbiZrscZjrIZLlwPqadtYcI7G6nnCeSF7kA             

              



                          YGeASUuG0NrYWEeG96qCDGgoB1uGPQxOM9vUCj2TG             

              



                          AiZQLbQjjfaW4uqWLdBHOiqE9vRThmNfSeKNTbG1B             

              



                          xOGexCjE6UsN0QNcrzhYnbSYho5Lea03hffXfMFDx             

              



                          FORdk85wmRF0wqTrGlQpiVy9cXGpHDG7DU1jlSVvf             

              



                          U65MGJtim1pDOCsl9C2ISTxn0H0HUYsbcHogMOyaC             

              



                          LiZXXzHFR6HIJqHAP2JXRyCNXbfXMoxiOqW2kmODb             

              



                          nwPHxIoOoITzwJGyegxpmk9Vqm7WeyXAsj58fwQL6             

              



                          vCAmbLYgFtCxB41tabCtkPWmMtsvUVH0XPVfsL6es             

              



                          1fawqL1SJ3ueGmhjyLdvGZgr0JmXxXhQV0lZCIlLQ             

              



                          OfjFSpoP3traOvhnOmfKLurSD4NFUwHT15zHQaiNu             

              



                          dwP7iDgSbZeJnOJMeqnjvEEMxZL9uJFMdEBY3YEQy             

              



                          lgKRrYEgSDDpd7TacSrggmUmJTFstI5tePStSLCud             

              



                          vYMTNR5eB8jPSCpIVC6TSKmsmDYDVymG79ckBH1vC             

              



                          XezXHqFaImJ27dviDpKEQluhNHOrrgH3HcxDSel56             

              



                          udOI7cWDuiCGpLHIbn4RfrNMur1dewKzyi5KjkISu             

              



                          FRxqHBLooHRqYKtddB7cOtSly1cuqCn2BItehbY8M             

              



                          GJxub69DIutJSSfN5KqX9RiQNugVJO7UGFjPkJwFG             

              



                          GtNY1JTjWrXYbXRZDnGZo9SvK1LAb4OZMYShHgUpY             

              



                          hPwpyHNuaYNHbIQf9GIi4KSfNXdG1TKG7SbQ2GvBr             

              



                          PYAuJzEbSWt6LSMlWTitwNMjQSSzLRAiGSlmZXrgH             

              



                          68vPsNdSJdaIvZjRX6dOB3ksAReXDJaiO0gAK9hR3             

              



                          ofpL0cXD3hqEPuBWGmRkXyK5DaKJSvA4JitkNcWTg             

              



                          gGZCrjq5joBmhOFacQwGcDMCmq0w8xPC1nROuqYT3             

              



                          pWPpkPrhZjPgSI9yhRWkYF9rVBqlZUmejrEhu7LrQ             

              



                          X32yLSluqHlnuGbVVK4PmIwNBnpOPWno3a4xDzsW5             

              



                          1et47ll5njfE7qRYP7IOO6YUzevuXndYLfc0Mmn02             

              



                          vyzPnCYFzWLOnr08biVE8abLfVaYgnRz8dXCdVTD0             

              



                          VR2nvUrcrcxdl9EukHZcZCFlAQOgO5AfTBYyVNAlm             

              



                          2A4TUZut5P5MWFqwuDfiETiyQIpPMWsptihzdlpLl             

              



                          KhdFOtKzHwY21xDXKrKzpaG21xsL6qY7WkZDOds1W             

              



                          yJJovAA3iwW4cHVT2EaNhuXRcFrEiuPHyEbEsA58w             

              



                          eZ1sRSsnjfOlAHMpOY8iDPEvSFDvOLFvEDI4NQVfZ             

              



                          XShA7AzXSHiEFRls5H1VZIbi3T4UPItrbCnuUEcjJ             

              



                          VnhaBpiLOakerbXUK8SMNcaQ2aa4ygdcS7GC9vpQp             

              



                          wabgcYq3jFYroaAOdo8ImfZExcVDcO1E0EOZ3vdZt             

              



                          P7GgKZUPsRDev4TfZ1vpTD6nrEZyf0LjkYh2cYZnZ             

              



                          KPesAwpKIo1MCvnGIImYYTdHijjNOQeyRLpUJyfk9             

              



                          VrvDTXZBnruCPhfmEVbNt4STocWTWso0W6IGWjzQi             

              



                          qSBQiS3UcH6FpbvN8v8gksEswa8ZidJUsTN3xkBLw             

              



                          fQ==                                   

 

                MICROSCOPIC DESCRIPTION (test code = m7ljfDQdVYCkpSZ2ZkEzSZCwh0e

sx9PkhPDqwBCoY                 

                                        



             3377)        MjocVXswgVsux46iWA8cM76QT9uLFQyEnQ3ROHahm             

              



                          T3Pau7JJJvGJGjmUOwD186c6ass1mwscXtzKJ4iTp             

              



                          rEWIpbjctHdL4OXtyKGTrblvhMZd4IUvjORKkeWK6             

              



                          KOZyxDRqJ5MrWQYuSD9dpfs0PAC9ZGbxOVUvDrP4J             

              



                          RDatKBxVWKvaAvsGJths620FLK0YwFfARAvogIrhR             

              



                          cqeO2vGiTfOVNPdV4ihNKlsCj7s0hmJJVqNZZkdQS             

              



                          jgJ3zmrUecR1oj8AjJHFwIAIuc2RvHIHqf98xmOIk             

              



                          nBPNOJMgEFQ7IXLrBW1uH9P8jPQcPWhrCKQ6xB8xU             

              



                          KPznHzvHDfxrQgue0AgeW2yOYKoRUR4aXPqAJRogQ             

              



                          TtqZQyNIBpIOVbxwJgj4cmi5FdnD8gbdglPhVIDMO             

              



                          ALMEEUIEXDKGKDJIvUJ8JBdedKTKRIIKRNwIis69v             

              



                          ELLbnNZUIcysKQJhzUpyFY2zsG7cqSohkS5ysPOjy             

              



                          OT3iqtpwwQrwBk7jisdgTEwSYHjCBVOS8mmJwbpYG             

              



                          CrBR64IRT4JFSfMYIjzKOuEQUeTUN8oZAis2Zcz22             

              



                          daYDxUD2JUB63ElFlRrLAzyPnA1InArJmAA97U3ji             

              



                          BCUnWJrginEax7dofnbvSVPpHXhMKUN3JSegFDpnz             

              



                          TSkyAlrJJQtwjWjaETonbIanDOfQJSbzQ4kPKCmpe             

              



                          USEQXgYlyhTJDxDG76QHG8SCKmHCSllBWlOSGdWIM             

              



                          9tPTvw2Fsk68jyAYjEBOyoeGKmrHhmtVzEXCvzFvm             

              



                          ybwxqBIbAVShXx4wgG5eh7dtKQNoq1BwltKchIMgd             

              



                          fIcvYQfIRWogN7zCY4pQJ2LUvAwvc87WUimnefoND             

              



                          ZbzJ71QXDsf0BmCtricYK1AYLgTUGnOsTyrAZia3L             

              



                          oqPNfmOu7NULeesR3GXGesKr0wU7jmLwuASjGA3bh             

              



                          SCkuIFxwYXJ9                           

 

             SPECIAL STUDIES (test code = 3376) g0wliXBlFQLav9ugKVHfsXEiFsGmIiFn

ZnRuYmpcd                           



                          IEdRQawqiWqQMizs5AcD9VkTzQbHOyoavJvKZZhCy             

              



                          igffmcIZFhQBZ9wdRtVAWsGAbpELGyYLgaWu3oiEI             

              



                          acVkcTiYhLRIdv6efjwDVezzzwVi5p8ygZXUrEcQ2             

              



                          eQQaUQnxG9xqnlUhbGAiO4VrmJUuwNl6e1ltQlDbT             

              



                          tZ0tOPoHThiG7xttfWwoEKyMMNaNYj2pO30MDAjzZ             

              



                          8dyVWsLMeydxKgCqZ7ZMoeLCUsCwD4IMYgzSYlIXZ             

              



                          rQ5taSLCnLKijEXDtEYpvyKJhOXH2oLtvx0N5bPHx             

              



                          bAYwlPwoFtFpIjVgMjNNv9SkGVu9iQmrH6WzJAXjE             

              



                          uD3xBQmMAYxZJejAVAeSFNmhtM4uIfydeMai11bbK             

              



                          OoHZKvKWAzElTkrPjcBOJfMXZBa4WznHfyTGF3fGp             

              



                          7fMizJcozDXQ2Ozr7RJ3kcv74xzk4dCeaYAIbzxdm             

              



                          IoD3BTusAWPzyksyQVz6EPueTXUrcUJ7DDHwcCYmP             

              



                          9YaXCBhBP3tovy6YQW1ILfaZPClQoM9ZPXmlTYsAP             

              



                          UtzFdmVMhxe548GXK3MxAzXI4eG2Lvu6E0xD3kbBN             

              



                          tQHUohQSgHaGsDHKzjq1iaQTyXEuxc5CcZDZ5vxY6             

              



                          pCDffIKgXXFgZN12Cihqz5ItXzlzc1GiR10wyWV7T             

              



                          Bump8whRZ8uSrQ1jiKkHAmey6rrbO1eQwW3BPbxFX             

              



                          5mSR6cISQidY9uhkusBUZmBjGngrvyBXCxoHhhnjU             

              



                          fAl2yvPgqZNP1IIxtW7urwH3oJgY5XWskL8vbgF6f             

              



                          QVk5QMvolVJ4OZTymM0lTU1sjmydc5efBTbkRHrcA             

              



                          ZCzewX6eaV2OROoiHFnK7VorG1bIZPzNQ2imgwud0             

              



                          rgDRF9QUbpDZEmBJI8VwZgZXLaz8Zsbdj5MoZeb6F             

              



                          gwUBbUHquG19rp168EOXykcCvF8ahzWZfcehujEIz             

              



                          oqqpLRpwqhO2WGExDCOeWHnaTHRnCMYhPoMnfGAmZ             

              



                          jDuIbAqlKdtxEgtGTstPhFwVDRmRNitH9vlSkNvI0             

              



                          JmXFRnSxJnVKqkQFjhcYHdrLVpkSD1lG6gTJ4eARK             

              



                          hoTBkF4KzOUUcxvGfiDNpVAI8kXCziEDyQZ1uACft             

              



                          nOZml4clj9TqO8lhsFugwDE6CR7gSNIxXOBbSXoue             

              



                          2IgtX7sHmpnyFVcfsmhFQueggLhUNcsnhjvILLlBL             

              



                          sgG8vvHeYlIXBevVtvYCije5PcPIJlYAKnPiksytV             

              



                          sURc5ypApJJKuemwiOLSouEiunO0iBtBfZrTrJoya             

              



                          ZH1dZBMvW3woxORsACYrEYWeW7xtUxJjuY0qkQnrF             

              



                          XnjGxKjLpIvMlFNi282wp5uJYGyrEEikbAWtOAtdU             

              



                          9iSHabKIbzQBjsjOMvROjpz0myQAEsj9o2dOLwMJU             

              



                          gesOir4wdUPylcyUhKKItvAIlnFKsPROay86kFNhz             

              



                          cWlqnTlwZZFsx2SbdEota1BbLbIjSIsai5SbD70tr             

              



                          CZndXOuxWxlBSVlojPeSJDls10ic3rsBTViMwY9uX             

              



                          ClyMJ2gYUddDWoa8BuwXtmKXAaf9jjNSHxag1nqgt             

              



                          plXFek9ZngQ6iqnseYZesmEZesdJxYSRea7b5sCZh             

              



                          USYwAZDvJDzlvEk4XEIra194ud4kriK9sRRaRVE2K             

              



                          WlsYWJsZSBhcmUgZXZhbHVhdGVkXHBsYWluXGYxXG             

              



                          ZzMjJcbGFuZzEwMzNcaGljaFxmMVxkYmNoXGYxXGx             

              



                          vG5qhHpQyQ3OhQXIeZeNxmIYaL8ixtCNzBEUgCXrs             

              



                          XGYxXGZzMjJcbGFuZzEwMzNcaGljaFxmMVxkYmNoX             

              



                          GHeZVcvJ5oeUtYxZ0RyZTWsNoLtJWjktIBcgnrgSP             

              



                          fbwfUhTCwunxuvFYHnOHnwS7ziHmWjBQXdsDkoYMf             

              



                          bv1MdOIIlPKIaJoptxmHlKBk0xfCjBUEusbmokYQy             

              



                          hovrYPsyevKfRUfjsmmmDXPcRGnyC7ukGeCeEXEcm             

              



                          HiaIYrgb8OyYHSoWQMwEogywfMiDTqovFDvw5xhz9             

              



                          WdP6qyvIxecDS8LDCfC7bgsCFbhEH3DDE4jE1zAXo             

              



                          vduFgYZYdi8JpFBJsNCFxMyH2rF9gSAO4UgVXcNsc             

              



                          XHBsYWluXGYxXGZzMjJcbGFuZzEwMzNcaGljaFxmM             

              



                          HhbTaCyGLUzCPyuF7jeOaYhZ6DaEDYiFiMmqGalDZ             

              



                          cgSGf2QrzojGYdmaegMXeveaCzIAeeppxnBAHyPZn             

              



                          uL3gvEvUwPTKnkMbwIEyju3GaQWSrFQBiDnnddnUd             

              



                          GMApSJLttITbyYKZMM77QEVwKHIjjCjapG0zcHCYA             

              



                          UOdxqI6o0P7TVkvQQEmUWr0LWhjdoLaLAVodL6zPX             

              



                          GpHF2zTBs4boSdXZSkm8MmKY5iYPVomQZqZHC1UOI             

              



                          jg0DfH4Vjz7UuMJXgDTKcun1sddFrKiDAvOHsOYOa             

              



                          cf08YPVoMM9wT2vpVLLkJRSafgGfyTGhh7SbYUVev             

              



                          LI0oMFzQN9NNjCCk08dBLKiKHGNgcGtTATedBwylC             

              



                          E1kjH3wJ1aUgLOqTLjCcSKLZjzbmKySXMqrw0qbhJ             

              



                          wXJGuLGCym0JqdLCbnBImehGsS4Kti3KrFMMgtv92             

              



                          NEkjpKNmpb57OV5dV2Fjm3OkfY7oBNvyRIFwz1Yxl             

              



                          CFbgLJlBBCmu0ZmW6maxquhJCaqpOYunN0pQTIeTJ             

              



                          p5BYCyr1VyEXRcx0JuNxLljzTmKIJvOFBmBXGscF7             

              



                          5CIL6hMmasRtcxpKoBY4aTAGvitFjEHObWGEdhF4a             

              



                          ZHkhtiBoIRDjbxW1a3U1XMjnKXYdwrKnAkbbCJE7m             

              



                          uWdigC2rOMlF4feexqcUDqbNDTii1EgjJ7cvPRTuZ             

              



                          Vri6VxsVJhdMNDcIYyHV7ngzLuDM0vCXM0WFsdCFH             

              



                          LWMHiQGzqNDRoAYD9KMofZmmtTAA5ufBaQLWgq0Ly             

              



                          DTukS6paO01xvMrcbZt3iHZgkXfibBFprEIoBRUnl             

              



                          bS9c7I6ECBez3EslejcGKDqZRxqZZCkULIvRrIilK             

              



                          HtGuMaEiTahXkasUvoGpdlRpSvKNWpYVbvY6iuHvU             

              



                          qDfWgEdnbAPI6mW==                           

 

             Gross assessment was performed at [facility],

                           



             (test code = 2777) Department of Pathology, [address] [place] [id number], Tel                        

   



                          [phone]                           

 

             Technical component was performed at [facility],                           



             (test code = 2778) Department of Pathology, [address] [place] [id number], Tel                        

   



                          [phone]                           

 

             Professional component was performed [facility],                           



             at (test code = 2779) Department of Pathology, [address] [place] [address], Tel                        

   



                          [phone]                           



[id number] 08:01:38Surgical Pathology 
Report Case: [id number] Authorizing Provider: Elayne Longo MD Collected: 
2022 10:01 AM Ordering Location: [facility] Received: 2022
11:57 AM Services  Pathologist: Homer Thomas MD Specimens: A) - Large 
Intestine, Colon - Transverse, Bx mass B) - Polyp, Colon - Left/Descending, 
taken by hot snare C) - Polyp, Colon - Left/Descending, x3 taken by hotsnare D) 
- Polyp, Colon - Sigmoid, x5 taken by hot snare A. LARGE INTESTINE, TRANSVERSE 
COLON MASS, BIOPSY: - INVASIVE ADENOCARCINOMA - SEE MICROSCOPIC DESCRIPTION B. 
LARGE INTESTINE, DESCENDING COLON POLYP, BIOPSY: - TUBULAR ADENOMA, FRAGMENTED 
C. LARGE INTESTINE, DESCENDING COLON POLYP x 3, BIOPSY: - TUBULAR ADENOMA, 
FRAGMENTED - SESSILE SERRATED LESION D. LARGE INTESTINE, SIGMOID COLON POLYP x 
5, BIOPSY: - TUBULOVILLOUS ADENOMA, FRAGMENTED - HYPERPLASTIC POLYP Signing 
Pathologist Direct Phone Line: [phone] signed by Homer Thomas MD on 2022 at 8:01 AMBlock A1 has adequate tumor cellularity for 
future ancillary studies.88305 x 4, 73713, 70653 x 5A. Large Intestine, Colon - 
Transverse.Received in formalin labeled with the patient's name, medical record 
number and "large intestine-colon-transverse biopsy mass" and consists of 
multiple tan-pink, irregular soft tissue fragments (2.4 x 2.1 x 0.3 cm in 
aggregate). The specimen is submitted in toto in A1.B. Polyp, Colon - 
Left/Descending.Received in formalin labeled with the patient's name, medical 
record number and "polyp, colon-left descending-taken by hot snare" and consists
of multiple tan-pink, irregular, ovoid soft tissue fragment (2.4 x 2.1 x 0.4 cm 
in aggregate). The specimen is submitted in toto in B1.C. Polyp, Colon - 
Left/Descending.Received in formalin labeled with the patient's name, medical 
record number and "polyp, colon-left descending x3 taken by hot snare" and 
consists of multiple tan-yellow, ovoidsoft tissue fragments (3.0 x 2.1 x 0.4 cm 
in aggregate). The largest ovoid soft tissue fragment is bisected to show tan-
pink cut surface. The specimen is submitted entirely in C1-C2.Ink 
code:Blue-resection marginSection code:C1: Soft tissue fragmentsC2: Ovoid soft 
tissue, bisectedD. Polyp, Colon - Sigmoid.Received in formalin labeled with the 
patient's name, medical record number and "polyp, colon-sigmoid x5 taken by hot 
snare" and consists of multiple tan-pink, ovoid, pedunculated soft tissue fragme
nts (ranging from 0.1 cm - 1.8 cm in greatest dimension, 7.5 x 2.1 x 0.4 cm in 
aggregate). The largest pedunculated soft tissue fragment is quadrisected to 
show tan-pink, focal hemorrhagic cut surface.The specimen is submitted entirely 
in D1-D3.JESENIA Braswell studentSynaptophysin and chromogranin were 
performed on part A and were negative in tumor cells (all stains are with 
appropriate control staining). MISMATCH REPAIR (MMR) PROTEINS on Part 
A:Immunohistochemistry results:MSH-2: Intact nuclear expressionMSH-6: Intact 
nuclear expressionMLH-1: Intact nuclear expressionPMS-2: Intact nuclear 
expressionInterpretation:No loss of nuclear expression of MMR proteins: low 
probability of microsattelite instability-high (MSI-H). The interpretation of 
this case included the use of immunohistochemistry or special stains.Control 
Slides Examined: In-house known positive controls were evaluated along with the 
test tissue. These control slides run alongside of the patients sample show 
appropriate staining. Internal positive and negative controls when available are
evaluated Immunohistochemistry technical testing was performed at [facility], Pathology Laboratory where it was developed and its 
performance characteristics were determined. It has not been cleared or approved
by the U.S. Food and Drug Administration. The FDA has determined that such 
clearance or approval is not necessary. The test is used for clinical purposes. 
It should not be regarded as investigational or for research. This laboratory is
certified under the Clinical Laboratory Improvement Amendments of 1988(CLIA-88) 
as qualified to perform high complexity clinical laboratory testing.[facility], Department of Pathology, [address] [place] [address], Tel [phone] of 
Path[facility], [address] [place] [address], Tel [phone] Beach [facility], [facility] of [facility], [address] [place] [address], Tel [phone]-Glucose [id number] 13:15:36





             Test Item    Value        Reference Range Interpretation Comments

 

             POC-Glucose Meter (test 121 mg/dL           H            : TE

STED AT [facility]



             code = 1538)                                        [address] [place] [address], 770

30:



                                                                 /Techni

shelbi



                                                                 ID = [id number] for



                                                                 Brendan Fuller

 

             Lab Interpretation (test Abnormal                               



             code = 97974-1)                                        



[id number] 13:15:36





             Test Item    Value        Reference Range Interpretation Comments

 

             POC-GLUCOSE METER 121 mg/dL           H            : TESTED A

T [facility] [address]



             (BEAKER) (test code =                                        [facility],



             1538)                                               [address]:



                                                                 /Techni

shelbi ID



                                                                 = [id number] for Colt De La Torre



POCT-GLUCOSE [id number] 09:02:57





             Test Item    Value        Reference Range Interpretation Comments

 

             POC-GLUCOSE METER 125 mg/dL           H            : TESTED A

T [facility] [address]



             (KEON) (test code =                                        [place],



             1538)                                               [address]:



                                                                 /Techni

shelbi ID



                                                                 = [id number] for MAYANK OWUSU



SARS-CoV2/RT-PCR (Asymptomatic ONLY)2022 07:59:31





             Test Item    Value        Reference    Interpretation Comments



                                       Range                     

 

             SARS-COV2/RT-PCR Negative     Negative                  The SARS-Co

V-2



             (test code =                                        target nucleic



             28253-3)                                            acids are not



                                                                 detected in thi

s



                                                                 specimen. Negat

pablo



                                                                 results do not



                                                                 preclude SARS-C

oV-2



                                                                 infection and



                                                                 should not be u

sed



                                                                 as the sole bas

is



                                                                 for patient



                                                                 management



                                                                 decisions. Nega

tive



                                                                 results must be



                                                                 combined with



                                                                 clinical



                                                                 observations,



                                                                 patient history

,



                                                                 and epidemiolog

ical



                                                                 information. A



                                                                 false negative



                                                                 result may occu

r if



                                                                 a specimen is



                                                                 improperly



                                                                 collected,



                                                                 transported or



                                                                 handled. This S

ARS



                                                                 CoV-2 test is a



                                                                 rapid, real-roxanne

e



                                                                 RT-PCR test



                                                                 intended for th

e



                                                                 qualitative



                                                                 detection of



                                                                 nucleic acid fr

om



                                                                 SARS-CoV-2 in a



                                                                 nasopharyngeal 

swab



                                                                 specimen collec

froilan



                                                                 from individual

s



                                                                 suspected of



                                                                 COVID-19 by the

ir



                                                                 healthcare



                                                                 provider.

 

             KEEGAN (test code = This test has been                           



             KEEGAN)         authorized by FDA                           



                          under an EUA for                           



                          use by authorized                           



                          laboratories. This                           



                          test is only                           



                          authorized for the                           



                          duration of the                           



                          declaration that                           



                          circumstances                           



                          exist justifying                           



                          the authorization                           



                          of emergency use                           



                          of in vitro                            



                          diagnostic tests                           



                          for detection                           



                          and/or diagnosis                           



                          of COVID-19 under                           



                          Section 564(b)(1)                           



                          of the Federal                           



                          Food, Drug and                           



                          Cosmetic Act, 21                           



                          U.S.C.                               



                          360bbb-3(b)(1),                           



                          unless the                             



                          authorization is                           



                          terminated or                           



                          revoked sooner.                           



                          Fact Sheet for                           



                          Healthcare                             



                          Providers:                             



                          https://www.[employer]/Documents/Xp                           



                          ert%20Xpress%20SAR                           



                          S%20CoV-2/Fact%20S                           



                          heets/302-0122%20S                           



                          ARS-COV-2%20HEALTH                           



                          CARE%20PROVIDERS%2                           



                          0FACT%20SHEET.pdf                           



                          Fact Sheet for                           



                          Healthcare                             



                          Patients:                              



                          https://www.[employer]/Documents/Xp                           



                          ert%20Xpress%20SAR                           



                          S%20CoV-2/Fact%20S                           



                          heets/302-3801%20S                           



                          ARS-COV-2%20PATIEN                           



                          T%20FACT%20SHEET.p                           



                          df                                     

 

             Lab Interpretation Normal                                 



             (test code =                                        



             20982-5)                                            



[facility]2022 
07:59:31





             Test Item    Value        Reference Range Interpretation Comments

 

             SARS-COV2/RT-PCR Negative     Negative                  The SARS-Co

V-2 target



             (test code =                                        nucleic acids a

re not



             3969275)                                            detected in thi

s specimen.



                                                                 Negative result

s do not



                                                                 preclude SARS-C

oV-2



                                                                 infection and s

hould not be



                                                                 used as the sol

e basis for



                                                                 patient managem

ent



                                                                 decisions. Nega

tive results



                                                                 must be combine

d with



                                                                 clinical observ

ations,



                                                                 patient history

, and



                                                                 epidemiological

 information.



                                                                 A false negativ

e result may



                                                                 occur if a spec

imen is



                                                                 improperly pina

ected,



                                                                 transported or 

handled. This



                                                                 SARS CoV-2 test

 is a rapid,



                                                                 real-time RT-PC

R test



                                                                 intended for th

e qualitative



                                                                 detection of nu

cleic acid



                                                                 from SARS-CoV-2

 in a



                                                                 nasopharyngeal 

swab specimen



                                                                 collected from 

individuals



                                                                 suspected of CO

VID-19 by



                                                                 their healthcar

e provider.



This test has been authorized by FDA under an EUA for use by authorized 
laboratories. This test is only authorized for the duration of the declaration 
that circumstances exist justifying the authorization of emergency use of in 
vitro diagnostic tests for detection and/or diagnosis of COVID-19 under Section 
564(b)(1) of the Federal Food, Drug and Cosmetic Act, 21 U.S.C. 360bbb-3(b)(1), 
unless the authorization is terminated or revoked sooner. Fact Sheet for 
Healthcare Providers: https://www.[employer].co
m/Documents/Xpert%20Xpress%20SARS%20CoV-2/Fact%20Sheets/302-3802%67IXLR-BWT-8%20

HEALTHCARE%20PROVIDERS%20FACT%20SHEET.pdf Fact Sheet for Healthcare Patients: 
https://www.[employer]/Documents/Xpert%20Xp
ress%20SARS%20CoV-2/Fact%20Sheets/302-3801%23PDEX-YXG-7%20PATIENT%20FACT%20SHEET

.pdfPOCT-GLUCOSE [id number] 08:23:46





             Test Item    Value        Reference Range Interpretation Comments

 

             POC-GLUCOSE METER 106 mg/dL                        : TESTED A

T [facility] [address]



             (BEAKER) (test code                                        [facility]ILLEG[facility] [place],



             = 1538)                                             [facility]

0:



                                                                 /Techni

shelbi ID =



                                                                 [id number] for RICHELLE DIMAS



[id number] 09:23:02





             Test Item    Value        Reference    Interpretation Comments



                                       Range                     

 

             CEA (test code              See_Commen   H             In otherwise

 healthy smokers, 95%



             = 2039-6)                 t                         of patients fal

l in the rangeof



                                                                 0.0-5.5 ng/mL. 

NOTE: Methodology is



                                                                 Roche Renan



                                                                 Electrochemilum

inescenceImmunoassay



                                                                 (ECLIA). Unless

 Otherwise Indicated,



                                                                 All Testing Per

formed At: Clinical



                                                                 [place] [address] Laboratory



                                                                 Director: Bonifacio Carey M.D.



                                                                 CLIA Number [id number]

[id number] Cap



                                                                 Accreditation N

o. [id number]



                                                                 [Automated mess

age] The system which



                                                                 generated this 

result transmitted



                                                                 reference range

: <=3.8 NG/ML. The



                                                                 reference range

 was not used to



                                                                 interpret this 

result as



                                                                 normal/abnormal

.

 

             Lab          Abnormal                               



             Interpretation                                        



             (test code =                                        



             14892-4)                                            



[id number] 09:23:02





             Test Item    Value        Reference Range Interpretation Comments

 

             AFP-TUMOR MARKER (test              See_Comment                Unle

ss Otherwise



             code = 68281-0)                                        Indicated, A

ll Testing



                                                                 Performed At: C

linical



                                                                 [place] [address] Laborator

y Director:



                                                                 Bonifacio negron M.D.



                                                                 CLIA Number [id number]

[id number] Cap



                                                                 Accreditation N

o. [id number]



                                                                 [Automated mess

age] The



                                                                 system which ge

nerated



                                                                 this result tra

nsmitted



                                                                 reference range

: <=8.30



                                                                 NG/ML. The refe

rence range



                                                                 was not used to

 interpret



                                                                 this result as



                                                                 normal/abnormal

.



[facility] [id number] 09:23:02





             Test Item    Value        Reference    Interpretation Comments



                                       Range                     

 

             CA 19-9 (test code >33462       See_Comment  H             NOTE: Me

thodology is Roche



             = 24108-3)                                          Renan



                                                                 Electrochemilum

inescence



                                                                 Immunoassay (EC

PIETER). Values



                                                                 obtained with d

ifferent



                                                                 assays/manufact

urers cannot



                                                                 be used interch

angeably.



                                                                 Results should 

not be used



                                                                 as sole basis t

o establish



                                                                 the presence or

 absence of



                                                                 malignancy. Unl

ess Otherwise



                                                                 Indicated, All 

Testing



                                                                 Performed At: C

linical



                                                                 Pathology [facility], [address] [place] [address]



                                                                 Laboratory Dire

ctor: Bonifacio Carey M.D.

 CLIA Number



                                                                 [id number] Cap 

Accreditation



                                                                 [place]. [id number] [A

utomated



                                                                 message] The sy

stem which



                                                                 generated this 

result



                                                                 transmitted ref

erence range:



                                                                 <35 U/ML. The r

eference



                                                                 range was not u

sed to



                                                                 interpret this 

result as



                                                                 normal/abnormal

.

 

             Lab Interpretation Abnormal                               



             (test code =                                        



             79478-5)                                            



[facility] [id number] 08:41:12





             Test Item    Value        Reference Range Interpretation Comments

 

             GLUCOSE (test code =              See_Comment  H             [Autom

ated message]



             2345-7)                                             The system [employer]



                                                                 generated this 

result



                                                                 transmitted ref

erence



                                                                 range: 70 - 99 

MG/DL.



                                                                 The reference r

veena



                                                                 was not used to



                                                                 interpret this 

result



                                                                 as normal/abnor

mal.

 

             BLOOD UREA NITROGEN              See_Comment                [Automa

froilan message]



             (test code = 3091-6)                                        The [facility]

tem which



                                                                 generated this 

result



                                                                 transmitted ref

erence



                                                                 range: 8 - 23 M

G/DL.



                                                                 The reference r

veena



                                                                 was not used to



                                                                 interpret this 

result



                                                                 as normal/abnor

mal.

 

             CREATININE (test code =              See_Comment                [Au

tomated message]



             2160-0)                                             The system [employer]



                                                                 generated this 

result



                                                                 transmitted ref

erence



                                                                 range: 0.80 - 1

.40



                                                                 MG/DL. The refe

rence



                                                                 range was not u

sed to



                                                                 interpret this 

result



                                                                 as normal/abnor

mal.

 

             EGFR (test code =              See_Comment  L             [Automate

d message]



             33914-3)                                            The system [employer]



                                                                 generated this 

result



                                                                 transmitted ref

erence



                                                                 range: >60



                                                                 ML/MIN/1.73. Th

e



                                                                 reference range

 was



                                                                 not used to int

erpret



                                                                 this result as



                                                                 normal/abnormal

.

 

             BUN/CREAT RATIO (test              See_Comment                [Auto

mated message]



             code = 3097-3)                                        The system [facility]



                                                                 generated this 

result



                                                                 transmitted ref

erence



                                                                 range: 6 - 28 R

ATIO.



                                                                 The reference r

veena



                                                                 was not used to



                                                                 interpret this 

result



                                                                 as normal/abnor

mal.

 

             SODIUM (test code =              See_Comment                [Automa

froilan message]



             2951-2)                                             The system [facility]



                                                                 generated this 

result



                                                                 transmitted ref

erence



                                                                 range: 133 - 14

6



                                                                 MEQ/L. The refe

rence



                                                                 range was not u

sed to



                                                                 interpret this 

result



                                                                 as normal/abnor

mal.

 

             POTASSIUM (test code =              See_Comment                [Aut

omated message]



             2823-3)                                             The system [facility]



                                                                 generated this 

result



                                                                 transmitted ref

erence



                                                                 range: 3.5 - 5.

4



                                                                 MEQ/L. The refe

rence



                                                                 range was not u

sed to



                                                                 interpret this 

result



                                                                 as normal/abnor

mal.

 

             CHLORIDE (test code =              See_Comment                [Auto

mated message]



             3355-0)                                             The system [facility]



                                                                 generated this 

result



                                                                 transmitted ref

erence



                                                                 range: 95 - 107

 MEQ/L.



                                                                 The reference r

veena



                                                                 was not used to



                                                                 interpret this 

result



                                                                 as normal/abnor

mal.

 

             CO2 (test code =              See_Comment                [Automated

 message]



             1963-8)                                             The system [employer]



                                                                 generated this 

result



                                                                 transmitted ref

erence



                                                                 range: 19 - 31 

MEQ/L.



                                                                 The reference r

veena



                                                                 was not used to



                                                                 interpret this 

result



                                                                 as normal/abnor

mal.

 

             CALCIUM (test code =              See_Comment                [Autom

ated message]



             57032-0)                                            The system [facility]

[employer]



                                                                 generated this 

result



                                                                 transmitted ref

erence



                                                                 range: 8.5 - 10

.5



                                                                 MG/DL. The refe

rence



                                                                 range was not u

sed to



                                                                 interpret this 

result



                                                                 as normal/abnor

mal.

 

             PROTEIN TOTAL (test              See_Comment                [Automa

froilan message]



             code = 2885-2)                                        The system [facility]



                                                                 generated this 

result



                                                                 transmitted ref

erence



                                                                 range: 6.1 - 8.

3 G/DL.



                                                                 The reference r

veena



                                                                 was not used to



                                                                 interpret this 

result



                                                                 as normal/abnor

mal.

 

             ALBUMIN (test code =              See_Comment                [Autom

ated message]



             91013-0)                                            The system [facility]

[place]



                                                                 generated this 

result



                                                                 transmitted ref

erence



                                                                 range: 3.5 - 5.

2 G/DL.



                                                                 The reference r

veena



                                                                 was not used to



                                                                 interpret this 

result



                                                                 as normal/abnor

mal.

 

             GLOBULINS, SERUM, TOTAL              See_Comment                [Au

tomated message]



             (test code = 59716-2)                                        The sy

stem which



                                                                 generated this 

result



                                                                 transmitted ref

erence



                                                                 range: 1.9 - 3.

7 G/DL.



                                                                 The reference r

veena



                                                                 was not used to



                                                                 interpret this 

result



                                                                 as normal/abnor

mal.

 

             A/G RATIO (test code =              See_Comment                [Aut

omated message]



             1759-0)                                             The system [employer]



                                                                 generated this 

result



                                                                 transmitted ref

erence



                                                                 range: 1.0 - 2.

6



                                                                 RATIO. The refe

rence



                                                                 range was not u

sed to



                                                                 interpret this 

result



                                                                 as normal/abnor

mal.

 

             BILIRUBIN TOTAL (test              See_Comment                [Auto

mated message]



             code = 1975-2)                                        The system [employer]



                                                                 generated this 

result



                                                                 transmitted ref

erence



                                                                 range: <=1.2 MG

/DL.



                                                                 The reference r

veena



                                                                 was not used to



                                                                 interpret this 

result



                                                                 as normal/abnor

mal.

 

             ALKALINE PHOSPHATASE 210 U/L             H            



             (test code = 6768-6)                                        

 

             AST (SGOT) (test code = 56 U/L       9-50         H            



             1920-8)                                             

 

             ALT (SGPT) (test code = 30 U/L       5-50                       Unl

ess Otherwise



             1744-2)                                             Indicated, All 

Testing



                                                                 Performed At: [facility]



                                                                 Pathology



                                                                 Laboratories, [address] [place] [address]



                                                                 [id number] Laborator

y



                                                                 Director: Bonifacio Carey M.D.

 CLIA



                                                                 Number [id number]

03 Cap



                                                                 Accreditation N

o.



                                                                 [id number]

 

             Lab Interpretation Abnormal                               



             (test code = 08843-2)                                        



[facility] W/AUTO DIFF WITH [id number] 07:37:08





             Test Item    Value        Reference Range Interpretation Comments

 

             WHITE BLOOD CELL COUNT              See_Comment                [Aut

omated message]



             (test code = 57335-8)                                        The sy

stem which



                                                                 generated this 

result



                                                                 transmitted ref

erence



                                                                 range: 3.5 - 11

.0



                                                                 K/UL. The refer

ence



                                                                 range was not u

sed to



                                                                 interpret this 

result



                                                                 as normal/abnor

mal.

 

             RED BLOOD CELL COUNT              See_Comment                [Autom

ated message]



             (test code = 79069-5)                                        The sy

stem which



                                                                 generated this 

result



                                                                 transmitted ref

erence



                                                                 range: 4.50 - 6

.10



                                                                 M/UL. The refer

ence



                                                                 range was not u

sed to



                                                                 interpret this 

result



                                                                 as normal/abnor

mal.

 

             HEMOGLOBIN (test code =              See_Comment                [Au

tomated message]



             718-7)                                              The system [employer]



                                                                 generated this 

result



                                                                 transmitted ref

erence



                                                                 range: 13.5 - 1

7.0



                                                                 G/DL. The refer

ence



                                                                 range was not u

sed to



                                                                 interpret this 

result



                                                                 as normal/abnor

mal.

 

             HEMATOCRIT (test code = 40.7 %       40.0-51.0                 



             42287-6)                                            

 

             MEAN CORPUSCULAR VOLUME 82.7 fL      80.0-99.0                 



             (test code = 53963-7)                                        

 

             MEAN CORPUSCULAR 28.0 PG      25.0-33.0                 



             HEMOGLOBIN (test code =                                        



             24062-2)                                            

 

             MEAN CORPUSCULAR              See_Comment                [Automated

 message]



             HEMOGLOBIN CONC (test                                        The sy

stem which



             code = 28540-3)                                        generated th

is result



                                                                 transmitted ref

erence



                                                                 range: 31.0 - 3

6.0



                                                                 G/DL. The refer

ence



                                                                 range was not u

sed to



                                                                 interpret this 

result



                                                                 as normal/abnor

mal.

 

             RED CELL DISTRIBUTION 13.9 %       11.5-15.0                 



             WIDTH (test code =                                        



             06188-9)                                            

 

             NEUTROPHILS % (test code 78.6 %                                 



             = 58190-7)                                          

 

             LYMPHOCYTES % (test code 7.7 %                                  



             = 57860-3)                                          

 

             MONOCYTES % (test code = 11.1 %                                 



             26485-3)                                            

 

             EOSINOPHILS % (test code 1.4 %                                  



             = 70834-1)                                          

 

             BASOPHILS % (test code = 0.8 %                                  



             19776-2)                                            

 

             IMMATURE GRANULOCYTES 0.4 %                                  



             (test code = 12713-2)                                        

 

             NUCLEATED RBC'S              See_Comment                Unless Othe

rwise



             MYELOPEROX STAIN (test                                        Indic

ated, All



             code = 52646-4)                                        Testing Perf

ormed At:



                                                                 Clinical Pathol

[facility], [address] [place] [address]



                                                                 [id number] Laborator

y



                                                                 Director: Bonifacio Carey M.D.

 CLIA



                                                                 Number [id number]

03 Cap



                                                                 Accreditation N

o.



                                                                 [id number] [Autom

ated



                                                                 message] The sy

stem



                                                                 which generated

 this



                                                                 result transmit

froilan



                                                                 reference range

: 0.00



                                                                 - 0.11 K/UL. Th

e



                                                                 reference range

 was



                                                                 not used to int

erpret



                                                                 this result as



                                                                 normal/abnormal

.

 

             PLATELET COUNT (test              See_Comment                [Autom

ated message]



             code = 38250-1)                                        The system w

[facility]



                                                                 generated this 

result



                                                                 transmitted ref

erence



                                                                 range: 130 - 40

0



                                                                 K/UL. The refer

ence



                                                                 range was not u

sed to



                                                                 interpret this 

result



                                                                 as normal/abnor

mal.

 

             NEUTROPHILS ABSOLUTE              See_Comment  H             [Autom

ated message]



             COUNT (test code =                                        The syste

m which



             48791-8)                                            generated this 

result



                                                                 transmitted ref

erence



                                                                 range: 1.50 - 7

.50



                                                                 K/UL. The refer

ence



                                                                 range was not u

sed to



                                                                 interpret this 

result



                                                                 as normal/abnor

mal.

 

             LYMPHOCYTES ABSOLUTE              See_Comment  L             [Autom

ated message]



             COUNT (test code =                                        The syste

m which



             97895-7)                                            generated this 

result



                                                                 transmitted ref

erence



                                                                 range: 1.00 - 4

.00



                                                                 K/UL. The refer

ence



                                                                 range was not u

sed to



                                                                 interpret this 

result



                                                                 as normal/abnor

mal.

 

             MONOCYTES ABSOLUTE COUNT              See_Comment  H             [A

utomated message]



             (test code = 49705-5)                                        The sy

stem which



                                                                 generated this 

result



                                                                 transmitted ref

erence



                                                                 range: 0.20 - 1

.00



                                                                 K/UL. The refer

ence



                                                                 range was not u

sed to



                                                                 interpret this 

result



                                                                 as normal/abnor

mal.

 

             BASOPHILS ABSOLUTE COUNT              See_Comment                [A

utomated message]



             (test code = 24672-9)                                        The sy

stem which



                                                                 generated this 

result



                                                                 transmitted ref

erence



                                                                 range: 0.00 - 0

.20



                                                                 K/UL. The refer

ence



                                                                 range was not u

sed to



                                                                 interpret this 

result



                                                                 as normal/abnor

mal.

 

             IMMATURE GRANS (ABS)              See_Comment                [Autom

ated message]



             (test code = 9987)                                        The syste

m which



                                                                 generated this 

result



                                                                 transmitted ref

erence



                                                                 range: 0.00 - 0

.10



                                                                 K/UL. The refer

ence



                                                                 range was not u

sed to



                                                                 interpret this 

result



                                                                 as normal/abnor

mal.

 

             Lab Interpretation (test Abnormal                               



             code = 03498-6)                                        



[facility](CELLAVISION MANUAL DIFF)2019 09:14:00





             Test Item    Value        Reference Range Interpretation Comments

 

             NEUTROPHILS - REL 81 %                                   



             (CELLAVISION)(BEAKER) (test code =                                 

       



             2816)                                               

 

             LYMPHOCYTES - REL 7 %                                    



             (CELLAVISION)(BEAKER) (test code =                                 

       



             2817)                                               

 

             MONOCYTES - REL 7 %                                    



             (CELLAVISION)(BEAKER) (test code =                                 

       



             2818)                                               

 

             EOSINOPHILS - REL 2 %                                    



             (CELLAVISION)(BEAKER) (test code =                                 

       



             2819)                                               

 

             MYELOCYTES - REL 2 %          0-0          H            



             (CELLAVISION)(BEAKER) (test code =                                 

       



             2822)                                               

 

             ATYPICAL LYMPHOCYTES - REL 1 %          0-0          H            



             (CELLAVISION)(BEAKER) (test code =                                 

       



             2829)                                               

 

             NEUTROPHILS - ABS 10.85 K/ul   1.78-5.38    H            



             (CELLAVISION)(BEAKER) (test code =                                 

       



             2830)                                               

 

             LYMPHOCYTES - ABS 0.94 K/ul    1.32-3.57    L            



             (CELLAVISION)(BEAKER) (test code =                                 

       



             2831)                                               

 

             MONOCYTES - ABS 0.94 K/uL    0.30-0.82    H            



             (CELLAVISION)(BEAKER) (test code =                                 

       



             2832)                                               

 

             EOSINOPHILS - ABS 0.27 K/uL    0.04-0.54                 



             (CELLAVISION)(BEAKER) (test code =                                 

       



             2834)                                               

 

             MYELOCYTES-ABS 0.27 K/uL    0.00-0.00    H            



             (CELLAVISION)(BEAKER) (test code =                                 

       



             2837)                                               

 

             ATYPICAL LYMPHOCYTES - ABS 0.13 K/uL    0.00-0.00    H            



             (CELLAVISION)(BEAKER) (test code =                                 

       



             2858)                                               

 

             TOTAL COUNTED (BEAKER) (test code 100                              

      



             = 1351)                                             

 

             RBC MORPHOLOGY (BEAKER) (test code Normal                          

       



             = 762)                                              

 

             SMUDGE CELLS (BEAKER) (test code = Present                         

       



             1371)                                               

 

             GIANT PLATELETS (BEAKER) (test Present                             

   



             code = 313)                                         

 

             PLATELET CONCENTRATION Decreased                              



             (CELLAVISION)(BEAKER) (test code =                                 

       



             3438)                                               



Received comment: User comments: Slide comments:RAD, CHEST, 1 VIEW, NON DEPT
2019 08:25:00Reason for exam:-&gt;left effusionShould this be performed at
the bedside?-&gt;YesFINAL REPORT PATIENT ID: [id number] Chest AP portable 
Comparison exam: 2019 History provided: Follow-up pleural effusion Left 
chest tube unchanged in place. No pneumothorax. No significant effusionsare 
evident. Nonspecific coarsening of markings in the left lower lobe. PICC line in
good position. Signed: Alex Morris MDReport Verified Date/Time: 2019 
08:25:12 Reading Location: [facility] Reading Room Electronically 
signed by: ALEX MORRIS on 2019 08:25 AMCOMPREHENSIVE METABOLIC PANEL
2019 06:40:00





             Test Item    Value        Reference Range Interpretation Comments

 

             TOTAL PROTEIN 4.8 gm/dL    6.0-8.3      L            



             (BEAKER) (test code =                                        



             770)                                                

 

             ALBUMIN (BEAKER) 2.4 g/dL     3.5-5.0      L            



             (test code = 1145)                                        

 

             ALKALINE PHOSPHATASE 141 U/L                          



             (BEAKER) (test code =                                        



             346)                                                

 

             BILIRUBIN TOTAL 3.9 mg/dL    0.2-1.2      H            



             (BEAKER) (test code =                                        



             377)                                                

 

             SODIUM (BEAKER) (test 123 meq/L    136-145      L            



             code = 381)                                         

 

             POTASSIUM (BEAKER) 3.6 meq/L    3.5-5.1                   



             (test code = 379)                                        

 

             CHLORIDE (BEAKER) 90 meq/L            L            



             (test code = 382)                                        

 

             CO2 (BEAKER) (test 29 meq/L     22-29                     



             code = 355)                                         

 

             BLOOD UREA NITROGEN 14 mg/dL     7-21                      



             (BEAKER) (test code =                                        



             354)                                                

 

             CREATININE (BEAKER) 0.80 mg/dL   0.57-1.25                 



             (test code = 358)                                        

 

             GLUCOSE RANDOM 152 mg/dL           H            



             (BEAKER) (test code =                                        



             652)                                                

 

             CALCIUM (BEAKER) 7.3 mg/dL    8.4-10.2     L            



             (test code = 697)                                        

 

             AST (SGOT) (BEAKER) 39 U/L       5-34         H            



             (test code = 353)                                        

 

             ALT (SGPT) (BEAKER) 23 U/L       6-55                      



             (test code = 347)                                        

 

             EGFR (BEAKER) (test 99 mL/min/1.73                           ESTIMA

FROILAN GFR IS



             code = 1092) sq m                                   NOT AS ACCURATE

 AS



                                                                 CREATININE



                                                                 CLEARANCE IN



                                                                 PREDICTING



                                                                 GLOMERULAR



                                                                 FILTRATION RATE

.



                                                                 ESTIMATED GFR I

S



                                                                 NOT APPLICABLE 

FOR



                                                                 DIALYSIS PATIEN

TS.



Specimen slightly [id number] 06:39:00





             Test Item    Value        Reference Range Interpretation Comments

 

             PHOSPHORUS (BEAKER) (test code = 2.1 mg/dL    2.3-4.7      L       

     



             604)                                                



[id number] 06:39:00





             Test Item    Value        Reference Range Interpretation Comments

 

             MAGNESIUM (BEAKER) (test code = 1.8 mg/dL    1.6-2.6               

    



             627)                                                



B-TYPE NATRIURETIC FACTOR (BNP)2019 06:04:00





             Test Item    Value        Reference Range Interpretation Comments

 

             B-TYPE NATRIURETIC PEPTIDE (BEAKER) 97 pg/mL     0-100             

        



             (test code = 700)                                        



CBC W/PLT COUNT &amp; AUTO [id number] 05:57:00





             Test Item    Value        Reference Range Interpretation Comments

 

             WHITE BLOOD CELL COUNT (BEAKER) 13.4 K/ L    3.5-10.5     H        

    



             (test code = 775)                                        

 

             RED BLOOD CELL COUNT (BEAKER) 3.17 M/ L    4.63-6.08    L          

  



             (test code = 761)                                        

 

             HEMOGLOBIN (BEAKER) (test code = 10.3 GM/DL   13.7-17.5    L       

     



             410)                                                

 

             HEMATOCRIT (BEAKER) (test code = 29.4 %       40.1-51.0    L       

     



             411)                                                

 

             MEAN CORPUSCULAR VOLUME (BEAKER) 92.7 fL      79.0-92.2    H       

     



             (test code = 753)                                        

 

             MEAN CORPUSCULAR HEMOGLOBIN 32.5 pg      25.7-32.2    H            



             (BEAKER) (test code = 751)                                        

 

             MEAN CORPUSCULAR HEMOGLOBIN CONC 35.0 GM/DL   32.3-36.5            

     



             (BEAKER) (test code = 752)                                        

 

             RED CELL DISTRIBUTION WIDTH 13.9 %       11.6-14.4                 



             (BEAKER) (test code = 412)                                        

 

             PLATELET COUNT (BEAKER) (test code 75 K/CU MM   150-450      L     

       



             = 756)                                              

 

             MEAN PLATELET VOLUME (BEAKER) 8.9 fL       9.4-12.4     L          

  



             (test code = 754)                                        

 

             NUCLEATED RED BLOOD CELLS (BEAKER) 0 /100 WBC   0-0                

       



             (test code = 413)                                        



CALCIUM, [id number] 05:44:00





             Test Item    Value        Reference Range Interpretation Comments

 

             CALCIUM IONIZED (BEAKER) (test 0.97 mmol/L  1.12-1.27    L         

   



             code = 698)                                         

 

             PH, BLOOD (BEAKER) (test code = 7.50                               

    



             1810)                                               



BODY FLUID CULTURE + GRAM [id number] 11:10:00





             Test Item    Value        Reference    Interpretation Comments



                                       Range                     

 

             CULTURE (BEAKER)                           A            <1+ Coagula

se



             (test code = 1095)                                        negative



                                                                 Staphylococcus

 

             CULTURE (BEAKER) COAGULASE NEGATIVE              A            <1+ P

seudomonas



             (test code = 1095) STAPHYLOCOCCUS                           aerugin

jennifer

 

             Clindamycin (test                           S            



             code = 10)                                          

 

             Erythromycin (test                           R            



             code = 4)                                           

 

             Linezolid (test code                           S            



             = 40)                                               

 

             Oxacillin (test code                           R            



             = 14)                                               

 

             Rifampin (test code =                           S            



             43)                                                 

 

             Tetracycline (test                           S            



             code = 2)                                           

 

             Trimethoprim +                           S            



             Sulfamethoxazole                                        



             (test code = 47)                                        

 

             Vancomycin (test code                           S            



             = 13)                                               

 

             GRAM STAIN RESULT 3+ WBCs                                



             (BEAKER) (test code =                                        



             1123)                                               

 

             GRAM STAIN RESULT <1+ gram positive                           



             (BEAKER) (test code = cocci in pairs and                           



             931201)      clusters                               



CBC W/PLT COUNT &amp; AUTO [id number] 09:58:00





             Test Item    Value        Reference Range Interpretation Comments

 

             WHITE BLOOD CELL COUNT (BEAKER) 10.7 K/ L    3.5-10.5     H        

    



             (test code = 775)                                        

 

             RED BLOOD CELL COUNT (BEAKER) 3.15 M/ L    4.63-6.08    L          

  



             (test code = 761)                                        

 

             HEMOGLOBIN (BEAKER) (test code = 10.2 GM/DL   13.7-17.5    L       

     



             410)                                                

 

             HEMATOCRIT (BEAKER) (test code = 28.9 %       40.1-51.0    L       

     



             411)                                                

 

             MEAN CORPUSCULAR VOLUME (BEAKER) 91.7 fL      79.0-92.2            

     



             (test code = 753)                                        

 

             MEAN CORPUSCULAR HEMOGLOBIN 32.4 pg      25.7-32.2    H            



             (BEAKER) (test code = 751)                                        

 

             MEAN CORPUSCULAR HEMOGLOBIN CONC 35.3 GM/DL   32.3-36.5            

     



             (BEAKER) (test code = 752)                                        

 

             RED CELL DISTRIBUTION WIDTH 13.7 %       11.6-14.4                 



             (BEAKER) (test code = 412)                                        

 

             PLATELET COUNT (BEAKER) (test code 57 K/CU MM   150-450      L     

       



             = 756)                                              

 

             MEAN PLATELET VOLUME (BEAKER) 8.8 fL       9.4-12.4     L          

  



             (test code = 754)                                        

 

             NUCLEATED RED BLOOD CELLS (BEAKER) 0 /100 WBC   0-0                

       



             (test code = 413)                                        



(CELLAVISION MANUAL DIFF)2019 09:58:00





             Test Item    Value        Reference Range Interpretation Comments

 

             NEUTROPHILS - REL 78 %                                   



             (CELLAVISION)(BEAKER) (test code =                                 

       



             2816)                                               

 

             LYMPHOCYTES - REL 5 %                                    



             (CELLAVISION)(BEAKER) (test code =                                 

       



             2817)                                               

 

             MONOCYTES - REL 9 %                                    



             (CELLAVISION)(BEAKER) (test code =                                 

       



             2818)                                               

 

             EOSINOPHILS - REL 2 %                                    



             (CELLAVISION)(BEAKER) (test code =                                 

       



             2819)                                               

 

             METAMYELOCYTES - REL 1 %          0-0          H            



             (CELLAVISION)(BEAKER) (test code =                                 

       



             2821)                                               

 

             BANDS - REL (CELLAVISION)(BEAKER) 4 %          0-10                

      



             (test code = 2826)                                        

 

             BLASTS - REL (CELLAVISION)(BEAKER) 1 %          0-0          H     

       



             (test code = 2827)                                        

 

             NEUTROPHILS - ABS 8.35 K/ul    1.78-5.38    H            



             (CELLAVISION)(BEAKER) (test code =                                 

       



             2830)                                               

 

             LYMPHOCYTES - ABS 0.54 K/ul    1.32-3.57    L            



             (CELLAVISION)(BEAKER) (test code =                                 

       



             2831)                                               

 

             MONOCYTES - ABS 0.96 K/uL    0.30-0.82    H            



             (CELLAVISION)(BEAKER) (test code =                                 

       



             2832)                                               

 

             EOSINOPHILS - ABS 0.21 K/uL    0.04-0.54                 



             (CELLAVISION)(BEAKER) (test code =                                 

       



             2834)                                               

 

             METAMYELOCYTES - ABS 0.11 K/uL    0.00-0.00    H            



             (CELLAVISION)(BEAKER) (test code =                                 

       



             2836)                                               

 

             BANDS - ABS (CELLAVISION)(BEAKER) 0.43 K/uL    0.00-0.80           

      



             (test code = 2840)                                        

 

             BLASTS - ABS (CELLAVISION)(BEAKER) 0.11 K/uL    0.00-0.00    H     

       



             (test code = 2845)                                        

 

             TOTAL COUNTED (BEAKER) (test code = 100                            

        



             1351)                                               

 

             WBC MORPHOLOGY (BEAKER) (test code Normal                          

       



             = 487)                                              

 

             PLT MORPHOLOGY (BEAKER) (test code Normal                          

       



             = 486)                                              

 

             ANISOCYTOSIS (BEAKER) (test code = 1+ few                          

       



             961)                                                

 

             POIKILOCYTES (BEAKER) (test code = 1+ few                          

       



             966)                                                

 

             OVALOCYTES (BEAKER) (test code = 1+ few                            

     



             477)                                                

 

             BASOPHILIC STIPPLING (BEAKER) (test Present                        

        



             code = 473)                                         

 

             ARTIFACT (CELLAVISION)(BEAKER) Present                             

   



             (test code = 3432)                                        

 

             PLATELET CONCENTRATION Decreased                              



             (CELLAVISION)(BEAKER) (test code =                                 

       



             3438)                                               



Received comment: User comments: Slide comments: WBC: SEGMENTED WITH TOXIC 
GRANULATIONS [id number] 08:30:00





             Test Item    Value        Reference Range Interpretation Comments

 

             PHOSPHORUS (BEAKER) (test code = 2.0 mg/dL    2.3-4.7      L       

     



             604)                                                



RAD, CHEST, 1 VIEW, NON [id number] 08:21:00Reason for exam:-&gt;left 
effusionShould this be performed at the bedside?-&gt;YesFINAL REPORT PATIENT ID:
[id number] TECHNIQUE: Frontal chest radiograph dated 2019. CLINICAL HISTORY: 
Left effusion COMPARISON STUDY: Chest radiographs and chest CT both dated 
2019 IMPRESSION:Right-sided PICC and left-sided chest tube are unchanged. 
No change in small left hydropneumothorax. Multiple rounded densities project 
over the left lower lobe of unclear etiology possibly something external to the 
patient. Cardiomediastinal silhouette is normal in size. No pulmonary edema. No 
fracture. Signed: Ann Kingeport Verified Date/Time: 2019 
08:21:33 Reading Location: [facility] Reading Room Electronically signed by: 
ANN KING MD on 2019 08:21 AMCOMPREHENSIVE METABOLIC PANEL
2019 05:34:00





             Test Item    Value        Reference Range Interpretation Comments

 

             TOTAL PROTEIN 4.9 gm/dL    6.0-8.3      L            



             (BEAKER) (test code =                                        



             770)                                                

 

             ALBUMIN (BEAKER) 2.7 g/dL     3.5-5.0      L            



             (test code = 1145)                                        

 

             ALKALINE PHOSPHATASE 137 U/L                          



             (BEAKER) (test code =                                        



             346)                                                

 

             BILIRUBIN TOTAL 5.5 mg/dL    0.2-1.2      H            



             (BEAKER) (test code =                                        



             377)                                                

 

             SODIUM (BEAKER) (test 123 meq/L    136-145      L            



             code = 381)                                         

 

             POTASSIUM (BEAKER) 3.9 meq/L    3.5-5.1                   



             (test code = 379)                                        

 

             CHLORIDE (BEAKER) 89 meq/L            L            



             (test code = 382)                                        

 

             CO2 (BEAKER) (test 31 meq/L     22-29        H            



             code = 355)                                         

 

             BLOOD UREA NITROGEN 15 mg/dL     7-21                      



             (BEAKER) (test code =                                        



             354)                                                

 

             CREATININE (BEAKER) 0.68 mg/dL   0.57-1.25                 



             (test code = 358)                                        

 

             GLUCOSE RANDOM 104 mg/dL                        



             (BEAKER) (test code =                                        



             652)                                                

 

             CALCIUM (BEAKER) 7.7 mg/dL    8.4-10.2     L            



             (test code = 697)                                        

 

             AST (SGOT) (BEAKER) 47 U/L       5-34         H            



             (test code = 353)                                        

 

             ALT (SGPT) (BEAKER) 25 U/L       6-55                      



             (test code = 347)                                        

 

             EGFR (BEAKER) (test 119                                    ESTIMATE

D GFR IS



             code = 1092) mL/min/1.73 sq                           NOT AS ACCURA

TE AS



                          m                                      CREATININE



                                                                 CLEARANCE IN



                                                                 PREDICTING



                                                                 GLOMERULAR



                                                                 FILTRATION RATE

.



                                                                 ESTIMATED GFR I

S



                                                                 NOT APPLICABLE 

FOR



                                                                 DIALYSIS PATIEN

TS.



Specimen moderately [id number] 05:04:00





             Test Item    Value        Reference Range Interpretation Comments

 

             MAGNESIUM (BEAKER) (test code = 1.8 mg/dL    1.6-2.6               

    



             627)                                                



CT, CHEST, WITH [id number] 15:37:00Reason for exam:-&gt;reevaluate 
pleural effusion and left lung abscessFINAL REPORT PATIENT ID: [id number] CT of 
the Chest dated 2019 COMPARISON: 2019 CLINICAL INFORMATION: 
Pleural effusionreevaluate pleural effusion and left lung abscess Comment: Axial
images of the chest were obtained from thoracic inlet to the upper abdomen with 
intravenous contrast. This exam was performed according to our departmental 
dose-optimization program, which includes automated exposure control, adjustment
of the mA and/or kV according to patient size and/or use of interactive 
reconstruction technique. Heart is normal in size. There is small pericardial 
effusion. Great vessels are unremarkable. No adenopathy in the mediastinum or 
perihilar region. Trachea and mainstem bronchi are patent. Trace bilateral 
pleural effusion is present. There is interval significant decreasein the amount
of left pleural effusion. There is small amount of air present in the left 
pleural space. The pigtail catheter is seen in the left pleural space. 
Atelectasis is seen in the lingula, rightmid, and both lower lobes. Cavitary 
lesion is seen in the superior segment of the left lower lobe measuring 
approximately 2.8 x 3.3 cm. Visualized upper abdomen demonstrates cirrhotic 
liver with trace ascites. Spleen is minimally enlarged. Impression: 1. Interval 
decrease in the amount of left pleuraleffusion with residual left 
hydropneumothorax.2. Trace right pleural effusion.3. Cavitary lesion in the 
superior segment of the left lower lobe may represent abscess.4. Cirrhosis with 
splenomegaly and ascites. Signed: Lucita Nails MDReport Verified Date/Time: 
2019 15:37:27 Reading Location: [facility] B1 C013Y CT Body Reading Room 
Electronically signed by: LUCITA NAILS M.D. on 2019 03:37 PMELECTROLYTES
2019 13:41:00





             Test Item    Value        Reference Range Interpretation Comments

 

             SODIUM (BEAKER) (test code = 381) 119 meq/L    136-145      LL     

      

 

             POTASSIUM (BEAKER) (test code = 4.1 meq/L    3.5-5.1               

    



             379)                                                

 

             CHLORIDE (BEAKER) (test code = 382) 85 meq/L            L    

        

 

             CO2 (BEAKER) (test code = 355) 29 meq/L     22-29                  

   



Page [phone] with results. Thank you.CBC W/PLT COUNT &amp; AUTO 
[id number] 10:30:00





             Test Item    Value        Reference Range Interpretation Comments

 

             WHITE BLOOD CELL COUNT (BEAKER) 11.3 K/ L    3.5-10.5     H        

    



             (test code = 775)                                        

 

             RED BLOOD CELL COUNT (BEAKER) 3.25 M/ L    4.63-6.08    L          

  



             (test code = 761)                                        

 

             HEMOGLOBIN (BEAKER) (test code = 10.6 GM/DL   13.7-17.5    L       

     



             410)                                                

 

             HEMATOCRIT (BEAKER) (test code = 29.8 %       40.1-51.0    L       

     



             411)                                                

 

             MEAN CORPUSCULAR VOLUME (BEAKER) 91.7 fL      79.0-92.2            

     



             (test code = 753)                                        

 

             MEAN CORPUSCULAR HEMOGLOBIN 32.6 pg      25.7-32.2    H            



             (BEAKER) (test code = 751)                                        

 

             MEAN CORPUSCULAR HEMOGLOBIN CONC 35.6 GM/DL   32.3-36.5            

     



             (BEAKER) (test code = 752)                                        

 

             RED CELL DISTRIBUTION WIDTH 13.5 %       11.6-14.4                 



             (BEAKER) (test code = 412)                                        

 

             PLATELET COUNT (BEAKER) (test code 51 K/CU MM   150-450      L     

       



             = 756)                                              

 

             MEAN PLATELET VOLUME (BEAKER) 8.3 fL       9.4-12.4     L          

  



             (test code = 754)                                        

 

             NUCLEATED RED BLOOD CELLS (BEAKER) 0 /100 WBC   0-0                

       



             (test code = 413)                                        



(CELLAVISION MANUAL DIFF)2019 10:30:00





             Test Item    Value        Reference Range Interpretation Comments

 

             NEUTROPHILS - REL 82 %                                   



             (CELLAVISION)(BEAKER) (test code =                                 

       



             2816)                                               

 

             LYMPHOCYTES - REL 2 %                                    



             (CELLAVISION)(BEAKER) (test code =                                 

       



             2817)                                               

 

             MONOCYTES - REL 9 %                                    



             (CELLAVISION)(BEAKER) (test code =                                 

       



             2818)                                               

 

             METAMYELOCYTES - REL 2 %          0-0          H            



             (CELLAVISION)(BEAKER) (test code =                                 

       



             2821)                                               

 

             PROMYELOCYTES - REL 2 %          0-0          H            



             (CELLAVSION)(BEAKER) (test code =                                  

      



             2825)                                               

 

             BANDS - REL (CELLAVISION)(BEAKER) 2 %          0-10                

      



             (test code = 2826)                                        

 

             ATYPICAL LYMPHOCYTES - REL 1 %          0-0          H            



             (CELLAVISION)(BEAKER) (test code =                                 

       



             2829)                                               

 

             NEUTROPHILS - ABS 9.27 K/ul    1.78-5.38    H            



             (CELLAVISION)(BEAKER) (test code =                                 

       



             2830)                                               

 

             LYMPHOCYTES - ABS 0.23 K/ul    1.32-3.57    L            



             (CELLAVISION)(BEAKER) (test code =                                 

       



             2831)                                               

 

             MONOCYTES - ABS 1.02 K/uL    0.30-0.82    H            



             (CELLAVISION)(BEAKER) (test code =                                 

       



             2832)                                               

 

             METAMYELOCYTES - ABS 0.23 K/uL    0.00-0.00    H            



             (CELLAVISION)(BEAKER) (test code =                                 

       



             2836)                                               

 

             PROMYELOCYTES - ABS 0.23 K/uL    0.00-0.00    H            



             (CELLAVISION)(BEAKER) (test code =                                 

       



             2838)                                               

 

             BANDS - ABS (CELLAVISION)(BEAKER) 0.23 K/uL    0.00-0.80           

      



             (test code = 2840)                                        

 

             ATYPICAL LYMPHOCYTES - ABS 0.11 K/uL    0.00-0.00    H            



             (CELLAVISION)(BEAKER) (test code =                                 

       



             2858)                                               

 

             TOTAL COUNTED (BEAKER) (test code = 100                            

        



             1351)                                               

 

             WBC MORPHOLOGY (BEAKER) (test code Normal                          

       



             = 487)                                              

 

             LARGE PLT(BEAKER) (test code = Present                             

   



             2156)                                               

 

             BASOPHILIC STIPPLING (BEAKER) (test Present                        

        



             code = 473)                                         

 

             ARTIFACT (CELLAVISION)(BEAKER) Present                             

   



             (test code = 3432)                                        

 

             PLATELET CONCENTRATION Decreased                              



             (CELLAVISION)(BEAKER) (test code =                                 

       



             3438)                                               



Received comment: User comments: Slide comments: WBC: SEGMENTED WITH TOXIC 
GRANULATIONS PRESENTRAD, CHEST, 1 VIEW, NON [id number] 07:48:00Reason for 
exam:-&gt;left effusionShould this be performed at the bedside?-&gt;YesFINAL 
REPORT PATIENT ID: [id number] RAD, CHEST, 1 VIEW, NON DEPT INDICATION: left 
effusion COMPARISON:Prior day's exam FINDINGS: Portable frontal view of the 
chest. IMPRESSION: Support Lines: PICC tip overlies the SVC. Left pleural 
catheter is again noted. Lungs and pleura: Bibasilar airspace disease is 
unchanged. Superimposed trace left effusion. Left apical pneumothorax is 
decreased in the interim. Heart and mediastinum: Stable contours. Additional 
findings: None. Signed: Trina Crocker MDReport Verified Date/Time: 2019 
07:48:14 Reading Location: [facility] Radiology Reading Room Electronically 
signed by: TRINA CROCKER MD on 2019 07:48 AMCALCIUM, IONIZED
2019 05:58:00





             Test Item    Value        Reference Range Interpretation Comments

 

             CALCIUM IONIZED (BEAKER) (test 0.96 mmol/L  1.12-1.27    L         

   



             code = 698)                                         

 

             PH, BLOOD (BEAKER) (test code = 7.53                               

    



             1810)                                               



COMPREHENSIVE METABOLIC [id number] 05:51:00





             Test Item    Value        Reference Range Interpretation Comments

 

             TOTAL PROTEIN 5.2 gm/dL    6.0-8.3      L            



             (BEAKER) (test code =                                        



             770)                                                

 

             ALBUMIN (BEAKER) 2.6 g/dL     3.5-5.0      L            



             (test code = 1145)                                        

 

             ALKALINE PHOSPHATASE 144 U/L                          



             (BEAKER) (test code =                                        



             346)                                                

 

             BILIRUBIN TOTAL 6.3 mg/dL    0.2-1.2      H            



             (BEAKER) (test code =                                        



             377)                                                

 

             SODIUM (BEAKER) (test 119 meq/L    136-145      LL           



             code = 381)                                         

 

             POTASSIUM (BEAKER) 4.2 meq/L    3.5-5.1                   



             (test code = 379)                                        

 

             CHLORIDE (BEAKER) 86 meq/L            L            



             (test code = 382)                                        

 

             CO2 (BEAKER) (test 26 meq/L     -                     



             code = 355)                                         

 

             BLOOD UREA NITROGEN 23 mg/dL     7-21         H            



             (BEAKER) (test code =                                        



             354)                                                

 

             CREATININE (BEAKER) 0.81 mg/dL   0.57-1.25                 



             (test code = 358)                                        

 

             GLUCOSE RANDOM 103 mg/dL                        



             (BEAKER) (test code =                                        



             652)                                                

 

             CALCIUM (BEAKER) 7.6 mg/dL    8.4-10.2     L            



             (test code = 697)                                        

 

             AST (SGOT) (BEAKER) 46 U/L       5-34         H            



             (test code = 353)                                        

 

             ALT (SGPT) (BEAKER) 21 U/L       6-55                      



             (test code = 347)                                        

 

             EGFR (BEAKER) (test 97 mL/min/1.73                           ESTIMA

FROILAN GFR IS



             code = 1092) sq m                                   NOT AS ACCURATE

 AS



                                                                 CREATININE



                                                                 CLEARANCE IN



                                                                 PREDICTING



                                                                 GLOMERULAR



                                                                 FILTRATION RATE

.



                                                                 ESTIMATED GFR I

S



                                                                 NOT APPLICABLE 

FOR



                                                                 DIALYSIS PATIEN

TS.



Specimen moderately [id number] 05:07:00





             Test Item    Value        Reference Range Interpretation Comments

 

             CORTISOL, TOTAL (BEAKER) (test code 9.6 ug/dL    3.7-19.4          

        



             = 2755)                                             



[id number] 04:57:00





             Test Item    Value        Reference Range Interpretation Comments

 

             PHOSPHORUS (BEAKER) (test code = 2.0 mg/dL    2.3-4.7      L       

     



             604)                                                



[id number] 04:57:00





             Test Item    Value        Reference Range Interpretation Comments

 

             MAGNESIUM (BEAKER) (test code = 1.7 mg/dL    1.6-2.6               

    



             627)                                                



[id number] 22:08:00





             Test Item    Value        Reference Range Interpretation Comments

 

             SODIUM (BEAKER) (test 119 meq/L    136-145      LL           



             code = 381)                                         

 

             POTASSIUM (BEAKER) 4.6 meq/L    3.5-5.1                   Specimen 

slightly



             (test code = 379)                                        hemolyzed

 

             CHLORIDE (BEAKER) 86 meq/L            L            



             (test code = 382)                                        

 

             CO2 (BEAKER) (test 27 meq/L                          



             code = 355)                                         



Call K &gt; 5.5POCT-GLUCOSE [id number] 12:59:00





             Test Item    Value        Reference Range Interpretation Comments

 

             POC-GLUCOSE METER 95 mg/dL                         TESTED AT 

[facility] [address]



             (BEAKER) (test code =                                        [place] [address]



             1538)                                               



T4, [id number] 10:25:00





             Test Item    Value        Reference Range Interpretation Comments

 

             FREE T4 (BEAKER) (test code = 655) 0.65 ng/dL   0.70-1.48    L     

       



CBC W/PLT COUNT &amp; AUTO [id number] 10:21:00





             Test Item    Value        Reference Range Interpretation Comments

 

             WHITE BLOOD CELL COUNT (BEAKER) 11.8 K/ L    3.5-10.5     H        

    



             (test code = 775)                                        

 

             RED BLOOD CELL COUNT (BEAKER) 3.74 M/ L    4.63-6.08    L          

  



             (test code = 761)                                        

 

             HEMOGLOBIN (BEAKER) (test code = 12.2 GM/DL   13.7-17.5    L       

     



             410)                                                

 

             HEMATOCRIT (BEAKER) (test code = 33.9 %       40.1-51.0    L       

     



             411)                                                

 

             MEAN CORPUSCULAR VOLUME (BEAKER) 90.6 fL      79.0-92.2            

     



             (test code = 753)                                        

 

             MEAN CORPUSCULAR HEMOGLOBIN 32.6 pg      25.7-32.2    H            



             (BEAKER) (test code = 751)                                        

 

             MEAN CORPUSCULAR HEMOGLOBIN CONC 36.0 GM/DL   32.3-36.5            

     



             (BEAKER) (test code = 752)                                        

 

             RED CELL DISTRIBUTION WIDTH 13.7 %       11.6-14.4                 



             (BEAKER) (test code = 412)                                        

 

             PLATELET COUNT (BEAKER) (test code 79 K/CU MM   150-450      L     

       



             = 756)                                              

 

             MEAN PLATELET VOLUME (BEAKER) 8.9 fL       9.4-12.4     L          

  



             (test code = 754)                                        

 

             NUCLEATED RED BLOOD CELLS (BEAKER) 0 /100 WBC   0-0                

       



             (test code = 413)                                        



(CELLAVISION MANUAL DIFF)2019 10:21:00





             Test Item    Value        Reference Range Interpretation Comments

 

             NEUTROPHILS - REL 82 %                                   



             (CELLAVISION)(BEAKER) (test code =                                 

       



             2816)                                               

 

             LYMPHOCYTES - REL 4 %                                    



             (CELLAVISION)(BEAKER) (test code =                                 

       



             2817)                                               

 

             MONOCYTES - REL 13 %                                   



             (CELLAVISION)(BEAKER) (test code =                                 

       



             2818)                                               

 

             ATYPICAL LYMPHOCYTES - REL 1 %          0-0          H            



             (CELLAVISION)(BEAKER) (test code =                                 

       



             2829)                                               

 

             NEUTROPHILS - ABS 9.68 K/ul    1.78-5.38    H            



             (CELLAVISION)(BEAKER) (test code =                                 

       



             2830)                                               

 

             LYMPHOCYTES - ABS 0.47 K/ul    1.32-3.57    L            



             (CELLAVISION)(BEAKER) (test code =                                 

       



             2831)                                               

 

             MONOCYTES - ABS 1.53 K/uL    0.30-0.82    H            



             (CELLAVISION)(BEAKER) (test code =                                 

       



             2832)                                               

 

             ATYPICAL LYMPHOCYTES - ABS 0.12 K/uL    0.00-0.00    H            



             (CELLAVISION)(BEAKER) (test code =                                 

       



             2858)                                               

 

             TOTAL COUNTED (BEAKER) (test code = 100                            

        



             1351)                                               

 

             RBC MORPHOLOGY (BEAKER) (test code Normal                          

       



             = 762)                                              

 

             WBC MORPHOLOGY (BEAKER) (test code Normal                          

       



             = 487)                                              

 

             PLT MORPHOLOGY (BEAKER) (test code Normal                          

       



             = 486)                                              

 

             ARTIFACT (CELLAVISION)(BEAKER) Present                             

   



             (test code = 3432)                                        

 

             PLATELET CONCENTRATION Decreased                              



             (CELLAVISION)(BEAKER) (test code =                                 

       



             3438)                                               



Received comment: User comments: Slide comments:POCT-GLUCOSE [id number] 
09:58:00





             Test Item    Value        Reference Range Interpretation Comments

 

             POC-GLUCOSE METER 104 mg/dL                        TESTED AT 

[facility] [address]



             (BEAKER) (test code =                                        [place]



             1538)                                               [address]



RAD, CHEST, 1 VIEW, NON [id number] 09:33:00Reason for exam:-
&gt;effusionShould this be performed at the bedside?-&gt;YesFINAL REPORT PATIENT
ID: [id number] Comparison: 2019 TECHNIQUE: Single view of the chest FINDINGS:
Bilateral interstitial and airspace opacities are stable. Small left pleural 
thickening with adjacent airspace disease identified. A previous left-sided 
pneumothorax has decreased. Left pleural drainage catheters again noted. Right-
sided PICC line is stable. Signed: Ronaldo Mireles Verified Date/Time: 
2019 09:33:21 Reading Location: [address] Transitional Reading Room 
Electronically signed by: RONALDO MIRELES M.D. on 2019 09:33 AMCOMPREHENSIVE
METABOLIC [id number] 08:59:00





             Test Item    Value        Reference Range Interpretation Comments

 

             TOTAL PROTEIN 5.4 gm/dL    6.0-8.3      L            Specimen sligh

tly



             (BEAKER) (test code =                                        hemoly

zed



             770)                                                

 

             ALBUMIN (BEAKER) 2.1 g/dL     3.5-5.0      L            Specimen sl

ightly



             (test code = 1145)                                        hemolyzed

 

             ALKALINE PHOSPHATASE 153 U/L             H            



             (BEAKER) (test code =                                        



             346)                                                

 

             BILIRUBIN TOTAL 5.0 mg/dL    0.2-1.2      H            Specimen sli

ghtly



             (BEAKER) (test code =                                        hemoly

zed



             377)                                                

 

             SODIUM (BEAKER) (test 117 meq/L    136-145      LL           



             code = 381)                                         

 

             POTASSIUM (BEAKER) 5.5 meq/L    3.5-5.1      H            Specimen 

slightly



             (test code = 379)                                        hemolyzed

 

             CHLORIDE (BEAKER) 84 meq/L            L            



             (test code = 382)                                        

 

             CO2 (BEAKER) (test 26 meq/L     22-29                     



             code = 355)                                         

 

             BLOOD UREA NITROGEN 32 mg/dL     7-21         H            



             (BEAKER) (test code =                                        



             354)                                                

 

             CREATININE (BEAKER) 0.85 mg/dL   0.57-1.25                 Specimen

 slightly



             (test code = 358)                                        hemolyzed

 

             GLUCOSE RANDOM 104 mg/dL                        



             (BEAKER) (test code =                                        



             652)                                                

 

             CALCIUM (BEAKER) 7.6 mg/dL    8.4-10.2     L            



             (test code = 697)                                        

 

             AST (SGOT) (BEAKER) 51 U/L       5-34         H            Specimen

 slightly



             (test code = 353)                                        hemolyzed

 

             ALT (SGPT) (BEAKER) 23 U/L       6-55                      Specimen

 slightly



             (test code = 347)                                        hemolyzed

 

             EGFR (BEAKER) (test 92 mL/min/1.73                           ESTIMA

FROILAN GFR IS



             code = 1092) sq m                                   NOT AS ACCURATE

 AS



                                                                 CREATININE



                                                                 CLEARANCE IN



                                                                 PREDICTING



                                                                 GLOMERULAR



                                                                 FILTRATION RATE

.



                                                                 ESTIMATED GFR I

S



                                                                 NOT APPLICABLE 

FOR



                                                                 DIALYSIS PATIEN

TS.



Specimen moderately [id number] 08:55:00





             Test Item    Value        Reference Range Interpretation Comments

 

             MAGNESIUM (BEAKER) 1.9 mg/dL    1.6-2.6                   Specimen 

slightly



             (test code = 627)                                        hemolyzed



[id number] 08:55:00





             Test Item    Value        Reference Range Interpretation Comments

 

             PHOSPHORUS (BEAKER) 2.5 mg/dL    2.3-4.7                   Specimen

 slightly



             (test code = 604)                                        hemolyzed



TSH/FREE T4 IF [id number] 08:39:00





             Test Item    Value        Reference Range Interpretation Comments

 

             THYROID STIMULATING HORMONE 8.07 uIU/mL  0.35-4.94    H            



             (BEAKER) (test code = 772)                                        



CALCIUM, [id number] 08:06:00





             Test Item    Value        Reference Range Interpretation Comments

 

             CALCIUM IONIZED (BEAKER) (test 1.00 mmol/L  1.12-1.27    L         

   



             code = 698)                                         

 

             PH, BLOOD (BEAKER) (test code = 7.48                               

    



             1810)                                               



[id number] 23:47:00





             Test Item    Value        Reference Range Interpretation Comments

 

             SODIUM (BEAKER) (test code = 381) 117 meq/L    136-145      LL     

      

 

             POTASSIUM (BEAKER) (test code = 5.3 meq/L    3.5-5.1      H        

    



             379)                                                

 

             CHLORIDE (BEAKER) (test code = 382) 84 meq/L            L    

        

 

             CO2 (BEAKER) (test code = 355) 27 meq/L     22-29                  

   



Call K &gt; [id number]-GLUCOSE [id number] 21:18:00





             Test Item    Value        Reference Range Interpretation Comments

 

             POC-GLUCOSE METER 145 mg/dL           H            TESTED AT 

[place] [address]



             ([facility]) (test code =                                        [address]



             [address])                                               [address]



[id number] 18:57:00





             Test Item    Value        Reference Range Interpretation Comments

 

             CORTISOL, TOTAL (BEAKER) (test 18.1 ug/dL   3.7-19.4               

   



             code = 2755)                                        



[id number] 18:34:00





             Test Item    Value        Reference Range Interpretation Comments

 

             SODIUM (BEAKER) (test code = 381) 116 meq/L    136-145      LL     

      

 

             POTASSIUM (BEAKER) (test code = 6.0 meq/L    3.5-5.1      HH       

    



             379)                                                

 

             CHLORIDE (BEAKER) (test code = 382) 82 meq/L            L    

        

 

             CO2 (BEAKER) (test code = 355) 30 meq/L     22-29        H         

   



Call [id number]-GLUCOSE [id number] 18:20:00





             Test Item    Value        Reference Range Interpretation Comments

 

             POC-GLUCOSE METER 141 mg/dL           H            TESTED AT 

[facility] [address]



             (BEAKER) (test code =                                        [address] [place]



             [address])                                               [address]



POCT-GLUCOSE [id number] 13:00:00





             Test Item    Value        Reference Range Interpretation Comments

 

             POC-GLUCOSE METER 122 mg/dL           H            TESTED AT 

[facility] [address]



             (BEAKER) (test code =                                        [address] [place] [facility]



             [address])                                               [address]



CBC W/PLT COUNT &amp; AUTO [id number] 10:34:00





             Test Item    Value        Reference Range Interpretation Comments

 

             WHITE BLOOD CELL COUNT (BEAKER) 15.5 K/ L    3.5-10.5     H        

    



             (test code = 775)                                        

 

             RED BLOOD CELL COUNT (BEAKER) 4.04 M/ L    4.63-6.08    L          

  



             (test code = 761)                                        

 

             HEMOGLOBIN (BEAKER) (test code = 13.3 GM/DL   13.7-17.5    L       

     



             410)                                                

 

             HEMATOCRIT (BEAKER) (test code = 36.5 %       40.1-51.0    L       

     



             411)                                                

 

             MEAN CORPUSCULAR VOLUME (BEAKER) 90.3 fL      79.0-92.2            

     



             (test code = 753)                                        

 

             MEAN CORPUSCULAR HEMOGLOBIN 32.9 pg      25.7-32.2    H            



             (BEAKER) (test code = 751)                                        

 

             MEAN CORPUSCULAR HEMOGLOBIN CONC 36.4 GM/DL   32.3-36.5            

     



             (BEAKER) (test code = 752)                                        

 

             RED CELL DISTRIBUTION WIDTH 13.5 %       11.6-14.4                 



             (BEAKER) (test code = 412)                                        

 

             PLATELET COUNT (BEAKER) (test code 65 K/CU MM   150-450      L     

       



             = 756)                                              

 

             MEAN PLATELET VOLUME (BEAKER) 9.0 fL       9.4-12.4     L          

  



             (test code = 754)                                        

 

             NUCLEATED RED BLOOD CELLS (BEAKER) 0 /100 WBC   0-0                

       



             (test code = 413)                                        



(CELLAVISION MANUAL DIFF)2019 10:34:00





             Test Item    Value        Reference Range Interpretation Comments

 

             NEUTROPHILS - REL 78 %                                   



             (CELLAVISION)(BEAKER) (test code                                   

     



             = 2816)                                             

 

             LYMPHOCYTES - REL 4 %                                    



             (CELLAVISION)(BEAKER) (test code                                   

     



             = 2817)                                             

 

             MONOCYTES - REL 14 %                                   



             (CELLAVISION)(BEAKER) (test code                                   

     



             = 2818)                                             

 

             EOSINOPHILS - REL 2 %                                    



             (CELLAVISION)(BEAKER) (test code                                   

     



             = 2819)                                             

 

             BANDS - REL (CELLAVISION)(BEAKER) 2 %          0-10                

      



             (test code = 2826)                                        

 

             NEUTROPHILS - ABS 12.09 K/ul   1.78-5.38    H            



             (CELLAVISION)(BEAKER) (test code                                   

     



             = 2830)                                             

 

             LYMPHOCYTES - ABS 0.62 K/ul    1.32-3.57    L            



             (CELLAVISION)(BEAKER) (test code                                   

     



             = 2831)                                             

 

             MONOCYTES - ABS 2.17 K/uL    0.30-0.82    H            



             (CELLAVISION)(BEAKER) (test code                                   

     



             = 2832)                                             

 

             EOSINOPHILS - ABS 0.31 K/uL    0.04-0.54                 



             (CELLAVISION)(BEAKER) (test code                                   

     



             = 2834)                                             

 

             BANDS - ABS (CELLAVISION)(BEAKER) 0.31 K/uL    0.00-0.80           

      



             (test code = 2840)                                        

 

             TOTAL COUNTED (BEAKER) (test code 100                              

      



             = 1351)                                             

 

             WBC MORPHOLOGY (BEAKER) (test Normal                               

  



             code = 487)                                         

 

             PLT MORPHOLOGY (BEAKER) (test Normal                               

  



             code = 486)                                         

 

             POLYCHROMATOPHILLIC RBCS(BEAKER) 1+ few                            

     



             (test code = 478)                                        

 

             POIKILOCYTES (BEAKER) (test code 2+ moderate                       

     



             = 966)                                              

 

             KARISSA CELLS (BEAKER) (test code = 2+ moderate                       

     



             474)                                                

 

             BASOPHILIC STIPPLING (BEAKER) Present                              

  



             (test code = 473)                                        

 

             ARTIFACT (CELLAVISION)(BEAKER) Present                             

   



             (test code = 3432)                                        

 

             PLATELET CONCENTRATION Decreased                              



             (CELLAVISION)(BEAKER) (test code                                   

     



             = 3438)                                             



Received comment: User comments: Slide comments:RAD, CHEST, 1 VIEW, NON DEPT
2019 08:40:00Reason for exam:-&gt;left effusionShould this be performed at
the bedside?-&gt;YesFINAL REPORT PATIENT ID: [id number] Portable chest. CLINICAL 
HISTORY: left effusion. COMPARISON STUDY:Chest x-ray from yesterday. FINDINGS: 
The cardiac silhouette is unremarkable. The pulmonary parenchyma demonstrates 
increased interstitial markings with atelectatic changes in the lung bases. A 
left-sided pigtail catheter chest tube remains and there has been development of
a loculated small basilar pneumothorax. A right PICC line remains. Degenerative 
changes are noted. IMPRESSION: Development a small left-sided basilar 
pneumothorax. No other significant change. Signed: Beckie Martinez MDReport Veri
fied Date/Time: 2019 08:40:30 Reading Location: [facility] B1 C013X Ortho Consult
Reading Room Electronically signed by: BECKIE MARTINEZ M.D. on 2019 08:40 
AMPOCT-GLUCOSE [id number] 08:39:00





             Test Item    Value        Reference Range Interpretation Comments

 

             POC-GLUCOSE METER 110 mg/dL                        TESTED AT 

[facility] [address]



             (BEAKER) (test code =                                        [address] [facility]



             [address])                                               [address]



COMPREHENSIVE METABOLIC [id number] 06:32:00





             Test Item    Value        Reference Range Interpretation Comments

 

             TOTAL PROTEIN 5.5 gm/dL    6.0-8.3      L            



             (BEAKER) (test code =                                        



             770)                                                

 

             ALBUMIN (BEAKER) 1.9 g/dL     3.5-5.0      L            



             (test code = 1145)                                        

 

             ALKALINE PHOSPHATASE 134 U/L                          



             (BEAKER) (test code =                                        



             346)                                                

 

             BILIRUBIN TOTAL 4.4 mg/dL    0.2-1.2      H            



             (BEAKER) (test code =                                        



             377)                                                

 

             SODIUM (BEAKER) (test 116 meq/L    136-145      LL           



             code = 381)                                         

 

             POTASSIUM (BEAKER) 5.6 meq/L    3.5-5.1      H            



             (test code = 379)                                        

 

             CHLORIDE (BEAKER) 83 meq/L            L            



             (test code = 382)                                        

 

             CO2 (BEAKER) (test 27 meq/L     22-29                     



             code = 355)                                         

 

             BLOOD UREA NITROGEN 34 mg/dL     7-21         H            



             (BEAKER) (test code =                                        



             354)                                                

 

             CREATININE (BEAKER) 0.89 mg/dL   0.57-1.25                 



             (test code = 358)                                        

 

             GLUCOSE RANDOM 109 mg/dL           H            



             (BEAKER) (test code =                                        



             652)                                                

 

             CALCIUM (BEAKER) 7.4 mg/dL    8.4-10.2     L            



             (test code = 697)                                        

 

             AST (SGOT) (BEAKER) 31 U/L       5-34                      



             (test code = 353)                                        

 

             ALT (SGPT) (BEAKER) 18 U/L       6-55                      



             (test code = 347)                                        

 

             EGFR (BEAKER) (test 87 mL/min/1.73                           ESTIMA

FROILAN GFR IS



             code = 1092) sq m                                   NOT AS ACCURATE

 AS



                                                                 CREATININE



                                                                 CLEARANCE IN



                                                                 PREDICTING



                                                                 GLOMERULAR



                                                                 FILTRATION RATE

.



                                                                 ESTIMATED GFR I

S



                                                                 NOT APPLICABLE 

FOR



                                                                 DIALYSIS PATIEN

TS.



Specimen moderately ictericPOCT-GLUCOSE [id number] 21:12:00





             Test Item    Value        Reference Range Interpretation Comments

 

             POC-GLUCOSE METER 114 mg/dL           H            TESTED AT 

[facility] [address]



             [facility] (test code =                                        [address] [facility]



             [address])                                               [address]



OSMOLALITY, [id number] 18:43:00





             Test Item    Value        Reference Range Interpretation Comments

 

             OSMOLALITY URINE (BEAKER) (test 694 mOsm/kg  40-1,400              

    



             code = 614)                                         



SODIUM, RANDOM [id number] 18:02:00





             Test Item    Value        Reference Range Interpretation Comments

 

             SODIUM URINE (BEAKER) (test code = < meq/L                         

       



             243)                                                



Reference Range: No NormalsPOCT-GLUCOSE [id number] 16:06:00





             Test Item    Value        Reference Range Interpretation Comments

 

             POC-GLUCOSE METER 145 mg/dL           H            TESTED AT 

[place] [address]



             ([facility]) (test code =                                        [facility]

[facility]



             [address]38)                                               [address]



RAD, ABDOMEN/KUB, 1 VIEW [id number] 12:51:00Reason for exam:-&gt;no BM in 13 
days. abdominal distension. concern for ileusFINAL REPORT PATIENT ID: [id number] 
RAD, ABDOMEN/KUB, 1 VIEW AP CLINICAL INDICATION: no BM in 13 days.abdominal 
distension. concern for ileus COMPARISON: None TECHNIQUE: 24 radiographs of the 
abdomen. IMPRESSION: The bowel gas pattern demonstrates gaseous distention 
without dilation. No pneumatosis. Appearance may reflect ileus. Excreted 
contrast is present in the urinary bladder. The regional skeleton is intact. 
Signed: JR Mendoza Robert MDReplinda Verified Date/Time: 2019 
12:51:21 Reading Location: [facility] Reading Room Electronically 
signed by: KULWINDER MENDOZA on 2019 12:51 PMOSMOLALITY, SERUM
2019 12:47:00





             Test Item    Value        Reference Range Interpretation Comments

 

             OSMOLALITY, SERUM (BEAKER) (test 258 mOsm/kg  275-295      L       

     



             code = 615)                                         



POCT-GLUCOSE [id number] 12:35:00





             Test Item    Value        Reference Range Interpretation Comments

 

             POC-GLUCOSE METER 93 mg/dL                         TESTED AT 

[facility] [address]



             (BEAKER) (test code =                                        [place] [address]



             1538)                                               



CBC W/PLT COUNT &amp; AUTO [id number] 10:10:00





             Test Item    Value        Reference Range Interpretation Comments

 

             WHITE BLOOD CELL COUNT (BEAKER) 19.5 K/ L    3.5-10.5     H        

    



             (test code = 775)                                        

 

             RED BLOOD CELL COUNT (BEAKER) 4.18 M/ L    4.63-6.08    L          

  



             (test code = 761)                                        

 

             HEMOGLOBIN (BEAKER) (test code = 13.3 GM/DL   13.7-17.5    L       

     



             410)                                                

 

             HEMATOCRIT (BEAKER) (test code = 37.8 %       40.1-51.0    L       

     



             411)                                                

 

             MEAN CORPUSCULAR VOLUME (BEAKER) 90.4 fL      79.0-92.2            

     



             (test code = 753)                                        

 

             MEAN CORPUSCULAR HEMOGLOBIN 31.8 pg      25.7-32.2                 



             (BEAKER) (test code = 751)                                        

 

             MEAN CORPUSCULAR HEMOGLOBIN CONC 35.2 GM/DL   32.3-36.5            

     



             (BEAKER) (test code = 752)                                        

 

             RED CELL DISTRIBUTION WIDTH 13.5 %       11.6-14.4                 



             (BEAKER) (test code = 412)                                        

 

             PLATELET COUNT (BEAKER) (test code 60 K/CU MM   150-450      L     

       



             = 756)                                              

 

             MEAN PLATELET VOLUME (BEAKER) 9.4 fL       9.4-12.4                

  



             (test code = 754)                                        

 

             NUCLEATED RED BLOOD CELLS (BEAKER) 0 /100 WBC   0-0                

       



             (test code = 413)                                        



(CELLAVISION MANUAL DIFF)2019 10:10:00





             Test Item    Value        Reference Range Interpretation Comments

 

             NEUTROPHILS - REL 86 %                                   



             (CELLAVISION)(BEAKER) (test code =                                 

       



             2816)                                               

 

             LYMPHOCYTES - REL 1 %                                    



             (CELLAVISION)(BEAKER) (test code =                                 

       



             2817)                                               

 

             MONOCYTES - REL 6 %                                    



             (CELLAVISION)(BEAKER) (test code =                                 

       



             2818)                                               

 

             EOSINOPHILS - REL 3 %                                    



             (CELLAVISION)(BEAKER) (test code =                                 

       



             2819)                                               

 

             BANDS - REL (CELLAVISION)(BEAKER) 3 %          0-10                

      



             (test code = 2826)                                        

 

             BLASTS - REL (CELLAVISION)(BEAKER) 1 %          0-0          H     

       



             (test code = 2827)                                        

 

             NEUTROPHILS - ABS 16.77 K/ul   1.78-5.38    H            



             (CELLAVISION)(BEAKER) (test code =                                 

       



             2830)                                               

 

             LYMPHOCYTES - ABS 0.20 K/ul    1.32-3.57    L            



             (CELLAVISION)(BEAKER) (test code =                                 

       



             2831)                                               

 

             MONOCYTES - ABS 1.17 K/uL    0.30-0.82    H            



             (CELLAVISION)(BEAKER) (test code =                                 

       



             2832)                                               

 

             EOSINOPHILS - ABS 0.59 K/uL    0.04-0.54    H            



             (CELLAVISION)(BEAKER) (test code =                                 

       



             2834)                                               

 

             BANDS - ABS (CELLAVISION)(BEAKER) 0.59 K/uL    0.00-0.80           

      



             (test code = 2840)                                        

 

             BLASTS - ABS (CELLAVISION)(BEAKER) 0.20 K/uL    0.00-0.00    H     

       



             (test code = 2845)                                        

 

             TOTAL COUNTED (BEAKER) (test code 100                              

      



             = 1351)                                             

 

             PLT MORPHOLOGY (BEAKER) (test code Normal                          

       



             = 486)                                              

 

             SMUDGE CELLS (BEAKER) (test code = Present                         

       



             1371)                                               

 

             POIKILOCYTES (BEAKER) (test code = 1+ few                          

       



             966)                                                

 

             PLATELET CONCENTRATION Decreased                              



             (CELLAVISION)(BEAKER) (test code =                                 

       



             3438)                                               



Received comment: User comments: Slide comments:RAD, CHEST, 1 VIEW, NON DEPT
2019 08:53:00Reason for exam:-&gt;left effusionShould this be performed at
the bedside?-&gt;YesFINAL REPORT PATIENT ID: [id number] RAD, CHEST, 1 VIEW, NON 
DEPT INDICATION: left effusion COMPARISON:Prior day's exam FINDINGS: Portable 
frontal view of the chest. IMPRESSION: Limited by patient positioning.Support 
Lines: Stable. Lungs and pleura: Interstitial congestion on the left slightly 
greater than the right and unchanged from prior. Small left effusion. No visible
pneumothorax.Heart and mediastinum: Stable contours. Additional findings: None. 
Signed: JR Mendoza Robert MDReport VerifiedDate/Time: 2019 08:53:19
Reading Location: [facility] Reading Room Electronicallysigned by: 
KULWINDER MENDOZA on 2019 08:53 AMPOCT-GLUCOSE [id number] 
07:58:00





             Test Item    Value        Reference Range Interpretation Comments

 

             POC-GLUCOSE METER 95 mg/dL                         TESTED AT 

[place] [address]



             ([facility]) (test code =                                        [facility] [address]



             1538)                                               



COMPREHENSIVE METABOLIC [id number] 05:41:00





             Test Item    Value        Reference Range Interpretation Comments

 

             TOTAL PROTEIN 5.4 gm/dL    6.0-8.3      L            



             (BEAKER) (test code =                                        



             770)                                                

 

             ALBUMIN (BEAKER) 1.9 g/dL     3.5-5.0      L            



             (test code = 1145)                                        

 

             ALKALINE PHOSPHATASE 126 U/L                          



             (BEAKER) (test code =                                        



             346)                                                

 

             BILIRUBIN TOTAL 4.9 mg/dL    0.2-1.2      H            



             (BEAKER) (test code =                                        



             377)                                                

 

             SODIUM (BEAKER) (test 117 meq/L    136-145      LL           



             code = 381)                                         

 

             POTASSIUM (BEAKER) 5.3 meq/L    3.5-5.1      H            



             (test code = 379)                                        

 

             CHLORIDE (BEAKER) 83 meq/L            L            



             (test code = 382)                                        

 

             CO2 (BEAKER) (test 28 meq/L     22-29                     



             code = 355)                                         

 

             BLOOD UREA NITROGEN 29 mg/dL     7-21         H            



             (BEAKER) (test code =                                        



             354)                                                

 

             CREATININE (BEAKER) 0.90 mg/dL   0.57-1.25                 



             (test code = 358)                                        

 

             GLUCOSE RANDOM 99 mg/dL                         



             (BEAKER) (test code =                                        



             652)                                                

 

             CALCIUM (BEAKER) 7.7 mg/dL    8.4-10.2     L            



             (test code = 697)                                        

 

             AST (SGOT) (BEAKER) 28 U/L       5-34                      



             (test code = 353)                                        

 

             ALT (SGPT) (BEAKER) 18 U/L       6-55                      



             (test code = 347)                                        

 

             EGFR (BEAKER) (test 86 mL/min/1.73                           ESTIMA

FROILAN GFR IS



             code = 1092) sq m                                   NOT AS ACCURATE

 AS



                                                                 CREATININE



                                                                 CLEARANCE IN



                                                                 PREDICTING



                                                                 GLOMERULAR



                                                                 FILTRATION RATE

.



                                                                 ESTIMATED GFR I

S



                                                                 NOT APPLICABLE 

FOR



                                                                 DIALYSIS PATIEN

TS.



Specimen moderately ictericPOCT-GLUCOSE [id number] 21:08:00





             Test Item    Value        Reference Range Interpretation Comments

 

             POC-GLUCOSE METER 92 mg/dL                         TESTED AT 

[place] [address]



             ([facility]) (test code =                                        [facility] [address]



             1538)                                               



RAD, CHEST, 1 VIEW, NON [id number] 17:40:00Reason for exam:-&gt;post chest 
tube placementFINAL REPORT PATIENT ID: [id number] INDICATION: post chest tube 
placement TECHNIQUE: Chest radiograph,single view, portable technique. FINDINGS 
/ IMPRESSION: Left pleural effusion has decreased in size,since 10:25 AM, after 
pleural tube placement. No pneumothorax demonstrated. Right PICC line again not
ed terminating at the cavoatrial junction. Signed: Bertram Ordonez MDReport 
Verified Date/Time: 2019 17:40:02 Reading Location: [address] Consult 
Reading Room Electronically signed by: BERTRAM ORDONEZ M.D. on 
2019 05:40 PMPOCT-GLUCOSE [id number] 17:33:00





             Test Item    Value        Reference Range Interpretation Comments

 

             POC-GLUCOSE METER 116 mg/dL           H            TESTED AT 

[place] [address]



             ([facility]) (test code =                                        [place] [facility]



             1538)                                               [id number]



POCT-GLUCOSE [id number] 15:19:00





             Test Item    Value        Reference Range Interpretation Comments

 

             POC-GLUCOSE METER 85 mg/dL                         TESTED AT 

[place] [id number]



             ([facility]) (test code =                                        [facility]

DA[place] [facility] [address]



             1538)                                               



CT, DRAINAGE, CHEST TUBE [id number] 14:49:00Reason for exam:-
&gt;loculated left pleural effusion, please place large bore pigtail if effusion
noted on CT scanAnesthesia:-&gt;NoneFINAL REPORT PATIENT ID: [id number] PROCEDURE:
CT-guided placement of a left pleural catheter. Dose modulation, iterative 
reconstruction, and/or weight-based adjustment of the mA/kV was utilized to 
reduce the radiation dose to as low as reasonably achievable. INDICATION: 
Loculated left pleural effusion.COMPARISON: CT from earlier today. SEDATION: 
Intravenous moderate sedation was administered by radiology nursing and 
monitored under the direction of the undersigned radiologist. The patient's 
vital signs were monitored throughout the procedure and recorded in the 
patient's medical record by radiologynursing. Total intraservice time of 
sedation was 25 minutes. MEDICATIONS: 0.5 mg Versed, 25 mcg fentanyl 
DESCRIPTION: After obtaining informed written consent, the patient was brought 
to the procedure room and placed in the supine position. Preliminary CT scan 
revealed a large left pleural effusion. Aclear path to the collection was 
identified. The overlying skin was prepped and draped in the usual,sterile 
fashion and local 2% lidocaine anesthesia was administered. Under CT guidance, a
19-gauge needle was placed. After placement of a wire and serial dilation, a 12 
French catheter was placed into the pleural fluid. The catheter was affixed to 
the skin and connected to a vacuum container. 1000 mL of clear red fluid was 
aspirated. Samples were placed on this side table and no clotting was noted. Sa
mples were sent for analysis. Post procedure scan showed decrease in size with 
left effusion and no immediate complications. IMPRESSION: Successful placement 
of a 12 French left chest tube. Signed: Vivien Aguilera Verified Date/Time: 
2019 14:49:30 Reading Location: [facility] C013Y CT Body Reading Room 
Electronically signed by: VIVIEN AGUILERA MD on 2019 02:49 PMCT, CHEST, 
WITH [id number] 13:11:00Reason for exam:-&gt;evaluate loculated left 
pleural effusion seen on xrayFINAL REPORT PATIENT ID: [id number] TECHNIQUE: CT 
scan of the chest WITH intravenous contrast. Dose modulation, iterative 
reconstruction, and/or weight-based adjustment of the mA/kV was utilized to 
reduce the radiation dose to as low as reasonably achievable. INDICATION: 
evaluate loculated left pleural effusion seen on xrayevaluate loculated left 
pleural effusion seen on xray. COMPARISON: None. FINDINGS: LINES/TUBES: A right 
PICC has its tip at the superior cavoatrial junction. LUNGS AND AIRWAYS: Mild
right basilar subsegmental atelectasis. There is an area of cavitation with a 
fluid fluid level in superior segment of the left lower lobe which measures 4 cm
PLEURA: Trace right pleural effusion. HEART AND MEDIASTINUM: The visualized 
thyroid gland is normal. No significant mediastinal, hilar, or axillary 
lymphadenopathy. The heart and pericardium are within normal limits. Severe 
coronary arterial calcifications. SOFT TISSUES AND BONES: Bilateral 
gynecomastia. Old, healed right 10th and 12th rib fractures. Old, healed left 
10th rib fracture. UPPER ABDOMEN: Nodular, cirrhotic liver. Partially visualized
upper abdominal varices. A periumbilical vein is recanalized. IMPRESSION: 
1.There is a large left sided loculated pleural effusion with a mildly thickened
pleura and some intermixed gas. This is concerning for an empyema. 2.The air-
fluid level with surrounding lung in the superior segment of the left lower lobe
is most likely a lung abscess. A cavitary lung nodule (either from malignancy or
fungal infection) is considered less likely. A follow-up chest CT is recommended
in three months to document decrease in size and exclude cavitary malignancy. 
3.Cirrhosis with findings of portal hypertension. Signed: Vivien Aguilera 
Verified Date/Time: 2019 13:11:18 Reading Location: [facility] B1 C013Y CT Body 
Reading Room Electronically signed by: VIVIEN AGUILERA MD on 2019 01:11 
PMPOCT-GLUCOSE [id number] 11:22:00





             Test Item    Value        Reference Range Interpretation Comments

 

             POC-GLUCOSE METER 81 mg/dL                         TESTED AT 

[facility] [address]



             ([facility]) (test code =                                        [place] [address]



             1538)                                               



RAD, CHEST, 1 VIEW, NON [id number] 10:59:00Reason for exam:-&gt;eval 
effusionShould this be performed at the bedside?-&gt;YesFINAL REPORT PATIENT ID:
[id number] RAD, CHEST, 1 VIEW, NON DEPT INDICATION: eval effusion COMPARISON:Prior
day's exam FINDINGS: Portable frontal view of the chest. IMPRESSION: Limited by 
patient rotation.Support Lines: A right PICC terminates over the superior vena 
cava. Lungs and pleura: Large left effusion. Right costophrenic sulcus is 
excluded from view. No pneumothorax.Heart and mediastinum: Unremarkable where 
visible.Additional findings: None. A CT evaluation is currently pending. Signed:
JR Mendoza Robert MDReport Verified Date/Time: 2019 10:59:34 
Reading Location: [facility] Reading Room Electronically signed by:
KULWINDER MENDOZA on 2019 10:59 AMCOMPREHENSIVE METABOLIC PANEL
2019 07:33:00





             Test Item    Value        Reference Range Interpretation Comments

 

             TOTAL PROTEIN 5.8 gm/dL    6.0-8.3      L            Specimen [facility]



             [facility] ([facility] code =                                        hemoly

zed



             770)                                                

 

             ALBUMIN (BEAKER) 2.0 g/dL     3.5-5.0      L            Specimen sl

ightly



             (test code = 1145)                                        hemolyzed

 

             ALKALINE PHOSPHATASE 130 U/L                          



             (BEAKER) (test code =                                        



             346)                                                

 

             BILIRUBIN TOTAL 4.6 mg/dL    0.2-1.2      H            Specimen sli

ghtly



             (BEAKER) (test code =                                        hemoly

zed



             377)                                                

 

             SODIUM (BEAKER) (test 117 meq/L    136-145      LL           



             code = 381)                                         

 

             POTASSIUM (BEAKER) 5.3 meq/L    3.5-5.1      H            Specimen 

slightly



             (test code = 379)                                        hemolyzed

 

             CHLORIDE (BEAKER) 84 meq/L            L            



             (test code = 382)                                        

 

             CO2 (BEAKER) (test 28 meq/L     22-29                     



             code = 355)                                         

 

             BLOOD UREA NITROGEN 22 mg/dL     7-21         H            



             (BEAKER) (test code =                                        



             354)                                                

 

             CREATININE (BEAKER) 0.86 mg/dL   0.57-1.25                 Specimen

 slightly



             (test code = 358)                                        hemolyzed

 

             GLUCOSE RANDOM 90 mg/dL                         



             (BEAKER) (test code =                                        



             652)                                                

 

             CALCIUM (BEAKER) 7.9 mg/dL    8.4-10.2     L            



             (test code = 697)                                        

 

             AST (SGOT) (BEAKER) 33 U/L       5-34                      Specimen

 slightly



             (test code = 353)                                        hemolyzed

 

             ALT (SGPT) (BEAKER) 20 U/L       6-55                      Specimen

 slightly



             (test code = 347)                                        hemolyzed

 

             EGFR (BEAKER) (test 91 mL/min/1.73                           ESTIMA

FROILAN GFR IS



             code = 1092) sq m                                   NOT AS ACCURATE

 AS



                                                                 CREATININE



                                                                 CLEARANCE IN



                                                                 PREDICTING



                                                                 GLOMERULAR



                                                                 FILTRATION RATE

.



                                                                 ESTIMATED GFR I

S



                                                                 NOT APPLICABLE 

FOR



                                                                 DIALYSIS PATIEN

TS.



Specimen moderately ictericPOCT-GLUCOSE [id number] 05:46:00





             Test Item    Value        Reference Range Interpretation Comments

 

             POC-GLUCOSE METER 93 mg/dL                         TESTED AT 

[facility] [address]



             (BEAKER) (test code =                                        [place] [address]



             1538)                                               



PROTHROMBIN [id number] 05:30:00





             Test Item    Value        Reference Range Interpretation Comments

 

             PROTIME (BEAKER) (test code = 16.9 seconds 11.9-14.2    H          

  



             759)                                                

 

             INR (BEAKER) (test code = 370) 1.4          <=5.9                  

   



Effective 2019: PT Reference Range ChangeNew: 11.9-14.2 Previous: 11.7-
14.7RECOMMENDED COUMADIN/WARFARIN INR THERAPY RANGESSTANDARD DOSE: 2.0-3.0 
Includes: PROPHYLAXIS for venous thrombosis, systemic embolization; TREATMENT 
for venous thrombosis and/or pulmonary embolus.HIGH RISK: Target INR is 2.5-3.5 
for patients wiht mechanical heart valves.CBC W/PLT COUNT &amp; AUTO 
[id number] 05:27:00





             Test Item    Value        Reference Range Interpretation Comments

 

             WHITE BLOOD CELL COUNT (BEAKER) 23.2 K/ L    3.5-10.5     H        

    



             (test code = 775)                                        

 

             RED BLOOD CELL COUNT (BEAKER) 4.75 M/ L    4.63-6.08               

  



             (test code = 761)                                        

 

             HEMOGLOBIN (BEAKER) (test code = 15.3 GM/DL   13.7-17.5            

     



             410)                                                

 

             HEMATOCRIT (BEAKER) (test code = 43.1 %       40.1-51.0            

     



             411)                                                

 

             MEAN CORPUSCULAR VOLUME (BEAKER) 90.7 fL      79.0-92.2            

     



             (test code = 753)                                        

 

             MEAN CORPUSCULAR HEMOGLOBIN 32.2 pg      25.7-32.2                 



             (BEAKER) (test code = 751)                                        

 

             MEAN CORPUSCULAR HEMOGLOBIN CONC 35.5 GM/DL   32.3-36.5            

     



             (BEAKER) (test code = 752)                                        

 

             RED CELL DISTRIBUTION WIDTH 13.3 %       11.6-14.4                 



             (BEAKER) (test code = 412)                                        

 

             PLATELET COUNT (BEAKER) (test code 87 K/CU MM   150-450      L     

       



             = 756)                                              

 

             MEAN PLATELET VOLUME (BEAKER) 8.9 fL       9.4-12.4     L          

  



             (test code = 754)                                        

 

             NUCLEATED RED BLOOD CELLS (BEAKER) 0 /100 WBC   0-0                

       



             (test code = 413)                                        

 

             NEUTROPHILS RELATIVE PERCENT 86 %                                  

 



             (BEAKER) (test code = 429)                                        

 

             LYMPHOCYTES RELATIVE PERCENT 3 %                                   

 



             (BEAKER) (test code = 430)                                        

 

             MONOCYTES RELATIVE PERCENT 9 %                                    



             (BEAKER) (test code = 431)                                        

 

             EOSINOPHILS RELATIVE PERCENT 0 %                                   

 



             (BEAKER) (test code = 432)                                        

 

             BASOPHILS RELATIVE PERCENT 0 %                                    



             (BEAKER) (test code = 437)                                        

 

             NEUTROPHILS ABSOLUTE COUNT 19.87 K/ L   1.78-5.38    H            



             (BEAKER) (test code = 670)                                        

 

             LYMPHOCYTES ABSOLUTE COUNT 0.73 K/ L    1.32-3.57    L            



             ([facility]) (test code = 414)                                        

 

             MONOCYTES ABSOLUTE COUNT ([facility]) 2.16 K/ L    0.30-0.82    H      

      



             (test code = 415)                                        

 

             EOSINOPHILS ABSOLUTE COUNT 0.06 K/ L    0.04-0.54                 



             ([facility]) (test code = 416)                                        

 

             BASOPHILS ABSOLUTE COUNT ([facility]) 0.04 K/ L    0.01-0.08           

      



             (test code = 417)                                        

 

             IMMATURE GRANULOCYTES-RELATIVE 1 %          0-1                    

   



             PERCENT ([facility]) (test code =                                      

  



             2801)                                               



POCT-GLUCOSE [id number] 23:34:00





             Test Item    Value        Reference Range Interpretation Comments

 

             POC-GLUCOSE METER 94 mg/dL                         TESTED AT 

[facility] [address]



             ([facility]) (test code =                                        [place] [address]



             1538)

## 2022-09-11 NOTE — RAD REPORT
EXAM DESCRIPTION:  CT - Chest For Pe Angio - 9/11/2022 3:41 pm

 

CLINICAL HISTORY:  Chest pain.

dyspnea

 

COMPARISON:  Chest Angio dated 11/8/2019; Chest Abdomen Pelvis W Cont dated 7/26/2022

 

TECHNIQUE:  CT angiogram of the pulmonary arteries was performed with MIP.

 

All CT scans are performed using dose optimization technique as appropriate and may include automated
 exposure control or mA/KV adjustment according to patient size.

 

FINDINGS:  No evidence of pulmonary thromboembolism.

 

No acute aortic finding demonstrated.

 

Pulmonary nodules are again seen bilaterally stable to fractionally reduced in size since July-26-22 
study.

 

No significant pericardial or pleural fluid. Mild bilateral hilar and upper mediastinal lymphadenopat
hy. .

 

No concerning bony finding.

 

IMPRESSION:  No evidence of pulmonary thromboembolism.

 

Bilateral pulmonary nodules again seen, stable or fractionally smaller than on the comparative study. What Type Of Note Output Would You Prefer (Optional)?: Standard Output Hpi Title: Evaluation of Skin Lesions How Severe Are Your Spot(S)?: mild Have Your Spot(S) Been Treated In The Past?: has not been treated

## 2022-09-11 NOTE — EDPHYS
Physician Documentation                                                                           

 [facility]                                                                 

Name: Omkar Chung                                                                                  

Age: 62 yrs                                                                                       

Sex: Male                                                                                         

: 1960                                                                                   

MRN: [id number]                                                                                   

Arrival Date: 2022                                                                          

Time: 13:26                                                                                       

Account#: [id number]                                                                            

Bed 19                                                                                            

Private MD:                                                                                       

ED Physician Chris Canales                                                                      

HPI:                                                                                              

                                                                                             

14:30 This 62 yrs old  Male presents to ER via EMS with complaints of Shortness Of   juliet 

      Breath.                                                                                     

14:30 The patient has shortness of breath with light activity. Onset: The symptoms/episode    juliet 

      began/occurred 3 day(s) ago. Duration: The symptoms are continuous, and are steadily        

      getting worse. The patient's shortness of breath is aggravated by coughing, light           

      activity, supine position, walking. The patient's shortness of breath is alleviated by      

      nebulizer treatment, application of supplemental oxygen. Associated signs and symptoms:     

      Pertinent positives: productive cough. Severity of symptoms: At their worst the             

      symptoms were moderate in the emergency department the symptoms are unchanged. The          

      patient has experienced similar episodes in the past, multiple times. dyspnea.              

                                                                                                  

Historical:                                                                                       

- Allergies:                                                                                      

13:35 No Known Allergies;                                                                     bm7 

- Home Meds:                                                                                      

13:35 Furosemide Oral [Active]; albuterol sulfate 2.5 mg/0.5 mL Nebulizer nebu 0.5 mL every 6 bm7 

      hours [Active];                                                                             

- PMHx:                                                                                           

13:35 Cirrhosis; colon cancer; COPD; DM type 2; Hernia;                                       bm7 

- PSHx:                                                                                           

13:35 None;                                                                                   bm7 

                                                                                                  

- Immunization history:: Adult Immunizations Client reports receiving the 2nd dose of             

  the Covid vaccine, Client reports receiving the 1st dose of the Covid vaccine.                  

- Social history:: Smoking status: Patient denies any tobacco usage or history of.                

- Family history:: not pertinent.                                                                 

                                                                                                  

                                                                                                  

ROS:                                                                                              

14:30 Constitutional: Negative for fever, chills, and weight loss, Eyes: Negative for injury, juliet 

      pain, redness, and discharge, ENT: Negative for injury, pain, and discharge, Neck:          

      Negative for injury, pain, and swelling, Cardiovascular: Negative for chest pain,           

      palpitations, and edema, Abdomen/GI: Negative for abdominal pain, nausea, vomiting,         

      diarrhea, and constipation, Back: Negative for injury and pain, : Negative for            

      injury, bleeding, discharge, and swelling, MS/Extremity: Negative for injury and            

      deformity, Skin: Negative for injury, rash, and discoloration, Neuro: Negative for          

      headache, weakness, numbness, tingling, and seizure, Psych: Negative for depression,        

      anxiety, suicide ideation, homicidal ideation, and hallucinations, Allergy/Immunology:      

      Negative for hives, rash, and allergies, Endocrine: Negative for neck swelling,             

      polydipsia, polyuria, polyphagia, and marked weight changes, Hematologic/Lymphatic:         

      Negative for swollen nodes, abnormal bleeding, and unusual bruising.                        

14:30 Respiratory: Positive for cough, shortness of breath, wheezing, inspiratory, expiratory.    

                                                                                                  

Exam:                                                                                             

14:30 Constitutional:  This is a well developed, well nourished patient who is awake, alert,  juliet 

      and in no acute distress. Head/Face:  Normocephalic, atraumatic. Eyes:  Pupils equal        

      round and reactive to light, extra-ocular motions intact.  Lids and lashes normal.          

      Conjunctiva and sclera are non-icteric and not injected.  Cornea within normal limits.      

      Periorbital areas with no swelling, redness, or edema. ENT:  Nares patent. No nasal         

      discharge, no septal abnormalities noted.  Tympanic membranes are normal and external       

      auditory canals are clear.  Oropharynx with no redness, swelling, or masses, exudates,      

      or evidence of obstruction, uvula midline.  Mucous membranes moist. Neck:  Trachea          

      midline, no thyromegaly or masses palpated, and no cervical lymphadenopathy.  Supple,       

      full range of motion without nuchal rigidity, or vertebral point tenderness.  No            

      Meningismus. Chest/axilla:  Normal chest wall appearance and motion.  Nontender with no     

      deformity.  No lesions are appreciated. Abdomen/GI:  Soft, non-tender, with normal          

      bowel sounds.  No distension or tympany.  No guarding or rebound.  No evidence of           

      tenderness throughout. Back:  No spinal tenderness.  No costovertebral tenderness.          

      Full range of motion. Male :  Normal genitalia with no discharge or lesions. Skin:        

      Warm, dry with normal turgor.  Normal color with no rashes, no lesions, and no evidence     

      of cellulitis. MS/ Extremity:  Pulses equal, no cyanosis.  Neurovascular intact.  Full,     

      normal range of motion. Neuro:  Awake and alert, GCS 15, oriented to person, place,         

      time, and situation.  Cranial nerves II-XII grossly intact.  Motor strength 5/5 in all      

      extremities.  Sensory grossly intact.  Cerebellar exam normal.  Normal gait. Psych:         

      Awake, alert, with orientation to person, place and time.  Behavior, mood, and affect       

      are within normal limits.                                                                   

14:30 Cardiovascular: Rate: tachycardic, Rhythm: regular, Pulses: Pulses are 4+ in bilateral      

      radial, brachial, femoral, popliteal, posterior tibial and and dorsalis pedis               

      arteries.. Heart sounds: normal, Edema: is not appreciated, JVD: is not appreciated.        

14:30 ECG was reviewed by the Attending Physician.                                                

                                                                                                  

Vital Signs:                                                                                      

13:32  / 95; Pulse 108; Resp 22; Temp 98.6(TE); Pulse Ox 98% on 3 lpm NC; Weight 92.99  bm7 

      kg (R); Height 6 ft. 2 in. (187.96 cm); Pain 0/10;                                          

15:16  / 80; Pulse 90; Resp 16; Pulse Ox 100% on 3 lpm NC;                              bm7 

17:34  / 84; Pulse 90; Resp 20; Pulse Ox 100% on Nebulizer Mask;                        bm7 

19:01  / 90; Pulse 80; Resp 16; Pulse Ox 99% on 3 lpm NC; Pain 0/10;                    bm7 

13:32 Body Mass Index 26.32 (92.99 kg, 187.96 cm)                                             bm7 

                                                                                                  

MDM:                                                                                              

13:30 Patient medically screened.                                                             juliet 

14:33 Differential diagnosis: Anemia Anxiety Reaction asthma, Bronchitis CHF exacerbation,    juliet 

      Chronic Obstructive Pulmonary Disease pneumonia, Pneumothorax pulmonary edema,              

      Pulmonary Embolism reactive airway disease, Sepsis Unstable Angina. Antibiotic              

      administration: Rocephin and Zithromax given. The patient's Wells Deep Vein Thrombosis      

      Score was calculated as follows: Heart Rate >100 BPM (1.5 Pts) Total Score: 3-6 Pts -       

      Mod Risk. The patient's pulmonary embolism risk score was calculated as follows: Total      

      Score: 0-2 points. This patient was found to be at low risk for a pulmonary embolism by     

      using the Well's assessment criteria. Immunization status: Influenza vaccine: Data          

      reviewed: vital signs, nurses notes, lab test result(s), EKG, radiologic studies, plain     

      films. Data interpreted: Cardiac monitor: rate is 108 beats/min, rhythm is regular,         

      Pulse oximetry: on room air is 98 %. Test interpretation: by ED physician or midlevel       

      provider: ECG, plain radiologic studies. Counseling: I had a detailed discussion with       

      the patient and/or guardian regarding: the historical points, exam findings, and any        

      diagnostic results supporting the discharge/admit diagnosis, lab results, radiology         

      results, the need for further work-up and treatment in the hospital.                        

                                                                                                  

                                                                                             

13:38 Order name: Basic Metabolic Panel                                                       [facility] 

                                                                                             

13:38 Order name: CBC with Diff; Complete Time: 16:24                                         [facility] 

                                                                                             

13:38 Order name: LFT's                                                                       [facility] 

                                                                                             

13:38 Order name: Magnesium                                                                   [facility] 

                                                                                             

13:38 Order name: NT PRO-BNP                                                                  [facility] 

                                                                                             

13:38 Order name: PT-INR; Complete Time: 16:24                                                [facility] 

                                                                                             

13:38 Order name: Troponin HS                                                                 [facility] 

                                                                                             

13:38 Order name: Blood Culture Adult (2)                                                     [facility] 

                                                                                             

13:38 Order name: Lactate; Complete Time: 14:52                                               [facility] 

                                                                                             

13:38 Order name: Flu; Complete Time: 14:52                                                   [facility] 

                                                                                             

13:38 Order name: SARS RAPID; Complete Time: 14:52                                            [facility] 

                                                                                             

13:38 Order name: D-Dimer; Complete Time: 16:24                                               [facility] 

                                                                                             

15:03 Order name: CBC Smear Scan; Complete Time: 16:24                                        [facility]

                                                                                             

17:26 Order name: Ammonia                                                                     [facility]

                                                                                             

13:38 Order name: XRAY Chest (1 view); Complete Time: 16:24                                   [facility] 

                                                                                             

14:54 Order name: US Extremity Venous W Compression Porfirio; Complete Time: 16:24                 [facility] 

                                                                                             

14:54 Order name: CT Chest For PE Angio                                                       [facility] 

                                                                                             

14:58 Order name: Chest For Pe Angio; Complete Time: 16:24                                    [facility]

                                                                                             

17:26 Order name: Ammonia                                                                     [facility]

                                                                                             

17:26 Order name: CBC with Automated Diff                                                     [facility]

                                                                                             

17:26 Order name: CBC with Automated Diff                                                     [facility]

                                                                                             

17:26 Order name: Comprehensive Metabolic Panel                                               [facility]

                                                                                             

17:26 Order name: Comprehensive Metabolic Panel                                               [facility]

                                                                                             

17:26 Order name: Lipid Profile                                                               [facility]

                                                                                             

17:26 Order name: Lipid Profile                                                               [facility]

                                                                                             

17:26 Order name: Protime (+INR)                                                              [facility]

                                                                                             

17:26 Order name: Protime (+INR)                                                              [facility]

                                                                                             

17:26 Order name: PTT, Activated Partial Thromb                                               [facility]

                                                                                             

17:26 Order name: PTT, Activated Partial Thromb                                               [facility]

                                                                                             

13:38 Order name: EKG; Complete Time: 13:39                                                   [facility] 

                                                                                             

13:38 Order name: Cardiac monitoring; Complete Time: 14:18                                    [facility] 

                                                                                             

13:38 Order name: EKG - Nurse/Tech; Complete Time: 14:18                                      [facility] 

                                                                                             

13:38 Order name: IV Saline Lock; Complete Time: 14:11                                        [facility] 

                                                                                             

13:38 Order name: Labs collected and sent; Complete Time: 14:11                               [facility] 

                                                                                             

13:38 Order name: O2 Per Protocol; Complete Time: 14:11                                       [facility] 

                                                                                             

13:38 Order name: O2 Sat Monitoring; Complete Time: 14:11                                     [facility] 

                                                                                             

17:26 Order name: CONS Physician Consult                                                      [facility]

                                                                                             

17:26 Order name: Heart Healthy                                                               [facility]

                                                                                                  

EC:30 Rate is 87 beats/min. Rhythm is regular. QRS Axis is Normal. WV interval is normal. QRS juliet 

      interval is normal. QT interval is normal. No Q waves. T waves are Normal. No ST            

      changes noted. Clinical impression: NSR w/ Non-specific ST/T Changes and No evidence of     

      ischemia. Interpreted by me. Reviewed by me.                                                

                                                                                                  

Administered Medications:                                                                         

14:12 Drug: Xopenex (levalbuterol) 1.25 mg Route: Inhalation;                                 bm7 

14:12 Drug: Pepcid (famotidine) 20 mg Route: IVP; Site: left antecubital;                     bm7 

16:38 Follow up: Response: No adverse reaction                                                bm7 

14:13 Drug: NS 0.9% 500 ml Route: IV; Rate: bolus; Site: left antecubital;                    bm7 

15:06 Follow up: IV Status: Completed infusion; IV Intake: 500ml                              bm7 

14:13 Drug: SOLU-Medrol (methylPrednisoLONE) 125 mg Route: IVP; Site: left antecubital;       bm7 

16:39 Follow up: Response: No adverse reaction                                                bm7 

14:13 Drug: Rocephin (cefTRIAXone) 1 grams Route: IV; Rate: per protocol; Site: left          bm7 

      antecubital;                                                                                

16:39 Follow up: IV Status: Completed infusion                                                bm7 

14:13 Drug: Zithromax (azithromycin) 500 mg Route: PO;                                        bm7 

16:39 Follow up: Response: No adverse reaction                                                bm7 

15:06 Drug: Magnesium Sulfate 2 grams Route: IVPB; Infused Over: 2 hrs; Site: left            bm7 

      antecubital;                                                                                

16:38 Follow up: IV Status: Completed infusion                                                bm7 

15:06 Drug: Lovenox (enoxaparin) 40 mg Route: Sub-Q; Site: right upper abdomen;               bm7 

16:38 Follow up: Response: No adverse reaction                                                bm7 

15:07 Drug: NS 0.9% 1000 ml Route: IV; Rate: 125 ml/hr; Site: left antecubital;               bm7 

19:24 Follow up: IV Status: Completed infusion; IV Intake: 1000ml                             bm7 

17:35 Drug: Decadron - Dexamethasone 8 mg Route: IVP; Site: left antecubital;                 bm7 

19:24 Follow up: Response: No adverse reaction                                                bm7 

17:35 Drug: Xopenex (levalbuterol) (3) 1.25 mg Route: Inhalation;                             bm7 

                                                                                                  

                                                                                                  

Disposition Summary:                                                                              

22 14:36                                                                                    

Hospitalization Ordered                                                                           

      Hospitalization Status: Inpatient Admission                                             juliet 

      Provider: Dara Houston cha 

      [facility]                                                 juliet 

      Condition: Fair                                                                         juliet 

      Problem: new                                                                            juliet 

      Symptoms: have improved                                                                 juliet 

      Bed/Room Type: Standard                                                                 juliet 

      Room Assignment: 430(22 18:32)                                                    eb  

      Diagnosis                                                                                   

        - COPD/ Chronic obstructive pulmonary disease with (acute) exacerbation               juliet 

        - Dyspnea                                                                             juliet 

        - Hypomagnesemia                                                                      juliet 

      Forms:                                                                                      

        - Medication Reconciliation Form                                                      juliet 

        - SBAR form                                                                           juliet 

Signatures:                                                                                       

Dispatcher [employer]                           Chris Jang MD MD cha Botello, Elizabeth eb McCarthy, Brittany RN                  RN   bm7                                                  

                                                                                                  

Corrections: (The following items were deleted from the chart)                                    

18:32 14:36 juliet                                                                               eb  

                                                                                                  

**************************************************************************************************

## 2022-09-11 NOTE — RAD REPORT
EXAM DESCRIPTION:  US - Extrem Venous W Compress Porfirio - 9/11/2022 3:32 pm

 

CLINICAL HISTORY:  PAIN

Bilateral leg edema and swelling.

 

COMPARISON:  UPPER EXTREMITY VENOUS UNILATE dated 11/22/2021

 

TECHNIQUE:  Real-time sonographic interrogation of the left and right lower extremity deep venous sys
tems was performed.

 

FINDINGS:  Normal compressibility, flow augmentation, phasic flow and spontaneous flow is identified 
in both the left and right lower extremity deep venous systems.

 

IMPRESSION:  No sonographic evidence of left or right lower extremity deep venous thrombosis.

## 2022-09-11 NOTE — ER
Nurse's Notes                                                                                     

 [facility]                                                                 

Name: Omkar Chung                                                                                  

Age: 62 yrs                                                                                       

Sex: Male                                                                                         

: 1960                                                                                   

MRN: [id number]                                                                                   

Arrival Date: 2022                                                                          

Time: 13:26                                                                                       

Account#: [id number]                                                                            

Bed 19                                                                                            

Private MD:                                                                                       

Diagnosis: COPD/ Chronic obstructive pulmonary disease with (acute)                               

  exacerbation;Dyspnea;Hypomagnesemia                                                             

                                                                                                  

Presentation:                                                                                     

                                                                                             

13:32 Chief complaint: EMS states: He started having shortness of breath and a productive     bm7 

      cough a week ago and today he became very short of breath. Coronavirus screen: Client       

      presents with at least one sign or symptom that may indicate coronavirus-19.                

      Standard/surgical mask placed on the client. Ebola Screen: No symptoms or risks             

      identified at this time. Initial Sepsis Screen: Does the patient meet any 2 criteria?       

      No. Patient's initial sepsis screen is negative. Does the patient have a suspected          

      source of infection? Yes: Productive cough/pneumonia. Risk Assessment: Do you want to       

      hurt yourself or someone else? Patient reports no desire to harm self or others. Onset      

      of symptoms is unknown.                                                                     

13:32 Method Of Arrival: EMS: [facility]                                                    7 

13:32 Acuity: BROCK 3                                                                           bm7 

                                                                                                  

Triage Assessment:                                                                                

13:35 General: Appears in no apparent distress. comfortable, Behavior is calm, cooperative,   bm7 

      appropriate for age. Pain: Denies pain. EENT: No deficits noted. No signs and/or            

      symptoms were reported regarding the EENT system. Neuro: No deficits noted.                 

      Cardiovascular: Chest pain is denied. Respiratory: Reports shortness of breath at rest      

      on exertion cough that is productive, labored breathing Airway is patent Respiratory        

      effort is even, labored, Respiratory pattern is regular, symmetrical, tachypnea Sputum      

      is thick, Breath sounds are coarse bilaterally. Breath sounds with crackles                 

      bilaterally. GI: No deficits noted. No signs and/or symptoms were reported involving        

      the gastrointestinal system. : No deficits noted. No signs and/or symptoms were           

      reported regarding the genitourinary system. Derm: No deficits noted. No signs and/or       

      symptoms reported regarding the dermatologic system. Musculoskeletal: No deficits           

      noted. No signs and/or symptoms reported regarding the musculoskeletal system.              

                                                                                                  

Historical:                                                                                       

- Allergies:                                                                                      

13:35 No Known Allergies;                                                                     bm7 

- Home Meds:                                                                                      

13:35 Furosemide Oral [Active]; albuterol sulfate 2.5 mg/0.5 mL Nebulizer nebu 0.5 mL every 6 bm7 

      hours [Active];                                                                             

- PMHx:                                                                                           

13:35 Cirrhosis; colon cancer; COPD; DM type 2; Hernia;                                       bm7 

- PSHx:                                                                                           

13:35 None;                                                                                   bm7 

                                                                                                  

- Immunization history:: Adult Immunizations Client reports receiving the 2nd dose of             

  the Covid vaccine, Client reports receiving the 1st dose of the Covid vaccine.                  

- Social history:: Smoking status: Patient denies any tobacco usage or history of.                

- Family history:: not pertinent.                                                                 

                                                                                                  

                                                                                                  

Screenin:37 Abuse screen: Denies threats or abuse. Nutritional screening: No deficits noted.        bm7 

      Tuberculosis screening: No symptoms or risk factors identified. Fall Risk None              

      identified.                                                                                 

                                                                                                  

Assessment:                                                                                       

13:37 Reassessment: No changes from previously documented assessment.                         bm7 

19:21 General: Appears in no apparent distress. comfortable, Behavior is calm, cooperative,   bm7 

      appropriate for age. Pain: Denies pain. Neuro: No deficits noted. Cardiovascular: Chest     

      pain is denied. Respiratory: Reports shortness of breath at rest on exertion cough that     

      is productive, persistent air hunger labored breathing pain with cough Airway is patent     

      Respiratory effort is even, unlabored, Respiratory pattern is regular, symmetrical,         

      tachypnea Sputum is thick, yellow Breath sounds are coarse bilaterally. Breath sounds       

      with wheezes bilaterally. the patient has moderate shortness of breath. GI: No deficits     

      noted. No signs and/or symptoms were reported involving the gastrointestinal system.        

      : No deficits noted. No signs and/or symptoms were reported regarding the                 

      genitourinary system. EENT: No deficits noted. No signs and/or symptoms were reported       

      regarding the EENT system. Derm: No deficits noted. No signs and/or symptoms reported       

      regarding the dermatologic system. Musculoskeletal: No deficits noted. No signs and/or      

      symptoms reported regarding the musculoskeletal system.                                     

                                                                                                  

Vital Signs:                                                                                      

13:32  / 95; Pulse 108; Resp 22; Temp 98.6(TE); Pulse Ox 98% on 3 lpm NC; Weight 92.99  bm7 

      kg (R); Height 6 ft. 2 in. (187.96 cm); Pain 0/10;                                          

15:16  / 80; Pulse 90; Resp 16; Pulse Ox 100% on 3 lpm NC;                              bm7 

17:34  / 84; Pulse 90; Resp 20; Pulse Ox 100% on Nebulizer Mask;                        bm7 

19:01  / 90; Pulse 80; Resp 16; Pulse Ox 99% on 3 lpm NC; Pain 0/10;                    bm7 

13:32 Body Mass Index 26.32 (92.99 kg, 187.96 cm)                                             7 

                                                                                                  

ED Course:                                                                                        

13:26 Patient arrived in ED.                                                                  bm7 

13:30 Chris Canales MD is Attending Physician.                                             [facility] 

13:35 Triage completed.                                                                       bm7 

13:35 Arm band placed on right wrist.                                                         bm7 

13:37 Patient has correct armband on for positive identification. Placed in gown. Bed in low  bm7 

      position. Call light in reach. Side rails up X2. Adult w/ patient. Client placed on         

      continuous cardiac and pulse oximetry monitoring. NIBP monitoring applied. Cardiac          

      monitor on.                                                                                 

13:48 Patricia Balderas, RN is Primary Nurse.                                                bm7 

14:11 Inserted saline lock: 20 gauge in left antecubital area, using aseptic technique. Blood kc6 

      collected.                                                                                  

14:11 D-Dimer Sent.                                                                           kc6 

14:11 SARS RAPID Sent.                                                                        kc6 

14:11 Lactate Sent.                                                                           kc6 

14:11 Flu Sent.                                                                               kc6 

14:11 Basic Metabolic Panel Sent.                                                             kc6 

14:11 CBC with Diff Sent.                                                                     kc6 

14:11 LFT's Sent.                                                                             kc6 

14:11 Magnesium Sent.                                                                         kc6 

14:11 NT PRO-BNP Sent.                                                                        kc6 

14:11 PT-INR Sent.                                                                            kc6 

14:11 Troponin HS Sent.                                                                       kc6 

14:30 Initial lab(s) drawn, by me, sent to lab. First set of blood cultures drawn by ED       [facility] 

      staff, EKG done, by ED staff, reviewed by Chris Canales MD X-ray(s) taken. Initial Neb     

      Treatment Given as ordered Patient was instructed and evaluated on procedure Patient        

      tolerated procedure well without adverse effect.                                            

14:35 Dara Houston MD is Hospitalizing Provider.                                           juliet 

14:47 XRAY Chest (1 view) In Process Unspecified.                                             EDMS

15:34 US Extremity Venous W Compression Porfirio In Process Unspecified.                           EDMS

15:36 Patient moved to CT via stretcher.                                                      bm7 

15:43 Chest For Pe Angio In Process Unspecified.                                              EDMS

19:21 No apparent distress. Resting quietly. Awaiting bed assignment.                         bm7 

19:21 No provider procedures requiring assistance completed. Patient admitted, IV remains in  bm7 

      place.                                                                                      

19:21 Oxygen administration via nasal cannula \T\ 3L/min Response to oxygen therapy: symptoms   7 

      improved.                                                                                   

19:58 Report given to Shabnam BRUCE                                                             7 

                                                                                                  

Administered Medications:                                                                         

14:12 Drug: Xopenex (levalbuterol) 1.25 mg Route: Inhalation;                                 bm7 

14:12 Drug: Pepcid (famotidine) 20 mg Route: IVP; Site: left antecubital;                     7 

16:38 Follow up: Response: No adverse reaction                                                7 

14:13 Drug: NS 0.9% 500 ml Route: IV; Rate: bolus; Site: left antecubital;                    bm7 

15:06 Follow up: IV Status: Completed infusion; IV Intake: 500ml                              7 

14:13 Drug: SOLU-Medrol (methylPrednisoLONE) 125 mg Route: IVP; Site: left antecubital;       bm7 

16:39 Follow up: Response: No adverse reaction                                                7 

14:13 Drug: Rocephin (cefTRIAXone) 1 grams Route: IV; Rate: per protocol; Site: left          7 

      antecubital;                                                                                

16:39 Follow up: IV Status: Completed infusion                                                7 

14:13 Drug: Zithromax (azithromycin) 500 mg Route: PO;                                        bm7 

16:39 Follow up: Response: No adverse reaction                                                7 

15:06 Drug: Magnesium Sulfate 2 grams Route: IVPB; Infused Over: 2 hrs; Site: left            7 

      antecubital;                                                                                

16:38 Follow up: IV Status: Completed infusion                                                7 

15:06 Drug: Lovenox (enoxaparin) 40 mg Route: Sub-Q; Site: right upper abdomen;               bm7 

16:38 Follow up: Response: No adverse reaction                                                7 

15:07 Drug: NS 0.9% 1000 ml Route: IV; Rate: 125 ml/hr; Site: left antecubital;               bm7 

19:24 Follow up: IV Status: Completed infusion; IV Intake: 1000ml                             7 

17:35 Drug: Decadron - Dexamethasone 8 mg Route: IVP; Site: left antecubital;                 bm7 

19:24 Follow up: Response: No adverse reaction                                                7 

17:35 Drug: Xopenex (levalbuterol) (3) 1.25 mg Route: Inhalation;                             7 

                                                                                                  

                                                                                                  

Medication:                                                                                       

13:37 VIS not applicable for this client.                                                     bm7 

                                                                                                  

Intake:                                                                                           

15:06 IV: 500ml; Total: 500ml.                                                                bm7 

19:24 IV: 1000ml; Total: 1500ml.                                                              bm7 

                                                                                                  

Outcome:                                                                                          

14:36 Decision to Hospitalize by Provider.                                                    juliet 

19:21 Admitted to Tele accompanied by tech, family with patient, via stretcher, room 430.     bm7 

19:21 Condition: improved                                                                         

19:21 Discharge instructions given to patient, family, Instructed on the need for admit,          

      Demonstrated understanding of                                                               

20:21 Patient left the ED.                                                                    bm7 

                                                                                                  

Signatures:                                                                                       

Dispatcher [employer]                           EDChris Silveira MD MD cha McCarthy, Brittany, RN                  RN   bm7                                                  

Shira Gonzalez                            kc6                                                  

                                                                                                  

**************************************************************************************************

## 2022-09-11 NOTE — RAD REPORT
EXAM DESCRIPTION:  RAD - Chest Single View - 9/11/2022 2:45 pm

 

CLINICAL HISTORY:  COPD

Chest pain.

 

COMPARISON:  Chest Single View dated 5/1/2022; Chest Single View dated 12/7/2019; Chest Single View d
ated 11/28/2019; Chest Single View dated 11/25/2019

 

FINDINGS:  Portable technique limits examination quality.

 

The lungs are moderately emphysematous but grossly clear. The heart is normal in size. No displaced f
ractures.Right port catheter has tip in the SVC.

 

IMPRESSION:  Moderate emphysema.

## 2022-09-11 NOTE — XMS REPORT
Clinical Summary

                          Created on:2022



Patient:Omkar Chung

Sex:Male

:1960

External Reference #:[id number]





Demographics







                          Address                   [address]



                                                    [place] [address]

 

                          Mobile Phone              [phone]

 

                          Home Phone                [phone]

 

                          Email Address             [email or web address]

 

                          Preferred Language        English

 

                          Marital Status            

 

                          Pentecostal Affiliation     Unknown

 

                          Race                      White

 

                          Ethnic Group              Not  or 









Author







                          Organization              [facility]

[facility]

 

                          Address                   [address] [place] [address]









Support







                Name            Relationship    Address         Phone

 

                Gabby Villatoro    Unavailable     Unavailable     [phone]









Care Team Providers







                    Name                Role                Phone

 

                    Moris Guajardo MD Unavailable         [phone]









Allergies

Not on File



Medications

Not on file



Active Problems

Not on file



Encounters







             Date         Type         Specialty    Care Team    Description

 

             2022   Travel                                 



after 2021



Social History







             Tobacco Use  Types        Packs/Day    Years Used   Date

 

             Never Assessed                                        









                          Sex Assigned at Birth     Date Recorded

 

                          Not on file               







Last Filed Vital Signs

Not on file



Plan of Treatment

Not on file



Results

Not on fileafter 2021



Insurance







          Payer     Benefit Plan / Subscriber ID Effective Dates Phone     Addre

ss   Type



                    Group                                             

 

           [facility]   [employer] [facility] [id number]  Effective for            [place]

X [id number]



                                        Medicare



                    MEDIC[place]            [place] [address] 









           Guarantor Name Account Type Relation to Date of Birth Phone      Bill

ing



                                 Patient                          Address

 

           Omkar Chung Personal/Family Self       1960 [phone] [address]



                                                       (Home)     [address]







                                                                  [place]



                                                                  [address]

 

           Omkar Chung Personal/Family Self       1960 [phone] [address]



                                                       (Home)     [place]



                                                                  [address]







Care Teams







             Team Member  Relationship Specialty    Start Date   End Date

 

                                        Moris Guajardo MD



                PCP - External Referring General Surgery 3/11/22         



                                        [address]



                                                                



                                        [address]



                                                                



             [place], [place]                                        



                                        [address] [phone] (Work)



                                                                



             [phone] (Fax)

## 2022-09-12 LAB
ALBUMIN SERPL BCP-MCNC: 2.9 G/DL (ref 3.4–5)
ALP SERPL-CCNC: 162 U/L (ref 45–117)
ALT SERPL W P-5'-P-CCNC: 46 U/L (ref 12–78)
AST SERPL W P-5'-P-CCNC: 50 U/L (ref 15–37)
BUN BLD-MCNC: 13 MG/DL (ref 7–18)
COHGB MFR BLDA: 1.2 % (ref 0–1.5)
GLUCOSE SERPLBLD-MCNC: 354 MG/DL (ref 74–106)
HCT VFR BLD CALC: 41.9 % (ref 39.6–49)
HDLC SERPL-MCNC: 85 MG/DL (ref 40–60)
INR BLD: 1.24
LDLC SERPL CALC-MCNC: 74 MG/DL (ref ?–130)
LYMPHOCYTES # SPEC AUTO: 0.3 K/UL (ref 0.7–4.9)
MCV RBC: 91.6 FL (ref 80–100)
NT-PROBNP SERPL-MCNC: 48 PG/ML
OXYHGB MFR BLDA: 89.6 % (ref 94–97)
PMV BLD: 8.3 FL (ref 7.6–11.3)
POTASSIUM SERPL-SCNC: 4.5 MMOL/L (ref 3.5–5.1)
RBC # BLD: 4.57 M/UL (ref 4.33–5.43)
SAO2 % BLDA: 92 % (ref 92–98.5)

## 2022-09-12 RX ADMIN — Medication SCH ML: at 20:50

## 2022-09-12 RX ADMIN — CEFTRIAXONE SCH MLS: 1 INJECTION, POWDER, FOR SOLUTION INTRAMUSCULAR; INTRAVENOUS at 08:59

## 2022-09-12 RX ADMIN — ALBUTEROL SULFATE SCH MG: 2.5 SOLUTION RESPIRATORY (INHALATION) at 08:00

## 2022-09-12 RX ADMIN — ARFORMOTEROL TARTRATE SCH MCG: 15 SOLUTION RESPIRATORY (INHALATION) at 21:20

## 2022-09-12 RX ADMIN — ARFORMOTEROL TARTRATE SCH: 15 SOLUTION RESPIRATORY (INHALATION) at 13:00

## 2022-09-12 RX ADMIN — IPRATROPIUM BROMIDE SCH MG: 0.5 SOLUTION RESPIRATORY (INHALATION) at 08:00

## 2022-09-12 RX ADMIN — FUROSEMIDE SCH MG: 40 TABLET ORAL at 11:25

## 2022-09-12 RX ADMIN — IPRATROPIUM BROMIDE SCH: 0.5 SOLUTION RESPIRATORY (INHALATION) at 20:00

## 2022-09-12 RX ADMIN — METOPROLOL TARTRATE SCH: 25 TABLET ORAL at 16:56

## 2022-09-12 RX ADMIN — ALBUTEROL SULFATE SCH MG: 2.5 SOLUTION RESPIRATORY (INHALATION) at 02:45

## 2022-09-12 RX ADMIN — LACTULOSE SCH: 20 SOLUTION ORAL at 11:00

## 2022-09-12 RX ADMIN — SODIUM CHLORIDE SCH MLS: 0.9 INJECTION, SOLUTION INTRAVENOUS at 08:59

## 2022-09-12 RX ADMIN — MELATONIN PRN MG: 3 TAB ORAL at 20:50

## 2022-09-12 RX ADMIN — SPIRONOLACTONE SCH MG: 100 TABLET, FILM COATED ORAL at 11:24

## 2022-09-12 RX ADMIN — IPRATROPIUM BROMIDE SCH MG: 0.5 SOLUTION RESPIRATORY (INHALATION) at 14:00

## 2022-09-12 RX ADMIN — IPRATROPIUM BROMIDE SCH MG: 0.5 SOLUTION RESPIRATORY (INHALATION) at 02:45

## 2022-09-12 RX ADMIN — IPRATROPIUM BROMIDE SCH MG: 0.5 SOLUTION RESPIRATORY (INHALATION) at 21:20

## 2022-09-12 RX ADMIN — Medication SCH ML: at 09:01

## 2022-09-12 NOTE — P.PN
Subjective


Date of Service: 09/12/22


Chief Complaint: Shortness of breath


Subjective: No new changes


No acute events overnight. At rest, he reports that he feels well. However, he 

experiences significant shortness of breath with ambulation.





Review of Systems


10-point ROS is otherwise unremarkable


Respiratory: Cough, SOB with Excertion





Physical Examination





- Vital Signs


Temperature: 98.4 F


Blood Pressure: 126/70


Pulse: 108


Respirations: 16


Pulse Ox (%): 94





- Physical Exam


General: Alert, In no apparent distress, Oriented x3


HEENT: Atraumatic, PERRLA, Mucous membr. moist/pink, EOMI, Sclerae nonicteric


Neck: Supple, JVD not distended


Respiratory: Diminished, Expiratory wheezes (end-expiratory)


Cardiovascular: No edema, Regular rate/rhythm, Normal S1 S2, No gallops, No 

rubs, No murmurs


Gastrointestinal: Normal bowel sounds, Soft and benign, Non-distended, No 

tenderness, No rebound, No guarding


Musculoskeletal: No clubbing


Integumentary: No rashes


Neurological: Normal speech, Cranial nerves 3-12 intact, Normal affect





- Studies


Laboratory Data (last 24 hrs)





09/12/22 03:27: Sodium 135 L, Potassium 4.5, BUN 13, Creatinine 1.19, Glucose 

354 H, Total Bilirubin 1.3 H, AST 50 H, ALT 46, Alkaline Phosphatase 162 H, 

Triglycerides 68, Cholesterol 173, HDL Cholesterol 85 H, Cholesterol/HDL Ratio 

2.04


09/12/22 03:27: PT 13.7 H, INR 1.24, APTT 40.4 H


09/12/22 03:27: WBC 3.90 L D, Hgb 13.7, Hct 41.9, Plt Count 87 L





Microbiology Data (last 24 hrs): 








09/11/22 14:02   Nasopharnyx   Influenza Type A Antigen Screen - Final


09/11/22 14:02   Nasopharnyx   Influenza Type B Antigen Screen - Final








Assessment And Plan





- Plan


# Acute Hypoxic Respiratory Failure - likely secondary to Acute Chronic 

Obstructive Pulmonary Disease Exacerbation


Currently, he is on 3 L nasal cannula, with improvement of his SpO2 readings to 

98%. However, his SpO2 drops to 80 % with ambulation.


- Evaluation thus far: 


   - D-Dimer = 5923


   - ABG = pending


   - Chest x-ray = "moderate emphysema." 


   - CT chest angiogram = "No evidence of pulmonary thromboembolism. Bilateral 

pulmonary nodules again seen, stable or fractionally smaller than on the 

comparative study."


- Management plan:


   - Consulted Pulmonary Medicine and spoke with Dr. Kay - recommendations 

appreciated


      - Bronchodilators and steroids per Pulm


   - Consulted Respiratory Therapy 


   - Supplemental oxygen to maintain SpO2 > 92%


   - PRN benzonatate, guaifenesin 


   - Encouraged incentive spirometry





# Metastatic  Colon Cancer on Chemotherapy


# History of Alcoholic Cirrhosis (last drink was 4-5 years ago)


- Stable. Follow-up with Oncology as previously scheduled





Dane Bertrand M.D.

## 2022-09-12 NOTE — EKG
Test Date:    2022-09-11               Test Time:    14:13:45

Technician:   ESTELA                                    

                                                     

MEASUREMENT RESULTS:                                       

Intervals:                                           

Rate:         87                                     

SD:           180                                    

QRSD:         86                                     

QT:           368                                    

QTc:          442                                    

Axis:                                                

P:            91                                     

SD:           180                                    

QRS:          56                                     

T:            97                                     

                                                     

INTERPRETIVE STATEMENTS:                                       

                                                     

Normal sinus rhythm

Low voltage QRS

Borderline ECG

Compared to ECG 05/01/2022 01:41:05

Low QRS voltage now present

Sinus tachycardia no longer present

Myocardial infarct finding no longer present



Electronically Signed On 09-12-22 15:06:50 CDT by Fidencio Pulido

## 2022-09-12 NOTE — P.CNS
Date of Consult: 09/12/22


Reason for Consult: Respiratory distress


Chief Complaint: Shortness of breath


History of Present Illness: 


Patient is 62 years of age admitted with sudden onset of respiratory distress he

has colon cancer with metastases history of hepatic encephalopathy in addition 

is also had thrombosis of the hepatic veins been following up with oncology no 

evidence of thromboembolism denies any history of tobacco abuse still has some 

chest congestion unable to cough up any phlegm





Allergies





No Known Allergies Allergy (Verified 09/11/22 20:25)


   





Home Medications: 








Folic Acid 0.4 mg PO DAILY 09/12/22 


Furosemide [Lasix*] 40 mg PO DAILY 09/12/22 


Hydrocodone 5/APAP 325 [Norco 5/325*] 1 tab PO Q8HP PRN 09/12/22 


Ipratropium Neb [Atrovent*] 1 amp NEB Q8HP PRN 09/12/22 


Lactulose 20 g PO DAILY 09/12/22 


Spironolactone [Aldactone*] 100 mg PO DAILY 09/12/22 


Thyroid,Pork [[place] Thyroid] 60 mg PO DAILY 09/12/22 








- Past Medical/Surgical History


Diabetic: No


-: Alcoholic liver cirrhosis


-: COPD


-: Chronic steroid use


-: Paracentesis


-: History of Pseudomonas bacteremia


-: Right Inguinal Hernia and Umbilical Hernia Repair


-: pracentesis


Psychosocial/ Personal History: Patient is at home





- Family History


  ** Father


Medical History: Hypertension, Lung disease





  ** Mother


Medical History: Diabetes





- Social History


Smoking Status: Unknown if ever smoked


Alcohol use: No


CD- Drugs: No


Caffeine use: No





Physical Examination














Temp Pulse Resp BP Pulse Ox


 


 98.1 F   108 H  18   149/85 H  98 


 


 09/12/22 08:00  09/12/22 08:00  09/12/22 08:00  09/12/22 08:00  09/12/22 08:00








Laboratory Data (last 24 hrs)





09/11/22 14:02: PT 13.1 H, INR 1.19


09/11/22 14:02: WBC 7.20, Hgb 14.0, Hct 41.5, Plt Count 99 L


09/11/22 14:02: Sodium 138, Potassium 3.9, BUN 8, Creatinine 0.82, Glucose 97, 

Magnesium 1.6 L D, Total Bilirubin 1.4 H, AST 56 H, ALT 50, Alkaline Phosphatase

161 H








- Problems


(1) Acute respiratory failure with hypoxemia


Current Visit: No   Status: Acute   


Plan: 


Patient is 62 years of age admitted with respiratory distress he has a history 

of colon cancer with metastases cirrhosis of the liver does have abnormal LFTs 

COPD changes on chest x-ray hyperinflated vital signs are stable he is mildly 

hypoxic 2 L of nasal cannula oxygen patient has bilateral nodules possibly 

metastatic cancer echocardiogram has been ordered changed to p.o. prednisone








(2) COPD (chronic obstructive pulmonary disease)


Current Visit: Yes   Status: Acute   


Plan: 


Patient will probably benefit from a long-acting bronchodilator

## 2022-09-13 LAB
ALBUMIN SERPL BCP-MCNC: 2.8 G/DL (ref 3.4–5)
ALP SERPL-CCNC: 141 U/L (ref 45–117)
ALT SERPL W P-5'-P-CCNC: 44 U/L (ref 12–78)
AST SERPL W P-5'-P-CCNC: 44 U/L (ref 15–37)
BUN BLD-MCNC: 17 MG/DL (ref 7–18)
GLUCOSE SERPLBLD-MCNC: 210 MG/DL (ref 74–106)
HCT VFR BLD CALC: 39.9 % (ref 39.6–49)
LYMPHOCYTES # SPEC AUTO: 0.7 K/UL (ref 0.7–4.9)
MCV RBC: 89.9 FL (ref 80–100)
PMV BLD: 8.2 FL (ref 7.6–11.3)
POTASSIUM SERPL-SCNC: 3.7 MMOL/L (ref 3.5–5.1)
RBC # BLD: 4.43 M/UL (ref 4.33–5.43)

## 2022-09-13 RX ADMIN — Medication SCH: at 09:00

## 2022-09-13 RX ADMIN — MELATONIN PRN MG: 3 TAB ORAL at 20:51

## 2022-09-13 RX ADMIN — METOPROLOL TARTRATE SCH: 25 TABLET ORAL at 06:00

## 2022-09-13 RX ADMIN — ARFORMOTEROL TARTRATE SCH MCG: 15 SOLUTION RESPIRATORY (INHALATION) at 20:30

## 2022-09-13 RX ADMIN — ARFORMOTEROL TARTRATE SCH MCG: 15 SOLUTION RESPIRATORY (INHALATION) at 09:36

## 2022-09-13 RX ADMIN — IPRATROPIUM BROMIDE SCH MG: 0.5 SOLUTION RESPIRATORY (INHALATION) at 20:30

## 2022-09-13 RX ADMIN — Medication SCH ML: at 09:39

## 2022-09-13 RX ADMIN — IPRATROPIUM BROMIDE SCH MG: 0.5 SOLUTION RESPIRATORY (INHALATION) at 01:40

## 2022-09-13 RX ADMIN — IPRATROPIUM BROMIDE SCH MG: 0.5 SOLUTION RESPIRATORY (INHALATION) at 14:57

## 2022-09-13 RX ADMIN — IPRATROPIUM BROMIDE SCH MG: 0.5 SOLUTION RESPIRATORY (INHALATION) at 09:36

## 2022-09-13 RX ADMIN — METOPROLOL TARTRATE SCH MG: 25 TABLET ORAL at 06:20

## 2022-09-13 RX ADMIN — LACTULOSE SCH: 20 SOLUTION ORAL at 09:00

## 2022-09-13 RX ADMIN — METOPROLOL TARTRATE SCH: 25 TABLET ORAL at 17:03

## 2022-09-13 RX ADMIN — Medication SCH ML: at 20:45

## 2022-09-13 RX ADMIN — SPIRONOLACTONE SCH MG: 100 TABLET, FILM COATED ORAL at 09:38

## 2022-09-13 RX ADMIN — FUROSEMIDE SCH MG: 40 TABLET ORAL at 09:39

## 2022-09-13 NOTE — RAD REPORT
EXAM DESCRIPTION:  RAD - Chest Single View - 9/13/2022 4:56 am

 

CLINICAL HISTORY:  recheckshortness of breath

 

COMPARISON:  Portable 09/11/2022, CT chest 09/11/2022

 

TECHNIQUE:  AP portable chest image was obtained 9/13/2022 4:56 am .

 

FINDINGS:  Right-sided Port-A-Cath remains in place. Interstitial and patchy alveolar opacities are s
table from the prior examination. Left costophrenic angle blunting is still present. Heart and vascul
ature are normal. No pneumothorax or enlarging pleural effusion. No acute bony abnormality seen. No a
cute aortic findings suspected.

 

IMPRESSION:  Stable chest from September 11.

## 2022-09-13 NOTE — P.PN
Subjective


Date of Service: 09/13/22


Chief Complaint: Shortness of breath


Overnight, 1/2 blood cultures returned positive for coagulase-negative 

Staphylococcus. He continues to experience significant shortness of breath with 

ambulation.





Review of Systems


10-point ROS is otherwise unremarkable


Respiratory: Cough, Dry, SOB with Excertion





Physical Examination





- Vital Signs


Temperature: 97 F


Blood Pressure: 117/65


Pulse: 104


Respirations: 18


Pulse Ox (%): 94





- Studies


Microbiology Data (last 24 hrs): 








09/11/22 17:06   Blood  - Blood   Blood Culture Gram Stain - Final








Assessment And Plan





- Plan





- Physical Exam


General: Alert, In no apparent distress, Oriented x3


HEENT: Atraumatic, PERRLA, Mucous membr. moist/pink, EOMI, Sclerae nonicteric


Neck: Supple, JVD not distended


Respiratory: Diminished, Expiratory wheezes (faint end-expiratory)


Cardiovascular: No edema, Regular rate/rhythm, Normal S1 S2, No gallops, No 

rubs, No murmurs


Gastrointestinal: Normal bowel sounds, Soft and benign, Non-distended, No 

tenderness, No rebound, No guarding


Musculoskeletal: No clubbing


Integumentary: No rashes


Neurological: Normal speech, Cranial nerves 3-12 intact, Normal affect








# Acute Hypoxemic Respiratory Failure - likely secondary to Acute Chronic 

Obstructive Pulmonary Disease Exacerbation


Currently, he is on 2.5 L nasal cannula, with SpO2 readings to 9%. However, his 

SpO2 drops to 80 % with ambulation.


- Evaluation thus far: 


   - D-Dimer = 5923


   - ABG = pH 7.42, PCO2 37.7, PO2 66.7


   - Chest x-ray = "moderate emphysema." 


   - CT chest angiogram = "No evidence of pulmonary thromboembolism. Bilateral 

pulmonary nodules again seen, stable or fractionally smaller than on the 

comparative study."


- Management plan:


   - Consulted Pulmonary Medicine and spoke with Dr. Kay - recommendations 

appreciated


      - Bronchodilators and steroids per Pulm


   - Consulted Respiratory Therapy 


   - Supplemental oxygen to maintain SpO2 > 92%


   - PRN benzonatate, guaifenesin 


   - Encouraged incentive spirometry





# Metastatic  Colon Cancer on Chemotherapy


# History of Alcoholic Cirrhosis (last drink was 4-5 years ago)


- Stable. Follow-up with Oncology as previously scheduled





# 1/2 Blood Cultures Positive for Coagulase-Negative Stapylococcus 


- Suspect this is a contaminated specimen


- Does not appear to have infection clinically


   - Discussed risks and benefits of antibiotics given this finding - he agrees 

with holding off on antibiotics for now


- Repeat blood cultures requested








Dane Bertrand M.D.

## 2022-09-14 VITALS — SYSTOLIC BLOOD PRESSURE: 117 MMHG | TEMPERATURE: 97.8 F | DIASTOLIC BLOOD PRESSURE: 62 MMHG

## 2022-09-14 VITALS — OXYGEN SATURATION: 99 %

## 2022-09-14 LAB
ALBUMIN SERPL BCP-MCNC: 2.9 G/DL (ref 3.4–5)
ALP SERPL-CCNC: 144 U/L (ref 45–117)
ALT SERPL W P-5'-P-CCNC: 49 U/L (ref 12–78)
AST SERPL W P-5'-P-CCNC: 57 U/L (ref 15–37)
BUN BLD-MCNC: 17 MG/DL (ref 7–18)
GLUCOSE SERPLBLD-MCNC: 229 MG/DL (ref 74–106)
HCT VFR BLD CALC: 41.3 % (ref 39.6–49)
LYMPHOCYTES # SPEC AUTO: 0.6 K/UL (ref 0.7–4.9)
MCV RBC: 90 FL (ref 80–100)
PMV BLD: 8 FL (ref 7.6–11.3)
POTASSIUM SERPL-SCNC: 3.8 MMOL/L (ref 3.5–5.1)
RBC # BLD: 4.59 M/UL (ref 4.33–5.43)

## 2022-09-14 RX ADMIN — LACTULOSE SCH: 20 SOLUTION ORAL at 08:43

## 2022-09-14 RX ADMIN — IPRATROPIUM BROMIDE SCH MG: 0.5 SOLUTION RESPIRATORY (INHALATION) at 01:50

## 2022-09-14 RX ADMIN — METOPROLOL TARTRATE SCH: 25 TABLET ORAL at 05:13

## 2022-09-14 RX ADMIN — Medication SCH ML: at 08:42

## 2022-09-14 RX ADMIN — Medication SCH: at 08:34

## 2022-09-14 RX ADMIN — ARFORMOTEROL TARTRATE SCH MCG: 15 SOLUTION RESPIRATORY (INHALATION) at 08:06

## 2022-09-14 RX ADMIN — IPRATROPIUM BROMIDE SCH MG: 0.5 SOLUTION RESPIRATORY (INHALATION) at 08:06

## 2022-09-14 RX ADMIN — SPIRONOLACTONE SCH MG: 100 TABLET, FILM COATED ORAL at 08:42

## 2022-09-14 RX ADMIN — ZOLPIDEM TARTRATE PRN MG: 5 TABLET, FILM COATED ORAL at 01:38

## 2022-09-14 RX ADMIN — FUROSEMIDE SCH MG: 40 TABLET ORAL at 08:43

## 2022-09-14 NOTE — P.DS
Admission Date: 09/12/22


Discharge Date: 09/14/22


Disposition: ROUTINE DISCHARGE


Discharge Condition: FAIR


Reason for Admission: COPD exacerbation


Consultations: 


1. Pulmonary Medicine


Hospital Course: 





DIAGNOSES:


# Acute on Chronic Hypoxemic Respiratory Failure - likely secondary to Acute 

Chronic Obstructive Pulmonary Disease Exacerbation


# Metastatic  Colon Cancer on Chemotherapy


# History of Alcoholic Cirrhosis (last drink was 4-5 years ago)


# 1/2 Blood Cultures Positive for Coagulase-Negative Stapylococcus 





HOSPITAL COURSE:


Mr. Omkar Chung is a 62 year old male with a past medical history significant for

metastatic colon cancer on chemotherapy, chronic respiratory failure secondary 

to chronic obstructive pulmonary disease on home oxygen on 3 L NC, and alcoholic

cirrhosis who was admitted to the [facility] on 

09/11/2022 for shortness of breath.





He was admitted to the Medicine service. Upon further evaluation, he was found 

to have acute hypoxemic respiratory failure likely secondary to an acute COPD 

exacerbation. Pulmonary Medicine was consulted and Dr. Kay evaluated him. 

He was treated with bronchodilators and steroid therapy, with improvement in his

symptoms. He is eager to be discharged home and Dr. Kay has cleared him for

discharge with close outpatient follow-up. 





Of note, one of two blood cultures was positive for coagulase-negative 

Staphylococcus. This was thought to likely be a contaminated specimen as he did 

not exhibits signs of infection. Antibiotics were not started for this reason. 

Repeat blood cultures were obtained, which have shown no growth to date.





On 09/14/2022, he was seen on morning rounds and deemed medically stable for diana rodriguez. He was discharged with instructions to schedule follow-up appointments 

with his PCP in 3-5 days and with Dr. Kay (Pulmonary) in 5-7 days. He was 

provided prescriptions for prednisone and tiotropium. He and his wife were given

the opportunity to ask questions and reported no further questions. Furthermore,

all questions were answered to the best of my ability.





Today, I personally spent 25 minutes on his case, of which greater than 50% of 

the time was spent in patient education, counseling, and coordination of care as

described above.





- Physical Exam


General: Alert, In no apparent distress, Oriented x3


HEENT: Atraumatic, PERRLA, Mucous membr. moist/pink, EOMI, Sclerae nonicteric


Neck: Supple, JVD not distended


Respiratory: Diminished, No wheezes/rhonchi/rales


Cardiovascular: No edema, Regular rate/rhythm, Normal S1 S2, No gallops, No ru

bs, No murmurs


Gastrointestinal: Normal bowel sounds, Soft and benign, Non-distended, No 

tenderness, No rebound, No guarding


Musculoskeletal: No clubbing


Integumentary: No rashes


Neurological: Normal speech, Cranial nerves 3-12 intact, Normal affect





Vital Signs/Physical Exam: 














Temp Pulse Resp BP Pulse Ox


 


 97.8 F   80   16   117/62   96 


 


 09/14/22 12:11  09/14/22 12:11  09/14/22 12:11  09/14/22 12:11  09/14/22 12:11








Laboratory Data at Discharge: 














WBC  6.90 K/uL (4.3-10.9)  D 09/14/22  07:31    


 


Hgb  13.8 g/dL (13.6-17.9)   09/14/22  07:31    


 


Hct  41.3 % (39.6-49.0)   09/14/22  07:31    


 


Plt Count  88 K/uL (152-406)  L  09/14/22  07:31    


 


PT  13.7 SECONDS (9.5-12.5)  H  09/12/22  03:27    


 


INR  1.24   09/12/22  03:27    


 


APTT  40.4 SECONDS (24.3-36.9)  H  09/12/22  03:27    


 


Sodium  136 mmol/L (136-145)   09/14/22  07:51    


 


Potassium  3.8 mmol/L (3.5-5.1)   09/14/22  07:51    


 


BUN  17 mg/dL (7-18)   09/14/22  07:51    


 


Creatinine  0.98 mg/dL (0.55-1.3)   09/14/22  07:51    


 


Glucose  229 mg/dL ()  H  09/14/22  07:51    


 


Magnesium  1.6 mg/dL (1.8-2.4)  L D 09/11/22  14:02    


 


Total Bilirubin  1.6 mg/dL (0.2-1.0)  H  09/14/22  07:51    


 


AST  57 U/L (15-37)  H  09/14/22  07:51    


 


ALT  49 U/L (12-78)   09/14/22  07:51    


 


Alkaline Phosphatase  144 U/L ()  H  09/14/22  07:51    


 


Triglycerides  68 mg/dL (<150)   09/12/22  03:27    


 


Cholesterol  173 mg/dL (<200)   09/12/22  03:27    


 


HDL Cholesterol  85 mg/dL (40-60)  H  09/12/22  03:27    


 


Cholesterol/HDL Ratio  2.04   09/12/22  03:27    








Home Medications: 








Folic Acid 0.4 mg PO DAILY 09/12/22 


Furosemide [Lasix*] 40 mg PO DAILY 09/12/22 


Hydrocodone 5/APAP 325 [Norco 5/325*] 1 tab PO Q8HP PRN 09/12/22 


Ipratropium Neb [Atrovent*] 1 amp NEB Q8HP PRN 09/12/22 


Lactulose 20 g PO DAILY 09/12/22 


Spironolactone [Aldactone*] 100 mg PO DAILY 09/12/22 


Thyroid,Pork [[place] Thyroid] 60 mg PO DAILY 09/12/22 


Prednisone [Sterapred Ds] 10 mg PO 30 MIN BEFORE HS #30 09/14/22 


Tiotropium Bromide [Spiriva Respimat] 4 gm IH DAILY 30 Days #120 09/14/22 





New Medications: 


Tiotropium Bromide [Spiriva Respimat] 4 gm IH DAILY 30 Days #120


Prednisone [Sterapred Ds] 10 mg PO 30 MIN BEFORE HS #30


Physician Discharge Instructions: 


1. Please schedule a follow-up with your PCP in 3-5 days


2. Please schedule a follow-up with Pulmonary Medicine (Dr. Kay) in 5-7 

days


Diet: AHA


Activity: Ad melva


Followup: 


Geraldo Kay MD [ACTIVE - CAN ADMIT] - 


NONE,NONE [Primary Care Provider] - 


Time spent managing pt's care (in minutes): 25

## 2022-09-14 NOTE — P.PN
Subjective


Date of Service: 09/14/22


Chief Complaint: COPD exacerbation


Subjective: Improving (Patient is improving no new complaints)





Review of Systems


General: Weakness


Respiratory: Shortness of Breath





Physical Examination





- Vital Signs


Temperature: 97.8 F


Blood Pressure: 117/62


Pulse: 80


Respirations: 16


Pulse Ox (%): 96





- Physical Exam


General: Alert, Oriented x3


Neck: Supple


Respiratory: Diminished, Expiratory wheezes


Cardiovascular: No edema, Normal S1 S2





- Studies


Microbiology Data (last 24 hrs): 








09/11/22 17:06   Blood  - Blood   Blood Culture Gram Stain - Final


09/11/22 17:06   Blood  - Blood   Gram Stain - Final








Assessment And Plan





- Current Problems (Diagnosis)


(1) COPD (chronic obstructive pulmonary disease)


Current Visit: Yes   Status: Acute   


Plan: 


Patient is doing better continue with Symbicort also ordered Spiriva and low-

dose prednisone for home stable to be discharged patient has oxygen at home 

vital signs are stable antibiotics not needed follow-up with me in 2-week


Qualifiers: 


   Emphysema type: unspecified

## 2022-12-21 ENCOUNTER — HOSPITAL ENCOUNTER (EMERGENCY)
Dept: HOSPITAL 97 - ER | Age: 62
Discharge: HOME | End: 2022-12-21
Payer: COMMERCIAL

## 2022-12-21 VITALS — TEMPERATURE: 97.9 F

## 2022-12-21 VITALS — OXYGEN SATURATION: 98 % | SYSTOLIC BLOOD PRESSURE: 142 MMHG | DIASTOLIC BLOOD PRESSURE: 76 MMHG

## 2022-12-21 DIAGNOSIS — M54.50: ICD-10-CM

## 2022-12-21 DIAGNOSIS — E11.9: ICD-10-CM

## 2022-12-21 DIAGNOSIS — R33.9: Primary | ICD-10-CM

## 2022-12-21 DIAGNOSIS — J44.9: ICD-10-CM

## 2022-12-21 DIAGNOSIS — Z85.038: ICD-10-CM

## 2022-12-21 PROCEDURE — 81015 MICROSCOPIC EXAM OF URINE: CPT

## 2022-12-21 PROCEDURE — 81003 URINALYSIS AUTO W/O SCOPE: CPT

## 2022-12-21 NOTE — ER
Nurse's Notes                                                                                     

 [facility]                                                                 

Name: Omkar Chung                                                                                  

Age: 62 yrs                                                                                       

Sex: Male                                                                                         

: 1960                                                                                   

MRN: [id number]                                                                                   

Arrival Date: 2022                                                                          

Time: 20:50                                                                                       

Account#: [id number]                                                                            

Bed 5                                                                                             

Private MD:                                                                                       

Diagnosis: Acute urinary retention                                                                

                                                                                                  

Presentation:                                                                                     

                                                                                             

21:04 Chief complaint: Patient states: "I was able to urinate about 10 min ago, but earlier I tw5 

      could not earlier and my back was killing me." Spouse and/or significant other states:      

      "He has been at a 10/10 pain for the past 5 days. He has not been able to urinate.".        

      Coronavirus screen: Vaccine status: Patient reports receiving the 2nd dose of the covid     

      vaccine. J and J. Ebola Screen: Patient negative for fever greater than or equal to         

      101.5 degrees Fahrenheit, and additional compatible Ebola Virus Disease symptoms            

      Patient denies exposure to infectious person. Patient denies travel to an                   

      Ebola-affected area in the 21 days before illness onset. Initial Sepsis Screen: Does        

      the patient meet any 2 criteria? HR > 90 bpm. Does the patient have a suspected source      

      of infection? Yes: Dysuria/Frequency/Urgency/UTI. Risk Assessment: Do you want to hurt      

      yourself or someone else? Patient reports no desire to harm self or others. Onset of        

      symptoms is unknown.                                                                        

21:04 Method Of Arrival: Ambulatory                                                           tw5 

21:04 Acuity: BROCK 3                                                                           tw5 

                                                                                                  

Triage Assessment:                                                                                

21:08 General: Appears uncomfortable, Behavior is calm, cooperative, appropriate for age.     tw5 

      Pain: Pain currently is 5 out of 10 on a pain scale.                                        

                                                                                                  

Historical:                                                                                       

- Allergies:                                                                                      

21:08 No Known Allergies;                                                                     tw5 

- Home Meds:                                                                                      

23:04 albuterol sulfate 2.5 mg/0.5 mL Inhl nebu 0.5 mL every 6 hours [Active]; Furosemide     kd3 

      Oral [Active];                                                                              

- PMHx:                                                                                           

21:08 Cirrhosis; colon cancer; COPD; DM type 2; Hernia;                                       tw5 

                                                                                                  

- Immunization history:: Flu vaccine is up to date.                                               

- Social history:: Smoking status: Patient/guardian denies using tobacco, the patient             

  reports quitting approximately 4 years ago.                                                     

                                                                                                  

                                                                                                  

Screenin:02 [facility] ED Fall Risk Assessment (Adult) History of falling in the last 3 months,       kd3 

      including since admission No falls in past 3 months (0 pts) Confusion or Disorientation     

      No (0 pts) Intoxicated or Sedated No (0 pts) Impaired Gait No (0 pts) Mobility Assist       

      Device Used No (0 pt) Altered Elimination No (0 pt) Score/Fall Risk Level 0 - 2 = Low       

      Risk. Abuse screen: Denies threats or abuse. Denies injuries from another. Nutritional      

      screening: No deficits noted. Tuberculosis screening: No symptoms or risk factors           

      identified.                                                                                 

                                                                                                  

Assessment:                                                                                       

21:51 General: Bladder scan showed 919 ml of fluid retained in bladder.                       kd3 

22:47 General: PT voided 750 CC of Urine into a urinal. Pt scanned again and is still         kd3 

      retaining 420 CC of fluids. Pt educated regarding consequences of refusing a An for      

      retention. Pt still wants to refuse a An. Pt is states "I peed a lot more that i         

      have been so lets just leave it alone for now". PT re-educated multiple times regarding     

      risks for injury and infection. Pt states understanding of education and still wants to     

      go home and follow up outpatient with urology. . Pain: Denies pain. Neuro: Level of         

      Consciousness is awake, alert, obeys commands, Oriented to person, place, time,             

      situation. Cardiovascular: Patient's skin is warm and dry. Respiratory: Airway is           

      patent Trachea midline Respiratory effort is even, unlabored.                               

23:04 General: pt voided another 600 CC .                                                     kd3 

                                                                                                  

Vital Signs:                                                                                      

21:04  / 83; Pulse 98; Resp 22; Temp 97.9; Pulse Ox 89% on 4 lpm NC; Weight 95.25 kg;   tw5 

      Height 6 ft. 2 in. (187.96 cm); Pain 8/10;                                                  

23:01  / 76; Pulse 87; Resp 19; Pulse Ox 98% on R/A;                                    kd3 

21:04 Body Mass Index 26.96 (95.25 kg, 187.96 cm)                                             tw5 

                                                                                                  

ED Course:                                                                                        

20:50 Patient arrived in ED.                                                                  ja2 

21:08 Triage completed.                                                                       tw5 

21:08 Arm band placed on.                                                                     tw5 

21:15 Pola Calderón, CAROL is Primary Nurse.                                                    as6 

21:24 Debbie Toney MD is Attending Physician.                                           sd2 

22:20 Urine Microscopic Only Sent.                                                            kd3 

22:58 Rafael Tomlinson MD is Referral Physician.                                              sd2 

23:02 No provider procedures requiring assistance completed. Patient did not have IV access   kd3 

      during this emergency room visit.                                                           

23:04 Patient has correct armband on for positive identification.                             kd3 

                                                                                                  

Administered Medications:                                                                         

No medications were administered                                                                  

                                                                                                  

                                                                                                  

Medication:                                                                                       

23:04 VIS not applicable for this client.                                                     kd3 

                                                                                                  

Outcome:                                                                                          

22:59 Discharge ordered by MD.                                                                sd2 

23:04 Discharged to home ambulatory.                                                          kd3 

23:04 Condition: stable                                                                           

23:04 Discharge instructions given to patient, family, Instructed on discharge instructions,      

      follow up and referral plans. Demonstrated understanding of instructions, follow-up         

      care.                                                                                       

23:11 Patient left the ED.                                                                    kd3 

                                                                                                  

Signatures:                                                                                       

Xi Tariq Tiffany                                tw5                                                  

Pola Calderón RN                      RN   as6                                                  

Olivia Ramirez RN                      RN   kd3                                                  

Debbie Toney MD MD   sd2                                                  

                                                                                                  

**************************************************************************************************

## 2022-12-21 NOTE — XMS REPORT
Continuity of Care Document

                          Created on:2022



Patient:ANAYELI CHUNG

Sex:Male

:1960

External Reference #:[id number]





Demographics







                          Address                   [address]



                                                    [place] [address]

 

                          Home Phone                [phone]

 

                          Work Phone                [phone]

 

                          Mobile Phone              [phone]

 

                          Email Address             [email or web address]

 

                          Preferred Language        English

 

                          Marital Status            Unknown

 

                          Nondenominational Affiliation     Unknown

 

                          Race                      Unknown

 

                          Additional Race(s)        Unavailable



                                                    White

 

                          Ethnic Group              Unknown









Author







                          Organization              [facility]

t

 

                          Address                   [address]



                                                    [place] [address]

 

                          Phone                     [phone]









Support







                Name            Relationship    Address         Phone

 

                Gabby Gonsalves   Spouse          [address]    [phone]



                                                [place] [id number] 

 

                CARLENE GONSALVES    P               Unavailable     [phone]

 

                YOSELIN LANGLEY               Unavailable     [phone]

 

                Ebony Avila   Sister          Unavailable     [phone]



                                                [place], [place]    

 

                Anayeli Chung     Unavailable     [address] [phone]



                                                [place] [id number] 









Care Team Providers







                    Name                Role                Phone

 

                    MONICA LONGO Primary Care Physician Unavailable

 

                    Monica Longo     Attending Clinician Unavailable

 

                    SYSTEM, PROVIDER NOT IN Attending Clinician Unavailable

 

                    ROCHELLE LEDESMA Attending Clinician Unavailable

 

                    ELAYNE LONGO      Attending Clinician Unavailable

 

                    Elayne Longo MD Attending Clinician [phone]

 

                    ELAYNE LONGO Attending Clinician Unavailable

 

                    Teresa Belle MD Attending Clinician [phone]

 

                    KATRINA MULLINS       Attending Clinician Unavailable

 

                    Katrina Mullins MD    Attending Clinician [phone]

 

                    KATRINA MULLINS       Attending Clinician Unavailable

 

                    Leonarda Bang MD Attending Clinician Unavailable

 

                    INGRID ALCAZAR   Attending Clinician Unavailable

 

                    SCHOENSTEIN, LYNDA  Attending Clinician Unavailable

 

                    Alex Weston MD Attending Clinician [phone]

 

                    ELVIN HUGO Attending Clinician Unavailable

 

                    ELAYNE LONGO Admitting Clinician Unavailable

 

                    KATRINA MULLINS       Admitting Clinician Unavailable

 

                    SCHOENSTEIN, JACEK  Admitting Clinician Unavailable

 

                    ESTELLA WHITMORE Admitting Clinician Unavailable









Payers







           Payer Name Policy Type Policy Number Effective Date Expiration Date RADHA hairston

 

           [facility]   53         [id number]                       [facility]

t



           ADVANTAGE                                              - [facility]              [id number]  2019            



           ADVANTAGE                        00:00:00              



           [facility]                                                

 

           AAR MEDICARE            [id number]  2017            



           COMPLETE                         00:00:00              







Problems







       Condition Condition Condition Status Onset  Resolution Last   Treating Co

mments 

Source



       Name   Details Category        Date   Date   Treatment Clinician        



                                                 Date                 

 

       Metastatic Metastatic Disease Active                              [facility]

[facility]



       adenocarci adenocarci               3-27                               Co

llege



       noma   noma                 00:00:                             of



                                   00                                 Medicin



                                                                      e

 

       Loculated Loculated Disease Active                              [facility]

 [facility]



       pleural pleural               9-19                               [facility]



       effusion effusion               00:00:                             Medica

l



                                   00                                 Center

 

       Pseudomona Pseudomona Disease Active                              C

[facility]



       l      l                    9-19                               [facility]



       pneumonia pneumonia               00:00:                             Medi

alexus



                                   00                                 Center

 

       Hyponatrem Hyponatrem Disease Active                              C

[facility]



       ia     ia                   9-19                               [facility]



                                   00:00:                             Medical



                                   00                                 Center

 

       Parapneumo Parapneumo Disease Active                              C

[facility]



       benjamin    benjamin                  9-18                               [facility]



       effusion effusion               00:00:                             Medica

l



                                   00                                 Center

 

       Inflammato Inflammato Disease Active                              [facility]

[facility]



       ry liver ry liver               6-13                               [facility]

e



       disease disease               00:00:                             of



                                   00                                 [facility]icin



                                                                      e

 

       Ascites Ascites Disease Active                              [facility]



                                   9-27                               College



                                   00:00:                             of



                                   00                                 Medicin



                                                                      e

 

       [id number] Adenocarci Problem                                           Co

mmon



              noma of                                                  [facility]irit



              liver                                                   [facility]

 

       [id number] Metastatic Problem                                           C

ommon



       104    adenocarci                                                  [facility]irit



              noma to                                                  - [facility]



              liver                                                   [facility]

 

       Malignant Malignant Problem                                           Com

mon



       neoplasm neoplasm                                                  [facility]



       of colon of colon,                                                  - [facility]



              unspecifie                                                  [facility]

 

       Neoplasm Neoplasm Problem                                           Commo

n



       related related                                                  Spirit



       pain   pain                                                    - [facility]

 

       Cirrhosis Cirrhosis Problem                                           Com

mon



       of liver of liver                                                  Spirit



                                                                      [facility]

 

       [id number] Adenocarci Problem                                           Co

mmon



              noma of                                                  [facility]



              transverse                                                  - [facility]



              colon                                                   [facility]

 

       Diabetic Diabetic Problem                                           Commo

n



       oculopathy eyes                                                    [facility]irit



       associated                                                         - [facility]



       with type                                                         



       II                                                             [facility]



       diabetes                                                         Medical



       mellitus                                                         Center

 

       Chronic Chronic Problem                                           Common



       kidney kidney                                                  Spirit



       disease disease,                                                  - [facility]



       stage 3 stage 3                                                  St



       (disorder) unspecifie                                                  [facility]

kes



              d                                                       Medical



                                                                      Center

 

       [id number] Type 2 Problem                                           Commo

n



       73747  diabetes                                                  Spirit



              mellitus                                                  - [facility]



              with other                                                  



              diabetic                                                  [facility]



              kidney                                                  Medical



              complicati                                                  Center



              on                                                      

 

       Chronic Chronic Problem                                           Common



       obstructiv obstructiv                                                  Sp

jose



       e      e                                                       - [facility]



       pulmonary pulmonary                                                  [facility]

 

       [id number] History of Problem                                           Co

mmon



              alcohol                                                  Spirit



              abuse                                                   - [facility]

 

       [id number] Alcoholic Problem                                           Com

mon



              cirrhosis                                                  Spirit



              of liver                                                  - [facility]



              with                                                    [facility]

 

       [id number] Hypothyroi Problem                                           Com

mon



              dism,                                                   Spirit



              unspecifie                                                  - [facility]



              d type                                                  [facility]

 

       25266113 Aphasia Problem                                           Common



                                                                      Spirit



                                                                      - [facility]

 

       [id number] Type 2 Problem                                           Commo

n



       68883  diabetes                                                  Spirit



              mellitus                                                  - [facility]



              with                                                    [facility]                                                    Medical



              unspecifie                                                  Center



              d whether                                                  



              long term                                                  



              insulin                                                  



              use                                                     

 

       [id number] On     Problem                                           Commo

n



       07     supplement                                                  Spirit



              al oxygen                                                  - [facility]



              therapy                                                  [facility]

 

       27033822 Generalize Problem                                           Com

mon



              d anxiety                                                  Spirit



              disorder                                                  - [facility]

 

       [id number] Type 2 Problem                                           Commo

n



       05     diabetes                                                  Spirit



              mellitus                                                  - [facility]



              with                                                    [facility]



              chronic                                                  Medical



              kidney                                                  Center



              disease                                                  

 

       [id number] COPD with Problem                                           Com

mon



              exacerbati                                                  Spirit



              on                                                      - [facility]

 

       Pleural Pleural Disease Active                                    [facility]



       effusion effusion                                                  Colleg

e



       on left on left                                                  of



                                                                      Medicin



                                                                      e







Allergies, Adverse Reactions, Alerts







       Allergy Allergy Status Severity Reaction(s) Onset  Inactive Treating Comm

ents 

Source



       Name   Type                        Date   Date   Clinician        

 

       NO KNOWN Allergy Active                                           [facility]



       ALLERGIE                                                         



       S                                                              

 

       NO KNOWN Drug   Active                                           [facility]



       ALLERGIE Class                                                   ity of



       S                                                              [facility]







Social History







           Social Habit Start Date Stop Date  Quantity   Comments   Source

 

           History of Tobacco                                             Common

 Spirit -



           Use                                                    [facility]

 

           History [facility] kes



           Alcohol Std Drinks                                             Medica

l Center

 

           History [facility]



           Alcohol Binge                                             Medical Tripp

ter

 

           Alcohol intake 2022 Current               [facility]

es



                      00:00:00   00:00:00   non-drinker of            Medical Ce

nter



                                            alcohol               



                                            (finding)             

 

           History SD[facility] 2022 [facility]uit 2017             [facility]



           Alcohol Comment 00:00:00   00:00:00                         Medical C

enter

 

           Exposure to 2022 Not sure              [facility] Zulema villegas



           SARS-CoV-2 (event) 00:00:00   17:54:00                         of Med

icine

 

           History SDOH 2019 1                     [facility]



           Alcohol Frequency 00:00:00   00:00:00                         [facility]

 

           Cigarettes smoked 2019                       [facility]



           current (pack per 00:00:00   00:00:00                         Medical

 Center



           day) - Reported                                             

 

           Cigarette  2019                       [facility]



           pack-years 00:00:00   00:00:00                         [facility]

 

           Tobacco use and 2019 Never used            [facility]

[facility]



           exposure   00:00:00   00:00:00                         [facility]

 

           Sex Assigned At 1960                       [facility]

kes



           Birth      00:00:00   00:00:00                         [facility]









                Smoking Status  Start Date      Stop Date       Source

 

                Former Smoker   2022-10-21 00:00:00 2022-10-21 00:00:00 Common S

pirit - [facility]







Medications







       Ordered Filled Start  Stop   Current Ordering Indication Dosage Frequency

 Signature

                    Comments            Components          Source



     Medication Medication Date Date Medication? Clinician                (SIG) 

          



     Name Name                                                   

 

     busPIRone busPIRone 2022      No             1{table BID  busPIRone      

     



     HCl 10 MG HCl 10 MG 0-26                     t}        HCl 10 MG           



               00:00:                                              



               00                                                

 

     busPIRone busPIRone 2022      No             1{table BID  busPIRone      

     



     HCl 10 MG HCl 10 MG 0-26                     t}        HCl 10 MG           



               00:00:                                              



               00                                                

 

     Polymyxin Polymyxin -0 202- No             1{drop_ QID  Polymyxin     

      



     B-Trimethop B-Trimethop 6-16 -                into_af      B-Trimetho  

         



     rim  rim  00:00: 00:00                fected_      prim           



     07260-3.1 66910-1.1 00   :00                 eye}      73716-2.1           



     UNIT/ML UNIT/ML                                    UNIT/ML           

 

     Polymyxin Polymyxin -0 - No             1{drop_ QID  Polymyxin     

      



     B-Trimethop B-Trimethop 6-16 06-23                into_af      B-Trimetho  

         



     rim  rim  00:00: 00:00                fected_      prim           



     02008-4.1 82231-5.1 00   :00                 eye}      58028-3.1           



     UNIT/ML UNIT/ML                                    UNIT/ML           

 

     insulin            Yes            45U  QD   Inject 45           [facility]

t



     degludec      4-12                               Units           [facility]



     (TRESIBA      11:20:                               subcutaneo           Med

ical



     U-100      03                                 usly           Center



     INSULIN                                         daily.           



     SUBQ)                                                        

 

     insulin            Yes            45U  QD   Inject 45           [facility]

t



     degludec      4-12                               Units           [facility]



     (TRESIBA      11:20:                               subcutaneo           Med

ical



     U-100      03                                 usly           Center



     INSULIN                                         daily.           



     SUBQ)                                                        

 

     insulin            Yes            45U  QD   Inject 45           [facility]

t



     degludec      4-12                               Units           [facility]



     (TRESIBA      11:20:                               subcutaneo           Med

ical



     U-100      03                                 usly           Center



     INSULIN                                         daily.           



     SUBQ)                                                        

 

     furosemide            Yes            40mg QD   Take 40 mg           C

HI [facility]



     (LASIX) 40      4-11                               by mouth           [facility]



     MG tablet      11:34:                               daily.           Medica

l



               01                                                Center

 

     folic acid            Yes            1mg  QD   Take 1 mg           [facility]

I St



     (FOLVITE) 1      4-11                               by mouth           Luke

s



     MG tablet      11:34:                               daily.           Medica

l



               01                                                Center

 

     albuterol            Yes            1{puff}      Inhale 1           C

HI St



     HFA       4-11                               puff by           [facility]



     (VENTOLIN      11:34:                               mouth via           Med

ical



     HFA) 90      01                                 inhaler           Center



     mcg/actuati                                         every 6           



     on inhaler                                         (six)           



                                                  hours as           



                                                  needed for           



                                                  Wheezing           



                                                  or             



                                                  Shortness           



                                                  of Breath.           

 

     acetaminoph            Yes            1{tbl}      Take 1           [facility]

I St



     en-codeine      4-11                               tablet by           [facility]

s



     (TYLENOL      11:34:                               mouth           Medical



     #3) 300-30      01                                 every 4           Center



     mg per                                         (four)           



     tablet                                         hours as           



                                                  needed for           



                                                  Pain.           

 

     ascorbic            Yes            1000mg QD   Take 1,000           C

[facility]



     acid,      4-11                               mg by           [facility]



     vitamin C,      11:34:                               mouth           Medica

l



     (vitamin C)      01                                 daily.           Center



     1000 MG                                                        



     tablet                                                        

 

     thyroid,            Yes            60mg QD   Take 60 mg           [facility]



     pork, 60 mg      4-11                               by mouth           [facility]

[facility]



     Tab       11:34:                               every           [facility]



               01                                 morning.           [facility]

 

     spironolact            Yes            100mg QD   Take 100           [facility]

[facility]



     one       4-11                               mg by           [facility]



     (ALDACTONE)      11:34:                               mouth           [facility]

al



     100 MG      01                                 daily.           Center



     tablet                                                        

 

     budesonide/            Yes                 Q.5D Inhale by           [facility]

[facility]



     formoterol      4-11                               mouth via           [facility]

[facility]



     fumarate      11:34:                               inhaler 2           Medi

alexus



     (SYMBICORT      01                                 (two)           Center



     INHL)                                         times           



                                                  daily.           

 

     ondansetron            Yes                      Take by           [facility]



     (ZOFRAN) 8      4-11                               mouth           [facility]



     MG tablet      11:34:                               every 8           [facility]

al



               01                                 (eight)           Center



                                                  hours as           



                                                  needed for           



                                                  Nausea.           

 

     furosemide            Yes            40mg QD   Take 40 mg           [facility]

[facility]



     (LASIX) 40      4-11                               by mouth           [facility]



     MG tablet      11:34:                               daily.           [facility]

l



               01                                                Center

 

     folic acid            Yes            1mg  QD   Take 1 mg           [facility]



     (FOLVITE) 1      4-11                               by mouth           [facility]



     MG tablet      11:34:                               daily.           [facility]

l



               01                                                Center

 

     albuterol            Yes            1{puff}      Inhale 1           [facility]

[facility]



     HFA       4-11                               puff by           [facility]



     (VENTOLIN      11:34:                               mouth via           Med

ical



     HFA) 90      01                                 inhaler           Center



     mcg/actuati                                         every 6           



     on inhaler                                         (six)           



                                                  hours as           



                                                  needed for           



                                                  Wheezing           



                                                  or             



                                                  Shortness           



                                                  of Breath.           

 

     acetaminoph            Yes            1{tbl}      Take 1           [facility]



     en-codeine      4-11                               tablet by           [facility]



     (TYLENOL      11:34:                               mouth           [facility]



     #3) 300-30      01                                 every 4           Center



     mg per                                         (four)           



     tablet                                         hours as           



                                                  needed for           



                                                  Pain.           

 

     ascorbic            Yes            1000mg QD   Take 1,000           [facility]

[facility]



     acid,      4-11                               mg by           [facility]



     vitamin C,      11:34:                               mouth           [facility]

l



     (vitamin C)      01                                 daily.           Center



     1000 MG                                                        



     tablet                                                        

 

     thyroid,            Yes            60mg QD   Take 60 mg           [facility]



     pork, 60 mg      4-11                               by mouth           [facility]

[facility]



     Tab       11:34:                               every           [facility]



               01                                 morning.           [place]

 

     spironolact            Yes            100mg QD   Take 100           [facility]

[facility]



     one       4-11                               mg by           [facility]



     (ALDACTONE)      11:34:                               mouth           [facility]

al



     100 MG      01                                 daily.           Center



     tablet                                                        

 

     budesonide/            Yes                 Q.5D Inhale by           [facility]

[facility]



     formoterol      4-11                               mouth via           [facility]

[facility]



     fumarate      11:34:                               inhaler 2           Medi

alexus



     (SYMBICORT      01                                 (two)           Center



     IN[facility])                                         times           



                                                  daily.           

 

     ondansetron            Yes                      Take by           [facility]



     (ZOFRAN) 8      4-11                               mouth           [facility]



     MG tablet      11:34:                               every 8           [facility]

al



               01                                 (eight)           Center



                                                  hours as           



                                                  needed for           



                                                  Nausea.           

 

     furosemide            Yes            40mg QD   Take 40 mg           [facility]

[facility]



     (LASIX) 40      4-11                               by mouth           [facility]



     MG tablet      11:34:                               daily.           [facility]

l



               01                                                Center

 

     folic acid            Yes            1mg  QD   Take 1 mg           [facility]



     (FOLVITE) 1      4-11                               by mouth           [facility]

[facility]



     MG tablet      11:34:                               daily.           [facility]

l



               01                                                [place]

 

     albuterol            Yes            1{puff}      Inhale 1           [facility]

[facility]



     HFA       4-11                               puff by           [facility]



     (VENTOLIN      11:34:                               mouth via           Med

ical



     HFA) 90      01                                 inhaler           Center



     mcg/actuati                                         every 6           



     on inhaler                                         (six)           



                                                  hours as           



                                                  needed for           



                                                  Wheezing           



                                                  or             



                                                  Shortness           



                                                  of Breath.           

 

     acetaminoph            Yes            1{tbl}      Take 1           [facility]



     en-codeine      4-11                               tablet by           [facility]

[facility]



     (TYLENOL      11:34:                               mouth           [facility]



     #3) 300-30      01                                 every 4           Center



     mg per                                         (four)           



     tablet                                         hours as           



                                                  needed for           



                                                  Pain.           

 

     ascorbic            Yes            1000mg QD   Take 1,000           [facility]

[facility]



     acid,      4-11                               mg by           [facility]



     vitamin C,      11:34:                               mouth           [facility]

l



     (vitamin C)      01                                 daily.           Center



     1000 MG                                                        



     tablet                                                        

 

     thyroid,            Yes            60mg QD   Take 60 mg           [facility]



     pork, 60 mg      4-11                               by mouth           [facility]

[facility]



     Tab       11:34:                               every           [facility]



               01                                 morning.           [place]

 

     spironolact            Yes            100mg QD   Take 100           [facility]

[facility]



     one       4-11                               mg by           [facility]



     (ALDACTONE)      11:34:                               mouth           [facility]

al



     100 MG      01                                 daily.           Center



     tablet                                                        

 

     budesonide/            Yes                 Q.5D Inhale by           [facility]

[facility]



     formoterol      4-11                               mouth via           [facility]

[facility]



     fumarate      11:34:                               inhaler 2           [facility]

alexus



     (SYMBICORT      01                                 (two)           Center



     [facility])                                         times           



                                                  daily.           

 

     ondansetron            Yes                      Take by           [facility]



     (ZOFRAN) 8      4-11                               mouth           [facility]



     MG tablet      11:34:                               every 8           [facility]

al



               01                                 (eight)           Center



                                                  hours as           



                                                  needed for           



                                                  Nausea.           

 

     albuterol      2022- No             1{puff}      Inhale 1           

[facility]



     HFA (ProAir      4-11 04-11                          puff by           [facility]

[facility]



     HFA) 90      08:38: 00:00                          mouth via           [facility]

alexus



     mcg/actuati      32   :00                           inhaler           [facility]

[facility]



     on inhaler                                         every 6           



                                                  (six)           



                                                  hours as           



                                                  needed for           



                                                  Wheezing.           

 

     insulin      0 - No                  QD   Inject           [facility]



     degludec      -11                          subcutaneo           [facility]

[facility]



     (TRESIBA      08:38: 00:00                          usly           [facility]



     FLEXTOUCH      32   :00                           daily.           Center



     U-100 SUBQ)                                                        

 

     albuterol      - 2022- No             1{puff}      Inhale 1           

[facility]



     HFA (ProAir      11                          puff by           [facility]

[facility]



     HFA) 90      08:38: 00:00                          mouth via           [facility]

alexus



     mcg/actuati      32   :00                           inhaler           [facility]

[facility]



     on inhaler                                         every 6           



                                                  (six)           



                                                  hours as           



                                                  needed for           



                                                  Wheezing.           

 

     insulin      2022- No                  QD   Inject           [facility]



     degludec                                subcutaneo           [facility]

[facility]



     (TRESIBA      08:38: 00:00                          usly           Medical



     FLEXTOUCH      32   :00                           daily.           Center



     U-100 SUBQ)                                                        

 

     albuterol      -2022- No             1{puff}      Inhale 1           

[facility]



     HFA (ProAir                                puff by           [facility]

[facility]



     HFA) 90      08:38: 00:00                          mouth via           [facility]

alexus



     mcg/actuati      32   :00                           inhaler           [facility]

[facility]



     on inhaler                                         every 6           



                                                  (six)           



                                                  hours as           



                                                  needed for           



                                                  Wheezing.           

 

     insulin      2022- No                  QD   Inject           [facility]



     degludec      11                          subcutaneo           [facility]

[facility]



     (TRESIBA      08:38: 00:00                          usly           Medical



     FLEXTOUCH      32   :00                           daily.           Center



     U-100 SUBQ)                                                        

 

     acetaminoph            Yes       [id number] 1{tbl}      Take 1      

     [facility]



     en-codeine      3-25                102            Tablet by           [facility]



     (TYLENOL      00:00:                               mouth           of



     #3) 300-30      00                                 every 6           [facility]

n



     MG per                                         hours as           e



     tablet                                         needed for           



                                                  Pain.           

 

     Ascorbic            Yes                      Take by           [facility]



     Acid      3-24                               mouth.           [place]



     (VITAMIN C      13:25:                                              of



     ER OR)      20                                                [facility]



                                                                 e

 

     OXYGEN GAS            Yes            2L        2 L daily.           [facility]



               3-24                                              [facility]



               13:24:                                              of



               18                                                [facility]



                                                                 e

 

     NP THYROID      2-0      Yes            60mg      Take 60 mg           [facility]



     60 MG      2-22                               by mouth           [facility]



     tablet      00:00:                               daily.           of



               00                                                [facility]



                                                                 e

 

     Levothyroxi Levothyroxi 2020      No                  QD   Levothyrox    

       



     ne Sodium ne Sodium 0-21                               ine Sodium          

 



     25 MCG 25 MCG 00:00:                               25 MCG           



               00                                                

 

     Levothyroxi Levothyroxi 2020      No                  QD   Levothyrox    

       



     ne Sodium ne Sodium 0-21                               ine Sodium          

 



     25 MCG 25 MCG 00:00:                               25 MCG           



               00                                                

 

     Levothyroxi Levothyroxi 2020      No                  QD   Levothyrox    

       



     ne Sodium ne Sodium 0-21                               ine Sodium          

 



     25 MCG 25 MCG 00:00:                               25 MCG           



               00                                                

 

     Levothyroxi Levothyroxi 2020      No                  QD   Levothyrox    

       



     ne Sodium ne Sodium 0-21                               ine Sodium          

 



     25 MCG 25 MCG 00:00:                               25 MCG           



               00                                                

 

     Levothyroxi Levothyroxi 2020      No                  QD   Levothyrox    

       



     ne Sodium ne Sodium 0-21                               ine Sodium          

 



     25 MCG 25 MCG 00:00:                               25 MCG           



               00                                                

 

     Levothyroxi Levothyroxi 2020      No                  QD   Levothyrox    

       



     ne Sodium ne Sodium 0-21                               ine Sodium          

 



     25 MCG 25 MCG 00:00:                               25 MCG           



               00                                                

 

     Levothyroxi Levothyroxi 2020      No                  QD   Levothyrox    

       



     ne Sodium ne Sodium 0-21                               ine Sodium          

 



     25 MCG 25 MCG 00:00:                               25 MCG           



               00                                                

 

     Levothyroxi Levothyroxi 2020      No                  QD   Levothyrox    

       



     ne Sodium ne Sodium 0-21                               ine Sodium          

 



     25 MCG 25 MCG 00:00:                               25 MCG           



               00                                                

 

     Levothyroxi Levothyroxi 2020      No                  QD   Levothyrox    

       



     ne Sodium ne Sodium 0-21                               ine Sodium          

 



     25 MCG 25 MCG 00:00:                               25 MCG           



               00                                                

 

     Levothyroxi Levothyroxi 2020      No                  QD   Levothyrox    

       



     ne Sodium ne Sodium 0-21                               ine Sodium          

 



     25 MCG 25 MCG 00:00:                               25 MCG           



               00                                                

 

     Levothyroxi Levothyroxi 2020      No                  QD   Levothyrox    

       



     ne Sodium ne Sodium 0-21                               ine Sodium          

 



     25 MCG 25 MCG 00:00:                               25 MCG           



               00                                                

 

     Levothyroxi Levothyroxi 2020      No                  QD   Levothyrox    

       



     ne Sodium ne Sodium 0-21                               ine Sodium          

 



     25 MCG 25 MCG 00:00:                               25 MCG           



               00                                                

 

     Levothyroxi Levothyroxi 2020      No                  QD   Levothyrox    

       



     ne Sodium ne Sodium 0-21                               ine Sodium          

 



     25 MCG 25 MCG 00:00:                               25 MCG           



               00                                                

 

     Levothyroxi Levothyroxi 2020      No                  QD   Levothyrox    

       



     ne Sodium ne Sodium 0-21                               ine Sodium          

 



     25 MCG 25 MCG 00:00:                               25 MCG           



               00                                                

 

     Levothyroxi Levothyroxi 2020      No                  QD   Levothyrox    

       



     ne Sodium ne Sodium 0-21                               ine Sodium          

 



     25 MCG 25 MCG 00:00:                               25 MCG           



               00                                                

 

     Levothyroxi Levothyroxi 2020      No                  QD   Levothyrox    

       



     ne Sodium ne Sodium 0-21                               ine Sodium          

 



     25 MCG 25 MCG 00:00:                               25 MCG           



               00                                                

 

     Levothyroxi Levothyroxi 2020      No                  QD   Levothyrox    

       



     ne Sodium ne Sodium 0-21                               ine Sodium          

 



     25 MCG 25 MCG 00:00:                               25 MCG           



               00                                                

 

     Levothyroxi Levothyroxi 2020      No                  QD   Levothyrox    

       



     ne Sodium ne Sodium 0-21                               ine Sodium          

 



     25 MCG 25 MCG 00:00:                               25 MCG           



               00                                                

 

     Levothyroxi Levothyroxi 2020      No                  QD   Levothyrox    

       



     ne Sodium ne Sodium 0-21                               ine Sodium          

 



     25 MCG 25 MCG 00:00:                               25 MCG           



               00                                                

 

     Levothyroxi Levothyroxi 2020      No                  QD   Levothyrox    

       



     ne Sodium ne Sodium 0-21                               ine Sodium          

 



     25 MCG 25 MCG 00:00:                               25 MCG           



               00                                                

 

     Tradjenta Tradjenta       Yes  [facility]                1 tablet        

   [facility]



               7-20           [facility]                               [facility]



               00:00:                                              - [facility]



               00                                                [facility]

 

     Tresiba Tresiba       Yes  [facility]                Inject 15           

[facility]



     FlexTouch FlexTouch 7-20           [facility]                units           [facility]

rit



               00:00:                                              - [facility]



                                                               [facility]

 

     OXYGEN GAS      2019      Yes            2L        2 L daily.           [facility]

[facility]



               0-17                                              [facility]



               15:24:                                              of



               51                                                [facility]



                                                                 e

 

     tramadol            Yes       [id number] 50mg      Take 1 Tab         

  [facility]



     (ULTRAM) 50      9-30                               by mouth           [facility]



     MG tablet      00:00:                               every 6           of



               00                                 hours as           [facility]



                                                  needed for           e



                                                  Pain.           

 

     tramadol      2022- No        [id number] 50mg      Take 1 Tab        

   [facility]



     (ULTRAM) 50      9-30 03-25                          by mouth           [facility]

[facility]



     MG tablet      00:00: 00:00                          every 6           of



               00   :00                           hours as           [facility]



                                                  needed for           e



                                                  Pain.           

 

     tiotropium            Yes            18ug      18 mcg by           [facility]



     (SPIRIVA)                                     Inhalation           [facility]

[facility]



     18 MCG      00:00:                               route.           of



     inhalation      00                                                Medicin



     capsule                                                        e

 

     tiotropium            Yes            18ug      18 mcg by           [facility]

[facility]



     (SPIRIVA)                                     Inhalation           [facility]

[facility]



     18 MCG      00:00:                               route.           of



     inhalation      00                                                Medicin



     capsule                                                        e

 

     tiotropium      2022- No        Chronic 18ug QD   Inhale 1          

 [facility]



     (SPIRIVA)      11           obstructive           capsule          

 [facility]



     18 mcg      00:00: 00:00           pulmonary           (18 mcg           Me

dical



     inhalation      00   :00            disease           total) by           C

enter



     capsule                          with acute           mouth via           



                                   exacerbatio           inhaler           



                                   n ([facility])           daily.           

 

     tiotropium       No        Chronic 18ug QD   Inhale 1          

 [facility]



     (SPIRIVA)                 obstructive           capsule          

 [facility]



     18 mcg      00:00: 00:00           pulmonary           (18 mcg           Me

dical



     inhalation      00   :00            disease           total) by           C

enter



     capsule                          with acute           mouth via           



                                   exacerbatio           inhaler           



                                   n ([facility])           daily.           

 

     tiotropium      2022- No        Chronic 18ug QD   Inhale 1          

 [facility]



     (SPIRIVA)                 obstructive           capsule          

 [facility]



     18 mcg      00:00: 00:00           pulmonary           (18 mcg           Me

dical



     inhalation      00   :00            disease           total) by           C

enter



     capsule                          with acute           mouth via           



                                   exacerbatio           inhaler           



                                   n ([facility])           daily.           

 

     Cefepime      2019- No             2g        Inject 2 g           [facility]

[facility]



     HCl 2 g      9-25 10-19                          into the           [facility]



     SOLR      00:00: 04:59                          vein Every           of



               00   :00                           8 hours.           [facility]



                                                                 e

 

     sodium      2019- No                       TAKE 1           [facility]



     chloride 1       1017                          TABLET BY           [facility]

lege



     g tablet      00:00: 00:00                          MOUTH           of



               00   :00                           THREE           [facility]icin



                                                  TIMES           e



                                                  DAILY WITH           



                                                  MEALS FOR           



                                                  14 DAYS           

 

     lactulose            Yes       Constipatio 20g       Take 30         

  [facility]



     (CHRONULAC)      9-24                n,             mLs (20 g           [facility]

es



     20 gram/30      00:00:                unspecified           total) by      

     Medical



     mL solution      00                  constipatio           mouth 2         

  Center



                                   n type           (two)           



                                                  times           



                                                  daily as           



                                                  needed           



                                                  (constipat           



                                                  ion).           

 

     lactulose            Yes       Constipatio 20g       Take 30         

  [facility]



     (CHRONULAC)      9-24                n,             mLs (20 g           [facility]

es



     20 gram/30      00:00:                unspecified           total) by      

     Medical



     mL solution      00                  constipatio           mouth 2         

  Center



                                   n type           (two)           



                                                  times           



                                                  daily as           



                                                  needed           



                                                  (constipat           



                                                  ion).           

 

     lactulose            Yes       Constipatio 20g       Take 30         

  [facility]



     (CHRONULAC)      924                n,             mLs (20 g           [facility]

es



     20 gram/30      00:00:                unspecified           total) by      

     Medical



     mL solution      00                  constipatio           mouth 2         

  Center



                                   n type           (two)           



                                                  times           



                                                  daily as           



                                                  needed           



                                                  (constipat           



                                                  ion).           

 

     levofloxaci            Yes                      TAKE 1           [facility]

or



     n         9-24                               TABLET BY           [facility]



     (LEVAQUIN)      00:00:                               MOUTH ONCE           o

f



     750 MG      00                                 DAILY FOR           [facility]



     tablet                                         24 DAYS           e

 

     levofloxaci            Yes                      TAKE 1           [facility]

or



     n         9-24                               TABLET BY           [facility]



     (LEVAQUIN)      00:00:                               MOUTH ONCE           o

f



     750 MG      00                                 DAILY FOR           [facility]



     tablet                                         24 DAYS           e

 

     levothyroxi      2022- No        Hypothyroid 25ug      Take 1       

    [facility]



     ne         04-11           ism,           tablet (25           [facility]



     (SYNTHROID,      00:00: 00:00           unspecified           mcg total)   

        [facility]



     LEVOTHROID)      00   :00            type           by mouth           Cent

er



     25 MCG                                         Every           



     tablet                                         morning on           



                                                  an empty           



                                                  stomach.           

 

     levothyroxi      2022- No        Hypothyroid 25ug      Take 1       

    [facility]



     ne         04-11           ism,           tablet (25           [facility]



     (SYNTHROID,      00:00: 00:00           unspecified           mcg total)   

        Medical



     LEVOTHROID)      00   :00            type           by mouth           Cent

er



     25 MCG                                         Every           



     tablet                                         morning on           



                                                  an empty           



                                                  stomach.           

 

     levothyroxi      2022- No        Hypothyroid 25ug      Take 1       

    [facility]



     ne         04-11           ism,           tablet (25           [facility]



     (SYNTHROID,      00:00: 00:00           unspecified           mcg total)   

        Medical



     LEVOTHROID)      00   :00            type           by mouth           Cent

er



     25 MCG                                         Every           



     tablet                                         morning on           



                                                  an empty           



                                                  stomach.           

 

     doxycycline       2019- No             100mg      Take 100           

[facility]



     (MONODOX)       10-20                          mg by           [facility]



     100 MG      00:00: 04:59                          mouth.           of



     capsule      00   :00                                          Medicin



                                                                 e

 

     dexamethaso            Yes                      TAKE 2           [facility]

[facility]



     ne        9-                               TABLETS BY           [facility]



     (DECADRON)      00:00:                               MOUTH           of



     4 MG tablet      00                                 TWICE           [facility]



                                                  DAILY           e

 

     dexamethaso            Yes                      TAKE 2           [facility]

or



     ne        9-01                               TABLETS BY           [facility]



     (DECADRON)      00:00:                               MOUTH           of



     4 MG tablet      00                                 TWICE           [facility]



                                                  DAILY           e

 

     albuterol            Yes                      USE 1 VIAL           [facility]

[facility]



     (PROVENTIL)      8-12                               IN             [facility]



     (2.5 mg/3      00:00:                               NEBULIZER           of



     mL) 0.083%      00                                 EVERY 4 TO           Med

icin



     nebulizer                                         6 HOURS AS           e



     solution                                         NEEDED           

 

     albuterol            Yes                      USE 1 VIAL           [facility]

[facility]



     (PROVENTIL)      8-12                               IN             [facility]



     (2.5 mg/3      00:00:                               NEBULIZER           of



     mL) 0.083%      00                                 EVERY 4 TO           Med

icin



     nebulizer                                         6 HOURS AS           e



     solution                                         NEEDED           

 

     furosemide            Yes            40mg      Take 40 mg           [facility]

[facility]



     (LASIX) 40      6-14                               by mouth           [facility]

ge



     MG tablet      00:00:                               daily.           of



                                                               [facility]



                                                                 e

 

     spironolact            Yes            100mg      Take 100           [facility]

[facility]



     one       6-14                               mg by           [facility]



     (ALDACTONE)      00:00:                               mouth           of



     100 MG      00                                 daily.           [facility]



     tablet                                                        e

 

     folic acid            Yes            1mg       Take 1 mg           [facility]

[facility]



     (FOLVITE) 1      6-14                               by mouth           [facility]

[facility]



     MG tablet      00:00:                               daily.           of



                                                               [facility]



                                                                 e

 

     furosemide            Yes            40mg      Take 40 mg           [facility]

[facility]



     (LASIX) 40      6-14                               by mouth           [facility]

[facility]



     MG tablet      00:00:                               daily.           of



                                                               Medicin



                                                                 e

 

     spironolact            Yes            100mg      Take 100           [facility]

[facility]



     one       6-14                               mg by           [facility]



     (ALDACTONE)      00:00:                               mouth           of



     100 MG      00                                 daily.           [facility]



     tablet                                                        e

 

     folic acid            Yes            1mg       Take 1 mg           [facility]

[facility]



     (FOLVITE) 1      6-14                               by mouth           [facility]

[facility]



     MG tablet      00:00:                               daily.           of



               00                                                [facility]



                                                                 e

 

     Symbicort Symbicort           Yes  [facility]                2 puffs           

Common



                              [place]                               [facility]

 

     Ipratropium Ipratropium           Yes  [facility]                2.5 ml        

   Common



     Bromide Bromide                [facility]

 

     ProAir HFA ProAir HFA           Yes  [facility]                1 puff as       

    Common



                              [place]                needed           [facility]

 

     Furosemide Furosemide           Yes  [facility]                1 tablet        

   Common



                              [place]                               [facility]



                                                                 [facility]

 

     Spironolact Spironolact           Yes  [facility]                1 tablet      

     Common



     one  one                 [facility]

 

     Aspir-Low Aspir-Low           Yes  [facility]                1 tablet          

 [facility]

 

     Folic Acid Folic Acid           Yes  [facility]                1 tablet        

   [facility]

 

     Constulose Constulose           Yes  [facility]                15 ml           

[facility]

 

     Symbicort Symbicort           No             2{puffs QD   Symbicort        

   



     160-4.5 160-4.5                          }         160-4.5           



     MCG/ACT MCG/ACT                                    MCG/ACT           

 

     Furosemide Furosemide           No             1{table BID  Furosemide     

      



     40 MG 40 MG                          t}        40 MG           

 

     Furosemide Furosemide           No             1{table BID  Furosemide     

      



     40 MG 40 MG                          t}        40 MG           

 

     Folic Acid Folic Acid           No             1{table QD   Folic Acid     

      



     1 MG 1 MG                          t}        1 MG           

 

     Tresiba Tresiba           No                       Tresiba           



     FlexTouch FlexTouch                                    FlexTouch           



     200 UNIT/ UNIT/ML                                    200            



                                                  UNIT/ML           

 

     Spiriva Spiriva           No                       Spiriva           



     HandiHaler HandiHaler                                    HandiHaler        

   

 

     Ipratropium Ipratropium           No             2.5{ml} TID  Ipratropiu   

        



     Bromide Bromide                                    m Bromide           



     0.02 % 0.02 %                                    0.02 %           

 

     Spironolact Spironolact           No             1{table BID  Spironolac   

        



     one 100 MG one 100 MG                          t}        tone 100          

 



                                                  MG             

 

     Tresiba Tresiba           No                  QD   Tresiba           



     FlexTouch FlexTouch                                    FlexTouch           



     200 UNIT/ UNIT/ML                                    200            



                                                  UNIT/ML           

 

     Constulose Constulose           No             30{ml} BID  Constulose      

     



     10 GM/15ML 10 GM/15ML                                    10 GM/15ML        

   

 

     predniSONE predniSONE           No             1{table QD   predniSONE     

      



     10 MG 10 MG                          t}        10 MG           

 

     ProAir HFA ProAir HFA           No             1{puff_ 6xD  ProAir HFA     

      



     108 (90 108 (90                          as_need      108 (90           



     Base) Base)                          ed}       Base)           



     MCG/ACT MCG/ACT                                    MCG/ACT           

 

     [place]           No             1{table QD   [place]           



     Thyroid 60 Thyroid 60                          t_on_an      Thyroid 60     

      



     MG   MG                            _empty_      MG             



                                        stomach                     



                                        }                        

 

     HYDROcodone HYDROcodone           No             1{table QID  HYDROcodon   

        



     -Acetaminop -Acetaminop                          t_as_ne      e-Acetamin   

        



     hen 5-325 hen 5-325                          eded}      ophen           



     MG   MG                                      5-325 MG           

 

     Symbicort Symbicort           No             2{puffs QD   Symbicort        

   



     80-4.5 80-4.5                          }         80-4.5           



     MCG/ACT MCG/ACT                                    MCG/ACT           

 

     Spironolact Spironolact           No             1{table BID  Spironolac   

        



     one 100 MG one 100 MG                          t}        tone 100          

 



                                                  MG             

 

     Furosemide Furosemide           No             1{table BID  Furosemide     

      



     40 MG 40 MG                          t}        40 MG           

 

     ProAir HFA ProAir HFA           No             1{puff_ 6xD  ProAir HFA     

      



     108 (90 108 (90                          as_need      108 (90           



     Base) Base)                          ed}       Base)           



     MCG/ACT MCG/ACT                                    MCG/ACT           

 

     Ipratropium Ipratropium           No             2.5{ml} TID  Ipratropiu   

        



     Bromide Bromide                                    m Bromide           



     0.02 % 0.02 %                                    0.02 %           

 

     Constulose Constulose           No             15{ml} BID  Constulose      

     



     10 GM/15ML 10 GM/15ML                                    10 GM/15ML        

   

 

     Tresiba Tresiba           No                  QD   Tresiba           



     FlexTouch FlexTouch                                    FlexTouch           



     200 UNIT/ UNIT/ML                                    200            



                                                  UNIT/ML           

 

     Folic Acid Folic Acid           No             1{table QD   Folic Acid     

      



     1 MG 1 MG                          t}        1 MG           

 

     Aspir-Low Aspir-Low           No             1{table QD   Aspir-Low        

   



     81 MG 81 MG                          t}        81 MG           

 

     [place]           No             1{table QD   [place]           



     Thyroid 60 Thyroid 60                          t_on_an      Thyroid 60     

      



     MG   MG                            _empty_      MG             



                                        stomach                     



                                        }                        

 

     Symbicort Symbicort           No             2{puffs QD   Symbicort        

   



     80-4.5 80-4.5                          }         80-4.5           



     MCG/ACT MCG/ACT                                    MCG/ACT           

 

     Spironolact Spironolact           No             1{table BID  Spironolac   

        



     one 100 MG one 100 MG                          t}        tone 100          

 



                                                  MG             

 

     Furosemide Furosemide           No             1{table BID  Furosemide     

      



     40 MG 40 MG                          t}        40 MG           

 

     ProAir HFA ProAir HFA           No             1{puff_ 6xD  ProAir HFA     

      



     108 (90 108 (90                          as_need      108 (90           



     Base) Base)                          ed}       Base)           



     MCG/ACT MCG/ACT                                    MCG/ACT           

 

     Ipratropium Ipratropium           No             2.5{ml} TID  Ipratropiu   

        



     Bromide Bromide                                    m Bromide           



     0.02 % 0.02 %                                    0.02 %           

 

     Constulose Constulose           No             15{ml} BID  Constulose      

     



     10 GM/15ML 10 GM/15ML                                    10 GM/15ML        

   

 

     Tresiba Tresiba           No                  QD   Tresiba           



     FlexTouch FlexTouch                                    FlexTouch           



     200 UNIT/ UNIT/ML                                    200            



                                                  UNIT/ML           

 

     Folic Acid Folic Acid           No             1{table QD   Folic Acid     

      



     1 MG 1 MG                          t}        1 MG           

 

     Aspir-Low Aspir-Low           No             1{table QD   Aspir-Low        

   



     81 MG 81 MG                          t}        81 MG           

 

     [place]           No             1{table QD   [place]           



     Thyroid 60 Thyroid 60                          t_on_an      Thyroid 60     

      



     MG   MG                            _empty_      MG             



                                        stomach                     



                                        }                        

 

     Symbicort Symbicort           No             2{puffs QD   Symbicort        

   



     80-4.5 80-4.5                          }         80-4.5           



     MCG/ACT MCG/ACT                                    MCG/ACT           

 

     Tresiba Tresiba           No                  QD   Tresiba           



     FlexTouch FlexTouch                                    FlexTouch           



     200 UNIT/ UNIT/ML                                    200            



                                                  UNIT/ML           

 

     Furosemide Furosemide           No             1{table BID  Furosemide     

      



     40 MG 40 MG                          t}        40 MG           

 

     ProAir HFA ProAir HFA           No             1{puff_ 6xD  ProAir HFA     

      



     108 (90 108 (90                          as_need      108 (90           



     Base) Base)                          ed}       Base)           



     MCG/ACT MCG/ACT                                    MCG/ACT           

 

     Ipratropium Ipratropium           No             2.5{ml} TID  Ipratropiu   

        



     Bromide Bromide                                    m Bromide           



     0.02 % 0.02 %                                    0.02 %           

 

     Constulose Constulose           No             15{ml} BID  Constulose      

     



     10 GM/15ML 10 GM/15ML                                    10 GM/15ML        

   

 

     Spironolact Spironolact           No             1{table BID  Spironolac   

        



     one 100 MG one 100 MG                          t}        tone 100          

 



                                                  MG             

 

     Folic Acid Folic Acid           No             1{table QD   Folic Acid     

      



     1 MG 1 MG                          t}        1 MG           

 

     Aspir-Low Aspir-Low           No             1{table QD   Aspir-Low        

   



     81 MG 81 MG                          t}        81 MG           

 

     [place]           No             1{table QD   [place]           



     Thyroid 60 Thyroid 60                          t_on_an      Thyroid 60     

      



     MG   MG                            _empty_      MG             



                                        stomach                     



                                        }                        

 

     Tresiba Tresiba           No                  QD   Tresiba           



     FlexTouch FlexTouch                                    FlexTouch           



     200 UNIT/ UNIT/ML                                    200            



                                                  UNIT/ML           

 

     Folic Acid Folic Acid           No             1{table QD   Folic Acid     

      



     1 MG 1 MG                          t}        1 MG           

 

     Aspir-Low Aspir-Low           No             1{table QD   Aspir-Low        

   



     81 MG 81 MG                          t}        81 MG           

 

     ProAir HFA ProAir HFA           No             1{puff_ 6xD  ProAir HFA     

      



     108 (90 108 (90                          as_need      108 (90           



     Base) Base)                          ed}       Base)           



     MCG/ACT MCG/ACT                                    MCG/ACT           

 

     Symbicort Symbicort           No             2{puffs QD   Symbicort        

   



     160-4.5 160-4.5                          }         160-4.5           



     MCG/ACT MCG/ACT                                    MCG/ACT           

 

     Spironolact Spironolact           No             1{table BID  Spironolac   

        



     one 100 MG one 100 MG                          t}        tone 100          

 



                                                  MG             

 

     Constulose Constulose           No             15{ml} BID  Constulose      

     



     10 GM/15ML 10 GM/15ML                                    10 GM/15ML        

   

 

     [place]           No             1{table QD   [place]           



     Thyroid 60 Thyroid 60                          t_on_an      Thyroid 60     

      



     MG   MG                            _empty_      MG             



                                        stomach                     



                                        }                        

 

     Ipratropium Ipratropium           No             2.5{ml} TID  Ipratropiu   

        



     Bromide Bromide                                    m Bromide           



     0.02 % 0.02 %                                    0.02 %           

 

     Furosemide Furosemide           No             1{table BID  Furosemide     

      



     40 MG 40 MG                          t}        40 MG           

 

     Tresiba Tresiba           No                  QD   Tresiba           



     FlexTouch FlexTouch                                    FlexTouch           



     200 UNIT/ UNIT/ML                                    200            



                                                  UNIT/ML           

 

     Folic Acid Folic Acid           No             1{table QD   Folic Acid     

      



     1 MG 1 MG                          t}        1 MG           

 

     Aspir-Low Aspir-Low           No             1{table QD   Aspir-Low        

   



     81 MG 81 MG                          t}        81 MG           

 

     ProAir HFA ProAir HFA           No             1{puff_ 6xD  ProAir HFA     

      



     108 (90 108 (90                          as_need      108 (90           



     Base) Base)                          ed}       Base)           



     MCG/ACT MCG/ACT                                    MCG/ACT           

 

     Symbicort Symbicort           No             2{puffs QD   Symbicort        

   



     160-4.5 160-4.5                          }         160-4.5           



     MCG/ACT MCG/ACT                                    MCG/ACT           

 

     Spironolact Spironolact           No             1{table BID  Spironolac   

        



     one 100 MG one 100 MG                          t}        tone 100          

 



                                                  MG             

 

     Constulose Constulose           No             15{ml} BID  Constulose      

     



     10 GM/15ML 10 GM/15ML                                    10 GM/15ML        

   

 

     [place]           No             1{table QD   [place]           



     Thyroid 60 Thyroid 60                          t_on_an      Thyroid 60     

      



     MG   MG                            _empty_      MG             



                                        stomach                     



                                        }                        

 

     Ipratropium Ipratropium           No             2.5{ml} TID  Ipratropiu   

        



     Bromide Bromide                                    m Bromide           



     0.02 % 0.02 %                                    0.02 %           

 

     Furosemide Furosemide           No             1{table BID  Furosemide     

      



     40 MG 40 MG                          t}        40 MG           

 

     Tresiba Tresiba           No                  QD   Tresiba           



     FlexTouch FlexTouch                                    FlexTouch           



     200 UNIT/ UNIT/ML                                    200            



                                                  UNIT/ML           

 

     Folic Acid Folic Acid           No             1{table QD   Folic Acid     

      



     1 MG 1 MG                          t}        1 MG           

 

     Aspir-Low Aspir-Low           No             1{table QD   Aspir-Low        

   



     81 MG 81 MG                          t}        81 MG           

 

     ProAir HFA ProAir HFA           No             1{puff_ 6xD  ProAir HFA     

      



     108 (90 108 (90                          as_need      108 (90           



     Base) Base)                          ed}       Base)           



     MCG/ACT MCG/ACT                                    MCG/ACT           

 

     Symbicort Symbicort           No             2{puffs QD   Symbicort        

   



     160-4.5 160-4.5                          }         160-4.5           



     MCG/ACT MCG/ACT                                    MCG/ACT           

 

     Spironolact Spironolact           No             1{table BID  Spironolac   

        



     one 100 MG one 100 MG                          t}        tone 100          

 



                                                  MG             

 

     Constulose Constulose           No             15{ml} BID  Constulose      

     



     10 GM/15ML 10 GM/15ML                                    10 GM/15ML        

   

 

     [place]           No             1{table QD   [place]           



     Thyroid 60 Thyroid 60                          t_on_an      Thyroid 60     

      



     MG   MG                            _empty_      MG             



                                        stomach                     



                                        }                        

 

     Ipratropium Ipratropium           No             2.5{ml} TID  Ipratropiu   

        



     Bromide Bromide                                    m Bromide           



     0.02 % 0.02 %                                    0.02 %           

 

     Furosemide Furosemide           No             1{table BID  Furosemide     

      



     40 MG 40 MG                          t}        40 MG           

 

     Tresiba Tresiba           No                  QD   Tresiba           



     FlexTouch FlexTouch                                    FlexTouch           



     200 UNIT/ UNIT/ML                                    200            



                                                  UNIT/ML           

 

     Folic Acid Folic Acid           No             1{table QD   Folic Acid     

      



     1 MG 1 MG                          t}        1 MG           

 

     Aspir-Low Aspir-Low           No             1{table QD   Aspir-Low        

   



     81 MG 81 MG                          t}        81 MG           

 

     ProAir HFA ProAir HFA           No             1{puff_ 6xD  ProAir HFA     

      



     108 (90 108 (90                          as_need      108 (90           



     Base) Base)                          ed}       Base)           



     MCG/ACT MCG/ACT                                    MCG/ACT           

 

     Symbicort Symbicort           No             2{puffs QD   Symbicort        

   



     160-4.5 160-4.5                          }         160-4.5           



     MCG/ACT MCG/ACT                                    MCG/ACT           

 

     Spironolact Spironolact           No             1{table BID  Spironolac   

        



     one 100 MG one 100 MG                          t}        tone 100          

 



                                                  MG             

 

     Constulose Constulose           No             15{ml} BID  Constulose      

     



     10 GM/15ML 10 GM/15ML                                    10 GM/15ML        

   

 

     [facility]           No             1{table QD   [place]           



     Thyroid 60 Thyroid 60                          t_on_an      Thyroid 60     

      



     MG   MG                            _empty_      MG             



                                        stomach                     



                                        }                        

 

     Ipratropium Ipratropium           No             2.5{ml} TID  Ipratropiu   

        



     Bromide Bromide                                    m Bromide           



     0.02 % 0.02 %                                    0.02 %           

 

     Furosemide Furosemide           No             1{table BID  Furosemide     

      



     40 MG 40 MG                          t}        40 MG           

 

     Tresiba Tresiba           No                  QD   Tresiba           



     FlexTouch FlexTouch                                    FlexTouch           



     200 UNIT/ UNIT/ML                                    200            



                                                  UNIT/ML           

 

     Folic Acid Folic Acid           No             1{table QD   Folic Acid     

      



     1 MG 1 MG                          t}        1 MG           

 

     Aspir-Low Aspir-Low           No             1{table QD   Aspir-Low        

   



     81 MG 81 MG                          t}        81 MG           

 

     ProAir HFA ProAir HFA           No             1{puff_ 6xD  ProAir HFA     

      



     108 (90 108 (90                          as_need      108 (90           



     Base) Base)                          ed}       Base)           



     MCG/ACT MCG/ACT                                    MCG/ACT           

 

     Symbicort Symbicort           No             2{puffs QD   Symbicort        

   



     160-4.5 160-4.5                          }         160-4.5           



     MCG/ACT MCG/ACT                                    MCG/ACT           

 

     Spironolact Spironolact           No             1{table BID  Spironolac   

        



     one 100 MG one 100 MG                          t}        tone 100          

 



                                                  MG             

 

     Constulose Constulose           No             15{ml} BID  Constulose      

     



     10 GM/15ML 10 GM/15ML                                    10 GM/15ML        

   

 

     [facility]           No             1{table QD   [place]           



     Thyroid 60 Thyroid 60                          t_on_an      Thyroid 60     

      



     MG   MG                            _empty_      MG             



                                        stomach                     



                                        }                        

 

     Ipratropium Ipratropium           No             2.5{ml} TID  Ipratropiu   

        



     Bromide Bromide                                    m Bromide           



     0.02 % 0.02 %                                    0.02 %           

 

     Furosemide Furosemide           No             1{table BID  Furosemide     

      



     40 MG 40 MG                          t}        40 MG           

 

     Tresiba Tresiba           No                  QD   Tresiba           



     FlexTouch FlexTouch                                    FlexTouch           



     200 UNIT/ UNIT/ML                                    200            



                                                  UNIT/ML           

 

     Folic Acid Folic Acid           No             1{table QD   Folic Acid     

      



     1 MG 1 MG                          t}        1 MG           

 

     Aspir-Low Aspir-Low           No             1{table QD   Aspir-Low        

   



     81 MG 81 MG                          t}        81 MG           

 

     ProAir HFA ProAir HFA           No             1{puff_ 6xD  ProAir HFA     

      



     108 (90 108 (90                          as_need      108 (90           



     Base) Base)                          ed}       Base)           



     MCG/ACT MCG/ACT                                    MCG/ACT           

 

     Symbicort Symbicort           No             2{puffs QD   Symbicort        

   



     160-4.5 160-4.5                          }         160-4.5           



     MCG/ACT MCG/ACT                                    MCG/ACT           

 

     Spironolact Spironolact           No             1{table BID  Spironolac   

        



     one 100 MG one 100 MG                          t}        tone 100          

 



                                                  MG             

 

     Constulose Constulose           No             15{ml} BID  Constulose      

     



     10 GM/15ML 10 GM/15ML                                    10 GM/15ML        

   

 

     [place]           No             1{table QD   [place]           



     Thyroid 60 Thyroid 60                          t_on_an      Thyroid 60     

      



     MG   MG                            _empty_      MG             



                                        stomach                     



                                        }                        

 

     Ipratropium Ipratropium           No             2.5{ml} TID  Ipratropiu   

        



     Bromide Bromide                                    m Bromide           



     0.02 % 0.02 %                                    0.02 %           

 

     Furosemide Furosemide           No             1{table BID  Furosemide     

      



     40 MG 40 MG                          t}        40 MG           

 

     Tresiba Tresiba           No                  QD   Tresiba           



     FlexTouch FlexTouch                                    FlexTouch           



     200 UNIT/ UNIT/ML                                    200            



                                                  UNIT/ML           

 

     Folic Acid Folic Acid           No             1{table QD   Folic Acid     

      



     1 MG 1 MG                          t}        1 MG           

 

     Aspir-Low Aspir-Low           No             1{table QD   Aspir-Low        

   



     81 MG 81 MG                          t}        81 MG           

 

     ProAir HFA ProAir HFA           No             1{puff_ 6xD  ProAir HFA     

      



     108 (90 108 (90                          as_need      108 (90           



     Base) Base)                          ed}       Base)           



     MCG/ACT MCG/ACT                                    MCG/ACT           

 

     Symbicort Symbicort           No             2{puffs QD   Symbicort        

   



     160-4.5 160-4.5                          }         160-4.5           



     MCG/ACT MCG/ACT                                    MCG/ACT           

 

     Spironolact Spironolact           No             1{table BID  Spironolac   

        



     one 100 MG one 100 MG                          t}        tone 100          

 



                                                  MG             

 

     Constulose Constulose           No             15{ml} BID  Constulose      

     



     10 GM/15ML 10 GM/15ML                                    10 GM/15ML        

   

 

     [place]           No             1{table QD   [place]           



     Thyroid 60 Thyroid 60                          t_on_an      Thyroid 60     

      



     MG   MG                            _empty_      MG             



                                        stomach                     



                                        }                        

 

     Ipratropium Ipratropium           No             2.5{ml} TID  Ipratropiu   

        



     Bromide Bromide                                    m Bromide           



     0.02 % 0.02 %                                    0.02 %           

 

     Furosemide Furosemide           No             1{table BID  Furosemide     

      



     40 MG 40 MG                          t}        40 MG           

 

     NP Thyroid NP Thyroid           No                       NP Thyroid        

   



     60 MG 60 MG                                    60 MG           

 

     Spironolact Spironolact           No             1{table BID  Spironolac   

        



     one 100 MG one 100 MG                          t}        tone 100          

 



                                                  MG             

 

     [place]           No             1{table QD   [place]           



     Thyroid 60 Thyroid 60                          t_on_an      Thyroid 60     

      



     MG   MG                            _empty_      MG             



                                        stomach                     



                                        }                        

 

     Furosemide Furosemide           No             1{table BID  Furosemide     

      



     40 MG 40 MG                          t}        40 MG           

 

     Constulose Constulose           No             15{ml} BID  Constulose      

     



     10 GM/15ML 10 GM/15ML                                    10 GM/15ML        

   

 

     HYDROcodone HYDROcodone           No             1{table QID  HYDROcodon   

        



     -Acetaminop -Acetaminop                          t_as_ne      e-Acetamin   

        



     hen 5-325 hen 5-325                          eded}      ophen           



     MG   MG                                      5-325 MG           

 

     Folic Acid Folic Acid           No             1{table QD   Folic Acid     

      



     1 MG 1 MG                          t}        1 MG           

 

     Symbicort Symbicort           No             2{puffs QD   Symbicort        

   



     160-4.5 160-4.5                          }         160-4.5           



     MCG/ACT MCG/ACT                                    MCG/ACT           

 

     Ondansetron Ondansetron           No             1{table QD   Ondansetro   

        



     4 MG 4 MG                          t_on_th      n 4 MG           



                                        e_tongu                     



                                        e_and_a                     



                                        llow_to                     



                                        _dissol                     



                                        ve}                      

 

     Ipratropium Ipratropium           No             2.5{ml} TID  Ipratropiu   

        



     Bromide Bromide                                    m Bromide           



     0.02 % 0.02 %                                    0.02 %           

 

     Aspir-Low Aspir-Low           No             1{table QD   Aspir-Low        

   



     81 MG 81 MG                          t}        81 MG           

 

     Tresiba Tresiba           No                  QD   Tresiba           



     FlexTouch FlexTouch                                    FlexTouch           



     200 UNIT/ UNIT/ML                                    200            



                                                  UNIT/ML           

 

     ProAir HFA ProAir HFA           No             1{puff_ 6xD  ProAir HFA     

      



     108 (90 108 (90                          as_need      108 (90           



     Base) Base)                          ed}       Base)           



     MCG/ACT MCG/ACT                                    MCG/ACT           

 

     Folic Acid Folic Acid           No             1{table QD   Folic Acid     

      



     1 MG 1 MG                          t}        1 MG           

 

     Symbicort Symbicort           No             2{puffs QD   Symbicort        

   



     160-4.5 160-4.5                          }         160-4.5           



     MCG/ACT MCG/ACT                                    MCG/ACT           

 

     [place]           No             1{table QD   [place]           



     Thyroid 60 Thyroid 60                          t_on_an      Thyroid 60     

      



     MG   MG                            _empty_      MG             



                                        stomach                     



                                        }                        

 

     Furosemide Furosemide           No             1{table BID  Furosemide     

      



     40 MG 40 MG                          t}        40 MG           

 

     Constulose Constulose           No             15{ml} BID  Constulose      

     



     10 GM/15ML 10 GM/15ML                                    10 GM/15ML        

   

 

     Aspir-Low Aspir-Low           No             1{table QD   Aspir-Low        

   



     81 MG 81 MG                          t}        81 MG           

 

     HYDROcodone HYDROcodone           No             1{table QID  HYDROcodon   

        



     -Acetaminop -Acetaminop                          t_as_ne      e-Acetamin   

        



     hen 5-325 hen 5-325                          eded}      ophen           



     MG   MG                                      5-325 MG           

 

     ProAir HFA ProAir HFA           No             1{puff_ 6xD  ProAir HFA     

      



     108 (90 108 (90                          as_need      108 (90           



     Base) Base)                          ed}       Base)           



     MCG/ACT MCG/ACT                                    MCG/ACT           

 

     NP Thyroid NP Thyroid           No                       NP Thyroid        

   



     60 MG 60 MG                                    60 MG           

 

     Ondansetron Ondansetron           No             1{table QD   Ondansetro   

        



     4 MG 4 MG                          t_on_th      n 4 MG           



                                        e_tongu                     



                                        e_and_a                     



                                        llow_to                     



                                        _dissol                     



                                        ve}                      

 

     Tresiba Tresiba           No                  QD   Tresiba           



     FlexTouch FlexTouch                                    FlexTouch           



     200 UNIT/ UNIT/ML                                    200            



                                                  UNIT/ML           

 

     Ipratropium Ipratropium           No             2.5{ml} TID  Ipratropiu   

        



     Bromide Bromide                                    m Bromide           



     0.02 % 0.02 %                                    0.02 %           

 

     Spironolact Spironolact           No                       Spironolac      

     



     one 100 MG one 100 MG                                    tone 100          

 



                                                  MG             

 

     Folic Acid Folic Acid           No             1{table QD   Folic Acid     

      



     1 MG 1 MG                          t}        1 MG           

 

     Symbicort Symbicort           No             2{puffs QD   Symbicort        

   



     160-4.5 160-4.5                          }         160-4.5           



     MCG/ACT MCG/ACT                                    MCG/ACT           

 

     [place]           No             1{table QD   [place]           



     Thyroid 60 Thyroid 60                          t_on_an      Thyroid 60     

      



     MG   MG                            _empty_      MG             



                                        stomach                     



                                        }                        

 

     Furosemide Furosemide           No             1{table BID  Furosemide     

      



     40 MG 40 MG                          t}        40 MG           

 

     Constulose Constulose           No             15{ml} BID  Constulose      

     



     10 GM/15ML 10 GM/15ML                                    10 GM/15ML        

   

 

     Aspir-Low Aspir-Low           No             1{table QD   Aspir-Low        

   



     81 MG 81 MG                          t}        81 MG           

 

     HYDROcodone HYDROcodone           No             1{table QID  HYDROcodon   

        



     -Acetaminop -Acetaminop                          t_as_ne      e-Acetamin   

        



     hen 5-325 hen 5-325                          eded}      ophen           



     MG   MG                                      5-325 MG           

 

     ProAir HFA ProAir HFA           No             1{puff_ 6xD  ProAir HFA     

      



     108 (90 108 (90                          as_need      108 (90           



     Base) Base)                          ed}       Base)           



     MCG/ACT MCG/ACT                                    MCG/ACT           

 

     NP Thyroid NP Thyroid           No                       NP Thyroid        

   



     60 MG 60 MG                                    60 MG           

 

     Ondansetron Ondansetron           No             1{table QD   Ondansetro   

        



     4 MG 4 MG                          t_on_th      n 4 MG           



                                        e_tongu                     



                                        e_and_a                     



                                        llow_to                     



                                        _dissol                     



                                        ve}                      

 

     Tresiba Tresiba           No                  QD   Tresiba           



     FlexTouch FlexTouch                                    FlexTouch           



     200 UNIT/ UNIT/ML                                    200            



                                                  UNIT/ML           

 

     Ipratropium Ipratropium           No             2.5{ml} TID  Ipratropiu   

        



     Bromide Bromide                                    m Bromide           



     0.02 % 0.02 %                                    0.02 %           

 

     Spironolact Spironolact           No                       Spironolac      

     



     one 100 MG one 100 MG                                    tone 100          

 



                                                  MG             

 

     Folic Acid Folic Acid           No             1{table QD   Folic Acid     

      



     1 MG 1 MG                          t}        1 MG           

 

     Symbicort Symbicort           No             2{puffs QD   Symbicort        

   



     160-4.5 160-4.5                          }         160-4.5           



     MCG/ACT MCG/ACT                                    MCG/ACT           

 

     [place]           No             1{table QD   [place]           



     Thyroid 60 Thyroid 60                          t_on_an      Thyroid 60     

      



     MG   MG                            _empty_      MG             



                                        stomach                     



                                        }                        

 

     Furosemide Furosemide           No             1{table BID  Furosemide     

      



     40 MG 40 MG                          t}        40 MG           

 

     Constulose Constulose           No             15{ml} BID  Constulose      

     



     10 GM/15ML 10 GM/15ML                                    10 GM/15ML        

   

 

     Aspir-Low Aspir-Low           No             1{table QD   Aspir-Low        

   



     81 MG 81 MG                          t}        81 MG           

 

     HYDROcodone HYDROcodone           No             1{table QID  HYDROcodon   

        



     -Acetaminop -Acetaminop                          t_as_ne      e-Acetamin   

        



     hen 5-325 hen 5-325                          eded}      ophen           



     MG   MG                                      5-325 MG           

 

     ProAir HFA ProAir HFA           No             1{puff_ 6xD  ProAir HFA     

      



     108 (90 108 (90                          as_need      108 (90           



     Base) Base)                          ed}       Base)           



     MCG/ACT MCG/ACT                                    MCG/ACT           

 

     NP Thyroid NP Thyroid           No                       NP Thyroid        

   



     60 MG 60 MG                                    60 MG           

 

     Ondansetron Ondansetron           No             1{table QD   Ondansetro   

        



     4 MG 4 MG                          t_on_th      n 4 MG           



                                        e_tongu                     



                                        e_and_a                     



                                        llow_to                     



                                        _dissol                     



                                        ve}                      

 

     Tresiba Tresiba           No                  QD   Tresiba           



     FlexTouch FlexTouch                                    FlexTouch           



     200 UNIT/ UNIT/ML                                    200            



                                                  UNIT/ML           

 

     Ipratropium Ipratropium           No             2.5{ml} TID  Ipratropiu   

        



     Bromide Bromide                                    m Bromide           



     0.02 % 0.02 %                                    0.02 %           

 

     Spironolact Spironolact           No                       Spironolac      

     



     one 100 MG one 100 MG                                    tone 100          

 



                                                  MG             

 

     Ondansetron Ondansetron           No             1{table QD   Ondansetro   

        



     4 MG 4 MG                          t_on_      n 4 MG           



                                        e_tongu                     



                                        e_and_a                     



                                        llow_to                     



                                        _dissol                     



                                        ve}                      

 

     Spironolact Spironolact           No             1{table BID  Spironolac   

        



     one 100 MG one 100 MG                          t}        tone 100          

 



                                                  MG             

 

     Furosemide Furosemide           No             1{table BID  Furosemide     

      



     40 MG 40 MG                          t}        40 MG           

 

     Symbicort Symbicort           No             2{puffs QD   Symbicort        

   



     160-4.5 160-4.5                          }         160-4.5           



     MCG/ACT MCG/ACT                                    MCG/ACT           

 

     Ipratropium Ipratropium           No             2.5{ml} TID  Ipratropiu   

        



     Bromide Bromide                                    m Bromide           



     0.02 % 0.02 %                                    0.02 %           

 

     Tresiba Tresiba           No                  QD   Tresiba           



     FlexTouch FlexTouch                                    FlexTouch           



     200 UNIT/ UNIT/ML                                    200            



                                                  UNIT/ML           

 

     Spironolact Spironolact           No                       Spironolac      

     



     one 100 MG one 100 MG                                    tone 100          

 



                                                  MG             

 

     Aspir-Low Aspir-Low           No             1{table QD   Aspir-Low        

   



     81 MG 81 MG                          t}        81 MG           

 

     HYDROcodone HYDROcodone           No             1{table QID  HYDROcodon   

        



     -Acetaminop -Acetaminop                          t_as_ne      e-Acetamin   

        



     hen 5-325 hen 5-325                          eded}      ophen           



     MG   MG                                      5-325 MG           

 

     Folic Acid Folic Acid           No             1{table QD   Folic Acid     

      



     1 MG 1 MG                          t}        1 MG           

 

     ProAir HFA ProAir HFA           No             1{puff_ 6xD  ProAir HFA     

      



     108 (90 108 (90                          as_need      108 (90           



     Base) Base)                          ed}       Base)           



     MCG/ACT MCG/ACT                                    MCG/ACT           

 

     NP Thyroid NP Thyroid           No                       NP Thyroid        

   



     60 MG 60 MG                                    60 MG           

 

     [place]           No             1{table QD   [place]           



     Thyroid 60 Thyroid 60                          t_on_an      Thyroid 60     

      



     MG   MG                            _empty_      MG             



                                        stomach                     



                                        }                        

 

     Ondansetron Ondansetron           No             1{table QD   Ondansetro   

        



     4 MG 4 MG                          t_on_th      n 4 MG           



                                        e_tongu                     



                                        e_and_a                     



                                        llow_to                     



                                        _dissol                     



                                        ve}                      

 

     Spironolact Spironolact           No             1{table BID  Spironolac   

        



     one 100 MG one 100 MG                          t}        tone 100          

 



                                                  MG             

 

     Furosemide Furosemide           No             1{table BID  Furosemide     

      



     40 MG 40 MG                          t}        40 MG           

 

     Symbicort Symbicort           No             2{puffs QD   Symbicort        

   



     160-4.5 160-4.5                          }         160-4.5           



     MCG/ACT MCG/ACT                                    MCG/ACT           

 

     Ipratropium Ipratropium           No             2.5{ml} TID  Ipratropiu   

        



     Bromide Bromide                                    m Bromide           



     0.02 % 0.02 %                                    0.02 %           

 

     Tresiba Tresiba           No                  QD   Tresiba           



     FlexTouch FlexTouch                                    FlexTouch           



     200 UNIT/ UNIT/ML                                    200            



                                                  UNIT/ML           

 

     Spironolact Spironolact           No                       Spironolac      

     



     one 100 MG one 100 MG                                    tone 100          

 



                                                  MG             

 

     Aspir-Low Aspir-Low           No             1{table QD   Aspir-Low        

   



     81 MG 81 MG                          t}        81 MG           

 

     HYDROcodone HYDROcodone           No             1{table QID  HYDROcodon   

        



     -Acetaminop -Acetaminop                          t_as_ne      e-Acetamin   

        



     hen 5-325 hen 5-325                          eded}      ophen           



     MG   MG                                      5-325 MG           

 

     Folic Acid Folic Acid           No             1{table QD   Folic Acid     

      



     1 MG 1 MG                          t}        1 MG           

 

     ProAir HFA ProAir HFA           No             1{puff_ 6xD  ProAir HFA     

      



     108 (90 108 (90                          as_need      108 (90           



     Base) Base)                          ed}       Base)           



     MCG/ACT MCG/ACT                                    MCG/ACT           

 

     NP Thyroid NP Thyroid           No                       NP Thyroid        

   



     60 MG 60 MG                                    60 MG           

 

     [place]           No             1{table QD   [place]           



     Thyroid 60 Thyroid 60                          t_on_an      Thyroid 60     

      



     MG   MG                            _empty_      MG             



                                        stomach                     



                                        }                        

 

     Ondansetron Ondansetron           No             1{table QD   Ondansetro   

        



     4 MG 4 MG                          t_on_th      n 4 MG           



                                        e_tongu                     



                                        e_and_a                     



                                        llow_to                     



                                        _dissol                     



                                        ve}                      

 

     Spironolact Spironolact           No             1{table BID  Spironolac   

        



     one 100 MG one 100 MG                          t}        tone 100          

 



                                                  MG             

 

     Furosemide Furosemide           No             1{table BID  Furosemide     

      



     40 MG 40 MG                          t}        40 MG           

 

     Symbicort Symbicort           No             2{puffs QD   Symbicort        

   



     160-4.5 160-4.5                          }         160-4.5           



     MCG/ACT MCG/ACT                                    MCG/ACT           

 

     Ipratropium Ipratropium           No             2.5{ml} TID  Ipratropiu   

        



     Bromide Bromide                                    m Bromide           



     0.02 % 0.02 %                                    0.02 %           

 

     Tresiba Tresiba           No                  QD   Tresiba           



     FlexTouch FlexTouch                                    FlexTouch           



     200 UNIT/ UNIT/ML                                    200            



                                                  UNIT/ML           

 

     Spironolact Spironolact           No                       Spironolac      

     



     one 100 MG one 100 MG                                    tone 100          

 



                                                  MG             

 

     Aspir-Low Aspir-Low           No             1{table QD   Aspir-Low        

   



     81 MG 81 MG                          t}        81 MG           

 

     HYDROcodone HYDROcodone           No             1{table QID  HYDROcodon   

        



     -Acetaminop -Acetaminop                          t_as_ne      e-Acetamin   

        



     hen 5-325 hen 5-325                          eded}      ophen           



     MG   MG                                      5-325 MG           

 

     Folic Acid Folic Acid           No             1{table QD   Folic Acid     

      



     1 MG 1 MG                          t}        1 MG           

 

     ProAir HFA ProAir HFA           No             1{puff_ 6xD  ProAir HFA     

      



     108 (90 108 (90                          as_need      108 (90           



     Base) Base)                          ed}       Base)           



     MCG/ACT MCG/ACT                                    MCG/ACT           

 

     NP Thyroid NP Thyroid           No                       NP Thyroid        

   



     60 MG 60 MG                                    60 MG           

 

     [place]           No             1{table QD   [place]           



     Thyroid 60 Thyroid 60                          t_on_an      Thyroid 60     

      



     MG   MG                            _empty_      MG             



                                        stomach                     



                                        }                        

 

     Ondansetron Ondansetron           No             1{table QD   Ondansetro   

        



     4 MG 4 MG                          t_on_th      n 4 MG           



                                        e_tongu                     



                                        e_and_a                     



                                        llow_to                     



                                        _dissol                     



                                        ve}                      

 

     Spironolact Spironolact           No             1{table BID  Spironolac   

        



     one 100 MG one 100 MG                          t}        tone 100          

 



                                                  MG             

 

     Furosemide Furosemide           No             1{table BID  Furosemide     

      



     40 MG 40 MG                          t}        40 MG           

 

     Symbicort Symbicort           No             2{puffs QD   Symbicort        

   



     160-4.5 160-4.5                          }         160-4.5           



     MCG/ACT MCG/ACT                                    MCG/ACT           

 

     Ipratropium Ipratropium           No             2.5{ml} TID  Ipratropiu   

        



     Bromide Bromide                                    m Bromide           



     0.02 % 0.02 %                                    0.02 %           

 

     Tresiba Tresiba           No                  QD   Tresiba           



     FlexTouch FlexTouch                                    FlexTouch           



     200 UNIT/ UNIT/ML                                    200            



                                                  UNIT/ML           

 

     Spironolact Spironolact           No                       Spironolac      

     



     one 100 MG one 100 MG                                    tone 100          

 



                                                  MG             

 

     Aspir-Low Aspir-Low           No             1{table QD   Aspir-Low        

   



     81 MG 81 MG                          t}        81 MG           

 

     HYDROcodone HYDROcodone           No             1{table QID  HYDROcodon   

        



     -Acetaminop -Acetaminop                          t_as_ne      e-Acetamin   

        



     hen 5-325 hen 5-325                          eded}      ophen           



     MG   MG                                      5-325 MG           

 

     Folic Acid Folic Acid           No             1{table QD   Folic Acid     

      



     1 MG 1 MG                          t}        1 MG           

 

     ProAir HFA ProAir HFA           No             1{puff_ 6xD  ProAir HFA     

      



     108 (90 108 (90                          as_need      108 (90           



     Base) Base)                          ed}       Base)           



     MCG/ACT MCG/ACT                                    MCG/ACT           

 

     NP Thyroid NP Thyroid           No                       NP Thyroid        

   



     60 MG 60 MG                                    60 MG           

 

     [place]           No             1{table QD   [place]           



     Thyroid 60 Thyroid 60                          t_on_an      Thyroid 60     

      



     MG   MG                            _empty_      MG             



                                        stomach                     



                                        }                        

 

     Spironolact Spironolact           No                       Spironolac      

     



     one 100 MG one 100 MG                                    tone 100          

 



                                                  MG             

 

     Symbicort Symbicort           No             2{puffs QD   Symbicort        

   



     160-4.5 160-4.5                          }         160-4.5           



     MCG/ACT MCG/ACT                                    MCG/ACT           

 

     Furosemide Furosemide           No             1{table BID  Furosemide     

      



     40 MG 40 MG                          t}        40 MG           

 

     ProAir HFA ProAir HFA           No             1{puff_ 6xD  ProAir HFA     

      



     108 (90 108 (90                          as_need      108 (90           



     Base) Base)                          ed}       Base)           



     MCG/ACT MCG/ACT                                    MCG/ACT           

 

     Aspir-Low Aspir-Low           No             1{table QD   Aspir-Low        

   



     81 MG 81 MG                          t}        81 MG           

 

     Tresiba Tresiba           No                  QD   Tresiba           



     FlexTouch FlexTouch                                    FlexTouch           



     200 UNIT/ UNIT/ML                                    200            



                                                  UNIT/ML           

 

     Ondansetron Ondansetron           No             1{table QD   Ondansetro   

        



     4 MG 4 MG                          t_on_th      n 4 MG           



                                        e_tongu                     



                                        e_and_a                     



                                        llow_to                     



                                        _dissol                     



                                        ve}                      

 

     Spironolact Spironolact           No             1{table BID  Spironolac   

        



     one 100 MG one 100 MG                          t}        tone 100          

 



                                                  MG             

 

     Ipratropium Ipratropium           No             2.5{ml} TID  Ipratropiu   

        



     Bromide Bromide                                    m Bromide           



     0.02 % 0.02 %                                    0.02 %           

 

     [place]           No             1{table QD   [place]           



     Thyroid 60 Thyroid 60                          t_on_an      Thyroid 60     

      



     MG   MG                            _empty_      MG             



                                        stomach                     



                                        }                        

 

     Folic Acid Folic Acid           No             1{table QD   Folic Acid     

      



     1 MG 1 MG                          t}        1 MG           

 

     HYDROcodone HYDROcodone           No             1{table QID  HYDROcodon   

        



     -Acetaminop -Acetaminop                          t_as_ne      e-Acetamin   

        



     hen 5-325 hen 5-325                          eded}      ophen           



     MG   MG                                      5-325 MG           

 

     NP Thyroid NP Thyroid           No                       NP Thyroid        

   



     60 MG 60 MG                                    60 MG           

 

     Spironolact Spironolact           No                       Spironolac      

     



     one 100 MG one 100 MG                                    tone 100          

 



                                                  MG             

 

     Symbicort Symbicort           No             2{puffs QD   Symbicort        

   



     160-4.5 160-4.5                          }         160-4.5           



     MCG/ACT MCG/ACT                                    MCG/ACT           

 

     Furosemide Furosemide           No             1{table BID  Furosemide     

      



     40 MG 40 MG                          t}        40 MG           

 

     ProAir HFA ProAir HFA           No             1{puff_ 6xD  ProAir HFA     

      



     108 (90 108 (90                          as_need      108 (90           



     Base) Base)                          ed}       Base)           



     MCG/ACT MCG/ACT                                    MCG/ACT           

 

     Aspir-Low Aspir-Low           No             1{table QD   Aspir-Low        

   



     81 MG 81 MG                          t}        81 MG           

 

     Tresiba Tresiba           No                  QD   Tresiba           



     FlexTouch FlexTouch                                    FlexTouch           



     200 UNIT/ UNIT/ML                                    200            



                                                  UNIT/ML           

 

     Ondansetron Ondansetron           No             1{table QD   Ondansetro   

        



     4 MG 4 MG                          t_on_th      n 4 MG           



                                        e_tongu                     



                                        e_and_a                     



                                        llow_to                     



                                        _dissol                     



                                        ve}                      

 

     Spironolact Spironolact           No             1{table BID  Spironolac   

        



     one 100 MG one 100 MG                          t}        tone 100          

 



                                                  MG             

 

     Ipratropium Ipratropium           No             2.5{ml} TID  Ipratropiu   

        



     Bromide Bromide                                    m Bromide           



     0.02 % 0.02 %                                    0.02 %           

 

     [place]           No             1{table QD   [place]           



     Thyroid 60 Thyroid 60                          t_on_an      Thyroid 60     

      



     MG   MG                            _empty_      MG             



                                        stomach                     



                                        }                        

 

     Folic Acid Folic Acid           No             1{table QD   Folic Acid     

      



     1 MG 1 MG                          t}        1 MG           

 

     HYDROcodone HYDROcodone           No             1{table QID  HYDROcodon   

        



     -Acetaminop -Acetaminop                          t_as_ne      e-Acetamin   

        



     hen 5-325 hen 5-325                          eded}      ophen           



     MG   MG                                      5-325 MG           

 

     NP Thyroid NP Thyroid           No                       NP Thyroid        

   



     60 MG 60 MG                                    60 MG           

 

     Tresiba Tresiba           No                       Tresiba           



     FlexTouch FlexTouch                                    FlexTouch           



     200 UNIT/ UNIT/ML                                    200            



                                                  UNIT/ML           

 

     Folic Acid Folic Acid           No             1{table QD   Folic Acid     

      



     1 MG 1 MG                          t}        1 MG           

 

     ProAir HFA ProAir HFA           No             1{puff_ 6xD  ProAir HFA     

      



     108 (90 108 (90                          as_need      108 (90           



     Base) Base)                          ed}       Base)           



     MCG/ACT MCG/ACT                                    MCG/ACT           

 

     Constulose Constulose           No             30{ml} BID  Constulose      

     



     10 GM/15ML 10 GM/15ML                                    10 GM/15ML        

   

 

     HYDROcodone HYDROcodone           No             1{table QID  HYDROcodon   

        



     -Acetaminop -Acetaminop                          t_as_ne      e-Acetamin   

        



     hen 5-325 hen 5-325                          eded}      ophen           



     MG   MG                                      5-325 MG           

 

     Spironolact Spironolact           No             1{table BID  Spironolac   

        



     one 100 MG one 100 MG                          t}        tone 100          

 



                                                  MG             

 

     Ipratropium Ipratropium           No             2.5{ml} TID  Ipratropiu   

        



     Bromide Bromide                                    m Bromide           



     0.02 % 0.02 %                                    0.02 %           

 

     [place]           No             1{table QD   [place]           



     Thyroid 60 Thyroid 60                          t_on_an      Thyroid 60     

      



     MG   MG                            _empty_      MG             



                                        stomach                     



                                        }                        

 

     Symbicort Symbicort           No             2{puffs QD   Symbicort        

   



     160-4.5 160-4.5                          }         160-4.5           



     MCG/ACT MCG/ACT                                    MCG/ACT           

 

     Furosemide Furosemide           No             1{table BID  Furosemide     

      



     40 MG 40 MG                          t}        40 MG           

 

     Tresiba Tresiba           No                       Tresiba           



     FlexTouch FlexTouch                                    FlexTouch           



     200 UNIT/ UNIT/ML                                    200            



                                                  UNIT/ML           

 

     Folic Acid Folic Acid           No             1{table QD   Folic Acid     

      



     1 MG 1 MG                          t}        1 MG           

 

     ProAir HFA ProAir HFA           No             1{puff_ 6xD  ProAir HFA     

      



     108 (90 108 (90                          as_need      108 (90           



     Base) Base)                          ed}       Base)           



     MCG/ACT MCG/ACT                                    MCG/ACT           

 

     Constulose Constulose           No             30{ml} BID  Constulose      

     



     10 GM/15ML 10 GM/15ML                                    10 GM/15ML        

   

 

     HYDROcodone HYDROcodone           No             1{table QID  HYDROcodon   

        



     -Acetaminop -Acetaminop                          t_as_ne      e-Acetamin   

        



     hen 5-325 hen 5-325                          eded}      ophen           



     MG   MG                                      5-325 MG           

 

     Spironolact Spironolact           No             1{table BID  Spironolac   

        



     one 100 MG one 100 MG                          t}        tone 100          

 



                                                  MG             

 

     Ipratropium Ipratropium           No             2.5{ml} TID  Ipratropiu   

        



     Bromide Bromide                                    m Bromide           



     0.02 % 0.02 %                                    0.02 %           

 

     [place]           No             1{table QD   [place]           



     Thyroid 60 Thyroid 60                          t_on_an      Thyroid 60     

      



     MG   MG                            _empty_      MG             



                                        stomach                     



                                        }                        

 

     Symbicort Symbicort           No             2{puffs QD   Symbicort        

   



     160-4.5 160-4.5                          }         160-4.5           



     MCG/ACT MCG/ACT                                    MCG/ACT           

 

     Furosemide Furosemide           No             1{table BID  Furosemide     

      



     40 MG 40 MG                          t}        40 MG           

 

     Furosemide Furosemide           No             1{table BID  Furosemide     

      



     40 MG 40 MG                          t}        40 MG           

 

     Folic Acid Folic Acid           No             1{table QD   Folic Acid     

      



     1 MG 1 MG                          t}        1 MG           

 

     Tresiba Tresiba           No                       Tresiba           



     FlexTouch FlexTouch                                    FlexTouch           



     200 UNIT/ UNIT/ML                                    200            



                                                  UNIT/ML           

 

     Spiriva Spiriva           No                       Spiriva           



     HandiHaler HandiHaler                                    HandiHaler        

   

 

     Ipratropium Ipratropium           No             2.5{ml} TID  Ipratropiu   

        



     Bromide Bromide                                    m Bromide           



     0.02 % 0.02 %                                    0.02 %           

 

     Spironolact Spironolact           No             1{table BID  Spironolac   

        



     one 100 MG one 100 MG                          t}        tone 100          

 



                                                  MG             

 

     Tresiba Tresiba           No                  QD   Tresiba           



     FlexTouch FlexTouch                                    FlexTouch           



     200 UNIT/ UNIT/ML                                    200            



                                                  UNIT/ML           

 

     Constulose Constulose           No             30{ml} BID  Constulose      

     



     10 GM/15ML 10 GM/15ML                                    10 GM/15ML        

   

 

     predniSONE predniSONE           No             1{table QD   predniSONE     

      



     10 MG 10 MG                          t}        10 MG           

 

     ProAir HFA ProAir HFA           No             1{puff_ 6xD  ProAir HFA     

      



     108 (90 108 (90                          as_need      108 (90           



     Base) Base)                          ed}       Base)           



     MCG/ACT MCG/ACT                                    MCG/ACT           

 

     [place]           No             1{table QD   [place]           



     Thyroid 60 Thyroid 60                          t_on_an      Thyroid 60     

      



     MG   MG                            _empty_      MG             



                                        stomach                     



                                        }                        

 

     HYDROcodone HYDROcodone           No             1{table QID  HYDROcodon   

        



     -Acetaminop -Acetaminop                          t_as_ne      e-Acetamin   

        



     hen 5-325 hen 5-325                          eded}      ophen           



     MG   MG                                      5-325 MG           

 

     Constulose Constulose           No             30{ml} BID  Constulose      

     



     10 GM/15ML 10 GM/15ML                                    10 GM/15ML        

   

 

     Tresiba Tresiba           No                       Tresiba           



     FlexTouch FlexTouch                                    FlexTouch           



     200 UNIT/ UNIT/ML                                    200            



                                                  UNIT/ML           

 

     Folic Acid Folic Acid           No             1{table QD   Folic Acid     

      



     1 MG 1 MG                          t}        1 MG           

 

     ProAir HFA ProAir HFA           No             1{puff_ 6xD  ProAir HFA     

      



     108 (90 108 (90                          as_need      108 (90           



     Base) Base)                          ed}       Base)           



     MCG/ACT MCG/ACT                                    MCG/ACT           

 

     HYDROcodone HYDROcodone           No             1{table QID  HYDROcodon   

        



     -Acetaminop -Acetaminop                          t_as_ne      e-Acetamin   

        



     hen 5-325 hen 5-325                          eded}      ophen           



     MG   MG                                      5-325 MG           

 

     predniSONE predniSONE           No             1{table QD   predniSONE     

      



     10 MG 10 MG                          t}        10 MG           

 

     Spiriva Spiriva           No                       Spiriva           



     HandiHaler HandiHaler                                    HandiHaler        

   

 

     [place]           No             1{table QD   [place]           



     Thyroid 60 Thyroid 60                          t_on_an      Thyroid 60     

      



     MG   MG                            _empty_      MG             



                                        stomach                     



                                        }                        

 

     Furosemide Furosemide           No             1{table BID  Furosemide     

      



     40 MG 40 MG                          t}        40 MG           

 

     Tresiba Tresiba           No                  QD   Tresiba           



     FlexTouch FlexTouch                                    FlexTouch           



     200 UNIT/ UNIT/ML                                    200            



                                                  UNIT/ML           

 

     Spironolact Spironolact           No             1{table BID  Spironolac   

        



     one 100 MG one 100 MG                          t}        tone 100          

 



                                                  MG             

 

     Ipratropium Ipratropium           No             2.5{ml} TID  Ipratropiu   

        



     Bromide Bromide                                    m Bromide           



     0.02 % 0.02 %                                    0.02 %           

 

     Constulose Constulose           No             30{ml} BID  Constulose      

     



     10 GM/15ML 10 GM/15ML                                    10 GM/15ML        

   

 

     Tresiba Tresiba           No                       Tresiba           



     FlexTouch FlexTouch                                    FlexTouch           



     200 UNIT/ UNIT/ML                                    200            



                                                  UNIT/ML           

 

     Folic Acid Folic Acid           No             1{table QD   Folic Acid     

      



     1 MG 1 MG                          t}        1 MG           

 

     ProAir HFA ProAir HFA           No             1{puff_ 6xD  ProAir HFA     

      



     108 (90 108 (90                          as_need      108 (90           



     Base) Base)                          ed}       Base)           



     MCG/ACT MCG/ACT                                    MCG/ACT           

 

     HYDROcodone HYDROcodone           No             1{table QID  HYDROcodon   

        



     -Acetaminop -Acetaminop                          t_as_ne      e-Acetamin   

        



     hen 5-325 hen 5-325                          eded}      ophen           



     MG   MG                                      5-325 MG           

 

     predniSONE predniSONE           No             1{table QD   predniSONE     

      



     10 MG 10 MG                          t}        10 MG           

 

     Spiriva Spiriva           No                       Spiriva           



     HandiHaler HandiHaler                                    HandiHaler        

   

 

     [place]           No             1{table QD   [place]           



     Thyroid 60 Thyroid 60                          t_on_an      Thyroid 60     

      



     MG   MG                            _empty_      MG             



                                        stomach                     



                                        }                        

 

     Furosemide Furosemide           No             1{table BID  Furosemide     

      



     40 MG 40 MG                          t}        40 MG           

 

     Tresiba Tresiba           No                  QD   Tresiba           



     FlexTouch FlexTouch                                    FlexTouch           



     200 UNIT/ UNIT/ML                                    200            



                                                  UNIT/ML           

 

     Spironolact Spironolact           No             1{table BID  Spironolac   

        



     one 100 MG one 100 MG                          t}        tone 100          

 



                                                  MG             

 

     Ipratropium Ipratropium           No             2.5{ml} TID  Ipratropiu   

        



     Bromide Bromide                                    m Bromide           



     0.02 % 0.02 %                                    0.02 %           

 

     Tresiba Tresiba           No                       Tresiba           



     FlexTouch FlexTouch                                    FlexTouch           



     200 UNIT/ UNIT/ML                                    200            



                                                  UNIT/ML           

 

     Folic Acid Folic Acid           No             1{table QD   Folic Acid     

      



     1 MG 1 MG                          t}        1 MG           

 

     ProAir HFA ProAir HFA           No             1{puff_ 6xD  ProAir HFA     

      



     108 (90 108 (90                          as_need      108 (90           



     Base) Base)                          ed}       Base)           



     MCG/ACT MCG/ACT                                    MCG/ACT           

 

     Constulose Constulose           No             30{ml} BID  Constulose      

     



     10 GM/15ML 10 GM/15ML                                    10 GM/15ML        

   

 

     HYDROcodone HYDROcodone           No             1{table QID  HYDROcodon   

        



     -Acetaminop -Acetaminop                          t_as_ne      e-Acetamin   

        



     hen 5-325 hen 5-325                          eded}      ophen           



     MG   MG                                      5-325 MG           

 

     Spironolact Spironolact           No             1{table BID  Spironolac   

        



     one 100 MG one 100 MG                          t}        tone 100          

 



                                                  MG             

 

     Ipratropium Ipratropium           No             2.5{ml} TID  Ipratropiu   

        



     Bromide Bromide                                    m Bromide           



     0.02 % 0.02 %                                    0.02 %           

 

     [place]           No             1{table QD   [place]           



     Thyroid 60 Thyroid 60                          t_on_an      Thyroid 60     

      



     MG   MG                            _empty_      MG             



                                        stomach                     



                                        }                        

 

     Constulose Constulose      - No             30{ml} BID  Constulose     

      



     10 GM/15ML 10 GM/15ML      08-21                          10 GM/15ML       

    



                    00:00                                         



                    :00                                          

 

     Constulose Constulose      - No             30{ml} BID  Constulose     

      



     10 GM/15ML 10 GM/15ML                                10 GM/15ML       

    



                    00:00                                         



                    :00                                          

 

     Constulose Constulose      2- No             30{ml} BID  Constulose     

      



     10 GM/15ML 10 GM/15ML      -                          10 GM/15ML       

    



                    00:00                                         



                    :00                                          

 

     Constulose Constulose      2- No             30{ml} BID  Constulose     

      



     10 GM/15ML 10 GM/15ML      -                          10 GM/15ML       

    



                    00:00                                         



                    :00                                          

 

     Constulose Constulose      2- No             30{ml} BID  Constulose     

      



     10 GM/15ML 10 GM/15ML      -                          10 GM/15ML       

    



                    00:00                                         



                    :00                                          

 

     Constulose Constulose      2- No             30{ml} BID  Constulose     

      



     10 GM/15ML 10 GM/15ML                                10 GM/15ML       

    



                    00:00                                         



                    :00                                          







Vital Signs







             Vital Name   Observation Time Observation Value Comments     Source

 

             height       2022-10-26 14:10:00 76 [in_i]                 [facility]

 

             weight       2022-10-26 14:10:00 231.9 [lb_av]              [facility]

 

             temperature  2022-10-26 14:10:00 97.6 [degF]               [facility]

 

             bmi          2022-10-26 14:10:00 28.22 kg/m2               [facility]

 

             oximetry     2022-10-26 14:10:00 94 %                      [facility]

 

             respiratory rate 2022-10-26 14:10:00 16 /min                   [facility]

[facility]

 

             blood pressure 2022-10-26 14:10:00 143 mm[Hg]                [facility] -



             systolic                                            [facility]

 

             blood pressure 2022-10-26 14:10:00 73 mm[Hg]                 [facility]



             diastolic                                           [facility]

 

             height       2022 09:40:00 76 [in_i]                 [facility]

 

             weight       2022 09:40:00 222.8 [lb_av]              [facility]

 

             temperature  2022 09:40:00 98.2 [degF]               [facility]

[facility]

 

             bmi          2022 09:40:00 27.12 kg/m2               [facility]

[facility]

 

             oximetry     2022 09:40:00 96 %                      [facility]

 

             respiratory rate 2022 09:40:00 17 /min                   [facility]

[facility] [facility]

 

             blood pressure 2022 09:40:00 140 mm[Hg]                Common

 [facility] -



             systolic                                            [facility]

 

             blood pressure 2022 09:40:00 78 mm[Hg]                 Common

 [facility] -



             diastolic                                           [facility]

 

             height       2022 14:40:00 76 [in_i]                 Common [facility]

 

             weight       2022 14:40:00 208 [lb_av]               [facility]

 

             temperature  2022 14:40:00 98.6 [degF]               Common S

[facility]

 

             bmi          2022 14:40:00 25.32 kg/m2               [facility]

 

             oximetry     2022 14:40:00 96 %                      Common S

[facility]

 

             respiratory rate 2022 14:40:00 16 /min                   Comm

on [facility]

 

             blood pressure 2022 14:40:00 134 mm[Hg]                Common

 [facility] -



             systolic                                            [facility]

 

             blood pressure 2022 14:40:00 70 mm[Hg]                 Common

 [facility] -



             diastolic                                           [facility]

 

             height       2022 10:30:00 76 [in_i]                 [facility]

 

             weight       2022 10:30:00 201.1 [lb_av]              Common 

[facility] -



                                                                 [facility]

 

             temperature  2022 10:30:00 98.0 [degF]               Common S

[facility]irit [facility]

 

             bmi          2022 10:30:00 24.48 kg/m2               Common S

[facility]

 

             oximetry     2022 10:30:00 90 %                      Common S

[facility]

 

             respiratory rate 2022 10:30:00 16 /min                   Comm

on [facility]

 

             blood pressure 2022 10:30:00 129 mm[Hg]                Common

 [facility] -



             systolic                                            [facility]

 

             blood pressure 2022 10:30:00 69 mm[Hg]                 Common

 [facility] -



             diastolic                                           [facility]

 

             height       2022 14:20:00 76 [in_i]                 Common [facility]

 

             weight       2022 14:20:00 225 [lb_av]               Common S

pirit [facility]

 

             temperature  2022 14:20:00 97 [degF]                 [facility]

 

             bmi          2022 14:20:00 27.38 kg/m2               Common S

[facility]

 

             blood pressure 2022 14:20:00 129 mm[Hg]                Common

 [facility] -



             systolic                                            [facility]

 

             blood pressure 2022 14:20:00 78 mm[Hg]                 Common

 [facility] -



             diastolic                                           [facility]

 

             height       2022 14:10:00 76 [in_i]                 Common S

pirit [facility]

 

             weight       2022 14:10:00 230.2 [lb_av]              Common 

[facility]

 

             temperature  2022 14:10:00 98.0 [degF]               [facility]

 

             bmi          2022 14:10:00 28.02 kg/m2               [facility]

 

             oximetry     2022 14:10:00 97 %                      Common S

[facility]irit [facility]

 

             respiratory rate 2022 14:10:00 18 /min                   Comm

on [facility] -



                                                                 [facility]

 

             blood pressure 2022 14:10:00 132 mm[Hg]                Common

 [facility] -



             systolic                                            [facility]

 

             blood pressure 2022 14:10:00 79 mm[Hg]                 Common

 [facility] -



             diastolic                                           [facility]

 

             HEIGHT       2022 08:26:00 188 cm                    

 

             WEIGHT       2022 08:26:00 96.843 kg                 

 

             HEIGHT       2022 08:26:00 188 cm                    

 

             WEIGHT       2022 08:26:00 96.843 kg                 

 

             HEIGHT       2022 08:26:00 188 cm                    

 

             WEIGHT       2022 08:26:00 96.843 kg                 

 

             HEIGHT       2022 08:10:00 188 cm                    

 

             WEIGHT       2022 08:10:00 96.752 kg                 

 

             HEIGHT       2022 13:44:00 188 cm                    

 

             WEIGHT       2022 13:44:00 92.987 kg                 

 

             HEIGHT       2022 08:10:00 188 cm                    

 

             WEIGHT       2022 08:10:00 96.752 kg                 

 

             HEIGHT       2022 13:44:00 188 cm                    

 

             WEIGHT       2022 13:44:00 92.987 kg                 

 

             HEIGHT       2022 08:10:00 188 cm                    

 

             WEIGHT       2022 08:10:00 96.752 kg                 

 

             HEIGHT       2022 13:44:00 188 cm                    

 

             WEIGHT       2022 13:44:00 92.987 kg                 

 

             Systolic blood 2022 18:17:00 146 mm[Hg]                [facility]



             pressure                                            Medicine

 

             Diastolic blood 2022 18:17:00 75 mm[Hg]                 [facility]                                            Medicine

 

             Heart rate   2022 18:17:00 95 /min                   [facility]

 

             Body temperature 2022 18:17:00 37.06 Renée                 [facility]

 

             Body height  2022 18:17:00 188 cm                    [facility]

 

             Body weight  2022 18:17:00 101.969 kg                [facility]

 

             BMI          2022 18:17:00 28.86 kg/m2               [facility]

 

             weight       2022 15:50:00 227.8 [lb_av]              Common 

[facility]

 

             temperature  2022 15:50:00 98.6 [degF]               Common S

[facility]

 

             bmi          2022 15:50:00 27.73 kg/m2               Common S

[facility]

 

             oximetry     2022 15:50:00 95 %                      Common S

[facility]

 

             respiratory rate 2022 15:50:00 16 /min                   Comm

on [facility]

 

             blood pressure 2022 15:50:00 134 mm[Hg]                Common

 [facility] -



             systolic                                            [facility]

 

             blood pressure 2022 15:50:00 78 mm[Hg]                 Common

 [facility] -



             diastolic                                           [facility]

 

             height       2022 15:50:00 76 [in_i]                 Common S

[facility]

 

             height       2022 15:30:00 76 [in_i]                 Common S

[facility]

 

             weight       2022 15:30:00 227.8 [lb_av]              Common 

[facility]

 

             temperature  2022 15:30:00 99.7 [degF]               Common S

[facility]

 

             bmi          2022 15:30:00 27.73 kg/m2               Common S

[facility]

 

             oximetry     2022 15:30:00 95 %                      Common S

[facility]

 

             blood pressure 2022 15:30:00 134 mm[Hg]                Common

 [facility] -



             systolic                                            [facility]

 

             blood pressure 2022 15:30:00 78 mm[Hg]                 Common

 [facility] -



             diastolic                                           [facility]

 

             height       2021-10-06 08:50:00 76 [in_i]                 Common S

[facility]

 

             weight       2021-10-06 08:50:00 224.1 [lb_av]              Common 

[facility] -



                                                                 [facility]

 

             temperature  2021-10-06 08:50:00 97.3 [degF]               Common [facility]

 

             bmi          2021-10-06 08:50:00 27.28 kg/m2               [facility]

 

             oximetry     2021-10-06 08:50:00 96 %                      Common S

[facility]

 

             respiratory rate 2021-10-06 08:50:00 18 /min                   Comm

on [facility] -



                                                                 [facility]

 

             blood pressure 2021-10-06 08:50:00 130 mm[Hg]                Common

 [facility] -



             systolic                                            [facility]

 

             blood pressure 2021-10-06 08:50:00 72 mm[Hg]                 Common

 [facility] -



             diastolic                                           [facility]

 

             Systolic blood 2019-10-17 15:21:00 115 mm[Hg]                [facility]                                            Medicine

 

             Diastolic blood 2019-10-17 15:21:00 67 mm[Hg]                 [facility]                                            Medicine

 

             Heart rate   2019-10-17 15:21:00 96 /min                   [facility]

 

             Body temperature 2019-10-17 15:21:00 36.28 Renée                 [facility]

 

             Body height  2019-10-17 15:21:00 188 cm                    [facility]

 

             Body weight  2019-10-17 15:21:00 102.059 kg                [facility]

 

             BMI          2019-10-17 15:21:00 28.89 kg/m2               [facility]

 

             Systolic blood 2019-10-17 15:21:00 115 mm[Hg]                [facility]                                            Medicine

 

             Diastolic blood 2019-10-17 15:21:00 67 mm[Hg]                 [facility]                                            Medicine

 

             Heart rate   2019-10-17 15:21:00 96 /min                   [facility]

 

             Body temperature 2019-10-17 15:21:00 36.28 Renée                 [facility]

 

             Body height  2019-10-17 15:21:00 188 cm                    [facility]

 

             Body weight  2019-10-17 15:21:00 102.059 kg                [facility]

 

             BMI          2019-10-17 15:21:00 28.89 kg/m2               [facility]

 

             Systolic blood 2022 11:09:00 136 mm[Hg]                [facility]

 

             Diastolic blood 2022 11:09:00 69 mm[Hg]                 [facility]

[facility]

 

             Heart rate   2022 11:09:00 98 /min                   [facility]

 

             Body temperature 2022 11:09:00 36.67 Renée                 [facility]

 

             Respiratory rate 2022 11:09:00 18 /min                   [facility]

 

             Oxygen saturation in 2022 11:09:00 96 /min                   

[facility]



             Arterial blood by                                        Medical Ce

nter



             Pulse oximetry                                        

 

             Body height  2022 08:26:00 188 cm                    [facility]

 

             Body weight  2022 08:26:00 96.843 kg                 [facility]

 

             BMI          2022 08:26:00 27.41 kg/m2               [facility]







Procedures







                Procedure       Date / Time     Performing      Source



                                Performed       Clinician       

 

                B-TYPE NATRIURETIC FACTOR (BNP) 2022                      

[facility]



                                13:52:00                        [facility]

 

                POCT-GLUCOSE METER 2022      Elayne Longo [facility]



                                10:43:00        [facility]

 

                REPORT OF PROCEDURE - ENDOSCOPY 2022      Elayne Longo [facility]



                URL             10:33:46        [facility]

 

                TISSUE EXAM     2022      Elayne Longo [facility]



                                10:01:00        [facility]

 

                COLONOSCOPY     2022      Elayne Longo [facility]



                                09:22:00        [facility]

 

                COLONOSCOPY, WITH POLYPECTOMY 2022      [facility]



                                09:22:00        [facility]

 

                POCT-GLUCOSE METER 2022      Elayne Longo [facility]



                                08:51:00        [facility]

 

                SARS-COV2/RT-PCR ([facility]) 2022      Khris Longo

  [facility]



                                06:52:31        [facility]

 

                REPORT OF PROCEDURE - ENDOSCOPY 2022      Katrina Mullins   

[facility]



                URL             09:44:13                        [facility]

 

                REPORT OF PROCEDURE - ENDOSCOPY 2022      Katrina Mullins [facility]



                URL             09:43:23                        [facility]

 

                COLONOSCOPY     2022      Katrina Mullins [facility]



                                08:47:00                        [facility]

 

                ESOPHAGOGASTRODUODENOSCOPY 2022      [facility]



                                08:47:00                        [facility]

 

                POCT-GLUCOSE METER 2022      Katrina Mullins [facility]



                                08:11:00                        [facility]

 

                COMPREHENSIVE METABOLIC PANEL 2022      [facility]



                                20:06:00                        of Medicine

 

                CEA             2022      [facility]



                                20:06:00                        of Medicine

 

                CBC W/AUTO DIFF WITH PLATELETS 2022      [facility]



                                20:06:00                        of Medicine

 

                AFP TUMOR MARKER 2022      [facility]

e



                                20:06:00                        of Medicine

 

                CANCER ANTIGEN 19-9 2022      [facility]

lege



                                20:06:00                        of Medicine







Plan of Care







             Planned Activity Planned Date Details      Comments     Source

 

             Future Scheduled 2032   Screening for malignant              

[facility]



             Test         00:00:00     neoplasm of colon              Medical Ce

nter



                                       (procedure) [code =              



                                       427157335]                

 

             Future Scheduled 2032   Screening for malignant              

[facility]



             Test         00:00:00     neoplasm of colon              Medical Ce

nter



                                       (procedure) [code =              



                                       498751320]                

 

             Future Scheduled 2032   Screening for malignant              

[facility]



             Test         00:00:00     neoplasm of colon              Medical Ce

nter



                                       (procedure) [code =              



                                       357598364]                

 

             Future Scheduled 2032   Screening for malignant              

[facility]



             Test         00:00:00     neoplasm of colon              Medical Ce

nter



                                       (procedure) [code =              



                                       015528245]                

 

             Future Scheduled 2032   Screening for malignant              

[facility]



             Test         00:00:00     neoplasm of colon              Medical Ce

nter



                                       (procedure) [code =              



                                       012490858]                

 

             Future Scheduled 2032   Screening for malignant              

[facility]



             Test         00:00:00     neoplasm of colon              Medical Ce

nter



                                       (procedure) [code =              



                                       520530800]                

 

             Future Scheduled 2023   Tobacco Cessation              [facility]



             Test         00:00:00     Counseling and              Medical Cente

r



                                       Screening (12+) [code =              



                                       Tobacco Cessation              



                                       Counseling and              



                                       Screening (12+)]              

 

             Future Scheduled 2022   INFLUENZA VACCINE (#1)              C

[facility]



             Test         00:00:00     [code = INFLUENZA              Medical Ce

nter



                                       VACCINE (#1)]              

 

             Future Scheduled 2022   INFLUENZA VACCINE (#1)              C

[facility]



             Test         00:00:00     [code = INFLUENZA              Medical Ce

nter



                                       VACCINE (#1)]              

 

             Future Scheduled 2022   INFLUENZA VACCINE (#1)              C

[facility]



             Test         00:00:00     [code = INFLUENZA              Medical Ce

nter



                                       VACCINE (#1)]              

 

             Future Scheduled 2022   Screening for malignant              

[facility]



             Test         11:27:55     neoplasm of colon              of Medicin

e



                                       (procedure) [code =              



                                       920482625]                

 

             Future Scheduled 2022   TETANUS SHOT (ADULT)              [facility]



             Test         11:27:55     [code = TETANUS SHOT              of Medi

cine



                                       (ADULT)]                  

 

             Future Scheduled 2022   BMI FOLLOW UP PLAN              [facility]

[facility]



             Test         11:27:55     [code = BMI FOLLOW UP              of Med

icine



                                       PLAN]                     

 

             Future Scheduled 2022   Hepatitis C screening              [facility]

[facility]



             Test         11:27:55     (procedure) [code =              of Medic

ine



                                       [id number]]                

 

             Future Scheduled 2022   Human immunodeficiency              B

[facility]



             Test         11:27:55     virus screening              of Medicine



                                       (procedure) [code =              



                                       108983575]                

 

             Future Scheduled 2022   Screening for malignant              

[facility]



             Test         11:27:55     neoplasm of lung              of Medicine



                                       (procedure) [code =              



                                       410182796]                

 

             Future Scheduled 2022   ZOSTER VACCINE (1 of 2)              

[facility]



             Test         11:27:55     [code = ZOSTER VACCINE              of Me

dicine



                                       (1 of 2)]                 

 

             Future Scheduled 2022   COVID-19 Vaccine (2 -              Ba

[facility]



             Test         11:27:55     Booster for [employer]              of Medic

ine



                                       series) [code =              



                                       COVID-19 Vaccine (2 -              



                                       Booster for [employer]              



                                       series)]                  

 

             Future Scheduled 2022   FLU VACCINE > 6 MONTHS              [facility]



             Test         11:27:55     [code = FLU VACCINE > 6              of M

edicine



                                       MONTHS]                   

 

             Future Scheduled 2022   IR PORT PLACEMENT EQUAL 1 Occurrences

 [facility]



             Test         14:40:38     OR > 5 YEARS [code = starting     of Medi

cine



                                       13086]       2022 until 



                                                    2023   

 

             Future Scheduled 2022   CT CHEST ABDOMEN PELVIS 1 Occurrences

 [facility]



             Test         14:19:01     W CONTRAST [code = starting     of Medici

ne



                                       46826-0]     2022 until 



                                                    2023   

 

             Future Scheduled 2022   WILD 1 [code = NOCPT] Ordered:     [facility]

[employer]



             Test         14:18:00                  2022   of Medicine

 

             Future Scheduled 2022   TEMPUS XF LIQUID BIOPSY Ordered:     

[facility]



             Test         14:17:42     NGS [code = 08289580] 2022   of Med

icine

 

             Future Scheduled 2022   TEMPUS XT TISSUE NGS Ordered:     [facility]



             Test         14:17:42     [code = 72222717] 2022   of Medicin

e

 

             Future Scheduled 2022   DEPRESSION SCREENING              [facility]



             Test         00:00:00     (12+) [code =              Medical Center



                                       DEPRESSION SCREENING              



                                       (12+)]                    

 

             Future Scheduled 2022   DEPRESSION SCREENING              [facility]



             Test         00:00:00     (12+) [code =              Medical Center



                                       DEPRESSION SCREENING              



                                       (12+)]                    

 

             Future Scheduled 2022   DEPRESSION SCREENING              [facility]



             Test         00:00:00     (12+) [code =              Medical Center



                                       DEPRESSION SCREENING              



                                       (12+)]                    

 

             Future Scheduled 2020   MEDICARE ANNUAL              [facility]



             Test         00:00:00     WELLNESS (YEAR 2 or              Medical 

Center



                                       FIRST YEAR if no IPPE)              



                                       [code = MEDICARE ANNUAL              



                                       WELLNESS (YEAR 2 or              



                                       FIRST YEAR if no IPPE)]              

 

             Future Scheduled 2020   MEDICARE ANNUAL              [facility]



             Test         00:00:00     WELLNESS (YEAR 2 or              Medical 

Center



                                       FIRST YEAR if no IPPE)              



                                       [code = MEDICARE ANNUAL              



                                       WELLNESS (YEAR 2 or              



                                       FIRST YEAR if no IPPE)]              

 

             Future Scheduled 2020   MEDICARE ANNUAL              [facility]



             Test         00:00:00     WELLNESS (YEAR 2 or              Medical 

Center



                                       FIRST YEAR if no IPPE)              



                                       [code = MEDICARE ANNUAL              



                                       WELLNESS (YEAR 2 or              



                                       FIRST YEAR if no IPPE)]              

 

             Future Scheduled 2009   SHINGLES VACCINES (1 of              

[facility]



             Test         00:00:00     2) [code = SHINGLES              Medical 

Center



                                       VACCINES (1 of 2)]              

 

             Future Scheduled 2009   SHINGLES VACCINES (1 of              

[facility]



             Test         00:00:00     2) [code = SHINGLES              Medical 

Center



                                       VACCINES (1 of 2)]              

 

             Future Scheduled 2009   SHINGLES VACCINES (1 of              

[facility]



             Test         00:00:00     2) [code = SHINGLES              Medical 

Center



                                       VACCINES (1 of 2)]              

 

             Future Scheduled 1994   Lipid panel (procedure)              

[facility]



             Test         00:00:00     [code = 30159710]              Medical Ce

nter

 

             Future Scheduled 1994   Lipid panel (procedure)              

[facility]



             Test         00:00:00     [code = 00635715]              Medical Ce

nter

 

             Future Scheduled 1994   Lipid panel (procedure)              

[facility]



             Test         00:00:00     [code = 72054622]              Medical Ce

nter

 

             Future Scheduled 1978   DTAP/TDAP/TD VACCINES              [facility]



             Test         00:00:00     (1 - Tdap) [code =              Medical C

enter



                                       DTAP/TDAP/TD VACCINES              



                                       (1 - Tdap)]               

 

             Future Scheduled 1978   DTAP/TDAP/TD VACCINES              [facility]



             Test         00:00:00     (1 - Tdap) [code =              Medical C

enter



                                       DTAP/TDAP/TD VACCINES              



                                       (1 - Tdap)]               

 

             Future Scheduled 1978   DTAP/TDAP/TD VACCINES              [facility]



             Test         00:00:00     (1 - Tdap) [code =              Medical C

enter



                                       DTAP/TDAP/TD VACCINES              



                                       (1 - Tdap)]               

 

             Future Scheduled 1977   HEPATITIS C SCREENING              [facility]



             Test         00:00:00     [code = HEPATITIS C              Medical 

Center



                                       SCREENING]                

 

             Future Scheduled 1977   HEPATITIS C SCREENING              [facility]



             Test         00:00:00     [code = HEPATITIS C              Medical 

Center



                                       SCREENING]                

 

             Future Scheduled 1977   HEPATITIS C SCREENING              [facility]



             Test         00:00:00     [code = HEPATITIS C              Medical 

Center



                                       SCREENING]                

 

             Future Scheduled 1965   PNEUMOCOCCAL VACCINE              [facility]



             Test         00:00:00     0-64 YRS (1 - PCV)              Medical C

enter



                                       [code = PNEUMOCOCCAL              



                                       VACCINE 0-64 YRS (1 -              



                                       PCV)]                     

 

             Future Scheduled 1965   PNEUMOCOCCAL VACCINE              [facility]



             Test         00:00:00     0-64 YRS (1 - PCV)              Medical C

enter



                                       [code = PNEUMOCOCCAL              



                                       VACCINE 0-64 YRS (1 -              



                                       PCV)]                     

 

             Future Scheduled 1965   PNEUMOCOCCAL VACCINE              [facility]



             Test         00:00:00     0-64 YRS (1 - PCV)              Medical C

enter



                                       [code = PNEUMOCOCCAL              



                                       VACCINE 0-64 YRS (1 -              



                                       PCV)]                     

 

             Future Scheduled 1959   COVID-19 VACCINE (#1)              [facility]



             Test         00:00:00     [code = COVID-19              Medical Tripp

ter



                                       VACCINE (#1)]              

 

             Future Scheduled 1959   COVID-19 VACCINE (#1)              [facility]



             Test         00:00:00     [code = COVID-19              Medical Tripp

ter



                                       VACCINE (#1)]              

 

             Future Scheduled 1959   COVID-19 VACCINE (#1)              [facility]



             Test         00:00:00     [code = COVID-19              Medical Tripp

ter



                                       VACCINE (#1)]              

 

             Future Scheduled 1959   CT Colonography (combo)              

[facility]



             Test         00:00:00     [code = CT Colonography              [facility]



                                       (combo)]                  

 

             Future Scheduled 1959   Screening for malignant              

[facility]



             Test         00:00:00     neoplasm of colon              Medical Ce

nter



                                       (procedure) [code =              



                                       673819173]                

 

             Future Scheduled 1959   Screening for malignant              

[facility]



             Test         00:00:00     neoplasm of colon              Medical Ce

nter



                                       (procedure) [code =              



                                       521387360]                

 

             Future Scheduled 1959   Sigmoidoscopy [code =              [facility]



             Test         00:00:00     Sigmoidoscopy]              Medical Cente

r

 

             Future Scheduled 1959   CT Colonography (combo)              

[facility]



             Test         00:00:00     [code = CT Colonography              Medi

[facility]



                                       (combo)]                  

 

             Future Scheduled 1959   Screening for malignant              

[facility]



             Test         00:00:00     neoplasm of colon              Medical Ce

nter



                                       (procedure) [code =              



                                       705876919]                

 

             Future Scheduled 1959   Screening for malignant              

[facility]



             Test         00:00:00     neoplasm of colon              Medical Ce

nter



                                       (procedure) [code =              



                                       011548296]                

 

             Future Scheduled 1959   Sigmoidoscopy [code =              [facility]



             Test         00:00:00     Sigmoidoscopy]              Medical Cente

r

 

             Future Scheduled 1959   CT Colonography (combo)              

[facility]



             Test         00:00:00     [code = CT Colonography              [facility]



                                       (combo)]                  

 

             Future Scheduled 1959   Screening for malignant              

[facility]



             Test         00:00:00     neoplasm of colon              Medical Ce

nter



                                       (procedure) [code =              



                                       603217778]                

 

             Future Scheduled 1959   Screening for malignant              

[facility]



             Test         00:00:00     neoplasm of colon              Medical Ce

nter



                                       (procedure) [code =              



                                       701968992]                

 

             Future Scheduled 1959   Sigmoidoscopy [code =              [facility]



             Test         00:00:00     Sigmoidoscopy]              Medical Cente

r

 

             Future Scheduled              COLON CANCER SCREENING:              

[employer]



             Test                      COLONOSCOPY [code =              of Medic

ine



                                       COLON CANCER SCREENING:              



                                       COLONOSCOPY]              

 

             Future Scheduled              MEDICARE AWV [code =              [facility]

[employer]



             Test                      MEDICARE AWV]              of Medicine

 

             Future Scheduled              TETANUS SHOT (ADULT)              [facility]

[employer]



             Test                      [code = TETANUS SHOT              of Medi

cine



                                       (ADULT)]                  

 

             Future Scheduled              BMI FOLLOW UP PLAN              [facility]

[employer]



             Test                      [code = BMI FOLLOW UP              of Med

icine



                                       PLAN]                     

 

             Future Scheduled              HEPATITIS C SCREENING              [facility]

[employer]



             Test                      [code = HEPATITIS C              of Medic

ine



                                       SCREENING]                

 

             Future Scheduled              HIV SCREENING [code =              [facility]

[employer]



             Test                      HIV SCREENING]              of Medicine

 

             Future Scheduled              FLU VACCINE > 6 MONTHS              [facility]

[employer]



             Test                      [code = FLU VACCINE > 6              of M

edicine



                                       MONTHS]                   







Encounters







        Start   End     Encounter Admission Attending Care    Care    Encounter 

Source



        Date/Time Date/Time Type    Type    Clinicians Facility Department ID   

   

 

        2022-10-25         Outpatient         [place],  [facility]  [id number]

 Common



        11:32:01                         Monica                     [facility]

 

        2022-10-24         Outpatient         [place],  [facility]  [id number]

 Common



        11:29:00                         Monica                     [facility]

 

        2022         Outpatient         [place],  [facility]  [id number]

 Common



        09:27:01                         Monica                     [facility]

 

        2022         Outpatient         [place],  [facility]  [id number]

 Common



        08:39:00                         Monica                     [facility]

 

        2022         Outpatient         [place],  [facility]  [id number]

 Common



        13:39:01                         Monica                     [facility]

 

        2022         Outpatient         [place],  [facility]  [facility]  [id number]

 Common



        08:18:01                         Monica                     [facility]

 

        2022         Outpatient         SYSTEM, [facility]     [id number]

 MD



        14:48:15                         PROVIDER                         Andrew negron

 

        2022         Outpatient         [place],  [facility]  [facility]  [id number]

 Common



        09:46:02                         Monica                     [facility]

 

        2022         Outpatient         Longo,  [facility]  [facility]  [id number]

 Common



        14:39:33                         Monica                     [facility]

 

        2022         Outpatient         Longo,  [facility]  [facility]  [id number]

 Common



        14:38:53                         Monica                     [facility]

 

        2022         Outpatient         Longo,  [facility]  [facility]  [id number]

 Common



        13:10:59                         Monica                  [id number]   [facility]

 

        2022         Outpatient         Longo,  [facility]  [facility]  [id number]

 Common



        12:52:47                         Monica                  [id number]   [facility]

 

        2022         Outpatient         [place]o,  [facility]  [facility]  [id number]

 Common



        11:32:03                         Monica                  [id number]   [facility]

 

        2022         Outpatient         Longo,  [facility]  [facility]  [id number]

 Common



        11:25:36                         Monica                  [id number]   [facility]

 

        2022         Outpatient         Longo,  [facility]  [facility]  [id number]

 Common



        11:16:16                         Monica                  [id number]   [facility]

 

        2022         Outpatient         Longo,  [facility]  [facility]  [id number]

 Common



        11:07:38                         Monica                  [id number]   [facility]

 

        2022-10-26 2022-10-26 OFFICE                  [facility]  [facility]  [id number] Co

mmon



        00:00:00 00:00:00 VISIT                                           [facility]



                        [facility]AB PT                                         - [facility]



                        LEVEL 4                                         [facility]

 

        2022-10-26 2022-10-26 (TEL)                   [facility]  [facility]  [id number] Co

mmon



        00:00:00 00:00:00                                                 [facility]

 

        2022 (HOSP F/U)                 [facility]  [facility]  [id number]

 Common



        00:00:00 00:00:00 [facility]



                        Follow                                          - [facility]

 

        2022 (TEL)                   [facility]  [facility]  [id number] Co

mmon



        00:00:00 00:00:00                                                 [facility]

 

        2022 Lab                     [facility]   [id number]

[address] [facility]



        00:00:00 00:00:00 [facility]

 

        2022 Lab                     [facility]   [id number]

[address] [facility]



        00:00:00 00:00:00 [facility]

 

        2022 OFFICE                  [facility]  [facility]  [id number] Co

mmon



        00:00:00 00:00:00 VISIT                                           [facility]



                        ESTAB PT                                         - [facility]



                        LEVEL 4                                         [facility]

 

        2022 OFFICE                  [facility]  [facility]  [id number] Co

mmon



        00:00:00 00:00:00 VISIT EST                                         Spir

it



                        PT LEVEL 3                                         - [facility]

 

        2022 (TEL)                   [facility]  [facility]  [id number] Co

mmon



        00:00:00 00:00:00                                                 [facility]

 

        2022-06-15 2022-06-15 (TEL)                   [facility]  [facility]  [id number] Co

mmon



        00:00:00 00:00:00                                                 [facility]

 

        2022 (TEL)                   [facility]  [facility]  [id number] Co

mmon



        00:00:00 00:00:00                                                 [facility]

 

        2022 (TEL)                   [facility]  [facility]  [id number] Co

mmon



        00:00:00 00:00:00                                                 [facility]



                                                                        - [facility]

 

        2022 (EST.                   [facility]  [facility]  [id number] Co

mmon



        00:00:00 00:00:00 VIDEO) EST                                         Spi

rit



                        VIRTUAL                                         - [facility]



                        VIDEO                                           [facility]

 

        2022 (TEL)                   [facility]  [facility]  [id number] Co

mmon



        00:00:00 00:00:00                                                 [facility]

 

        2022 (TEL)                   [facility]  [facility]  [id number] Co

mmon



        00:00:00 00:00:00                                                 [facility]

 

        2022 OFFICE                  [facility]  [facility]  [id number] Co

mmon



        00:00:00 00:00:00 VISIT                                           [facility]



                        ESTAB PT                                         - [facility]



                        LEVEL 4                                         [facility]

 

        2022 (TEL)                   [facility]  [facility]  [id number] Co

mmon



        00:00:00 00:00:00                                                 [facility]

 

        2022 Outpatient         LORENZO LEDESMA    MED     [id number]

[id number]    DAVID



        13:34:00 23:59:00                 ROCHLELE                           

 

        2022 Outpatient         PADMINI LONGO     [facility]     [id number]

9 [facility]



        09:01:46 09:03:16                 [facility]                         Zulema villegas



                                                                        of



                                                                        Medicin



                                                                        e

 

        2022 [facility]   [id number]

[address] [facility]



        05:57:00 11:20:00 Encounter         [facility]

 

        2022 Hospital [facility]   [id number]

[address] [facility]



        05:57:00 11:20:00 Encounter         [facility]

 

        2022 Outpatient RADHA LONGO  [facility]    Surgery [id number]

[facility]



        05:57:00 11:20:00                 [facility]                         

 

        2022 Anesthesia         Yoshi, [facility]   [id number]

[id number] [facility]



        09:27:00 10:40:00 Event           [facility]

 

        2022 Anesthesia         Yoshi, [facility]   [id number]

[id number] [facility]



        09:27:00 10:40:00 Event           [facility]

 

        2022 Surgery         [place],  [facility]   [id number]

[address] [facility]



        09:41:00 10:11:00                 [facility]

 

        2022 Surgery         [place]o,  [facility]   [id number]

[address] [facility]



        09:41:00 10:11:00                 [facility]

 

        2022 Travel                  [facility]   [id number]

 [facility]



        00:00:00 00:00:00                                                 [facility]

 

        2022 Travel                  [facility]   [id number]

 [facility]



        00:00:00 00:00:00                                                 [facility]

 

        2022 Outpatient                 [facility]     [id number]

4 [facility]



        06:06:00 23:59:00                                                 Colleg

e



                                                                        of



                                                                        Medicin



                                                                        e

 

        2022 Outpatient       [facility]    Surgery [id number]

[address] [facility]



        06:06:00 11:15:00                 [facility]                           

 

        2022 [facility]   [id number]

[address] [facility]



        06:06:00 11:15:00 Encounter         [facility]

 

        2022 [facility]   [id number]

[address] [facility]



        06:06:00 11:15:00 Encounter         [facility]

 

        2022 Outpatient         PADMINI MULLINS     [facility]     [id number]

8 [facility]



        10:19:37 10:19:37                 [facility]                           [facility]

e



                                                                        of



                                                                        Medicin



                                                                        e

 

        2022 Outpatient         PADMINI MULLINS     [facility]     [id number]

7 [facility]



        10:17:26 10:17:26                 KATRINA Johnson

bakari



                                                                        of



                                                                        Medicin



                                                                        e

 

        2022 Anesthesia         [facility]   [id number]

[id number] [facility]



        08:52:00 09:46:00 Event           [facility]

 

        2022 Anesthesia         [facility]   [id number]

[id number] [facility]



        08:52:00 09:46:00 Event           [facility]

 

        2022 Surgery         [facility]   [id number]

[address] [facility]



        08:45:00 09:30:00                 [facility]

 

        2022 Surgery         [facility]   [id number]

[address] [facility]



        08:45:00 09:30:00                 [facility]

 

        2022 Travel                  [facility]   [id number]

 [facility]



        00:00:00 00:00:00                                                 [facility]

 

        2022 Travel                  [facility]   [id number]

 [facility]



        00:00:00 00:00:00                                                 [facility]

 

        2022 Outpatient EL              SLE    SLE    [id number]

[id number]



        14:20:19 23:59:00                                                 

 

        2022 hospitals   [id number]

[address] [facility]



        13:00:00 23:59:00 Encounter                                         [facility]

 

        2022 hospitals   [id number]

[address] [facility]



        13:00:00 23:59:00 Encounter                                         [facility]

 

        2022 Travel                  [facility]   [id number]

 [facility]



        00:00:00 00:00:00                                                 [facility]

 

        2022 Travel                  [facility]   [id number]

 [facility]



        00:00:00 00:00:00                                                 [facility]

 

        2022 Office          [facility]E [facility]   [id number]

[id number] [facility]



        11:37:02 15:01:03 Visit           INGRID Tavares  350.1.13.21         Co

llege



                                                        0.2.7.2.686         of



                                                        [phone]         [facility]

eulogio



                                                        504             e

 

        2022 (TEL)                   [facility]  [facility]  [id number] Co

mmon



        00:00:00 00:00:00                                                 [facility]

 

        2022 (TEL)                   [facility]  [facility]  [id number] Co

mmon



        00:00:00 00:00:00                                                 [facility]

 

        2022 Travel                  1.2.840.1 [id number]

[id number] [facility]



        00:00:00 00:00:00                         [id number] 350.1.13.41         

ity of



                                                3.412.2.7 2.2.7.3.698         [place]

[place]



                                                .[id number] 084.8           MD



                                                .8                      [facility]

 

        2022 (TEL)                   [facility]  [facility]  [id number] Co

mmon



        00:00:00 00:00:00                                                 [facility]

 

        2022 (TEL)                   [facility]  [facility]  [id number] Co

mmon



        00:00:00 00:00:00                                                 [facility]

 

        2022 Travel                  1.2.840.1 [id number]

[id number] [facility]



        00:00:00 00:00:00                         84570.1.1 350.1.13.41         

ity of



                                                3.412.2.7 2.2.7.3.698         [place]

[place]



                                                .[id number] 084.8           MD Day                      [facility]

 

        2022 (TEL)                   [facility]  [facility]  [id number] Co

mmon



        00:00:00 00:00:00                                                 [facility]

 

        2022 OFFICE                  [facility]  [facility]  [id number] Co

mmon



        00:00:00 00:00:00 VISIT                                           [address]

 

        2022 SUB ANNUAL                 [facility]  [facility]  [id number]

 Common



        00:00:00 00:00:00 MCR                                             [facility]



                        [facility]                                         - [facility]



                        VISIT                                           [facility]

 

        2021 (TEL)                   [facility]  [facility]  [id number] Co

mmon



        00:00:00 00:00:00                                                 [facility]

 

        2021 (TEL)                   [facility]  [facility]  [id number] Co

mmon



        00:00:00 00:00:00                                                 [facility]

 

        2021-10-06 2021-10-06 OFFICE                  [facility]  [facility]  [id number] Co

mmon



        00:00:00 00:00:00 VISIT                                           [address]

 

        2021 Outpatient                 [facility]  [facility]  [id number]

 Common



        00:00:00 00:00:00                                                 [facility]

 

        2021 Outpatient                 [facility]  [facility]  [id number]

 Common



        00:00:00 00:00:00                                                 [facility]

 

        2021 Outpatient                 [facility]  [facility]  [id number]

 Common



        00:00:00 00:00:00                                                 [facility]

 

        2021 Outpatient                 [facility]  [facility]  [id number]

 Common



        00:00:00 00:00:00                                                 [facility]

 

        2021 Outpatient                 [facility]  [facility]  [id number]

 Common



        00:00:00 00:00:00                                                 [facility]

 

        2021 Outpatient                 [facility]  [facility]C  [id number]

 Common



        00:00:00 00:00:00                                                 [facility]

 

        2021 Outpatient                 [facility]  STL[facility]C  [id number]

 Common



        00:00:00 00:00:00                                                 [facility]

 

        2021-01-15 2021-01-15 Outpatient                 [facility]  [facility]  [id number]

 Common



        00:00:00 00:00:00                                                 [facility]

 

        2021 Outpatient                 [facility]  [facility]C  [id number]

 Common



        00:00:00 00:00:00                                                 [facility]

 

        2020-10-21 2020-10-21 Outpatient                 [facility]  [facility]  [id number]

 Common



        00:00:00 00:00:00                                                 [facility]

 

        2020-10-19 2020-10-19 Outpatient                 [facility]  [facility]  [id number]

 Common



        00:00:00 00:00:00                                                 [facility]

 

        2020-10-08 2020-10-08 Outpatient                 [facility]  [facility]  [id number]

 Common



        00:00:00 00:00:00                                                 [facility]

 

        2020 Outpatient                 [facility] [address]

[id number] Common



        13:15:00 13:15:00                         [place]   [address]         Spir

it



                                                Drive   [facility]

 

        2020 Outpatient                 [facility] [address]

[id number] Common



        13:45:00 13:45:00                         [place]   [address]         Spir

it



                                                Drive   [facility]

 

        2020 Outpatient                 [facility] [address]

[id number] Common



        08:33:00 08:33:00                         [place]   [address]         Spir

it



                                                Drive   [facility]

 

        2020-07-15 2020-07-15 Outpatient                 [facility] [address]

[id number] Common



        15:30:00 15:30:00                         [place]   [address]         Spir

it



                                                Drive   [facility]

 

        2020 Outpatient                 [facility] [address]

[id number] Common



        15:00:00 15:00:00                         [place]   [address]         Spir

it



                                                Drive   [facility]

 

        2020 Outpatient                 [facility] [address]

[id number] Common



        15:00:00 15:00:00                         [place]   [address]         Spir

it



                                                Drive   [facility]

 

        2020 Outpatient                 [facility] [address]

[id number] Common



        10:00:00 10:00:00                         [place]   [address]         Spir

it



                                                Drive   [facility]

 

        2020 Outpatient                 [facility] [address]

[id number] Common



        13:17:00 13:17:00                         [place]   [address]         Spir

it



                                                Drive   [facility]

 

        2020 Outpatient                 [facility] [address]

[id number] Common



        11:00:00 11:00:00                         [place]   [address]         Spir

it



                                                Drive   [facility]

 

        2019 Emergency X [facility]     [id number]

[id number] [facility]



        07:09:33 11:41:00                 , JACEKALTAGRACIA [facility]iver[facility]

 

        2019-10-17 2019-10-17 Office          PADMINI Weston     1.2.840.114 [id number]

59 



        09:26:46 14:06:09 Visit           Alex ANGUIANO AMBULATOR 350.1.13.21        

 



                                                Y       0.2.7.2.686         



                                                        [id number]         



                                                        840             

 

        2019-10-17 2019-10-17 Office          PADMINI Weston     [id number] [id number]

598 [facility]



        09:26:46 14:06:09 Visit           Alex ANGUIANO AMBULATOR 350.1.13.21        

 College



                                                Y       0.2.7.2.686         of



                                                        [phone]         [facility]



                                                        840             e







Results







           Test Description Test Time  Test Comments Results    Result Comments 

Source









                    B-TYPE NATRIURETIC FACTOR (BNP) 2022 23:54:52 









                      Test Item  Value      Reference Range Interpretation Comme

nts









             B-TYPE NATRIURETIC PEPTIDE (BEAKER) (test code = 700) 48 pg/mL     

0-100                     



 ID - MITCHTissue [id number] 08:01:38





             Test Item    Value        Reference Range Interpretation Comments

 

             Case Report (test code Surgical Pathology                          

 



             = 104)       Report Case: [id number]                           



                          Authorizing Provider:                           



                          Elayne Longo MD Collected:                           



                          2022 10:01 AM                           



                          Ordering Location:                           



                          [facility]                           



                          Received: 2022                           



                          11:57 AM Services                           



                          Pathologist:                           



                          Homer Thomas MD                           



                          Specimens: A) - Large                           



                          Intestine, Colon -                           



                          Transverse, Bx mass B)                           



                          - Polyp, Colon -                           



                          Left/Descending, taken                           



                          by hot snare C) -                           



                          Polyp, Colon -                           



                          Left/Descending, x3                           



                          taken by hot snare D)                           



                          - Polyp, Colon -                           



                          Sigmoid, x5 taken by                           



                          hot snare                              

 

             DIAGNOSIS (test code = [id number]                      

     



             3220)        FuZzEwMzNcZnRuYmpcdWMx                           



                          IHtccnRmMVxlcGljOTYwMV                           



                          rnaxZcXYYtyUFbI2Xwfyid                           



                          NIenPB4iSW6xcEinvSXgsA                           



                          JiUSMpVxVlq9ajq394nQMf                           



                          t3rlRIUWafrxxIq3xMuvW3                           



                          6xt1V4MojgI04tsTZkVAV3                           



                          RDSuGJYfrVRvRJMjGLL6QV                           



                          SakTWvF1omPSNbSL3calco                           



                          FQozVVsjZBPqxTF8OJUliQ                           



                          SqD2NpULTyNIdsXAHnacn3                           



                          SjQeWb4vwLEjdVovJLytDG                           



                          XiTMXyCFgyWDKyAoKyIJ6x                           



                          ZMFEI7ErAO1GJDLSLX6RBZ                           



                          BOLjVJT5NASqXTMFAAAD5B                           



                          JL0PV0PsJRCRK7OUWIdmsX                           



                          DiFEWlIBOADrFSO2jMPMOA                           



                          XCXXU6NYFfJPFw8HWYiiJJ                           



                          EyRIGiRPQBGISNUVGNP2IE                           



                          W5ELEdWKJKPZUrcZVCpUYl                           



                          BccGFyXHBhciBCLiBMQVJH                           



                          QAVWBoUPH0WKGuVuAZIWE0                           



                          CTPeQHFgkpS15WU56pQA6U                           



                          LPObIOBFS6EODMelbQTtFN                           



                          RwIVRKOIGGPGDLXOGGFG0Q                           



                          CRWcUSAUVIbIWR4IPRVhHC                           



                          QcozzlRGVaZy3aTXDJV3Gi                           



                          QJ0ENIFZVT7WCBWTPOABSO                           



                          3FIS6UTZUYCC9ANBMLEUmQ                           



                          IHggMywgQklPUFNZOlxwYX                           



                          IgICAtIFRVQlVMQVIgQURF                           



                          Vq1OAXxgFpUJS89FUnNTCT                           



                          kxQGPdCNZkIWJHL7UPSTKa                           



                          O2YLMoETJWOjMUTZSL4KEM                           



                          xwYXJccGFyIEQuIExBUkdF                           



                          AQaEKMKSDOrABQtdJ4eBSV                           



                          4CGFFSK0cCIhVZD6rQGOF1                           



                          UGPwKXZXU0FMAZuohCKrMG                           



                          OvTWJDSYCRXV0CIAhGW6XA                           



                          BUQZTV0CBYFhZDPCXPuGLY                           



                          5URURccGFyICAgLSBIWVBF                           



                          TlVVZTQADELgTB8HFPLaWG                           



                          Wmmd46VAC8MqWyz6N7DNR8                           



                          VBIcZHIsm8peMWBksDPpFb                           



                          EwMzNcZnRuYmpcdWMxXGRl                           



                          EnSlm9aij557iDYlu7dbJZ                           



                          GgVbV8jJRuEKLezKSfS252                           



                          REUqWPnys3nlp1MvZDJpsP                           



                          Zwv1L0OOFOuvznbXv3aMlb                           



                          X14hd6D9BukwY3kkETWzSM                           



                          XhI1DtIZ3uFPEkDyn2NYJ8                           



                          KON9WNIxZLOoC1SiQS1cWH                           



                          RrsNMgZCz8b8kksEwyKZXw                           



                          KZG5s6mtCNyfnyVfWS5eji                           



                          7syVa4s2mrylMuFBNeSEDj                           



                          wYKWIAIlQ0VmuJkwCa2xzG                           



                          q0kDcjLnpmFMO0Bkb7PD6d                           



                          zc75prd7dSjeLCIyrcizKr                           



                          V6GDkqIQSwayfwUTo7VXeg                           



                          OAXylCJ0ONKksQWrU1JoGK                           



                          BiFM4hjqc1ZVB8BCgiKZPx                           



                          ZhN5GAYupDZeWGSudDbyCT                           



                          hmv596ZPC3SwJyTA6lH3Fp                           



                          c1O1zH7rxUUdEPGwzRMbFh                           



                          WiVCHvpn0trYXcMPlcc3Gz                           



                          DEG7mvO5rIPkoOApUSIuMm                           



                          Z6NJcnGA2hzh82MRRxERW9                           



                          tv8azBSycMsxrtMeaCMoEP                           



                          odI7MsMYGhu157CJXxL9Qk                           



                          NIHuk8M3nrYoHnAnKPIpeC                           



                          D9keS7NEVxFI7yvduhs1ft                           



                          OSwwZQudJJWefjM4hvO1MG                           



                          ShxMTuB4RkkF5kHSOnTI1v                           



                          mccjp4noWAH3HWhdRPDxKR                           



                          K1YlXxUPZog5Xaixn1GsOi                           



                          q1OliOJxDYgwE51me988YL                           



                          XdylHbP7jrzFQlngwzqPHv                           



                          pckqYYrdyxM4STCiWOpmlu                           



                          pcAZZcEHmoI3rrVyFuJUBt                           



                          kQbqFWxmd0MgLLSrTKWbIp                           



                          RhtZDjHEUaUad1EKQalQYi                           



                          JOEuGqWeG4dytcgtHfIMQC                           



                          Mnh8buN6gzbORHmZAjL8Jq                           



                          UGhvbmUgTGluZTogNzEzLT                           



                          s0AD56QQuwIUNbwa06                           

 

             COMMENT (test code = s8etiBMcMYDoeVI0BiKpOJ                        

   



             9930)        Kdj5lnc4AbqPMnvAUjJLco                           



                          eZPmobUjnd89hXR4sB10ZA                           



                          1dJJQwLuS9MFDdodB3Cal6                           



                          DJJoXFNeaJRzJ622q9jpu3                           



                          fgbhAbtSI3jXioPRTyiumd                           



                          ZcD1QMhyYJBtwyloZCj3JC                           



                          eeFWNarPF6ZOSrdKAnK0Eo                           



                          TLEbLV1kecy2KIZ2EYonSJ                           



                          CsLwL8WYLpeFPgKYAjzBgz                           



                          XZuyq786RLB1ArLtEQHxag                           



                          JfwJxaaZ8mBrEbEOXBfI3r                           



                          ayBBMSBoYXMgYWRlcXVhdG                           



                          CsbUWlz0XcX5SjoCWaEZTd                           



                          uKkpZq8vJYB8xLXmFUJhkl                           



                          OefXgwfksqm1Y1LGethl8z                           



                          cGFyfQ==                               

 

             CPT Code(s) (test code h0iqaUAvJXRieFZ2MjXvIR                      

     



             = 3357)      Cbp9wpg8ZdxYUrxEUdLHnh                           



                          zGVpfrDjuo94hJQ1mN64LP                           



                          8pTHSrDsB1DTZzyaN1Dke2                           



                          PRKcCXGirHVvE677p0jhw2                           



                          hapvRvoTK2hZzkZBBmzkpc                           



                          DrX0ZHwjNNGupmbxCDd3GL                           



                          wjCYJybWR4FSRdjIPxL7Uf                           



                          JDFwOD1pbdr0CSC7MVplIY                           



                          ZjEaD1LMLmiBRjWXUgeQlr                           



                          DCcub885SCY2IbNwKVWhsx                           



                          XitDbkfN4hPiBpPSD3CGIf                           



                          APN6OEEkLJz0TeUpSFI9TC                           



                          B6SBA5BFTmiAOyaZ==                           

 

             GROSS DESCRIPTION (test z1syjBNhGFXhyOO2MrZjCD                     

      



             code = [id number]) Frq7rhj0LowRPbgOFvDDms                          

 



                          gNZaqnUali62gVD5tX20IA                           



                          1oWZHbXyV1KVKdvmZ5Vqx4                           



                          BSFlBEZraTEeK804k1ytr1                           



                          ooqzGasPL6LJQdRKUlY8Ke                           



                          PN2gZYKwlOVpQ98isZDmBP                           



                          J5VULwMYTbbBYbXFXaGTG9                           



                          DDGlyCQtN9yqTHOrNM2pfm                           



                          hgBCkoYTnfYOEikIL7UCTn                           



                          rJXuR7SiKHCeAYnaMMYfcz                           



                          r0MqXyPj7wySMlgZuyGOwy                           



                          NGDak4vaFAKjsOBaZMX6AX                           



                          xdxQCiUFGyFBEyTOc5XSXz                           



                          NZobuMNsUG4ilTmfNvonxJ                           



                          try0RtfVUpMNebQTQdALGf                           



                          SLjmVAFxP8PKGEEgVzY1Xq                           



                          G4ZiBzWTo9JRv3QB0NHqPk                           



                          XOSzBQFhQEW7MFDtKTo0YA                           



                          smYT9MKCF7GcB0SUq9BHG6                           



                          NTMyMSBcXHQgMiBcXGZsIF                           



                          ioItTMzeaziQPpRE6yyJii                           



                          bGFpblxmczIwIEEuIExhcm                           



                          muVLppyXEmqXufORmhH21a                           



                          d48cMSRZfzZgy5DxyoDnVm                           



                          xwYXJcZnMyMFxjZjEgUmVj                           



                          PHi5XXKusP7lFg7guRBjjN                           



                          0hjVUyFYcyXRQ9kHDaQNJg                           



                          IEGrPSZdHN99WXalGNHwjb                           



                          FtZSwgbWVkaWNhbCByZWNv                           



                          cmQgbnVtYmVyIGFuZCBcdT                           



                          vkXaKxIBx5M1rkfjlxBAbk                           



                          eEKttSfwTQ2gu0wjvl98ny                           



                          Lcs1EgtuKdKWMmf7XblQBx                           



                          SRXqCtXqpqOeO88fr7lkxU                           



                          Gqy3GqkFFuhXgklNHjnFRs                           



                          LXBpbmssIGlycmVndWxhci                           



                          Cbd2M7SHGbr7G1YLFeecUq                           



                          gIUzoQCmHIZnDWN3FGLsXJ                           



                          H7TGFiBmKxeHHawrWrA1ay                           



                          ZWdhdGUpLiAgVGhlIHNwZW                           



                          UvrSQtVLjpRBU0Ey5zzYMv                           



                          ABWbwyS7t8EgQPggMTOmPx                           



                          iyAPLzH7JbT7WwfnPtuGXx                           



                          NMCrlkHkx1jgPBF1ESNgjZ                           



                          OmiZMgNnBuOhsxPVP6s4ql                           



                          JYMbhTWqBOH8LKyhsEAwRX                           



                          EwMDIgXFxkYiBPVlIgIiBa                           



                          ViZxFHL6NpRjBLz6VZnuI3                           



                          TJMUTzFJD7GUA2RdgwDkR9                           



                          XWn8GUTYMr0hPIN3CLxyWK                           



                          N7TLI1OqEjRAclpIIqCHod                           



                          ZmwgXFxmIEFyaWFsIFxcbm                           



                          V5XVBhGvXmA8RfJAZiRKPe                           



                          wDngPOBCh3fimeNoVOweYo                           



                          HqGGOsE8RrQEudCw4eyHIj                           



                          ABCcDuEiX7DiQFRrA7Akqj                           



                          KaCHqyOPNsma1jvEhnIUvs                           



                          QlCxLQCdw0l1rPE1tWLtvP                           



                          H4gQSrzQpfUeAlWG9blZPy                           



                          WR9fWCdhRYxuykUmy0ArRH                           



                          86bETtdwXclfNoARP0OiJx                           



                          WCwxGDVdg5g6sHdhU61vi0                           



                          5dfQCjzUToHDOcRQ6icR9t                           



                          GVFlg0CoUXH5CRoukUEvlw                           



                          StVLDxXQ6yIMJgreZsv9Yx                           



                          QO4qWN34bCKemHgfKZPnfb                           



                          6bhN4cTGNsmvKvZ5FjPQJu                           



                          ED46j2ueUJDhQgPdwSncj0                           



                          ThCDEbBFwyVS58KAztPiVx                           



                          qNDpSrMswCHmTuHuQ09xeF                           



                          3lJYvtvoLhUCLgGK6zGCKb                           



                          MLUdtPAvdX0pyhRxydSsbP                           



                          CemJQ7OUSepU6unI94lnNr                           



                          zoMWUB46RXTciRCqUPF6AX                           



                          6iDRGfnvifWAIiVEPxSPM9                           



                          HRxafE41gANcAZMiFGWobL                           



                          CgtDhhYekpmGydh5ZahRLd                           



                          XGlkIDUxMDAyIFxcZGIgT1                           



                          UAFITyHuJ1MlU4FzWgRLd1                           



                          FXn5NQ6NQkEpEPLrLPMoZT                           



                          C5EbZpDSa9RSgmTY2UHMJ0                           



                          CnS2XMkuAPR3LZIzZSAyEB                           



                          QgMiBcXGZsIFxcZiBBcmlh                           



                          hYHuAX1ilRopthSeGUZtSB                           



                          MCCwYCe0r5yQwkR18pz38t                           



                          PHHHWXH7M3Agd6WmwcVnbw                           



                          cuXHBhclxmczIwXGNmMSBS                           



                          MBUuwMQaMBUykeCmx8EvMY                           



                          xpbiBsYWJlbGVkIHdpdGgg                           



                          hAecVTPvqCnayaEiP8X9vb                           



                          OvOS5tKJUhRRFtC9CiCWPd                           



                          B62uLQWaxX6wPVSlAT5fQY                           



                          f6NTUcZZNfBxnlyW6awODu                           



                          MDOggF6gQPpzTcQzDUDmN6                           



                          UxVEpoOwP9RhA2IDhtolLk                           



                          bHFtn2Aem61xyaYjDYUcGO                           



                          Wdq71jrUW1fcVeRfTkoJt0                           



                          lFYrPPR5XJ3jkUUgdS64XY                           



                          Xsmf3wGLNgf3C4FGJuz8E8                           



                          ZSBmcmFnbWVudHMgKDMuMC                           



                          W5UKUwDIC6KEJoPISmfQOe                           



                          dvAtZ3dpIIdcaAMeVcNsQM                           



                          gbKMthaofpl0Vet1FoxKUk                           



                          b43mgLX4vNIxuGSeUqQxV8                           



                          8welRbqYFrGqyiHTT3XPFi                           



                          uZ8kw1hnglS2WX5uuLakpi                           



                          AnqZDfy2OgYpIkOW4sTWKr                           



                          TUVxuSMncZ8xefHqxiNhaG                           



                          IsjZE1ZKFbEU83gRHyvKtu                           



                          sU4dOhQsKnEiLDArtlpiXS                           



                          YbYF6sGSMyEIJ4BVKmvzRG                           



                          aIVjIWQst4RkwRjtlqApQA                           



                          MnvS8vyTBvAHPhsaBAMOY2                           



                          sC8dBBBgXHU1QZDpmwKEYX                           



                          srX60ytLD4bNZrfFXsHmZs                           



                          E61agtNsCFRczdIMKchvD7                           



                          OxsPWbs13hkTU1sTNitBCw                           



                          SJCpd0XhqQBvo4oonJlmu4                           



                          VjdGVuZFxwYXJccGFyZFxz                           



                          pK4iWbTiu8hiyXq8YXqwod                           



                          R7KQDouw30QMsbBOKnV3Pr                           



                          B4NfYPitFPT7EOVtSsMtTJ                           



                          RgRG3KNfNrHDxMSKEiZRo9                           



                          UxS9LEm5QBDDYuGeVxInXc                           



                          pgFYifBDNvPCy8JLu9ZDdK                           



                          DvI9CZL0RlJ4PbVxBNMcKv                           



                          AoTJi6WRAbDNfmwPLzBRSm                           



                          YZKbFIcbUNajS38lSyVtLR                           



                          dhQmVjDR8uNO4aiDNsWBUv                           



                          zM2nAR7rZ4phpK2rLH7soO                           



                          QmFJHpNxXkI7WrKOFzI4Ov                           



                          qmDzXCanVJRiao6okNucYW                           



                          wnPaRoEIGuy7y1aQW6vGMu                           



                          pZW8mHIyfDvyDvFvGD9gdE                           



                          CnPB0oNNjuURgmddMkj1St                           



                          UA35gDOjamHablEoUXR0Dk                           



                          CqBZusXDHcy0u6sZyzI33b                           



                          x83it1oarE3vXNH7PLA0RJ                           



                          plgsNiyMSaf8Zon10murLo                           



                          BPMzVWMwl65uyPL8snQkWh                           



                          PbtUz5zTZwGPV4LM7txFml                           



                          cmjdq4AabEGmVVZfRDGwX7                           



                          VjYSTnSHLet2Y1OOHsi6B1                           



                          ZSBmcmFnbWVudHMgKHJhbm                           



                          tlfodwQuCboKDlNjZoE40c                           



                          HLMmNlzbA56jqG8wY2SmKO                           



                          Hpn9GaOLmlNG7mfD4yMHL1                           



                          LjUgeCAyLjEgeCAwLjQgY2                           



                          4wyD4hPGqwldMkJGEzOU6k                           



                          DQKsPDNlISWbCGQ1RMJqDS                           



                          KvS1AmSZTzMUGfy3Y6HAFq                           



                          a6H6XBPpjkHqeXTlzBGgbh                           



                          EhpBZymhtwGRU5FILnmR7x                           



                          h2inqyL7QC1qlIimqhzzGi                           



                          8eIIzpzOKgx4ZydMNllWVt                           



                          U4K5KJC8pwTpQ5MkAYSDrQ                           



                          Pfa5QfK8xgSQ7pvCVfa2Jo                           



                          jDz7jJJvUTZatTssTMu8NL                           



                          luIEQxLUQzLlxwYXJccGFy                           



                          LTthf3OvyZSXYIkgcVSdpu                           



                          VOuEr2CZhyRGGfb2U7UJCo                           



                          hJftZZXlD1NlG6UearR8l9                           



                          pfkDxby3UgtPHnUN6zdJHk                           



                          fQ==                                   

 

             MICROSCOPIC DESCRIPTION c4lwbTNxEKEirRS8JyFfJN                     

      



             (test code = 3371) Uio1xbm9KtqZCwfLCtECxn                          

 



                          mFTlnrBaww49dTA2tD88LU                           



                          1vIMWgMqP0HIMxekU9Ppu7                           



                          PIKbHHCelNVnJ699t0aso7                           



                          kcyiVihVS2tQggMRRguioc                           



                          IbA2XArkYEOrayxgJAx5ME                           



                          uzYSRpyJC8LHHpiNDvT7Cg                           



                          ZAYyCT0uset3ZXW5EQghBM                           



                          RhUgD4FJHldZOpUODzuUbs                           



                          POqwe796MOE3PgEjPBCzzt                           



                          ZmsWdzaC8lDxQuZPTReG0r                           



                          cXQorTi5x0tdTFQxLPCuwA                           



                          SpvJ8kavHdsG8mi2MoPKVh                           



                          PBLos8JdUDWac11czPMklY                           



                          IBVUCwXDL0FAZvSB1eW1V0                           



                          mPFsPLivFIS7jC4oHLBhzW                           



                          qzZOibqOoet8EklH1lEBIl                           



                          UTY0mFIyABNmhNLpaEMjRC                           



                          VuHFTwwiKab0eht9DloW5p                           



                          bmcpLiBNSVNNQVRDSCBSRV                           



                          PTRQUfMR5CAlbgWMNFZVJN                           



                          IsPvo39yBTNoqDVGTauyYC                           



                          GglCqzYD2kzZ8lkScjxK6u                           



                          xFJeeMI4sskcimFkmIg2ih                           



                          wdvHEzPEVsEAGNH3acWwnc                           



                          ZAKcQL89OTB3GKYtSEEgcE                           



                          FyWEAxHEP3rYEuh9Qnf94f                           



                          aFOmUL6GGB19MpVsIyMAze                           



                          TwF4CcUpUkZG22S8vgZEJq                           



                          OMrphrMyj8szlyuzQHAfMV                           



                          sJXQG8YQxtOAjjfFKoaBqg                           



                          MCAgbnVjbGVhciBleHByZX                           



                          NlbH6yUAHrokIPBMLiZrbc                           



                          BUQuDC41HAL3APJmCRMuwY                           



                          KaOTXyAMK2eBInl4Rtc47x                           



                          cGFyXHBhciBJbnRlcnByZX                           



                          ByfLqcygxpmGRsLVAfRa2b                           



                          uG8kt2qhHTHpd0VlgsGcrG                           



                          HmbeDyeIWwKNLhuK8hTL9r                           



                          FA2OKzTcfw40VTsppzjpLC                           



                          ZzkR72IYCsh0GkQbaruSW7                           



                          VJXwIEUkPfIorHRpz7DypU                           



                          IkuGz6RCPlitU4VTRkfIv3                           



                          jA4elPqhLHuQW5pqQAazMT                           



                          xwYXJ9                                 

 

             SPECIAL STUDIES (test z7oofZXjKEPzi9aaQJIyyS                       

    



             code = 3376) FuZzEwMzNcZnRuYmpcdWMx                           



                          WVkenbQyGJvcv9IuC0BkEz                           



                          AwMFxhbnNpXGRlZmxhbmcx                           



                          PIGfPOZ5klSlYJYcMOozKZ                           



                          LcFXjrLv9jdQAorScgEhMf                           



                          PTBrq2ieuxJKtnkdhTt8e7                           



                          hnFOFrVcJ5lQEtBOinU4tv                           



                          kmClcJPgU6GfuRKjjAi3z5                           



                          eiFcDyHtP7fLTyUFruK4be                           



                          lmYbrNTdOJDmNNv8oA26UT                           



                          MimT5dsSMjIJebadBiHsB4                           



                          FJzxAVJdWrD1RZJkqOCzTZ                           



                          JoP7qrTSFgZHvuMTZdLFns                           



                          wVUhIYC8aNckr4L6hFDccD                           



                          IrkPogKnLxBoCkPxHSh3Ej                           



                          CAm0zMibZ2NdXMOxGdA7fC                           



                          QgUGFyYWdyYXBoIEZvbnQ7                           



                          aOhwqnJiu55mvKTpVBQtJV                           



                          KiMrVfqWkwJTLeUCPTy2Ed                           



                          fExvNRR9bLd4vKzsRhtiED                           



                          I2Csv5TR0bxl62fhl3hRug                           



                          QKBzuodjYxZ0FWvlRUEzrz                           



                          xyCVc1LNuaLGZltBY4ADRk                           



                          sYJiY0KbGWAnLL9hyft2UZ                           



                          B9ZTcyRBKcHiA8YHEflAAj                           



                          VSHxwOspXNsvl312EAP5Ub                           



                          VrKO9uB4Htb3U0kJ1ddRVo                           



                          YJWmyHKdMqMpUWDcyg2wrE                           



                          CpBNqvu5QoLJW1heR6cBIv                           



                          dDPyYFOeSG73Orxls4PeYw                           



                          zwf7ByY56zhSM1ZKmcs6jb                           



                          GF7eOkY9xbXlWBjnb6byeY                           



                          7vErK8CUkbTM7zKV0cCBMp                           



                          fL6uenmoERBmFcHcuzlvAI                           



                          HkuLeubtNcAl5orBagQGT7                           



                          EVbpU8ymoK5sWxG9WIufA6                           



                          ejuH5bTPr6QKwptOW8HWIq                           



                          yT6bPA3eteqvo4arPMjkUA                           



                          rnHKPiynK3wdW8BPCglUPx                           



                          J1YikJ8hLVDbDE9odtsdz2                           



                          sdDCH8GLrjHMDoFVA2WkAr                           



                          NASxp0Bngnv0PeLee9QrwF                           



                          OxYDewR82mo520PEZdgfDu                           



                          K3ireQFuvxbqnCQrezpqWU                           



                          mkknO2HGHxVFBuRTpjMJWm                           



                          XGZzMjJcbGFuZzEwMzNcaG                           



                          ljaFxmMVxkYmNoXGYxXGxv                           



                          G0gpCgWjE9YsJRRgRiTjAV                           



                          zgXMfcpBTzvBGmeWE0eE8y                           



                          IO7aNRVvvMIlU8GpYNYwah                           



                          EmnKLiHYP4aVLxcBQlAO9h                           



                          MDlrzWHyt7epf3BdO1zgrA                           



                          bjtWH1HS3dGONbHDXdLTro                           



                          h1TueM9zIfsrkHYedlvxCO                           



                          xmczIyXGxhbmcxMDMzXGhp                           



                          P7aeJyFjMQCmgTpbSArqb6                           



                          NoXGYxXGNmMlxmczIyXGx0                           



                          cmNoXHBhclxwYXJccGxhaW                           



                          2rIwTeQtIjLlvgBR3qTKIf                           



                          I0rfcYJhLXHnWPKxO6mtBe                           



                          AxaK8ecXkkTDyjSrWiCaDk                           



                          TaMHe003lh0cHIGinMPsus                           



                          IXyVVkeG1tWWvjIQsmBOmu                           



                          vETcTWjiw2slFNZvi5d0cN                           



                          VbZEHlsyDbk1cdOGpjuvUp                           



                          FDAmdLTtsJAcDFVje79dUB                           



                          fjhLynyQhwUTJzh0QiuFcl                           



                          u2HmBhQmFAiza1FsT20bwK                           



                          JvbCBzbGlkZXMgcnVuIGFs                           



                          x99wj0yvNKSlAcT9zJOxzW                           



                          P9kAJgtUFaa5XowDksIMQm                           



                          k3ttERObsr2mbwvzhDGjy9                           



                          PvoX8scgreOSbhcFFaywZb                           



                          MMKht4a1wLZeWZDoPJRxLW                           



                          npzQk9AWXnc324wt7dbyF0                           



                          sEIyWKO6LSkqBCViHUNzog                           



                          UgZXZhbHVhdGVkXHBsYWlu                           



                          XGYxXGZzMjJcbGFuZzEwMz                           



                          NcaGljaFxmMVxkYmNoXGYx                           



                          JMfaM2fuQyHwU9DeFPGtOf                           



                          KxbYZpF7hcgZPaSTYzWMjf                           



                          XGYxXGZzMjJcbGFuZzEwMz                           



                          NcaGljaFxmMVxkYmNoXGYx                           



                          AMjfX3jjZwNaF0SiGXTeYg                           



                          IgIFxwbGFpblxmMVxmczIy                           



                          CZufergvRNGkKJbhC7wmMc                           



                          XxAIOisWnmWSpbn1HuXVIw                           



                          QYDtVxqcvnEiHOe7ehWiEE                           



                          BhclxwbGFpblxmMVxmczIy                           



                          BZkqhcjyULYgBHabE2wmRn                           



                          WsQYLluQbpTQxvp5LvYDBx                           



                          XGNmMlxmczIyIEltbXVub2                           



                          yfv3FjM9fdhLzjfVQ9DMLb                           



                          O6uifQZyqNX9ECF9yS4eWB                           



                          sokwXmCGJbi0FzAPMaCEYv                           



                          WnQ5tE1aJYQ9SxVZsOskPS                           



                          BsYWluXGYxXGZzMjJcbGFu                           



                          ZzEwMzNcaGljaFxmMVxkYm                           



                          VbLAXcBQtwG1fdKdIsL9Iy                           



                          TCMeNjBdiQaoOVkyBNv3Ve                           



                          xwbGFpblxmMVxmczIyXGxh                           



                          rvexAKGuZQiuF0esKiWlSR                           



                          ThlEnyBMjuy2RsIBVlXVMb                           



                          MlxmczIyIHMgTWVkaWNhbC                           



                          KQOE09JDSqMKZrtFcybP5m                           



                          tJBDYBQxrzN0w0W1HIkcMD                           



                          WmYLi8DHddnyMtAAKuwF8n                           



                          XBPvUI5oFCs0qgCbSHRzs4                           



                          RzWP7cMZGrhZRhUHK3AZKh                           



                          z5ImT7Ayb0NxNDMcYLJosq                           



                          1zumBsLjYRuSBnPMIhbk64                           



                          NSAuVP6pX5jjUSByEXUjeb                           



                          OxdAWaq9RhZUDcyLH8iDTx                           



                          XT0GCjCTz08rMFLwQAXLlc                           



                          UeGOElwYubbBQ3wbE9pT2a                           



                          LiBUaGUgRkRBIGhhcyBkZX                           



                          Jmmy9xafBlQPRmZHNgu4Fw                           



                          kNHgdLGpmoKpS5Jbr4GbCK                           



                          Imno34IYpdiQBibz08YN1l                           



                          I7Vhc0MbbQ7vNOzjFASww7                           



                          GyvUMkkZNuGPOsj4VaV5ff                           



                          lfimLAiinHEohA5sGQEqXJ                           



                          l8EJPxh3ZdYHOkv7FkZvWk                           



                          vrCuXCVoXMVkVJSpaF84FI                           



                          I8eBridAdzkhJnUY3fSLAd                           



                          ljCcFWRtEFRpkT6tMImonl                           



                          UnSBXkblZ7q6J3YLevKVVk                           



                          ktZmPefxUMA6gzLugvW8bP                           



                          TkU6misyhiQDroRJLyr2Lk                           



                          qK6tfCERdYCeo4VytESciZ                           



                          YWdQLdTU7nzpVmGU5hEEX8                           



                          ODggKENMSUEtODgpIGFzIH                           



                          A2WDwgRoqzJMB0kbJtVRFr                           



                          g4PcSUfbU7lwB39ufHugwQ                           



                          o5tKAgkQeigQSezYClNBUr                           



                          ymV7j3M3VZHkc8QynzlnIC                           



                          BsYWluXGYyXGZzMjJcbGFu                           



                          ZzEwMzNcaGljaFxmMlxkYm                           



                          LuUZLfXXczS4bcVxMaJxPc                           



                          VvsqFMA7eY==                           

 

             Gross assessment was [facility]                           



             performed at ([facility],                           



             = 2777)      Department of                           



                          Pathology, [address] [place] [address], Tel                           



                          [phone]                           

 

             Technical component was [facility]                          

 



             performed at ([facility],                           



             = 2773)      Department of                           



                          Pathology, [address] [place] [address], Tel                           



                          [phone]                           

 

             Professional component [facility]                           



             was performed at ([facility],                           



             code = 2779) Department of                           



                          Pathology, [address] [place] [address], Tel                           



                          [phone]                           



[id number] 08:01:38





             Test Item    Value        Reference Range Interpretation Comments

 

             Case Report (test code Surgical Pathology                          

 



             = 104)       Report Case: [id number]                           



                          Authorizing Provider:                           



                          Elayne Longo MD Collected:                           



                          2022 10:01 AM                           



                          Ordering Location:                           



                          [facility]                           



                          Received: 2022                           



                          11:57 AM  Services                           



                          Pathologist:                           



                          Homer Thomas MD                           



                          Specimens: A) - Large                           



                          Intestine, Colon -                           



                          Transverse, Bx mass B)                           



                          - Polyp, Colon -                           



                          Left/Descending, taken                           



                          by hot snare C) -                           



                          Polyp, Colon -                           



                          Left/Descending, x3                           



                          taken by hot snare D)                           



                          - Polyp, Colon -                           



                          Sigmoid, x5 taken by                           



                          hot snare                              

 

             DIAGNOSIS (test code = [id number]                      

     



             3220)        FuZzEwMzNcZnRuYmpcdWMx                           



                          IHtccnRmMVxlcGljOTYwMV                           



                          jyzqEcNAEdkEBxK0Ligipk                           



                          NZnjUO5oIV8kcAuxwDXdhU                           



                          JvFVPxUyAxx8nyy550vEKt                           



                          g0fyLUAWfjxagPm6yTamV1                           



                          0vf8H8QbljJ66oiAYcQDW9                           



                          INEpXAHxfQJoWPSwPVI3GS                           



                          EljTEdU0ooMVUiKI0uogns                           



                          MKdePLztNVMheFT7FDQdfF                           



                          LeM7EzKPUfBGklHTVkosk0                           



                          RyKeFi3zwDXqiGkwHEvrBN                           



                          BjBUNwTVbfOLSpBwLnUU9i                           



                          ZKDTR6LkSW9AEMYQCT9RMU                           



                          WBQoWQC7VOBtMEZAGZES8N                           



                          MI2IZ0QeRISCP8CCMIupcS                           



                          KiRXOnANRIMzFBP0lHDGMS                           



                          ZORPA3LTDjNQRj6HSFyyCE                           



                          QtBUYuKGHOULRXGPZUA8ES                           



                          N3QSQiAAQIQUWwvFEUcVIk                           



                          BccGFyXHBhciBCLiBMQVJH                           



                          ICZHClNYN1IQZnQkTJNXO1                           



                          LOAySTIopwG18FM70gKT0D                           



                          XNSaGGMEV8TRNIghaPLwRZ                           



                          SeODGKTADTZEHSQDVHTQ2W                           



                          MMPuHMUUHMcITD0YNABdSZ                           



                          MbvagkBAWmPu7lWGYEA8Dn                           



                          GH0NYXJNTY8EYZXHPDQHPP                           



                          2UES5YULIFVD8WIBFBMSmN                           



                          IHggMywgQklPUFNZOlxwYX                           



                          IgICAtIFRVQlVMQVIgQURF                           



                          Bf3ZNEglLvBZT53IUzQGFF                           



                          ilAGEcPYZnXUEOP0PXQVKr                           



                          G5WYFiPSJYUuXDTPCT8WQW                           



                          xwYXJccGFyIEQuIExBUkdF                           



                          AOxUEEVWYJwZHVvdA9lYYI                           



                          2MNDCAA9dFQeCYT0qKABH0                           



                          GKPsRFBFB8ZHURubnFYmLF                           



                          OiEBHRSKHGWA7UGMnYK3JO                           



                          SSPFDG1WAATyZABGVHpIFN                           



                          5URURccGFyICAgLSBIWVBF                           



                          AjIUKXSWEZVhBC2LWAAeLW                           



                          Xcji12THS2CaFmc7D1WTE2                           



                          MGPqJSTdg4qiCHJhbLRsTe                           



                          EwMzNcZnRuYmpcdWMxXGRl                           



                          QmVrd6ehj333vGRrr5beST                           



                          CtJyK8hQErZWMqiFXvG364                           



                          KEFwYFpqb4whv8RcRHIapE                           



                          Nez7K6TOLSuuujqSs5hPpm                           



                          P94sl7X9KhftD3xkVQGdLY                           



                          ErO9SlCG7jMTIkPjq3WLO5                           



                          BON4TPOqPVRdM1PyRE0oDH                           



                          OxpXMvFSb6c7kpoPxcLYWe                           



                          LUI3i5ojIZxgrwOeWY9puy                           



                          7vvPk8x2orqbKeLJDiHBLe                           



                          kLOCLARhW7GbzUuvJd6prR                           



                          v9pFtmRxfkGEN7Cfc5LE5r                           



                          ap13zbb9fDbnGIEbmembCm                           



                          F3VNmxRNFffjzwJJn6IMpu                           



                          WAJscSG7IIOquHCmG0NvPY                           



                          YxFZ2autf4UGH0UEhjMRTm                           



                          TrN0QNZvpPNtRKEzlWczYA                           



                          xpz221VUE7RyQgOO7pS9Df                           



                          g6N9uS9gmAReXKWkrUUuOo                           



                          QdBQXjkh9geNZaRCmcm6Sp                           



                          ESV1lpQ7yIMriSKgJFVgZb                           



                          Q9PMivEE3seu86XAPoGOK0                           



                          hg8ggDBkkNscgxUvvRLcXF                           



                          azL5GuBXEcm909ODHbF4Qv                           



                          FOIyh7T6qaKsPsFmUOZguM                           



                          I2aaQ4HQVvYE6vdmazm9el                           



                          COxhQYbsPFXomhJ6wqC4RO                           



                          NijZNhK4AkwK1fHXKaFV9l                           



                          fqthp9zjEKH4LGmhEPNaAO                           



                          D5YmCoDHXmd7Xiqcs8MgOj                           



                          a3MisVIoOSpoE19cv916GQ                           



                          WqreDwN7dykZJlpnpyjNYh                           



                          ibsdSDzugcH7CSVbNPyfgi                           



                          vcPJRoSJgyS3tsFbIcFUSm                           



                          uDtsIQuoi1WkHLCyMWOgTv                           



                          ExlBHqXNOcQji1IHFszVVn                           



                          MJMmUdBkE0xbneubAvDEIR                           



                          Nwd4wjM4kiyVDXjRYrY1Wp                           



                          UGhvbmUgTGluZTogNzEzLT                           



                          r2WO86BZphZURybb00                           

 

             COMMENT (test code = [id number]                        

   



             7451)        Oog9ytv2WamDXwrJQdMUdz                           



                          wZRhdnXqyx15gNR7zM69PZ                           



                          1aJWLyReA1SFJzprP2Kdf8                           



                          WWLeLBBsiNPxC416z1cjv6                           



                          jubtGeuTY9tLmzOQOxrnuz                           



                          LvY1AEppWMYqxiccMCb3SX                           



                          dvPPDfnLT9FYGblGKzV6Bp                           



                          TSZwDR4wyzi0ADI5RYdrMX                           



                          FrMpL1IVMjnWXqMAArtCnv                           



                          QWgwd521FCK5UeTuZQSpwg                           



                          QqlRspdH0sTxWdENRXjJ5p                           



                          ayBBMSBoYXMgYWRlcXVhdG                           



                          AnnLBdx6WgI6FtnFDpEFFk                           



                          uWnqFc2jASP0mFXaFDOxap                           



                          UuzUkcocacg0V3RMgsqk6r                           



                          cGFyfQ==                               

 

             CPT Code(s) (test code [id number]                      

     



             = 3357)      Fgj0ydh7ObsLVycFQnOBzh                           



                          uVWxznUhaa99wSW9qN78MV                           



                          0gMPNlRjH6UQVfxiD2Bnr6                           



                          WSVzYBWuiVMiG832x0bvf7                           



                          ivbtRkjJQ6aPytBJCndzhg                           



                          BxR2GGcuMLRmkoliVZb9OR                           



                          hgOGQnrMG5YQXtmTHyH5Or                           



                          TASyYB8qzcv6AFW9HKtgON                           



                          WdSwO0GOTxeJVtELHctTsg                           



                          WCfcq222WNR0DwIcNHImrz                           



                          YaaHzvlQ6uKtPxGCA9VQZi                           



                          TYL4COJxXRr3RkRhKWY8IF                           



                          Z3CDS6RFKhtBIqrJ==                           

 

             GROSS DESCRIPTION (test a5qbmYJiLAHdqUS4RaCfBM                     

      



             code = [id number]) Ixe8lrn9KwnOGqbHNjAAkd                          

 



                          jMMckxRgmv93zGI0jG68ZC                           



                          1eJNQbUxF7ZYYhmdS2Tnw7                           



                          GPSrIBMvkCEtQ875m1zdm3                           



                          nhpwFfwXE3FARtQLChZ1Ix                           



                          DE7cRZPukVEoZ66fcRWrWU                           



                          B0FSWpKNQzgVFuICPeBSF6                           



                          JLRtaZPrK2xjNMCmEU0lbl                           



                          xvOYrkSMpwZQXkaNR7JHZi                           



                          gQCsC4MkYNLmQDbjCPAklf                           



                          d6CvReRo2hcYGgdZswQWqq                           



                          DSTco8pdHPFqtBJvKAT7XR                           



                          fyzHArUZWuUOMdLAr5ZJEw                           



                          GHnuaPXmVA6bpOwyQteutG                           



                          jww4VhqAVgQUbiCENyQLEa                           



                          PTjmHKDnK4KJTPOwZbX4Tb                           



                          R6OqGrQQa4JFe7VU7QBnUo                           



                          EPVeKWRsSFW0OMXoWXl9CH                           



                          fmLN4HFCB4RsQ7ITy4AMU8                           



                          NTMyMSBcXHQgMiBcXGZsIF                           



                          ogFkTYqsldzOIlWI7eySkc                           



                          bGFpblxmczIwIEEuIExhcm                           



                          ueJZnflOVwqSurEOwkM88f                           



                          v51qPCXPtwOhu8NuovOlPz                           



                          xwYXJcZnMyMFxjZjEgUmVj                           



                          PYw7HAUzyF6vEl6cbDGpxP                           



                          0udXDoYPrbXZU3xUSsEJMb                           



                          JPIzRNRbSP39CCdlIVGdph                           



                          FtZSwgbWVkaWNhbCByZWNv                           



                          cmQgbnVtYmVyIGFuZCBcdT                           



                          hvWpScNBi7F4wnurwtYMhm                           



                          rHKvvWnwVG3yl7ondu66in                           



                          Tqi2YqwxDyZNJxp9YlqUYi                           



                          ZLGdDeWuziCeN97th1gszF                           



                          Ran2RluWGiwYfnzUAcrZVj                           



                          LXBpbmssIGlycmVndWxhci                           



                          Qwt5O9IDBse2U6KAYuxpFu                           



                          kENgiCQgCSPjPJG3YEDsJJ                           



                          S3CBTfOxRrqOXophSsG3kk                           



                          ZWdhdGUpLiAgVGhlIHNwZW                           



                          MxuQMoPDlsQHE1On9upJVt                           



                          OQDrhmC2e7MbOIzpVSFwNw                           



                          tvMWKuX5ObI3DocsOrcGWh                           



                          UIJgctEky0icXEX3EHEwvX                           



                          PsqYWmAwZsQxjzCVF4e0ur                           



                          ZLSgoWWqDER1VSuzaWWoTJ                           



                          EwMDIgXFxkYiBPVlIgIiBa                           



                          XrTgEZA7NxAfCOu0RLvpM3                           



                          HRKZMoOPG5SBN0EimxTqX5                           



                          HFz9LASOHo1sSQG5BUvtNE                           



                          Q7JKL6YqVlKWqdhMUgVPwf                           



                          ZmwgXFxmIEFyaWFsIFxcbm                           



                          N3EVHhCbPgQ1CzDVOqSLTg                           



                          zShxCWRZb1mpfjTwMSxhVc                           



                          ZsBOGoD8RjRCgpBv2emBFf                           



                          ZGUfWjKxB3SwFJEmG3Uhan                           



                          HdVPzgXANrek7kzRozFUkn                           



                          RkKoZTIdi4u5xNG0iHJtyP                           



                          L5eFMrpKaxRyKyAE1bcBLf                           



                          KJ0cZHscQZwufuGvy1XcWK                           



                          11eCIffbVspyGlOLA5OlLu                           



                          UKouNXDjq0b8oLfjY79no6                           



                          0ifEAnnZNzGTNnXX0psK6y                           



                          BARih1IxIEH7ZPedxRWyiu                           



                          KsVHRgGF1cFJBijpNjy7Ti                           



                          DY3gOB30dXAxaUukZFVzug                           



                          1vmT1lBRNnxoPhY2GsWJJd                           



                          IP97d7pfYWEnFsWkpZnoe4                           



                          UlCABsYDcgWD44OVuhUxBb                           



                          yMYtZmGjqQEbXiCfH46soG                           



                          4qCAdbcsNcFAYaOU9cMMUg                           



                          YSSnqUDkgD7nasUvvcHucR                           



                          LceZK5QANscZ0qzX25stMs                           



                          nkVVHQ21BHVxsLXoATU5EP                           



                          0vMUIajrgxQWUpRQQqCCO4                           



                          AOfebG85aWAuMNRaVCMqrP                           



                          HttHriZavpiSihz5BimUFs                           



                          XGlkIDUxMDAyIFxcZGIgT1                           



                          QLIJFwRaD7QqQ4AxXcEOv0                           



                          GIk4IV7FUlCpBKLzVJClMD                           



                          J6TmJvDCd9YDueUD7MQSX4                           



                          BeS4MEhjUYN3UBHhUHSeLR                           



                          QgMiBcXGZsIFxcZiBBcmlh                           



                          cLOaPT8ysEzvbwUqCIHeCM                           



                          SDXmILn7s3pGfeK78vf75h                           



                          TOAYEXZ3F4Gdo4YkkyKeop                           



                          cuXHBhclxmczIwXGNmMSBS                           



                          PNBfhVJwFIYsskHgp6FvBJ                           



                          xpbiBsYWJlbGVkIHdpdGgg                           



                          oIkcHMNcpWgxmmWgK4A7rw                           



                          SeES4yUTSfFWAwW8EoYAYj                           



                          R58aSWIeeG2rMREtSU4iZV                           



                          q3HZPrAFWgRermwJ4ueQKw                           



                          NOMifN9dYWsxAhJlSVOpQ6                           



                          KzUWugPfF3GdG0GUjsjoZc                           



                          nLUxf3Scr06lweCmHJOiFQ                           



                          Lux10jfWG0yhPnCdXezMx2                           



                          dFIlMSC0SC9zjKQyuO50TB                           



                          Podh6mTQIgy9B5HVDme1V6                           



                          ZSBmcmFnbWVudHMgKDMuMC                           



                          U3CZBbRYT4QUYwPCYjuXAo                           



                          ruEmR9kkUOwgoGEwGzChBN                           



                          ykDOwufappt9Ung3OjlCQu                           



                          s37pjLP0yXWhvTBlUyUiT3                           



                          3fjlBjmMJqRqqsYLL7MQYn                           



                          xN2pf2mkisT6BK9pdNtusu                           



                          RqaPFbj0JgNeUdBA1zYGDn                           



                          STPijMBiqP6rptTdecCxaF                           



                          BxrEF6NXJxXC39bGAioEax                           



                          gQ6jXwZmIgTmIKVcvuclQW                           



                          HcDI5pLKYvAMH5UKWbipXG                           



                          gCEhVQTwx3VrqJoreuLjWN                           



                          WanO9cnOZrLVFgehGDYPZ8                           



                          sO6mMBGnEKH7DHDoksGMRE                           



                          gwF79wbFS8kUHlbRLeYiAg                           



                          H26txuGuLMAbjtPBVyefE8                           



                          NyvQVgs43nkTT1sFQrdLXf                           



                          BQShc4RlqUMkq8lydRjzf1                           



                          VjdGVuZFxwYXJccGFyZFxz                           



                          tN2hObVlg7hbrRv2RFtdux                           



                          D9WRKlrk62PKduNOXbI8Rx                           



                          E4MxVTmnRFG9FMMdVzHqKQ                           



                          EaYG3KRjLqCQySXKLdZAg9                           



                          GoP1PZl6LFLQPyFjAbUrQz                           



                          dkGNyvOTRcTEl8EBg3KDiR                           



                          WoD7ABS0JnD9QrCkDMPmNe                           



                          BqNSj3IMWcAOgezTNdVLAm                           



                          OUWaPCiiULeyR37zYvYxHD                           



                          qcDiSmJN3eYA1skEPoSNKu                           



                          nF9zSC8vY7fphV9uKA3cuC                           



                          UeKYZjRhEmZ5XqVJRqK0Po                           



                          jsSqHNcaDLUtbd0ceTkdIZ                           



                          bpWsRmKMNiz6g0bYG4mOSj                           



                          iSB2aPMueKlxRaKkGZ6rsY                           



                          OyTJ9dGGopPZzmfvSsf7Pj                           



                          HV61gWGwgrMnwjWkKTG8Gp                           



                          HqHSojQLVlp1h2xWipN18z                           



                          a55gq6ymwR2aGPS1PYI6SC                           



                          nyxkKhpXYtp2Qmz38jnnBa                           



                          HTKePZYzp24tvIH8eyPjIb                           



                          NhwSw9sNLhYFG6DZ4fqQti                           



                          vhyfh5EbyXYdHTCnQYAbR3                           



                          RpPRTsPLXti3Z4GFHgx5J0                           



                          ZSBmcmFnbWVudHMgKHJhbm                           



                          jsnwjwJnSglELkXyVfF73c                           



                          EYSfKvmpF31ugA9vY9ZoYW                           



                          Zvf2NjXCfiGP2vgM4xTEU1                           



                          LjUgeCAyLjEgeCAwLjQgY2                           



                          2noO4wNNfbkoXfHPQxWE7u                           



                          TQUcUFWuHIInLTR2ULSyZU                           



                          UzZ1AwKKJrQWYlb8K7GYTl                           



                          e0M8VTKygbYuaFSvqAXcpd                           



                          AhnNClozejHMH6DJOwdL0m                           



                          c8olyhN7IJ5inXmhlafkDj                           



                          3gFFaxyYZvf3AqeWXjlPXp                           



                          Y7P3RMW7ltNnX4HoMRSFdE                           



                          Xkb8MnI2nfJE3sxJLdf2Zp                           



                          jHr2dTTuQULzcDfpZCd5JB                           



                          luIEQxLUQzLlxwYXJccGFy                           



                          LBsex6XfwWDXLHbrsZSbvd                           



                          AEgDm5MBobRNOxh3J4EGNw                           



                          pOrjCVAdP9IeE3YmggV3d6                           



                          bpjDsbk3UkeZZlIA1leEOl                           



                          fQ==                                   

 

             MICROSCOPIC DESCRIPTION d5xtyOMoZLTagSV2HnPpAN                     

      



             (test code = 3371) Nax2dda0AmrYPcaMKxEFwy                          

 



                          yFMlfhEnzv89pJR3eG36FP                           



                          0zACNeCkO3YEBhmhE3Eex9                           



                          LEAnLKBcnKReP686b6gki9                           



                          rzcwKxqIL9gVrdCQLiaqxl                           



                          XgV3SUuvOHYyzvqnAUi6MT                           



                          sjOQMaeGZ1BUTfmMVaP4Bt                           



                          SGAcUP0jnqc6BXD0VBzwIK                           



                          UfWjP0GTTemMHwUWDkcQzq                           



                          ZFndz527MUT8VgLhZWBfyw                           



                          IgcPuopW3hNyRcHYDNvW1g                           



                          yHCpnRh6m1pxMPQxIQHnmK                           



                          XlyM7elkTwmP4vw6TjTDAx                           



                          GIOzu5EvXVDzy43xrPNxuF                           



                          NPJYJnCNM8ISFnFZ3tX9O6                           



                          rHEkIBegRTY4rE4nABAobC                           



                          alJAebeNdwa7ZmiQ4kCOFh                           



                          TXB0tXQdZMLybUTcrTAfOX                           



                          XmKFVidtAqu4oae8LacN7x                           



                          bmcpLiBNSVNNQVRDSCBSRV                           



                          VXWTWwJO3JEwxwSSWBCPNX                           



                          VjNvl77bFIYizLLYTxzhHV                           



                          QipUktUE9ooB6zuEnevT0u                           



                          jYBytDP4yjzzijAtqOs8xz                           



                          iieYBpQNPyKVISF1arMqyb                           



                          WVVqBA31WLT6ZQTlRGHiuX                           



                          CjOCRsZGE3eDKiu6Lyx64u                           



                          wQVfEL6STN32TmJmHfIDvv                           



                          NeN1YnUeRuEW38D3lmMEIr                           



                          FRfvutTko5impolcUSRpVT                           



                          zIIYM2RMjhPDoklZTtoXeh                           



                          MCAgbnVjbGVhciBleHByZX                           



                          KxuP0lQPJgkiUAULCyVkfx                           



                          UUZyRD22NJN2WIMoRQMaxZ                           



                          RuVRSbYSA1oNJyp6Wsv69o                           



                          cGFyXHBhciBJbnRlcnByZX                           



                          XukXuempqkiAHvVWBtTq3x                           



                          eL7aw1htWHRfz7UwdnPdfP                           



                          AmrcChhIZkDLEnbI8sZB4u                           



                          QV9OMjFysg17IWljfxzeSM                           



                          OktH10VNQwp1DrHpranYM7                           



                          NFYeQWWzXkJrkQHqz4FtuC                           



                          HflHc8FTDpduG5YZSorCg9                           



                          nA0qmLwkERhVW2pjXJeiAK                           



                          xwYXJ9                                 

 

             SPECIAL STUDIES (test g1qdvIXmJFBbs8gcEUZzzN                       

    



             code = 3376) FuZzEwMzNcZnRuYmpcdWMx                           



                          LNsrdiGbCFavn7FxM5FbXe                           



                          AwMFxhbnNpXGRlZmxhbmcx                           



                          EHBrWNQ8zwFcLDJzIDavCS                           



                          HhPCgzZu9nrVDyeQzoHtLw                           



                          QZXhy7gncjAOqlccpDl4w2                           



                          uwLOLnOgT6iSJoQYcpM5mg                           



                          deMetJJoQ1TyyWHpbNr7p9                           



                          kfYtWuVpW1kJDbHPepW6ad                           



                          bcExmXFjWEMlICr1eS21KI                           



                          CylV9zjTBqELflnwYlXfB5                           



                          WTdqDGLiSfA0HNVjgWXnUL                           



                          QkO3umYQScXElrQOGoWYxd                           



                          dXQdPWS9pVmku8B1cFTbjZ                           



                          PujGdzCgKsKzCaYoEAk1Ac                           



                          AXt9oPnzP3FrCRVbYlS4tV                           



                          QgUGFyYWdyYXBoIEZvbnQ7                           



                          hQbariEwb86cwFKbZNOxGY                           



                          KqDlKhgOzpSPGbHBLFi1Vn                           



                          jHxgQYQ8qJm5nJgpGkpeGA                           



                          X9Xge6LW4rta68qby3sEph                           



                          ZFYjqikxMvC3HPqbOXGdyp                           



                          lxPRd4JVnwUTMvnTH9XVMw                           



                          kKAlB2GgMKXaLW8xyig5YD                           



                          J9XNwbBQVhOqH3JXVplVOh                           



                          CUApaOivXEvrl017DHN0Al                           



                          CsHK8wR6Xhs8D4eT3kcSBi                           



                          URRnmRYwRxJbEZFjfk8kmO                           



                          KySIctw7LjVGZ0whD0eVCj                           



                          aIHyRDKgRC33Ftvzt4QcSf                           



                          rpl2ZoZ34ahWC9ZZeyt2ie                           



                          QI3iSxS1ghGjPSibw7tnmL                           



                          9sQeE8VPiqEM0wYF7fGESj                           



                          xY0igadgZRUoSuAaiagaMQ                           



                          VraJhesjKyMv1ayUjkRJD2                           



                          KUlaP0drgP3xElH1OFbcY6                           



                          agvC5aIUd7UEgunEV8LRJb                           



                          cS4dCN6tzczlu6bcYYkqMA                           



                          ntHOVrgtP1uhL3SPXppPFz                           



                          C9AjaJ9xPCAvSI7tezyvm9                           



                          jbBJI7KKzuUQToKHB3ZmAq                           



                          UPWey4Hmdos9BbWgw7YhsO                           



                          XiFPixI65yv436USTlzbCr                           



                          U5jwpPNrsoeqxUJlzvhpBX                           



                          wlabB6UQIoFKQqWTidZHOp                           



                          XGZzMjJcbGFuZzEwMzNcaG                           



                          ljaFxmMVxkYmNoXGYxXGxv                           



                          J1ldAsDpB3KpFXLoDsUdAE                           



                          epNFvetZSbwJBqhBM7mO2h                           



                          TH7hKMUtmKXpY5DzFTPbls                           



                          AmfRBgDAV4kSCoeTYtJL1r                           



                          QRjlxZHsy4fzd5SaA0nnbL                           



                          ghxAP0ZX4lQQIsFINwCSgw                           



                          k2MufG4oQtvpeALkyqmqSK                           



                          xmczIyXGxhbmcxMDMzXGhp                           



                          F0kkCjBhYQStzMmlFXfmr1                           



                          NoXGYxXGNmMlxmczIyXGx0                           



                          cmNoXHBhclxwYXJccGxhaW                           



                          7wEpXlPeJtFfpqVK4wXKIk                           



                          V6mhwXYaYWQbQTGyH0deTp                           



                          XkoY5xcNfcHPuhRcLjKlNl                           



                          GiNRv268jg9zRLEvdFAmnv                           



                          FXyWGaqT7aWHncZNqmRLtx                           



                          kTBzNIpwd0whBBIns7l3uA                           



                          IuMCKrzrOft0tqLYjbcxNj                           



                          FCZmkLBpyBBmECSub51mDO                           



                          rxpLeplCxvVTFyl2HkaTjw                           



                          y7FiXwTeVDxin8ChZ69jbG                           



                          JvbCBzbGlkZXMgcnVuIGFs                           



                          w86ei5txXGKsLrO5kEXubN                           



                          B4rTMldFXfd7PhySgoXQIy                           



                          l8umYWKiwc7wvcjnnSSlm6                           



                          NwpK2qbmcpTJckmPDhzzLf                           



                          GCZqx4q5pKVoWWWtIZQtYF                           



                          fipSb4FYGjp980sd5agbP6                           



                          gXIeCDR6OIluVWYaVNQdsx                           



                          UgZXZhbHVhdGVkXHBsYWlu                           



                          XGYxXGZzMjJcbGFuZzEwMz                           



                          NcaGljaFxmMVxkYmNoXGYx                           



                          GXxvV1teNpKzP0WvKNJgUm                           



                          CtnPRdE9ghwXHdBHObETfv                           



                          XGYxXGZzMjJcbGFuZzEwMz                           



                          NcaGljaFxmMVxkYmNoXGYx                           



                          QSnaT4psElHzJ6HdOFWxEo                           



                          IgIFxwbGFpblxmMVxmczIy                           



                          TXxghzrwCOSeSGofO1dlJp                           



                          NyZYSisPodIVaxf6TgZSDb                           



                          VGTcHtowlsWvIFy7fqIiOG                           



                          BhclxwbGFpblxmMVxmczIy                           



                          NMwqbuwhFNLdUFjhI0piAq                           



                          CiEBVywOruUXapc1HlJFAl                           



                          XGNmMlxmczIyIEltbXVub2                           



                          mwo5FpZ3glhOofzVO3RYQv                           



                          N0bzgCPmkQI5MGU7lT2kTN                           



                          riezBaFYMja0PmOOUjKNEd                           



                          TrW9oI4wVLM2HpOQiCxkAK                           



                          BsYWluXGYxXGZzMjJcbGFu                           



                          ZzEwMzNcaGljaFxmMVxkYm                           



                          YnNLYoWVyoH9qsWrKsM9Wr                           



                          FAZiWkBvvChfLYlfVKw2Mh                           



                          xwbGFpblxmMVxmczIyXGxh                           



                          odyvKFWcVQnyO0fkUmNsAI                           



                          YwiBpyDHlcd5CzLVRiGHRg                           



                          MlxmczIyIHMgTWVkaWNhbC                           



                          QVAG82OYMnJTUjqLwveH5m                           



                          aQKVHEGolaQ4b0M6HOnwAU                           



                          VxAIk3XLuyipKuCMXffR4w                           



                          WETzTU7mTFz9xxNuNULzc5                           



                          DxWY0pEXXvmZQzNTI4FCUk                           



                          l8OqB1Aqs0ClFJBcGQPnji                           



                          4hcrCgXcWTwARjHWLvcs98                           



                          ZGJwVC3jK2kaEETvXRFtxo                           



                          PeeRLkn8PjEYAzvDL9lNZn                           



                          PL0DOkJKs60mAGDoUOECas                           



                          XsQBCeyAtxwYD2vrE1jM7n                           



                          LiBUaGUgRkRBIGhhcyBkZX                           



                          Bodt5ocxCuCECtCIUac9Wr                           



                          xGIzvTNnmqZhR5Xxe0ScGK                           



                          Kfvb40JRvtxHScdj64XL8k                           



                          Y7Qdr7VkcD8bWGhbNBNrc5                           



                          YnwUXzbFHfNEWxo5NhQ3hx                           



                          qbrmOWesuLPapP4vRZOfHM                           



                          c0PRGtf1QqEAWre9RmBhLp                           



                          zoHeZFCaMMUzFROswN87MN                           



                          D0oJeouTzjsbFcFR3xMPCf                           



                          peYeJDTyFVEieO5yHSbypx                           



                          RnKQHgplD2d1C1FPzcGAQt                           



                          acYzPtdoUPI9kbObkzM6bH                           



                          TiX4gbivnyROowTBDxf1Sk                           



                          sF0rgAYIcDTcg5FlxMVwmZ                           



                          GAnEWjBM9thpHcZC4fLAL7                           



                          ODggKENMSUEtODgpIGFzIH                           



                          L7RAdvEcipHEP1ocLjCDWc                           



                          i0BoVBhrS5hoX40kcPnprM                           



                          o6iCZwmQwdmWBzfBRyGHLr                           



                          tdG6t9M8MGVxo5QkdvqdNE                           



                          BsYWluXGYyXGZzMjJcbGFu                           



                          ZzEwMzNcaGljaFxmMlxkYm                           



                          YaOAGgPLdrE4ncZaTvFdDl                           



                          YsjyRTA6zN==                           

 

             Gross assessment was [facility]                           



             performed at ([facility],                           



             = 2777)      Department of                           



                          Pathology, [address] [place] [id number], Tel                           



                          [phone]                           

 

             Technical component was [facility]                          

 



             performed at ([facility],                           



             = 2778)      Department of                           



                          Pathology, [address] [place] [address], Tel                           



                          [phone]                           

 

             Professional component [facility]                           



             was performed at ([facility],                           



             code = 2779) Department of                           



                          Pathology, [address] [place] [address], Tel                           



                          [phone]                           



[id number]01[facility]38





             Test Item    Value        Reference Range Interpretation Comments

 

             Case Report (test code Surgical Pathology                          

 



             = 104)       Report Case: [id number]                           



                           Authorizing Provider:                           



                          Elayne Longo MD Collected:                           



                          2022 10:01 AM                           



                          Ordering Location:                           



                          [facility]                           



                          Received: 2022                           



                          11:57 AM Services                           



                          Pathologist:                           



                          Homer Thomas MD                           



                          Specimens: A) - Large                           



                          Intestine, Colon -                           



                          Transverse, Bx mass B)                           



                          - Polyp, Colon -                           



                          Left/Descending, taken                           



                          by hot snare  C) -                           



                          Polyp, Colon -                           



                          Left/Descending, x3                           



                          taken by hot snare D)                           



                          - Polyp, Colon -                           



                          Sigmoid, x5 taken by                           



                          hot snare                              

 

             DIAGNOSIS (test code = [id number]                      

     



             3220)        FuZzEwMzNcZnRuYmpcdWMx                           



                          IHtccnRmMVxlcGljOTYwMV                           



                          yzapRjCEMddFVfT2Wrigzi                           



                          WVwwZU0cNS5bsTedkYTihJ                           



                          SuRXNnYaYuv1wgx963mFDn                           



                          x4meIVVWdrozpLj9nUhuK8                           



                          5zt2U4NzxzJ84dhCXbWNB4                           



                          VNEaJGQxpOFkRHZhDUG2KK                           



                          ZacLRtW2kuLXZnWG9xdgfn                           



                          XFmyLAfdZMLmqHO2PGQdsU                           



                          CfV5JeFASuSImjMNFfktg8                           



                          EuBlTw2ijRQhzJouCUdpAL                           



                          NqJJHwZVgeXXXaVaGnCT0r                           



                          TQLQZ0NfGF9MKBULHK1CBK                           



                          PMFiSDG0ZKHlLTITCNAV6V                           



                          DA8NV7IgQIFZT7OEVOycmT                           



                          VwQQTqMSEVUbGHM9aAIDOP                           



                          RWTBL6QKWkRJXp0JLObsGD                           



                          TyHGJpWKXUMXCOXGXOG5XT                           



                          B4RXQpNFIYVUUhdNRJkKCw                           



                          BccGFyXHBhciBCLiBMQVJH                           



                          THAGKpOLZ1SMCfUySDXXO3                           



                          VTMsTBJzzxN18FX83jVC1X                           



                          QGWnBTYIV7EYWDzpbIDgRM                           



                          ZjPKYTFWBRBASBTSHXXE5P                           



                          TXRbLQDHZXkADO9KICAiEM                           



                          AtgvrdMQCuCx8qKKNTV2Jt                           



                          JS0EXAYDOW7OJPRHLACGNA                           



                          2SJL1SQTJJOD0TJWIQUPnY                           



                          IHggMywgQklPUFNZOlxwYX                           



                          IgICAtIFRVQlVMQVIgQURF                           



                          Ct3AVOqaSqACA78VEoVJOS                           



                          tdWVCrONXeSPLYR0LGYDIi                           



                          X7XNFeOKJQAeVQEBWI9GBF                           



                          xwYXJccGFyIEQuIExBUkdF                           



                          ABjGMPZAZUjEGCtiD8hXNW                           



                          4IBSWZT0ySYzTIT2wZEDN2                           



                          ITOpPVONA7SAAQgtcUUtDD                           



                          QeGVZYWXHBHV1MCFqEY8NH                           



                          FZYTZP8BQERpHEYIVIdDWM                           



                          5URURccGFyICAgLSBIWVBF                           



                          UuMBCURAZSRbQU4XSKNoND                           



                          Dbzd54VXR0GcWjp8I6BQW2                           



                          LNNeBSEul1giEOOuhUVmZw                           



                          EwMzNcZnRuYmpcdWMxXGRl                           



                          QyRae3oka764qKEth4jmPI                           



                          JtNgY5mUJaUONiwVXjH073                           



                          XUHfQRtoc4ccj3WlZFYctJ                           



                          Hnj5P7ALCQcqaifBw6qQda                           



                          F21xp8J6DemsV3hgUKHsEA                           



                          EsC2TgYB6gBEOxDrk1BED7                           



                          UMN0XDPzTXTnK9YwCV4tUP                           



                          UzvTAwPEl4h0iaiQtpGBRk                           



                          VWV3v0pdTMcqcbWqGK7jir                           



                          0yrVp2g3lbteUpONQuFMZt                           



                          zMWOCQToM3VhmFgjRj2nqV                           



                          e4pEdfRnynIQF6Uqc1ZB0g                           



                          bw33jcx7zHptNUVigzdqRg                           



                          G6BJklFJSxiymcNXv3PZad                           



                          SFGnhVS2BPBpeLJqT7HcHN                           



                          FnAS8tsdi9KEU4VUbjMDFd                           



                          HqF8GNEnoGEjQJGyuDgaEC                           



                          kil098FTD0PmNnHN2bU3Rs                           



                          t3H2rE3isDQzUTGegVMcDs                           



                          BxCYEmwn6qtWCwTSxyz8Fa                           



                          IFM0ciC8tXEsbHZsEKPuTv                           



                          P7CMunUL8ggp95LKLjWIF2                           



                          lt9ziOYqxNiykuXpgBPwAD                           



                          jgN6GaUCUxk480VABxE2Gi                           



                          ULHnv0P1jeEzVbHtNPHmoN                           



                          M0pcB4VMLjDY0zifgsv0pb                           



                          YXkrLJcnXDXtwxR1qjX5BN                           



                          GwbBCbK7LiwI3sLUPbCE3r                           



                          upoaf0bdHGE2CYoeCHAcZR                           



                          C2PxKePDShl5Ddcby0VeZd                           



                          a0DmeOFqFFqeA64dy306VH                           



                          FynnFbB9tblHXoaqeyuEPk                           



                          zcnyVGznkfY2MHHaUNjuof                           



                          rnAWEoLHtdR1jlAsGkBNIg                           



                          fEjiUPeub6AwVOKiWIGoJb                           



                          BnoPVxTIVhYup7YBAqsWSc                           



                          LKKoXiOcD7piymntSxAOWU                           



                          Vyy2pqE2frtQABzKIoA5Tg                           



                          UGhvbmUgTGluZTogNzEzLT                           



                          d2FY12JWeaBXWppq99                           

 

             COMMENT (test code = h2havNTvPKGrjVB3BaLyOO                        

   



             2053)        Hok9xpp5GhaVZmdENdWCgw                           



                          hAZcylKzlo74uSP3hJ24RF                           



                          0pLBWoQlD2YSJikdH4Poq7                           



                          GUNxFWVkrBZdC042a9oce4                           



                          qexlHtxXI2rKdrPTYnghyb                           



                          ZxJ9BNjwCJTxpvahGIl8HD                           



                          xyWGMsvAR3TDDbtDNkT5Hj                           



                          XWDiXA0dynw0YGU3PDqvQT                           



                          FdYiB5OVJorHRgWTFreSsi                           



                          QQcpv054EQA9LgUfFTWbyk                           



                          ZnjUgmfF7sXgUwPFSTiT9c                           



                          ayBBMSBoYXMgYWRlcXVhdG                           



                          DprNNuf3UlN9XdxGQbSGXu                           



                          gPwaCk9jKVP2hNMaCFHaka                           



                          BfdMivqizzb1I7KPntch6v                           



                          cGFyfQ==                               

 

             CPT Code(s) (test code s8otoYDwMLPrnXC6EaGkXA                      

     



             = 2793)      Cnp4tif0JnmZWzkFCyVPab                           



                          iXFjhlWnvf06hSM8sC15RF                           



                          7xVEWsGuE6VWWwhfD5Dys4                           



                          VAYxFAEaeSYqD674m9elu3                           



                          qhtpBpcZH7oDpwLVLbqqlm                           



                          JmB6HFfeTEWdmihuEYc6DU                           



                          fcXSQxoXT1TVHjhGFuK7Rc                           



                          WFIiAN0myla2HKR5GFbzAS                           



                          LcUlG6GTNusUBlTQJeoCpr                           



                          SYycp091NCM0IcSjEJEsqd                           



                          QspAxvzD7aBnMrKEJ3GRJa                           



                          NEV0SXEmLCt7QeHiIUJ7YV                           



                          U4PCM4SGHnfMLenF==                           

 

             GROSS DESCRIPTION (test d4gruEApMKSwxEA9EdAzTO                     

      



             code = 2680970830) Vhn7lfl1RlvXXveAFpEDgf                          

 



                          kTTefiKrpb14mZO4vD96ND                           



                          9hUVYdIvZ8PHQpnyQ7Wtl1                           



                          IMAjDWPztPSjQ704j0xey3                           



                          lxmbUehON3AWWsFYLeQ1Ap                           



                          DE7cKBJeeGWcA98sqBVyFU                           



                          F5YNXgUDLbcSTzEQIuLMZ5                           



                          ZFRpbAJaJ6bzEAWdIF2lgq                           



                          yuSZjtVKakDWJjcMU9NZIi                           



                          jOMpW3WwUGIcVYgdQNEaqq                           



                          j7JrKgDy1neGXxcAzeIIxl                           



                          BPNbo1zlIPPzpNZjMOV6KG                           



                          oigUAnIIJlUQFaJIt7QOMk                           



                          IJzqnCHmSJ1ftNdeQkkfiT                           



                          pym0YanSKcYLvfUYCxFGIb                           



                          GCjxQDVbH7PDZLKlGrU5Ir                           



                          Z9QqPwUTd2KSa2PG9QRnOb                           



                          OIAcOLLhDFM7IHEkQRr7WZ                           



                          inTM7YGBV3MhP8JYm0MYV7                           



                          NTMyMSBcXHQgMiBcXGZsIF                           



                          yfPwZYbzvqeGGpBE0sdWoi                           



                          bGFpblxmczIwIEEuIExhcm                           



                          hmKKgmwNUjnLbfGLpcV47y                           



                          u84eXJQHmgXup7YfyhBaXn                           



                          xwYXJcZnMyMFxjZjEgUmVj                           



                          HPj7LDMcyW1xCj4cgGZfkY                           



                          3teXEuGUvxQWM0hUPaLKPq                           



                          HCDwDBTnGW88FUqfWJZgur                           



                          FtZSwgbWVkaWNhbCByZWNv                           



                          cmQgbnVtYmVyIGFuZCBcdT                           



                          hoZrSeBLl3Y8ezusobCSms                           



                          eIWxfTzzCQ1br7ceec68ty                           



                          Dae2JfnaRjFVTki2RljGQl                           



                          BTWrNfDcmtNrN59iu7vuaJ                           



                          Pov7VszBXkuNonyNRfmAAp                           



                          LXBpbmssIGlycmVndWxhci                           



                          Gap7W9CWOzv5P1HIQiplYz                           



                          fMGnnFYdJXXkKSA5IKYhDX                           



                          F6SAUyBrNbzXLwiwWtI9ls                           



                          ZWdhdGUpLiAgVGhlIHNwZW                           



                          RxkVNyDQazCBJ2Sb8usXCn                           



                          QWFwbjV1i9VkTJqmHXRjLc                           



                          yvKNKaX6JbO2NwpeCoqZRx                           



                          AWWdprUei3xhUMT5FCGtjG                           



                          XhoXUyIbYsWgfjWDA3n2ny                           



                          YOGnyHEjJBJ6NEskySLrIC                           



                          EwMDIgXFxkYiBPVlIgIiBa                           



                          ZtIbULD5XzZjWXi3OYvrD3                           



                          YVYNBaWVJ0NKH6AissOmY0                           



                          CVo2MSXKCy4cXLG5POdmMR                           



                          Z1XBG7MmRuMQlmpUPxOUti                           



                          ZmwgXFxmIEFyaWFsIFxcbm                           



                          H1MGHmUpHvQ5WpMYOgLZRm                           



                          wOksIWTSl7ybzqGjQEffSk                           



                          PoTKJtM7StHHjtLf8caNTi                           



                          DRUdCeBaK0DvCRXmY9Tijv                           



                          YnSFhsJMBdah2nyFlcKGhq                           



                          LtAeHIAoi4l1iKU4hVRpaJ                           



                          N6pKWfmZuySzJmCV9zcHIi                           



                          AB7tKHepCVpptzVzj3SgTD                           



                          64pCShbkZgjqPxZJQ3GlCe                           



                          MFnqTEZls2v7gSfiH67ox9                           



                          9pdVNgoIIdWFDaTV7wuO0h                           



                          KVVeb6VfRJU0UAhaxEMfqm                           



                          QyCFFxIL7yUAWsktVbj9Rt                           



                          HO2fKA97iAIamVmsERMhsv                           



                          6ieK5vYVZcjtKvN7BnZQTy                           



                          EH45d5ycYTMuQoFycTvsh7                           



                          YnRMZuJDgjTF24MLtlXxVg                           



                          zMLhXqQebOBpPuJdV70hwX                           



                          8hNKezehRaNAUrMC0sLFCg                           



                          NFHtqEBvzN8zsdSopcPjxY                           



                          EdeRZ4XERbxL8adO08uuGc                           



                          zrZSJQ40ADIgtHGeCXS4UP                           



                          4tXZPgejumYEVsVJUdWOV2                           



                          AXayaC89bVEuCRGgQJRmiR                           



                          NutItpGqjakOrak2XnqQVa                           



                          XGlkIDUxMDAyIFxcZGIgT1                           



                          YHWADcCvA0KyE2PdHeTBs1                           



                          VRd5NM9NPlBoPXSdIOAdRN                           



                          U9HkJtCWw7ONrjRY6CKEF4                           



                          OsZ3HGvzOND9LICwZQAfWM                           



                          QgMiBcXGZsIFxcZiBBcmlh                           



                          kZUgWC1azGonxxZbUUTlHX                           



                          MCDdPZs1h6sUroG97dn90m                           



                          CSXQBQA9I7Ifl5ZopjBmyv                           



                          cuXHBhclxmczIwXGNmMSBS                           



                          RPZbwGPeLUPfrzVdr1QpHH                           



                          xpbiBsYWJlbGVkIHdpdGgg                           



                          qEydLZJzhTslmoUmL0M5yz                           



                          AaMB7vZALyACHeV6WmXJQx                           



                          T14rKQIwaM5oTBTzUI0pNO                           



                          e1UIStEDKtRvdqlX3cpZBb                           



                          VHWylO5uVGdnTmPqVENeK8                           



                          GrOHghLnH4RiV5GYovytBd                           



                          yIVgr4Oeu76awySeUELgGP                           



                          Spz04exTL1bsSuNiKtsQx3                           



                          hJOkXWB0HA9pmOPsgT55ZZ                           



                          Vwou0tTWCmb6G0QKJhr2D8                           



                          ZSBmcmFnbWVudHMgKDMuMC                           



                          E6VPAmOML4VFHdOUWduCEn                           



                          apNaT8fcXAwwfWLbXuDfFN                           



                          rnEInneiwma4Ihk2ZezXAh                           



                          z20fcEL8nSQmqDXhHpWwH6                           



                          0qwaBztFZoBfvqFUI3NMUa                           



                          lI3ec7esmnU7PA9seQgtng                           



                          NquHPdz8WxHxPiAA9hXNBk                           



                          HYVfpZGsmZ2cpwBnpzRejD                           



                          TqdXD0UYLjWT82jUJxzOys                           



                          hG7eOfWbXvNdRLHczrrpQA                           



                          IwXJ0uHXZiATA4TTMisoAA                           



                          uEJqHIVue2HopOladhFiUH                           



                          HjxZ9axBGnZCVvytHMHRR0                           



                          kS8cDGMqJZD6JXFbdvFGAN                           



                          vuK83vmGE0sVQbqTMyYdSz                           



                          N49btrExOCTslsTSSpwsZ8                           



                          AjgLUrd46doMU9yWNjpMXu                           



                          KVUtb4OtuVNng4nktHkvj7                           



                          VjdGVuZFxwYXJccGFyZFxz                           



                          hF3vSzMra3shsPv2HHzpms                           



                          K6SOAqjq03RZxzMHHgQ4Yb                           



                          X7QuBTobLVF3FXTrQkTgNB                           



                          SlXA8BYpXrUQqLDGKxDEh5                           



                          GtI5HOx9TPHYQvAuZyHhYk                           



                          nxROxhQFCgRKu7ZKl4ZDqS                           



                          VlI8RPE1ThN4HsOvMLHgSv                           



                          AoTUx2ESLqQZjuqFHwHUIi                           



                          XIAjWJomEVxvK23eUwOnQW                           



                          hiPrVsPY6cKL2qjDOnVQAi                           



                          eX7xJH4zE0kxeP7pCX1niT                           



                          JdPLZxKeJdV4KfITJbY3Dx                           



                          stDyKFlpSLYbwv1ntEhnMI                           



                          iiJkWpEKCrg0t9mRZ7pHZs                           



                          ySC3cONhmTpcNqPvJD5szE                           



                          CxOU3qCMygEBkvfiFpo9Zp                           



                          MP79rXRnkuNwxlSdLVO4Ji                           



                          RiGApeWJFlj4i5qZneQ02w                           



                          e80df6qlmE4qVVW9WWM5PN                           



                          bxjpXqgKFov6Zsi96yqzQs                           



                          NXHgVNHsn85bpAA6gnBfRn                           



                          WvhAs3rVAjOFC8HD7psXnm                           



                          xibec9WngUAxQSOcHNVvP8                           



                          EnVKCyOOPcu3C9GZXtv9P3                           



                          ZSBmcmFnbWVudHMgKHJhbm                           



                          ptzbtnVyUmgYQmOpOvH50y                           



                          FQEeLubhO31xtH0hG8KfIJ                           



                          Imz7HiXOtoGO5jcC6rZME4                           



                          LjUgeCAyLjEgeCAwLjQgY2                           



                          2tvW6gRVycdoRkTTCbFK1l                           



                          YPOdXLOgJDMfSAL7XERvEC                           



                          BhG3NaAMClZJNjh3L1TLSf                           



                          a9M7LLVjxaBeaPSkrUQkqu                           



                          KpzFSalhroUMZ3MMSgpX4j                           



                          p7zkqoN2GP3glYfmcpuiCx                           



                          7hZNqinBZhv2DedDQqnILw                           



                          M4H7RHG4ydRaL1BjDABUlA                           



                          Etq9BwS5daNR2hvBAsd5Iv                           



                          bYb2iOGqWHCqoZcxBXa7ID                           



                          luIEQxLUQzLlxwYXJccGFy                           



                          ULpwn1UsjJDYDLjxfQBcjd                           



                          FDwVl4IJlkFQJzx8O9CRUe                           



                          fMlwWDOsP2PsV7CjvpM9b1                           



                          mkvKkdc9DgqXKmPA5phKEz                           



                          fQ==                                   

 

             MICROSCOPIC DESCRIPTION t0qltOSvHCPcvPH4CkDoYF                     

      



             (test code = 3371) Xbk5fzd4MhgOYuvFYnMCfb                          

 



                          xGEubaYvdv66oIC4kJ53GF                           



                          6qPYGkUrZ3WPJacyF7Ugv0                           



                          MZPyBQSkzJGyF441j6hju7                           



                          uykkRhmNJ8vOqoABUhnela                           



                          RyC5YAneYOYshgbtXTe6GP                           



                          aaCODepTK3GXHpgTQsZ0Lq                           



                          BKJuWH7lcyu0MRI7HXowRY                           



                          DgJpJ2PPCnsBUqWUYfhLvt                           



                          HOaid025MSO5OnTcXJDzfu                           



                          RqoQaonA7mArTyXSXRzS6a                           



                          mTWcdAn4p9ktGLFuMHVioA                           



                          LppH2ublPqiB6ys9LbPJVt                           



                          DNKcw7DwYSCay31hmPVmlU                           



                          AORNDfIYV5AFKqJB1dG0J2                           



                          tRVoQNurUZB7sD9bJTUxnZ                           



                          pnZRzzpPqon6QpeG7vVJJs                           



                          RYL9oFIwCSWmgTAmoVCiEE                           



                          UiTVDtwwPkx7rsa5RfpU1c                           



                          bmcpLiBNSVNNQVRDSCBSRV                           



                          GDYWGuLM8LKyuiOBCPZTQS                           



                          QlFgd63kTBZyaJIAIcksYB                           



                          FppIbsXR9fgX1jqQsjtW3j                           



                          oOHvsLX9orsurqVbfTp8rb                           



                          vmzGSlJLWiFWGXY8xpGcgn                           



                          FPVcBD93OEU4YPRrMKBltK                           



                          XjAFAuMOI2hCZif2Qyc15x                           



                          xILtAC6SBK09InFsKrOHgh                           



                          ThU7WzIdQwIU54U7ywNTDb                           



                          WIowbiWnx6rvfmsdGQBvZQ                           



                          eLRPG5UXxqGQdxfPRnlCsc                           



                          MCAgbnVjbGVhciBleHByZX                           



                          MipL0qYLSdjgTAPTWwSfdi                           



                          JRFbHL24JNN5MCRzFNAjbM                           



                          PhZGGrZYR3yBYhd8Kbd64o                           



                          cGFyXHBhciBJbnRlcnByZX                           



                          QlbOgbzmdpeAYsYELoZb0f                           



                          nM7zy9hbWHSxo6OiasEjqE                           



                          ZhvkOwmQJaUUXwzY6hGM9i                           



                          KW8QFcQnoj31EPtqbidgXU                           



                          ZvwR43FGBbz1NaDshvsMZ0                           



                          VSYwSRDkCiGfuSRbf9JtmS                           



                          FclZn5PRXuywU6DDNlnFd3                           



                          dP3rqDomMKxXG6dbPKmuPA                           



                          xwYXJ9                                 

 

             SPECIAL STUDIES (test i7botELdYLPvk3cfOBHysO                       

    



             code = 3378) FuZzEwMzNcZnRuYmpcdWMx                           



                          VUlaobKxYRbqi8AqI9EkEn                           



                          AwMFxhbnNpXGRlZmxhbmcx                           



                          EEFfBBG6xqIhSENtWNjfTM                           



                          AhOGmcHo4utGBnwLgfBwIr                           



                          JSRwv7zopiUVwvgfaRe2w4                           



                          olPWHcTtS1eZMxYNqsK2pv                           



                          vuWzuBOxY3SvyZWovFl4d4                           



                          vcHoHrGiA4wCPeUCewF6vu                           



                          vvClbABlCTRsMTm8lS98AJ                           



                          BpxU0qoPEyLHvcevEwVaT5                           



                          ICpoQMJfQhB9KAYxaOQvHO                           



                          FtG9rzJRAqWRfqHJHrHBqi                           



                          rKJuXVC2dVthh5K7iZMhkK                           



                          AamQjoOySbTxQaVzJOk0Hf                           



                          OXv6hZrmO6HkQUMfLsY8nM                           



                          QgUGFyYWdyYXBoIEZvbnQ7                           



                          iAmbypDuo63huKKaNQHvLE                           



                          YxHsDfoXliWCQoPCQAr3Lt                           



                          vZwaYET7yOh1iSieLjgxLD                           



                          H3Nxb6KS0znt16equ7dTxk                           



                          UHEofmxtJdL3EDboMBFgmr                           



                          ecWPc4HEuzMYUxeDM2FYHa                           



                          aQFiK8GtHXOvXE5okqn8UV                           



                          A6QApeDZEuFsH1MVFqsZHu                           



                          MQFipCtiDTrws939IIO4Zs                           



                          TzFY6lW4Lov7Z4mJ5goWMq                           



                          BMMeqLLkEzUqBPXuus8phA                           



                          LyGPnql6QpJFL0cdQ5iNHa                           



                          fHKkDRLlBY61Vgqqf0AxDm                           



                          kzx3MkG99pxUU6LNaxi0lg                           



                          MR7yNsT2miZuJGysv1gfhU                           



                          5hVkF9GLmpCB9nNO9iGJFy                           



                          pC0bwjurYCYiOvRjadmnSY                           



                          SskXfflpKwTj9fnQllZYV1                           



                          FQaoW8zngQ7wVrA7GHnzN1                           



                          nynX7lJTz1SFofkAP5CLLq                           



                          wV0yCV5bgkjke6chQDlpIH                           



                          iyREMblfT4szM5JJAdbSSl                           



                          H6QemK9wNGJuLN3topumt4                           



                          jpPNZ7WCqjULSkDEI8UyMo                           



                          HWOhm1Ezpiq1ZxGmx1MfwE                           



                          EkDGxiX78vw553LLOqutIl                           



                          J7ynzQJwejbdfKIsgsszBW                           



                          qfvjA2GLQgDJNmWVaoHXAl                           



                          XGZzMjJcbGFuZzEwMzNcaG                           



                          ljaFxmMVxkYmNoXGYxXGxv                           



                          T1ugGxMpA6AlCZVaDsNsYS                           



                          foKFonbNAibWZfrZM0sM1m                           



                          VG1fLZCwsYBdN4QyOYApbz                           



                          EpwBEtBRT9iDNssLCnPL7z                           



                          DHodwSWux6wpo4IyO7uyrZ                           



                          jttYL1EW0yRVMkXSSnTCrs                           



                          a4WqxO5rAsrnzMDvbdeaIC                           



                          xmczIyXGxhbmcxMDMzXGhp                           



                          U4tkZfAtOZJjdHklMHvaj5                           



                          NoXGYxXGNmMlxmczIyXGx0                           



                          cmNoXHBhclxwYXJccGxhaW                           



                          9yOfUtHdKtFxceYR1wIWOx                           



                          T1mypQFhGZAxXPDwH9svYl                           



                          MyeB5fxZfwAGidNsRoIoPt                           



                          PrFYj677sp6wUJLqqQYdvi                           



                          AKdOTbqC0wRVzpUPpoKUvu                           



                          vXEyQSmey8lwYEWqd5i6aT                           



                          TwHORaphDde2ahWJbayhUl                           



                          LWDybPNqcSLtIUYke92hBE                           



                          wimKmeaZfnQRPuj7AkdOyk                           



                          i9EvXaClVQrkk1QwB53qoQ                           



                          JvbCBzbGlkZXMgcnVuIGFs                           



                          m67da9mjVCEmCgD8dGJieW                           



                          I0iGWpsZBxm0VybYciANSr                           



                          u2jxPNFkal2zhratcUZny2                           



                          LooM9wpmnfTUactBEpkuSk                           



                          LRUeh5w9lUUnERQsUMLhQG                           



                          lhzHa2XWIqj792jk8blkL4                           



                          rHVaCPR9VVvsNVWaMNSndz                           



                          UgZXZhbHVhdGVkXHBsYWlu                           



                          XGYxXGZzMjJcbGFuZzEwMz                           



                          NcaGljaFxmMVxkYmNoXGYx                           



                          TXjvS0llJaLuI5IfQUFbPd                           



                          KktXDzB5kekKHnYFRlHUet                           



                          XGYxXGZzMjJcbGFuZzEwMz                           



                          NcaGljaFxmMVxkYmNoXGYx                           



                          TElrG5poVbYnE4NrPBKwDm                           



                          IgIFxwbGFpblxmMVxmczIy                           



                          TZujmsdhKEDzLOvoG8feGz                           



                          PgGUUrvGwcLOcmb0JlJDSe                           



                          BSWtGlbacpTjMUo1knVvGR                           



                          BhclxwbGFpblxmMVxmczIy                           



                          TSbpdrnvUECnRNzjG4pqFk                           



                          MmPVBymJmgHFzbb2WaQTXr                           



                          XGNmMlxmczIyIEltbXVub2                           



                          epc8XyW7hswWzacSP2YCQd                           



                          D3lqgWJqzVE5SSQ7eH7yIG                           



                          tezlUnBTJns7DmXOJmCURd                           



                          KbB8qW3lKEB6VqCBmBpaTV                           



                          BsYWluXGYxXGZzMjJcbGFu                           



                          ZzEwMzNcaGljaFxmMVxkYm                           



                          MvDNFcPRvtK3ehWvIzX5Kg                           



                          IDZzYfJloDcnSReqONs7Wa                           



                          xwbGFpblxmMVxmczIyXGxh                           



                          gsyeFEXeMHuiX4paCfAvPZ                           



                          QkmBpiPJkbt0TwKQNoRQCp                           



                          MlxmczIyIHMgTWVkaWNhbC                           



                          QTWR80FPXyQNJudNbtoL5q                           



                          uSKUJFXukjP4f3Q5GNshKG                           



                          QzHLk4FLnxgqSfLNToiS9u                           



                          DMUpEZ7tGNq3kcEtRLDah1                           



                          PuSY6wYXCtvSWlIOH1SJEr                           



                          b5SbG2Xyr7KnRUCgPGGhab                           



                          1xpaWmBhVAkOKjXZCzzy10                           



                          RWTwUU1aL8vbHIChSDInpt                           



                          DwrTQak3VtJHEjbRR1qEBp                           



                          ZE6KNwPIg04eGJAlDSCNbo                           



                          HoOTLeoLazlGP9peP6rJ6y                           



                          LiBUaGUgRkRBIGhhcyBkZX                           



                          Eiag7syqWfXFXvSTLrq4Yi                           



                          uIDzaSHusgBlF8Qmi0WwMP                           



                          Sihj25TMyodVLkbm83GC5y                           



                          V5Vlw1QwxE0xPZkkQLMll5                           



                          NkiMDfgYKxQBVfd3RaD1vv                           



                          lvpkCFbcwZYydG3zGJSzSP                           



                          i7FFCei7UbUXZwr2UcVwOo                           



                          ypNcLCDcWWTiVTFrdI51DY                           



                          N5gItbqDgduxSoWB7jEEKi                           



                          ioGaBFCqCHIphZ8pYYouxp                           



                          LfDPSwldU8m1Q0ELmjHFOl                           



                          juTnFbtlBXP5ngXjcvW0wE                           



                          JlF2hocbvkPAnoHRAng4My                           



                          oC4uaSZXcUQdp4IlnCAfhH                           



                          ESpUBdGY7vykRdND7lGVJ1                           



                          ODggKENMSUEtODgpIGFzIH                           



                          S8GGwrIehiKCB8tpHpOUHc                           



                          y5HsNGuaC0cgT18bsOmbaT                           



                          b1mCViyXkygDErfMHaJGLr                           



                          atB0e8W2CCVak8KehjhgYS                           



                          BsYWluXGYyXGZzMjJcbGFu                           



                          ZzEwMzNcaGljaFxmMlxkYm                           



                          JwIBVvEHzjL3qaZyYaIsHe                           



                          LbomARI6fB==                           

 

             Gross assessment was [facility]                           



             performed at ([facility],                           



             = 2777)      Department of                           



                          Pathology, [address] [place] [address], Tel                           



                          [phone]                           

 

             Technical component was [facility]                          

 



             performed at ([facility],                           



             = 2778)      Department of                           



                          Pathology, [address] [place] [address], Tel                           



                          [phone]                           

 

             Professional component [facility]                           



             was performed at ([facility],                           



             code = 2779) Department of                           



                          Pathology, [address] [place] [address], Tel                           



                          [phone]                           



[id number] 08:01:38Surgical Pathology 
Report Case: [id number]  Authorizing Provider: Elayne Longo MD Collected:
2022 10:01 AM Ordering Location: [facility] Received: 2022
11:57 AM Services Pathologist: Homer Thomas MD  Specimens: A) - Large 
Intestine, Colon - Transverse, Bx mass B) - Polyp, Colon - Left/Descending, 
taken by hot snare  C) - Polyp, Colon - Left/Descending, x3 taken by hot snare 
D) - Polyp, Colon - Sigmoid, x5 taken by hot snare  A. LARGE INTESTINE, 
TRANSVERSE COLON MASS, BIOPSY: - INVASIVE ADENOCARCINOMA - SEE MICROSCOPIC 
DESCRIPTION B. LARGE INTESTINE, DESCENDING COLON POLYP, BIOPSY: - TUBULAR 
ADENOMA, FRAGMENTED C. LARGE INTESTINE, DESCENDING COLON POLYP x 3, BIOPSY: - 
TUBULAR ADENOMA, FRAGMENTED - SESSILE SERRATED LESION D. LARGE INTESTINE, 
SIGMOID COLON POLYP x 5, BIOPSY: - TUBULOVILLOUS ADENOMA, FRAGMENTED - 
HYPERPLASTIC POLYP Signing Pathologist Direct Phone Line: 
[phone] signed by Homer Thomas MD on 2022 at 8:01 
AMBlock A1 has adequate tumor cellularity for future ancillary studies.88305 x 
4, 45295, 67077 x 5A. Large Intestine, Colon - Transverse.Received in formalin 
labeled with the patient's name, medical record number and "large 
intestine-colon-transverse biopsy mass" and consists of multiple tan-pink, 
irregular soft tissue fragments (2.4 x 2.1 x 0.3 cm in aggregate). The specimen 
is submitted in toto in A1.B. Polyp, Colon - Left/Descending.Received in 
formalin labeled with the patient's name, medical record number and "polyp, 
colon-left descending-taken by hot snare" and consists of multiple tan-pink, 
irregular, ovoidsoft tissue fragment (2.4 x 2.1 x 0.4 cm in aggregate). The 
specimen is submitted in toto in B1.C. Polyp, Colon - Left/Descending.Received 
in formalin labeled with the patient's name, medical record number and "polyp, 
colon-left descending x3 taken by hot snare" and consists of multiple tan-
yellow, ovoid soft tissue fragments (3.0 x 2.1 x 0.4 cm in aggregate). The 
largest ovoid soft tissue fragment is bisected to show tan-pink cut surface. The
specimen is submitted entirely in C1-C2.Ink code:Blue-resection marginSection 
code:C1: Soft tissue fragmentsC2: Ovoid soft tissue, bisectedD. Polyp, Colon - S
igmoid.Received in formalin labeled with the patient's name, medical record 
number and "polyp, colon-sigmoid x5 taken by hot snare" and consists of multiple
tan-pink, ovoid, pedunculated soft tissue fragments (ranging from 0.1 cm - 1.8 
cm in greatest dimension, 7.5 x 2.1 x 0.4 cm in aggregate). The largest 
pedunculated soft tissue fragment is quadrisected to show tan-pink, focal 
hemorrhagic cut surface. The specimen is submitted entirely in D1-D3.JESENIA Braswell studentSynaptophysin and chromogranin were performed on part 
A and were negative in tumor cells (all stains are with appropriatecontrol 
staining). MISMATCH REPAIR (MMR) PROTEINS on Part A:Immunohistochemistry 
results:MSH-2: Intact nuclear expressionMSH-6: Intact nuclear expressionMLH-1: 
Intact nuclear expressionPMS-2: Intact nuclear expressionInterpretation:No loss 
of nuclear expression of MMR proteins: low probability of microsattelite 
instability-high (MSI-H). The interpretation of this case included the use of 
immunohistochemistry or special stains.Control Slides Examined: In-house known 
positive controls were evaluated along with the test tissue. These control 
slides run alongside of the patients sample show appropriatestaining. Internal 
positive and negative controls when available are evaluated Immunohistochemistry
technical testing was performed at [facility], Pathology 
Laboratory where it was developed and its performance characteristics were 
determined. It has not been cleared or approved by the U.S. Food and Drug 
Administration. The FDA has determined that such clearance or approval is not 
necessary. The test is used for clinical purposes. It should not be regarded as 
investigational orfor research. This laboratory is certified under the Clinical 
Laboratory Improvement Amendments of 1988 (CLIA-88) as qualified to perform high
complexity clinical laboratory testing.[facility], 
Department of Pathology, [address] [place] [address], Tel 
[phone] [facility], [facility] of [facility], [address] [place] [address], Tel [phone] Linda [facility] Affairs [facility], Department of Pathology, [address] [place] [address], Tel 
[phone]-Glucose [id number] 13:15:36





             Test Item    Value        Reference Range Interpretation Comments

 

             POC-Glucose Meter (test 121 mg/dL           H            : TE

STED AT [facility]



             code = 1538)                                        [address] [place] [address], [facility]

30:



                                                                 /Techni

shelbi



                                                                 ID = [id number] for



                                                                 Wyatt Fullerk

e

 

             Lab Interpretation (test Abnormal                               



             code = 11774-7)                                        



[facility]-Glucose [id number] 13:15:36





             Test Item    Value        Reference Range Interpretation Comments

 

             POC-Glucose Meter (test 121 mg/dL           H            : TE

STED AT [facility]



             code = 1538)                                        [address] [place] [address]

30:



                                                                 /Techni

shelbi



                                                                 ID = [id number] for



                                                                 Jonny Shemek

e

 

             Lab Interpretation (test Abnormal                               



             code = 51535-6)                                        



[facility] [id number] 13:15:36





             Test Item    Value        Reference Range Interpretation Comments

 

             POC-Glucose Meter (test 121 mg/dL           H            : TE

STED AT [facility]



             code = 1538)                                        [address] [facility], [address]

30:



                                                                 /Techni

shelbi



                                                                 ID = [id number] for



                                                                 Brendan Fuller

e

 

             Lab Interpretation (test Abnormal                               



             code = 12377-0)                                        



[facility] [id number] 13:15:36





             Test Item    Value        Reference Range Interpretation Comments

 

             POC-GLUCOSE METER 121 mg/dL           H            : TESTED A

[facility]



             (BEAKER) (test code =                                        [facility],



             1538)                                               [address]:



                                                                 /Techni

shelbi ID



                                                                 = [id number] for Colt De La Torre



POCT-GLUCOSE [id number] 09:02:57





             Test Item    Value        Reference Range Interpretation Comments

 

             POC-GLUCOSE METER 125 mg/dL           H            : TESTED A

[facility]



             (BEAKER) (test code =                                        [facility],



             1538)                                               [id number]:



                                                                 /Techni

shelbi ID



                                                                 = [id number] for NICOLLE GOINS,



                                                                 MAYANK



SARS-CoV2/RT-PCR (Asymptomatic ONLY)2022 07:59:31





             Test Item    Value        Reference    Interpretation Comments



                                       Range                     

 

             SARS-COV2/RT-PCR Negative     Negative                  The SARS-Co

V-2



             (test code =                                        target nucleic



             46812-9)                                            acids are not



                                                                 detected in thi

s



                                                                 specimen. Negat

pablo



                                                                 results do not



                                                                 preclude SARS-C

oV-2



                                                                 infection and



                                                                 should not be u

sed



                                                                 as the sole bas

is



                                                                 for patient



                                                                 management



                                                                 decisions. Nega

tive



                                                                 results must be



                                                                 combined with



                                                                 clinical



                                                                 observations,



                                                                 patient history

,



                                                                 and epidemiolog

ical



                                                                 information. A



                                                                 false negative



                                                                 result may occu

r if



                                                                 a specimen is



                                                                 improperly



                                                                 collected,



                                                                 transported or



                                                                 handled. This S

ARS



                                                                 CoV-2 test is a



                                                                 rapid, real-roxanne

e



                                                                 RT-PCR test



                                                                 intended for 

e



                                                                 qualitative



                                                                 detection of



                                                                 nucleic acid fr

om



                                                                 SARS-CoV-2 in a



                                                                 nasopharyngeal 

swab



                                                                 specimen collec

froilan



                                                                 from individual

s



                                                                 suspected of



                                                                 COVID-19 by the

ir



                                                                 healthcare



                                                                 provider.

 

             KEEGAN (test code = This test has been                           



             KEEGAN)         authorized by FDA                           



                          under an EUA for                           



                          use by authorized                           



                          laboratories. This                           



                          test is only                           



                          authorized for the                           



                          duration of the                           



                          declaration that                           



                          circumstances                           



                          exist justifying                           



                          the authorization                           



                          of emergency use                           



                          of in vitro                            



                          diagnostic tests                           



                          for detection                           



                          and/or diagnosis                           



                          of COVID-19 under                           



                          Section 564(b)(1)                           



                          of the Federal                           



                          Food, Drug and                           



                          Cosmetic Act, 21                           



                          U.[place]C.                               



                          360bbb-3(b)(1),                           



                          unless the                             



                          authorization is                           



                          terminated or                           



                          revoked sooner.                           



                          Fact Sheet for                           



                          Healthcare                             



                          Providers:                             



                          https://www.[employer]/Documents/Xp                           



                          ert%20Xpress%20SAR                           



                          S%20CoV-2/Fact%20S                           



                          heets/302-3802%20S                           



                          ARS-COV-2%20HEALTH                           



                          CARE%20PROVIDERS%2                           



                          0FACT%20SHEET.pdf                           



                          Fact Sheet for                           



                          Healthcare                             



                          Patients:                              



                          https://www.[employer]/Documents/Xp                           



                          ert%20Xpress%20SAR                           



                          S%20CoV-2/Fact%20S                           



                          heets/302-3801%20S                           



                          ARS-COV-2%20PATIEN                           



                          T%20FACT%20SHEET.p                           



                          df                                     

 

             Lab Interpretation Normal                                 



             (test code =                                        



             22534-7)                                            



[facility] 
[facility]31





             Test Item    Value        Reference    Interpretation Comments



                                       Range                     

 

             SARS-COV2/RT-PCR Negative     Negative                  The SARS-Co

V-2



             (test code =                                        target nucleic



             08065-6)                                            acids are not



                                                                 detected in thi

s



                                                                 specimen. Negat

pablo



                                                                 results do not



                                                                 preclude SARS-C

oV-2



                                                                 infection and



                                                                 should not be u

sed



                                                                 as the sole bas

is



                                                                 for patient



                                                                 management



                                                                 decisions. Nega

tive



                                                                 results must be



                                                                 combined with



                                                                 clinical



                                                                 observations,



                                                                 patient history

,



                                                                 and epidemiolog

ical



                                                                 information. A



                                                                 false negative



                                                                 result may occu

r if



                                                                 a specimen is



                                                                 improperly



                                                                 collected,



                                                                 transported or



                                                                 handled. This S

ARS



                                                                 CoV-2 test is a



                                                                 rapid, real-roxanne

e



                                                                 RT-PCR test



                                                                 intended for th

e



                                                                 qualitative



                                                                 detection of



                                                                 nucleic acid fr

om



                                                                 SARS-CoV-2 in a



                                                                 nasopharyngeal 

swab



                                                                 specimen colle

froilan



                                                                 from individual

s



                                                                 suspected of



                                                                 COVID-19 by the

ir



                                                                 healthcare



                                                                 provider.

 

             KEEGAN (test code = This test has been                           



             KEEGAN)         authorized by FDA                           



                          under an EUA for                           



                          use by authorized                           



                          laboratories. This                           



                          test is only                           



                          authorized for the                           



                          duration of the                           



                          declaration that                           



                          circumstances                           



                          exist justifying                           



                          the authorization                           



                          of emergency use                           



                          of in vitro                            



                          diagnostic tests                           



                          for detection                           



                          and/or diagnosis                           



                          of COVID-19 under                           



                          Section 564(b)(1)                           



                          of the Federal                           



                          Food, Drug and                           



                          Cosmetic Act, 21                           



                          [place]                               



                          360bbb-3(b)(1),                           



                          unless the                             



                          authorization is                           



                          terminated or                           



                          revoked sooner.                           



                          Fact Sheet for                           



                          Healthcare                             



                          Providers:                             



                          https://www.[employer]/Documents/Xp                           



                          ert%20Xpress%20SAR                           



                          S%20CoV-2/Fact%20S                           



                          heets/302-3802%20S                           



                          ARS-COV-2%20HEALTH                           



                          CARE%20PROVIDERS%2                           



                          0FACT%20SHEET.pdf                           



                          Fact Sheet for                           



                          Healthcare                             



                          Patients:                              



                          https://www.[employer]/Documents/Xp                           



                          ert%20Xpress%20SAR                           



                          S%20CoV-2/Fact%20S                           



                          heets/302-3801%20S                           



                          ARS-COV-2%20PATIEN                           



                          T%20FACT%20SHEET.p                           



                          df                                     

 

             Lab Interpretation Normal                                 



             (test code =                                        



             55924-4)                                            



[facility] 
07:59:31





             Test Item    Value        Reference    Interpretation Comments



                                       Range                     

 

             SARS-COV2/RT-PCR Negative     Negative                  The SARS-Co

V-2



             (test code =                                        target nucleic



             73224-9)                                            acids are not



                                                                 detected in thi

s



                                                                 specimen. Negat

pablo



                                                                 results do not



                                                                 preclude SARS-C

oV-2



                                                                 infection and



                                                                 should not be u

sed



                                                                 as the sole bas

is



                                                                 for patient



                                                                 management



                                                                 decisions. Nega

tive



                                                                 results must be



                                                                 combined with



                                                                 clinical



                                                                 observations,



                                                                 patient history

,



                                                                 and epidemiolog

ical



                                                                 information. A



                                                                 false negative



                                                                 result may occu

r if



                                                                 a specimen is



                                                                 improperly



                                                                 collected,



                                                                 transported or



                                                                 handled. This S

ARS



                                                                 CoV-2 test is a



                                                                 rapid, real-roxanne

e



                                                                 RT-PCR test



                                                                 intended for th

e



                                                                 qualitative



                                                                 detection of



                                                                 nucleic acid fr

om



                                                                 SARS-CoV-2 in a



                                                                 nasopharyngeal 

swab



                                                                 specimen collec

froilan



                                                                 from individual

s



                                                                 suspected of



                                                                 COVID-19 by the

ir



                                                                 healthcare



                                                                 provider.

 

             KEEGAN (test code = This test has been                           



             KEEGAN)         authorized by FDA                           



                          under an EUA for                           



                          use by authorized                           



                          laboratories. This                           



                          test is only                           



                          authorized for the                           



                          duration of the                           



                          declaration that                           



                          circumstances                           



                          exist justifying                           



                          the authorization                           



                          of emergency use                           



                          of in vitro                            



                          diagnostic tests                           



                          for detection                           



                          and/or diagnosis                           



                          of COVID-19 under                           



                          Section 564(b)(1)                           



                          of the Federal                           



                          Food, Drug and                           



                          Cosmetic Act, 21                           



                          U.[place]                               



                          360bbb-3(b)(1),                           



                          unless the                             



                          authorization is                           



                          terminated or                           



                          revoked sooner.                           



                          Fact Sheet for                           



                          Healthcare                             



                          Providers:                             



                          https://www.[employer]/Documents/Xp                           



                          ert%20Xpress%20SAR                           



                          S%20CoV-2/Fact%20S                           



                          heets/302-3802%20S                           



                          ARS-COV-2%20HEALTH                           



                          CARE%20PROVIDERS%2                           



                          0FACT%20SHEET.pdf                           



                          Fact Sheet for                           



                          Healthcare                             



                          Patients:                              



                          https://www.[employer]/Documents/Xp                           



                          ert%20Xpress%20SAR                           



                          S%20CoV-2/Fact%20S                           



                          heets/302-3801%20S                           



                          ARS-COV-2%20PATIEN                           



                          T%20FACT%20SHEET.p                           



                          df                                     

 

             Lab Interpretation Normal                                 



             (test code =                                        



             52579-0)                                            



[facility] 
07:59:31





             Test Item    Value        Reference Range Interpretation Comments

 

             SARS-COV2/RT-PCR Negative     Negative                  The SARS-Co

V-2 target



             (test code =                                        nucleic acids a

re not



             4911411)                                            detected in thi

s specimen.



                                                                 Negative result

s do not



                                                                 preclude SARS-C

oV-2



                                                                 infection and s

hould not be



                                                                 used as the sol

e basis for



                                                                 patient managem

ent



                                                                 decisions. Nega

tive results



                                                                 must be combine

d with



                                                                 clinical observ

ations,



                                                                 patient history

, and



                                                                 epidemiological

 information.



                                                                 A false negativ

e result may



                                                                 occur if a spec

imen is



                                                                 improperly pina

ected,



                                                                 transported or 

handled. This



                                                                 SARS CoV-2 test

 is a rapid,



                                                                 real-time RT-PC

R test



                                                                 intended for th

e qualitative



                                                                 detection of nu

cleic acid



                                                                 from SARS-CoV-2

 in a



                                                                 nasopharyngeal 

swab specimen



                                                                 collected from 

individuals



                                                                 suspected of CO

VID-19 by



                                                                 their healthcar

e provider.



This test has been authorized by FDA under an EUA for use by authorized 
laboratories. This test is only authorized for the duration of the declaration 
that circumstances exist justifying the authorization of emergency use of in 
vitro diagnostic tests for detection and/or diagnosis of COVID-19 under Section 
564(b)(1) of the Federal Food, Drug and Cosmetic Act, 21 U.S.C. 360bbb-3(b)(1), 
unless the authorization is terminated or revoked sooner. Fact Sheet for 
Healthcare Providers: https://www.[employer]
m/Documents/Xpert%20Xpress%20SARS%20CoV-2/Fact%20Sheets/302-3802%25HGEG-CAW-6%20

HEALTHCARE%20PROVIDERS%20FACT%20SHEET.pdf Fact Sheet for Healthcare Patients: 
https://www.[employer]/Documents/Xpert%20Xp
ress%20SARS%20CoV-2/Fact%20Sheets/302-3801%67LSJQ-KTU-9%20PATIENT%20FACT%20SHEET

.pdfPOCT-GLUCOSE [id number] 08:23:46





             Test Item    Value        Reference Range Interpretation Comments

 

             POC-GLUCOSE METER 106 mg/dL                        : TESTED A

T [facility] 7200



             (BEAKER) (test code                                        AUGUSTINE MAN A,



             = 1538)                                             [place] [address]

0:



                                                                 /Techni

shelbi ID =



                                                                 [id number] for RICHELLE DIMAS



[id number] 09:23:02





             Test Item    Value        Reference    Interpretation Comments



                                       Range                     

 

             CEA (test code              See_Commen   H             In otherwise

 healthy smokers, 95%



             = 2039-6)                 t                         of patients fal

l in the rangeof



                                                                 0.0-5.5 ng/mL. 

NOTE: Methodology is



                                                                 Roche Renan



                                                                 Electrochemilum

inescenceImmunoassay



                                                                 (ECLIA). Unless

 Otherwise Indicated,



                                                                 All Testing Per

formed At: Clinical



                                                                 [place] [address] Laboratory



                                                                 Director: Bonifacio Carey M.D.



                                                                 IA Number [id number]

[id number] Cap



                                                                 Accreditation N

o. [id number]



                                                                 [Automated mess

age] The system which



                                                                 generated this 

result transmitted



                                                                 reference range

: <=3.8 NG/ML. The



                                                                 reference range

 was not used to



                                                                 interpret this 

result as



                                                                 normal/abnormal

.

 

             Lab          Abnormal                               



             Interpretation                                        



             (test code =                                        



             77327-9)                                            



[id number]





             Test Item    Value        Reference Range Interpretation Comments

 

             AFP-TUMOR MARKER (test              See_Comment                Unle

ss Otherwise



             code = 72782-6)                                        Indicated, A

ll Testing



                                                                 Performed At: C

linical



                                                                 [place] [address] Laborator

y Director:



                                                                 Bonifacio negron M.D.



                                                                 CLIA Number [id number]

[id number] Cap



                                                                 Accreditation N

o. [id number]



                                                                 [Automated mess

age] The



                                                                 system which ge

nerated



                                                                 this result tra

nsmitted



                                                                 reference range

: <=8.30



                                                                 NG/ML. The refe

rence range



                                                                 was not used to

 interpret



                                                                 this result as



                                                                 normal/abnormal

.



[id number]02





             Test Item    Value        Reference    Interpretation Comments



                                       Range                     

 

             CA 19-9 (test code >71088       See_Comment  H             NOTE: Me

thodology is Roche



             = 24108-3)                                          Renan



                                                                 Electrochemilum

inescence



                                                                 Immunoassay (EC

PIETER). Values



                                                                 obtained with d

ifferent



                                                                 assays/manufact

urers cannot



                                                                 be used interch

angeably.



                                                                 Results should 

not be used



                                                                 as sole basis t

o establish



                                                                 the presence or

 absence of



                                                                 malignancy. Unl

ess Otherwise



                                                                 Indicated, All 

Testing



                                                                 Performed At: 

linical



                                                                 Pathology [facility], [address] [place] [id number]



                                                                 Laboratory Dire

ctor: Bonifacio Carey M.D.

 CLIA Number



                                                                 [id number] Cap 

Accreditation



                                                                 [place]. [id number] [A

utomated



                                                                 message] The sy

stem which



                                                                 generated this 

result



                                                                 transmitted ref

erence range:



                                                                 <35 U/ML. The r

eference



                                                                 range was not u

sed to



                                                                 interpret this 

result as



                                                                 normal/abnormal

.

 

             Lab Interpretation Abnormal                               



             (test code =                                        



             84028-0)                                            



[id number]41[facility]12





             Test Item    Value        Reference Range Interpretation Comments

 

             GLUCOSE (test code =              See_Comment  H             [Autom

ated message]



             6255-7)                                             The system [employer]



                                                                 generated this 

result



                                                                 transmitted ref

erence



                                                                 range: 70 - 99 

MG/DL.



                                                                 The reference r

veena



                                                                 was not used to



                                                                 interpret this 

result



                                                                 as normal/abnor

mal.

 

             BLOOD UREA NITROGEN              See_Comment                [Automa

froilan message]



             (test code = 3091-6)                                        The sys

tem which



                                                                 generated this 

result



                                                                 transmitted ref

erence



                                                                 range: 8 - 23 M

G/DL.



                                                                 The reference r

veena



                                                                 was not used to



                                                                 interpret this 

result



                                                                 as normal/abnor

mal.

 

             CREATININE (test code =              See_Comment                [Au

tomated message]



             2160-0)                                             The system [employer]



                                                                 generated this 

result



                                                                 transmitted ref

erence



                                                                 range: 0.80 - 1

.40



                                                                 MG/DL. The refe

rence



                                                                 range was not u

sed to



                                                                 interpret this 

result



                                                                 as normal/abnor

mal.

 

             EGFR (test code =              See_Comment  L             [Automate

d message]



             33914-3)                                            The system [employer]



                                                                 generated this 

result



                                                                 transmitted ref

erence



                                                                 range: >60



                                                                 ML/MIN/1.73. Th

e



                                                                 reference range

 was



                                                                 not used to int

erpret



                                                                 this result as



                                                                 normal/abnormal

.

 

             BUN/CREAT RATIO (test              See_Comment                [Auto

mated message]



             code = 3097-3)                                        The system 

[employer]



                                                                 generated this 

result



                                                                 transmitted ref

erence



                                                                 range: 6 - 28 R

ATIO.



                                                                 The reference r

veena



                                                                 was not used to



                                                                 interpret this 

result



                                                                 as normal/abnor

mal.

 

             SODIUM (test code =              See_Comment                [Automa

froilan message]



             2951-2)                                             The system [facility]

[place]



                                                                 generated this 

result



                                                                 transmitted ref

erence



                                                                 range: 133 - 14

6



                                                                 MEQ/L. The refe

rence



                                                                 range was not u

sed to



                                                                 interpret this 

result



                                                                 as normal/abnor

mal.

 

             POTASSIUM (test code =              See_Comment                [Aut

omated message]



             0453-3)                                             The system [facility]

[place]



                                                                 generated this 

result



                                                                 transmitted ref

erence



                                                                 range: 3.5 - 5.

4



                                                                 MEQ/L. The refe

rence



                                                                 range was not u

sed to



                                                                 interpret this 

result



                                                                 as normal/abnor

mal.

 

             CHLORIDE (test code =              See_Comment                [Auto

mated message]



             5-0)                                             The system [facility]

[place]



                                                                 generated this 

result



                                                                 transmitted ref

erence



                                                                 range: 95 - 107

 MEQ/L.



                                                                 The reference r

veena



                                                                 was not used to



                                                                 interpret this 

result



                                                                 as normal/abnor

mal.

 

             CO2 (test code =              See_Comment                [Automated

 message]



             -8)                                             The system [facility]

[place]



                                                                 generated this 

result



                                                                 transmitted ref

erence



                                                                 range: 19 - 31 

MEQ/L.



                                                                 The reference r

veena



                                                                 was not used to



                                                                 interpret this 

result



                                                                 as normal/abnor

mal.

 

             CALCIUM (test code =              See_Comment                [Autom

ated message]



             04993-9)                                            The system [facility]

[place]



                                                                 generated this 

result



                                                                 transmitted ref

erence



                                                                 range: 8.5 - 10

.5



                                                                 MG/DL. The refe

rence



                                                                 range was not u

sed to



                                                                 interpret this 

result



                                                                 as normal/abnor

mal.

 

             PROTEIN TOTAL (test              See_Comment                [Automa

froilan message]



             code = 2885-2)                                        The system 

[employer]



                                                                 generated this 

result



                                                                 transmitted ref

erence



                                                                 range: 6.1 - 8.

3 G/DL.



                                                                 The reference r

veena



                                                                 was not used to



                                                                 interpret this 

result



                                                                 as normal/abnor

mal.

 

             ALBUMIN (test code =              See_Comment                [Autom

ated message]



             22875-4)                                            The system [facility]

h



                                                                 generated this 

result



                                                                 transmitted ref

erence



                                                                 range: 3.5 - 5.

2 G/DL.



                                                                 The reference r

veena



                                                                 was not used to



                                                                 interpret this 

result



                                                                 as normal/abnor

mal.

 

             GLOBULINS, SERUM, TOTAL              See_Comment                [Au

tomated message]



             (test code = 33022-3)                                        The sy

stem which



                                                                 generated this 

result



                                                                 transmitted ref

erence



                                                                 range: 1.9 - 3.

7 G/DL.



                                                                 The reference r

veena



                                                                 was not used to



                                                                 interpret this 

result



                                                                 as normal/abnor

mal.

 

             A/G RATIO (test code =              See_Comment                [Aut

omated message]



             1759-0)                                             The system [facility]



                                                                 generated this 

result



                                                                 transmitted ref

erence



                                                                 range: 1.0 - 2.

6



                                                                 RATIO. The refe

rence



                                                                 range was not u

sed to



                                                                 interpret this 

result



                                                                 as normal/abnor

mal.

 

             BILIRUBIN TOTAL (test              See_Comment                [Auto

mated message]



             code = 1975-2)                                        The system [facility]



                                                                 generated this 

result



                                                                 transmitted ref

erence



                                                                 range: <=1.2 MG

/DL.



                                                                 The reference r

veena



                                                                 was not used to



                                                                 interpret this 

result



                                                                 as normal/abnor

mal.

 

             ALKALINE PHOSPHATASE 210 U/L             H            



             (test code = 6768-6)                                        

 

             AST (SGOT) (test code = 56 U/L       9-50         H            



             1920-8)                                             

 

             ALT (SGPT) (test code = 30 U/L       5-50                        Un

less Otherwise



             1744-2)                                             Indicated, All 

Testing



                                                                 Performed At: C

[facility]



                                                                 Pathology



                                                                 Laboratories, [address] [place] [address]



                                                                 [id number] Laborator

y



                                                                 Director: Bonifacio Carey M.D.

 CLIA



                                                                 Number [id number]

03 Cap



                                                                 Accreditation N

o.



                                                                 [id number]

 

             Lab Interpretation Abnormal                               



             (test code = 11608-3)                                        



[facility] W/AUTO DIFF WITH [id number] 07:37:08





             Test Item    Value        Reference Range Interpretation Comments

 

             WHITE BLOOD CELL COUNT              See_Comment                [Aut

omated message]



             (test code = 11608-2)                                        The sy

stem which



                                                                 generated this 

result



                                                                 transmitted ref

erence



                                                                 range: 3.5 - 11

.0



                                                                 K/UL. The refer

ence



                                                                 range was not u

sed to



                                                                 interpret this 

result



                                                                 as normal/abnor

mal.

 

             RED BLOOD CELL COUNT              See_Comment                [Autom

ated message]



             (test code = 86916-7)                                        The sy

stem which



                                                                 generated this 

result



                                                                 transmitted ref

erence



                                                                 range: 4.50 - 6

.10



                                                                 M/UL. The refer

ence



                                                                 range was not u

sed to



                                                                 interpret this 

result



                                                                 as normal/abnor

mal.

 

             HEMOGLOBIN (test code =              See_Comment                [Au

tomated message]



             718-7)                                              The system whic

h



                                                                 generated this 

result



                                                                 transmitted ref

erence



                                                                 range: 13.5 - 1

7.0



                                                                 G/DL. The refer

ence



                                                                 range was not u

sed to



                                                                 interpret this 

result



                                                                 as normal/abnor

mal.

 

             HEMATOCRIT (test code = 40.7 %       40.0-51.0                 



             71886-6)                                            

 

             MEAN CORPUSCULAR VOLUME 82.7 fL      80.0-99.0                 



             (test code = 64278-6)                                        

 

             MEAN CORPUSCULAR 28.0 PG      25.0-33.0                 



             HEMOGLOBIN (test code =                                        



             06756-6)                                            

 

             MEAN CORPUSCULAR              See_Comment                [Automated

 message]



             HEMOGLOBIN CONC (test                                        The sy

stem which



             code = 28540-3)                                        generated th

is result



                                                                 transmitted ref

erence



                                                                 range: 31.0 - 3

6.0



                                                                 G/DL. The refer

ence



                                                                 range was not u

sed to



                                                                 interpret this 

result



                                                                 as normal/abnor

mal.

 

             RED CELL DISTRIBUTION 13.9 %       11.5-15.0                 



             WIDTH (test code =                                        



             25714-9)                                            

 

             NEUTROPHILS % (test code 78.6 %                                 



             = 51290-1)                                          

 

             LYMPHOCYTES % (test code 7.7 %                                  



             = 37833-0)                                          

 

             MONOCYTES % (test code = 11.1 %                                 



             26485-3)                                            

 

             EOSINOPHILS % (test code 1.4 %                                  



             = 20830-7)                                          

 

             BASOPHILS % (test code = 0.8 %                                  



             41275-6)                                            

 

             IMMATURE GRANULOCYTES 0.4 %                                  



             (test code = 14834-8)                                        

 

             NUCLEATED RBC'S              See_Comment                Unless Othe

rwise



             MYELOPEROX STAIN (test                                        Indic

ated, All



             code = 70407-5)                                        Testing Perf

ormed At:



                                                                 Clinical Pathol

ogy



                                                                 Laboratories, [address] [place] [address], [place]



                                                                 [id number] Laborator

y



                                                                 Director: Bonifacio Carey M.D.

 CLIA



                                                                 Number [id number]

03 Cap



                                                                 Accreditation N

o.



                                                                 [id number] [Autom

ated



                                                                 message] The sy

stem



                                                                 which generated

 this



                                                                 result transmit

froilan



                                                                 reference range

: 0.00



                                                                 - 0.11 K/UL. Th

e



                                                                 reference range

 was



                                                                 not used to int

erpret



                                                                 this result as



                                                                 normal/abnormal

.

 

             PLATELET COUNT (test              See_Comment                [Autom

ated message]



             code = 75815-5)                                        The system w

hich



                                                                 generated this 

result



                                                                 transmitted ref

erence



                                                                 range: 130 - 40

0



                                                                 K/UL. The refer

ence



                                                                 range was not u

sed to



                                                                 interpret this 

result



                                                                 as normal/abnor

mal.

 

             NEUTROPHILS ABSOLUTE              See_Comment  H             [Autom

ated message]



             COUNT (test code =                                        The syste

m which



             97742-7)                                            generated this 

result



                                                                 transmitted ref

erence



                                                                 range: 1.50 - 7

.50



                                                                 K/UL. The refer

ence



                                                                 range was not u

sed to



                                                                 interpret this 

result



                                                                 as normal/abnor

mal.

 

             LYMPHOCYTES ABSOLUTE              See_Comment  L             [Autom

ated message]



             COUNT (test code =                                        The syste

m which



             19649-3)                                            generated this 

result



                                                                 transmitted ref

erence



                                                                 range: 1.00 - 4

.00



                                                                 K/UL. The refer

ence



                                                                 range was not u

sed to



                                                                 interpret this 

result



                                                                 as normal/abnor

mal.

 

             MONOCYTES ABSOLUTE COUNT              See_Comment  H             [A

utomated message]



             (test code = 02025-6)                                        The sy

stem which



                                                                 generated this 

result



                                                                 transmitted ref

erence



                                                                 range: 0.20 - 1

.00



                                                                 K/UL. The refer

ence



                                                                 range was not u

sed to



                                                                 interpret this 

result



                                                                 as normal/abnor

mal.

 

             BASOPHILS ABSOLUTE COUNT              See_Comment                [A

utomated message]



             (test code = 01760-5)                                        The sy

stem which



                                                                 generated this 

result



                                                                 transmitted ref

erence



                                                                 range: 0.00 - 0

.20



                                                                 K/UL. The refer

ence



                                                                 range was not u

sed to



                                                                 interpret this 

result



                                                                 as normal/abnor

mal.

 

             IMMATURE GRANS (ABS)              See_Comment                [Autom

ated message]



             (test code = 9987)                                        The syste

m which



                                                                 generated this 

result



                                                                 transmitted ref

erence



                                                                 range: 0.00 - 0

.10



                                                                 K/UL. The refer

ence



                                                                 range was not u

sed to



                                                                 interpret this 

result



                                                                 as normal/abnor

mal.

 

             Lab Interpretation (test Abnormal                               



             code = 65258-6)                                        



[facility](CELLAVISION MANUAL DIFF)2019 09:14:00





             Test Item    Value        Reference Range Interpretation Comments

 

             NEUTROPHILS - REL 81 %                                   



             (CELLAVISION)(BEAKER) (test code =                                 

       



             2816)                                               

 

             LYMPHOCYTES - REL 7 %                                    



             (CELLAVISION)(BEAKER) (test code =                                 

       



             2817)                                               

 

             MONOCYTES - REL 7 %                                    



             (CELLAVISION)(BEAKER) (test code =                                 

       



             2818)                                               

 

             EOSINOPHILS - REL 2 %                                    



             (CELLAVISION)(BEAKER) (test code =                                 

       



             2819)                                               

 

             MYELOCYTES - REL 2 %          0-0          H            



             (CELLAVISION)(BEAKER) (test code =                                 

       



             2822)                                               

 

             ATYPICAL LYMPHOCYTES - REL 1 %          0-0          H            



             (CELLAVISION)(BEAKER) (test code =                                 

       



             2829)                                               

 

             NEUTROPHILS - ABS 10.85 K/ul   1.78-5.38    H            



             (CELLAVISION)(BEAKER) (test code =                                 

       



             2830)                                               

 

             LYMPHOCYTES - ABS 0.94 K/ul    1.32-3.57    L            



             (CELLAVISION)(BEAKER) (test code =                                 

       



             2831)                                               

 

             MONOCYTES - ABS 0.94 K/uL    0.30-0.82    H            



             (CELLAVISION)(BEAKER) (test code =                                 

       



             2832)                                               

 

             EOSINOPHILS - ABS 0.27 K/uL    0.04-0.54                 



             (CELLAVISION)(BEAKER) (test code =                                 

       



             2834)                                               

 

             MYELOCYTES-ABS 0.27 K/uL    0.00-0.00    H            



             (CELLAVISION)(BEAKER) (test code =                                 

       



             2837)                                               

 

             ATYPICAL LYMPHOCYTES - ABS 0.13 K/uL    0.00-0.00    H            



             (CELLAVISION)(BEAKER) (test code =                                 

       



             2858)                                               

 

             TOTAL COUNTED (BEAKER) (test code 100                              

      



             = 1351)                                             

 

             RBC MORPHOLOGY (BEAKER) (test code Normal                          

       



             = 762)                                              

 

             SMUDGE CELLS (BEAKER) (test code = Present                         

       



             1371)                                               

 

             GIANT PLATELETS (BEAKER) (test Present                             

   



             code = 313)                                         

 

             PLATELET CONCENTRATION Decreased                              



             (CELLAVISION)(BEAKER) (test code =                                 

       



             3438)                                               



Received comment: User comments: Slide comments:RAD, CHEST, 1 VIEW, NON DEPT
2019 08:25:00Reason for exam:-&gt;left effusionShould this be performed at
the bedside?-&gt;YesFINAL REPORT PATIENT ID: [id number] Chest AP portable 
Comparison exam: 2019 History provided: Follow-up pleural effusion Left 
chest tube unchanged in place. No pneumothorax. No significant effusionsare 
evident. Nonspecific coarsening of markings in the left lower lobe. PICC line in
good position. Signed: Alex Morris MDReport Verified Date/Time: 2019 
08:25:12 [facility] Electron[facility] 
signed by: ALEX MORRIS on 2019 08:25 AMCOMPREHENSIVE METABOLIC PANEL
2019 06:40:00





             Test Item    Value        Reference Range Interpretation Comments

 

             TOTAL PROTEIN 4.8 gm/dL    6.0-8.3      L            



             (BEAKER) (test code =                                        



             770)                                                

 

             ALBUMIN (BEAKER) 2.4 g/dL     3.5-5.0      L            



             (test code = 1145)                                        

 

             ALKALINE PHOSPHATASE 141 U/L                          



             (BEAKER) (test code =                                        



             346)                                                

 

             BILIRUBIN TOTAL 3.9 mg/dL    0.2-1.2      H            



             (BEAKER) (test code =                                        



             377)                                                

 

             SODIUM (BEAKER) (test 123 meq/L    136-145      L            



             code = 381)                                         

 

             POTASSIUM (BEAKER) 3.6 meq/L    3.5-5.1                   



             (test code = 379)                                        

 

             CHLORIDE (BEAKER) 90 meq/L            L            



             (test code = 382)                                        

 

             CO2 (BEAKER) (test 29 meq/L     22-29                     



             code = 355)                                         

 

             BLOOD UREA NITROGEN 14 mg/dL     7-21                      



             (BEAKER) (test code =                                        



             354)                                                

 

             CREATININE (BEAKER) 0.80 mg/dL   0.57-1.25                 



             (test code = 358)                                        

 

             GLUCOSE RANDOM 152 mg/dL           H            



             (BEAKER) (test code =                                        



             652)                                                

 

             CALCIUM (BEAKER) 7.3 mg/dL    8.4-10.2     L            



             (test code = 697)                                        

 

             AST (SGOT) (BEAKER) 39 U/L       5-34         H            



             (test code = 353)                                        

 

             ALT (SGPT) (BEAKER) 23 U/L       6-55                      



             (test code = 347)                                        

 

             EGFR (BEAKER) (test 99 mL/min/1.73                           ESTIMA

FROILAN GFR IS



             code = 1092) sq m                                   NOT AS ACCURATE

 AS



                                                                 CREATININE



                                                                 CLEARANCE IN



                                                                 PREDICTING



                                                                 GLOMERULAR



                                                                 FILTRATION RATE

.



                                                                 ESTIMATED GFR I

S



                                                                 NOT APPLICABLE 

FOR



                                                                 DIALYSIS PATIEN

TS.



Specimen slightly [id number] 06:39:00





             Test Item    Value        Reference Range Interpretation Comments

 

             PHOSPHORUS (BEAKER) (test code = 2.1 mg/dL    2.3-4.7      L       

     



             604)                                                



[id number] 06:39:00





             Test Item    Value        Reference Range Interpretation Comments

 

             MAGNESIUM (BEAKER) (test code = 1.8 mg/dL    1.6-2.6               

    



             627)                                                



B-TYPE NATRIURETIC FACTOR (BNP)2019 06:04:00





             Test Item    Value        Reference Range Interpretation Comments

 

             B-TYPE NATRIURETIC PEPTIDE (BEAKER) 97 pg/mL     0-100             

        



             (test code = 700)                                        



CBC W/PLT COUNT &amp; AUTO [id number] 05:57:00





             Test Item    Value        Reference Range Interpretation Comments

 

             WHITE BLOOD CELL COUNT (BEAKER) 13.4 K/ L    3.5-10.5     H        

    



             (test code = 775)                                        

 

             RED BLOOD CELL COUNT (BEAKER) 3.17 M/ L    4.63-6.08    L          

  



             (test code = 761)                                        

 

             HEMOGLOBIN (BEAKER) (test code = 10.3 GM/DL   13.7-17.5    L       

     



             410)                                                

 

             HEMATOCRIT (BEAKER) (test code = 29.4 %       40.1-51.0    L       

     



             411)                                                

 

             MEAN CORPUSCULAR VOLUME (BEAKER) 92.7 fL      79.0-92.2    H       

     



             (test code = 753)                                        

 

             MEAN CORPUSCULAR HEMOGLOBIN 32.5 pg      25.7-32.2    H            



             (BEAKER) (test code = 751)                                        

 

             MEAN CORPUSCULAR HEMOGLOBIN CONC 35.0 GM/DL   32.3-36.5            

     



             (BEAKER) (test code = 752)                                        

 

             RED CELL DISTRIBUTION WIDTH 13.9 %       11.6-14.4                 



             (BEAKER) (test code = 412)                                        

 

             PLATELET COUNT (BEAKER) (test code 75 K/CU MM   150-450      L     

       



             = 756)                                              

 

             MEAN PLATELET VOLUME (BEAKER) 8.9 fL       9.4-12.4     L          

  



             (test code = 754)                                        

 

             NUCLEATED RED BLOOD CELLS (BEAKER) 0 /100 WBC   0-0                

       



             (test code = 413)                                        



CALCIUM, [id number] 05:44:00





             Test Item    Value        Reference Range Interpretation Comments

 

             CALCIUM IONIZED (BEAKER) (test 0.97 mmol/L  1.12-1.27    L         

   



             code = 698)                                         

 

             PH, BLOOD (BEAKER) (test code = 7.50                               

    



             1810)                                               



BODY FLUID CULTURE + GRAM [id number] 11:10:00





             Test Item    Value        Reference    Interpretation Comments



                                       Range                     

 

             CULTURE (BEAKER)                           A            <1+ Coagula

se



             (test code = 1095)                                        negative



                                                                 Staphylococcus

 

             CULTURE (BEAKER) COAGULASE NEGATIVE              A            <1+ P

seudomonas



             (test code = 1095) STAPHYLOCOCCUS                           aerugin

jennifer

 

             Clindamycin (test                           S            



             code = 10)                                          

 

             Erythromycin (test                           R            



             code = 4)                                           

 

             Linezolid (test code                           S            



             = 40)                                               

 

             Oxacillin (test code                           R            



             = 14)                                               

 

             Rifampin (test code =                           S            



             43)                                                 

 

             Tetracycline (test                           S            



             code = 2)                                           

 

             Trimethoprim +                           S            



             Sulfamethoxazole                                        



             (test code = 47)                                        

 

             Vancomycin (test code                           S            



             = 13)                                               

 

             GRAM STAIN RESULT 3+ WBCs                                



             (BEAKER) (test code =                                        



             1123)                                               

 

             GRAM STAIN RESULT <1+ gram positive                           



             (BEAKER) (test code = cocci in pairs and                           



             502180)      clusters                               



CBC W/PLT COUNT &amp; AUTO [id number] 09:58:00





             Test Item    Value        Reference Range Interpretation Comments

 

             WHITE BLOOD CELL COUNT (BEAKER) 10.7 K/ L    3.5-10.5     H        

    



             (test code = 775)                                        

 

             RED BLOOD CELL COUNT (BEAKER) 3.15 M/ L    4.63-6.08    L          

  



             (test code = 761)                                        

 

             HEMOGLOBIN (BEAKER) (test code = 10.2 GM/DL   13.7-17.5    L       

     



             410)                                                

 

             HEMATOCRIT (BEAKER) (test code = 28.9 %       40.1-51.0    L       

     



             411)                                                

 

             MEAN CORPUSCULAR VOLUME (BEAKER) 91.7 fL      79.0-92.2            

     



             (test code = 753)                                        

 

             MEAN CORPUSCULAR HEMOGLOBIN 32.4 pg      25.7-32.2    H            



             (BEAKER) (test code = 751)                                        

 

             MEAN CORPUSCULAR HEMOGLOBIN CONC 35.3 GM/DL   32.3-36.5            

     



             (BEAKER) (test code = 752)                                        

 

             RED CELL DISTRIBUTION WIDTH 13.7 %       11.6-14.4                 



             (BEAKER) (test code = 412)                                        

 

             PLATELET COUNT (BEAKER) (test code 57 K/CU MM   150-450      L     

       



             = 756)                                              

 

             MEAN PLATELET VOLUME (BEAKER) 8.8 fL       9.4-12.4     L          

  



             (test code = 754)                                        

 

             NUCLEATED RED BLOOD CELLS (BEAKER) 0 /100 WBC   0-0                

       



             (test code = 413)                                        



(CELLAVISION MANUAL DIFF)2019 09:58:00





             Test Item    Value        Reference Range Interpretation Comments

 

             NEUTROPHILS - REL 78 %                                   



             (CELLAVISION)(BEAKER) (test code =                                 

       



             2816)                                               

 

             LYMPHOCYTES - REL 5 %                                    



             (CELLAVISION)(BEAKER) (test code =                                 

       



             2817)                                               

 

             MONOCYTES - REL 9 %                                    



             (CELLAVISION)(BEAKER) (test code =                                 

       



             2818)                                               

 

             EOSINOPHILS - REL 2 %                                    



             (CELLAVISION)(BEAKER) (test code =                                 

       



             2819)                                               

 

             METAMYELOCYTES - REL 1 %          0-0          H            



             (CELLAVISION)(BEAKER) (test code =                                 

       



             2821)                                               

 

             BANDS - REL (CELLAVISION)(BEAKER) 4 %          0-10                

      



             (test code = 2826)                                        

 

             BLASTS - REL (CELLAVISION)(BEAKER) 1 %          0-0          H     

       



             (test code = 2827)                                        

 

             NEUTROPHILS - ABS 8.35 K/ul    1.78-5.38    H            



             (CELLAVISION)(BEAKER) (test code =                                 

       



             2830)                                               

 

             LYMPHOCYTES - ABS 0.54 K/ul    1.32-3.57    L            



             (CELLAVISION)(BEAKER) (test code =                                 

       



             2831)                                               

 

             MONOCYTES - ABS 0.96 K/uL    0.30-0.82    H            



             (CELLAVISION)(BEAKER) (test code =                                 

       



             2832)                                               

 

             EOSINOPHILS - ABS 0.21 K/uL    0.04-0.54                 



             (CELLAVISION)(BEAKER) (test code =                                 

       



             2834)                                               

 

             METAMYELOCYTES - ABS 0.11 K/uL    0.00-0.00    H            



             (CELLAVISION)(BEAKER) (test code =                                 

       



             2836)                                               

 

             BANDS - ABS (CELLAVISION)(BEAKER) 0.43 K/uL    0.00-0.80           

      



             (test code = 2840)                                        

 

             BLASTS - ABS (CELLAVISION)(BEAKER) 0.11 K/uL    0.00-0.00    H     

       



             (test code = 2845)                                        

 

             TOTAL COUNTED (BEAKER) (test code = 100                            

        



             1351)                                               

 

             WBC MORPHOLOGY (BEAKER) (test code Normal                          

       



             = 487)                                              

 

             PLT MORPHOLOGY (BEAKER) (test code Normal                          

       



             = 486)                                              

 

             ANISOCYTOSIS (BEAKER) (test code = 1+ few                          

       



             961)                                                

 

             POIKILOCYTES (BEAKER) (test code = 1+ few                          

       



             966)                                                

 

             OVALOCYTES (BEAKER) (test code = 1+ few                            

     



             477)                                                

 

             BASOPHILIC STIPPLING (BEAKER) (test Present                        

        



             code = 473)                                         

 

             ARTIFACT (CELLAVISION)(BEAKER) Present                             

   



             (test code = 3432)                                        

 

             PLATELET CONCENTRATION Decreased                              



             (CELLAVISION)(BEAKER) (test code =                                 

       



             3438)                                               



Received comment: User comments: Slide comments: WBC: SEGMENTED WITH TOXIC 
GRANULATIONS [id number] 08:30:00





             Test Item    Value        Reference Range Interpretation Comments

 

             PHOSPHORUS (BEAKER) (test code = 2.0 mg/dL    2.3-4.7      L       

     



             604)                                                



RAD, CHEST, 1 VIEW, NON [id number] 08:21:00Reason for exam:-&gt;left 
effusionShould this be performed at the bedside?-&gt;YesFINAL REPORT PATIENT ID:
[id number] TECHNIQUE: Frontal chest radiograph dated 2019. CLINICAL HISTORY: 
Left effusion COMPARISON STUDY: Chest radiographs and chest CT both dated 
2019 IMPRESSION:Right-sided PICC and left-sided chest tube are unchanged. 
No change in small left hydropneumothorax. Multiple rounded densities project 
over the left lower lobe of unclear etiology possibly something external to the 
patient. Cardiomediastinal silhouette is normal in size. No pulmonary edema. No 
fracture. Signed: Ann [place]ep[place] Verified Date/Time: 2019 
08:21:33 Reading Location: [facility] Reading Room Electronically signed by: 
ANN KING MD on 2019 08:21 AMCOMPREHENSIVE METABOLIC PANEL
2019 05:34:00





             Test Item    Value        Reference Range Interpretation Comments

 

             TOTAL PROTEIN 4.9 gm/dL    6.0-8.3      L            



             (BEAKER) (test code =                                        



             770)                                                

 

             ALBUMIN (BEAKER) 2.7 g/dL     3.5-5.0      L            



             (test code = 1145)                                        

 

             ALKALINE PHOSPHATASE 137 U/L                          



             (BEAKER) (test code =                                        



             346)                                                

 

             BILIRUBIN TOTAL 5.5 mg/dL    0.2-1.2      H            



             (BEAKER) (test code =                                        



             377)                                                

 

             SODIUM (BEAKER) (test 123 meq/L    136-145      L            



             code = 381)                                         

 

             POTASSIUM (BEAKER) 3.9 meq/L    3.5-5.1                   



             (test code = 379)                                        

 

             CHLORIDE (BEAKER) 89 meq/L            L            



             (test code = 382)                                        

 

             CO2 (BEAKER) (test 31 meq/L     22-29        H            



             code = 355)                                         

 

             BLOOD UREA NITROGEN 15 mg/dL     7-21                      



             (BEAKER) (test code =                                        



             354)                                                

 

             CREATININE (BEAKER) 0.68 mg/dL   0.57-1.25                 



             (test code = 358)                                        

 

             GLUCOSE RANDOM 104 mg/dL                        



             (BEAKER) (test code =                                        



             652)                                                

 

             CALCIUM (BEAKER) 7.7 mg/dL    8.4-10.2     L            



             (test code = 697)                                        

 

             AST (SGOT) (BEAKER) 47 U/L       5-34         H            



             (test code = 353)                                        

 

             ALT (SGPT) (BEAKER) 25 U/L       6-55                      



             (test code = 347)                                        

 

             EGFR (BEAKER) (test 119                                    ESTIMATE

D GFR IS



             code = 1092) mL/min/1.73 sq                           NOT AS ACCURA

TE AS



                          m                                      CREATININE



                                                                 CLEARANCE IN



                                                                 PREDICTING



                                                                 GLOMERULAR



                                                                 FILTRATION RATE

.



                                                                 ESTIMATED GFR I

S



                                                                 NOT APPLICABLE 

FOR



                                                                 DIALYSIS PATIEN

TS.



Specimen moderately [id number] 05:04:00





             Test Item    Value        Reference Range Interpretation Comments

 

             MAGNESIUM (BEAKER) (test code = 1.8 mg/dL    1.6-2.6               

    



             627)                                                



CT, CHEST, WITH [id number] 15:37:00Reason for exam:-&gt;reevaluate 
pleural effusion and left lung abscessFINAL REPORT PATIENT ID: [id number] CT of 
the Chest dated 2019 COMPARISON: 2019 CLINICAL INFORMATION: 
Pleural effusionreevaluate pleural effusion and left lung abscess Comment: Axial
images of the chest were obtained from thoracic inlet to the upper abdomen with 
intravenous contrast. This exam was performed according to our departmental 
dose-optimization program, which includes automated exposure control, adjustment
of the mA and/or kV according to patient size and/or use of interactive 
reconstruction technique. Heart is normal in size. There is small pericardial 
effusion. Great vessels are unremarkable. No adenopathy in the mediastinum or 
perihilar region. Trachea and mainstem bronchi are patent. Trace bilateral 
pleural effusion is present. There is interval significant decreasein the amount
of left pleural effusion. There is small amount of air present in the left 
pleural space. The pigtail catheter is seen in the left pleural space. 
Atelectasis is seen in the lingula, rightmid, and both lower lobes. Cavitary 
lesion is seen in the superior segment of the left lower lobe measuring 
approximately 2.8 x 3.3 cm. Visualized upper abdomen demonstrates cirrhotic 
liver with trace ascites. Spleen is minimally enlarged. Impression: 1. Interval 
decrease in the amount of left pleuraleffusion with residual left 
hydropneumothorax.2. Trace right pleural effusion.3. Cavitary lesion in the 
superior segment of the left lower lobe may represent abscess.4. Cirrhosis with 
splenomegaly and ascites. Signed: Lucita Nails MDReport Verified Date/Time: 
2019 15:37:27 Reading Location: [facility] C013Y CT Body Reading Room 
Electronically signed by: LUCITA NAILS M.D. on 2019 03:37 PMELECTROLYTES
2019 13:41:00





             Test Item    Value        Reference Range Interpretation Comments

 

             SODIUM (BEAKER) (test code = 381) 119 meq/L    136-145      LL     

      

 

             POTASSIUM (BEAKER) (test code = 4.1 meq/L    3.5-5.1               

    



             379)                                                

 

             CHLORIDE (BEAKER) (test code = 382) 85 meq/L            L    

        

 

             CO2 (BEAKER) (test code = 355) 29 meq/L     22-29                  

   



Page [phone] with results. Thank you.CBC W/PLT COUNT &amp; AUTO 
[id number] 10:30:00





             Test Item    Value        Reference Range Interpretation Comments

 

             WHITE BLOOD CELL COUNT (BEAKER) 11.3 K/ L    3.5-10.5     H        

    



             (test code = 775)                                        

 

             RED BLOOD CELL COUNT (BEAKER) 3.25 M/ L    4.63-6.08    L          

  



             (test code = 761)                                        

 

             HEMOGLOBIN (BEAKER) (test code = 10.6 GM/DL   13.7-17.5    L       

     



             410)                                                

 

             HEMATOCRIT (BEAKER) (test code = 29.8 %       40.1-51.0    L       

     



             411)                                                

 

             MEAN CORPUSCULAR VOLUME (BEAKER) 91.7 fL      79.0-92.2            

     



             (test code = 753)                                        

 

             MEAN CORPUSCULAR HEMOGLOBIN 32.6 pg      25.7-32.2    H            



             (BEAKER) (test code = 751)                                        

 

             MEAN CORPUSCULAR HEMOGLOBIN CONC 35.6 GM/DL   32.3-36.5            

     



             (BEAKER) (test code = 752)                                        

 

             RED CELL DISTRIBUTION WIDTH 13.5 %       11.6-14.4                 



             (BEAKER) (test code = 412)                                        

 

             PLATELET COUNT (BEAKER) (test code 51 K/CU MM   150-450      L     

       



             = 756)                                              

 

             MEAN PLATELET VOLUME (BEAKER) 8.3 fL       9.4-12.4     L          

  



             (test code = 754)                                        

 

             NUCLEATED RED BLOOD CELLS (BEAKER) 0 /100 WBC   0-0                

       



             (test code = 413)                                        



(CELLAVISION MANUAL DIFF)2019 10:30:00





             Test Item    Value        Reference Range Interpretation Comments

 

             NEUTROPHILS - REL 82 %                                   



             (CELLAVISION)(BEAKER) (test code =                                 

       



             2816)                                               

 

             LYMPHOCYTES - REL 2 %                                    



             (CELLAVISION)(BEAKER) (test code =                                 

       



             2817)                                               

 

             MONOCYTES - REL 9 %                                    



             (CELLAVISION)(BEAKER) (test code =                                 

       



             2818)                                               

 

             METAMYELOCYTES - REL 2 %          0-0          H            



             (CELLAVISION)(BEAKER) (test code =                                 

       



             2821)                                               

 

             PROMYELOCYTES - REL 2 %          0-0          H            



             (CELLAVSION)(BEAKER) (test code =                                  

      



             2825)                                               

 

             BANDS - REL (CELLAVISION)(BEAKER) 2 %          0-10                

      



             (test code = 2826)                                        

 

             ATYPICAL LYMPHOCYTES - REL 1 %          0-0          H            



             (CELLAVISION)(BEAKER) (test code =                                 

       



             2829)                                               

 

             NEUTROPHILS - ABS 9.27 K/ul    1.78-5.38    H            



             (CELLAVISION)(BEAKER) (test code =                                 

       



             2830)                                               

 

             LYMPHOCYTES - ABS 0.23 K/ul    1.32-3.57    L            



             (CELLAVISION)(BEAKER) (test code =                                 

       



             2831)                                               

 

             MONOCYTES - ABS 1.02 K/uL    0.30-0.82    H            



             (CELLAVISION)(BEAKER) (test code =                                 

       



             2832)                                               

 

             METAMYELOCYTES - ABS 0.23 K/uL    0.00-0.00    H            



             (CELLAVISION)(BEAKER) (test code =                                 

       



             2836)                                               

 

             PROMYELOCYTES - ABS 0.23 K/uL    0.00-0.00    H            



             (CELLAVISION)(BEAKER) (test code =                                 

       



             2838)                                               

 

             BANDS - ABS (CELLAVISION)(BEAKER) 0.23 K/uL    0.00-0.80           

      



             (test code = 2840)                                        

 

             ATYPICAL LYMPHOCYTES - ABS 0.11 K/uL    0.00-0.00    H            



             (CELLAVISION)(BEAKER) (test code =                                 

       



             2858)                                               

 

             TOTAL COUNTED (BEAKER) (test code = 100                            

        



             1351)                                               

 

             WBC MORPHOLOGY (BEAKER) (test code Normal                          

       



             = 487)                                              

 

             LARGE PLT(BEAKER) (test code = Present                             

   



             2156)                                               

 

             BASOPHILIC STIPPLING (BEAKER) (test Present                        

        



             code = 473)                                         

 

             ARTIFACT (CELLAVISION)(BEAKER) Present                             

   



             (test code = 3432)                                        

 

             PLATELET CONCENTRATION Decreased                              



             (CELLAVISION)(BEAKER) (test code =                                 

       



             3438)                                               



Received comment: User comments: Slide comments: WBC: SEGMENTED WITH TOXIC 
GRANULATIONS PRESENTRAD, CHEST, 1 VIEW, NON [id number] 07:48:00Reason for 
exam:-&gt;left effusionShould this be performed at the bedside?-&gt;YesFINAL 
REPORT PATIENT ID: [id number] RAD, CHEST, 1 VIEW, NON DEPT INDICATION: left 
effusion COMPARISON:Prior day's exam FINDINGS: Portable frontal view of the 
chest. IMPRESSION: Support Lines: PICC tip overlies the SVC. Left pleural 
catheter is again noted. Lungs and pleura: Bibasilar airspace disease is 
unchanged. Superimposed trace left effusion. Left apical pneumothorax is 
decreased in the interim. Heart and mediastinum: Stable contours. Additional 
findings: None. Signed: Trina Crocker MDReport Verified Date/Time: 2019 
07:48:14 Reading Location: [facility] Radiology Reading Room Electronically 
signed by: TRINA CROCKER MD on 2019 07:48 AMCALCIUM, IONIZED
2019 05:58:00





             Test Item    Value        Reference Range Interpretation Comments

 

             CALCIUM IONIZED (BEAKER) (test 0.96 mmol/L  1.12-1.27    L         

   



             code = 698)                                         

 

             PH, BLOOD (BEAKER) (test code = 7.53                               

    



             1810)                                               



COMPREHENSIVE METABOLIC [id number] 05:51:00





             Test Item    Value        Reference Range Interpretation Comments

 

             TOTAL PROTEIN 5.2 gm/dL    6.0-8.3      L            



             (BEAKER) (test code =                                        



             770)                                                

 

             ALBUMIN (BEAKER) 2.6 g/dL     3.5-5.0      L            



             (test code = 1145)                                        

 

             ALKALINE PHOSPHATASE 144 U/L                          



             (BEAKER) (test code =                                        



             346)                                                

 

             BILIRUBIN TOTAL 6.3 mg/dL    0.2-1.2      H            



             (BEAKER) (test code =                                        



             377)                                                

 

             SODIUM (BEAKER) (test 119 meq/L    136-145      LL           



             code = 381)                                         

 

             POTASSIUM (BEAKER) 4.2 meq/L    3.5-5.1                   



             (test code = 379)                                        

 

             CHLORIDE (BEAKER) 86 meq/L            L            



             (test code = 382)                                        

 

             CO2 (BEAKER) (test 26 meq/L     22-29                     



             code = 355)                                         

 

             BLOOD UREA NITROGEN 23 mg/dL     7-21         H            



             (BEAKER) (test code =                                        



             354)                                                

 

             CREATININE (BEAKER) 0.81 mg/dL   0.57-1.25                 



             (test code = 358)                                        

 

             GLUCOSE RANDOM 103 mg/dL                        



             (BEAKER) (test code =                                        



             652)                                                

 

             CALCIUM (BEAKER) 7.6 mg/dL    8.4-10.2     L            



             (test code = 697)                                        

 

             AST (SGOT) (BEAKER) 46 U/L       5-34         H            



             (test code = 353)                                        

 

             ALT (SGPT) (BEAKER) 21 U/L       6-55                      



             (test code = 347)                                        

 

             EGFR (BEAKER) (test 97 mL/min/1.73                           ESTIMA

FROILAN GFR IS



             code = 1092) sq m                                   NOT AS ACCURATE

 AS



                                                                 CREATININE



                                                                 CLEARANCE IN



                                                                 PREDICTING



                                                                 GLOMERULAR



                                                                 FILTRATION RATE

.



                                                                 ESTIMATED GFR I

S



                                                                 NOT APPLICABLE 

FOR



                                                                 DIALYSIS PATIEN

TS.



Specimen moderately [id number] 05:07:00





             Test Item    Value        Reference Range Interpretation Comments

 

             CORTISOL, TOTAL (BEAKER) (test code 9.6 ug/dL    3.7-19.4          

        



             = 2755)                                             



[id number] 04:57:00





             Test Item    Value        Reference Range Interpretation Comments

 

             PHOSPHORUS (BEAKER) (test code = 2.0 mg/dL    2.3-4.7      L       

     



             604)                                                



[id number] 04:57:00





             Test Item    Value        Reference Range Interpretation Comments

 

             MAGNESIUM (BEAKER) (test code = 1.7 mg/dL    1.6-2.6               

    



             627)                                                



[id number] 22:08:00





             Test Item    Value        Reference Range Interpretation Comments

 

             SODIUM (BEAKER) (test 119 meq/L    136-145      LL           



             code = 381)                                         

 

             POTASSIUM (BEAKER) 4.6 meq/L    3.5-5.1                   Specimen 

slightly



             (test code = 379)                                        hemolyzed

 

             CHLORIDE (BEAKER) 86 meq/L            L            



             (test code = 382)                                        

 

             CO2 (BEAKER) (test 27 meq/L     22-29                     



             code = 355)                                         



Call K &gt; 5.5POCT-GLUCOSE [id number] 12:59:00





             Test Item    Value        Reference Range Interpretation Comments

 

             POC-GLUCOSE METER 95 mg/dL                         TESTED AT 

[facility] [address]



             (BEAKER) (test code =                                        [place] [address]



             1538)                                               



T4, [id number] 10:25:00





             Test Item    Value        Reference Range Interpretation Comments

 

             FREE T4 (BEAKER) (test code = 655) 0.65 ng/dL   0.70-1.48    L     

       



CBC W/PLT COUNT &amp; AUTO [id number] 10:21:00





             Test Item    Value        Reference Range Interpretation Comments

 

             WHITE BLOOD CELL COUNT (BEAKER) 11.8 K/ L    3.5-10.5     H        

    



             (test code = 775)                                        

 

             RED BLOOD CELL COUNT (BEAKER) 3.74 M/ L    4.63-6.08    L          

  



             (test code = 761)                                        

 

             HEMOGLOBIN (BEAKER) (test code = 12.2 GM/DL   13.7-17.5    L       

     



             410)                                                

 

             HEMATOCRIT (BEAKER) (test code = 33.9 %       40.1-51.0    L       

     



             411)                                                

 

             MEAN CORPUSCULAR VOLUME (BEAKER) 90.6 fL      79.0-92.2            

     



             (test code = 753)                                        

 

             MEAN CORPUSCULAR HEMOGLOBIN 32.6 pg      25.7-32.2    H            



             (BEAKER) (test code = 751)                                        

 

             MEAN CORPUSCULAR HEMOGLOBIN CONC 36.0 GM/DL   32.3-36.5            

     



             (BEAKER) (test code = 752)                                        

 

             RED CELL DISTRIBUTION WIDTH 13.7 %       11.6-14.4                 



             (BEAKER) (test code = 412)                                        

 

             PLATELET COUNT (BEAKER) (test code 79 K/CU MM   150-450      L     

       



             = 756)                                              

 

             MEAN PLATELET VOLUME (BEAKER) 8.9 fL       9.4-12.4     L          

  



             (test code = 754)                                        

 

             NUCLEATED RED BLOOD CELLS (BEAKER) 0 /100 WBC   0-0                

       



             (test code = 413)                                        



(CELLAVISION MANUAL DIFF)2019 10:21:00





             Test Item    Value        Reference Range Interpretation Comments

 

             NEUTROPHILS - REL 82 %                                   



             (CELLAVISION)(BEAKER) (test code =                                 

       



             2816)                                               

 

             LYMPHOCYTES - REL 4 %                                    



             (CELLAVISION)(BEAKER) (test code =                                 

       



             2817)                                               

 

             MONOCYTES - REL 13 %                                   



             (CELLAVISION)(BEAKER) (test code =                                 

       



             2818)                                               

 

             ATYPICAL LYMPHOCYTES - REL 1 %          0-0          H            



             (CELLAVISION)(BEAKER) (test code =                                 

       



             2829)                                               

 

             NEUTROPHILS - ABS 9.68 K/ul    1.78-5.38    H            



             (CELLAVISION)(BEAKER) (test code =                                 

       



             2830)                                               

 

             LYMPHOCYTES - ABS 0.47 K/ul    1.32-3.57    L            



             (CELLAVISION)(BEAKER) (test code =                                 

       



             2831)                                               

 

             MONOCYTES - ABS 1.53 K/uL    0.30-0.82    H            



             (CELLAVISION)(BEAKER) (test code =                                 

       



             2832)                                               

 

             ATYPICAL LYMPHOCYTES - ABS 0.12 K/uL    0.00-0.00    H            



             (CELLAVISION)(BEAKER) (test code =                                 

       



             2858)                                               

 

             TOTAL COUNTED (BEAKER) (test code = 100                            

        



             1351)                                               

 

             RBC MORPHOLOGY (BEAKER) (test code Normal                          

       



             = 762)                                              

 

             WBC MORPHOLOGY (BEAKER) (test code Normal                          

       



             = 487)                                              

 

             PLT MORPHOLOGY (BEAKER) (test code Normal                          

       



             = 486)                                              

 

             ARTIFACT (CELLAVISION)(BEAKER) Present                             

   



             (test code = 3432)                                        

 

             PLATELET CONCENTRATION Decreased                              



             (CELLAVISION)(BEAKER) (test code =                                 

       



             3438)                                               



Received comment: User comments: Slide comments:POCT-GLUCOSE [id number] 
09:58:00





             Test Item    Value        Reference Range Interpretation Comments

 

             POC-GLUCOSE METER 104 mg/dL                        TESTED AT 

[facility] [address]



             (BEAKER) (test code =                                        [address] [place]



             1538)                                               [address]



RAD, CHEST, 1 VIEW, NON [id number] 09:33:00Reason for exam:-
&gt;effusionShould this be performed at the bedside?-&gt;YesFINAL REPORT PATIENT
ID: [id number] Comparison: 2019 TECHNIQUE: Single view of the chest FINDINGS:
Bilateral interstitial and airspace opacities are stable. Small left pleural 
thickening with adjacent airspace disease identified. A previous left-sided 
pneumothorax has decreased. Left pleural drainage catheters again noted. Right-
sided PICC line is stable. Signed: Ronaldo Mireles MDReport Verified Date/Time: 
2019 09:33:21 Reading Location: [address] Transitional Reading Room  
Electronically signed by: RONALDO MIRELES M.D. on 2019 09:33 AMCOMPREHENSIVE
METABOLIC [id number] 08:59:00





             Test Item    Value        Reference Range Interpretation Comments

 

             TOTAL PROTEIN 5.4 gm/dL    6.0-8.3      L            Specimen sligh

tly



             (BEAKER) (test code =                                        hemoly

zed



             770)                                                

 

             ALBUMIN (BEAKER) 2.1 g/dL     3.5-5.0      L            Specimen sl

ightly



             (test code = 1145)                                        hemolyzed

 

             ALKALINE PHOSPHATASE 153 U/L             H            



             (BEAKER) (test code =                                        



             346)                                                

 

             BILIRUBIN TOTAL 5.0 mg/dL    0.2-1.2      H            Specimen sli

ghtly



             (BEAKER) (test code =                                        hemoly

zed



             377)                                                

 

             SODIUM (BEAKER) (test 117 meq/L    136-145      LL           



             code = 381)                                         

 

             POTASSIUM (BEAKER) 5.5 meq/L    3.5-5.1      H            Specimen 

slightly



             (test code = 379)                                        hemolyzed

 

             CHLORIDE (BEAKER) 84 meq/L            L            



             (test code = 382)                                        

 

             CO2 (BEAKER) (test 26 meq/L     22-29                     



             code = 355)                                         

 

             BLOOD UREA NITROGEN 32 mg/dL     7-21         H            



             (BEAKER) (test code =                                        



             354)                                                

 

             CREATININE (BEAKER) 0.85 mg/dL   0.57-1.25                 Specimen

 slightly



             (test code = 358)                                        hemolyzed

 

             GLUCOSE RANDOM 104 mg/dL                        



             (BEAKER) (test code =                                        



             652)                                                

 

             CALCIUM (BEAKER) 7.6 mg/dL    8.4-10.2     L            



             (test code = 697)                                        

 

             AST (SGOT) (BEAKER) 51 U/L       5-34         H            Specimen

 slightly



             (test code = 353)                                        hemolyzed

 

             ALT (SGPT) (BEAKER) 23 U/L       6-55                      Specimen

 slightly



             (test code = 347)                                        hemolyzed

 

             EGFR (BEAKER) (test 92 mL/min/1.73                           ESTIMA

FROILAN GFR IS



             code = 1092) sq m                                   NOT AS ACCURATE

 AS



                                                                 CREATININE



                                                                 CLEARANCE IN



                                                                 PREDICTING



                                                                 GLOMERULAR



                                                                 FILTRATION RATE

.



                                                                 ESTIMATED GFR I

S



                                                                 NOT APPLICABLE 

FOR



                                                                 DIALYSIS PATIEN

TS.



Specimen moderately [id number] 08:55:00





             Test Item    Value        Reference Range Interpretation Comments

 

             MAGNESIUM (BEAKER) 1.9 mg/dL    1.6-2.6                   Specimen 

slightly



             (test code = 627)                                        hemolyzed



[id number] 08:55:00





             Test Item    Value        Reference Range Interpretation Comments

 

             PHOSPHORUS (BEAKER) 2.5 mg/dL    2.3-4.7                   Specimen

 slightly



             (test code = 604)                                        hemolyzed



TSH/FREE T4 IF [id number] 08:39:00





             Test Item    Value        Reference Range Interpretation Comments

 

             THYROID STIMULATING HORMONE 8.07 uIU/mL  0.35-4.94    H            



             (BEAKER) (test code = 772)                                        



CALCIUM, [id number] 08:06:00





             Test Item    Value        Reference Range Interpretation Comments

 

             CALCIUM IONIZED (BEAKER) (test 1.00 mmol/L  1.12-1.27    L         

   



             code = 698)                                         

 

             PH, BLOOD (BEAKER) (test code = 7.48                               

    



             1810)                                               



[id number] 23:47:00





             Test Item    Value        Reference Range Interpretation Comments

 

             SODIUM (BEAKER) (test code = 381) 117 meq/L    136-145      LL     

      

 

             POTASSIUM (BEAKER) (test code = 5.3 meq/L    3.5-5.1      H        

    



             379)                                                

 

             CHLORIDE (BEAKER) (test code = 382) 84 meq/L            L    

        

 

             CO2 (BEAKER) (test code = 355) 27 meq/L     22-29                  

   



Call K &gt; [id number]-GLUCOSE [id number] 21:18:00





             Test Item    Value        Reference Range Interpretation Comments

 

             POC-GLUCOSE METER 145 mg/dL           H            TESTED AT 

[facility] [address]



             (BEAKER) (test code =                                        [address] [place]



             1538)                                               [address]



[id number] 18:57:00





             Test Item    Value        Reference Range Interpretation Comments

 

             CORTISOL, TOTAL (BEAKER) (test 18.1 ug/dL   3.7-19.4               

   



             code = 5555)                                        



[id number] 18:34:00





             Test Item    Value        Reference Range Interpretation Comments

 

             SODIUM (BEAKER) (test code = 381) 116 meq/L    136-145      LL     

      

 

             POTASSIUM (BEAKER) (test code = 6.0 meq/L    3.5-5.1      HH       

    



             379)                                                

 

             CHLORIDE (BEAKER) (test code = 382) 82 meq/L            L    

        

 

             CO2 (BEAKER) (test code = 355) 30 meq/L     22-29        H         

   



Call [id number]-GLUCOSE [id number] 18:20:00





             Test Item    Value        Reference Range Interpretation Comments

 

             POC-GLUCOSE METER 141 mg/dL           H            TESTED AT 

[facility] [address]



             (BEAKER) (test code =                                        [address] [place]



             1538)                                               [address]



POCT-GLUCOSE [id number] 13:00:00





             Test Item    Value        Reference Range Interpretation Comments

 

             POC-GLUCOSE METER 122 mg/dL           H            TESTED AT 

[facility] [address]



             (BEAKER) (test code =                                        [address] [place]



             1538)                                               [address]



CBC W/PLT COUNT &amp; AUTO [id number] 10:34:00





             Test Item    Value        Reference Range Interpretation Comments

 

             WHITE BLOOD CELL COUNT (BEAKER) 15.5 K/ L    3.5-10.5     H        

    



             (test code = 775)                                        

 

             RED BLOOD CELL COUNT (BEAKER) 4.04 M/ L    4.63-6.08    L          

  



             (test code = 761)                                        

 

             HEMOGLOBIN (BEAKER) (test code = 13.3 GM/DL   13.7-17.5    L       

     



             410)                                                

 

             HEMATOCRIT (BEAKER) (test code = 36.5 %       40.1-51.0    L       

     



             411)                                                

 

             MEAN CORPUSCULAR VOLUME (BEAKER) 90.3 fL      79.0-92.2            

     



             (test code = 753)                                        

 

             MEAN CORPUSCULAR HEMOGLOBIN 32.9 pg      25.7-32.2    H            



             (BEAKER) (test code = 751)                                        

 

             MEAN CORPUSCULAR HEMOGLOBIN CONC 36.4 GM/DL   32.3-36.5            

     



             (BEAKER) (test code = 752)                                        

 

             RED CELL DISTRIBUTION WIDTH 13.5 %       11.6-14.4                 



             (BEAKER) (test code = 412)                                        

 

             PLATELET COUNT (BEAKER) (test code 65 K/CU MM   150-450      L     

       



             = 756)                                              

 

             MEAN PLATELET VOLUME (BEAKER) 9.0 fL       9.4-12.4     L          

  



             (test code = 754)                                        

 

             NUCLEATED RED BLOOD CELLS (BEAKER) 0 /100 WBC   0-0                

       



             (test code = 413)                                        



(CELLAVISION MANUAL DIFF)2019 10:34:00





             Test Item    Value        Reference Range Interpretation Comments

 

             NEUTROPHILS - REL 78 %                                   



             (CELLAVISION)(BEAKER) (test code                                   

     



             = 2816)                                             

 

             LYMPHOCYTES - REL 4 %                                    



             (CELLAVISION)(BEAKER) (test code                                   

     



             = 2817)                                             

 

             MONOCYTES - REL 14 %                                   



             (CELLAVISION)(BEAKER) (test code                                   

     



             = 2818)                                             

 

             EOSINOPHILS - REL 2 %                                    



             (CELLAVISION)(BEAKER) (test code                                   

     



             = 2819)                                             

 

             BANDS - REL (CELLAVISION)(BEAKER) 2 %          0-10                

      



             (test code = 2826)                                        

 

             NEUTROPHILS - ABS 12.09 K/ul   1.78-5.38    H            



             (CELLAVISION)(BEAKER) (test code                                   

     



             = 2830)                                             

 

             LYMPHOCYTES - ABS 0.62 K/ul    1.32-3.57    L            



             (CELLAVISION)(BEAKER) (test code                                   

     



             = 2831)                                             

 

             MONOCYTES - ABS 2.17 K/uL    0.30-0.82    H            



             (CELLAVISION)(BEAKER) (test code                                   

     



             = 2832)                                             

 

             EOSINOPHILS - ABS 0.31 K/uL    0.04-0.54                 



             (CELLAVISION)(BEAKER) (test code                                   

     



             = 2834)                                             

 

             BANDS - ABS (CELLAVISION)(BEAKER) 0.31 K/uL    0.00-0.80           

      



             (test code = 2840)                                        

 

             TOTAL COUNTED (BEAKER) (test code 100                              

      



             = 1351)                                             

 

             WBC MORPHOLOGY (BEAKER) (test Normal                               

  



             code = 487)                                         

 

             PLT MORPHOLOGY (BEAKER) (test Normal                               

  



             code = 486)                                         

 

             POLYCHROMATOPHILLIC RBCS(BEAKER) 1+ few                            

     



             (test code = 478)                                        

 

             POIKILOCYTES (BEAKER) (test code 2+ moderate                       

     



             = 966)                                              

 

             KARISSA CELLS (BEAKER) (test code = 2+ moderate                       

     



             474)                                                

 

             BASOPHILIC STIPPLING (BEAKER) Present                              

  



             (test code = 473)                                        

 

             ARTIFACT (CELLAVISION)(BEAKER) Present                             

   



             (test code = 3432)                                        

 

             PLATELET CONCENTRATION Decreased                              



             (CELLAVISION)(BEAKER) (test code                                   

     



             = 3438)                                             



Received comment: User comments: Slide comments:RAD, CHEST, 1 VIEW, NON DEPT
2019 08:40:00Reason for exam:-&gt;left effusionShould this be performed at
the bedside?-&gt;YesFINAL REPORT PATIENT ID: [id number] Portable chest. CLINICAL 
HISTORY: left effusion. COMPARISON STUDY:Chest x-ray from yesterday. FINDINGS: 
The cardiac silhouette is unremarkable. The pulmonary parenchyma demonstrates 
increased interstitial markings with atelectatic changes in the lung bases. A 
left-sided pigtail catheter chest tube remains and there has been development of
a loculated small basilar pneumothorax. A right PICC line remains. Degenerative 
changes are noted. IMPRESSION: Development a small left-sided basilar 
pneumothorax. No other significant change. Signed: Beckie Martinezort Veri
fied Date/Time: 2019 08:40:30 Reading Location: [facility] C0X Ortho Consult
Reading Room Electronically signed by: BECKIE MARTINEZ M.D. on 2019 08:40 
AMPOCT-GLUCOSE [id number] 08:39:00





             Test Item    Value        Reference Range Interpretation Comments

 

             POC-GLUCOSE METER 110 mg/dL                        TESTED AT 

[facility] [address]



             [facility] (test code =                                        [facility]



             153)                                               [address]



COMPREHENSIVE METABOLIC [id number] 06:32:00





             Test Item    Value        Reference Range Interpretation Comments

 

             TOTAL PROTEIN 5.5 gm/dL    6.0-8.3      L            



             (BEAKER) (test code =                                        



             770)                                                

 

             ALBUMIN (BEAKER) 1.9 g/dL     3.5-5.0      L            



             (test code = 1145)                                        

 

             ALKALINE PHOSPHATASE 134 U/L                          



             (BEAKER) (test code =                                        



             346)                                                

 

             BILIRUBIN TOTAL 4.4 mg/dL    0.2-1.2      H            



             (BEAKER) (test code =                                        



             377)                                                

 

             SODIUM (BEAKER) (test 116 meq/L    136-145      LL           



             code = 381)                                         

 

             POTASSIUM (BEAKER) 5.6 meq/L    3.5-5.1      H            



             (test code = 379)                                        

 

             CHLORIDE (BEAKER) 83 meq/L            L            



             (test code = 382)                                        

 

             CO2 (BEAKER) (test 27 meq/L     22-29                     



             code = 355)                                         

 

             BLOOD UREA NITROGEN 34 mg/dL     7-21         H            



             (BEAKER) (test code =                                        



             354)                                                

 

             CREATININE (BEAKER) 0.89 mg/dL   0.57-1.25                 



             (test code = 358)                                        

 

             GLUCOSE RANDOM 109 mg/dL           H            



             (BEAKER) (test code =                                        



             652)                                                

 

             CALCIUM (BEAKER) 7.4 mg/dL    8.4-10.2     L            



             (test code = 697)                                        

 

             AST (SGOT) (BEAKER) 31 U/L       5-34                      



             (test code = 353)                                        

 

             ALT (SGPT) (BEAKER) 18 U/L       6-55                      



             (test code = 347)                                        

 

             EGFR (BEAKER) (test 87 mL/min/1.73                           ESTIMA

FROILAN GFR IS



             code = 1092) sq m                                   NOT AS ACCURATE

 AS



                                                                 CREATININE



                                                                 CLEARANCE IN



                                                                 PREDICTING



                                                                 GLOMERULAR



                                                                 FILTRATION RATE

.



                                                                 ESTIMATED GFR I

S



                                                                 NOT APPLICABLE 

FOR



                                                                 DIALYSIS PATIEN

TS.



Specimen moderately ictericPOCT-GLUCOSE [id number] 21:12:00





             Test Item    Value        Reference Range Interpretation Comments

 

             POC-GLUCOSE METER 114 mg/dL           H            TESTED AT 

[facility] [address]



             ([facility]) (test code =                                        [facility]



             1538)                                               [address]



OSMOLALITY, [id number] 18:43:00





             Test Item    Value        Reference Range Interpretation Comments

 

             OSMOLALITY URINE (BEAKER) (test 694 mOsm/kg  40-1,400              

    



             code = 614)                                         



SODIUM, RANDOM [id number] 18:02:00





             Test Item    Value        Reference Range Interpretation Comments

 

             SODIUM URINE (BEAKER) (test code = < meq/L                         

       



             243)                                                



Reference Range: No NormalsPOCT-GLUCOSE [id number] 16:06:00





             Test Item    Value        Reference Range Interpretation Comments

 

             POC-GLUCOSE METER 145 mg/dL           H            TESTED AT 

[place] [address]



             ([facility]) (test code =                                        [facility]



             1538)                                               [address]



RAD, ABDOMEN/KUB, 1 VIEW [id number] 12:51:00Reason for exam:-&gt;no BM in 13 
days. abdominal distension. concern for ileusFINAL REPORT PATIENT ID: [id number] 
RAD, ABDOMEN/KUB, 1 VIEW AP CLINICAL INDICATION: no BM in 13 days.abdominal 
distension. concern for ileus COMPARISON: None TECHNIQUE: 24 radiographs of the 
abdomen. IMPRESSION: The bowel gas pattern demonstrates gaseous distention 
without dilation. No pneumatosis. Appearance may reflect ileus. Excreted 
contrast is present in the urinary bladder. The regional skeleton is intact. 
Signed: JR Reji, Kulwinder LINDAerickalinda Verified Date/Time: 2019 
12:51:21 Reading Location: [facility] Radiology Reading Room  Electronically 
signed by: KULWINDER OVERTON on2019 12:51 PMOSMOLALITY, SERUM
2019 12:47:00





             Test Item    Value        Reference Range Interpretation Comments

 

             OSMOLALITY, SERUM (BEAKER) (test 258 mOsm/kg  275-295      L       

     



             code = 615)                                         



POCT-GLUCOSE [id number] 12:35:00





             Test Item    Value        Reference Range Interpretation Comments

 

             POC-GLUCOSE METER 93 mg/dL                         TESTED AT 

[facility] [address]



             (BEAKER) (test code =                                        [place] [address]



             1538)                                               



CBC W/PLT COUNT &amp; AUTO [id number] 10:10:00





             Test Item    Value        Reference Range Interpretation Comments

 

             WHITE BLOOD CELL COUNT (BEAKER) 19.5 K/ L    3.5-10.5     H        

    



             (test code = 775)                                        

 

             RED BLOOD CELL COUNT (BEAKER) 4.18 M/ L    4.63-6.08    L          

  



             (test code = 761)                                        

 

             HEMOGLOBIN (BEAKER) (test code = 13.3 GM/DL   13.7-17.5    L       

     



             410)                                                

 

             HEMATOCRIT (BEAKER) (test code = 37.8 %       40.1-51.0    L       

     



             411)                                                

 

             MEAN CORPUSCULAR VOLUME (BEAKER) 90.4 fL      79.0-92.2            

     



             (test code = 753)                                        

 

             MEAN CORPUSCULAR HEMOGLOBIN 31.8 pg      25.7-32.2                 



             (BEAKER) (test code = 751)                                        

 

             MEAN CORPUSCULAR HEMOGLOBIN CONC 35.2 GM/DL   32.3-36.5            

     



             (BEAKER) (test code = 752)                                        

 

             RED CELL DISTRIBUTION WIDTH 13.5 %       11.6-14.4                 



             (BEAKER) (test code = 412)                                        

 

             PLATELET COUNT (BEAKER) (test code 60 K/CU MM   150-450      L     

       



             = 756)                                              

 

             MEAN PLATELET VOLUME (BEAKER) 9.4 fL       9.4-12.4                

  



             (test code = 754)                                        

 

             NUCLEATED RED BLOOD CELLS (BEAKER) 0 /100 WBC   0-0                

       



             (test code = 413)                                        



(CELLAVISION MANUAL DIFF)2019 10:10:00





             Test Item    Value        Reference Range Interpretation Comments

 

             NEUTROPHILS - REL 86 %                                   



             (CELLAVISION)(BEAKER) (test code =                                 

       



             2816)                                               

 

             LYMPHOCYTES - REL 1 %                                    



             (CELLAVISION)(BEAKER) (test code =                                 

       



             2817)                                               

 

             MONOCYTES - REL 6 %                                    



             (CELLAVISION)(BEAKER) (test code =                                 

       



             2818)                                               

 

             EOSINOPHILS - REL 3 %                                    



             (CELLAVISION)(BEAKER) (test code =                                 

       



             2819)                                               

 

             BANDS - REL (CELLAVISION)(BEAKER) 3 %          0-10                

      



             (test code = 2826)                                        

 

             BLASTS - REL (CELLAVISION)(BEAKER) 1 %          0-0          H     

       



             (test code = 2827)                                        

 

             NEUTROPHILS - ABS 16.77 K/ul   1.78-5.38    H            



             (CELLAVISION)(BEAKER) (test code =                                 

       



             2830)                                               

 

             LYMPHOCYTES - ABS 0.20 K/ul    1.32-3.57    L            



             (CELLAVISION)(BEAKER) (test code =                                 

       



             2831)                                               

 

             MONOCYTES - ABS 1.17 K/uL    0.30-0.82    H            



             (CELLAVISION)(BEAKER) (test code =                                 

       



             2832)                                               

 

             EOSINOPHILS - ABS 0.59 K/uL    0.04-0.54    H            



             (CELLAVISION)(BEAKER) (test code =                                 

       



             2834)                                               

 

             BANDS - ABS (CELLAVISION)(BEAKER) 0.59 K/uL    0.00-0.80           

      



             (test code = 2840)                                        

 

             BLASTS - ABS (CELLAVISION)(BEAKER) 0.20 K/uL    0.00-0.00    H     

       



             (test code = 2845)                                        

 

             TOTAL COUNTED (BEAKER) (test code 100                              

      



             = 1351)                                             

 

             PLT MORPHOLOGY (BEAKER) (test code Normal                          

       



             = 486)                                              

 

             SMUDGE CELLS (BEAKER) (test code = Present                         

       



             1371)                                               

 

             POIKILOCYTES (BEAKER) (test code = 1+ few                          

       



             966)                                                

 

             PLATELET CONCENTRATION Decreased                              



             (CELLAVISION)(BEAKER) (test code =                                 

       



             3438)                                               



Received comment: User comments: Slide comments:RAD, CHEST, 1 VIEW, NON DEPT
2019 08:53:00Reason for exam:-&gt;left effusionShould this be performed at
the bedside?-&gt;YesFINAL REPORT PATIENT ID: [id number] RAD, CHEST, 1 VIEW, NON 
DEPT INDICATION: left effusion COMPARISON:Prior day's exam FINDINGS: Portable 
frontal view of the chest. IMPRESSION: Limited by patient positioning.Support 
Lines: Stable. Lungs and pleura: Interstitial congestion on the left slightly 
greater than the right and unchanged from prior. Small left effusion. No visible
pneumothorax.Heart and mediastinum: Stable contours. Additional findings: None. 
Signed: JR Reji, Kulwinder Zaidi VerifiedDate/Time: 2019 08:53:19
Reading Location: [facility] Radiology Reading Room Electronicallysigned by: 
KULWINDER OVERTON on 2019 08:53 AMPOCT-GLUCOSE [id number] 
07:58:00





             Test Item    Value        Reference Range Interpretation Comments

 

             POC-GLUCOSE METER 95 mg/dL                         TESTED AT 

[facility] [address]



             (BEAKER) (test code =                                        [place] [address]



             1538)                                               



COMPREHENSIVE METABOLIC [id number] 05:41:00





             Test Item    Value        Reference Range Interpretation Comments

 

             TOTAL PROTEIN 5.4 gm/dL    6.0-8.3      L            



             (BEAKER) (test code =                                        



             770)                                                

 

             ALBUMIN (BEAKER) 1.9 g/dL     3.5-5.0      L            



             (test code = 1145)                                        

 

             ALKALINE PHOSPHATASE 126 U/L                          



             (BEAKER) (test code =                                        



             346)                                                

 

             BILIRUBIN TOTAL 4.9 mg/dL    0.2-1.2      H            



             (BEAKER) (test code =                                        



             377)                                                

 

             SODIUM (BEAKER) (test 117 meq/L    136-145      LL           



             code = 381)                                         

 

             POTASSIUM (BEAKER) 5.3 meq/L    3.5-5.1      H            



             (test code = 379)                                        

 

             CHLORIDE (BEAKER) 83 meq/L            L            



             (test code = 382)                                        

 

             CO2 (BEAKER) (test 28 meq/L     22-29                     



             code = 355)                                         

 

             BLOOD UREA NITROGEN 29 mg/dL     7-21         H            



             (BEAKER) (test code =                                        



             354)                                                

 

             CREATININE (BEAKER) 0.90 mg/dL   0.57-1.25                 



             (test code = 358)                                        

 

             GLUCOSE RANDOM 99 mg/dL                         



             (BEAKER) (test code =                                        



             652)                                                

 

             CALCIUM (BEAKER) 7.7 mg/dL    8.4-10.2     L            



             (test code = 697)                                        

 

             AST (SGOT) (BEAKER) 28 U/L       5-34                      



             (test code = 353)                                        

 

             ALT (SGPT) ([facility]) 18 U/L       6-55                      



             (test code = 347)                                        

 

             EGFR ([facility]) (test 86 mL/min/1.73                           ESTIMA

FROILAN GFR IS



             code = 1092) sq m                                   NOT AS ACCURATE

 AS



                                                                 CREATININE



                                                                 CLEARANCE IN



                                                                 PREDICTING



                                                                 GLOMERULAR



                                                                 FILTRATION RATE

.



                                                                 ESTIMATED GFR I

S



                                                                 NOT APPLICABLE 

FOR



                                                                 DIALYSIS PATIEN

TS.



Specimen moderately ictericPOCT-GLUCOSE [id number] 21:08:00





             Test Item    Value        Reference Range Interpretation Comments

 

             POC-GLUCOSE METER 92 mg/dL                         TESTED AT 

[place] [address]



             ([facility]) (test code =                                        [address] [place] [facility] [address]



             1538)                                               



RAD, CHEST, 1 VIEW, NON [id number] 17:40:00Reason for exam:-&gt;post chest 
tube placementFINAL REPORT PATIENT ID: [id number] INDICATION: post chest tube 
placement TECHNIQUE: Chest radiograph,single view, portable technique. FINDINGS 
/ IMPRESSION: Left pleural effusion has decreased in size,since 10:25 AM, after 
pleural tube placement. No pneumothorax demonstrated. Right PICC line again not
ed terminating at the cavoatrial junction. Signed: Bertram Ordonez MDReport 
Verified Date/Time: 2019 17:40:02 Reading Location: [address] Consult 
Reading Room Electronically signed by: BERTRAM ORDONEZ M.D. on 
2019 05:40 PMPOCT-GLUCOSE [id number] 17:33:00





             Test Item    Value        Reference Range Interpretation Comments

 

             POC-GLUCOSE METER 116 mg/dL           H            TESTED AT 

[place] [address]



             ([facility]) (test code =                                        [address] [facility]



             1538)                                               [address]



POCT-GLUCOSE [id number] 15:19:00





             Test Item    Value        Reference Range Interpretation Comments

 

             POC-GLUCOSE METER 85 mg/dL                         TESTED AT 

[place] [address]



             ([facility]) (test code =                                        [place]

[place] [facility] [address]



             1538)                                               



CT, DRAINAGE, CHEST TUBE [id number] 14:49:00Reason for exam:-
&gt;loculated left pleural effusion, please place large bore pigtail if effusion
noted on CT scanAnesthesia:-&gt;NoneFINAL REPORT PATIENT ID: [id number] PROCEDURE:
CT-guided placement of a left pleural catheter. Dose modulation, iterative 
reconstruction, and/or weight-based adjustment of the mA/kV was utilized to 
reduce the radiation dose to as low as reasonably achievable. INDICATION: 
Loculated left pleural effusion.COMPARISON: CT from earlier today. SEDATION: 
Intravenous moderate sedation was administered by radiology nursing and 
monitored under the direction of the undersigned radiologist. The patient's 
vital signs were monitored throughout the procedure and recorded in the 
patient's medical record by radiologynursing. Total intraservice time of 
sedation was 25 minutes. MEDICATIONS: 0.5 mg Versed, 25 mcg fentanyl 
DESCRIPTION: After obtaining informed written consent, the patient was brought 
to the procedure room and placed in the supine position. Preliminary CT scan 
revealed a large left pleural effusion. Aclear path to the collection was 
identified. The overlying skin was prepped and draped in the usual,sterile 
fashion and local 2% lidocaine anesthesia was administered. Under CT guidance, a
19-gauge needle was placed. After placement of a wire and serial dilation, a 12 
French catheter was placed into the pleural fluid. The catheter was affixed to 
the skin and connected to a vacuum container. 1000 mL of clear red fluid was 
aspirated. Samples were placed on this side table and no clotting was noted. Sa
mples were sent for analysis. Post procedure scan showed decrease in size with 
left effusion and no immediate complications. IMPRESSION: Successful placement 
of a 12 French left chest tube. Signed: Vivien Aguilera Verified Date/Time: 
2019 14:49:30 Reading Location: [address] Body Reading Room 
Electronically signed by: VIVIEN AGUILERA MD on 2019 02:49 PMCT, CHEST, 
WITH [id number] 13:11:00Reason for exam:-&gt;evaluate loculated left 
pleural effusion seen on xrayFINAL REPORT PATIENT ID: [id number] TECHNIQUE: CT 
scan of the chest WITH intravenous contrast. Dose modulation, iterative 
reconstruction, and/or weight-based adjustment of the mA/kV was utilized to 
reduce the radiation dose to as low as reasonably achievable. INDICATION: 
evaluate loculated left pleural effusion seen on xrayevaluate loculated left 
pleural effusion seen on xray. COMPARISON: None. FINDINGS: LINES/TUBES: A right 
PICC has its tip at the superior cavoatrial junction. LUNGS AND AIRWAYS: Mild
right basilar subsegmental atelectasis. There is an area of cavitation with a 
fluid fluid level in superior segment of the left lower lobe which measures 4 cm
PLEURA: Trace right pleural effusion. HEART AND MEDIASTINUM: The visualized 
thyroid gland is normal. No significant mediastinal, hilar, or axillary 
lymphadenopathy. The heart and pericardium are within normal limits. Severe 
coronary arterial calcifications. SOFT TISSUES AND BONES: Bilateral 
gynecomastia. Old, healed right 10th and 12th rib fractures. Old, healed left 
10th rib fracture. UPPER ABDOMEN: Nodular, cirrhotic liver. Partially visualized
upper abdominal varices. A periumbilical vein is recanalized. IMPRESSION: 
1.There is a large left sided loculated pleural effusion with a mildly thickened
pleura and some intermixed gas. This is concerning for an empyema. 2.The air-
fluid level with surrounding lung in the superior segment of the left lower lobe
is most likely a lung abscess. A cavitary lung nodule (either from malignancy or
fungal infection) is considered less likely. A follow-up chest CT is recommended
in three months to document decrease in size and exclude cavitary malignancy. 
3.Cirrhosis with findings of portal hypertension. Signed: Vivien Aguilera MDReport 
Verified Date/Time: 2019 13:11:18 Reading Location: [address] Body 
Reading Room Electronically signed by: VIVIEN AGUILERA MD on 2019 01:11 
PMPOCT-GLUCOSE [id number] 11:22:00





             Test Item    Value        Reference Range Interpretation Comments

 

             POC-GLUCOSE METER 81 mg/dL                         TESTED AT 

[place] [address]



             ([facility]) (test code =                                        [place] [facility] [address]



             1538)                                               



RAD, CHEST, 1 VIEW, NON [id number] 10:59:00Reason for exam:-&gt;eval 
effusionShould this be performed at the bedside?-&gt;YesFINAL REPORT PATIENT ID:
[id number] RAD, CHEST, 1 VIEW, NON DEPT INDICATION: eval effusion COMPARISON:Prior
day's exam FINDINGS: Portable frontal view of the chest. IMPRESSION: Limited by 
patient rotation.Support Lines: A right PICC terminates over the superior vena 
cava. Lungs and pleura: Large left effusion. Right costophrenic sulcus is 
excluded from view. No pneumothorax.Heart and mediastinum: Unremarkable where 
visible.Additional findings: None. A CT evaluation is currently pending. Signed:
JR Reji, Kulwinder MCKEONeport Verified Date/Time: 2019 10:59:34 
Reading Location: [facility] Radiology Reading Room Electronically signed by:
KULWINDER OVERTON on 2019 10:59 AMCOMPREHENSIVE METABOLIC PANEL
2019 07:33:00





             Test Item    Value        Reference Range Interpretation Comments

 

             TOTAL PROTEIN 5.8 gm/dL    6.0-8.3      L            Specimen sligh

tly



             (BEAKER) (test code =                                        hemoly

zed



             770)                                                

 

             ALBUMIN (BEAKER) 2.0 g/dL     3.5-5.0      L            Specimen sl

ightly



             (test code = 1145)                                        hemolyzed

 

             ALKALINE PHOSPHATASE 130 U/L                          



             (BEAKER) (test code =                                        



             346)                                                

 

             BILIRUBIN TOTAL 4.6 mg/dL    0.2-1.2      H            Specimen sli

ghtly



             (BEAKER) (test code =                                        hemoly

zed



             377)                                                

 

             SODIUM (BEAKER) (test 117 meq/L    136-145      LL           



             code = 381)                                         

 

             POTASSIUM (BEAKER) 5.3 meq/L    3.5-5.1      H            Specimen 

slightly



             (test code = 379)                                        hemolyzed

 

             CHLORIDE (BEAKER) 84 meq/L            L            



             (test code = 382)                                        

 

             CO2 (BEAKER) (test 28 meq/L     22-29                     



             code = 355)                                         

 

             BLOOD UREA NITROGEN 22 mg/dL     7-21         H            



             (BEAKER) (test code =                                        



             354)                                                

 

             CREATININE (BEAKER) 0.86 mg/dL   0.57-1.25                 Specimen

 slightly



             (test code = 358)                                        hemolyzed

 

             GLUCOSE RANDOM 90 mg/dL                         



             (BEAKER) (test code =                                        



             652)                                                

 

             CALCIUM (BEAKER) 7.9 mg/dL    8.4-10.2     L            



             (test code = 697)                                        

 

             AST (SGOT) (BEAKER) 33 U/L       5-34                      Specimen

 slightly



             (test code = 353)                                        hemolyzed

 

             ALT (SGPT) (BEAKER) 20 U/L       6-55                      Specimen

 slightly



             (test code = 347)                                        hemolyzed

 

             EGFR (BEAKER) (test 91 mL/min/1.73                           ESTIMA

FROILAN GFR IS



             code = 1092) sq m                                   NOT AS ACCURATE

 AS



                                                                 CREATININE



                                                                 CLEARANCE IN



                                                                 PREDICTING



                                                                 GLOMERULAR



                                                                 FILTRATION RATE

.



                                                                 ESTIMATED GFR I

S



                                                                 NOT APPLICABLE 

FOR



                                                                 DIALYSIS PATIEN

TS.



Specimen moderately ictericPOCT-GLUCOSE [id number] 05:46:00





             Test Item    Value        Reference Range Interpretation Comments

 

             POC-GLUCOSE METER 93 mg/dL                         TESTED AT 

[facility] [address]



             (BEAKER) (test code =                                        [place] [address]



             1538)                                               



PROTHROMBIN [id number] 05:30:00





             Test Item    Value        Reference Range Interpretation Comments

 

             PROTIME (BEAKER) (test code = 16.9 seconds 11.9-14.2    H          

  



             759)                                                

 

             INR (BEAKER) (test code = 370) 1.4          <=5.9                  

   



Effective 2019: PT Reference Range ChangeNew: 11.9-14.2 Previous: 11.7-
14.7RECOMMENDED COUMADIN/WARFARIN INR THERAPY RANGESSTANDARD DOSE: 2.0-3.0 
Includes: PROPHYLAXIS for venous thrombosis, systemic embolization; TREATMENT 
for venous thrombosis and/or pulmonary embolus.HIGH RISK: Target INR is 2.5-3.5 
for patients wiht mechanical heart valves.CBC W/PLT COUNT &amp; AUTO 
[id number] 05:27:00





             Test Item    Value        Reference Range Interpretation Comments

 

             WHITE BLOOD CELL COUNT (BEAKER) 23.2 K/ L    3.5-10.5     H        

    



             (test code = 775)                                        

 

             RED BLOOD CELL COUNT (BEAKER) 4.75 M/ L    4.63-6.08               

  



             (test code = 761)                                        

 

             HEMOGLOBIN (BEAKER) (test code = 15.3 GM/DL   13.7-17.5            

     



             410)                                                

 

             HEMATOCRIT (BEAKER) (test code = 43.1 %       40.1-51.0            

     



             411)                                                

 

             MEAN CORPUSCULAR VOLUME (BEAKER) 90.7 fL      79.0-92.2            

     



             (test code = 753)                                        

 

             MEAN CORPUSCULAR HEMOGLOBIN 32.2 pg      25.7-32.2                 



             (BEAKER) (test code = 751)                                        

 

             MEAN CORPUSCULAR HEMOGLOBIN CONC 35.5 GM/DL   32.3-36.5            

     



             (BEAKER) (test code = 752)                                        

 

             RED CELL DISTRIBUTION WIDTH 13.3 %       11.6-14.4                 



             (BEAKER) (test code = 412)                                        

 

             PLATELET COUNT (BEAKER) (test code 87 K/CU MM   150-450      L     

       



             = 756)                                              

 

             MEAN PLATELET VOLUME (BEAKER) 8.9 fL       9.4-12.4     L          

  



             (test code = 754)                                        

 

             NUCLEATED RED BLOOD CELLS (BEAKER) 0 /100 WBC   0-0                

       



             (test code = 413)                                        

 

             NEUTROPHILS RELATIVE PERCENT 86 %                                  

 



             (BEAKER) (test code = 429)                                        

 

             LYMPHOCYTES RELATIVE PERCENT 3 %                                   

 



             (BEAKER) (test code = 430)                                        

 

             MONOCYTES RELATIVE PERCENT 9 %                                    



             (BEAKER) (test code = 431)                                        

 

             EOSINOPHILS RELATIVE PERCENT 0 %                                   

 



             (BEAKER) (test code = 432)                                        

 

             BASOPHILS RELATIVE PERCENT 0 %                                    



             (BEAKER) (test code = 437)                                        

 

             NEUTROPHILS ABSOLUTE COUNT 19.87 K/ L   1.78-5.38    H            



             (BEAKER) (test code = 670)                                        

 

             LYMPHOCYTES ABSOLUTE COUNT 0.73 K/ L    1.32-3.57    L            



             (BEAKER) (test code = 414)                                        

 

             MONOCYTES ABSOLUTE COUNT (BEAKER) 2.16 K/ L    0.30-0.82    H      

      



             (test code = 415)                                        

 

             EOSINOPHILS ABSOLUTE COUNT 0.06 K/ L    0.04-0.54                 



             (BEAKER) (test code = 416)                                        

 

             BASOPHILS ABSOLUTE COUNT (BEAKER) 0.04 K/ L    0.01-0.08           

      



             (test code = 417)                                        

 

             IMMATURE GRANULOCYTES-RELATIVE 1 %          0-1                    

   



             PERCENT (BEAKER) (test code =                                      

  



             2801)                                               



POCT-GLUCOSE [id number] 23:34:00





             Test Item    Value        Reference Range Interpretation Comments

 

             POC-GLUCOSE METER 94 mg/dL                         TESTED AT 

[facility] [address]



             ([facility]) (test code =                                        [place] [address]



             1538)

## 2022-12-21 NOTE — XMS REPORT
Clinical Summary

                          Created on:2022



Patient:Omkar Chung

Sex:Male

:1960

External Reference #:[id number]





Demographics







                          Address                   [address]



                                                    [place] [address]

 

                          Mobile Phone              [phone]

 

                          Home Phone                [phone]

 

                          Email Address             [email or web address]

 

                          Preferred Language        English

 

                          Marital Status            

 

                          Zoroastrian Affiliation     Unknown

 

                          Race                      White

 

                          Ethnic Group              Not  or 









Author







                          Organization              [facility]

[facility]

 

                          Address                   [address] [place] [address]









Support







                Name            Relationship    Address         Phone

 

                Gabby Villatoro    Unavailable     Unavailable     [phone]









Care Team Providers







                    Name                Role                Phone

 

                    Moris Guajardo MD Unavailable         [phone]









Allergies

Not on File



Medications

Not on file



Active Problems

Not on file



Encounters







             Date         Type         Specialty    Care Team    Description

 

             2022   Travel                                 



after 2021



Social History







             Tobacco Use  Types        Packs/Day    Years Used   Date

 

             Smoking Tobacco: Never Assessed                                    

    









                          Sex Assigned at Birth     Date Recorded

 

                          Not on file               







Last Filed Vital Signs

Not on file



Plan of Treatment

Not on file



Results

Not on fileafter 2021



Insurance







          Payer     Benefit Plan / Subscriber ID Effective Dates Phone     Addre

ss   Type



                    Group                                             

 

           [facility]   [employer] [facility] [id number]  Effective for            [place]

[address] [id number]



                                        Medicare



                    MED[place] [address] 









           Guarantor Name Account Type Relation to Date of Birth Phone      Bill

ing



                                 Patient                          Address

 

           Omkar Chung Personal/Family Self       1960 [phone] [address]



                                                       (Home)     [address]







                                                                  [place]



                                                                  [address]

 

           Omkar Chung Personal/Family Self       1960 [phone] [address]



                                                       (Home)     [place]



                                                                  [address]







Care Teams







             Team Member  Relationship Specialty    Start Date   End Date

 

                                        Moris Guajardo MD



                PCP - External Referring General Surgery 3/11/22         



                                        [address]



                                                                



                                        [address]                                        



                                        [address] [phone] (Work)



                                                                



             [phone] (Fax)

## 2022-12-21 NOTE — EDPHYS
Physician Documentation                                                                           

 [facility]                                                                 

Name: Omkar Chung                                                                                  

Age: 62 yrs                                                                                       

Sex: Male                                                                                         

: 1960                                                                                   

MRN: [id number]                                                                                   

Arrival Date: 2022                                                                          

Time: 20:50                                                                                       

Account#: [id number]                                                                            

Bed 5                                                                                             

Private MD:                                                                                       

ED Physician Debbie Toney                                                                    

HPI:                                                                                              

                                                                                             

21:47 This 62 yrs old Male presents to ER via Ambulatory with complaints of Urinary Problem.  sd2 

21:47 62-year-old male presents with chief complaint of difficulty urinating. He reports this sd2 

      has been ongoing for the past 5 days. He reports that upon arrival to the ER, he was        

      finally able to have a full void and does have some improvement in his symptoms             

      currently. He states he was starting to have some lower abdominal and lower back            

      discomfort due to not being able to fully urinate as he was only having dribbling           

      episodes. The patient is a cancer patient and is currently receiving chemotherapy with      

      his last dose received at the beginning of this month. He denies any prior urinary          

      issues or prostate problems..                                                               

                                                                                                  

Historical:                                                                                       

- Allergies:                                                                                      

21:08 No Known Allergies;                                                                     tw5 

- Home Meds:                                                                                      

23:04 albuterol sulfate 2.5 mg/0.5 mL Inhl nebu 0.5 mL every 6 hours [Active]; Furosemide     kd3 

      Oral [Active];                                                                              

- PMHx:                                                                                           

21:08 Cirrhosis; colon cancer; COPD; DM type 2; Hernia;                                       tw5 

                                                                                                  

- Immunization history:: Flu vaccine is up to date.                                               

- Social history:: Smoking status: Patient/guardian denies using tobacco, the patient             

  reports quitting approximately 4 years ago.                                                     

                                                                                                  

                                                                                                  

ROS:                                                                                              

21:47 Constitutional: Negative for fever, chills, and weight loss, Eyes: Negative for injury, sd2 

      pain, redness, and discharge, Cardiovascular: Negative for chest pain, palpitations,        

      and edema, Respiratory: Negative for shortness of breath, cough, wheezing. Abdomen/GI:      

      Positive for abdominal pain, Negative for nausea, vomiting, diarrhea. Back: Negative        

      for injury and positive for low back pain, : Negative for dysuria, frequency or           

      hematuria. Positive for retention Skin: Negative for injury, rash, and discoloration,       

      Neuro: Negative for headache, numbness and tingling.                                        

                                                                                                  

Exam:                                                                                             

21:47 Constitutional:  This is a well developed, well nourished patient who is awake, alert,  sd2 

      and in no acute distress. Head/Face:  Normocephalic, atraumatic. Eyes:  EOMI, normal        

      conjunctiva bilaterally Chest/axilla:  Normal chest wall appearance and motion.             

      Nontender with no deformity.   Cardiovascular:  Regular rate and rhythm with a normal       

      S1 and S2.  No gallops, murmurs, or rubs.  2+ distal pulses. Respiratory:  Lungs have       

      equal breath sounds bilaterally, clear to auscultation and percussion.  No rales,           

      rhonchi or wheezes noted.  No increased work of breathing, no retractions or nasal          

      flaring. Abdomen/GI:  Soft, abdominal distention present with chronic ventral abdominal     

      wall hernia and no significant associated tenderness, no rebound or guarding Skin:          

      Warm, dry with normal turgor.  Normal color with no rashes, no lesions, and no evidence     

      of cellulitis. MS/ Extremity:  Pulses equal, no cyanosis.  Neurovascular intact.  Full,     

      normal range of motion. Ambulatory without difficulty. Psych:  Awake, alert, with           

      orientation to person, place and time.  Behavior, mood, and affect are within normal        

      limits.                                                                                     

                                                                                                  

Vital Signs:                                                                                      

21:04  / 83; Pulse 98; Resp 22; Temp 97.9; Pulse Ox 89% on 4 lpm NC; Weight 95.25 kg;   tw5 

      Height 6 ft. 2 in. (187.96 cm); Pain 8/10;                                                  

23:01  / 76; Pulse 87; Resp 19; Pulse Ox 98% on R/A;                                    kd3 

21:04 Body Mass Index 26.96 (95.25 kg, 187.96 cm)                                             tw5 

                                                                                                  

MDM:                                                                                              

21:24 Patient medically screened.                                                             sd2 

21:47 Differential Diagnosis Urinary retention, UTI, BPH, obstruction, mass among others.     sd2 

      Data reviewed: vital signs, nurses notes.                                                   

22:55 Data reviewed: lab test result(s). Counseling: I had a detailed discussion with the     sd2 

      patient and/or guardian regarding: the historical points, exam findings, and any            

      diagnostic results supporting the discharge/admit diagnosis, lab results, the need for      

      outpatient follow up, to return to the emergency department if symptoms worsen or           

      persist or if there are any questions or concerns that arise at home. ED course: Pt         

      with initial bladder scan showing >900cc of urine in bladder. Pt was then able to void      

      750 cc of urine on his own. repeat bladder scan shows PVR of 400 cc in bladder.             

      Discussed in depth regarding An catheter placement and possibility of recurrence of      

      his urinary retention as he is still having some level of retaining. Pt declines            

      catheter placement at this time and reports he will return if he has any further issues     

      to have the catheter placed and will work on following up with Urology in the interim       

      as well as with his PCP. Verbalizes understanding of discharge plan and strict return       

      precautions. .                                                                              

23:05 ED course: Pt was able to void another 600 cc prior to discharge additionally..         sd2 

                                                                                                  

                                                                                             

21:38 Order name: Urine Microscopic Only; Complete Time: 22:39                                sd2 

                                                                                             

22:17 Order name: Urine Dipstick-Ancillary; Complete Time: 22:39                              EDMS

                                                                                             

21:38 Order name: Bladder Scanner; Complete Time: 21:43                                       sd2 

                                                                                             

21:38 Order name: Urine Dipstick-Ancillary (obtain specimen); Complete Time: 22:20            sd2 

                                                                                                  

Administered Medications:                                                                         

No medications were administered                                                                  

                                                                                                  

                                                                                                  

Disposition Summary:                                                                              

22 22:59                                                                                    

Discharge Ordered                                                                                 

      Location: Home                                                                          sd2 

      Problem: new                                                                            sd2 

      Symptoms: have improved                                                                 sd2 

      Condition: Stable                                                                       sd2 

      Diagnosis                                                                                   

        - Acute urinary retention                                                             sd2 

      Followup:                                                                               sd2 

        - With: Private Physician                                                                  

        - When: 2 - 3 days                                                                         

        - Reason: Recheck today's complaints, Continuance of care, Re-evaluation by your           

      physician                                                                                   

      Followup:                                                                               sd2 

        - With: Rafael Tomlinson MD                                                                

        - When: 1 - 2 days                                                                         

        - Reason: Recheck today's complaints, Continuance of care                                  

      Discharge Instructions:                                                                     

        - Discharge Summary Sheet                                                             sd2 

        - Acute Urinary Retention, Male                                                       sd2 

      Forms:                                                                                      

        - Medication Reconciliation Form                                                      sd2 

        - Thank You Letter                                                                    sd2 

        - Antibiotic Education                                                                sd2 

        - Family Work Release                                                                 kd3 

        - Prescription Opioid Use                                                             sd2 

Signatures:                                                                                       

Dispatcher [employer]                           Dior Jeffrey                                tw5                                                  

Olivia Ramirez RN RN   kd3                                                  

Debbie Toney MD MD   sd2                                                  

                                                                                                  

**************************************************************************************************

## 2023-01-04 ENCOUNTER — HOSPITAL ENCOUNTER (EMERGENCY)
Dept: HOSPITAL 97 - ER | Age: 63
Discharge: HOME | End: 2023-01-04
Payer: COMMERCIAL

## 2023-01-04 VITALS — TEMPERATURE: 98.1 F

## 2023-01-04 VITALS — DIASTOLIC BLOOD PRESSURE: 74 MMHG | SYSTOLIC BLOOD PRESSURE: 134 MMHG | OXYGEN SATURATION: 94 %

## 2023-01-04 DIAGNOSIS — J44.9: ICD-10-CM

## 2023-01-04 DIAGNOSIS — R19.7: ICD-10-CM

## 2023-01-04 DIAGNOSIS — R11.10: Primary | ICD-10-CM

## 2023-01-04 DIAGNOSIS — Z85.038: ICD-10-CM

## 2023-01-04 DIAGNOSIS — E11.9: ICD-10-CM

## 2023-01-04 LAB
ALBUMIN SERPL BCP-MCNC: 2.6 G/DL (ref 3.4–5)
ALP SERPL-CCNC: 242 U/L (ref 45–117)
ALT SERPL W P-5'-P-CCNC: 46 U/L (ref 16–61)
AST SERPL W P-5'-P-CCNC: 103 U/L (ref 15–37)
BUN BLD-MCNC: 22 MG/DL (ref 7–18)
GLUCOSE SERPLBLD-MCNC: 82 MG/DL (ref 74–106)
HCT VFR BLD CALC: 41.5 % (ref 39.6–49)
LIPASE SERPL-CCNC: 60 U/L (ref 73–393)
LYMPHOCYTES # SPEC AUTO: 0.9 K/UL (ref 0.7–4.9)
MCV RBC: 91.1 FL (ref 80–100)
PMV BLD: 7.6 FL (ref 7.6–11.3)
POTASSIUM SERPL-SCNC: 3.3 MMOL/L (ref 3.5–5.1)
RBC # BLD: 4.55 M/UL (ref 4.33–5.43)

## 2023-01-04 PROCEDURE — 36415 COLL VENOUS BLD VENIPUNCTURE: CPT

## 2023-01-04 PROCEDURE — 80053 COMPREHEN METABOLIC PANEL: CPT

## 2023-01-04 PROCEDURE — 96375 TX/PRO/DX INJ NEW DRUG ADDON: CPT

## 2023-01-04 PROCEDURE — 83690 ASSAY OF LIPASE: CPT

## 2023-01-04 PROCEDURE — 96374 THER/PROPH/DIAG INJ IV PUSH: CPT

## 2023-01-04 PROCEDURE — 99284 EMERGENCY DEPT VISIT MOD MDM: CPT

## 2023-01-04 PROCEDURE — 96361 HYDRATE IV INFUSION ADD-ON: CPT

## 2023-01-04 PROCEDURE — 85025 COMPLETE CBC W/AUTO DIFF WBC: CPT

## 2023-01-04 NOTE — ER
Nurse's Notes                                                                                     

 [facility]                                                                 

Name: Omkar Chung                                                                                  

Age: 62 yrs                                                                                       

Sex: Male                                                                                         

: 1960                                                                                   

MRN: [id number]                                                                                   

Arrival Date: 2023                                                                          

Time: 19:14                                                                                       

Account#: [id number]                                                                            

Bed 13                                                                                            

Private MD:                                                                                       

Diagnosis: Vomiting                                                                               

                                                                                                  

Presentation:                                                                                     

                                                                                             

19:15 Chief complaint: EMS states: Patient C/O vomiting and diarrhea for 1 week. Patient      pf1 

      denies any diarrhea in the past 24 hours and vomiting x 10 episodes in the past 24          

      hours. Patient denies any abdominal pain.                                                   

19:15 Coronavirus screen: Client presents with at least one sign or symptom that may indicate pf1 

      coronavirus-19. Standard/surgical mask placed on the client. Ebola Screen: Patient          

      negative for fever greater than or equal to 101.5 degrees Fahrenheit, and additional        

      compatible Ebola Virus Disease symptoms. Initial Sepsis Screen: Does the patient meet       

      any 2 criteria? No. Patient's initial sepsis screen is negative. Does the patient have      

      a suspected source of infection? No. Patient's initial sepsis screen is negative. Risk      

      Assessment: Do you want to hurt yourself or someone else? Patient reports no desire to      

      harm self or others. Onset of symptoms was 2022.                               

19:15 Method Of Arrival: EMS: [facility]                                                    pf1 

19:15 Acuity: BROCK 3                                                                           pf1 

                                                                                                  

Historical:                                                                                       

- Allergies:                                                                                      

19:51 No Known Allergies;                                                                     pf1 

- Home Meds:                                                                                      

19:51 albuterol sulfate 2.5 mg/0.5 mL Inhl nebu 0.5 mL every 6 hours [Active];                pf1 

- PMHx:                                                                                           

19:51 colon cancer; COPD; DM type 2; Hernia; Cirrhosis;                                       pf1 

                                                                                                  

- Immunization history:: Adult Immunizations not up to date, Client reports receiving             

  the Escobar \T\ Escobar single-dose vaccine.                                                    

- Social history:: Smoking status: unknown.                                                       

                                                                                                  

                                                                                                  

Screenin:15 [facility] ED Fall Risk Assessment (Adult) History of falling in the last 3 months,       pf1 

      including since admission No falls in past 3 months (0 pts) Confusion or Disorientation     

      No (0 pts) Intoxicated or Sedated No (0 pts) Impaired Gait No (0 pts) Mobility Assist       

      Device Used No (0 pt) Altered Elimination No (0 pt) Score/Fall Risk Level 0 - 2 = Low       

      Risk Oriented to surroundings, Maintained a safe environment, Educated pt \T\ family on     

      fall prevention, incl call for assistance when getting out of bed, Assessed \T\             

      reinforced patient's understanding of fall precautions, Provided non-skid footwear,         

      Hourly rounding (assess needs \T\ fall precautionary measures) done, Used ambulatory aids   

      as needed (educated on \T\ assisted with), Used gait belt as appropriate.                   

19:15 Abuse screen: Denies threats or abuse.                                                  pf1 

19:15 Nutritional screening: No deficits noted. Tuberculosis screening: No symptoms or risk   pf1 

      factors identified.                                                                         

                                                                                                  

Assessment:                                                                                       

19:15 General: Appears in no apparent distress. uncomfortable, well groomed, emaciated,       pf1 

      Behavior is calm, cooperative, appropriate for age, quiet, .                                

19:15 Pain: Denies pain. Neuro: No deficits noted. Level of Consciousness is awake, alert,    pf1 

      obeys commands, Oriented to person, place, time, situation. Cardiovascular: No deficits     

      noted. Capillary refill < 3 seconds. Respiratory: Airway is patent on 3LNC per 02           

      dependent at home for COPD Respiratory effort is even, unlabored, Respiratory pattern       

      is regular, symmetrical. GI: Reports diarrhea, nausea, vomiting, for 1.5 weeks. : No      

      deficits noted. EENT: No deficits noted. Derm: No deficits noted. Musculoskeletal: No       

      deficits noted. No signs and/or symptoms reported regarding the musculoskeletal system.     

                                                                                                  

Vital Signs:                                                                                      

19:15  / 74; Pulse 101; Resp 15; Temp 98.1; Pulse Ox 93% on 3 lpm NC; Weight 95.25 kg;  pf1 

      Height 6 ft. 2 in. (187.96 cm); Pain 0/10;                                                  

20:00  / 73; Pulse 94; Resp 20; Pulse Ox 93% on 3 lpm NC; Pain 0/10;                    pf1 

21:00  / 86; Pulse 95; Resp 18; Pulse Ox 93% on 3 lpm NC;                               pf1 

22:00  / 74; Pulse 99; Resp 18; Temp 98.1; Pulse Ox 94% on 3 lpm NC; Pain 0/10;         pf1 

19:15 Body Mass Index 26.96 (95.25 kg, 187.96 cm)                                             pf1 

                                                                                                  

ED Course:                                                                                        

19:14 Patient arrived in ED.                                                                  wm  

19:15 Arm band placed on right wrist.                                                         pf1 

19:15 Patient has correct armband on for positive identification. Placed in gown. Bed in low  pf1 

      position. Call light in reach. Side rails up X 1.                                           

19:17 Fatoumata Longo MD is Attending Physician.                                                sp3 

19:34 Rafia gallagher, RN is Primary Nurse.                                                    pf1 

19:35 No provider procedures requiring assistance completed. Inserted saline lock: 20 gauge   pf1 

      in left antecubital area, using aseptic technique. Blood collected.                         

19:41 Triage completed.                                                                       pf1 

22:13 IV discontinued, intact, bleeding controlled, No redness/swelling at site. Pressure     pf1 

      dressing applied.                                                                           

                                                                                                  

Administered Medications:                                                                         

19:48 Drug: NS 0.9% 1000 ml Route: IV; Rate: 1 bolus; Site: left antecubital;                 pf1 

20:30 Follow up: IV Status: Completed infusion; IV Intake: 1000ml                             pf1 

19:48 Drug: Zofran (Ondansetron) 8 mg Route: IVP; Site: left antecubital;                     pf1 

20:48 Follow up: Response: No adverse reaction; Nausea unchanged                              pf1 

21:20 Drug: Phenergan (promethazine) 12.5 mg Route: IVP; Site: left antecubital;              pf1 

21:50 Follow up: Response: No adverse reaction; Marked relief of symptoms; Nausea is decreasedpf1 

22:00 Drug: Potassium Chloride 40 mEq Route: PO;                                              pf1 

22:10 Follow up: Response: No adverse reaction                                                pf1 

                                                                                                  

                                                                                                  

Medication:                                                                                       

22:15 VIS not applicable for this client.                                                     pf1 

                                                                                                  

Intake:                                                                                           

20:30 IV: 1000ml; Total: 1000ml.                                                              pf1 

                                                                                                  

Outcome:                                                                                          

21:42 Discharge ordered by MD.                                                                sp3 

22:14 Discharged to home via wheelchair.                                                      pf1 

22:14 Condition: improved                                                                         

22:14 Discharge instructions given to patient, family, Instructed on discharge instructions,      

      follow up and referral plans. Demonstrated understanding of instructions, follow-up         

      care.                                                                                       

22:15 Patient left the ED.                                                                    pf1 

                                                                                                  

Signatures:                                                                                       

Esme Reddy                                                                                    

Fatoumata Longo MD MD   sp3                                                  

Rafia gallagher, RN                      RN   pf1                                                  

                                                                                                  

**************************************************************************************************

## 2023-01-04 NOTE — XMS REPORT
Continuity of Care Document

                           Created on:2023



Patient:ANAYELI CHUNG

Sex:Male

:1960

External Reference #:[id number]





Demographics







                          Address                   [address]



                                                    [place] [address]

 

                          Home Phone                [phone]

 

                          Work Phone                [phone]

 

                          Mobile Phone              [phone]

 

                          Email Address             [email or web address]

 

                          Preferred Language        English

 

                          Marital Status            Unknown

 

                          Judaism Affiliation     Unknown

 

                          Race                      Unknown

 

                          Additional Race(s)        Unavailable



                                                    White

 

                          Ethnic Group              Unknown









Author







                          Organization              [facility]

t

 

                          Address                   [address]



                                                    [place] [address]

 

                          Phone                     [phone]









Support







                Name            Relationship    Address         Phone

 

                HERIBERTO GONSALVES   X               [address]    [phone]



                                                [place] [address] 

 

                CARLENE GONSALVES               Unavailable     [phone]

 

                YOSELIN LANGLEY               Unavailable     [phone]

 

                Ebony Avila   Sister          Unavailable     [phone]



                                                [place]    

 

                Anayeli Chung     Unavailable     [address] [phone]



                                                [place] [address] 









Care Team Providers







                    Name                Role                Phone

 

                    GILMA MIKEL        Primary Care Physician Unavailable

 

                    Jhon Longo     Attending Clinician Unavailable

 

                    SYSTEM, PROVIDER NOT IN Attending Clinician Unavailable

 

                    ROCHELLE LEDESMA Attending Clinician Unavailable

 

                    ELAYNE LONGO      Attending Clinician Unavailable

 

                    Elayne Longo MD Attending Clinician [phone]

 

                    ELAYNE LONGO Attending Clinician Unavailable

 

                    Teresa Belle MD Attending Clinician [phone]

 

                    Katrina Mullins MD    Attending Clinician [phone]

 

                    KATRINA MULLINS       Attending Clinician Unavailable

 

                    KATRINA MULLINS       Attending Clinician Unavailable

 

                    Leonarda Bang MD Attending Clinician Unavailable

 

                    INGRID ALCAZAR   Attending Clinician Unavailable

 

                    SCHOENSTEIN, LYNDA  Attending Clinician Unavailable

 

                    Alex Weston MD Attending Clinician [phone]

 

                    ELVIN HUGO Attending Clinician Unavailable

 

                    ELAYNE LONGO Admitting Clinician Unavailable

 

                    KATRINA MULLINS       Admitting Clinician Unavailable

 

                    SCHOENSTEIN, LYNDA  Admitting Clinician Unavailable

 

                    ESTELLA WHITMORE Admitting Clinician Unavailable









Payers







           Payer Name Policy Type Policy Number Effective Date Expiration Date RADHA hairston

 

           [facility]   [facility]         [id number]                       [facility]

t



           ADVANTAGE                                              - [facility]

 

           [facility]              [id number]  2019            



           ADVANTAGE                        00:00:00              



           [facility]                                                

 

           AAR MEDICARE            [id number]  2017            



           COMPLETE                         00:00:00              







Problems







       Condition Condition Condition Status Onset  Resolution Last   Treating Co

mments 

Source



       Name   Details Category        Date   Date   Treatment Clinician        



                                                 Date                 

 

       Metastatic Metastatic Disease Active                              [facility]

[facility]



       adenocarci adenocarci               3-27                               Co

llege



       noma   noma                 00:00:                             of



                                   00                                 Medicin



                                                                      e

 

       Loculated Loculated Disease Active                              [facility]



       pleural pleural               9-19                               [facility]



       effusion effusion               00:00:                             Medica

l



                                   00                                 Center

 

       Pseudomona Pseudomona Disease Active                              C

[facility]



       l      l                    9-19                               [facility]



       pneumonia pneumonia               00:00:                             Medi

alexus



                                   00                                 Center

 

       Hyponatrem Hyponatrem Disease Active                              C

[facility]



       ia     ia                   9-19                               [facility]



                                   00:00:                             Medical



                                   00                                 Center

 

       Parapneumo Parapneumo Disease Active                              C

[facility]



       benjamin    benjamin                  9-18                               [facility]



       effusion effusion               00:00:                             Medica

l



                                   00                                 Center

 

       Inflammato Inflammato Disease Active                              [facility]

[facility]



       ry liver ry liver               6-13                               Col[facility]

e



       disease disease               00:00:                             of



                                   00                                 Medicin



                                                                      e

 

       Ascites Ascites Disease Active                              [facility]



                                   9-27                               College



                                   00:00:                             of



                                   00                                 Medicin



                                                                      e

 

       [id number] Adenocarci Problem                                           Co

mmon



              noma of                                                  [facility]



              liver                                                   [facility]

 

       [id number] Metastatic Problem                                           C

ommon



       104    adenocarci                                                  [facility]



              noma to                                                  - [facility]



              liver                                                   [facility]

 

       Malignant Malignant Problem                                           Com

mon



       neoplasm neoplasm                                                  [facility]



       of colon of colon,                                                  - [facility]



              [facility]

 

       Neoplasm Neoplasm Problem                                           Commo

n



       related related                                                  [facility]



       pain   pain                                                    - [facility]

 

       Cirrhosis Cirrhosis Problem                                           Com

mon



       of liver of liver                                                  Spirit



                                                                      [facility]

 

       [id number] Adenocarci Problem                                           Co

mmon



              noma of                                                  [facility]



              transverse                                                  - [facility]



              colon                                                   [facility]

 

       Diabetic Diabetic Problem                                           Commo

n



       oculopathy eyes                                                    [facility]irit



       associated                                                         - [facility]



       with type                                                         



       II                                                             [facility]



       diabetes                                                         Medical



       mellitus                                                         Center

 

       Chronic Chronic Problem                                           Common



       kidney kidney                                                  [facility]irit



       disease disease,                                                  - [facility]



       stage 3 stage 3                                                  [facility]



       [facility]

 

       [id number] Type 2 Problem                                           Commo

n



       91864  diabetes                                                  Spirit



              mellitus                                                  - [facility]



              with other                                                  



              diabetic                                                  [facility]



              kidney                                                  Medical



              complicati                                                  Center



              on                                                      

 

       Chronic Chronic Problem                                           Common



       obstructiv obstructiv                                                  Sp

jose



       e      e                                                       - [facility]



       pulmonary pulmonary                                                  [facility]

 

       [id number] History of Problem                                           Co

mmon



              alcohol                                                  Spirit



              abuse                                                   - [facility]



                                                                      [facility]

 

       [id number] Alcoholic Problem                                           Com

mon



              cirrhosis                                                  Spirit



              of liver                                                  - [facility]



              with                                                    [facility]

 

       59630178 Hypothyroi Problem                                           Com

mon



              dism,                                                   Spirit



              unspecifie                                                  - [facility]



              d type                                                  [facility]

 

       03828500 Aphasia Problem                                           Common



                                                                      Spirit



                                                                      - [facility]

 

       [id number] Type 2 Problem                                           Commo

n



       16607  diabetes                                                  Spirit



              mellitus                                                  - [facility]



              with                                                    [facility]                                                    Medical



              unspecifie                                                  Center



              d whether                                                  



              long term                                                  



              insulin                                                  



              use                                                     

 

       [id number] On     Problem                                           Commo

n



       07     supplement                                                  Spirit



              al oxygen                                                  - [facility]



              therapy                                                  [facility]

 

       22214020 Generalize Problem                                           Com

mon



              d anxiety                                                  Spirit



              disorder                                                  - [facility]

 

       [id number] Type 2 Problem                                           Commo

n



       05     diabetes                                                  Spirit



              mellitus                                                  - [facility]



              with                                                    [facility]



              chronic                                                  Medical



              kidney                                                  Center



              disease                                                  

 

       [id number] COPD with Problem                                           Com

mon



              exacerbati                                                  Spirit



              on                                                      - [facility]

 

       Pleural Pleural Disease Active                                    [facility]



       effusion effusion                                                  Colleg

e



       on left on left                                                  of



                                                                      Medicin



                                                                      e







Allergies, Adverse Reactions, Alerts







       Allergy Allergy Status Severity Reaction(s) Onset  Inactive Treating Comm

ents 

Source



       Name   Type                        Date   Date   Clinician        

 

       NO KNOWN Allergy Active                                           [facility]



       ALLERGIE                                                         



       S                                                              

 

       NO KNOWN Drug   Active                                           [facility]



       ALLERGIE Class                                                   ity of



       S                                                              [facility]







Social History







           Social Habit Start Date Stop Date  Quantity   Comments   Source

 

           History of Tobacco                                             Common

 [facility] -



           Use                                                    [facility]

 

           [facility]



           Alcohol Std Drinks                                             [facility]

l Center

 

           [facility]



           Alcohol Binge                                             [facility]

ter

 

           Al[facility] 2022 [facility]               [facility]

es



                      00:00:00   00:00:00   non-drinker of            Medical Ce

nter



                                            alcohol               



                                            (finding)             

 

           History SDOH 2022 Quit 2017             [facility]



           Alcohol Comment 00:00:00   00:00:00                         Medical C

enter

 

           Exposure to 2022 Not sure              [facility]

e



           SARS-CoV-2 (event) 00:00:00   17:54:00                         of Med

icine

 

           History SDOH 2019 1                     [facility]



           Alcohol Frequency 00:00:00   00:00:00                         [facility]

 Center

 

           Cigarettes smoked 2019                       [facility]



           current (pack per 00:00:00   00:00:00                         Medical

 Center



           day) - Reported                                             

 

           Cigarette  2019                       [facility]



           pack-years 00:00:00   00:00:00                         [facility]

 

           Tobacco use and 2019 Never used            [facility]

[facility]



           exposure   00:00:00   00:00:00                         [facility]

 

           Sex Assigned At 1960                       [facility]

kes



           Birth      00:00:00   00:00:00                         [facility]









                Smoking Status  Start Date      Stop Date       Source

 

                Former Smoker   2022-10-21 00:00:00 2022-10-21 00:00:00 Common S

pirit - [facility]







Medications







       Ordered Filled Start  Stop   Current Ordering Indication Dosage Frequency

 Signature

                    Comments            Components          Source



     Medication Medication Date Date Medication? Clinician                (SIG) 

          



     Name Name                                                   

 

     busPIRone busPIRone 2022      No             1{table BID  busPIRone      

     



     HCl 10 MG HCl 10 MG 0-26                     t}        HCl 10 MG           



               00:00:                                              



               00                                                

 

     busPIRone busPIRone 2022      No             1{table BID  busPIRone      

     



     HCl 10 MG HCl 10 MG 0-26                     t}        HCl 10 MG           



               00:00:                                              



               00                                                

 

     busPIRone busPIRone 2022      No             1{table BID  busPIRone      

     



     HCl 10 MG HCl 10 MG 0-26                     t}        HCl 10 MG           



               00:00:                                              



               00                                                

 

     busPIRone busPIRone 2022      No             1{table BID  busPIRone      

     



     HCl 10 MG HCl 10 MG 0-26                     t}        HCl 10 MG           



               00:00:                                              



               00                                                

 

     busPIRone busPIRone 2022      No             1{table BID  busPIRone      

     



     HCl 10 MG HCl 10 MG 0-26                     t}        HCl 10 MG           



               00:00:                                              



               00                                                

 

     Polymyxin Polymyxin 2022- No             1{drop_ QID  Polymyxin     

      



     B-Trimethop B-Trimethop 6-16 06-23                into_af      B-Trimetho  

         



     rim  rim  00:00: 00:00                fected_      prim           



     00468-0.1 57190-7.1 00   :00                 eye}      25960-6.1           



     UNIT/ML UNIT/ML                                    UNIT/ML           

 

     Polymyxin Polymyxin 2- No             1{drop_ QID  Polymyxin     

      



     B-Trimethop B-Trimethop 6-16                 into_af      B-Trimetho  

         



     rim  rim  00:00: 00:00                fected_      prim           



     03920-5.1 58926-2.1 00   :00                 eye}      00103-7.1           



     UNIT/ML UNIT/ML                                    UNIT/ML           

 

     insulin            Yes            45U  QD   Inject 45           [facility]

t



     degludec      4-12                               Units           [facility]



     (TRESIBA      11:20:                               subcutaneo           Med

ical



     U-100      03                                 usly           Center



     INSULIN                                         daily.           



     SUBQ)                                                        

 

     insulin            Yes            45U  QD   Inject 45           [facility]

t



     degludec      4-12                               Units           [facility]



     (TRESIBA      11:20:                               subcutaneo           Med

ical



     U-100      03                                 usly           Center



     INSULIN                                         daily.           



     SUBQ)                                                        

 

     insulin            Yes            45U  QD   Inject 45           [facility]

t



     degludec      4-12                               Units           [facility]



     (TRESIBA      11:20:                               subcutaneo           Med

ical



     U-100      03                                 usly           Center



     INSULIN                                         daily.           



     SUBQ)                                                        

 

     insulin            Yes            45U  QD   Inject 45           [facility]

t



     degludec      4-12                               Units           [facility]



     (TRESIBA      11:20:                               subcutaneo           Med

ical



     U-100      03                                 usly           Center



     INSULIN                                         daily.           



     SUBQ)                                                        

 

     furosemide            Yes            40mg QD   Take 40 mg           C

[facility]



     (LASIX) 40      4-11                               by mouth           [facility]



     MG tablet      11:34:                               daily.           Medica

l



               01                                                Center

 

     folic acid            Yes            1mg  QD   Take 1 mg           [facility]

I St



     (FOLVITE) 1      4-11                               by mouth           Luke

s



     MG tablet      11:34:                               daily.           Medica

l



               01                                                Center

 

     albuterol            Yes            1{puff}      Inhale 1           C

[facility]



     HFA       4-11                               puff by           [facility]



     (VENTOLIN      11:34:                               mouth via           Med

ical



     HFA) 90      01                                 inhaler           Center



     mcg/actuati                                         every 6           



     on inhaler                                         (six)           



                                                  hours as           



                                                  needed for           



                                                  Wheezing           



                                                  or             



                                                  Shortness           



                                                  of Breath.           

 

     acetaminoph            Yes            1{tbl}      Take 1           [facility]



     en-codeine      4-11                               tablet by           [facility]

[facility]



     (TYLENOL      11:34:                               mouth           [facility]



     #3) 300-30      01                                 every 4           [facility]



     mg per                                         (four)           



     tablet                                         hours as           



                                                  needed for           



                                                  Pain.           

 

     ascorbic            Yes            1000mg QD   Take 1,000           [facility]

[facility]



     acid,      4-11                               mg by           [facility]



     vitamin C,      11:34:                               mouth           [facility]

[facility]



     (vitamin C)      01                                 daily.           [facility]



     1000 MG                                                        



     tablet                                                        

 

     thyroid,            Yes            60mg QD   Take 60 mg           [facility]



     pork, 60 mg      4-11                               by mouth           [facility]

[facility]



     Tab       11:34:                               every           [facility]



               01                                 morning.           [facility]

 

     spironolact            Yes            100mg QD   Take 100           [facility]

[facility]



     one       4-11                               mg by           [facility]



     (ALDACTONE)      11:34:                               mouth           [facility]

al



     100 MG      01                                 daily.           [facility]



     tablet                                                        

 

     budesonide/            Yes                 Q.5D Inhale by           [facility]

[facility]



     formoterol      4-11                               mouth via           [facility]

[facility]



     fumarate      11:34:                               inhaler 2           [facility]

alexus



     (SYMBICORT      01                                 (two)           Center



     IN[facility])                                         times           



                                                  daily.           

 

     ondansetron            Yes                      Take by           [facility]



     (ZOFRAN) 8      4-11                               mouth           [facility]



     MG tablet      11:34:                               every 8           [facility]

al



               01                                 (eight)           Center



                                                  hours as           



                                                  needed for           



                                                  Nausea.           

 

     furosemide            Yes            40mg QD   Take 40 mg           [facility]

[facility]



     (LASIX) 40      4-11                               by mouth           [facility]



     MG tablet      11:34:                               daily.           [facility]

[facility]



               01                                                Center

 

     folic acid            Yes            1mg  QD   Take 1 mg           [facility]



     (FOLVITE) 1      4-11                               by mouth           [facility]

[facility]



     MG tablet      11:34:                               daily.           [facility]

l



               01                                                Center

 

     albuterol            Yes            1{puff}      Inhale 1           [facility]

[facility]



     HFA       4-11                               puff by           [facility]



     (VENTOLIN      11:34:                               mouth via           [facility]

ical



     HFA) 90      01                                 inhaler           Center



     mcg/actuati                                         every 6           



     on inhaler                                         (six)           



                                                  hours as           



                                                  needed for           



                                                  Wheezing           



                                                  or             



                                                  Shortness           



                                                  of Breath.           

 

     acetaminoph            Yes            1{tbl}      Take 1           [facility]



     en-codeine      4-11                               tablet by           [facility]



     (TYLENOL      11:34:                               mouth           [facility]



     #3) 300-30      01                                 every 4           [facility]



     mg per                                         (four)           



     tablet                                         hours as           



                                                  needed for           



                                                  Pain.           

 

     ascorbic            Yes            1000mg QD   Take 1,000           [facility]

[facility]



     acid,      4-11                               mg by           [facility]



     vitamin C,      11:34:                               mouth           [facility]

[facility]



     (vitamin C)      01                                 daily.           [facility]



     1000 MG                                                        



     tablet                                                        

 

     thyroid,            Yes            60mg QD   Take 60 mg           [facility]



     pork, 60 mg      4-11                               by mouth           [facility]

[facility]



     Tab       11:34:                               every           [facility]



               01                                 morning.           [facility]

 

     spironolact            Yes            100mg QD   Take 100           [facility]

[facility]



     one       4-11                               mg by           [facility]



     (ALDACTONE)      11:34:                               mouth           [facility]

al



     100 MG      01                                 daily.           [facility]



     tablet                                                        

 

     budesonide/            Yes                 Q.5D Inhale by           [facility]

[facility]



     formoterol      4-11                               mouth via           [facility]

[facility]



     fumarate      11:34:                               inhaler 2           [facility]

alexus



     (SYMBICORT      01                                 (two)           Center



     [facility])                                         times           



                                                  daily.           

 

     ondansetron            Yes                      Take by           [facility]



     (ZOFRAN) 8      4-11                               mouth           [facility]



     MG tablet      11:34:                               every 8           [facility]

al



               01                                 (eight)           Center



                                                  hours as           



                                                  needed for           



                                                  Nausea.           

 

     furosemide            Yes            40mg QD   Take 40 mg           [facility]

[facility]



     (LASIX) 40      4-11                               by mouth           [facility]



     MG tablet      11:34:                               daily.           [facility]

l



               01                                                Center

 

     folic acid            Yes            1mg  QD   Take 1 mg           [facility]



     (FOLVITE) 1      4-11                               by mouth           [facility]



     MG tablet      11:34:                               daily.           [facility]

l



               01                                                Center

 

     albuterol            Yes            1{puff}      Inhale 1           [facility]

[facility]



     HFA       4-11                               puff by           [facility]



     (VENTOLIN      11:34:                               mouth via           [facility]

ical



     HFA) 90      01                                 inhaler           Center



     mcg/actuati                                         every 6           



     on inhaler                                         (six)           



                                                  hours as           



                                                  needed for           



                                                  Wheezing           



                                                  or             



                                                  Shortness           



                                                  of Breath.           

 

     acetaminoph            Yes            1{tbl}      Take 1           [facility]



     en-codeine      4-11                               tablet by           [facility]



     (TYLENOL      11:34:                               mouth           [facility]



     #3) 300-30      01                                 every 4           [facility]



     mg per                                         (four)           



     tablet                                         hours as           



                                                  needed for           



                                                  Pain.           

 

     ascorbic            Yes            1000mg QD   Take 1,000           [facility]

[facility]



     acid,      4-11                               mg by           [facility]



     vitamin C,      11:34:                               mouth           [facility]

[facility]



     (vitamin C)      01                                 daily.           [facility]



     1000 MG                                                        



     tablet                                                        

 

     thyroid,            Yes            60mg QD   Take 60 mg           [facility]



     pork, 60 mg      4-11                               by mouth           [facility]

[facility]



     Tab       11:34:                               every           [facility]



               01                                 morning.           [facility]

 

     spironolact            Yes            100mg QD   Take 100           [facility]

[facility]



     one       4-11                               mg by           [facility]



     (ALDACTONE)      11:34:                               mouth           [facility]

al



     100 MG      01                                 daily.           [facility]



     tablet                                                        

 

     budesonide/            Yes                 Q.5D Inhale by           [facility]

[facility]



     formoterol      4-11                               mouth via           [facility]

[facility]



     fumarate      11:34:                               inhaler 2           [facility]

alex[facility]



     (SYMBICORT      01                                 (two)           Center



     [facility])                                         times           



                                                  daily.           

 

     ondansetron            Yes                      Take by           [facility]



     (ZOFRAN) 8      4-11                               mouth           [facility]



     MG tablet      11:34:                               every 8           [facility]

al



               01                                 (eight)           Center



                                                  hours as           



                                                  needed for           



                                                  Nausea.           

 

     furosemide            Yes            40mg QD   Take 40 mg           [facility]

[facility]



     (LASIX) 40      4-11                               by mouth           [facility]



     MG tablet      11:34:                               daily.           [facility]

[facility]



               01                                                [facility]

 

     folic acid            Yes            1mg  QD   Take 1 mg           [facility]



     (FOLVITE) 1      4-11                               by mouth           [facility]



     MG tablet      11:34:                               daily.           [facility]

l



               01                                                Center

 

     albuterol            Yes            1{puff}      Inhale 1           [facility]

[facility]



     HFA       4-11                               puff by           [facility]



     (VENTOLIN      11:34:                               mouth via           [facility]

ical



     HFA) 90      01                                 inhaler           Center



     mcg/actuati                                         every 6           



     on inhaler                                         (six)           



                                                  hours as           



                                                  needed for           



                                                  Wheezing           



                                                  or             



                                                  Shortness           



                                                  of Breath.           

 

     acetaminoph            Yes            1{tbl}      Take 1           [facility]



     en-codeine      4-11                               tablet by           [facility]



     (TYLENOL      11:34:                               mouth           [facility]



     #3) 300-30      01                                 every 4           [facility]



     mg per                                         (four)           



     tablet                                         hours as           



                                                  needed for           



                                                  Pain.           

 

     ascorbic            Yes            1000mg QD   Take 1,000           [facility]

[facility]



     acid,      4-11                               mg by           [facility]



     vitamin C,      11:34:                               mouth           [facility]

[facility]



     (vitamin C)      01                                 daily.           [place]



     1000 MG                                                        



     tablet                                                        

 

     thyroid,            Yes            60mg QD   Take 60 mg           [facility]



     pork, 60 mg      4-11                               by mouth           [facility]

[facility]



     Tab       11:34:                               every           [facility]



               01                                 morning.           [place]

 

     spironolact            Yes            100mg QD   Take 100           [facility]

[facility]



     one       4-11                               mg by           [facility]



     (ALDACTONE)      11:34:                               mouth           [facility]

al



     100 MG      01                                 daily.           [place]



     tablet                                                        

 

     budesonide/      2022-0      Yes                 Q.5D Inhale by           [facility]

[facility]



     formoterol      4-11                               mouth via           [facility]

[facility]



     fumarate      11:34:                               inhaler 2           [facility]

[facility]



     (SYMBICORT      01                                 (two)           Center



     [facility])                                         times           



                                                  daily.           

 

     ondansetron            Yes                      Take by           [facility]



     (ZOFRAN) 8      4-11                               mouth           [facility]



     MG tablet      11:34:                               every 8           [facility]

al



               01                                 (eight)           Center



                                                  hours as           



                                                  needed for           



                                                  Nausea.           

 

     albuterol      2022- No             1{puff}      Inhale 1           

[facility]



     HFA (ProAir      4-11 04-11                          puff by           [facility]

[facility]



     HFA) 90      08:38: 00:00                          mouth via           [facility]

[facility]



     mcg/actuati      32   :00                           inhaler           [facility]

[facility]



     on inhaler                                         every 6           



                                                  (six)           



                                                  hours as           



                                                  needed for           



                                                  Wheezing.           

 

     insulin      2022- No                  QD   Inject           [facility]



     degludec      4- 04-11                          subcutaneo           [facility]

[facility]



     (TRESIBA      08:38: 00:00                          usly           [facility]



     FLEXTOUCH      32   :00                           daily.           Center



     U-100 SUBQ)                                                        

 

     albuterol      2022- No             1{puff}      Inhale 1           

[facility]



     HFA (ProAir      4-11 04-11                          puff by           [facility]



     HFA) 90      08:38: 00:00                          mouth via           [facility]

alexus



     mcg/actuati      32   :00                           inhaler           [facility]

[facility]



     on inhaler                                         every 6           



                                                  (six)           



                                                  hours as           



                                                  needed for           



                                                  Wheezing.           

 

     insulin      2022- No                  QD   Inject           [facility]



     degludec      4- 04-11                          subcutaneo           [facility]

[facility]



     (TRESIBA      08:38: 00:00                          usly           [facility]



     FLEXTOUCH      32   :00                           daily.           Center



     U-100 SUBQ)                                                        

 

     albuterol      2022- No             1{puff}      Inhale 1           

[facility]



     HFA (ProAir      4-11 04-11                          puff by           [facility]

[facility]



     HFA) 90      08:38: 00:00                          mouth via           [facility]

[facility]



     mcg/actuati      32   :00                           inhaler           [facility]

[facility]



     on inhaler                                         every 6           



                                                  (six)           



                                                  hours as           



                                                  needed for           



                                                  Wheezing.           

 

     insulin      -2022- No                  QD   Inject           [facility]



     degludec      4-11 04-11                          subcutaneo           [facility]

[facility]



     (TRESIBA      08:38: 00:00                          usly           [facility]



     FLEXTOUCH      32   :00                           daily.           Center



     U-100 SUBQ)                                                        

 

     albuterol      2022- No             1{puff}      Inhale 1           

[facility]



     HFA (ProAir                                puff by           [facility]



     HFA) 90      08:38: 00:00                          mouth via           [facility]

[facility]



     mcg/actuati      32   :00                           inhaler           [facility]

[facility]



     on inhaler                                         every 6           



                                                  (six)           



                                                  hours as           



                                                  needed for           



                                                  Wheezing.           

 

     insulin      2022- No                  QD   Inject           [facility]



     degludec                                subcutaneo           [facility]

[facility]



     (TRESIBA      08:38: 00:00                          us           [facility]



     FLEXTOUCH      32   :00                           daily.           Center



     U-100 SUBQ)                                                        

 

     acetaminoph            Yes       [id number] 1{tbl}      Take 1      

     [facility]



     en-codeine      3-25                102            Tablet by           [facility]



     (TYLENOL      00:00:                               mouth           of



     #3) 300-30      00                                 every 6           [facility]

n



     MG per                                         hours as           e



     tablet                                         needed for           



                                                  Pain.           

 

     Ascorbic            Yes                      Take by           [facility]



     Acid      3-24                               mouth.           [facility]



     (VITAMIN C      13:25:                                              of



     ER OR)      20                                                [facility]



                                                                 e

 

     OXYGEN GAS            Yes            2L        2 L daily.           [facility]



               3-24                                              [facility]



               13:24:                                              of



               18                                                [facility]



                                                                 e

 

     NP THYROID            Yes            60mg      Take 60 mg           [facility]



     60 MG      2-22                               by mouth           [facility]



     tablet      00:00:                               daily.           of



               00                                                [facility]



                                                                 e

 

     Levothyroxi Levothyroxi 2020      No                  QD   Levothyrox    

       



     ne Sodium ne Sodium 0-21                               ine Sodium          

 



     25 MCG 25 MCG 00:00:                               25 MCG           



               00                                                

 

     Levothyroxi Levothyroxi 2020      No                  QD   Levothyrox    

       



     ne Sodium ne Sodium 0-21                               ine Sodium          

 



     25 MCG 25 MCG 00:00:                               25 MCG           



               00                                                

 

     Levothyroxi Levothyroxi 2020      No                  QD   Levothyrox    

       



     ne Sodium ne Sodium 0-21                               ine Sodium          

 



     25 MCG 25 MCG 00:00:                               25 MCG           



               00                                                

 

     Levothyroxi Levothyroxi 2020      No                  QD   Levothyrox    

       



     ne Sodium ne Sodium 0-21                               ine Sodium          

 



     25 MCG 25 MCG 00:00:                               25 MCG           



               00                                                

 

     Levothyroxi Levothyroxi 2020      No                  QD   Levothyrox    

       



     ne Sodium ne Sodium 0-21                               ine Sodium          

 



     25 MCG 25 MCG 00:00:                               25 MCG           



               00                                                

 

     Levothyroxi Levothyroxi 2020      No                  QD   Levothyrox    

       



     ne Sodium ne Sodium 0-21                               ine Sodium          

 



     25 MCG 25 MCG 00:00:                               25 MCG           



               00                                                

 

     Levothyroxi Levothyroxi 2020      No                  QD   Levothyrox    

       



     ne Sodium ne Sodium 0-21                               ine Sodium          

 



     25 MCG 25 MCG 00:00:                               25 MCG           



               00                                                

 

     Levothyroxi Levothyroxi 2020      No                  QD   Levothyrox    

       



     ne Sodium ne Sodium 0-21                               ine Sodium          

 



     25 MCG 25 MCG 00:00:                               25 MCG           



               00                                                

 

     Levothyroxi Levothyroxi 2020      No                  QD   Levothyrox    

       



     ne Sodium ne Sodium 0-21                               ine Sodium          

 



     25 MCG 25 MCG 00:00:                               25 MCG           



               00                                                

 

     Levothyroxi Levothyroxi 2020      No                  QD   Levothyrox    

       



     ne Sodium ne Sodium 0-21                               ine Sodium          

 



     25 MCG 25 MCG 00:00:                               25 MCG           



               00                                                

 

     Levothyroxi Levothyroxi 2020      No                  QD   Levothyrox    

       



     ne Sodium ne Sodium 0-21                               ine Sodium          

 



     25 MCG 25 MCG 00:00:                               25 MCG           



               00                                                

 

     Levothyroxi Levothyroxi 2020      No                  QD   Levothyrox    

       



     ne Sodium ne Sodium 0-21                               ine Sodium          

 



     25 MCG 25 MCG 00:00:                               25 MCG           



               00                                                

 

     Levothyroxi Levothyroxi 2020      No                  QD   Levothyrox    

       



     ne Sodium ne Sodium 0-21                               ine Sodium          

 



     25 MCG 25 MCG 00:00:                               25 MCG           



               00                                                

 

     Levothyroxi Levothyroxi 2020      No                  QD   Levothyrox    

       



     ne Sodium ne Sodium 0-21                               ine Sodium          

 



     25 MCG 25 MCG 00:00:                               25 MCG           



               00                                                

 

     Levothyroxi Levothyroxi 2020      No                  QD   Levothyrox    

       



     ne Sodium ne Sodium 0-21                               ine Sodium          

 



     25 MCG 25 MCG 00:00:                               25 MCG           



               00                                                

 

     Levothyroxi Levothyroxi 2020      No                  QD   Levothyrox    

       



     ne Sodium ne Sodium 0-21                               ine Sodium          

 



     25 MCG 25 MCG 00:00:                               25 MCG           



               00                                                

 

     Levothyroxi Levothyroxi 2020      No                  QD   Levothyrox    

       



     ne Sodium ne Sodium 0-21                               ine Sodium          

 



     25 MCG 25 MCG 00:00:                               25 MCG           



               00                                                

 

     Levothyroxi Levothyroxi 2020      No                  QD   Levothyrox    

       



     ne Sodium ne Sodium 0-21                               ine Sodium          

 



     25 MCG 25 MCG 00:00:                               25 MCG           



               00                                                

 

     Levothyroxi Levothyroxi 2020      No                  QD   Levothyrox    

       



     ne Sodium ne Sodium 0-21                               ine Sodium          

 



     25 MCG 25 MCG 00:00:                               25 MCG           



               00                                                

 

     Levothyroxi Levothyroxi 2020-1      No                  QD   Levothyrox    

       



     ne Sodium ne Sodium 0-21                               ine Sodium          

 



     25 MCG 25 MCG 00:00:                               25 MCG           



               00                                                

 

     Tradjenta Tradjenta       Yes  Jhon                1 tablet        

   [facility]



               7-20           [facility]                               [facility]



               00:00:                                              - [facility]



               00                                                [facility]

 

     Tresiba Tresiba       Yes  Jhon                Inject 15           

Common



     FlexTouch FlexTouch 7-20           [facility]                units           Spi

rit



               00:00:                                              - [facility]



                                                               [facility]

 

     OXYGEN GAS      2019      Yes            2L        2 L daily.           [facility]

[facility]



               0-17                                              [facility]



               15:24:                                              of



               51                                                [facility]



                                                                 e

 

     tramadol            Yes       [id number] 50mg      Take 1 Tab         

  [facility]



     (ULTRAM) 50      9-30                               by mouth           [facility]

[facility]



     MG tablet      00:00:                               every 6           of



               00                                 hours as           [facility]



                                                  needed for           e



                                                  Pain.           

 

     tramadol      2022- No        [id number] 50mg      Take 1 Tab        

   [facility]



     (ULTRAM) 50      9-30 03-25                          by mouth           [facility]

lege



     MG tablet      00:00: 00:00                          every 6           of



               00   :00                           hours as           [facility]



                                                  needed for           e



                                                  Pain.           

 

     tiotropium            Yes            18ug      18 mcg by           [facility]

[facility]



     (SPIRIVA)      9-25                               Inhalation           [facility]

[facility]



     18 MCG      00:00:                               route.           of



     inhalation      00                                                [facility]



     capsule                                                        e

 

     tiotropium            Yes            18ug      18 mcg by           [facility]

[facility]



     (SPIRIVA)      9-25                               Inhalation           [facility]

[facility]



     18 MCG      00:00:                               route.           of



     inhalation      00                                                [facility]



     capsule                                                        e

 

     tiotropium      2022- No        Chronic 18ug QD   Inhale 1          

 [facility]



     (SPIRIVA)      -11           obstructive           capsule          

 [facility]



     18 mcg      00:00: 00:00           pulmonary           (18 mcg           Me

dical



     inhalation      00   :00            disease           total) by           [facility]

enter



     capsule                          with acute           mouth via           



                                   exacerbatio           inhaler           



                                   n ([facility])           daily.           

 

     tiotropium      2022- No        Chronic 18ug QD   Inhale 1          

 [facility]



     (SPIRIVA)      -11           obstructive           capsule          

 [facility]



     18 mcg      00:00: 00:00           pulmonary           (18 mcg           Me

dical



     inhalation      00   :00            disease           total) by           C

enter



     capsule                          with acute           mouth via           



                                   exacerbatio           inhaler           



                                   n ([facility])           daily.           

 

     tiotropium      2022- No        Chronic 18ug QD   Inhale 1          

 [facility]



     (SPIRIVA)      -11           obstructive           capsule          

 [facility]



     18 mcg      00:00: 00:00           pulmonary           (18 mcg           Me

dical



     inhalation      00   :00            disease           total) by           C

enter



     capsule                          with acute           mouth via           



                                   exacerbatio           inhaler           



                                   n ([facility])           daily.           

 

     tiotropium              Chronic 18ug QD   Inhale 1          

 [facility]



     (SPIRIVA)       04-11           obstructive           capsule          

 [facility]



     18 mcg      00:00: 00:00           pulmonary           (18 mcg           Me

dical



     inhalation      00   :00            disease           total) by           C

enter



     capsule                          with acute           mouth via           



                                   exacerbatio           inhaler           



                                   n ([facility])           daily.           

 

     Cefepime      2019-              2g        Inject 2 g           [facility]

[facility]



     HCl 2 g      9-25 10-19                          into the           College



     SOLR      00:00: 04:59                          vein Every           of



               00   :00                           8 hours.           [facility]



                                                                 e

 

     sodium                             TAKE 1           [facility]



     chloride 1       10-17                          TABLET BY           [facility]

lege



     g tablet      00:00: 00:00                          MOUTH           of



               00   :00                           THREE           [facility]



                                                  TIMES           e



                                                  DAILY WITH           



                                                  MEALS FOR           



                                                  14 DAYS           

 

     lactulose            Yes       Constipatio 20g       Take 30         

  [facility]



     (CHRONULAC)      9-24                n,             mLs (20 g           [facility]

[facility]



     20 gram/30      00:00:                unspecified           total) by      

     Medical



     mL solution      00                  constipatio           mouth 2         

  Center



                                   n type           (two)           



                                                  times           



                                                  daily as           



                                                  needed           



                                                  (constipat           



                                                  ion).           

 

     levofloxaci            Yes                      TAKE 1           [facility]

or



     n         9-24                               TABLET BY           [place]



     (LEVAQUIN)      00:00:                               MOUTH ONCE           o

f



     750 MG      00                                 DAILY FOR           [facility]



     tablet                                         24 DAYS           e

 

     levofloxaci            Yes                      TAKE 1           [facility]

or



     n         9-24                               TABLET BY           [place]



     (LEVAQUIN)      00:00:                               MOUTH ONCE           o

f



     750 MG      00                                 DAILY FOR           [facility]



     tablet                                         24 DAYS           e

 

     lactulose            Yes       Constipatio 20g       Take 30         

  [facility]



     (CHRONULAC)      9-24                n,             mLs (20 g           [facility]

[facility]



     20 gram/30      00:00:                unspecified           total) by      

     Medical



     mL solution      00                  constipatio           mouth 2         

  Center



                                   n type           (two)           



                                                  times           



                                                  daily as           



                                                  needed           



                                                  (constipat           



                                                  ion).           

 

     lactulose            Yes       Constipatio 20g       Take 30         

  [facility]



     (CHRONULAC)      9-24                n,             mLs (20 g           [facility]

[facility]



     20 gram/30      00:00:                unspecified           total) by      

     Medical



     mL solution      00                  constipatio           mouth 2         

  Center



                                   n type           (two)           



                                                  times           



                                                  daily as           



                                                  needed           



                                                  (constipat           



                                                  ion).           

 

     lactulose            Yes       Constipatio 20g       Take 30         

  [facility]



     (CHRONULAC)      9-24                n,             mLs (20 g           [facility]

es



     20 gram/30      00:00:                unspecified           total) by      

     Medical



     mL solution      00                  constipatio           mouth 2         

  Center



                                   n type           (two)           



                                                  times           



                                                  daily as           



                                                  needed           



                                                  (constipat           



                                                  ion).           

 

     levothyroxi      2022- No        Hypothyroid 25ug      Take 1       

    [facility]



     ne        9-24 04-11           ism,           tablet (25           [facility]



     (SYNTHROID,      00:00: 00:00           unspecified           mcg total)   

        Medical



     LEVOTHROID)      00   :00            type           by mouth           Cent

er



     25 MCG                                         Every           



     tablet                                         morning on           



                                                  an empty           



                                                  stomach.           

 

     levothyroxi      2022- No        Hypothyroid 25ug      Take 1       

    [facility]



     ne        9-24 04-11           ism,           tablet (25           [facility]



     (SYNTHROID,      00:00: 00:00           unspecified           mcg total)   

        Medical



     LEVOTHROID)      00   :00            type           by mouth           Cent

er



     25 MCG                                         Every           



     tablet                                         morning on           



                                                  an empty           



                                                  stomach.           

 

     levothyroxi      2022- No        Hypothyroid 25ug      Take 1       

    [facility]



     ne        9-24 04-11           ism,           tablet (25           [facility]



     (SYNTHROID,      00:00: 00:00           unspecified           mcg total)   

        Medical



     LEVOTHROID)      00   :00            type           by mouth           Cent

er



     25 MCG                                         Every           



     tablet                                         morning on           



                                                  an empty           



                                                  stomach.           

 

     levothyroxi      2022- No        Hypothyroid 25ug      Take 1       

    [facility]



     ne        9-24 04-11           ism,           tablet (25           [facility]



     (SYNTHROID,      00:00: 00:00           unspecified           mcg total)   

        Medical



     LEVOTHROID)      00   :00            type           by mouth           Cent

er



     25 MCG                                         Every           



     tablet                                         morning on           



                                                  an empty           



                                                  stomach.           

 

     doxycycline       2019- No             100mg      Take 100           

[facility]



     (MONODOX)       10-20                          mg by           [place]



     100 MG      00:00: 04:59                          mouth.           of



     capsule      00   :00                                          [facility]



                                                                 e

 

     dexamethaso            Yes                      TAKE 2           [facility]

or



     ne        9-                               TABLETS BY           [place]



     (DECADRON)      00:00:                               MOUTH           of



     4 MG tablet      00                                 TWICE           [facility]



                                                  DAILY           e

 

     dexamethaso            Yes                      TAKE 2           [facility]

[facility]



     ne        9-                               TABLETS BY           [place]



     (DECADRON)      00:00:                               MOUTH           of



     4 MG tablet      00                                 TWICE           [facility]



                                                  DAILY           e

 

     albuterol            Yes                      USE 1 VIAL           [facility]

[facility]



     (PROVENTIL)      8-12                               IN             [facility]



     (2.5 mg/3      00:00:                               NEBULIZER           of



     mL) 0.083%      00                                 EVERY 4 TO           Med

icin



     nebulizer                                         6 HOURS AS           e



     solution                                         NEEDED           

 

     albuterol            Yes                      USE 1 VIAL           [facility]

[facility]



     (PROVENTIL)      8-12                               IN             [facility]



     (2.5 mg/3      00:00:                               NEBULIZER           of



     mL) 0.083%      00                                 EVERY 4 TO           Med

icin



     nebulizer                                         6 HOURS AS           e



     solution                                         NEEDED           

 

     furosemide            Yes            40mg      Take 40 mg           [facility]

[facility]



     (LASIX) 40      6-14                               by mouth           [facility]

ge



     MG tablet      00:00:                               daily.           of



               00                                                [facility]



                                                                 e

 

     spironolact            Yes            100mg      Take 100           [facility]

[facility]



     one       6-14                               mg by           [facility]



     (ALDACTONE)      00:00:                               mouth           of



     100 MG      00                                 daily.           [facility]



     tablet                                                        e

 

     folic acid            Yes            1mg       Take 1 mg           [facility]

[facility]



     (FOLVITE) 1      6-14                               by mouth           [facility]

[facility]



     MG tablet      00:00:                               daily.           of



               00                                                [facility]



                                                                 e

 

     furosemide            Yes            40mg      Take 40 mg           [facility]

[facility]



     (LASIX) 40      6-14                               by mouth           [facility]

[facility]



     MG tablet      00:00:                               daily.           of



               00                                                [facility]



                                                                 e

 

     spironolact            Yes            100mg      Take 100           [facility]

[facility]



     one       6-14                               mg by           [facility]



     (ALDACTONE)      00:00:                               mouth           of



     100 MG      00                                 daily.           [facility]



     tablet                                                        e

 

     folic acid            Yes            1mg       Take 1 mg           [facility]

[facility]



     (FOLVITE) 1      6-14                               by mouth           [facility]

[facility]



     MG tablet      00:00:                               daily.           of



               00                                                [facility]



                                                                 e

 

     ProAir HFA ProAir HFA           No             1{puff_ 6xD  ProAir HFA     

      



     108 (90 108 (90                          as_need      108 (90           



     Base) Base)                          ed}       Base)           



     MCG/ACT MCG/ACT                                    MCG/ACT           

 

     HYDROcodone HYDROcodone           No             1{table QID  HYDROcodon   

        



     -Acetaminop -Acetaminop                          t_as_ne      e-Acetamin   

        



     hen 5-325 hen 5-325                          eded}      ophen           



     MG   MG                                      5-325 MG           

 

     predniSONE predniSONE           No             1{table QD   predniSONE     

      



     10 MG 10 MG                          t}        10 MG           

 

     Spiriva Spiriva           No                       Spir[facility]           



     HandiHaler HandiHaler                                    HandiHaler        

   

 

     [place]ac           No             1{table QD   [place]           



     Thyroid 60 Thyroid 60                          t_on_an      Thyroid 60     

      



     MG   MG                            _empty_      MG             



                                        stomach                     



                                        }                        

 

     Furosemide Furosemide           No             1{table BID  Furosemide     

      



     40 MG 40 MG                          t}        40 MG           

 

     Tresiba Tresiba           No                  QD   Tresiba           



     FlexTouch FlexTouch                                    FlexTouch           



     200 UNIT/ UNIT/ML                                    200            



                                                  UNIT/ML           

 

     Spironolact Spironolact           No             1{table BID  Spironolac   

        



     one 100 MG one 100 MG                          t}        tone 100          

 



                                                  MG             

 

     Ipratropium Ipratropium           No             2.5{ml} TID  Ipratropiu   

        



     Bromide Bromide                                    m Bromide           



     0.02 % 0.02 %                                    0.02 %           

 

     Constulose Constulose           No             30{ml} BID  Constulose      

     



     10 GM/15ML 10 GM/15ML                                    10 GM/15ML        

   

 

     Tresiba Tresiba           No                       Tresiba           



     FlexTouch FlexTouch                                    FlexTouch           



     200 UNIT/ UNIT/ML                                    200            



                                                  UNIT/ML           

 

     Folic Acid Folic Acid           No             1{table QD   Folic Acid     

      



     1 MG 1 MG                          t}        1 MG           

 

     ProAir HFA ProAir HFA           No             1{puff_ 6xD  ProAir HFA     

      



     108 (90 108 (90                          as_need      108 (90           



     Base) Base)                          ed}       Base)           



     MCG/ACT MCG/ACT                                    MCG/ACT           

 

     HYDROcodone HYDROcodone           No             1{table QID  HYDROcodon   

        



     -Acetaminop -Acetaminop                          t_as_ne      e-Acetamin   

        



     hen 5-325 hen 5-325                          eded}      ophen           



     MG   MG                                      5-325 MG           

 

     predniSONE predniSONE           No             1{table QD   predniSONE     

      



     10 MG 10 MG                          t}        10 MG           

 

     Spiriva Spiriva           No                       Spiriva           



     HandiHaler HandiHaler                                    HandiHaler        

   

 

     Saranac Saranac           No             1{table QD   [place]           



     Thyroid 60 Thyroid 60                          t_on_an      Thyroid 60     

      



     MG   MG                            _empty_      MG             



                                        stomach                     



                                        }                        

 

     Furosemide Furosemide           No             1{table BID  Furosemide     

      



     40 MG 40 MG                          t}        40 MG           

 

     Tresiba Tresiba           No                  QD   Tresiba           



     FlexTouch FlexTouch                                    FlexTouch           



     200 UNIT/ UNIT/ML                                    200            



                                                  UNIT/ML           

 

     Spironolact Spironolact           No             1{table BID  Spironolac   

        



     one 100 MG one 100 MG                          t}        tone 100          

 



                                                  MG             

 

     Ipratropium Ipratropium           No             2.5{ml} TID  Ipratropiu   

        



     Bromide Bromide                                    m Bromide           



     0.02 % 0.02 %                                    0.02 %           

 

     Tresiba Tresiba           No                       Tresiba           



     FlexTouch FlexTouch                                    FlexTouch           



     200 UNIT/ UNIT/ML                                    200            



                                                  UNIT/ML           

 

     Folic Acid Folic Acid           No             1{table QD   Folic Acid     

      



     1 MG 1 MG                          t}        1 MG           

 

     ProAir HFA ProAir HFA           No             1{puff_ 6xD  ProAir HFA     

      



     108 (90 108 (90                          as_need      108 (90           



     Base) Base)                          ed}       Base)           



     MCG/ACT MCG/ACT                                    MCG/ACT           

 

     Constulose Constulose           No             30{ml} BID  Constulose      

     



     10 GM/15ML 10 GM/15ML                                    10 GM/15ML        

   

 

     HYDROcodone HYDROcodone           No             1{table QID  HYDROcodon   

        



     -Acetaminop -Acetaminop                          t_as_ne      e-Acetamin   

        



     hen 5-325 hen 5-325                          eded}      ophen           



     MG   MG                                      5-325 MG           

 

     Spironolact Spironolact           No             1{table BID  Spironolac   

        



     one 100 MG one 100 MG                          t}        tone 100          

 



                                                  MG             

 

     Ipratropium Ipratropium           No             2.5{ml} TID  Ipratropiu   

        



     Bromide Bromide                                    m Bromide           



     0.02 % 0.02 %                                    0.02 %           

 

     Saranac Saranac           No             1{table QD   [place]           



     Thyroid 60 Thyroid 60                          t_on_an      Thyroid 60     

      



     MG   MG                            _empty_      MG             



                                        stomach                     



                                        }                        

 

     Symbicort Symbicort           Yes  Jhon                2 puffs           

Common



                              [facility]

 

     Ipratropium Ipratropium           Yes  Jhon                2.5 ml        

   Common



     Bromide Bromide                [facility]

 

     ProAir HFA ProAir HFA           Yes  Jhon                1 puff as       

    Common



                              [place]                needed           [facility]

 

     Furosemide Furosemide           Yes  Jhon                1 tablet        

   Common



                              [facility]

 

     Spironolact Spironolact           Yes  Jhon                1 tablet      

     Common



     one  one                 [facility]

 

     Aspir-Low Aspir-Low           Yes  Jhon                1 tablet          

 Common



                              [facility]

 

     Folic Acid Folic Acid           Yes  Jhon                1 tablet        

   Common



                              [facility]

 

     Constulose Constulose           Yes  Jhon                15 ml           

Common



                              [facility]

 

     Symbicort Symbicort           No             2{puffs QD   Symbicort        

   



     160-4.5 160-4.5                          }         160-4.5           



     MCG/ACT MCG/ACT                                    MCG/ACT           

 

     Furosemide Furosemide           No             1{table BID  Furosemide     

      



     40 MG 40 MG                          t}        40 MG           

 

     Furosemide Furosemide           No             1{table BID  Furosemide     

      



     40 MG 40 MG                          t}        40 MG           

 

     Folic Acid Folic Acid           No             1{table QD   Folic Acid     

      



     1 MG 1 MG                          t}        1 MG           

 

     Tresiba Tresiba           No                       Tresiba           



     FlexTouch FlexTouch                                    FlexTouch           



     200 UNIT/ UNIT/ML                                    200            



                                                  UNIT/ML           

 

     Spiriva Spiriva           No                       Spiriva           



     HandiHaler HandiHaler                                    HandiHaler        

   

 

     Ipratropium Ipratropium           No             2.5{ml} TID  Ipratropiu   

        



     Bromide Bromide                                    m Bromide           



     0.02 % 0.02 %                                    0.02 %           

 

     Spironolact Spironolact           No             1{table BID  Spironolac   

        



     one 100 MG one 100 MG                          t}        tone 100          

 



                                                  MG             

 

     Tresiba Tresiba           No                  QD   Tresiba           



     FlexTouch FlexTouch                                    FlexTouch           



     200 UNIT/ UNIT/ML                                    200            



                                                  UNIT/ML           

 

     Constulose Constulose           No             30{ml} BID  Constulose      

     



     10 GM/15ML 10 GM/15ML                                    10 GM/15ML        

   

 

     predniSONE predniSONE           No             1{table QD   predniSONE     

      



     10 MG 10 MG                          t}        10 MG           

 

     ProAir HFA ProAir HFA           No             1{puff_ 6xD  ProAir HFA     

      



     108 (90 108 (90                          as_need      108 (90           



     Base) Base)                          ed}       Base)           



     MCG/ACT MCG/ACT                                    MCG/ACT           

 

     Saranac Saranac           No             1{table QD   [place]           



     Thyroid 60 Thyroid 60                          t_on_an      Thyroid 60     

      



     MG   MG                            _empty_      MG             



                                        stomach                     



                                        }                        

 

     HYDROcodone HYDROcodone           No             1{table QID  HYDROcodon   

        



     -Acetaminop -Acetaminop                          t_as_ne      e-Acetamin   

        



     hen 5-325 hen 5-325                          eded}      ophen           



     MG   MG                                      5-325 MG           

 

     Symbicort Symbicort           No             2{puffs QD   Symbicort        

   



     80-4.5 80-4.5                          }         80-4.5           



     MCG/ACT MCG/ACT                                    MCG/ACT           

 

     Spironolact Spironolact           No             1{table BID  Spironolac   

        



     one 100 MG one 100 MG                          t}        tone 100          

 



                                                  MG             

 

     Furosemide Furosemide           No             1{table BID  Furosemide     

      



     40 MG 40 MG                          t}        40 MG           

 

     ProAir HFA ProAir HFA           No             1{puff_ 6xD  ProAir HFA     

      



     108 (90 108 (90                          as_need      108 (90           



     Base) Base)                          ed}       Base)           



     MCG/ACT MCG/ACT                                    MCG/ACT           

 

     Ipratropium Ipratropium           No             2.5{ml} TID  Ipratropiu   

        



     Bromide Bromide                                    m Bromide           



     0.02 % 0.02 %                                    0.02 %           

 

     Constulose Constulose           No             15{ml} BID  Constulose      

     



     10 GM/15ML 10 GM/15ML                                    10 GM/15ML        

   

 

     Tresiba Tresiba           No                  QD   Tresiba           



     FlexTouch FlexTouch                                    FlexTouch           



     200 UNIT/ UNIT/ML                                    200            



                                                  UNIT/ML           

 

     Folic Acid Folic Acid           No             1{table QD   Folic Acid     

      



     1 MG 1 MG                          t}        1 MG           

 

     Aspir-Low Aspir-Low           No             1{table QD   Aspir-Low        

   



     81 MG 81 MG                          t}        81 MG           

 

     Saranac Saranac           No             1{table QD   [place]           



     Thyroid 60 Thyroid 60                          t_on_an      Thyroid 60     

      



     MG   MG                            _empty_      MG             



                                        stomach                     



                                        }                        

 

     Symbicort Symbicort           No             2{puffs QD   Symbicort        

   



     80-4.5 80-4.5                          }         80-4.5           



     MCG/ACT MCG/ACT                                    MCG/ACT           

 

     Spironolact Spironolact           No             1{table BID  Spironolac   

        



     one 100 MG one 100 MG                          t}        tone 100          

 



                                                  MG             

 

     Furosemide Furosemide           No             1{table BID  Furosemide     

      



     40 MG 40 MG                          t}        40 MG           

 

     ProAir HFA ProAir HFA           No             1{puff_ 6xD  ProAir HFA     

      



     108 (90 108 (90                          as_need      108 (90           



     Base) Base)                          ed}       Base)           



     MCG/ACT MCG/ACT                                    MCG/ACT           

 

     Ipratropium Ipratropium           No             2.5{ml} TID  Ipratropiu   

        



     Bromide Bromide                                    m Bromide           



     0.02 % 0.02 %                                    0.02 %           

 

     Constulose Constulose           No             15{ml} BID  Constulose      

     



     10 GM/15ML 10 GM/15ML                                    10 GM/15ML        

   

 

     Tresiba Tresiba           No                  QD   Tresiba           



     FlexTouch FlexTouch                                    FlexTouch           



     200 UNIT/ UNIT/ML                                    200            



                                                  UNIT/ML           

 

     Folic Acid Folic Acid           No             1{table QD   Folic Acid     

      



     1 MG 1 MG                          t}        1 MG           

 

     Aspir-Low Aspir-Low           No             1{table QD   Aspir-Low        

   



     81 MG 81 MG                          t}        81 MG           

 

     Saranac Saranac           No             1{table QD   [place]           



     Thyroid 60 Thyroid 60                          t_on_an      Thyroid 60     

      



     MG   MG                            _empty_      MG             



                                        stomach                     



                                        }                        

 

     Symbicort Symbicort           No             2{puffs QD   Symbicort        

   



     80-4.5 80-4.5                          }         80-4.5           



     MCG/ACT MCG/ACT                                    MCG/ACT           

 

     Tresiba Tresiba           No                  QD   Tresiba           



     FlexTouch FlexTouch                                    FlexTouch           



     200 UNIT/ UNIT/ML                                    200            



                                                  UNIT/ML           

 

     Furosemide Furosemide           No             1{table BID  Furosemide     

      



     40 MG 40 MG                          t}        40 MG           

 

     ProAir HFA ProAir HFA           No             1{puff_ 6xD  ProAir HFA     

      



     108 (90 108 (90                          as_need      108 (90           



     Base) Base)                          ed}       Base)           



     MCG/ACT MCG/ACT                                    MCG/ACT           

 

     Ipratropium Ipratropium           No             2.5{ml} TID  Ipratropiu   

        



     Bromide Bromide                                    m Bromide           



     0.02 % 0.02 %                                    0.02 %           

 

     Constulose Constulose           No             15{ml} BID  Constulose      

     



     10 GM/15ML 10 GM/15ML                                    10 GM/15ML        

   

 

     Spironolact Spironolact           No             1{table BID  Spironolac   

        



     one 100 MG one 100 MG                          t}        tone 100          

 



                                                  MG             

 

     Folic Acid Folic Acid           No             1{table QD   Folic Acid     

      



     1 MG 1 MG                          t}        1 MG           

 

     Aspir-Low Aspir-Low           No             1{table QD   Aspir-Low        

   



     81 MG 81 MG                          t}        81 MG           

 

     Saranac Saranac           No             1{table QD   [place]           



     Thyroid 60 Thyroid 60                          t_on_an      Thyroid 60     

      



     MG   MG                            _empty_      MG             



                                        stomach                     



                                        }                        

 

     Tresiba Tresiba           No                  QD   Tresiba           



     FlexTouch FlexTouch                                    FlexTouch           



     200 UNIT/ UNIT/ML                                    200            



                                                  UNIT/ML           

 

     Folic Acid Folic Acid           No             1{table QD   Folic Acid     

      



     1 MG 1 MG                          t}        1 MG           

 

     Aspir-Low Aspir-Low           No             1{table QD   Aspir-Low        

   



     81 MG 81 MG                          t}        81 MG           

 

     ProAir HFA ProAir HFA           No             1{puff_ 6xD  ProAir HFA     

      



     108 (90 108 (90                          as_need      108 (90           



     Base) Base)                          ed}       Base)           



     MCG/ACT MCG/ACT                                    MCG/ACT           

 

     Symbicort Symbicort           No             2{puffs QD   Symbicort        

   



     160-4.5 160-4.5                          }         160-4.5           



     MCG/ACT MCG/ACT                                    MCG/ACT           

 

     Spironolact Spironolact           No             1{table BID  Spironolac   

        



     one 100 MG one 100 MG                          t}        tone 100          

 



                                                  MG             

 

     Constulose Constulose           No             15{ml} BID  Constulose      

     



     10 GM/15ML 10 GM/15ML                                    10 GM/15ML        

   

 

     Saranac Saranac           No             1{table QD   [place]           



     Thyroid 60 Thyroid 60                          t_on_an      Thyroid 60     

      



     MG   MG                            _empty_      MG             



                                        stomach                     



                                        }                        

 

     Ipratropium Ipratropium           No             2.5{ml} TID  Ipratropiu   

        



     Bromide Bromide                                    m Bromide           



     0.02 % 0.02 %                                    0.02 %           

 

     Furosemide Furosemide           No             1{table BID  Furosemide     

      



     40 MG 40 MG                          t}        40 MG           

 

     Tresiba Tresiba           No                  QD   Tresiba           



     FlexTouch FlexTouch                                    FlexTouch           



     200 UNIT/ UNIT/ML                                    200            



                                                  UNIT/ML           

 

     Folic Acid Folic Acid           No             1{table QD   Folic Acid     

      



     1 MG 1 MG                          t}        1 MG           

 

     Aspir-Low Aspir-Low           No             1{table QD   Aspir-Low        

   



     81 MG 81 MG                          t}        81 MG           

 

     ProAir HFA ProAir HFA           No             1{puff_ 6xD  ProAir HFA     

      



     108 (90 108 (90                          as_need      108 (90           



     Base) Base)                          ed}       Base)           



     MCG/ACT MCG/ACT                                    MCG/ACT           

 

     Symbicort Symbicort           No             2{puffs QD   Symbicort        

   



     160-4.5 160-4.5                          }         160-4.5           



     MCG/ACT MCG/ACT                                    MCG/ACT           

 

     Spironolact Spironolact           No             1{table BID  Spironolac   

        



     one 100 MG one 100 MG                          t}        tone 100          

 



                                                  MG             

 

     Constulose Constulose           No             15{ml} BID  Constulose      

     



     10 GM/15ML 10 GM/15ML                                    10 GM/15ML        

   

 

     Saranac Saranac           No             1{table QD   [place]           



     Thyroid 60 Thyroid 60                          t_on_an      Thyroid 60     

      



     MG   MG                            _empty_      MG             



                                        stomach                     



                                        }                        

 

     Ipratropium Ipratropium           No             2.5{ml} TID  Ipratropiu   

        



     Bromide Bromide                                    m Bromide           



     0.02 % 0.02 %                                    0.02 %           

 

     Furosemide Furosemide           No             1{table BID  Furosemide     

      



     40 MG 40 MG                          t}        40 MG           

 

     Tresiba Tresiba           No                  QD   Tresiba           



     FlexTouch FlexTouch                                    FlexTouch           



     200 UNIT/ UNIT/ML                                    200            



                                                  UNIT/ML           

 

     Folic Acid Folic Acid           No             1{table QD   Folic Acid     

      



     1 MG 1 MG                          t}        1 MG           

 

     Aspir-Low Aspir-Low           No             1{table QD   Aspir-Low        

   



     81 MG 81 MG                          t}        81 MG           

 

     ProAir HFA ProAir HFA           No             1{puff_ 6xD  ProAir HFA     

      



     108 (90 108 (90                          as_need      108 (90           



     Base) Base)                          ed}       Base)           



     MCG/ACT MCG/ACT                                    MCG/ACT           

 

     Symbicort Symbicort           No             2{puffs QD   Symbicort        

   



     160-4.5 160-4.5                          }         160-4.5           



     MCG/ACT MCG/ACT                                    MCG/ACT           

 

     Spironolact Spironolact           No             1{table BID  Spironolac   

        



     one 100 MG one 100 MG                          t}        tone 100          

 



                                                  MG             

 

     Constulose Constulose           No             15{ml} BID  Constulose      

     



     10 GM/15ML 10 GM/15ML                                    10 GM/15ML        

   

 

     Saranac Saranac           No             1{table QD   [place]           



     Thyroid 60 Thyroid 60                          t_on_an      Thyroid 60     

      



     MG   MG                            _empty_      MG             



                                        stomach                     



                                        }                        

 

     Ipratropium Ipratropium           No             2.5{ml} TID  Ipratropiu   

        



     Bromide Bromide                                    m Bromide           



     0.02 % 0.02 %                                    0.02 %           

 

     Furosemide Furosemide           No             1{table BID  Furosemide     

      



     40 MG 40 MG                          t}        40 MG           

 

     Tresiba Tresiba           No                  QD   Tresiba           



     FlexTouch FlexTouch                                    FlexTouch           



     200 UNIT/ UNIT/ML                                    200            



                                                  UNIT/ML           

 

     Folic Acid Folic Acid           No             1{table QD   Folic Acid     

      



     1 MG 1 MG                          t}        1 MG           

 

     Aspir-Low Aspir-Low           No             1{table QD   Aspir-Low        

   



     81 MG 81 MG                          t}        81 MG           

 

     ProAir HFA ProAir HFA           No             1{puff_ 6xD  ProAir HFA     

      



     108 (90 108 (90                          as_need      108 (90           



     Base) Base)                          ed}       Base)           



     MCG/ACT MCG/ACT                                    MCG/ACT           

 

     Symbicort Symbicort           No             2{puffs QD   Symbicort        

   



     160-4.5 160-4.5                          }         160-4.5           



     MCG/ACT MCG/ACT                                    MCG/ACT           

 

     Spironolact Spironolact           No             1{table BID  Spironolac   

        



     one 100 MG one 100 MG                          t}        tone 100          

 



                                                  MG             

 

     Constulose Constulose           No             15{ml} BID  Constulose      

     



     10 GM/15ML 10 GM/15ML                                    10 GM/15ML        

   

 

     Saranac Saranac           No             1{table QD   [place]           



     Thyroid 60 Thyroid 60                          t_on_an      Thyroid 60     

      



     MG   MG                            _empty_      MG             



                                        stomach                     



                                        }                        

 

     Ipratropium Ipratropium           No             2.5{ml} TID  Ipratropiu   

        



     Bromide Bromide                                    m Bromide           



     0.02 % 0.02 %                                    0.02 %           

 

     Furosemide Furosemide           No             1{table BID  Furosemide     

      



     40 MG 40 MG                          t}        40 MG           

 

     Tresiba Tresiba           No                  QD   Tresiba           



     FlexTouch FlexTouch                                    FlexTouch           



     200 UNIT/ UNIT/ML                                    200            



                                                  UNIT/ML           

 

     Folic Acid Folic Acid           No             1{table QD   Folic Acid     

      



     1 MG 1 MG                          t}        1 MG           

 

     Aspir-Low Aspir-Low           No             1{table QD   Aspir-Low        

   



     81 MG 81 MG                          t}        81 MG           

 

     ProAir HFA ProAir HFA           No             1{puff_ 6xD  ProAir HFA     

      



     108 (90 108 (90                          as_need      108 (90           



     Base) Base)                          ed}       Base)           



     MCG/ACT MCG/ACT                                    MCG/ACT           

 

     Symbicort Symbicort           No             2{puffs QD   Symbicort        

   



     160-4.5 160-4.5                          }         160-4.5           



     MCG/ACT MCG/ACT                                    MCG/ACT           

 

     Spironolact Spironolact           No             1{table BID  Spironolac   

        



     one 100 MG one 100 MG                          t}        tone 100          

 



                                                  MG             

 

     Constulose Constulose           No             15{ml} BID  Constulose      

     



     10 GM/15ML 10 GM/15ML                                    10 GM/15ML        

   

 

     Saranac Saranac           No             1{table QD   [place]           



     Thyroid 60 Thyroid 60                          t_on_an      Thyroid 60     

      



     MG   MG                            _empty_      MG             



                                        stomach                     



                                        }                        

 

     Ipratropium Ipratropium           No             2.5{ml} TID  Ipratropiu   

        



     Bromide Bromide                                    m Bromide           



     0.02 % 0.02 %                                    0.02 %           

 

     Furosemide Furosemide           No             1{table BID  Furosemide     

      



     40 MG 40 MG                          t}        40 MG           

 

     Tresiba Tresiba           No                  QD   Tresiba           



     FlexTouch FlexTouch                                    FlexTouch           



     200 UNIT/ UNIT/ML                                    200            



                                                  UNIT/ML           

 

     Folic Acid Folic Acid           No             1{table QD   Folic Acid     

      



     1 MG 1 MG                          t}        1 MG           

 

     Aspir-Low Aspir-Low           No             1{table QD   Aspir-Low        

   



     81 MG 81 MG                          t}        81 MG           

 

     ProAir HFA ProAir HFA           No             1{puff_ 6xD  ProAir HFA     

      



     108 (90 108 (90                          as_need      108 (90           



     Base) Base)                          ed}       Base)           



     MCG/ACT MCG/ACT                                    MCG/ACT           

 

     Symbicort Symbicort           No             2{puffs QD   Symbicort        

   



     160-4.5 160-4.5                          }         160-4.5           



     MCG/ACT MCG/ACT                                    MCG/ACT           

 

     Spironolact Spironolact           No             1{table BID  Spironolac   

        



     one 100 MG one 100 MG                          t}        tone 100          

 



                                                  MG             

 

     Constulose Constulose           No             15{ml} BID  Constulose      

     



     10 GM/15ML 10 GM/15ML                                    10 GM/15ML        

   

 

     Saranac Saranac           No             1{table QD   [place]           



     Thyroid 60 Thyroid 60                          t_on_an      Thyroid 60     

      



     MG   MG                            _empty_      MG             



                                        stomach                     



                                        }                        

 

     Ipratropium Ipratropium           No             2.5{ml} TID  Ipratropiu   

        



     Bromide Bromide                                    m Bromide           



     0.02 % 0.02 %                                    0.02 %           

 

     Furosemide Furosemide           No             1{table BID  Furosemide     

      



     40 MG 40 MG                          t}        40 MG           

 

     Tresiba Tresiba           No                  QD   Tresiba           



     FlexTouch FlexTouch                                    FlexTouch           



     200 UNIT/ UNIT/ML                                    200            



                                                  UNIT/ML           

 

     Folic Acid Folic Acid           No             1{table QD   Folic Acid     

      



     1 MG 1 MG                          t}        1 MG           

 

     Aspir-Low Aspir-Low           No             1{table QD   Aspir-Low        

   



     81 MG 81 MG                          t}        81 MG           

 

     ProAir HFA ProAir HFA           No             1{puff_ 6xD  ProAir HFA     

      



     108 (90 108 (90                          as_need      108 (90           



     Base) Base)                          ed}       Base)           



     MCG/ACT MCG/ACT                                    MCG/ACT           

 

     Symbicort Symbicort           No             2{puffs QD   Symbicort        

   



     160-4.5 160-4.5                          }         160-4.5           



     MCG/ACT MCG/ACT                                    MCG/ACT           

 

     Spironolact Spironolact           No             1{table BID  Spironolac   

        



     one 100 MG one 100 MG                          t}        tone 100          

 



                                                  MG             

 

     Constulose Constulose           No             15{ml} BID  Constulose      

     



     10 GM/15ML 10 GM/15ML                                    10 GM/15ML        

   

 

     S[place]ac Saranac           No             1{table QD   [place]           



     Thyroid 60 Thyroid 60                          t_on_an      Thyroid 60     

      



     MG   MG                            _empty_      MG             



                                        stomach                     



                                        }                        

 

     Ipratropium Ipratropium           No             2.5{ml} TID  Ipratropiu   

        



     Bromide Bromide                                    m Bromide           



     0.02 % 0.02 %                                    0.02 %           

 

     Furosemide Furosemide           No             1{table BID  Furosemide     

      



     40 MG 40 MG                          t}        40 MG           

 

     NP Thyroid NP Thyroid           No                       NP Thyroid        

   



     60 MG 60 MG                                    60 MG           

 

     Spironolact Spironolact           No             1{table BID  Spironolac   

        



     one 100 MG one 100 MG                          t}        tone 100          

 



                                                  MG             

 

     [place]ac Saranac           No             1{table QD   [place]           



     Thyroid 60 Thyroid 60                          t_on_an      Thyroid 60     

      



     MG   MG                            _empty_      MG             



                                        stomach                     



                                        }                        

 

     Furosemide Furosemide           No             1{table BID  Furosemide     

      



     40 MG 40 MG                          t}        40 MG           

 

     Constulose Constulose           No             15{ml} BID  Constulose      

     



     10 GM/15ML 10 GM/15ML                                    10 GM/15ML        

   

 

     HYDROcodone HYDROcodone           No             1{table QID  HYDROcodon   

        



     -Acetaminop -Acetaminop                          t_as_ne      e-Acetamin   

        



     hen 5-325 hen 5-325                          eded}      ophen           



     MG   MG                                      5-325 MG           

 

     Folic Acid Folic Acid           No             1{table QD   Folic Acid     

      



     1 MG 1 MG                          t}        1 MG           

 

     Symbicort Symbicort           No             2{puffs QD   Symbicort        

   



     160-4.5 160-4.5                          }         160-4.5           



     MCG/ACT MCG/ACT                                    MCG/ACT           

 

     Ondansetron Ondansetron           No             1{table QD   Ondansetro   

        



     4 MG 4 MG                          t_on_th      n 4 MG           



                                        e_tongu                     



                                        e_and_a                     



                                        llow_to                     



                                        _dissol                     



                                        ve}                      

 

     Ipratropium Ipratropium           No             2.5{ml} TID  Ipratropiu   

        



     Bromide Bromide                                    m Bromide           



     0.02 % 0.02 %                                    0.02 %           

 

     Aspir-Low Aspir-Low           No             1{table QD   Aspir-Low        

   



     81 MG 81 MG                          t}        81 MG           

 

     Tresiba Tresiba           No                  QD   Tresiba           



     FlexTouch FlexTouch                                    FlexTouch           



     200 UNIT/ UNIT/ML                                    200            



                                                  UNIT/ML           

 

     ProAir HFA ProAir HFA           No             1{puff_ 6xD  ProAir HFA     

      



     108 (90 108 (90                          as_need      108 (90           



     Base) Base)                          ed}       Base)           



     MCG/ACT MCG/ACT                                    MCG/ACT           

 

     Folic Acid Folic Acid           No             1{table QD   Folic Acid     

      



     1 MG 1 MG                          t}        1 MG           

 

     Symbicort Symbicort           No             2{puffs QD   Symbicort        

   



     160-4.5 160-4.5                          }         160-4.5           



     MCG/ACT MCG/ACT                                    MCG/ACT           

 

     Saranac Saranac           No             1{table QD   [place]           



     Thyroid 60 Thyroid 60                          t_on_an      Thyroid 60     

      



     MG   MG                            _empty_      MG             



                                        stomach                     



                                        }                        

 

     Furosemide Furosemide           No             1{table BID  Furosemide     

      



     40 MG 40 MG                          t}        40 MG           

 

     Constulose Constulose           No             15{ml} BID  Constulose      

     



     10 GM/15ML 10 GM/15ML                                    10 GM/15ML        

   

 

     Aspir-Low Aspir-Low           No             1{table QD   Aspir-Low        

   



     81 MG 81 MG                          t}        81 MG           

 

     HYDROcodone HYDROcodone           No             1{table QID  HYDROcodon   

        



     -Acetaminop -Acetaminop                          t_as_ne      e-Acetamin   

        



     hen 5-325 hen 5-325                          eded}      ophen           



     MG   MG                                      5-325 MG           

 

     ProAir HFA ProAir HFA           No             1{puff_ 6xD  ProAir HFA     

      



     108 (90 108 (90                          as_need      108 (90           



     Base) Base)                          ed}       Base)           



     MCG/ACT MCG/ACT                                    MCG/ACT           

 

     NP Thyroid NP Thyroid           No                       NP Thyroid        

   



     60 MG 60 MG                                    60 MG           

 

     Ondansetron Ondansetron           No             1{table QD   Ondansetro   

        



     4 MG 4 MG                          t_on_th      n 4 MG           



                                        e_tongu                     



                                        e_and_a                     



                                        llow_to                     



                                        _dissol                     



                                        ve}                      

 

     Tresiba Tresiba           No                  QD   Tresiba           



     FlexTouch FlexTouch                                    FlexTouch           



     200 UNIT/ UNIT/ML                                    200            



                                                  UNIT/ML           

 

     Ipratropium Ipratropium           No             2.5{ml} TID  Ipratropiu   

        



     Bromide Bromide                                    m Bromide           



     0.02 % 0.02 %                                    0.02 %           

 

     Spironolact Spironolact           No                       Spironolac      

     



     one 100 MG one 100 MG                                    tone 100          

 



                                                  MG             

 

     Folic Acid Folic Acid           No             1{table QD   Folic Acid     

      



     1 MG 1 MG                          t}        1 MG           

 

     Symbicort Symbicort           No             2{puffs QD   Symbicort        

   



     160-4.5 160-4.5                          }         160-4.5           



     MCG/ACT MCG/ACT                                    MCG/ACT           

 

     Saranac Saranac           No             1{table QD   [place]           



     Thyroid 60 Thyroid 60                          t_on_an      Thyroid 60     

      



     MG   MG                            _empty_      MG             



                                        stomach                     



                                        }                        

 

     Furosemide Furosemide           No             1{table BID  Furosemide     

      



     40 MG 40 MG                          t}        40 MG           

 

     Constulose Constulose           No             15{ml} BID  Constulose      

     



     10 GM/15ML 10 GM/15ML                                    10 GM/15ML        

   

 

     Aspir-Low Aspir-Low           No             1{table QD   Aspir-Low        

   



     81 MG 81 MG                          t}        81 MG           

 

     HYDROcodone HYDROcodone           No             1{table QID  HYDROcodon   

        



     -Acetaminop -Acetaminop                          t_as_ne      e-Acetamin   

        



     hen 5-325 hen 5-325                          eded}      ophen           



     MG   MG                                      5-325 MG           

 

     ProAir HFA ProAir HFA           No             1{puff_ 6xD  ProAir HFA     

      



     108 (90 108 (90                          as_need      108 (90           



     Base) Base)                          ed}       Base)           



     MCG/ACT MCG/ACT                                    MCG/ACT           

 

     NP Thyroid NP Thyroid           No                       NP Thyroid        

   



     60 MG 60 MG                                    60 MG           

 

     Ondansetron Ondansetron           No             1{table QD   Ondansetro   

        



     4 MG 4 MG                          t_on_th      n 4 MG           



                                        e_tongu                     



                                        e_and_a                     



                                        llow_to                     



                                        _dissol                     



                                        ve}                      

 

     Tresiba Tresiba           No                  QD   Tresiba           



     FlexTouch FlexTouch                                    FlexTouch           



     200 UNIT/ UNIT/ML                                    200            



                                                  UNIT/ML           

 

     Ipratropium Ipratropium           No             2.5{ml} TID  Ipratropiu   

        



     Bromide Bromide                                    m Bromide           



     0.02 % 0.02 %                                    0.02 %           

 

     Spironolact Spironolact           No                       Spironolac      

     



     one 100 MG one 100 MG                                    tone 100          

 



                                                  MG             

 

     Folic Acid Folic Acid           No             1{table QD   Folic Acid     

      



     1 MG 1 MG                          t}        1 MG           

 

     Symbicort Symbicort           No             2{puffs QD   Symbicort        

   



     160-4.5 160-4.5                          }         160-4.5           



     MCG/ACT MCG/ACT                                    MCG/ACT           

 

     S[place]ac Saranac           No             1{table QD   [place]           



     Thyroid 60 Thyroid 60                          t_on_an      Thyroid 60     

      



     MG   MG                            _empty_      MG             



                                        stomach                     



                                        }                        

 

     Furosemide Furosemide           No             1{table BID  Furosemide     

      



     40 MG 40 MG                          t}        40 MG           

 

     Constulose Constulose           No             15{ml} BID  Constulose      

     



     10 GM/15ML 10 GM/15ML                                    10 GM/15ML        

   

 

     Aspir-Low Aspir-Low           No             1{table QD   Aspir-Low        

   



     81 MG 81 MG                          t}        81 MG           

 

     HYDROcodone HYDROcodone           No             1{table QID  HYDROcodon   

        



     -Acetaminop -Acetaminop                          t_as_ne      e-Acetamin   

        



     hen 5-325 hen 5-325                          eded}      ophen           



     MG   MG                                      5-325 MG           

 

     ProAir HFA ProAir HFA           No             1{puff_ 6xD  ProAir HFA     

      



     108 (90 108 (90                          as_need      108 (90           



     Base) Base)                          ed}       Base)           



     MCG/ACT MCG/ACT                                    MCG/ACT           

 

     NP Thyroid NP Thyroid           No                       NP Thyroid        

   



     60 MG 60 MG                                    60 MG           

 

     Ondansetron Ondansetron           No             1{table QD   Ondansetro   

        



     4 MG 4 MG                          t_on_th      n 4 MG           



                                        e_tongu                     



                                        e_and_a                     



                                        llow_to                     



                                        _dissol                     



                                        ve}                      

 

     Tresiba Tresiba           No                  QD   Tresiba           



     FlexTouch FlexTouch                                    FlexTouch           



     200 UNIT/ UNIT/ML                                    200            



                                                  UNIT/ML           

 

     Ipratropium Ipratropium           No             2.5{ml} TID  Ipratropiu   

        



     Bromide Bromide                                    m Bromide           



     0.02 % 0.02 %                                    0.02 %           

 

     Spironolact Spironolact           No                       Spironolac      

     



     one 100 MG one 100 MG                                    tone 100          

 



                                                  MG             

 

     Ondansetron Ondansetron           No             1{table QD   Ondansetro   

        



     4 MG 4 MG                          t_on_th      n 4 MG           



                                        e_tongu                     



                                        e_and_a                     



                                        llow_to                     



                                        _dissol                     



                                        ve}                      

 

     Spironolact Spironolact           No             1{table BID  Spironolac   

        



     one 100 MG one 100 MG                          t}        tone 100          

 



                                                  MG             

 

     Furosemide Furosemide           No             1{table BID  Furosemide     

      



     40 MG 40 MG                          t}        40 MG           

 

     Symbicort Symbicort           No             2{puffs QD   Symbicort        

   



     160-4.5 160-4.5                          }         160-4.5           



     MCG/ACT MCG/ACT                                    MCG/ACT           

 

     Ipratropium Ipratropium           No             2.5{ml} TID  Ipratropiu   

        



     Bromide Bromide                                    m Bromide           



     0.02 % 0.02 %                                    0.02 %           

 

     Tresiba Tresiba           No                  QD   Tresiba           



     FlexTouch FlexTouch                                    FlexTouch           



     200 UNIT/ UNIT/ML                                    200            



                                                  UNIT/ML           

 

     Spironolact Spironolact           No                       Spironolac      

     



     one 100 MG one 100 MG                                    tone 100          

 



                                                  MG             

 

     Aspir-Low Aspir-Low           No             1{table QD   Aspir-Low        

   



     81 MG 81 MG                          t}        81 MG           

 

     HYDROcodone HYDROcodone           No             1{table QID  HYDROcodon   

        



     -Acetaminop -Acetaminop                          t_as_ne      e-Acetamin   

        



     hen 5-325 hen 5-325                          eded}      ophen           



     MG   MG                                      5-325 MG           

 

     Folic Acid Folic Acid           No             1{table QD   Folic Acid     

      



     1 MG 1 MG                          t}        1 MG           

 

     ProAir HFA ProAir HFA           No             1{puff_ 6xD  ProAir HFA     

      



     108 (90 108 (90                          as_need      108 (90           



     Base) Base)                          ed}       Base)           



     MCG/ACT MCG/ACT                                    MCG/ACT           

 

     NP Thyroid NP Thyroid           No                       NP Thyroid        

   



     60 MG 60 MG                                    60 MG           

 

     [place] Saranac           No             1{table QD   [place]           



     Thyroid 60 Thyroid 60                          t_on_an      Thyroid 60     

      



     MG   MG                            _empty_      MG             



                                        stomach                     



                                        }                        

 

     Ondansetron Ondansetron           No             1{table QD   Ondansetro   

        



     4 MG 4 MG                          t_on_th      n 4 MG           



                                        e_tongu                     



                                        e_and_a                     



                                        llow_to                     



                                        _dissol                     



                                        ve}                      

 

     Spironolact Spironolact           No             1{table BID  Spironolac   

        



     one 100 MG one 100 MG                          t}        tone 100          

 



                                                  MG             

 

     Furosemide Furosemide           No             1{table BID  Furosemide     

      



     40 MG 40 MG                          t}        40 MG           

 

     Symbicort Symbicort           No             2{puffs QD   Symbicort        

   



     160-4.5 160-4.5                          }         160-4.5           



     MCG/ACT MCG/ACT                                    MCG/ACT           

 

     Ipratropium Ipratropium           No             2.5{ml} TID  Ipratropiu   

        



     Bromide Bromide                                    m Bromide           



     0.02 % 0.02 %                                    0.02 %           

 

     Tresiba Tresiba           No                  QD   Tresiba           



     FlexTouch FlexTouch                                    FlexTouch           



     200 UNIT/ UNIT/ML                                    200            



                                                  UNIT/ML           

 

     Spironolact Spironolact           No                       Spironolac      

     



     one 100 MG one 100 MG                                    tone 100          

 



                                                  MG             

 

     Aspir-Low Aspir-Low           No             1{table QD   Aspir-Low        

   



     81 MG 81 MG                          t}        81 MG           

 

     HYDROcodone HYDROcodone           No             1{table QID  HYDROcodon   

        



     -Acetaminop -Acetaminop                          t_as_ne      e-Acetamin   

        



     hen 5-325 hen 5-325                          eded}      ophen           



     MG   MG                                      5-325 MG           

 

     Folic Acid Folic Acid           No             1{table QD   Folic Acid     

      



     1 MG 1 MG                          t}        1 MG           

 

     ProAir HFA ProAir HFA           No             1{puff_ 6xD  ProAir HFA     

      



     108 (90 108 (90                          as_need      108 (90           



     Base) Base)                          ed}       Base)           



     MCG/ACT MCG/ACT                                    MCG/ACT           

 

     NP Thyroid NP Thyroid           No                       NP Thyroid        

   



     60 MG 60 MG                                    60 MG           

 

     S[place]ac Saranac           No             1{table QD   [place]           



     Thyroid 60 Thyroid 60                          t_on_an      Thyroid 60     

      



     MG   MG                            _empty_      MG             



                                        stomach                     



                                        }                        

 

     Ondansetron Ondansetron           No             1{table QD   Ondansetro   

        



     4 MG 4 MG                          t_on_th      n 4 MG           



                                        e_tongu                     



                                        e_and_a                     



                                        llow_to                     



                                        _dissol                     



                                        ve}                      

 

     Spironolact Spironolact           No             1{table BID  Spironolac   

        



     one 100 MG one 100 MG                          t}        tone 100          

 



                                                  MG             

 

     Furosemide Furosemide           No             1{table BID  Furosemide     

      



     40 MG 40 MG                          t}        40 MG           

 

     Symbicort Symbicort           No             2{puffs QD   Symbicort        

   



     160-4.5 160-4.5                          }         160-4.5           



     MCG/ACT MCG/ACT                                    MCG/ACT           

 

     Ipratropium Ipratropium           No             2.5{ml} TID  Ipratropiu   

        



     Bromide Bromide                                    m Bromide           



     0.02 % 0.02 %                                    0.02 %           

 

     Tresiba Tresiba           No                  QD   Tresiba           



     FlexTouch FlexTouch                                    FlexTouch           



     200 UNIT/ UNIT/ML                                    200            



                                                  UNIT/ML           

 

     Spironolact Spironolact           No                       Spironolac      

     



     one 100 MG one 100 MG                                    tone 100          

 



                                                  MG             

 

     Aspir-Low Aspir-Low           No             1{table QD   Aspir-Low        

   



     81 MG 81 MG                          t}        81 MG           

 

     HYDROcodone HYDROcodone           No             1{table QID  HYDROcodon   

        



     -Acetaminop -Acetaminop                          t_as_ne      e-Acetamin   

        



     hen 5-325 hen 5-325                          eded}      ophen           



     MG   MG                                      5-325 MG           

 

     Folic Acid Folic Acid           No             1{table QD   Folic Acid     

      



     1 MG 1 MG                          t}        1 MG           

 

     ProAir HFA ProAir HFA           No             1{puff_ 6xD  ProAir HFA     

      



     108 (90 108 (90                          as_need      108 (90           



     Base) Base)                          ed}       Base)           



     MCG/ACT MCG/ACT                                    MCG/ACT           

 

     NP Thyroid NP Thyroid           No                       NP Thyroid        

   



     60 MG 60 MG                                    60 MG           

 

     S[place] Saranac           No             1{table QD   [place]           



     Thyroid 60 Thyroid 60                          t_on_an      Thyroid 60     

      



     MG   MG                            _empty_      MG             



                                        stomach                     



                                        }                        

 

     Ondansetron Ondansetron           No             1{table QD   Ondansetro   

        



     4 MG 4 MG                          t_on_th      n 4 MG           



                                        e_tongu                     



                                        e_and_a                     



                                        llow_to                     



                                        _dissol                     



                                        ve}                      

 

     Spironolact Spironolact           No             1{table BID  Spironolac   

        



     one 100 MG one 100 MG                          t}        tone 100          

 



                                                  MG             

 

     Furosemide Furosemide           No             1{table BID  Furosemide     

      



     40 MG 40 MG                          t}        40 MG           

 

     Symbicort Symbicort           No             2{puffs QD   Symbicort        

   



     160-4.5 160-4.5                          }         160-4.5           



     MCG/ACT MCG/ACT                                    MCG/ACT           

 

     Ipratropium Ipratropium           No             2.5{ml} TID  Ipratropiu   

        



     Bromide Bromide                                    m Bromide           



     0.02 % 0.02 %                                    0.02 %           

 

     Tresiba Tresiba           No                  QD   Tresiba           



     FlexTouch FlexTouch                                    FlexTouch           



     200 UNIT/ UNIT/ML                                    200            



                                                  UNIT/ML           

 

     Spironolact Spironolact           No                       Spironolac      

     



     one 100 MG one 100 MG                                    tone 100          

 



                                                  MG             

 

     Aspir-Low Aspir-Low           No             1{table QD   Aspir-Low        

   



     81 MG 81 MG                          t}        81 MG           

 

     HYDROcodone HYDROcodone           No             1{table QID  HYDROcodon   

        



     -Acetaminop -Acetaminop                          t_as_ne      e-Acetamin   

        



     hen 5-325 hen 5-325                          eded}      ophen           



     MG   MG                                      5-325 MG           

 

     Folic Acid Folic Acid           No             1{table QD   Folic Acid     

      



     1 MG 1 MG                          t}        1 MG           

 

     ProAir HFA ProAir HFA           No             1{puff_ 6xD  ProAir HFA     

      



     108 (90 108 (90                          as_need      108 (90           



     Base) Base)                          ed}       Base)           



     MCG/ACT MCG/ACT                                    MCG/ACT           

 

     NP Thyroid NP Thyroid           No                       NP Thyroid        

   



     60 MG 60 MG                                    60 MG           

 

     S[place] Saranac           No             1{table QD   [place]           



     Thyroid 60 Thyroid 60                          t_on_an      Thyroid 60     

      



     MG   MG                            _empty_      MG             



                                        stomach                     



                                        }                        

 

     Spironolact Spironolact           No                       Spironolac      

     



     one 100 MG one 100 MG                                    tone 100          

 



                                                  MG             

 

     Symbicort Symbicort           No             2{puffs QD   Symbicort        

   



     160-4.5 160-4.5                          }         160-4.5           



     MCG/ACT MCG/ACT                                    MCG/ACT           

 

     Furosemide Furosemide           No             1{table BID  Furosemide     

      



     40 MG 40 MG                          t}        40 MG           

 

     ProAir HFA ProAir HFA           No             1{puff_ 6xD  ProAir HFA     

      



     108 (90 108 (90                          as_need      108 (90           



     Base) Base)                          ed}       Base)           



     MCG/ACT MCG/ACT                                    MCG/ACT           

 

     Aspir-Low Aspir-Low           No             1{table QD   Aspir-Low        

   



     81 MG 81 MG                          t}        81 MG           

 

     Tresiba Tresiba           No                  QD   Tresiba           



     FlexTouch FlexTouch                                    FlexTouch           



     200 UNIT/ UNIT/ML                                    200            



                                                  UNIT/ML           

 

     Ondansetron Ondansetron           No             1{table QD   Ondansetro   

        



     4 MG 4 MG                          t_on_th      n 4 MG           



                                        e_tongu                     



                                        e_and_a                     



                                        llow_to                     



                                        _dissol                     



                                        ve}                      

 

     Spironolact Spironolact           No             1{table BID  Spironolac   

        



     one 100 MG one 100 MG                          t}        tone 100          

 



                                                  MG             

 

     Ipratropium Ipratropium           No             2.5{ml} TID  Ipratropiu   

        



     Bromide Bromide                                    m Bromide           



     0.02 % 0.02 %                                    0.02 %           

 

     S[place]ac Saranac           No             1{table QD   [place]           



     Thyroid 60 Thyroid 60                          t_on_an      Thyroid 60     

      



     MG   MG                            _empty_      MG             



                                        stomach                     



                                        }                        

 

     Folic Acid Folic Acid           No             1{table QD   Folic Acid     

      



     1 MG 1 MG                          t}        1 MG           

 

     HYDROcodone HYDROcodone           No             1{table QID  HYDROcodon   

        



     -Acetaminop -Acetaminop                          t_as_ne      e-Acetamin   

        



     hen 5-325 hen 5-325                          eded}      ophen           



     MG   MG                                      5-325 MG           

 

     NP Thyroid NP Thyroid           No                       NP Thyroid        

   



     60 MG 60 MG                                    60 MG           

 

     Spironolact Spironolact           No                       Spironolac      

     



     one 100 MG one 100 MG                                    tone 100          

 



                                                  MG             

 

     Symbicort Symbicort           No             2{puffs QD   Symbicort        

   



     160-4.5 160-4.5                          }         160-4.5           



     MCG/ACT MCG/ACT                                    MCG/ACT           

 

     Furosemide Furosemide           No             1{table BID  Furosemide     

      



     40 MG 40 MG                          t}        40 MG           

 

     ProAir HFA ProAir HFA           No             1{puff_ 6xD  ProAir HFA     

      



     108 (90 108 (90                          as_need      108 (90           



     Base) Base)                          ed}       Base)           



     MCG/ACT MCG/ACT                                    MCG/ACT           

 

     Aspir-Low Aspir-Low           No             1{table QD   Aspir-Low        

   



     81 MG 81 MG                          t}        81 MG           

 

     Tresiba Tresiba           No                  QD   Tresiba           



     FlexTouch FlexTouch                                    FlexTouch           



     200 UNIT/ UNIT/ML                                    200            



                                                  UNIT/ML           

 

     Ondansetron Ondansetron           No             1{table QD   Ondansetro   

        



     4 MG 4 MG                          t_on_th      n 4 MG           



                                        e_tongu                     



                                        e_and_a                     



                                        llow_to                     



                                        _dissol                     



                                        ve}                      

 

     Spironolact Spironolact           No             1{table BID  Spironolac   

        



     one 100 MG one 100 MG                          t}        tone 100          

 



                                                  MG             

 

     Ipratropium Ipratropium           No             2.5{ml} TID  Ipratropiu   

        



     Bromide Bromide                                    m Bromide           



     0.02 % 0.02 %                                    0.02 %           

 

     S[place]ac Saranac           No             1{table QD   [place]           



     Thyroid 60 Thyroid 60                          t_on_an      Thyroid 60     

      



     MG   MG                            _empty_      MG             



                                        stomach                     



                                        }                        

 

     Folic Acid Folic Acid           No             1{table QD   Folic Acid     

      



     1 MG 1 MG                          t}        1 MG           

 

     HYDROcodone HYDROcodone           No             1{table QID  HYDROcodon   

        



     -Acetaminop -Acetaminop                          t_as_ne      e-Acetamin   

        



     hen 5-325 hen 5-325                          eded}      ophen           



     MG   MG                                      5-325 MG           

 

     NP Thyroid NP Thyroid           No                       NP Thyroid        

   



     60 MG 60 MG                                    60 MG           

 

     Tresiba Tresiba           No                       Tresiba           



     FlexTouch FlexTouch                                    FlexTouch           



     200 UNIT/ UNIT/ML                                    200            



                                                  UNIT/ML           

 

     Folic Acid Folic Acid           No             1{table QD   Folic Acid     

      



     1 MG 1 MG                          t}        1 MG           

 

     ProAir HFA ProAir HFA           No             1{puff_ 6xD  ProAir HFA     

      



     108 (90 108 (90                          as_need      108 (90           



     Base) Base)                          ed}       Base)           



     MCG/ACT MCG/ACT                                    MCG/ACT           

 

     Constulose Constulose           No             30{ml} BID  Constulose      

     



     10 GM/15ML 10 GM/15ML                                    10 GM/15ML        

   

 

     HYDROcodone HYDROcodone           No             1{table QID  HYDROcodon   

        



     -Acetaminop -Acetaminop                          t_as_ne      e-Acetamin   

        



     hen 5-325 hen 5-325                          eded}      ophen           



     MG   MG                                      5-325 MG           

 

     Spironolact Spironolact           No             1{table BID  Spironolac   

        



     one 100 MG one 100 MG                          t}        tone 100          

 



                                                  MG             

 

     Ipratropium Ipratropium           No             2.5{ml} TID  Ipratropiu   

        



     Bromide Bromide                                    m Bromide           



     0.02 % 0.02 %                                    0.02 %           

 

     S[place]ac Saranac           No             1{table QD   [place]           



     Thyroid 60 Thyroid 60                          t_on_an      Thyroid 60     

      



     MG   MG                            _empty_      MG             



                                        stomach                     



                                        }                        

 

     Symbicort Symbicort           No             2{puffs QD   Symbicort        

   



     160-4.5 160-4.5                          }         160-4.5           



     MCG/ACT MCG/ACT                                    MCG/ACT           

 

     Furosemide Furosemide           No             1{table BID  Furosemide     

      



     40 MG 40 MG                          t}        40 MG           

 

     Tresiba Tresiba           No                       Tresiba           



     FlexTouch FlexTouch                                    FlexTouch           



     200 UNIT/ UNIT/ML                                    200            



                                                  UNIT/ML           

 

     Folic Acid Folic Acid           No             1{table QD   Folic Acid     

      



     1 MG 1 MG                          t}        1 MG           

 

     ProAir HFA ProAir HFA           No             1{puff_ 6xD  ProAir HFA     

      



     108 (90 108 (90                          as_need      108 (90           



     Base) Base)                          ed}       Base)           



     MCG/ACT MCG/ACT                                    MCG/ACT           

 

     Constulose Constulose           No             30{ml} BID  Constulose      

     



     10 GM/15ML 10 GM/15ML                                    10 GM/15ML        

   

 

     HYDROcodone HYDROcodone           No             1{table QID  HYDROcodon   

        



     -Acetaminop -Acetaminop                          t_as_ne      e-Acetamin   

        



     hen 5-325 hen 5-325                          eded}      ophen           



     MG   MG                                      5-325 MG           

 

     Spironolact Spironolact           No             1{table BID  Spironolac   

        



     one 100 MG one 100 MG                          t}        tone 100          

 



                                                  MG             

 

     Ipratropium Ipratropium           No             2.5{ml} TID  Ipratropiu   

        



     Bromide Bromide                                    m Bromide           



     0.02 % 0.02 %                                    0.02 %           

 

     [place]           No             1{table QD   [place]           



     Thyroid 60 Thyroid 60                          t_on_an      Thyroid 60     

      



     MG   MG                            _empty_      MG             



                                        stomach                     



                                        }                        

 

     Symbicort Symbicort           No             2{puffs QD   Symbicort        

   



     160-4.5 160-4.5                          }         160-4.5           



     MCG/ACT MCG/ACT                                    MCG/ACT           

 

     Furosemide Furosemide           No             1{table BID  Furosemide     

      



     40 MG 40 MG                          t}        40 MG           

 

     Furosemide Furosemide           No             1{table BID  Furosemide     

      



     40 MG 40 MG                          t}        40 MG           

 

     Folic Acid Folic Acid           No             1{table QD   Folic Acid     

      



     1 MG 1 MG                          t}        1 MG           

 

     Tresiba Tresiba           No                       Tresiba           



     FlexTouch FlexTouch                                    FlexTouch           



     200 UNIT/ UNIT/ML                                    200            



                                                  UNIT/ML           

 

     Spiriva Spiriva           No                       Spiriva           



     HandiHaler HandiHaler                                    HandiHaler        

   

 

     Ipratropium Ipratropium           No             2.5{ml} TID  Ipratropiu   

        



     Bromide Bromide                                    m Bromide           



     0.02 % 0.02 %                                    0.02 %           

 

     Spironolact Spironolact           No             1{table BID  Spironolac   

        



     one 100 MG one 100 MG                          t}        tone 100          

 



                                                  MG             

 

     Tresiba Tresiba           No                  QD   Tresiba           



     FlexTouch FlexTouch                                    FlexTouch           



     200 UNIT/ UNIT/ML                                    200            



                                                  UNIT/ML           

 

     Constulose Constulose           No             30{ml} BID  Constulose      

     



     10 GM/15ML 10 GM/15ML                                    10 GM/15ML        

   

 

     predniSONE predniSONE           No             1{table QD   predniSONE     

      



     10 MG 10 MG                          t}        10 MG           

 

     ProAir HFA ProAir HFA           No             1{puff_ 6xD  ProAir HFA     

      



     108 (90 108 (90                          as_need      108 (90           



     Base) Base)                          ed}       Base)           



     MCG/ACT MCG/ACT                                    MCG/ACT           

 

     [place]           No             1{table QD   [place]           



     Thyroid 60 Thyroid 60                          t_on_an      Thyroid 60     

      



     MG   MG                            _empty_      MG             



                                        stomach                     



                                        }                        

 

     HYDROcodone HYDROcodone           No             1{table QID  HYDROcodon   

        



     -Acetaminop -Acetaminop                          t_as_ne      e-Acetamin   

        



     hen 5-325 hen 5-325                          eded}      ophen           



     MG   MG                                      5-325 MG           

 

     Constulose Constulose           No             30{ml} BID  Constulose      

     



     10 GM/15ML 10 GM/15ML                                    10 GM/15ML        

   

 

     Tresiba Tresiba           No                       Tresiba           



     FlexTouch FlexTouch                                    FlexTouch           



     200 UNIT/ UNIT/ML                                    200            



                                                  UNIT/ML           

 

     Folic Acid Folic Acid           No             1{table QD   Folic Acid     

      



     1 MG 1 MG                          t}        1 MG           

 

     ProAir HFA ProAir HFA           No             1{puff_ 6xD  ProAir HFA     

      



     108 (90 108 (90                          as_need      108 (90           



     Base) Base)                          ed}       Base)           



     MCG/ACT MCG/ACT                                    MCG/ACT           

 

     HYDROcodone HYDROcodone           No             1{table QID  HYDROcodon   

        



     -Acetaminop -Acetaminop                          t_as_ne      e-Acetamin   

        



     hen 5-325 hen 5-325                          eded}      ophen           



     MG   MG                                      5-325 MG           

 

     predniSONE predniSONE           No             1{table QD   predniSONE     

      



     10 MG 10 MG                          t}        10 MG           

 

     Spiriva Spiriva           No                       Spiriva           



     HandiHaler HandiHaler                                    HandiHaler        

   

 

     [place]           No             1{table QD   [place]           



     Thyroid 60 Thyroid 60                          t_on_an      Thyroid 60     

      



     MG   MG                            _empty_      MG             



                                        stomach                     



                                        }                        

 

     Furosemide Furosemide           No             1{table BID  Furosemide     

      



     40 MG 40 MG                          t}        40 MG           

 

     Tresiba Tresiba           No                  QD   Tresiba           



     FlexTouch FlexTouch                                    FlexTouch           



     200 UNIT/ UNIT/ML                                    200            



                                                  UNIT/ML           

 

     Spironolact Spironolact           No             1{table BID  Spironolac   

        



     one 100 MG one 100 MG                          t}        tone 100          

 



                                                  MG             

 

     Ipratropium Ipratropium           No             2.5{ml} TID  Ipratropiu   

        



     Bromide Bromide                                    m Bromide           



     0.02 % 0.02 %                                    0.02 %           

 

     Constulose Constulose           No             30{ml} BID  Constulose      

     



     10 GM/15ML 10 GM/15ML                                    10 GM/15ML        

   

 

     Tresiba Tresiba           No                       Tresiba           



     FlexTouch FlexTouch                                    FlexTouch           



     200 UNIT/ UNIT/ML                                    200            



                                                  UNIT/ML           

 

     Folic Acid Folic Acid           No             1{table QD   Folic Acid     

      



     1 MG 1 MG                          t}        1 MG           

 

     ProAir HFA ProAir HFA           No             1{puff_ 6xD  ProAir HFA     

      



     108 (90 108 (90                          as_need      108 (90           



     Base) Base)                          ed}       Base)           



     MCG/ACT MCG/ACT                                    MCG/ACT           

 

     HYDROcodone HYDROcodone           No             1{table QID  HYDROcodon   

        



     -Acetaminop -Acetaminop                          t_as_ne      e-Acetamin   

        



     hen 5-325 hen 5-325                          eded}      ophen           



     MG   MG                                      5-325 MG           

 

     predniSONE predniSONE           No             1{table QD   predniSONE     

      



     10 MG 10 MG                          t}        10 MG           

 

     Spiriva Spiriva           No                       Spiriva           



     HandiHaler HandiHaler                                    HandiHaler        

   

 

     [facility]           No             1{table QD   [place]           



     Thyroid 60 Thyroid 60                          t_on_an      Thyroid 60     

      



     MG   MG                            _empty_      MG             



                                        stomach                     



                                        }                        

 

     Furosemide Furosemide           No             1{table BID  Furosemide     

      



     40 MG 40 MG                          t}        40 MG           

 

     Tresiba Tresiba           No                  QD   Tresiba           



     FlexTouch FlexTouch                                    FlexTouch           



     200 UNIT/ UNIT/ML                                    200            



                                                  UNIT/ML           

 

     Spironolact Spironolact           No             1{table BID  Spironolac   

        



     one 100 MG one 100 MG                          t}        tone 100          

 



                                                  MG             

 

     Ipratropium Ipratropium           No             2.5{ml} TID  Ipratropiu   

        



     Bromide Bromide                                    m Bromide           



     0.02 % 0.02 %                                    0.02 %           

 

     Constulose Constulose           No             30{ml} BID  Constulose      

     



     10 GM/15ML 10 GM/15ML                                    10 GM/15ML        

   

 

     Tresiba Tresiba           No                       Tresiba           



     FlexTouch FlexTouch                                    FlexTouch           



     200 UNIT/ UNIT/ML                                    200            



                                                  UNIT/ML           

 

     Folic Acid Folic Acid           No             1{table QD   Folic Acid     

      



     1 MG 1 MG                          t}        1 MG           

 

     ProAir HFA ProAir HFA           No             1{puff_ 6xD  ProAir HFA     

      



     108 (90 108 (90                          as_need      108 (90           



     Base) Base)                          ed}       Base)           



     MCG/ACT MCG/ACT                                    MCG/ACT           

 

     HYDROcodone HYDROcodone           No             1{table QID  HYDROcodon   

        



     -Acetaminop -Acetaminop                          t_as_ne      e-Acetamin   

        



     hen 5-325 hen 5-325                          eded}      ophen           



     MG   MG                                      5-325 MG           

 

     predniSONE predniSONE           No             1{table QD   predniSONE     

      



     10 MG 10 MG                          t}        10 MG           

 

     Spiriva Spiriva           No                       Spiriva           



     HandiHaler HandiHaler                                    HandiHaler        

   

 

     [facility]           No             1{table QD   [place]           



     Thyroid 60 Thyroid 60                          t_on_an      Thyroid 60     

      



     MG   MG                            _empty_      MG             



                                        stomach                     



                                        }                        

 

     Furosemide Furosemide           No             1{table BID  Furosemide     

      



     40 MG 40 MG                          t}        40 MG           

 

     Tresiba Tresiba           No                  QD   Tresiba           



     FlexTouch FlexTouch                                    FlexTouch           



     200 UNIT/ UNIT/ML                                    200            



                                                  UNIT/ML           

 

     Spironolact Spironolact           No             1{table BID  Spironolac   

        



     one 100 MG one 100 MG                          t}        tone 100          

 



                                                  MG             

 

     Ipratropium Ipratropium           No             2.5{ml} TID  Ipratropiu   

        



     Bromide Bromide                                    m Bromide           



     0.02 % 0.02 %                                    0.02 %           

 

     Constulose Constulose           No             30{ml} BID  Constulose      

     



     10 GM/15ML 10 GM/15ML                                    10 GM/15ML        

   

 

     Tresiba Tresiba           No                       Tresiba           



     FlexTouch FlexTouch                                    FlexTouch           



     200 UNIT/ UNIT/ML                                    200            



                                                  UNIT/ML           

 

     Folic Acid Folic Acid           No             1{table QD   Folic Acid     

      



     1 MG 1 MG                          t}        1 MG           

 

     Constulose Constulose      - No             30{ml} BID  Constulose     

      



     10 GM/15ML 10 GM/15ML      -21                          10 GM/15ML       

    



                    00:00                                         



                    :00                                          

 

     Constulose Constulose      2- No             30{ml} BID  Constulose     

      



     10 GM/15ML 10 GM/15ML      -                          10 GM/15ML       

    



                    00:00                                         



                    :00                                          

 

     Constulose Constulose      2- No             30{ml} BID  Constulose     

      



     10 GM/15ML 10 GM/15ML      -21                          10 GM/15ML       

    



                    00:00                                         



                    :00                                          

 

     Constulose Constulose      - No             30{ml} BID  Constulose     

      



     10 GM/15ML 10 GM/15ML      -21                          10 GM/15ML       

    



                    00:00                                         



                    :00                                          

 

     Constulose Constulose      2- No             30{ml} BID  Constulose     

      



     10 GM/15ML 10 GM/15ML      -21                          10 GM/15ML       

    



                    00:00                                         



                    :00                                          

 

     Constulose Constulose      2- No             30{ml} BID  Constulose     

      



     10 GM/15ML 10 GM/15ML      21                          10 GM/15ML       

    



                    00:00                                         



                    :00                                          







Vital Signs







             Vital Name   Observation Time Observation Value Comments     Source

 

             height       2022-10-26 14:10:00 76 [in_i]                 [facility]

 

             weight       2022-10-26 14:10:00 231.9 [lb_av]              [facility] 

[facility]

 

             temperature  2022-10-26 14:10:00 97.6 [degF]               [facility]

 

             bmi          2022-10-26 14:10:00 28.22 kg/m2               [facility]

 

             oximetry     2022-10-26 14:10:00 94 %                      [facility]

 

             respiratory rate 2022-10-26 14:10:00 16 /min                   [facility]

on [facility]

 

             blood pressure 2022-10-26 14:10:00 143 mm[Hg]                [facility]

 [facility] [place] -



             systolic                                            [facility]

 

             blood pressure 2022-10-26 14:10:00 73 mm[Hg]                 [facility]

 [facility] [place] -



             diastolic                                           [facility]

 

             height       2022 09:40:00 76 [in_i]                 [facility]

 

             weight       2022 09:40:00 222.8 [lb_av]              [facility] 

[facility]

 

             temperature  2022 09:40:00 98.2 [degF]               [facility]

 

             bmi          2022 09:40:00 27.12 kg/m2               [facility]

 

             oximetry     2022 09:40:00 96 %                      [facility]

 

             respiratory rate 2022 09:40:00 17 /min                   Comm

on [facility]

 

             blood pressure 2022 09:40:00 140 mm[Hg]                [facility]

 [facility] [place] -



             systolic                                            [facility]

 

             blood pressure 2022 09:40:00 78 mm[Hg]                 [facility]

 [facility] [place] -



             diastolic                                           [facility]

 

             height       2022 14:40:00 76 [in_i]                 [facility]

 

             weight       2022 14:40:00 208 [lb_av]               [facility]

 

             temperature  2022 14:40:00 98.6 [degF]               [facility]

[facility]

 

             bmi          2022 14:40:00 25.32 kg/m2               [facility]

[facility]

 

             oximetry     2022 14:40:00 96 %                      [facility]

 

             respiratory rate 2022 14:40:00 16 /min                   Comm

on [facility]

 

             blood pressure 2022 14:40:00 134 mm[Hg]                [facility]

 [facility] [place] -



             systolic                                            [facility]

 

             blood pressure 2022 14:40:00 70 mm[Hg]                 [facility]

 [facility]



             diastolic                                           [facility]

 

             height       2022 10:30:00 76 [in_i]                 [facility]

[facility]

 

             weight       2022 10:30:00 201.1 [lb_av]              [facility] 

[facility] -



                                                                 [facility]

 

             temperature  2022 10:30:00 98.0 [degF]               [facility]

[facility]

 

             bmi          2022 10:30:00 24.48 kg/m2               [facility]

[facility]

 

             oximetry     2022 10:30:00 90 %                      [facility]

 

             respiratory rate 2022 10:30:00 16 /min                   Comm

on [facility]

 

             blood pressure 2022 10:30:00 129 mm[Hg]                [facility]

 [facility] [place] -



             systolic                                            [facility]

 

             blood pressure 2022 10:30:00 69 mm[Hg]                 [facility]

 [facility] [place] -



             diastolic                                           [facility]

 

             height       2022 14:20:00 76 [in_i]                 [facility]

 

             weight       2022 14:20:00 225 [lb_av]               [facility]

 

             temperature  2022 14:20:00 97 [degF]                 [facility]

 

             bmi          2022 14:20:00 27.38 kg/m2               [facility]

 

             blood pressure 2022 14:20:00 129 mm[Hg]                [facility]

 [facility]



             systolic                                            [facility]

 

             blood pressure 2022 14:20:00 78 mm[Hg]                 [facility]

 [facility]



             diastolic                                           [facility]

 

             height       2022 14:10:00 76 [in_i]                 [facility]

 

             weight       2022 14:10:00 230.2 [lb_av]              [facility]

 

             temperature  2022 14:10:00 98.0 [degF]               [facility]

[facility]

 

             bmi          2022 14:10:00 28.02 kg/m2               [facility]

 

             oximetry     2022 14:10:00 97 %                      [facility]

[facility] -



                                                                 [facility]

 

             respiratory rate 2022 14:10:00 18 /min                   [facility]

on [facility] -



                                                                 [facility]

 

             blood pressure 2022 14:10:00 132 mm[Hg]                [facility]

 [facility]



             systolic                                            [facility]

 

             blood pressure 2022 14:10:00 79 mm[Hg]                 [facility]

 [facility] -



             diastolic                                           [facility]

 

             HEIGHT       2022 08:26:00 188 cm                    

 

             WEIGHT       2022 08:26:00 96.843 kg                 

 

             HEIGHT       2022 08:26:00 188 cm                    

 

             WEIGHT       2022 08:26:00 96.843 kg                 

 

             HEIGHT       2022 08:26:00 188 cm                    

 

             WEIGHT       2022 08:26:00 96.843 kg                 

 

             HEIGHT       2022 08:10:00 188 cm                    

 

             WEIGHT       2022 08:10:00 96.752 kg                 

 

             HEIGHT       2022 13:44:00 188 cm                    

 

             WEIGHT       2022 13:44:00 92.987 kg                 

 

             HEIGHT       2022 08:10:00 188 cm                    

 

             WEIGHT       2022 08:10:00 96.752 kg                 

 

             HEIGHT       2022 13:44:00 188 cm                    

 

             WEIGHT       2022 13:44:00 92.987 kg                 

 

             HEIGHT       2022 08:10:00 188 cm                    

 

             WEIGHT       2022 08:10:00 96.752 kg                 

 

             HEIGHT       2022 13:44:00 188 cm                    

 

             WEIGHT       2022 13:44:00 92.987 kg                 

 

             Systolic blood 2022 18:17:00 146 mm[Hg]                [facility]



             pressure                                            Medicine

 

             Diastolic blood 2022 18:17:00 75 mm[Hg]                 [facility]                                            Medicine

 

             Heart rate   2022 18:17:00 95 /min                   [facility]

 

             Body temperature 2022 18:17:00 37.06 Renée                 [facility]

 

             Body height  2022 18:17:00 188 cm                    [facility]

 

             Body weight  2022 18:17:00 101.969 kg                [facility]

 

             BMI          2022 18:17:00 28.86 kg/m2               [facility]

 

             height       2022 15:50:00 76 [in_i]                 [facility]

 

             weight       2022 15:50:00 227.8 [lb_av]              [facility]

 

             temperature  2022 15:50:00 98.6 [degF]               [facility]

 

             bmi          2022 15:50:00 27.73 kg/m2               [facility]

 

             oximetry     2022 15:50:00 95 %                      [facility]

 

             respiratory rate 2022 15:50:00 16 /min                   Comm

on [facility]

 

             blood pressure 2022 15:50:00 134 mm[Hg]                [facility]

 [facility] -



             systolic                                            [facility]

 

             blood pressure 2022 15:50:00 78 mm[Hg]                 [facility]

 [facility] -



             diastolic                                           [facility]

 

             height       2022 15:30:00 76 [in_i]                 [facility]

 

             weight       2022 15:30:00 227.8 [lb_av]              [facility]

 

             temperature  2022 15:30:00 99.7 [degF]               [facility]

[facility]

 

             bmi          2022 15:30:00 27.73 kg/m2               [facility]

 

             oximetry     2022 15:30:00 95 %                      [facility]

[facility]

 

             blood pressure 2022 15:30:00 134 mm[Hg]                [facility]

 [facility] -



             systolic                                            [facility]

 

             blood pressure 2022 15:30:00 78 mm[Hg]                 [facility]

 [facility] -



             diastolic                                           [facility]

 

             height       2021-10-06 08:50:00 76 [in_i]                 [facility]

 

             weight       2021-10-06 08:50:00 224.1 [lb_av]              [facility]

 

             temperature  2021-10-06 08:50:00 97.3 [degF]               [facility]

 

             bmi          2021-10-06 08:50:00 27.28 kg/m2               [facility]

 

             oximetry     2021-10-06 08:50:00 96 %                      [facility]

 

             respiratory rate 2021-10-06 08:50:00 18 /min                   Comm

on [facility]

 

             blood pressure 2021-10-06 08:50:00 130 mm[Hg]                [facility]



             systolic                                            [facility]

 

             blood pressure 2021-10-06 08:50:00 72 mm[Hg]                 [facility]



             diastolic                                           [facility]

 

             Systolic blood 2019-10-17 15:21:00 115 mm[Hg]                [facility]                                            Medicine

 

             Diastolic blood 2019-10-17 15:21:00 67 mm[Hg]                 [facility]                                            Medicine

 

             Heart rate   2019-10-17 15:21:00 96 /min                   [facility]

 

             Body temperature 2019-10-17 15:21:00 36.28 Renée                 [facility]

 

             Body height  2019-10-17 15:21:00 188 cm                    [facility]

 

             Body weight  2019-10-17 15:21:00 102.059 kg                [facility]

 

             BMI          2019-10-17 15:21:00 28.89 kg/m2               [facility]

 

             Systolic blood 2019-10-17 15:21:00 115 mm[Hg]                [facility]                                            Medicine

 

             Diastolic blood 2019-10-17 15:21:00 67 mm[Hg]                 [facility]                                            Medicine

 

             Heart rate   2019-10-17 15:21:00 96 /min                   [facility]

 

             Body temperature 2019-10-17 15:21:00 36.28 Renée                 [facility]

 

             Body height  2019-10-17 15:21:00 188 cm                    [facility]

 

             Body weight  2019-10-17 15:21:00 102.059 kg                [facility]

 

             BMI          2019-10-17 15:21:00 28.89 kg/m2               [facility]

 

             Systolic blood 2022 11:09:00 136 mm[Hg]                [facility]

 

             Diastolic blood 2022 11:09:00 69 mm[Hg]                 [facility]

 

             Heart rate   2022 11:09:00 98 /min                   [facility]

 

             Body temperature 2022 11:09:00 36.67 Renée                 [facility]

 

             Respiratory rate 2022 11:09:00 18 /min                   [facility]

 

             Oxygen saturation in 2022 11:09:00 96 /min                   

[facility]



             Arterial blood by                                        Medical Ce

nter



             Pulse oximetry                                        

 

             Body height  2022 08:26:00 188 cm                    [facility]

 

             Body weight  2022 08:26:00 96.843 kg                 [facility]

 

             BMI          2022 08:26:00 27.41 kg/m2               [facility]







Procedures







                Procedure       Date / Time     Performing      Source



                                Performed       Clinician       

 

                B-TYPE NATRIURETIC FACTOR (BNP) 2022                      

[facility]



                                13:52:00                        [facility]

 

                POCT-GLUCOSE METER 2022      Roselyn Elayne  [facility]



                                10:43:00        [facility]

 

                REPORT OF PROCEDURE - ENDOSCOPY 2022      Roselyn Elayne  

[facility]



                URL             10:33:46        [facility]

 

                TISSUE EXAM     2022      Roselyn Elayne  [facility]



                                10:01:00        [facility]

 

                COLONOSCOPY     2022      Roselyn Elayne  [facility]



                                09:22:00        [facility]

 

                COLONOSCOPY, WITH POLYPECTOMY 2022      Elayne Longo  

[facility]



                                09:22:00        [facility]

 

                POCT-GLUCOSE METER 2022      Roselyn Elayne  [facility]



                                08:51:00        [facility]

 

                SARS-COV2/RT-PCR ([facility]) 2022      Khris Longo

vitaly  [facility]



                                06:52:31        [facility]

 

                REPORT OF PROCEDURE - ENDOSCOPY 2022      Katrina Mullins [facility]



                URL             09:44:13                        [facility]

 

                REPORT OF PROCEDURE - ENDOSCOPY 2022      Katrina Mullins [facility]



                URL             09:43:23                        [facility]

 

                COLONOSCOPY     2022      Katrina Mullins [facility]



                                08:47:00                        [facility]

 

                ESOPHAGOGASTRODUODENOSCOPY 2022      [facility]



                                08:47:00                        [facility]

 

                POCT-GLUCOSE METER 2022      Katrina Mullins [facility]



                                08:11:00                        [facility]

 

                COMPREHENSIVE METABOLIC PANEL 2022      [facility]



                                20:06:00                        of Medicine

 

                CEA             2022      [facility]



                                20:06:00                        of Medicine

 

                CBC W/AUTO DIFF WITH PLATELETS 2022      [facility]



                                20:06:00                        of Medicine

 

                AFP TUMOR MARKER 2022      [facility]

e



                                20:06:00                        of Medicine

 

                CANCER ANTIGEN 19-9 2022      [facility]

lege



                                20:06:00                        of Medicine







Plan of Care







             Planned Activity Planned Date Details      Comments     Source

 

             Future Scheduled 2032   Screening for malignant              

[facility]



             Test         00:00:00     neoplasm of colon              Medical Ce

nter



                                       (procedure) [code =              



                                       387579112]                

 

             Future Scheduled 2032   Screening for malignant              

[facility]



             Test         00:00:00     neoplasm of colon              Medical Ce

nter



                                       (procedure) [code =              



                                       091673977]                

 

             Future Scheduled 2032   Screening for malignant              

[facility]



             Test         00:00:00     neoplasm of colon              Medical Ce

nter



                                       (procedure) [code =              



                                       468158867]                

 

             Future Scheduled 2032   Screening for malignant              

[facility]



             Test         00:00:00     neoplasm of colon              Medical Ce

nter



                                       (procedure) [code =              



                                       645456071]                

 

             Future Scheduled 2032   Screening for malignant              

[facility]



             Test         00:00:00     neoplasm of colon              Medical Ce

nter



                                       (procedure) [code =              



                                       899983813]                

 

             Future Scheduled 2032   Screening for malignant              

[facility]



             Test         00:00:00     neoplasm of colon              Medical Ce

nter



                                       (procedure) [code =              



                                       672578898]                

 

             Future Scheduled 2032   Screening for malignant              

[facility]



             Test         00:00:00     neoplasm of colon              Medical Ce

nter



                                       (procedure) [code =              



                                       137672243]                

 

             Future Scheduled 2032   Screening for malignant              

[facility]



             Test         00:00:00     neoplasm of colon              Medical Ce

nter



                                       (procedure) [code =              



                                       581582080]                

 

             Future Scheduled 2023   Tobacco Cessation              [facility]



             Test         00:00:00     Counseling and              Medical Cente

r



                                       Screening (12+) [code =              



                                       Tobacco Cessation              



                                       Counseling and              



                                       Screening (12+)]              

 

             Future Scheduled 2023   Tobacco Cessation              [facility]



             Test         00:00:00     Counseling and              Medical Cente

r



                                       Screening (12+) [code =              



                                       Tobacco Cessation              



                                       Counseling and              



                                       Screening (12+)]              

 

             Future Scheduled 2022   INFLUENZA VACCINE (#1)              C

[facility]



             Test         00:00:00     [code = INFLUENZA              Medical Ce

nter



                                       VACCINE (#1)]              

 

             Future Scheduled 2022   INFLUENZA VACCINE (#1)              C

[facility]



             Test         00:00:00     [code = INFLUENZA              Medical Ce

nter



                                       VACCINE (#1)]              

 

             Future Scheduled 2022   INFLUENZA VACCINE (#1)              C

[facility]



             Test         00:00:00     [code = INFLUENZA              Medical Ce

nter



                                       VACCINE (#1)]              

 

             Future Scheduled 2022   INFLUENZA VACCINE (#1)              C

[facility]



             Test         00:00:00     [code = INFLUENZA              Medical Ce

nter



                                       VACCINE (#1)]              

 

             Future Scheduled 2022   Screening for malignant              

[facility]



             Test         11:27:55     neoplasm of colon              of Medicin

e



                                       (procedure) [code =              



                                       941097765]                

 

             Future Scheduled 2022   TETANUS SHOT (ADULT)              [facility]



             Test         11:27:55     [code = TETANUS SHOT              of Medi

cine



                                       (ADULT)]                  

 

             Future Scheduled 2022   BMI FOLLOW UP PLAN              [facility]



             Test         11:27:55     [code = BMI FOLLOW UP              of Med

icine



                                       PLAN]                     

 

             Future Scheduled 2022   Hepatitis C screening              [facility]



             Test         11:27:55     (procedure) [code =              of Medic

ine



                                       [id number]]                

 

             Future Scheduled 2022   Human immunodeficiency              B

[facility]



             Test         11:27:55     virus screening              of Medicine



                                       (procedure) [code =              



                                       736204234]                

 

             Future Scheduled 2022   Screening for malignant              

[facility]



             Test         11:27:55     neoplasm of lung              of Medicine



                                       (procedure) [code =              



                                       490508963]                

 

             Future Scheduled 2022   ZOSTER VACCINE (1 of 2)              

[facility]



             Test         11:27:55     [code = ZOSTER VACCINE              of Me

dicine



                                       (1 of 2)]                 

 

             Future Scheduled 2022   COVID-19 Vaccine (2 -              Ba

[facility]



             Test         11:27:55     Booster for Nav[employer]              of Medic

ine



                                       series) [code =              



                                       COVID-19 Vaccine (2 -              



                                       Booster for Navdeep              



                                       series)]                  

 

             Future Scheduled 2022   FLU VACCINE > 6 MONTHS              B

[facility]



             Test         11:27:55     [code = FLU VACCINE > 6              of M

edicine



                                       MONTHS]                   

 

             Future Scheduled 2022   IR PORT PLACEMENT EQUAL 1 Occurrences

 [facility]



             Test         14:40:38     OR > 5 YEARS [code = starting     of Medi

cine



                                       14094]       2022 until 



                                                    2023   

 

             Future Scheduled 2022   CT CHEST ABDOMEN PELVIS 1 Occurrences

 [facility]



             Test         14:19:01     W CONTRAST [code = starting     of Medici

ne



                                       80702-8]     2022 until 



                                                    2023   

 

             Future Scheduled 2022   WILD 1 [code = NOCPT] Ordered:     [facility]



             Test         14:18:00                  2022   of Medicine

 

             Future Scheduled 2022   TEMPUS XF LIQUID BIOPSY Ordered:     

[facility]



             Test         14:17:42     NGS [code = 92937317] 2022   of Med

icine

 

             Future Scheduled 2022   TEMPUS XT TISSUE NGS Ordered:     [facility]



             Test         14:17:42     [code = 82907359] 2022   of Medicin

e

 

             Future Scheduled 2022   DEPRESSION SCREENING              [facility]



             Test         00:00:00     (12+) [code =              Medical Center



                                       DEPRESSION SCREENING              



                                       (12+)]                    

 

             Future Scheduled 2022   DEPRESSION SCREENING              [facility]



             Test         00:00:00     (12+) [code =              Medical Center



                                       DEPRESSION SCREENING              



                                       (12+)]                    

 

             Future Scheduled 2022   DEPRESSION SCREENING              [facility]



             Test         00:00:00     (12+) [code =              Medical Center



                                       DEPRESSION SCREENING              



                                       (12+)]                    

 

             Future Scheduled 2022   DEPRESSION SCREENING              [facility]



             Test         00:00:00     (12+) [code =              Medical Center



                                       DEPRESSION SCREENING              



                                       (12+)]                    

 

             Future Scheduled 2020   MEDICARE ANNUAL              [facility]



             Test         00:00:00     WELLNESS (YEAR 2 or              Medical 

Center



                                       FIRST YEAR if no IPPE)              



                                       [code = MEDICARE ANNUAL              



                                       WELLNESS (YEAR 2 or              



                                       FIRST YEAR if no IPPE)]              

 

             Future Scheduled 2020   MEDICARE ANNUAL              [facility]



             Test         00:00:00     WELLNESS (YEAR 2 or              Medical 

Center



                                       FIRST YEAR if no IPPE)              



                                       [code = MEDICARE ANNUAL              



                                       WELLNESS (YEAR 2 or              



                                       FIRST YEAR if no IPPE)]              

 

             Future Scheduled 2020   MEDICARE ANNUAL              [facility]



             Test         00:00:00     WELLNESS (YEAR 2 or              Medical 

Center



                                       FIRST YEAR if no IPPE)              



                                       [code = MEDICARE ANNUAL              



                                       WELLNESS (YEAR 2 or              



                                       FIRST YEAR if no IPPE)]              

 

             Future Scheduled 2020   MEDICARE ANNUAL              [facility]



             Test         00:00:00     WELLNESS (YEAR 2 or              Medical 

Center



                                       FIRST YEAR if no IPPE)              



                                       [code = MEDICARE ANNUAL              



                                       WELLNESS (YEAR 2 or              



                                       FIRST YEAR if no IPPE)]              

 

             Future Scheduled 2009   SHINGLES VACCINES (1 of              

[facility]



             Test         00:00:00     2) [code = SHINGLES              Medical 

Center



                                       VACCINES (1 of 2)]              

 

             Future Scheduled 2009   SHINGLES VACCINES (1 of              

[facility]



             Test         00:00:00     2) [code = SHINGLES              Medical 

Center



                                       VACCINES (1 of 2)]              

 

             Future Scheduled 2009   SHINGLES VACCINES (1 of              

[facility]



             Test         00:00:00     2) [code = SHINGLES              Medical 

Center



                                       VACCINES (1 of 2)]              

 

             Future Scheduled 2009   SHINGLES VACCINES (1 of              

[facility]



             Test         00:00:00     2) [code = SHINGLES              Medical 

Center



                                       VACCINES (1 of 2)]              

 

             Future Scheduled 1994   Lipid panel (procedure)              

[facility]



             Test         00:00:00     [code = 36142403]              Medical Ce

nter

 

             Future Scheduled 1994   Lipid panel (procedure)              

[facility]



             Test         00:00:00     [code = 32348796]              Medical Ce

nter

 

             Future Scheduled 1994   Lipid panel (procedure)              

[facility]



             Test         00:00:00     [code = 40648572]              Medical Ce

nter

 

             Future Scheduled 1994   Lipid panel (procedure)              

[facility]



             Test         00:00:00     [code = 71065506]              Medical Ce

nter

 

             Future Scheduled 1978   DTAP/TDAP/TD VACCINES              [facility]



             Test         00:00:00     (1 - Tdap) [code =              Medical C

enter



                                       DTAP/TDAP/TD VACCINES              



                                       (1 - Tdap)]               

 

             Future Scheduled 1978   DTAP/TDAP/TD VACCINES              [facility]



             Test         00:00:00     (1 - Tdap) [code =              Medical C

enter



                                       DTAP/TDAP/TD VACCINES              



                                       (1 - Tdap)]               

 

             Future Scheduled 1978   DTAP/TDAP/TD VACCINES              [facility]



             Test         00:00:00     (1 - Tdap) [code =              Medical C

enter



                                       DTAP/TDAP/TD VACCINES              



                                       (1 - Tdap)]               

 

             Future Scheduled 1978   DTAP/TDAP/TD VACCINES              [facility]



             Test         00:00:00     (1 - Tdap) [code =              Medical C

enter



                                       DTAP/TDAP/TD VACCINES              



                                       (1 - Tdap)]               

 

             Future Scheduled 1977   HEPATITIS C SCREENING              [facility]

[facility]



             Test         00:00:00     [code = HEPATITIS C              Medical 

Center



                                       SCREENING]                

 

             Future Scheduled 1977   HEPATITIS C SCREENING              [facility]

[facility]



             Test         00:00:00     [code = HEPATITIS C              Medical 

Center



                                       SCREENING]                

 

             Future Scheduled 1977   HEPATITIS C SCREENING              [facility]

[facility]



             Test         00:00:00     [code = HEPATITIS C              Medical 

Center



                                       SCREENING]                

 

             Future Scheduled 1977   HEPATITIS C SCREENING              [facility]

[facility]



             Test         00:00:00     [code = HEPATITIS C              Medical 

Center



                                       SCREENING]                

 

             Future Scheduled 1965   PNEUMOCOCCAL VACCINE              [facility]



             Test         00:00:00     0-64 YRS (1 - PCV)              Medical C

enter



                                       [code = PNEUMOCOCCAL              



                                       VACCINE 0-64 YRS (1 -              



                                       PCV)]                     

 

             Future Scheduled 1965   PNEUMOCOCCAL VACCINE              [facility]



             Test         00:00:00     0-64 YRS (1 - PCV)              Medical C

enter



                                       [code = PNEUMOCOCCAL              



                                       VACCINE 0-64 YRS (1 -              



                                       PCV)]                     

 

             Future Scheduled 1965   PNEUMOCOCCAL VACCINE              [facility]



             Test         00:00:00     0-64 YRS (1 - PCV)              Medical C

enter



                                       [code = PNEUMOCOCCAL              



                                       VACCINE 0-64 YRS (1 -              



                                       PCV)]                     

 

             Future Scheduled 1965   PNEUMOCOCCAL VACCINE              [facility]



             Test         00:00:00     0-64 YRS (1 - PCV)              Medical C

enter



                                       [code = PNEUMOCOCCAL              



                                       VACCINE 0-64 YRS (1 -              



                                       PCV)]                     

 

             Future Scheduled 1959   COVID-19 VACCINE (#1)              [facility]

[facility]



             Test         00:00:00     [code = COVID-19              Medical Tripp

ter



                                       VACCINE (#1)]              

 

             Future Scheduled 1959   COVID-19 VACCINE (#1)              [facility]

[facility]



             Test         00:00:00     [code = COVID-19              Medical Tripp

ter



                                       VACCINE (#1)]              

 

             Future Scheduled 1959   COVID-19 VACCINE (#1)              [facility]

[facility]



             Test         00:00:00     [code = COVID-19              Medical Tripp

ter



                                       VACCINE (#1)]              

 

             Future Scheduled 1959   COVID-19 VACCINE (#1)              [facility]

[facility]



             Test         00:00:00     [code = COVID-19              Medical Tripp

ter



                                       VACCINE (#1)]              

 

             Future Scheduled 1959   CT Colonography (combo)              

[facility]



             Test         00:00:00     [code = CT Colonography              [facility]



                                       (combo)]                  

 

             Future Scheduled 1959   Screening for malignant              

[facility]



             Test         00:00:00     neoplasm of colon              Medical Ce

nter



                                       (procedure) [code =              



                                       738001017]                

 

             Future Scheduled 1959   Screening for malignant              

[facility]



             Test         00:00:00     neoplasm of colon              Medical Ce

nter



                                       (procedure) [code =              



                                       003980497]                

 

             Future Scheduled 1959   Sigmoidoscopy [code =              [facility]



             Test         00:00:00     Sigmoidoscopy]              Medical Cente

r

 

             Future Scheduled 1959   CT Colonography (combo)              

[facility]



             Test         00:00:00     [code = CT Colonography              [facility]



                                       (combo)]                  

 

             Future Scheduled 1959   Screening for malignant              

[facility]



             Test         00:00:00     neoplasm of colon              Medical Ce

nter



                                       (procedure) [code =              



                                       274007172]                

 

             Future Scheduled 1959   Screening for malignant              

[facility]



             Test         00:00:00     neoplasm of colon              Medical Ce

nter



                                       (procedure) [code =              



                                       876341796]                

 

             Future Scheduled 1959   Sigmoidoscopy [code =              [facility]

[facility]



             Test         00:00:00     Sigmoidoscopy]              Medical Cente

r

 

             Future Scheduled 1959   CT Colonography (combo)              

[facility]



             Test         00:00:00     [code = CT Colonography              [facility]



                                       (combo)]                  

 

             Future Scheduled 1959   Screening for malignant              

[facility]



             Test         00:00:00     neoplasm of colon              Medical Ce

nter



                                       (procedure) [code =              



                                       449898764]                

 

             Future Scheduled 1959   Screening for malignant              

[facility]



             Test         00:00:00     neoplasm of colon              Medical Ce

nter



                                       (procedure) [code =              



                                       289398928]                

 

             Future Scheduled 1959   Sigmoidoscopy [code =              [facility]



             Test         00:00:00     Sigmoidoscopy]              Medical Cente

r

 

             Future Scheduled 1959   CT Colonography (combo)              

[facility]



             Test         00:00:00     [code = CT Colonography              [facility]



                                       (combo)]                  

 

             Future Scheduled 1959   Screening for malignant              

[facility]



             Test         00:00:00     neoplasm of colon              Medical Ce

nter



                                       (procedure) [code =              



                                       657289704]                

 

             Future Scheduled 1959   Screening for malignant              

[facility]



             Test         00:00:00     neoplasm of colon              Medical Ce

nter



                                       (procedure) [code =              



                                       875494839]                

 

             Future Scheduled 1959   Sigmoidoscopy [code =              [facility]

[facility]



             Test         00:00:00     Sigmoidoscopy]              Medical Cente

r

 

             Future Scheduled              COLON CANCER SCREENING:              

[facility] [employer]



             Test                      COLONOSCOPY [code =              of Medic

ine



                                       COLON CANCER SCREENING:              



                                       COLONOSCOPY]              

 

             Future Scheduled              MEDICARE AWV [code =              [place]

[employer]



             Test                      MEDICARE AWV]              of Medicine

 

             Future Scheduled              TETANUS SHOT (ADULT)              [place]

[employer]



             Test                      [code = TETANUS SHOT              of Medi

cine



                                       (ADULT)]                  

 

             Future Scheduled              BMI FOLLOW UP PLAN              [employer]

[facility] [employer]



             Test                      [code = BMI FOLLOW UP              of Med

icine



                                       PLAN]                     

 

             Future Scheduled              HEPATITIS C SCREENING              [employer]

[employer]



             Test                      [code = HEPATITIS C              of Medic

ine



                                       SCREENING]                

 

             Future Scheduled              HIV SCREENING [code =              [facility]

[employer]



             Test                      HIV SCREENING]              of Medicine

 

             Future Scheduled              FLU VACCINE > 6 MONTHS              [facility]

[employer]



             Test                      [code = FLU VACCINE > 6              of M

edicine



                                       MONTHS]                   







Encounters







        Start   End     Encounter Admission Attending Care    Care    Encounter 

Source



        Date/Time Date/Time Type    Type    Clinicians Facility Department ID   

   

 

        2022-10-25         Outpatient         Longo,  [facility]  [facility]  [id number]

 Common



        11:32:01                         Jhon                     [facility]

 

        2022-10-24         Outpatient         Longo,  [facility]LMLC  [facility]  [id number]

 Common



        11:29:00                         Jhon                     [facility]

 

        2022         Outpatient         Longo,  [facility]C  [facility]  [id number]

 Common



        09:27:01                         Jhon                     [facility]

 

        2022         Outpatient         Longo,  [facility]  [facility]  [id number]

 Common



        08:39:00                         Jhon                     [facility]

 

        2022         Outpatient         Longo,  [facility]  [facility]  [id number]

 Common



        13:39:01                         Jhon                     [facility]

 

        2022         [facility]         [facility]  [id number]

 Common



        08:18:01                         Jhon                     [facility]

 

        2022         Outpatient         SYSTEM, MDA     MDA     [id number]

 MD



        14:48:15                         PROVIDER                         Andrew negron

 

        2022         Outpatient         [facility],  [facility]  [facility]  [id number]

 Common



        09:46:02                         Jhon                     [facility]

 

        2022         Outpatient         Longo,  [facility]  [facility]  [id number]

 Common



        14:39:33                         Jhon                     [facility]

 

        2022         Outpatient         Longo,  STLMLC  [facility]  [id number]

 Common



        14:38:53                         Jhon                     [facility]

 

        2022         Outpatient         Longo,  STLMLC  [facility]  [id number]

 Common



        13:10:59                         Jhon                     [facility]

 

        2022         Outpatient         Longo,  STLMLC  [facility]  [id number]

 Common



        12:52:47                         Jhon                  [id number]   [facility]

 

        2022         Outpatient         Longo,  [facility]  [facility]  [id number]

 Common



        11:32:03                         Jhon                  [id number]   [facility]

 

        2022         Outpatient         Longo,  STLMLC  [facility]  [id number]

 Common



        11:25:36                         Jhon                  [id number]   [facility]

 

        2022         Outpatient         Longo,  STLMLC  [facility]  [id number]

 Common



        11:16:16                         Jhon                  [id number]   [facility]

 

        2022         Outpatient         Longo,  STLMLC  [facility]  [id number]

 Common



        11:07:38                         Jhon                  [id number]   [facility]

 

        2022 (TEL)                   STLMLC  ST[facility]  [id number] Co

mmon



        00:00:00 00:00:00                                                 [facility]

 

        2022 (TEL)                   STLMLC  STL[facility]C  [id number] Co

mmon



        00:00:00 00:00:00                                                 [facility]

 

        2022 (TEL)                   STLMLC  ST[facility]C  [id number] Co

mmon



        00:00:00 00:00:00                                                 [facility]

 

        2022-10-26 2022-10-26 OFFICE                  STLMLC  [facility]  [id number] Co

mmon



        00:00:00 00:00:00 VISIT                                           [facility]



                        ESTAB PT                                         - [facility]



                        LEVEL 4                                         [facility]

 

        2022-10-26 2022-10-26 (TEL)                   STLMLC  [facility]  [id number] Co

mmon



        00:00:00 00:00:00                                                 [facility]

 

        2022 (HOSP F/U)                 STLMLC  [facility]  [id number]

 Common



        00:00:00 00:00:00 Hospital                                         Spiri

t



                        Follow [facility]

 

        2022 (TEL)                   STLMLC  STLMLC  [id number] Co

mmon



        00:00:00 00:00:00                                                 [facility]

 

        2022 Lab                     [facility]   [id number]

[address] [facility]



        00:00:00 00:00:00 [facility]

 

        2022 Lab                     [facility]   [id number]

[address] [facility]



        00:00:00 00:00:00 Requisitio                                         [facility]

 

        2022 OFFICE                  STLMLC  STLMLC  [id number] Co

mmon



        00:00:00 00:00:00 VISIT                                           [facility]



                        ESTAB PT                                         - [facility]



                        LEVEL 4                                         [facility]

 

        2022 OFFICE                  STLMLC  STL[facility]C  [id number] Co

mmon



        00:00:00 00:00:00 VISIT EST                                         Spir

it



                        PT LEVEL 3                                         - [facility]



                                                                        [facility]

 

        2022 (TEL)                   STLMLC  STL[facility]C  [id number] Co

mmon



        00:00:00 00:00:00                                                 [facility]

 

        2022-06-15 2022-06-15 (TEL)                   STLMLC  STLMLC  [id number] Co

mmon



        00:00:00 00:00:00                                                 [facility]

 

        2022 (TEL)                   STLMLC  STL[facility]C  [id number] Co

mmon



        00:00:00 00:00:00                                                 [facility]

 

        2022 (TEL)                   STLMLC  STL[facility]C  [id number] Co

mmon



        00:00:00 00:00:00                                                 [facility]

 

        2022 (EST.                   STLMLC  STLMLC  [id number] Co

mmon



        00:00:00 00:00:00 VIDEO) EST                                         Spi

rit



                        VIRTUAL                                         - [facility]



                        VIDEO                                           [facility]

 

        2022 (TEL)                   STLMLC  STLMLC  [id number] Co

mmon



        00:00:00 00:00:00                                                 [facility]

 

        2022 (TEL)                   STLMLC  STLMLC  [id number] Co

mmon



        00:00:00 00:00:00                                                 [facility]

 

        2022 OFFICE                  STLMLC  STLMLC  [id number] Co

mmon



        00:00:00 00:00:00 VISIT                                           [facility]



                        ESTAB PT                                         - [facility]



                        LEVEL 4                                         [facility]

 

        2022 [facility]TEL[facility]                   [facility]  [id number] Co

mmon



        00:00:00 00:00:00                                                 [facility]



                                                                        [facility]



                                                                        [facility]

 

        2022 Outpatient         LORENZO LEDESMA    MED     [id number]

[id number]    LORENZO



        13:34:00 23:59:00                 ROCHELLE                           

 

        2022 Outpatient         ROSELYN  BCMUMTAZ     [facility]     [id number]

[id number] [facility]



        09:01:46 09:03:16                 [facility]                         [place]

e



                                                                        of



                                                                        Medicin



                                                                        e

 

        2022 [facility]   [id number]

[address] [facility]



        05:57:00 11:20:00 Encounter         [facility]

 

        2022 Outpatient EL      ROSELYN,  [facility]    Surgery [id number]

[id number] [facility]



        05:57:00 11:20:00                 [facility]                         

 

        2022 [facility],  [facility]   [id number]

[address] [facility]



        05:57:00 11:20:00 Encounter         [facility]

 

        2022 Anesthesia         Yoshi, [facility]   [id number]

[id number] [facility]



        09:27:00 10:40:00 Event           [facility]

 

        2022 Anesthesia         Yoshi, [facility]   [id number]

[id number] [facility]



        09:27:00 10:40:00 Event           [facility]

 

        2022 Surgery         Longo,  [facility]   [id number]

[address] [facility]



        09:41:00 10:11:00                 [facility]

 

        2022 Surgery         Longo,  [facility]   [id number]

[address] [facility]



        09:41:00 10:11:00                 [facility]

 

        2022 Travel                  [facility]   [id number]

 [facility]



        00:00:00 00:00:00                                                 [facility]

 

        2022 Travel                  [facility]   [id number]

 [facility]



        00:00:00 00:00:00                                                 [facility]

 

        2022 Outpatient                 [facility]     [id number]

4 [facility]



        06:06:00 23:59:00                                                 Colleg

e



                                                                        of



                                                                        Medicin



                                                                        e

 

        2022 [facility]   [id number]

[address] [facility]



        06:06:00 11:15:00 Encounter         [facility]

 

        2022 Outpatient [facility]    Surgery [id number]

[id number] [facility]



        06:06:00 11:15:00                 [facility]                           

 

        2022 [facility]   [id number]

[address] [facility]



        06:06:00 11:15:00 Encounter         [facility]

 

        2022 Outpatient         PATRICIA, [facility]     [id number]

8 [facility]



        10:19:37 10:19:37                 KATRINA                           Colleg

e



                                                                        of



                                                                        Medicin



                                                                        e

 

        2022 Outpatient         PATRICIA, [facility]     [id number]

7 [facility]



        10:17:26 10:17:26                 KATRINA                           Colleg

e



                                                                        of



                                                                        Medicin



                                                                        e

 

        2022 Anesthesia         [facility]   [id number]

[id number] [facility]



        08:52:00 09:46:00 Event           [facility]

 

        2022 Anesthesia         [facility]   [id number]

[id number] [facility]



        08:52:00 09:46:00 Event           [facility]

 

        2022 Surgery         [facility]   [id number]

[address] [facility]



        08:45:00 09:30:00                 [facility]

 

        2022 Surgery         [facility]   [id number]

[address] [facility]



        08:45:00 09:30:00                 [facility]

 

        2022 Travel                  [facility]   [id number]

 [facility]



        00:00:00 00:00:00                                                 [facility]

 

        2022 Travel                  [facility]   [id number]

 [facility]



        00:00:00 00:00:00                                                 [facility]

 

        2022 Outpatient [facility]              [facility]    [id number]

[id number]



        14:20:19 23:59:00                                                 

 

        2022 \[facility]\""   [id number]

[address] [facility]



        13:00:00 23:59:00 [facility]

 

        2022 \[facility]\""   [id number]

[address] [facility]



        13:00:00 23:59:00 [facility]

 

        2022 Travel                  [facility]   [id number]

 [facility]



        00:00:00 00:00:00                                                 [facility]

 

        2022 Travel                  [facility] [facility]   [id number]

 [facility]



        00:00:00 00:00:00                                                 [facility]

 

        2022 Office          [facility]   [id number]

7878 [facility]



        11:37:02 15:01:03 Visit           INGRID Tavares  350.1.13.21         Co

llege



                                                        0.2.7.2.686         



                                                        [phone]         [facility]

eulogio



                                                        504             e

 

        2022 (TEL)                   [facility]  [facility]  [id number] Co

mmon



        00:00:00 00:00:00                                                 [facility]

 

        2022 (TEL)                   STLMLC  STLMLC  [id number] Co

mmon



        00:00:00 00:00:00                                                 [facility]

 

        2022 Travel                  1.2.840.1 [id number]

[id number] Univers



        00:00:00 00:00:00                         08906.1.1 350.1.13.41         

ity of



                                                3.412.2.7 2.2.7.3.698         [place]

[place]



                                                .[id number] 084.8           MD



                                                .8                      [facility]

 

        2022 (TEL)                   STLMLC  STLMLC  [id number] Co

mmon



        00:00:00 00:00:00                                                 [facility]



                                                                        [facility]



                                                                        [facility]

 

        2022 (TEL)                   STLMLC  STLMLC  [id number] Co

mmon



        00:00:00 00:00:00                                                 [facility]

 

        2022 Travel                  1.2.840.1 [id number]

[id number] Univers



        00:00:00 00:00:00                         36401.1.1 350.1.13.41         

ity of



                                                3.412.2.7 2.2.7.3.698         [place]

[place]



                                                .[id number] 084.8           MD



                                                .8                      [facility]

 

        2022 (TEL)                   STLMLC  STLMLC  [id number] Co

mmon



        00:00:00 00:00:00                                                 [facility]

 

        2022 OFFICE                  STLMLC  STLMLC  [id number] Co

mmon



        00:00:00 00:00:00 VISIT                                           [facility] [place]



                        ESTAB PT                                         - [facility]



                        LEVEL 4                                         [facility]

 

        2022 SUB ANNUAL                 STLMLC  STLMLC  [id number]

 Common



        00:00:00 00:00:00 MCR                                             [facility]



                        WELLNESS                                         - [facility]



                        VISIT                                           [facility]

 

        2021 (TEL)                   STLMLC  STLMLC  [id number] Co

mmon



        00:00:00 00:00:00                                                 [facility]

 

        2021 (TEL)                   STLMLC  STLMLC  [id number] Co

mmon



        00:00:00 00:00:00                                                 [facility]

 

        2021-10-06 2021-10-06 OFFICE                  STLMLC  STLMLC  [id number] Co

mmon



        00:00:00 00:00:00 VISIT                                           [facility] 4                                         [facility]

 

        2021 Outpatient                 STLMLC  STLMLC  [id number]

 Common



        00:00:00 00:00:00                                                 [facility]

 

        2021 Outpatient                 STLMLC  STLMLC  [id number]

 Common



        00:00:00 00:00:00                                                 [facility]

 

        2021 Outpatient                 STLMLC  STLMLC  [id number]

 Common



        00:00:00 00:00:00                                                 [facility]

 

        2021 Outpatient                 STLMLC  STLMLC  [id number]

 Common



        00:00:00 00:00:00                                                 [facility]

 

        2021 Outpatient                 STLMLC  STLMLC  [id number]

 Common



        00:00:00 00:00:00                                                 [facility]

 

        2021 Outpatient                 STLMLC  STLMLC  [id number]

 Common



        00:00:00 00:00:00                                                 [facility]

 

        2021 Outpatient                 STLMLC  STLMLC  [id number]

 Common



        00:00:00 00:00:00                                                 [facility]

 

        2021-01-15 2021-01-15 Outpatient                 STLMLC  STLMLC  [id number]

 Common



        00:00:00 00:00:00                                                 [facility]

 

        2021 Outpatient                 STLMLC  STLMLC  [id number]

 Common



        00:00:00 00:00:00                                                 [facility]

 

        2020-10-21 2020-10-21 Outpatient                 STLMLC  STLMLC  [id number]

 Common



        00:00:00 00:00:00                                                 [facility]

 

        2020-10-19 2020-10-19 Outpatient                 STLMLC  STLMLC  [id number]

 Common



        00:00:00 00:00:00                                                 [facility]

 

        2020-10-08 2020-10-08 Outpatient                 STLMLC  STLMLC  [id number]

 Common



        00:00:00 00:00:00                                                 [facility]

 

        2020 Outpatient                 [facility] [id number]

[id number] Common



        13:15:00 13:15:00                         [place]   [address]         Spir

it



                                                Drive   [facility]

 

        2020 Outpatient                 [address]

[id number] Common



        13:45:00 13:45:00                         [place]   [address]         Spir

it



                                                Drive   [facility]

 

        2020 Outpatient                 [facility] [address]

[id number] Common



        08:33:00 08:33:00                         [place]   [address]         Spir

it



                                                Drive   [facility]

 

        2020-07-15 2020-07-15 Outpatient                 [facility] [address]

[id number] Common



        15:30:00 15:30:00                         [place]   [address]         Spir

it



                                                Drive   [facility]

 

        2020 Outpatient                 [facility] [address]

[id number] Common



        15:00:00 15:00:00                         [place]   [address]         Spir

it



                                                Drive   [facility]

 

        2020 Outpatient                 [facility] [address]

[id number] Common



        15:00:00 15:00:00                         [place]   [address]         Spir

it



                                                Drive   [facility]

 

        2020 Outpatient                 [facility] [address]

[id number] Common



        10:00:00 10:00:00                         [place]   [address]         Spir

it



                                                Drive   [facility]

 

        2020 Outpatient                 [facility] [address]

[id number] Common



        13:17:00 13:17:00                         [place]   [address]         Spir

it



                                                Drive   [facility]

 

        2020 Outpatient                 [facility] [address]

[id number] Common



        11:00:00 11:00:00                         [address] [place]   [address]         Spir

it



                                                Drive   [facility]

 

        2019 Emergency [facility]    ERT     [id number]

[id number] Univers



        07:09:33 11:41:00                 , JACEK ontiveros [facility]

 

        2019-10-17 2019-10-17 Office          PADMINI Weston     [id number]

598 



        09:26:46 14:06:09 Visit           Alex ANGUIANO AMBULATOR 350.1.13.21        

 



                                                Y       0.2.7.2.686         



                                                        [phone]         



                                                        840             

 

        2019-10-17 2019-10-17 Office          PADMINI Weston     1.2.840.114 [id number]

598 [facility]



        09:26:46 14:06:09 Visit           Alex ANGUIANO AMBULATOR 350.1.13.21        

 College



                                                Y       0.2.7.2.686         of



                                                        [phone]         [facility]



                                                        840             e







Results







           Test Description Test Time  Test Comments Results    Result Comments 

Source









                    B-TYPE NATRIURETIC FACTOR (BNP) 2022 23:54:52 









                      Test Item  Value      Reference Range Interpretation Comme

nts









             B-TYPE NATRIURETIC PEPTIDE (BEAKER) (test code = 700) 48 pg/mL     

0-100                     



 ID - MITCHTissue [id number] 08:01:38





             Test Item    Value        Reference Range Interpretation Comments

 

             Case Report (test code Surgical Pathology                          

 



             = 104)       Report Case: [id number]                           



                           Authorizing Provider:                           



                          Elayne Longo MD Collected:                           



                          2022 10:01 AM                           



                          Ordering Location:                           



                          [facility]                           



                          Received:  2022                           



                          11:57 AM Services                           



                          Pathologist:                           



                          Homer Thomas MD                           



                          Specimens: A) - Large                           



                          Intestine, Colon -                           



                          Transverse, Bx mass B)                           



                          - Polyp, Colon -                           



                          Left/Descending, taken                           



                          by hot snare  C) -                           



                          Polyp, Colon -                           



                          Left/Descending, x3                           



                          taken by hot snare D)                           



                          - Polyp, Colon -                           



                          Sigmoid, x5 taken by                           



                          hot snare                              

 

             DIAGNOSIS (test code = [id number]                      

     



             3220)        FuZzEwMzNcZnRuYmpcdWMx                           



                          IHtccnRmMVxlcGljOTYwMV                           



                          tqhtZhNLZokXJpU8Pepaoi                           



                          ENkdVP9kTL5rkJfwnXDmrM                           



                          DrEVTmTaAji8umc299qZEa                           



                          h9uqUZWUmguqqEf1fJokO4                           



                          3qd5K1PzdaQ61jnCUrXON3                           



                          ODVkSEMoyODeYGDxIAF9KE                           



                          WwrBQrF0ifRJEcEY7lgcxv                           



                          NAmxDLonEYOqaTM6RSPnrX                           



                          LbM4MkYVUoDAmnCGOxoih9                           



                          HgZuTb9agHMkrFmhNQtmUC                           



                          MeCKTyEGlmYJRvThGqPO3e                           



                          EZNYA9ZuHM0FPYEZLT8LSH                           



                          WIFzTUG1TYRlGSDQSGUJ2G                           



                          VS9IU9ObLZRRG9LLZBknyM                           



                          WvMDNeMLFWNpJOA2zGAUWO                           



                          CFRGW0NANnFYEa8SXJkbKA                           



                          GtMOEgFKNKZIPTEWNLL3DI                           



                          X6SPKpWVVQXUOapNNBsHOd                           



                          BccGFyXHBhciBCLiBMQVJH                           



                          HKIOVpKEU1YKXuWcJCYXF5                           



                          VOZyVGJzutL42TO85kMT1Z                           



                          FAGmXRMBX2QZZMzmjHAoJT                           



                          YhSGPOVXZYLKDZPZQJHB0F                           



                          AHSwUNWYRYqAFG0OAJSnRN                           



                          TvbefmISDsLi0kTCHKU3Gp                           



                          CD9WDFBWIB6FBKSQTESRVW                           



                          2KWX5CHVSLMU6BMXRRYQrK                           



                          IHggMywgQklPUFNZOlxwYX                           



                          IgICAtIFRVQlVMQVIgQURF                           



                          Jd6AVJszLpJAZ10AAjWVRT                           



                          mxSAKvFHAwZUYKS6LLHMLm                           



                          E2WPLbAVNHGlFLOMXH1UCA                           



                          xwYXJccGFyIEQuIExBUkdF                           



                          PTrXSHXPYHwRVGmfI6wNZJ                           



                          6TPSXWS7nSAtIFW2oBBEN0                           



                          SFPoZAZLV5YAMZddhPOlCA                           



                          AoJOFLBOCGTC5YRCuYY1YY                           



                          ROWVFF2WEWStIPRLWCiTZR                           



                          5URURccGFyICAgLSBIWVBF                           



                          CmYYXEEPPRHeDV9KXEGeWS                           



                          Qdvk81EKC9FdEhu5F8SFD9                           



                          LMPxVWUll9nlDSGmeLFdTb                           



                          EwMzNcZnRuYmpcdWMxXGRl                           



                          DvBsi9ovz923tDCem2jiQW                           



                          CtDtN5bDIfQGBzgHWmI128                           



                          CAGwHSzwe2qax0PcCTKgbO                           



                          Hyc9Q5QGOZyweujPg9kCkx                           



                          S83wj2A1SldxY4xoPNLaFB                           



                          AsZ5RqEF5xGRTfBah9JYE5                           



                          BKS6RVJcKMNwS7RxUB4lOS                           



                          GkeWMuVEc1n8iciLviGVVh                           



                          NVB7z5dnXDuaxrCvBM5zkh                           



                          1mbKt7e5pcuoMmOXXyWYPz                           



                          yTRGFMTfT7WlxLabDz4ypE                           



                          m3hEvzRswsTHL8Vlv1EO4e                           



                          mf95tli8aYhgJEXzhwleRk                           



                          S6XOnrMIEcukzuSWj3JEqb                           



                          QTVjtCD7YCYesXMpD9ZdTV                           



                          ZuWX8ubhx3ZYU3ZWavUBDg                           



                          SvA8KNZrtDClEQZgkWoaVK                           



                          rzz331JHJ5DuJrVL5vR8Em                           



                          u0C7wO0dbFClHGGynVSyLy                           



                          OuZDTpdx6nzAAgBPvor6Vw                           



                          NOM1qqJ4zJVxzVDuMYPrQi                           



                          X0HCpaDY3hqy25LJZmJPX2                           



                          cl9mvBGqcOmcncWrbWSfQD                           



                          tvP2BxUAReq490GZEbT9Oq                           



                          WVNrq6L9lvIlFaVrCIBtsO                           



                          U8zwW4NBFsXV6jphfxn5as                           



                          PXwwSFppLCRbbaL8soW4QR                           



                          TohQTvT0ObgP1eKCKsMI3x                           



                          frcpc7tsVHC4SSunWPEvKH                           



                          P1DjEoQRTyp1Glkbk6UzGb                           



                          a2GxoJCaXSvpZ64vr507EK                           



                          CaitAnQ6ndoLWrswldrJLg                           



                          awezHRponcM7TDPuOOzgxe                           



                          ioIARdVLejP7ffRzReCGDj                           



                          uHkbODiay5IhVMCaIMFwZj                           



                          YuiYPnBRQnMcf4PHGatVOt                           



                          EWZiNbWsH5oljinjDcSTAS                           



                          Jld9bsU8wvqWVSiXOtW2Gh                           



                          UGhvbmUgTGluZTogNzEzLT                           



                          u8LD98ZRfpLMZosn83                           

 

             COMMENT (test code = d1embLQcAKEmaYS5CdHyBK                        

   



             9027)        Qoc1akm1XtqWKarOCtBNqb                           



                          jBLlynZhyu80iTW3lG96NY                           



                          5zQKTaOsH8FMYouvV9Zbw7                           



                          CZZrAETpsILwE645q2fnq8                           



                          fvveGnsGA0yUiqMTIufvbf                           



                          PxG8LLmaBHYmzghcRAc5DJ                           



                          dgUJEclMX8VPPlfGDeN0Qq                           



                          XOFoNF8fciw7JQN9VKouGS                           



                          MhRyA9PTVkvLOjVBOmkUmt                           



                          YLvyu634XWH9TcBtPWQxfg                           



                          UyrSsnuJ9nZzMcZRISrV5j                           



                          ayBBMSBoYXMgYWRlcXVhdG                           



                          IjrWBdd3NpA8AtaTCpOLBq                           



                          lNzxUx6lQUM9bRLsQIMoby                           



                          UycKdymopxe9J2NPgclx4q                           



                          cGFyfQ==                               

 

             CPT Code(s) (test code q4rqtXLgHKLeuNK8YpZtBN                      

     



             = 7047)      Mvd2hxk6DipOAizQYsDCic                           



                          sKRfucAgpc14tUU3aF22EE                           



                          4lOQClIaG9NSZcyzN3Gpu2                           



                          SQScBYQliVSvH416q7hjz4                           



                          ergqJslRZ0uNidNNJcvsps                           



                          BbQ9XCkoGMFinobhVTj5IS                           



                          bcJXGctHX8AZIthWQuW6Mi                           



                          LDCsAF6rhrj8MLR5KCihYH                           



                          DmHlD8EUOisDSpNEYikNbg                           



                          KXakz937WIW0YnGjHEScpa                           



                          WkxUuwcK2eFmKbISZ2MUCa                           



                          UPS0FMIvNFd3YxIzONP8LJ                           



                          X4YTZ2SSAneGEoyA==                           

 

             GROSS DESCRIPTION (test c6xjaLYpDHHjpPZ9CjVoVK                     

      



             code = [id number]) Zrz1mfi5UyeINnzVUeIXqc                          

 



                          sEEgoaCewf23pFC2sT42EU                           



                          2oIEMqUgU7AIMnzfM6Lpa5                           



                          VYSbWBUazDCfJ638d3ucn7                           



                          mbcsDheUZ3TZQwAMYjQ3Rd                           



                          TE0qURCawGQoM52neQGuQY                           



                          K4BTFkUNMxnGRlRQOqLQL6                           



                          GLZhvFSpK4efMKXbVE5ape                           



                          asFLzmWUecWIFjkVA5BKNs                           



                          mYOvB9JtAQEgPPinRBItyr                           



                          m9MnKuPw8yhMKblRyfBPox                           



                          ZSRgy7blJOBxnZAhEZY6OR                           



                          akfKBhBAEoLHDbNHb1VZGd                           



                          BQvumKZiRU0zhRqiNxnzlR                           



                          src0XxjUVhLNarAZGpHGZy                           



                          GNbrMVXzX1FLVVHlEzN2Il                           



                          T1UwWuCBp6OCm6LK0XJvEc                           



                          TTRvFJFyJUF0ZVAaGPn2EF                           



                          yzSC5MZQY0XcS2TJp3PMB0                           



                          NTMyMSBcXHQgMiBcXGZsIF                           



                          wxCjYNpqgxxNMnWT8tjIew                           



                          bGFpblxmczIwIEEuIExhcm                           



                          tyPOqdoRMnqMyiMSzwX25q                           



                          p03bSFYJeiAas4BiarVsJn                           



                          xwYXJcZnMyMFxjZjEgUmVj                           



                          OOw5OUJqiL1dGm7phOGzvX                           



                          1paRDdLGcpISQ8lLDnQOUl                           



                          APBmUMNuWJ40TGtjLXBcbb                           



                          FtZSwgbWVkaWNhbCByZWNv                           



                          cmQgbnVtYmVyIGFuZCBcdT                           



                          vqOgBiMAx3L5jqgcsvTHwo                           



                          bQShzFvyGU1kl9gdne53lh                           



                          Gms9OmcsJzXWKke1SwhHNu                           



                          MNXuPeDwqaXqM27gn4rreB                           



                          Czf6JqbQVqzCdyjBJgcODx                           



                          LXBpbmssIGlycmVndWxhci                           



                          Zhe9K0FUIxd4D8HCGpexGb                           



                          sWGwoDWwNWEtIQE8ETRpGZ                           



                          N1ICVsKmGolDZbgrXlJ5sf                           



                          ZWdhdGUpLiAgVGhlIHNwZW                           



                          UsjCFvVQyvLZN3Dy5olJXn                           



                          GKWsctB9q7VaLJnyLVGiMc                           



                          svKZDeO3QfW8EizmTfpXBl                           



                          JYEciqKbd1iqHAS6PEHowQ                           



                          HmbEVoJdWwGldiVUU0s3zk                           



                          WALusKGfBIH2SBvjlTWoAH                           



                          EwMDIgXFxkYiBPVlIgIiBa                           



                          PxTzFNA0AxOsDHb3XApnZ0                           



                          ROCEFyJVI6WAM0VwonUkL1                           



                          CLe8ESBPDi4fIQJ2WZlbFM                           



                          S9FLT5GjSwZTcrmJCpEDme                           



                          ZmwgXFxmIEFyaWFsIFxcbm                           



                          W8HIXvUuPiE4SgRTLwNQOs                           



                          eFnpWNSOv3wjklTbBSucNb                           



                          CkDBKjX8EeGZisJf3usRIh                           



                          UAQcJtWdV7IyARUeI0Uhvl                           



                          PfCEurDHGhvh8svXmhJPnn                           



                          NvGnHNSim8l9xEW5bFJlkC                           



                          I8cWWvbQqoVvWrRL9koYRb                           



                          MY8dIUmpSUobexTxt0XoYC                           



                          98gSRudlBuvrCjGFQ0GtJe                           



                          XTgiHSNvh6n8zFpaX38wr0                           



                          1uzRUglNGsUHCnEJ8qrW7v                           



                          JPNre8OyMOY5CFvxzPIagb                           



                          XsAZZoVS5dWFWmjgBuo5Iv                           



                          VX2qRF62mNOapSilZQOodw                           



                          7fkJ1iAJHijvBfP3MhPFMw                           



                          WF23d3bgLISgTsZfuPvos6                           



                          PtPQMiNSjvMJ87TTjrFjNr                           



                          vRTaXaSdjQNjPgPvJ00kvP                           



                          1aMXffhbBnTRDeRB1rISZe                           



                          OSDvqHCnqS3mrpKtjuWckR                           



                          JcfYZ7LVEgoN7tzE04mdRw                           



                          ajXYVG26VPObkSZsOEP7EK                           



                          7yENQxfzniSNUaIKXaBAO8                           



                          IAlsxN11fVWgZZDfQRLasQ                           



                          FkhMkeFrlwbEbzo2WebJYq                           



                          XGlkIDUxMDAyIFxcZGIgT1                           



                          SVHUGvAdN3KqC3LwYpYSg1                           



                          YIi2LJ4GXyHdXPOxNMBiQZ                           



                          Y2WvMzQRy6JMnqKW3XYNX8                           



                          WaG0HQfhXQC7GNQkMVRwUU                           



                          QgMiBcXGZsIFxcZiBBcmlh                           



                          uHAxSJ8nbRriuqUuEWWtYE                           



                          YRBrSMy1s1lVcuI93cl66z                           



                          CQRVPCR6A5Haa8PgqtBupa                           



                          cuXHBhclxmczIwXGNmMSBS                           



                          RIEqsXJdXQNujkGqx6JmTX                           



                          xpbiBsYWJlbGVkIHdpdGgg                           



                          zBtaPRGscKyytqEsT2Y2lv                           



                          ZiXU7bFTVhVAEnX5TjNSNb                           



                          S41pQTApoM4pHLGpEU8nZQ                           



                          a9HXZvMDJcVnmflM4vfBFm                           



                          PHUfdH0xTNhyHaJjBHMxL9                           



                          JbMFobZhZ4EaL8JWzeukDn                           



                          nYCmk0Zgq51wjoEgTOQwPI                           



                          Ebu22gpNS2fyHaNoXxkCl6                           



                          zGIhUXT8KD1ibYMzxK66VU                           



                          Ozrk1vHYZqf6O0WMNur4Y1                           



                          ZSBmcmFnbWVudHMgKDMuMC                           



                          S9CBUqGYV4SJZtEGQnqCYd                           



                          aoCbN5rrVGxyiXLpLtGrUS                           



                          yhPIiwqhebd4Ict4SigPJh                           



                          p10phRK7tRWosKKxGeMfT8                           



                          3nmxGrkVHxHhbvNAL8IRWj                           



                          mV0be1rinzR6DS0kkVowda                           



                          DmqJNim2UsTaNmOO1nAUEw                           



                          KSJewLLbrK1yrmNxhxYocT                           



                          WsrLI7EJQhIT39bSVcnIvl                           



                          xR5aEqXaYfCrALDcuredDB                           



                          JkKF0vNVRuMIS3XMGbhgOU                           



                          rTThVVAgf0GyeOzfozLeFS                           



                          BwuE8sxPIsZVYqsuFRVSK7                           



                          uT9zDLDuLPD9WDMeqnPLHW                           



                          eiU87vnHR0vGMtyTRxTmHn                           



                          E19fdeEgFDUsirBFNrehF1                           



                          GprOXvq97imXE0uFQgxBUc                           



                          TDRsp1SbzGRit4kpuPcby4                           



                          VjdGVuZFxwYXJccGFyZFxz                           



                          pT6mSwHla3ulwPv8SKarvg                           



                          I3EPGflz03CBnoJMBlL8Xl                           



                          H2VjIGauPUK5YNZeVdViZI                           



                          ByXU7YYfLwCRnWUQFkRSx6                           



                          HhB6VVr7CFKGKwUlAiBzHs                           



                          esFYanXGQoRTa0YKr1EFjE                           



                          FoK0SNM7WwT2GnRiJZXfWm                           



                          ViZRi1XNZcHXdxnQPxYUAq                           



                          TRJaVZliOCtbH08iJpPiHG                           



                          qsZwZuSU4bSZ7shQMuHJJf                           



                          rX6fIF0aA0jdfX0oKV9nuM                           



                          NiHCYmKuFrZ5SdGUVyF6Va                           



                          lhTbHHkaAATrex6xoKqoZM                           



                          jeUpVcOCQel4l4kGU1sABe                           



                          rYK5eVOlsZoqQgRsOE7efJ                           



                          NdPG2qIIidOCjvicGlq2In                           



                          QZ24bDBfwpKiwuNdAJY2Xn                           



                          TkEGcgHXJxb3z9vMadX52e                           



                          u92dw2fmrR4gNLC8RTR2IK                           



                          brlaFsxVBzr5Ueo00papDs                           



                          VOUtICYae54vfFI5ekDtNv                           



                          HgdVp8yTJwILH4GM8apXqy                           



                          vmoxp5KlzFFiFILaXMAcD6                           



                          OyNFGjHWFzn9B5HPCsm9L6                           



                          ZSBmcmFnbWVudHMgKHJhbm                           



                          kmwtziHgAlrVHzJaBoS41u                           



                          JZTgLlhuV93jmV7xJ6BrCK                           



                          Zlu1EpTAgrKE5moS8aJFJ0                           



                          LjUgeCAyLjEgeCAwLjQgY2                           



                          7erV8qNHijgrWpVPXzEL7t                           



                          CPEzKSPeUILnLCB7BEImLF                           



                          StF0LoBQPoZXUqo4T5SPXy                           



                          u2C2BZRardCbkOIwaBXhci                           



                          KxoPLedoedNJO1KOWiyW5z                           



                          e0lkcfQ6TU0xlNorrsdyTb                           



                          9kMAulvWHpo8SxuTZzqQQm                           



                          Y0R9GSZ4qjUwY1GnWQNBzH                           



                          Hkp6OvY5gsJF6bpQAfb1Yv                           



                          oGu5pDOwVEDshFhfHZi2GR                           



                          luIEQxLUQzLlxwYXJccGFy                           



                          DYyef8YzgAHNHSrztJJtxp                           



                          UPdOm1YZmgZNXtf2Q2POYj                           



                          rQjxXFByK7ZhI1PfbrJ5k8                           



                          cdsUrmg5YtvPMcZP4wzFCc                           



                          fQ==                                   

 

             MICROSCOPIC DESCRIPTION n7jvdGIxPWIlcBY6FbInSM                     

      



             (test code = 3371) Etp9frg2BziTJhvNUhKJsc                          

 



                          dEMrfzTads12yJT7uE68LP                           



                          4nRZOwLsR1QXOuqiE5Cgo5                           



                          KVJkAWKfuKFnG004s0ine1                           



                          klwmXukXB4lIrkCYZfayva                           



                          NzP5WLuqQZLtfkdmVEz7RT                           



                          oqDUYhtZU9VXOdvOTjS8Jr                           



                          TDDqQS1gqbo3CAL1WCwhBY                           



                          SiEpF5IKMzfMJqXBHvfYve                           



                          AUfun655QIK8WfKsLNRumm                           



                          YquFetlR7zAkQeEOGNoW8n                           



                          iBKooEw1w4aiFYPaDJWtlR                           



                          YtrP0flmZuzZ5lv5PfYUYe                           



                          URDrs5XrHMZgm91qsKCfhL                           



                          SGVZEoMHM8PZDeYA2mP9C2                           



                          uKGgLOepTJF6kT1cXQIqjX                           



                          hdBLlszPkxm9WffE8bTLNb                           



                          YHS5gGMeVNGnrDOfhSIsAT                           



                          LxVXHibnDco1rvc2DsfQ3i                           



                          bmcpLiBNSVNNQVRDSCBSRV                           



                          UWVCSzUV5XPoxiACGCVBZO                           



                          ArPak08lYFCnuZNRSrthOU                           



                          FdeZnyRP1haM4qnOpyfF4a                           



                          mIWysSY5dqqlxbDxbOa2qj                           



                          fseZFpYBApYZKVG1dtUzev                           



                          DDIhIF96YEV8KNKeRPBykT                           



                          BnCKPsWMZ3vVYeb8Ohs39s                           



                          yOXaVE8NRJ71HbNnQmMFqy                           



                          RpE1LyLkQbKZ19J2feUOCj                           



                          LHvueqLtb7ldeaikTUAbFO                           



                          mZZOM3VMaoYAiyiGNjrAtc                           



                          MCAgbnVjbGVhciBleHByZX                           



                          TnsD0lJNFuhnNDAMQvKzcy                           



                          PLWjIS45HUY7OKJsYZEefZ                           



                          PpPIHfETO8xLCav9Uav70f                           



                          cGFyXHBhciBJbnRlcnByZX                           



                          TcoKrsylvuwXZrERRiOt8y                           



                          hE4ii2iwUWKiv7RxmrNvjO                           



                          KipyEmyKFvIGZupC4uFS5m                           



                          HX2JUiYbfc09FJfbyxssHL                           



                          RdyD40PAJan9ZlHrllzNH5                           



                          HRIiVNLoFbVkxQGmy3OroG                           



                          CmaHa4WMNvnpP6IPVszFq0                           



                          oX2qvKknMDqOE6fqLNctKN                           



                          xwYXJ9                                 

 

             SPECIAL STUDIES (test u6greDVwWMXja0njXBOvlU                       

    



             code = 3376) FuZzEwMzNcZnRuYmpcdWMx                           



                          IHitkcNqRNiwk0WdG2SaMi                           



                          AwMFxhbnNpXGRlZmxhbmcx                           



                          CIMoFGX6wbLuOMGgMJysRF                           



                          OdZHbtQy3zsLMxzRkzRbVw                           



                          XRRww8sqniKTphiovMb6d6                           



                          seJCWmVrZ1rQVfFFinI1ph                           



                          qqEphYQgQ9UvjKUjeJa7u6                           



                          cbDzYzVaM2yBJgLQfvO6am                           



                          wuFcbSMdUXMaAVj2vX52BY                           



                          JksF1asBLaGXzsnsNoHwV5                           



                          BJacHHTqQgB1SBMurXSwAQ                           



                          RmL0btJEJaTFkpWJWmRYhd                           



                          oUKuSUA6iAydn4W2fJQwmN                           



                          FvzQhiVhJwLaQlBfZRi9Tk                           



                          KQc5qWcxK4RrAYXxYjD5vP                           



                          QgUGFyYWdyYXBoIEZvbnQ7                           



                          hJatwuEee78vtANtEZGeBM                           



                          RuDcNxeVacBPAaULVYv8Bp                           



                          mGoxJEQ3hTm2qRzdXmsgHY                           



                          Y0Nnp2AZ0aae19lhy9lQxd                           



                          KXEfdodlKdY4QTcuTDUwdw                           



                          vuCWb9AThhUZUptSD6JKFj                           



                          uAPxM2AsHUIyKF4yjbm3DN                           



                          E0WUucESEwMnU6NOPclVEi                           



                          GKRffYrvAAubn386UQL3En                           



                          UiVY4rP2Qtm7A2yC6nhJKl                           



                          BSAgeNXwVdIuYOKaar6dpF                           



                          YdEOofe1OoNEF3fcS6wZBv                           



                          vMXmOFQaLX92Hzagx9BsTo                           



                          dpa3ImS54phTO8COwex5gr                           



                          ZL0jOoQ0bzMqSImlk8aghV                           



                          6aKgE7IExyLB2wMM9fHFGm                           



                          kN7frriaYTInCmVajztpJK                           



                          OhmCaorjNoAp4wxDyiNWN5                           



                          NResV5avyR3bJcD6SGfiP1                           



                          brpL4cPDv1ECjyzWV8EQXn                           



                          eL3hIW0uxwybi7weCOqbCZ                           



                          awICYoxoK9tgD7AAXhpYZx                           



                          B6AbiO8fHORhGO3cdwkkd7                           



                          wxEJH2NNjeQGSxTAB1MaVo                           



                          LLQqk2Zbqug8UwWwd4WjtS                           



                          VjSQdxZ23ks845SKWxexYy                           



                          E4wnsYAyjlefpGRkssmoVT                           



                          mnsxX5NRYaFMSwCImbGOVm                           



                          XGZzMjJcbGFuZzEwMzNcaG                           



                          ljaFxmMVxkYmNoXGYxXGxv                           



                          V8khDdCuW5UlHMJiCvLfZT                           



                          vaQCapiMKegAAfwYC4hM3b                           



                          GB2zIFIerHMjV4SdDWXisw                           



                          CleKMmBRO4yNMssJMqFD0c                           



                          BVpwaTNho5hoh7RlV7ahnE                           



                          cqnRM4EQ6tEBUsIDBaTOlv                           



                          h8SxkU6nVdobkIRgdnmlGG                           



                          xmczIyXGxhbmcxMDMzXGhp                           



                          W1yvVaNjCIStaZsqOLtco7                           



                          NoXGYxXGNmMlxmczIyXGx0                           



                          cmNoXHBhclxwYXJccGxhaW                           



                          3tBuYxUsFxJslgSU6fYWCl                           



                          F6duoZSzSCUlGLKzA0hxEj                           



                          StzG3diSgwNGyzAiErIvUz                           



                          BjQQt104ka4lIBDgaBWxce                           



                          RQlVYrhE8aNNpcZGikAOww                           



                          zWDgEXghp0ssUTPqg9d4rL                           



                          WqVPTxvnAle6hzZGiohmXx                           



                          GLCkcFVsjBXiSAEou57cDC                           



                          fkvFmmkJkwXFLry9XxnYxe                           



                          a9DdDqXcLNgwx6KsM22liA                           



                          JvbCBzbGlkZXMgcnVuIGFs                           



                          t54yb6ceWKRlKoT7dDEmeI                           



                          C5lWSnrHKag9ZseUvyYNFc                           



                          w9rzSEZrzd4ahbmisDKyv2                           



                          ZjnO0stzfkOBtdiJXgkrBv                           



                          VSVqy3w6rCZlSWPbIZDaHJ                           



                          wxiLk6IHQve415zr2uzmG2                           



                          iBGmFES9JTvcYYKiYGKmtb                           



                          UgZXZhbHVhdGVkXHBsYWlu                           



                          XGYxXGZzMjJcbGFuZzEwMz                           



                          NcaGljaFxmMVxkYmNoXGYx                           



                          MHueU9ezNcBzJ5YqGYJpGi                           



                          KxeLWoZ2ayaJPnAMKeECjq                           



                          XGYxXGZzMjJcbGFuZzEwMz                           



                          NcaGljaFxmMVxkYmNoXGYx                           



                          YZovY0yiIaOeY3GdWYCxHo                           



                          IgIFxwbGFpblxmMVxmczIy                           



                          ASjmljiwRKEuGKxnV9owMc                           



                          CfMSKksTbqTHamd9VkTHFd                           



                          CIEbVuehhzSpMCm4hhEsUU                           



                          BhclxwbGFpblxmMVxmczIy                           



                          VQnauvnaQMBfNKtfG7bsBi                           



                          HgTAVpyPoeBCrtf4CoJVCt                           



                          XGNmMlxmczIyIEltbXVub2                           



                          adq5WaA2lpqEjdyPT7DRSp                           



                          P1hliRJknLT3DTQ8qE3vQX                           



                          blrlMaCOKgl0ZpLGGsMPIb                           



                          MiE9vH2jPKC8RzKBwHuhYW                           



                          BsYWluXGYxXGZzMjJcbGFu                           



                          ZzEwMzNcaGljaFxmMVxkYm                           



                          LgFEQsVPvcH4eqUlTcO0Ar                           



                          BSOkHhCgzNacGJhmGSi3Bg                           



                          xwbGFpblxmMVxmczIyXGxh                           



                          eiltCXBhASxpL3mmBeWdER                           



                          KjkKffWQxxt7MnIKDxJEEw                           



                          MlxmczIyIHMgTWVkaWNhbC                           



                          QVCQ24FSQhAPClvOmwyC0o                           



                          sGGLDWQbioJ2p4V4VEuaCT                           



                          JjAMu2LUxgkuKoXIKxsM0y                           



                          KNYqDS3nLEp4awErMTDnk6                           



                          DjNZ1bFTEsxAJtVSG8XGJw                           



                          l1NwW1Dqt0WfMTSrUNKfco                           



                          0wpdPeEpNDxVEfUUXezk82                           



                          RSNpMP9oR8uqUCJhZFQjrg                           



                          NjcJHxz4XmYZQnxNU8iWUm                           



                          VS1GKiWMx13kAUAwVAHMmb                           



                          EzZAPqkZkpqZO9kaL7bK4r                           



                          LiBUaGUgRkRBIGhhcyBkZX                           



                          Cjbb5vbwEiZPTmDNAcq5Bk                           



                          aPSmeGMotaNnW3Mrv8IjCI                           



                          Oshq86EGouvJYzcd81RA5c                           



                          B8Jtw8MaiT9fNGgqJGBdt3                           



                          VhaVNraTJrIBWni4GnZ8ej                           



                          estoUHngkYViiD6eTGZpKF                           



                          m8JFQkb9SdTXEof0YsFjQd                           



                          txOfVMYdKXKjJAXziR87NU                           



                          P0uXdhbVchtjZfRT6tLBQj                           



                          hwFsRVHpSOAqsD9hOEwlxm                           



                          AlIAYvwcP5h5T9LRkdDJHx                           



                          lzZeSpkcLVP4eoKnywT1lN                           



                          YpN9bsxcwwONmiBKMsn8Kr                           



                          rF8fgETUsJEfz5IneDZumN                           



                          HWcFDwXO9dmcBuUV3lPME3                           



                          ODggKENMSUEtODgpIGFzIH                           



                          I1PVqhPwatJBR6ehPsVGGa                           



                          o3HmQShsW1wuZ15ytVqvbJ                           



                          w2aNBroVliaKStaXIlMPSb                           



                          bbA6q3A7SGDts6YuzdjdNR                           



                          BsYWluXGYyXGZzMjJcbGFu                           



                          ZzEwMzNcaGljaFxmMlxkYm                           



                          QiALVcQLesR1reLvSkKbXg                           



                          CpjrQNB3nY==                           

 

             Gross assessment was [facility]                           



             performed at (Formerly [facility],                           



             = 2777)      Department of                           



                          Pathology, [address] [place] [address], Tel                           



                          [phone]                           

 

             Technical component was [facility]                          

 



             performed at (Formerly [facility],                           



             = 2776)      Department of                           



                          Pathology, [address] [place] [address], Tel                           



                          [phone]                           

 

             Professional component [facility]                           



             was performed at ([facility],                           



             code = 2773) Department of                           



                          Pathology, [address] [place] [address], Tel                           



                          [phone]                           



[id number]01[facility]38





             Test Item    Value        Reference Range Interpretation Comments

 

             Case Report (test code Surgical Pathology                          

 



             = 104)       Report  Case:                           



                          [id number] Authorizing                           



                          Provider: Elayne Longo MD                           



                          Collected: 2022                           



                          10:01 AM Ordering                           



                          Location: [facility]                           



                          Endoscopy Received:                           



                          2022 11:57 AM                           



                          Services Pathologist:                           



                          Homer Thomas MD                           



                          Specimens: A) - Large                           



                          Intestine, Colon -                           



                          Transverse, Bx mass B)                           



                          - Polyp, Colon -                           



                          Left/Descending, taken                           



                          by hot snare C) -                           



                          Polyp, Colon -                           



                          Left/Descending, x3                           



                          taken by hot snare D)                           



                          - Polyp, Colon -                           



                          Sigmoid, x5 taken by                           



                          hot snare                              

 

             DIAGNOSIS (test code = [id number]                      

     



             3220)        FuZzEwMzNcZnRuYmpcdWMx                           



                          IHtccnRmMVxlcGljOTYwMV                           



                          dqitAwAINtpAVmJ4Wdndri                           



                          SPbtGX1dSS1qlOoksIMjsV                           



                          MiLHRaTyInk1sed708nHGb                           



                          g1mzYNXVkzuunFw8oJevU4                           



                          4gn0S3UwjqW31tfOHvAYK5                           



                          VKRhJQPlbMRtTLQwDHM5EK                           



                          SuhLRiJ1kyGKEuFV6gliut                           



                          PEqsKRpqILVntGK3TGAheO                           



                          AzF1WtRVAvJFoyETGzsfy6                           



                          BdEzVf5zvMHxvTxaMIkvGX                           



                          AuSDFeESuqCOKcLvVuTA3j                           



                          BCAZW4InCM3YMNRWFZ3WGQ                           



                          OHIjZQM0NUZsEJPRSEBB0V                           



                          HT2ZQ9DnSWZPC6LVMFlwpA                           



                          XzVDGsVKHJWoPGY7kBWZJZ                           



                          OXRFC8AUNzXCEm0GFHdiAT                           



                          BwBFRtJLOKJNFRPOMHW4EM                           



                          U9SBIfLQGVUCUuxURIlABm                           



                          BccGFyXHBhciBCLiBMQVJH                           



                          QNIXTcITV4MSOpMcBFLKQ7                           



                          ZIBeMVLwkxT86WD57qWX0D                           



                          ROJhKVKNX9FGNGapqSKcCA                           



                          SlQHHYFURJMFRFTEXMPC8I                           



                          SLSrIIIWXVaDGN6ZDJIbNU                           



                          AlapcyRNQvFu9mWWEMS7Qz                           



                          PI4RVRBPNU8MHLYMUPNYYG                           



                          5CLD5NOBFYNZ3AMHQWGPtB                           



                          IHggMywgQklPUFNZOlxwYX                           



                          IgICAtIFRVQlVMQVIgQURF                           



                          Ac2DVGkvJzMSH17BAlUFYN                           



                          khNORcGBMgZIUGY2FDWMKz                           



                          V6MZWnUVANMdVQIKFG3GZG                           



                          xwYXJccGFyIEQuIExBUkdF                           



                          SFeDOBEVTYuGMRecY0hOUI                           



                          8LXHBDR2sBCzWNT0kAVWW1                           



                          NHPpSCCTJ9HHGIfvoCLvVW                           



                          NjMKTWIHLXPL9ZWXaQH6WV                           



                          HUWFVL0JFVKaMYDCETgZOX                           



                          5URURccGFyICAgLSBIWVBF                           



                          DtYCTYYAUITzRX6RJWNvZG                           



                          Cidi78CXO3MbKhr7I6INB4                           



                          QYRzQCDqn7lqWQLuxOIyVj                           



                          EwMzNcZnRuYmpcdWMxXGRl                           



                          DgMac7qth451lZMvn6kkKO                           



                          YiFcN0hOQoWFFziYOwS517                           



                          VLEqYFqfr4bxp4HbVEQkmS                           



                          Dla0O5KCDJeaosvKw3aEum                           



                          Y20iy3T1ZjcbN7ukKNQbDH                           



                          HpR6XaGR0wIAOnEuv1TKN4                           



                          PPW2TPBqGRSuG9FaOR8kXS                           



                          OteTZaUCp0n8tafNaaNNOb                           



                          IZN7w4kpRYuuktNjRK7tkr                           



                          8goOx7z9sthrWqATOeGVKx                           



                          mSRICMDeL2GylKebYk4bvY                           



                          n1wBxuUwdbKOO8Vmi1ON3c                           



                          vo69apf8pQccGCXmrzvoNg                           



                          G9PMmzDLMposmgHFl5EJmb                           



                          JFXtvQO4NIRpeSMoJ6TxHR                           



                          ZvPB9pawx9EDH2QYuoQTWz                           



                          BsI0KKDkyWKxPLYvyIuyFN                           



                          sis540UVX3DbUcGP4fR5Cb                           



                          c8A9zP4lnJKyRRZjwRZvCd                           



                          OjBUKhkc7lvKDvMEejn5Ig                           



                          OYL8rfU6sKNyqYWqQTMeYe                           



                          K1SNfxTR2xps82XMGiFSI3                           



                          hr0sqZOpaZoccqCrnMGzMM                           



                          gvM0MwRQDjn868YSOzY7Hr                           



                          FWAfy6I8rzRtYoObCQDyaC                           



                          A0jmF0PGBiRA2wjwoub2vf                           



                          QOyjOYoxFBVtrfQ2zlV4WL                           



                          HhvYCiQ7SzeW4cHHIeBA6q                           



                          udrhx3mmMFE9ACoyJNAiEM                           



                          N5LqLlRRQas4Hpqrt3YcSk                           



                          s0MzmDKwGHplG10uq555PT                           



                          BmitLhQ9ohmMZuvuljbBCn                           



                          prdwDHilhkB7VBEuUSxyck                           



                          cbDVUrVEyiA9vaIlExCAGv                           



                          zZliZJhkr5XbUZOrQFGwDq                           



                          BxqDAtMFLpRab9TNEdjBYq                           



                          IPVaXoYdD9wvfddhAaPRDN                           



                          Vtk1pmR5topHUJgMSkA4Mk                           



                          UGhvbmUgTGluZTogNzEzLT                           



                          z5AG27OMljQIXzqv09                           

 

             COMMENT (test code = t1jzrDLhKMTlpBN1ZaAaNX                        

   



             3356)        Dxl3gld9AbxEGrfJZzGDqq                           



                          zOQuunZwup26lRY9yU61CE                           



                          1cLYSsNmE7QXHjigI4Qou2                           



                          IKDcKAOqnHWvK368i9xad9                           



                          dxlpEgtKC8aVhjHFUodvle                           



                          ZpN0XYrxJZPvbkarWKl5YG                           



                          nlGBAfbWS9AETfxNXiJ6Ae                           



                          FMEfIJ7ldud2FKG0ZSxxNA                           



                          XiNfB8LWCwdKYuZYVsoHkn                           



                          NNlvw909BVS8QmQsQXKpoc                           



                          PetEufuT2pErCfVADDyF7f                           



                          ayBBMSBoYXMgYWRlcXVhdG                           



                          HywZPxw2BeA2TrcTDcYWPf                           



                          hIppAc0iEYF1wUFjHNSpfc                           



                          XwwGozcgtut0B8UBgqdy6l                           



                          cGFyfQ==                               

 

             CPT Code(s) (test code x5rzoFHjEXEwqYB0HnWnRV                      

     



             = 0744)      Dko7rue7ZveCWwjCKtLUkc                           



                          aJPmfgZqap18fTY1mC22TC                           



                          4vAPQbUgH4HTCensU9Lqb9                           



                          YWVbKACrxSNiH200j4zwo8                           



                          ogaoMihQK8sKzuFAOgiugb                           



                          DqE7AOqlVLCuwwlrXQg9LW                           



                          uoDFZnxZA5QMRemRWtS3Xy                           



                          KBVeQO2puch4AKQ9ZZjxXQ                           



                          NwQdY5WBCtlSYgLBTqhTob                           



                          TPbzj711PIT5QwOjWLCxxh                           



                          PclBubcO9lMjCdUVI0XJRt                           



                          QSK4QOUqMOg3EiOhCGR9FM                           



                          T0BUL7BTNyjCHstX==                           

 

             GROSS DESCRIPTION (test x5adhUTfMOWtkFE2OhIdOL                     

      



             code = 195904) Pyw4rzd7RotSTbtTQyYMrv                          

 



                          kTIqboLdvc26iEW5tI58RZ                           



                          2bDFSrJcC0OAGswrH8Jmr8                           



                          SKZtEGWzmKFmB752i8kfa8                           



                          bmutHpkTA4QSPvYNTkK3Hz                           



                          GY5mTAGlqHWcS51hxWBxHV                           



                          U2QGAlHDUnpQLqFNTdDMK7                           



                          QVLhlWPlT1pgKBEyFL9ngm                           



                          plGCzoIKibPYBfxOG7BJGz                           



                          bIAnD5EmJNBrCYwrTAFabu                           



                          n1RoJbXl2vcHLodKhvTFoq                           



                          FBHxt5qpVTKpzDPtQJA0FR                           



                          omfSYcZQCwGWRfZTx9WJBq                           



                          IFxraYUbTV7ftCziDmassZ                           



                          mgt0GsnVMmSEiqXBCrXMFz                           



                          PAbrRNMbC1YNOEYfJiQ1Cs                           



                          F5WcLzXWa0NPa2FH8DJwSg                           



                          ZVQwHNXzWZP2HUBqMSk4AZ                           



                          lgBA1DVVW3FkU6VDe4MSZ5                           



                          NTMyMSBcXHQgMiBcXGZsIF                           



                          fpJjOHbagdeBVmEF5eoJrw                           



                          bGFpblxmczIwIEEuIExhcm                           



                          pgYAshuIKdmEgsLHlcY43t                           



                          d57tLEONndIjb9PuzkIdGd                           



                          xwYXJcZnMyMFxjZjEgUmVj                           



                          APy4CJGirT7vBn0rhNOthB                           



                          5gwAYuTEebAZD7iVIgGKSc                           



                          JWLnHDJdMY11EHdeFGJakn                           



                          FtZSwgbWVkaWNhbCByZWNv                           



                          cmQgbnVtYmVyIGFuZCBcdT                           



                          dzXaZbHFk4V9ijzlvoUGay                           



                          qJNoaYzqYK3hn1vfbx30fs                           



                          Jld3KbetWeUICbu0NaqLQx                           



                          GAJgSuEixnLdT64rl5hpwR                           



                          Jwn2SdmSRewCepuYPqdAKx                           



                          LXBpbmssIGlycmVndWxhci                           



                          Wml9L6AYGxt5P9JWOchhVc                           



                          kJTghIVrNXCrXMM7DJAgSD                           



                          U9VUAfEmImwIFpaiOqO3nm                           



                          ZWdhdGUpLiAgVGhlIHNwZW                           



                          VgiZZzYLcuHRH1Fe3bkNLr                           



                          TYSubbV0p5PeWJipAYOoWt                           



                          rkRZPtG8OdF4WmuxWqaMHa                           



                          PPZsbiExp4pcVCF4IZJcbQ                           



                          CmvPUkJyYtPixrZVR3q2dt                           



                          WUPrgZHsJFW1LKsviANcNG                           



                          EwMDIgXFxkYiBPVlIgIiBa                           



                          CqNqNHH6ZcOvUOz6VGeyC1                           



                          MJOMUoNFJ3LJZ2GvpdLwQ6                           



                          UTx7MUFJYl7nXQW4HGmrMF                           



                          D9VCP3SiBbXBvxkFFyLTbb                           



                          ZmwgXFxmIEFyaWFsIFxcbm                           



                          D7ZUEwFrGvC8IzVIBwZEMj                           



                          hBobONITg2fudyJoCOigZl                           



                          MfBMToG5XbMWgwGd8gvOXl                           



                          KHOyYzIhJ5WiYQToG2Mqyp                           



                          PqOTwyODZqhb7alXdaCOup                           



                          EkBgNLVje6m5xBH9oOCtoO                           



                          J7sTAopCjePwFdVP0nbCTp                           



                          FS9vBCzoAUfoqsXfd1FbFM                           



                          48dFQvbeAjrzNbVOF0MbXn                           



                          HMduFHCab4s4lUieB69zx8                           



                          6soUDgxZVdRZRdZU2wrG6d                           



                          KTPsa5MyTPN2PPqalTSnqi                           



                          SzYVYcQX7qTBMhphAnu5Xs                           



                          II9fUE90rACdjNcbISPkha                           



                          1fsH9oGRXtckUsG2UaTXZt                           



                          UN47h0xnFDKqObTxaHhmj9                           



                          UzWAIqYSuaWV38ZWypNxUu                           



                          cQHgYxMwqMUlJkWtR74lhZ                           



                          6yPFuookBxVBCsGJ3pLLAf                           



                          LUNhtALvfS8xvcAqgzYeoJ                           



                          SpzCS6IBBuaI5enT78ggGs                           



                          iqAPNM18RGYpmZAqDTJ4KQ                           



                          5fNYMgeqxmUHZwZAJxWOC7                           



                          NIntgT05jUAySOLnHSDvyC                           



                          XuoKjrNfpplQeln5WgcUGv                           



                          XGlkIDUxMDAyIFxcZGIgT1                           



                          NLQUCjLwR1IyZ6DbTiTKe6                           



                          KMe4JS9ZVhQlOXEcYAKxZG                           



                          O5EgDjTOu3DQfgVD3IKBS3                           



                          ThI6LWeuMKN5UAZsOPCsQB                           



                          QgMiBcXGZsIFxcZiBBcmlh                           



                          jLQbIB2zzReuxbVvDNFlLB                           



                          ZGHhOGw2i6eNczM83sx18i                           



                          ZXPBPAQ5E5Bpx2HumoVjvk                           



                          cuXHBhclxmczIwXGNmMSBS                           



                          URSddMZlYXJlqpFxx0EoEO                           



                          xpbiBsYWJlbGVkIHdpdGgg                           



                          iDyaVOYwkPlvseAbB5Z5aj                           



                          FaAD1bOHFvLWJgB9MhOEQh                           



                          I32xDUJpsT2bGJZqSP6mWW                           



                          f9DMLdZMZwTpdeqP7fwZMn                           



                          SFVfmD2sOPdxGwWsCYBuA6                           



                          IpIAmiUhT0MgW5VEpdaqSf                           



                          bXXdj4Tph29xttKbVRGjLO                           



                          Jam10ytCO2puZfQuOpqRc8                           



                          cKIcIRF7OY0knLYouW34LE                           



                          Yceq7oQUUic4P9VVDwj5L1                           



                          ZSBmcmFnbWVudHMgKDMuMC                           



                          W5DTMeBLK8DOFzOOMvgUWc                           



                          oeRvJ5lnWQonzNJoBhOxHU                           



                          hvBDeburugk2Gvq6WybJGp                           



                          j07enLW4zKZobXEaEjFbL8                           



                          3uqyIiqPUaLgaiVRR4FHFy                           



                          bK3cp2pnkbH4VA6veIldps                           



                          BwxCUwm6JcShMwUJ0yDMNn                           



                          YLKmoHMuyP0lepQejwCkmB                           



                          NkqSA7XRAuTH18jRTvjEzc                           



                          tZ9oHjFvNkFxRAZdwikzXC                           



                          FnPO4iOWUbVKU1WXIkfqLT                           



                          xDApHWVvl6DsmKypclFcKH                           



                          BxuA6euUJuDHUtiyWEMDV2                           



                          yF2vMPJbPYS2AOWzcrRSMO                           



                          gsN47pmKG5jRUzxMWtBrVf                           



                          A37etbAnXUHvkhONNhkjZ9                           



                          WgsOXoy49ixIZ7qKQrjFDr                           



                          MFFdq4VfzSClw3oseJzwb4                           



                          VjdGVuZFxwYXJccGFyZFxz                           



                          wA7kWwHfz2wsvUb3UWuysa                           



                          H5NNKewh00JDppHAIxY4Aj                           



                          I7NpUAtxCJV4WEWkByFuHV                           



                          PqKN9QEkEpXKqUZXTjBYh8                           



                          PmQ4MKj5JAUTNqMuKoLkXe                           



                          foLVyhTXKiLZg7PNl9WUcU                           



                          WrR7JJX6AsY9NfXjYAUoYm                           



                          GyUBc6RBXfYPilsCHoXGLh                           



                          XKJwYQfqVTmdO09ePeXmBT                           



                          ewVnOsND2eMP4kcOShCTDa                           



                          uX6gFU6mF8eqfL2nZU8zuK                           



                          YkCARmZoTeW2MaYEBuM3Hr                           



                          qmQxDTfkKYGyxr0diLhxLD                           



                          dwYdSqEYDut5u9nAD2bWSy                           



                          xME3mYKkbIduWuTkAK2xlR                           



                          PkKR2bASduMLzqemHsm2Vl                           



                          KF13iSSoanYnsfYyNBN2Hz                           



                          FxXChnWNGqq6p5dCaoQ71w                           



                          q90ak7jpdQ6dCJC5MQO0QY                           



                          ptmeGymCHnv7Nnm69rkzMw                           



                          JJXqFTKke32azTI2naObEf                           



                          BniTs4pKBwALA4FO0idEbr                           



                          duiju3JyoVVtZRHlEJQhS3                           



                          WpSXKlIIYmv2W4EXEwp9X2                           



                          ZSBmcmFnbWVudHMgKHJhbm                           



                          gnuaztRgIysDJdGpZiK65q                           



                          TEKpPzyrH17vlJ2yE9ThYA                           



                          Zsu8GtJHlbAS9hnY3iLMP1                           



                          LjUgeCAyLjEgeCAwLjQgY2                           



                          9kaU9jUVreslJyUOOmBO8e                           



                          AEFoUUCzONTxWYC9VQVcYW                           



                          HtU6HfUKVmAZBig7W0SGMz                           



                          j7A8IDCfszLksVZqnXJhol                           



                          OyuIKdvdzkJXF2NYWvwT6k                           



                          r8fzixR9UY3lqKkwajwmQr                           



                          2xTSgxoBPzw3NdtHHzvDIi                           



                          B5R7WOX2cqHdM8QzRVMPxE                           



                          Lad7FpL3uqGU0ulINhe4Kw                           



                          tGs6wAKzXFOkyZhqWSq1KN                           



                          luIEQxLUQzLlxwYXJccGFy                           



                          ODhgk5DfdDMSGNddxLSvrt                           



                          KMaSu3YWxrRRWah3U7RXDe                           



                          pOyvJVQoX0SuZ2QctbN2f2                           



                          fbqYeuw9QydEBgZQ2tmCFh                           



                          fQ==                                   

 

             MICROSCOPIC DESCRIPTION t0vyzJWrCJFacBV4OiPqJR                     

      



             (test code = 3371) Mrv6kaa5LesUEbmWEwPGrr                          

 



                          hJXkyeQfhm57cGZ1nR51TM                           



                          5kOMNnVtD1RGZcvrM3Nyk7                           



                          CPXrMRYkvSZqE499i7sia0                           



                          nlxvVyvTF7yNkuUGNwjdva                           



                          TrJ0FUrqUEMbdoyfXRk0JP                           



                          tnRAVoxNV8TWQbvORpC4Hb                           



                          HCIlDJ9nxzq8WVW2ETjsFN                           



                          RrZpV6WQZtoKCpVQLtfCce                           



                          RYvgt102EYK3LxJbQPOfxs                           



                          LlbIgscN8zMyFvWNLMqH0h                           



                          nLZelMz9z9tyVGPjBXJxiJ                           



                          HwsL9yutOoeE9qt4ZdLWRf                           



                          TPFqx7SoGEMxp31ujEYmwN                           



                          WYWULcSBO8QDGpLT7mD7T2                           



                          bWReZHxvCQG1eG7hPRLtwE                           



                          nmEKyjcCgtg8IurE9dNQRl                           



                          QCE3dMJtIMBemQTgmXKtFB                           



                          OyQLNqzyGds5nuy5DyuW6d                           



                          bmcpLiBNSVNNQVRDSCBSRV                           



                          SYYCLbLD3YFvjlHNIPFBNL                           



                          JdAjp87dAPIipQBIPamjOD                           



                          RpjEdqVI8brT6ruKtkrC3i                           



                          qCZgtQV8ucauyeBwyEq1yq                           



                          tdlCGyTJCvRDFKE4kdAbbo                           



                          NOEdKS22ZXG2VOWlEKTadM                           



                          IqRFSbMZE2tGUnb3Ody33w                           



                          eRBjMD9BGT60SoTyXdJBkm                           



                          YdG6TnDbXaLR09B4teIKNx                           



                          WWpibeZjs5zfrocyYATuZU                           



                          rEDGO7BHapCCrrfJJwyFia                           



                          MCAgbnVjbGVhciBleHByZX                           



                          HavH8fWNShfcIFTMZmCnll                           



                          ZHLdGQ50CCK4WRLeDVPqvP                           



                          CpVCUyGJC1cAOcg1Jxr17k                           



                          cGFyXHBhciBJbnRlcnByZX                           



                          XdjMdqukehyPIsGYGnIk3s                           



                          oE4jj5qeXTIml7YwiqYtwK                           



                          FuvaTimECwHFPukA3oFU9g                           



                          GT3EUvCwln87KLtxdynsVU                           



                          QgmV20WPOso9IeEqcfmRQ9                           



                          YGMeWINqBlJmbGYpk7HzbU                           



                          IlhMm4ROCsbtA0DPKguLv9                           



                          sN8okMjpNSpMN2tsQCwuMR                           



                          xwYXJ9                                 

 

             SPECIAL STUDIES (test h1qvuYOaCRObl2zlWUBgrG                       

    



             code = 3376) FuZzEwMzNcZnRuYmpcdWMx                           



                          FTjnaqJcBTdnm6VvG9AzAf                           



                          AwMFxhbnNpXGRlZmxhbmcx                           



                          FFTnIZF7loGoSDGePQvzQL                           



                          XhYZyuKv9msPIhhPxzXhQb                           



                          VRPzb9didtRUmdwtbQg6m4                           



                          hiRKFlHlV5vMUpCJxfX5ic                           



                          bbMjwEFcW3IxuSAiqHc7i8                           



                          byXaNkImK2gCAfPNtoU6cr                           



                          bkPmdNVqNPPgMNp0uD56VK                           



                          VxeI0aeNFoFZrivuAqStT3                           



                          VYvqNYEtOnI5ITFzdVUvEX                           



                          HxI5qeTXYwLPyzCADxQIyv                           



                          nBCsCWW7uOcco0W9aKXdrJ                           



                          BtfOqlQjTwVsDsQmYRz7Bh                           



                          DWy8xSlpN1AhRVXeIgI4pX                           



                          QgUGFyYWdyYXBoIEZvbnQ7                           



                          mUyvtgKcr35waPQkUJKmGB                           



                          PuCnZohNtoLEQdDMFFy5Lu                           



                          tHmhOOT4tJm3fNamUbpuXJ                           



                          N9Oha7YD2fdy81nfc7dMhh                           



                          KIRandylKwI3IYagVOQnbf                           



                          kxDGi9PIxvAOBkjXI4BVMq                           



                          fENlM8TqRERhZX9qvbo7VU                           



                          G4CJonHXXeTrQ6ZWEfjMUv                           



                          NYPisQphJMybx679RSL1Ss                           



                          TgZE6yR6Iqg0F3lC5zxABs                           



                          UZShhCCaOuRyNREroh5jxB                           



                          VkLOpdn7QnZHK4wrQ6mWKg                           



                          xKGcZALwLL43Zznbr1MqYi                           



                          alr6XdL23ycYC9PMpsd6vk                           



                          VQ6jBhR1vjHtRXcyc7eovT                           



                          1zZsW4GCoaVC2iTH5bYVCy                           



                          rB7kktmoGZAdXnPuvjorTG                           



                          EldVjbcmUiHm8oeQgaJNX1                           



                          ZYmdL8ehwR0zQxI4AGvxK7                           



                          mrdM5eYVp6WOgxqKZ7JJWx                           



                          rV7fSB9liitmb3rhPKpaNB                           



                          qyTMGvgjH3ylA4JAKuhXQi                           



                          H7EliH2wNQCmAG3vniyym3                           



                          xeAMD6UMmsEUVlDFI3TlRr                           



                          TEMyf2Lhvuz0QlPrv0KmxB                           



                          KzBJjaB54gb697FTXzjoDs                           



                          H0ouwHGdqdbbmFYnyramKS                           



                          nlyhE0UZNzCPNxUNtfDWHw                           



                          XGZzMjJcbGFuZzEwMzNcaG                           



                          ljaFxmMVxkYmNoXGYxXGxv                           



                          Z5isMrYiO6LrERUkFjBvEP                           



                          foNMlweIItlBBjuHE1fF4s                           



                          IY1bLRNhvSOlP5EjWRMceu                           



                          BtrISbIJT9tBIjeSGkUC7i                           



                          RUaebQCjw6qvn2YsL6kqvF                           



                          mdmYS7OL5rKSEpHAUeIUkx                           



                          z4GxaK8zMczyaHZjodciYF                           



                          xmczIyXGxhbmcxMDMzXGhp                           



                          V8izAkLpSPNlyWquSMfdd8                           



                          NoXGYxXGNmMlxmczIyXGx0                           



                          cmNoXHBhclxwYXJccGxhaW                           



                          3bMtBpGkCuEokpSV0xVEWt                           



                          I9cdxTAuMEOxGSQdB3amXs                           



                          VljE1yfWjkHImkIkEjGoLs                           



                          PrKUw957ri3eGBRzdECefz                           



                          WEnAAudY4qKEinYEenVFlj                           



                          vLBiHZlng7yyPTWeg9z1xN                           



                          LuVDNirwXuw6glCRjdncAc                           



                          ODYriPEqcVXmPRJpy35lSA                           



                          oajKdwkRrfOQUcz1RkzBbh                           



                          r3LyCgJwOLgnp4ZdV42zwW                           



                          JvbCBzbGlkZXMgcnVuIGFs                           



                          b31jo1raQFQvKyM3lMAgtU                           



                          U1wLVqrPAww8KxmCntBNMm                           



                          s8oyTURlzb3uxcnhuRSdh5                           



                          OgkB1cmjabLZzzaKPyazIv                           



                          VIXys1m1jJXfJNFyDISyPY                           



                          jupSb6GZSdz038br4lskS8                           



                          yQNbWGJ4DWieGLAhTXVibu                           



                          UgZXZhbHVhdGVkXHBsYWlu                           



                          XGYxXGZzMjJcbGFuZzEwMz                           



                          NcaGljaFxmMVxkYmNoXGYx                           



                          DAyvA0phVjAlJ4AlOHTaEm                           



                          BlmUUzA7lamKRpTJQzJYxi                           



                          XGYxXGZzMjJcbGFuZzEwMz                           



                          NcaGljaFxmMVxkYmNoXGYx                           



                          NUutP5hbPdNwE0NfYAUkWg                           



                          IgIFxwbGFpblxmMVxmczIy                           



                          DHkccxzjALMxJEzxI4qcXx                           



                          WfGGChaDvaNMtwn2LiGILa                           



                          NGAvOtxutaVnMCa6tkSdLK                           



                          BhclxwbGFpblxmMVxmczIy                           



                          LDntahdoKKEcYLktH5giBs                           



                          UcUAPwlGtjZOqcv9EbBXVv                           



                          XGNmMlxmczIyIEltbXVub2                           



                          ive4FfE5ppcJpomZW6JVGg                           



                          V5iomMYybTK1NLB2qT8hFM                           



                          mucxGmWUUyb3CyCSMsNASe                           



                          JwG3wV0qVIJ5LzLSwIcgCD                           



                          BsYWluXGYxXGZzMjJcbGFu                           



                          ZzEwMzNcaGljaFxmMVxkYm                           



                          YjFGXmUQcbP9mcFfPdO8Gw                           



                          VCGyChYpeFqmWEjiGGt0Or                           



                          xwbGFpblxmMVxmczIyXGxh                           



                          ytmcRZTgZHhkE0seNmOjGO                           



                          GzlUsoTBzso9WiJDEwVXVv                           



                          MlxmczIyIHMgTWVkaWNhbC                           



                          JOEN12ECSfRSFebAbmvI6z                           



                          bUNUJPRxtwG5y4K3OHraIN                           



                          WsIBm8UItbetZbMMUxcV6f                           



                          VRGmVP5pHMd5heYdCBTxp3                           



                          NgXG0vEONugFEbATL6DMOi                           



                          t2CmT5Nfc1IeUVEnHKYlpg                           



                          1temHwCxQUjCUrIAOlnb31                           



                          KMLtYI7xR6dtIXIsGUBvht                           



                          DycNXfa2TcDDYkfNK6hVTb                           



                          WW9YOxABf60lLNRwWTQWvd                           



                          CzBLUzkDbpwJJ2vgL9iP9k                           



                          LiBUaGUgRkRBIGhhcyBkZX                           



                          Wsju2uasKgNUDdPXHjn8By                           



                          eRWznOPpatEuI6Ixy7XwWQ                           



                          Mdvi59QAkpuMDudk80DS0w                           



                          L3Fgb0BeeB7sCGnqZTMdt2                           



                          RlzLXhyUIqVADvy0ReJ0za                           



                          ynzaGNapuOXixL5aGKKqGC                           



                          n2ZUYam1QsHXRpk9OnKsRn                           



                          ljAcBOEjCWLcRPBygT74RM                           



                          P6yZeyaGmsbaClLD6gPCRt                           



                          orWjJLWsXOQwxT5iKXwsdc                           



                          XmMFUpcfC2i9A1NUkpSXBp                           



                          kwUaTtelCZL6fzNytdD6fO                           



                          MtM2xzyrgvTBlsFUTbd8Yr                           



                          pX0rxOGJxQUss2PtqGMceI                           



                          USrFWdIV2qskYrSV4cGFW4                           



                          ODggKENMSUEtODgpIGFzIH                           



                          X9SHpsXopwWWF2lzQlKFBd                           



                          n2UeNQtmE6keW26axXiqgT                           



                          q4mUMsbBvujTTwiOXvWZEc                           



                          ymX3h5D5SZMbg7GcksnfIW                           



                          BsYWluXGYyXGZzMjJcbGFu                           



                          ZzEwMzNcaGljaFxmMlxkYm                           



                          AdLWVkJTysA8jbRoKfUjMk                           



                          MxcwEAA7qG==                           

 

             Gross assessment was [facility]'s                           



             performed at ([facility],                           



             = 0733)      Department of                           



                          Pathology, [address] [place] [address],                           



                          [place] [address], Tel                           



                          [phone]                           

 

             Technical component was [facility]                          

 



             performed at (test code [facility],                           



             = 2778)      Department of                           



                          Pathology, [address] [place] [address], Tel                           



                          [phone]                           

 

             Professional component [facility]                           



             was performed at ([facility],                           



             code = 2779) Department of                           



                          Pathology, [address] [place] [address], Tel                           



                          [phone]                           



[id number] 08[facility]01[facility]38





             Test Item    Value        Reference Range Interpretation Comments

 

             Case Report (test code Surgical Pathology                          

 



             = 104)       Report Case: [id number]                           



                          Authorizing Provider:                           



                          Elayne Longo MD Collected:                           



                          2022 10:01 AM                           



                          Ordering Location:                           



                          [facility]                           



                          Received: 2022                           



                          11:57 AM Services                           



                          Pathologist:                           



                          Homer Thomas MD                           



                          Specimens: A) - Large                           



                          Intestine, Colon -                           



                          Transverse, Bx mass B)                           



                          - Polyp, Colon -                           



                          Left/Descending, taken                           



                          by hot snare C) -                           



                          Polyp, Colon -                           



                          Left/Descending, x3                           



                          taken by hot snare D)                           



                          - Polyp, Colon -                           



                          Sigmoid, x5 taken by                           



                          hot snare                              

 

             DIAGNOSIS (test code = [id number]                      

     



             3220)        FuZzEwMzNcZnRuYmpcdWMx                           



                          IHtccnRmMVxlcGljOTYwMV                           



                          xqaxUuOCZwcAQgU7Ibvqkw                           



                          UKquIZ9wUQ1wdQlwpNDbuP                           



                          ByHEPbLdHgn4kix740gYHb                           



                          s2okOWCEinbrtLu9yEpuE1                           



                          5vd9I8JndgJ11nxCYwKBH5                           



                          FVGoXXMblSCkZNGsLOH0TC                           



                          PfvYUiT0xjFUDwNX7hmsra                           



                          EAefDSxbREBgkMV7BQCxbT                           



                          LiS7MsJGPhBAouDBLnyhx4                           



                          AdIpOv3okEJfdUuaLMbnBO                           



                          BwLGSmCOrmYCFzHfHlXS6u                           



                          JQHXV3SyOW3WPSQOUD5YTH                           



                          QJWdTPS3OFGhPUTKKCNS9Z                           



                          YA4VH8VzWHGOT5XQZCpezH                           



                          YdAAXbWGIPZtHDU7cTCOUC                           



                          DQCFU8UAYnSOXd3CHAnrZL                           



                          JrDDWsYPOUYZYDHXUFE7UR                           



                          E0HHVePMKNSTXhgECBaFCe                           



                          BccGFyXHBhciBCLiBMQVJH                           



                          YJFUBcTRJ2IQZdKuWYBBP1                           



                          UPQqCLRfemI48KI66pKX3Y                           



                          RCLqGSXMG1NBEKvwnTWjJM                           



                          IjXLKIWCHDAWFSGAGQPI7F                           



                          ZFStSRHFAZvQAR8ZCOIkHV                           



                          MzvbmpDPLwDh5lBMPFX7Oi                           



                          ML2FADMUPL3JIQXFAYKSMO                           



                          6CWW1PVCMRHU6NHUFRMOqO                           



                          IHggMywgQklPUFNZOlxwYX                           



                          IgICAtIFRVQlVMQVIgQURF                           



                          Dk7RFTqxFsAVN40QLeBCJE                           



                          zoBXFoHMDjPCNSY4WDBGYc                           



                          S9LQPuRMYSLlMKEARA2WEJ                           



                          xwYXJccGFyIEQuIExBUkdF                           



                          EBkLCAGBZBmVGQpgF8uYZN                           



                          5AHOHEN3mEHsMOH8bQGTM8                           



                          LSIdCGCGC5RIWAnapCBpHB                           



                          LtPILDQBTEQC2UIFxIC5IR                           



                          POICYC2BJHDyTBSGSQzJXG                           



                          5URURccGFyICAgLSBIWVBF                           



                          UwDKXLYCDZPbZB1JQNKyCA                           



                          Gqqo15UIF3UjQkm1S8ODV4                           



                          RIEqXLObf8tnUGMcdQIuDh                           



                          EwMzNcZnRuYmpcdWMxXGRl                           



                          RvSza0kjc208eKBwb0fiLB                           



                          FsQrR1pWEzITZqrADhP433                           



                          GUMgFBwjf7tem6IeLFDguS                           



                          Ojn8T3WSZIgkqbtJd7bIic                           



                          S69zz7C9NkgnW4hnPKAoHX                           



                          WcH5HpFF0qIIVbUyr0HCF9                           



                          YYT4WRKjCTFjI8HbBT2xIM                           



                          YmeLOxAYd6e1qqwNwaMHIv                           



                          COK0k4hnIBmcytRoRN6cww                           



                          1giRy8i4wziiVjFLAnQYIe                           



                          sONCFVOnU3CrqBbhQb0svN                           



                          w6ePauVejfLOL7Tat0GZ1l                           



                          dl67btj2yRdyXAWscxvtJp                           



                          E5PDobOCNuqyopDNy2OUbo                           



                          FZYgiYW6VJQmwSPyG3BmQN                           



                          IcGQ5vipn6VQE0VAbuVJNq                           



                          KdY7INCzaZYsBEEekFehMW                           



                          qwl569MWS6GwMxTF5lO0Vv                           



                          g7S4dP0afNIsSGJitGIfPy                           



                          WuBWFzsd6gcOWlFAcui5Ya                           



                          ZZJ9gpH9iWZilDFzIKWnNi                           



                          E7SBwdBD7vil11QNIhYVV7                           



                          ok7myKDtmBuqwdUfoSJtZN                           



                          qrF2GmCJMid482QPFrO7Hs                           



                          WSGcs3B9ndBaPeHkUFCntZ                           



                          W3ovJ8VSKuET4xzubcq3ye                           



                          RNpjVBbwDLFxheD6atC2UG                           



                          IgvTAnB2OccK0lOVQvOU6m                           



                          yyoui5ybUIO6KDjxJBAzRO                           



                          R7EoWpOKDup6Ggiad7AsQu                           



                          n4TccBKzARqlL83xx214TO                           



                          OtntToT1ygjHLvxrljbSAu                           



                          zglvFXuaugR2WOGeCJgktd                           



                          zhTARxJHflB1vtSrUyOUYe                           



                          kNuwQLnes9NvXFJrJTObKo                           



                          CkoTJzHBYiFlh2FLOvxPYt                           



                          MOKoXnOuG7uvupxiInICPC                           



                          Ikr8wnY1iauTFKcPJlI3Cc                           



                          UGhvbmUgTGluZTogNzEzLT                           



                          g9LN30KAhxOUPrws12                           

 

             COMMENT (test code = [id number]                        

   



             3353)        Nns3uqr9KdaVPiqZStBAap                           



                          yKQghwYagk73oKT7hY58SA                           



                          9qSBStBqE7XQYizoL3Mdx0                           



                          XWRbMKOzcPZmO114f7heo7                           



                          wjknWygVP7aMfwQFAmhfyl                           



                          OhR2MWkjBGYfwcflECj2DG                           



                          slSSGamOH4ZAAwwLYhY7Je                           



                          BOQiSG3gvte2BRU1ECcaDL                           



                          UqXkF8BSNudTZuJROwcDnx                           



                          OCaei133TRM7DtPxGJWiwj                           



                          CnxBlsjM7dEmYnOPNHoD5i                           



                          ayBBMSBoYXMgYWRlcXVhdG                           



                          WynLRck7AqR3GnhYPzWYIk                           



                          zKinXk1zFMB5mIUfRXKdwz                           



                          LjmNyfnsnmi0H2HKddmc2z                           



                          cGFyfQ==                               

 

             CPT Code(s) (test code v2hxtGBjVNHvkYH0LbMfTU                      

     



             = 3358)      Shr5xwv8QsyRQruTBkNGid                           



                          tRIjoiHzxf09nKV1wJ95UU                           



                          9vLMUtHlO7TZWkmrR2Bjv5                           



                          PYDwYLJqjBGsH176i9exp1                           



                          cgnrMypMI6hUbuAFRqfkvd                           



                          QtF3NCcxQVYabopiOOc5MV                           



                          dhVMCupZY4XULigWOjZ2Tc                           



                          QMViKM6rpql3SJD7TKciPY                           



                          MiLlP0EJFfzRNnKNMgaSqe                           



                          ENvmi306LLC9WiUaDRYdyc                           



                          PebHfdzG9nKtEgFGC7WSYf                           



                          LKY1HYFaSLo9OwEpPHJ4GR                           



                          Q2ICS9WCQcrOAdjK==                           

 

             GROSS DESCRIPTION (test a4tfjTFdKFVghNQ4JzKySO                     

      



             code = [id number]) Lpt5uxh7DqzXWesUBxQJql                          

 



                          oTLhiiCtiz39bJE7bH45RO                           



                          7xPKUwVvC1JVJcgfB6Pge1                           



                          YXIpZWUwfFViC803t6blb4                           



                          jpcvRsnAU2YLBfFSErD1My                           



                          EX5rMKUhzUFfC40urWVhVZ                           



                          M7YIFrSNKuuWKxOZIuAQT1                           



                          SJVirRBwN1iyDHNrSY6pcl                           



                          tsXDnqMEbpEIIooMY9BGIi                           



                          dZZkG1DxDEEjHWtpDJInjx                           



                          n9XmBbJv3phIKapLikZIwv                           



                          UPIba7npPWAcjPMcVIG5PJ                           



                          vegZDcAHUyGGRmZEn6IJTk                           



                          GJacaKFaQL2mwLnaCcwusY                           



                          yht9JsoWMqRUrwTSEuZILw                           



                          ICjsQJTxJ3JPNBZuLrX6Hj                           



                          S0ThZvMQs0VYf3LX6LYqKf                           



                          ACGbDNKoCWG7NWImJNe6MK                           



                          ynIL6HMYB6EwL5MVj0ORD3                           



                          NTMyMSBcXHQgMiBcXGZsIF                           



                          noReOFtsdnfLSnUV6ycZpa                           



                          bGFpblxmczIwIEEuIExhcm                           



                          mmRZwaaXDilNzzQGdeV63m                           



                          j61nZIPXxyKry3GnsjTiTy                           



                          xwYXJcZnMyMFxjZjEgUmVj                           



                          TRc0MGKujG1kVv6xmUHhvP                           



                          2ysCIhZDvzJJG0aMVnIDFy                           



                          JYXbMVWiBU22TPntNTKmlh                           



                          FtZSwgbWVkaWNhbCByZWNv                           



                          cmQgbnVtYmVyIGFuZCBcdT                           



                          xfFwWaAXh6Y8phavffWDia                           



                          dTBflGdcMP5pf7yevt82ey                           



                          Shy3ClvaAlVDYsu2EorTDd                           



                          WFTlLiGczhNkE06vy0hqvG                           



                          Hvd7BhhWLslUejpJTmzOZz                           



                          LXBpbmssIGlycmVndWxhci                           



                          Nzi7K9WTSpo5X0BEGqrfXc                           



                          eMAujEBeBUWyNVM6MJTeCT                           



                          B2AWWeTdUwiLJmozWsY0xi                           



                          ZWdhdGUpLiAgVGhlIHNwZW                           



                          AvuWToHQomFHH1Zm4laCKx                           



                          HCLnmlC5y3RoMGapMMCeYw                           



                          aiWHPzD5BmW2LhfwSkvUHt                           



                          TWBhjpVal0zrPRT7NNJteJ                           



                          RcdNOgEfUlPgvmXDN6h2sb                           



                          FVGygUEvSVJ1OMyxhRErOL                           



                          EwMDIgXFxkYiBPVlIgIiBa                           



                          WrVwDGJ1EqGpYOg5DDoqA9                           



                          RMNUOnRQD1OUG7UberVhC4                           



                          ZVj2GWZQQs0yNEG3FMvsWF                           



                          T0XPU6OuFrKKaimYQqMKcr                           



                          ZmwgXFxmIEFyaWFsIFxcbm                           



                          L9EISyRpDzN1WbGIFwVCLv                           



                          rRivKMQVq5cargYmXNhpAf                           



                          PpACMlA1InXQafMs5jlJCu                           



                          PTDcPmCmL8FhBBObI9Qaou                           



                          ZxMIaaGPKpop9yaIehJYxl                           



                          CxZkYVNmc4r9nCZ3xAYttL                           



                          Q5kFUfvZklOwKbCX7hoGGl                           



                          BZ2sRNvfVGmbypLml2SgHK                           



                          13aVYgaiVjnmJoCKM7QjOz                           



                          CFbeSKHdl7f6jQteP08fn1                           



                          5vxJMbaOOaMXYhOX7asI5n                           



                          UOTzi6TrIJU9WRrevLNktm                           



                          HjFDZcAB0wOPAlgsMtd2St                           



                          MW1cOT43wBVjxGfaBDIplj                           



                          2hmY6gAVBykkRjY6GvRGKf                           



                          MA46s1ftLTLjRmCrkMwoa8                           



                          TjPUFdKCpfJF48AKvdWlMt                           



                          lTCzCyNjtVEmHrUjK82ilF                           



                          8aBSfzfbZaKFXkTN4cNCOe                           



                          NZEabOBirC2nhmVrmpOoyK                           



                          TrxYZ3DNBvvZ1taR64rrBh                           



                          nkZZNA93BVItlAQuSVT4AO                           



                          4cGJRgxafbCXZzWWOaOTD2                           



                          ULjjiZ39cUCiGBZuIRSazS                           



                          AgxSryWajpvXszt9BhiWCx                           



                          XGlkIDUxMDAyIFxcZGIgT1                           



                          VPHFFuMyZ3PlY5ImBtIYx2                           



                          RHk0VY1WBwZkTJIzTBXtPD                           



                          S5PeDoIVx9NIaqDQ5BKDS0                           



                          FhO6BNbjKIR7CETeAGIxCI                           



                          QgMiBcXGZsIFxcZiBBcmlh                           



                          yGNkKP2iuKekshQsDWFuBG                           



                          VVSgMLs3g8oQqpQ98uk18t                           



                          UFIHRMI0S5Yqf8EampBqsq                           



                          cuXHBhclxmczIwXGNmMSBS                           



                          QVApbTMzONKhkyJvo0UrVA                           



                          xpbiBsYWJlbGVkIHdpdGgg                           



                          eBrfWWLkuVszvoGoI6U1bs                           



                          DwFM4iPDSsGWXgM2QwZQDs                           



                          O26rBBUcxO3uFLVbQD8qCE                           



                          s8RLIxKSDnGhggxY1uxYIe                           



                          RJKbsD8pHSeoXnSzMMGaM7                           



                          ZuTRqkWrO3LxG1NSfgbwPx                           



                          dVFir0Jjt93pdkCwJEGoMS                           



                          Sse66yvPJ3gfBcIsKpkDa1                           



                          gQTyGPD9SA8pyTExdB49GS                           



                          Lgoj5iPRUfo0F9ELHtt1D8                           



                          ZSBmcmFnbWVudHMgKDMuMC                           



                          C2TOGvXOV7VLUyGDEsqJPy                           



                          mnRtR4voZRbmzOKiHaOsOA                           



                          keVOlutfyma6Hxm7OgzZBq                           



                          p00ugWR4vLDyiPOpCwRoX9                           



                          6qzkKvyZXnKcizWTO7XJRi                           



                          jH9vt2ekibK1US9gwVwjho                           



                          YlpIKpr3YkXoDbAI2qEPNn                           



                          ZEHuyWEejV5fuiYrfmNngA                           



                          PchKQ0YHXrWZ27tXUycTxy                           



                          cB9hBjVnThEwHSWqdbrwMJ                           



                          HyTL3kHLQcVXH3YAMqgcYM                           



                          fMVdDKQno2FpxRzhosLrNJ                           



                          LipR1cwYAuDSIgtgFTUMN3                           



                          gT1eKWHdMLF1TVMyptETNT                           



                          thS18pyJQ2nKKgtHJcAuSj                           



                          X93smyXlPGJjrhXBXfttV5                           



                          JvxSSqb79hlYR7nGLimZYw                           



                          QXPbj2TbmWEmt9mwsLzfx8                           



                          VjdGVuZFxwYXJccGFyZFxz                           



                          gC0xYoFgn2aspOo4ABbxvs                           



                          Q0PEXqsj06QRaqSDNwI8Vi                           



                          U4NdOBvvRTL6VPIgVhXvSZ                           



                          TbFG9ABcWvCByJVFEdKWm0                           



                          OkO1CCq6PTKAPnQjJiOgZz                           



                          qgSTshLDGoUYx5WNq9TXdK                           



                          MoC4ARM4FeG1IbKuONLrBz                           



                          CkROs7UPGyIYvdeKTvQZAb                           



                          VYBnMGrzLElyZ95lNdRxDK                           



                          rpXhEgCG9iYP7lcYPsPFQp                           



                          dN3gEP4oK1wpmX8cKM3ylG                           



                          IfELVsFfOzP8WgCWDaY5Ts                           



                          rbFqCVptGFZohd0bqJjxIQ                           



                          jsElTlAJVdb4f8dWN8zZQp                           



                          pKM8tEZmnImhQuCkEA2dvK                           



                          LbAQ4dHEpoHYqaxlSbp6Mt                           



                          WE87vXIwybIjvbUoAUV9Us                           



                          VfXKqkEBHje6a0nZhvS60q                           



                          o71pa9wqaN2dDKK5BQC5YH                           



                          enfqEqpPPyp9Njl93zlxQm                           



                          YXXaWNHjq44kwKN4dzPwEv                           



                          WuqVp8bSFiWMZ0IG9icSbt                           



                          tzujo7ZinPMhPGLqNFJqK6                           



                          YvNVDkFAQts0O3EZGqu8Q0                           



                          ZSBmcmFnbWVudHMgKHJhbm                           



                          ohrjmhQsTjpPIqHrJlI21h                           



                          PYVfQyqoV25drD1tA1YaMB                           



                          Mnt6AyTGczKX0acO9dDOP2                           



                          LjUgeCAyLjEgeCAwLjQgY2                           



                          8vvJ7vQRnktoVuGZHqFA5t                           



                          SHXjFDMbYWJtKDD5HLTgWE                           



                          VbO3WtDYJkTRLnp8I9MKLl                           



                          i0I1RRYwajEkrBPwvIBdob                           



                          YnzGObloqoCVU6CKMfkA1t                           



                          g9eklaL4WS3fzAcgugejVz                           



                          8yNQbdnBBmr0YvxZGeoHZv                           



                          O7C6KYC4vwKiU9VgUMDVrY                           



                          Elm0UcK1jxUQ4hyRGyb2Cl                           



                          wIk4xEFlDRZydFzwOFn0VE                           



                          luIEQxLUQzLlxwYXJccGFy                           



                          VDhrs0PpfBYERUeqsXAwal                           



                          JUtBz3FCceXIDhq2S9BIMf                           



                          dUomTBLaG5QxS8BizzC2j9                           



                          uxtHblc4RfaNJoYT7ofZDx                           



                          fQ==                                   

 

             MICROSCOPIC DESCRIPTION x8cmfWXjJXCahCT5AcPhYS                     

      



             (test code = 3371) Rkr8tiq2QbkHUzsWHxSXcf                          

 



                          wBOuozHkaj42eZW6jD86ID                           



                          9uGMFtPsX8YRWyxrI4Bhs7                           



                          PSJlRBDmaKFwT857i8rji6                           



                          goieAcoVY2sEwsZMVnwubi                           



                          DpU0UWvuBHCkmwebWUo1FC                           



                          jvFKVqtDX4VDWexSQvA3Ut                           



                          MERqDO1qkcl2JRM4AUaqIH                           



                          DfLmE1HFFngKVxPKTuiMzj                           



                          XEojd308QJD9QvLaBZPnji                           



                          EtnMpsfH7kVjLaNORDeN2g                           



                          pMQacLq3t6dqXMYsIJEacJ                           



                          UcsY0idcLbxP3bw6WmEKGh                           



                          DKDeu1UvMXWuk09dtFAfhB                           



                          GHQOOlLJI7USFlZR8hP9V5                           



                          mDGvHJtrIUK0gY9rYTSxbT                           



                          eeNRnjvPonl6NjaO0vIBRs                           



                          ULR2sBVoQILleGYdlUTuWZ                           



                          RpYPOoksUka2lpt3PxiC9a                           



                          bmcpLiBNSVNNQVRDSCBSRV                           



                          RSHYCsAD0QZaytSJDQMFAP                           



                          PiLik33qJXQalKAASjyeMW                           



                          BxzPjyYW9pnB3caFpllY2a                           



                          mMMkcFN3kvpgfpKveZw3dg                           



                          dadFJiMCFrTNRMO9vvCxuv                           



                          NDKtLG45FBJ3TOUxURRlnR                           



                          DtMVMyPCQ3zHZwu5Tvb54q                           



                          lBQbRK3OSC07GrKfTnRYwm                           



                          NeJ7EeRqLlKE70Y7ekMTDf                           



                          NXlwndOnq9zyeirsBUMuQM                           



                          kYDIB4ITnmTCrhdICrtZhx                           



                          MCAgbnVjbGVhciBleHByZX                           



                          PrsP9oOUDgnhFIYQQpQhgq                           



                          QRKjTP25BLP1SAEkAQSjtS                           



                          LxMCOxDGU7eUZsa4Wxv75o                           



                          cGFyXHBhciBJbnRlcnByZX                           



                          FuwXhdnzlekDKjQEInVu6l                           



                          yR8jr3owYSUkw3TireFxdU                           



                          RmlvYpoGIoAOXowH2hAA2n                           



                          PW7TArYjwj29IDvbmrlyCW                           



                          RibG43IDEne5ByZgfdtST3                           



                          EUDsDZChKlYlwSCep5UfcI                           



                          LjeBt7YGHevxP4DZJshCl1                           



                          xU0ynFanCAxYL6bbMCkxUR                           



                          xwYXJ9                                 

 

             SPECIAL STUDIES (test f8lqeFAjASOjj4nwMTDpqT                       

    



             code = 3376) FuZzEwMzNcZnRuYmpcdWMx                           



                          JQimjvCjCHzre6BjB2KpMc                           



                          AwMFxhbnNpXGRlZmxhbmcx                           



                          KDMzERP1mqGbDIWwKMxoCD                           



                          QmCMloDg7ljUMowKzySvBa                           



                          OVHxj1nijoJCkdfujCn0d5                           



                          keKWYfIhU1wMWlGUchV9um                           



                          eoGtmTOmM7EvuJTweLw8y8                           



                          wxJwTpYtT9wMThHWbwU0sp                           



                          gdXtmPVpQHHfFVf9gN15JF                           



                          DkdD4veVCcNZbzrgYbFnR0                           



                          YMtrHCOkLzI9BNTlfIUyCB                           



                          QyW7qjLYFiDTzyMDYuUObr                           



                          vXReMMI3xWjrc8H4nJEmqA                           



                          JmzXgnMyBxVdNyRqJKq2Cl                           



                          EGu1zKpwR5PsKWKqQjI5aG                           



                          QgUGFyYWdyYXBoIEZvbnQ7                           



                          pYddzpGpy86ufIApCRSnIV                           



                          YaFiBnxLtkAENhFDWIc0Sg                           



                          mXpjYWQ2tDr0vQcrQjwtFH                           



                          P4Woz3ES5zdl75upv5uUkv                           



                          FRBmbiifCcI8DIzvOFUsvn                           



                          bkSYl2SIgkHGQyyOY1PPSl                           



                          gQIfR1LxZHXtGR7gukm7NF                           



                          C2TRyzNRUqWrC9OTQaeUMr                           



                          LGVvtPbsDTfgh163ACD3Ge                           



                          QgQN1yM8Led9R8sZ7piAOc                           



                          DHAkdRQsAnLzTXKukh0osN                           



                          RnTMami6AbXYB8lqE7bMYy                           



                          jYSwWEOnQS24Hlwoe6PiXt                           



                          kei7LpM15ohSZ5RRkjp3vh                           



                          MK8mGhO8pxSbZByxh1fluM                           



                          4hDmZ1XOepLU5eHS1xVMMw                           



                          iF9ixahqIIJgGzHwwgjvQG                           



                          CveSfodvQwBb5rvNhnSDU9                           



                          CKviZ0hctN3gQaT1HSybO9                           



                          sprI3gJCg1EVvlfCB6MBEg                           



                          rN2bTR8rrpmwb1xrHVfnHW                           



                          keRLNdmsJ8upV4FUSyuBFf                           



                          F6XojT3rNRLbXV3tqmsvu7                           



                          nqHUK1FIqjSVPeQOC9FaKz                           



                          JZFka6Aezsf8LrJbu7SqxG                           



                          YrZEzdP95ii021BBWnqoJx                           



                          K7mexYJetkilpUBmddpkMC                           



                          ovrvO9WNFqMIFlWChpSXQk                           



                          XGZzMjJcbGFuZzEwMzNcaG                           



                          ljaFxmMVxkYmNoXGYxXGxv                           



                          C4mgRfDkW0MhDSRuAxAyGX                           



                          xsPFhzmNRscNRatBF7pK4e                           



                          GM8lETLmiBAuV2NwZBYpzf                           



                          QszSCmOPX9pWVyoCNiNW9l                           



                          QTrjzEPyl6wco8BzF5xbzR                           



                          nprQA9GZ1wYIBbPSGuSOrd                           



                          k9OquZ1eBtrdgJZblxhmPX                           



                          xmczIyXGxhbmcxMDMzXGhp                           



                          Q6qkBmXmYQQvbNjqVIcom9                           



                          NoXGYxXGNmMlxmczIyXGx0                           



                          cmNoXHBhclxwYXJccGxhaW                           



                          3dQoBrCuNvLpzuRO7dGSRs                           



                          O1lbrFUuBNDoGETsL6kzYp                           



                          PtsG4zqOzsQFlnMmXwJwEd                           



                          NeDOo987sk2aMMAoxMJzih                           



                          XCfEJrkA0mAWlbJTkkVFfh                           



                          lJCkYElbg0ylXEWvu7i1cJ                           



                          EeVRXvisTsa9hkDRzrydPt                           



                          WJItnFXfaYHeFBFcv25mQO                           



                          quoAndzLxaUZCpw6UaoYoh                           



                          r0YzFuEpNIsms4AuT92utK                           



                          JvbCBzbGlkZXMgcnVuIGFs                           



                          t26fq8vgXDMiSzJ4iOJuzA                           



                          H3oDKizTRom0ExwJdbNXRc                           



                          a3hlUBDrgl0wkmslvCPao5                           



                          MkoK2pputmREoweGRqrpDu                           



                          MUGar4q4rOBfZTWvLAHbES                           



                          gbhNs2NMCwq122aa4gokO6                           



                          rLUmWCQ2IHufXRBsGCGedu                           



                          UgZXZhbHVhdGVkXHBsYWlu                           



                          XGYxXGZzMjJcbGFuZzEwMz                           



                          NcaGljaFxmMVxkYmNoXGYx                           



                          PQcoD3ezViPaY3GhEMRoEb                           



                          ZnsMBcQ6ozqTCxUOHhZQer                           



                          XGYxXGZzMjJcbGFuZzEwMz                           



                          NcaGljaFxmMVxkYmNoXGYx                           



                          ORrwO6xwYqIbT2VaKACdYg                           



                          IgIFxwbGFpblxmMVxmczIy                           



                          QGiaaklyCOGqESydO1giCv                           



                          FjRHLrsLksCZooe2SnRCLw                           



                          URLeFsswgbFgLZh8nsYjVJ                           



                          BhclxwbGFpblxmMVxmczIy                           



                          XAeoeodrOBNlCFujG0bmIk                           



                          UnAQIrtBfpXKozm6XeBPUj                           



                          XGNmMlxmczIyIEltbXVub2                           



                          axd0UmO8fjmSmqiXW4KMNh                           



                          U2nbgLJlwLY9EIM8lI1qHT                           



                          urapDmGJAuv4WzCEStUNJn                           



                          MmE5eO4wOOH2GnFNzRihPD                           



                          BsYWluXGYxXGZzMjJcbGFu                           



                          ZzEwMzNcaGljaFxmMVxkYm                           



                          PaREWeHOjsB9xwBpUgK0Ma                           



                          LANsCzHvvIarCSiqLLj6Vn                           



                          xwbGFpblxmMVxmczIyXGxh                           



                          cybvJVMrXBlsY0daXdLiWM                           



                          BpbNgcXDidj4TkEETnYBBr                           



                          MlxmczIyIHMgTWVkaWNhbC                           



                          STBU48WJCcZWHamTioyD5a                           



                          aPLEOZQyueV5l4E6ISpqAU                           



                          DsXKb0TXdvojToBGNoqC1x                           



                          LDGpGU6iIMb3uoNrAWKdc6                           



                          BgCA0fAEBrfMRiKET4MKGp                           



                          i9ChY8Dkk4GeXNNaXZVmjf                           



                          0xkiZbKcNShAXpTMJwdj58                           



                          BIVlNC1oN7ziUABpTAUegx                           



                          UhyBOmw9FwPPTndFC5nTKo                           



                          OT4PYxPGr04zHKQuEDQLgj                           



                          LpTSCjzMhhxLH6vsS3jR7g                           



                          LiBUaGUgRkRBIGhhcyBkZX                           



                          Pade0dkjLcCOBoUKRbo8Pi                           



                          nVCktPJsqjGdS0Gef4GbJR                           



                          Otyj12MPgbkRPkld28JT3l                           



                          Q1Hni7ShbL6bRPewRQWff6                           



                          DyzVMioZGzPEZlk1IcU5lt                           



                          xusdCLhzdNLucI7cIIStJI                           



                          g5NSSnb9OyOHKua5RhEiSm                           



                          roSmIRWyFCTzHBLttT14ZP                           



                          M0zIxdtQnaopXeCX9qEVYe                           



                          xaXtQBDjMLFyoE6qVRggjn                           



                          HmRQCjwlQ2j5G2WGgpFYCs                           



                          xdSqPfobOAE1mdQtkmG6rL                           



                          YoW3neuruqTKfiDFTeu1Yb                           



                          nN9szNKOcRTub9JfwLOhdO                           



                          NBbWOoTB9qaiRcLT1dQFK0                           



                          ODggKENMSUEtODgpIGFzIH                           



                          C6IDktBiamWQD4boWkPYZw                           



                          s0QmBZisB7cqA04yoPeaiC                           



                          t0xLObaFsfkWIxlLQzVYGr                           



                          agC7f3I6MWVky0WjoufyUH                           



                          BsYWluXGYyXGZzMjJcbGFu                           



                          ZzEwMzNcaGljaFxmMlxkYm                           



                          HyQCKjKYizF6roImLsBnDf                           



                          DhalSOB3rT==                           

 

             Gross assessment was [facility]                           



             performed at ([facility],                           



             = 4490)      Department of                           



                          Pathology, [address] [place] [address], Tel                           



                          [phone]                           

 

             Technical component was [facility]                          

 



             performed at ([facility],                           



             = 5493)      Department of                           



                          Pathology, [address] [place] [address], Tel                           



                          [phone]                           

 

             Professional component [facility]'s                           



             was performed at ([facility],                           



             code = 2779) Department of                           



                          Pathology, [address] [place] [address],                           



                          [place] [address], Tel                           



                          [phone]                           



[id number]38





             Test Item    Value        Reference Range Interpretation Comments

 

             Case Report (test code Surgical Pathology                          

 



             = 104)       Report Case: [id number]                           



                          Authorizing Provider:                           



                          Elayne Longo MD Collected:                           



                          2022 10:01 AM                           



                          Ordering Location:                           



                          [facility]                           



                          Received: 2022                           



                          11:57 AM Services                           



                          Pathologist:                           



                          Homer Thomas MD                           



                          Specimens: A) - Large                           



                          Intestine, Colon -                           



                          Transverse, Bx mass                           



                          B) - Polyp, Colon -                           



                          Left/Descending, taken                           



                          by hot snare C) -                           



                          Polyp, Colon -                           



                          Left/Descending, x3                           



                          taken by hot snare  D)                           



                          - Polyp, Colon -                           



                          Sigmoid, x5 taken by                           



                          hot snare                              

 

             DIAGNOSIS (test code = [id number]                      

     



             3220)        FuZzEwMzNcZnRuYmpcdWMx                           



                          IHtccnRmMVxlcGljOTYwMV                           



                          coewAzCPVxvUKhR8Mekxjh                           



                          EQwaDQ3mMS8vnRqlcVAeoP                           



                          RtXLBmOzDss2frx794yUZu                           



                          h0niZCFOifnswRr8xNhgM0                           



                          0kk7H9EiyfG39geTOoZOO3                           



                          OHWyZAFheOPtCGDaSIO4IY                           



                          HumAJkK2xoBNUjXL5ljcrr                           



                          BTqvBZuqWIUgeKD1EBEsjO                           



                          EjU3DoWOSfTYvjQFNnugy5                           



                          CyGnMl7gtKZatOwaWEgtFL                           



                          CfBSIfZVyeIBXpBuUvRK1e                           



                          GVVQC3YsAN1XDEEIVW7MIV                           



                          JUDuNZX4HXPlAWAMXSGJ1T                           



                          FZ1WW8TiLBCFY9LBCVmdfX                           



                          HfGFNcGZPETwRTN8nAXWMO                           



                          QIMNP5DXByLKUc7WLVgwTY                           



                          UdBAOmTKALSGZKLHBQY3WQ                           



                          I0LTRaTKLJFRVasTFQjDEf                           



                          BccGFyXHBhciBCLiBMQVJH                           



                          FZELMhNJQ4DQLxGtBPXYO3                           



                          SKFtDOQybhY92GG59qEI8Y                           



                          HSMlGSTWR2UFPIgpkFFxTD                           



                          RoFTXABAXSQYRHSLUDOF8O                           



                          UQKqCBNSLBxPFM5BGYRyIC                           



                          TwzmzqPEPqIi9sWYMLV0By                           



                          XT4AJWIIWH8BBCEDFFJNHE                           



                          6FXV6QFBGKCJ0MFUSRUNxP                           



                          IHggMywgQklPUFNZOlxwYX                           



                          IgICAtIFRVQlVMQVIgQURF                           



                          Rl4YFPpzDrTDY22YCkOJUO                           



                          elZSYiRWVzXOQXI1NIZIAj                           



                          P3LFYiQREDQeWZNFTV2GQY                           



                          xwYXJccGFyIEQuIExBUkdF                           



                          TDqDHXJQKUeBXXbzY3eNPR                           



                          9HIWZXS1vUObTKV0uBKWX6                           



                          BYRkAAZHD5BGMFpocOJvEZ                           



                          UaWZRXNRYWPO1EUHfQC8DU                           



                          SRSFHJ2KNWEyLEMTCMjJJD                           



                          5URURccGFyICAgLSBIWVBF                           



                          QmAMESFGKGCfFC6ENUObJF                           



                          Uwix29DMS8ClUss2Z8RSF1                           



                          OGLqCMVef5qgATBpuCLaHj                           



                          EwMzNcZnRuYmpcdWMxXGRl                           



                          CjVrs1rno245eDIpo3jxIY                           



                          MsHzF7kFFqWCZocOTaH215                           



                          YZEmQGqpl3ydh2HsRYEdqW                           



                          Oxi5T4ITPEngfhwVl7wOzj                           



                          K60eo8E3BivjM3saBZSaQT                           



                          AeD9XcWK7fFPIrYvx9ASV4                           



                          UXW2CVAhWNRqA9NhTQ2uDR                           



                          IsvEFcAJe3o5ihwVhfIXEi                           



                          OYJ3h9ksDHquvxUqZU9shw                           



                          5vhTk4n9abtcEuKIFgNTXz                           



                          jZLRYVFvK7WepFxxXh9xlM                           



                          b9lNvoKavrVBT2Juc3ZN8c                           



                          zq64vph6vZwgPEIxffvvSv                           



                          R9TPfhULBqllsvMJx0CXha                           



                          IACnhAA1BKWryHWtD6LlQT                           



                          LbID3phro5QEP6BKcnAGMi                           



                          WnT3XNFwyTOhWWVrxVdmBE                           



                          gih750UQP5FbAiJY6vQ6Xu                           



                          s9K7dM6mxHZcGXArtNUqDg                           



                          KxHEBxzn7tdVUwMJiue0Oe                           



                          RAE3fnA9rRSbwLImZWQwYh                           



                          F8PYavCD6fnq38LRQhMKF3                           



                          qu7wfEJbmSftdsOqhNFcZS                           



                          lhD7RbBVIdq112HJDfW9Hb                           



                          PJAqi2L9hdKwUzXvHBYcmJ                           



                          H7keV3ZTAmFV9rxcroj3yf                           



                          KMukXQaoGQDtomD9umS7VE                           



                          JvlEBrQ0IggI4jYHAlEC9w                           



                          zfjbj1kpEYL0JAxhJXPgUA                           



                          T6QfOpTDOzt2Ngicr2GkGb                           



                          w0QrlCJnUGgcF19ef366BS                           



                          NxlsTxK8nejMRmafwnfPZv                           



                          dapzTMlryvU1HRYpXPshkt                           



                          fuYAMbJDiqK1tiYwGwNLQz                           



                          cVvaZGhyk1NyPTQvQCGoUj                           



                          TsxVVaHDFxBsc1GZZreNBy                           



                          BSJlOgLvU5yfujacSxXNFI                           



                          Ayu1yaO8qrdIJZpMPcJ8Iv                           



                          UGhvbmUgTGluZTogNzEzLT                           



                          r7AF64FXqwMMTfyx80                           

 

             COMMENT (test code = p9ildGInVEVdnHV5IwMhML                        

   



             6979)        Ayi9vep6SejWTqxSIcWYhw                           



                          eGYrmxQcnz25gGQ5yV46GQ                           



                          4rTFEvFvT3KEAdzjW2Cud7                           



                          EJEkAGAdpDHpM282m2ojd0                           



                          mazlEhvFY1zJknUOGwalqs                           



                          DmL6WUuvZPPxrahcEOt3TE                           



                          itDAMnlQE3TIBrvRUcM4Ot                           



                          WKOzOG4vlwt5JZN2NIxjNN                           



                          CtGaP1PFJbkVFeWIUliXpo                           



                          TCkwn216ELE6XoLoOIRbau                           



                          PjdJksdZ6lNcWdLFJCtN0h                           



                          ayBBMSBoYXMgYWRlcXVhdG                           



                          IbePWqz2FoG1PfeFApNOSz                           



                          yHulWa1fZQH8pLGoMWQplu                           



                          YmtZnyomkad9S8UUrsdf2o                           



                          cGFyfQ==                               

 

             CPT Code(s) (test code [id number]                      

     



             = 9567)      Clx3itk2CzzGVaaWNlVEcn                           



                          cPPoejVobi83kKJ0gO85SK                           



                          9rRJStEqN5YDGvrnP9Eln1                           



                          WMNqAIErbVXiB591p0jeh8                           



                          ygvvSnkBE0jBchBDMatqes                           



                          MeL8LPwqWZMuhecmIUu5YO                           



                          ydDQJueFQ6UNIeiCFsM7Uk                           



                          CDJeLQ5palr0LPI0MXorKC                           



                          BaRfP3YHIigYZyPCWpeNpd                           



                          YKbug159BTB8FxNcQWMfaa                           



                          XmzJsxgV4cFcEqCNL9NNUf                           



                          EKU1EBEmPTk3VxDoPFT0EB                           



                          G6AUP6LHRydJJmcF==                           

 

             GROSS DESCRIPTION (test q1cltDRcWEYrzJK1WlAvBZ                     

      



             code = [id number]) Wmc9hei6UcuYBwdDOdOAye                          

 



                          kUMfuhDjod13hJK6wR16SO                           



                          3vMVUzQrW7LVRfkbW6Ayf3                           



                          AFPbDSCjqWHtY030p4hov2                           



                          mnrdEtpPA7LAGuHCCoZ8Yh                           



                          EN1jLVHvfPQqI36bjMKsWA                           



                          F8VDEtDVOusQJjTLAvOZN2                           



                          IXYxgAZiY5gzMZLrDA5gvb                           



                          kpVAapTQqnPFUiqRU5XFFf                           



                          cLYqF1QeGFNmBEbjFXWtkt                           



                          n3XrCkIz4daVGkkAoiHDlk                           



                          HHJjt4ivBPRnuFEgBUN3GM                           



                          zobZRoONAfGRNsYKu2QBPg                           



                          SXpedFQxWM6ckGvtAwnmyX                           



                          pvo2CoiBRiIUnlMXAsAQAy                           



                          KArbSNBtV1JHPFZvNnH0Eo                           



                          E1NbIbWQu0JFf9TT2MEfFr                           



                          ECMaFAUdNSF7XMFzXQd9ST                           



                          rfCS9HJWB5WlM5XNn5KFP5                           



                          NTMyMSBcXHQgMiBcXGZsIF                           



                          vrYsYSbeveaPQkGH5xrXwd                           



                          bGFpblxmczIwIEEuIExhcm                           



                          oiCOrgpHXheMorUMkzD25o                           



                          f56zSMTWuoWgn9OoewNrTq                           



                          xwYXJcZnMyMFxjZjEgUmVj                           



                          HDv2BRUloT3iZl5ovKHkvR                           



                          8qmJTlYRkePOT8jEYlYNGl                           



                          RQDdEUKdUM02PWdtXXVoew                           



                          FtZSwgbWVkaWNhbCByZWNv                           



                          cmQgbnVtYmVyIGFuZCBcdT                           



                          rrMtYhNAe1O5eihwehZLtt                           



                          vXEemGgsSD0jh6zmnb28kt                           



                          Uka5IqogVoUNBaz8MvqQTm                           



                          XSNuDcBsjeDtD30zc2bfoM                           



                          Erm3NinDXnyGvmbLJjxXRf                           



                          LXBpbmssIGlycmVndWxhci                           



                          Llw8E4RZDrj9Z1XRSfmbAz                           



                          mKRvrCVaFBGqSSD6THUwHB                           



                          S1UZCyUaRkzASxfkCpL8qs                           



                          ZWdhdGUpLiAgVGhlIHNwZW                           



                          YaeOKcCNriAVJ2Id9pnFIp                           



                          HTHaeeD9d3SwJCnsVCSiFa                           



                          ntOTYpX9LwK9JzzfZecYJm                           



                          IWLuqsCzf6hwVAF9IJRwqE                           



                          BufQHrFlQnOytdHEF0o1mw                           



                          GHZxtXNmWZP3COkuzOLzPD                           



                          EwMDIgXFxkYiBPVlIgIiBa                           



                          OxIbLSH8GkGcIVs2JPzfQ7                           



                          ISGAXqATW7HQI4YgmeMhQ5                           



                          WLh9QFJAWh9eUWK5RVcaTO                           



                          D3JMH9CrCbWFqjwJDfSClf                           



                          ZmwgXFxmIEFyaWFsIFxcbm                           



                          L5TEJnToXyB9QqCQHzCOXv                           



                          jFmsYLNCf9qocwXcPXewBd                           



                          IsLGPeL0MsPPdbCz6krPDq                           



                          VDFqQcUwP1OkBFRrX0Nmib                           



                          MbJBvcHDSggj2aqXmxYOfh                           



                          ZtZhPMRlj2x0uVB8bMFmwS                           



                          Y5aWZpuCmjErHyOL0jxFCq                           



                          LZ1yYTttSSvvnmJas3NwHX                           



                          12iUAfyuDlddTuGUE9GcHl                           



                          QZzaMWHyx9r9yHrbQ72dp0                           



                          4czFCbxSZvQBRzSY6rrA3o                           



                          GOFzy0BlHES4BCwihSXfht                           



                          RxRGVfZQ8xPWJwyhRqd4Lq                           



                          TD7lAK34oVFraQnnFGPiuc                           



                          5dwM0nBBJfeoVyW7DlAACr                           



                          HK38f3jpMDOxFrMfiYdju8                           



                          VxXXNgTAymVP37TMfgWmJa                           



                          yTCdDcRyeAAvDlBaM21hrU                           



                          1wKTuuydJeBDTyAV6cAFVd                           



                          VNVexONvdK5jxtRfprWyhD                           



                          LygAW0XQYluT6nbB13nwMm                           



                          inJWXZ74PLChmREkALH2WH                           



                          4xMBJvyqnrYWAsAVJvMHN7                           



                          HUybcW77wFRtJMEuAXPagD                           



                          PvdNpjMdgcwZwas8NhfQBc                           



                          XGlkIDUxMDAyIFxcZGIgT1                           



                          JIUAShHiY9KaY5EhPxAHj6                           



                          KLi7GY0DXmFgBBRoCKJhIP                           



                          I5WqSeWZt3LEwvRY7WCZC4                           



                          IkJ1YHnlCUO2SKPzEPGiVS                           



                          QgMiBcXGZsIFxcZiBBcmlh                           



                          wYXcHX7kvBvetrEwBIBoZZ                           



                          SIWyWSs5b1aNsbR33md74p                           



                          IESHGBD4R8Nia3QelhGulg                           



                          cuXHBhclxmczIwXGNmMSBS                           



                          AHWctOQoKPLarxVzd8HoMS                           



                          xpbiBsYWJlbGVkIHdpdGgg                           



                          xWuxRTYhzZjlcmNtT9Z8of                           



                          MsGR0xRMRxCPRfR3KcPBEy                           



                          P95rLNNvtY6uJFOoMN9lPD                           



                          f4HUGkWHNjMzxyaQ4ipYWk                           



                          VKOzwM4wWPtuNhFsAAVcU0                           



                          NrCLyxTpD5KkA8JEjkxyDz                           



                          iSOvj7Dom15wjoWaAIPrNV                           



                          Gah93zoVG7xaCnKyHjnFd6                           



                          kOCwLFV6LH9zfHNxeZ46MY                           



                          Dnig5sVVCye4U6VJWyp9Q4                           



                          ZSBmcmFnbWVudHMgKDMuMC                           



                          K9HGWnGGG8UVUkPMPrxOKl                           



                          smVtD8nxPRwvxJXjUbEoEY                           



                          fvFInhptury3Rbn5ZpnORi                           



                          a68fbKE3kNZazRHpWcJyU9                           



                          8ukjHznXLmYybzVRL9FMUv                           



                          lT6um3aytbB6QS2rqNbgas                           



                          NphKWox3CeGyJsTG3fJWNo                           



                          AESmgBYweN6qycQbulQscV                           



                          PliDK2SKXyKJ73mJGcdZjf                           



                          hC6cGaSsOvYdBNUsccfzAS                           



                          DcCI3qOCAqXPG8GNUryqFP                           



                          tRLmUIFul6BtjJspqmDgQR                           



                          MziE9msTTaFXThddNTEMG9                           



                          hT4sMMTyHFL9VWXwalHWFI                           



                          gcO98uvJU1lWCtuHZzYkCz                           



                          U40acpElJGAoppSQDrtpT9                           



                          FsuWRih48lzQF1wDGfwUFv                           



                          HDTng0BmpBXpk0twjIrsr4                           



                          VjdGVuZFxwYXJccGFyZFxz                           



                          nL9cUeMjj2wboCf6BSncjm                           



                          E7OUQkkp98KGfoUSGcH9Qn                           



                          O0VdFFusMJT6EVKoXjUyAC                           



                          BkUK9SDvLeUZuYKURiDJq9                           



                          WcP1EPu6QTORWoSqXcFcZi                           



                          tgBBdwWAXgIFb4AEg7XQaX                           



                          CcO6PPA9OeU8IzEvXZMyOa                           



                          PtCLg3RMNcULwbfCIjIEDw                           



                          OKBxUVksSOhuK57lUaBsAA                           



                          bgGnYwDA1eQJ8mqIAqCIBr                           



                          oC4yRB1mO4lqjV3aME9gtL                           



                          BfEXFeEzVtZ2TiMACyX0Rs                           



                          ymEzEEqxKQJlna7neIqtAW                           



                          qeCnQuKNUgm2r3sUB7oGIl                           



                          uUV5tTYgpHveZsPtWI4uwX                           



                          GkAN7gSKwqAAlzevWgy7Ne                           



                          ZJ09pJVabzHztfJvLJZ4Mb                           



                          RaFHbvVCGgf4v0oPblH82h                           



                          s70rh0sogO6nORF4OQN7FV                           



                          fmltXupPGsa9Alp13piqUb                           



                          DPNaYERre07naJR7vgYuXb                           



                          SzeEn4xRUcPYB6YY1gwGik                           



                          jlask6UuwVVxFADcYUPiA7                           



                          ThASUrXOWww7K2UTHge0M2                           



                          ZSBmcmFnbWVudHMgKHJhbm                           



                          skdllvByPzeXYoAgLhM07k                           



                          ENNpXbktL45jpD9kN1TnDM                           



                          Ntv5AnZZrxWW4vqH4xHPT5                           



                          LjUgeCAyLjEgeCAwLjQgY2                           



                          6laM1zPMytzzTdIXBpZD5i                           



                          RAIpTWAlHUUmXBZ6TMMiKL                           



                          JqH2NuPBUxRMMik7Z1MIGc                           



                          e8H3WYDbrwOlcCPabBJakw                           



                          GkuDYsfkiuOIQ9DKOouF5p                           



                          e5ltfcB3JW7dtWiquxboEs                           



                          9gLTxgePZyu1PwvSXglWMa                           



                          M4Q6HDH9gjZpE6PcNOPAuN                           



                          Rfq7FiO4kjYK9oxPOec3Nb                           



                          hJa0oEXqZGAamFuqMTe7IU                           



                          luIEQxLUQzLlxwYXJccGFy                           



                          PRgbd3AnkYLAJVemxXPirk                           



                          ZDiFc4FNotQNPke4H6OOUv                           



                          lHfwXAXcJ0CcR8XeslE5w0                           



                          paxTqnx8BttGMiSY3rjSZc                           



                          fQ==                                   

 

             MICROSCOPIC DESCRIPTION y9lmtKEfQELttXP2AjBoLT                     

      



             (test code = 3371) Jfh1blz7IenJMeeDGhQEzj                          

 



                          gPJhzvBern46hGT9aS84WG                           



                          2zJQVeRuP1OJEzfbT7Fat0                           



                          VAAwUCLmvXFtL842c9xfj7                           



                          wiinLlkNL3hFbtOEWpnpgq                           



                          DiS3QKqzSIKsbtulIMh3GV                           



                          weLCEneQW5QTMjcSPeY0Vd                           



                          TKYxWO7cybv8WOT1DGfbHM                           



                          QeRdT0FEDlnUFeSUXbkDkv                           



                          OVqre272ZIQ1ZhItRRVoeh                           



                          SdbFluzO8gAoOgOZDNhJ8h                           



                          zMSdbUm3u3zkLYPlQLUppL                           



                          YcqP7yykCszO4ml0TgZJPg                           



                          QGPjm4XvIJHtw79jjZZztZ                           



                          KUGOYfTRZ9RAFySB8mL7W1                           



                          fADwQDgmWIX3sK4yVBUwuC                           



                          htSJimzSrgn4IoqH4oKRSy                           



                          RPF7fXOvCZHwaHCclYTcOI                           



                          JuHPZqalNsa5ini8KtpW1y                           



                          bmcpLiBNSVNNQVRDSCBSRV                           



                          KHKEGhUU7JCkjdNKGLNIQS                           



                          OtXil90hKNOldLNGCeihYC                           



                          NdkHthTD7unM6jpDigaO6p                           



                          hJZioAX9qphdubYomIm6vj                           



                          bdoPUgQPXnNXYME4tuBrul                           



                          FSVwPN94SDC4SWCtCFNzcE                           



                          YoNNErRFV7wQTtu9Xep39p                           



                          nVUvPA1YQQ47YmAaLrETxo                           



                          HoX2BySlKmUX12B5rnWAIl                           



                          GLchtoQtc7raabiySLDuTR                           



                          uMCGM3JFgxDRkxhSMwtZco                           



                          MCAgbnVjbGVhciBleHByZX                           



                          VarN7uLBZufpTRGJJiPktp                           



                          NYUdAD51KYG0VDQmLREvoU                           



                          DdRBXjSMC0jYBmp7Msv37o                           



                          cGFyXHBhciBJbnRlcnByZX                           



                          XcwCubeadjhYHuECGwMs3o                           



                          qQ1lh7hjCNQpp4RjxuHjbO                           



                          DtepCckIMwDXLziS1cIQ6r                           



                          GO1MUhKbfj19MOcoswbeYM                           



                          UibY05ZMIoo9WaWvjabNJ9                           



                          ZQDeXJQkXsIuaWQrl3UwlC                           



                          PwbRm2FLSmelR9OMMfqMi5                           



                          pG4mpLvjYNfOG4rxVFtoTP                           



                          xwYXJ9                                 

 

             SPECIAL STUDIES (test o9ucwAEnSSCgx8lrSXDnsV                       

    



             code = 3376) FuZzEwMzNcZnRuYmpcdWMx                           



                          PUwtkaFiDWdnq7TaE1FtUx                           



                          AwMFxhbnNpXGRlZmxhbmcx                           



                          FAGvKJX9jvCaNQJwLRjtQT                           



                          XpVAmnYr4xxFVutDnhCsFq                           



                          AFEak0debcFDwneotEt7m1                           



                          unYJIaRiM4jQRbRGbcB7ji                           



                          hzYruDXrJ9HsoZWzxLj3f7                           



                          owSdVaKuH0jYEqIDvdT4zo                           



                          dkJerDWrCSJiQDb0gY25BE                           



                          AesX2dwENtHLzuyhHxBtP9                           



                          BAwnQIAfYvU2DXFyxTNkYY                           



                          WrL5xxFJWfPPctKOOvDMaf                           



                          fREwINA6vOsrs4L2iVJfzS                           



                          EiwAuwCuJnRcDyTzJOm8Kv                           



                          DLr3dNyoP1HdBDTsMcG6qP                           



                          QgUGFyYWdyYXBoIEZvbnQ7                           



                          tStvbbChn98eoFLsCGPcCV                           



                          GrJuCftOlbQEAoCTEVw1Ul                           



                          lFadLNE2bAg8cKfdWgfgVK                           



                          G7Hzh4DA9zen97zwk8gGkm                           



                          ICGzuqkxLyL6JRugTXBnoc                           



                          svGYb0ISmnFEPibEC7GWZb                           



                          mMMbK7SaXVGhHN0hxfc2FU                           



                          P4FOagRMMyShC6KBCbhZAn                           



                          SXMhlIplNWswl715IMR6Rj                           



                          LoZV9rZ9Ebf6I9iQ2orMGe                           



                          BHVlgZDqDhJnWYIhue4ngC                           



                          VvDUvly5OaLJI0utE5fNDc                           



                          qFShKVPmJW57Srezm8RyZu                           



                          qij2HpJ87ewQS6XIfev1ht                           



                          IN5rQuW3mcStJVpbc2rimU                           



                          5sTyC0FKyjGT3xEL9iVTTi                           



                          qV7dvexbJJIoRzOkawrbUY                           



                          WriSkwviXdIw1oaNbbLEA4                           



                          XBljA7curP0mXkN1JYgbY1                           



                          abqG6cXMa9MWlshYI1OXLi                           



                          xO8zYF5wlvwlb3jpYPqkAY                           



                          ylXEWblkH2btV3BSSruHTu                           



                          E6VriN3qMKSbNU0wyrfvl7                           



                          tjDQG3ARyfWPGwYAW6QzYs                           



                          KTHxw3Mnalg6NtHrz1HizA                           



                          SsOBrdY64ay124UCOvmlBh                           



                          B0uwrUDolratwGQfvbvvUQ                           



                          eexaY3FJNzXWIbTWofILBg                           



                          XGZzMjJcbGFuZzEwMzNcaG                           



                          ljaFxmMVxkYmNoXGYxXGxv                           



                          K1wxKrTdW9KuAZItDxWyUV                           



                          yqKHyavENfaMIolMF2lU7s                           



                          MH4uXSWtmHLuI7CmPGAvmi                           



                          XabMNlRJE9tVFfqORqNS2v                           



                          HBfudCIso0hbo2CtY8uejT                           



                          rkkXG2TE9kXQXoEVAnXMhu                           



                          m3AuoL3lQddlsQCgozbzTJ                           



                          xmczIyXGxhbmcxMDMzXGhp                           



                          X2qkQxGjMSPfaSfyVFcig1                           



                          NoXGYxXGNmMlxmczIyXGx0                           



                          cmNoXHBhclxwYXJccGxhaW                           



                          6zBcDoDrAcTpgzDZ5bLRPg                           



                          I5gxjILoVHIiFHMzG9qrTc                           



                          PpbO4jqIkjHKudEmNaFoHn                           



                          XxLBv404hw4aBMGybRFpyx                           



                          TKpKZtpV9tONsdKKdqYSse                           



                          fBWmDJele3ooOWDyo7t7sK                           



                          VqDMVzsySnt7uxSUqdrcDh                           



                          LUYrhKVueGVxEEEyk49nQF                           



                          nxgXkujNbnBCZoe1OqpKym                           



                          v1RaGkPyRFcgp4RwS67xiG                           



                          JvbCBzbGlkZXMgcnVuIGFs                           



                          a12tx9dhJEBvUsO5bXUxnX                           



                          V9oPDbjGRjv8IpnLlaGVLm                           



                          p4bwRFDahi8kbsbcsGCkf9                           



                          WsyK6yviqqDFisvGOwexUu                           



                          COYpn8d6wIJdHNItEICqWM                           



                          kyaTr1BMQnp176jg8frjX1                           



                          kHSwOVG1NLfvLFPsVZWsbb                           



                          UgZXZhbHVhdGVkXHBsYWlu                           



                          XGYxXGZzMjJcbGFuZzEwMz                           



                          NcaGljaFxmMVxkYmNoXGYx                           



                          AMnfE8bvXzWzC3RcGMDwAi                           



                          RelOVdL8wimQOnLSGuQHva                           



                          XGYxXGZzMjJcbGFuZzEwMz                           



                          NcaGljaFxmMVxkYmNoXGYx                           



                          IMagL6rwZfDhI4MzHSFjDy                           



                          IgIFxwbGFpblxmMVxmczIy                           



                          ACrwfhppSORoKLjuM7uqRt                           



                          AcAIKlpIqoYLhqr2ZaBLNf                           



                          MTHdOkcipyArBMg5vzOsQS                           



                          BhclxwbGFpblxmMVxmczIy                           



                          FXileyxxILPjLNadP1xyRx                           



                          WgNLGgzCezACtvf1McUBTr                           



                          XGNmMlxmczIyIEltbXVub2                           



                          idg9XgV0cebPxajYX5QEGc                           



                          R5fmxCObdIK1TNB6yT0vNW                           



                          lftlOkTKVzf0QsYGDeJFTz                           



                          GxU7zX8oXCZ3KbICaXujHL                           



                          BsYWluXGYxXGZzMjJcbGFu                           



                          ZzEwMzNcaGljaFxmMVxkYm                           



                          IbAYEsFLykC8tfSsRaR0Oq                           



                          AKIyJcImmSraJGraKJp3Cz                           



                          xwbGFpblxmMVxmczIyXGxh                           



                          ggkbICAwHQhyL2qwVhPqTE                           



                          TslPrnVKoic6YvTAQaYBNk                           



                          MlxmczIyIHMgTWVkaWNhbC                           



                          JBFV82QAGsDRRpaRdgzN6x                           



                          oDJXQYNcnkE8e1T1VJilOM                           



                          BuQNx9DNdkoiYpIOXyyC4e                           



                          FGLcPH8gKVv8bjHaNUUwe2                           



                          OwDB1oRIXbhXKkPTS8TPKg                           



                          x6UpF9Lzm6FePSRpQVMaid                           



                          3lewHkUuCTlNAfQOIbaa47                           



                          IJDcRZ8aH5prEVIcGDKnku                           



                          DzkUXqq1UnXKDhqWC0wSHo                           



                          UA1ZJwJNe37jUAFqSSCSzg                           



                          TbMIFibRqhmNO2dqU2eY0h                           



                          LiBUaGUgRkRBIGhhcyBkZX                           



                          Eykd3lkoXbVMUvWSKlb5Zw                           



                          nOAzzYDnldFcB1Hid9VzFV                           



                          Nbsk92LVywmSMpfj39AM5e                           



                          Z0Fty9UvcZ0lUNzpAIElo5                           



                          EglABonNBlHNKss6RfD6ul                           



                          ccvtUGrczBWsrW8yTNMaIF                           



                          k6RRCot0OiRFJrx5SqSoLb                           



                          vlDtTUQbEZJjMFXyhH24KY                           



                          F2yBogrDtsrnPeCK8oPFBu                           



                          jpQnPWChOCElgP4vPAlrtf                           



                          BzXBIlvmG0n6E7VAeiWKXj                           



                          djYrRyajJFX8cbMpmbM1uN                           



                          YyB8qqvuboQSslWMMhk3Ki                           



                          dT6yxPYEvIJsh2LajSCcuL                           



                          YHtBSbPR3jmjGuQD7zWCB2                           



                          ODggKENMSUEtODgpIGFzIH                           



                          D9SYfsXdrzWSF4slYfFAVg                           



                          e5MnMMqhG8etK91ejJmyeE                           



                          b0oRSliWoerAJarZRbHWXo                           



                          vhX9e1Y8ERKzn9FndvukGK                           



                          BsYWluXGYyXGZzMjJcbGFu                           



                          ZzEwMzNcaGljaFxmMlxkYm                           



                          TmURKrTEqjG4kcZuSrFgRb                           



                          CfcfGAM6nV==                           

 

             Gross assessment was [facility]                           



             performed at ([facility],                           



             = 2777)      Department of                           



                          Pathology, [address] [place] [id number], Tel                           



                          [phone]                           

 

             Technical component was [facility]                          

 



             performed at ([facility],                           



             = 2778)      Department of                           



                          Pathology, [address] [place] [address], Tel                           



                          [phone]                           

 

             Professional component [facility]                           



             was performed at ([facility],                           



             code = 2779) Department of                           



                          Pathology, [address] [place] [address], Tel                           



                          [phone]                           



[id number]:38Surgical Pathology 
Report Case: [id number] Authorizing Provider: Elayne Longo MD Collected: 
2022 10:01 AM Ordering Location: [facility] Received: 2022
11:57 AM Services  Pathologist: Homer Thomas MD Specimens: A) - Large 
Intestine, Colon - Transverse, Bx mass  B) - Polyp, Colon - Left/Descending, 
taken by hot snare C) - Polyp, Colon - Left/Descending, x3 taken by hot snare  
D) - Polyp, Colon - Sigmoid, x5 taken by hot snare A. LARGE INTESTINE, 
TRANSVERSE COLON MASS, BIOPSY: - INVASIVE ADENOCARCINOMA - SEE MICROSCOPIC 
DESCRIPTION B. LARGE INTESTINE, DESCENDING COLON POLYP, BIOPSY: - TUBULAR 
ADENOMA, FRAGMENTED C. LARGE INTESTINE, DESCENDING COLON POLYP x 3, BIOPSY: - 
TUBULAR ADENOMA, FRAGMENTED - SESSILE SERRATED LESION D. LARGE INTESTINE, 
SIGMOID COLON POLYP x 5, BIOPSY: - TUBULOVILLOUS ADENOMA, FRAGMENTED - 
HYPERPLASTIC POLYP Signing Pathologist Direct Phone Line: 
[phone] signed by Homer Thomas MD on 2022 at 8:01 
AMBlock A1 has adequate tumor cellularity for future ancillary studies.88305 x 
4, 19318, 78070 x 5A. Large Intestine, Colon - Transverse.Received in formalin 
labeled with the patient's name, medical record number and "large 
intestine-colon-transverse biopsy mass" and consists of multiple tan-pink, 
irregular soft tissue fragments (2.4 x 2.1 x 0.3 cm in aggregate). The specimen 
is submitted in toto in A1.B. Polyp, Colon - Left/Descending.Received in 
formalin labeled with the patient's name, medical record number and "polyp, 
colon-left descending-taken by hot snare" and consists of multiple tan-pink, 
irregular, ovoidsoft tissue fragment (2.4 x 2.1 x 0.4 cm in aggregate). The 
specimen is submitted in toto in B1.C. Polyp, Colon - Left/Descending.Received 
in formalin labeled with the patient's name, medical record number and "polyp, 
colon-left descending x3 taken by hot snare" and consists of multiple tan-
yellow, ovoid soft tissue fragments (3.0 x 2.1 x 0.4 cm in aggregate). The 
largest ovoid soft tissue fragment is bisected to show tan-pink cut surface. The
specimen is submitted entirely in C1-C2.Ink code:Blue-resection marginSection 
code:C1: Soft tissue fragmentsC2: Ovoid soft tissue, bisectedD. Polyp, Colon - S
igmoid.Received in formalin labeled with the patient's name, medical record 
number and "polyp, colon-sigmoid x5 taken by hot snare" and consists of multiple
tan-pink, ovoid, pedunculated soft tissue fragments (ranging from 0.1 cm - 1.8 
cm in greatest dimension, 7.5 x 2.1 x 0.4 cm in aggregate). The largest 
pedunculated soft tissue fragment is quadrisected to show tan-pink, focal 
hemorrhagic cut surface. The specimen is submitted entirely in D1-D3.JESENIA Braswell studentSynaptophysin and chromogranin were performed on part 
A and were negative in tumor cells (all stains are with appropriatecontrol 
staining). MISMATCH REPAIR (MMR) PROTEINS on Part A:Immunohistochemistry 
results:MSH-2: Intact nuclear expressionMSH-6: Intact nuclear expressionMLH-1: 
Intact nuclear expressionPMS-2: Intact nuclear expressionInterpretation:No loss 
of nuclear expression of MMR proteins: low probability of microsattelite 
instability-high (MSI-H). The interpretation of this case included the use of 
immunohistochemistry or special stains.Control Slides Examined: In-house known 
positive controls were evaluated along with the test tissue. These control 
slides run alongside of the patients sample show appropriatestaining. Internal 
positive and negative controls when available are evaluated Immunohistochemistry
technical testing was performed at [facility], Pathology 
Laboratory where it was developed and its performance characteristics were 
determined. It has not been cleared or approved by the U.S. Food and Drug 
Administration. The FDA has determined that such clearance or approval is not 
necessary. The test is used for clinical purposes. It should not be regarded as 
investigational orfor research. This laboratory is certified under the Clinical 
Laboratory Improvement Amendments of 1988 (CLIA-88) as qualified to perform high
complexity clinical laboratory testing.[facility], 
Department of Pathology, [address] [place] [address], [place] [address], Tel 
[phone], [facility] of [facility], [address] [place] [address], Tel [phone] Hospital, Department of [facility], [address] [place] [address], Tel 
[phone]-Glucose [id number] 13:15:36





             Test Item    Value        Reference Range Interpretation Comments

 

             POC-Glucose Meter (test 121 mg/dL           H            : TE

STED AT [facility]



             code = 1538)                                        [address] [place] [address], [facility]

30:



                                                                 /Techni

shelbi



                                                                 ID = [id number] for



                                                                 Jonny, Shemek

e

 

             Lab Interpretation (test Abnormal                               



             code = 80627-7)                                        



[id number] 13:15:36





             Test Item    Value        Reference Range Interpretation Comments

 

             POC-Glucose Meter (test 121 mg/dL           H            : TE

STED AT [facility]



             code = 1538)                                        [address] [place] [address], [facility]

30:



                                                                 /Techni

shelbi



                                                                 ID = [id number] for



                                                                 Jonny, Shemek

e

 

             Lab Interpretation (test Abnormal                               



             code = 82660-0)                                        



[facility] [id number] 13:15:36





             Test Item    Value        Reference Range Interpretation Comments

 

             POC-Glucose Meter (test 121 mg/dL           H            : TE

STED AT [facility]



             code = 1538)                                        [address] [place] [address], [facility]

30:



                                                                 /Techni

shelbi



                                                                 ID = [id number] for



                                                                 Jonny, Shemek

e

 

             Lab Interpretation (test Abnormal                               



             code = 16672-9)                                        



[facility] [id number] 13:15:36





             Test Item    Value        Reference Range Interpretation Comments

 

             POC-Glucose Meter (test 121 mg/dL           H            : TE

STED AT [facility]



             code = 1538)                                        [address] [place] [address], [facility]

30:



                                                                 /Techni

shelbi



                                                                 ID = [id number] for



                                                                 Jonny, Shemek

e

 

             Lab Interpretation (test Abnormal                               



             code = 96835-9)                                        



[facility] [id number] 13:15:36





             Test Item    Value        Reference Range Interpretation Comments

 

             POC-GLUCOSE METER 121 mg/dL           H            : TESTED A

T [facility] [address]



             (BEAKER) (test code =                                        [facility],



             1538)                                               [address]:



                                                                 /Techni

shelbi ID



                                                                 = [id number] for Colt De La Torre



POCT-GLUCOSE [id number] 09:02:57





             Test Item    Value        Reference Range Interpretation Comments

 

             POC-GLUCOSE METER 125 mg/dL           H            : TESTED A

T [facility] [address]



             (BEAKER) (test code =                                        [facility],



             1538)                                               [address]:



                                                                 /Techni

shelbi ID



                                                                 = [id number] for MYAANK OWUSU



SARS-CoV2/RT-PCR (Asymptomatic ONLY)2022 07:59:31





             Test Item    Value        Reference    Interpretation Comments



                                       Range                     

 

             SARS-COV2/RT-PCR Negative     Negative                  The SARS-Co

V-2



             (test code =                                        target nucleic



             71218-6)                                            acids are not



                                                                 detected in thi

s



                                                                 specimen. Negat

pablo



                                                                 results do not



                                                                 preclude SARS-C

oV-2



                                                                 infection and



                                                                 should not be u

sed



                                                                 as the sole bas

is



                                                                 for patient



                                                                 management



                                                                 decisions. Nega

tive



                                                                 results must be



                                                                 combined with



                                                                 clinical



                                                                 observations,



                                                                 patient history

,



                                                                 and epidemiolog

ical



                                                                 information. A



                                                                 false negative



                                                                 result may occu

r if



                                                                 a specimen is



                                                                 improperly



                                                                 collected,



                                                                 transported or



                                                                 handled. This S

ARS



                                                                 CoV-2 test is a



                                                                 rapid, real-roxanne

e



                                                                 RT-PCR test



                                                                 intended for th

e



                                                                 qualitative



                                                                 detection of



                                                                 nucleic acid fr

om



                                                                 SARS-CoV-2 in a



                                                                 nasopharyngeal 

swab



                                                                 specimen [facility]



                                                                 from individual

s



                                                                 suspected of



                                                                 COVID-19 by the

ir



                                                                 healthcare



                                                                 provider.

 

             KEEGAN (test code = This test has been                           



             KEEGAN)         authorized by FDA                           



                          under an EUA for                           



                          use by authorized                           



                          laboratories. This                           



                          test is only                           



                          authorized for the                           



                          duration of the                           



                          declaration that                           



                          circumstances                           



                          exist justifying                           



                          the authorization                           



                          of emergency use                           



                          of in vitro                            



                          diagnostic tests                           



                          for detection                           



                          and/or diagnosis                           



                          of COVID-19 under                           



                          Section 564(b)(1)                           



                          of the Federal                           



                          Food, Drug and                           



                          Cosmetic Act, 21                           



                          [place]                               



                          360bbb-3(b)(1),                           



                          unless the                             



                          authorization is                           



                          terminated or                           



                          revoked sooner.                           



                          Fact Sheet for                           



                          Healthcare                             



                          Providers:                             



                          https://www.[employer]/Documents/Xp                           



                          ert%20Xpress%20SAR                           



                          S%20CoV-2/Fact%20S                           



                          heets/647-2565%20S                           



                          ARS-COV-2%20HEALTH                           



                          CARE%20PROVIDERS%2                           



                          0FACT%20SHEET.pdf                           



                          Fact Sheet for                           



                          Healthcare                             



                          Patients:                              



                          https://www.[employer]/Documents/Xp                           



                          ert%20Xpress%20SAR                           



                          S%20CoV-2/Fact%20S                           



                          heets/302-3801%20S                           



                          ARS-COV-2%20PATIEN                           



                          T%20FACT%20SHEET.p                           



                          df                                     

 

             Lab Interpretation Normal                                 



             (test code =                                        



             18378-4)                                            



[facility] 
07:59:31





             Test Item    Value        Reference    Interpretation Comments



                                       Range                     

 

             SARS-COV2/RT-PCR Negative     Negative                  The SARS-Co

V-2



             (test code =                                        target nucleic



             76998-9)                                            acids are not



                                                                 detected in thi

s



                                                                 specimen. Negat

pablo



                                                                 results do not



                                                                 preclude SARS-C

oV-2



                                                                 infection and



                                                                 should not be u

sed



                                                                 as the sole bas

is



                                                                 for patient



                                                                 management



                                                                 decisions. Nega

tive



                                                                 results must be



                                                                 combined with



                                                                 clinical



                                                                 observations,



                                                                 patient history

,



                                                                 and epidemiolog

ical



                                                                 information. A



                                                                 false negative



                                                                 result may occu

r if



                                                                 a specimen is



                                                                 improperly



                                                                 collected,



                                                                 transported or



                                                                 handled. This S

ARS



                                                                 CoV-2 test is a



                                                                 rapid, real-roxanne

e



                                                                 RT-PCR test



                                                                 intended for th

e



                                                                 qualitative



                                                                 detection of



                                                                 nucleic acid fr

om



                                                                 SARS-CoV-2 in a



                                                                 nasopharyngeal 

swab



                                                                 specimen collec

froilan



                                                                 from individual

s



                                                                 suspected of



                                                                 COVID-19 by the

ir



                                                                 healthcare



                                                                 provider.

 

             KEEGAN (test code = This test has been                           



             KEEGAN)         authorized by FDA                           



                          under an EUA for                           



                          use by authorized                           



                          laboratories. This                           



                          test is only                           



                          authorized for the                           



                          duration of the                           



                          declaration that                           



                          circumstances                           



                          exist justifying                           



                          the authorization                           



                          of emergency use                           



                          of in vitro                            



                          diagnostic tests                           



                          for detection                           



                          and/or diagnosis                           



                          of COVID-19 under                           



                          Section 564(b)(1)                           



                          of the Federal                           



                          Food, Drug and                           



                          Cosmetic Act, 21                           



                          U.S[place]C.                               



                          360bbb-3(b)(1),                           



                          unless the                             



                          authorization is                           



                          terminated or                           



                          revoked sooner.                           



                          Fact Sheet for                           



                          Healthcare                             



                          Providers:                             



                          https://www.[employer]/Documents/Xp                           



                          ert%20Xpress%20SAR                           



                          S%20CoV-2/Fact%20S                           



                          heets/3023802%20S                           



                          ARS-COV-2%20HEALTH                           



                          CARE%20PROVIDERS%2                           



                          0FACT%20SHEET.pdf                           



                          Fact Sheet for                           



                          Healthcare                             



                          Patients:                              



                          https://www.[employer]/Documents/Xp                           



                          ert%20Xpress%20SAR                           



                          S%20CoV-2/Fact%20S                           



                          heets/302-3801%20S                           



                          ARS-COV-2%20PATIEN                           



                          T%20FACT%20SHEET.p                           



                          df                                     

 

             Lab Interpretation Normal                                 



             (test code =                                        



             95229-0)                                            



[facility] 
07:59:31





             Test Item    Value        Reference    Interpretation Comments



                                       Range                     

 

             SARS-COV2/RT-PCR Negative     Negative                  The SARS-Co

V-2



             (test code =                                        target nucleic



             43692-5)                                            acids are not



                                                                 detected in thi

s



                                                                 specimen. Negat

pablo



                                                                 results do not



                                                                 preclude SARS-C

oV-2



                                                                 infection and



                                                                 should not be u

sed



                                                                 as the sole bas

is



                                                                 for patient



                                                                 management



                                                                 decisions. Nega

tive



                                                                 results must be



                                                                 combined with



                                                                 clinical



                                                                 observations,



                                                                 patient history

,



                                                                 and epidemiolog

ical



                                                                 information. A



                                                                 false negative



                                                                 result may occu

r if



                                                                 a specimen is



                                                                 improperly



                                                                 collected,



                                                                 transported or



                                                                 handled. This S

ARS



                                                                 CoV-2 test is a



                                                                 rapid, real-roxanne

e



                                                                 RT-PCR test



                                                                 intended for th

e



                                                                 qualitative



                                                                 detection of



                                                                 nucleic acid fr

om



                                                                 SARS-CoV-2 in a



                                                                 nasopharyngeal 

swab



                                                                 specimen collec

froilan



                                                                 from individual

s



                                                                 suspected of



                                                                 COVID-19 by the

ir



                                                                 healthcare



                                                                 provider.

 

             KEEGAN (test code = This test has been                           



             KEEGAN)         authorized by FDA                           



                          under an EUA for                           



                          use by authorized                           



                          laboratories. This                           



                          test is only                           



                          authorized for the                           



                          duration of the                           



                          declaration that                           



                          circumstances                           



                          exist justifying                           



                          the authorization                           



                          of emergency use                           



                          of in vitro                            



                          diagnostic tests                           



                          for detection                           



                          and/or diagnosis                           



                          of COVID-19 under                           



                          Section 564(b)(1)                           



                          of the Federal                           



                          Food, Drug and                           



                          Cosmetic Act, 21                           



                          [place]                               



                          360bbb-3(b)(1),                           



                          unless the                             



                          authorization is                           



                          terminated or                           



                          revoked sooner.                           



                          Fact Sheet for                           



                          Healthcare                             



                          Providers:                             



                          https://www.[employer]/Documents/Xp                           



                          ert%20Xpress%20SAR                           



                          S%20CoV-2/Fact%20S                           



                          heets/302-3802%20S                           



                          ARS-COV-2%20HEALTH                           



                          CARE%20PROVIDERS%2                           



                          0FACT%20SHEET.pdf                           



                          Fact Sheet for                           



                          Healthcare                             



                          Patients:                              



                          https://www.[employer]/Documents/Xp                           



                          ert%20Xpress%20SAR                           



                          S%20CoV-2/Fact%20S                           



                          heets/302-3801%20S                           



                          ARS-COV-2%20PATIEN                           



                          T%20FACT%20SHEET.p                           



                          df                                     

 

             Lab Interpretation Normal                                 



             (test code =                                        



             83579-7)                                            



[facility] 
[facility]31





             Test Item    Value        Reference    Interpretation Comments



                                       Range                     

 

             SARS-COV2/RT-PCR Negative     Negative                  The SARS-Co

V-2



             (test code =                                        target nucleic



             81160-0)                                            acids are not



                                                                 detected in thi

s



                                                                 specimen. Negat

pablo



                                                                 results do not



                                                                 preclude SARS-C

oV-2



                                                                 infection and



                                                                 should not be u

sed



                                                                 as the sole bas

is



                                                                 for patient



                                                                 management



                                                                 decisions. Nega

tive



                                                                 results must be



                                                                 combined with



                                                                 clinical



                                                                 observations,



                                                                 patient history

,



                                                                 and epidemiolog

ical



                                                                 information. A



                                                                 false negative



                                                                 result may occu

r if



                                                                 a specimen is



                                                                 improperly



                                                                 collected,



                                                                 transported or



                                                                 handled. This S

ARS



                                                                 CoV-2 test is a



                                                                 rapid, real-roxanne

e



                                                                 RT-PCR test



                                                                 intended for th

e



                                                                 qualitative



                                                                 detection of



                                                                 nucleic acid fr

om



                                                                 SARS-CoV-2 in a



                                                                 nasopharyngeal 

swab



                                                                 specimen colle

froilan



                                                                 from individual

s



                                                                 suspected of



                                                                 COVID-19 by the

ir



                                                                 healthcare



                                                                 provider.

 

             KEEGAN (test code = This test has been                           



             KEEGAN)         authorized by FDA                           



                          under an EUA for                           



                          use by authorized                           



                          laboratories. This                           



                          test is only                           



                          authorized for the                           



                          duration of the                           



                          declaration that                           



                          circumstances                           



                          exist justifying                           



                          the authorization                           



                          of emergency use                           



                          of in vitro                            



                          diagnostic tests                           



                          for detection                           



                          and/or diagnosis                           



                          of COVID-19 under                           



                          Section 564(b)(1)                           



                          of the Federal                           



                          Food, Drug and                           



                          Cosmetic Act, 21                           



                          U.S[place]                               



                          360bbb-3(b)(1),                           



                          unless the                             



                          authorization is                           



                          terminated or                           



                          revoked sooner.                           



                          Fact Sheet for                           



                          Healthcare                             



                          Providers:                             



                          https://www.[employer]/Documents/Xp                           



                          ert%20Xpress%20SAR                           



                          S%20CoV-2/Fact%20S                           



                          heets/302-3802%20S                           



                          ARS-COV-2%20HEALTH                           



                          CARE%20PROVIDERS%2                           



                          0FACT%20SHEET.pdf                           



                          Fact Sheet for                           



                          Healthcare                             



                          Patients:                              



                          https://www.[employer]/Documents/Xp                           



                          ert%20Xpress%20SAR                           



                          S%20CoV-2/Fact%20S                           



                          heets/302-3801%20S                           



                          ARS-COV-2%20PATIEN                           



                          T%20FACT%20SHEET.p                           



                          df                                     

 

             Lab Interpretation Normal                                 



             (test code =                                        



             60319-1)                                            



[facility] 
07:59:31





             Test Item    Value        Reference Range Interpretation Comments

 

             SARS-COV2/RT-PCR Negative     Negative                  The SARS-Co

V-2 target



             (test code =                                        nucleic acids a

re not



             8014831)                                            detected in thi

s specimen.



                                                                 Negative result

s do not



                                                                 preclude SARS-C

oV-2



                                                                 infection and s

hould not be



                                                                 used as the sol

e basis for



                                                                 patient managem

ent



                                                                 decisions. Nega

tive results



                                                                 must be combine

d with



                                                                 clinical observ

ations,



                                                                 patient history

, and



                                                                 epidemiological

 information.



                                                                 A false negativ

e result may



                                                                 occur if a spec

imen is



                                                                 improperly pina

ected,



                                                                 transported or 

handled. This



                                                                 SARS CoV-2 test

 is a rapid,



                                                                 real-time RT-PC

R test



                                                                 intended for th

e qualitative



                                                                 detection of nu

cleic acid



                                                                 from SARS-CoV-2

 in a



                                                                 nasopharyngeal 

swab specimen



                                                                 collected from 

individuals



                                                                 suspected of CO

VID-19 by



                                                                 their healthcar

e provider.



This test has been authorized by FDA under an EUA for use by authorized 
laboratories. This test is only authorized for the duration of the declaration 
that circumstances exist justifying the authorization of emergency use of in 
vitro diagnostic tests for detection and/or diagnosis of COVID-19 under Section 
564(b)(1) of the Federal Food, Drug and Cosmetic Act, 21 U.S.C. 360bbb-3(b)(1), 
unless the authorization is terminated or revoked sooner. Fact Sheet for 
Healthcare Providers: https://www.[employer]
m/Documents/Xpert%20Xpress%20SARS%20CoV-2/Fact%20Sheets/246-0055%90HYIU-SIQ-9%20

HEALTHCARE%20PROVIDERS%20FACT%20SHEET.pdf Fact Sheet for Healthcare Patients: 
https://www.[employer]/Documents/Xpert%20Xp
ress%20SARS%20CoV-2/Fact%20Sheets/636-4748%90JLDA-FNZ-7%20PATIENT%20FACT%20SHEET

.pdfPOCT-GLUCOSE [id number] 08:23:46





             Test Item    Value        Reference Range Interpretation Comments

 

             POC-GLUCOSE METER 106 mg/dL                        : TESTED A

[facility] [address]



             (BEAKER) (test code                                        AUGUSTINE GARCIA BLDG A,



             = 1538)                                             [facility] [address]

0:



                                                                 /Techni

shelbi ID =



                                                                 [id number] for RICHELLE DIMAS



[id number] 09:23:02





             Test Item    Value        Reference    Interpretation Comments



                                       Range                     

 

             CEA (test code              See_Commen   H             In otherwise

 healthy smokers, 95%



             = 2039-6)                 t                         of patients fal

l in the rangeof



                                                                 0.0-5.5 ng/mL. 

NOTE: Methodology is



                                                                 Roche Renan



                                                                 Electrochemilum

inescenceImmunoassay



                                                                 (ECLIA). Unless

 Otherwise Indicated,



                                                                 All Testing Per

formed At: Clinical



                                                                 [place] [address] Laboratory



                                                                 Director: Bonifacio Carey M.D.



                                                                 CLIA Number [id number]

[id number] Cap



                                                                 Accreditation N

o. [id number]



                                                                 [Automated mess

age] The system which



                                                                 generated this 

result transmitted



                                                                 reference range

: <=3.8 NG/ML. The



                                                                 reference range

 was not used to



                                                                 interpret this 

result as



                                                                 normal/abnormal

.

 

             Lab          Abnormal                               



             Interpretation                                        



             (test code =                                        



             24367-5)                                            



[facility] [id number] 09:23:02





             Test Item    Value        Reference Range Interpretation Comments

 

             AFP-TUMOR MARKER (test              See_Comment                Unle

ss Otherwise



             code = 42239-1)                                        Indicated, A

ll Testing



                                                                 Performed At: C

linical



                                                                 [place] [address] Laborator

y Director:



                                                                 Bonifacio negron M.D.



                                                                 CLIA Number [id number]

[id number] Cap



                                                                 Accreditation N

o. [id number]



                                                                  [Automated mes

teodora] The



                                                                 system which ge

nerated



                                                                 this result tra

nsmitted



                                                                 reference range

: <=8.30



                                                                 NG/ML. The refe

rence range



                                                                 was not used to

 interpret



                                                                 this result as



                                                                 normal/abnormal

.



[facility] [id number] 09:23:02





             Test Item    Value        Reference    Interpretation Comments



                                       Range                     

 

             CA 19-9 (test code >26979       See_Comment  H             NOTE: Me

thodology is Roche



             = 24108-3)                                          Renan



                                                                 Electrochemilum

inescence



                                                                 Immunoassay (EC

PIETER). Values



                                                                 obtained with d

ifferent



                                                                 assays/manufact

urers cannot



                                                                 be used interch

angeably.



                                                                 Results should 

not be used



                                                                 as sole basis t

o establish



                                                                 the presence or

 absence of



                                                                 malignancy. Unl

ess Otherwise



                                                                 Indicated, All 

Testing



                                                                 Performed At: [facility]

[facility]



                                                                 [facility]ology [facility], [address]



                                                                 [facility] [address]



                                                                 Laboratory Dire

ctor: Bonifacio Carey M.D.

 CLIA Number



                                                                 [id number] Cap 

Accreditation



                                                                 [place]. [id number] [A

utomated



                                                                 message] The sy

stem which



                                                                 generated this 

result



                                                                 transmitted ref

erence range:



                                                                 <35 U/ML. The r

eference



                                                                 range was not u

sed to



                                                                 interpret this 

result as



                                                                 normal/abnormal

.

 

             Lab Interpretation Abnormal                               



             (test code =                                        



             18950-5)                                            



[facility] [id number] 08:41:12





             Test Item    Value        Reference Range Interpretation Comments

 

             GLUCOSE (test code =              See_Comment  H             [Autom

ated message]



             2345-7)                                             The system [employer]



                                                                 generated this 

result



                                                                 transmitted ref

erence



                                                                 range: 70 - 99 

MG/DL.



                                                                 The reference r

veena



                                                                 was not used to



                                                                 interpret this 

result



                                                                 as normal/abnor

mal.

 

             BLOOD UREA NITROGEN              See_Comment                [Automa

froilan message]



             (test code = 3091-6)                                        The sys

tem which



                                                                 generated this 

result



                                                                 transmitted ref

erence



                                                                 range: 8 - 23 M

G/DL.



                                                                 The reference r

veena



                                                                 was not used to



                                                                 interpret this 

result



                                                                 as normal/abnor

mal.

 

             CREATININE (test code =              See_Comment                [Au

tomated message]



             2160-0)                                             The system [employer]



                                                                 generated this 

result



                                                                 transmitted ref

erence



                                                                 range: 0.80 - 1

.40



                                                                 MG/DL. The refe

rence



                                                                 range was not u

sed to



                                                                 interpret this 

result



                                                                 as normal/abnor

mal.

 

             EGFR (test code =              See_Comment  L             [Automate

d message]



             33914-3)                                            The system [employer]



                                                                 generated this 

result



                                                                 transmitted ref

erence



                                                                 range: >60



                                                                 ML/MIN/1.73. Th

e



                                                                 reference range

 was



                                                                 not used to int

erpret



                                                                 this result as



                                                                 normal/abnormal

.

 

             BUN/CREAT RATIO (test              See_Comment                [Auto

mated message]



             code = 3097-3)                                        The system [employer]

[facility]



                                                                 generated this 

result



                                                                 transmitted ref

erence



                                                                 range: 6 - 28 R

ATIO.



                                                                 The reference r

veena



                                                                 was not used to



                                                                 interpret this 

result



                                                                 as normal/abnor

mal.

 

             SODIUM (test code =              See_Comment                [Automa

froilan message]



             2951-2)                                             The system [facility]



                                                                 generated this 

result



                                                                 transmitted ref

erence



                                                                 range: 133 - 14

6



                                                                 MEQ/L. The refe

rence



                                                                 range was not u

sed to



                                                                 interpret this 

result



                                                                 as normal/abnor

mal.

 

             POTASSIUM (test code =              See_Comment                [Aut

omated message]



             2823-3)                                             The system [facility]



                                                                 generated this 

result



                                                                 transmitted ref

erence



                                                                 range: 3.5 - 5.

4



                                                                 MEQ/L. The refe

rence



                                                                 range was not u

sed to



                                                                 interpret this 

result



                                                                 as normal/abnor

mal.

 

             CHLORIDE (test code =              See_Comment                [Auto

mated message]



             2075-0)                                             The system [facility]



                                                                 generated this 

result



                                                                 transmitted ref

erence



                                                                 range: 95 - 107

 MEQ/L.



                                                                 The reference r

veena



                                                                 was not used to



                                                                 interpret this 

result



                                                                 as normal/abnor

mal.

 

             CO2 (test code =              See_Comment                [Automated

 message]



             1963-8)                                             The system [facility]



                                                                 generated this 

result



                                                                 transmitted ref

erence



                                                                 range: 19 - 31 

MEQ/L.



                                                                 The reference r

veena



                                                                 was not used to



                                                                 interpret this 

result



                                                                 as normal/abnor

mal.

 

             CALCIUM (test code =              See_Comment                [Autom

ated message]



             19052-2)                                            The system [facility]



                                                                 generated this 

result



                                                                 transmitted ref

erence



                                                                 range: 8.5 - 10

.5



                                                                 MG/DL. The refe

rence



                                                                 range was not u

sed to



                                                                 interpret this 

result



                                                                 as normal/abnor

mal.

 

             PROTEIN TOTAL (test              See_Comment                [Automa

froilan message]



             code = 2885-2)                                        The system [facility]



                                                                 generated this 

result



                                                                 transmitted ref

erence



                                                                 range: 6.1 - 8.

3 G/DL.



                                                                 The reference r

veena



                                                                 was not used to



                                                                 interpret this 

result



                                                                 as normal/abnor

mal.

 

             ALBUMIN (test code =              See_Comment                [Autom

ated message]



             41322-0)                                            The system [facility]



                                                                 generated this 

result



                                                                 transmitted ref

erence



                                                                 range: 3.5 - 5.

2 G/DL.



                                                                 The reference r

veena



                                                                 was not used to



                                                                 interpret this 

result



                                                                 as normal/abnor

mal.

 

             GLOBULINS, SERUM, TOTAL              See_Comment                [Au

tomated message]



             (test code = 68681-4)                                        The sy

stem which



                                                                 generated this 

result



                                                                 transmitted ref

erence



                                                                 range: 1.9 - 3.

7 G/DL.



                                                                 The reference r

veena



                                                                 was not used to



                                                                 interpret this 

result



                                                                 as normal/abnor

mal.

 

             A/G RATIO (test code =              See_Comment                [Aut

omated message]



             1759-0)                                             The system [employer]



                                                                 generated this 

result



                                                                 transmitted ref

erence



                                                                 range: 1.0 - 2.

6



                                                                 RATIO. The refe

rence



                                                                 range was not u

sed to



                                                                 interpret this 

result



                                                                 as normal/abnor

mal.

 

             BILIRUBIN TOTAL (test              See_Comment                [Auto

mated message]



             code = -2)                                        The system [employer]



                                                                 generated this 

result



                                                                 transmitted ref

erence



                                                                 range: <=1.2 MG

/DL.



                                                                 The reference r

veena



                                                                 was not used to



                                                                 interpret this 

result



                                                                 as normal/abnor

mal.

 

             ALKALINE PHOSPHATASE 210 U/L             H            



             (test code = 6768-6)                                        

 

             AST (SGOT) (test code = 56 U/L       9-50         H            



             1920-8)                                             

 

             ALT (SGPT) (test code = 30 U/L       5-50                       Unl

ess Otherwise



             1744-2)                                             Indicated, All 

Testing



                                                                 Performed At: 

[facility]



                                                                 Pathology



                                                                 Laboratories, [address] [place] [address]



                                                                 [id number] Laborator

y



                                                                 Director: Bonifacio Carey M.D.

 CLIA



                                                                 Number [id number]

03 Cap



                                                                 Accreditation N

o.



                                                                 [id number]

 

             Lab Interpretation Abnormal                               



             (test code = 82455-0)                                        



[facility] W/AUTO DIFF WITH [id number] 07:37:08





             Test Item    Value        Reference Range Interpretation Comments

 

             WHITE BLOOD CELL COUNT              See_Comment                [Aut

omated message]



             (test code = 54841-3)                                        The sy

stem which



                                                                 generated this 

result



                                                                 transmitted ref

erence



                                                                 range: 3.5 - 11

.0



                                                                 K/UL. The refer

ence



                                                                 range was not u

sed to



                                                                 interpret this 

result



                                                                 as normal/abnor

mal.

 

             RED BLOOD CELL COUNT              See_Comment                [Autom

ated message]



             (test code = 86943-6)                                        The sy

stem which



                                                                 generated this 

result



                                                                 transmitted ref

erence



                                                                 range: 4.50 - 6

.10



                                                                 M/UL. The refer

ence



                                                                 range was not u

sed to



                                                                 interpret this 

result



                                                                 as normal/abnor

mal.

 

             HEMOGLOBIN (test code =              See_Comment                [Au

tomated message]



             718-7)                                              The system [employer]



                                                                 generated this 

result



                                                                 transmitted ref

erence



                                                                 range: 13.5 - 1

7.0



                                                                 G/DL. The refer

ence



                                                                 range was not u

sed to



                                                                 interpret this 

result



                                                                 as normal/abnor

mal.

 

             HEMATOCRIT (test code = 40.7 %       40.0-51.0                 



             59376-5)                                            

 

             MEAN CORPUSCULAR VOLUME 82.7 fL      80.0-99.0                 



             (test code = 37826-1)                                        

 

             MEAN CORPUSCULAR 28.0 PG      25.0-33.0                 



             HEMOGLOBIN (test code =                                        



             83095-1)                                            

 

             MEAN CORPUSCULAR              See_Comment                [Automated

 message]



             HEMOGLOBIN CONC (test                                        The sy

stem which



             code = 28540-3)                                        generated th

is result



                                                                 transmitted ref

erence



                                                                 range: 31.0 - 3

6.0



                                                                 G/DL. The refer

ence



                                                                 range was not u

sed to



                                                                 interpret this 

result



                                                                 as normal/abnor

mal.

 

             RED CELL DISTRIBUTION 13.9 %       11.5-15.0                 



             WIDTH (test code =                                        



             64506-0)                                            

 

             NEUTROPHILS % (test code 78.6 %                                 



             = 49778-3)                                          

 

             LYMPHOCYTES % (test code 7.7 %                                  



             = 02203-7)                                          

 

             MONOCYTES % (test code = 11.1 %                                 



             26485-3)                                            

 

             EOSINOPHILS % (test code 1.4 %                                  



             = 89636-0)                                          

 

             BASOPHILS % (test code = 0.8 %                                  



             10141-9)                                            

 

             IMMATURE GRANULOCYTES 0.4 %                                  



             (test code = 93040-8)                                        

 

             NUCLEATED RBC'S              See_Comment                Unless Othe

rwise



             MYELOPEROX STAIN (test                                        Indic

ated, All



             code = 90283-8)                                        Testing Perf

ormed At:



                                                                 Clinical Pathol

[facility], [address] [place] [address]



                                                                 [id number] Laborator

y



                                                                 Director: JOE ScanlonIA



                                                                 Number [id number]

03 Cap



                                                                 Accreditation N

o.



                                                                 [id number] [Autom

ated



                                                                 message] The sy

stem



                                                                 which generated

 this



                                                                 result transmit

froilan



                                                                 reference range

: 0.00



                                                                 - 0.11 K/UL. Th

e



                                                                 reference range

 was



                                                                 not used to int

erpret



                                                                 this result as



                                                                 normal/abnormal

.

 

             PLATELET COUNT (test              See_Comment                [Autom

ated message]



             code = 67406-1)                                        The system w

[facility]



                                                                 generated this 

result



                                                                 transmitted ref

erence



                                                                 range: 130 - 40

0



                                                                 K/UL. The refer

ence



                                                                 range was not u

sed to



                                                                 interpret this 

result



                                                                 as normal/abnor

mal.

 

             NEUTROPHILS ABSOLUTE              See_Comment  H             [Autom

ated message]



             COUNT (test code =                                        The syste

m which



             25007-3)                                            generated this 

result



                                                                 transmitted ref

erence



                                                                 range: 1.50 - 7

.50



                                                                 K/UL. The refer

ence



                                                                 range was not u

sed to



                                                                 interpret this 

result



                                                                 as normal/abnor

mal.

 

             LYMPHOCYTES ABSOLUTE              See_Comment  L             [Autom

ated message]



             COUNT (test code =                                        The syste

m which



             05826-2)                                            generated this 

result



                                                                 transmitted ref

erence



                                                                 range: 1.00 - 4

.00



                                                                 K/UL. The refer

ence



                                                                 range was not u

sed to



                                                                 interpret this 

result



                                                                 as normal/abnor

mal.

 

             MONOCYTES ABSOLUTE COUNT              See_Comment  H             [A

utomated message]



             (test code = 76660-6)                                        The sy

stem which



                                                                 generated this 

result



                                                                 transmitted ref

erence



                                                                 range: 0.20 - 1

.00



                                                                 K/UL. The refer

ence



                                                                 range was not u

sed to



                                                                 interpret this 

result



                                                                 as normal/abnor

mal.

 

             BASOPHILS ABSOLUTE COUNT              See_Comment                [A

utomated message]



             (test code = 23407-3)                                        The sy

stem which



                                                                 generated this 

result



                                                                 transmitted ref

erence



                                                                 range: 0.00 - 0

.20



                                                                 K/UL. The refer

ence



                                                                 range was not u

sed to



                                                                 interpret this 

result



                                                                 as normal/abnor

mal.

 

             IMMATURE GRANS (ABS)              See_Comment                [Autom

ated message]



             (test code = 9987)                                        The syste

m which



                                                                 generated this 

result



                                                                 transmitted ref

erence



                                                                 range: 0.00 - 0

.10



                                                                 K/UL. The refer

ence



                                                                 range was not u

sed to



                                                                 interpret this 

result



                                                                 as normal/abnor

mal.

 

             Lab Interpretation (test Abnormal                               



             code = 93513-4)                                        



[facility](CELLAVISION MANUAL DIFF)2019 09:14:00





             Test Item    Value        Reference Range Interpretation Comments

 

             NEUTROPHILS - REL 81 %                                   



             (CELLAVISION)(BEAKER) (test code =                                 

       



             2816)                                               

 

             LYMPHOCYTES - REL 7 %                                    



             (CELLAVISION)(BEAKER) (test code =                                 

       



             2817)                                               

 

             MONOCYTES - REL 7 %                                    



             (CELLAVISION)(BEAKER) (test code =                                 

       



             2818)                                               

 

             EOSINOPHILS - REL 2 %                                    



             (CELLAVISION)(BEAKER) (test code =                                 

       



             2819)                                               

 

             MYELOCYTES - REL 2 %          0-0          H            



             (CELLAVISION)(BEAKER) (test code =                                 

       



             2822)                                               

 

             ATYPICAL LYMPHOCYTES - REL 1 %          0-0          H            



             (CELLAVISION)(BEAKER) (test code =                                 

       



             2829)                                               

 

             NEUTROPHILS - ABS 10.85 K/ul   1.78-5.38    H            



             (CELLAVISION)(BEAKER) (test code =                                 

       



             2830)                                               

 

             LYMPHOCYTES - ABS 0.94 K/ul    1.32-3.57    L            



             (CELLAVISION)(BEAKER) (test code =                                 

       



             2831)                                               

 

             MONOCYTES - ABS 0.94 K/uL    0.30-0.82    H            



             (CELLAVISION)(BEAKER) (test code =                                 

       



             2832)                                               

 

             EOSINOPHILS - ABS 0.27 K/uL    0.04-0.54                 



             (CELLAVISION)(BEAKER) (test code =                                 

       



             2834)                                               

 

             MYELOCYTES-ABS 0.27 K/uL    0.00-0.00    H            



             (CELLAVISION)(BEAKER) (test code =                                 

       



             2837)                                               

 

             ATYPICAL LYMPHOCYTES - ABS 0.13 K/uL    0.00-0.00    H            



             (CELLAVISION)(BEAKER) (test code =                                 

       



             2858)                                               

 

             TOTAL COUNTED (BEAKER) (test code 100                              

      



             = 1351)                                             

 

             RBC MORPHOLOGY (BEAKER) (test code Normal                          

       



             = 762)                                              

 

             SMUDGE CELLS (BEAKER) (test code = Present                         

       



             1371)                                               

 

             GIANT PLATELETS (BEAKER) (test Present                             

   



             code = 313)                                         

 

             PLATELET CONCENTRATION Decreased                              



             (CELLAVISION)(BEAKER) (test code =                                 

       



             3438)                                               



Received comment: User comments: Slide comments:RAD, CHEST, 1 VIEW, NON DEPT
2019 08:25:00Reason for exam:-&gt;left effusionShould this be performed at
the bedside?-&gt;YesFINAL REPORT PATIENT ID: [id number] Chest AP portable 
Comparison exam: 2019 History provided: Follow-up pleural effusion Left 
chest tube unchanged in place. No pneumothorax. No significant effusionsare 
evident. Nonspecific coarsening of markings in the left lower lobe. PICC line in
good position. Signed: Alex Morris MDReport Verified Date/Time: 2019 
08:25:12 Reading Location: [facility] Reading Room Electronically 
signed by: ALEX MORRIS on 2019 08:25 AMCOMPREHENSIVE METABOLIC PANEL
2019 06:40:00





             Test Item    Value        Reference Range Interpretation Comments

 

             TOTAL PROTEIN 4.8 gm/dL    6.0-8.3      L            



             (BEAKER) (test code =                                        



             770)                                                

 

             ALBUMIN (BEAKER) 2.4 g/dL     3.5-5.0      L            



             (test code = 1145)                                        

 

             ALKALINE PHOSPHATASE 141 U/L                          



             (BEAKER) (test code =                                        



             346)                                                

 

             BILIRUBIN TOTAL 3.9 mg/dL    0.2-1.2      H            



             (BEAKER) (test code =                                        



             377)                                                

 

             SODIUM (BEAKER) (test 123 meq/L    136-145      L            



             code = 381)                                         

 

             POTASSIUM (BEAKER) 3.6 meq/L    3.5-5.1                   



             (test code = 379)                                        

 

             CHLORIDE (BEAKER) 90 meq/L            L            



             (test code = 382)                                        

 

             CO2 (BEAKER) (test 29 meq/L     22-29                     



             code = 355)                                         

 

             BLOOD UREA NITROGEN 14 mg/dL     7-21                      



             (BEAKER) (test code =                                        



             354)                                                

 

             CREATININE (BEAKER) 0.80 mg/dL   0.57-1.25                 



             (test code = 358)                                        

 

             GLUCOSE RANDOM 152 mg/dL           H            



             (BEAKER) (test code =                                        



             652)                                                

 

             CALCIUM (BEAKER) 7.3 mg/dL    8.4-10.2     L            



             (test code = 697)                                        

 

             AST (SGOT) (BEAKER) 39 U/L       5-34         H            



             (test code = 353)                                        

 

             ALT (SGPT) (BEAKER) 23 U/L       6-55                      



             (test code = 347)                                        

 

             EGFR (BEAKER) (test 99 mL/min/1.73                           ESTIMA

FROILAN GFR IS



             code = 1092) sq m                                   NOT AS ACCURATE

 AS



                                                                 CREATININE



                                                                 CLEARANCE IN



                                                                 PREDICTING



                                                                 GLOMERULAR



                                                                 FILTRATION RATE

.



                                                                 ESTIMATED GFR I

S



                                                                 NOT APPLICABLE 

FOR



                                                                 DIALYSIS PATIEN

TS.



Specimen slightly [id number] 06:39:00





             Test Item    Value        Reference Range Interpretation Comments

 

             PHOSPHORUS (BEAKER) (test code = 2.1 mg/dL    2.3-4.7      L       

     



             604)                                                



[id number] 06:39:00





             Test Item    Value        Reference Range Interpretation Comments

 

             MAGNESIUM (BEAKER) (test code = 1.8 mg/dL    1.6-2.6               

    



             627)                                                



B-TYPE NATRIURETIC FACTOR (BNP)2019 06:04:00





             Test Item    Value        Reference Range Interpretation Comments

 

             B-TYPE NATRIURETIC PEPTIDE (BEAKER) 97 pg/mL     0-100             

        



             (test code = 700)                                        



CBC W/PLT COUNT &amp; AUTO [id number] 05:57:00





             Test Item    Value        Reference Range Interpretation Comments

 

             WHITE BLOOD CELL COUNT (BEAKER) 13.4 K/ L    3.5-10.5     H        

    



             (test code = 775)                                        

 

             RED BLOOD CELL COUNT (BEAKER) 3.17 M/ L    4.63-6.08    L          

  



             (test code = 761)                                        

 

             HEMOGLOBIN (BEAKER) (test code = 10.3 GM/DL   13.7-17.5    L       

     



             410)                                                

 

             HEMATOCRIT (BEAKER) (test code = 29.4 %       40.1-51.0    L       

     



             411)                                                

 

             MEAN CORPUSCULAR VOLUME (BEAKER) 92.7 fL      79.0-92.2    H       

     



             (test code = 753)                                        

 

             MEAN CORPUSCULAR HEMOGLOBIN 32.5 pg      25.7-32.2    H            



             (BEAKER) (test code = 751)                                        

 

             MEAN CORPUSCULAR HEMOGLOBIN CONC 35.0 GM/DL   32.3-36.5            

     



             (BEAKER) (test code = 752)                                        

 

             RED CELL DISTRIBUTION WIDTH 13.9 %       11.6-14.4                 



             (BEAKER) (test code = 412)                                        

 

             PLATELET COUNT (BEAKER) (test code 75 K/CU MM   150-450      L     

       



             = 756)                                              

 

             MEAN PLATELET VOLUME (BEAKER) 8.9 fL       9.4-12.4     L          

  



             (test code = 754)                                        

 

             NUCLEATED RED BLOOD CELLS (BEAKER) 0 /100 WBC   0-0                

       



             (test code = 413)                                        



CALCIUM, [id number] 05:44:00





             Test Item    Value        Reference Range Interpretation Comments

 

             CALCIUM IONIZED (BEAKER) (test 0.97 mmol/L  1.12-1.27    L         

   



             code = 698)                                         

 

             PH, BLOOD (BEAKER) (test code = 7.50                               

    



             1810)                                               



BODY FLUID CULTURE + GRAM [id number] 11:10:00





             Test Item    Value        Reference    Interpretation Comments



                                       Range                     

 

             CULTURE (BEAKER)                           A            <1+ Coagula

se



             (test code = 1095)                                        negative



                                                                 Staphylococcus

 

             CULTURE (BEAKER) COAGULASE NEGATIVE              A            <1+ P

seudomonas



             (test code = 1095) STAPHYLOCOCCUS                           aerugin

jennifer

 

             Clindamycin (test                           S            



             code = 10)                                          

 

             Erythromycin (test                           R            



             code = 4)                                           

 

             Linezolid (test code                           S            



             = 40)                                               

 

             Oxacillin (test code                           R            



             = 14)                                               

 

             Rifampin (test code =                           S            



             43)                                                 

 

             Tetracycline (test                           S            



             code = 2)                                           

 

             Trimethoprim +                           S            



             Sulfamethoxazole                                        



             (test code = 47)                                        

 

             Vancomycin (test code                           S            



             = 13)                                               

 

             GRAM STAIN RESULT 3+ WBCs                                



             (BEAKER) (test code =                                        



             1123)                                               

 

             GRAM STAIN RESULT <1+ gram positive                           



             (BEAKER) (test code = cocci in pairs and                           



             713295)      clusters                               



CBC W/PLT COUNT &amp; AUTO [id number] 09:58:00





             Test Item    Value        Reference Range Interpretation Comments

 

             WHITE BLOOD CELL COUNT (BEAKER) 10.7 K/ L    3.5-10.5     H        

    



             (test code = 775)                                        

 

             RED BLOOD CELL COUNT (BEAKER) 3.15 M/ L    4.63-6.08    L          

  



             (test code = 761)                                        

 

             HEMOGLOBIN (BEAKER) (test code = 10.2 GM/DL   13.7-17.5    L       

     



             410)                                                

 

             HEMATOCRIT (BEAKER) (test code = 28.9 %       40.1-51.0    L       

     



             411)                                                

 

             MEAN CORPUSCULAR VOLUME (BEAKER) 91.7 fL      79.0-92.2            

     



             (test code = 753)                                        

 

             MEAN CORPUSCULAR HEMOGLOBIN 32.4 pg      25.7-32.2    H            



             (BEAKER) (test code = 751)                                        

 

             MEAN CORPUSCULAR HEMOGLOBIN CONC 35.3 GM/DL   32.3-36.5            

     



             (BEAKER) (test code = 752)                                        

 

             RED CELL DISTRIBUTION WIDTH 13.7 %       11.6-14.4                 



             (BEAKER) (test code = 412)                                        

 

             PLATELET COUNT (BEAKER) (test code 57 K/CU MM   150-450      L     

       



             = 756)                                              

 

             MEAN PLATELET VOLUME (BEAKER) 8.8 fL       9.4-12.4     L          

  



             (test code = 754)                                        

 

             NUCLEATED RED BLOOD CELLS (BEAKER) 0 /100 WBC   0-0                

       



             (test code = 413)                                        



(CELLAVISION MANUAL DIFF)2019 09:58:00





             Test Item    Value        Reference Range Interpretation Comments

 

             NEUTROPHILS - REL 78 %                                   



             (CELLAVISION)(BEAKER) (test code =                                 

       



             2816)                                               

 

             LYMPHOCYTES - REL 5 %                                    



             (CELLAVISION)(BEAKER) (test code =                                 

       



             2817)                                               

 

             MONOCYTES - REL 9 %                                    



             (CELLAVISION)(BEAKER) (test code =                                 

       



             2818)                                               

 

             EOSINOPHILS - REL 2 %                                    



             (CELLAVISION)(BEAKER) (test code =                                 

       



             2819)                                               

 

             METAMYELOCYTES - REL 1 %          0-0          H            



             (CELLAVISION)(BEAKER) (test code =                                 

       



             2821)                                               

 

             BANDS - REL (CELLAVISION)(BEAKER) 4 %          0-10                

      



             (test code = 2826)                                        

 

             BLASTS - REL (CELLAVISION)(BEAKER) 1 %          0-0          H     

       



             (test code = 2827)                                        

 

             NEUTROPHILS - ABS 8.35 K/ul    1.78-5.38    H            



             (CELLAVISION)(BEAKER) (test code =                                 

       



             2830)                                               

 

             LYMPHOCYTES - ABS 0.54 K/ul    1.32-3.57    L            



             (CELLAVISION)(BEAKER) (test code =                                 

       



             2831)                                               

 

             MONOCYTES - ABS 0.96 K/uL    0.30-0.82    H            



             (CELLAVISION)(BEAKER) (test code =                                 

       



             2832)                                               

 

             EOSINOPHILS - ABS 0.21 K/uL    0.04-0.54                 



             (CELLAVISION)(BEAKER) (test code =                                 

       



             2834)                                               

 

             METAMYELOCYTES - ABS 0.11 K/uL    0.00-0.00    H            



             (CELLAVISION)(BEAKER) (test code =                                 

       



             2836)                                               

 

             BANDS - ABS (CELLAVISION)(BEAKER) 0.43 K/uL    0.00-0.80           

      



             (test code = 2840)                                        

 

             BLASTS - ABS (CELLAVISION)(BEAKER) 0.11 K/uL    0.00-0.00    H     

       



             (test code = 2845)                                        

 

             TOTAL COUNTED (BEAKER) (test code = 100                            

        



             1351)                                               

 

             WBC MORPHOLOGY (BEAKER) (test code Normal                          

       



             = 487)                                              

 

             PLT MORPHOLOGY (BEAKER) (test code Normal                          

       



             = 486)                                              

 

             ANISOCYTOSIS (BEAKER) (test code = 1+ few                          

       



             961)                                                

 

             POIKILOCYTES (BEAKER) (test code = 1+ few                          

       



             966)                                                

 

             OVALOCYTES (BEAKER) (test code = 1+ few                            

     



             477)                                                

 

             BASOPHILIC STIPPLING (BEAKER) (test Present                        

        



             code = 473)                                         

 

             ARTIFACT (CELLAVISION)(BEAKER) Present                             

   



             (test code = 3432)                                        

 

             PLATELET CONCENTRATION Decreased                              



             (CELLAVISION)(BEAKER) (test code =                                 

       



             3438)                                               



Received comment: User comments: Slide comments: WBC: SEGMENTED WITH TOXIC 
GRANULATIONS [id number] 08:30:00





             Test Item    Value        Reference Range Interpretation Comments

 

             PHOSPHORUS (BEAKER) (test code = 2.0 mg/dL    2.3-4.7      L       

     



             604)                                                



RAD, CHEST, 1 VIEW, NON [id number] 08:21:00Reason for exam:-&gt;left 
effusionShould this be performed at the bedside?-&gt;YesFINAL REPORT PATIENT ID:
[id number] TECHNIQUE: Frontal chest radiograph dated 2019. CLINICAL HISTORY: 
Left effusion COMPARISON STUDY: Chest radiographs and chest CT both dated 
2019 IMPRESSION:Right-sided PICC and left-sided chest tube are unchanged. 
No change in small left hydropneumothorax. Multiple rounded densities project 
over the left lower lobe of unclear etiology possibly something external to the 
patient. Cardiomediastinal silhouette is normal in size. No pulmonary edema. No 
fracture. Signed: Ann Kingeport Verified Date/Time: 2019 
08:21:33 Reading Location: [facility] Reading Room  Electronically signed by: 
ANN KING MD on 2019 08:21 AMCOMPREHENSIVE METABOLIC PANEL
2019 05:34:00





             Test Item    Value        Reference Range Interpretation Comments

 

             TOTAL PROTEIN 4.9 gm/dL    6.0-8.3      L            



             (BEAKER) (test code =                                        



             770)                                                

 

             ALBUMIN (BEAKER) 2.7 g/dL     3.5-5.0      L            



             (test code = 1145)                                        

 

             ALKALINE PHOSPHATASE 137 U/L                          



             (BEAKER) (test code =                                        



             346)                                                

 

             BILIRUBIN TOTAL 5.5 mg/dL    0.2-1.2      H            



             (BEAKER) (test code =                                        



             377)                                                

 

             SODIUM (BEAKER) (test 123 meq/L    136-145      L            



             code = 381)                                         

 

             POTASSIUM (BEAKER) 3.9 meq/L    3.5-5.1                   



             (test code = 379)                                        

 

             CHLORIDE (BEAKER) 89 meq/L            L            



             (test code = 382)                                        

 

             CO2 (BEAKER) (test 31 meq/L     22-29        H            



             code = 355)                                         

 

             BLOOD UREA NITROGEN 15 mg/dL     7-21                      



             (BEAKER) (test code =                                        



             354)                                                

 

             CREATININE (BEAKER) 0.68 mg/dL   0.57-1.25                 



             (test code = 358)                                        

 

             GLUCOSE RANDOM 104 mg/dL                        



             (BEAKER) (test code =                                        



             652)                                                

 

             CALCIUM (BEAKER) 7.7 mg/dL    8.4-10.2     L            



             (test code = 697)                                        

 

             AST (SGOT) (BEAKER) 47 U/L       5-34         H            



             (test code = 353)                                        

 

             ALT (SGPT) (BEAKER) 25 U/L       6-55                      



             (test code = 347)                                        

 

             EGFR (BEAKER) (test 119                                    ESTIMATE

D GFR IS



             code = 1092) mL/min/1.73 sq                           NOT AS ACCURA

TE AS



                          m                                      CREATININE



                                                                 CLEARANCE IN



                                                                 PREDICTING



                                                                 GLOMERULAR



                                                                 FILTRATION RATE

.



                                                                 ESTIMATED GFR I

S



                                                                 NOT APPLICABLE 

FOR



                                                                 DIALYSIS PATIEN

TS.



Specimen moderately [id number] 05:04:00





             Test Item    Value        Reference Range Interpretation Comments

 

             MAGNESIUM (KEON) (test code = 1.8 mg/dL    1.6-2.6               

    



             627)                                                



CT, CHEST, WITH [id number] 15:37:00Reason for exam:-&gt;reevaluate 
pleural effusion and left lung abscessFINAL REPORT PATIENT ID: [id number] CT of 
the Chest dated 2019 COMPARISON: 2019 CLINICAL INFORMATION: 
Pleural effusionreevaluate pleural effusion and left lung abscess Comment: Axial
images of the chest were obtained from thoracic inlet to the upper abdomen with 
intravenous contrast. This exam was performed according to our departmental 
dose-optimization program, which includes automated exposure control, adjustment
of the mA and/or kV according to patient size and/or use of interactive 
reconstruction technique. Heart is normal in size. There is small pericardial 
effusion. Great vessels are unremarkable. No adenopathy in the mediastinum or 
perihilar region. Trachea and mainstem bronchi are patent. Trace bilateral 
pleural effusion is present. There is interval significant decreasein the amount
of left pleural effusion. There is small amount of air present in the left 
pleural space. The pigtail catheter is seen in the left pleural space. 
Atelectasis is seen in the lingula, rightmid, and both lower lobes. Cavitary 
lesion is seen in the superior segment of the left lower lobe measuring 
approximately 2.8 x 3.3 cm. Visualized upper abdomen demonstrates cirrhotic 
liver with trace ascites. Spleen is minimally enlarged. Impression: 1. Interval 
decrease in the amount of left pleuraleffusion with residual left 
hydropneumothorax.2. Trace right pleural effusion.3. Cavitary lesion in the 
superior segment of the left lower lobe may represent abscess.4. Cirrhosis with 
splenomegaly and ascites. Signed: Lucita Nails MDReport Verified Date/Time: 
2019 15:37:27 Reading Location: [facility] C013Y CT Body Reading Room 
Electronically signed by: LUCITA NAILS M.D. on 2019 03:37 PMELECTROLYTES
2019 13:41:00





             Test Item    Value        Reference Range Interpretation Comments

 

             SODIUM (BEAKER) (test code = 381) 119 meq/L    136-145      LL     

      

 

             POTASSIUM (BEAKER) (test code = 4.1 meq/L    3.5-5.1               

    



             379)                                                

 

             CHLORIDE (BEAKER) (test code = 382) 85 meq/L            L    

        

 

             CO2 (BEAKER) (test code = 355) 29 meq/L     22-29                  

   



Page [phone] with results. Thank you.CBC W/PLT COUNT &amp; AUTO 
[id number] 10:30:00





             Test Item    Value        Reference Range Interpretation Comments

 

             WHITE BLOOD CELL COUNT (BEAKER) 11.3 K/ L    3.5-10.5     H        

    



             (test code = 775)                                        

 

             RED BLOOD CELL COUNT (BEAKER) 3.25 M/ L    4.63-6.08    L          

  



             (test code = 761)                                        

 

             HEMOGLOBIN (BEAKER) (test code = 10.6 GM/DL   13.7-17.5    L       

     



             410)                                                

 

             HEMATOCRIT (BEAKER) (test code = 29.8 %       40.1-51.0    L       

     



             411)                                                

 

             MEAN CORPUSCULAR VOLUME (BEAKER) 91.7 fL      79.0-92.2            

     



             (test code = 753)                                        

 

             MEAN CORPUSCULAR HEMOGLOBIN 32.6 pg      25.7-32.2    H            



             (BEAKER) (test code = 751)                                        

 

             MEAN CORPUSCULAR HEMOGLOBIN CONC 35.6 GM/DL   32.3-36.5            

     



             (BEAKER) (test code = 752)                                        

 

             RED CELL DISTRIBUTION WIDTH 13.5 %       11.6-14.4                 



             (BEAKER) (test code = 412)                                        

 

             PLATELET COUNT (BEAKER) (test code 51 K/CU MM   150-450      L     

       



             = 756)                                              

 

             MEAN PLATELET VOLUME (BEAKER) 8.3 fL       9.4-12.4     L          

  



             (test code = 754)                                        

 

             NUCLEATED RED BLOOD CELLS (BEAKER) 0 /100 WBC   0-0                

       



             (test code = 413)                                        



(CELLAVISION MANUAL DIFF)2019 10:30:00





             Test Item    Value        Reference Range Interpretation Comments

 

             NEUTROPHILS - REL 82 %                                   



             (CELLAVISION)(BEAKER) (test code =                                 

       



             2816)                                               

 

             LYMPHOCYTES - REL 2 %                                    



             (CELLAVISION)(BEAKER) (test code =                                 

       



             2817)                                               

 

             MONOCYTES - REL 9 %                                    



             (CELLAVISION)(BEAKER) (test code =                                 

       



             2818)                                               

 

             METAMYELOCYTES - REL 2 %          0-0          H            



             (CELLAVISION)(BEAKER) (test code =                                 

       



             2821)                                               

 

             PROMYELOCYTES - REL 2 %          0-0          H            



             (CELLAVSION)(BEAKER) (test code =                                  

      



             2825)                                               

 

             BANDS - REL (CELLAVISION)(BEAKER) 2 %          0-10                

      



             (test code = 2826)                                        

 

             ATYPICAL LYMPHOCYTES - REL 1 %          0-0          H            



             (CELLAVISION)(BEAKER) (test code =                                 

       



             2829)                                               

 

             NEUTROPHILS - ABS 9.27 K/ul    1.78-5.38    H            



             (CELLAVISION)(BEAKER) (test code =                                 

       



             2830)                                               

 

             LYMPHOCYTES - ABS 0.23 K/ul    1.32-3.57    L            



             (CELLAVISION)(BEAKER) (test code =                                 

       



             2831)                                               

 

             MONOCYTES - ABS 1.02 K/uL    0.30-0.82    H            



             (CELLAVISION)(BEAKER) (test code =                                 

       



             2832)                                               

 

             METAMYELOCYTES - ABS 0.23 K/uL    0.00-0.00    H            



             (CELLAVISION)(BEAKER) (test code =                                 

       



             2836)                                               

 

             PROMYELOCYTES - ABS 0.23 K/uL    0.00-0.00    H            



             (CELLAVISION)(BEAKER) (test code =                                 

       



             2838)                                               

 

             BANDS - ABS (CELLAVISION)(BEAKER) 0.23 K/uL    0.00-0.80           

      



             (test code = 2840)                                        

 

             ATYPICAL LYMPHOCYTES - ABS 0.11 K/uL    0.00-0.00    H            



             (CELLAVISION)(BEAKER) (test code =                                 

       



             2858)                                               

 

             TOTAL COUNTED (BEAKER) (test code = 100                            

        



             1351)                                               

 

             WBC MORPHOLOGY (BEAKER) (test code Normal                          

       



             = 487)                                              

 

             LARGE PLT(BEAKER) (test code = Present                             

   



             2156)                                               

 

             BASOPHILIC STIPPLING (BEAKER) (test Present                        

        



             code = 473)                                         

 

             ARTIFACT (CELLAVISION)(BEAKER) Present                             

   



             (test code = 3432)                                        

 

             PLATELET CONCENTRATION Decreased                              



             (CELLAVISION)(BEAKER) (test code =                                 

       



             3438)                                               



Received comment: User comments: Slide comments: WBC: SEGMENTED WITH TOXIC 
GRANULATIONS PRESENTRAD, CHEST, 1 VIEW, NON [id number] 07:48:00Reason for 
exam:-&gt;left effusionShould this be performed at the bedside?-&gt;YesFINAL 
REPORT PATIENT ID: [id number] RAD, CHEST, 1 VIEW, NON DEPT INDICATION: left 
effusion COMPARISON:Prior day's exam FINDINGS: Portable frontal view of the 
chest. IMPRESSION: Support Lines: PICC tip overlies the SVC. Left pleural 
catheter is again noted. Lungs and pleura: Bibasilar airspace disease is 
unchanged. Superimposed trace left effusion. Left apical pneumothorax is 
decreased in the interim. Heart and mediastinum: Stable contours. Additional 
findings: None. Signed: Trina Crocker Verified Date/Time: 2019 
07:48:14 Reading Location: [facility] Radiology Reading Room Electronically 
signed by: TRINA CROCKER MD on 2019 07:48 AMCALCIUM, IONIZED
2019 05:58:00





             Test Item    Value        Reference Range Interpretation Comments

 

             CALCIUM IONIZED (BEAKER) (test 0.96 mmol/L  1.12-1.27    L         

   



             code = 698)                                         

 

             PH, BLOOD (BEAKER) (test code = 7.53                               

    



             1810)                                               



COMPREHENSIVE METABOLIC [id number] 05:51:00





             Test Item    Value        Reference Range Interpretation Comments

 

             TOTAL PROTEIN 5.2 gm/dL    6.0-8.3      L            



             (BEAKER) (test code =                                        



             770)                                                

 

             ALBUMIN (BEAKER) 2.6 g/dL     3.5-5.0      L            



             (test code = 1145)                                        

 

             ALKALINE PHOSPHATASE 144 U/L                          



             (BEAKER) (test code =                                        



             346)                                                

 

             BILIRUBIN TOTAL 6.3 mg/dL    0.2-1.2      H            



             (BEAKER) (test code =                                        



             377)                                                

 

             SODIUM (BEAKER) (test 119 meq/L    136-145      LL           



             code = 381)                                         

 

             POTASSIUM (BEAKER) 4.2 meq/L    3.5-5.1                   



             (test code = 379)                                        

 

             CHLORIDE (BEAKER) 86 meq/L            L            



             (test code = 382)                                        

 

             CO2 (BEAKER) (test 26 meq/L     22-29                     



             code = 355)                                         

 

             BLOOD UREA NITROGEN 23 mg/dL     7-21         H            



             (BEAKER) (test code =                                        



             354)                                                

 

             CREATININE (BEAKER) 0.81 mg/dL   0.57-1.25                 



             (test code = 358)                                        

 

             GLUCOSE RANDOM 103 mg/dL                        



             (BEAKER) (test code =                                        



             652)                                                

 

             CALCIUM (BEAKER) 7.6 mg/dL    8.4-10.2     L            



             (test code = 697)                                        

 

             AST (SGOT) (BEAKER) 46 U/L       5-34         H            



             (test code = 353)                                        

 

             ALT (SGPT) (BEAKER) 21 U/L       6-55                      



             (test code = 347)                                        

 

             EGFR (BEAKER) (test 97 mL/min/1.73                           ESTIMA

FROILAN GFR IS



             code = 1092) sq m                                   NOT AS ACCURATE

 AS



                                                                 CREATININE



                                                                 CLEARANCE IN



                                                                 PREDICTING



                                                                 GLOMERULAR



                                                                 FILTRATION RATE

.



                                                                 ESTIMATED GFR I

S



                                                                 NOT APPLICABLE 

FOR



                                                                 DIALYSIS PATIEN

TS.



Specimen moderately [id number] 05:07:00





             Test Item    Value        Reference Range Interpretation Comments

 

             CORTISOL, TOTAL (BEAKER) (test code 9.6 ug/dL    3.7-19.4          

        



             = 2755)                                             



[id number] 04:57:00





             Test Item    Value        Reference Range Interpretation Comments

 

             PHOSPHORUS (BEAKER) (test code = 2.0 mg/dL    2.3-4.7      L       

     



             604)                                                



[id number] 04:57:00





             Test Item    Value        Reference Range Interpretation Comments

 

             MAGNESIUM (BEAKER) (test code = 1.7 mg/dL    1.6-2.6               

    



             627)                                                



[id number] 22:08:00





             Test Item    Value        Reference Range Interpretation Comments

 

             SODIUM (BEAKER) (test 119 meq/L    136-145      LL           



             code = 381)                                         

 

             POTASSIUM (BEAKER) 4.6 meq/L    3.5-5.1                   Specimen 

slightly



             (test code = 379)                                        hemolyzed

 

             CHLORIDE (BEAKER) 86 meq/L            L            



             (test code = 382)                                        

 

             CO2 (BEAKER) (test 27 meq/L     22-29                     



             code = 355)                                         



Call K &gt; 5.5POCT-GLUCOSE [id number] 12:59:00





             Test Item    Value        Reference Range Interpretation Comments

 

             POC-GLUCOSE METER 95 mg/dL                         TESTED AT 

[facility] [address]



             (BEAKER) (test code =                                        [place] [address]



             1538)                                               



T4, [id number] 10:25:00





             Test Item    Value        Reference Range Interpretation Comments

 

             FREE T4 (BEAKER) (test code = 655) 0.65 ng/dL   0.70-1.48    L     

       



CBC W/PLT COUNT &amp; AUTO [id number] 10:21:00





             Test Item    Value        Reference Range Interpretation Comments

 

             WHITE BLOOD CELL COUNT (BEAKER) 11.8 K/ L    3.5-10.5     H        

    



             (test code = 775)                                        

 

             RED BLOOD CELL COUNT (BEAKER) 3.74 M/ L    4.63-6.08    L          

  



             (test code = 761)                                        

 

             HEMOGLOBIN (BEAKER) (test code = 12.2 GM/DL   13.7-17.5    L       

     



             410)                                                

 

             HEMATOCRIT (BEAKER) (test code = 33.9 %       40.1-51.0    L       

     



             411)                                                

 

             MEAN CORPUSCULAR VOLUME (BEAKER) 90.6 fL      79.0-92.2            

     



             (test code = 753)                                        

 

             MEAN CORPUSCULAR HEMOGLOBIN 32.6 pg      25.7-32.2    H            



             (BEAKER) (test code = 751)                                        

 

             MEAN CORPUSCULAR HEMOGLOBIN CONC 36.0 GM/DL   32.3-36.5            

     



             (BEAKER) (test code = 752)                                        

 

             RED CELL DISTRIBUTION WIDTH 13.7 %       11.6-14.4                 



             (BEAKER) (test code = 412)                                        

 

             PLATELET COUNT (BEAKER) (test code 79 K/CU MM   150-450      L     

       



             = 756)                                              

 

             MEAN PLATELET VOLUME (BEAKER) 8.9 fL       9.4-12.4     L          

  



             (test code = 754)                                        

 

             NUCLEATED RED BLOOD CELLS (BEAKER) 0 /100 WBC   0-0                

       



             (test code = 413)                                        



(CELLAVISION MANUAL DIFF)2019 10:21:00





             Test Item    Value        Reference Range Interpretation Comments

 

             NEUTROPHILS - REL 82 %                                   



             (CELLAVISION)(BEAKER) (test code =                                 

       



             2816)                                               

 

             LYMPHOCYTES - REL 4 %                                    



             (CELLAVISION)(BEAKER) (test code =                                 

       



             2817)                                               

 

             MONOCYTES - REL 13 %                                   



             (CELLAVISION)(BEAKER) (test code =                                 

       



             2818)                                               

 

             ATYPICAL LYMPHOCYTES - REL 1 %          0-0          H            



             (CELLAVISION)(BEAKER) (test code =                                 

       



             2829)                                               

 

             NEUTROPHILS - ABS 9.68 K/ul    1.78-5.38    H            



             (CELLAVISION)(BEAKER) (test code =                                 

       



             2830)                                               

 

             LYMPHOCYTES - ABS 0.47 K/ul    1.32-3.57    L            



             (CELLAVISION)(BEAKER) (test code =                                 

       



             2831)                                               

 

             MONOCYTES - ABS 1.53 K/uL    0.30-0.82    H            



             (CELLAVISION)(BEAKER) (test code =                                 

       



             2832)                                               

 

             ATYPICAL LYMPHOCYTES - ABS 0.12 K/uL    0.00-0.00    H            



             (CELLAVISION)(BEAKER) (test code =                                 

       



             2858)                                               

 

             TOTAL COUNTED (BEAKER) (test code = 100                            

        



             1351)                                               

 

             RBC MORPHOLOGY (BEAKER) (test code Normal                          

       



             = 762)                                              

 

             WBC MORPHOLOGY (BEAKER) (test code Normal                          

       



             = 487)                                              

 

             PLT MORPHOLOGY (BEAKER) (test code Normal                          

       



             = 486)                                              

 

             ARTIFACT (CELLAVISION)(BEAKER) Present                             

   



             (test code = 3432)                                        

 

             PLATELET CONCENTRATION Decreased                              



             (CELLAVISION)(BEAKER) (test code =                                 

       



             3438)                                               



Received comment: User comments: Slide comments:POCT-GLUCOSE [id number] 
09:58:00





             Test Item    Value        Reference Range Interpretation Comments

 

             POC-GLUCOSE METER 104 mg/dL                        TESTED AT 

[facility] [address]



             (BEAKER) (test code =                                        [address] [facility]



             1538)                                               [address]



RAD, CHEST, 1 VIEW, NON [id number] 09:33:00Reason for exam:-
&gt;effusionShould this be performed at the bedside?-&gt;YesFINAL REPORT PATIENT
ID: [id number] Comparison: 2019 TECHNIQUE: Single view of the chest FINDINGS:
Bilateral interstitial and airspace opacities are stable. Small left pleural 
thickening with adjacent airspace disease identified. A previous left-sided 
pneumothorax has decreased. Left pleural drainage catheters again noted. Right-
sided PICC line is stable. Signed: Ronaldo Mireles MDReport Verified Date/Time: 
2019 09:33:21 Reading Location: [address] Transitional Reading Room 
Electronically signed by: RONALDO MIRELES M.D. on 2019 09:33 AMCOMPREHENSIVE
METABOLIC [id number] 08:59:00





             Test Item    Value        Reference Range Interpretation Comments

 

             TOTAL PROTEIN 5.4 gm/dL    6.0-8.3      L            Specimen sligh

tly



             (BEAKER) (test code =                                        hemoly

zed



             770)                                                

 

             ALBUMIN (BEAKER) 2.1 g/dL     3.5-5.0      L            Specimen sl

ightly



             (test code = 1145)                                        hemolyzed

 

             ALKALINE PHOSPHATASE 153 U/L             H            



             (BEAKER) (test code =                                        



             346)                                                

 

             BILIRUBIN TOTAL 5.0 mg/dL    0.2-1.2      H            Specimen sli

ghtly



             (BEAKER) (test code =                                        hemoly

zed



             377)                                                

 

             SODIUM (BEAKER) (test 117 meq/L    136-145      LL           



             code = 381)                                         

 

             POTASSIUM (BEAKER) 5.5 meq/L    3.5-5.1      H            Specimen 

slightly



             (test code = 379)                                        hemolyzed

 

             CHLORIDE (BEAKER) 84 meq/L            L            



             (test code = 382)                                        

 

             CO2 (BEAKER) (test 26 meq/L     22-29                     



             code = 355)                                         

 

             BLOOD UREA NITROGEN 32 mg/dL     7-21         H            



             (BEAKER) (test code =                                        



             354)                                                

 

             CREATININE (BEAKER) 0.85 mg/dL   0.57-1.25                 Specimen

 slightly



             (test code = 358)                                        hemolyzed

 

             GLUCOSE RANDOM 104 mg/dL                        



             (BEAKER) (test code =                                        



             652)                                                

 

             CALCIUM (BEAKER) 7.6 mg/dL    8.4-10.2     L            



             (test code = 697)                                        

 

             AST (SGOT) (BEAKER) 51 U/L       5-34         H            Specimen

 slightly



             (test code = 353)                                        hemolyzed

 

             ALT (SGPT) (BEAKER) 23 U/L       6-55                      Specimen

 slightly



             (test code = 347)                                        hemolyzed

 

             EGFR (BEAKER) (test 92 mL/min/1.73                           ESTIMA

FROILAN GFR IS



             code = 1092) sq m                                   NOT AS ACCURATE

 AS



                                                                 CREATININE



                                                                 CLEARANCE IN



                                                                 PREDICTING



                                                                 GLOMERULAR



                                                                 FILTRATION RATE

.



                                                                 ESTIMATED GFR I

S



                                                                 NOT APPLICABLE 

FOR



                                                                 DIALYSIS PATIEN

TS.



Specimen moderately [id number] 08:55:00





             Test Item    Value        Reference Range Interpretation Comments

 

             MAGNESIUM (BEAKER) 1.9 mg/dL    1.6-2.6                   Specimen 

slightly



             (test code = 627)                                        hemolyzed



[id number] 08:55:00





             Test Item    Value        Reference Range Interpretation Comments

 

             PHOSPHORUS (BEAKER) 2.5 mg/dL    2.3-4.7                   Specimen

 slightly



             (test code = 604)                                        hemolyzed



TSH/FREE T4 IF [id number] 08:39:00





             Test Item    Value        Reference Range Interpretation Comments

 

             THYROID STIMULATING HORMONE 8.07 uIU/mL  0.35-4.94    H            



             (BEAKER) (test code = 772)                                        



CALCIUM, [id number] 08:06:00





             Test Item    Value        Reference Range Interpretation Comments

 

             CALCIUM IONIZED (BEAKER) (test 1.00 mmol/L  1.12-1.27    L         

   



             code = 698)                                         

 

             PH, BLOOD (BEAKER) (test code = 7.48                               

    



             1810)                                               



[id number] 23:47:00





             Test Item    Value        Reference Range Interpretation Comments

 

             SODIUM (BEAKER) (test code = 381) 117 meq/L    136-145      LL     

      

 

             POTASSIUM (BEAKER) (test code = 5.3 meq/L    3.5-5.1      H        

    



             379)                                                

 

             CHLORIDE (BEAKER) (test code = 382) 84 meq/L            L    

        

 

             CO2 (BEAKER) (test code = 355) 27 meq/L     22-29                  

   



Call K &gt; [id number]-GLUCOSE [id number] 21:18:00





             Test Item    Value        Reference Range Interpretation Comments

 

             POC-GLUCOSE METER 145 mg/dL           H            TESTED AT 

[place] [address]



             (BEAKER) (test code =                                        [facility]



             1538)                                               [address]



[id number] 18:57:00





             Test Item    Value        Reference Range Interpretation Comments

 

             CORTISOL, TOTAL (BEAKER) (test 18.1 ug/dL   3.7-19.4               

   



             code = 2755)                                        



[id number] 18:34:00





             Test Item    Value        Reference Range Interpretation Comments

 

             SODIUM (BEAKER) (test code = 381) 116 meq/L    136-145      LL     

      

 

             POTASSIUM (BEAKER) (test code = 6.0 meq/L    3.5-5.1      HH       

    



             379)                                                

 

             CHLORIDE (BEAKER) (test code = 382) 82 meq/L            L    

        

 

             CO2 (BEAKER) (test code = 355) 30 meq/L     22-29        H         

   



Call [id number]-GLUCOSE [id number] 18:20:00





             Test Item    Value        Reference Range Interpretation Comments

 

             POC-GLUCOSE METER 141 mg/dL           H            TESTED AT 

[place] [address]



             [facility] (test code =                                        [facility]



             1538)                                               [address]



POCT-GLUCOSE [id number] 13:00:00





             Test Item    Value        Reference Range Interpretation Comments

 

             POC-GLUCOSE METER 122 mg/dL           H            TESTED AT 

[facility] [address]



             (BEAKER) (test code =                                        [place]



             [address]38)                                               [address]



CBC W/PLT COUNT &amp; AUTO [id number] 10:34:00





             Test Item    Value        Reference Range Interpretation Comments

 

             WHITE BLOOD CELL COUNT (BEAKER) 15.5 K/ L    3.5-10.5     H        

    



             (test code = 775)                                        

 

             RED BLOOD CELL COUNT (BEAKER) 4.04 M/ L    4.63-6.08    L          

  



             (test code = 761)                                        

 

             HEMOGLOBIN (BEAKER) (test code = 13.3 GM/DL   13.7-17.5    L       

     



             410)                                                

 

             HEMATOCRIT (BEAKER) (test code = 36.5 %       40.1-51.0    L       

     



             411)                                                

 

             MEAN CORPUSCULAR VOLUME (BEAKER) 90.3 fL      79.0-92.2            

     



             (test code = 753)                                        

 

             MEAN CORPUSCULAR HEMOGLOBIN 32.9 pg      25.7-32.2    H            



             (BEAKER) (test code = 751)                                        

 

             MEAN CORPUSCULAR HEMOGLOBIN CONC 36.4 GM/DL   32.3-36.5            

     



             (BEAKER) (test code = 752)                                        

 

             RED CELL DISTRIBUTION WIDTH 13.5 %       11.6-14.4                 



             (BEAKER) (test code = 412)                                        

 

             PLATELET COUNT (BEAKER) (test code 65 K/CU MM   150-450      L     

       



             = 756)                                              

 

             MEAN PLATELET VOLUME (BEAKER) 9.0 fL       9.4-12.4     L          

  



             (test code = 754)                                        

 

             NUCLEATED RED BLOOD CELLS (BEAKER) 0 /100 WBC   0-0                

       



             (test code = 413)                                        



(CELLAVISION MANUAL DIFF)2019 10:34:00





             Test Item    Value        Reference Range Interpretation Comments

 

             NEUTROPHILS - REL 78 %                                   



             (CELLAVISION)(BEAKER) (test code                                   

     



             = 2816)                                             

 

             LYMPHOCYTES - REL 4 %                                    



             (CELLAVISION)(BEAKER) (test code                                   

     



             = 2817)                                             

 

             MONOCYTES - REL 14 %                                   



             (CELLAVISION)(BEAKER) (test code                                   

     



             = 2818)                                             

 

             EOSINOPHILS - REL 2 %                                    



             (CELLAVISION)(BEAKER) (test code                                   

     



             = 2819)                                             

 

             BANDS - REL (CELLAVISION)(BEAKER) 2 %          0-10                

      



             (test code = 2826)                                        

 

             NEUTROPHILS - ABS 12.09 K/ul   1.78-5.38    H            



             (CELLAVISION)(BEAKER) (test code                                   

     



             = 2830)                                             

 

             LYMPHOCYTES - ABS 0.62 K/ul    1.32-3.57    L            



             (CELLAVISION)(BEAKER) (test code                                   

     



             = 2831)                                             

 

             MONOCYTES - ABS 2.17 K/uL    0.30-0.82    H            



             (CELLAVISION)(BEAKER) (test code                                   

     



             = 2832)                                             

 

             EOSINOPHILS - ABS 0.31 K/uL    0.04-0.54                 



             (CELLAVISION)(BEAKER) (test code                                   

     



             = 2834)                                             

 

             BANDS - ABS (CELLAVISION)(BEAKER) 0.31 K/uL    0.00-0.80           

      



             (test code = 2840)                                        

 

             TOTAL COUNTED (BEAKER) (test code 100                              

      



             = 1351)                                             

 

             WBC MORPHOLOGY (BEAKER) (test Normal                               

  



             code = 487)                                         

 

             PLT MORPHOLOGY (BEAKER) (test Normal                               

  



             code = 486)                                         

 

             POLYCHROMATOPHILLIC RBCS(BEAKER) 1+ few                            

     



             (test code = 478)                                        

 

             POIKILOCYTES (BEAKER) (test code 2+ moderate                       

     



             = 966)                                              

 

             KARISSA CELLS (BEAKER) (test code = 2+ moderate                       

     



             474)                                                

 

             BASOPHILIC STIPPLING (BEAKER) Present                              

  



             (test code = 473)                                        

 

             ARTIFACT (CELLAVISION)(BEAKER) Present                             

   



             (test code = 3432)                                        

 

             PLATELET CONCENTRATION Decreased                              



             (CELLAVISION)(BEAKER) (test code                                   

     



             = 3438)                                             



Received comment: User comments: Slide comments:RAD, CHEST, 1 VIEW, NON DEPT
2019 08:40:00Reason for exam:-&gt;left effusionShould this be performed at
the bedside?-&gt;YesFINAL REPORT PATIENT ID: [id number] Portable chest. CLINICAL 
HISTORY: left effusion. COMPARISON STUDY:Chest x-ray from yesterday. FINDINGS: 
The cardiac silhouette is unremarkable. The pulmonary parenchyma demonstrates 
increased interstitial markings with atelectatic changes in the lung bases. A 
left-sided pigtail catheter chest tube remains and there has been development of
a loculated small basilar pneumothorax. A right PICC line remains. Degenerative 
changes are noted. IMPRESSION: Development a small left-sided basilar 
pneumothorax. No other significant change. Signed: Beckie Martinez [facility] Vermegan
jose Date/Time: 2019 08:40:30 Reading Location: [facility] B1 [id number] Ortho Consult
Reading Room Electronically signed by: BECKIE MARTINEZ M.D. on 2019 08:40 
AMPOCT-GLUCOSE [id number] 08:39:00





             Test Item    Value        Reference Range Interpretation Comments

 

             POC-GLUCOSE METER 110 mg/dL                        TESTED AT 

[facility] [address]



             (BEAKER) (test code =                                        [place]



             [address])                                               [address]



COMPREHENSIVE METABOLIC [id number] 06:32:00





             Test Item    Value        Reference Range Interpretation Comments

 

             TOTAL PROTEIN 5.5 gm/dL    6.0-8.3      L            



             (BEAKER) (test code =                                        



             770)                                                

 

             ALBUMIN (BEAKER) 1.9 g/dL     3.5-5.0      L            



             (test code = 1145)                                        

 

             ALKALINE PHOSPHATASE 134 U/L                          



             (BEAKER) (test code =                                        



             346)                                                

 

             BILIRUBIN TOTAL 4.4 mg/dL    0.2-1.2      H            



             (BEAKER) (test code =                                        



             377)                                                

 

             SODIUM (BEAKER) (test 116 meq/L    136-145      LL           



             code = 381)                                         

 

             POTASSIUM (BEAKER) 5.6 meq/L    3.5-5.1      H            



             (test code = 379)                                        

 

             CHLORIDE (BEAKER) 83 meq/L            L            



             (test code = 382)                                        

 

             CO2 (BEAKER) (test 27 meq/L     22-29                     



             code = 355)                                         

 

             BLOOD UREA NITROGEN 34 mg/dL     7-21         H            



             (BEAKER) (test code =                                        



             354)                                                

 

             CREATININE (BEAKER) 0.89 mg/dL   0.57-1.25                 



             (test code = 358)                                        

 

             GLUCOSE RANDOM 109 mg/dL           H            



             (BEAKER) (test code =                                        



             652)                                                

 

             CALCIUM (BEAKER) 7.4 mg/dL    8.4-10.2     L            



             (test code = 697)                                        

 

             AST (SGOT) (BEAKER) 31 U/L       5-34                      



             (test code = 353)                                        

 

             ALT (SGPT) (BEAKER) 18 U/L       6-55                      



             (test code = 347)                                        

 

             EGFR (BEAKER) (test 87 mL/min/1.73                           ESTIMA

FROILAN GFR IS



             code = 1092) sq m                                   NOT AS ACCURATE

 AS



                                                                 CREATININE



                                                                 CLEARANCE IN



                                                                 PREDICTING



                                                                 GLOMERULAR



                                                                 FILTRATION RATE

.



                                                                 ESTIMATED GFR I

S



                                                                 NOT APPLICABLE 

FOR



                                                                 DIALYSIS PATIEN

TS.



Specimen moderately ictericPOCT-GLUCOSE [id number] 21:12:00





             Test Item    Value        Reference Range Interpretation Comments

 

             POC-GLUCOSE METER 114 mg/dL           H            TESTED AT 

[place] [address]



             ([facility]) (test code =                                        [facility]



             1538)                                               [address]



OSMOLALITY, [id number] 18:43:00





             Test Item    Value        Reference Range Interpretation Comments

 

             OSMOLALITY URINE (BEAKER) (test 694 mOsm/kg  40-1,400              

    



             code = 614)                                         



SODIUM, RANDOM [id number] 18:02:00





             Test Item    Value        Reference Range Interpretation Comments

 

             SOD[facility] URINE [facility] (test code = < meq/L                         

       



             243)                                                



Reference Range: No NormalsPOCT-GLUCOSE [id number] 16:06:00





             Test Item    Value        Reference Range Interpretation Comments

 

             POC-GLUCOSE METER 145 mg/dL           H            TESTED AT 

[place] [address]



             ([facility]) (test code =                                        [facility]



             1538)                                               [address]



RAD, ABDOMEN/KUB, 1 VIEW [id number] 12:51:00Reason for exam:-&gt;no BM in 13 
days. abdominal distension. concern for ileusFINAL REPORT PATIENT ID: [id number] 
RAD, ABDOMEN/KUB, 1 VIEW AP CLINICAL INDICATION: no BM in 13 days.abdominal 
distension. concern for ileus COMPARISON: None TECHNIQUE: 24 radiographs of the 
abdomen. IMPRESSION: The bowel gas pattern demonstrates gaseous distention 
without dilation. No pneumatosis. Appearance may reflect ileus. Excreted 
contrast is present in the urinary bladder. The regional skeleton is intact. 
Signed: JR Mendoza Robert MDReport Verified Date/Time: 2019 
12:51:21 Reading Location: [facility] Radiology Reading Room Electronically 
signed by: KULWINDER MENDOZA on 2019 12:51 PMOSMOLALITY, SERUM
2019 12:47:00





             Test Item    Value        Reference Range Interpretation Comments

 

             OSMOLALITY, SERUM (BEAKER) (test 258 mOsm/kg  275-295      L       

     



             code = 615)                                         



POCT-GLUCOSE [id number] 12:35:00





             Test Item    Value        Reference Range Interpretation Comments

 

             POC-GLUCOSE METER 93 mg/dL                         TESTED AT 

[place] [address]



             ([facility]) (test code =                                        [facility] [address]



             1538)                                               



CBC W/PLT COUNT &amp; AUTO [id number] 10:10:00





             Test Item    Value        Reference Range Interpretation Comments

 

             WHITE BLOOD CELL COUNT (BEAKER) 19.5 K/ L    3.5-10.5     H        

    



             (test code = 775)                                        

 

             RED BLOOD CELL COUNT (BEAKER) 4.18 M/ L    4.63-6.08    L          

  



             (test code = 761)                                        

 

             HEMOGLOBIN (BEAKER) (test code = 13.3 GM/DL   13.7-17.5    L       

     



             410)                                                

 

             HEMATOCRIT (BEAKER) (test code = 37.8 %       40.1-51.0    L       

     



             411)                                                

 

             MEAN CORPUSCULAR VOLUME (BEAKER) 90.4 fL      79.0-92.2            

     



             (test code = 753)                                        

 

             MEAN CORPUSCULAR HEMOGLOBIN 31.8 pg      25.7-32.2                 



             (BEAKER) (test code = 751)                                        

 

             MEAN CORPUSCULAR HEMOGLOBIN CONC 35.2 GM/DL   32.3-36.5            

     



             (BEAKER) (test code = 752)                                        

 

             RED CELL DISTRIBUTION WIDTH 13.5 %       11.6-14.4                 



             (BEAKER) (test code = 412)                                        

 

             PLATELET COUNT (BEAKER) (test code 60 K/CU MM   150-450      L     

       



             = 756)                                              

 

             MEAN PLATELET VOLUME (BEAKER) 9.4 fL       9.4-12.4                

  



             (test code = 754)                                        

 

             NUCLEATED RED BLOOD CELLS (BEAKER) 0 /100 WBC   0-0                

       



             (test code = 413)                                        



(CELLAVISION MANUAL DIFF)2019 10:10:00





             Test Item    Value        Reference Range Interpretation Comments

 

             NEUTROPHILS - REL 86 %                                   



             (CELLAVISION)(BEAKER) (test code =                                 

       



             2816)                                               

 

             LYMPHOCYTES - REL 1 %                                    



             (CELLAVISION)(BEAKER) (test code =                                 

       



             2817)                                               

 

             MONOCYTES - REL 6 %                                    



             (CELLAVISION)(BEAKER) (test code =                                 

       



             2818)                                               

 

             EOSINOPHILS - REL 3 %                                    



             (CELLAVISION)(BEAKER) (test code =                                 

       



             2819)                                               

 

             BANDS - REL (CELLAVISION)(BEAKER) 3 %          0-10                

      



             (test code = 2826)                                        

 

             BLASTS - REL (CELLAVISION)(BEAKER) 1 %          0-0          H     

       



             (test code = 2827)                                        

 

             NEUTROPHILS - ABS 16.77 K/ul   1.78-5.38    H            



             (CELLAVISION)(BEAKER) (test code =                                 

       



             2830)                                               

 

             LYMPHOCYTES - ABS 0.20 K/ul    1.32-3.57    L            



             (CELLAVISION)(BEAKER) (test code =                                 

       



             2831)                                               

 

             MONOCYTES - ABS 1.17 K/uL    0.30-0.82    H            



             (CELLAVISION)(BEAKER) (test code =                                 

       



             2832)                                               

 

             EOSINOPHILS - ABS 0.59 K/uL    0.04-0.54    H            



             (CELLAVISION)(BEAKER) (test code =                                 

       



             2834)                                               

 

             BANDS - ABS (CELLAVISION)(BEAKER) 0.59 K/uL    0.00-0.80           

      



             (test code = 2840)                                        

 

             BLASTS - ABS (CELLAVISION)(BEAKER) 0.20 K/uL    0.00-0.00    H     

       



             (test code = 2845)                                        

 

             TOTAL COUNTED (BEAKER) (test code 100                              

      



             = 1351)                                             

 

             PLT MORPHOLOGY (BEAKER) (test code Normal                          

       



             = 486)                                              

 

             SMUDGE CELLS (BEAKER) (test code = Present                         

       



             1371)                                               

 

             POIKILOCYTES (BEAKER) (test code = 1+ few                          

       



             966)                                                

 

             PLATELET CONCENTRATION Decreased                              



             (CELLAVISION)(BEAKER) (test code =                                 

       



             3438)                                               



Received comment: User comments: Slide comments:RAD, CHEST, 1 VIEW, NON DEPT
2019 08:53:00Reason for exam:-&gt;left effusionShould this be performed at
the bedside?-&gt;YesFINAL REPORT PATIENT ID: [id number] RAD, CHEST, 1 VIEW, NON 
DEPT INDICATION: left effusion COMPARISON:Prior day's exam FINDINGS: Portable 
frontal view of the chest. IMPRESSION: Limited by patient positioning.Support 
Lines: Stable. Lungs and pleura: Interstitial congestion on the left slightly 
greater than the right and unchanged from prior. Small left effusion. No visible
pneumothorax.Heart and mediastinum: Stable contours. Additional findings: None. 
Signed: JR Mendoza Robert MDReport VerifiedDate/Time: 2019 08:53:19
Reading Location: [facility] Radiology Reading Room Electronicallysigned by: 
KULWINDER MENDOZA on 2019 08:53 AMPOCT-GLUCOSE [id number] 
07:58:00





             Test Item    Value        Reference Range Interpretation Comments

 

             POC-GLUCOSE METER 95 mg/dL                         TESTED AT 

[place] [address]



             ([facility]) (test code =                                        [place] [facility] [address]



             1538)                                               



COMPREHENSIVE METABOLIC [id number] 05:41:00





             Test Item    Value        Reference Range Interpretation Comments

 

             TOTAL PROTEIN 5.4 gm/dL    6.0-8.3      L            



             (BEAKER) (test code =                                        



             770)                                                

 

             ALBUMIN (BEAKER) 1.9 g/dL     3.5-5.0      L            



             (test code = 1145)                                        

 

             ALKALINE PHOSPHATASE 126 U/L                          



             (BEAKER) (test code =                                        



             346)                                                

 

             BILIRUBIN TOTAL 4.9 mg/dL    0.2-1.2      H            



             (BEAKER) (test code =                                        



             377)                                                

 

             SODIUM (BEAKER) (test 117 meq/L    136-145      LL           



             code = 381)                                         

 

             POTASSIUM (BEAKER) 5.3 meq/L    3.5-5.1      H            



             (test code = 379)                                        

 

             CHLORIDE (BEAKER) 83 meq/L            L            



             (test code = 382)                                        

 

             CO2 (BEAKER) (test 28 meq/L     22-29                     



             code = 355)                                         

 

             BLOOD UREA NITROGEN 29 mg/dL     7-21         H            



             (BEAKER) (test code =                                        



             354)                                                

 

             CREATININE (BEAKER) 0.90 mg/dL   0.57-1.25                 



             (test code = 358)                                        

 

             GLUCOSE RANDOM 99 mg/dL                         



             (BEAKER) (test code =                                        



             652)                                                

 

             CALCIUM (BEAKER) 7.7 mg/dL    8.4-10.2     L            



             (test code = 697)                                        

 

             AST (SGOT) (BEAKER) 28 U/L       5-34                      



             (test code = 353)                                        

 

             ALT (SGPT) (BEAKER) 18 U/L       6-55                      



             (test code = 347)                                        

 

             EGFR (BEAKER) (test 86 mL/min/1.73                           ESTIMA

FROILAN GFR IS



             code = 1092) sq m                                   NOT AS ACCURATE

 AS



                                                                 CREATININE



                                                                 CLEARANCE IN



                                                                 PREDICTING



                                                                 GLOMERULAR



                                                                 FILTRATION RATE

.



                                                                 ESTIMATED GFR I

S



                                                                 NOT APPLICABLE 

FOR



                                                                 DIALYSIS PATIEN

TS.



Specimen moderately ictericPOCT-GLUCOSE [id number] 21:08:00





             Test Item    Value        Reference Range Interpretation Comments

 

             POC-GLUCOSE METER 92 mg/dL                         TESTED AT 

[facility] [address]



             ([facility]) (test code =                                        [place] [facility] [address]



             1538)                                               



RAD, CHEST, 1 VIEW, NON [id number] 17:40:00Reason for exam:-&gt;post chest 
tube placementFINAL REPORT PATIENT ID: [id number] INDICATION: post chest tube 
placement TECHNIQUE: Chest radiograph,single view, portable technique. FINDINGS 
/ IMPRESSION: Left pleural effusion has decreased in size,since 10:25 AM, after 
pleural tube placement. No pneumothorax demonstrated. Right PICC line again not
ed terminating at the cavoatrial junction. Signed: Bertram Ordonez MDReport 
Verified Date/Time: 2019 17:40:02 Reading Location: [address] Consult 
Reading Room Electronically signed by: BERTRAM ORDONEZ M.D. on 
2019 05:40 PMPOCT-GLUCOSE [id number] 17:33:00





             Test Item    Value        Reference Range Interpretation Comments

 

             POC-GLUCOSE METER 116 mg/dL           H            TESTED AT 

[place] [address]



             ([facility]) (test code =                                        [facility]

[facility]



             1538)                                               [id number]



POCT-GLUCOSE [id number] 15:19:00





             Test Item    Value        Reference Range Interpretation Comments

 

             POC-GLUCOSE METER 85 mg/dL                         TESTED AT 

[place] [address]



             ([facility]) (test code =                                        [employer]

[place] [address]



             1538)                                               



CT, DRAINAGE, CHEST TUBE [id number] 14:49:00Reason for exam:-
&gt;loculated left pleural effusion, please place large bore pigtail if effusion
noted on CT scanAnesthesia:-&gt;NoneFINAL REPORT PATIENT ID: [id number] PROCEDURE:
CT-guided placement of a left pleural catheter. Dose modulation, iterative 
reconstruction, and/or weight-based adjustment of the mA/kV was utilized to 
reduce the radiation dose to as low as reasonably achievable. INDICATION: 
Loculated left pleural effusion.COMPARISON: CT from earlier today. SEDATION: 
Intravenous moderate sedation was administered by radiology nursing and 
monitored under the direction of the undersigned radiologist. The patient's 
vital signs were monitored throughout the procedure and recorded in the 
patient's medical record by radiologynursing. Total intraservice time of 
sedation was 25 minutes. MEDICATIONS: 0.5 mg Versed, 25 mcg fentanyl 
DESCRIPTION: After obtaining informed written consent, the patient was brought 
to the procedure room and placed in the supine position. Preliminary CT scan 
revealed a large left pleural effusion. Aclear path to the collection was 
identified. The overlying skin was prepped and draped in the usual,sterile 
fashion and local 2% lidocaine anesthesia was administered. Under CT guidance, a
19-gauge needle was placed. After placement of a wire and serial dilation, a 12 
French catheter was placed into the pleural fluid. The catheter was affixed to 
the skin and connected to a vacuum container. 1000 mL of clear red fluid was 
aspirated. Samples were placed on this side table and no clotting was noted. Sa
mples were sent for analysis. Post procedure scan showed decrease in size with 
left effusion and no immediate complications. IMPRESSION: Successful placement 
of a 12 French left chest tube. Signed: Vivien Aguilera MDReport Verified Date/Time: 
2019 14:49:30 Reading Location: [facility] B1 C013Y CT Body Reading Room 
Electronically signed by: VIVIEN AGUILERA MD on 2019 02:49 PMCT, CHEST, 
WITH [id number] 13:11:00Reason for exam:-&gt;evaluate loculated left 
pleural effusion seen on xrayFINAL REPORT PATIENT ID: [id number] TECHNIQUE: CT 
scan of the chest WITH intravenous contrast. Dose modulation, iterative 
reconstruction, and/or weight-based adjustment of the mA/kV was utilized to 
reduce the radiation dose to as low as reasonably achievable. INDICATION: 
evaluate loculated left pleural effusion seen on xrayevaluate loculated left 
pleural effusion seen on xray. COMPARISON: None. FINDINGS: LINES/TUBES: A right 
PICC has its tip at the superior cavoatrial junction. LUNGS AND AIRWAYS: Mild
right basilar subsegmental atelectasis. There is an area of cavitation with a 
fluid fluid level in superior segment of the left lower lobe which measures 4 cm
PLEURA: Trace right pleural effusion. HEART AND MEDIASTINUM: The visualized 
thyroid gland is normal. No significant mediastinal, hilar, or axillary 
lymphadenopathy. The heart and pericardium are within normal limits. Severe 
coronary arterial calcifications. SOFT TISSUES AND BONES: Bilateral 
gynecomastia. Old, healed right 10th and 12th rib fractures. Old, healed left 
10th rib fracture. UPPER ABDOMEN: Nodular, cirrhotic liver. Partially visualized
upper abdominal varices. A periumbilical vein is recanalized. IMPRESSION: 
1.There is a large left sided loculated pleural effusion with a mildly thickened
pleura and some intermixed gas. This is concerning for an empyema. 2.The air-
fluid level with surrounding lung in the superior segment of the left lower lobe
is most likely a lung abscess. A cavitary lung nodule (either from malignancy or
fungal infection) is considered less likely. A follow-up chest CT is recommended
in three months to document decrease in size and exclude cavitary malignancy. 
3.Cirrhosis with findings of portal hypertension. Signed: Vivien Aguilera 
Verified Date/Time: 2019 13:11:18 Reading Location: [facility] B1 C013Y CT Body 
Reading Room Electronically signed by: VIVIEN AGUILERA MD on 2019 01:11 
PMPOCT-GLUCOSE [id number] 11:22:00





             Test Item    Value        Reference Range Interpretation Comments

 

             POC-GLUCOSE METER 81 mg/dL                         TESTED AT 

[facility] [address]



             ([facility]) (test code =                                        [place] [address]



             1538)                                               



RAD, CHEST, 1 VIEW, NON [id number] 10:59:00Reason for exam:-&gt;eval 
effusionShould this be performed at the bedside?-&gt;YesFINAL REPORT PATIENT ID:
[id number] RAD, CHEST, 1 VIEW, NON DEPT INDICATION: eval effusion COMPARISON:Prior
day's exam FINDINGS: Portable frontal view of the chest. IMPRESSION: Limited by 
patient rotation.Support Lines: A right PICC terminates over the superior vena 
cava. Lungs and pleura: Large left effusion. Right costophrenic sulcus is 
excluded from view. No pneumothorax.Heart and mediastinum: Unremarkable where 
visible.Additional findings: None. A CT evaluation is currently pending. Signed:
JR Mendoza Robert MDReport Verified Date/Time: 2019 10:59:34 
Reading Location: [facility] Radiology Reading Room Electronically signed by:
KULWINDER MENDOZA on 2019 10:59 AMCOMPREHENSIVE METABOLIC PANEL
2019 07:33:00





             Test Item    Value        Reference Range Interpretation Comments

 

             TOTAL PROTEIN 5.8 gm/dL    6.0-8.3      L            Specimen sligh

tly



             (BEAKER) (test code =                                        hemoly

zed



             770)                                                

 

             ALBUMIN (BEAKER) 2.0 g/dL     3.5-5.0      L            Specimen sl

ightly



             (test code = 1145)                                        hemolyzed

 

             ALKALINE PHOSPHATASE 130 U/L                          



             (BEAKER) (test code =                                        



             346)                                                

 

             BILIRUBIN TOTAL 4.6 mg/dL    0.2-1.2      H            Specimen sli

ghtly



             (BEAKER) (test code =                                        hemoly

zed



             377)                                                

 

             SODIUM (BEAKER) (test 117 meq/L    136-145      LL           



             code = 381)                                         

 

             POTASSIUM (BEAKER) 5.3 meq/L    3.5-5.1      H            Specimen 

slightly



             (test code = 379)                                        hemolyzed

 

             CHLORIDE (BEAKER) 84 meq/L            L            



             (test code = 382)                                        

 

             CO2 (BEAKER) (test 28 meq/L     22-29                     



             code = 355)                                         

 

             BLOOD UREA NITROGEN 22 mg/dL     7-21         H            



             (BEAKER) (test code =                                        



             354)                                                

 

             CREATININE (BEAKER) 0.86 mg/dL   0.57-1.25                 Specimen

 slightly



             (test code = 358)                                        hemolyzed

 

             GLUCOSE RANDOM 90 mg/dL                         



             (BEAKER) (test code =                                        



             652)                                                

 

             CALCIUM (BEAKER) 7.9 mg/dL    8.4-10.2     L            



             (test code = 697)                                        

 

             AST (SGOT) (BEAKER) 33 U/L       5-34                      Specimen

 slightly



             (test code = 353)                                        hemolyzed

 

             ALT (SGPT) (BEAKER) 20 U/L       6-55                      Specimen

 slightly



             (test code = 347)                                        hemolyzed

 

             EGFR (BEAKER) (test 91 mL/min/1.73                           ESTIMA

FROILAN GFR IS



             code = 1092) sq m                                   NOT AS ACCURATE

 AS



                                                                 CREATININE



                                                                 CLEARANCE IN



                                                                 PREDICTING



                                                                 GLOMERULAR



                                                                 FILTRATION RATE

.



                                                                 ESTIMATED GFR I

S



                                                                 NOT APPLICABLE 

FOR



                                                                 DIALYSIS PATIEN

TS.



Specimen moderately ictericPOCT-GLUCOSE [id number] 05:46:00





             Test Item    Value        Reference Range Interpretation Comments

 

             POC-GLUCOSE METER 93 mg/dL                         TESTED AT 

[facility] [address]



             (BEAKER) (test code =                                        [place] [address]



             7468)                                               



PROTHROMBIN [id number] 05:30:00





             Test Item    Value        Reference Range Interpretation Comments

 

             PROTIME (BEAKER) (test code = 16.9 seconds 11.9-14.2    H          

  



             759)                                                

 

             INR (BEAKER) (test code = 370) 1.4          <=5.9                  

   



Effective 2019: PT Reference Range ChangeNew: 11.9-14.2 Previous: 11.7-
14.7RECOMMENDED COUMADIN/WARFARIN INR THERAPY RANGESSTANDARD DOSE: 2.0-3.0 
Includes: PROPHYLAXIS for venous thrombosis, systemic embolization; TREATMENT 
for venous thrombosis and/or pulmonary embolus.HIGH RISK: Target INR is 2.5-3.5 
for patients wiht mechanical heart valves.CBC W/PLT COUNT &amp; AUTO 
[id number] 05:27:00





             Test Item    Value        Reference Range Interpretation Comments

 

             WHITE BLOOD CELL COUNT (BEAKER) 23.2 K/ L    3.5-10.5     H        

    



             (test code = 775)                                        

 

             RED BLOOD CELL COUNT (BEAKER) 4.75 M/ L    4.63-6.08               

  



             (test code = 761)                                        

 

             HEMOGLOBIN (BEAKER) (test code = 15.3 GM/DL   13.7-17.5            

     



             410)                                                

 

             HEMATOCRIT (BEAKER) (test code = 43.1 %       40.1-51.0            

     



             411)                                                

 

             MEAN CORPUSCULAR VOLUME (BEAKER) 90.7 fL      79.0-92.2            

     



             (test code = 753)                                        

 

             MEAN CORPUSCULAR HEMOGLOBIN 32.2 pg      25.7-32.2                 



             (BEAKER) (test code = 751)                                        

 

             MEAN CORPUSCULAR HEMOGLOBIN CONC 35.5 GM/DL   32.3-36.5            

     



             (BEAKER) (test code = 752)                                        

 

             RED CELL DISTRIBUTION WIDTH 13.3 %       11.6-14.4                 



             (BEAKER) (test code = 412)                                        

 

             PLATELET COUNT (BEAKER) (test code 87 K/CU MM   150-450      L     

       



             = 756)                                              

 

             MEAN PLATELET VOLUME (BEAKER) 8.9 fL       9.4-12.4     L          

  



             (test code = 754)                                        

 

             NUCLEATED RED BLOOD CELLS (BEAKER) 0 /100 WBC   0-0                

       



             (test code = 413)                                        

 

             NEUTROPHILS RELATIVE PERCENT 86 %                                  

 



             (BEAKER) (test code = 429)                                        

 

             LYMPHOCYTES RELATIVE PERCENT 3 %                                   

 



             (BEAKER) (test code = 430)                                        

 

             MONOCYTES RELATIVE PERCENT 9 %                                    



             (BEAKER) (test code = 431)                                        

 

             EOSINOPHILS RELATIVE PERCENT 0 %                                   

 



             (BEAKER) (test code = 432)                                        

 

             BASOPHILS RELATIVE PERCENT 0 %                                    



             (BEAKER) (test code = 437)                                        

 

             NEUTROPHILS ABSOLUTE COUNT 19.87 K/ L   1.78-5.38    H            



             (BEAKER) (test code = 670)                                        

 

             LYMPHOCYTES ABSOLUTE COUNT 0.73 K/ L    1.32-3.57    L            



             (BEAKER) (test code = 414)                                        

 

             MONOCYTES ABSOLUTE COUNT (BEAKER) 2.16 K/ L    0.30-0.82    H      

      



             (test code = 415)                                        

 

             EOSINOPHILS ABSOLUTE COUNT 0.06 K/ L    0.04-0.54                 



             ([facility]) (test code = 416)                                        

 

             BASOPHILS ABSOLUTE COUNT ([facility]) 0.04 K/ L    0.01-0.08           

      



             (test code = 417)                                        

 

             IMMATURE GRANULOCYTES-RELATIVE 1 %          0-1                    

   



             PERCENT ([facility]) (test code =                                      

  



             2801)                                               



POCT-GLUCOSE [id number] 23:34:00





             Test Item    Value        Reference Range Interpretation Comments

 

             POC-GLUCOSE METER 94 mg/dL                         TESTED AT 

[facility] [address]



             ([facility]) (test code =                                        [place] [address]



             1538)

## 2023-01-04 NOTE — XMS REPORT
Clinical Summary

                           Created on:2023



Patient:Omkar Chung

Sex:Male

:1960

External Reference #:[id number]





Demographics







                          Address                   [address]



                                                    [place] [address]

 

                          Mobile Phone              [phone]

 

                          Home Phone                [phone]

 

                          Email Address             [email or web address]

 

                          Preferred Language        English

 

                          Marital Status            

 

                          Latter day Affiliation     Unknown

 

                          Race                      White

 

                          Ethnic Group              Not  or 









Author







                          Organization              [facility]

[facility]

 

                          Address                   [address] [place] [address]









Support







                Name            Relationship    Address         Phone

 

                Gabby Villatoro    Unavailable     Unavailable     [phone]









Care Team Providers







                    Name                Role                Phone

 

                    Moris Guajardo MD Unavailable         [phone]









Allergies

Not on File



Medications

Not on file



Active Problems

Not on file



Encounters







             Date         Type         Specialty    Care Team    Description

 

             2022   Travel                                 



after 2022



Social History







             Tobacco Use  Types        Packs/Day    Years Used   Date

 

             Smoking Tobacco: Never Assessed                                    

    









                          Sex Assigned at Birth     Date Recorded

 

                          Not on file               







Last Filed Vital Signs

Not on file



Plan of Treatment

Not on file



Results

Not on fileafter 2022



Insurance







          Payer     Benefit Plan / Subscriber ID Effective Dates Phone     Addre

ss   Type



                    Group                                             

 

           [facility]   [employer] [facility] [id number]  Effective for            [place]

[place] [id number]



                                        Medicare



                    [place] [address] 









           Guarantor Name Account Type Relation to Date of Birth Phone      Bill

ing



                                 Patient                          Address

 

           Omkar Chung Personal/Family Self       1960 [phone] [address]



                                                       (Home)     [address]







                                                                  [place]



                                                                  [address]

 

           Omkar Chung Personal/Family Self       1960 [phone] [address]



                                                       (Home)     [place]



                                                                  [address]







Care Teams







             Team Member  Relationship Specialty    Start Date   End Date

 

                                        Moris Guajardo MD



                PCP - External Referring General Surgery 3/11/22         



                                        [address]



                                                                



                                        [address]                                        



                                        [address] [phone] (Work)



                                                                



             [phone] (Fax)

## 2023-01-04 NOTE — EDPHYS
Physician Documentation                                                                           

 [facility]                                                                 

Name: Omkar Chung                                                                                  

Age: 62 yrs                                                                                       

Sex: Male                                                                                         

: 1960                                                                                   

MRN: [id number]                                                                                   

Arrival Date: 2023                                                                          

Time: 19:14                                                                                       

Account#: [id number]                                                                            

Bed 13                                                                                            

Private MD:                                                                                       

ED Physician Fatoumata Longo                                                                         

HPI:                                                                                              

                                                                                             

20:05 This 62 yrs old Male presents to ER via EMS with complaints of Diarrhea.                sp3 

20:05 62-year-old male with history of colon cancer currently on chemotherapy, COPD,          sp3 

      diabetes, liver cirrhosis presents with a chief complaint vomiting and diarrhea for the     

      last 7 to 8 days. His last chemotherapy was early December. He states that the diarrhea     

      has slowly improved but the emesis and nausea continue. He feels like he has had            

      decreased p.o. intake and also decreased urine output. Dr. Lynn is his oncologist.          

      Denies fever, chest pain, shortness of breath, back pain, abdominal pain, rash, blood       

      or mucus in his diarrhea, known sick contacts, travel history, any other aspect of ROS      

      at this time..                                                                              

                                                                                                  

Historical:                                                                                       

- Allergies:                                                                                      

19:51 No Known Allergies;                                                                     pf1 

- Home Meds:                                                                                      

19:51 albuterol sulfate 2.5 mg/0.5 mL Inhl nebu 0.5 mL every 6 hours [Active];                pf1 

- PMHx:                                                                                           

19:51 colon cancer; COPD; DM type 2; Hernia; Cirrhosis;                                       pf1 

                                                                                                  

- Immunization history:: Adult Immunizations not up to date, Client reports receiving             

  the Escobar \T\ Escobar single-dose vaccine.                                                    

- Social history:: Smoking status: unknown.                                                       

                                                                                                  

                                                                                                  

ROS:                                                                                              

20:08 Constitutional: Negative for fever, chills, and weight loss, Eyes: Negative for injury, sp3 

      pain, redness, and discharge, ENT: Negative for injury, pain, and discharge, Neck:          

      Negative for injury, pain, and swelling, Cardiovascular: Negative for chest pain,           

      palpitations, and edema, Respiratory: Negative for shortness of breath, cough,              

      wheezing, and pleuritic chest pain, Back: Negative for injury and pain, MS/Extremity:       

      Negative for injury and deformity, Skin: Negative for injury, rash, and discoloration,      

      Neuro: Negative for headache, weakness, numbness, tingling, and seizure, Psych:             

      Negative for depression, anxiety, suicide ideation, homicidal ideation, and                 

      hallucinations, Allergy/Immunology: Negative for hives, rash, and allergies, Endocrine:     

      Negative for neck swelling, polydipsia, polyuria, polyphagia, and marked weight             

      changes, Hematologic/Lymphatic: Negative for swollen nodes, abnormal bleeding, and          

      unusual bruising.                                                                           

20:08 All other systems are negative.                                                             

                                                                                                  

Exam:                                                                                             

20:08 Constitutional:  This is a well developed, well nourished patient who is awake, alert,  sp3 

      and in no acute distress. Head/Face:  Normocephalic, atraumatic. Eyes:  Pupils equal        

      round and reactive to light, extra-ocular motions intact.  Lids and lashes normal.          

      Conjunctiva and sclera are non-icteric and not injected.  Cornea within normal limits.      

      Periorbital areas with no swelling, redness, or edema. ENT:  Nares patent. No nasal         

      discharge, no septal abnormalities noted.  External auditory canals are clear.              

      Oropharynx with no redness, swelling, or masses, exudates, or evidence of obstruction,      

      uvula midline.  Mucous membranes moist. Neck:  Trachea midline, no thyromegaly or           

      masses palpated, and no cervical lymphadenopathy.  Supple, full range of motion without     

      nuchal rigidity, or vertebral point tenderness.  No Meningismus. Chest/axilla:  Normal      

      chest wall appearance and motion.  Nontender with no deformity.  No lesions are             

      appreciated. Cardiovascular:  Regular rate and rhythm with a normal S1 and S2.  No          

      gallops, murmurs, or rubs.  Normal PMI, no JVD.  No pulse deficits. Respiratory:  Lungs     

      have equal breath sounds bilaterally, clear to auscultation and percussion.  No rales,      

      rhonchi or wheezes noted.  No increased work of breathing, no retractions or nasal          

      flaring. Skin:  Warm, dry with normal turgor.  Normal color with no rashes, no lesions,     

      and no evidence of cellulitis. MS/ Extremity:  Pulses equal, no cyanosis.                   

      Neurovascular intact.  Full, normal range of motion. Neuro:  Awake and alert, GCS 15,       

      oriented to person, place, time, and situation.  Cranial nerves II-XII grossly intact.      

      Motor strength 5/5 in all extremities.  Sensory grossly intact.  Cerebellar exam            

      normal.  Normal gait. Psych:  Awake, alert, with orientation to person, place and time.     

       Behavior, mood, and affect are within normal limits.                                       

20:08 Abdomen/GI: Soft abdomen that is fluid-filled.  No peritoneal signs including rebound       

      or guarding.  No tenderness noted..                                                         

                                                                                                  

Vital Signs:                                                                                      

19:15  / 74; Pulse 101; Resp 15; Temp 98.1; Pulse Ox 93% on 3 lpm NC; Weight 95.25 kg;  pf1 

      Height 6 ft. 2 in. (187.96 cm); Pain 0/10;                                                  

20:00  / 73; Pulse 94; Resp 20; Pulse Ox 93% on 3 lpm NC; Pain 0/10;                    pf1 

21:00  / 86; Pulse 95; Resp 18; Pulse Ox 93% on 3 lpm NC;                               pf1 

22:00  / 74; Pulse 99; Resp 18; Temp 98.1; Pulse Ox 94% on 3 lpm NC; Pain 0/10;         pf1 

19:15 Body Mass Index 26.96 (95.25 kg, 187.96 cm)                                             pf1 

                                                                                                  

MDM:                                                                                              

19:24 Patient medically screened.                                                             sp3 

20:09 Data reviewed: vital signs, nurses notes. ED course: 62-year-old male with complex      sp3 

      medical history including liver cirrhosis and colon cancer on chemo presents for            

      dehydration symptoms likely from side effects of his continued chemotherapy. Other          

      options include viral syndrome, gastroenteritis, functional abdominal motility issues,      

      gastritis, among others. Patient has had similar episodes in the past and normally gets     

      IV fluids and antiemetics which self resolve the issue. Clinically have ruled out acute     

      coronary syndrome, pulmonary embolism, sepsis, shock, aortic pathology, or any other        

      critical diagnosis. Patiently is traditionally admitted overnight and that is certainly     

      a possibility today. We will obtain laboratory values, urine analysis and administer IV     

      fluids and antiemetics with a goal of discharge but with recognition that he may need       

      23-hour observation..                                                                       

21:32 ED course: Patient continues to have emesis and is now had a total of 8 mg of Zofran    sp3 

      and an additional 20 mg of Phenergan. Patient is hypokalemic and mildly hyponatremic in     

      the setting of elevated creatinine. He does now feel better after intervention. I           

      offered him 23 observation in the hospital but he would like to be discharged and           

      states he has antiemetics at home. He thanked us for making him feel better and is          

      ready for discharge..                                                                       

                                                                                                  

                                                                                             

19:23 Order name: CBC with Diff; Complete Time: 21:32                                         sp3 

                                                                                             

19:23 Order name: CMP; Complete Time: 21:32                                                   sp3 

                                                                                             

19:23 Order name: Lipase; Complete Time: 21:32                                                sp3 

                                                                                             

19:23 Order name: IV Saline Lock; Complete Time: 19:41                                        sp3 

                                                                                             

19:23 Order name: Labs collected and sent; Complete Time: 19:42                               sp3 

                                                                                             

19:23 Order name: Urine Dipstick-Ancillary (obtain specimen)                                  sp3 

                                                                                                  

Administered Medications:                                                                         

19:48 Drug: NS 0.9% 1000 ml Route: IV; Rate: 1 bolus; Site: left antecubital;                 pf1 

20:30 Follow up: IV Status: Completed infusion; IV Intake: 1000ml                             pf1 

19:48 Drug: Zofran (Ondansetron) 8 mg Route: IVP; Site: left antecubital;                     pf1 

20:48 Follow up: Response: No adverse reaction; Nausea unchanged                              pf1 

21:20 Drug: Phenergan (promethazine) 12.5 mg Route: IVP; Site: left antecubital;              pf1 

21:50 Follow up: Response: No adverse reaction; Marked relief of symptoms; Nausea is decreasedpf1 

22:00 Drug: Potassium Chloride 40 mEq Route: PO;                                              pf1 

22:10 Follow up: Response: No adverse reaction                                                pf1 

                                                                                                  

                                                                                                  

Disposition Summary:                                                                              

23 21:42                                                                                    

Discharge Ordered                                                                                 

      Location: Home                                                                          sp3 

      Condition: Stable                                                                       sp3 

      Diagnosis                                                                                   

        - Vomiting                                                                            sp3 

      Followup:                                                                               sp3 

        - With: Private Physician                                                                  

        - When: Upon discharge from the Emergency Department                                       

        - Reason: Continuance of care                                                              

      Discharge Instructions:                                                                     

        - Discharge Summary Sheet                                                             sp3 

        - Managing Chemotherapy Side Effects, Adult                                           sp3 

      Forms:                                                                                      

        - Medication Reconciliation Form                                                      sp3 

        - Thank You Letter                                                                    sp3 

        - Antibiotic Education                                                                sp3 

        - Prescription Opioid Use                                                             sp3 

Signatures:                                                                                       

Dispatcher [employer]                           EDMS                                                 

Fatoumata Longo MD MD   sp3                                                  

Rafia gallagher RN                      RN   pf1                                                  

                                                                                                  

Corrections: (The following items were deleted from the chart)                                    

21:42 21:32 ED course: Patient continues to have emesis and is now had a total of 8 mg of     sp3 

      Zofran and an additional 20 mg of Phenergan. Patient is hypokalemic and mildly              

      hyponatremic in the setting of elevated creatinine. Will place patient in observation       

      status for 23 hours for continued IV fluid support and antiemetics.. sp3                    

                                                                                                  

**************************************************************************************************

## 2023-01-19 ENCOUNTER — HOSPITAL ENCOUNTER (EMERGENCY)
Dept: HOSPITAL 97 - ER | Age: 63
Discharge: ANOTHER HOSPITAL | End: 2023-01-19
Payer: COMMERCIAL

## 2023-01-19 VITALS — TEMPERATURE: 98.5 F

## 2023-01-19 VITALS — DIASTOLIC BLOOD PRESSURE: 86 MMHG | SYSTOLIC BLOOD PRESSURE: 136 MMHG | OXYGEN SATURATION: 95 %

## 2023-01-19 DIAGNOSIS — D72.829: ICD-10-CM

## 2023-01-19 DIAGNOSIS — Z20.822: ICD-10-CM

## 2023-01-19 DIAGNOSIS — E11.9: ICD-10-CM

## 2023-01-19 DIAGNOSIS — R79.1: ICD-10-CM

## 2023-01-19 DIAGNOSIS — K72.90: Primary | ICD-10-CM

## 2023-01-19 DIAGNOSIS — R41.82: ICD-10-CM

## 2023-01-19 DIAGNOSIS — G93.41: ICD-10-CM

## 2023-01-19 DIAGNOSIS — Z85.038: ICD-10-CM

## 2023-01-19 DIAGNOSIS — J44.9: ICD-10-CM

## 2023-01-19 DIAGNOSIS — K70.31: ICD-10-CM

## 2023-01-19 LAB
ALBUMIN SERPL BCP-MCNC: 2.1 G/DL (ref 3.4–5)
ALP SERPL-CCNC: 271 U/L (ref 45–117)
ALT SERPL W P-5'-P-CCNC: 58 U/L (ref 16–61)
AST SERPL W P-5'-P-CCNC: 175 U/L (ref 15–37)
BUN BLD-MCNC: 19 MG/DL (ref 7–18)
GLUCOSE SERPLBLD-MCNC: 111 MG/DL (ref 74–106)
HCT VFR BLD CALC: 40 % (ref 39.6–49)
INR BLD: 2.05
LYMPHOCYTES # SPEC AUTO: 0.6 K/UL (ref 0.7–4.9)
MAGNESIUM SERPL-MCNC: 2.8 MG/DL (ref 1.6–2.4)
MCV RBC: 92.6 FL (ref 80–100)
MORPHOLOGY BLD-IMP: (no result)
NT-PROBNP SERPL-MCNC: 297 PG/ML (ref ?–125)
PMV BLD: 7 FL (ref 7.6–11.3)
POTASSIUM SERPL-SCNC: 5.1 MMOL/L (ref 3.5–5.1)
RBC # BLD: 4.32 M/UL (ref 4.33–5.43)
TROPONIN I SERPL HS-MCNC: 11.2 PG/ML (ref ?–58.9)

## 2023-01-19 PROCEDURE — 87040 BLOOD CULTURE FOR BACTERIA: CPT

## 2023-01-19 PROCEDURE — 87205 SMEAR GRAM STAIN: CPT

## 2023-01-19 PROCEDURE — 85025 COMPLETE CBC W/AUTO DIFF WBC: CPT

## 2023-01-19 PROCEDURE — 80076 HEPATIC FUNCTION PANEL: CPT

## 2023-01-19 PROCEDURE — 83880 ASSAY OF NATRIURETIC PEPTIDE: CPT

## 2023-01-19 PROCEDURE — 84484 ASSAY OF TROPONIN QUANT: CPT

## 2023-01-19 PROCEDURE — 71045 X-RAY EXAM CHEST 1 VIEW: CPT

## 2023-01-19 PROCEDURE — 36415 COLL VENOUS BLD VENIPUNCTURE: CPT

## 2023-01-19 PROCEDURE — 70450 CT HEAD/BRAIN W/O DYE: CPT

## 2023-01-19 PROCEDURE — 83735 ASSAY OF MAGNESIUM: CPT

## 2023-01-19 PROCEDURE — 82140 ASSAY OF AMMONIA: CPT

## 2023-01-19 PROCEDURE — 93005 ELECTROCARDIOGRAM TRACING: CPT

## 2023-01-19 PROCEDURE — 80048 BASIC METABOLIC PNL TOTAL CA: CPT

## 2023-01-19 PROCEDURE — 83605 ASSAY OF LACTIC ACID: CPT

## 2023-01-19 PROCEDURE — 85610 PROTHROMBIN TIME: CPT

## 2023-01-19 NOTE — XMS REPORT
Continuity of Care Document

                           Created on:2023



Patient:ANAYELI CHUNG

Sex:Male

:1960

External Reference #:[id number]





Demographics







                          Address                   [address]



                                                    [place] [address]

 

                          Home Phone                [phone]

 

                          Work Phone                [phone]

 

                          Mobile Phone              [phone]

 

                          Email Address             [email or web address]

 

                          Preferred Language        English

 

                          Marital Status            Unknown

 

                          Gnosticist Affiliation     Unknown

 

                          Race                      Unknown

 

                          Additional Race(s)        Unavailable



                                                    White

 

                          Ethnic Group              Unknown









Author







                          Organization              [facility]

t

 

                          Address                   [address]



                                                    [place] [address]

 

                          Phone                     [phone]









Support







                Name            Relationship    Address         Phone

 

                Gabby Gonsalves   Spouse          [address]    [phone]



                                                [place] [id number] 

 

                CARLENE GONSALVES    P               Unavailable     [phone]

 

                YOSELIN LANGLEY               Unavailable     [phone]

 

                Ebony Avila   Sister          Unavailable     [phone]



                                                [place]    

 

                Anayeli Chung     Unavailable     [address] [phone]



                                                [place] [phone] 









Care Team Providers







                    Name                Role                Phone

 

                    MONICA LONGO Primary Care Physician Unavailable

 

                    Monica Longo     Attending Clinician Unavailable

 

                    SYSTEM, PROVIDER NOT IN Attending Clinician Unavailable

 

                    ROCHELLE LEDESMA Attending Clinician Unavailable

 

                    ELAYNE LONGO      Attending Clinician Unavailable

 

                    Elayne Longo MD Attending Clinician [phone]

 

                    ELAYNE LONGO Attending Clinician Unavailable

 

                    Teresa Belle MD Attending Clinician [phone]

 

                    KATRINA MULLINS       Attending Clinician Unavailable

 

                    Katrina Mullins MD    Attending Clinician [phone]

 

                    KATRINA MULLINS       Attending Clinician Unavailable

 

                    Leonarda Bang MD Attending Clinician Unavailable

 

                    INGRID ALCAZAR   Attending Clinician Unavailable

 

                    SCHOENSTEIN, LYNDA  Attending Clinician Unavailable

 

                    Alex Weston MD Attending Clinician [phone]

 

                    ELVIN HUGO Attending Clinician Unavailable

 

                    ELAYNE LONGO Admitting Clinician Unavailable

 

                    KATRINA MULLINS       Admitting Clinician Unavailable

 

                    SCHOENSTEIN, JACEK  Admitting Clinician Unavailable

 

                    ESTELLA WHITMORE Admitting Clinician Unavailable









Payers







           Payer Name Policy Type Policy Number Effective Date Expiration Date RADHA hairston

 

           [facility]   53         [id number]                       Common [facility]

t



           ADVANTAGE                                              - [facility]              [id number]  2019            



           ADVANTAGE                        00:00:00              



           [facility]                                                

 

           [employer] MEDICARE            [id number]  2017            



           COMPLETE                         00:00:00              







Problems







       Condition Condition Condition Status Onset  Resolution Last   Treating Co

mments 

Source



       Name   Details Category        Date   Date   Treatment Clinician        



                                                 Date                 

 

       Metastatic Metastatic Disease Active                              [facility]

[facility]



       adenocarci adenocarci               3-27                               Co

llege



       noma   noma                 00:00:                             of



                                   00                                 Medicin



                                                                      e

 

       Loculated Loculated Disease Active                              [facility]



       pleural pleural               9-19                               [facility]



       effusion effusion               00:00:                             Medica

l



                                   00                                 Center

 

       Pseudomona Pseudomona Disease Active                              C

[facility]



       l      l                    9-19                               [facility]



       pneumonia pneumonia               00:00:                             Medi

alexus



                                   00                                 Center

 

       Hyponatrem Hyponatrem Disease Active                              C

[facility]



       ia     ia                   9-19                               [facility]



                                   00:00:                             Medical



                                   00                                 Center

 

       Parapneumo Parapneumo Disease Active                              C

[facility]



       benjamin    benjamin                  9-18                               [facility]



       effusion effusion               00:00:                             Medica

l



                                   00                                 Center

 

       Inflammato Inflammato Disease Active                              [facility]

[facility]



       ry liver ry liver               6-13                               Col[facility]

e



       disease disease               00:00:                             of



                                   00                                 [facility]icin



                                                                      e

 

       Ascites Ascites Disease Active                              [facility]



                                   9-27                               College



                                   00:00:                             of



                                   00                                 Medicin



                                                                      e

 

       Pleural Pleural Disease Active                                    [facility]



       effusion effusion                                                  Colleg

e



       on left on left                                                  of



                                                                      [facility]icin



                                                                      e

 

       [id number] Adenocarci Problem                                           Co

mmon



              noma of                                                  [facility]



              liver                                                   [facility]

 

       [id number] Metastatic Problem                                           C

ommon



       104    adenocarci                                                  [facility]



              noma to                                                  - [facility]



              liver                                                   [facility]

 

       Malignant Malignant Problem                                           Com

mon



       neoplasm neoplasm                                                  [facility]



       of colon of colon,                                                  - [facility]



              unspecifie                                                  [facility]

 

       Neoplasm Neoplasm Problem                                           Commo

n



       related related                                                  [facility]



       pain   pain                                                    - [facility]

 

       Cirrhosis Cirrhosis Problem                                           Com

mon



       of liver of liver                                                  Spirit



                                                                      [facility]

 

       [id number] Adenocarci Problem                                           Co

mmon



              noma of                                                  [facility]



              transverse                                                  - [facility]



              colon                                                   [facility]

 

       Diabetic Diabetic Problem                                           Commo

n



       oculopathy eyes                                                    [facility]irit



       associated                                                         - [facility]



       with type                                                         



       II                                                             [facility]



       diabetes                                                         Medical



       mellitus                                                         Center

 

       Chronic Chronic Problem                                           Common



       kidney kidney                                                  [facility]



       disease disease,                                                  - [facility]



       stage 3 stage 3                                                  St



       (disorder) unspecifie                                                  [facility]

[facility]



              d                                                       Medical



                                                                      Center

 

       [id number] Type 2 Problem                                           Commo

n



       22807  diabetes                                                  [facility]irit



              mellitus                                                  - [facility]



              with other                                                  



              diabetic                                                  [facility]



              kidney                                                  Medical



              complicati                                                  Center



              on                                                      

 

       Chronic Chronic Problem                                           Common



       obstructiv obstructiv                                                  Sp

jose



       e      e                                                       - [facility]



       pulmonary pulmonary                                                  



       disease [facility]

 

       [id number] History of Problem                                           Co

mmon



              alcohol                                                  Spirit



              abuse                                                   - [facility]

 

       [id number] Alcoholic Problem                                           Com

mon



              cirrhosis                                                  Spirit



              of liver                                                  - [facility]



              with                                                    [facility]

 

       [id number] Hypothyroi Problem                                           Com

mon



              dism,                                                   Spirit



              unspecifie                                                  - [facility]



              d type                                                  [facility]

 

       [id number] Aphasia Problem                                           Common



                                                                      Spirit



                                                                      - [facility]

 

       [id number] Type 2 Problem                                           Commo

n



       44312  diabetes                                                  Spirit



              mellitus                                                  - [facility]



              with                                                    [facility]                                                    Medical



              unspecifie                                                  Center



              d whether                                                  



              long term                                                  



              insulin                                                  



              use                                                     

 

       [id number] On     Problem                                           Commo

n



       07     supplement                                                  Spirit



              al oxygen                                                  - [facility]



              therapy                                                  [facility]

 

       [id number] Generalize Problem                                           Com

mon



              d anxiety                                                  Spirit



              disorder                                                  - [facility]

 

       [id number] Type 2 Problem                                           Commo

n



       05     diabetes                                                  Spirit



              mellitus                                                  - [facility]



              with                                                    [facility]



              chronic                                                  Medical



              kidney                                                  Center



              disease                                                  

 

       [id number] COPD with Problem                                           Com

mon



              exacerbati                                                  Spirit



              on                                                      - [facility]







Allergies, Adverse Reactions, Alerts







       Allergy Allergy Status Severity Reaction(s) Onset  Inactive Treating Comm

ents 

Source



       Name   Type                        Date   Date   Clinician        

 

       NO KNOWN Allergy Active                                           [facility]



       ALLERGIE                                                         



       S                                                              

 

       NO KNOWN Drug   Active                                           [facility]



       ALLERGIE Class                                                   ity of



       S                                                              [facility]







Social History







           Social Habit Start Date Stop Date  Quantity   Comments   Source

 

           History of Tobacco                                             Common

 [facility] -



           Use                                                    [facility]

 

           History [facility]



           Alcohol Std Drinks                                             Medica

l Center

 

           History [facility]



           Alcohol Binge                                             Medical Tripp

ter

 

           Alcohol intake 2022 [facility]               [facility]

es



                      00:00:00   00:00:00   non-drinker of            Medical Ce

nter



                                            alcohol               



                                            (finding)             

 

           History SDOH 2022 Quit 2017             [facility]



           Alcohol Comment 00:00:00   00:00:00                         Medical C

enter

 

           Exposure to 2022 Not sure              [facility]



           SARS-CoV-2 (event) 00:00:00   17:54:00                         of Med

icine

 

           History SDOH 2019 1                     [facility]



           Alcohol Frequency 00:00:00   00:00:00                         [facility]

 Center

 

           Cigarettes smoked 2019                       [facility]



           current (pack per 00:00:00   00:00:00                         Medical

 Center



           day) - Reported                                             

 

           Cigarette  2019                       [facility]



           pack-years 00:00:00   00:00:00                         [facility]

 

           Tobacco use and 2019 Never used            [facility]

[facility]



           exposure   00:00:00   00:00:00                         [facility]

 

           Sex Assigned At 1960                       [facility]

[facility]



           Birth      00:00:00   00:00:00                         [facility]









                Smoking Status  Start Date      Stop Date       Source

 

                Former Smoker   2022-10-21 00:00:00 2022-10-21 00:00:00 [facility] S

pirit - [facility]







Medications







       Ordered Filled Start  Stop   Current Ordering Indication Dosage Frequency

 Signature

                    Comments            Components          Source



     Medication Medication Date Date Medication? Clinician                (SIG) 

          



     Name Name                                                   

 

     busPIRone busPIRone 2022      No             1{table BID  busPIRone      

     



     HCl 10 MG HCl 10 MG 0-26                     t}        HCl 10 MG           



               00:00:                                              



               00                                                

 

     busPIRone busPIRone 2022      No             1{table BID  busPIRone      

     



     HCl 10 MG HCl 10 MG 0-26                     t}        HCl 10 MG           



               00:00:                                              



               00                                                

 

     busPIRone busPIRone 2022      No             1{table BID  busPIRone      

     



     HCl 10 MG HCl 10 MG 0-26                     t}        HCl 10 MG           



               00:00:                                              



               00                                                

 

     busPIRone busPIRone 2022      No             1{table BID  busPIRone      

     



     HCl 10 MG HCl 10 MG 0-26                     t}        HCl 10 MG           



               00:00:                                              



               00                                                

 

     busPIRone busPIRone 2022      No             1{table BID  busPIRone      

     



     HCl 10 MG HCl 10 MG 0-26                     t}        HCl 10 MG           



               00:00:                                              



               00                                                

 

     Polymyxin Polymyxin 2022- No             1{drop_ QID  Polymyxin     

      



     B-Trimethop B-Trimethop 6-16 06-23                into_af      B-Trimetho  

         



     rim  rim  00:00: 00:00                fected_      prim           



     98111-5.1 26968-1.1 00   :00                 eye}      74989-1.1           



     UNIT/ML UNIT/ML                                    UNIT/ML           

 

     Polymyxin Polymyxin 2022- No             1{drop_ QID  Polymyxin     

      



     B-Trimethop B-Trimethop 6-16                 into_af      B-Trimetho  

         



     rim  rim  00:00: 00:00                fected_      prim           



     95368-2.1 41034-9.1 00   :00                 eye}      62726-0.1           



     UNIT/ML UNIT/ML                                    UNIT/ML           

 

     insulin            Yes            45U  QD   Inject 45           [facility]

t



     degludec      4-12                               Units           [facility]



     (TRESIBA      11:20:                               subcutaneo           Med

ical



     U-100      03                                 usly           Center



     INSULIN                                         daily.           



     SUBQ)                                                        

 

     insulin            Yes            45U  QD   Inject 45           [facility]

t



     degludec      4-12                               Units           [facility]



     (TRESIBA      11:20:                               subcutaneo           Med

ical



     U-100      03                                 usly           Center



     INSULIN                                         daily.           



     SUBQ)                                                        

 

     insulin            Yes            45U  QD   Inject 45           [facility]

t



     degludec      4-12                               Units           [facility]



     (TRESIBA      11:20:                               subcutaneo           Med

ical



     U-100      03                                 usly           Center



     INSULIN                                         daily.           



     SUBQ)                                                        

 

     insulin            Yes            45U  QD   Inject 45           [facility]

t



     degludec      4-12                               Units           [facility]



     (TRESIBA      11:20:                               subcutaneo           Med

ical



     U-100      03                                 usly           Center



     INSULIN                                         daily.           



     SUBQ)                                                        

 

     insulin            Yes            45U  QD   Inject 45           [facility]

t



     degludec      4-12                               Units           [facility]



     (TRESIBA      11:20:                               subcutaneo           Med

ical



     U-100      03                                 usly           Center



     INSULIN                                         daily.           



     SUBQ)                                                        

 

     furosemide            Yes            40mg QD   Take 40 mg           C

[facility]



     (LASIX) 40      4-11                               by mouth           [facility]



     MG tablet      11:34:                               daily.           Medica

l



               01                                                [facility]

 

     folic acid            Yes            1mg  QD   Take 1 mg           [facility]

I St



     (FOLVITE) 1      4-11                               by mouth           Luke

s



     MG tablet      11:34:                               daily.           Medica

l



               01                                                Center

 

     albuterol            Yes            1{puff}      Inhale 1           C

[facility]



     HFA       4-11                               puff by           [facility]



     (VENTOLIN      11:34:                               mouth via           Med

ical



     HFA) 90      01                                 inhaler           Center



     mcg/actuati                                         every 6           



     on inhaler                                         (six)           



                                                  hours as           



                                                  needed for           



                                                  Wheezing           



                                                  or             



                                                  Shortness           



                                                  of Breath.           

 

     acetaminoph            Yes            1{tbl}      Take 1           [facility]



     en-codeine      4-11                               tablet by           [facility]



     (TYLENOL      11:34:                               mouth           [facility]



     #3) 300-30      01                                 every 4           [facility]



     mg per                                         (four)           



     tablet                                         hours as           



                                                  needed for           



                                                  Pain.           

 

     ascorbic            Yes            1000mg QD   Take 1,000           [facility]

[facility]



     acid,      4-11                               mg by           [facility]



     vitamin C,      11:34:                               mouth           [facility]

[facility]



     (vitamin C)      01                                 daily.           [facility]



     1000 MG                                                        



     tablet                                                        

 

     thyroid,            Yes            60mg QD   Take 60 mg           [facility]

 [facility]



     pork, 60 mg      4-11                               by mouth           [facility]

[facility]



     Tab       11:34:                               every           [facility]



               01                                 morning.           Center

 

     spironolact            Yes            100mg QD   Take 100           [facility]

[facility]



     one       4-11                               mg by           [facility]



     (ALDACTONE)      11:34:                               mouth           [facility]

al



     100 MG      01                                 daily.           [facility]



     tablet                                                        

 

     budesonide/            Yes                 Q.5D Inhale by           [facility]

[facility]



     formoterol      4-11                               mouth via           [facility]



     fumarate      11:34:                               inhaler 2           [facility]

alexus



     (SYMBICORT      01                                 (two)           Center



     IN[facility])                                         times           



                                                  daily.           

 

     ondansetron            Yes                      Take by           [facility]

 [facility]



     (ZOFRAN) 8      4-11                               mouth           [facility]



     MG tablet      11:34:                               every 8           [facility]

al



               01                                 (eight)           Center



                                                  hours as           



                                                  needed for           



                                                  Nausea.           

 

     furosemide            Yes            40mg QD   Take 40 mg           [facility]

[facility]



     (LASIX) 40      4-11                               by mouth           [facility]



     MG tablet      11:34:                               daily.           [facility]

[facility]



               01                                                Center

 

     folic acid            Yes            1mg  QD   Take 1 mg           [facility]



     (FOLVITE) 1      4-11                               by mouth           [facility]



     MG tablet      11:34:                               daily.           [facility]

l



               01                                                Center

 

     albuterol            Yes            1{puff}      Inhale 1           [facility]

[facility]



     HFA       4-11                               puff by           [facility]



     (VENTOLIN      11:34:                               mouth via           Med

ical



     HFA) 90      01                                 inhaler           [facility]



     mcg/actuati                                         every 6           



     on inhaler                                         (six)           



                                                  hours as           



                                                  needed for           



                                                  Wheezing           



                                                  or             



                                                  Shortness           



                                                  of Breath.           

 

     acetaminoph            Yes            1{tbl}      Take 1           [facility]



     en-codeine      4-11                               tablet by           [facility]



     (TYLENOL      11:34:                               mouth           [facility]



     #3) 300-30      01                                 every 4           [facility]



     mg per                                         (four)           



     tablet                                         hours as           



                                                  needed for           



                                                  Pain.           

 

     ascorbic            Yes            1000mg QD   Take 1,000           [facility]

[facility]



     acid,      4-11                               mg by           [facility]



     vitamin C,      11:34:                               mouth           [facility]

l



     (vitamin C)      01                                 daily.           [facility]



     1000 MG                                                        



     tablet                                                        

 

     thyroid,            Yes            60mg QD   Take 60 mg           [facility]

 [facility]



     pork, 60 mg      4-11                               by mouth           [facility]



     Tab       11:34:                               every           [facility]



               01                                 morning.           [facility]

 

     spironolact            Yes            100mg QD   Take 100           [facility]

[facility]



     one       4-11                               mg by           [facility]



     (ALDACTONE)      11:34:                               mouth           [facility]

al



     100 MG      01                                 daily.           [facility]



     tablet                                                        

 

     budesonide/            Yes                 Q.5D Inhale by           [facility]

[facility]



     formoterol      4-11                               mouth via           [facility]



     fumarate      11:34:                               inhaler 2           [facility]

alexus



     (SYMBICORT      01                                 (two)           Center



     [facility])                                         times           



                                                  daily.           

 

     ondansetron            Yes                      Take by           [facility]



     (ZOFRAN) 8      4-11                               mouth           [facility]



     MG tablet      11:34:                               every 8           [facility]

al



               01                                 (eight)           Center



                                                  hours as           



                                                  needed for           



                                                  Nausea.           

 

     furosemide            Yes            40mg QD   Take 40 mg           [facility]

[facility]



     (LASIX) 40      4-11                               by mouth           [facility]



     MG tablet      11:34:                               daily.           [facility]

l



               01                                                [facility]

 

     folic acid            Yes            1mg  QD   Take 1 mg           [facility]



     (FOLVITE) 1      4-11                               by mouth           [facility]



     MG tablet      11:34:                               daily.           [facility]

l



               01                                                Center

 

     albuterol            Yes            1{puff}      Inhale 1           [facility]

[facility]



     HFA       4-11                               puff by           [facility]



     (VENTOLIN      11:34:                               mouth via           [facility]

ical



     HFA) 90      01                                 inhaler           [facility]



     mcg/actuati                                         every 6           



     on inhaler                                         (six)           



                                                  hours as           



                                                  needed for           



                                                  Wheezing           



                                                  or             



                                                  Shortness           



                                                  of Breath.           

 

     acetaminoph            Yes            1{tbl}      Take 1           [facility]



     en-codeine      4-11                               tablet by           [facility]



     (TYLENOL      11:34:                               mouth           [facility]



     #3) 300-30      01                                 every 4           [facility]



     mg per                                         (four)           



     tablet                                         hours as           



                                                  needed for           



                                                  Pain.           

 

     ascorbic            Yes            1000mg QD   Take 1,000           [facility]

[facility]



     acid,      4-11                               mg by           [facility]



     vitamin C,      11:34:                               mouth           [facility]

l



     (vitamin C)      01                                 daily.           [facility]



     1000 MG                                                        



     tablet                                                        

 

     thyroid,            Yes            60mg QD   Take 60 mg           [facility]



     pork, 60 mg      4-11                               by mouth           [facility]



     Tab       11:34:                               every           [facility]



               01                                 morning.           [facility]

 

     spironolact            Yes            100mg QD   Take 100           [facility]

[facility]



     one       4-11                               mg by           [facility]



     (ALDACTONE)      11:34:                               mouth           [facility]

al



     100 MG      01                                 daily.           [facility]



     tablet                                                        

 

     budesonide/            Yes                 Q.5D Inhale by           [facility]

[facility]



     formoterol      4-11                               mouth via           [facility]



     fumarate      11:34:                               inhaler 2           [facility]

alexus



     (SYMBICORT      01                                 (two)           [facility]



     [facility])                                         times           



                                                  daily.           

 

     ondansetron            Yes                      Take by           [facility]



     (ZOFRAN) 8      4-11                               mouth           [facility]



     MG tablet      11:34:                               every 8           [facility]

al



               01                                 (eight)           Center



                                                  hours as           



                                                  needed for           



                                                  Nausea.           

 

     furosemide            Yes            40mg QD   Take 40 mg           [facility]

[facility]



     (LASIX) 40      4-11                               by mouth           [facility]



     MG tablet      11:34:                               daily.           [facility]

l



               01                                                [facility]

 

     folic acid            Yes            1mg  QD   Take 1 mg           [facility]



     (FOLVITE) 1      4-11                               by mouth           [facility]



     MG tablet      11:34:                               daily.           [facility]

l



               01                                                [facility]

 

     albuterol            Yes            1{puff}      Inhale 1           C

[facility]



     HFA       4-11                               puff by           [facility]



     (VENTOLIN      11:34:                               mouth via           [facility]

ical



     HFA) 90      01                                 inhaler           [facility]



     mcg/actuati                                         every 6           



     on inhaler                                         (six)           



                                                  hours as           



                                                  needed for           



                                                  Wheezing           



                                                  or             



                                                  Shortness           



                                                  of Breath.           

 

     acetaminoph            Yes            1{tbl}      Take 1           [facility]



     en-codeine      4-11                               tablet by           [facility]



     (TYLENOL      11:34:                               mouth           [facility]



     #3) 300-30      01                                 every 4           [facility]



     mg per                                         (four)           



     tablet                                         hours as           



                                                  needed for           



                                                  Pain.           

 

     ascorbic            Yes            1000mg QD   Take 1,000           [facility]

[facility]



     acid,      4-11                               mg by           [facility]



     vitamin C,      11:34:                               mouth           [facility]

[facility]



     (vitamin C)      01                                 daily.           [facility]



     1000 MG                                                        



     tablet                                                        

 

     thyroid,            Yes            60mg QD   Take 60 mg           [facility]



     pork, 60 mg      4-11                               by mouth           [facility]



     Tab       11:34:                               every           [facility]



               01                                 morning.           [facility]

 

     spironolact            Yes            100mg QD   Take 100           [facility]

[facility]



     one       4-11                               mg by           [facility]



     (ALDACTONE)      11:34:                               mouth           [facility]

al



     100 MG      01                                 daily.           [facility]



     tablet                                                        

 

     budesonide/            Yes                 Q.5D Inhale by           [facility]

[facility]



     formoterol      4-11                               mouth via           [facility]



     fumarate      11:34:                               inhaler 2           [facility]

alexus



     (SYMBICORT      01                                 (two)           [facility]



     [facility])                                         times           



                                                  daily.           

 

     ondansetron            Yes                      Take by           [facility]



     (ZOFRAN) 8      4-11                               mouth           [facility]



     MG tablet      11:34:                               every 8           [facility]

al



               01                                 (eight)           Center



                                                  hours as           



                                                  needed for           



                                                  Nausea.           

 

     furosemide            Yes            40mg QD   Take 40 mg           [facility]

[facility]



     (LASIX) 40      4-11                               by mouth           [facility]



     MG tablet      11:34:                               daily.           [facility]

l



               01                                                Center

 

     folic acid            Yes            1mg  QD   Take 1 mg           [facility]



     (FOLVITE) 1      4-11                               by mouth           [facility]



     MG tablet      11:34:                               daily.           [facility]

[facility]



               01                                                [facility]

 

     albuterol            Yes            1{puff}      Inhale 1           C

[facility]



     HFA       4-11                               puff by           [facility]



     (VENTOLIN      11:34:                               mouth via           [facility]

ical



     HFA) 90      01                                 inhaler           [facility]



     mcg/actuati                                         every 6           



     on inhaler                                         (six)           



                                                  hours as           



                                                  needed for           



                                                  Wheezing           



                                                  or             



                                                  Shortness           



                                                  of Breath.           

 

     acetaminoph            Yes            1{tbl}      Take 1           [facility]



     en-codeine      4-11                               tablet by           [facility]



     (TYLENOL      11:34:                               mouth           [facility]



     #3) 300-30      01                                 every 4           [facility]



     mg per                                         (four)           



     tablet                                         hours as           



                                                  needed for           



                                                  Pain.           

 

     ascorbic            Yes            1000mg QD   Take 1,000           [facility]

[facility]



     acid,      4-11                               mg by           [facility]



     vitamin C,      11:34:                               mouth           [facility]

[facility]



     (vitamin C)      01                                 daily.           [place]



     1000 MG                                                        



     tablet                                                        

 

     thyroid,            Yes            60mg QD   Take 60 mg           [facility]



     pork, 60 mg      4-11                               by mouth           [facility]



     Tab       11:34:                               every           [facility]



               01                                 morning.           [place]

 

     spironolact            Yes            100mg QD   Take 100           [facility]

[facility]



     one       4-11                               mg by           [facility]



     (ALDACTONE)      11:34:                               mouth           [facility]

al



     100 MG      01                                 daily.           [place]



     tablet                                                        

 

     budesonide/      2022-0      Yes                 Q.5D Inhale by           [facility]

[facility]



     formoterol      4-11                               mouth via           [facility]



     fumarate      11:34:                               inhaler 2           [facility]

alexus



     (SYMBICORT      01                                 (two)           [facility]



     [facility])                                         times           



                                                  daily.           

 

     ondansetron            Yes                      Take by           [facility]



     (ZOFRAN) 8      4-11                               mouth           [facility]



     MG tablet      11:34:                               every 8           [facility]

al



               01                                 (eight)           Center



                                                  hours as           



                                                  needed for           



                                                  Nausea.           

 

     albuterol      2022- No             1{puff}      Inhale 1           

[facility]



     HFA (ProAir      4-11 04-11                          puff by           [facility]



     HFA) 90      08:38: 00:00                          mouth via           [facility]

[facility]



     mcg/actuati      32   :00                           inhaler           [facility]

[facility]



     on inhaler                                         every 6           



                                                  (six)           



                                                  hours as           



                                                  needed for           



                                                  Wheezing.           

 

     insulin      2022- No                  QD   Inject           [facility]



     degludec      4- 04-11                          subcutaneo           [facility]

[facility]



     (TRESIBA      08:38: 00:00                          usly           [facility]



     FLEXTOUCH      32   :00                           daily.           [facility]



     U-100 SUBQ)                                                        

 

     albuterol      2022- No             1{puff}      Inhale 1           

[facility]



     HFA (ProAir      4-11 04-11                          puff by           [facility]



     HFA) 90      08:38: 00:00                          mouth via           [facility]

[facility]



     mcg/actuati      32   :00                           inhaler           [facility]

[facility]



     on inhaler                                         every 6           



                                                  (six)           



                                                  hours as           



                                                  needed for           



                                                  Wheezing.           

 

     insulin      2022- No                  QD   Inject           [facility]



     degludec      4- 04-11                          subcutaneo           [facility]

[facility]



     (TRESIBA      08:38: 00:00                          usly           [facility]



     FLEXTOUCH      32   :00                           daily.           [facility]



     U-100 SUBQ)                                                        

 

     albuterol      2022- No             1{puff}      Inhale 1           

[facility]



     HFA (ProAir      4-11 04-11                          puff by           [facility]



     HFA) 90      08:38: 00:00                          mouth via           [facility]

[facility]



     mcg/actuati      32   :00                           inhaler           [facility]

[facility]



     on inhaler                                         every 6           



                                                  (six)           



                                                  hours as           



                                                  needed for           



                                                  Wheezing.           

 

     insulin      -2022- No                  QD   Inject           [facility]



     degludec      4-11 04-11                          subcutaneo           [facility]

[facility]



     (TRESIBA      08:38: 00:00                          usly           Medical



     FLEXTOUCH      32   :00                           daily.           Center



     U-100 SUBQ)                                                        

 

     albuterol      2022- No             1{puff}      Inhale 1           

[facility]



     HFA (ProAir                                puff by           [facility]

[facility]



     HFA) 90      08:38: 00:00                          mouth via           Medi

alexus



     mcg/actuati      32   :00                           inhaler           [facility]

[facility]



     on inhaler                                         every 6           



                                                  (six)           



                                                  hours as           



                                                  needed for           



                                                  Wheezing.           

 

     insulin      2022- No                  QD   Inject           [facility]



     degludec                                subcutaneo           [facility]

[facility]



     (TRESIBA      08:38: 00:00                          usly           Medical



     FLEXTOUCH      32   :00                           daily.           Center



     U-100 SUBQ)                                                        

 

     albuterol      2022- No             1{puff}      Inhale 1           

[facility]



     HFA (ProAir                                puff by           [facility]

[facility]



     HFA) 90      08:38: 00:00                          mouth via           Medi

alexus



     mcg/actuati      32   :00                           inhaler           [facility]

[facility]



     on inhaler                                         every 6           



                                                  (six)           



                                                  hours as           



                                                  needed for           



                                                  Wheezing.           

 

     insulin      2022- No                  QD   Inject           [facility]



     degludec                                subcutaneo           [facility]

[facility]



     (TRESIBA      08:38: 00:00                          usly           Medical



     FLEXTOUCH      32   :00                           daily.           Center



     U-100 SUBQ)                                                        

 

     acetaminoph            Yes       [id number] 1{tbl}      Take 1      

     [facility]



     en-codeine      3-25                102            Tablet by           [facility]



     (TYLENOL      00:00:                               mouth           of



     #3) 300-30      00                                 every 6           [facility]

n



     MG per                                         hours as           e



     tablet                                         needed for           



                                                  Pain.           

 

     Ascorbic            Yes                      Take by           [facility]



     Acid      3-24                               mouth.           [facility]



     (VITAMIN C      13:25:                                              of



     ER OR)      20                                                [facility]



                                                                 e

 

     OXYGEN GAS            Yes            2L        2 L daily.           [facility]

[facility]



               3-24                                              [facility]



               13:24:                                              of



               18                                                [facility]



                                                                 e

 

     NP THYROID            Yes            60mg      Take 60 mg           [facility]

[facility]



     60 MG      2-22                               by mouth           [facility]



     tablet      00:00:                               daily.           of



               00                                                [facility]



                                                                 e

 

     Levothyroxi Levothyroxi 2020      No                  QD   Levothyrox    

       



     ne Sodium ne Sodium 0-21                               ine Sodium          

 



     25 MCG 25 MCG 00:00:                               25 MCG           



               00                                                

 

     Levothyroxi Levothyroxi 2020      No                  QD   Levothyrox    

       



     ne Sodium ne Sodium 0-21                               ine Sodium          

 



     25 MCG 25 MCG 00:00:                               25 MCG           



               00                                                

 

     Levothyroxi Levothyroxi 2020      No                  QD   Levothyrox    

       



     ne Sodium ne Sodium 0-21                               ine Sodium          

 



     25 MCG 25 MCG 00:00:                               25 MCG           



               00                                                

 

     Levothyroxi Levothyroxi 2020      No                  QD   Levothyrox    

       



     ne Sodium ne Sodium 0-21                               ine Sodium          

 



     25 MCG 25 MCG 00:00:                               25 MCG           



               00                                                

 

     Levothyroxi Levothyroxi 2020      No                  QD   Levothyrox    

       



     ne Sodium ne Sodium 0-21                               ine Sodium          

 



     25 MCG 25 MCG 00:00:                               25 MCG           



               00                                                

 

     Levothyroxi Levothyroxi 2020      No                  QD   Levothyrox    

       



     ne Sodium ne Sodium 0-21                               ine Sodium          

 



     25 MCG 25 MCG 00:00:                               25 MCG           



               00                                                

 

     Levothyroxi Levothyroxi 2020      No                  QD   Levothyrox    

       



     ne Sodium ne Sodium 0-21                               ine Sodium          

 



     25 MCG 25 MCG 00:00:                               25 MCG           



               00                                                

 

     Levothyroxi Levothyroxi 2020      No                  QD   Levothyrox    

       



     ne Sodium ne Sodium 0-21                               ine Sodium          

 



     25 MCG 25 MCG 00:00:                               25 MCG           



               00                                                

 

     Levothyroxi Levothyroxi 2020      No                  QD   Levothyrox    

       



     ne Sodium ne Sodium 0-21                               ine Sodium          

 



     25 MCG 25 MCG 00:00:                               25 MCG           



               00                                                

 

     Levothyroxi Levothyroxi 2020      No                  QD   Levothyrox    

       



     ne Sodium ne Sodium 0-21                               ine Sodium          

 



     25 MCG 25 MCG 00:00:                               25 MCG           



               00                                                

 

     Levothyroxi Levothyroxi 2020      No                  QD   Levothyrox    

       



     ne Sodium ne Sodium 0-21                               ine Sodium          

 



     25 MCG 25 MCG 00:00:                               25 MCG           



               00                                                

 

     Levothyroxi Levothyroxi 2020      No                  QD   Levothyrox    

       



     ne Sodium ne Sodium 0-21                               ine Sodium          

 



     25 MCG 25 MCG 00:00:                               25 MCG           



               00                                                

 

     Levothyroxi Levothyroxi 2020      No                  QD   Levothyrox    

       



     ne Sodium ne Sodium 0-21                               ine Sodium          

 



     25 MCG 25 MCG 00:00:                               25 MCG           



               00                                                

 

     Levothyroxi Levothyroxi 2020      No                  QD   Levothyrox    

       



     ne Sodium ne Sodium 0-21                               ine Sodium          

 



     25 MCG 25 MCG 00:00:                               25 MCG           



               00                                                

 

     Levothyroxi Levothyroxi 2020      No                  QD   Levothyrox    

       



     ne Sodium ne Sodium 0-21                               ine Sodium          

 



     25 MCG 25 MCG 00:00:                               25 MCG           



               00                                                

 

     Levothyroxi Levothyroxi 2020      No                  QD   Levothyrox    

       



     ne Sodium ne Sodium 0-21                               ine Sodium          

 



     25 MCG 25 MCG 00:00:                               25 MCG           



               00                                                

 

     Levothyroxi Levothyroxi 2020      No                  QD   Levothyrox    

       



     ne Sodium ne Sodium 0-21                               ine Sodium          

 



     25 MCG 25 MCG 00:00:                               25 MCG           



               00                                                

 

     Levothyroxi Levothyroxi 2020      No                  QD   Levothyrox    

       



     ne Sodium ne Sodium 0-21                               ine Sodium          

 



     25 MCG 25 MCG 00:00:                               25 MCG           



               00                                                

 

     Levothyroxi Levothyroxi 2020      No                  QD   Levothyrox    

       



     ne Sodium ne Sodium 0-21                               ine Sodium          

 



     25 MCG 25 MCG 00:00:                               25 MCG           



               00                                                

 

     Levothyroxi Levothyroxi 2020      No                  QD   Levothyrox    

       



     ne Sodium ne Sodium 0-21                               ine Sodium          

 



     25 MCG 25 MCG 00:00:                               25 MCG           



               00                                                

 

     Tradjenta Tradjenta       Yes  [facility]                1 tablet        

   [facility]



               7-20           [facility]                               [facility]



               00:00:                                              - [facility]



                                                               [facility]

 

     Tresiba Tresiba       Yes  [facility]                Inject 15           

[facility]



     FlexTouch FlexTouch 7-20           [facility]                units           [facility]

rit



               00:00:                                              - [facility]



                                                               [facility]

 

     OXYGEN GAS      2019      Yes            2L        2 L daily.           [facility]

[facility]



               0-17                                              [facility]



               15:24:                                              of



               51                                                [facility]



                                                                 e

 

     tramadol            Yes       [id number] 50mg      Take 1 Tab         

  [facility]



     (ULTRAM) 50      9-30                               by mouth           [facility]

[facility]



     MG tablet      00:00:                               every 6           of



               00                                 hours as           [facility]



                                                  needed for           e



                                                  Pain.           

 

     tramadol      2022- No        [id number] 50mg      Take 1 Tab        

   [facility]



     (ULTRAM) 50      9-30 03-25                          by mouth           [facility]

[facility]



     MG tablet      00:00: 00:00                          every 6           of



               00   :00                           hours as           [facility]



                                                  needed for           e



                                                  Pain.           

 

     tiotropium            Yes            18ug      18 mcg by           [facility]

[facility]



     (SPIRIVA)      9-25                               Inhalation           [facility]

[facility]



     18 MCG      00:00:                               route.           of



     inhalation      00                                                [facility]



     capsule                                                        e

 

     tiotropium            Yes            18ug      18 mcg by           [facility]

[facility]



     (SPIRIVA)      9-25                               Inhalation           [facility]

[facility]



     18 MCG      00:00:                               route.           of



     inhalation      00                                                [facility]



     capsule                                                        e

 

     tiotropium      2022- No        Chronic 18ug QD   Inhale 1          

 [facility]



     (SPIRIVA)      - 04-11           obstructive           capsule          

 [facility]



     18 mcg      00:00: 00:00           pulmonary           (18 mcg           [facility]

dical



     inhalation      00   :00            disease           total) by           C

enter



     capsule                          with acute           mouth via           



                                   exacerbatio           inhaler           



                                   n ([facility])           daily.           

 

     tiotropium      2022- No        Chronic 18ug QD   Inhale 1          

 [facility]



     (SPIRIVA)                 obstructive           capsule          

 [facility]



     18 mcg      00:00: 00:00           pulmonary           (18 mcg           Me

dical



     inhalation      00   :00            disease           total) by           [facility]

enter



     capsule                          with acute           mouth via           



                                   exacerbatio           inhaler           



                                   n ([facility])           daily.           

 

     tiotropium      2022- No        Chronic 18ug QD   Inhale 1          

 [facility]



     (SPIRIVA)                 obstructive           capsule          

 [facility]



     18 mcg      00:00: 00:00           pulmonary           (18 mcg           Me

dical



     inhalation      00   :00            disease           total) by           C

enter



     capsule                          with acute           mouth via           



                                   exacerbatio           inhaler           



                                   n ([facility])           daily.           

 

     tiotropium      2022- No        Chronic 18ug QD   Inhale 1          

 [facility]



     (SPIRIVA)                 obstructive           capsule          

 [facility]



     18 mcg      00:00: 00:00           pulmonary           (18 mcg           Me

dical



     inhalation      00   :00            disease           total) by           C

enter



     capsule                          with acute           mouth via           



                                   exacerbatio           inhaler           



                                   n ([facility])           daily.           

 

     tiotropium      2022- No        Chronic 18ug QD   Inhale 1          

 [facility]



     (SPIRIVA)                 obstructive           capsule          

 [facility]



     18 mcg      00:00: 00:00           pulmonary           (18 mcg           Me

dical



     inhalation      00   :00            disease           total) by           C

enter



     capsule                          with acute           mouth via           



                                   exacerbatio           inhaler           



                                   n ([facility])           daily.           

 

     Cefepime      2019- No             2g        Inject 2 g           [facility]

[facility]



     HCl 2 g      9-25 10-19                          into the           [facility]



     SOLR      00:00: 04:59                          vein Every           of



               00   :00                           8 hours.           [facility]



                                                                 e

 

     sodium      2019- No                       TAKE 1           [facility]



     chloride 1       10-17                          TABLET BY           [facility]

lege



     g tablet      00:00: 00:00                          MOUTH           of



               00   :00                           THREE           [facility]



                                                  TIMES           e



                                                  DAILY WITH           



                                                  MEALS FOR           



                                                  14 DAYS           

 

     lactulose            Yes       Constipatio 20g       Take 30         

  [facility]



     (CHRONULAC)                      n,             mLs (20 g           [facility]

[facility]



     20 gram/30      00:00:                unspecified           total) by      

     Medical



     mL solution      00                  constipatio           mouth 2         

  Center



                                   n type           (two)           



                                                  times           



                                                  daily as           



                                                  needed           



                                                  (constipat           



                                                  ion).           

 

     levofloxaci            Yes                      TAKE 1           [facility]

[facility]



     n         9-24                               TABLET BY           [facility]



     (LEVAQUIN)      00:00:                               MOUTH ONCE           o

f



     750 MG      00                                 DAILY FOR           [facility]



     tablet                                         24 DAYS           e

 

     levofloxaci            Yes                      TAKE 1           [facility]

or



     n         9-24                               TABLET BY           [facility]



     (LEVAQUIN)      00:00:                               MOUTH ONCE           o

f



     750 MG      00                                 DAILY FOR           [facility]



     tablet                                         24 DAYS           e

 

     lactulose            Yes       Constipatio 20g       Take 30         

  [facility]



     (CHRONULAC)      9-24                n,             mLs (20 g           [facility]

[facility]



     20 gram/30      00:00:                unspecified           total) by      

     Medical



     mL solution      00                  constipatio           mouth 2         

  [facility]



                                   n type           (two)           



                                                  times           



                                                  daily as           



                                                  needed           



                                                  (constipat           



                                                  ion).           

 

     lactulose            Yes       Constipatio 20g       Take 30         

  [facility]



     (CHRONULAC)      9-24                n,             mLs (20 g           [facility]

[facility]



     20 gram/30      00:00:                unspecified           total) by      

     Medical



     mL solution      00                  constipatio           mouth 2         

  Center



                                   n type           (two)           



                                                  times           



                                                  daily as           



                                                  needed           



                                                  (constipat           



                                                  ion).           

 

     lactulose            Yes       Constipatio 20g       Take 30         

  [facility]



     (CHRONULAC)      9-24                n,             mLs (20 g           [facility]

[facility]



     20 gram/30      00:00:                unspecified           total) by      

     Medical



     mL solution      00                  constipatio           mouth 2         

  Center



                                   n type           (two)           



                                                  times           



                                                  daily as           



                                                  needed           



                                                  (constipat           



                                                  ion).           

 

     lactulose            Yes       Constipatio 20g       Take 30         

  [facility]



     (CHRONULAC)      9-24                n,             mLs (20 g           [facility]

[facility]



     20 gram/30      00:00:                unspecified           total) by      

     Medical



     mL solution      00                  constipatio           mouth 2         

  Center



                                   n type           (two)           



                                                  times           



                                                  daily as           



                                                  needed           



                                                  (constipat           



                                                  ion).           

 

     levothyroxi      2022- No        Hypothyroid 25ug      Take 1       

    [facility]



     ne         04-11           ism,           tablet (25           [facility]



     (SYNTHROID,      00:00: 00:00           unspecified           mcg total)   

        [facility]



     LEVOTHROID)      00   :00            type           by mouth           Cent

er



     25 MCG                                         Every           



     tablet                                         morning on           



                                                  an empty           



                                                  stomach.           

 

     levothyroxi      2022- No        Hypothyroid 25ug      Take 1       

    [facility]



     ne         04-11           ism,           tablet (25           [facility]



     (SYNTHROID,      00:00: 00:00           unspecified           mcg total)   

        Medical



     LEVOTHROID)      00   :00            type           by mouth           Cent

er



     25 MCG                                         Every           



     tablet                                         morning on           



                                                  an empty           



                                                  stomach.           

 

     levothyroxi      2022- No        Hypothyroid 25ug      Take 1       

    [facility]



     ne        -24 04-11           ism,           tablet (25           [facility]



     (SYNTHROID,      00:00: 00:00           unspecified           mcg total)   

        Medical



     LEVOTHROID)      00   :00            type           by mouth           Cent

er



     25 MCG                                         Every           



     tablet                                         morning on           



                                                  an empty           



                                                  stomach.           

 

     levothyroxi      2022- No        Hypothyroid 25ug      Take 1       

    [facility]



     ne        9-24 04-11           ism,           tablet (25           [facility]



     (SYNTHROID,      00:00: 00:00           unspecified           mcg total)   

        [facility]



     LEVOTHROID)      00   :00            type           by mouth           [facility]

[facility]



     25 MCG                                         Every           



     tablet                                         morning on           



                                                  an empty           



                                                  stomach.           

 

     levothyroxi      2022- No        Hypothyroid 25ug      Take 1       

    [facility]



     ne        -24 04-11           ism,           tablet (25           [facility]



     (SYNTHROID,      00:00: 00:00           unspecified           mcg total)   

        [facility]



     LEVOTHROID)      00   :00            type           by mouth           [facility]

er



     25 MCG                                         Every           



     tablet                                         morning on           



                                                  an empty           



                                                  stomach.           

 

     doxycycline      2019- No             100mg      Take 100           

[facility]



     (MONODOX)      9-24 10-20                          mg by           [place]



     100 MG      00:00: 04:59                          mouth.           of



     capsule      00   :00                                          [facility]



                                                                 e

 

     dexamethaso            Yes                      TAKE 2           [facility]

or



     ne        9-01                               TABLETS BY           [place]



     (DECADRON)      00:00:                               MOUTH           of



     4 MG tablet      00                                 TWICE           [facility]



                                                  DAILY           e

 

     dexamethaso            Yes                      TAKE 2           [facility]

or



     ne        9-01                               TABLETS BY           [place]



     (DECADRON)      00:00:                               MOUTH           of



     4 MG tablet      00                                 TWICE           [facility]



                                                  DAILY           e

 

     albuterol            Yes                      USE 1 VIAL           [facility]

[facility]



     (PROVENTIL)      8-12                               IN             [facility]



     (2.5 mg/3      00:00:                               NEBULIZER           of



     mL) 0.083%      00                                 EVERY 4 TO           Med

icin



     nebulizer                                         6 HOURS AS           e



     solution                                         NEEDED           

 

     albuterol            Yes                      USE 1 VIAL           [facility]

[facility]



     (PROVENTIL)      8-12                               IN             [facility]



     (2.5 mg/3      00:00:                               NEBULIZER           of



     mL) 0.083%      00                                 EVERY 4 TO           Med

icin



     nebulizer                                         6 HOURS AS           e



     solution                                         NEEDED           

 

     furosemide            Yes            40mg      Take 40 mg           [facility]

[facility]



     (LASIX) 40      6-14                               by mouth           [facility]

[facility]



     MG tablet      00:00:                               daily.           of



               00                                                [facility]



                                                                 e

 

     spironolact            Yes            100mg      Take 100           [facility]

[facility]



     one       6-14                               mg by           [facility]



     (ALDACTONE)      00:00:                               mouth           of



     100 MG      00                                 daily.           [facility]



     tablet                                                        e

 

     folic acid            Yes            1mg       Take 1 mg           [facility]

[facility]



     (FOLVITE) 1      6-14                               by mouth           [facility]

[facility]



     MG tablet      00:00:                               daily.           of



                                                               [facility]



                                                                 e

 

     furosemide            Yes            40mg      Take 40 mg           [facility]

[facility]



     (LASIX) 40      6-14                               by mouth           [facility]

[facility]



     MG tablet      00:00:                               daily.           of



               00                                                [facility]



                                                                 e

 

     spironolact            Yes            100mg      Take 100           [facility]

[facility]



     one       6-14                               mg by           [facility]



     (ALDACTONE)      00:00:                               mouth           of



     100 MG      00                                 daily.           [facility]



     tablet                                                        e

 

     folic acid            Yes            1mg       Take 1 mg           [facility]

[facility]



     (FOLVITE) 1      6-14                               by mouth           [facility]

[facility]



     MG tablet      00:00:                               daily.           of



               00                                                [facility]



                                                                 e

 

     Folic Acid Folic Acid           No             1{table QD   Folic Acid     

      



     1 MG 1 MG                          t}        1 MG           

 

     ProAir HFA ProAir HFA           No             1{puff_ 6xD  ProAir HFA     

      



     108 (90 108 (90                          as_need      108 (90           



     Base) Base)                          ed}       Base)           



     MCG/ACT MCG/ACT                                    MCG/ACT           

 

     HYDROcodone HYDROcodone           No             1{table QID  HYDROcodon   

        



     -Acetaminop -Acetaminop                          t_as_ne      e-Acetamin   

        



     hen 5-325 hen 5-325                          eded}      ophen           



     MG   MG                                      5-325 MG           

 

     predniSONE predniSONE           No             1{table QD   predniSONE     

      



     10 MG 10 MG                          t}        10 MG           

 

     Spiriva Spiriva           No                       Spiriva           



     HandiHaler HandiHaler                                    HandiHaler        

   

 

     [place]           No             1{table QD   [place]           



     Thyroid 60 Thyroid 60                          t_on_an      Thyroid 60     

      



     MG   MG                            _empty_      MG             



                                        stomach                     



                                        }                        

 

     Furosemide Furosemide           No             1{table BID  Furosemide     

      



     40 MG 40 MG                          t}        40 MG           

 

     Tresiba Tresiba           No                  QD   Tresiba           



     FlexTouch FlexTouch                                    FlexTouch           



     200 UNIT/ UNIT/ML                                    200            



                                                  UNIT/ML           

 

     Spironolact Spironolact           No             1{table BID  Spironolac   

        



     one 100 MG one 100 MG                          t}        tone 100          

 



                                                  MG             

 

     Ipratropium Ipratropium           No             2.5{ml} TID  Ipratropiu   

        



     Bromide Bromide                                    m Bromide           



     0.02 % 0.02 %                                    0.02 %           

 

     Constulose Constulose           No             30{ml} BID  Constulose      

     



     10 GM/15ML 10 GM/15ML                                    10 GM/15ML        

   

 

     Tresiba Tresiba           No                       Tresiba           



     FlexTouch FlexTouch                                    FlexTouch           



     200 UNIT/ UNIT/ML                                    200            



                                                  UNIT/ML           

 

     Folic Acid Folic Acid           No             1{table QD   Folic Acid     

      



     1 MG 1 MG                          t}        1 MG           

 

     ProAir HFA ProAir HFA           No             1{puff_ 6xD  ProAir HFA     

      



     108 (90 108 (90                          as_need      108 (90           



     Base) Base)                          ed}       Base)           



     MCG/ACT MCG/ACT                                    MCG/ACT           

 

     HYDROcodone HYDROcodone           No             1{table QID  HYDROcodon   

        



     -Acetaminop -Acetaminop                          t_as_ne      e-Acetamin   

        



     hen 5-325 hen 5-325                          eded}      ophen           



     MG   MG                                      5-325 MG           

 

     predniSONE predniSONE           No             1{table QD   predniSONE     

      



     10 MG 10 MG                          t}        10 MG           

 

     Spiriva Spiriva           No                       Spiriva           



     HandiHaler HandiHaler                                    HandiHaler        

   

 

     [place]           No             1{table QD   [place]           



     Thyroid 60 Thyroid 60                          t_on_an      Thyroid 60     

      



     MG   MG                            _empty_      MG             



                                        stomach                     



                                        }                        

 

     Furosemide Furosemide           No             1{table BID  Furosemide     

      



     40 MG 40 MG                          t}        40 MG           

 

     Tresiba Tresiba           No                  QD   Tresiba           



     FlexTouch FlexTouch                                    FlexTouch           



     200 UNIT/ UNIT/ML                                    200            



                                                  UNIT/ML           

 

     Spironolact Spironolact           No             1{table BID  Spironolac   

        



     one 100 MG one 100 MG                          t}        tone 100          

 



                                                  MG             

 

     Ipratropium Ipratropium           No             2.5{ml} TID  Ipratropiu   

        



     Bromide Bromide                                    m Bromide           



     0.02 % 0.02 %                                    0.02 %           

 

     Constulose Constulose           No             30{ml} BID  Constulose      

     



     10 GM/15ML 10 GM/15ML                                    10 GM/15ML        

   

 

     Tresiba Tresiba           No                       Tresiba           



     FlexTouch FlexTouch                                    FlexTouch           



     200 UNIT/ UNIT/ML                                    200            



                                                  UNIT/ML           

 

     Folic Acid Folic Acid           No             1{table QD   Folic Acid     

      



     1 MG 1 MG                          t}        1 MG           

 

     ProAir HFA ProAir HFA           No             1{puff_ 6xD  ProAir HFA     

      



     108 (90 108 (90                          as_need      108 (90           



     Base) Base)                          ed}       Base)           



     MCG/ACT MCG/ACT                                    MCG/ACT           

 

     HYDROcodone HYDROcodone           No             1{table QID  HYDROcodon   

        



     -Acetaminop -Acetaminop                          t_as_ne      e-Acetamin   

        



     hen 5-325 hen 5-325                          eded}      ophen           



     MG   MG                                      5-325 MG           

 

     predniSONE predniSONE           No             1{table QD   predniSONE     

      



     10 MG 10 MG                          t}        10 MG           

 

     Spiriva Spiriva           No                       Spiriva           



     HandiHaler HandiHaler                                    HandiHaler        

   

 

     [place]           No             1{table QD   [place]           



     Thyroid 60 Thyroid 60                          t_on_an      Thyroid 60     

      



     MG   MG                            _empty_      MG             



                                        stomach                     



                                        }                        

 

     Furosemide Furosemide           No             1{table BID  Furosemide     

      



     40 MG 40 MG                          t}        40 MG           

 

     Tresiba Tresiba           No                  QD   Tresiba           



     FlexTouch FlexTouch                                    FlexTouch           



     200 UNIT/ UNIT/ML                                    200            



                                                  UNIT/ML           

 

     Spironolact Spironolact           No             1{table BID  Spironolac   

        



     one 100 MG one 100 MG                          t}        tone 100          

 



                                                  MG             

 

     Ipratropium Ipratropium           No             2.5{ml} TID  Ipratropiu   

        



     Bromide Bromide                                    m Bromide           



     0.02 % 0.02 %                                    0.02 %           

 

     Constulose Constulose           No             30{ml} BID  Constulose      

     



     10 GM/15ML 10 GM/15ML                                    10 GM/15ML        

   

 

     Tresiba Tresiba           No                       Tresiba           



     FlexTouch FlexTouch                                    FlexTouch           



     200 UNIT/ UNIT/ML                                    200            



                                                  UNIT/ML           

 

     Folic Acid Folic Acid           No             1{table QD   Folic Acid     

      



     1 MG 1 MG                          t}        1 MG           

 

     ProAir HFA ProAir HFA           No             1{puff_ 6xD  ProAir HFA     

      



     108 (90 108 (90                          as_need      108 (90           



     Base) Base)                          ed}       Base)           



     MCG/ACT MCG/ACT                                    MCG/ACT           

 

     HYDROcodone HYDROcodone           No             1{table QID  HYDROcodon   

        



     -Acetaminop -Acetaminop                          t_as_ne      e-Acetamin   

        



     hen 5-325 hen 5-325                          eded}      ophen           



     MG   MG                                      5-325 MG           

 

     predniSONE predniSONE           No             1{table QD   predniSONE     

      



     10 MG 10 MG                          t}        10 MG           

 

     Spiriva Spiriva           No                       Spiriva           



     HandiHaler HandiHaler                                    HandiHaler        

   

 

     [place]           No             1{table QD   [place]           



     Thyroid 60 Thyroid 60                          t_on_an      Thyroid 60     

      



     MG   MG                            _empty_      MG             



                                        stomach                     



                                        }                        

 

     Furosemide Furosemide           No             1{table BID  Furosemide     

      



     40 MG 40 MG                          t}        40 MG           

 

     Tresiba Tresiba           No                  QD   Tresiba           



     FlexTouch FlexTouch                                    FlexTouch           



     200 UNIT/ UNIT/ML                                    200            



                                                  UNIT/ML           

 

     Spironolact Spironolact           No             1{table BID  Spironolac   

        



     one 100 MG one 100 MG                          t}        tone 100          

 



                                                  MG             

 

     Ipratropium Ipratropium           No             2.5{ml} TID  Ipratropiu   

        



     Bromide Bromide                                    m Bromide           



     0.02 % 0.02 %                                    0.02 %           

 

     Constulose Constulose           No             30{ml} BID  Constulose      

     



     10 GM/15ML 10 GM/15ML                                    10 GM/15ML        

   

 

     Tresiba Tresiba           No                       Tresiba           



     FlexTouch FlexTouch                                    FlexTouch           



     200 UNIT/ UNIT/ML                                    200            



                                                  UNIT/ML           

 

     Folic Acid Folic Acid           No             1{table QD   Folic Acid     

      



     1 MG 1 MG                          t}        1 MG           

 

     ProAir HFA ProAir HFA           No             1{puff_ 6xD  ProAir HFA     

      



     108 (90 108 (90                          as_need      108 (90           



     Base) Base)                          ed}       Base)           



     MCG/ACT MCG/ACT                                    MCG/ACT           

 

     HYDROcodone HYDROcodone           No             1{table QID  HYDROcodon   

        



     -Acetaminop -Acetaminop                          t_as_ne      e-Acetamin   

        



     hen 5-325 hen 5-325                          eded}      ophen           



     MG   MG                                      5-325 MG           

 

     predniSONE predniSONE           No             1{table QD   predniSONE     

      



     10 MG 10 MG                          t}        10 MG           

 

     Spiriva Spiriva           No                       Spiriva           



     HandiHaler HandiHaler                                    HandiHaler        

   

 

     [place]           No             1{table QD   [place]           



     Thyroid 60 Thyroid 60                          t_on_an      Thyroid 60     

      



     MG   MG                            _empty_      MG             



                                        stomach                     



                                        }                        

 

     Furosemide Furosemide           No             1{table BID  Furosemide     

      



     40 MG 40 MG                          t}        40 MG           

 

     Tresiba Tresiba           No                  QD   Tresiba           



     FlexTouch FlexTouch                                    FlexTouch           



     200 UNIT/ UNIT/ML                                    200            



                                                  UNIT/ML           

 

     Spironolact Spironolact           No             1{table BID  Spironolac   

        



     one 100 MG one 100 MG                          t}        tone 100          

 



                                                  MG             

 

     Ipratropium Ipratropium           No             2.5{ml} TID  Ipratropiu   

        



     Bromide Bromide                                    m Bromide           



     0.02 % 0.02 %                                    0.02 %           

 

     Tresiba Tresiba           No                       Tresiba           



     FlexTouch FlexTouch                                    FlexTouch           



     200 UNIT/ UNIT/ML                                    200            



                                                  UNIT/ML           

 

     Folic Acid Folic Acid           No             1{table QD   Folic Acid     

      



     1 MG 1 MG                          t}        1 MG           

 

     ProAir HFA ProAir HFA           No             1{puff_ 6xD  ProAir HFA     

      



     108 (90 108 (90                          as_need      108 (90           



     Base) Base)                          ed}       Base)           



     MCG/ACT MCG/ACT                                    MCG/ACT           

 

     Constulose Constulose           No             30{ml} BID  Constulose      

     



     10 GM/15ML 10 GM/15ML                                    10 GM/15ML        

   

 

     HYDROcodone HYDROcodone           No             1{table QID  HYDROcodon   

        



     -Acetaminop -Acetaminop                          t_as_ne      e-Acetamin   

        



     hen 5-325 hen 5-325                          eded}      ophen           



     MG   MG                                      5-325 MG           

 

     Spironolact Spironolact           No             1{table BID  Spironolac   

        



     one 100 MG one 100 MG                          t}        tone 100          

 



                                                  MG             

 

     Ipratropium Ipratropium           No             2.5{ml} TID  Ipratropiu   

        



     Bromide Bromide                                    m Bromide           



     0.02 % 0.02 %                                    0.02 %           

 

     [place]           No             1{table QD   [place]           



     Thyroid 60 Thyroid 60                          t_on_an      Thyroid 60     

      



     MG   MG                            _empty_      MG             



                                        stomach                     



                                        }                        

 

     Symbicort Symbicort           Yes  [place]                2 puffs           

Common



                              [facility]

 

     Ipratropium Ipratropium           Yes  [facility]                2.5 ml        

   Common



     Bromide Bromide                [facility]

 

     ProAir HFA ProAir HFA           Yes  [facility]                1 puff as       

    Common



                              [facility]                needed           [facility]

 

     Furosemide Furosemide           Yes  [facility]                1 tablet        

   Common



                              [facility]

 

     Spironolact Spironolact           Yes  [facility]                1 tablet      

     Common



     one  one                 [facility]

 

     Aspir-Low Aspir-Low           Yes  [facility]                1 tablet          

 Common



                              [facility]

 

     Folic Acid Folic Acid           Yes  [facility]                1 tablet        

   Common



                              [facility]

 

     Constulose Constulose           Yes  [facility]                15 ml           

Common



                              [facility]

 

     Symbicort Symbicort           No             2{puffs QD   Symbicort        

   



     160-4.5 160-4.5                          }         160-4.5           



     MCG/ACT MCG/ACT                                    MCG/ACT           

 

     Furosemide Furosemide           No             1{table BID  Furosemide     

      



     40 MG 40 MG                          t}        40 MG           

 

     Furosemide Furosemide           No             1{table BID  Furosemide     

      



     40 MG 40 MG                          t}        40 MG           

 

     Folic Acid Folic Acid           No             1{table QD   Folic Acid     

      



     1 MG 1 MG                          t}        1 MG           

 

     Tresiba Tresiba           No                       Tresiba           



     FlexTouch FlexTouch                                    FlexTouch           



     200 UNIT/ UNIT/ML                                    200            



                                                  UNIT/ML           

 

     Spiriva Spiriva           No                       Spiriva           



     HandiHaler HandiHaler                                    HandiHaler        

   

 

     Ipratropium Ipratropium           No             2.5{ml} TID  Ipratropiu   

        



     Bromide Bromide                                    m Bromide           



     0.02 % 0.02 %                                    0.02 %           

 

     Spironolact Spironolact           No             1{table BID  Spironolac   

        



     one 100 MG one 100 MG                          t}        tone 100          

 



                                                  MG             

 

     Tresiba Tresiba           No                  QD   Tresiba           



     FlexTouch FlexTouch                                    FlexTouch           



     200 UNIT/ UNIT/ML                                    200            



                                                  UNIT/ML           

 

     Constulose Constulose           No             30{ml} BID  Constulose      

     



     10 GM/15ML 10 GM/15ML                                    10 GM/15ML        

   

 

     predniSONE predniSONE           No             1{table QD   predniSONE     

      



     10 MG 10 MG                          t}        10 MG           

 

     ProAir HFA ProAir HFA           No             1{puff_ 6xD  ProAir HFA     

      



     108 (90 108 (90                          as_need      108 (90           



     Base) Base)                          ed}       Base)           



     MCG/ACT MCG/ACT                                    MCG/ACT           

 

     [place]           No             1{table QD   [place]           



     Thyroid 60 Thyroid 60                          t_on_an      Thyroid 60     

      



     MG   MG                            _empty_      MG             



                                        stomach                     



                                        }                        

 

     HYDROcodone HYDROcodone           No             1{table QID  HYDROcodon   

        



     -Acetaminop -Acetaminop                          t_as_ne      e-Acetamin   

        



     hen 5-325 hen 5-325                          eded}      ophen           



     MG   MG                                      5-325 MG           

 

     Symbicort Symbicort           No             2{puffs QD   Symbicort        

   



     80-4.5 80-4.5                          }         80-4.5           



     MCG/ACT MCG/ACT                                    MCG/ACT           

 

     Spironolact Spironolact           No             1{table BID  Spironolac   

        



     one 100 MG one 100 MG                          t}        tone 100          

 



                                                  MG             

 

     Furosemide Furosemide           No             1{table BID  Furosemide     

      



     40 MG 40 MG                          t}        40 MG           

 

     ProAir HFA ProAir HFA           No             1{puff_ 6xD  ProAir HFA     

      



     108 (90 108 (90                          as_need      108 (90           



     Base) Base)                          ed}       Base)           



     MCG/ACT MCG/ACT                                    MCG/ACT           

 

     Ipratropium Ipratropium           No             2.5{ml} TID  Ipratropiu   

        



     Bromide Bromide                                    m Bromide           



     0.02 % 0.02 %                                    0.02 %           

 

     Constulose Constulose           No             15{ml} BID  Constulose      

     



     10 GM/15ML 10 GM/15ML                                    10 GM/15ML        

   

 

     Tresiba Tresiba           No                  QD   Tresiba           



     FlexTouch FlexTouch                                    FlexTouch           



     200 UNIT/ UNIT/ML                                    200            



                                                  UNIT/ML           

 

     Folic Acid Folic Acid           No             1{table QD   Folic Acid     

      



     1 MG 1 MG                          t}        1 MG           

 

     Aspir-Low Aspir-Low           No             1{table QD   Aspir-Low        

   



     81 MG 81 MG                          t}        81 MG           

 

     [place]           No             1{table QD   [place]           



     Thyroid 60 Thyroid 60                          t_on_an      Thyroid 60     

      



     MG   MG                            _empty_      MG             



                                        stomach                     



                                        }                        

 

     Symbicort Symbicort           No             2{puffs QD   Symbicort        

   



     80-4.5 80-4.5                          }         80-4.5           



     MCG/ACT MCG/ACT                                    MCG/ACT           

 

     Spironolact Spironolact           No             1{table BID  Spironolac   

        



     one 100 MG one 100 MG                          t}        tone 100          

 



                                                  MG             

 

     Furosemide Furosemide           No             1{table BID  Furosemide     

      



     40 MG 40 MG                          t}        40 MG           

 

     ProAir HFA ProAir HFA           No             1{puff_ 6xD  ProAir HFA     

      



     108 (90 108 (90                          as_need      108 (90           



     Base) Base)                          ed}       Base)           



     MCG/ACT MCG/ACT                                    MCG/ACT           

 

     Ipratropium Ipratropium           No             2.5{ml} TID  Ipratropiu   

        



     Bromide Bromide                                    m Bromide           



     0.02 % 0.02 %                                    0.02 %           

 

     Constulose Constulose           No             15{ml} BID  Constulose      

     



     10 GM/15ML 10 GM/15ML                                    10 GM/15ML        

   

 

     Tresiba Tresiba           No                  QD   Tresiba           



     FlexTouch FlexTouch                                    FlexTouch           



     200 UNIT/ UNIT/ML                                    200            



                                                  UNIT/ML           

 

     Folic Acid Folic Acid           No             1{table QD   Folic Acid     

      



     1 MG 1 MG                          t}        1 MG           

 

     Aspir-Low Aspir-Low           No             1{table QD   Aspir-Low        

   



     81 MG 81 MG                          t}        81 MG           

 

     [place]           No             1{table QD   [place]           



     Thyroid 60 Thyroid 60                          t_on_an      Thyroid 60     

      



     MG   MG                            _empty_      MG             



                                        stomach                     



                                        }                        

 

     Symbicort Symbicort           No             2{puffs QD   Symbicort        

   



     80-4.5 80-4.5                          }         80-4.5           



     MCG/ACT MCG/ACT                                    MCG/ACT           

 

     Tresiba Tresiba           No                  QD   Tresiba           



     FlexTouch FlexTouch                                    FlexTouch           



     200 UNIT/ UNIT/ML                                    200            



                                                  UNIT/ML           

 

     Furosemide Furosemide           No             1{table BID  Furosemide     

      



     40 MG 40 MG                          t}        40 MG           

 

     ProAir HFA ProAir HFA           No             1{puff_ 6xD  ProAir HFA     

      



     108 (90 108 (90                          as_need      108 (90           



     Base) Base)                          ed}       Base)           



     MCG/ACT MCG/ACT                                    MCG/ACT           

 

     Ipratropium Ipratropium           No             2.5{ml} TID  Ipratropiu   

        



     Bromide Bromide                                    m Bromide           



     0.02 % 0.02 %                                    0.02 %           

 

     Constulose Constulose           No             15{ml} BID  Constulose      

     



     10 GM/15ML 10 GM/15ML                                    10 GM/15ML        

   

 

     Spironolact Spironolact           No             1{table BID  Spironolac   

        



     one 100 MG one 100 MG                          t}        tone 100          

 



                                                  MG             

 

     Folic Acid Folic Acid           No             1{table QD   Folic Acid     

      



     1 MG 1 MG                          t}        1 MG           

 

     Aspir-Low Aspir-Low           No             1{table QD   Aspir-Low        

   



     81 MG 81 MG                          t}        81 MG           

 

     [place]           No             1{table QD   [place]           



     Thyroid 60 Thyroid 60                          t_on_an      Thyroid 60     

      



     MG   MG                            _empty_      MG             



                                        stomach                     



                                        }                        

 

     Tresiba Tresiba           No                  QD   Tresiba           



     FlexTouch FlexTouch                                    FlexTouch           



     200 UNIT/ UNIT/ML                                    200            



                                                  UNIT/ML           

 

     Folic Acid Folic Acid           No             1{table QD   Folic Acid     

      



     1 MG 1 MG                          t}        1 MG           

 

     Aspir-Low Aspir-Low           No             1{table QD   Aspir-Low        

   



     81 MG 81 MG                          t}        81 MG           

 

     ProAir HFA ProAir HFA           No             1{puff_ 6xD  ProAir HFA     

      



     108 (90 108 (90                          as_need      108 (90           



     Base) Base)                          ed}       Base)           



     MCG/ACT MCG/ACT                                    MCG/ACT           

 

     Symbicort Symbicort           No             2{puffs QD   Symbicort        

   



     160-4.5 160-4.5                          }         160-4.5           



     MCG/ACT MCG/ACT                                    MCG/ACT           

 

     Spironolact Spironolact           No             1{table BID  Spironolac   

        



     one 100 MG one 100 MG                          t}        tone 100          

 



                                                  MG             

 

     Constulose Constulose           No             15{ml} BID  Constulose      

     



     10 GM/15ML 10 GM/15ML                                    10 GM/15ML        

   

 

     [place]           No             1{table QD   [place]           



     Thyroid 60 Thyroid 60                          t_on_an      Thyroid 60     

      



     MG   MG                            _empty_      MG             



                                        stomach                     



                                        }                        

 

     Ipratropium Ipratropium           No             2.5{ml} TID  Ipratropiu   

        



     Bromide Bromide                                    m Bromide           



     0.02 % 0.02 %                                    0.02 %           

 

     Furosemide Furosemide           No             1{table BID  Furosemide     

      



     40 MG 40 MG                          t}        40 MG           

 

     Tresiba Tresiba           No                  QD   Tresiba           



     FlexTouch FlexTouch                                    FlexTouch           



     200 UNIT/ UNIT/ML                                    200            



                                                  UNIT/ML           

 

     Folic Acid Folic Acid           No             1{table QD   Folic Acid     

      



     1 MG 1 MG                          t}        1 MG           

 

     Aspir-Low Aspir-Low           No             1{table QD   Aspir-Low        

   



     81 MG 81 MG                          t}        81 MG           

 

     ProAir HFA ProAir HFA           No             1{puff_ 6xD  ProAir HFA     

      



     108 (90 108 (90                          as_need      108 (90           



     Base) Base)                          ed}       Base)           



     MCG/ACT MCG/ACT                                    MCG/ACT           

 

     Symbicort Symbicort           No             2{puffs QD   Symbicort        

   



     160-4.5 160-4.5                          }         160-4.5           



     MCG/ACT MCG/ACT                                    MCG/ACT           

 

     Spironolact Spironolact           No             1{table BID  Spironolac   

        



     one 100 MG one 100 MG                          t}        tone 100          

 



                                                  MG             

 

     Constulose Constulose           No             15{ml} BID  Constulose      

     



     10 GM/15ML 10 GM/15ML                                    10 GM/15ML        

   

 

     [place]           No             1{table QD   [place]           



     Thyroid 60 Thyroid 60                          t_on_an      Thyroid 60     

      



     MG   MG                            _empty_      MG             



                                        stomach                     



                                        }                        

 

     Ipratropium Ipratropium           No             2.5{ml} TID  Ipratropiu   

        



     Bromide Bromide                                    m Bromide           



     0.02 % 0.02 %                                    0.02 %           

 

     Furosemide Furosemide           No             1{table BID  Furosemide     

      



     40 MG 40 MG                          t}        40 MG           

 

     Tresiba Tresiba           No                  QD   Tresiba           



     FlexTouch FlexTouch                                    FlexTouch           



     200 UNIT/ UNIT/ML                                    200            



                                                  UNIT/ML           

 

     Folic Acid Folic Acid           No             1{table QD   Folic Acid     

      



     1 MG 1 MG                          t}        1 MG           

 

     Aspir-Low Aspir-Low           No             1{table QD   Aspir-Low        

   



     81 MG 81 MG                          t}        81 MG           

 

     ProAir HFA ProAir HFA           No             1{puff_ 6xD  ProAir HFA     

      



     108 (90 108 (90                          as_need      108 (90           



     Base) Base)                          ed}       Base)           



     MCG/ACT MCG/ACT                                    MCG/ACT           

 

     Symbicort Symbicort           No             2{puffs QD   Symbicort        

   



     160-4.5 160-4.5                          }         160-4.5           



     MCG/ACT MCG/ACT                                    MCG/ACT           

 

     Spironolact Spironolact           No             1{table BID  Spironolac   

        



     one 100 MG one 100 MG                          t}        tone 100          

 



                                                  MG             

 

     Constulose Constulose           No             15{ml} BID  Constulose      

     



     10 GM/15ML 10 GM/15ML                                    10 GM/15ML        

   

 

     [facility]           No             1{table QD   [place]           



     Thyroid 60 Thyroid 60                          t_on_an      Thyroid 60     

      



     MG   MG                            _empty_      MG             



                                        stomach                     



                                        }                        

 

     Ipratropium Ipratropium           No             2.5{ml} TID  Ipratropiu   

        



     Bromide Bromide                                    m Bromide           



     0.02 % 0.02 %                                    0.02 %           

 

     Furosemide Furosemide           No             1{table BID  Furosemide     

      



     40 MG 40 MG                          t}        40 MG           

 

     Tresiba Tresiba           No                  QD   Tresiba           



     FlexTouch FlexTouch                                    FlexTouch           



     200 UNIT/ UNIT/ML                                    200            



                                                  UNIT/ML           

 

     Folic Acid Folic Acid           No             1{table QD   Folic Acid     

      



     1 MG 1 MG                          t}        1 MG           

 

     Aspir-Low Aspir-Low           No             1{table QD   Aspir-Low        

   



     81 MG 81 MG                          t}        81 MG           

 

     ProAir HFA ProAir HFA           No             1{puff_ 6xD  ProAir HFA     

      



     108 (90 108 (90                          as_need      108 (90           



     Base) Base)                          ed}       Base)           



     MCG/ACT MCG/ACT                                    MCG/ACT           

 

     Symbicort Symbicort           No             2{puffs QD   Symbicort        

   



     160-4.5 160-4.5                          }         160-4.5           



     MCG/ACT MCG/ACT                                    MCG/ACT           

 

     Spironolact Spironolact           No             1{table BID  Spironolac   

        



     one 100 MG one 100 MG                          t}        tone 100          

 



                                                  MG             

 

     Constulose Constulose           No             15{ml} BID  Constulose      

     



     10 GM/15ML 10 GM/15ML                                    10 GM/15ML        

   

 

     [facility]           No             1{table QD   [place]           



     Thyroid 60 Thyroid 60                          t_on_an      Thyroid 60     

      



     MG   MG                            _empty_      MG             



                                        stomach                     



                                        }                        

 

     Ipratropium Ipratropium           No             2.5{ml} TID  Ipratropiu   

        



     Bromide Bromide                                    m Bromide           



     0.02 % 0.02 %                                    0.02 %           

 

     Furosemide Furosemide           No             1{table BID  Furosemide     

      



     40 MG 40 MG                          t}        40 MG           

 

     Tresiba Tresiba           No                  QD   Tresiba           



     FlexTouch FlexTouch                                    FlexTouch           



     200 UNIT/ UNIT/ML                                    200            



                                                  UNIT/ML           

 

     Folic Acid Folic Acid           No             1{table QD   Folic Acid     

      



     1 MG 1 MG                          t}        1 MG           

 

     Aspir-Low Aspir-Low           No             1{table QD   Aspir-Low        

   



     81 MG 81 MG                          t}        81 MG           

 

     ProAir HFA ProAir HFA           No             1{puff_ 6xD  ProAir HFA     

      



     108 (90 108 (90                          as_need      108 (90           



     Base) Base)                          ed}       Base)           



     MCG/ACT MCG/ACT                                    MCG/ACT           

 

     Symbicort Symbicort           No             2{puffs QD   Symbicort        

   



     160-4.5 160-4.5                          }         160-4.5           



     MCG/ACT MCG/ACT                                    MCG/ACT           

 

     Spironolact Spironolact           No             1{table BID  Spironolac   

        



     one 100 MG one 100 MG                          t}        tone 100          

 



                                                  MG             

 

     Constulose Constulose           No             15{ml} BID  Constulose      

     



     10 GM/15ML 10 GM/15ML                                    10 GM/15ML        

   

 

     [place]           No             1{table QD   [place]           



     Thyroid 60 Thyroid 60                          t_on_an      Thyroid 60     

      



     MG   MG                            _empty_      MG             



                                        stomach                     



                                        }                        

 

     Ipratropium Ipratropium           No             2.5{ml} TID  Ipratropiu   

        



     Bromide Bromide                                    m Bromide           



     0.02 % 0.02 %                                    0.02 %           

 

     Furosemide Furosemide           No             1{table BID  Furosemide     

      



     40 MG 40 MG                          t}        40 MG           

 

     Tresiba Tresiba           No                  QD   Tresiba           



     FlexTouch FlexTouch                                    FlexTouch           



     200 UNIT/ UNIT/ML                                    200            



                                                  UNIT/ML           

 

     Folic Acid Folic Acid           No             1{table QD   Folic Acid     

      



     1 MG 1 MG                          t}        1 MG           

 

     Aspir-Low Aspir-Low           No             1{table QD   Aspir-Low        

   



     81 MG 81 MG                          t}        81 MG           

 

     ProAir HFA ProAir HFA           No             1{puff_ 6xD  ProAir HFA     

      



     108 (90 108 (90                          as_need      108 (90           



     Base) Base)                          ed}       Base)           



     MCG/ACT MCG/ACT                                    MCG/ACT           

 

     Symbicort Symbicort           No             2{puffs QD   Symbicort        

   



     160-4.5 160-4.5                          }         160-4.5           



     MCG/ACT MCG/ACT                                    MCG/ACT           

 

     Spironolact Spironolact           No             1{table BID  Spironolac   

        



     one 100 MG one 100 MG                          t}        tone 100          

 



                                                  MG             

 

     Constulose Constulose           No             15{ml} BID  Constulose      

     



     10 GM/15ML 10 GM/15ML                                    10 GM/15ML        

   

 

     [place]           No             1{table QD   [place]           



     Thyroid 60 Thyroid 60                          t_on_an      Thyroid 60     

      



     MG   MG                            _empty_      MG             



                                        stomach                     



                                        }                        

 

     Ipratropium Ipratropium           No             2.5{ml} TID  Ipratropiu   

        



     Bromide Bromide                                    m Bromide           



     0.02 % 0.02 %                                    0.02 %           

 

     Furosemide Furosemide           No             1{table BID  Furosemide     

      



     40 MG 40 MG                          t}        40 MG           

 

     Tresiba Tresiba           No                  QD   Tresiba           



     FlexTouch FlexTouch                                    FlexTouch           



     200 UNIT/ UNIT/ML                                    200            



                                                  UNIT/ML           

 

     Folic Acid Folic Acid           No             1{table QD   Folic Acid     

      



     1 MG 1 MG                          t}        1 MG           

 

     Aspir-Low Aspir-Low           No             1{table QD   Aspir-Low        

   



     81 MG 81 MG                          t}        81 MG           

 

     ProAir HFA ProAir HFA           No             1{puff_ 6xD  ProAir HFA     

      



     108 (90 108 (90                          as_need      108 (90           



     Base) Base)                          ed}       Base)           



     MCG/ACT MCG/ACT                                    MCG/ACT           

 

     Symbicort Symbicort           No             2{puffs QD   Symbicort        

   



     160-4.5 160-4.5                          }         160-4.5           



     MCG/ACT MCG/ACT                                    MCG/ACT           

 

     Spironolact Spironolact           No             1{table BID  Spironolac   

        



     one 100 MG one 100 MG                          t}        tone 100          

 



                                                  MG             

 

     Constulose Constulose           No             15{ml} BID  Constulose      

     



     10 GM/15ML 10 GM/15ML                                    10 GM/15ML        

   

 

     [place]           No             1{table QD   [place]           



     Thyroid 60 Thyroid 60                          t_on_an      Thyroid 60     

      



     MG   MG                            _empty_      MG             



                                        stomach                     



                                        }                        

 

     Ipratropium Ipratropium           No             2.5{ml} TID  Ipratropiu   

        



     Bromide Bromide                                    m Bromide           



     0.02 % 0.02 %                                    0.02 %           

 

     Furosemide Furosemide           No             1{table BID  Furosemide     

      



     40 MG 40 MG                          t}        40 MG           

 

     NP Thyroid NP Thyroid           No                       NP Thyroid        

   



     60 MG 60 MG                                    60 MG           

 

     Spironolact Spironolact           No             1{table BID  Spironolac   

        



     one 100 MG one 100 MG                          t}        tone 100          

 



                                                  MG             

 

     [place]           No             1{table QD   [place]           



     Thyroid 60 Thyroid 60                          t_on_an      Thyroid 60     

      



     MG   MG                            _empty_      MG             



                                        stomach                     



                                        }                        

 

     Furosemide Furosemide           No             1{table BID  Furosemide     

      



     40 MG 40 MG                          t}        40 MG           

 

     Constulose Constulose           No             15{ml} BID  Constulose      

     



     10 GM/15ML 10 GM/15ML                                    10 GM/15ML        

   

 

     HYDROcodone HYDROcodone           No             1{table QID  HYDROcodon   

        



     -Acetaminop -Acetaminop                          t_as_ne      e-Acetamin   

        



     hen 5-325 hen 5-325                          eded}      ophen           



     MG   MG                                      5-325 MG           

 

     Folic Acid Folic Acid           No             1{table QD   Folic Acid     

      



     1 MG 1 MG                          t}        1 MG           

 

     Symbicort Symbicort           No             2{puffs QD   Symbicort        

   



     160-4.5 160-4.5                          }         160-4.5           



     MCG/ACT MCG/ACT                                    MCG/ACT           

 

     Ondansetron Ondansetron           No             1{table QD   Ondansetro   

        



     4 MG 4 MG                          t_on_th      n 4 MG           



                                        e_tongu                     



                                        e_and_a                     



                                        llow_to                     



                                        _dissol                     



                                        ve}                      

 

     Ipratropium Ipratropium           No             2.5{ml} TID  Ipratropiu   

        



     Bromide Bromide                                    m Bromide           



     0.02 % 0.02 %                                    0.02 %           

 

     Aspir-Low Aspir-Low           No             1{table QD   Aspir-Low        

   



     81 MG 81 MG                          t}        81 MG           

 

     Tresiba Tresiba           No                  QD   Tresiba           



     FlexTouch FlexTouch                                    FlexTouch           



     200 UNIT/ UNIT/ML                                    200            



                                                  UNIT/ML           

 

     ProAir HFA ProAir HFA           No             1{puff_ 6xD  ProAir HFA     

      



     108 (90 108 (90                          as_need      108 (90           



     Base) Base)                          ed}       Base)           



     MCG/ACT MCG/ACT                                    MCG/ACT           

 

     Folic Acid Folic Acid           No             1{table QD   Folic Acid     

      



     1 MG 1 MG                          t}        1 MG           

 

     Symbicort Symbicort           No             2{puffs QD   Symbicort        

   



     160-4.5 160-4.5                          }         160-4.5           



     MCG/ACT MCG/ACT                                    MCG/ACT           

 

     [place]           No             1{table QD   [place]           



     Thyroid 60 Thyroid 60                          t_on_an      Thyroid 60     

      



     MG   MG                            _empty_      MG             



                                        stomach                     



                                        }                        

 

     Furosemide Furosemide           No             1{table BID  Furosemide     

      



     40 MG 40 MG                          t}        40 MG           

 

     Constulose Constulose           No             15{ml} BID  Constulose      

     



     10 GM/15ML 10 GM/15ML                                    10 GM/15ML        

   

 

     Aspir-Low Aspir-Low           No             1{table QD   Aspir-Low        

   



     81 MG 81 MG                          t}        81 MG           

 

     HYDROcodone HYDROcodone           No             1{table QID  HYDROcodon   

        



     -Acetaminop -Acetaminop                          t_as_ne      e-Acetamin   

        



     hen 5-325 hen 5-325                          eded}      ophen           



     MG   MG                                      5-325 MG           

 

     ProAir HFA ProAir HFA           No             1{puff_ 6xD  ProAir HFA     

      



     108 (90 108 (90                          as_need      108 (90           



     Base) Base)                          ed}       Base)           



     MCG/ACT MCG/ACT                                    MCG/ACT           

 

     NP Thyroid NP Thyroid           No                       NP Thyroid        

   



     60 MG 60 MG                                    60 MG           

 

     Ondansetron Ondansetron           No             1{table QD   Ondansetro   

        



     4 MG 4 MG                          t_on_th      n 4 MG           



                                        e_tongu                     



                                        e_and_a                     



                                        llow_to                     



                                        _dissol                     



                                        ve}                      

 

     Tresiba Tresiba           No                  QD   Tresiba           



     FlexTouch FlexTouch                                    FlexTouch           



     200 UNIT/ UNIT/ML                                    200            



                                                  UNIT/ML           

 

     Ipratropium Ipratropium           No             2.5{ml} TID  Ipratropiu   

        



     Bromide Bromide                                    m Bromide           



     0.02 % 0.02 %                                    0.02 %           

 

     Spironolact Spironolact           No                       Spironolac      

     



     one 100 MG one 100 MG                                    tone 100          

 



                                                  MG             

 

     Folic Acid Folic Acid           No             1{table QD   Folic Acid     

      



     1 MG 1 MG                          t}        1 MG           

 

     Symbicort Symbicort           No             2{puffs QD   Symbicort        

   



     160-4.5 160-4.5                          }         160-4.5           



     MCG/ACT MCG/ACT                                    MCG/ACT           

 

     [place]           No             1{table QD   [place]           



     Thyroid 60 Thyroid 60                          t_on_an      Thyroid 60     

      



     MG   MG                            _empty_      MG             



                                        stomach                     



                                        }                        

 

     Furosemide Furosemide           No             1{table BID  Furosemide     

      



     40 MG 40 MG                          t}        40 MG           

 

     Constulose Constulose           No             15{ml} BID  Constulose      

     



     10 GM/15ML 10 GM/15ML                                    10 GM/15ML        

   

 

     Aspir-Low Aspir-Low           No             1{table QD   Aspir-Low        

   



     81 MG 81 MG                          t}        81 MG           

 

     HYDROcodone HYDROcodone           No             1{table QID  HYDROcodon   

        



     -Acetaminop -Acetaminop                          t_as_ne      e-Acetamin   

        



     hen 5-325 hen 5-325                          eded}      ophen           



     MG   MG                                      5-325 MG           

 

     ProAir HFA ProAir HFA           No             1{puff_ 6xD  ProAir HFA     

      



     108 (90 108 (90                          as_need      108 (90           



     Base) Base)                          ed}       Base)           



     MCG/ACT MCG/ACT                                    MCG/ACT           

 

     NP Thyroid NP Thyroid           No                       NP Thyroid        

   



     60 MG 60 MG                                    60 MG           

 

     Ondansetron Ondansetron           No             1{table QD   Ondansetro   

        



     4 MG 4 MG                          t_on_th      n 4 MG           



                                        e_tongu                     



                                        e_and_a                     



                                        llow_to                     



                                        _dissol                     



                                        ve}                      

 

     Tresiba Tresiba           No                  QD   Tresiba           



     FlexTouch FlexTouch                                    FlexTouch           



     200 UNIT/ UNIT/ML                                    200            



                                                  UNIT/ML           

 

     Ipratropium Ipratropium           No             2.5{ml} TID  Ipratropiu   

        



     Bromide Bromide                                    m Bromide           



     0.02 % 0.02 %                                    0.02 %           

 

     Spironolact Spironolact           No                       Spironolac      

     



     one 100 MG one 100 MG                                    tone 100          

 



                                                  MG             

 

     Folic Acid Folic Acid           No             1{table QD   Folic Acid     

      



     1 MG 1 MG                          t}        1 MG           

 

     Symbicort Symbicort           No             2{puffs QD   Symbicort        

   



     160-4.5 160-4.5                          }         160-4.5           



     MCG/ACT MCG/ACT                                    MCG/ACT           

 

     [place]           No             1{table QD   [place]           



     Thyroid 60 Thyroid 60                          t_on_an      Thyroid 60     

      



     MG   MG                            _empty_      MG             



                                        stomach                     



                                        }                        

 

     Furosemide Furosemide           No             1{table BID  Furosemide     

      



     40 MG 40 MG                          t}        40 MG           

 

     Constulose Constulose           No             15{ml} BID  Constulose      

     



     10 GM/15ML 10 GM/15ML                                    10 GM/15ML        

   

 

     Aspir-Low Aspir-Low           No             1{table QD   Aspir-Low        

   



     81 MG 81 MG                          t}        81 MG           

 

     HYDROcodone HYDROcodone           No             1{table QID  HYDROcodon   

        



     -Acetaminop -Acetaminop                          t_as_ne      e-Acetamin   

        



     hen 5-325 hen 5-325                          eded}      ophen           



     MG   MG                                      5-325 MG           

 

     ProAir HFA ProAir HFA           No             1{puff_ 6xD  ProAir HFA     

      



     108 (90 108 (90                          as_need      108 (90           



     Base) Base)                          ed}       Base)           



     MCG/ACT MCG/ACT                                    MCG/ACT           

 

     NP Thyroid NP Thyroid           No                       NP Thyroid        

   



     60 MG 60 MG                                    60 MG           

 

     Ondansetron Ondansetron           No             1{table QD   Ondansetro   

        



     4 MG 4 MG                          t_on_th      n 4 MG           



                                        e_tongu                     



                                        e_and_a                     



                                        llow_to                     



                                        _dissol                     



                                        ve}                      

 

     Tresiba Tresiba           No                  QD   Tresiba           



     FlexTouch FlexTouch                                    FlexTouch           



     200 UNIT/ UNIT/ML                                    200            



                                                  UNIT/ML           

 

     Ipratropium Ipratropium           No             2.5{ml} TID  Ipratropiu   

        



     Bromide Bromide                                    m Bromide           



     0.02 % 0.02 %                                    0.02 %           

 

     Spironolact Spironolact           No                       Spironolac      

     



     one 100 MG one 100 MG                                    tone 100          

 



                                                  MG             

 

     Ondansetron Ondansetron           No             1{table QD   Ondansetro   

        



     4 MG 4 MG                          t_on_th      n 4 MG           



                                        e_tongu                     



                                        e_and_a                     



                                        llow_to                     



                                        _dissol                     



                                        ve}                      

 

     Spironolact Spironolact           No             1{table BID  Spironolac   

        



     one 100 MG one 100 MG                          t}        tone 100          

 



                                                  MG             

 

     Furosemide Furosemide           No             1{table BID  Furosemide     

      



     40 MG 40 MG                          t}        40 MG           

 

     Symbicort Symbicort           No             2{puffs QD   Symbicort        

   



     160-4.5 160-4.5                          }         160-4.5           



     MCG/ACT MCG/ACT                                    MCG/ACT           

 

     Ipratropium Ipratropium           No             2.5{ml} TID  Ipratropiu   

        



     Bromide Bromide                                    m Bromide           



     0.02 % 0.02 %                                    0.02 %           

 

     Tresiba Tresiba           No                  QD   Tresiba           



     FlexTouch FlexTouch                                    FlexTouch           



     200 UNIT/ UNIT/ML                                    200            



                                                  UNIT/ML           

 

     Spironolact Spironolact           No                       Spironolac      

     



     one 100 MG one 100 MG                                    tone 100          

 



                                                  MG             

 

     Aspir-Low Aspir-Low           No             1{table QD   Aspir-Low        

   



     81 MG 81 MG                          t}        81 MG           

 

     HYDROcodone HYDROcodone           No             1{table QID  HYDROcodon   

        



     -Acetaminop -Acetaminop                          t_as_ne      e-Acetamin   

        



     hen 5-325 hen 5-325                          eded}      ophen           



     MG   MG                                      5-325 MG           

 

     Folic Acid Folic Acid           No             1{table QD   Folic Acid     

      



     1 MG 1 MG                          t}        1 MG           

 

     ProAir HFA ProAir HFA           No             1{puff_ 6xD  ProAir HFA     

      



     108 (90 108 (90                          as_need      108 (90           



     Base) Base)                          ed}       Base)           



     MCG/ACT MCG/ACT                                    MCG/ACT           

 

     NP Thyroid NP Thyroid           No                       NP Thyroid        

   



     60 MG 60 MG                                    60 MG           

 

     [place]           No             1{table QD   [place]           



     Thyroid 60 Thyroid 60                          t_on_an      Thyroid 60     

      



     MG   MG                            _empty_      MG             



                                        stomach                     



                                        }                        

 

     Ondansetron Ondansetron           No             1{table QD   Ondansetro   

        



     4 MG 4 MG                          t_on_th      n 4 MG           



                                        e_tongu                     



                                        e_and_a                     



                                        llow_to                     



                                        _dissol                     



                                        ve}                      

 

     Spironolact Spironolact           No             1{table BID  Spironolac   

        



     one 100 MG one 100 MG                          t}        tone 100          

 



                                                  MG             

 

     Furosemide Furosemide           No             1{table BID  Furosemide     

      



     40 MG 40 MG                          t}        40 MG           

 

     Symbicort Symbicort           No             2{puffs QD   Symbicort        

   



     160-4.5 160-4.5                          }         160-4.5           



     MCG/ACT MCG/ACT                                    MCG/ACT           

 

     Ipratropium Ipratropium           No             2.5{ml} TID  Ipratropiu   

        



     Bromide Bromide                                    m Bromide           



     0.02 % 0.02 %                                    0.02 %           

 

     Tresiba Tresiba           No                  QD   Tresiba           



     FlexTouch FlexTouch                                    FlexTouch           



     200 UNIT/ UNIT/ML                                    200            



                                                  UNIT/ML           

 

     Spironolact Spironolact           No                       Spironolac      

     



     one 100 MG one 100 MG                                    tone 100          

 



                                                  MG             

 

     Aspir-Low Aspir-Low           No             1{table QD   Aspir-Low        

   



     81 MG 81 MG                          t}        81 MG           

 

     HYDROcodone HYDROcodone           No             1{table QID  HYDROcodon   

        



     -Acetaminop -Acetaminop                          t_as_ne      e-Acetamin   

        



     hen 5-325 hen 5-325                          eded}      ophen           



     MG   MG                                      5-325 MG           

 

     Folic Acid Folic Acid           No             1{table QD   Folic Acid     

      



     1 MG 1 MG                          t}        1 MG           

 

     ProAir HFA ProAir HFA           No             1{puff_ 6xD  ProAir HFA     

      



     108 (90 108 (90                          as_need      108 (90           



     Base) Base)                          ed}       Base)           



     MCG/ACT MCG/ACT                                    MCG/ACT           

 

     NP Thyroid NP Thyroid           No                       NP Thyroid        

   



     60 MG 60 MG                                    60 MG           

 

     [place]           No             1{table QD   [place]           



     Thyroid 60 Thyroid 60                          t_on_an      Thyroid 60     

      



     MG   MG                            _empty_      MG             



                                        stomach                     



                                        }                        

 

     Ondansetron Ondansetron           No             1{table QD   Ondansetro   

        



     4 MG 4 MG                          t_on_th      n 4 MG           



                                        e_tongu                     



                                        e_and_a                     



                                        llow_to                     



                                        _dissol                     



                                        ve}                      

 

     Spironolact Spironolact           No             1{table BID  Spironolac   

        



     one 100 MG one 100 MG                          t}        tone 100          

 



                                                  MG             

 

     Furosemide Furosemide           No             1{table BID  Furosemide     

      



     40 MG 40 MG                          t}        40 MG           

 

     Symbicort Symbicort           No             2{puffs QD   Symbicort        

   



     160-4.5 160-4.5                          }         160-4.5           



     MCG/ACT MCG/ACT                                    MCG/ACT           

 

     Ipratropium Ipratropium           No             2.5{ml} TID  Ipratropiu   

        



     Bromide Bromide                                    m Bromide           



     0.02 % 0.02 %                                    0.02 %           

 

     Tresiba Tresiba           No                  QD   Tresiba           



     FlexTouch FlexTouch                                    FlexTouch           



     200 UNIT/ UNIT/ML                                    200            



                                                  UNIT/ML           

 

     Spironolact Spironolact           No                       Spironolac      

     



     one 100 MG one 100 MG                                    tone 100          

 



                                                  MG             

 

     Aspir-Low Aspir-Low           No             1{table QD   Aspir-Low        

   



     81 MG 81 MG                          t}        81 MG           

 

     HYDROcodone HYDROcodone           No             1{table QID  HYDROcodon   

        



     -Acetaminop -Acetaminop                          t_as_ne      e-Acetamin   

        



     hen 5-325 hen 5-325                          eded}      ophen           



     MG   MG                                      5-325 MG           

 

     Folic Acid Folic Acid           No             1{table QD   Folic Acid     

      



     1 MG 1 MG                          t}        1 MG           

 

     ProAir HFA ProAir HFA           No             1{puff_ 6xD  ProAir HFA     

      



     108 (90 108 (90                          as_need      108 (90           



     Base) Base)                          ed}       Base)           



     MCG/ACT MCG/ACT                                    MCG/ACT           

 

     NP Thyroid NP Thyroid           No                       NP Thyroid        

   



     60 MG 60 MG                                    60 MG           

 

     [place]           No             1{table QD   [place]           



     Thyroid 60 Thyroid 60                          t_on_an      Thyroid 60     

      



     MG   MG                            _empty_      MG             



                                        stomach                     



                                        }                        

 

     Ondansetron Ondansetron           No             1{table QD   Ondansetro   

        



     4 MG 4 MG                          t_on_th      n 4 MG           



                                        e_tongu                     



                                        e_and_a                     



                                        llow_to                     



                                        _dissol                     



                                        ve}                      

 

     Spironolact Spironolact           No             1{table BID  Spironolac   

        



     one 100 MG one 100 MG                          t}        tone 100          

 



                                                  MG             

 

     Furosemide Furosemide           No             1{table BID  Furosemide     

      



     40 MG 40 MG                          t}        40 MG           

 

     Symbicort Symbicort           No             2{puffs QD   Symbicort        

   



     160-4.5 160-4.5                          }         160-4.5           



     MCG/ACT MCG/ACT                                    MCG/ACT           

 

     Ipratropium Ipratropium           No             2.5{ml} TID  Ipratropiu   

        



     Bromide Bromide                                    m Bromide           



     0.02 % 0.02 %                                    0.02 %           

 

     Tresiba Tresiba           No                  QD   Tresiba           



     FlexTouch FlexTouch                                    FlexTouch           



     200 UNIT/ UNIT/ML                                    200            



                                                  UNIT/ML           

 

     Spironolact Spironolact           No                       Spironolac      

     



     one 100 MG one 100 MG                                    tone 100          

 



                                                  MG             

 

     Aspir-Low Aspir-Low           No             1{table QD   Aspir-Low        

   



     81 MG 81 MG                          t}        81 MG           

 

     HYDROcodone HYDROcodone           No             1{table QID  HYDROcodon   

        



     -Acetaminop -Acetaminop                          t_as_ne      e-Acetamin   

        



     hen 5-325 hen 5-325                          eded}      ophen           



     MG   MG                                      5-325 MG           

 

     Folic Acid Folic Acid           No             1{table QD   Folic Acid     

      



     1 MG 1 MG                          t}        1 MG           

 

     ProAir HFA ProAir HFA           No             1{puff_ 6xD  ProAir HFA     

      



     108 (90 108 (90                          as_need      108 (90           



     Base) Base)                          ed}       Base)           



     MCG/ACT MCG/ACT                                    MCG/ACT           

 

     NP Thyroid NP Thyroid           No                       NP Thyroid        

   



     60 MG 60 MG                                    60 MG           

 

     [place]           No             1{table QD   [place]           



     Thyroid 60 Thyroid 60                          t_on_an      Thyroid 60     

      



     MG   MG                            _empty_      MG             



                                        stomach                     



                                        }                        

 

     Spironolact Spironolact           No                       Spironolac      

     



     one 100 MG one 100 MG                                    tone 100          

 



                                                  MG             

 

     Symbicort Symbicort           No             2{puffs QD   Symbicort        

   



     160-4.5 160-4.5                          }         160-4.5           



     MCG/ACT MCG/ACT                                    MCG/ACT           

 

     Furosemide Furosemide           No             1{table BID  Furosemide     

      



     40 MG 40 MG                          t}        40 MG           

 

     ProAir HFA ProAir HFA           No             1{puff_ 6xD  ProAir HFA     

      



     108 (90 108 (90                          as_need      108 (90           



     Base) Base)                          ed}       Base)           



     MCG/ACT MCG/ACT                                    MCG/ACT           

 

     Aspir-Low Aspir-Low           No             1{table QD   Aspir-Low        

   



     81 MG 81 MG                          t}        81 MG           

 

     Tresiba Tresiba           No                  QD   Tresiba           



     FlexTouch FlexTouch                                    FlexTouch           



     200 UNIT/ UNIT/ML                                    200            



                                                  UNIT/ML           

 

     Ondansetron Ondansetron           No             1{table QD   Ondansetro   

        



     4 MG 4 MG                          t_on_th      n 4 MG           



                                        e_tongu                     



                                        e_and_a                     



                                        llow_to                     



                                        _dissol                     



                                        ve}                      

 

     Spironolact Spironolact           No             1{table BID  Spironolac   

        



     one 100 MG one 100 MG                          t}        tone 100          

 



                                                  MG             

 

     Ipratropium Ipratropium           No             2.5{ml} TID  Ipratropiu   

        



     Bromide Bromide                                    m Bromide           



     0.02 % 0.02 %                                    0.02 %           

 

     [place]           No             1{table QD   [place]           



     Thyroid 60 Thyroid 60                          t_on_an      Thyroid 60     

      



     MG   MG                            _empty_      MG             



                                        stomach                     



                                        }                        

 

     Folic Acid Folic Acid           No             1{table QD   Folic Acid     

      



     1 MG 1 MG                          t}        1 MG           

 

     HYDROcodone HYDROcodone           No             1{table QID  HYDROcodon   

        



     -Acetaminop -Acetaminop                          t_as_ne      e-Acetamin   

        



     hen 5-325 hen 5-325                          eded}      ophen           



     MG   MG                                      5-325 MG           

 

     NP Thyroid NP Thyroid           No                       NP Thyroid        

   



     60 MG 60 MG                                    60 MG           

 

     Spironolact Spironolact           No                       Spironolac      

     



     one 100 MG one 100 MG                                    tone 100          

 



                                                  MG             

 

     Symbicort Symbicort           No             2{puffs QD   Symbicort        

   



     160-4.5 160-4.5                          }         160-4.5           



     MCG/ACT MCG/ACT                                    MCG/ACT           

 

     Furosemide Furosemide           No             1{table BID  Furosemide     

      



     40 MG 40 MG                          t}        40 MG           

 

     ProAir HFA ProAir HFA           No             1{puff_ 6xD  ProAir HFA     

      



     108 (90 108 (90                          as_need      108 (90           



     Base) Base)                          ed}       Base)           



     MCG/ACT MCG/ACT                                    MCG/ACT           

 

     Aspir-Low Aspir-Low           No             1{table QD   Aspir-Low        

   



     81 MG 81 MG                          t}        81 MG           

 

     Tresiba Tresiba           No                  QD   Tresiba           



     FlexTouch FlexTouch                                    FlexTouch           



     200 UNIT/ UNIT/ML                                    200            



                                                  UNIT/ML           

 

     Ondansetron Ondansetron           No             1{table QD   Ondansetro   

        



     4 MG 4 MG                          t_on_      n 4 MG           



                                        e_tongu                     



                                        e_and_a                     



                                        llow_to                     



                                        _dissol                     



                                        ve}                      

 

     Spironolact Spironolact           No             1{table BID  Spironolac   

        



     one 100 MG one 100 MG                          t}        tone 100          

 



                                                  MG             

 

     Ipratropium Ipratropium           No             2.5{ml} TID  Ipratropiu   

        



     Bromide Bromide                                    m Bromide           



     0.02 % 0.02 %                                    0.02 %           

 

     [place]           No             1{table QD   [place]           



     Thyroid 60 Thyroid 60                          t_on_an      Thyroid 60     

      



     MG   MG                            _empty_      MG             



                                        stomach                     



                                        }                        

 

     Folic Acid Folic Acid           No             1{table QD   Folic Acid     

      



     1 MG 1 MG                          t}        1 MG           

 

     HYDROcodone HYDROcodone           No             1{table QID  HYDROcodon   

        



     -Acetaminop -Acetaminop                          t_as_ne      e-Acetamin   

        



     hen 5-325 hen 5-325                          eded}      ophen           



     MG   MG                                      5-325 MG           

 

     NP Thyroid NP Thyroid           No                       NP Thyroid        

   



     60 MG 60 MG                                    60 MG           

 

     Tresiba Tresiba           No                       Tresiba           



     FlexTouch FlexTouch                                    FlexTouch           



     200 UNIT/ UNIT/ML                                    200            



                                                  UNIT/ML           

 

     Folic Acid Folic Acid           No             1{table QD   Folic Acid     

      



     1 MG 1 MG                          t}        1 MG           

 

     ProAir HFA ProAir HFA           No             1{puff_ 6xD  ProAir HFA     

      



     108 (90 108 (90                          as_need      108 (90           



     Base) Base)                          ed}       Base)           



     MCG/ACT MCG/ACT                                    MCG/ACT           

 

     Constulose Constulose           No             30{ml} BID  Constulose      

     



     10 GM/15ML 10 GM/15ML                                    10 GM/15ML        

   

 

     HYDROcodone HYDROcodone           No             1{table QID  HYDROcodon   

        



     -Acetaminop -Acetaminop                          t_as_ne      e-Acetamin   

        



     hen 5-325 hen 5-325                          eded}      ophen           



     MG   MG                                      5-325 MG           

 

     Spironolact Spironolact           No             1{table BID  Spironolac   

        



     one 100 MG one 100 MG                          t}        tone 100          

 



                                                  MG             

 

     Ipratropium Ipratropium           No             2.5{ml} TID  Ipratropiu   

        



     Bromide Bromide                                    m Bromide           



     0.02 % 0.02 %                                    0.02 %           

 

     [place]           No             1{table QD   [place]           



     Thyroid 60 Thyroid 60                          t_on_an      Thyroid 60     

      



     MG   MG                            _empty_      MG             



                                        stomach                     



                                        }                        

 

     Symbicort Symbicort           No             2{puffs QD   Symbicort        

   



     160-4.5 160-4.5                          }         160-4.5           



     MCG/ACT MCG/ACT                                    MCG/ACT           

 

     Furosemide Furosemide           No             1{table BID  Furosemide     

      



     40 MG 40 MG                          t}        40 MG           

 

     Tresiba Tresiba           No                       Tresiba           



     FlexTouch FlexTouch                                    FlexTouch           



     200 UNIT/ UNIT/ML                                    200            



                                                  UNIT/ML           

 

     Folic Acid Folic Acid           No             1{table QD   Folic Acid     

      



     1 MG 1 MG                          t}        1 MG           

 

     ProAir HFA ProAir HFA           No             1{puff_ 6xD  ProAir HFA     

      



     108 (90 108 (90                          as_need      108 (90           



     Base) Base)                          ed}       Base)           



     MCG/ACT MCG/ACT                                    MCG/ACT           

 

     Constulose Constulose           No             30{ml} BID  Constulose      

     



     10 GM/15ML 10 GM/15ML                                    10 GM/15ML        

   

 

     HYDROcodone HYDROcodone           No             1{table QID  HYDROcodon   

        



     -Acetaminop -Acetaminop                          t_as_ne      e-Acetamin   

        



     hen 5-325 hen 5-325                          eded}      ophen           



     MG   MG                                      5-325 MG           

 

     Spironolact Spironolact           No             1{table BID  Spironolac   

        



     one 100 MG one 100 MG                          t}        tone 100          

 



                                                  MG             

 

     Ipratropium Ipratropium           No             2.5{ml} TID  Ipratropiu   

        



     Bromide Bromide                                    m Bromide           



     0.02 % 0.02 %                                    0.02 %           

 

     [place]           No             1{table QD   [place]           



     Thyroid 60 Thyroid 60                          t_on_an      Thyroid 60     

      



     MG   MG                            _empty_      MG             



                                        stomach                     



                                        }                        

 

     Symbicort Symbicort           No             2{puffs QD   Symbicort        

   



     160-4.5 160-4.5                          }         160-4.5           



     MCG/ACT MCG/ACT                                    MCG/ACT           

 

     Furosemide Furosemide           No             1{table BID  Furosemide     

      



     40 MG 40 MG                          t}        40 MG           

 

     Furosemide Furosemide           No             1{table BID  Furosemide     

      



     40 MG 40 MG                          t}        40 MG           

 

     Folic Acid Folic Acid           No             1{table QD   Folic Acid     

      



     1 MG 1 MG                          t}        1 MG           

 

     Tresiba Tresiba           No                       Tresiba           



     FlexTouch FlexTouch                                    FlexTouch           



     200 UNIT/ UNIT/ML                                    200            



                                                  UNIT/ML           

 

     Spiriva Spiriva           No                       Spiriva           



     HandiHaler HandiHaler                                    HandiHaler        

   

 

     Ipratropium Ipratropium           No             2.5{ml} TID  Ipratropiu   

        



     Bromide Bromide                                    m Bromide           



     0.02 % 0.02 %                                    0.02 %           

 

     Spironolact Spironolact           No             1{table BID  Spironolac   

        



     one 100 MG one 100 MG                          t}        tone 100          

 



                                                  MG             

 

     Tresiba Tresiba           No                  QD   Tresiba           



     FlexTouch FlexTouch                                    FlexTouch           



     200 UNIT/ UNIT/ML                                    200            



                                                  UNIT/ML           

 

     Constulose Constulose           No             30{ml} BID  Constulose      

     



     10 GM/15ML 10 GM/15ML                                    10 GM/15ML        

   

 

     predniSONE predniSONE           No             1{table QD   predniSONE     

      



     10 MG 10 MG                          t}        10 MG           

 

     ProAir HFA ProAir HFA           No             1{puff_ 6xD  ProAir HFA     

      



     108 (90 108 (90                          as_need      108 (90           



     Base) Base)                          ed}       Base)           



     MCG/ACT MCG/ACT                                    MCG/ACT           

 

     [place]           No             1{table QD   [place]           



     Thyroid 60 Thyroid 60                          t_on_an      Thyroid 60     

      



     MG   MG                            _empty_      MG             



                                        stomach                     



                                        }                        

 

     HYDROcodone HYDROcodone           No             1{table QID  HYDROcodon   

        



     -Acetaminop -Acetaminop                          t_as_ne      e-Acetamin   

        



     hen 5-325 hen 5-325                          eded}      ophen           



     MG   MG                                      5-325 MG           

 

     Constulose Constulose           No             30{ml} BID  Constulose      

     



     10 GM/15ML 10 GM/15ML                                    10 GM/15ML        

   

 

     Tresiba Tresiba           No                       Tresiba           



     FlexTouch FlexTouch                                    FlexTouch           



     200 UNIT/ UNIT/ML                                    200            



                                                  UNIT/ML           

 

     Constulose Constulose      - No             30{ml} BID  Constulose     

      



     10 GM/15ML 10 GM/15ML      08-21                          10 GM/15ML       

    



                    00:00                                         



                    :00                                          

 

     Constulose Constulose      - No             30{ml} BID  Constulose     

      



     10 GM/15ML 10 GM/15ML      08-21                          10 GM/15ML       

    



                    00:00                                         



                    :00                                          

 

     Constulose Constulose      - No             30{ml} BID  Constulose     

      



     10 GM/15ML 10 GM/15ML                                10 GM/15ML       

    



                    00:00                                         



                    :00                                          

 

     Constulose Constulose      2- No             30{ml} BID  Constulose     

      



     10 GM/15ML 10 GM/15ML      -                          10 GM/15ML       

    



                    00:00                                         



                    :00                                          

 

     Constulose Constulose      2- No             30{ml} BID  Constulose     

      



     10 GM/15ML 10 GM/15ML                                10 GM/15ML       

    



                    00:00                                         



                    :00                                          

 

     Constulose Constulose      2- No             30{ml} BID  Constulose     

      



     10 GM/15ML 10 GM/15ML                                10 GM/15ML       

    



                    00:00                                         



                    :00                                          







Vital Signs







             Vital Name   Observation Time Observation Value Comments     Source

 

             height       2022-10-26 14:10:00 76 [in_i]                 [facility]

 

             weight       2022-10-26 14:10:00 231.9 [lb_av]              [facility]

 

             temperature  2022-10-26 14:10:00 97.6 [degF]               [facility]

 

             bmi          2022-10-26 14:10:00 28.22 kg/m2               [facility]

 

             oximetry     2022-10-26 14:10:00 94 %                      [facility]

 

             respiratory rate 2022-10-26 14:10:00 16 /min                   Comm

on [facility]

 

             blood pressure 2022-10-26 14:10:00 143 mm[Hg]                [facility] -



             systolic                                            [facility]

 

             blood pressure 2022-10-26 14:10:00 73 mm[Hg]                 [facility] -



             diastolic                                           [facility]

 

             height       2022 09:40:00 76 [in_i]                 [facility]

 

             weight       2022 09:40:00 222.8 [lb_av]              [facility]

 

             temperature  2022 09:40:00 98.2 [degF]               [facility]

 

             bmi          2022 09:40:00 27.12 kg/m2               [facility]

 

             oximetry     2022 09:40:00 96 %                      [facility]

 

             respiratory rate 2022 09:40:00 17 /min                   Comm

on [facility]

 

             blood pressure 2022 09:40:00 140 mm[Hg]                [facility]

 [facility] -



             systolic                                            [facility]

 

             blood pressure 2022 09:40:00 78 mm[Hg]                 [facility]

 [facility] -



             diastolic                                           [facility]

 

             height       2022 14:40:00 76 [in_i]                 [facility]

 

             weight       2022 14:40:00 208 [lb_av]               [facility]

 

             temperature  2022 14:40:00 98.6 [degF]               [facility]

 

             bmi          2022 14:40:00 25.32 kg/m2               [facility]

 

             oximetry     2022 14:40:00 96 %                      [facility]

 

             respiratory rate 2022 14:40:00 16 /min                   Comm

on [facility]

 

             blood pressure 2022 14:40:00 134 mm[Hg]                [facility] -



             systolic                                            [facility]

 

             blood pressure 2022 14:40:00 70 mm[Hg]                 [facility]

 [facility] -



             diastolic                                           [facility]

 

             height       2022 10:30:00 76 [in_i]                 [facility]

[facility]

 

             weight       2022 10:30:00 201.1 [lb_av]              [facility]

 

             temperature  2022 10:30:00 98.0 [degF]               [facility]

 

             bmi          2022 10:30:00 24.48 kg/m2               [facility]

 

             oximetry     2022 10:30:00 90 %                      [facility]

 

             respiratory rate 2022 10:30:00 16 /min                   [facility]

[facility]



                                                                 [facility]

 

             blood pressure 2022 10:30:00 129 mm[Hg]                [facility]

 [facility] -



             systolic                                            [facility]

 

             blood pressure 2022 10:30:00 69 mm[Hg]                 [facility]

 [facility] -



             diastolic                                           [facility]

 

             height       2022 14:20:00 76 [in_i]                 [facility]

[facility] -



                                                                 [facility]

 

             weight       2022 14:20:00 225 [lb_av]               [facility]

[facility] -



                                                                 [facility]

 

             temperature  2022 14:20:00 97 [degF]                 [facility]

[facility] -



                                                                 [facility]

 

             bmi          2022 14:20:00 27.38 kg/m2               [facility]

[facility] -



                                                                 [facility]

 

             blood pressure 2022 14:20:00 129 mm[Hg]                [facility]

 [facility] -



             systolic                                            [facility]

 

             blood pressure 2022 14:20:00 78 mm[Hg]                 [facility]

 [facility] -



             diastolic                                           [facility]

 

             height       2022 14:10:00 76 [in_i]                 [facility]

[facility]

 

             weight       2022 14:10:00 230.2 [lb_av]              [facility] 

[facility] -



                                                                 [facility]

 

             temperature  2022 14:10:00 98.0 [degF]               [facility]

[facility]

 

             bmi          2022 14:10:00 28.02 kg/m2               [facility]

[facility]

 

             oximetry     2022 14:10:00 97 %                      [facility]

 

             respiratory rate 2022 14:10:00 18 /min                   [facility]

on [facility]

 

             blood pressure 2022 14:10:00 132 mm[Hg]                [facility]

 [facility] -



             systolic                                            [facility]

 

             blood pressure 2022 14:10:00 79 mm[Hg]                 [facility]

 [facility] -



             diastolic                                           [facility]

 

             HEIGHT       2022 08:26:00 188 cm                    

 

             WEIGHT       2022 08:26:00 96.843 kg                 

 

             HEIGHT       2022 08:26:00 188 cm                    

 

             WEIGHT       2022 08:26:00 96.843 kg                 

 

             HEIGHT       2022 08:26:00 188 cm                    

 

             WEIGHT       2022 08:26:00 96.843 kg                 

 

             HEIGHT       2022 08:10:00 188 cm                    

 

             WEIGHT       2022 08:10:00 96.752 kg                 

 

             HEIGHT       2022 13:44:00 188 cm                    

 

             WEIGHT       2022 13:44:00 92.987 kg                 

 

             HEIGHT       2022 08:10:00 188 cm                    

 

             WEIGHT       2022 08:10:00 96.752 kg                 

 

             HEIGHT       2022 13:44:00 188 cm                    

 

             WEIGHT       2022 13:44:00 92.987 kg                 

 

             HEIGHT       2022 08:10:00 188 cm                    

 

             WEIGHT       2022 08:10:00 96.752 kg                 

 

             HEIGHT       2022 13:44:00 188 cm                    

 

             WEIGHT       2022 13:44:00 92.987 kg                 

 

             Systolic blood 2022 18:17:00 146 mm[Hg]                [facility]



             pressure                                            Medicine

 

             Diastolic blood 2022 18:17:00 75 mm[Hg]                 [facility]



             pressure                                            Medicine

 

             Heart rate   2022 18:17:00 95 /min                   [facility]

 

             Body temperature 2022 18:17:00 37.06 Renée                 [facility]

 

             Body height  2022 18:17:00 188 cm                    [facility]

 

             Body weight  2022 18:17:00 101.969 kg                [facility]

 

             BMI          2022 18:17:00 28.86 kg/m2               [facility]

 

             height       2022 15:50:00 76 [in_i]                 [facility]

[facility]

 

             weight       2022 15:50:00 227.8 [lb_av]              [facility] 

[facility] -



                                                                 [facility]

 

             temperature  2022 15:50:00 98.6 [degF]               [facility]

[facility] -



                                                                 [facility]

 

             bmi          2022 15:50:00 27.73 kg/m2               [facility]

[facility]

 

             oximetry     2022 15:50:00 95 %                      [facility]

[facility]

 

             respiratory rate 2022 15:50:00 16 /min                   Comm

on [facility]

 

             blood pressure 2022 15:50:00 134 mm[Hg]                [facility]

 [facility]



             systolic                                            [facility]

 

             blood pressure 2022 15:50:00 78 mm[Hg]                 [facility]

 [facility] -



             diastolic                                           [facility]

 

             height       2022 15:30:00 76 [in_i]                 [facility]

[facility]

 

             weight       2022 15:30:00 227.8 [lb_av]              [facility]

 

             temperature  2022 15:30:00 99.7 [degF]               [facility]

[facility]

 

             bmi          2022 15:30:00 27.73 kg/m2               [facility]

[facility]

 

             oximetry     2022 15:30:00 95 %                      [facility]

[facility]

 

             blood pressure 2022 15:30:00 134 mm[Hg]                [facility]

 [facility] -



             systolic                                            [facility]

 

             blood pressure 2022 15:30:00 78 mm[Hg]                 [facility]

 [facility] -



             diastolic                                           [facility]

 

             height       2021-10-06 08:50:00 76 [in_i]                 [facility]

[facility]

 

             weight       2021-10-06 08:50:00 224.1 [lb_av]              [facility]

 

             temperature  2021-10-06 08:50:00 97.3 [degF]               [facility]

[facility]

 

             bmi          2021-10-06 08:50:00 27.28 kg/m2               [facility]

[facility]

 

             oximetry     2021-10-06 08:50:00 96 %                      [facility]

[facility]

 

             respiratory rate 2021-10-06 08:50:00 18 /min                   [facility]

[facility] -



                                                                 [facility]

 

             blood pressure 2021-10-06 08:50:00 130 mm[Hg]                [facility]

 [facility] -



             systolic                                            [facility]

 

             blood pressure 2021-10-06 08:50:00 72 mm[Hg]                 [facility]

 [facility] -



             diastolic                                           [facility]

 

             Systolic blood 2019-10-17 15:21:00 115 mm[Hg]                [facility]                                            Med[facility]

 

             Diastolic blood 2019-10-17 15:21:00 67 mm[Hg]                 [facility]                                            Medicine

 

             Heart rate   2019-10-17 15:21:00 96 /min                   [facility]

 

             Body temperature 2019-10-17 15:21:00 36.28 Renée                 [facility]

 

             Body height  2019-10-17 15:21:00 188 cm                    [facility]

 

             Body weight  2019-10-17 15:21:00 102.059 kg                [facility]

 

             BMI          2019-10-17 15:21:00 28.89 kg/m2               [facility]

 

             Systolic blood 2019-10-17 15:21:00 115 mm[Hg]                [facility]                                            Medicine

 

             Diastolic blood 2019-10-17 15:21:00 67 mm[Hg]                 [facility]                                            Medicine

 

             Heart rate   2019-10-17 15:21:00 96 /min                   [facility]

 

             Body temperature 2019-10-17 15:21:00 36.28 Renée                 [facility]

 

             Body height  2019-10-17 15:21:00 188 cm                    [facility]

 

             Body weight  2019-10-17 15:21:00 102.059 kg                [facility]

 

             BMI          2019-10-17 15:21:00 28.89 kg/m2               [facility]

 

             Systolic blood 2022 11:09:00 136 mm[Hg]                [facility]

 

             Diastolic blood 2022 11:09:00 69 mm[Hg]                 [facility]

[facility]

 

             Heart rate   2022 11:09:00 98 /min                   [facility]

 

             Body temperature 2022 11:09:00 36.67 Renée                 [facility]

 

             Respiratory rate 2022 11:09:00 18 /min                   [facility]

 

             Oxygen saturation in 2022 11:09:00 96 /min                   

[facility]



             Arterial blood by                                        Medical Ce

nter



             Pulse oximetry                                        

 

             Body height  2022 08:26:00 188 cm                    [facility]

 

             Body weight  2022 08:26:00 96.843 kg                 [facility]

 

             BMI          2022 08:26:00 27.41 kg/m2               [facility]







Procedures







                Procedure       Date / Time     Performing Clinician Source



                                Performed                       

 

                B-TYPE NATRIURETIC FACTOR 2022 13:52:00                 [facility]



                (BNP)                                           [facility]

 

                POCT-GLUCOSE METER 2022 10:43:00 Donta [facility]

 

                REPORT OF PROCEDURE - 2022 10:33:46 [facility]



                ENDOSCOPY [facility]

 

                TISSUE EXAM     2022 10:01:00 Donta [facility]

 

                COLONOSCOPY     2022 09:22:00 Donta [facility]

 

                COLONOSCOPY, WITH 2022 09:22:00 Donta [facility]



                POLYPECTOMY                                     [facility]

 

                POCT-GLUCOSE METER 2022 08:51:00 Donta [facility]

 

                SARS-COV2/RT-PCR ([facility] & 2022 06:52:31 Donta [facility]



                REF LABS)                                       [facility]

 

                REPORT OF PROCEDURE - 2022 09:44:13 [facility]

 

                REPORT OF PROCEDURE - 2022 09:43:23 [facility]

 

                COLONOSCOPY     2022 08:47:00 [facility]

 

                EGD             2022 08:47:00 Harpreet, [facility]



                (ESOPHAGOGASTRODUODENOSCO                                 Medica

[facility]



                PY)                                             

 

                POCT-GLUCOSE METER 2022 08:11:00 Katrina Mullins   OFELIA [facility]

 

                COMPREHENSIVE METABOLIC 2022 20:06:00 Ingrid Alcazar [facility]

 

                CEA             2022 20:06:00 Ingrid Alcazar [facility]

 

                CBC W/AUTO DIFF WITH 2022 20:06:00 Ingrid Alcazar [facility]

 

                AFP TUMOR MARKER 2022 20:06:00 Ingrid Alcazar [facility]

 

                CANCER ANTIGEN 19-9 2022 20:06:00 Inge Ingrid [facility]







Plan of Care







             Planned Activity Planned Date Details      Comments     Source

 

             Future Scheduled 2032   Screening for malignant              

[facility]



             Test         00:00:00     neoplasm of colon              Medical Ce

nter



                                       (procedure) [code =              



                                       836345714]                

 

             Future Scheduled 2032   Screening for malignant              

[facility]



             Test         00:00:00     neoplasm of colon              Medical Ce

nter



                                       (procedure) [code =              



                                       194651561]                

 

             Future Scheduled 2032   Screening for malignant              

[facility]



             Test         00:00:00     neoplasm of colon              Medical Ce

nter



                                       (procedure) [code =              



                                       [id number]]                

 

             Future Scheduled 2032   Screening for malignant              

[facility]



             Test         00:00:00     neoplasm of colon              Medical Ce

nter



                                       (procedure) [code =              



                                       [id number]]                

 

             Future Scheduled 2032   Screening for malignant              

[facility]



             Test         00:00:00     neoplasm of colon              Medical Ce

nter



                                       (procedure) [code =              



                                       [id number]]                

 

             Future Scheduled 2032   Screening for malignant              

[facility]



             Test         00:00:00     neoplasm of colon              Medical Ce

nter



                                       (procedure) [code =              



                                       [id number]]                

 

             Future Scheduled 2032   Screening for malignant              

[facility]



             Test         00:00:00     neoplasm of colon              Medical Ce

nter



                                       (procedure) [code =              



                                       [id number]]                

 

             Future Scheduled 2032   Screening for malignant              

[facility]



             Test         00:00:00     neoplasm of colon              Medical Ce

nter



                                       (procedure) [code =              



                                       [id number]]                

 

             Future Scheduled 2032   Screening for malignant              

[facility]



             Test         00:00:00     neoplasm of colon              Medical Ce

nter



                                       (procedure) [code =              



                                       [id number]]                

 

             Future Scheduled 2032   Screening for malignant              

[facility]



             Test         00:00:00     neoplasm of colon              Medical Ce

nter



                                       (procedure) [code =              



                                       [id number]]                

 

             Future Scheduled 2023   Tobacco Cessation              [facility]



             Test         00:00:00     Counseling and              [facility] Cent[facility]

r



                                       Screening (12+) [code =              



                                       Tobacco Cessation              



                                       Counseling and              



                                       Screening (12+)]              

 

             Future Scheduled 2023   Tobacco Cessation              [facility]



             Test         00:00:00     Counseling and              Medical Cente

r



                                       Screening (12+) [code =              



                                       Tobacco Cessation              



                                       Counseling and              



                                       Screening (12+)]              

 

             Future Scheduled 2023   Tobacco Cessation              [facility]



             Test         00:00:00     Counseling and              Medical Cente

r



                                       Screening (12+) [code =              



                                       Tobacco Cessation              



                                       Counseling and              



                                       Screening (12+)]              

 

             Future Scheduled 2023   DEPRESSION SCREENING              [facility]



             Test         00:00:00     (12+) [code =              [facility]



                                       DEPRESSION SCREENING              



                                       (12+)]                    

 

             Future Scheduled 2022   INFLUENZA VACCINE (#1)              C

[facility]



             Test         00:00:00     [code = INFLUENZA              Medical Ce

nter



                                       VACCINE (#1)]              

 

             Future Scheduled 2022   INFLUENZA VACCINE (#1)              [facility]

[facility]



             Test         00:00:00     [code = INFLUENZA              Medical Ce

nter



                                       VACCINE (#1)]              

 

             Future Scheduled 2022   INFLUENZA VACCINE (#1)              [facility]

[facility]



             Test         00:00:00     [code = INFLUENZA              Medical Ce

nter



                                       VACCINE (#1)]              

 

             Future Scheduled 2022   INFLUENZA VACCINE (#1)              [facility]

[facility]



             Test         00:00:00     [code = INFLUENZA              Medical Ce

nter



                                       VACCINE (#1)]              

 

             Future Scheduled 2022   INFLUENZA VACCINE (#1)              C

[facility]



             Test         00:00:00     [code = INFLUENZA              Medical Ce

nter



                                       VACCINE (#1)]              

 

             Future Scheduled 2022   Screening for malignant              

[facility]



             Test         11:27:55     neoplasm of colon              of Medicin

e



                                       (procedure) [code =              



                                       092469465]                

 

             Future Scheduled 2022   TETANUS SHOT (ADULT)              [facility]



             Test         11:27:55     [code = TETANUS SHOT              of Medi

cine



                                       (ADULT)]                  

 

             Future Scheduled 2022   BMI FOLLOW UP PLAN              [facility]



             Test         11:27:55     [code = BMI FOLLOW UP              of Med

icine



                                       PLAN]                     

 

             Future Scheduled 2022   Hepatitis C screening              Ba

[facility]



             Test         11:27:55     (procedure) [code =              of Medic

ine



                                       799761426]                

 

             Future Scheduled 2022   Human immunodeficiency              B

[facility]



             Test         11:27:55     virus screening              of Medicine



                                       (procedure) [code =              



                                       404788120]                

 

             Future Scheduled 2022   Screening for malignant              

[facility]



             Test         11:27:55     neoplasm of lung              of Medicine



                                       (procedure) [code =              



                                       712955091]                

 

             Future Scheduled 2022   ZOSTER VACCINE (1 of 2)              

[facility]



             Test         11:27:55     [code = ZOSTER VACCINE              of Me

dicine



                                       (1 of 2)]                 

 

             Future Scheduled 2022   COVID-19 Vaccine (2 -              Ba

[facility]



             Test         11:27:55     Booster for Navdeep              of Medic

ine



                                       series) [code =              



                                       COVID-19 Vaccine (2 -              



                                       Booster for Navdeep              



                                       series)]                  

 

             Future Scheduled 2022   FLU VACCINE > 6 MONTHS              B

[facility]



             Test         11:27:55     [code = FLU VACCINE > 6              of M

edicine



                                       MONTHS]                   

 

             Future Scheduled 2022   IR PORT PLACEMENT EQUAL 1 Occurrences

 [facility]



             Test         14:40:38     OR > 5 YEARS [code = starting     of Envia LÃ¡

cine



                                       568[address]]       2022 until 



                                                    2023   

 

             Future Scheduled 2022   CT CHEST ABDOMEN PELVIS 1 Occurrences

 [facility]



             Test         14:19:01     W CONTRAST [code = starting     of Medici

ne



                                       35370-7]     2022 until 



                                                    2023   

 

             Future Scheduled 2022   WILD 1 [code = NOCPT] Ordered:     [facility]

[employer]



             Test         14:18:00                  2022   of Medicine

 

             Future Scheduled 2022   TEMPUS XF LIQUID BIOPSY Ordered:     

[facility]



             Test         14:17:42     NGS [code = 67042820] 2022   of Med

icine

 

             Future Scheduled 2022   TEMPUS XT TISSUE NGS Ordered:     [facility]



             Test         14:17:42     [code = 13513424] 2022   of Medicin

e

 

             Future Scheduled 2022   DEPRESSION SCREENING              [facility]



             Test         00:00:00     (12+) [code =              Medical Center



                                       DEPRESSION SCREENING              



                                       (12+)]                    

 

             Future Scheduled 2022   DEPRESSION SCREENING              [facility]



             Test         00:00:00     (12+) [code =              Medical Center



                                       DEPRESSION SCREENING              



                                       (12+)]                    

 

             Future Scheduled 2022   DEPRESSION SCREENING              [facility]



             Test         00:00:00     (12+) [code =              Medical Center



                                       DEPRESSION SCREENING              



                                       (12+)]                    

 

             Future Scheduled 2022   DEPRESSION SCREENING              [facility]



             Test         00:00:00     (12+) [code =              Medical Center



                                       DEPRESSION SCREENING              



                                       (12+)]                    

 

             Future Scheduled 2020   MEDICARE ANNUAL              [facility]



             Test         00:00:00     WELLNESS (YEAR 2 or              Medical 

Center



                                       FIRST YEAR if no IPPE)              



                                       [code = MEDICARE ANNUAL              



                                       WELLNESS (YEAR 2 or              



                                       FIRST YEAR if no IPPE)]              

 

             Future Scheduled 2020   MEDICARE ANNUAL              [facility]



             Test         00:00:00     WELLNESS (YEAR 2 or              Medical 

Center



                                       FIRST YEAR if no IPPE)              



                                       [code = MEDICARE ANNUAL              



                                       WELLNESS (YEAR 2 or              



                                       FIRST YEAR if no IPPE)]              

 

             Future Scheduled 2020   MEDICARE ANNUAL              [facility]



             Test         00:00:00     WELLNESS (YEAR 2 or              Medical 

Center



                                       FIRST YEAR if no IPPE)              



                                       [code = MEDICARE ANNUAL              



                                       WELLNESS (YEAR 2 or              



                                       FIRST YEAR if no IPPE)]              

 

             Future Scheduled 2020   MEDICARE ANNUAL              [facility]



             Test         00:00:00     WELLNESS (YEAR 2 or              Medical 

Center



                                       FIRST YEAR if no IPPE)              



                                       [code = MEDICARE ANNUAL              



                                       WELLNESS (YEAR 2 or              



                                       FIRST YEAR if no IPPE)]              

 

             Future Scheduled 2020   MEDICARE ANNUAL              [facility]



             Test         00:00:00     WELLNESS (YEAR 2 or              Medical 

Center



                                       FIRST YEAR if no IPPE)              



                                       [code = MEDICARE ANNUAL              



                                       WELLNESS (YEAR 2 or              



                                       FIRST YEAR if no IPPE)]              

 

             Future Scheduled 2009   SHINGLES VACCINES (1 of              

[facility]



             Test         00:00:00     2) [code = SHINGLES              Medical 

Center



                                       VACCINES (1 of 2)]              

 

             Future Scheduled 2009   SHINGLES VACCINES (1 of              

[facility]



             Test         00:00:00     2) [code = SHINGLES              Medical 

Center



                                       VACCINES (1 of 2)]              

 

             Future Scheduled 2009   SHINGLES VACCINES (1 of              

[facility]



             Test         00:00:00     2) [code = SHINGLES              [facility] 

Center



                                       VACCINES (1 of 2)]              

 

             Future Scheduled 2009   SHINGLES VACCINES (1 of              

[facility]



             Test         00:00:00     2) [code = SHINGLES              [facility] 

Center



                                       VACCINES (1 of 2)]              

 

             Future Scheduled 2009   SHINGLES VACCINES (1 of              

[facility]



             Test         00:00[facility]00     [facility] 2)]              

 

             Future Scheduled 1994   Lipid panel (procedure)              

[facility]



             Test         00:00:00     [code = [id number]]              Medical Ce

nter

 

             Future Scheduled 1994   Lipid panel (procedure)              

[facility]



             Test         00:00:00     [code = 66724319]              Medical Ce

nter

 

             Future Scheduled 1994   Lipid panel (procedure)              

[facility]



             Test         00:00:00     [code = 66563973]              Medical Ce

nter

 

             Future Scheduled 1994   Lipid panel (procedure)              

[facility]



             Test         00:00:00     [code = 89384730]              Medical Ce

nter

 

             Future Scheduled 1994   Lipid panel (procedure)              

[facility]



             Test         00:00:00     [code = 25568708]              Medical Ce

nter

 

             Future Scheduled 1978   DTAP/TDAP/TD VACCINES              [facility]

[facility]



             Test         00:00:00     (1 - Tdap) [code =              Medical C

enter



                                       DTAP/TDAP/TD VACCINES              



                                       (1 - Tdap)]               

 

             Future Scheduled 1978   DTAP/TDAP/TD VACCINES              [facility]

[facility]



             Test         00:00:00     (1 - Tdap) [code =              Medical C

enter



                                       DTAP/TDAP/TD VACCINES              



                                       (1 - Tdap)]               

 

             Future Scheduled 1978   DTAP/TDAP/TD VACCINES              [facility]

[facility]



             Test         00:00:00     (1 - Tdap) [code =              Medical C

enter



                                       DTAP/TDAP/TD VACCINES              



                                       (1 - Tdap)]               

 

             Future Scheduled 1978   DTAP/TDAP/TD VACCINES              [facility]

[facility]



             Test         00:00:00     (1 - Tdap) [code =              Medical C

enter



                                       DTAP/TDAP/TD VACCINES              



                                       (1 - Tdap)]               

 

             Future Scheduled 1978   DTAP/TDAP/TD VACCINES              [facility]

[facility]



             Test         00:00:00     (1 - Tdap) [code =              Medical C

enter



                                       DTAP/TDAP/TD VACCINES              



                                       (1 - Tdap)]               

 

             Future Scheduled 1977   HEPATITIS C SCREENING              [facility]

[facility]



             Test         00:00:00     [code = HEPATITIS C              Medical 

Center



                                       SCREENING]                

 

             Future Scheduled 1977   HEPATITIS C SCREENING              [facility]

[facility]



             Test         00:00:00     [code = HEPATITIS C              Medical 

Center



                                       SCREENING]                

 

             Future Scheduled 1977   HEPATITIS C SCREENING              [facility]

[facility]



             Test         00:00:00     [code = HEPATITIS C              Medical 

Center



                                       SCREENING]                

 

             Future Scheduled 1977   HEPATITIS C SCREENING              [facility]

[facility]



             Test         00:00:00     [code = HEPATITIS C              Medical 

Center



                                       SCREENING]                

 

             Future Scheduled 1977   HEPATITIS C SCREENING              [facility]

[facility]



             Test         00:00:00     [code = HEPATITIS C              Medical 

Center



                                       SCREENING]                

 

             Future Scheduled 1965   PNEUMOCOCCAL VACCINE              [facility]



             Test         00:00:00     0-64 YRS (1 - PCV)              Medical C

enter



                                       [code = PNEUMOCOCCAL              



                                       VACCINE 0-64 YRS (1 -              



                                       PCV)]                     

 

             Future Scheduled 1965   PNEUMOCOCCAL VACCINE              [facility]



             Test         00:00:00     0-64 YRS (1 - PCV)              Medical C

enter



                                       [code = PNEUMOCOCCAL              



                                       VACCINE 0-64 YRS (1 -              



                                       PCV)]                     

 

             Future Scheduled 1965   PNEUMOCOCCAL VACCINE              [facility]



             Test         00:00:00     0-64 YRS (1 - PCV)              Medical C

enter



                                       [code = PNEUMOCOCCAL              



                                       VACCINE 0-64 YRS (1 -              



                                       PCV)]                     

 

             Future Scheduled 1965   PNEUMOCOCCAL VACCINE              [facility]



             Test         00:00:00     0-64 YRS (1 - PCV)              Medical C

enter



                                       [code = PNEUMOCOCCAL              



                                       VACCINE 0-64 YRS (1 -              



                                       PCV)]                     

 

             Future Scheduled 1965   PNEUMOCOCCAL VACCINE              [facility]



             Test         00:00:00     0-64 YRS (1 - PCV)              Medical C

enter



                                       [code = PNEUMOCOCCAL              



                                       VACCINE 0-64 YRS (1 -              



                                       PCV)]                     

 

             Future Scheduled 1959   COVID-19 VACCINE (#1)              [facility]



             Test         00:00:00     [code = COVID-19              [facility] Tripp

ter



                                       VACCINE (#1)]              

 

             Future Scheduled 1959   COVID-19 VACCINE (#1)              [facility]



             Test         00:00:00     [code = COVID-19              [facility]pp

ter



                                       VACCINE (#1)]              

 

             Future Scheduled 1959   COVID-19 VACCINE (#1)              [facility]



             Test         00:00:00     [code = COVID-19              [facility] Tripp

ter



                                       VACCINE (#1)]              

 

             Future Scheduled 1959   COVID-19 VACCINE (#1)              [facility]



             Test         00:00:00     [code = COVID-19              Medical Tripp

ter



                                       VACCINE (#1)]              

 

             Future Scheduled 1959   COVID-19 VACCINE (#1)              [facility]

[facility]



             Test         00:00:00     [code = COVID-19              Medical Tripp

ter



                                       VACCINE (#1)]              

 

             Future Scheduled 1959   CT Colonography (combo)              

[facility]



             Test         00:00:00     [code = CT Colonography              Medi

[facility]



                                       (combo)]                  

 

             Future Scheduled 1959   Screening for malignant              

[facility]



             Test         00:00:00     neoplasm of colon              Medical Ce

nter



                                       (procedure) [code =              



                                       074889666]                

 

             Future Scheduled 1959   Screening for malignant              

[facility]



             Test         00:00:00     neoplasm of colon              Medical Ce

nter



                                       (procedure) [code =              



                                       944120992]                

 

             Future Scheduled 1959   Sigmoidoscopy [code =              [facility]



             Test         00:00:00     Sigmoidoscopy]              Medical Cente

r

 

             Future Scheduled 1959   CT Colonography (combo)              

[facility]



             Test         00:00:00     [code = CT Colonography              Medi

[facility]



                                       (combo)]                  

 

             Future Scheduled 1959   Screening for malignant              

[facility]



             Test         00:00:00     neoplasm of colon              Medical Ce

nter



                                       (procedure) [code =              



                                       973150907]                

 

             Future Scheduled 1959   Screening for malignant              

[facility]



             Test         00:00:00     neoplasm of colon              Medical Ce

nter



                                       (procedure) [code =              



                                       253518832]                

 

             Future Scheduled 1959   Sigmoidoscopy [code =              [facility]



             Test         00:00:00     Sigmoidoscopy]              Medical Cente

r

 

             Future Scheduled 1959   CT Colonography (combo)              

[facility]



             Test         00:00:00     [code = CT Colonography              Medi

[facility]



                                       (combo)]                  

 

             Future Scheduled 1959   Screening for malignant              

[facility]



             Test         00:00:00     neoplasm of colon              Medical Ce

nter



                                       (procedure) [code =              



                                       883659262]                

 

             Future Scheduled 1959   Screening for malignant              

[facility]



             Test         00:00:00     neoplasm of colon              Medical Ce

nter



                                       (procedure) [code =              



                                       095681250]                

 

             Future Scheduled 1959   Sigmoidoscopy [code =              [facility]



             Test         00:00:00     Sigmoidoscopy]              Medical Cente

r

 

             Future Scheduled 1959   CT Colonography (combo)              

[facility]



             Test         00:00:00     [code = CT Colonography              [facility]



                                       (combo)]                  

 

             Future Scheduled 1959   Screening for malignant              

[facility]



             Test         00:00:00     neoplasm of colon              Medical Ce

nter



                                       (procedure) [code =              



                                       283122352]                

 

             Future Scheduled 1959   Screening for malignant              

[facility]



             Test         00:00:00     neoplasm of colon              Medical Ce

nter



                                       (procedure) [code =              



                                       345313445]                

 

             Future Scheduled 1959   Sigmoidoscopy [code =              [facility]



             Test         00:00:00     Sigmoidoscopy]              Medical Cente

r

 

             Future Scheduled 1959   CT Colonography (combo)              

[facility]



             Test         00:00:00     [code = CT Colonography              [facility]



                                       (combo)]                  

 

             Future Scheduled 1959   Screening for malignant              

[facility]



             Test         00:00:00     neoplasm of colon              Medical Ce

nter



                                       (procedure) [code =              



                                       087532206]                

 

             Future Scheduled 1959   Screening for malignant              

[facility]



             Test         00:00:00     neoplasm of colon              Medical Ce

nter



                                       (procedure) [code =              



                                       674241996]                

 

             Future Scheduled 1959   Sigmoidoscopy [code =              [facility]



             Test         00:00:00     Sigmoidoscopy]              Medical Cente

r

 

             Future Scheduled              FLU VACCINE > 6 MONTHS              B

[employer]



             Test                      [code = FLU VACCINE > 6              of M

edicine



                                       MONTHS]                   

 

             Future Scheduled              COLON CANCER SCREENING:              

[facility]



             Test                      COLONOSCOPY [code =              of Medic

ine



                                       COLON CANCER SCREENING:              



                                       COLONOSCOPY]              

 

             Future Scheduled              MEDICARE AWV [code =              [facility]

[employer] [employer]



             Test                      MEDICARE AWV]              of Medicine

 

             Future Scheduled              TETANUS SHOT (ADULT)              [facility]

[employer]



             Test                      [code = TETANUS SHOT              of Medi

cine



                                       (ADULT)]                  

 

             Future Scheduled              BMI FOLLOW UP PLAN              [employer]

[facility] [employer]



             Test                      [code = BMI FOLLOW UP              of Med

icine



                                       PLAN]                     

 

             Future Scheduled              HEPATITIS C SCREENING              [facility]

[facility]



             Test                      [code = HEPATITIS C              of Medic

ine



                                       SCREENING]                

 

             Future Scheduled              HIV SCREENING [code =              Ba

[facility]



             Test                      HIV SCREENING]              of Medicine







Encounters







        Start   End     Encounter Admission Attending Care    Care    Encounter 

Source



        Date/Time Date/Time Type    Type    Clinicians Facility Department ID   

   

 

        2022-10-25         Outpatient         Longo,  STLMLC  [facility]  [id number]

 Common



        11:32:01                         Monica                     [facility]

 

        2022-10-24         Outpatient         [facility]  [id number]

 Common



        11:29:00                         Monica                     [facility]

 

        2022         Outpatient         [facility]  [id number]

 Common



        09:27:01                         Monica                     [facility]

 

        2022         Outpatient         [facility]  [id number]

 Common



        08:39:00                         Monica                     [facility]

 

        2022         Outpatient         [facility]  [id number]

 Common



        13:39:01                         Monica                     [facility]

 

        2022         [facility]  [id number]

 Common



        08:18:01                         Monica                     [facility]

 

        2022         Outpatient         SYSTEM, [facility]     [id number]

 MD



        14:48:15                         PROVIDER                         Andrew negron

 

        2022         [facility]         [facility]  [id number]

 Common



        09:46:02                         Monica                     [facility]

 

        2022         [facility]         [facility]  [id number]

 Common



        14:39:33                         Monica                     [facility]

 

        2022         Outpatient         [facility]  [id number]

 Common



        14:38:53                         Monica                     [facility]

 

        2022         Outpatient         [facility],  [facility]  [id number]

 Common



        13:10:59                         Monica                     [facility]

 

        2022         Out[facility]         [facility]  [id number]

 Common



        12:52:47                         Monica                  [id number]   [facility]

 

        2022         Outpatient         Longo,  STLMLC  STL[facility]C  [id number]

 Common



        11:32:03                         Monica                  [id number]   [facility]

 

        2022         Outpatient         Longo,  STLMLC  ST[facility]  [id number]

 Common



        11:25:36                         Monica                  [id number]   [facility]

 

        2022         Outpatient         Longo,  STLMLC  ST[facility]  [id number]

 Common



        11:16:16                         Monica                  [id number]   [facility]

 

        2022         Outpatient         Longo,  STLMLC  [facility]  [id number]

 Common



        11:07:38                         Monica                  [id number]   [facility]

 

        2022 (TEL)                   STLMLC  STLMLC  [id number] Co

mmon



        00:00:00 00:00:00                                                 [facility]

 

        2022 (TEL)                   STLMLC  STLMLC  [id number] Co

mmon



        00:00:00 00:00:00                                                 [facility]

 

        2022 (TEL)                   STLMLC  STLMLC  [id number] Co

mmon



        00:00:00 00:00:00                                                 [facility]

 

        2022-10-26 2022-10-26 OFFICE                  STLMLC  STLMLC  [id number] Co

mmon



        00:00:00 00:00:00 VISIT                                           Spirit



                        ESTAB PT                                         - [facility]



                        LEVEL 4                                         [facility]

 

        2022-10-26 2022-10-26 (TEL)                   STLMLC  STLMLC  [id number] Co

mmon



        00:00:00 00:00:00                                                 [facility]

 

        2022 (HOSP F/U)                 STLMLC  STLMLC  [id number]

 Common



        00:00:00 00:00:00 [facility]

 

        2022 (TEL)                   STLMLC  STLMLC  [id number] Co

mmon



        00:00:00 00:00:00                                                 [facility]

 

        2022 Lab                     [facility]   [id number]

[address] [facility]



        00:00:00 00:00:00 [facility]

 

        2022 [facility]   [id number]

[address] [facility]



        00:00:00 00:00:00 [facility]

 

        2022 OFFICE                  STLMLC  STLMLC  [id number] Co

mmon



        00:00:00 00:00:00 VISIT                                           [facility]irit



                        ESTAB PT                                         - [facility]



                        LEVEL 4                                         [facility]

 

        2022 OFFICE                  STLMLC  STLMLC  [id number] Co

mmon



        00:00:00 00:00:00 VISIT EST                                         Spir

it



                        PT LEVEL 3                                         - [facility]



                                                                        [facility]

 

        2022 (TEL)                   STLMLC  STLMLC  [id number] Co

mmon



        00:00:00 00:00:00                                                 [facility]

 

        2022-06-15 2022-06-15 (TEL)                   STLMLC  STLMLC  [id number] Co

mmon



        00:00:00 00:00:00                                                 [facility]

 

        2022 (TEL)                   STLMLC  STLMLC  [id number] Co

mmon



        00:00:00 00:00:00                                                 [facility]

 

        2022 (TEL)                   STLMLC  STLMLC  [id number] Co

mmon



        00:00:00 00:00:00                                                 [facility]

 

        2022 (EST.                   STLMLC  STLMLC  [id number] Co

mmon



        00:00:00 00:00:00 VIDEO) EST                                         Spi

rit



                        VIRTUAL                                         - [facility]



                        VIDEO                                           [facility]

 

        2022 (TEL)                   STLMLC  STLMLC  [id number] Co

mmon



        00:00:00 00:00:00                                                 [facility]

 

        2022 (TEL)                   STLMLC  STLMLC  [id number] Co

mmon



        00:00:00 00:00:00                                                 [facility]

 

        2022 OFFICE                  STLMLC  STLMLC  [id number] Co

mmon



        00:00:00 00:00:00 VISIT                                           [facility]



                        ESTAB PT                                         - [facility]



                        LEVEL 4                                         [facility]

 

        20[facility]                   [facility]  [id number] Co

mmon



        00:00:00 00:00:00                                                 [facility]



                                                                        [facility]



                                                                        [facility]

 

        2022 Outpatient         LORENZO LEDEMSA    MED     750

[id number]    LORENZO



        13:34:00 23:59:00                 ROCHELLE                           

 

        2022 Outpatient         PADMINI LONGO     [facility]     [id number]

[id number] [facility]



        09:01:46 09:03:16                 [facility]                         Zulema villegas



                                                                        of



                                                                        Medicin



                                                                        e

 

        2022 [facility]   [id number]

[address] [facility]



        05:57:00 11:20:00 Encounter         [facility]

 

        2022 Hospital [facility]   [id number]

[address] [facility]



        05:57:00 11:20:00 Encounter         [facility]

 

        2022 Outpatient EL      LONGO,  [facility]    Surgery [id number]

[id number] [facility]



        05:57:00 11:20:00                 [facility]                         

 

        2022 Anesthesia         Yoshi, [facility]   [id number]

[id number] [facility]



        09:27:00 10:40:00 Event           [facility]

 

        2022 Anesthesia         Yoshi, [facility]   [id number]

[id number] [facility]



        09:27:00 10:40:00 Event           [facility]

 

        2022 Surgery         Longo,  [facility]   [id number]

[address] [facility]



        09:41:00 10:11:00                 [facility]

 

        2022 Surgery         Longo,  [facility]   [id number]

[address] [facility]



        09:41:00 10:11:00                 [facility]

 

        2022 Travel                  [facility]   [id number]

 [facility]



        00:00:00 00:00:00                                                 [facility]

 

        2022 Travel                  [facility]   [id number]

 [facility]



        00:00:00 00:00:00                                                 [facility]

 

        2022 Outpatient                 BCM     [facility]     [id number]

[id number] [facility]



        06:06:00 23:59:00                                                 Colleg

e



                                                                        of



                                                                        Medicin



                                                                        e

 

        2022 Outpatient [facility]    Surgery [id number]

[id number] [facility]



        06:06:00 11:15:00                 [facility]                           

 

        2022 [facility]   [id number]

[address] [facility]



        06:06:00 11:15:00 Encounter         [facility]

 

        2022 [facility]   [id number]

[address] [facility]



        06:06:00 11:15:00 Encounter         [facility]

 

        2022 [facility]         [facility]     [id number]

[id number] [facility]



        10:19:37 10:19:37                 KATRINA                           Colleg

e



                                                                        of



                                                                        Medicin



                                                                        e

 

        2022 Outpatient         PADMINI MULLINS     [facility]     [id number]

[id number] [facility]



        10:17:26 10:17:26                 KATRINA                           Colleg

e



                                                                        of



                                                                        Medicin



                                                                        e

 

        2022 Anesthesia         Phillcarroll [facility]   [id number]

[id number] [facility]



        08:52:00 09:46:00 Event           [facility]

 

        2022 Anesthesia         Merritt [facility]   [id number]

[id number] [facility]



        08:52:00 09:46:00 Event           [facility]

 

        2022 Surgery         [facility]   [id number]

[address] [facility]



        08:45:00 09:30:00                 [facility]

 

        2022 Surgery         Harpreet, [facility]   [id number]

[address] [facility]



        08:45:00 09:30:00                 [facility]

 

        2022 Travel                  [facility]   [id number]

 [facility]



        00:00:00 00:00:00                                                 [facility]

 

        2022 Travel                  [facility]   [id number]

 [facility]



        00:00:00 00:00:00                                                 [facility]

 

        2022 Outpatient EL              [facility]    [id number]

[id number] [facility]



        14:20:19 23:59:00                                                 

 

        2022 [facility]   [id number]

[address] [facility]



        13:00:00 23:59:00 [facility]

 

        2022 [facility]   [id number]

[address] [facility]



        13:00:00 23:59:00 [facility]

 

        2022 Travel                  [facility]   [id number]

 [facility]



        00:00:00 00:00:00                                                 [facility]

 

        2022 Travel                  [facility]   [id number]

 [facility]



        00:00:00 00:00:00                                                 [facility]

 

        2022 Office          INGE [facility]   [id number]

[id number] [facility]



        11:37:02 15:01:03 Visit           INGRID Tavares  350.1.13.21         Co

llege



                                                        0.2.7.2.686         of



                                                        [phone]         [facility]

eulogio



                                                        504             e

 

        2022 (TEL)                   STLMLC  STLMLC  [id number] Co

mmon



        00:00:00 00:00:00                                                 [facility]

 

        2022 (TEL)                   STLMLC  STLC  [id number] Co

mmon



        00:00:00 00:00:00                                                 [facility]

 

        2022 Travel                  1.2.840.1 [id number] [id number]

[id number] [facility]



        00:00:00 00:00:00                         29054.1.1 350.1.13.41         

ity of



                                                3.412.2.7 2.2.7.3.698         Te

xas



                                                .[id number] 084.8           MD



                                                .8                      [facility]

 

        2022 (TEL)                   STLMLC  STLMLC  [id number] Co

mmon



        00:00:00 00:00:00                                                 [facility]



                                                                         [facility]



                                                                        [facility]

 

        2022 (TEL)                   STLMLC  STLMLC  [id number] Co

mmon



        00:00:00 00:00:00                                                 [facility]

 

        2022 Travel                  1.2.840.1 1.2.404.516 [id number]

[id number] Univers



        00:00:00 00:00:00                         55241.1.1 350.1.13.41         

ity of



                                                3.412.2.7 2.2.7.3.698         Te

xas



                                                .[id number] 084.8           MD



                                                .8                      [facility]

 

        2022 (TEL)                   STLMLC  STLMLC  [id number] Co

mmon



        00:00:00 00:00:00                                                 [facility]



                                                                        - [facility]



                                                                        [facility]

 

        2022 OFFICE                  STLMLC  STLMLC  [id number] Co

mmon



        00:00:00 00:00:00 VISIT                                           [facility]



                        ESTAB PT                                         - [facility]



                        LEVEL 4                                         [facility]

 

        2022 SUB ANNUAL                 STLMLC  STLMLC  [id number]

 Common



        00:00:00 00:00:00 MCR                                             [facility]



                        WELLNESS                                         - [facility]



                        VISIT                                           [facility]

 

        2021 (TEL)                   STLMLC  STLMLC  [id number] Co

mmon



        00:00:00 00:00:00                                                 [facility]



                                                                        - [facility]



                                                                        [facility]

 

        2021 (TEL)                   STLMLC  STLMLC  [id number] Co

mmon



        00:00:00 00:00:00                                                 [facility]

 

        2021-10-06 2021-10-06 OFFICE                  STLMLC  STLMLC  [id number] Co

mmon



        00:00:00 00:00:00 VISIT                                           [facility] 4                                         [facility]

 

        2021 Outpatient                 STLMLC  STLMLC  [id number]

 Common



        00:00:00 00:00:00                                                 [facility]

 

        2021 Outpatient                 STLMLC  STLMLC  [id number]

 Common



        00:00:00 00:00:00                                                 [facility]

 

        2021 Outpatient                 STLMLC  STLMLC  [id number]

 Common



        00:00:00 00:00:00                                                 [facility]

 

        2021 Outpatient                 STLMLC  STLMLC  [id number]

 Common



        00:00:00 00:00:00                                                 [facility]

 

        2021 Outpatient                 STLMLC  STLMLC  [id number]

 Common



        00:00:00 00:00:00                                                 [facility]

 

        2021 Outpatient                 STLMLC  STLMLC  [id number]

 Common



        00:00:00 00:00:00                                                 [facility]

 

        2021 Outpatient                 STLMLC  STLMLC  [id number]

 Common



        00:00:00 00:00:00                                                 [facility]

 

        2021-01-15 2021-01-15 Outpatient                 STLMLC  STLMLC  [id number]

 Common



        00:00:00 00:00:00                                                 [facility]

 

        2021 Outpatient                 STLMLC  STLMLC  [id number]

 Common



        00:00:00 00:00:00                                                 [facility]

 

        2020-10-21 2020-10-21 Outpatient                 STLMLC  STLMLC  [id number]

 Common



        00:00:00 00:00:00                                                 [facility]

 

        2020-10-19 2020-10-19 Outpatient                 STLMLC  STLMLC  [id number]

 Common



        00:00:00 00:00:00                                                 [facility]

 

        2020-10-08 2020-10-08 Outpatient                 STLMLC  STLMLC  [id number]

 Common



        00:00:00 00:00:00                                                 [facility]

 

        2020 Outpatient                 [facility] [address]

[id number] Common



        13:15:00 13:15:00                         [place]   [address]         Spir

it



                                                Drive   [facility]

 

        2020 Outpatient                 [facility] [address]

[id number] Common



        13:45:00 13:45:00                         [place]   [address]         Spir

it



                                                Drive   [facility]

 

        2020 Outpatient                 [facility] [address]

[id number] Common



        08:33:00 08:33:00                         [place]   [address]         Spir

it



                                                Drive   [facility]

 

        2020-07-15 2020-07-15 Outpatient                 [facility] [address]

[id number] Common



        15:30:00 15:30:00                         [place]   [address]         Spir

it



                                                Drive   [facility]

 

        2020 Outpatient                 [facility] [address]

[id number] Common



        15:00:00 15:00:00                         [place]   [address]         Spir

it



                                                Drive   [facility]

 

        2020 Outpatient                 [facility] [address]

[id number] Common



        15:00:00 15:00:00                         [place]   [address]         Spir

it



                                                Drive   [facility]

 

        2020 Outpatient                 [facility] [address]

[id number] Common



        10:00:00 10:00:00                         [place]   [address]         Spir

it



                                                Drive   [facility]

 

        2020 Outpatient                 [facility] [address]

[id number] Common



        13:17:00 13:17:00                         [place]   [address]         Spir

it



                                                Drive   [facility]

 

        2020 Outpatient                 [facility] [address]

[id number] Common



        11:00:00 11:00:00                         t [place]   [address]         Spir

it



                                                Drive   [facility]

 

        2019 Emergency [facility]    ERT     [id number]

[id number] [facility]



        07:09:33 11:41:00                 , JACEK ontiveros [facility]

 

        2019-10-17 2019-10-17 Office          PADMINI Weston     [id number] [id number]

598 



        09:26:46 14:06:09 Visit           Alex ANGUIANO AMBULATOR 350.1.13.21        

 



                                                Y       0.2.7.2.686         



                                                        [phone]         



                                                        840             

 

        2019-10-17 2019-10-17 Office          PADMINI Weston     1.2.840.114 [id number]

598 [facility]



        09:26:46 14:06:09 Visit           Alex ANGUIANO AMBULATOR 350.1.13.21        

 College



                                                Y       0.2.7.2.686         of



                                                        [phone]         [facility]



                                                        840             e







Results







           Test Description Test Time  Test Comments Results    Result Comments 

Source









                    B-TYPE NATRIURETIC FACTOR (BNP) 2022 23:54:52 









                      Test Item  Value      Reference Range Interpretation Comme

nts









             B-TYPE NATRIURETIC PEPTIDE (BEAKER) (test code = 700) 48 pg/mL     

0-100                     



 ID - [id number] [id number] 08:01:38





             Test Item    Value        Reference Range Interpretation Comments

 

             Case Report (test code Surgical Pathology                          

 



             = 104)       Report Case: [id number]                           



                          Authorizing Provider:                           



                          Elayne Longo MD Collected:                           



                          2022 10:01 AM                           



                          Ordering Location:                           



                          [facility]                           



                          Received: 2022                           



                          11:57 AM Services                           



                          Pathologist:                           



                          Homer Thomas MD                           



                          Specimens: A) - Large                           



                          Intestine, Colon -                           



                          Transverse, Bx mass B)                           



                          - Polyp, Colon -                           



                          Left/Descending, taken                           



                          by hot snare C) -                           



                          Polyp, Colon -                           



                          Left/Descending, x3                           



                          taken by hot snare D)                           



                          - Polyp, Colon -                           



                          Sigmoid, x5 taken by                           



                          hot snare                              

 

             DIAGNOSIS (test code = [id number]                      

     



             3220)        FuZzEwMzNcZnRuYmpcdWMx                           



                          IHtccnRmMVxlcGljOTYwMV                           



                          rtbbPmLYRpjHKwU8Lbitab                           



                          JMwjUF9mAP8eaSogfONcdV                           



                          PbLJIfDwJkm7krl845oVXu                           



                          u7haWUHSejboqFo0bMrzM4                           



                          5ml3A7RjvbV86nbCWzSDX5                           



                          ZQRpEOPvjQVeTDAcHFC0LO                           



                          VyhMWmL7jrALOhEZ9nolml                           



                          BKcsYZftLUGlwKV8YTKmoC                           



                          TxM4HjVJHjPArsWINbrlr5                           



                          ZqIePb2dxDUyeIgvPYgkJH                           



                          XcVHRfJOccYSMcEbHdSV3o                           



                          ASOIW6AmJG0RTZTKIA7JDM                           



                          DKFrTRA5HHAbSHVUTMQU0A                           



                          SH2GR9UvNFTGY6IWTNmlsA                           



                          SbQDAwSFXRUyJLT1oUJWEK                           



                          VAWIY8LBKaWHZh8IUJyzJZ                           



                          NsOUCmNCWRDHTGHATKK8ZD                           



                          U2BXMgWGYXHNGpbXAPbKCf                           



                          BccGFyXHBhciBCLiBMQVJH                           



                          QGZYDtJLM1FXHtKyZKRUE9                           



                          RGEhCQAtbcV13JF31mZJ3V                           



                          DLJoYCGNS6KKPDijlMXzJI                           



                          MkZFDIYVNXDNJBSYTAFU2Z                           



                          WTUuUYKNGSiSRK7GGWZuRO                           



                          DxqpvmNQIwNo7xYWKYW8Lk                           



                          UA9QIVVIMG2FRKASKZMBSA                           



                          4QLW2YKMRXXT3JFJHQUPdK                           



                          IHggMywgQklPUFNZOlxwYX                           



                          IgICAtIFRVQlVMQVIgQURF                           



                          Fb3XLMthGfXGU17LYgXWNN                           



                          slUCJnAQQoVKJMZ4PRQKIh                           



                          P7JKDgVUOUVsYDEVWZ0PUC                           



                          xwYXJccGFyIEQuIExBUkdF                           



                          NMaUQJYPKMjTEEkgH6aVFQ                           



                          7OVINOV6zIBdSGT8iRALM8                           



                          BIJgEBFRH6BMJJbyrYJlMI                           



                          CeOMPDCEVVOD0ACNjIB0KO                           



                          EDCSYM5UMEFbVXEYPKnWXB                           



                          5URURccGFyICAgLSBIWVBF                           



                          HiIERHGSOLMgIA2CDBImDE                           



                          Rtst14WAP6JwQbo5H5HSE4                           



                          UNXePUJls6enKKVtrVOwYd                           



                          EwMzNcZnRuYmpcdWMxXGRl                           



                          EkWxp1tdc711lUUcc2yfKI                           



                          UtMtJ7kEQxZTTbtAJuD522                           



                          MTFoSJdez7cvx9PtSQDneO                           



                          Zqb8O7HBZIsajktZd1wJwb                           



                          U36cl4A7ZzzyR0jzVJCaMX                           



                          BhY5TxPG4aBCXhYmy1QLN9                           



                          NHO7VXJkDPIrG9OuQM8eVE                           



                          YikPNwNSu5v0spkPhcYANi                           



                          IGZ1a9zaFWdfdgUaNI1ozk                           



                          5wlEs1p3hotrLgVWIcMRCu                           



                          hMWJFEJrJ8KekBibKw1gjO                           



                          j1dOlxOwqcVLO1Ppi5ZG1n                           



                          pw68ddi1lSavWMDwinubIy                           



                          Y8AShmITBalqjjGUk8CIds                           



                          RYMjhVR5CTUpxQSnD7GhWJ                           



                          UfZR3axbh5DVZ6AHiyFRRc                           



                          HhI7KQCmxASqYPBfjNyaGK                           



                          unp958ODM1TwJmFF1vM2Fm                           



                          w3G2gE8fdQOnNLYyqCRbGn                           



                          JlBAMzwf7gxVWhLColl5Gt                           



                          SPX3hzI4sZHglGEvEKPxXw                           



                          S3SYnwSA4xab60QZTiNBL0                           



                          cp5trZApjVinfcVknLOcTC                           



                          yuP1OuSYGfd914OLTwF8Mc                           



                          DEVqw8X9fzGcPeVqXIDhiC                           



                          H2zaH2YGEjFA7wrzhif6bg                           



                          DDvvWVzwWSDbxtX2drJ5JP                           



                          VptKPrP5OrhO5pFQAvFP6w                           



                          atrin0wsPTI5HNvqLUPyOZ                           



                          V7NtQcLXTuc1Pfwjo6LyMu                           



                          s9FawOEiFMmyM80iv801WB                           



                          HadfOnX4sguHXwzcokoSWf                           



                          nmhuAIxfovE6FXWmDJcveq                           



                          rbZPVqJSedY9npWvUhLRSe                           



                          tHxxJNxul0IjEOWsDCOsLc                           



                          FofBHhXJIeQmb2WUVpqHSm                           



                          HGWcUbGrV5vyjovbYcKZVD                           



                          Rol3dvS5cwcCJUnSRjK9Bh                           



                          UGhvbmUgTGluZTogNzEzLT                           



                          c9TB77SLxiCRPrwc74                           

 

             COMMENT (test code = p8pxzGPqRPZztEQ9MvWhSL                        

   



             3356)        Mhm6bqq8YujCRenJCpKFck                           



                          gLKbeeZffc55bIK2yW20LT                           



                          6bPEUfJmN2DKMtlfC6Tgw8                           



                          DPVfSCBllMCwV241i5ihp8                           



                          fnniVddVA5qWicBIKspubr                           



                          GhF4HPheCKEkxlijEEf2VH                           



                          qcIKYjoYK6RCDguCYfN1Hb                           



                          ERDeAN9vnrd3YXM5XBbcYQ                           



                          WbGqT9PCBmtHRxSTXvqHgg                           



                          HRlqn395VLX4HxDiOVBpqq                           



                          GoiTgydQ8bBoHgNWELoD7r                           



                          ayBBMSBoYXMgYWRlcXVhdG                           



                          RjaKJpv5ByO3YbaAWhXKZi                           



                          cZqrCm6tAGG3yXEaZSNvxs                           



                          PstCdlhdicx8V4FNuvqo2s                           



                          cGFyfQ==                               

 

             CPT Code(s) (test code t2elvOUcJMWjgFC5BlRmMA                      

     



             = 3464)      Rvx6nbn1EksGMnkAOnWYxx                           



                          zWUxlkIldo69xSJ7pU75CX                           



                          6vQYTxBaR3LKHglfD7Puy4                           



                          BXJaXTTaeGIcE246r0pav5                           



                          gcogHizBJ7uEdaPFRxkpaz                           



                          IxQ1IXyoUBDmmbkcLKw5EE                           



                          zmDUPafTV5KGWvgQAfK7Yf                           



                          KFVrLY7lidw4MXV7BLqwXQ                           



                          UdCnQ8GGJifWSbHSBaaFzn                           



                          UXhto571DDZ1LxOvUHLado                           



                          NhuBxneU7eCmYfCPU5FPEq                           



                          UJU7HMBjBVy8ErQkRBW9LR                           



                          F5FCT6WXEccWXflF==                           

 

             GROSS DESCRIPTION (test n7xkxBKeXJYskBS9TdSaRV                     

      



             code = [id number]) Pnr4qgj9CtdISkzEJlIVac                          

 



                          rZFhopKqrr97yEF7xY56HI                           



                          5zRAYmGwR8OGWlgrJ9Jts3                           



                          PVZpMELmwOZqQ168i5ctt4                           



                          fmumZeeUF8RKWhLZXpK3Ww                           



                          AA8wQIYsrGTcZ51osVIoIY                           



                          L5TQSnBIAoqBLgQOVqZXC3                           



                          NQEefJXnI8aqZDBbMD6dbx                           



                          hwJGzpQFfzAQDpqHK2VIFi                           



                          zHSnX2WaNAZxEGhrGMLpsj                           



                          b3OcIrAp9spKGvhEumMRri                           



                          TVNby2awEGLzfFJsSOI7UQ                           



                          epfANiCLUxVGQqVIi6ZDHc                           



                          JCvesYJhZX9svVmfGxxujB                           



                          ihz4EavEOyXLknMHIjLVHg                           



                          BBuaNSFvU3PCEPFpXgJ5Og                           



                          W1GbWmKKo4ICt4WH2EOfFv                           



                          LYVfATNcNEU6XPEuLBf7JN                           



                          taDK5YLNA7HhO5LYl3XFX9                           



                          NTMyMSBcXHQgMiBcXGZsIF                           



                          guTyGJydnojBYaEN3fbCgp                           



                          bGFpblxmczIwIEEuIExhcm                           



                          fnNTqhbZShoUbkPNvlJ12g                           



                          r89qNLROwxUqw4TbhcMrGo                           



                          xwYXJcZnMyMFxjZjEgUmVj                           



                          LJl1BXOenW2xOy5yvCXwcL                           



                          8jgXYwEZmhWPT8cSHwGIKl                           



                          ZXAaZFRjWA11CDtoXFNuwn                           



                          FtZSwgbWVkaWNhbCByZWNv                           



                          cmQgbnVtYmVyIGFuZCBcdT                           



                          vhAdXrWKp1J9wkyhsoHHlr                           



                          mMOioAwxCW2vt1xjnr95kt                           



                          Evu4NxhzOxCGEvf2XojDKe                           



                          YPDdYwAlqwNdE38rv6fomA                           



                          Erf7AhpXTgjBodxHHmpILn                           



                          LXBpbmssIGlycmVndWxhci                           



                          Yoc9Q4GWCog1K5JBEjprDk                           



                          vBZeuBKcMADyXLZ5FWGkFP                           



                          R0OWZiEvPgoGRqmpInB3pn                           



                          ZWdhdGUpLiAgVGhlIHNwZW                           



                          DphJBdFXqgSXW4Se6gkCHy                           



                          YVKrbxT8m2TwATiePXFgYv                           



                          yuUPOaC5PrN5UlzoDlsBNb                           



                          NEOmdtNmt5raLYM7KEKxlX                           



                          OobYWnQkEsDccuFWV2h9lc                           



                          VZYdtODkIFM2SYpccOEqBJ                           



                          EwMDIgXFxkYiBPVlIgIiBa                           



                          SaSlIQU3HdWzLPw1RFgnD1                           



                          BEDYHhAGE5KNE3GbqfBzD1                           



                          JFo9HYTEDr7aRRK0XFviEJ                           



                          U3MGW4RuFtPFlolACuESbc                           



                          ZmwgXFxmIEFyaWFsIFxcbm                           



                          V7IMCoLjVdM4IlKQFdLBKf                           



                          jOfhSVFZe0dznuDsKMyyAo                           



                          UiKHYdD4YiSZxxYa6zyAZn                           



                          WHJxCpHwN9HvVVXoV9Bskr                           



                          LcYOlnHEGnze5vmSfkBUxt                           



                          AgSaTKRni5q1qIV3uAOnbX                           



                          I3iVOriVyfGvStEH9ybMPf                           



                          PN8aWDtbXUrfmeCqq9IxWT                           



                          85dLLneyYgeeXeIDC9XjWr                           



                          FBtxYABke9p1iCivL02ft4                           



                          6auXQtpFExFTUsLD2xuL1u                           



                          PBAli4IjRQL4WKoqhMZcdp                           



                          KeNFJsTW4bISScohWwd5Oy                           



                          OT8uNV38lWKpgTyjAUMgdh                           



                          6ggU8jVECskdBvG3CnISOr                           



                          MY38t1gvONLeSzKzwIqwx3                           



                          OlPRGyBYagKQ93EEtaKdKk                           



                          qFCrJyBeqYFlOrFhS23etS                           



                          0yMLihbmGaBZYkGD1oROWx                           



                          UHRsvNWnkX4mlmQbumRffW                           



                          DkdTG2MDMafY0udN04syTh                           



                          vsEPXH18EBOmlXDnUZJ6QO                           



                          8wJAQixjoyTDSfBLKhFXZ2                           



                          JZjwrB16nKCcVZThASHdpY                           



                          VirWnmJtycwXgot0FfnAGr                           



                          XGlkIDUxMDAyIFxcZGIgT1                           



                          AMIQTzEhN5PkO5TuWcJZm9                           



                          JEp5AV6WDgWlDMQdWMHvLO                           



                          X5EuFoCRi1FIjeZN5XRHY1                           



                          ShP5UUuxCLV9FZUoRCHiMF                           



                          QgMiBcXGZsIFxcZiBBcmlh                           



                          iMNeDT8olRxjaeQvNNKdNG                           



                          ZYVyUJs6f9qGcvG30vq56n                           



                          VFMUZMS9R4Aoi3EmsuSlis                           



                          cuXHBhclxmczIwXGNmMSBS                           



                          TDHyyFPfEXLipkGok6LeBN                           



                          xpbiBsYWJlbGVkIHdpdGgg                           



                          lHxbNYJhzKsmyiMhV7V3gc                           



                          JmUZ4xSHQpHCKvB6UwHKPt                           



                          I38yCERttS2fMYNgQH6tHZ                           



                          m1QLVzUETcLmcoxU9rkZIn                           



                          EHYomT5jLSppXmPfUHHzN0                           



                          PhEJkaWnO8YzX1PNkqcdHi                           



                          vKEwq0Pwx44jxlKfJERpMR                           



                          Nep75meHD0dhFsQjJuqDs6                           



                          lNKuLRJ6II8vqVNojJ73JA                           



                          Mebc1aIXKdg9U8VAYen0I4                           



                          ZSBmcmFnbWVudHMgKDMuMC                           



                          K0YJOgPJQ0MHRrWNWyiSAg                           



                          esKfF8ziGSkryPFnEpDgJH                           



                          kfGSjetueug0Cwo9IcvNAz                           



                          n07adRJ3oXLtdBLnXxEpR8                           



                          6ryyVriAIgRcnbAZT1KCGr                           



                          lK1jg3zzewW9UM5vnZwyah                           



                          UeuHWxo8OxRdXdCY5dLLSb                           



                          FEFxoQPhlE5nxsTzmvMzaA                           



                          GxmYF3LQZrKL67pGBfxUkr                           



                          sQ2xJoOdBkQqCAHsiuenGG                           



                          BtKX0bKLElURE3VYXtqfIV                           



                          ySZqFHAuf0ErvAyzfqAyNL                           



                          TvbV9yeGJiYMVvxsICZPX3                           



                          pB1gAKQuHVA1TAPxqaZMWV                           



                          gkL02ufSC7uKKutWBlQyPq                           



                          L93dbmSgKDIynlGTNvjwX6                           



                          RgmXQjt07xtZV1yGAlzHAa                           



                          HYFcj2SsqTJkt3zstMvtu3                           



                          VjdGVuZFxwYXJccGFyZFxz                           



                          qH7cUzIda4nzpEw1YMyosj                           



                          E6ZKKbzz39VDbiENEfZ5Uo                           



                          I7FrEKeaPXZ3SKWgAvJeTT                           



                          BeCR1JZhTlFRjNDTDrCGd9                           



                          MoO9NPx2HHGFLgAqLjEnFw                           



                          kpUIvvANYoMSk0GVs4BKaX                           



                          JlW0SGS8IjA2UbJaYRGeMa                           



                          XyQPu0HXSgHGhnaMHvOZFe                           



                          ULNrKNilDZjiW73kLkBpVJ                           



                          ldHxQfFU4yZB6ttWSjZQTh                           



                          rU9nCW3jT1ipmR9rCA3qeB                           



                          LoUCOtDuWqC3LmUAUaW0Bw                           



                          faPhBAejSDOmzp5rnMwxUZ                           



                          biJrCbCVZfh7l8fDI1vBRx                           



                          rSL2tQAuvPblSjOhUI7iwA                           



                          ThSQ4uAInkAAveepGfy8Um                           



                          IX90iEXtjiLyfuAdGJG8Md                           



                          KqZFooXONcj3q8wBiuK13w                           



                          h87ii1wzfJ2uHBC0ZHG1JQ                           



                          mzzwQxlFYkl8Tcu12dtrWz                           



                          MVXqBJZqg90ymSN5vgOqFp                           



                          BwfRm3mWJbJZG9DG6kaHcd                           



                          xdrwf5PuiEEkJKTvLCKxW4                           



                          QsHSFaTMTgi1O2SSNic8U6                           



                          ZSBmcmFnbWVudHMgKHJhbm                           



                          zjzpsgLcAwxEAfTfNkF03i                           



                          WYZzDyiiS06atD9kP5BsAZ                           



                          Wyk6ItCKmkHQ2qaA2jBEV1                           



                          LjUgeCAyLjEgeCAwLjQgY2                           



                          1rqJ3hUTwobxUyPUBzMO2x                           



                          LTQiWXAbLCIuIOD7MZHsTF                           



                          YvL0SvHVMfLEOei9I6PXWs                           



                          c3E2TAZeacXftIJczXHvrf                           



                          WevJOicbnxFQK5KQRztS9w                           



                          v3edcaQ4XA7alMkdefjgZy                           



                          1nDLqisMIbv1QxdDXabWBa                           



                          K1O4KGH7lxDuX7StQBZStN                           



                          Pyf2MbZ6wlZX7tlGKnq0Tk                           



                          jMk1iMVfZGKxyWhzESl1ZR                           



                          luIEQxLUQzLlxwYXJccGFy                           



                          DCgbj1DdsIOZOMtyeMNwut                           



                          JPlTy6NCqeCQRmy9V0RWOy                           



                          fZntJDBxW8NuQ0DbrhC8i1                           



                          jhvWmwc1UkhEGsUX5qkBNl                           



                          fQ==                                   

 

             MICROSCOPIC DESCRIPTION f6fguKJkTJEjqOT4PlVlTQ                     

      



             (test code = 3371) Bqn7hfs4EvwFKgwNZyBSdh                          

 



                          bTMyizHhit16qRJ8xR53AN                           



                          0lODYyQhW5OMRnvwS8Jmo0                           



                          YLKvOPVfdIUlY379g1zba0                           



                          jwsiRekQI3aPdmTZTbrqkv                           



                          CtO3YBbkJISapjeuBJk5EJ                           



                          laYEOomQF0YVAmoOKaD6Pn                           



                          FDOxGF9ogeq6KDO6HVkzJN                           



                          LwZlO6AUIhfMEtHMKgaVyy                           



                          JEdgo785KLT1CfPpTJByva                           



                          TidMofxC0vIjDhPCKLgN7k                           



                          sIOhaRu1i3olEBEsTGOzxM                           



                          RddQ7jppAbhQ8et6WxOCAe                           



                          BYEmr7BwJVMpv17hbVHliN                           



                          TTCOYlANG3IUOvDL4aZ5Q3                           



                          bDDiRLwqGQZ7cD0wGBVqyF                           



                          skYMwmjNajl8RlrM8sQASj                           



                          RLJ6vFVtQUMdzETqvIYtXV                           



                          QqCRQsmwNyk0jin4FngD0y                           



                          bmcpLiBNSVNNQVRDSCBSRV                           



                          MGMCGmIB3QXgemJOWNWQVP                           



                          RiHqo99mQQNhuCOLEaccRO                           



                          HlfOnsES1joZ6zdXlkyU0d                           



                          tLSxuIR1txoldfHvyPa7yx                           



                          hikPFzZTUuCEEKY7csPpvw                           



                          UJVuGX56EGW1AGRaTGUmtC                           



                          IoJBHvDMC8mDUxj4Ohj15f                           



                          fZKiTT5VFD83LdOuIyOIeo                           



                          JmD9GxOtQsMK18B5twTUDs                           



                          NLzympQeh2peiecaAEGfEE                           



                          dCNPE1CCvsRDcpsXMjeAbv                           



                          MCAgbnVjbGVhciBleHByZX                           



                          XohD6pLHXzxzEVDRFgArea                           



                          EXXzFV90LBH5VEQoQCJyiW                           



                          LdSCNpTND3hGMnp2Hnz56b                           



                          cGFyXHBhciBJbnRlcnByZX                           



                          CiuTcbbuufvQXbFIVxXa0p                           



                          mR2ux9swJNXzt8LsunDziM                           



                          FvipPqpXLyQUTthB5aBI9s                           



                          GQ6VRvKmrk15ORyxriksAM                           



                          DgrV38HOKfn3VpXcwauEZ4                           



                          LHHgGALpPvByrGClh1SqyK                           



                          CleBr3PMTjcgL5RFGzrHq7                           



                          dO7irWrqAYiEZ8geUFthGC                           



                          xwYXJ9                                 

 

             SPECIAL STUDIES (test i5gigAOzLGImt3ocECCdxT                       

    



             code = 3376) FuZzEwMzNcZnRuYmpcdWMx                           



                          LPmvygSnZVorx5ZpO8VhPu                           



                          AwMFxhbnNpXGRlZmxhbmcx                           



                          KLKnGJM4lrExSVAwWXuhRZ                           



                          RwIZwsNb8vlJNomLsqUaDh                           



                          SJDjw2xhmrGJphkfjQp8a8                           



                          gaUKMcGeD4vXBbXTztK1dx                           



                          lvYebJMcE5LxnTKfaHg2y1                           



                          riQxPqNcZ5mBMbOUsvT3eu                           



                          wiFajCSsMURiQAu2gQ57WJ                           



                          PiqP4wwFOwYCdegjNxTrF8                           



                          JNkcWJHqFdH0VVJivANdGN                           



                          FmV3riZEWaISdsAOFsQTiy                           



                          xNUfTBL2gRlqe3X8uZZpaT                           



                          QglSeaPiMlWuBqCjXCe8Ub                           



                          SWn8eJqkD1PqDLAmDhE9gW                           



                          QgUGFyYWdyYXBoIEZvbnQ7                           



                          oSoromWhw19pjHBxCEEmFC                           



                          XzNxGhvBpvPBFbDHNGo1Nh                           



                          xNvbTZW8jFz5vMnxBbzxJL                           



                          Q5Gws2NU5pxi74cdr9eLxg                           



                          OTUpsqwxWdA9NDidDTXdil                           



                          wfUJm4DZxrJYJwyIW5KTQq                           



                          eSVvS4ZvGKDvWA2imur8TA                           



                          R4BFbkPUNoTxR7DWZqfEAh                           



                          TOLycCkyLZcrh207EYW4Kg                           



                          SePS5qS7Fjx1I8fS2khIOe                           



                          ANQkiCMpKcZxJZBtco5qpQ                           



                          HiFDcnx8LfPAI6taG6uXMe                           



                          eDVvLAJwFE85Fnoqf7JzRb                           



                          kgp6NgJ16zzUS8ZFdxi4dr                           



                          QX2oBkE4xsKfFSxxy0mlzL                           



                          3zNgE0UVazTE9oVC4tCFTc                           



                          oG1mhwutNSBfZuEpshtoPW                           



                          LbtIwrkmMuUb3xyYvpBUA1                           



                          COqlF1yhbU0hZhT0AMpmX3                           



                          eamB5qNEw1YLxikNP1LUPp                           



                          wO7bXM0jmqyza2lhROffVM                           



                          ivGIWlbiQ4oxE6FSLfzHZy                           



                          B4ExyT9kAZSpYP1qwcaxc6                           



                          ksSVV0EReeWGIoFRW4OuJm                           



                          IDXik1Ixicg4TkSgx2PajS                           



                          WsKClgD45la709BTYcurZk                           



                          W3hhcNRbyguovLDjhiixKK                           



                          djqaL0LFRrZSMlHQsuJWUu                           



                          XGZzMjJcbGFuZzEwMzNcaG                           



                          ljaFxmMVxkYmNoXGYxXGxv                           



                          Q2tzCoQcR0CnPYUzGvBbDO                           



                          ehWKbysEDonIOzeWH9uS2v                           



                          EG6eYSVctVWfM8GuOGUmuh                           



                          MtbYVfLKK3rYNyzMVvHF5z                           



                          CBqziELpd3cax2ZoG5xicF                           



                          bofTV1VE1hUNDcQEJoPRpe                           



                          p6IfkN9kFhvykUUfdqifAD                           



                          xmczIyXGxhbmcxMDMzXGhp                           



                          T8ztJzOpXIPjfZmxITgoq6                           



                          NoXGYxXGNmMlxmczIyXGx0                           



                          cmNoXHBhclxwYXJccGxhaW                           



                          9oUlSdEgVfRozrOS2aDPRa                           



                          K8fcnQMnZOXyUMCvZ1emGt                           



                          MfoT6joWgkZLwhMvNrHaSh                           



                          HfYEb126xw0rJTJmkUImxf                           



                          UWsNOczD3vGXlmOAdnYKcv                           



                          jNRxQGlpb9orLKNzn3o1kM                           



                          GpGRGrolZmc3qoDKhctmRb                           



                          QXMccDWjyAZhSYKtz31dUZ                           



                          twxFltmCkdGTGim6XlcLnh                           



                          f0WyCeQpOHjxu6LyI25wtD                           



                          JvbCBzbGlkZXMgcnVuIGFs                           



                          g84px1jlFAArNmG5rOEmdL                           



                          Y2pCNofEHwg7LjmVviAFQw                           



                          k5egOWObfr4xvobpeFYxc7                           



                          BzmJ5skijkUWgjpAPpvsCq                           



                          MXLvd9y0aWRtBAXvSLSsVS                           



                          amcVp5CRPbh279sd9ztzY3                           



                          uQMxXUK3KQctODRbTIUfpn                           



                          UgZXZhbHVhdGVkXHBsYWlu                           



                          XGYxXGZzMjJcbGFuZzEwMz                           



                          NcaGljaFxmMVxkYmNoXGYx                           



                          XLrnM5crAyUmH2OgCDVrTa                           



                          YpwRGnA6fzbAYhRNXmXPkc                           



                          XGYxXGZzMjJcbGFuZzEwMz                           



                          NcaGljaFxmMVxkYmNoXGYx                           



                          FPfjP9peSqFtX5LhKXMuPt                           



                          IgIFxwbGFpblxmMVxmczIy                           



                          DRguqxovRQCiINplD1ynLo                           



                          QsXZRvtQrmMUlia7BfAEDm                           



                          KFJhTvpvszDgCJo7rcDaJZ                           



                          BhclxwbGFpblxmMVxmczIy                           



                          TZpbtsiyIHYvCNojV6hwVl                           



                          CmZZBsnLujHOciu4HmZNEr                           



                          XGNmMlxmczIyIEltbXVub2                           



                          brk9XnB7gagSwrlGN0IMNa                           



                          Z9bdpSNzwNF2RHI7jI5pYA                           



                          pdgjYmMXSvz2TeZZZmVTBv                           



                          JqY0eL5vOML6OsBCpYwtBR                           



                          BsYWluXGYxXGZzMjJcbGFu                           



                          ZzEwMzNcaGljaFxmMVxkYm                           



                          TkEXDaWUuyH5bhJnVrR3Qy                           



                          QZIdRbQurMlvFSwwDRq9Cz                           



                          xwbGFpblxmMVxmczIyXGxh                           



                          groxPTFsDSdjO2wzGlLhAW                           



                          PzsKyvUCake0LeCDPnOTRy                           



                          MlxmczIyIHMgTWVkaWNhbC                           



                          ARPF63SLOhLXDmlXvkmS5i                           



                          bRARWTThimU7k0W5CNvlRJ                           



                          MnKVg2CMzvjtIwSHXsgU9l                           



                          DRVvVN0eWJo8udBmJQIzo4                           



                          ZsXU3lZRVrqYDxVYC6OLJs                           



                          p9GiX2Tgv1HeFTEbADTnsd                           



                          6imfKxMgICrIAdEDBsgy09                           



                          ASZwLF1yV9pwMGAnMODctc                           



                          KvwOAgu3BzKJBsfNA5wGId                           



                          QW2GOqKAt28lSHHzBKZEix                           



                          AhFZXdlBkqtDN7zsW3gV8w                           



                          LiBUaGUgRkRBIGhhcyBkZX                           



                          Coeq9pfrEsHTDdYJJnl8Zm                           



                          rQDisEMsxrWuQ3Whk7KmCI                           



                          Dmvp28HAnhbJUzkx89CW6l                           



                          M2Wzd8ZnvY0aMQdeINWhq3                           



                          AtzVNekATpEMIkm2QgB2ql                           



                          obdoARekeKLqwQ0lSTDvJK                           



                          i1GOEew4ChVRUlf5XiYgEy                           



                          ugQqVFDxDODyXKXtbO29HA                           



                          O8nOijhExozsNeJY0bRKOs                           



                          qvOjCOVzDWDfhJ2eFAttrd                           



                          BgTERkjtI3f8P5QGbjUZZf                           



                          abTaWovkCFI8gbMrtrO1cY                           



                          SfJ6piwrxeOIqlRCInx5Fw                           



                          gP3ncNBMeCSfs3OmpRWijO                           



                          TPnQOyQC1yzjYvBC9gLIH1                           



                          ODggKENMSUEtODgpIGFzIH                           



                          J2OTviYyqdYIH8boFbVMZr                           



                          r7OaPXvlY9faO16bvUljcV                           



                          z8hTKgvYohoEYlpFHuGQAz                           



                          rpI2w0A8ACGdl9CglypgVI                           



                          BsYWluXGYyXGZzMjJcbGFu                           



                          ZzEwMzNcaGljaFxmMlxkYm                           



                          GzSUMiDOkyW6miKgAaXtDb                           



                          OyyxYDV9jK==                           

 

             Gross assessment was [facility]                           



             performed at ([facility],                           



             = 2777)      Department of                           



                          Pathology, [address] [place] [address], Tel                           



                          [phone]                           

 

             Technical component was [facility]                          

 



             performed at ([facility],                           



             = 2046)      Department of                           



                          Pathology, [address] [place] [address], Tel                           



                          [phone]                           

 

             Professional component [facility]                           



             was performed at ([facility],                           



             code = 2778) Department of                           



                          Pathology, [address] [place] [address], Tel                           



                          [phone]                           



[facility] [id number] 08:01:38





             Test Item    Value        Reference Range Interpretation Comments

 

             Case Report (test code Surgical Pathology                          

 



             = 104)       Report Case: [id number]                           



                          Authorizing Provider:                           



                          Elayne Longo MD Collected:                           



                          2022 10:01 AM                           



                          Ordering Location:                           



                          [facility]                           



                          Received: 2022                           



                          11:57 AM Services                           



                          Pathologist:                           



                          Homer Thomas MD                           



                          Specimens: A) - Large                           



                          Intestine, Colon -                           



                          Transverse, Bx mass                           



                          B) - Polyp, Colon -                           



                          Left/Descending, taken                           



                          by hot snare C) -                           



                          Polyp, Colon -                           



                          Left/Descending, x3                           



                          taken by hot snare  D)                           



                          - Polyp, Colon -                           



                          Sigmoid, x5 taken by                           



                          hot snare                              

 

             DIAGNOSIS (test code = [id number]                      

     



             3220)        FuZzEwMzNcZnRuYmpcdWMx                           



                          IHtccnRmMVxlcGljOTYwMV                           



                          uobjKwFGHmxQIsS7Zblbsi                           



                          OMdiOZ2tKT8fdHgpqCBlhX                           



                          UqLNTkHlQom8joy819gQWa                           



                          g9ecGJLQnjeeuRb1tFnvR8                           



                          6rn5W6FfswR68ngVPrOSW3                           



                          QATmEBSmhEEuHCMwFVB4WG                           



                          BjqJGjQ6guISSmAU2ouamm                           



                          DXqgXNnfPYToyJK0TBOcxR                           



                          RzN9TiEMLbWElmHXNrahi9                           



                          CqEaZp0tzVZjwUabTBxoLM                           



                          BpAMFvLEitOUKlGqKgHC8v                           



                          ZBOUL4BbAC5AMWIIWX3ROZ                           



                          ZCXqGEI2PIMsASKEXKQQ6Q                           



                          PE1PR8ZnQTLFV3OIUWyasK                           



                          GmAMQaKNHWEnYET1tJLOLI                           



                          CVWEA9KIHsMKXe4GUMkdMF                           



                          XgDBByZWAYKGTQFWQVC4OC                           



                          Z3VNEvHLFGNTPiiXUFkXHm                           



                          BccGFyXHBhciBCLiBMQVJH                           



                          KIKGQsFLQ2ZTLwDiWFMUT9                           



                          YCQwPBXzudC50TY89gZC6Z                           



                          PSFrQKXZK9AGKWdidYIbOV                           



                          MoLGCSSAMSYOOQEBVCKC5X                           



                          TGQmHZNJKHcEEZ5AZRHjPO                           



                          PbajfdEVYtOu7aJWQLB0Bz                           



                          GO8ZFCGFLB5ZXHDALPUMFU                           



                          4JBC1WODPJKJ6JXDWMFTjT                           



                          IHggMywgQklPUFNZOlxwYX                           



                          IgICAtIFRVQlVMQVIgQURF                           



                          Uo8GLLcuXmYMB68IVvCDDM                           



                          adUQSrCGGiCVIVM0WUUJKd                           



                          Z3SFUoJUOAFeCVVPNG1JIP                           



                          xwYXJccGFyIEQuIExBUkdF                           



                          PHiKXBKGKJlKXDboY9lGEG                           



                          8UMPFMC2nAWtOWH5qQQSI2                           



                          AWMvMEING8GHCRpezZRqQR                           



                          VnJIQCUFRMMY4ZJFnYA2JB                           



                          NBWGPU7UWSNuVHDFBAiOEX                           



                          5URURccGFyICAgLSBIWVBF                           



                          VnKBXBSQNBScJS6NWEQxEA                           



                          Lnqf30PWF5JcXal5P8LHA4                           



                          KWKgEGGdz8meOZBopKQmEr                           



                          EwMzNcZnRuYmpcdWMxXGRl                           



                          NtNts5yqd024pRLmn0uuPJ                           



                          DqJgZ7vFRxGKNhcBIeR800                           



                          CYPsBUlbd8mml7ErNYGogQ                           



                          Tem6X6IFPWdmyjnNg0wVga                           



                          Z38ps1Z2VvanN3epQQArSU                           



                          VqB8MhSS4uZWMnBqn1RZI5                           



                          JKP6JPNoZLPoI7IwWV9uTU                           



                          OioRTcAKz1q7mpxPjiKVAz                           



                          WOE9f6yzHQjzmwBeAQ2hyn                           



                          9rgEj5b7xfjiSeJWRnXSBi                           



                          tSELDLPaX1BraHzuJv2qnC                           



                          m5hGqlIkuzMJW1Ock0IW0y                           



                          ws91rfn3uXdgYJJgdwjfTt                           



                          H7JDzxDQNjfxaxAQy2DGdx                           



                          HLRbvGF4AQZkwFYmQ3OzWK                           



                          YyEL0lxzk8HPK3MIxiJESs                           



                          HyU8AZZscGUxLETibNvfON                           



                          qfu622LGZ8BiRwMV3fT2Bx                           



                          v3M3cM4sjWIdIOZhjNZhUz                           



                          OyRWHwek2jxBExAKxwr1Or                           



                          TOG2bjS8cILfdWYjEZEpUx                           



                          M0YVxfDU3ouo15HECoEJN7                           



                          ug6rvMWxoRjsopIdfPHyTC                           



                          ymG3UwUCCaa962XKWuG7Ql                           



                          UPIoj7N0mzXiGdGuGYCulU                           



                          T7yfH2UGFeZS6wiiaoa8eb                           



                          YExzVKgtFJTuprW1tmH6YV                           



                          NolVCqP1BknQ4gQAAgGZ7b                           



                          dobvn5izUTM3DMfnORYsHM                           



                          G3QdLlYWMwl5Xnnch9ZaAc                           



                          t6GlgYGfTWowW02uz269FB                           



                          PzhkFaC1ruhUZsgnvosGDr                           



                          rimgUKbfpiT0OWMzKWxmtk                           



                          svLHEcWXuyJ8gbFuVlRZBq                           



                          nPdvYFdfg1RmILXlLCIlQf                           



                          EfjVAyGNViNbs4AZSqdZGd                           



                          JDJpCfDdO7fpquywNbMWGU                           



                          Asr8rcK4tpaNSZyRXuH1Jr                           



                          UGhvbmUgTGluZTogNzEzLT                           



                          o4BQ68UBuuUJSqyu12                           

 

             COMMENT (test code = [id number]                        

   



             3352)        Khv3pze1GsvGUyvOJiVVrg                           



                          fJSdtoGnft60kNT0vM47HZ                           



                          7iHMBdLjV1FTOsmvQ1Eam4                           



                          YQPrWJGifKWgN823z6hst7                           



                          swnaMeyAW6hYzlKHDclwyo                           



                          UfB9CZanMFSzbcmgFNf4TK                           



                          faAAXnzXH1UZPzfECcA4Ci                           



                          ETSyWD0iwqr4ALK4UEojLR                           



                          GqZnT1EMQyfPEnTJWgtJzp                           



                          QKlsg922VML2CrErJAAvjl                           



                          AavOvydB5iPqVtIMEIaR1f                           



                          ayBBMSBoYXMgYWRlcXVhdG                           



                          IxlEDtx2LoL9JioCEcJPEd                           



                          oTmmRa5hGBL2kXXrJVUfss                           



                          MlgCrbdvqcd7J6QIkzip9g                           



                          cGFyfQ==                               

 

             CPT Code(s) (test code [id number]                      

     



             = 1045)      Uvr3ueu6RhzIIihUDtMRop                           



                          nSPxbmVfsi31vTX6eL44AE                           



                          7lIMZjGhM5GWFymbF8Okl9                           



                          PWRjGZXwoDRrZ849t4qkd8                           



                          zbhiVduXC1rUpdULHjreai                           



                          YnQ9TQdbNMQtskkzLGe7EL                           



                          scMVJytQN4SIOpkVXdU4Lz                           



                          FGZvLP9lrgm5PGB8ONzsBR                           



                          EkIdO9OTGciVNfJXChcEmc                           



                          MDhau259HNA4PpJjHKYlhr                           



                          LxlGmpxW1zImKjDCW9KYEn                           



                          HTG3GDCcXTp7FeHkZJC3UR                           



                          E1NPR0QFAmcQDdfN==                           

 

             GROSS DESCRIPTION (test p8cuzJEdZWHbeIM7YsRhUC                     

      



             code = [id number]) Qwv8trw5LupAWxdWHaGRym                          

 



                          eLAokvTbbj52aSP6wC32FP                           



                          0kBVAvSfA4AAQqgfH3Vaa4                           



                          BYDpSMKfpTPfG058b0cqj4                           



                          gzvxNsoUJ7ELHzUVKwK7Un                           



                          VQ7gZSIuiMCnI75qeWXtAO                           



                          A4IHYcSKSngWOsODAeOTK6                           



                          GXXpwRZtU5msKMGpMZ1rup                           



                          efFYluTTzpDVNjpMW2NYEo                           



                          lXLlB4DzTEDdYTsoWCBuig                           



                          j4VkZiJp5tqEVxaGzoXLul                           



                          MUXzz1sjDKVikLXvPLW6MT                           



                          dsnQQnXFEmOUXsRDh4AZTz                           



                          ZNdabWQmUJ8hyOqsTwmisG                           



                          gxz5RauENzTRfaFMTrRMMd                           



                          WAdpAAGmF3WSBYTmPtL9Cz                           



                          O2VmHgIXz2OKw8PB8NChAw                           



                          FDYoBCXaZYO0CIYdYHa4QR                           



                          yhAZ0RJKU2OgA5YKq0OAU5                           



                          NTMyMSBcXHQgMiBcXGZsIF                           



                          hiQbIJxtthzEHnKA4hkHwe                           



                          bGFpblxmczIwIEEuIExhcm                           



                          qtTEmecRIjbYupGJtnU04n                           



                          i29yPNFUgoZmv7EmahQzOq                           



                          xwYXJcZnMyMFxjZjEgUmVj                           



                          YJk4XNVovW5lCi6zrTLodS                           



                          5unQGuQQhaPQP7cIXoDIPj                           



                          CNBpGIQqYO72FDcvFASihk                           



                          FtZSwgbWVkaWNhbCByZWNv                           



                          cmQgbnVtYmVyIGFuZCBcdT                           



                          nuBlJlOIl0L0csrwgoMXaa                           



                          oJTmvFobNA7fh0pjhi05uu                           



                          Mcv7WtbfMcJQYut1JfoXRa                           



                          FEWcYwDtixSeO69jf6cawT                           



                          Xir3PlsYNifDukvEYviLQm                           



                          LXBpbmssIGlycmVndWxhci                           



                          Ezx5T1DJOkc6L3GOUntbWt                           



                          xPYelLHfFAKmKYJ0MNIuXR                           



                          L6LFGoTlLapKPrjkKjF7vk                           



                          ZWdhdGUpLiAgVGhlIHNwZW                           



                          MrpOYaSKvtZYQ7Qz3hbCWz                           



                          HBTsbqZ9d0TvTIjlKYGpAd                           



                          bpSCCpY8XyV0DnheVlmFMv                           



                          ODPuwiYvi8cmGPE7JXAymI                           



                          LdgZGxGhHvMjjkLUO6f7tz                           



                          ZCCecSAyLUW8BIzqnXMdNL                           



                          EwMDIgXFxkYiBPVlIgIiBa                           



                          NdJfDIB6EcWgMXs6DCslD9                           



                          VOMBZuETB6KGH0OtehOiM5                           



                          AQk3OTMCOm2tQEA6MThiRL                           



                          C4SIY4LzRyISuawTKvMXmb                           



                          ZmwgXFxmIEFyaWFsIFxcbm                           



                          K2ZMTqUjKhG7DhDLOrDFYr                           



                          qTkpPQNGj7jxysTgJSegZk                           



                          CnEMYpB8MtSOpdLs2jvVSv                           



                          UHHrFiDjT4DfCPDjL8Cfmd                           



                          NjFZdmKUGjak0glCzcMSam                           



                          RmEgYEGrg4z3mSF1oXRypD                           



                          I4yEFpuQdnOwAeTP9vyMTc                           



                          LF9kMQgiZIxijnIfq0UsAQ                           



                          34hWUxgdNfllDdMYQ6BnOy                           



                          NHfjPBCij0v1kAedU31yj9                           



                          3zqPQqsCHwZPOcAY1prM1y                           



                          BSObz8LfRJM1OJhnqCGpmz                           



                          RuWJSbZB1sTXWsnkKpc6Fa                           



                          CW5yXT24aNNtgAodWDSgtj                           



                          0lnA7bNCHtqzZjM5KkRXKo                           



                          XP90p4cyPUDgLoYouKuez2                           



                          ErVNFxRYikCO49MYreGqBc                           



                          lLVpTfXicWXcGyWcT99sxN                           



                          5mDPivyyOiDGLtED7cAOKo                           



                          RDDmyQUijA7glwFlxdJqwI                           



                          IwiJW5PCNpeK3ryP54apYc                           



                          koOTTI66QEIoxXBaDIX4EU                           



                          5lTPNvwitzAJSvWQUdPBQ6                           



                          BDorjC74kURuBRFiEIFcaQ                           



                          LauGypRarukBlzl4TrsSCu                           



                          XGlkIDUxMDAyIFxcZGIgT1                           



                          ASMFWpYeP8VcO0KjWoEWp1                           



                          GAt1XN4EUoIiRPVtEXGbJP                           



                          H7VuTmZFz1GVwhAH7PFQH7                           



                          YmG9DQswJSM3MYViRUYdLU                           



                          QgMiBcXGZsIFxcZiBBcmlh                           



                          uOEbCR5jiTuzkpYmCIGgFH                           



                          TNPpSTs6g8cPcyQ38sx99p                           



                          ADAWQIF7G3Lcm9PmkyPqjx                           



                          cuXHBhclxmczIwXGNmMSBS                           



                          AFXysJEtGKGtzfVyr7BdQK                           



                          xpbiBsYWJlbGVkIHdpdGgg                           



                          pCmgHQNlwOdikmYyV0C8ib                           



                          PtUH4cJJDyUUIvQ9UeXXYh                           



                          O34kVGCjrS9vQRTqVB0bIC                           



                          g2NNLlFTQxZxqflB1xaPFn                           



                          ILSdmT4jVZdsYeVjLOBaS5                           



                          EeXYpvEnU3AqC6YQxtykQu                           



                          uFCxn8Wmq04oybLtMJNlSH                           



                          Zwy54tsIC5wpDrAmNyaHw7                           



                          hHBcNWV2YZ6xtHLprN27SR                           



                          Omeb6xVIJyc1C3ORTfs7X6                           



                          ZSBmcmFnbWVudHMgKDMuMC                           



                          B1WVNzJCM7NQTePOTqdZSc                           



                          mjBlA8uxCKqgsZBjIpTuYL                           



                          keAQsnayigh4Lnd9RluTDh                           



                          i70dqKB4tBFdqIUyXsRlG5                           



                          7wwpZomFDiGrxeMVC1SQDd                           



                          aQ3gy5otjwA5EJ2aqVjhxs                           



                          FyjALjf9LxTbEoPI2tCZXi                           



                          CIKilQMvkP3awxMtwdEfjU                           



                          PeuKI7FKZvVV19eJMhmJdh                           



                          nJ4aCuAwJcMsEVTcipciYR                           



                          UxDT4bHVCzBJP1ORHpofDU                           



                          cKGdADAtb9UqdDwyotOrLT                           



                          GueU5mmCWmUYOgsvHMKAJ6                           



                          nC3pEUBdZAE3QLMkpfWGBG                           



                          ezC61dhNQ1bFStxSGsQfBl                           



                          B87phrPxBYXabjAOBjayF0                           



                          FqwXVmc41clZN0lOAfzHVt                           



                          ZCHbf5IstOBjm4eslWvfo9                           



                          VjdGVuZFxwYXJccGFyZFxz                           



                          cV6sYeRyh6hpzCx6GZujwb                           



                          C4DLRehs47DTdfZTXpV5Yx                           



                          Q1QeJExtPVT6JERxOoPrVK                           



                          YwXX1QVyNuETyFKNFrILw0                           



                          NgU1AIg0BUTTBqMvDgSrNc                           



                          zvGWokQJFxHQy2MXy3HCkD                           



                          EnA6GOB6OlW0AiFsLOOiIk                           



                          YcJPg9TMRpLElwaJVfGPZt                           



                          UPHhLBpjNTtkI17rFeRmIG                           



                          pmJoEiYW4lIG3wbMXcEEUs                           



                          lA5kTV7mT9mwbW1wIU3ocH                           



                          IkBLReTaQdZ1HoONYeZ4Fx                           



                          zyJeDIgyMFGqrw2alQtnEM                           



                          iaQgLvFGGoe9y9tUQ1eZMk                           



                          sTM9lNNmcScgEqDnRQ7nyK                           



                          XcOP4gMRwqTSreamJvv9Nh                           



                          OR72nPTxlqEqnyRoEFQ8Ll                           



                          RnAIlsNKGbc9s7yDqfL76h                           



                          z45xv1qkkO5nWEH8YYE5NR                           



                          cdizSihSZsf7Cah56ecfGo                           



                          WNOrIPLkt36tfJN6diBjUq                           



                          EguVn5aTJjTIO3VG7wdExj                           



                          mydqx2FlvWTdJIRqDPGyR4                           



                          PoHMWbBGOei7D2PXDru6Z1                           



                          ZSBmcmFnbWVudHMgKHJhbm                           



                          ydyvdwAjRwyMKnCjIqA28u                           



                          MJLpPpnsG62qwA5bY0JpXL                           



                          Wht7MbKAtsWT7inA5aCWC6                           



                          LjUgeCAyLjEgeCAwLjQgY2                           



                          4kkB8uUFiipoVoQOViZR8j                           



                          PEItRJYrZFJjMGA7LAVkSW                           



                          HwF8LuSZZvJLTtj7Z5JDAa                           



                          o9H9KNHvgnDvpQPotWJyfi                           



                          CfjDMfhtwtVFR0CYNnjT0f                           



                          q6csynV2YM6uxSwdenrhGm                           



                          4oEIlujOZuo2BznLNflPIl                           



                          C1D4TBR0nmNbQ8PrLLMSaS                           



                          Ryp9EtS3kxLH3oaTGhj7Ql                           



                          gSt5cADaCEXgeUrdRSd0UY                           



                          luIEQxLUQzLlxwYXJccGFy                           



                          PTiec2IjzAGGJSqahVMvhy                           



                          AYuXa1ADrfHOSiw7P1BURq                           



                          uNwqGYLoJ2JxA9TszmT4j5                           



                          hgzFgxt0PsxAReDS9rrVXh                           



                          fQ==                                   

 

             MICROSCOPIC DESCRIPTION h6ckbGXoUSPqxIY5FpDkBQ                     

      



             (test code = 3371) Zac6yei8EpwYMbcPMxIJjw                          

 



                          cZPyhvXufa35xSE6kK31OV                           



                          6hZLLxWeU2BZWsxoO0Uvf8                           



                          PXFyTRRixDApT407p7tsi6                           



                          ibqzVepCK7hPvvSJIfpnle                           



                          AvS6EQslACCcyeslGDy9YX                           



                          faRIOurPV7XZVncZNnJ5Di                           



                          NFInXB6mqcz6TCB5DHegUJ                           



                          QjBqM8YGNddHJgLWDypGuj                           



                          OVsmm135OGA2KhJpFODzkg                           



                          AxwNoxnP7eZrHtDCOUiD4x                           



                          vGNqhXa4s2urYBYoIFZyoD                           



                          WkdS8kwzTolC5gb4ZrGPMm                           



                          CAOcb8OyHSGiy55xtRZjoF                           



                          KRCSEiDEC7BHAxOU0sY1N4                           



                          aSMcQPtqDGO9cS7xONQdbX                           



                          fwKKzgmYgwo7YmeM0pSYOu                           



                          OTC3hAVtGXDurLAszYKfIV                           



                          SnEXHjomTxb3jbv0RlrO2y                           



                          bmcpLiBNSVNNQVRDSCBSRV                           



                          GYTHYoIH1AWhxiOMVIFZFJ                           



                          DoEyt22aLSBmuIFTOstaWE                           



                          EffGpkLT8otN7csTuixQ9m                           



                          aEQxhNU9frzqpnCvvSh2or                           



                          wsoJJqXQYkFOCME5ezCplf                           



                          UTDjBP99IFO3WYGdTELhqA                           



                          JtVEVqGBY5lWWat0Ycs17e                           



                          sDBsMB8KRM28AzXeFzUPzz                           



                          KkS1NhZzPkXH08X8cqNCCv                           



                          BInmyvXsv9owejcqZESsVV                           



                          qAPZG3FTlxOHfbdELyyNua                           



                          MCAgbnVjbGVhciBleHByZX                           



                          HunP9pJGLtlhDYKHFuLovg                           



                          YFYqGV11IYK4UXYaIFDrfF                           



                          KlYKBfHHN3cQNsg6Oec63t                           



                          cGFyXHBhciBJbnRlcnByZX                           



                          JrhAbhhzxqyMCsXVLiFr1l                           



                          pK6vq9ewYNDvz4FwmvRnmG                           



                          NtpzJxdKWuSFBgeE4lMR6s                           



                          XF9BJeWceu59XFrfrfvaSF                           



                          OznJ16YKYev0QzAcwjnDJ0                           



                          XOFoYFJvGnMozRAkv5CqnT                           



                          FyqLt5UMNxzqM2UFLahTq8                           



                          aN9ziUycWBgHO1tyGPwjLK                           



                          xwYXJ9                                 

 

             SPECIAL STUDIES (test x6dynMQcYFFka0owDKNncP                       

    



             code = 3376) FuZzEwMzNcZnRuYmpcdWMx                           



                          PPyurkMpTSraw0JhS3EyTl                           



                          AwMFxhbnNpXGRlZmxhbmcx                           



                          PVIvRIL1nwKuPXGmYNofVD                           



                          RjPSjxRm0vxSFqqUvxNeJx                           



                          QLSsk0kiyiMQcxxwfXr7r7                           



                          fpPGSbRqG0uLKbXEvzW2pn                           



                          nsUcmJRtH3EvnRUbjMk1b7                           



                          eaIeIoYwH6iNGeZMxiE9zs                           



                          ilWfeHLhUWRpEDr9sC02JI                           



                          BaiX6agVXkJXvxhsUmXiB5                           



                          ZBosCFTrFkM6AEWkyPNsGA                           



                          NcC6wbBKUrNLjbSOIaDGxl                           



                          fAZxYRX7hLilm2F6gXZvtN                           



                          XiuOrgJnHpHsAlDpDLm1Px                           



                          UAb4hXfeC9PuSTDtMhP5oU                           



                          QgUGFyYWdyYXBoIEZvbnQ7                           



                          nDsuhtVoi31zmHZsQSTjTI                           



                          RpZzKbeSkxECJpFTNYn0Vt                           



                          kXwxMBN0sTb4zEfxMzgjZM                           



                          N8Dry0AG7dqb30gbl8cUdw                           



                          HYAlxjwrEvT6KTvwDZFfyt                           



                          ymUQe3ATksNQHheRG1KJHa                           



                          eXGcI2CfLQSzVO6fraw5PB                           



                          E7GRtmFIPdSyM7AJPujEBp                           



                          FHOrhLfdVZtms938QMQ0Ei                           



                          ZiRK4zA2Lkh4P8uL5iwGMo                           



                          LCNvvEYgOwUtQGMblq4jhQ                           



                          NrKLgyw8EnIHU8iiN0zXLt                           



                          fRKcJBYkEB30Lqbgx5MjVi                           



                          zst5HmN33czZN9XSeuy3en                           



                          XX0fHlJ4joRgLWrht6xmuP                           



                          4xFpE8MAdiHR8aVN0fTVLw                           



                          nX9hwxbkFLXxIzToyqlmYH                           



                          WrvHbqmoAgVl6saKbwTSQ9                           



                          INhlU7kxjH0gDsI0VHaiQ1                           



                          qqoH7dZWz1LGrtwDS7IEYq                           



                          xG6bSB5fezubo9ksTVrcHV                           



                          toLRLkznS6sgK8UTYafOHc                           



                          D2VseU6uPRJsKF3megyrv9                           



                          puGQQ8ZBopBNFpIPW4WfTd                           



                          AVSsc6Zntqn2QdJbp6JekQ                           



                          VjPUvrR21sv347LCEqzeGl                           



                          C1efgTQhfatryMIqxctlHF                           



                          eydjV3KMVoMUNgWGeqLLAm                           



                          XGZzMjJcbGFuZzEwMzNcaG                           



                          ljaFxmMVxkYmNoXGYxXGxv                           



                          E5vgOtGpR5PqYGRiMeEsOU                           



                          laLDrgqHLuiJQbdLI3lM4t                           



                          CO6lIDSwqCXuL5SbLXUvcx                           



                          XpbWBsSYT3wXPkfDKxWY0b                           



                          QIvymIGsb1oeo0QuZ6wroQ                           



                          xtyBG1KL4oQZEsIPIeOSvs                           



                          j8KwdR7mRlckrOOfwenoVU                           



                          xmczIyXGxhbmcxMDMzXGhp                           



                          J3ckEzLgAGRrzWwcXBmlb4                           



                          NoXGYxXGNmMlxmczIyXGx0                           



                          cmNoXHBhclxwYXJccGxhaW                           



                          8eDmGmIeDyQiduRR4tLJZi                           



                          D3smiMIdDVOyEVFcL5aeDq                           



                          DwvZ3hxGqrGOfrKgPxTuGu                           



                          LhLOw442rx1wWNEzuHItpi                           



                          BMcZAyoU9dLUivFGikPLqz                           



                          yOWwBEeok4vyVROsv4r1iH                           



                          HrSOLrssKoa5alAYhmpfJv                           



                          OIGwxZXnwMBhXJCod95mBT                           



                          wjpPqqoVrtKVYio8PoiSro                           



                          d3BdGhGcWGdcu0LhZ25ciS                           



                          JvbCBzbGlkZXMgcnVuIGFs                           



                          q62js5oyGNJdAeV5wMQotQ                           



                          Y3rAGjxIXdp1NljPjdSBJo                           



                          x2hyHTVfog4vnlyoiRQvl7                           



                          MtgS7jhnknIGegxMOtlvEq                           



                          ZBZwt3u1zAAaOMJuVJRmGP                           



                          slaDl1VERnd081tn1xzgE1                           



                          hCXsKKV8MNvsSBGrMHJjtu                           



                          UgZXZhbHVhdGVkXHBsYWlu                           



                          XGYxXGZzMjJcbGFuZzEwMz                           



                          NcaGljaFxmMVxkYmNoXGYx                           



                          IWraD2slDxKjF3VmMLZkNf                           



                          KphLAaH1iujMVlRCLtCYgf                           



                          XGYxXGZzMjJcbGFuZzEwMz                           



                          NcaGljaFxmMVxkYmNoXGYx                           



                          EDxvK7lmMvWeV0DrXAThPt                           



                          IgIFxwbGFpblxmMVxmczIy                           



                          XExtsunePGInJVwuY0adJa                           



                          EqZBVivYcyBLfoh4KxQXSn                           



                          QLBuClzncyLxAWk9loQwXU                           



                          BhclxwbGFpblxmMVxmczIy                           



                          XDpdkvcsGPYoELaeT4oxCu                           



                          OkRJNqiAdiQThzb1LzHZCq                           



                          XGNmMlxmczIyIEltbXVub2                           



                          ugm3RqW8cgdXflbZS6HLBh                           



                          F9sgySRiuMH9DOZ8lG9qQH                           



                          cinvNyYVOmc0SfYSOqQACj                           



                          CsN2aE0uAOL2YbFAeTfdMN                           



                          BsYWluXGYxXGZzMjJcbGFu                           



                          ZzEwMzNcaGljaFxmMVxkYm                           



                          XpUEXpAZumU9lsGgBsN8Fn                           



                          POOoOjZiaFzpCXbyUWr7Mr                           



                          xwbGFpblxmMVxmczIyXGxh                           



                          krhvBCXnBWxdN1dkLpSuGB                           



                          NgfAibLGoah5LaBVSpZZKd                           



                          MlxmczIyIHMgTWVkaWNhbC                           



                          GPXD00FUGuTYCepCrecI7h                           



                          oAYGLLOedpW7j5S9ZOomLK                           



                          PqSAi4NYmavdBzYQNyqX3u                           



                          KQDaUZ0oKTi2fjTaNPRke1                           



                          PzDU4oAWAwmYVrVFZ4SNZt                           



                          f4QxR8Qyp7RnOTOhSWBtzm                           



                          0fdoQyIiYVtEWsKHLofw05                           



                          PNRbSJ0hC8exFBRiNVPbkb                           



                          IqcWRdl5BoAZBorEL0tRPw                           



                          BB0ELbPUv93vSUGwAPUXjv                           



                          KnJAXjzSgoxCO6ggK5kH6b                           



                          LiBUaGUgRkRBIGhhcyBkZX                           



                          Dusf0dyaJzRKLrEBXya9Fj                           



                          uOArhWXrynFdH5Uwp6BsOY                           



                          Vvaf41GVrimITzwj91AD9a                           



                          K2Yrw4XniC0tAFloOYVlc7                           



                          TysGOmiESrCPQrh4GrO3xy                           



                          ipfaVBvmdMYbdX0cCUZyNB                           



                          h8KCVet5QcWMPld0QrIvWl                           



                          iqSmWFCnJYQbIRHugB39KZ                           



                          E8gVhyoUhislWhGL4wUYIt                           



                          ilIfAMJyCEXpiA8qSRuehx                           



                          AhINAwnqG7s5L2MJnwUUUq                           



                          gsNoLbnvNRT5ytLzfkQ9zY                           



                          AqB3itfakuHRprSEAqk8Yn                           



                          kG8hdLSRpPQqo7DcpGPujD                           



                          ZDgPRhLA1npgIgLJ3kRYA4                           



                          ODggKENMSUEtODgpIGFzIH                           



                          O9HEsoMbxgBTS9bxHpGHUe                           



                          n8AfCMkwL5goY46jzGekuN                           



                          k4uYZbyJrisCHxoTDdZBRo                           



                          gyJ0n1B9XSNdt0PufuyvGD                           



                          BsYWluXGYyXGZzMjJcbGFu                           



                          ZzEwMzNcaGljaFxmMlxkYm                           



                          AaPBGbSXlsW3wwWaXmBqBp                           



                          XvhwEKY2dA==                           

 

             Gross assessment was [facility]'s                           



             performed at ([facility],                           



             = 8308)      Department of                           



                          Pathology, [address] [place] [address], Tel                           



                          [phone]                           

 

             Technical component was [facility]                          

 



             performed at (test code [facility],                           



             = 2778)      Department of                           



                          Pathology, [address] [place] [address], Tel                           



                          [phone]                           

 

             Professional component [facility]                           



             was performed at ([facility],                           



             code = 2779) Department of                           



                          Pathology, [address] [place] [address], Tel                           



                          [phone]                           



[id number]38





             Test Item    Value        Reference Range Interpretation Comments

 

             Case Report (test code Surgical Pathology                          

 



             = 104)       Report Case: [id number]                           



                          Authorizing Provider:                           



                          Elayne Longo MD Collected:                           



                          2022 10:01 AM                           



                          Ordering Location:                           



                          [facility]                           



                          Received: 2022                           



                          11:57 AM Services                           



                          Pathologist:                           



                          Homer Thomas MD                           



                          Specimens: A) - Large                           



                          Intestine, Colon -                           



                          Transverse, Bx mass B)                           



                          - Polyp, Colon -                           



                          Left/Descending, taken                           



                          by hot snare C) -                           



                          Polyp, Colon -                           



                          Left/Descending, x3                           



                          taken by hot snare D)                           



                          - Polyp, Colon -                           



                          Sigmoid, x5 taken by                           



                          hot snare                              

 

             DIAGNOSIS (test code = [id number]                      

     



             3220)        FuZzEwMzNcZnRuYmpcdWMx                           



                          IHtccnRmMVxlcGljOTYwMV                           



                          thwrNnAYCguPJhR7Bvbudc                           



                          LZmzIC3gEO0tpIscwUIetO                           



                          ReABWoFpTfw9dph102nARt                           



                          h7biAILJlklvqKb3gVsoC2                           



                          9mv7I1AqooL52gwLTaZBF5                           



                          ZLSsTIFxvDEhTCPuYCP3QK                           



                          WafNDdU2lkOCNaCX2qufdr                           



                          XEjvICmvKOHmoEU5MJBniE                           



                          QeO3SuPZVzQTjsFCDqsqs7                           



                          UfVsYn8vbJEzkGvbODjhTY                           



                          RsUSQmHCtoLMTgFmTwGY6j                           



                          ZBHYD1ZoSP0TIUOJCY6ESP                           



                          CHMiNRW8XZJpVLDFNEDB2X                           



                          XG3AZ2LnCPHKK3XOJPfyfL                           



                          CyTLUlXELCYyGVS0uZVYBV                           



                          BOMCE0AELiKFFv7ELHyoFU                           



                          UjFXHbLIGMCNVRCDKIS2TV                           



                          H6SJZsYQLXFAGepILAwYTw                           



                          BccGFyXHBhciBCLiBMQVJH                           



                          YRHVSgPJN8DGUsRoLFBZN9                           



                          ZGVfMOAvpgM54EU80nSA5E                           



                          ZDHrQMPRM0BMROnvnAXrAL                           



                          BgNKJPIEIIGDKQZBEDKV8S                           



                          WBVfULJBJNzVSJ6HAKWuGK                           



                          AjzxetIDFnNq2gWJMYO9Tf                           



                          WQ1LKGLXSY3BEGAFDWVOLC                           



                          5YEC7WOGXLFV0MJNNOIPpB                           



                          IHggMywgQklPUFNZOlxwYX                           



                          IgICAtIFRVQlVMQVIgQURF                           



                          Xz9NSSmzIqMRV79KGxUPOX                           



                          ezQICnFUWzHGOQO1MHRIAy                           



                          U1BMPqMIBWVrPMQCPH5YVY                           



                          xwYXJccGFyIEQuIExBUkdF                           



                          JAxENDKEKLfOKZarG6mSDJ                           



                          3SEZVLU7wAIsUQM7zPYMZ3                           



                          JJVdJDWNY0SSPVaayFDlWH                           



                          KrDPBPCZFYWA7WLNyCW0VZ                           



                          DPFFFI5VTKMmIANBGGvIQD                           



                          5URURccGFyICAgLSBIWVBF                           



                          GkXTJSHLVRYaRG9GYAFiPW                           



                          Wrql64MXA0IsApn5X3JGJ8                           



                          HGBqHLZbl1rbHTTjwOIhWh                           



                          EwMzNcZnRuYmpcdWMxXGRl                           



                          FgKmq7szt604hVOsd0lxPH                           



                          MdKgA1wXFrWRDogMJlX100                           



                          BFTjHVvxv7gcb8JbFUFtuM                           



                          Vkg5D4IZWYisyexDg1kYiw                           



                          S35mn0Y0XmfdN0ydUFUlNO                           



                          GoO4NnOK5pZFIfZgh5BJK0                           



                          RCO7ICKiSEHeM8QkOD4qRW                           



                          SseIUdFXu1j6izeYdbWMZj                           



                          ADV5o9uiSOmvrfZlZN7kwb                           



                          3heWh2v8mjftCxRQZaDWSn                           



                          vSGFBURoX7HhhPsaDw2gtQ                           



                          z2uOypHddwHWJ8Sdx9FA8c                           



                          vn93qhe3zRsvWNPrnoabSu                           



                          W9VUqbQYFumpqkXUs5RHci                           



                          GNSslGC4TNBeqJYmF7TmHX                           



                          OhCH4isvi6YVG3EWxoRTSd                           



                          ToA7PYRhlFUzWROvkAkmZH                           



                          pnp081BBX3BkJnUZ0dJ5Ud                           



                          j8O3mJ9wfSKtNJNjcDYmYj                           



                          MiFDQdvl2lsKFoVCowg6Bp                           



                          QMX3rmF8mQVfvSRiDHIvMv                           



                          Q9OPbwTL9fxv27JUEsEVK5                           



                          ge2kvFVabHbyqfOczITlGD                           



                          ifW1PwOOLxq292EAAdW2Tl                           



                          GUXgj2X0cwTaHeWsLUPviN                           



                          O4zdA5FFBbMG9tpqtrl1cm                           



                          UIstAZayHQTjqxR3rdQ1FX                           



                          YpsKUjY5JtzJ0cVZUiIH2q                           



                          dlpcv1naKST8WHsaJLMtXE                           



                          H4ReMuHACps2Ferzp2PjSf                           



                          n7XuuDEaOYhlN15qn019HA                           



                          UdkcEyG6nqaFRogtsfxWYr                           



                          kqyoTEtfcwS2KRVzUXonyy                           



                          zgZGWsFCqnO7ibOuIbSPEn                           



                          yBjaACkdm8TgSIKaUUGdUr                           



                          GzjISaTOEeJgd1UOEmzHGn                           



                          AGEmPoVvS7lnrgynEhUERP                           



                          Rez6xtE3yzfZLMxEToY3Vz                           



                          UGhvbmUgTGluZTogNzEzLT                           



                          w7PL90WWrfOSHoyb71                           

 

             COMMENT (test code = n1vowDRuOMPcvEZ6LtDpSC                        

   



             3359)        Oqy5rug1JlrKDoiSClCCni                           



                          sRBubgSkbp14bFY2xM40FN                           



                          9nWZUsJfP2EVJlclC5Dgh6                           



                          MVNnETIjvBJiE836d8dxd8                           



                          lhgcJszMW2vRyqHVXrdxff                           



                          CgU2BEsmSEUsoepqSNi3BA                           



                          ukUZCkxZI3XMMqoANpS3Va                           



                          XPCoAZ0zolq7HJO2VNfiZQ                           



                          YjTfS2SHHuxOCxYSAvdEui                           



                          TBhbi135BCN4FuVbVWOjxm                           



                          XgtYqhbY1nOiMhHFGTuC8p                           



                          ayBBMSBoYXMgYWRlcXVhdG                           



                          LvgHKuv6MoH0RogIUkIQCe                           



                          fRxlGv3iRWY8iWZrYGGfcc                           



                          McyGmqolvbb0Q2ELbfri1g                           



                          cGFyfQ==                               

 

             CPT Code(s) (test code z4elcTFwRUUnrCY3FdQvDV                      

     



             = 3357)      Xuc0qft5IneYUdbYQlMDwf                           



                          bEJprnTsdp18bIT9bB79XE                           



                          9bBKEdJlG6RKUxrtP8Uld6                           



                          JKZoYIFigURbI898z6bsu2                           



                          fdoyOuiZG0pOhaPOTrgmsf                           



                          DuO4JWyhTUOzmradCYr5QD                           



                          eoHKAtoMV8YBTayCRfB9Eh                           



                          GOPuJM3suys9IJR9ZVkzQP                           



                          FtUvD6ZNTaqWXiYSVfsHes                           



                          RAfbn028WOM8RmHfKGLwuq                           



                          QetOoaeY2rQnVtSPY3SPNl                           



                          AQX0RPAzYMc1AnToIDT5AR                           



                          T1DUL1JNMztNBiiU==                           

 

             GROSS DESCRIPTION (test r8rsuCDuTWDflAR8XfXaIM                     

      



             code = [id number]) Xte1bjn4NvjPMdyVTjYFfh                          

 



                          uYQicfJiau40yES7fX15VT                           



                          8cTLAmDnK3MTHnieY7Ekj1                           



                          UOWnUDTtkEZxA069r5ttf9                           



                          pcqdRgqEQ2MYXeFJYvF2Qg                           



                          MR3oCGInsRFxS88zwEHdQE                           



                          Y7OTWaEVSnwWBbRMChDYJ7                           



                          OSUvqXOwK5fnQKEeQT3oyk                           



                          guPRamJRtqGMQbmUL1BYDy                           



                          fVDbX8DpVRVfIVafGOUhfe                           



                          v2ZlBiPk5exTMhhTkfRXue                           



                          PSDlm6ckBIUyxQMqNNQ4DG                           



                          srbGUiPSOrRZHmCJx2TLQp                           



                          JOwprAPpSD6noOicCjjbgH                           



                          fyn9OuoTOyOLxuHIDyVDHa                           



                          DJaqBKXfI5MMMABvAlR4Dj                           



                          C5BtMyDNc9HKl3EF1BZqIe                           



                          DAOgHIDsXET3RFChIIz5TZ                           



                          tjWN1IFLV6WdE9HXv1SHK0                           



                          NTMyMSBcXHQgMiBcXGZsIF                           



                          dgIiESflembYSlXC3nrTid                           



                          bGFpblxmczIwIEEuIExhcm                           



                          rsCFplxYSswSwkRCpoV61r                           



                          s72mBDMExhEwu0KgnaYzEk                           



                          xwYXJcZnMyMFxjZjEgUmVj                           



                          WUt9BZPpyC4eHx1jkVQvrR                           



                          3fsLIpVQcuEFZ0kRWlLWAf                           



                          VXBcJZBbDR91RDliLPGnci                           



                          FtZSwgbWVkaWNhbCByZWNv                           



                          cmQgbnVtYmVyIGFuZCBcdT                           



                          ohBuKcWOc1N3ptamjjCAcy                           



                          jHOraZaiCU3tu3pawg46nk                           



                          Kiu0MueqTmLGQzj6AccUJy                           



                          IEZtVrXrskMeX47ve5phvD                           



                          Qkd1GznAAzyWjuePEtkBXt                           



                          LXBpbmssIGlycmVndWxhci                           



                          Anu8P5YBYcj4M8MOYbklKi                           



                          fIUlzDXxKXFfHUV6WWPkAY                           



                          J2LPQlJuRsoMMrahPxS6hi                           



                          ZWdhdGUpLiAgVGhlIHNwZW                           



                          LxnRFzJCflHPA3Og6byJWz                           



                          WZSwvgA0w5GiZKrnAYVdTh                           



                          xfSFRsO6IdO8AmxzRjwEGp                           



                          WKXnxvNqe6uxUAR3OZQuqW                           



                          QrhDGpUeDxNwhoENY2l3yn                           



                          INWmiVUsPSV2UNbegCKmFI                           



                          EwMDIgXFxkYiBPVlIgIiBa                           



                          OwCeGQT8VpXoGQw3UDwhT9                           



                          XQGBHoSJA2DNU1YmfcCgF1                           



                          DKn8MNIYDj1nPHF9CVhsBD                           



                          I1MVD4AcJgCEpqlJAePItk                           



                          ZmwgXFxmIEFyaWFsIFxcbm                           



                          Z9ILHaQeGzS4FvAYAxATHq                           



                          wLjjUXDLd9doscOhKUfhKb                           



                          BkLIDnT5LtDIjgPe0myCRi                           



                          EWYyCkNlI6ExGNVlF9Iqip                           



                          VuSTioGKJmjh8owSywRWlc                           



                          FkHbDSHty9x5xUH8fSHxlS                           



                          T2gNRdsHfbBkNkWC1wkTUd                           



                          BI3zQPiqMDcbwlKkl1UbMH                           



                          50mHQfrwFxuzWwULA0HpMe                           



                          FIkgCQOll0a3lUdlG48ct7                           



                          4frCAlgQTeWXSrTN0neO0t                           



                          VPTtb6DiPWQ8HOxjqAVtpl                           



                          UbCSGfHM5jQBQccbJsl1Gf                           



                          FY1tAW80eFHcyUkpQMPjha                           



                          5xmM6vAGTzqtYuH3HuUSFg                           



                          UN82v3miJCCzRqRomDpgr4                           



                          TlZDNjKZzvDV77WQjaCzDw                           



                          zWFmKbXnaXDsJtDdL02yoE                           



                          0lHHbhqhMzHAZaSJ3oEJQl                           



                          EYQxvQRbiR2ziuOgizZmxP                           



                          AbbWT8GNZmmZ7gbM32ksVe                           



                          tkUABC67ZEHrySNqGWE2LU                           



                          3zWTBhmckmTUGhTZFtDEK4                           



                          ZRtrwO77bKUgWYJeYVNveC                           



                          SwnHlyKhiiePozj8UunEBz                           



                          XGlkIDUxMDAyIFxcZGIgT1                           



                          RDVAYjKzZ5YnV2VmAfNAo7                           



                          CUe9MZ9GClIaWVGdXGYwVG                           



                          H3PpVwUQp2RAvtLL8UMIQ5                           



                          XeX8KPqdXYB6DWUaTRNeQI                           



                          QgMiBcXGZsIFxcZiBBcmlh                           



                          rQXjQI3joHjpmkLvEBAxZG                           



                          XTLtSMa9h9yJdbJ42dp23d                           



                          CZJOFWU4T0Hgx6BytrNrwr                           



                          cuXHBhclxmczIwXGNmMSBS                           



                          UDHvcTDrGDClpeKdk9RdSQ                           



                          xpbiBsYWJlbGVkIHdpdGgg                           



                          mJipYNKxaEzdfyHxD0K2kt                           



                          BqLH1pLRHqBYHqV3NcNKTl                           



                          V41vPVSxtM6cFRAhEF2sTS                           



                          i9FIJgLEAzGmeycK7cgUDs                           



                          THGxzV7xLUunZoUfCDEuH3                           



                          FfXLbqTlF9UxM4BQontfGu                           



                          yAFhz5Oxs12xomFkVPHrNP                           



                          Ted89vbQC2lqLcWnEcgHj7                           



                          vGJgGTG9KX6qhTRyjB64UA                           



                          Yurk9jTFYma4F1UJWlk5G2                           



                          ZSBmcmFnbWVudHMgKDMuMC                           



                          O7UFXbOTA0MYFfLPBymLPi                           



                          tnHaK9kfSGcsvVFrGvTrTG                           



                          yxFMmbckfuh0Epw7CqfABi                           



                          a49llQE7cWXkhNGcFkUlB9                           



                          6vbzOxxIMbPdfiYLX1SUSd                           



                          xT6nf7bidrW7KT2beFgadc                           



                          TnfCNbr0XxFmTuZD0pNHGu                           



                          KJGolYLpuI2ijvYiuqClbR                           



                          DifFZ4SPReOQ49qMDhhQur                           



                          cT0lXkFrJcFbNFYnrkwwFU                           



                          JmUR9sZPChMWR1QVKchvMT                           



                          rKIaPOJtk2IqqHggckShIG                           



                          AruW8yqWDtXKTnrzEYBEY7                           



                          pE9iEXEhBOP4WNEkdiPTOT                           



                          wwX56zbZB0zNNmoVIpSkPy                           



                          L22mnmIcYLEzxfRNEtvvY6                           



                          XrdMUis23raLV0uCTfvTHk                           



                          RLBzq0OslACoo5bkyQvii0                           



                          VjdGVuZFxwYXJccGFyZFxz                           



                          pH3qAiYzj9xgkOf3YGshoj                           



                          W2DPHczi54NWnaRSBsN2Dy                           



                          B1DoHNgtBFM1MHHrGqEjIY                           



                          TnVI0GEnHuIIsJJHJrSJr8                           



                          LkK6PYv0OGWSRwHrIvApRm                           



                          dvSJqfIGGpGHs5UPz9MGtK                           



                          RgU1UXA0VnR3OjXnWOWaYe                           



                          WcRSh1NHDpTXbzfVXvAFOq                           



                          ERYlNPckIJxkY40yEjPeSG                           



                          wfHvEuAS8eCS8dkTAnHCUx                           



                          cL7yKK1wP6snsD0gXV7dbB                           



                          IiOCJqPlZbY8WlRBWdY6Xn                           



                          xsOxQSfePATkcr3zoSkoBC                           



                          lqSsUoJDZhh6g5oAC5jJJh                           



                          yPH0aKOelLgpThSfLH5etA                           



                          BhXP9mEMbjWKpxftWiu4Yk                           



                          OI14xEYzztXlshEtYGL3Kx                           



                          ArYYluOPPpt6p0eXroH08k                           



                          b09cm4bjeV8zEFQ6JJI8RT                           



                          whqoJzlAYeo2Isi99cgsTj                           



                          EKQsECSee72muST9gcIlJc                           



                          EdySc4kUYhPWG3RV4rfExa                           



                          vzepq8WsmUToMAHyUTUlM4                           



                          SiLRVmRLCnk2X9CPSez5Y9                           



                          ZSBmcmFnbWVudHMgKHJhbm                           



                          sxrvyqXaVzfYGgJnTeA61m                           



                          WDHrPhmkC53siM5cG9IvPQ                           



                          Bgl9ZpMElaSA1vaU5xOFK5                           



                          LjUgeCAyLjEgeCAwLjQgY2                           



                          0gzS9zZKkaxiDhGIEbZH6b                           



                          COYrOQLpELOqKKI7GDHwPE                           



                          CzC6PoJCLpBUKjl1K5UQQw                           



                          t8E9SKEbpnXskIEwvCWayf                           



                          UnjJJvcnstYUP4DHDpfG5u                           



                          z2ynxlH2CP7vyCtoixnlMv                           



                          4lJNkxmYHlx8CtcCBpwPKz                           



                          L5I6GET2ldXlV8HpTMMIkO                           



                          Phx5PhD2owLF6hbYCkr7Ix                           



                          sCb6jYAzUZFgfZfpWJg7IH                           



                          luIEQxLUQzLlxwYXJccGFy                           



                          YKqtc3BzvUXHODhnfNIahw                           



                          YWaWg3CFcoMTWyj3R1JKYn                           



                          zOyrFSAwR2KxJ6JuqcU2q2                           



                          zxkWlqt8ZwtZQpQL7nqKLe                           



                          fQ==                                   

 

             MICROSCOPIC DESCRIPTION d2grgOMdCSFovZJ0FzHxNN                     

      



             (test code = 3371) Wml7iiz2YtfABkpJBiFZtn                          

 



                          cRWosyJtql20xKT5iK46QV                           



                          7cLSSaMeO9HOSdbuK1Ghy9                           



                          PRNzQQKxhSMrX559n6cbi8                           



                          owrsOsdCB7wEbcTQCsazjo                           



                          VdO1FIunGSYefcjuLUe5DQ                           



                          llCEIsvOL5GHDsmVTuB2Td                           



                          ANFkPY0xvxo3RLM7SFghKB                           



                          HjPxA0AWEreLVeERGvjSkd                           



                          NJmtn405HNA7GzRhRDYveu                           



                          ColDpfjZ4qYtPfQWKFtK7r                           



                          tZVoyYk2k5ddTXGhFAJdeR                           



                          EziU4fobPtsH7gh7OrYBUr                           



                          DCMyr9WgVKGxq17ytFGvcB                           



                          LOQFBfHAO7XJMjDP4lV0I4                           



                          cVYyCQwvSUH6tB1mDNRpyK                           



                          yhWGtdsGequ1GzqC5hMPYe                           



                          CRX0aILwJJUbnUEhoQEjQI                           



                          LbNWGhgnMnv9psw0LfdX8l                           



                          bmcpLiBNSVNNQVRDSCBSRV                           



                          GUBLHpXO7CQlkzSCTAHGFK                           



                          HoQjm42pOEKmkORCRbrrLK                           



                          AroHllNX6baW0heMgimJ3w                           



                          yTFfvKM3itssnjYeaSz8zv                           



                          eseUKbYMLbVWLHF4roRuxz                           



                          GJWsPD38QPI5EGUxRZLhpS                           



                          BqIZNsKDE9qMXtr0Mit49a                           



                          pUBgNY2TKX01CjAvJsFRdb                           



                          GpJ5RmUqDwLS27N4vvWOSt                           



                          DQxzbtYxk6ideswaKQHkKY                           



                          rVKBR8PBjjNWexgAMgoCzo                           



                          MCAgbnVjbGVhciBleHByZX                           



                          LyeB9tBKVahwJTXGCpUdbe                           



                          HTObQR83FKI7YLSbQIWxyF                           



                          MoFNFhCNF0aXTaj5Vrz06u                           



                          cGFyXHBhciBJbnRlcnByZX                           



                          IchMfxbratpOTkNTMyZd5q                           



                          xR3il5vpJXXns9ZhblXpnO                           



                          EbliYdeNWoDEDyxY6tJF8n                           



                          LF5IMhSdlf58VZbafcdzYX                           



                          LylN85PJQty9JqVeuylOC2                           



                          OIElSRHkHcZenPAky3BmeC                           



                          NjoGl6NZLupeM5PTAbmHq6                           



                          wX8jrFlaXZmAY6giHQwlCF                           



                          xwYXJ9                                 

 

             SPECIAL STUDIES (test x8rgzUUmZXAbb5btMJXfiE                       

    



             code = 3376) FuZzEwMzNcZnRuYmpcdWMx                           



                          OQgggsOcCXpdh9KtL9EuHq                           



                          AwMFxhbnNpXGRlZmxhbmcx                           



                          BAHjAZO6gkEdESHeERtvQO                           



                          ZzAFqiPi9yhCByaNbpNzOa                           



                          AFTwo1keetQPnagpcLb6a1                           



                          muZNZdMgE8dNLkYPsiW7mq                           



                          cwUaqOUqC4QfvAIqdUt0q8                           



                          voIhNkHpG9jSOiPAfkG2fg                           



                          ciLjjAEfYTHqNTo2mU43ZU                           



                          JlyO5osBSqAEqkshRmBnJ3                           



                          SKnoGCJaTzH1ACLouWXfQU                           



                          UcV0vpGFBdFRdjTVBlCVel                           



                          zGQeMBX1aBubb4R6qJFqlX                           



                          PgpHekLrIzEjIeEiFFp5Im                           



                          EPq9uCpiI8EjPWFxRnY7rP                           



                          QgUGFyYWdyYXBoIEZvbnQ7                           



                          jAxgdeNux00jxWNrPNYgIS                           



                          XpIyCpmWdbPZLlTAEIc8Sp                           



                          uResAQE7dRz3gWwlSxxiVX                           



                          O4Sef7KE4hex46uvn0hIrv                           



                          GURqljdtVqS8UBwfYPKsyd                           



                          llVCz4EAiqDHFyjYD7RNTn                           



                          bDDgR2OdCLPbQD4emsx8IA                           



                          J1WNolAVGsLqU7WYTirJQj                           



                          KHJmjRgqBWsby188WDM5Ow                           



                          UkAW2eH5Ycr8L3uS2oeEDp                           



                          ACNqlEYtHdRiPGIchn1jtE                           



                          YfBTndb9NpESW3tlB9oKDb                           



                          iPFvKIJvHT92Jvniy8OlBn                           



                          jke1WkX74bsQE4HBjov8zp                           



                          FS4tDoZ3eoWcXMzsw2zvoV                           



                          2bJvL8YPfvSS2sRO7xYUYu                           



                          nU4dgaxgNAOlMpIgtjocAM                           



                          GrfPxsrvGtTy7hoDniEIN2                           



                          GKyfS6rdmJ0pJkL4BLqfN7                           



                          gjzE8cIDr6PDkdkNE6JUCm                           



                          nF8rPH1gfnxoz0wzWOzgMT                           



                          fgTJByukB7haM4AYZajVCk                           



                          W2MnnR9vOQJfCD1rextkk3                           



                          tsATY2NCyjSEZnMNW7GlLp                           



                          PSBcx6Ysuth6JtTle1DhmK                           



                          LiKSihC13kw525FODqjnWq                           



                          S3gbxWAdypccuSBumqrtXR                           



                          xkbmM5SLXiTVLuHCbzIICd                           



                          XGZzMjJcbGFuZzEwMzNcaG                           



                          ljaFxmMVxkYmNoXGYxXGxv                           



                          J0yoUhPfG2HaHMCsJhLeYX                           



                          arGIbmuXOrmOBsmDU1yC7u                           



                          ZH1cIIHmkMRlM1AeWOUrhc                           



                          OhkIGuZIG4jPBgwJPdFX8s                           



                          TXdetBFfn5fzq0FkL4wayS                           



                          gjvGW7IU5fFTRwAIRaVXmu                           



                          j9UcpI0oVzwgpIBrpotzVU                           



                          xmczIyXGxhbmcxMDMzXGhp                           



                          R7zlMlClHXHzmBrsTHhfs5                           



                          NoXGYxXGNmMlxmczIyXGx0                           



                          cmNoXHBhclxwYXJccGxhaW                           



                          5xXpCbObYaCdsgYV7xOTRq                           



                          Y0mqiPJiAKYaQFCuG8jbNl                           



                          CnyI5niTlhMSyoGjEoYhDu                           



                          MbQQk018dc5fISIfuCSkch                           



                          YLiYVpsP9sYTjyVHdzHDbo                           



                          oVJiDDgut3pvWUAcw3i3eZ                           



                          ZxHEOmkhKmj7ruKBhehfOp                           



                          QXHjqKXcqTMmJKGoe41zCK                           



                          lgiLbjtQqaUJDow4CceDjl                           



                          y6HtJfQvNQjex2EyI67ydT                           



                          JvbCBzbGlkZXMgcnVuIGFs                           



                          f53ho9iiTWHtLmR2qEIfsM                           



                          N7rXAfzXNiv0HeuSjyYBHq                           



                          w7ftXJDkfc8reqqczHIgi4                           



                          BngN4etkdjBMoliCAbumMx                           



                          EEYus4s9hKBuUSTiPIKpNF                           



                          nhbDo9GNJdw337ax7umnZ9                           



                          zAKtUFI3RDefAGAuGXFrps                           



                          UgZXZhbHVhdGVkXHBsYWlu                           



                          XGYxXGZzMjJcbGFuZzEwMz                           



                          NcaGljaFxmMVxkYmNoXGYx                           



                          BZclW1toTiCgQ2EvEGGdQu                           



                          OmdHDgV3cvrMMnBGLhRCil                           



                          XGYxXGZzMjJcbGFuZzEwMz                           



                          NcaGljaFxmMVxkYmNoXGYx                           



                          BLxqW2pjLgUiQ5SrWOXeMw                           



                          IgIFxwbGFpblxmMVxmczIy                           



                          SSuyzgjmSXUiYMnbF3pjXj                           



                          PhJPYzzCnzXEmjr8KqRKGi                           



                          FKLlQplbrfOzNAn0bqNpLN                           



                          BhclxwbGFpblxmMVxmczIy                           



                          FYgvmriqRVTpNTyrT2jhRd                           



                          XwJPIvbKhcTAnvu9DcDIMu                           



                          XGNmMlxmczIyIEltbXVub2                           



                          ywf6RcU5lvrKdhvEN5JTDt                           



                          V1xhbXFstVA7AUX1jI7rZM                           



                          dbaiDpAMLge5SpTMBjGEQh                           



                          EbP8vY5cVRF3BmFXpMrbCY                           



                          BsYWluXGYxXGZzMjJcbGFu                           



                          ZzEwMzNcaGljaFxmMVxkYm                           



                          BbAKHqIFsvI3atUxFkI9Ee                           



                          UUWgCzImeFtkCHobQUh2De                           



                          xwbGFpblxmMVxmczIyXGxh                           



                          uekxWCGyFSjyJ8ceDhMbVP                           



                          AhxPewYDgma0BxUMBsGBUo                           



                          MlxmczIyIHMgTWVkaWNhbC                           



                          RVSK54LIAuMXZekChwlL3z                           



                          fMBJDQXdmgH7a0R8INwfWX                           



                          LnNPt4HFevbmFlXTHqaV6e                           



                          YLLjRG9eJSo2txKqYSPdl0                           



                          JpHI2ySJIneMNgMVF7NNXn                           



                          b5HpP6Lfc4HxXZAlUATria                           



                          4udjRtXxBMfRJyCUAmek76                           



                          PJTtTX2yJ4tlUUPgRYHwoj                           



                          VksCFik1BsPWUcjNX3iIJn                           



                          QR2FLeBJe39zZEVcRQIPmw                           



                          FoOMNgxPjrdFN0fsK9nD7j                           



                          LiBUaGUgRkRBIGhhcyBkZX                           



                          Yawc7mmuMlSENhAFMqy2Zo                           



                          zOQfcXAvsiZvJ1Qyi1CqVI                           



                          Ggqg64NScdnMFahl61WM6b                           



                          H8Rng1EpeS9aBTxtJEUcf9                           



                          OjdOOnfJVfTMAxt2ItF5kq                           



                          jajfKXghmVBnuT8yIBIkXL                           



                          e0FRRrt7XoGCJpc5YpRfOv                           



                          sxCoUFIxCACzWQIjgD99US                           



                          K2gSzktGilrpQeQB3dEBRs                           



                          bkMjQTZgAQLlyF4jWTaend                           



                          TsOHLseqE9f1M0IYwsESSk                           



                          oxNtAsneTSK3rxNijzD7vC                           



                          JqI2lhrfdlDOmkOIQoi7Zl                           



                          pO3yiKSQsRDlq8QcwBDtgA                           



                          TQqYXfQR5lvcPqOW3sTDH5                           



                          ODggKENMSUEtODgpIGFzIH                           



                          R7HWhwYfjvCGV5bnMoFALr                           



                          u0IeDCycA1qpR27fqAavoG                           



                          q3zUUisDlbcSXpuXQxWISx                           



                          lmR5p0Y4BWRoi7PhjgjfAG                           



                          BsYWluXGYyXGZzMjJcbGFu                           



                          ZzEwMzNcaGljaFxmMlxkYm                           



                          KsGQKoIIfiX7heGvDlCgAk                           



                          BgqcBWL2bE==                           

 

             Gross assessment was [facility]                           



             performed at ([facility],                           



             = 5432)      Department of                           



                          Pathology, [address] [place] [address] [place] [address], Tel                           



                          [phone]                           

 

             Technical component was [facility]                          

 



             performed at ([facility],                           



             = 5077)      Department of                           



                          Pathology, [address] [place] [address], Tel                           



                          [phone]                           

 

             Professional component [facility]'s                           



             was performed at ([facility],                           



             code = 2779) Department of                           



                          Pathology, [address] [place] [address], Tel                           



                          [phone]                           



[facility] [id number] 08:01:38





             Test Item    Value        Reference Range Interpretation Comments

 

             Case Report (test code Surgical Pathology                          

 



             = 104)       Report Case: [id number]                           



                          Authorizing Provider:                           



                          Elayne Longo MD Collected:                           



                          2022 10:01 AM                           



                          Ordering Location:                           



                          [facility]                           



                          Received: 2022                           



                          11:57 AM Services                           



                          Pathologist:                           



                          Homer Thomas MD                           



                          Specimens: A) - Large                           



                          Intestine, Colon -                           



                          Transverse, Bx mass B)                           



                          - Polyp, Colon -                           



                          Left/Descending, taken                           



                          by hot snare C) -                           



                          Polyp, Colon -                           



                          Left/Descending, x3                           



                          taken by hot snare D)                           



                          - Polyp, Colon -                           



                          Sigmoid, x5 taken by                           



                          hot snare                              

 

             DIAGNOSIS (test code = [id number]                      

     



             3220)        FuZzEwMzNcZnRuYmpcdWMx                           



                          IHtccnRmMVxlcGljOTYwMV                           



                          rsiiRmALRbsKRaU6Hxqmid                           



                          PWdbWU2eRP2foScamLSlqZ                           



                          EbTQThPlCap8prd243lZKz                           



                          w4yyIVPRvqogpOj0oQfyA4                           



                          8pg9R0KhdiG18xzBHvWBF4                           



                          NHTaXPDlvDVdWCAlHLQ8MF                           



                          CefPDbJ9slBWOvHS8ynqco                           



                          MKtgGMrbERKfjED2PAGaaG                           



                          CmT8AiSPYtERouIIPtaqa6                           



                          MfHhZk4fdCTlfColLFylXW                           



                          MyWTMfBEfuJUWtDwOwEF5f                           



                          XHHPR7VzVZ0PBQCJOE2DYS                           



                          PPTmPNK7UJClRMWRMQBC6Y                           



                          PE9MJ5ZwZUPZG8QLPHjmwT                           



                          DvXNPhOTNCUbXMP3jNKRDY                           



                          KBCEW3ITDaZYMt1DKUicVI                           



                          KvIVPkUMFHGLMGGJGVY3ZA                           



                          Q5PUXjTGRSBMTpjMSOwZOi                           



                          BccGFyXHBhciBCLiBMQVJH                           



                          YVIVUdUEZ6GRMfOxLLEGS8                           



                          OAXhTDBcfeE56KV12uOW6I                           



                          URDlZHBWO3JCKDdpfSStZA                           



                          EcUMGZTEEDNTIOQSAIWZ0A                           



                          MEFcGBHSSVqUBU7GWTUiAJ                           



                          PbmzxuBZVhRw7yREOVO8Jm                           



                          EV0YVMAKXN0QXHUMRZVRSK                           



                          4IQZ2JVXQPDG9HSYMWEZjB                           



                          IHggMywgQklPUFNZOlxwYX                           



                          IgICAtIFRVQlVMQVIgQURF                           



                          Vt1KRQeeHtBGX39UBhXXMP                           



                          zeLTHzHSCwZCAKI0ZCFJEn                           



                          Z4CKCcKTTBYgWPWKER5MGZ                           



                          xwYXJccGFyIEQuIExBUkdF                           



                          FOoBFOTLMYiNJEamK3wZAM                           



                          9TBZVIM0lHRvZZO6gXXCC6                           



                          JAQwWFGBH0HESOrrgLGiJE                           



                          TaUPXQGDRTOF0UAMyMO4MG                           



                          CHBIZP6YWDLuJSEAIXfMZJ                           



                          5URURccGFyICAgLSBIWVBF                           



                          PhMQZVASVUBjOJ3ANBUvLE                           



                          Chqp92ZIN7QuXmm4M6XCQ0                           



                          AMWkTLRwb7paWHDtoMJzXz                           



                          EwMzNcZnRuYmpcdWMxXGRl                           



                          EeHkm3was458kYYxa5dqFL                           



                          AkTtN4vAOfZYWjyGLsM784                           



                          TXQlPGifw4wef1CkNXQjiI                           



                          Dvj5Q9KIMLzspokDy0dRrj                           



                          K53rc6Q2IxaiP1srEKUoER                           



                          JkX1SzSJ0gTLBhXfq9PHU7                           



                          NQG9YYYlKBCvC4PlKH6hVV                           



                          WldSIpYJm2f0pixTqdDEAp                           



                          ICT8b9klKHlmazYnDJ5zwl                           



                          1isCo9h4wlzrBhDWXyEJFi                           



                          rNGELMQvB6MsyXnwEp0hpE                           



                          a8cMgzAkpzFJC2Ozb3UZ1k                           



                          ar11ixm9dWgsIVMhkfkgTx                           



                          X8WNpnTTKollozTAn0FCql                           



                          GZMkmDV1LQAjwXOaW5DhJQ                           



                          FhZC4mfat3AUP0DWlnGLYc                           



                          DpT5FJKhhYDyAMRbxIzeLF                           



                          emp871KOA1PqUiFM2hH4Ij                           



                          w3R4eJ7wqQXzPTKchVNwVt                           



                          RgPCHwny7wmSScHKsln6Ek                           



                          BHW7jjR6bCVfdNQcGVDxHw                           



                          O6CJrqHF8uks98XBKdAQP7                           



                          bw7vuETadXrdlyRqbCVdUK                           



                          cjH7QzMBYpc523QUEcU0Er                           



                          POJxj1E9btPuAvNcFSVezJ                           



                          N5mbR4CBVjWX6qqbjhh8wp                           



                          CMkwVFnwMOIzzsU4myA8NN                           



                          TbiOWxB1HoaH0iBVEgOE2p                           



                          urrax4ibTAO8MHukECQyXG                           



                          N0BjPeKKEqr7Scuyx1JlIe                           



                          z4GeqULhNWhoG12cq238GT                           



                          BehhImF0bpsLRyfjtypSFc                           



                          obxjHJxubwF9QPBuYTsoir                           



                          wgQXWvVFijL2elHgJuCYEj                           



                          qDqpUIcns2NvTCCuYRWbAt                           



                          JyzPDvQXPwSjs3RWLagFZd                           



                          EGXbKoXcI9vyaqgiErCRGP                           



                          Xqg4ihC7reeTVGsOHcO6Rg                           



                          UGhvbmUgTGluZTogNzEzLT                           



                          i5QT20CIzeLOHkgl87                           

 

             COMMENT (test code = [id number] 8513)        Tth7ddn4MpxYRweJPfIUqi                           



                          zYFlvtPttm48lPL5tM87GG                           



                          9sGQDyFvJ3QPSgfiA1Ohc4                           



                          ASXqZZKahEAeO651c3jce5                           



                          bcgkMxeVE5sNkrKKDdxxcl                           



                          WxC6UEnvRZZlapoxAEw1SE                           



                          noLHIieWT9WGXxjNWoZ4Sm                           



                          JHBbYT7wgrf3EYQ2BPeaKX                           



                          QmTuV0IBRkgSFtUNIpsXqr                           



                          MOcfb248JZX3KmTeHGEgwg                           



                          WssLvezW5fNqZvVVYDgK3v                           



                          ayBBMSBoYXMgYWRlcXVhdG                           



                          DteEBvi2MeJ9TcxFVaYUZe                           



                          mLyxBb3zXFL6kWUaGSGydw                           



                          MewRanyjbzh2G8NVihnl5d                           



                          cGFyfQ==                               

 

             CPT Code(s) (test code p0uoeGBcCNPlyKP4GmEnHX                      

     



             = 3357)      Jpw3ahl8HulGRurAAzRRzy                           



                          nXAabxOhnz54pVG8yH42IY                           



                          3bNUDzQxA5HXZtliC3Wyb0                           



                          DLJeYQNdcLLoC707p6teh5                           



                          glvvYjpWN6bHcvOPOmkayh                           



                          GeU1MLgpSFJhrvxmXHt0HH                           



                          drZBOtyHG4ATTaxPYnD4Yr                           



                          HPVqHL1jfan5PJW4VQjtCZ                           



                          LaDeS1GLXunGCgOJAvoKzf                           



                          LLusa610VUR2RwKeQKYcuj                           



                          LjyOfjqG1tIqYfTTA6PMMw                           



                          NTN7KLDgMVq0HvPeTYY8YM                           



                          B0FGQ4BKXgrRLewG==                           

 

             GROSS DESCRIPTION (test u2ihlYLoDUQecZO3WbYcIN                     

      



             code = [id number]) Swv5ped2QmpDCliGWbXSat                          

 



                          sCGegfPdmr30mAN3hY50XB                           



                          6rJJRaJbR5CYWynoP4Eab5                           



                          TUXbDXNgdCKfT246z1whc3                           



                          rcayUabXR9IMFxOQIwO7Hu                           



                          RM3sDHMntTAuA75lcNRlSR                           



                          Q6KMZsGZGwzWFlYIQpHTF1                           



                          HOWvrUJjS9ixGZWzKW0uow                           



                          yaTQxbXKdcVAWzbVM1KAKp                           



                          uICmQ1CvHJJgQRqpIOIqee                           



                          r2LfLzXk1phCTwiKfrVKjd                           



                          KVXqd8unSXKvyRYuYMZ3HF                           



                          omyWJyFGMtWIIwWLj2LLSj                           



                          KWmbbKSoQW7oeIhvHovnaM                           



                          ldz7SrtJPrTSssSXCsNFGp                           



                          YRwtRXRbD8TPSRErSiN1Cp                           



                          D6JjJdVGt8VFj5QC3KWaQx                           



                          WCHeQXLpIUF6FIFeUYl7ST                           



                          zrWH5CNTW2SiU8QOu3IPL2                           



                          NTMyMSBcXHQgMiBcXGZsIF                           



                          xnNgUDawewfSYpAW4gmQed                           



                          bGFpblxmczIwIEEuIExhcm                           



                          jqCZapuCIxmGyjWXwwJ98l                           



                          c91hFIVIbhAvs2IxlbOzYg                           



                          xwYXJcZnMyMFxjZjEgUmVj                           



                          NBs0QNAnkL3cXl1mhHJntP                           



                          0daPFyDZxwYQS9vASbZVEn                           



                          VUZmEYYxPS64TRktJNEfmd                           



                          FtZSwgbWVkaWNhbCByZWNv                           



                          cmQgbnVtYmVyIGFuZCBcdT                           



                          fsJsVgHFc5P6ajdcfwHWhg                           



                          dOUnpAvaDS0ey6petj82fu                           



                          Fgr0FiyzYuWQKkd8PnnXVm                           



                          GKBkPnOlnaQfB20be3uyiL                           



                          Rjw7JykCSzkAhplIYpwRZf                           



                          LXBpbmssIGlycmVndWxhci                           



                          Zkg8I1SIVus1V9GTBbpgVx                           



                          wXFrmXLiTDTqOQS3YCCfWY                           



                          O6DTNpFoKytIPpciRqG3ly                           



                          ZWdhdGUpLiAgVGhlIHNwZW                           



                          BjoZKmIUnsXSK6Qe9yeYEp                           



                          QDOekvA3a0PsTJbbMUTiMz                           



                          qcTYIcU0UuX4NrrhKgwXFh                           



                          VKHogyUel8siIMV1CKDicW                           



                          UurUZqUuKvKmxkQOT0b5aa                           



                          TFJamUMsLTU0ZVikyKWnXQ                           



                          EwMDIgXFxkYiBPVlIgIiBa                           



                          HyNrDNH4RaQlRTt5PXeuM5                           



                          RPJEFvXOK9WSI6PwikNyK3                           



                          UAt9QNSKGx7fBAR0MFnaIM                           



                          W2POX4CaByGAkbzKGgYQfj                           



                          ZmwgXFxmIEFyaWFsIFxcbm                           



                          C6BNRoPmLfO2CcIGOsKCJk                           



                          bBaeUQPGb5snswIoVJsuQw                           



                          TvKRRmI2LoMWmsCp5yfDNd                           



                          GECfQcXcK7SaZQJqF0Ondt                           



                          IbAGetUMPbwa8dqBhaSYwe                           



                          VpRaTOCfb9l8nGG3eLLjsU                           



                          M9wADfvWufHySeTJ1lbKDn                           



                          MT6zWAtnYMiulzPxe4KkZU                           



                          37tDTajlTqqlLkRKV2XzEn                           



                          FJvpYSRod9j8nDeeO10yy3                           



                          7paKWmwSKrIYOqQC0plI2k                           



                          XGSno8EyMKA8RJhgtLJwqb                           



                          CvLFDnGR8eSNEgyoRoy0Dd                           



                          MQ8sCI99iOFhyDdqUVGsjz                           



                          8zmS3pKFImzoFeA5ThDDJt                           



                          UP61g7hoLXMxGzSqyRjjq2                           



                          OvNMByXRtbHU58DPenUuQu                           



                          lCQvMlJsiEDwIdVsN96nzE                           



                          9mLTgegzFeYBCjJG0rFPLt                           



                          PAYbtHPphO9dasRbbmUynC                           



                          RuxHT7MPYjiH3qrY31onOp                           



                          vsOJUZ59AJJegCDwPBL1KY                           



                          0pEIJazkjsULKkEJRgFUE3                           



                          SIndsH32mZRvUULrJQViiG                           



                          LmyIvdGjszhAqen6BtvFMs                           



                          XGlkIDUxMDAyIFxcZGIgT1                           



                          XDTJDtPxX8IuJ0YuIsVAy8                           



                          FAb1UP2TMzQpBRRdOPOrDX                           



                          Q6QlJjFGj7BFzjLZ9VPFR3                           



                          SyT9XDadUBP8LWYeLVGfFF                           



                          QgMiBcXGZsIFxcZiBBcmlh                           



                          tZXhYH8wiKecdtFbWLVgMO                           



                          GXRgGYu8r1uXwzC50me28w                           



                          JGTMDKC7H0Gsv2OzsaFhxt                           



                          cuXHBhclxmczIwXGNmMSBS                           



                          TTSiiSJsUYRbnnJqj0LrZR                           



                          xpbiBsYWJlbGVkIHdpdGgg                           



                          dNtnINAxqDuemyMpK2Y8ox                           



                          IkPF2vPPXjSQQoN0KxWCHa                           



                          U32jUMPkiK6vGJIwOH1aYR                           



                          e7PDWwMLKiZomfsH5yyYIe                           



                          JWKfdL3mSVzkFbBjMBExQ0                           



                          UiRHreWcK9InR6DGmhqrRd                           



                          zOElv3Nyl47dggUxTULlFQ                           



                          Bqa54xaOW3olSlToEliUz2                           



                          kKGuSKZ2BB1sjPJvxN48MD                           



                          Dpgq7mPDBrj7J3UZRlg7Z5                           



                          ZSBmcmFnbWVudHMgKDMuMC                           



                          B0GAQnBUZ7HLOlOEJycETs                           



                          cxUdL8alMYwmnOEaWqYdRO                           



                          skOJereznbf9Rnm5LgeLHy                           



                          w36xlLJ9eVYaxJKyZpOhV4                           



                          9lnuSjkWJgYcjrPLY6GVJq                           



                          iH8op7ypcvA1JM9kiZgnxq                           



                          IyfWLck8YwLlEaSN4fJRWs                           



                          PHWhwJWwrB7igwVbhnVwoY                           



                          TagEV8ODSsBW61pKKntBkn                           



                          nN1yOkSzLqQeGHNeidfbFD                           



                          GvXS7aFPMfLAX8FUHvhsPX                           



                          aUTsXAXtt0JnnPxcvoLxDK                           



                          WakG4lfUGzRNJtiqKNYTC1                           



                          nY5jUUHbPMP3AMWkdfSPCA                           



                          nrC68qyMW4lGCqhDHbQjCi                           



                          V18pzaUpMFNelzNVJrkeJ1                           



                          ErfXZsx86hvFN0jOKueTDs                           



                          VZJoj5VurPAco3guqPocp1                           



                          VjdGVuZFxwYXJccGFyZFxz                           



                          dW5pZvWxa7olkAe7EKjaft                           



                          D1CZFdzy35XXpyFJQrO9Hb                           



                          W2JcDNyrVLB1GWZjLyHpQG                           



                          VrVA7MUtOeCKmNHFIkYMx8                           



                          KrL9FDk9WQJSTuXsCfWfUi                           



                          quCCchMENnZRt9FGc7JHsL                           



                          GzQ8BVX9OiL3QmEtLLSdXt                           



                          UeJHq2TXNiJLafiSLvSBPl                           



                          SWYoTPccPIkjL96dEvKzNQ                           



                          llEcMjEK0qBL8vsDTsTPBq                           



                          lD2nMM4tE7ripW7lTS0liA                           



                          JeGCHlEwLwG2XlUCFcS4Wi                           



                          xzViZGebUXPkgh4ajRztYC                           



                          ulLeZxYOYsx9g7pFQ8dOLx                           



                          qWA3iOWzaUmuLbQuVN7ltS                           



                          VmUI6oVZzoOLivteFfz9Lp                           



                          UE83qYWnefRfzgExWGL3Aq                           



                          QnFVnmDZIfk8p0nNitV54s                           



                          h16td4pvyU4rFGT6IZQ4MC                           



                          eackMneKLes0Aec06zaaKe                           



                          XIEcGKHms60qmCH9ikQeAp                           



                          KbgXb3iJVhQVT7LK9rfEii                           



                          dtycz2UycHKnWYAqRRDcG5                           



                          DsSOOjYJWvk9M8FVMjh0D5                           



                          ZSBmcmFnbWVudHMgKHJhbm                           



                          yfacqjGiBzdFQvZjCkC83n                           



                          RHTxFdpwF14whK5lF5VcRX                           



                          Rov6MsGBgtJQ7ugW7nUHN9                           



                          LjUgeCAyLjEgeCAwLjQgY2                           



                          1fpR9kWZbzhgHuQOYsDU3t                           



                          YUMfECFyZWRkDOP3FQCfAM                           



                          QxQ9XyTHOwSTZsj0I1KTRc                           



                          i9H7IGWjumUocZFzgBShmm                           



                          LmtRZdgvapZIF5MDLxeZ7l                           



                          j1fbssA0CI3brEvxybqvZq                           



                          0eJFagbTHyb3CsmOLnxGJx                           



                          K8H7SCR7buZeL1YcLZYUzP                           



                          Lyf4FnS8bsHT3tlSWes6Sw                           



                          dDw8nWQqTIWlyDicRIx6JD                           



                          luIEQxLUQzLlxwYXJccGFy                           



                          NArfo4OdeFZRYIqrrGTycq                           



                          TBeIe9SDkkICVsl4Z8PQHv                           



                          wQhcZPVgP8PwG3SorrM9o9                           



                          lmuRraa2KapJLuWF3soCBk                           



                          fQ==                                   

 

             MICROSCOPIC DESCRIPTION g7ylxCHqOBTtjET2ClBzFG                     

      



             (test code = [id number]) Exp5bab1KvhRIfuDYtOSsi                          

 



                          iPUbctLrfe06xZB4bU11ZP                           



                          3cWDRsLcJ4QPGtwkD1Wen9                           



                          TYWqWLXsrWIcQ940p7afn0                           



                          qyhlRgnWS4nIafBLRpgpgb                           



                          EoB2IOnkRLSthwhiLOs4ZZ                           



                          unJFAiuWN4MBDgfWWbW8Yg                           



                          LGVlPD8npvo7CBN6RIcuVJ                           



                          ArTzU3LUZnmQBdOWPwzCwj                           



                          ISscu106IBK8OtVoCRSqpo                           



                          QkvAsudN5yHyIkGCCZiM0n                           



                          oJKwdAe5n5czCCGxCIGqaZ                           



                          DbiP9oydUioV5xd0AfVSAq                           



                          JKYjn8IkKRCxi99hgXMwqC                           



                          KPEHNmEPC0YLYyKS9lJ2W5                           



                          mKUlPMnsYGO6lV7tLIFzhC                           



                          yxGDxxmXujk0MnjF3gASVb                           



                          KXU8hRQkBSSqtNGlyRGzIC                           



                          DiNNGshgXrm7ulp4HkpE0k                           



                          bmcpLiBNSVNNQVRDSCBSRV                           



                          SNZWLpEV6TIwxhUYIEUIIN                           



                          WyLof31tAKSogZJTEfrkVE                           



                          QlhZdzSV0ooU0hfNiilC8z                           



                          mWJohQB3xwmyrxWdwMq0fr                           



                          qagTNxSVSaNOZXH3kjIzhs                           



                          JLCyLH21WHG9VVGlUAAkfU                           



                          AkNWXnYKC1fIOqh3Unt77a                           



                          nJHdZG0ECN97XyFbGfKGdi                           



                          AlC3MiShUyIK61T6lzFURb                           



                          GCvebsSrd0fnybejHCJmER                           



                          qEKQW2UChhGCwboAUelJxw                           



                          MCAgbnVjbGVhciBleHByZX                           



                          HtoQ6tOUMjgzDGSDGiQiam                           



                          YOShFJ66CBV6OOBtOVQvbA                           



                          AbCEZaISC9gVRmf2Wve18i                           



                          cGFyXHBhciBJbnRlcnByZX                           



                          QrgQvysaucqHGdYYHiBb5d                           



                          tS7wk8xuYCAjf2HcqqVzhF                           



                          TizyLvbMBhXHOwpT6zRF8y                           



                          JZ2MHfCqkx71CKmyzqxyYH                           



                          DqqN42SOEej3NdJoigzUR5                           



                          DFZcEJNuWuJegFLuh1XmgW                           



                          OkbBg3IZWoguE5ZMFhwHq0                           



                          fA7isZaxPLqLO9umQNwoRL                           



                          xwYXJ9                                 

 

             SPECIAL STUDIES (test g5olyYRlZITgz7cbJCBkzC                       

    



             code = 3376) FuZzEwMzNcZnRuYmpcdWMx                           



                          GRjcnfCvPNqzo2UnL2IyPl                           



                          AwMFxhbnNpXGRlZmxhbmcx                           



                          DMEtSXC1rgJdJCJcEVpmHL                           



                          HnIXtoCq7bqPTojHgxLeNv                           



                          FVJpa1xtqtSLjzhzpGo5s5                           



                          kgNNBrSgM5aJZqOLxbH2et                           



                          qhVpsZLxR1LddGPiaPs0b3                           



                          akKqSjExV4xFXyUMpfC6di                           



                          tiXlgSEbRFXfUIx1eK31JW                           



                          FinV1nwGRgWPrkphGhUlF2                           



                          YGveGUQgXhJ0EIEmrKLhIV                           



                          KeC5fhFEYlLPvzODRsOGmz                           



                          vJZyQTG8sAezw2I5xIPdrJ                           



                          YwrIrrFuTpQrClDeGMa5Ia                           



                          CKt1nMvoX3YwWDIiOyY2qG                           



                          QgUGFyYWdyYXBoIEZvbnQ7                           



                          jEguqpYno37wwDQfJBCzXD                           



                          LwUtJlxDfaKDYcTLEXs8He                           



                          nFjuDZM2xSm3fYmxVruvXS                           



                          A1Dtk9JO7vsu16tor6hEvm                           



                          ZFRpysahPgG8OJttEVZqvw                           



                          cmVUt2XUefAHAlmMV7NLIz                           



                          lODzA4FdPKIuBP3exxe4TL                           



                          M0AHiaUTFnEpN4CSDbmVEe                           



                          IKPqkSowHMkem565DOJ9Gn                           



                          ZbPG9bT1Qrv6P7tH8nhSNx                           



                          ONLjwMWhJcDzOJAhij0qdH                           



                          QtXTzbt2AfTOF6lxA9jRFw                           



                          zHGlGTJyHH48Xkwug9UoIe                           



                          jef1IaO72yyJK2HWuzb7yo                           



                          CO1pIuU9iqXbOAaav5dmvW                           



                          8uVrL1SJmgRD9eLC8nKIPv                           



                          rP6voxwuOCEeXbBldehwMP                           



                          PhdWnftuViZy9chCtzXWO8                           



                          NQvnG9tplM9eGaC6HXccJ5                           



                          yvqV9iQSr6GTsalQM6QQDf                           



                          nD7hZB9hwnvxf8bxSPnbAM                           



                          tlFUShhxC9xsE4MXNnkHSy                           



                          J5JiuZ3uCQRhFQ6iunxis9                           



                          ksKXX0DNngYPQsQMI9YeXa                           



                          TAJir3Pursx0DfJjs0OgxK                           



                          NtGXiuO78fu686ZZTzvxBf                           



                          Y8qsoGTngcqdkJPavqrsQR                           



                          kdyiZ5UYMbYPRpQOiiYDKk                           



                          XGZzMjJcbGFuZzEwMzNcaG                           



                          ljaFxmMVxkYmNoXGYxXGxv                           



                          F3gaMuPgJ6NoHZBfYnMhDL                           



                          fbZOmarLAidTRnoMI7tP4l                           



                          BT4jQVBvjYXuD6HbVGYreg                           



                          JplBJvUQO0hGIvsEOoMT2y                           



                          TVigqHEbu8hzg4YnW8vnsZ                           



                          yffFX2SF4pYVPjTBTgUMdp                           



                          e6DonQ5wTvmuiREkrbkbLK                           



                          xmczIyXGxhbmcxMDMzXGhp                           



                          X0juMaVrTNUleGwyPZpvb4                           



                          NoXGYxXGNmMlxmczIyXGx0                           



                          cmNoXHBhclxwYXJccGxhaW                           



                          9wImXtDnMuNxrzHC0dRIJk                           



                          S3fniVTkQLWuZXDzW2aaJq                           



                          MoqD8dcJneJMrxFuNiLaFd                           



                          NiIIe155od8nFXFlpUIyca                           



                          MQyKVywV9zIIsbLPxlNXfc                           



                          dACtNTjyg9ziVCChl2g8zJ                           



                          VtOYLxmzQrb9rlPKrzbhGv                           



                          LJMgoPKtlJZhJJRix49qQB                           



                          tqoBhytLyeBPBox1ZqwRae                           



                          j5RkRoKbCOkkn1LhG42fzQ                           



                          JvbCBzbGlkZXMgcnVuIGFs                           



                          u56bc1htOAZkChW9hIXuvX                           



                          K1nKVblSCaj3IyuRljYFDo                           



                          h0xiJXFkke9edrpvmPZea7                           



                          XidM4enwmsNTnzrGVhemNp                           



                          ZDHxe2n3aGSyQNGySYIjJO                           



                          fbaXe2KNMgd372vk1wquB8                           



                          cAFjPOH8DNokDCRiKYNxcj                           



                          UgZXZhbHVhdGVkXHBsYWlu                           



                          XGYxXGZzMjJcbGFuZzEwMz                           



                          NcaGljaFxmMVxkYmNoXGYx                           



                          WBsxM7npNdNtM0MpYYVpKp                           



                          HdaJAzX3oiuBHyPCWjDXko                           



                          XGYxXGZzMjJcbGFuZzEwMz                           



                          NcaGljaFxmMVxkYmNoXGYx                           



                          COrrR1ruHmRsN1MpHTJlYp                           



                          IgIFxwbGFpblxmMVxmczIy                           



                          VTflcojfKTWoMLbwO2ciLk                           



                          CdWZPkcJbmOBprf2YjDTDo                           



                          LRQcOwkvyxGrBXg9lsDrTU                           



                          BhclxwbGFpblxmMVxmczIy                           



                          KMdwhfxqCOFoOQrpC7xdDy                           



                          MiGDAafTgpBBfei1MkWIKs                           



                          XGNmMlxmczIyIEltbXVub2                           



                          mdr1ApG0safGtxqEU1CUTy                           



                          B5ekgBNikHO6JLS9mX6qDJ                           



                          cmziKiFQBuq8VuQYFpDEHg                           



                          HjA9qB4kVCF3YwXCqEhgUS                           



                          BsYWluXGYxXGZzMjJcbGFu                           



                          ZzEwMzNcaGljaFxmMVxkYm                           



                          ZjDVHkWKhfO6koJjHyI5Tj                           



                          EIXxTbUqcBsfGVexSAw1Jg                           



                          xwbGFpblxmMVxmczIyXGxh                           



                          ghzdJZItUSsoV5mvLrHaYH                           



                          XxfJsuZJiyx1BcOIEhLYDf                           



                          MlxmczIyIHMgTWVkaWNhbC                           



                          SMXY38GFWwTIMoySlcgI8t                           



                          xNJWNLWsljC8k4T9WOmqQE                           



                          CsRLu7OLtuwlBgUKXjwM5d                           



                          NXVxBC9qDFv5ylMhCJPsy7                           



                          SkRL9sNXQxuUNhKKO2LPNz                           



                          g3PuP8Tzb7CfWXWkORSfft                           



                          3oroIlIrZMdUAlHCLzwy87                           



                          VPCmBZ3iF9afAWRmDVSmrg                           



                          AbsAZzh0InWZKvqEH6aVHn                           



                          XL7ARuDEq06zRPMgNOAWwm                           



                          PtPOAruRswbPG9puQ0iA0k                           



                          LiBUaGUgRkRBIGhhcyBkZX                           



                          Qwiq9bcmLdAPLtYYKme5Ey                           



                          kSVibIYqjtFyP5Xuz3QzQI                           



                          Yvyl97MEaagPOfjz89QM0j                           



                          L5Iph5HerP1bKArfDUDkr3                           



                          YihGTvfKIhUFCtz5NtW4wg                           



                          lzqqZSekuHVhqH2uMLFjDB                           



                          w5DNVjo1WiCJExd3DnVlFn                           



                          kwMcZFTgLPLnGFComL50CZ                           



                          B0gFynpDghjlIlMV4oNKXq                           



                          psVoKKIoNQMijU7vSCqiqv                           



                          WpMKZvbvT9z7J9ZHgtFAVl                           



                          kbLaYmkjJCX1cbOybkT0cW                           



                          QgX5utxcjfSVhrCNQcd7Jd                           



                          vN2haZWBhOVfd6WgsOSbfQ                           



                          CLoIRaRP4lkyNxOM2dNAK0                           



                          ODggKENMSUEtODgpIGFzIH                           



                          A9NYuuGifoGTQ3lgLxUFGj                           



                          w5ZaLFtyI7zmY80psLvhmH                           



                          k8hTPaoTlzxCElxFJrDWHu                           



                          ebQ2t6G6HRDdh4RoevnkJW                           



                          BsYWluXGYyXGZzMjJcbGFu                           



                          ZzEwMzNcaGljaFxmMlxkYm                           



                          OtVVCpIRkfG2suGnPcGyAg                           



                          EvjdPSP6qC==                           

 

             Gross assessment was [facility]                           



             performed at ([facility],                           



             = 2777)      Department of                           



                          Pathology, [address] [place] [address], Tel                           



                          [phone]                           

 

             Technical component was [facility]                          

 



             performed at ([facility],                           



             = 2778)      Department of                           



                          Pathology, [address] [place] [address], Tel                           



                          [phone]                           

 

             Professional component [facility]                           



             was performed at ([facility],                           



             code = 2779) Department of                           



                          Pathology, [address] [place] [address], Tel                           



                          [phone]                           



[facility] [id number] 08:01:38





             Test Item    Value        Reference Range Interpretation Comments

 

             Case Report (test code Surgical Pathology                          

 



             = 104)       Report Case: [id number]                           



                          Authorizing Provider:                           



                          Elayne Longo MD Collected:                           



                          2022 10:01 AM                           



                          Ordering Location:                           



                          [facility]                           



                          Received: 2022                           



                          11:57 AM  Services                           



                          Pathologist:                           



                          Homer Thomas MD                           



                          Specimens: A) - Large                           



                          Intestine, Colon -                           



                          Transverse, Bx mass B)                           



                          - Polyp, Colon -                           



                          Left/Descending, taken                           



                          by hot snare  C) -                           



                          Polyp, Colon -                           



                          Left/Descending, x3                           



                          taken by hot snare D)                           



                          - Polyp, Colon -                           



                          Sigmoid, x5 taken by                           



                          hot snare                              

 

             DIAGNOSIS (test code = [id number]                      

     



             3220)        FuZzEwMzNcZnRuYmpcdWMx                           



                          IHtccnRmMVxlcGljOTYwMV                           



                          fiigEgGWFveSJlT6Rsxgpu                           



                          RCzuXK6vEY8sxExayDYmoS                           



                          MnLADgRpQdr0aam292hDAe                           



                          q4ebKFIHyziqwZu0cTutO1                           



                          2kh0A2HtcxP17rcVOnCWC0                           



                          KHCmMRVflTNtMUNrUUS2OY                           



                          YuwVZuG9ulGJYdUA6pmraq                           



                          PFmkXAgaDBMwyTU2SKYnuW                           



                          MwI4DpYIIdPCbgAOQznxh5                           



                          YwJlPr8dlPPdzTafDSzvEM                           



                          PgWBXtGSdvWNWkSlEiHV6d                           



                          DWQRF9ErUP3XDTSBOG1DEG                           



                          PMVsZLN3MZWjFAXMAEUQ1R                           



                          KW2ZV2XsMHHIB4SCNGlahE                           



                          KiZVYmCOJZBxOLQ1jMGCOX                           



                          RURAB1FOUeXNNz7WHApuLU                           



                          UtMWLiFHDTMXISLPXSK6XB                           



                          H9VJHpTGYOLXNeqEJSlKRt                           



                          BccGFyXHBhciBCLiBMQVJH                           



                          ZJFEFcKPE4EIBrErDKGLV3                           



                          CSKvQEFpeiG65DF30pSH4A                           



                          FMMgGWHBY7JWCUaeuEExHO                           



                          EqAWUBVNCKTWETYFXBWU6W                           



                          ZYCyFNLEJVtAAE8AYRToVX                           



                          QktnreVDAlTe9cALSNY1Vh                           



                          PS9UWTNFSY3GXIUWMQYFJP                           



                          7GYV8FIAXOUF8HCCTMEWtN                           



                          IHggMywgQklPUFNZOlxwYX                           



                          IgICAtIFRVQlVMQVIgQURF                           



                          Ur4BFRwpAdCIO02XJuKMAF                           



                          rkPVEpPSNsURXUF0XRLXNd                           



                          G5ZNIgCWQHAyEZDJTF0OXM                           



                          xwYXJccGFyIEQuIExBUkdF                           



                          PZmICQQFENlCFVqfH0jZYU                           



                          6SWAFCP2wZWvDNC2hYSBL7                           



                          HZOxPTQLT5WMIAlbsRJpDT                           



                          QtXVHZFEHPLF9WZXlSB9GX                           



                          PBTIJH5FSWHbLSIOCNjSJB                           



                          5URURccGFyICAgLSBIWVBF                           



                          HcFLKIUYXSTlKQ8RBKPfFT                           



                          Lirj97LVA5YlNgo5L1THS6                           



                          FKGyEPYxi5tiLFJhvTLpPh                           



                          EwMzNcZnRuYmpcdWMxXGRl                           



                          RbHwn4cja193mZTek0ouDE                           



                          QiOqG8vJUlCHGzfFUkH166                           



                          GFWqFOoas9dxn9YjRAKilL                           



                          Ofa7P7NIEFdpdadDv1fNmz                           



                          B52pe3Z3BoaeZ3vcKNOiUZ                           



                          GfV9EjDC4pGNJuJww3NRA6                           



                          TIS7WXTlKLJjE8HlPF9kIS                           



                          AffKHlCSz5e7peiResDXEb                           



                          QTZ9g9dhOKapxaZwLI2uwg                           



                          3yoLv6q4foflRcXWFtVKFg                           



                          dEMDYFIiZ1KihCvrJv6xpI                           



                          t5hBsuGngtAPF2Ucy2IB2i                           



                          ip15sxj7lEdaYCDzxoqkGt                           



                          D0WNwhCOHgmkrvPCb6FVba                           



                          TYWohOE0XBCbpKUjW9CxVH                           



                          XqHX4dcks7VWM5CCojRZUz                           



                          VzJ7NUFcuFEtTHIhyIgaGY                           



                          oqp266DXN4AuJeJE3yA3Wz                           



                          p1O8sK1qlJNiYMTbgUQuOk                           



                          ExPYClfw5epDZoFBmrc5Sc                           



                          RYU7wdJ3yRToxJPrYZNzEm                           



                          Y3QIsmCJ1bfr16JKMwWEZ5                           



                          ij1mqREyiOouslEffEDaJE                           



                          hqH1JbAEMgi161XCGtW5Po                           



                          QUKsy4E7teXuQdCjNJDrmI                           



                          E7mnZ7QGHmQH9ycdohw6ku                           



                          MUftQMcyNTWlolU6chB9UT                           



                          ByhHLmN5HywM2bJEYnHR2n                           



                          nfsoy6kjKJQ1ICbpYRUpHX                           



                          Z9JsJzGJYod8Pdsqx4JzXl                           



                          x6RfhGFrEMzcG39er775VY                           



                          SkcjRjX3vasVHcjdaboMDp                           



                          wqnoAXvtvaC4YHGjTRiykf                           



                          txOWGsRNqlL0ljVeQhNPVt                           



                          iWfmHJhdg8UvFSXqCSIcRf                           



                          GvjXQrETWqOvg0CZVvfQHa                           



                          JLZuHdXnI5oatmhaAsDRPQ                           



                          Dan6woM1yjoFNVrKMhE6Ri                           



                          UGhvbmUgTGluZTogNzEzLT                           



                          g5UD19QNfsUVEhey43                           

 

             COMMENT (test code = [id number]                        

   



             3794)        Slv7xjk1TfsOCxyTIiDMdd                           



                          kKIvrsYrlf20rFO5aE79IX                           



                          5dOIPvPpC3RVQtqkG9Bsy1                           



                          EUEaOKTibWNyZ010f1pwk4                           



                          zauaLyqFY2iNcfLYKbqacn                           



                          QgL6VYjhEHCosspqVHa1ZJ                           



                          ywTKSwwTA8LJXsaDNwN3Gl                           



                          SESqND1gxvm0XMP5RBtmKC                           



                          FdMfQ6UDFqzNPxJCOsmJfv                           



                          QXcgm062APF1VvWqSBPmaq                           



                          BowKzcnO6cThTbIVKAiU8b                           



                          ayBBMSBoYXMgYWRlcXVhdG                           



                          YhvUHcl3LwU4OnvXWrGDHe                           



                          hWetVg3aULQ7jTDzZCHufv                           



                          HxrFlonhqdx8J3WSorgq4o                           



                          cGFyfQ==                               

 

             CPT Code(s) (test code g7aijWBtYFBtlSF7DmAdMB                      

     



             = 3357)      Upv9enu3SeyFVeiSWcVOyh                           



                          pMFnrcOgyl47wCI2zD54OF                           



                          7eWQWlMiZ5HJScmoI4Boq3                           



                          WAEsTYTibTRtT855g8aba6                           



                          sljbRzwKN9vEwjTQMrsseh                           



                          RmJ4HSrzIWFznltrSIf6QL                           



                          tiXNPobHO0JHCbkQKzL5Td                           



                          CJPiGZ1ltkp5XSL1ZHoaQD                           



                          CnDrU0BIBsuEFsZHOpdTvy                           



                          XYuym550EEO4JcZoVSAhad                           



                          NuyUxtzX3tYhBjCBR4YRLq                           



                          DGT3DKIeFXo7UsMdWRO8ZL                           



                          P2WGO2XSYkfUOqiJ==                           

 

             GROSS DESCRIPTION (test f0yofWWbZYNkgXQ6MwZsSR                     

      



             code = [id number]) Lbl1ktm5TnwIEugNMaWGhn                          

 



                          jDVobhIugp19zRI6oD41IB                           



                          3wPQVjZgR5KIMpxwN2Cyf7                           



                          YPCkREOcxRDnF662g5ldw8                           



                          jkrqWyoLE8KPQkNZLdD0Pg                           



                          KX5eDCTudORrZ78awULgOU                           



                          R0HLLaNAJlrMEkGEXpBHN4                           



                          RYKdcRIhB6dmNVDtEH7ofm                           



                          uxBRxtVIlvBDFqsQV4DXOq                           



                          fTFlY3VyNAGfZHziLOGeiq                           



                          o7OyPtAb2fnCLedSqxNAye                           



                          FKIan3zbRGIxtFNjNUC3LL                           



                          frgWNgCDLqEKPxOAo2XYMd                           



                          UCqlwDSmUP1lzYogGclnaC                           



                          cuq4BjjYQzKEgjZEKyBNPz                           



                          LSwvZWKfN2GDPDPxNvO3Hf                           



                          Z8EsJtFMj2QHj5NQ4UFnOa                           



                          EOUwJKFeDSZ6KMAePOy3VH                           



                          spAY6DKLV0ZmT4JUe6GQH5                           



                          NTMyMSBcXHQgMiBcXGZsIF                           



                          mzYaMQzhwzaLVkFO4yfZzc                           



                          bGFpblxmczIwIEEuIExhcm                           



                          gjGKawxRDduTucQTprH88l                           



                          y94vVAMKzpItq4IsfaHvNj                           



                          xwYXJcZnMyMFxjZjEgUmVj                           



                          VUn1SOYtxG9gXd7rpXSxrZ                           



                          8gdLNaNFgzYXK3fYTyNMTq                           



                          SIUvCUYwXT33FFuiENOxqi                           



                          FtZSwgbWVkaWNhbCByZWNv                           



                          cmQgbnVtYmVyIGFuZCBcdT                           



                          jpNdIjPDj3X4qmhyfsGQfy                           



                          jLAwwKipVH6zl1lnia51eg                           



                          Qlk5WgmcYrUJXpr4XwxVJu                           



                          EDDgVmDehqAzN05kl6pjgB                           



                          Syn4RuvHLkcQkybFPllWFd                           



                          LXBpbmssIGlycmVndWxhci                           



                          Wkr3C1ATHlj9O7QHAenyWu                           



                          vNXiiVDoOJAhLUY4BPLlBL                           



                          W2PDEtEjGiqWLtayXfK1hf                           



                          ZWdhdGUpLiAgVGhlIHNwZW                           



                          ExjZVwOTwlJRO1Qb5foFNi                           



                          ZDOlruI3z8FyOMldEIAbVg                           



                          edOCXaS7EtB9OnoaQfkPCm                           



                          KTPlhmPhj6nqZZR5THTkaQ                           



                          MriEIsQsEzXpkrDEX2l0yj                           



                          WJLepRXpPYM0FBasiHMoQQ                           



                          EwMDIgXFxkYiBPVlIgIiBa                           



                          AvXjZHV8VgHdNHb4HAcuX2                           



                          VAONLaKCG7NKV1WhkrZbK9                           



                          FBv0CQYGBq0kRSR1KOysOO                           



                          K7YKL2CbCiFGmcjCIzSBfa                           



                          ZmwgXFxmIEFyaWFsIFxcbm                           



                          C2WEWfTpWfX6JyWGXxCONq                           



                          nFejERIGm5ndcxEhBItfVt                           



                          IeMEBeK5MnNAbvKj5ywOIh                           



                          YGCaSsZaM2GhFAZxW5Yrnm                           



                          ZsSAvbMNWeiz8jeTbfIFwv                           



                          GjCvDQLpn0i5jZM5bBTohI                           



                          F1gLMydJwxBtHoRM6omKEj                           



                          GH2uYNjjEZlolyFia9KhMA                           



                          37qPEjsuEdmmZeNAB7VxRt                           



                          EEzqPUKyt5k8rIwgM82ub3                           



                          0ieWZjiTWtOOHnXK9ltB3t                           



                          NBIap5IwDFS4OUtqrFQhaq                           



                          OqFUNuHV6yZBLlykOjf2Wu                           



                          OY4tYY89dAAhjZsiRWZgym                           



                          6haC9gEPMbffAoE6RqDWMj                           



                          EV37d8fvQQZrBnXoqCqsq7                           



                          LgJOHhQJilYJ22CBgkYcJz                           



                          tTGwPoVlxQGgXaXxJ58ioD                           



                          2aVTamniIuDQVjQU1kUSBi                           



                          CAXdtYAboR2omsIdxzCozJ                           



                          GwlNG2VZMvxR5xbV05gzJr                           



                          ffZXZE96ZUWxaALhQTE4YR                           



                          0hRMSlrjviTPImVODjHYK4                           



                          WTivaK96fKLqPESkFHTmaU                           



                          TjzMnjAnplcGiow7IcrVJy                           



                          XGlkIDUxMDAyIFxcZGIgT1                           



                          KQIXRdLpP2XfA6GzBqMXf0                           



                          CDk0VC3JIxTrNDXrOXIiYZ                           



                          H6XsDlHVj7ABzfWI4ECBX9                           



                          TnW5MGhyTCA1ZMJnXFOaZV                           



                          QgMiBcXGZsIFxcZiBBcmlh                           



                          zYWfDQ3anFiutfAbXABkWO                           



                          FDXrGUa0h9bLxhA57eo97y                           



                          PXSMRBD6A4Ayg6EhrhSuxu                           



                          cuXHBhclxmczIwXGNmMSBS                           



                          HILqcQAyLWYrjdAgr5CwZL                           



                          xpbiBsYWJlbGVkIHdpdGgg                           



                          iDvgFVLcfZpwzsJpG2Q5tz                           



                          HdMI2dHJCfRJEcP8DyEBMo                           



                          D24dEUMqkR1oFLMsXG9nZZ                           



                          l2UZUvATXgPwvieU1jrQBm                           



                          HACbxL9qLPzeKzUqMQFhP5                           



                          FlQJlkLjH7YxI0ZLxemdOv                           



                          eWUrq3Rhk84rzcJrHBGoZR                           



                          Hhd53guNG0bpLkTxTmhZm3                           



                          hMYbKKG1PA5dgXDhiM64NN                           



                          Yyjq1qOGVwy8Y1OLZqt7P6                           



                          ZSBmcmFnbWVudHMgKDMuMC                           



                          N6CQXsJIE4JZJcYAFusHPx                           



                          ejXyA8rcWBwfpIEkVhMcCH                           



                          tzKBqovaums5Wee3IezQRd                           



                          z31jcOM6pKCqbOJgLhTbE7                           



                          9tzqBuzLFiKyltZPT4OVXy                           



                          eM3eg9srisU0AU7vmMjrcc                           



                          LmvBWwy1UvZrKjIE9yYLAl                           



                          STXjhNUvfV4cffCzexLmsD                           



                          MffQC7WPGkAD05aUGhqTtx                           



                          aM2bFdOuSgOtNDLgfedeGS                           



                          BgJM8aZKNuBNB0LGIrdsAZ                           



                          iUGmUESgw2WwbEqljoKgHZ                           



                          XjzI1nuWRvZCVivrDUJCX9                           



                          dD8gAZAqQLY4AKNddrQJNK                           



                          jnM01bgHI6vSNvfYHqYdKh                           



                          O80pvaVhOIKticDTLbquF2                           



                          JjlUOdd64zaMY2zSDosNRm                           



                          WPMpt4FooHWcz4jzvFbdz2                           



                          VjdGVuZFxwYXJccGFyZFxz                           



                          sT6lAvTzw3omiAx9OMqzaj                           



                          Y1FSMjku36BXivCXYdA0Py                           



                          J1RuTXmaRUS2JIFbAxIfRF                           



                          IpLT8QIqYnKJaGJXPrWLj4                           



                          IfD4SOc2YNSVNtCgFuQlOw                           



                          jmPCujFMQaFWy4OLv1YYjY                           



                          FwV7DFA6AfT6MwHmSOJeWc                           



                          PeDIi5SAVrVHxsbSZfVKDv                           



                          OFFeDHecMCkiX23kNmPuKU                           



                          kiLuYrFS9sPX0tjERqYACs                           



                          eD8bZA5jD9ccfN4mHY7fhA                           



                          RwDXDmRpHjG2QnGJMuE9Lx                           



                          gaIrIEmzYCFljt8ffAwfWS                           



                          xwYnTdRYRzb3c4gWA5gVFn                           



                          xND3gFFtmAziJyAbNY6ckL                           



                          RcTX4bBQyeMKnfoxSme7Re                           



                          XP00hMJwncPwphJmSGE5Cb                           



                          EyXRchZYApr1d0oNqpA95p                           



                          n51aa6jcqR6bOXD9UMN6KX                           



                          brymKnaLVuw4Yvf38dvoGe                           



                          LARaJBNlo98geIQ2ksNiSm                           



                          YyzNy6rJVcJIT9KW8mxAti                           



                          ycjyw3YrqKLzLBIdOLHlI0                           



                          CmIGZaLHAyy5P2WZMpq7J7                           



                          ZSBmcmFnbWVudHMgKHJhbm                           



                          dsdpjcOgCgqZFdMpFqD31g                           



                          SNQcIdybX98diG7iR6MmEA                           



                          Ujl4MlUFopZU0reZ7rLMS0                           



                          LjUgeCAyLjEgeCAwLjQgY2                           



                          6ffV2wOGcbpbJiMAAwVE9h                           



                          CZLyCNCaZZAjFDY5BRFsDP                           



                          IoX2BwXVBcEQAmq9K6QRCd                           



                          p4M4ALObycBhkJDrsAVyjw                           



                          BroJFiyjftUJX2WIJlfO4y                           



                          c6sgknW7NS6npMucuvmnRm                           



                          1xJDxxsYQer5XrpHOtzQPv                           



                          T5P5VPF2fzIrE2SlWCDHbV                           



                          Ipk2ZzB1ayKJ7acTPea2Dm                           



                          qNz5vTWhMNYjjNchGDq3II                           



                          luIEQxLUQzLlxwYXJccGFy                           



                          NHqee6XjsHQHHPvxaNJvrf                           



                          CVgRm2KGtkHLQjs0B7DIBo                           



                          hXyhLSArI8UtV7HtldU6t1                           



                          ebtOcpb3IgjTDiUW9yjPUu                           



                          fQ==                                   

 

             MICROSCOPIC DESCRIPTION o8ynmQKqZJJetMI8VbWqHF                     

      



             (test code = 3371) Kvr4spa3MqfEGxaKYpJBvj                          

 



                          yIHinqQsrl09bVX7aR18GF                           



                          9fOPZkZnB3XKIvnvR1Wae3                           



                          THZjNJQlmRKkV580g5vmo8                           



                          miwyGmjTH4uClwSIAhlqhz                           



                          DkQ3UBgdFIItxwbhMTl5JE                           



                          xdSOZidCE2QVGyrVHhL0Cy                           



                          UTIbEG0ndjt5AZA1FWdaQF                           



                          EsEoP4VQQekNZgNDSciXhu                           



                          BEuxx407GIX7HtGzZBOvwf                           



                          ZezCuzjE3oKmIkFAEYiE4s                           



                          oUJdeWg5g0fyGMWbAWPbhN                           



                          UctZ3cpnRbyV7dk0NmPQYk                           



                          WIKib8RvIUJwl83peIChdU                           



                          GULFPeZHG0MLWnLZ1oZ9U6                           



                          fSMyATcnNEV1eR5gJYOmfM                           



                          acKJyfhUyln1DrlE1mBOBy                           



                          RLS2bTMmRFEqdLOigJQtVV                           



                          LkWLGetbIbj9uwg7OxfK3a                           



                          bmcpLiBNSVNNQVRDSCBSRV                           



                          SQRPNjAY1WLoduJOHWLBTU                           



                          HxJxv21zIZCnwYNEVboxTY                           



                          EvrIhfBB0eqK6hfOgqyU0n                           



                          rBPdgXT7awlvfhHhsSy4sv                           



                          ytmFIfVOHrCNUEI5wcDjyh                           



                          OXWyEM68IZK1EXMcFALglR                           



                          NlYVTbBYX5cLNjz3Oof60b                           



                          eYLqVN9DSF10YsReQiJUus                           



                          XvM2AhNtMeWS85F8gvMUHw                           



                          DJekfaUly6cfkzzhPUVcDK                           



                          xNNKY2TWazQDmojURngCgc                           



                          MCAgbnVjbGVhciBleHByZX                           



                          ThhQ9hETDaefDEPLRfGalq                           



                          UPItFY33XZB9CZJrYSShoM                           



                          GuMBIePKL4kHUky7Cxc93a                           



                          cGFyXHBhciBJbnRlcnByZX                           



                          EmpJtlawxswKOrMBUeFw9a                           



                          mH2vq0kjUOUjv5QymgZymC                           



                          WrilIhzEEcCTDkuF2rRY4f                           



                          IX2GQzClvw78NJrtnrcfUQ                           



                          PqnE57UJMvu2PtPuigcWS4                           



                          YJXuWNCpNyNdcAMet8RrdE                           



                          EzkNe8TYNppsH4YXJxcTu9                           



                          dM4cgZzlVZjVB9tpLTvxNP                           



                          xwYXJ9                                 

 

             SPECIAL STUDIES (test w4kbwLKvPCAqo9wuLOPjxM                       

    



             code = 3376) FuZzEwMzNcZnRuYmpcdWMx                           



                          ZDxibtYqAUbrg1SaJ5GjId                           



                          AwMFxhbnNpXGRlZmxhbmcx                           



                          CUUaYGW1seMaUVBjFOykFJ                           



                          BdCUbiPq4zeVUpeOlpJuHs                           



                          WMUpb8qihuVQcfpqzEw9u7                           



                          yqAWLnWaZ6rHCqOAxfX3dy                           



                          jaGehPDeB9QkpDQktFv5n0                           



                          rdCgQwKkN9mVWmLZmoN1ja                           



                          eePeqEInOBMjFBw8mP20BZ                           



                          RzaC8etPXdBGyhehMiCoX8                           



                          KMttTZXdRiC8UUKheYFmIH                           



                          InS6hnXRHxZAwnIJHhQImu                           



                          hYRiIWL2qAjvn0Z4nKFafM                           



                          QizBtvShKnZcGlHgYYd5Sw                           



                          QOa8pYmgY0AnOPFdOeV1uG                           



                          QgUGFyYWdyYXBoIEZvbnQ7                           



                          nYrugqDoh28piWIwZMLzIG                           



                          ZkSgCltXucQEKtLAGQk8Sh                           



                          yPegSHZ7uLi1fXsxMvruUT                           



                          L2Xfn0QP4rfg12tvu0hPdm                           



                          GXCwrnxtQgF2HYjeBPLdjn                           



                          ihAZr0VNcbTIVeoEI1LGQc                           



                          sVFqB3RkJQAuMU6csjj1WC                           



                          L3BQplUFGjZoE4NHNhhJPm                           



                          BZRgyUakUQdai679RAI0Oq                           



                          DsAO6vE9Omy5T6vF8njKRx                           



                          RPLcnEElZuHuWSMcfa4ukN                           



                          EoYEobf6AkUAP2xdB0zYLk                           



                          rRXyPMKnJB84Bkrnz0OdZv                           



                          wwc9TqI75riCD8TYvbv2lo                           



                          BT4mWfZ0tlUjBYgtl7khwD                           



                          6kOoJ1NLqmKY3xUJ4nHNRt                           



                          kI2ciqkpIGPpIwKryrgxXV                           



                          FcqEojnqAqXn6ykNakHLV1                           



                          ADteF1qspY8iEoR1TMuuB9                           



                          nxmR4rBDs2PVxaqCY3KOAf                           



                          fG3sNZ9jcmzwd4cvDAabYR                           



                          nmEOWmjkC8hvR8OZSpoPEf                           



                          P9DvhM5dBSMaNI5mitgkq5                           



                          nnNIF0EQmcDNYaVXP7LvFi                           



                          JSJxc4Wemaq5WqPie2TdwT                           



                          EyAGnmH93fy718BBDtglKo                           



                          D8rfjIDokiixeSWoxqfcUO                           



                          cllsR1QTMwSAYlNZeeYXJn                           



                          XGZzMjJcbGFuZzEwMzNcaG                           



                          ljaFxmMVxkYmNoXGYxXGxv                           



                          Z0rmJbAxL3DmWWFrDaJvZA                           



                          vmLHkvkNUvyDRhuAW3tB4t                           



                          KD3kNCOvzYGpX1IvMPCoxy                           



                          JbpZRqVDU5eJLuiNDoSB1u                           



                          MSjxjALhq2bnq3CuW1yfpB                           



                          layTA6FN2gBUJkFTFpGCpm                           



                          a6OrvO4fHurqnTXgnqcrVN                           



                          xmczIyXGxhbmcxMDMzXGhp                           



                          O1mfNfZbEHRnnWzjGInsq2                           



                          NoXGYxXGNmMlxmczIyXGx0                           



                          cmNoXHBhclxwYXJccGxhaW                           



                          5eQlEdQtBzFdnvUS0qCPWv                           



                          P4pyzAFyWZEuCZYzD9xdDf                           



                          FgcN0lrOptVOgmLlUsMxRt                           



                          SkLGf019vk1wPSCndHBoko                           



                          WDiLHgoL4iIQpgARwrJZof                           



                          vETtUZogc6ykNSZbv6m6gF                           



                          RsSAWavrHjg5kzRGvnlaWb                           



                          JOSldXMxvJAeISPub07nPM                           



                          qycZualYpsEDLce8FjtGdb                           



                          x3SyKiLdSWfbi3TpB62hjF                           



                          JvbCBzbGlkZXMgcnVuIGFs                           



                          p26ea9bgGBNjWaU4wWRqnE                           



                          A6kILyvAPgn1BdcLitMFSa                           



                          p9jaMEPdee7xirnyuKUat9                           



                          KwuP1kinxwCJrriLJufsQy                           



                          OOFkz7p2hRLmJTZkWEEwDH                           



                          lifFn9KFCoh662tc0tmdH8                           



                          fMHmESZ6UYchHCHkJDPldc                           



                          UgZXZhbHVhdGVkXHBsYWlu                           



                          XGYxXGZzMjJcbGFuZzEwMz                           



                          NcaGljaFxmMVxkYmNoXGYx                           



                          QTzcR0hcHaHnO8GdPQZsTm                           



                          DhvRNoC3tqnBSfQTZgHVmi                           



                          XGYxXGZzMjJcbGFuZzEwMz                           



                          NcaGljaFxmMVxkYmNoXGYx                           



                          BWmrS4rhEbQfP6IvZVObNf                           



                          IgIFxwbGFpblxmMVxmczIy                           



                          QHjjempmIWJhCAawC7gqSh                           



                          CcOBSgfVnhGLzob5CrYARy                           



                          AVSsQodrtyRyDLf7ekHpGS                           



                          BhclxwbGFpblxmMVxmczIy                           



                          WWcufhsgAXTeCJmkS3dzLi                           



                          EyAJWcsEwdSXqvt8JxPIWb                           



                          XGNmMlxmczIyIEltbXVub2                           



                          wtk3VaR6zwhKsbdDI1AXMx                           



                          C2effDBsvXZ5GFP7qA1iLN                           



                          mukxBbJRDhj9ViKPFcQNMf                           



                          FaK9mE5tVUC3YlGIhGfvGC                           



                          BsYWluXGYxXGZzMjJcbGFu                           



                          ZzEwMzNcaGljaFxmMVxkYm                           



                          RiIOXdSSvlU5myCgDxB8Wv                           



                          TJQdXxVsqZriKOedBCp7Gu                           



                          xwbGFpblxmMVxmczIyXGxh                           



                          hrneBERySBklE9bdEpDzVR                           



                          XpyTtwWUlxj6CjNUBdJCMq                           



                          MlxmczIyIHMgTWVkaWNhbC                           



                          TOIO02VERzHKMqfNfpdR8u                           



                          vMZXCHStuaG3w2J9CIuiGH                           



                          ItLDt4DKvwikTkJDFktW9d                           



                          BUMhZU3zBPt7teAfGSWlx2                           



                          KeOU8zRBTlsNPrBWW7LFLy                           



                          j8AmH2Jnw1SiEZGeZNTnpj                           



                          0nvyMzQhPMoZIbCFOqii55                           



                          VTKtXO1lV4fpNKLvYSXkfa                           



                          InnYGyo4VeDMTqiBD0dSXj                           



                          HI8CKuVEs90iOQZrZSNFcf                           



                          YhWSTsuFnazUJ5aqQ5oI7y                           



                          LiBUaGUgRkRBIGhhcyBkZX                           



                          Ispt5jsyKeAMLeRSKer0Rh                           



                          iFKolRHoldCrU0Mgn9LpAZ                           



                          Kdxs38OWtqxTDsjv73XJ1p                           



                          C9Ngp5DcfD4qTRoeKTZtk1                           



                          DbmCVcmOAgHKMxz3RuY4be                           



                          axenSOvhtYWrcS8yUKIzFA                           



                          v1IVWyh9CoRVHzs1HzByBq                           



                          zjJiTDXtLIInVZWbaP19YG                           



                          I4wMmycGwymsOuIY3fOAGj                           



                          zwLqRUKaGWCiqQ0gUMwmee                           



                          FoMTOmluE5z9G7VIspDPUy                           



                          haFaAuvjSUQ2gzVvegU2cF                           



                          ZiB4psrvkaYExtTHOfb2Xi                           



                          sO7ilSGGfGEgr1KwkRVrzE                           



                          HXpOTfBU6euyXbDV4xMYZ9                           



                          ODggKENMSUEtODgpIGFzIH                           



                          C1YEtbOzkzCIW1pdNcWPIb                           



                          u7OuLQncY8csW71igGoulD                           



                          i5fFOaeYcbkMXiwUVuPVCv                           



                          njV3t1K8EABhn1DzpumxSP                           



                          BsYWluXGYyXGZzMjJcbGFu                           



                          ZzEwMzNcaGljaFxmMlxkYm                           



                          StKHQhWLrlT0xwHbKmYcKo                           



                          PwqpQWX7eH==                           

 

             Gross assessment was [facility]                           



             performed at ([facility],                           



             = 2777)      Department of                           



                          Pathology, [address] [place] [address], Tel                           



                          [phone]                           

 

             Technical component was [facility]                          

 



             performed at ([facility],                           



             = 2778)      Department of                           



                          Pathology, [address] [place] [address], Tel                           



                          [phone]                           

 

             Professional component [facility]                           



             was performed at ([facility],                           



             code = 2779) Department of                           



                          Pathology, [address] [place] [address], Tel                           



                          [phone]                           



[facility] [id number] 08:01:38Surgical Pathology 
Report Case: [id number]  Authorizing Provider: Elayne Longo MD Collected:
2022 10:01 AM Ordering Location: [facility] Received: 2022
11:57 AM Services Pathologist: Homer Thomas MD  Specimens: A) - Large 
Intestine, Colon - Transverse, Bx mass B) - Polyp, Colon - Left/Descending, 
taken by hot snare  C) - Polyp, Colon - Left/Descending, x3 taken by hot snare 
D) - Polyp, Colon - Sigmoid, x5 taken by hot snare  A. LARGE INTESTINE, 
TRANSVERSE COLON MASS, BIOPSY: - INVASIVE ADENOCARCINOMA - SEE MICROSCOPIC 
DESCRIPTION B. LARGE INTESTINE, DESCENDING COLON POLYP, BIOPSY: - TUBULAR 
ADENOMA, FRAGMENTED C. LARGE INTESTINE, DESCENDING COLON POLYP x 3, BIOPSY: - 
TUBULAR ADENOMA, FRAGMENTED - SESSILE SERRATED LESION D. LARGE INTESTINE, 
SIGMOID COLON POLYP x 5, BIOPSY: - TUBULOVILLOUS ADENOMA, FRAGMENTED - 
HYPERPLASTIC POLYP Signing Pathologist Direct Phone Line: 
[phone] signed by Homer Thomas MD on 2022 at 8:01 
AMBlock A1 has adequate tumor cellularity for future ancillary studies.88305 x 
4, 99313, 06418 x 5A. Large Intestine, Colon - Transverse.Received in formalin 
labeled with the patient's name, medical record number and "large 
intestine-colon-transverse biopsy mass" and consists of multiple tan-pink, 
irregular soft tissue fragments (2.4 x 2.1 x 0.3 cm in aggregate). The specimen 
is submitted in toto in A1.B. Polyp, Colon - Left/Descending.Received in 
formalin labeled with the patient's name, medical record number and "polyp, 
colon-left descending-taken by hot snare" and consists of multiple tan-pink, 
irregular, ovoidsoft tissue fragment (2.4 x 2.1 x 0.4 cm in aggregate). The 
specimen is submitted in toto in B1.C. Polyp, Colon - Left/Descending.Received 
in formalin labeled with the patient's name, medical record number and "polyp, 
colon-left descending x3 taken by hot snare" and consists of multiple tan-
yellow, ovoid soft tissue fragments (3.0 x 2.1 x 0.4 cm in aggregate). The 
largest ovoid soft tissue fragment is bisected to show tan-pink cut surface. The
specimen is submitted entirely in C1-C2.Ink code:Blue-resection marginSection 
code:C1: Soft tissue fragmentsC2: Ovoid soft tissue, bisectedD. Polyp, Colon - S
igmoid.Received in formalin labeled with the patient's name, medical record 
number and "polyp, colon-sigmoid x5 taken by hot snare" and consists of multiple
tan-pink, ovoid, pedunculated soft tissue fragments (ranging from 0.1 cm - 1.8 
cm in greatest dimension, 7.5 x 2.1 x 0.4 cm in aggregate). The largest 
pedunculated soft tissue fragment is quadrisected to show tan-pink, focal 
hemorrhagic cut surface. The specimen is submitted entirely in D1-D3.JESENIA Braswell studentSynaptophysin and chromogranin were performed on part 
A and were negative in tumor cells (all stains are with appropriatecontrol 
staining). MISMATCH REPAIR (MMR) PROTEINS on Part A:Immunohistochemistry 
results:MSH-2: Intact nuclear expressionMSH-6: Intact nuclear expressionMLH-1: 
Intact nuclear expressionPMS-2: Intact nuclear expressionInterpretation:No loss 
of nuclear expression of MMR proteins: low probability of microsattelite 
instability-high (MSI-H). The interpretation of this case included the use of 
immunohistochemistry or special stains.Control Slides Examined: In-house known 
positive controls were evaluated along with the test tissue. These control 
slides run alongside of the patients sample show appropriatestaining. Internal 
positive and negative controls when available are evaluated Immunohistochemistry
technical testing was performed at [facility], Pathology 
Laboratory where it was developed and its performance characteristics were 
determined. It has not been cleared or approved by the U.S. Food and Drug 
Administration. The FDA has determined that such clearance or approval is not 
necessary. The test is used for clinical purposes. It should not be regarded as 
investigational orfor research. This laboratory is certified under the Clinical 
Laboratory Improvement Amendments of 1988 (CLIA-88) as qualified to perform high
complexity clinical laboratory testing.[facility], 
Department of Pathology, [address] [place] [address], Tel 
[phone] [facility], [facility] of Pathology, [address] [place] [address], Tel [phone] Heart & Surgical Hospital, Department of Pathology, [address] [place] [address], Tel 
[phone]-Glucose [id number] 13:15:36





             Test Item    Value        Reference Range Interpretation Comments

 

             POC-Glucose Meter (test 121 mg/dL           H            : TE

STED AT [facility]



             code = 1538)                                        [address] [place] [address], [facility]

30:



                                                                 /Techni

shelbi



                                                                 ID = [id number] for



                                                                 Jonny, Shemek

e

 

             Lab Interpretation (test Abnormal                               



             code = 39484-2)                                        



[facility]-Glucose [id number] 13:15:36





             Test Item    Value        Reference Range Interpretation Comments

 

             POC-Glucose Meter (test 121 mg/dL           H            : TE

STED AT [facility]



             code = 1538)                                        [address] [place] [address], [facility]

30:



                                                                 /Techni

shelbi



                                                                 ID = [id number] for



                                                                 Jonny, Shemek

e

 

             Lab Interpretation (test Abnormal                               



             code = 14058-5)                                        



[facility]-Glucose [id number] 13:15:36





             Test Item    Value        Reference Range Interpretation Comments

 

             POC-Glucose Meter (test 121 mg/dL           H            : TE

STED AT [facility]



             code = 1538)                                        [address] [place] [address], [facility]

30:



                                                                 /Techni

shelbi



                                                                 ID = [id number] for



                                                                 Jonny, Shemek

e

 

             Lab Interpretation (test Abnormal                               



             code = 08776-3)                                        



[facility] [id number] 13:15:36





             Test Item    Value        Reference Range Interpretation Comments

 

             POC-Glucose Meter (test 121 mg/dL           H            : TE

STED AT [facility]



             code = 1538)                                        [address] [place] [address], [facility]

30:



                                                                 /Techni

shelbi



                                                                 ID = [id number] for



                                                                 Jonny, Shemek

e

 

             Lab Interpretation (test Abnormal                               



             code = 47640-4)                                        



[facility]-Glucose [id number] 13:15:36





             Test Item    Value        Reference Range Interpretation Comments

 

             POC-Glucose Meter (test 121 mg/dL           H            : TE

STED AT [facility]



             code = 1538)                                        [address] [place] [address], [facility]

30:



                                                                 /Techni

shelbi



                                                                 ID = [id number] for



                                                                 Jonny, Shemek

e

 

             Lab Interpretation (test Abnormal                               



             code = 50168-9)                                        



[facility] [id number] 13:15:36





             Test Item    Value        Reference Range Interpretation Comments

 

             POC-GLUCOSE METER 121 mg/dL           H            : TESTED A

T [facility] [address]



             (BEAKER) (test code =                                        [facility] FARNSWOR[facility],



             1538)                                               [address]:



                                                                 /Techni

shelbi ID



                                                                 = [id number] for Colt De La Torre



POCT-GLUCOSE [id number] 09:02:57





             Test Item    Value        Reference Range Interpretation Comments

 

             POC-GLUCOSE METER 125 mg/dL           H            : TESTED A

T [facility] [address]



             (BEAKER) (test code =                                        [facility]AR[facility]WOR[facility],



             1538)                                               [address]:



                                                                 /Techni

shelbi ID



                                                                 = [id number] for MAYANK OWUSU



SARS-CoV2/RT-PCR (Asymptomatic ONLY)2022 07:59:31





             Test Item    Value        Reference    Interpretation Comments



                                       Range                     

 

             SARS-COV2/RT-PCR Negative     Negative                  The SARS-Co

V-2



             (test code =                                        target nucleic



             06826-9)                                            acids are not



                                                                 detected in thi

s



                                                                 specimen. Negat

pablo



                                                                 results do not



                                                                 preclude SARS-C

oV-2



                                                                 infection and



                                                                 should not be u

sed



                                                                 as the sole bas

is



                                                                 for patient



                                                                 management



                                                                 decisions. Nega

tive



                                                                 results must be



                                                                 combined with



                                                                 clinical



                                                                 observations,



                                                                 patient history

,



                                                                 and epidemiolog

ical



                                                                 information. A



                                                                 false negative



                                                                 result may occu

r if



                                                                 a specimen is



                                                                 improperly



                                                                 collected,



                                                                 transported or



                                                                 handled. This S

ARS



                                                                 CoV-2 test is a



                                                                 rapid, real-roxanne

e



                                                                 RT-PCR test



                                                                 intended for 

e



                                                                 qualitative



                                                                 detection of



                                                                 nucleic acid fr

om



                                                                 SARS-CoV-2 in a



                                                                 nasopharyngeal 

swab



                                                                 specimen [facility]



                                                                 from individual

s



                                                                 suspected of



                                                                 COVID-19 by the

ir



                                                                 healthcare



                                                                 provider.

 

             KEEGAN (test code = This test has been                           



             KEEGAN)         authorized by FDA                           



                          under an EUA for                           



                          use by authorized                           



                          laboratories. This                           



                          test is only                           



                          authorized for the                           



                          duration of the                           



                          declaration that                           



                          circumstances                           



                          exist justifying                           



                          the authorization                           



                          of emergency use                           



                          of in vitro                            



                          diagnostic tests                           



                          for detection                           



                          and/or diagnosis                           



                          of COVID-19 under                           



                          Section 564(b)(1)                           



                          of the Federal                           



                          Food, Drug and                           



                          Cosmetic Act, 21                           



                          U.S[place]C.                               



                          360bbb-3(b)(1),                           



                          unless the                             



                          authorization is                           



                          terminated or                           



                          revoked sooner.                           



                          Fact Sheet for                           



                          Healthcare                             



                          Providers:                             



                          https://www.[employer]/Documents/Xp                           



                          ert%20Xpress%20SAR                           



                          S%20CoV-2/Fact%20S                           



                          heets/302-3802%20S                           



                          ARS-COV-2%20HEALTH                           



                          CARE%20PROVIDERS%2                           



                          0FACT%20SHEET.pdf                           



                          Fact Sheet for                           



                          Healthcare                             



                          Patients:                              



                          https://www.[employer]/Documents/Xp                           



                          ert%20Xpress%20SAR                           



                          S%20CoV-2/Fact%20S                           



                          heets/302-3801%20S                           



                          ARS-COV-2%20PATIEN                           



                          T%20FACT%20SHEET.p                           



                          df                                     

 

             Lab Interpretation Normal                                 



             (test code =                                        



             44014-6)                                            



[facility]20[facility] 
07:59:31





             Test Item    Value        Reference    Interpretation Comments



                                       Range                     

 

             SARS-COV2/RT-PCR Negative     Negative                  The SARS-Co

V-2



             (test code =                                        target nucleic



             98726-9)                                            acids are not



                                                                 detected in thi

s



                                                                 specimen. Negat

pablo



                                                                 results do not



                                                                 preclude SARS-C

oV-2



                                                                 infection and



                                                                 should not be u

sed



                                                                 as the sole bas

is



                                                                 for patient



                                                                 management



                                                                 decisions. Nega

tive



                                                                 results must be



                                                                 combined with



                                                                 clinical



                                                                 observations,



                                                                 patient history

,



                                                                 and epidemiolog

ical



                                                                 information. A



                                                                 false negative



                                                                 result may occu

r if



                                                                 a specimen is



                                                                 improperly



                                                                 collected,



                                                                 transported or



                                                                 handled. This S

ARS



                                                                 CoV-2 test is a



                                                                 rapid, real-roxanne

e



                                                                 RT-PCR test



                                                                 intended for th

e



                                                                 qualitative



                                                                 detection of



                                                                 nucleic acid fr

om



                                                                 SARS-CoV-2 in a



                                                                 nasopharyngeal 

swab



                                                                 specimen colle

froilan



                                                                 from individual

s



                                                                 suspected of



                                                                 COVID-19 by the

ir



                                                                 healthcare



                                                                 provider.

 

             KEEGAN (test code = This test has been                           



             KEEGAN)         authorized by FDA                           



                          under an EUA for                           



                          use by authorized                           



                          laboratories. This                           



                          test is only                           



                          authorized for the                           



                          duration of the                           



                          declaration that                           



                          circumstances                           



                          exist justifying                           



                          the authorization                           



                          of emergency use                           



                          of in vitro                            



                          diagnostic tests                           



                          for detection                           



                          and/or diagnosis                           



                          of COVID-19 under                           



                          Section 564(b)(1)                           



                          of the Federal                           



                          Food, Drug and                           



                          Cosmetic Act, 21                           



                          [place]                               



                          360bbb-3(b)(1),                           



                          unless the                             



                          authorization is                           



                          terminated or                           



                          revoked sooner.                           



                          Fact Sheet for                           



                          Healthcare                             



                          Providers:                             



                          https://www.[employer]/Documents/Xp                           



                          ert%20Xpress%20SAR                           



                          S%20CoV-2/Fact%20S                           



                          heets/302-3802%20S                           



                          ARS-COV-2%20HEALTH                           



                          CARE%20PROVIDERS%2                           



                          0FACT%20SHEET.pdf                           



                          Fact Sheet for                           



                          Healthcare                             



                          Patients:                              



                          https://www.[employer]/Documents/Xp                           



                          ert%20Xpress%20SAR                           



                          S%20CoV-2/Fact%20S                           



                          heets/302-3801%20S                           



                          ARS-COV-2%20PATIEN                           



                          T%20FACT%20SHEET.p                           



                          df                                     

 

             Lab Interpretation Normal                                 



             (test code =                                        



             01403-5)                                            



[facility] 
[facility]31





             Test Item    Value        Reference    Interpretation Comments



                                       Range                     

 

             SARS-COV2/RT-PCR Negative     Negative                  The SARS-Co

V-2



             (test code =                                        target nucleic



             18962-5)                                            acids are not



                                                                 detected in thi

s



                                                                 specimen. Negat

pablo



                                                                 results do not



                                                                 preclude SARS-C

oV-2



                                                                 infection and



                                                                 should not be u

sed



                                                                 as the sole bas

is



                                                                 for patient



                                                                 management



                                                                 decisions. Nega

tive



                                                                 results must be



                                                                 combined with



                                                                 clinical



                                                                 observations,



                                                                 patient history

,



                                                                 and epidemiolog

ical



                                                                 information. A



                                                                 false negative



                                                                 result may occu

r if



                                                                 a specimen is



                                                                 improperly



                                                                 collected,



                                                                 transported or



                                                                 handled. This S

ARS



                                                                 CoV-2 test is a



                                                                 rapid, real-roxanne

e



                                                                 RT-PCR test



                                                                 intended for th

e



                                                                 qualitative



                                                                 detection of



                                                                 nucleic acid fr

om



                                                                 SARS-CoV-2 in a



                                                                 nasopharyngeal 

swab



                                                                 specimen collec

froilan



                                                                 from individual

s



                                                                 suspected of



                                                                 COVID-19 by the

ir



                                                                 healthcare



                                                                 provider.

 

             KEEGAN (test code = This test has been                           



             KEEGAN)         authorized by FDA                           



                          under an EUA for                           



                          use by authorized                           



                          laboratories. This                           



                          test is only                           



                          authorized for the                           



                          duration of the                           



                          declaration that                           



                          circumstances                           



                          exist justifying                           



                          the authorization                           



                          of emergency use                           



                          of in vitro                            



                          diagnostic tests                           



                          for detection                           



                          and/or diagnosis                           



                          of COVID-19 under                           



                          Section 564(b)(1)                           



                          of the Federal                           



                          Food, Drug and                           



                          Cosmetic Act, 21                           



                          [place]                               



                          360bbb-3(b)(1),                           



                          unless the                             



                          authorization is                           



                          terminated or                           



                          revoked sooner.                           



                          Fact Sheet for                           



                          Healthcare                             



                          Providers:                             



                          https://www.[employer]/Documents/Xp                           



                          ert%20Xpress%20SAR                           



                          S%20CoV-2/Fact%20S                           



                          heets/302-3802%20S                           



                          ARS-COV-2%20HEALTH                           



                          CARE%20PROVIDERS%2                           



                          0FACT%20SHEET.pdf                           



                          Fact Sheet for                           



                          Healthcare                             



                          Patients:                              



                          https://www.[employer]/Documents/Xp                           



                          ert%20Xpress%20SAR                           



                          S%20CoV-2/Fact%20S                           



                          heets/3023801%20S                           



                          ARS-COV-2%20PATIEN                           



                          T%20FACT%20SHEET.p                           



                          df                                     

 

             Lab Interpretation Normal                                 



             (test code =                                        



             69164-6)                                            



[facility] 
07:59:31





             Test Item    Value        Reference    Interpretation Comments



                                       Range                     

 

             SARS-COV2/RT-PCR Negative     Negative                  The SARS-Co

V-2



             (test code =                                        target nucleic



             90920-7)                                            acids are not



                                                                 detected in thi

s



                                                                 specimen. Negat

pablo



                                                                 results do not



                                                                 preclude SARS-C

oV-2



                                                                 infection and



                                                                 should not be u

sed



                                                                 as the sole bas

is



                                                                 for patient



                                                                 management



                                                                 decisions. Nega

tive



                                                                 results must be



                                                                 combined with



                                                                 clinical



                                                                 observations,



                                                                 patient history

,



                                                                 and epidemiolog

ical



                                                                 information. A



                                                                 false negative



                                                                 result may occu

r if



                                                                 a specimen is



                                                                 improperly



                                                                 collected,



                                                                 transported or



                                                                 handled. This S

ARS



                                                                 CoV-2 test is a



                                                                 rapid, real-roxanne

e



                                                                 RT-PCR test



                                                                 intended for th

e



                                                                 qualitative



                                                                 detection of



                                                                 nucleic acid fr

om



                                                                 SARS-CoV-2 in a



                                                                 nasopharyngeal 

swab



                                                                 specimen colle

froilan



                                                                 from individual

s



                                                                 suspected of



                                                                 COVID-19 by the

ir



                                                                 healthcare



                                                                 provider.

 

             KEEGAN (test code = This test has been                           



             KEEGAN)         authorized by FDA                           



                          under an EUA for                           



                          use by authorized                           



                          laboratories. This                           



                          test is only                           



                          authorized for the                           



                          duration of the                           



                          declaration that                           



                          circumstances                           



                          exist justifying                           



                          the authorization                           



                          of emergency use                           



                          of in vitro                            



                          diagnostic tests                           



                          for detection                           



                          and/or diagnosis                           



                          of COVID-19 under                           



                          Section 564(b)(1)                           



                          of the Federal                           



                          Food, Drug and                           



                          Cosmetic Act, 21                           



                          [place]                               



                          360bbb-3(b)(1),                           



                          unless the                             



                          authorization is                           



                          terminated or                           



                          revoked sooner.                           



                          Fact Sheet for                           



                          Healthcare                             



                          Providers:                             



                          https://www.[employer]/Documents/Xp                           



                          ert%20Xpress%20SAR                           



                          S%20CoV-2/Fact%20S                           



                          heets/3023802%20S                           



                          ARS-COV-2%20HEALTH                           



                          CARE%20PROVIDERS%2                           



                          0FACT%20SHEET.pdf                           



                          Fact Sheet for                           



                          Healthcare                             



                          Patients:                              



                          https://www.[employer]/Documents/Xp                           



                          ert%20Xpress%20SAR                           



                          S%20CoV-2/Fact%20S                           



                          heets/302-3801%20S                           



                          ARS-COV-2%20PATIEN                           



                          T%20FACT%20SHEET.p                           



                          df                                     

 

             Lab Interpretation Normal                                 



             (test code =                                        



             17122-2)                                            



[facility] 
07:59:31





             Test Item    Value        Reference    Interpretation Comments



                                       Range                     

 

             SARS-COV2/RT-PCR Negative     Negative                  The SARS-Co

V-2



             (test code =                                        target nucleic



             84756-8)                                            acids are not



                                                                 detected in thi

s



                                                                 specimen. Negat

pablo



                                                                 results do not



                                                                 preclude SARS-C

oV-2



                                                                 infection and



                                                                 should not be u

sed



                                                                 as the sole bas

is



                                                                 for patient



                                                                 management



                                                                 decisions. Nega

tive



                                                                 results must be



                                                                 combined with



                                                                 clinical



                                                                 observations,



                                                                 patient history

,



                                                                 and epidemiolog

ical



                                                                 information. A



                                                                 false negative



                                                                 result may occu

r if



                                                                 a specimen is



                                                                 improperly



                                                                 collected,



                                                                 transported or



                                                                 handled. This S

ARS



                                                                 CoV-2 test is a



                                                                 rapid, real-roxanne

e



                                                                 RT-PCR test



                                                                 intended for th

e



                                                                 qualitative



                                                                 detection of



                                                                 nucleic acid fr

om



                                                                 SARS-CoV-2 in a



                                                                 nasopharyngeal 

swab



                                                                 specimen collec

froilan



                                                                 from individual

s



                                                                 suspected of



                                                                 COVID-19 by the

ir



                                                                 healthcare



                                                                 provider.

 

             KEEAGN (test code = This test has been                           



             KEEGAN)         authorized by FDA                           



                          under an EUA for                           



                          use by authorized                           



                          laboratories. This                           



                          test is only                           



                          authorized for the                           



                          duration of the                           



                          declaration that                           



                          circumstances                           



                          exist justifying                           



                          the authorization                           



                          of emergency use                           



                          of in vitro                            



                          diagnostic tests                           



                          for detection                           



                          and/or diagnosis                           



                          of COVID-19 under                           



                          Section 564(b)(1)                           



                          of the Federal                           



                          Food, Drug and                           



                          Cosmetic Act, 21                           



                          U.S[place]C.                               



                          360bbb-3(b)(1),                           



                          unless the                             



                          authorization is                           



                          terminated or                           



                          revoked sooner.                           



                          Fact Sheet for                           



                          Healthcare                             



                          Providers:                             



                          https://www.[employer]/Documents/Xp                           



                          ert%20Xpress%20SAR                           



                          S%20CoV-2/Fact%20S                           



                          heets/302-3802%20S                           



                          ARS-COV-2%20HEALTH                           



                          CARE%20PROVIDERS%2                           



                          0FACT%20SHEET.pdf                           



                          Fact Sheet for                           



                          Healthcare                             



                          Patients:                              



                          https://www.[employer]/Documents/Xp                           



                          ert%20Xpress%20SAR                           



                          S%20CoV-2/Fact%20S                           



                          heets/302-3801%20S                           



                          ARS-COV-2%20PATIEN                           



                          T%20FACT%20SHEET.p                           



                          df                                     

 

             Lab Interpretation Normal                                 



             (test code =                                        



             39523-7)                                            



[facility] 
07:59:31





             Test Item    Value        Reference Range Interpretation Comments

 

             SARS-COV2/RT-PCR Negative     Negative                  The SARS-Co

V-2 target



             (test code =                                        nucleic acids a

re not



             2629492)                                            detected in thi

s specimen.



                                                                 Negative result

s do not



                                                                 preclude SARS-C

oV-2



                                                                 infection and s

hould not be



                                                                 used as the sol

e basis for



                                                                 patient managem

ent



                                                                 decisions. Nega

tive results



                                                                 must be combine

d with



                                                                 clinical observ

ations,



                                                                 patient history

, and



                                                                 epidemiological

 information.



                                                                 A false negativ

e result may



                                                                 occur if a spec

imen is



                                                                 improperly pina

ected,



                                                                 transported or 

handled. This



                                                                 SARS CoV-2 test

 is a rapid,



                                                                 real-time RT-PC

R test



                                                                 intended for th

e qualitative



                                                                 detection of nu

cleic acid



                                                                 from SARS-CoV-2

 in a



                                                                 nasopharyngeal 

swab specimen



                                                                 collected from 

individuals



                                                                 suspected of CO

VID-19 by



                                                                 their healthcar

e provider.



This test has been authorized by FDA under an EUA for use by authorized 
laboratories. This test is only authorized for the duration of the declaration 
that circumstances exist justifying the authorization of emergency use of in 
vitro diagnostic tests for detection and/or diagnosis of COVID-19 under Section 
564(b)(1) of the Federal Food, Drug and Cosmetic Act, 21 U.S.C. 360bbb-3(b)(1), 
unless the authorization is terminated or revoked sooner. Fact Sheet for 
Healthcare Providers: https://www.[employer]/Documents/Xpert%20Xpress%20SARS%20CoV-2/Fact%20Sheets/302-3802%04VCDE-LAC-5%20

HEALTHCARE%20PROVIDERS%20FACT%20SHEET.pdf Fact Sheet for Healthcare Patients: 
https://www.[employer]/Documents/Xpert%20Xp
ress%20SARS%20CoV-2/Fact%20Sheets/302-3801%91HIOB-BPJ-9%20PATIENT%20FACT%20SHEET

.pdfPOCT-GLUCOSE [id number] 08:23:46





             Test Item    Value        Reference Range Interpretation Comments

 

             POC-GLUCOSE METER 106 mg/dL                        : TESTED A

T [facility]200



             (BEAKER) (test code                                        AUGUSTINE DE LA ROSA,



             = 1538)                                             [facility] 7703

0:



                                                                 /Techni

shelbi ID =



                                                                 [id number] for RICHELLE DIMAS



[id number] 09:23:02





             Test Item    Value        Reference    Interpretation Comments



                                       Range                     

 

             CEA (test code              See_Commen   H             In otherwise

 healthy smokers, 95%



             = 2039-6)                 t                         of patients fal

l in the rangeof



                                                                 0.0-5.5 ng/mL. 

NOTE: Methodology is



                                                                 Roche [employer]



                                                                 Electrochemilum

inescenceImmunoassay



                                                                 (ECLIA). Unless

 Otherwise Indicated,



                                                                 All Testing Per

formed At: Clinical



                                                                 [place] [address] Laboratory



                                                                 Director: Bonifacio Carey M.D.



                                                                 CLIA Number [id number]

[id number] Cap



                                                                 Accreditation N

o. [id number]



                                                                 [Automated mess

age] The system which



                                                                 generated this 

result transmitted



                                                                 reference range

: <=3.8 NG/ML. The



                                                                 reference range

 was not used to



                                                                 interpret this 

result as



                                                                 normal/abnormal

.

 

             Lab          Abnormal                               



             Interpretation                                        



             (test code =                                        



             14145-8)                                            



[id number]02





             Test Item    Value        Reference Range Interpretation Comments

 

             AFP-TUMOR MARKER (test              See_Comment                Unle

ss Otherwise



             code = 08439-6)                                        Indicated, A

ll Testing



                                                                 Performed At:  

[place] [address] Laborator

y Director:



                                                                 Bonifacio negron M.D.



                                                                 CLIA Number [id number]

[id number] Cap



                                                                 Accreditation N

o. [id number]



                                                                 [Automated mess

age] The



                                                                 system which ge

nerated



                                                                 this result tra

nsmitted



                                                                 reference range

: <=8.30



                                                                 NG/ML. The refe

rence range



                                                                 was not used to

 interpret



                                                                 this result as



                                                                 normal/abnormal

.



[facility] [id number] 09:23:02





             Test Item    Value        Reference    Interpretation Comments



                                       Range                     

 

             CA 19-9 (test code >48615       See_Comment  H             NOTE: Me

thodology is Roche



             = 24108-3)                                          Renan



                                                                 Electrochemilum

inescence



                                                                 Immunoassay (EC

PIETER). Values



                                                                 obtained with d

ifferent



                                                                 assays/manufact

urers cannot



                                                                 be used interch

angeably.



                                                                 Results should 

not be used



                                                                 as sole basis t

o establish



                                                                 the presence or

 absence of



                                                                 malignancy. Unl

ess Otherwise



                                                                 Indicated, All 

Testing



                                                                 Performed At: C

linical



                                                                 Pathology [facility], [address] [place] [address]



                                                                 Laboratory Dire

ctor: Bonifacio Carey M.D.

 CLIA Number



                                                                 [id number] Cap 

Accreditation



                                                                 [place]. [id number] [A

utomated



                                                                 message] The sy

stem which



                                                                 generated this 

result



                                                                 transmitted ref

erence range:



                                                                 <35 U/ML. The r

eference



                                                                 range was not u

sed to



                                                                 interpret this 

result as



                                                                 normal/abnormal

.

 

             Lab Interpretation Abnormal                               



             (test code =                                        



             50441-0)                                            



[facility] [id number] 08:41:12





             Test Item    Value        Reference Range Interpretation Comments

 

             GLUCOSE (test code =              See_Comment  H             [Autom

ated message]



             2345-7)                                             The system [employer]



                                                                 generated this 

result



                                                                 transmitted ref

erence



                                                                 range: 70 - 99 

MG/DL.



                                                                 The reference r

veena



                                                                 was not used to



                                                                 interpret this 

result



                                                                 as normal/abnor

mal.

 

             BLOOD UREA NITROGEN              See_Comment                [Automa

froilan message]



             (test code = 3091-6)                                        The sys

tem which



                                                                 generated this 

result



                                                                 transmitted ref

erence



                                                                 range: 8 - 23 M

G/DL.



                                                                 The reference r

veena



                                                                 was not used to



                                                                 interpret this 

result



                                                                 as normal/abnor

mal.

 

             CREATININE (test code =              See_Comment                [Au

tomated message]



             2160-0)                                             The system [employer]



                                                                 generated this 

result



                                                                 transmitted ref

erence



                                                                 range: 0.80 - 1

.40



                                                                 MG/DL. The refe

rence



                                                                 range was not u

sed to



                                                                 interpret this 

result



                                                                 as normal/abnor

mal.

 

             EGFR (test code =              See_Comment  L             [Automate

d message]



             33914-3)                                            The system [employer]



                                                                 generated this 

result



                                                                 transmitted ref

erence



                                                                 range: >60



                                                                 ML/MIN/1.73. Th

e



                                                                 reference range

 was



                                                                 not used to int

erpret



                                                                 this result as



                                                                 normal/abnormal

.

 

             BUN/CREAT RATIO (test              See_Comment                [Auto

mated message]



             code = 3097-3)                                        The system [facility]



                                                                 generated this 

result



                                                                 transmitted ref

erence



                                                                 range: 6 - 28 R

ATIO.



                                                                 The reference r

veena



                                                                 was not used to



                                                                 interpret this 

result



                                                                 as normal/abnor

mal.

 

             SODIUM (test code =              See_Comment                [Automa

froilan message]



             2951-2)                                             The system [employer]



                                                                 generated this 

result



                                                                 transmitted ref

erence



                                                                 range: 133 - 14

6



                                                                 MEQ/L. The refe

rence



                                                                 range was not u

sed to



                                                                 interpret this 

result



                                                                 as normal/abnor

mal.

 

             POTASSIUM (test code =              See_Comment                [Aut

omated message]



             2823-3)                                             The system Sequence



                                                                 generated this 

result



                                                                 transmitted ref

erence



                                                                 range: 3.5 - 5.

4



                                                                 MEQ/L. The refe

rence



                                                                 range was not u

sed to



                                                                 interpret this 

result



                                                                 as normal/abnor

mal.

 

             CHLORIDE (test code =              See_Comment                [Auto

mated message]



             1495-0)                                             The system [employer]





                                                                 generated this 

result



                                                                 transmitted ref

erence



                                                                 range: 95 - 107

 MEQ/L.



                                                                 The reference r

veena



                                                                 was not used to



                                                                 interpret this 

result



                                                                 as normal/abnor

mal.

 

             CO2 (test code =              See_Comment                [Automated

 message]



             1963-8)                                             The system [employer]



                                                                 generated this 

result



                                                                 transmitted ref

erence



                                                                 range: 19 - 31 

MEQ/L.



                                                                 The reference r

veena



                                                                 was not used to



                                                                 interpret this 

result



                                                                 as normal/abnor

mal.

 

             CALCIUM (test code =              See_Comment                [Autom

ated message]



             71608-3)                                            The system [employer]



                                                                 generated this 

result



                                                                 transmitted ref

erence



                                                                 range: 8.5 - 10

.5



                                                                 MG/DL. The refe

rence



                                                                 range was not u

sed to



                                                                 interpret this 

result



                                                                 as normal/abnor

mal.

 

             PROTEIN TOTAL (test              See_Comment                [Automa

froilan message]



             code = 2885-2)                                        The system [facility]



                                                                 generated this 

result



                                                                 transmitted ref

erence



                                                                 range: 6.1 - 8.

3 G/DL.



                                                                 The reference r

veena



                                                                 was not used to



                                                                 interpret this 

result



                                                                 as normal/abnor

mal.

 

             ALBUMIN (test code =              See_Comment                [Autom

ated message]



             98198-3)                                            The system [employer]



                                                                 generated this 

result



                                                                 transmitted ref

erence



                                                                 range: 3.5 - 5.

2 G/DL.



                                                                 The reference r

veena



                                                                 was not used to



                                                                 interpret this 

result



                                                                 as normal/abnor

mal.

 

             GLOBULINS, SERUM, TOTAL              See_Comment                [Au

tomated message]



             (test code = 95364-5)                                        The sy

stem which



                                                                 generated this 

result



                                                                 transmitted ref

erence



                                                                 range: 1.9 - 3.

7 G/DL.



                                                                 The reference r

veena



                                                                 was not used to



                                                                 interpret this 

result



                                                                 as normal/abnor

mal.

 

             A/G RATIO (test code =              See_Comment                [Aut

omated message]



             1759-0)                                             The system [employer]



                                                                 generated this 

result



                                                                 transmitted ref

erence



                                                                 range: 1.0 - 2.

6



                                                                 RATIO. The refe

rence



                                                                 range was not u

sed to



                                                                 interpret this 

result



                                                                 as normal/abnor

mal.

 

             BILIRUBIN TOTAL (test              See_Comment                [Auto

mated message]



             code = 1975-2)                                        The system [facility]



                                                                 generated this 

result



                                                                 transmitted ref

erence



                                                                 range: <=1.2 MG

/DL.



                                                                 The reference r

veena



                                                                 was not used to



                                                                 interpret this 

result



                                                                 as normal/abnor

mal.

 

             ALKALINE PHOSPHATASE 210 U/L             H            



             (test code = 6768-6)                                        

 

             AST (SGOT) (test code = 56 U/L       9-50         H            



             1920-8)                                             

 

             ALT (SGPT) (test code = 30 U/L       5-50                       Unl

ess Otherwise



             1744-2)                                             Indicated, All 

Testing



                                                                 Performed At: [facility]



                                                                 Pathology



                                                                 Laboratories, [address] [place] [address]



                                                                 [id number] Laborator

y



                                                                 Director: Bonifacio Carey M.D.

 CLIA



                                                                 Number [id number]

03 Cap



                                                                 Accreditation N

o.



                                                                 [id number]

 

             Lab Interpretation Abnormal                               



             (test code = 35834-3)                                        



[facility] W/AUTO DIFF WITH [id number] 07:37:08





             Test Item    Value        Reference Range Interpretation Comments

 

             WHITE BLOOD CELL COUNT              See_Comment                [Aut

omated message]



             (test code = 41660-6)                                        The sy

stem which



                                                                 generated this 

result



                                                                 transmitted ref

erence



                                                                 range: 3.5 - 11

.0



                                                                 K/UL. The refer

ence



                                                                 range was not u

sed to



                                                                 interpret this 

result



                                                                 as normal/abnor

mal.

 

             RED BLOOD CELL COUNT              See_Comment                [Autom

ated message]



             (test code = 65496-2)                                        The sy

stem which



                                                                 generated this 

result



                                                                 transmitted ref

erence



                                                                 range: 4.50 - 6

.10



                                                                 M/UL. The refer

ence



                                                                 range was not u

sed to



                                                                 interpret this 

result



                                                                 as normal/abnor

mal.

 

             HEMOGLOBIN (test code =              See_Comment                [Au

tomated message]



             718-7)                                              The system [employer]



                                                                 generated this 

result



                                                                 transmitted ref

erence



                                                                 range: 13.5 - 1

7.0



                                                                 G/DL. The refer

ence



                                                                 range was not u

sed to



                                                                 interpret this 

result



                                                                 as normal/abnor

mal.

 

             HEMATOCRIT (test code = 40.7 %       40.0-51.0                 



             23038-1)                                            

 

             MEAN CORPUSCULAR VOLUME 82.7 fL      80.0-99.0                 



             (test code = 89616-1)                                        

 

             MEAN CORPUSCULAR 28.0 PG      25.0-33.0                 



             HEMOGLOBIN (test code =                                        



             16294-4)                                            

 

             MEAN CORPUSCULAR              See_Comment                [Automated

 message]



             HEMOGLOBIN CONC (test                                        The sy

stem which



             code = 28540-3)                                        generated th

is result



                                                                 transmitted ref

erence



                                                                 range: 31.0 - 3

6.0



                                                                 G/DL. The refer

ence



                                                                 range was not u

sed to



                                                                 interpret this 

result



                                                                 as normal/abnor

mal.

 

             RED CELL DISTRIBUTION 13.9 %       11.5-15.0                 



             WIDTH (test code =                                        



             70215-3)                                            

 

             NEUTROPHILS % (test code 78.6 %                                 



             = 85899-0)                                          

 

             LYMPHOCYTES % (test code 7.7 %                                  



             = 99627-9)                                          

 

             MONOCYTES % (test code = 11.1 %                                 



             26485-3)                                            

 

             EOSINOPHILS % (test code 1.4 %                                  



             = 30428-7)                                          

 

             BASOPHILS % (test code = 0.8 %                                  



             33531-8)                                            

 

             IMMATURE GRANULOCYTES 0.4 %                                  



             (test code = 31437-8)                                        

 

             NUCLEATED RBC'S              See_Comment                Unless Othe

rwise



             MYELOPEROX STAIN (test                                        Indic

ated, All



             code = 79460-6)                                        Testing Perf

ormed At:



                                                                 [facility]

[facility] [address] [place] [address]



                                                                 [id number] Laborator

y



                                                                 Director: JOE ScanlonIA



                                                                 Number [id number]

03 Cap



                                                                 Accreditation N

o.



                                                                 [id number] [Autom

ated



                                                                 message] The sy

stem



                                                                 which generated

 this



                                                                 result transmit

froilan



                                                                 reference range

: 0.00



                                                                 - 0.11 K/UL. Th

e



                                                                 reference range

 was



                                                                 not used to int

erpret



                                                                 this result as



                                                                 normal/abnormal

.

 

             PLATELET COUNT (test              See_Comment                [Autom

ated message]



             code = 34098-4)                                        The system w

[facility]



                                                                 generated this 

result



                                                                 transmitted ref

erence



                                                                 range: 130 - 40

0



                                                                 K/UL. The refer

ence



                                                                 range was not u

sed to



                                                                 interpret this 

result



                                                                 as normal/abnor

mal.

 

             NEUTROPHILS ABSOLUTE              See_Comment  H             [Autom

ated message]



             COUNT (test code =                                        The syste

m which



             32839-7)                                            generated this 

result



                                                                 transmitted ref

erence



                                                                 range: 1.50 - 7

.50



                                                                 K/UL. The refer

ence



                                                                 range was not u

sed to



                                                                 interpret this 

result



                                                                 as normal/abnor

mal.

 

             LYMPHOCYTES ABSOLUTE              See_Comment  L             [Autom

ated message]



             COUNT (test code =                                        The syste

m which



             99706-1)                                            generated this 

result



                                                                 transmitted ref

erence



                                                                 range: 1.00 - 4

.00



                                                                 K/UL. The refer

ence



                                                                 range was not u

sed to



                                                                 interpret this 

result



                                                                 as normal/abnor

mal.

 

             MONOCYTES ABSOLUTE COUNT              See_Comment  H             [A

utomated message]



             (test code = 44140-1)                                        The sy

stem which



                                                                 generated this 

result



                                                                 transmitted ref

erence



                                                                 range: 0.20 - 1

.00



                                                                 K/UL. The refer

ence



                                                                 range was not u

sed to



                                                                 interpret this 

result



                                                                 as normal/abnor

mal.

 

             BASOPHILS ABSOLUTE COUNT              See_Comment                [A

utomated message]



             (test code = 94548-0)                                        The sy

stem which



                                                                 generated this 

result



                                                                 transmitted ref

erence



                                                                 range: 0.00 - 0

.20



                                                                 K/UL. The refer

ence



                                                                 range was not u

sed to



                                                                 interpret this 

result



                                                                 as normal/abnor

mal.

 

             IMMATURE GRANS (ABS)              See_Comment                [Autom

ated message]



             (test code = 9987)                                        The syste

m which



                                                                 generated this 

result



                                                                 transmitted ref

erence



                                                                 range: 0.00 - 0

.10



                                                                 K/UL. The refer

ence



                                                                 range was not u

sed to



                                                                 interpret this 

result



                                                                 as normal/abnor

mal.

 

             Lab Interpretation (test Abnormal                               



             code = 68400-7)                                        



[facility](CELLAVISION MANUAL DIFF)2019 09:14:00





             Test Item    Value        Reference Range Interpretation Comments

 

             NEUTROPHILS - REL 81 %                                   



             (CELLAVISION)(BEAKER) (test code =                                 

       



             2816)                                               

 

             LYMPHOCYTES - REL 7 %                                    



             (CELLAVISION)(BEAKER) (test code =                                 

       



             2817)                                               

 

             MONOCYTES - REL 7 %                                    



             (CELLAVISION)(BEAKER) (test code =                                 

       



             2818)                                               

 

             EOSINOPHILS - REL 2 %                                    



             (CELLAVISION)(BEAKER) (test code =                                 

       



             2819)                                               

 

             MYELOCYTES - REL 2 %          0-0          H            



             (CELLAVISION)(BEAKER) (test code =                                 

       



             2822)                                               

 

             ATYPICAL LYMPHOCYTES - REL 1 %          0-0          H            



             (CELLAVISION)(BEAKER) (test code =                                 

       



             2829)                                               

 

             NEUTROPHILS - ABS 10.85 K/ul   1.78-5.38    H            



             (CELLAVISION)(BEAKER) (test code =                                 

       



             2830)                                               

 

             LYMPHOCYTES - ABS 0.94 K/ul    1.32-3.57    L            



             (CELLAVISION)(BEAKER) (test code =                                 

       



             2831)                                               

 

             MONOCYTES - ABS 0.94 K/uL    0.30-0.82    H            



             (CELLAVISION)(BEAKER) (test code =                                 

       



             2832)                                               

 

             EOSINOPHILS - ABS 0.27 K/uL    0.04-0.54                 



             (CELLAVISION)(BEAKER) (test code =                                 

       



             2834)                                               

 

             MYELOCYTES-ABS 0.27 K/uL    0.00-0.00    H            



             (CELLAVISION)(BEAKER) (test code =                                 

       



             2837)                                               

 

             ATYPICAL LYMPHOCYTES - ABS 0.13 K/uL    0.00-0.00    H            



             (CELLAVISION)(BEAKER) (test code =                                 

       



             2858)                                               

 

             TOTAL COUNTED (BEAKER) (test code 100                              

      



             = 1351)                                             

 

             RBC MORPHOLOGY (BEAKER) (test code Normal                          

       



             = 762)                                              

 

             SMUDGE CELLS (BEAKER) (test code = Present                         

       



             1371)                                               

 

             GIANT PLATELETS (BEAKER) (test Present                             

   



             code = 313)                                         

 

             PLATELET CONCENTRATION Decreased                              



             (CELLAVISION)(BEAKER) (test code =                                 

       



             3438)                                               



Received comment: User comments: Slide comments:RAD, CHEST, 1 VIEW, NON DEPT
2019 08:25:00Reason for exam:-&gt;left effusionShould this be performed at
the bedside?-&gt;YesFINAL REPORT PATIENT ID: [id number] Chest AP portable 
Comparison exam: 2019 History provided: Follow-up pleural effusion Left 
chest tube unchanged in place. No pneumothorax. No significant effusionsare 
evident. Nonspecific coarsening of markings in the left lower lobe. PICC line in
good position. Signed: Alex Morris MDReport Verified Date/Time: 2019 
08:25:12 Reading Location: [facility] Reading Room Electronically 
signed by: ALEX MORRIS on 2019 08:25 AMCOMPREHENSIVE METABOLIC PANEL
2019 06:40:00





             Test Item    Value        Reference Range Interpretation Comments

 

             TOTAL PROTEIN 4.8 gm/dL    6.0-8.3      L            



             (BEAKER) (test code =                                        



             770)                                                

 

             ALBUMIN (BEAKER) 2.4 g/dL     3.5-5.0      L            



             (test code = 1145)                                        

 

             ALKALINE PHOSPHATASE 141 U/L                          



             (BEAKER) (test code =                                        



             346)                                                

 

             BILIRUBIN TOTAL 3.9 mg/dL    0.2-1.2      H            



             (BEAKER) (test code =                                        



             377)                                                

 

             SODIUM (BEAKER) (test 123 meq/L    136-145      L            



             code = 381)                                         

 

             POTASSIUM (BEAKER) 3.6 meq/L    3.5-5.1                   



             (test code = 379)                                        

 

             CHLORIDE (BEAKER) 90 meq/L            L            



             (test code = 382)                                        

 

             CO2 (BEAKER) (test 29 meq/L     22-29                     



             code = 355)                                         

 

             BLOOD UREA NITROGEN 14 mg/dL     7-21                      



             (BEAKER) (test code =                                        



             354)                                                

 

             CREATININE (BEAKER) 0.80 mg/dL   0.57-1.25                 



             (test code = 358)                                        

 

             GLUCOSE RANDOM 152 mg/dL           H            



             (BEAKER) (test code =                                        



             652)                                                

 

             CALCIUM (BEAKER) 7.3 mg/dL    8.4-10.2     L            



             (test code = 697)                                        

 

             AST (SGOT) (BEAKER) 39 U/L       5-34         H            



             (test code = 353)                                        

 

             ALT (SGPT) (BEAKER) 23 U/L       6-55                      



             (test code = 347)                                        

 

             EGFR (BEAKER) (test 99 mL/min/1.73                           ESTIMA

FROILAN GFR IS



             code = 1092) sq m                                   NOT AS ACCURATE

 AS



                                                                 CREATININE



                                                                 CLEARANCE IN



                                                                 PREDICTING



                                                                 GLOMERULAR



                                                                 FILTRATION RATE

.



                                                                 ESTIMATED GFR I

S



                                                                 NOT APPLICABLE 

FOR



                                                                 DIALYSIS PATIEN

TS.



Specimen slightly [id number] 06:39:00





             Test Item    Value        Reference Range Interpretation Comments

 

             PHOSPHORUS (BEAKER) (test code = 2.1 mg/dL    2.3-4.7      L       

     



             604)                                                



[id number] 06:39:00





             Test Item    Value        Reference Range Interpretation Comments

 

             MAGNESIUM (BEAKER) (test code = 1.8 mg/dL    1.6-2.6               

    



             627)                                                



B-TYPE NATRIURETIC FACTOR (BNP)2019 06:04:00





             Test Item    Value        Reference Range Interpretation Comments

 

             B-TYPE NATRIURETIC PEPTIDE (BEAKER) 97 pg/mL     0-100             

        



             (test code = 700)                                        



CBC W/PLT COUNT &amp; AUTO [id number] 05:57:00





             Test Item    Value        Reference Range Interpretation Comments

 

             WHITE BLOOD CELL COUNT (BEAKER) 13.4 K/ L    3.5-10.5     H        

    



             (test code = 775)                                        

 

             RED BLOOD CELL COUNT (BEAKER) 3.17 M/ L    4.63-6.08    L          

  



             (test code = 761)                                        

 

             HEMOGLOBIN (BEAKER) (test code = 10.3 GM/DL   13.7-17.5    L       

     



             410)                                                

 

             HEMATOCRIT (BEAKER) (test code = 29.4 %       40.1-51.0    L       

     



             411)                                                

 

             MEAN CORPUSCULAR VOLUME (BEAKER) 92.7 fL      79.0-92.2    H       

     



             (test code = 753)                                        

 

             MEAN CORPUSCULAR HEMOGLOBIN 32.5 pg      25.7-32.2    H            



             (BEAKER) (test code = 751)                                        

 

             MEAN CORPUSCULAR HEMOGLOBIN CONC 35.0 GM/DL   32.3-36.5            

     



             (BEAKER) (test code = 752)                                        

 

             RED CELL DISTRIBUTION WIDTH 13.9 %       11.6-14.4                 



             (BEAKER) (test code = 412)                                        

 

             PLATELET COUNT (BEAKER) (test code 75 K/CU MM   150-450      L     

       



             = 756)                                              

 

             MEAN PLATELET VOLUME (BEAKER) 8.9 fL       9.4-12.4     L          

  



             (test code = 754)                                        

 

             NUCLEATED RED BLOOD CELLS (BEAKER) 0 /100 WBC   0-0                

       



             (test code = 413)                                        



CALCIUM, [id number] 05:44:00





             Test Item    Value        Reference Range Interpretation Comments

 

             CALCIUM IONIZED (BEAKER) (test 0.97 mmol/L  1.12-1.27    L         

   



             code = 698)                                         

 

             PH, BLOOD (BEAKER) (test code = 7.50                               

    



             1810)                                               



BODY FLUID CULTURE + GRAM [id number] 11:10:00





             Test Item    Value        Reference    Interpretation Comments



                                       Range                     

 

             CULTURE (BEAKER)                           A            <1+ Coagula

se



             (test code = 1095)                                        negative



                                                                 Staphylococcus

 

             CULTURE (BEAKER) COAGULASE NEGATIVE              A            <1+ P

seudomonas



             (test code = 1095) STAPHYLOCOCCUS                           aerugin

jennifer

 

             Clindamycin (test                           S            



             code = 10)                                          

 

             Erythromycin (test                           R            



             code = 4)                                           

 

             Linezolid (test code                           S            



             = 40)                                               

 

             Oxacillin (test code                           R            



             = 14)                                               

 

             Rifampin (test code =                           S            



             43)                                                 

 

             Tetracycline (test                           S            



             code = 2)                                           

 

             Trimethoprim +                           S            



             Sulfamethoxazole                                        



             (test code = 47)                                        

 

             Vancomycin (test code                           S            



             = 13)                                               

 

             GRAM STAIN RESULT 3+ WBCs                                



             (BEAKER) (test code =                                        



             1123)                                               

 

             GRAM STAIN RESULT <1+ gram positive                           



             (BEAKER) (test code = cocci in pairs and                           



             063112)      clusters                               



CBC W/PLT COUNT &amp; AUTO [id number] 09:58:00





             Test Item    Value        Reference Range Interpretation Comments

 

             WHITE BLOOD CELL COUNT (BEAKER) 10.7 K/ L    3.5-10.5     H        

    



             (test code = 775)                                        

 

             RED BLOOD CELL COUNT (BEAKER) 3.15 M/ L    4.63-6.08    L          

  



             (test code = 761)                                        

 

             HEMOGLOBIN (BEAKER) (test code = 10.2 GM/DL   13.7-17.5    L       

     



             410)                                                

 

             HEMATOCRIT (BEAKER) (test code = 28.9 %       40.1-51.0    L       

     



             411)                                                

 

             MEAN CORPUSCULAR VOLUME (BEAKER) 91.7 fL      79.0-92.2            

     



             (test code = 753)                                        

 

             MEAN CORPUSCULAR HEMOGLOBIN 32.4 pg      25.7-32.2    H            



             (BEAKER) (test code = 751)                                        

 

             MEAN CORPUSCULAR HEMOGLOBIN CONC 35.3 GM/DL   32.3-36.5            

     



             (BEAKER) (test code = 752)                                        

 

             RED CELL DISTRIBUTION WIDTH 13.7 %       11.6-14.4                 



             (BEAKER) (test code = 412)                                        

 

             PLATELET COUNT (BEAKER) (test code 57 K/CU MM   150-450      L     

       



             = 756)                                              

 

             MEAN PLATELET VOLUME (BEAKER) 8.8 fL       9.4-12.4     L          

  



             (test code = 754)                                        

 

             NUCLEATED RED BLOOD CELLS (BEAKER) 0 /100 WBC   0-0                

       



             (test code = 413)                                        



(CELLAVISION MANUAL DIFF)2019 09:58:00





             Test Item    Value        Reference Range Interpretation Comments

 

             NEUTROPHILS - REL 78 %                                   



             (CELLAVISION)(BEAKER) (test code =                                 

       



             2816)                                               

 

             LYMPHOCYTES - REL 5 %                                    



             (CELLAVISION)(BEAKER) (test code =                                 

       



             2817)                                               

 

             MONOCYTES - REL 9 %                                    



             (CELLAVISION)(BEAKER) (test code =                                 

       



             2818)                                               

 

             EOSINOPHILS - REL 2 %                                    



             (CELLAVISION)(BEAKER) (test code =                                 

       



             2819)                                               

 

             METAMYELOCYTES - REL 1 %          0-0          H            



             (CELLAVISION)(BEAKER) (test code =                                 

       



             2821)                                               

 

             BANDS - REL (CELLAVISION)(BEAKER) 4 %          0-10                

      



             (test code = 2826)                                        

 

             BLASTS - REL (CELLAVISION)(BEAKER) 1 %          0-0          H     

       



             (test code = 2827)                                        

 

             NEUTROPHILS - ABS 8.35 K/ul    1.78-5.38    H            



             (CELLAVISION)(BEAKER) (test code =                                 

       



             2830)                                               

 

             LYMPHOCYTES - ABS 0.54 K/ul    1.32-3.57    L            



             (CELLAVISION)(BEAKER) (test code =                                 

       



             2831)                                               

 

             MONOCYTES - ABS 0.96 K/uL    0.30-0.82    H            



             (CELLAVISION)(BEAKER) (test code =                                 

       



             2832)                                               

 

             EOSINOPHILS - ABS 0.21 K/uL    0.04-0.54                 



             (CELLAVISION)(BEAKER) (test code =                                 

       



             2834)                                               

 

             METAMYELOCYTES - ABS 0.11 K/uL    0.00-0.00    H            



             (CELLAVISION)(BEAKER) (test code =                                 

       



             2836)                                               

 

             BANDS - ABS (CELLAVISION)(BEAKER) 0.43 K/uL    0.00-0.80           

      



             (test code = 2840)                                        

 

             BLASTS - ABS (CELLAVISION)(BEAKER) 0.11 K/uL    0.00-0.00    H     

       



             (test code = 2845)                                        

 

             TOTAL COUNTED (BEAKER) (test code = 100                            

        



             1351)                                               

 

             WBC MORPHOLOGY (BEAKER) (test code Normal                          

       



             = 487)                                              

 

             PLT MORPHOLOGY (BEAKER) (test code Normal                          

       



             = 486)                                              

 

             ANISOCYTOSIS (BEAKER) (test code = 1+ few                          

       



             961)                                                

 

             POIKILOCYTES (BEAKER) (test code = 1+ few                          

       



             966)                                                

 

             OVALOCYTES (BEAKER) (test code = 1+ few                            

     



             477)                                                

 

             BASOPHILIC STIPPLING (BEAKER) (test Present                        

        



             code = 473)                                         

 

             ARTIFACT (CELLAVISION)(BEAKER) Present                             

   



             (test code = 3432)                                        

 

             PLATELET CONCENTRATION Decreased                              



             (CELLAVISION)(BEAKER) (test code =                                 

       



             3438)                                               



Received comment: User comments: Slide comments: WBC: SEGMENTED WITH TOXIC 
GRANULATIONS [id number] 08:30:00





             Test Item    Value        Reference Range Interpretation Comments

 

             PHOSPHORUS (BEAKER) (test code = 2.0 mg/dL    2.3-4.7      L       

     



             604)                                                



RAD, CHEST, 1 VIEW, NON [id number] 08:21:00Reason for exam:-&gt;left 
effusionShould this be performed at the bedside?-&gt;YesFINAL REPORT PATIENT ID:
[id number] TECHNIQUE: Frontal chest radiograph dated 2019. CLINICAL HISTORY: 
Left effusion COMPARISON STUDY: Chest radiographs and chest CT both dated 
2019 IMPRESSION:Right-sided PICC and left-sided chest tube are unchanged. 
No change in small left hydropneumothorax. Multiple rounded densities project 
over the left lower lobe of unclear etiology possibly something external to the 
patient. Cardiomediastinal silhouette is normal in size. No pulmonary edema. No 
fracture. Signed: Ann Kingeplinda Verified Date/Time: 2019 
08:21:33 Reading Location: [facility] Reading Room Electronically signed by: 
ANN KING MD on 2019 08:21 AMCOMPREHENSIVE METABOLIC PANEL
2019 05:34:00





             Test Item    Value        Reference Range Interpretation Comments

 

             TOTAL PROTEIN 4.9 gm/dL    6.0-8.3      L            



             (BEAKER) (test code =                                        



             770)                                                

 

             ALBUMIN (BEAKER) 2.7 g/dL     3.5-5.0      L            



             (test code = 1145)                                        

 

             ALKALINE PHOSPHATASE 137 U/L                          



             (BEAKER) (test code =                                        



             346)                                                

 

             BILIRUBIN TOTAL 5.5 mg/dL    0.2-1.2      H            



             (BEAKER) (test code =                                        



             377)                                                

 

             SODIUM (BEAKER) (test 123 meq/L    136-145      L            



             code = 381)                                         

 

             POTASSIUM (BEAKER) 3.9 meq/L    3.5-5.1                   



             (test code = 379)                                        

 

             CHLORIDE (BEAKER) 89 meq/L            L            



             (test code = 382)                                        

 

             CO2 (BEAKER) (test 31 meq/L     22-29        H            



             code = 355)                                         

 

             BLOOD UREA NITROGEN 15 mg/dL     7-21                      



             (BEAKER) (test code =                                        



             354)                                                

 

             CREATININE (BEAKER) 0.68 mg/dL   0.57-1.25                 



             (test code = 358)                                        

 

             GLUCOSE RANDOM 104 mg/dL                        



             (BEAKER) (test code =                                        



             652)                                                

 

             CALCIUM (BEAKER) 7.7 mg/dL    8.4-10.2     L            



             (test code = 697)                                        

 

             AST (SGOT) (BEAKER) 47 U/L       5-34         H            



             (test code = 353)                                        

 

             ALT (SGPT) (BEAKER) 25 U/L       6-55                      



             (test code = 347)                                        

 

             EGFR (BEAKER) (test 119                                    ESTIMATE

D GFR IS



             code = 1092) mL/min/1.73 sq                           NOT AS ACCURA

TE AS



                          m                                      CREATININE



                                                                 CLEARANCE IN



                                                                 PREDICTING



                                                                 GLOMERULAR



                                                                 FILTRATION RATE

.



                                                                 ESTIMATED GFR I

S



                                                                 NOT APPLICABLE 

FOR



                                                                 DIALYSIS PATIEN

TS.



Specimen moderately [id number] 05:04:00





             Test Item    Value        Reference Range Interpretation Comments

 

             MAGNESIUM (BEAKER) (test code = 1.8 mg/dL    1.6-2.6               

    



             627)                                                



CT, CHEST, WITH [id number] 15:37:00Reason for exam:-&gt;reevaluate 
pleural effusion and left lung abscessFINAL REPORT PATIENT ID: [id number] CT of 
the Chest dated 2019 COMPARISON: 2019 CLINICAL INFORMATION: 
Pleural effusionreevaluate pleural effusion and left lung abscess Comment: Axial
images of the chest were obtained from thoracic inlet to the upper abdomen with 
intravenous contrast. This exam was performed according to our departmental 
dose-optimization program, which includes automated exposure control, adjustment
of the mA and/or kV according to patient size and/or use of interactive 
reconstruction technique. Heart is normal in size. There is small pericardial 
effusion. Great vessels are unremarkable. No adenopathy in the mediastinum or 
perihilar region. Trachea and mainstem bronchi are patent. Trace bilateral 
pleural effusion is present. There is interval significant decreasein the amount
of left pleural effusion. There is small amount of air present in the left 
pleural space. The pigtail catheter is seen in the left pleural space. 
Atelectasis is seen in the lingula, rightmid, and both lower lobes. Cavitary 
lesion is seen in the superior segment of the left lower lobe measuring 
approximately 2.8 x 3.3 cm. Visualized upper abdomen demonstrates cirrhotic 
liver with trace ascites. Spleen is minimally enlarged. Impression: 1. Interval 
decrease in the amount of left pleuraleffusion with residual left 
hydropneumothorax.2. Trace right pleural effusion.3. Cavitary lesion in the 
superior segment of the left lower lobe may represent abscess.4. Cirrhosis with 
splenomegaly and ascites. Signed: Jaqui, Lucita MDReport Verified Date/Time: 
2019 15:37:27 Reading Location: [facility] C013Y CT Body Reading Room 
Electronically signed by: LUCITA MARX M.D. on 2019 03:37 PMELECTROLYTES
2019 13:41:00





             Test Item    Value        Reference Range Interpretation Comments

 

             SODIUM (BEAKER) (test code = 381) 119 meq/L    136-145      LL     

      

 

             POTASSIUM (BEAKER) (test code = 4.1 meq/L    3.5-5.1               

    



             379)                                                

 

             CHLORIDE (BEAKER) (test code = 382) 85 meq/L            L    

        

 

             CO2 (BEAKER) (test code = 355) 29 meq/L     22-29                  

   



Page [phone] with results. Thank you.CBC W/PLT COUNT &amp; AUTO 
[id number] 10:30:00





             Test Item    Value        Reference Range Interpretation Comments

 

             WHITE BLOOD CELL COUNT (BEAKER) 11.3 K/ L    3.5-10.5     H        

    



             (test code = 775)                                        

 

             RED BLOOD CELL COUNT (BEAKER) 3.25 M/ L    4.63-6.08    L          

  



             (test code = 761)                                        

 

             HEMOGLOBIN (BEAKER) (test code = 10.6 GM/DL   13.7-17.5    L       

     



             410)                                                

 

             HEMATOCRIT (BEAKER) (test code = 29.8 %       40.1-51.0    L       

     



             411)                                                

 

             MEAN CORPUSCULAR VOLUME (BEAKER) 91.7 fL      79.0-92.2            

     



             (test code = 753)                                        

 

             MEAN CORPUSCULAR HEMOGLOBIN 32.6 pg      25.7-32.2    H            



             (BEAKER) (test code = 751)                                        

 

             MEAN CORPUSCULAR HEMOGLOBIN CONC 35.6 GM/DL   32.3-36.5            

     



             (BEAKER) (test code = 752)                                        

 

             RED CELL DISTRIBUTION WIDTH 13.5 %       11.6-14.4                 



             (BEAKER) (test code = 412)                                        

 

             PLATELET COUNT (BEAKER) (test code 51 K/CU MM   150-450      L     

       



             = 756)                                              

 

             MEAN PLATELET VOLUME (BEAKER) 8.3 fL       9.4-12.4     L          

  



             (test code = 754)                                        

 

             NUCLEATED RED BLOOD CELLS (BEAKER) 0 /100 WBC   0-0                

       



             (test code = 413)                                        



(CELLAVISION MANUAL DIFF)2019 10:30:00





             Test Item    Value        Reference Range Interpretation Comments

 

             NEUTROPHILS - REL 82 %                                   



             (CELLAVISION)(BEAKER) (test code =                                 

       



             2816)                                               

 

             LYMPHOCYTES - REL 2 %                                    



             (CELLAVISION)(BEAKER) (test code =                                 

       



             2817)                                               

 

             MONOCYTES - REL 9 %                                    



             (CELLAVISION)(BEAKER) (test code =                                 

       



             2818)                                               

 

             METAMYELOCYTES - REL 2 %          0-0          H            



             (CELLAVISION)(BEAKER) (test code =                                 

       



             2821)                                               

 

             PROMYELOCYTES - REL 2 %          0-0          H            



             (CELLAVSION)(BEAKER) (test code =                                  

      



             2825)                                               

 

             BANDS - REL (CELLAVISION)(BEAKER) 2 %          0-10                

      



             (test code = 2826)                                        

 

             ATYPICAL LYMPHOCYTES - REL 1 %          0-0          H            



             (CELLAVISION)(BEAKER) (test code =                                 

       



             2829)                                               

 

             NEUTROPHILS - ABS 9.27 K/ul    1.78-5.38    H            



             (CELLAVISION)(BEAKER) (test code =                                 

       



             2830)                                               

 

             LYMPHOCYTES - ABS 0.23 K/ul    1.32-3.57    L            



             (CELLAVISION)(BEAKER) (test code =                                 

       



             2831)                                               

 

             MONOCYTES - ABS 1.02 K/uL    0.30-0.82    H            



             (CELLAVISION)(BEAKER) (test code =                                 

       



             2832)                                               

 

             METAMYELOCYTES - ABS 0.23 K/uL    0.00-0.00    H            



             (CELLAVISION)(BEAKER) (test code =                                 

       



             2836)                                               

 

             PROMYELOCYTES - ABS 0.23 K/uL    0.00-0.00    H            



             (CELLAVISION)(BEAKER) (test code =                                 

       



             2838)                                               

 

             BANDS - ABS (CELLAVISION)(BEAKER) 0.23 K/uL    0.00-0.80           

      



             (test code = 2840)                                        

 

             ATYPICAL LYMPHOCYTES - ABS 0.11 K/uL    0.00-0.00    H            



             (CELLAVISION)(BEAKER) (test code =                                 

       



             2858)                                               

 

             TOTAL COUNTED (BEAKER) (test code = 100                            

        



             1351)                                               

 

             WBC MORPHOLOGY (BEAKER) (test code Normal                          

       



             = 487)                                              

 

             LARGE PLT(BEAKER) (test code = Present                             

   



             2156)                                               

 

             BASOPHILIC STIPPLING (BEAKER) (test Present                        

        



             code = 473)                                         

 

             ARTIFACT (CELLAVISION)(BEAKER) Present                             

   



             (test code = 3432)                                        

 

             PLATELET CONCENTRATION Decreased                              



             (CELLAVISION)(BEAKER) (test code =                                 

       



             3438)                                               



Received comment: User comments: Slide comments: WBC: SEGMENTED WITH TOXIC 
GRANULATIONS PRESENTRAD, CHEST, 1 VIEW, NON [id number] 07:48:00Reason for 
exam:-&gt;left effusionShould this be performed at the bedside?-&gt;YesFINAL 
REPORT PATIENT ID: [id number] RAD, CHEST, 1 VIEW, NON DEPT INDICATION: left 
effusion COMPARISON:Prior day's exam FINDINGS: Portable frontal view of the 
chest. IMPRESSION: Support Lines: PICC tip overlies the SVC. Left pleural 
catheter is again noted. Lungs and pleura: Bibasilar airspace disease is 
unchanged. Superimposed trace left effusion. Left apical pneumothorax is 
decreased in the interim. Heart and mediastinum: Stable contours. Additional 
findings: None. Signed: Trina Crocker MDReport Verified Date/Time: 2019 
07:48:14 Reading Location: [facility] Radiology Reading Room Electronically 
signed by: TRINA CROCKER MD on 2019 07:48 AMCALCIUM, IONIZED
2019 05:58:00





             Test Item    Value        Reference Range Interpretation Comments

 

             CALCIUM IONIZED (BEAKER) (test 0.96 mmol/L  1.12-1.27    L         

   



             code = 698)                                         

 

             PH, BLOOD (BEAKER) (test code = 7.53                               

    



             1810)                                               



COMPREHENSIVE METABOLIC [id number] 05:51:00





             Test Item    Value        Reference Range Interpretation Comments

 

             TOTAL PROTEIN 5.2 gm/dL    6.0-8.3      L            



             (BEAKER) (test code =                                        



             770)                                                

 

             ALBUMIN (BEAKER) 2.6 g/dL     3.5-5.0      L            



             (test code = 1145)                                        

 

             ALKALINE PHOSPHATASE 144 U/L                          



             (BEAKER) (test code =                                        



             346)                                                

 

             BILIRUBIN TOTAL 6.3 mg/dL    0.2-1.2      H            



             (BEAKER) (test code =                                        



             377)                                                

 

             SODIUM (BEAKER) (test 119 meq/L    136-145      LL           



             code = 381)                                         

 

             POTASSIUM (BEAKER) 4.2 meq/L    3.5-5.1                   



             (test code = 379)                                        

 

             CHLORIDE (BEAKER) 86 meq/L            L            



             (test code = 382)                                        

 

             CO2 (BEAKER) (test 26 meq/L     22-29                     



             code = 355)                                         

 

             BLOOD UREA NITROGEN 23 mg/dL     7-21         H            



             (BEAKER) (test code =                                        



             354)                                                

 

             CREATININE (BEAKER) 0.81 mg/dL   0.57-1.25                 



             (test code = 358)                                        

 

             GLUCOSE RANDOM 103 mg/dL                        



             (BEAKER) (test code =                                        



             652)                                                

 

             CALCIUM (BEAKER) 7.6 mg/dL    8.4-10.2     L            



             (test code = 697)                                        

 

             AST (SGOT) (BEAKER) 46 U/L       5-34         H            



             (test code = 353)                                        

 

             ALT (SGPT) (BEAKER) 21 U/L       6-55                      



             (test code = 347)                                        

 

             EGFR (BEAKER) (test 97 mL/min/1.73                           ESTIMA

FROILAN GFR IS



             code = 1092) sq m                                   NOT AS ACCURATE

 AS



                                                                 CREATININE



                                                                 CLEARANCE IN



                                                                 PREDICTING



                                                                 GLOMERULAR



                                                                 FILTRATION RATE

.



                                                                 ESTIMATED GFR I

S



                                                                 NOT APPLICABLE 

FOR



                                                                 DIALYSIS PATIEN

TS.



Specimen moderately [id number] 05:07:00





             Test Item    Value        Reference Range Interpretation Comments

 

             CORTISOL, TOTAL (BEAKER) (test code 9.6 ug/dL    3.7-19.4          

        



             = 9895)                                             



[id number] 04:57:00





             Test Item    Value        Reference Range Interpretation Comments

 

             PHOSPHORUS (BEAKER) (test code = 2.0 mg/dL    2.3-4.7      L       

     



             604)                                                



[id number] 04:57:00





             Test Item    Value        Reference Range Interpretation Comments

 

             MAGNESIUM (BEAKER) (test code = 1.7 mg/dL    1.6-2.6               

    



             627)                                                



[id number] 22:08:00





             Test Item    Value        Reference Range Interpretation Comments

 

             SODIUM (BEAKER) (test 119 meq/L    136-145      LL           



             code = 381)                                         

 

             POTASSIUM (BEAKER) 4.6 meq/L    3.5-5.1                   Specimen 

slightly



             (test code = 379)                                        hemolyzed

 

             CHLORIDE (BEAKER) 86 meq/L            L            



             (test code = 382)                                        

 

             CO2 (BEAKER) (test 27 meq/L                          



             code = 355)                                         



Call K &gt; 5.5POCT-GLUCOSE [id number] 12:59:00





             Test Item    Value        Reference Range Interpretation Comments

 

             POC-GLUCOSE METER 95 mg/dL                         TESTED AT 

[facility] [address]



             (BEAKER) (test code =                                        [place] [address])                                               



T4, [id number] 10:25:00





             Test Item    Value        Reference Range Interpretation Comments

 

             FREE T4 (BEAKER) (test code = 655) 0.65 ng/dL   0.70-1.48    L     

       



CBC W/PLT COUNT &amp; AUTO [id number] 10:21:00





             Test Item    Value        Reference Range Interpretation Comments

 

             WHITE BLOOD CELL COUNT (BEAKER) 11.8 K/ L    3.5-10.5     H        

    



             (test code = 775)                                        

 

             RED BLOOD CELL COUNT (BEAKER) 3.74 M/ L    4.63-6.08    L          

  



             (test code = 761)                                        

 

             HEMOGLOBIN (BEAKER) (test code = 12.2 GM/DL   13.7-17.5    L       

     



             410)                                                

 

             HEMATOCRIT (BEAKER) (test code = 33.9 %       40.1-51.0    L       

     



             411)                                                

 

             MEAN CORPUSCULAR VOLUME (BEAKER) 90.6 fL      79.0-92.2            

     



             (test code = 753)                                        

 

             MEAN CORPUSCULAR HEMOGLOBIN 32.6 pg      25.7-32.2    H            



             (BEAKER) (test code = 751)                                        

 

             MEAN CORPUSCULAR HEMOGLOBIN CONC 36.0 GM/DL   32.3-36.5            

     



             (BEAKER) (test code = 752)                                        

 

             RED CELL DISTRIBUTION WIDTH 13.7 %       11.6-14.4                 



             (BEAKER) (test code = 412)                                        

 

             PLATELET COUNT (BEAKER) (test code 79 K/CU MM   150-450      L     

       



             = 756)                                              

 

             MEAN PLATELET VOLUME (BEAKER) 8.9 fL       9.4-12.4     L          

  



             (test code = 754)                                        

 

             NUCLEATED RED BLOOD CELLS (BEAKER) 0 /100 WBC   0-0                

       



             (test code = 413)                                        



(CELLAVISION MANUAL DIFF)2019 10:21:00





             Test Item    Value        Reference Range Interpretation Comments

 

             NEUTROPHILS - REL 82 %                                   



             (CELLAVISION)(BEAKER) (test code =                                 

       



             2816)                                               

 

             LYMPHOCYTES - REL 4 %                                    



             (CELLAVISION)(BEAKER) (test code =                                 

       



             2817)                                               

 

             MONOCYTES - REL 13 %                                   



             (CELLAVISION)(BEAKER) (test code =                                 

       



             2818)                                               

 

             ATYPICAL LYMPHOCYTES - REL 1 %          0-0          H            



             (CELLAVISION)(BEAKER) (test code =                                 

       



             2829)                                               

 

             NEUTROPHILS - ABS 9.68 K/ul    1.78-5.38    H            



             (CELLAVISION)(BEAKER) (test code =                                 

       



             2830)                                               

 

             LYMPHOCYTES - ABS 0.47 K/ul    1.32-3.57    L            



             (CELLAVISION)(BEAKER) (test code =                                 

       



             2831)                                               

 

             MONOCYTES - ABS 1.53 K/uL    0.30-0.82    H            



             (CELLAVISION)(BEAKER) (test code =                                 

       



             2832)                                               

 

             ATYPICAL LYMPHOCYTES - ABS 0.12 K/uL    0.00-0.00    H            



             (CELLAVISION)(BEAKER) (test code =                                 

       



             2858)                                               

 

             TOTAL COUNTED (BEAKER) (test code = 100                            

        



             1351)                                               

 

             RBC MORPHOLOGY (BEAKER) (test code Normal                          

       



             = 762)                                              

 

             WBC MORPHOLOGY (BEAKER) (test code Normal                          

       



             = 487)                                              

 

             PLT MORPHOLOGY (BEAKER) (test code Normal                          

       



             = 486)                                              

 

             ARTIFACT (CELLAVISION)(BEAKER) Present                             

   



             (test code = 3432)                                        

 

             PLATELET CONCENTRATION Decreased                              



             (CELLAVISION)(BEAKER) (test code =                                 

       



             3438)                                               



Received comment: User comments: Slide comments:POCT-GLUCOSE [id number] 
09:58:00





             Test Item    Value        Reference Range Interpretation Comments

 

             POC-GLUCOSE METER 104 mg/dL                        TESTED AT 

[facility] [address]



             (BEAKER) (test code =                                        [address] [place]



             1538)                                               [address]



RAD, CHEST, 1 VIEW, NON [id number] 09:33:00Reason for exam:-
&gt;effusionShould this be performed at the bedside?-&gt;YesFINAL REPORT PATIENT
ID: [id number] Comparison: 2019 TECHNIQUE: Single view of the chest FINDINGS:
Bilateral interstitial and airspace opacities are stable. Small left pleural 
thickening with adjacent airspace disease identified. A previous left-sided 
pneumothorax has decreased. Left pleural drainage catheters again noted. Right-
sided PICC line is stable. Signed: Ronaldo Mireles Verified Date/Time: 
2019 09:33:21 Reading Location: [address] Reading Room 
Electronically signed by: RONALDO MIRELES M.D. on 2019 09:33 AMCOMPREHENSIVE
METABOLIC [id number] 08:59:00





             Test Item    Value        Reference Range Interpretation Comments

 

             TOTAL PROTEIN 5.4 gm/dL    6.0-8.3      L            Specimen sligh

tly



             (BEAKER) (test code =                                        hemoly

zed



             770)                                                

 

             ALBUMIN (BEAKER) 2.1 g/dL     3.5-5.0      L            Specimen sl

ightly



             (test code = 1145)                                        hemolyzed

 

             ALKALINE PHOSPHATASE 153 U/L             H            



             (BEAKER) (test code =                                        



             346)                                                

 

             BILIRUBIN TOTAL 5.0 mg/dL    0.2-1.2      H            Specimen sli

ghtly



             (BEAKER) (test code =                                        hemoly

zed



             377)                                                

 

             SODIUM (BEAKER) (test 117 meq/L    136-145      LL           



             code = 381)                                         

 

             POTASSIUM (BEAKER) 5.5 meq/L    3.5-5.1      H            Specimen 

slightly



             (test code = 379)                                        hemolyzed

 

             CHLORIDE (BEAKER) 84 meq/L            L            



             (test code = 382)                                        

 

             CO2 (BEAKER) (test 26 meq/L     22-29                     



             code = 355)                                         

 

             BLOOD UREA NITROGEN 32 mg/dL     7-21         H            



             (BEAKER) (test code =                                        



             354)                                                

 

             CREATININE (BEAKER) 0.85 mg/dL   0.57-1.25                 Specimen

 slightly



             (test code = 358)                                        hemolyzed

 

             GLUCOSE RANDOM 104 mg/dL                        



             (BEAKER) (test code =                                        



             652)                                                

 

             CALCIUM (BEAKER) 7.6 mg/dL    8.4-10.2     L            



             (test code = 697)                                        

 

             AST (SGOT) (BEAKER) 51 U/L       5-34         H            Specimen

 slightly



             (test code = 353)                                        hemolyzed

 

             ALT (SGPT) (BEAKER) 23 U/L       6-55                      Specimen

 slightly



             (test code = 347)                                        hemolyzed

 

             EGFR (BEAKER) (test 92 mL/min/1.73                           ESTIMA

FROILAN GFR IS



             code = 1092) sq m                                   NOT AS ACCURATE

 AS



                                                                 CREATININE



                                                                 CLEARANCE IN



                                                                 PREDICTING



                                                                 GLOMERULAR



                                                                 FILTRATION RATE

.



                                                                 ESTIMATED GFR I

S



                                                                 NOT APPLICABLE 

FOR



                                                                 DIALYSIS PATIEN

TS.



Specimen moderately [id number] 08:55:00





             Test Item    Value        Reference Range Interpretation Comments

 

             MAGNESIUM (BEAKER) 1.9 mg/dL    1.6-2.6                   Specimen 

slightly



             (test code = 627)                                        hemolyzed



[id number] 08:55:00





             Test Item    Value        Reference Range Interpretation Comments

 

             PHOSPHORUS (BEAKER) 2.5 mg/dL    2.3-4.7                   Specimen

 slightly



             (test code = 604)                                        hemolyzed



TSH/FREE T4 IF [id number] 08:39:00





             Test Item    Value        Reference Range Interpretation Comments

 

             THYROID STIMULATING HORMONE 8.07 uIU/mL  0.35-4.94    H            



             (BEAKER) (test code = 772)                                        



CALCIUM, [id number] 08:06:00





             Test Item    Value        Reference Range Interpretation Comments

 

             CALCIUM IONIZED (BEAKER) (test 1.00 mmol/L  1.12-1.27    L         

   



             code = 698)                                         

 

             PH, BLOOD (BEAKER) (test code = 7.48                               

    



             1810)                                               



[id number] 23:47:00





             Test Item    Value        Reference Range Interpretation Comments

 

             SODIUM (BEAKER) (test code = 381) 117 meq/L    136-145      LL     

      

 

             POTASSIUM (BEAKER) (test code = 5.3 meq/L    3.5-5.1      H        

    



             379)                                                

 

             CHLORIDE (BEAKER) (test code = 382) 84 meq/L            L    

        

 

             CO2 (BEAKER) (test code = 355) 27 meq/L     22-29                  

   



Call K &gt; [id number]-GLUCOSE [id number] 21:18:00





             Test Item    Value        Reference Range Interpretation Comments

 

             POC-GLUCOSE METER 145 mg/dL           H            TESTED AT 

[place] [address]



             ([facility]) (test code =                                        [facility]



             [address]38)                                               [address]



[id number] 18:57:00





             Test Item    Value        Reference Range Interpretation Comments

 

             CORTISOL, TOTAL (BEAKER) (test 18.1 ug/dL   3.7-19.4               

   



             code = 2755)                                        



[id number] 18:34:00





             Test Item    Value        Reference Range Interpretation Comments

 

             SODIUM (BEAKER) (test code = 381) 116 meq/L    136-145      LL     

      

 

             POTASSIUM (BEAKER) (test code = 6.0 meq/L    3.5-5.1      HH       

    



             379)                                                

 

             CHLORIDE (BEAKER) (test code = 382) 82 meq/L            L    

        

 

             CO2 (BEAKER) (test code = 355) 30 meq/L     22-29        H         

   



Call [id number]-GLUCOSE [id number] 18:20:00





             Test Item    Value        Reference Range Interpretation Comments

 

             POC-GLUCOSE METER 141 mg/dL           H            TESTED AT 

[place] [address]



             [facility] (test [facility]                                        [facility]



             [address])                                               [address]



POCT-GLUCOSE [id number] 13:00:00





             Test Item    Value        Reference Range Interpretation Comments

 

             POC-GLUCOSE METER 122 mg/dL           H            TESTED AT 

[facility] [address]



             (BEAKER) (test code =                                        [place]



             [address])                                               [address]



CBC W/PLT COUNT &amp; AUTO [id number] 10:34:00





             Test Item    Value        Reference Range Interpretation Comments

 

             WHITE BLOOD CELL COUNT (BEAKER) 15.5 K/ L    3.5-10.5     H        

    



             (test code = 775)                                        

 

             RED BLOOD CELL COUNT (BEAKER) 4.04 M/ L    4.63-6.08    L          

  



             (test code = 761)                                        

 

             HEMOGLOBIN (BEAKER) (test code = 13.3 GM/DL   13.7-17.5    L       

     



             410)                                                

 

             HEMATOCRIT (BEAKER) (test code = 36.5 %       40.1-51.0    L       

     



             411)                                                

 

             MEAN CORPUSCULAR VOLUME (BEAKER) 90.3 fL      79.0-92.2            

     



             (test code = 753)                                        

 

             MEAN CORPUSCULAR HEMOGLOBIN 32.9 pg      25.7-32.2    H            



             (BEAKER) (test code = 751)                                        

 

             MEAN CORPUSCULAR HEMOGLOBIN CONC 36.4 GM/DL   32.3-36.5            

     



             (BEAKER) (test code = 752)                                        

 

             RED CELL DISTRIBUTION WIDTH 13.5 %       11.6-14.4                 



             (BEAKER) (test code = 412)                                        

 

             PLATELET COUNT (BEAKER) (test code 65 K/CU MM   150-450      L     

       



             = 756)                                              

 

             MEAN PLATELET VOLUME (BEAKER) 9.0 fL       9.4-12.4     L          

  



             (test code = 754)                                        

 

             NUCLEATED RED BLOOD CELLS (BEAKER) 0 /100 WBC   0-0                

       



             (test code = 413)                                        



(CELLAVISION MANUAL DIFF)2019 10:34:00





             Test Item    Value        Reference Range Interpretation Comments

 

             NEUTROPHILS - REL 78 %                                   



             (CELLAVISION)(BEAKER) (test code                                   

     



             = 2816)                                             

 

             LYMPHOCYTES - REL 4 %                                    



             (CELLAVISION)(BEAKER) (test code                                   

     



             = 2817)                                             

 

             MONOCYTES - REL 14 %                                   



             (CELLAVISION)(BEAKER) (test code                                   

     



             = 2818)                                             

 

             EOSINOPHILS - REL 2 %                                    



             (CELLAVISION)(BEAKER) (test code                                   

     



             = 2819)                                             

 

             BANDS - REL (CELLAVISION)(BEAKER) 2 %          0-10                

      



             (test code = 2826)                                        

 

             NEUTROPHILS - ABS 12.09 K/ul   1.78-5.38    H            



             (CELLAVISION)(BEAKER) (test code                                   

     



             = 2830)                                             

 

             LYMPHOCYTES - ABS 0.62 K/ul    1.32-3.57    L            



             (CELLAVISION)(BEAKER) (test code                                   

     



             = 2831)                                             

 

             MONOCYTES - ABS 2.17 K/uL    0.30-0.82    H            



             (CELLAVISION)(BEAKER) (test code                                   

     



             = 2832)                                             

 

             EOSINOPHILS - ABS 0.31 K/uL    0.04-0.54                 



             (CELLAVISION)(BEAKER) (test code                                   

     



             = 2834)                                             

 

             BANDS - ABS (CELLAVISION)(BEAKER) 0.31 K/uL    0.00-0.80           

      



             (test code = 2840)                                        

 

             TOTAL COUNTED (BEAKER) (test code 100                              

      



             = 1351)                                             

 

             WBC MORPHOLOGY (BEAKER) (test Normal                               

  



             code = 487)                                         

 

             PLT MORPHOLOGY (BEAKER) (test Normal                               

  



             code = 486)                                         

 

             POLYCHROMATOPHILLIC RBCS(BEAKER) 1+ few                            

     



             (test code = 478)                                        

 

             POIKILOCYTES (BEAKER) (test code 2+ moderate                       

     



             = 966)                                              

 

             KARISSA CELLS (BEAKER) (test code = 2+ moderate                       

     



             474)                                                

 

             BASOPHILIC STIPPLING (BEAKER) Present                              

  



             (test code = 473)                                        

 

             ARTIFACT (CELLAVISION)(BEAKER) Present                             

   



             (test code = 3432)                                        

 

             PLATELET CONCENTRATION Decreased                              



             (CELLAVISION)(BEAKER) (test code                                   

     



             = 3438)                                             



Received comment: User comments: Slide comments:RAD, CHEST, 1 VIEW, NON DEPT
2019 08:40:00Reason for exam:-&gt;left effusionShould this be performed at
the bedside?-&gt;YesFINAL REPORT PATIENT ID: [id number] Portable chest. CLINICAL 
HISTORY: left effusion. COMPARISON STUDY:Chest x-ray from yesterday. FINDINGS: 
The cardiac silhouette is unremarkable. The pulmonary parenchyma demonstrates 
increased interstitial markings with atelectatic changes in the lung bases. A 
left-sided pigtail catheter chest tube remains and there has been development of
a loculated small basilar pneumothorax. A right PICC line remains. Degenerative 
changes are noted. IMPRESSION: Development a small left-sided basilar 
pneumothorax. No other significant change. Signed: Beckie Martinez MDReport Veri
fied Date/Time: 2019 08:40:30 Reading Location: [facility] C013X Ortho Consult
Reading Room Electronically signed by: BECKIE MARTINEZ M.D. on 2019 08:40 
AMPOCT-GLUCOSE [id number] 08:39:00





             Test Item    Value        Reference Range Interpretation Comments

 

             POC-GLUCOSE METER 110 mg/dL                        TESTED AT 

[facility] [address]



             (BEAKER) (test code =                                        [address] [facility]



             1538)                                               [address]



COMPREHENSIVE METABOLIC [id number] 06:32:00





             Test Item    Value        Reference Range Interpretation Comments

 

             TOTAL PROTEIN 5.5 gm/dL    6.0-8.3      L            



             (BEAKER) (test code =                                        



             770)                                                

 

             ALBUMIN (BEAKER) 1.9 g/dL     3.5-5.0      L            



             (test code = 1145)                                        

 

             ALKALINE PHOSPHATASE 134 U/L                          



             (BEAKER) (test code =                                        



             346)                                                

 

             BILIRUBIN TOTAL 4.4 mg/dL    0.2-1.2      H            



             (BEAKER) (test code =                                        



             377)                                                

 

             SODIUM (BEAKER) (test 116 meq/L    136-145      LL           



             code = 381)                                         

 

             POTASSIUM (BEAKER) 5.6 meq/L    3.5-5.1      H            



             (test code = 379)                                        

 

             CHLORIDE (BEAKER) 83 meq/L            L            



             (test code = 382)                                        

 

             CO2 (BEAKER) (test 27 meq/L     22-29                     



             code = 355)                                         

 

             BLOOD UREA NITROGEN 34 mg/dL     7-21         H            



             (BEAKER) (test code =                                        



             354)                                                

 

             CREATININE (BEAKER) 0.89 mg/dL   0.57-1.25                 



             (test code = 358)                                        

 

             GLUCOSE RANDOM 109 mg/dL           H            



             (BEAKER) (test code =                                        



             652)                                                

 

             CALCIUM (BEAKER) 7.4 mg/dL    8.4-10.2     L            



             (test code = 697)                                        

 

             AST (SGOT) (BEAKER) 31 U/L       5-34                      



             (test code = 353)                                        

 

             ALT (SGPT) (BEAKER) 18 U/L       6-55                      



             (test code = 347)                                        

 

             EGFR (BEAKER) (test 87 mL/min/1.73                           ESTIMA

FROILAN GFR IS



             code = 1092) sq m                                   NOT AS ACCURATE

 AS



                                                                 CREATININE



                                                                 CLEARANCE IN



                                                                 PREDICTING



                                                                 GLOMERULAR



                                                                 FILTRATION RATE

.



                                                                 ESTIMATED GFR I

S



                                                                 NOT APPLICABLE 

FOR



                                                                 DIALYSIS PATIEN

TS.



Specimen moderately ictericPOCT-GLUCOSE [id number] 21:12:00





             Test Item    Value        Reference Range Interpretation Comments

 

             POC-GLUCOSE METER 114 mg/dL           H            TESTED AT 

[facility] [address]



             [facility] (test code =                                        [address] [place]



             1538)                                               [address]



OSMOLALITY, [id number] 18:43:00





             Test Item    Value        Reference Range Interpretation Comments

 

             OSMOLALITY URINE (BEAKER) (test 694 mOsm/kg  40-1,400              

    



             code = 614)                                         



SODIUM, RANDOM [id number] 18:02:00





             Test Item    Value        Reference Range Interpretation Comments

 

             SODIUM URINE (BEAKER) (test code = < meq/L                         

       



             243)                                                



Reference Range: No NormalsPOCT-GLUCOSE [id number] 16:06:00





             Test Item    Value        Reference Range Interpretation Comments

 

             POC-GLUCOSE METER 145 mg/dL           H            TESTED AT 

[facility] [address]



             ([facility]) (test code =                                        [address] [facility]



             1538)                                               [address]



RAD, ABDOMEN/KUB, 1 VIEW [id number] 12:51:00Reason for exam:-&gt;no BM in 13 
days. abdominal distension. concern for ileusFINAL REPORT PATIENT ID: [id number] 
RAD, ABDOMEN/KUB, 1 VIEW AP CLINICAL INDICATION: no BM in 13 days.abdominal 
distension. concern for ileus COMPARISON: None TECHNIQUE: 24 radiographs of the 
abdomen. IMPRESSION: The bowel gas pattern demonstrates gaseous distention 
without dilation. No pneumatosis. Appearance may reflect ileus. Excreted 
contrast is present in the urinary bladder. The regional skeleton is intact. 
Signed: JR Mendoza Robert MDReport Verified Date/Time: 2019 
12:51:21 Reading Location: [facility] Radiology Reading Room Electronically 
signed by: KULWINDER MENDOZA on 2019 12:51 PMOSMOLALITY, SERUM
2019 12:47:00





             Test Item    Value        Reference Range Interpretation Comments

 

             OSMOLALITY, SERUM (BEAKER) (test 258 mOsm/kg  275-295      L       

     



             code = 615)                                         



POCT-GLUCOSE [id number] 12:35:00





             Test Item    Value        Reference Range Interpretation Comments

 

             POC-GLUCOSE METER 93 mg/dL                         TESTED AT 

[facility] [address]



             (BEAKER) (test code =                                        [place] [address]



             1538)                                               



CBC W/PLT COUNT &amp; AUTO [id number] 10:10:00





             Test Item    Value        Reference Range Interpretation Comments

 

             WHITE BLOOD CELL COUNT (BEAKER) 19.5 K/ L    3.5-10.5     H        

    



             (test code = 775)                                        

 

             RED BLOOD CELL COUNT (BEAKER) 4.18 M/ L    4.63-6.08    L          

  



             (test code = 761)                                        

 

             HEMOGLOBIN (BEAKER) (test code = 13.3 GM/DL   13.7-17.5    L       

     



             410)                                                

 

             HEMATOCRIT (BEAKER) (test code = 37.8 %       40.1-51.0    L       

     



             411)                                                

 

             MEAN CORPUSCULAR VOLUME (BEAKER) 90.4 fL      79.0-92.2            

     



             (test code = 753)                                        

 

             MEAN CORPUSCULAR HEMOGLOBIN 31.8 pg      25.7-32.2                 



             (BEAKER) (test code = 751)                                        

 

             MEAN CORPUSCULAR HEMOGLOBIN CONC 35.2 GM/DL   32.3-36.5            

     



             (BEAKER) (test code = 752)                                        

 

             RED CELL DISTRIBUTION WIDTH 13.5 %       11.6-14.4                 



             (BEAKER) (test code = 412)                                        

 

             PLATELET COUNT (BEAKER) (test code 60 K/CU MM   150-450      L     

       



             = 756)                                              

 

             MEAN PLATELET VOLUME (BEAKER) 9.4 fL       9.4-12.4                

  



             (test code = 754)                                        

 

             NUCLEATED RED BLOOD CELLS (BEAKER) 0 /100 WBC   0-0                

       



             (test code = 413)                                        



(CELLAVISION MANUAL DIFF)2019 10:10:00





             Test Item    Value        Reference Range Interpretation Comments

 

             NEUTROPHILS - REL 86 %                                   



             (CELLAVISION)(BEAKER) (test code =                                 

       



             2816)                                               

 

             LYMPHOCYTES - REL 1 %                                    



             (CELLAVISION)(BEAKER) (test code =                                 

       



             2817)                                               

 

             MONOCYTES - REL 6 %                                    



             (CELLAVISION)(BEAKER) (test code =                                 

       



             2818)                                               

 

             EOSINOPHILS - REL 3 %                                    



             (CELLAVISION)(BEAKER) (test code =                                 

       



             2819)                                               

 

             BANDS - REL (CELLAVISION)(BEAKER) 3 %          0-10                

      



             (test code = 2826)                                        

 

             BLASTS - REL (CELLAVISION)(BEAKER) 1 %          0-0          H     

       



             (test code = 2827)                                        

 

             NEUTROPHILS - ABS 16.77 K/ul   1.78-5.38    H            



             (CELLAVISION)(BEAKER) (test code =                                 

       



             2830)                                               

 

             LYMPHOCYTES - ABS 0.20 K/ul    1.32-3.57    L            



             (CELLAVISION)(BEAKER) (test code =                                 

       



             2831)                                               

 

             MONOCYTES - ABS 1.17 K/uL    0.30-0.82    H            



             (CELLAVISION)(BEAKER) (test code =                                 

       



             2832)                                               

 

             EOSINOPHILS - ABS 0.59 K/uL    0.04-0.54    H            



             (CELLAVISION)(BEAKER) (test code =                                 

       



             2834)                                               

 

             BANDS - ABS (CELLAVISION)(BEAKER) 0.59 K/uL    0.00-0.80           

      



             (test code = 2840)                                        

 

             BLASTS - ABS (CELLAVISION)(BEAKER) 0.20 K/uL    0.00-0.00    H     

       



             (test code = 2845)                                        

 

             TOTAL COUNTED (BEAKER) (test code 100                              

      



             = 1351)                                             

 

             PLT MORPHOLOGY (BEAKER) (test code Normal                          

       



             = 486)                                              

 

             SMUDGE CELLS (BEAKER) (test code = Present                         

       



             1371)                                               

 

             POIKILOCYTES (BEAKER) (test code = 1+ few                          

       



             966)                                                

 

             PLATELET CONCENTRATION Decreased                              



             (CELLAVISION)(BEAKER) (test code =                                 

       



             3438)                                               



Received comment: User comments: Slide comments:RAD, CHEST, 1 VIEW, NON DEPT
2019 08:53:00Reason for exam:-&gt;left effusionShould this be performed at
the bedside?-&gt;YesFINAL REPORT PATIENT ID: [id number] RAD, CHEST, 1 VIEW, NON 
DEPT INDICATION: left effusion COMPARISON:Prior day's exam FINDINGS: Portable 
frontal view of the chest. IMPRESSION: Limited by patient positioning.Support 
Lines: Stable. Lungs and pleura: Interstitial congestion on the left slightly 
greater than the right and unchanged from prior. Small left effusion. No visible
pneumothorax.Heart and mediastinum: Stable contours. Additional findings: None. 
Signed: JR Mendoza Robert MDReport VerifiedDate/Time: 2019 08:53:19
Reading Location: [facility] Radiology Reading Room Electronicallysigned by: 
KULWINDER MENDOZA on 2019 08:53 AMPOCT-GLUCOSE [id number] 
07:58:00





             Test Item    Value        Reference Range Interpretation Comments

 

             POC-GLUCOSE METER 95 mg/dL                         TESTED AT 

[facility] [address]



             ([facility]) (test code =                                        [facility] [address]



             1538)                                               



COMPREHENSIVE METABOLIC [id number] 05:41:00





             Test Item    Value        Reference Range Interpretation Comments

 

             TOTAL PROTEIN 5.4 gm/dL    6.0-8.3      L            



             (BEAKER) (test code =                                        



             770)                                                

 

             ALBUMIN (BEAKER) 1.9 g/dL     3.5-5.0      L            



             (test code = 1145)                                        

 

             ALKALINE PHOSPHATASE 126 U/L                          



             (BEAKER) (test code =                                        



             346)                                                

 

             BILIRUBIN TOTAL 4.9 mg/dL    0.2-1.2      H            



             (BEAKER) (test code =                                        



             377)                                                

 

             SODIUM (BEAKER) (test 117 meq/L    136-145      LL           



             code = 381)                                         

 

             POTASSIUM (BEAKER) 5.3 meq/L    3.5-5.1      H            



             (test code = 379)                                        

 

             CHLORIDE (BEAKER) 83 meq/L            L            



             (test code = 382)                                        

 

             CO2 (BEAKER) (test 28 meq/L     22-29                     



             code = 355)                                         

 

             BLOOD UREA NITROGEN 29 mg/dL     7-21         H            



             (BEAKER) (test code =                                        



             354)                                                

 

             CREATININE (BEAKER) 0.90 mg/dL   0.57-1.25                 



             (test code = 358)                                        

 

             GLUCOSE RANDOM 99 mg/dL                         



             (BEAKER) (test code =                                        



             652)                                                

 

             CALCIUM (BEAKER) 7.7 mg/dL    8.4-10.2     L            



             (test code = 697)                                        

 

             AST (SGOT) (BEAKER) 28 U/L       5-34                      



             (test code = 353)                                        

 

             ALT (SGPT) (BEAKER) 18 U/L       6-55                      



             (test code = 347)                                        

 

             EGFR (BEAKER) (test 86 mL/min/1.73                           ESTIMA

FROILAN GFR IS



             code = 1092) sq m                                   NOT AS ACCURATE

 AS



                                                                 CREATININE



                                                                 CLEARANCE IN



                                                                 PREDICTING



                                                                 GLOMERULAR



                                                                 FILTRATION RATE

.



                                                                 ESTIMATED GFR I

S



                                                                 NOT APPLICABLE 

FOR



                                                                 DIALYSIS PATIEN

TS.



Specimen moderately ictericPOCT-GLUCOSE [id number] 21:08:00





             Test Item    Value        Reference Range Interpretation Comments

 

             POC-GLUCOSE METER 92 mg/dL                         TESTED AT 

[place] [id number]



             ([facility]) (test code =                                        [facility] [address]



             1538)                                               



RAD, CHEST, 1 VIEW, NON [id number] 17:40:00Reason for exam:-&gt;post chest 
tube placementFINAL REPORT PATIENT ID: [id number] INDICATION: post chest tube 
placement TECHNIQUE: Chest radiograph,single view, portable technique. FINDINGS 
/ IMPRESSION: Left pleural effusion has decreased in size,since 10:25 AM, after 
pleural tube placement. No pneumothorax demonstrated. Right PICC line again not
ed terminating at the cavoatrial junction. Signed: Bertram Ordonez MDReport 
Verified Date/Time: 2019 17:40:02 Reading Location: [address] Consult 
Reading Room Electronically signed by: BERTRAM ORDONEZ M.D. on 
2019 05:40 PMPOCT-GLUCOSE [id number] 17:33:00





             Test Item    Value        Reference Range Interpretation Comments

 

             POC-GLUCOSE METER 116 mg/dL           H            TESTED AT 

[place] [address]



             ([facility]) (test code =                                        [address] [facility]



             1538)                                               [address]



POCT-GLUCOSE [id number] 15:19:00





             Test Item    Value        Reference Range Interpretation Comments

 

             POC-GLUCOSE METER 85 mg/dL                         TESTED AT 

[place] [address]



             ([facility]) (test code =                                        [address] [facility] [address]



             1538)                                               



CT, DRAINAGE, CHEST TUBE [id number] 14:49:00Reason for exam:-
&gt;loculated left pleural effusion, please place large bore pigtail if effusion
noted on CT scanAnesthesia:-&gt;NoneFINAL REPORT PATIENT ID: [id number] PROCEDURE:
CT-guided placement of a left pleural catheter. Dose modulation, iterative 
reconstruction, and/or weight-based adjustment of the mA/kV was utilized to 
reduce the radiation dose to as low as reasonably achievable. INDICATION: 
Loculated left pleural effusion.COMPARISON: CT from earlier today. SEDATION: 
Intravenous moderate sedation was administered by radiology nursing and 
monitored under the direction of the undersigned radiologist. The patient's 
vital signs were monitored throughout the procedure and recorded in the 
patient's medical record by radiologynursing. Total intraservice time of 
sedation was 25 minutes. MEDICATIONS: 0.5 mg Versed, 25 mcg fentanyl 
DESCRIPTION: After obtaining informed written consent, the patient was brought 
to the procedure room and placed in the supine position. Preliminary CT scan 
revealed a large left pleural effusion. Aclear path to the collection was 
identified. The overlying skin was prepped and draped in the usual,sterile 
fashion and local 2% lidocaine anesthesia was administered. Under CT guidance, a
19-gauge needle was placed. After placement of a wire and serial dilation, a 12 
French catheter was placed into the pleural fluid. The catheter was affixed to 
the skin and connected to a vacuum container. 1000 mL of clear red fluid was 
aspirated. Samples were placed on this side table and no clotting was noted. Sa
mples were sent for analysis. Post procedure scan showed decrease in size with 
left effusion and no immediate complications. IMPRESSION: Successful placement 
of a 12 French left chest tube. Signed: Vivien Aguilera MDReport Verified Date/Time: 
2019 14:49:30 Reading Location: [facility] C013Y CT Body Reading Room 
Electronically signed by: VIVIEN AGUILERA MD on 2019 02:49 PMCT, CHEST, 
WITH [id number] 13:11:00Reason for exam:-&gt;evaluate loculated left 
pleural effusion seen on xrayFINAL REPORT PATIENT ID: [id number] TECHNIQUE: CT 
scan of the chest WITH intravenous contrast. Dose modulation, iterative 
reconstruction, and/or weight-based adjustment of the mA/kV was utilized to 
reduce the radiation dose to as low as reasonably achievable. INDICATION: 
evaluate loculated left pleural effusion seen on xrayevaluate loculated left 
pleural effusion seen on xray. COMPARISON: None. FINDINGS: LINES/TUBES: A right 
PICC has its tip at the superior cavoatrial junction. LUNGS AND AIRWAYS: Mild
right basilar subsegmental atelectasis. There is an area of cavitation with a 
fluid fluid level in superior segment of the left lower lobe which measures 4 cm
PLEURA: Trace right pleural effusion. HEART AND MEDIASTINUM: The visualized 
thyroid gland is normal. No significant mediastinal, hilar, or axillary 
lymphadenopathy. The heart and pericardium are within normal limits. Severe 
coronary arterial calcifications. SOFT TISSUES AND BONES: Bilateral 
gynecomastia. Old, healed right 10th and 12th rib fractures. Old, healed left 
10th rib fracture. UPPER ABDOMEN: Nodular, cirrhotic liver. Partially visualized
upper abdominal varices. A periumbilical vein is recanalized. IMPRESSION: 
1.There is a large left sided loculated pleural effusion with a mildly thickened
pleura and some intermixed gas. This is concerning for an empyema. 2.The air-
fluid level with surrounding lung in the superior segment of the left lower lobe
is most likely a lung abscess. A cavitary lung nodule (either from malignancy or
fungal infection) is considered less likely. A follow-up chest CT is recommended
in three months to document decrease in size and exclude cavitary malignancy. 
3.Cirrhosis with findings of portal hypertension. Signed: Vivien Aguilera 
Verified Date/Time: 2019 13:11:18 Reading Location: [facility] B1 C013Y CT Body 
Reading Room Electronically signed by: VIVIEN AGUILERA MD on 2019 01:11 
PMPOCT-GLUCOSE [id number] 11:22:00





             Test Item    Value        Reference Range Interpretation Comments

 

             POC-GLUCOSE METER 81 mg/dL                         TESTED AT 

[facility] [address]



             ([facility]) (test code =                                        [place] [address]



             1538)                                               



RAD, CHEST, 1 VIEW, NON [id number] 10:59:00Reason for exam:-&gt;eval 
effusionShould this be performed at the bedside?-&gt;YesFINAL REPORT PATIENT ID:
[id number] RAD, CHEST, 1 VIEW, NON DEPT INDICATION: eval effusion COMPARISON:Prior
day's exam FINDINGS: Portable frontal view of the chest. IMPRESSION: Limited by 
patient rotation.Support Lines: A right PICC terminates over the superior vena 
cava. Lungs and pleura: Large left effusion. Right costophrenic sulcus is 
excluded from view. No pneumothorax.Heart and mediastinum: Unremarkable where 
visible.Additional findings: None. A CT evaluation is currently pending. Signed:
JR Mendoza Robert MDReport Verified Date/Time: 2019 10:59:34 
Reading Location: [facility] Radiology Reading Room Electronically signed by:
KULIWNDER MENDOZA on 2019 10:59 AMCOMPREHENSIVE METABOLIC PANEL
2019 07:33:00





             Test Item    Value        Reference Range Interpretation Comments

 

             TOTAL PROTEIN 5.8 gm/dL    6.0-8.3      L            Specimen [facility]



             ([facility]) (test code =                                        hemoly

zed



             770)                                                

 

             ALBUMIN ([facility]) 2.0 g/dL     3.5-5.0      L            Specimen sl

ightly



             (test code = 1145)                                        hemolyzed

 

             ALKALINE PHOSPHATASE 130 U/L                          



             (BEAKER) (test code =                                        



             346)                                                

 

             BILIRUBIN TOTAL 4.6 mg/dL    0.2-1.2      H            Specimen sli

ghtly



             (BEAKER) (test code =                                        hemoly

zed



             377)                                                

 

             SODIUM (BEAKER) (test 117 meq/L    136-145      LL           



             code = 381)                                         

 

             POTASSIUM (BEAKER) 5.3 meq/L    3.5-5.1      H            Specimen 

slightly



             (test code = 379)                                        hemolyzed

 

             CHLORIDE (BEAKER) 84 meq/L            L            



             (test code = 382)                                        

 

             CO2 (BEAKER) (test 28 meq/L     22-29                     



             code = 355)                                         

 

             BLOOD UREA NITROGEN 22 mg/dL     7-21         H            



             (BEAKER) (test code =                                        



             354)                                                

 

             CREATININE (BEAKER) 0.86 mg/dL   0.57-1.25                 Specimen

 slightly



             (test code = 358)                                        hemolyzed

 

             GLUCOSE RANDOM 90 mg/dL                         



             (BEAKER) (test code =                                        



             652)                                                

 

             CALCIUM (BEAKER) 7.9 mg/dL    8.4-10.2     L            



             (test code = 697)                                        

 

             AST (SGOT) (BEAKER) 33 U/L       5-34                      Specimen

 slightly



             (test code = 353)                                        hemolyzed

 

             ALT (SGPT) (BEAKER) 20 U/L       6-55                      Specimen

 slightly



             (test code = 347)                                        hemolyzed

 

             EGFR (BEAKER) (test 91 mL/min/1.73                           ESTIMA

FROILAN GFR IS



             code = 1092) sq m                                   NOT AS ACCURATE

 AS



                                                                 CREATININE



                                                                 CLEARANCE IN



                                                                 PREDICTING



                                                                 GLOMERULAR



                                                                 FILTRATION RATE

.



                                                                 ESTIMATED GFR I

S



                                                                 NOT APPLICABLE 

FOR



                                                                 DIALYSIS PATIEN

TS.



Specimen moderately ictericPOCT-GLUCOSE [id number] 05:46:00





             Test Item    Value        Reference Range Interpretation Comments

 

             POC-GLUCOSE METER 93 mg/dL                         TESTED AT 

[facility] [address]



             (BEAKER) (test code =                                        [place] [address]



             1538)                                               



PROTHROMBIN [id number] 05:30:00





             Test Item    Value        Reference Range Interpretation Comments

 

             PROTIME (BEAKER) (test code = 16.9 seconds 11.9-14.2    H          

  



             759)                                                

 

             INR (BEAKER) (test code = 370) 1.4          <=5.9                  

   



Effective 2019: PT Reference Range ChangeNew: 11.9-14.2 Previous: 11.7-
14.7RECOMMENDED COUMADIN/WARFARIN INR THERAPY RANGESSTANDARD DOSE: 2.0-3.0 
Includes: PROPHYLAXIS for venous thrombosis, systemic embolization; TREATMENT 
for venous thrombosis and/or pulmonary embolus.HIGH RISK: Target INR is 2.5-3.5 
for patients wiht mechanical heart valves.CBC W/PLT COUNT &amp; AUTO 
[id number] 05:27:00





             Test Item    Value        Reference Range Interpretation Comments

 

             WHITE BLOOD CELL COUNT (BEAKER) 23.2 K/ L    3.5-10.5     H        

    



             (test code = 775)                                        

 

             RED BLOOD CELL COUNT (BEAKER) 4.75 M/ L    4.63-6.08               

  



             (test code = 761)                                        

 

             HEMOGLOBIN (BEAKER) (test code = 15.3 GM/DL   13.7-17.5            

     



             410)                                                

 

             HEMATOCRIT (BEAKER) (test code = 43.1 %       40.1-51.0            

     



             411)                                                

 

             MEAN CORPUSCULAR VOLUME (BEAKER) 90.7 fL      79.0-92.2            

     



             (test code = 753)                                        

 

             MEAN CORPUSCULAR HEMOGLOBIN 32.2 pg      25.7-32.2                 



             (BEAKER) (test code = 751)                                        

 

             MEAN CORPUSCULAR HEMOGLOBIN CONC 35.5 GM/DL   32.3-36.5            

     



             (BEAKER) (test code = 752)                                        

 

             RED CELL DISTRIBUTION WIDTH 13.3 %       11.6-14.4                 



             (BEAKER) (test code = 412)                                        

 

             PLATELET COUNT (BEAKER) (test code 87 K/CU MM   150-450      L     

       



             = 756)                                              

 

             MEAN PLATELET VOLUME (BEAKER) 8.9 fL       9.4-12.4     L          

  



             (test code = 754)                                        

 

             NUCLEATED RED BLOOD CELLS (BEAKER) 0 /100 WBC   0-0                

       



             (test code = 413)                                        

 

             NEUTROPHILS RELATIVE PERCENT 86 %                                  

 



             (BEAKER) (test code = 429)                                        

 

             LYMPHOCYTES RELATIVE PERCENT 3 %                                   

 



             (BEAKER) (test code = 430)                                        

 

             MONOCYTES RELATIVE PERCENT 9 %                                    



             (BEAKER) (test code = 431)                                        

 

             EOSINOPHILS RELATIVE PERCENT 0 %                                   

 



             (BEAKER) (test code = 432)                                        

 

             BASOPHILS RELATIVE PERCENT 0 %                                    



             (BEAKER) (test code = 437)                                        

 

             NEUTROPHILS ABSOLUTE COUNT 19.87 K/ L   1.78-5.38    H            



             (BEAKER) (test code = 670)                                        

 

             LYMPHOCYTES ABSOLUTE COUNT 0.73 K/ L    1.32-3.57    L            



             ([facility]) (test code = 414)                                        

 

             MONOCYTES ABSOLUTE COUNT ([facility]) 2.16 K/ L    0.30-0.82    H      

      



             (test code = 415)                                        

 

             EOSINOPHILS ABSOLUTE COUNT 0.06 K/ L    0.04-0.54                 



             ([facility]) (test code = 416)                                        

 

             BASOPHILS ABSOLUTE COUNT ([facility]) 0.04 K/ L    0.01-0.08           

      



             (test code = 417)                                        

 

             IMMATURE GRANULOCYTES-RELATIVE 1 %          0-1                    

   



             PERCENT ([facility]) (test code =                                      

  



             2801)                                               



POCT-GLUCOSE [id number] 23:34:00





             Test Item    Value        Reference Range Interpretation Comments

 

             POC-GLUCOSE METER 94 mg/dL                         TESTED AT 

[facility] [address]



             ([facility]) (test code =                                        [place] [address]



             1538)

## 2023-01-19 NOTE — EDPHYS
Physician Documentation                                                                           

 [facility]                                                                 

Name: Omkar Chung                                                                                  

Age: 62 yrs                                                                                       

Sex: Male                                                                                         

: 1960                                                                                   

MRN: [id number]                                                                                   

Arrival Date: 2023                                                                          

Time: 02:24                                                                                       

Account#: [id number]                                                                            

Bed 16                                                                                            

Private MD:                                                                                       

ED Physician Chris Canales                                                                      

HPI:                                                                                              

                                                                                             

03:24 This 62 yrs old  Male presents to ER via Unassigned with complaints of Altered juliet 

      Mental Status.                                                                              

03:24 The patient presents with confusion, decreased mental status, decreased responsiveness. juliet 

      Onset: The symptoms/episode began/occurred 2 day(s) ago. Possible causes: CVA or TIA,       

      alcohol, low blood sugar, seizure, sepsis. Associated signs and symptoms: Pertinent         

      positives: abdominal pain, combativeness, confusion. Current symptoms: In the emergency     

      department the patient's symptoms are unchanged from the initial presentation, despite      

      home interventions. Patient's baseline: Neuro: alert and fully oriented. The patient        

      has experienced similar episodes in the past, multiple times.                               

                                                                                                  

Historical:                                                                                       

- Allergies:                                                                                      

04:46 No Known Allergies;                                                                     jb4 

- Home Meds:                                                                                      

04:45 albuterol sulfate 2.5 mg/0.5 mL Inhl nebu 0.5 mL every 6 hours [Active];                jb4 

- PMHx:                                                                                           

04:45 Cirrhosis; colon cancer; COPD; DM type 2; Hernia;                                       jb4 

                                                                                                  

- Family history:: not pertinent.                                                                 

                                                                                                  

                                                                                                  

ROS:                                                                                              

03:26 Eyes: Negative for injury, pain, redness, and discharge, ENT: Negative for injury,      juliet 

      pain, and discharge, Neck: Negative for injury, pain, and swelling, Cardiovascular:         

      Negative for chest pain, palpitations, and edema, Respiratory: Negative for shortness       

      of breath, cough, wheezing, and pleuritic chest pain, Back: Negative for injury and         

      pain, : Negative for injury, bleeding, discharge, and swelling, MS/Extremity:             

      Negative for injury and deformity, Psych: Negative for depression, anxiety, suicide         

      ideation, homicidal ideation, and hallucinations, Allergy/Immunology: Negative for          

      hives, rash, and allergies, Endocrine: Negative for neck swelling, polydipsia,              

      polyuria, polyphagia, and marked weight changes.                                            

03:26 Constitutional: Positive for fatigue, poor PO intake.                                       

03:26 Cardiovascular: Positive for palpitations.                                                  

03:26 Abdomen/GI: Positive for abdominal pain, abdominal cramps, abdominal distension.            

03:26 Skin: Positive for jaundice.                                                                

                                                                                                  

Exam:                                                                                             

03:26 Head/Face:  Normocephalic, atraumatic. ENT:  Nares patent. No nasal discharge, no       juliet 

      septal abnormalities noted.  Tympanic membranes are normal and external auditory canals     

      are clear.  Oropharynx with no redness, swelling, or masses, exudates, or evidence of       

      obstruction, uvula midline.  Mucous membranes moist. Neck:  Trachea midline, no             

      thyromegaly or masses palpated, and no cervical lymphadenopathy.  Supple, full range of     

      motion without nuchal rigidity, or vertebral point tenderness.  No Meningismus.             

      Chest/axilla:  Normal chest wall appearance and motion.  Nontender with no deformity.       

      No lesions are appreciated. Respiratory:  Lungs have equal breath sounds bilaterally,       

      clear to auscultation and percussion.  No rales, rhonchi or wheezes noted.  No              

      increased work of breathing, no retractions or nasal flaring. Back:  No spinal              

      tenderness.  No costovertebral tenderness.  Full range of motion. Male :  Normal          

      genitalia with no discharge or lesions. MS/ Extremity:  Pulses equal, no cyanosis.          

      Neurovascular intact.  Full, normal range of motion. Psych:  Awake, alert, with             

      orientation to person, place and time.  Behavior, mood, and affect are within normal        

      limits.                                                                                     

03:26 Constitutional: The patient appears obviously ill, restless.                                

03:26 Cardiovascular: Rate: tachycardic, actual rate is  115 bpm, Rhythm: regular, Pulses:        

      Pulses are 4+ in bilateral radial, brachial, femoral, popliteal, posterior tibial and       

      and dorsalis pedis arteries.. Heart sounds: normal, normal S1and S2, no S3 or S4, no        

      murmur, no rub, no gallop, Edema: 2+ edema to level of left midcalf and right midcalf,      

      JVD: is noted bilaterally, to 2 cm.                                                         

03:26 ECG was reviewed by the Attending Physician.                                                

03:26 Skin: Appearance: Color: jaundiced, Temperature: normal temperature, Moisture: normal       

      moisture, petechiae, not noted, ecchymosis, noted on the, right arm and left arm,           

      Turgor: is good.                                                                            

                                                                                                  

Vital Signs:                                                                                      

03:13  / 63; Pulse 115; Resp 18; Temp 98.5(O); Pulse Ox 95% on 3 lpm NC; Weight 100.24  jb4 

      kg (M);                                                                                     

04:45  / 68; Pulse 102; Resp 19; Pulse Ox 96% on 3 lpm NC;                              jb4 

05:15  / 86; Pulse 102; Resp 20; Pulse Ox 95% on 3 lpm NC;                              jb4 

                                                                                                  

MDM:                                                                                              

02:26 Patient medically screened.                                                             juliet 

03:30 Differential Diagnosis altered mental status, sepsis. Differential Diagnosis: CVA,      juliet 

      electrolyte abnormality, alcohol intoxication, hypoglycemia, intracranial bleed,            

      overdose, seizure, sepsis, TIA, UTI, volume depletion. Differential diagnosis: bowel        

      obstruction, Cholelithiasis, diverticulitis, Hepatitis, Irritable bowel syndrome,           

      myocardia ischemia or infarction, non-specific abd pain, pancreatitis, Peptic Ulcer         

      Disease, Pyelonephritis, Ureterolithiasis, urinary tract infection. Data reviewed:          

      vital signs, nurses notes, EMS record, lab test result(s), EKG, radiologic studies,         

      plain films. Consideration of Admission/Observation Patient was admitted/placed on          

      observation. Escalation of care including admission/observation considered. Management      

      of patient was discussed with the following: Consultant: hepatology. Test considered        

      but Not performed: MRI: mri.                                                                

                                                                                                  

                                                                                             

02:28 Order name: Basic Metabolic Panel; Complete Time: 04:12                                 [facility] 

                                                                                             

02:28 Order name: CBC with Diff; Complete Time: 05:03                                         [facility] 

                                                                                             

02:28 Order name: LFT's; Complete Time: 04:12                                                 [facility] 

                                                                                             

02:28 Order name: Magnesium; Complete Time: 04:12                                             [facility] 

                                                                                             

02:28 Order name: NT PRO-BNP; Complete Time: 04:12                                            [facility] 

                                                                                             

02:28 Order name: PT-INR; Complete Time: 03:04                                                [facility] 

                                                                                             

02:28 Order name: Troponin HS; Complete Time: 04:12                                           [facility] 

                                                                                             

02:28 Order name: XRAY Chest (1 view); Complete Time: 23:47                                   [facility] 

                                                                                             

02:28 Order name: AMMONIA; Complete Time: 04:12                                               [facility] 

                                                                                             

02:44 Order name: SARS-COV-2 RT PCR; Complete Time: 04:12                                     [facility]

                                                                                             

02:48 Order name: Blood Culture Adult (2)                                                     [facility] 

                                                                                             

23:47 Interpretation: Abnormal.                                                               [facility] 

                                                                                             

02:48 Order name: Lactate w/ 2H reflex if indic.; Complete Time: 05:01                        [facility] 

                                                                                             

02:50 Order name: Manual Differential; Complete Time: 05:03                                   [facility]

                                                                                             

02:28 Order name: EKG; Complete Time: 02:28                                                   [facility] 

                                                                                             

02:28 Order name: Cardiac monitoring; Complete Time: 02:28                                    [facility] 

                                                                                             

02:28 Order name: EKG - Nurse/Tech; Complete Time: 04:24                                      [facility] 

                                                                                             

02:28 Order name: IV Saline Lock; Complete Time: 02:28                                        [facility] 

                                                                                             

02:28 Order name: Labs collected and sent; Complete Time: 02:28                               [facility] 

                                                                                             

02:28 Order name: CT Head Brain wo Cont; Complete Time: 23:47                                 [facility] 

                                                                                             

02:28 Order name: O2 Per Protocol; Complete Time: 02:28                                       [facility] 

                                                                                             

02:28 Order name: O2 Sat Monitoring; Complete Time: 02:28                                     [facility] 

                                                                                             

05:03 Order name: IV Saline Lock - Large Bore; Complete Time: 05:13                           [facility] 

                                                                                                  

EC:26 Rate is 102 beats/min. Rhythm is regular. QRS Axis is Normal. NJ interval is normal.    [facility] 

      QRS interval is normal. QT interval is normal. No Q waves. T waves are Normal. No ST        

      changes noted. Clinical impression: Abnormal EKG without significant change and No          

      evidence of ischemia. Interpreted by me. Reviewed by me.                                    

                                                                                                  

Administered Medications:                                                                         

03:02 Drug: Thiamine 100 mg Route: IV; Rate: per protocol; Site: left antecubital;            jb4 

03:02 Drug: Lactulose 60 grams Volume: 45 ml; Route: PO;                                      jb4 

03:02 Drug: Pepcid (famotidine) 20 mg Route: IVP; Site: left antecubital;                     jb4 

03:13 Drug: Ativan (LORazepam) 0.5 mg Route: IVP; Site: left antecubital;                     jb4 

03:27 Follow up: Response: Adverse reaction, Physician notified; No change in condition       jb4 

03:24 Drug: Ativan (LORazepam) 0.5 mg Route: IVP; Site: left antecubital;                     jb4 

03:53 Follow up: Response: No adverse reaction; Marked relief of symptoms                     jb4 

03:41 Drug: Vitamin K1 (phytonadione) 10 mg Route: Sub-Q; Site: left upper arm;               jb4 

04:49 Drug: Rocephin (cefTRIAXone) 1 grams Route: IV; Rate: per protocol; Site: left          jb4 

      antecubital;                                                                                

06:20 Drug: Albuterol 1.25 mg Route: Inhalation;                                              jb4 

06:20 Drug: AtroVENT (ipratropium) Aerosol 0.5 mg Route: Inhalation;                          jb4 

06:37 Not Given (Physician Discretion): Ativan (LORazepam) 0.5 mg IVP once                    jb4 

                                                                                                  

                                                                                                  

Disposition Summary:                                                                              

23 03:35                                                                                    

Transfer Ordered                                                                                  

      Transfer Location: [facility]                                juliet 

      Reason: Higher level of care                                                            juliet 

      Condition: Serious                                                                      juliet 

      Problem: an acute exacerbation                                                          juliet 

      Symptoms: are unchanged                                                                 juliet 

      Accepting Physician: [facility](23 07:10)                                            jb4 

      Diagnosis                                                                                   

        - Hepatic failure, unspecified without coma                                           juliet 

        - Unspecified jaundice                                                                juliet 

        - Alcoholic cirrhosis of liver with ascites                                           juliet 

        - Altered mental status, unspecified                                                  juliet 

        - Elevated white blood cell count                                                     juliet 

        - Abnormal coagulation profile                                                        juliet 

        - Metabolic encephalopathy - hepatic                                                  juliet 

      Forms:                                                                                      

        - Medication Reconciliation Form                                                      juliet 

        - SBAR form                                                                           juliet 

Signatures:                                                                                       

Dispatcher [employer]                           EDMS                                                 

Chris Canales MD MD cha Waters, Shelly, FNP-C                   FNP-Csnw                                                  

Vipul Nunn FNP-C                      FNP-Cla1                                                  

Denys Garcia, RN                       RN   jb4                                                  

                                                                                                  

Corrections: (The following items were deleted from the chart)                                    

02:44 02:28 SARS-COV-2 Antigen Rapid+I.LAB.BRZ ordered. EDMS                                  [facility]

07:10 03:35 [facility] juliet                                                                       jb4 

                                                                                                  

**************************************************************************************************

## 2023-01-19 NOTE — XMS REPORT
Clinical Summary

                           Created on:2023



Patient:Omkar Chung

Sex:Male

:1960

External Reference #:[id number]





Demographics







                          Address                   [address]



                                                    [place] [address]

 

                          Mobile Phone              [phone]

 

                          Home Phone                [phone]

 

                          Email Address             [email or web address]

 

                          Preferred Language        English

 

                          Marital Status            

 

                          Anabaptism Affiliation     Unknown

 

                          Race                      White

 

                          Ethnic Group              Not  or 









Author







                          Organization              [facility]

[facility]

 

                          Address                   [address] [place] [address]









Support







                Name            Relationship    Address         Phone

 

                Gabby Villatoro    Unavailable     Unavailable     [phone]









Care Team Providers







                    Name                Role                Phone

 

                    Moris Guajardo MD Unavailable         [phone]









Allergies

Not on File



Medications

Not on file



Active Problems

Not on file



Encounters







             Date         Type         Specialty    Care Team    Description

 

             2022   Travel                                 



after 2022



Social History







             Tobacco Use  Types        Packs/Day    Years Used   Date

 

             Smoking Tobacco: Never Assessed                                    

    









                          Sex Assigned at Birth     Date Recorded

 

                          Not on file               







Last Filed Vital Signs

Not on file



Plan of Treatment

Not on file



Results

Not on fileafter 2022



Insurance







          Payer     Benefit Plan / Subscriber ID Effective Dates Phone     Addre

ss   Type



                    Group                                             

 

           [facility]   [employer] [facility] [id number]  Effective for            [place]

X [id number]



                                        Medicare



                    [place] [address] 









           Guarantor Name Account Type Relation to Date of Birth Phone      Bill

ing



                                 Patient                          Address

 

           Omkar Chung Personal/Family Self       1960 [phone] [address]



                                                       (Home)     [address]







                                                                  [place]



                                                                  [address]

 

           Omkar Chung Personal/Family Self       1960 [phone] [address]



                                                       (Home)     [place]



                                                                  [address]







Care Teams







             Team Member  Relationship Specialty    Start Date   End Date

 

                                        Moris Guajardo MD



                PCP - External Referring General Surgery 3/11/22         



                                        [address]



                                                                



                                        [address]                                        



                                        [address] [phone] (Work)



                                                                



             [phone] (Fax)

## 2023-01-19 NOTE — ER
Nurse's Notes                                                                                     

 [facility]                                                                 

Name: Omkar Chung                                                                                  

Age: 62 yrs                                                                                       

Sex: Male                                                                                         

: 1960                                                                                   

MRN: [id number]                                                                                   

Arrival Date: 2023                                                                          

Time: 02:24                                                                                       

Account#: [id number]                                                                            

Bed 16                                                                                            

Private MD:                                                                                       

Diagnosis: Hepatic failure, unspecified without coma;Unspecified jaundice;Alcoholic cirrhosis of  

  liver with ascites;Altered mental status, unspecified;Elevated white blood cell                 

  count;Abnormal coagulation profile;Metabolic encephalopathy-hepatic                             

                                                                                                  

Presentation:                                                                                     

                                                                                             

02:47 Acuity: BROCK 2                                                                           tw5 

02:47 Chief complaint: Patient states: Pt was altered at home, we were called out earlier for jb4 

      a low BGL. Now the family thinks his ammonia is high due to how he is acting.               

02:47 Coronavirus screen: At this time, the client does not indicate any symptoms associated  jb4 

      with coronavirus-19. Ebola Screen: No symptoms or risks identified at this time.            

      Initial Sepsis Screen: Does the patient meet any 2 criteria? No. Patient's initial          

      sepsis screen is negative. Does the patient have a suspected source of infection? No.       

      Patient's initial sepsis screen is negative. Risk Assessment: Do you want to hurt           

      yourself or someone else? Patient reports no desire to harm self or others. Onset of        

      symptoms was 2023.                                                              

02:47 Method Of Arrival: EMS: [facility]                                                    [facility] 

                                                                                                  

Historical:                                                                                       

- Allergies:                                                                                      

04:46 No Known Allergies;                                                                     jb4 

- Home Meds:                                                                                      

04:45 albuterol sulfate 2.5 mg/0.5 mL Inhl nebu 0.5 mL every 6 hours [Active];                jb4 

- PMHx:                                                                                           

04:45 Cirrhosis; colon cancer; COPD; DM type 2; Hernia;                                       jb4 

                                                                                                  

- Family history:: not pertinent.                                                                 

                                                                                                  

                                                                                                  

Screenin:47 [facility] ED Fall Risk Assessment (Adult) History of falling in the last 3 months,       jb4 

      including since admission No falls in past 3 months (0 pts) Confusion or Disorientation     

      Yes (5 pts) Intoxicated or Sedated No (0 pts) Impaired Gait Yes (1 pt) Mobility Assist      

      Device Used No (0 pt) Altered Elimination Yes (1 pt) Score/Fall Risk Level 3 or more        

      points = High Risk Oriented to surroundings, Maintained a safe environment, Educated pt     

      \T\ family on fall prevention, incl call for assistance when getting out of bed, Assessed   

      \T\ reinforced patient's understanding of fall precautions, Provided non-skid footwear,     

      Hourly rounding (assess needs \T\ fall precautionary measures) done, Used ambulatory aids   

      as needed (educated on \T\ assisted with), Used gait belt as appropriate Implemented a      

      Fall Risk Plan of Care, Apply high fall risk patient identification: yellow non skid        

      footwear/ fall signage, Placed fall mat w/ non beveled edge next to bed, Activated          

      bed/chair alarm, Remained w/in arm's length of patient and in sight while toileting,        

      Offered frequent toileting (1:1 observation), Remained with patient while ambulating,       

      Utilized family, sitter, or virtual  as indicated.                                 

02:47 Abuse screen: Denies threats or abuse. Nutritional screening: No deficits noted.        jb4 

      Tuberculosis screening: No symptoms or risk factors identified.                             

                                                                                                  

Assessment:                                                                                       

02:30 General: Appears in no apparent distress. ill, Behavior is agitated, uncooperative.     jb4 

      Pain: Unable to use pain scale. Patient appears agitated. Neuro: Level of Consciousness     

      is awake, confused, Oriented to none. Cardiovascular: Patient's skin is warm and dry.       

      Respiratory: Airway is patent Respiratory effort is even, unlabored, Respiratory            

      pattern is regular, symmetrical. GI: No signs and/or symptoms were reported involving       

      the gastrointestinal system. : No signs and/or symptoms were reported regarding the       

      genitourinary system. EENT: No signs and/or symptoms were reported regarding the EENT       

      system. Derm: Skin is intact, Skin is dry, Skin is jaundiced, Skin temperature is warm.     

      Musculoskeletal: Circulation, motion, and sensation intact. Range of motion: intact in      

      all extremities.                                                                            

03:11 Reassessment: Received verbal order to give 0.5mg of ativan IVP up to 2mg as needed for jb4 

      agitation.                                                                                  

03:30 Reassessment: Pt is more calm after ativan administrations. Respirations remain even    jb4 

      and unlabored with no s/s of pain or distress noted.                                        

04:30 Reassessment: Pt is awake, confused, oriented x0, respirations remain even and          jb4 

      unlabored with no s/s of pain or distress noted.                                            

05:24 Reassessment: Patient appears in no apparent distress at this time. No changes from     jb4 

      previously documented assessment. Patient and/or family updated on plan of care and         

      expected duration. Pain level reassessed.                                                   

06:36 Reassessment: Patient appears in no apparent distress at this time. No changes from     jb4 

      previously documented assessment. Patient and/or family updated on plan of care and         

      expected duration. Pain level reassessed.                                                   

                                                                                                  

Vital Signs:                                                                                      

03:13  / 63; Pulse 115; Resp 18; Temp 98.5(O); Pulse Ox 95% on 3 lpm NC; Weight 100.24  jb4 

      kg (M);                                                                                     

04:45  / 68; Pulse 102; Resp 19; Pulse Ox 96% on 3 lpm NC;                              jb4 

05:15  / 86; Pulse 102; Resp 20; Pulse Ox 95% on 3 lpm NC;                              jb4 

                                                                                                  

ED Course:                                                                                        

02:24 Patient arrived in ED.                                                                  juliet 

02:26 Chris Canales MD is Attending Physician.                                             juliet 

02:36 AMMONIA Sent.                                                                           jb4 

02:47 Notified ED physician of a critical lab result(s). WBC 21.4.                            tw5 

02:48 Triage completed.                                                                       tw5 

03:00 Initial lab(s) drawn, by me, sent to lab. Inserted saline lock: 18 gauge in left        jb4 

      antecubital area, using aseptic technique. Blood collected.                                 

03:26 Intiated transfer at [facility] with Sanaz. Stated they would work on request and rv1 

      call back.                                                                                  

03:28 Lactate w/ 2H reflex if indic. Sent.                                                    ds4 

03:28 Notified ED physician of a critical lab result(s). 13.3 javier.                            tw5 

03:31 XRAY Chest (1 view) In Process Unspecified.                                             EDMS

03:31 CT Head Brain wo Cont In Process Unspecified.                                           EDMS

03:46 SARS-COV-2 RT PCR Sent.                                                                 tw5 

04:12 Notified ED physician of a critical lab result(s). 7.5 lac.                             tw5 

05:02 Denys Garcia, RN is Primary Nurse.                                                     jb4 

05:03 Sanaz called back from [facility] with their physician to speak with Dr. Canales rv1 

      regarding the transfer request.                                                             

05:12 Sanaz Marbin gave admin approval. The pt is going to [address], [address], Bed 11. The accepting physician is Dr. Gonsales. Nurse to call report to      

      [phone]. Facesheet and MOT to be faxed to [phone] per Sanaz's request.          

07:09 No provider procedures requiring assistance completed. Patient transferred, IV remains  jb4 

      in place.                                                                                   

                                                                                                  

Administered Medications:                                                                         

03:02 Drug: Thiamine 100 mg Route: IV; Rate: per protocol; Site: left antecubital;            jb4 

03:02 Drug: Lactulose 60 grams Volume: 45 ml; Route: PO;                                      jb4 

03:02 Drug: Pepcid (famotidine) 20 mg Route: IVP; Site: left antecubital;                     jb4 

03:13 Drug: Ativan (LORazepam) 0.5 mg Route: IVP; Site: left antecubital;                     jb4 

03:27 Follow up: Response: Adverse reaction, Physician notified; No change in condition       jb4 

03:24 Drug: Ativan (LORazepam) 0.5 mg Route: IVP; Site: left antecubital;                     jb4 

03:53 Follow up: Response: No adverse reaction; Marked relief of symptoms                     jb4 

03:41 Drug: Vitamin K1 (phytonadione) 10 mg Route: Sub-Q; Site: left upper arm;               jb4 

04:49 Drug: Rocephin (cefTRIAXone) 1 grams Route: IV; Rate: per protocol; Site: left          jb4 

      antecubital;                                                                                

06:20 Drug: Albuterol 1.25 mg Route: Inhalation;                                              jb4 

06:20 Drug: AtroVENT (ipratropium) Aerosol 0.5 mg Route: Inhalation;                          jb4 

06:37 Not Given (Physician Discretion): Ativan (LORazepam) 0.5 mg IVP once                    jb4 

                                                                                                  

                                                                                                  

Medication:                                                                                       

03:13 VIS not applicable for this client.                                                     jb4 

                                                                                                  

Outcome:                                                                                          

03:35 ER care complete, transfer ordered by MD. chung 

07:10 Transferred by ground EMS to [facility], Transfer form completed.    jb4 

      X-rays sent w/ patient.                                                                     

07:10 Condition: stable                                                                           

07:10 Discharge instructions given to family, Instructed on the need for transfer,                

      Demonstrated understanding of instructions.                                                 

07:10 Patient left the ED.                                                                    jb4 

                                                                                                  

Signatures:                                                                                       

Dispatcher [employer]                           EDChris Silveira MD MD cha Swanson, Donovan                             ds4                                                  

Denys Garcia, CAROL                       RN   jb4                                                  

Dior Driscoll                                5                                                  

Sher, Renee                            rv1                                                  

                                                                                                  

**************************************************************************************************

## 2023-01-19 NOTE — RAD REPORT
EXAM DESCRIPTION:  CT - Head Brain Wo Cont - 1/19/2023 6:13 am

 

CLINICAL HISTORY:  62 years Male Mental status change, persistent or worsening

 

TECHNIQUE:  Multiple axial CT images of the brain were performed followed by sagittal and coronal rec
onstructed images. The CT study is performed according to ALARA (as low as reasonably achievable) or 
ALARA/IMAGE GENTLY, with automatic adjustment of mA and/or kV according to patient size.

Performed on: 1/19/2023 at 4:22 AM

 

COMPARISON:  CT head performed on 5/1/2022 and brain MRI report from 5/16/2022. The brain MRI images 
were not available for review..

 

FINDINGS:  Limitations: There is some motion artifact on the images resulting in degradation of image
 quality.

Brain: There is no evidence of mass, acute mass effect or midline shift. There are no acute extra-axi
al fluid collections.   There is no evidence of acute intracranial hemorrhage. The cerebral sulci and
 ventricles are prominent consistent with mild to moderate cerebral volume loss. There are scattered 
areas of decreased attenuation within the subcortical and periventricular white matter most likely du
e to mild chronic microangiopathy. There is a questionable subtle area of decreased attenuation over 
the right frontoparietal convexity which is nonspecific but could be due to acute ischemic changes. T
his could simply be related to motion artifact. There are atherosclerotic calcifications along the ca
vernous carotid arteries and distal vertebral arteries. There is no evidence of a hyperdense MCA.

Paranasal Sinuses and Mastoids: There is trace mucosal thickening of the right maxillary sinus. The m
astoid air cells are clear.

Orbits: The orbital contents are grossly unremarkable.

Bones: No acute osseous abnormalities are identified.

Soft Tissues: No focal soft tissue abnormalities are identified.

 

IMPRESSION:  1.   There is no evidence of acute intracranial hemorrhage.

2.   Mild to moderate cerebral atrophy with findings compatible with chronic microangiopathy. There i
s a questionable subtle area of decreased attenuation over the right frontoparietal convexity which i
s nonspecific but could be due to acute ischemic changes. This could simply be related to motion dionisio
fact. There is no evidence of a hyperdense MCA.

3.   There is motion artifact on the images resulting in degradation of image quality.

These critical findings were discussed with Dr. Canales on 1/19/2023 at 5:08 AM central time

 

Electronically signed by:   Dee Rice DO   1/19/2023 5:08 AM CST Workstation: [id number]

 

 

 

Due to temporary technical issues with the PACS/Fluency reporting system, reports are being signed by
 the in house radiologists without review as a courtesy to insure prompt reporting. The interpreting 
radiologist is fully responsible for the content of the report.

## 2023-01-19 NOTE — RAD REPORT
EXAM DESCRIPTION:  RAD - Chest Single View - 1/19/2023 3:29 am

 

CLINICAL HISTORY:  62 years Male, COUGH

 

COMPARISON:  CT chest report from 10/27/2022. The images were not available for review. Chest x-ray p
erformed on 5/1/2022.

 

TECHNIQUE:  Single portable x-ray view of the chest performed on 1/19/2023 at 3:25 AM

 

FINDINGS:  The lung volumes are low. There is mild basilar interstitial prominence likely due to fibr
osis and/or atelectasis. No focal airspace consolidation is identified. There is no evidence of a pne
umothorax. The previously described pulmonary nodules are difficult to delineate on this examination.


The cardiac silhouette is normal in size and configuration.

The mediastinal contours are normal.

No acute osseous abnormality is identified.

No acute soft tissue abnormalities are seen.

Lines and tubes:   The right IJ Vuxldp-v-[id number] catheter tip terminates in the region of the cavoatrial
 junction.

Free air: None

 

IMPRESSION:  1.   Low lung volumes.

2.   Suspect minimal bibasilar fibrosis and/or atelectasis.

3.   The previously described pulmonary nodules are difficult to delineate on this examination.

4.   Right IJ Pvatch-z-Imqc catheter present as described above.

 

Electronically signed by:   Dee Rice DO   1/19/2023 3:45 AM CST Workstation: [id number]

 

 

Due to temporary technical issues with the PACS/Fluency reporting system, reports are being signed by
 the in house radiologists without review as a courtesy to insure prompt reporting. The interpreting 
radiologist is fully responsible for the content of the report.

## 2023-01-20 NOTE — EKG
Test Date:    2023-01-19               Test Time:    02:50:12

Technician:   VALERIA                                    

                                                     

MEASUREMENT RESULTS:                                       

Intervals:                                           

Rate:         102                                    

NV:           192                                    

QRSD:         74                                     

QT:           342                                    

QTc:          445                                    

Axis:                                                

P:            93                                     

NV:           192                                    

QRS:          -12                                    

T:            102                                    

                                                     

INTERPRETIVE STATEMENTS:                                       

                                                     

Sinus tachycardia with occasional premature ventricular complexes

Low voltage QRS

Cannot rule out Anteroseptal infarct, age undetermined

ST & T wave abnormality, consider lateral ischemia

Abnormal ECG

Compared to ECG 09/11/2022 14:13:45

Ventricular premature complex(es) now present

Myocardial infarct finding now present

ST (T wave) deviation now present

Possible ischemia now present

Sinus rhythm no longer present



Electronically Signed On 01-20-23 17:32:24 CST by Fidencio Pulido

## 2023-01-25 ENCOUNTER — HOSPITAL ENCOUNTER (EMERGENCY)
Dept: HOSPITAL 97 - ER | Age: 63
End: 2023-01-25
Payer: COMMERCIAL

## 2023-01-25 VITALS — SYSTOLIC BLOOD PRESSURE: 127 MMHG | TEMPERATURE: 95.7 F | DIASTOLIC BLOOD PRESSURE: 94 MMHG

## 2023-01-25 DIAGNOSIS — K92.0: ICD-10-CM

## 2023-01-25 DIAGNOSIS — Z85.038: ICD-10-CM

## 2023-01-25 DIAGNOSIS — I46.9: Primary | ICD-10-CM

## 2023-01-25 DIAGNOSIS — J44.9: ICD-10-CM

## 2023-01-25 PROCEDURE — 86901 BLOOD TYPING SEROLOGIC RH(D): CPT

## 2023-01-25 PROCEDURE — 82947 ASSAY GLUCOSE BLOOD QUANT: CPT

## 2023-01-25 PROCEDURE — 86900 BLOOD TYPING SEROLOGIC ABO: CPT

## 2023-01-25 NOTE — ER
Nurse's Notes                                                                                     

 [facility]                                                                 

Name: Omkar Chung                                                                                  

Age: 62 yrs                                                                                       

Sex: Male                                                                                         

: 1960                                                                                   

MRN: [id number]                                                                                   

Arrival Date: 2023                                                                          

Time: 09:00                                                                                       

Account#: [id number]                                                                            

Bed 2                                                                                             

Private MD:                                                                                       

Diagnosis: Cardiopulmonary Arrest;Hematemesis                                                     

                                                                                                  

Presentation:                                                                                     

                                                                                             

08:55 Acuity: BROCK 1                                                                           ph  

08:55 Chief complaint: EMS states: 61 yo male from home, hx of stage 4 colon cancer, family     

      reports that pt was vomiting dark, bloody vomit x 12 hours, last seen alert and awake       

      at 0800, rhythm upon EMS asystole, CPR initiated by EMS, 1 round of epi given, time         

      since CPR initiated 37 minutes.                                                             

08:55 Method Of Arrival: EMS: [facility]                                                      

08:55 Care prior to arrival: CPR via thumper performed by EMS Medication(s) given:            ph  

      epinephrine x1 IO to L humerus. Compressions began prior to arrival.                        

08:55 Care prior to arrival: Oral airway placed, 5 noel tube Medication(s) given: 25 grams    ph  

      d10 given via IO Glucose check: 23.                                                         

08:55 Coronavirus screen: Vaccine status: Patient reports receiving the 2nd dose of the covid ph  

      vaccine. Ebola Screen: No symptoms or risks identified at this time. Initial Sepsis         

      Screen: Does the patient meet any 2 criteria? No. Patient's initial sepsis screen is        

      negative. Does the patient have a suspected source of infection? No. Patient's initial      

      sepsis screen is negative. Risk Assessment: Do you want to hurt yourself or someone         

      else? Unable to obtain. Onset of symptoms was 2023.                             

                                                                                                  

Historical:                                                                                       

- Allergies:                                                                                      

11:06 No Known Allergies;                                                                     ph  

- PMHx:                                                                                           

11:06 Cirrhosis; colon cancer; COPD; DM type 2; Hernia;                                       ph  

                                                                                                  

- Immunization history:: Adult Immunizations unknown.                                             

- Social history:: Smoking status: unknown.                                                       

- Family history:: not pertinent.                                                                 

                                                                                                  

                                                                                                  

Screenin:04 Abuse screen: Denies threats or abuse. Denies injuries from another. Nutritional        ph  

      screening: No deficits noted. Tuberculosis screening: No symptoms or risk factors           

      identified.                                                                                 

11:06 [facility] ED Fall Risk Assessment (Adult) Score/Fall Risk Level 0 - 2 = Low Risk.        ph  

                                                                                                  

Assessment:                                                                                       

08:55 CPR assessment: unresponsive, no respiratory effort, Ambu ventilation. Cardiac rhythm   ph  

      is Cardiac rhythm is bradycardia. General: Appears ill, Behavior is unresponsive.           

      Neuro: Level of Consciousness is unresponsive. Cardiovascular: Rhythm is sinus              

      bradycardia w/ palpable femoral pulses. Respiratory: Airway noel tube in place. GI:         

      Abdomen is distended, dark colored vomitus noted to mouth and beneath pt's shoulders.       

      Derm: Skin is jaundiced, mottled.                                                           

09:15 Reassessment: Dr Cheek spoke w/ family who have stated that hey wish for only      ph  

      palliative measures to be taken, states that they were in process of setting up hospice     

      for pt and that the pt wished to be a DNR.                                                  

09:43 Reassessment: Pt asystole on monitor, no central pulses palpable TOD 0943, family at      

      bedside.                                                                                    

11:31 Reassessment: [facility] contacted. Reference number is [id number]-.                     [id number] 

11:35 Reassessment:  Home, [place], at bedside.                                          jl7 

                                                                                                  

Vital Signs:                                                                                      

08:59  / 94; Pulse 59; Resp 16 A; Temp 95.7;                                            ph  

09:15 Pulse 42; Resp 8;                                                                       ph  

09:15 unable to obtain BP or Spo2                                                             ph  

                                                                                                  

ED Course:                                                                                        

09:00 Patient arrived in ED.                                                                  jh5 

09:03 Javi Cheek MD is Attending Physician.                                            rt  

09:26 Dania Hudson, RN is Primary Nurse.                                                    ph  

09:27 Triage completed.                                                                       ph  

09:57 Javi Cheek MD is Pronouncing Provider.                                           rt  

10:34 notified pts PCP of pt death and need of him to sign the death certificate, pts dr is   ridge  

      Jhonvitaly Longo.                                                                               

11:05 Patient has correct armband on for positive identification. Placed in gown.             ph  

11:06 Arm band placed on.                                                                     ph  

11:07 No provider procedures requiring assistance completed.                                  ph  

                                                                                                  

Administered Medications:                                                                         

09:01 Drug: ProTONIX (pantoprazole) 80 mg Route: IVP; Site: Other;                            ph  

11:11 Follow up: Response: No adverse reaction                                                ph  

                                                                                                  

                                                                                                  

Medication:                                                                                       

11:06 VIS not applicable for this client.                                                     ph  

                                                                                                  

Point of Care Testing:                                                                            

      Blood Glucose:                                                                              

09:04 Blood Glucose: 222 mg/dL;                                                               ph  

      Ranges:                                                                                     

                                                                                                  

Outcome:                                                                                          

:43  Condition:                                                                      ph  

11:38 Patient left the ED.                                                                    mb9 

                                                                                                  

Signatures:                                                                                       

Kelsey Flowers Patricia, RN                      RN   ph                                                   

Gifford, Jahala, RN                        RN   jl7                                                  

Harjit, Xi, RN                       RN   jh5                                                  

June, Kanchan Ryder, RN                 RN   mb9                                                  

Javi Cheek MD MD   rt                                                   

                                                                                                  

Corrections: (The following items were deleted from the chart)                                    

: 08:55 Chief complaint: EMS states: 61 yo male from home, hx of stage 4 colon cancer,    ph  

      family reports that pt was vomiting dark, bloody vomit x 12 hours, last seen alert and      

      awake at 0800, rhythm upon EMS asystole, CPR initiated by EMS, 1 round of epi given,        

      time since CPR initiated 37 minutes ph                                                      

11: 09:43 Reassessment: Pt asystole on monitor, no central pulses palpable TOD 0943 ph      ph  

                                                                                                  

**************************************************************************************************

## 2023-01-25 NOTE — XMS REPORT
Continuity of Care Document

                           Created on:2023



Patient:ANAYELI CHUNG

Sex:Male

:1960

External Reference #:[id number]





Demographics







                          Address                   [address]



                                                    [place] [address]

 

                          Home Phone                [phone]

 

                          Work Phone                [phone]

 

                          Mobile Phone              [phone]

 

                          Email Address             [email or web address]

 

                          Preferred Language        English

 

                          Marital Status            Unknown

 

                          U[employer] Affiliation     Unknown

 

                          Race                      Unknown

 

                          Additional Race(s)        White



                                                    Unavailable

 

                          Ethnic Group              Unknown









Author







                          Organization              [facility]

t

 

                          Address                   [address]



                                                    [place] [address]

 

                          Phone                     [phone]









Support







                Name            Relationship    Address         Phone

 

                Gabby Villatoro   Spouse          [address]    [phone]



                                                [place] [address] 

 

                CARLENE VILLATORO    P               Unavailable     [phone]

 

                YOSELIN LANGLEY               [address]    [phone]



                                                [place] [address] 

 

                Ebony Avila   Sister          Unavailable     [phone]



                                                [place], [place]    

 

                Anayeli Chung     Unavailable     [address] [phone]



                                                [place] [address] 









Care Team Providers







                    Name                Role                Phone

 

                    MONICA DEMPSEY Primary Care Physician Unavailable

 

                    Monica Dempsey     Attending Clinician Unavailable

 

                    SYSTEM, PROVIDER NOT IN Attending Clinician Unavailable

 

                    Kulwinder Gonsales MD Attending Clinician [phone]

[phone]

 

                    KULWINDER GONSALES Attending Clinician Unavailable

 

                    ROCHELLE LEDESMA Attending Clinician Unavailable

 

                    ELAYNE DEMPSEY      Attending Clinician Unavailable

 

                    ELAYNE DEMPSEY Attending Clinician Unavailable

 

                    Elayne Dempsey MD Attending Clinician [phone]

 

                    Teresa Belle MD Attending Clinician [phone]

 

                    KATRINA MULLINS       Attending Clinician Unavailable

 

                    Katrina Mullins MD    Attending Clinician [phone]

 

                    KATRINA MULLINS       Attending Clinician Unavailable

 

                    Leonarda Bang MD Attending Clinician Unavailable

 

                    INGRID ALCAZAR   Attending Clinician Unavailable

 

                    SCHOENSTEIN, LYNDA  Attending Clinician Unavailable

 

                    Alex Weston MD Attending Clinician [phone]

 

                    ELVIN HUGO Attending Clinician Unavailable

 

                    KULWINDER GONSALES Admitting Clinician Unavailable

 

                    ELAYNE DEMPSEY Admitting Clinician Unavailable

 

                    KATRINA MULLINS       Admitting Clinician Unavailable

 

                    SCHOENSTEIN, LYNDA  Admitting Clinician Unavailable

 

                    ESTELLA WHITMORE Admitting Clinician Unavailable









Payers







           Payer Name Policy Type Policy Number Effective Date Expiration Date RADHA hairston

 

           [facility]   53         [id number]                       [facility]

t



           ADVANTAGE                                              [facility]              [id number]  2019            



           ADVANTAGE                        00:00:00              



           [employer] MEDICARE            [id number]  2017            



           COMPLETE                         00:00:00              







Problems







       Condition Condition Condition Status Onset  Resolution Last   Treating Co

mments 

Source



       Name   Details Category        Date   Date   Treatment Clinician        



                                                 Date                 

 

       Altered Altered Disease Active                              [facility]



       mental mental               1-19                               [facility]



       status status               00:00:                             Medical



                                   00                                 [place]

 

       Metastatic Metastatic Disease Active                              B

aydianne



       adenocarci adenocarci               3-27                               Co

llege



       noma   noma                 00:00:                             of



                                   00                                 [facility]



                                                                      e

 

       Loculated Loculated Disease Active                              [facility]



       pleural pleural               9-19                               [facility]



       effusion effusion               00:00:                             Medica

l



                                   00                                 Center

 

       Pseudomona Pseudomona Disease Active                              C

[facility]



       l      l                    9-19                               [facility]



       pneumonia pneumonia               00:00:                             Medi

alexus



                                   00                                 Center

 

       Hyponatrem Hyponatrem Disease Active                              C

[facility]



       ia     ia                   9-19                               [facility]



                                   00:00:                             Medical



                                   00                                 Center

 

       Parapneumo Parapneumo Disease Active                              C

[facility]



       benjamin    benjamin                  9-18                               [facility]



       effusion effusion               00:00:                             Medica

l



                                   00                                 Center

 

       Inflammato Inflammato Disease Active                              [facility]

[facility]



       ry liver ry liver                                              Col[facility]

e



       disease disease               00:00:                             of



                                   00                                 [facility]icin



                                                                      e

 

       Ascites Ascites Disease Active                              [facility]



                                   9-27                               College



                                   00:00:                             of



                                   00                                 [facility]icin



                                                                      e

 

       [id number] Adenocarci Problem                                           Co

mmon



              noma of                                                  Spirit



              liver                                                   [facility]

 

       [id number] Metastatic Problem                                           C

ommon



       104    adenocarci                                                  Spirit



              noma to                                                  [facility]

 

       Malignant Malignant Problem                                           Com

mon



       neoplasm neoplasm                                                  [facility]



       of colon of colon,                                                  - [facility]



              unspecifie                                                  [facility]

 

       Neoplasm Neoplasm Problem                                           Commo

n



       related related                                                  Spirit



       pain   pain                                                    [facility]

 

       Cirrhosis Cirrhosis Problem                                           Com

mon



       of liver of liver                                                  Spirit



                                                                      - [facility]



                                                                      [facility]

 

       [id number] Adenocarci Problem                                           Co

mmon



              noma of                                                  Spirit



              transverse                                                  - [facility]



              colon                                                   [facility]

 

       Diabetic Diabetic Problem                                           Commo

n



       oculopathy eyes                                                    Spirit



       associated                                                         - [facility]



       with type                                                         



       II                                                             [facility]



       diabetes                                                         Medical



       mellitus                                                         Center

 

       Chronic Chronic Problem                                           Common



       kidney kidney                                                  Spirit



       disease disease,                                                  - [facility]



       stage 3 stage 3                                                  St



       (disorder) unspecifie                                                  [facility]

kes



              d                                                       Medical



                                                                      Center

 

       [id number] Type 2 Problem                                           Commo

n



       70920  diabetes                                                  Spirit



              mellitus                                                  - [facility]



              with other                                                  



              diabetic                                                  [facility]



              kidney                                                  Medical



              complicati                                                  Center



              on                                                      

 

       Chronic Chronic Problem                                           Common



       obstructiv obstructiv                                                  Sp

jose



       e      e                                                       - [facility]



       pulmonary pulmonary                                                  [facility]

 

       [id number] History of Problem                                           Co

mmon



              alcohol                                                  Spirit



              abuse                                                   - [facility]

 

       [id number] Alcoholic Problem                                           Com

mon



              cirrhosis                                                  Spirit



              of liver                                                  - [facility]



              with                                                    [facility]

 

       22265762 Hypothyroi Problem                                           Com

mon



              dism,                                                   Spirit



              unspecifie                                                  - [facility]



              d type                                                  [facility]

 

       [id number] Aphasia Problem                                           Common



                                                                      Spirit



                                                                      - [facility]



                                                                      [facility]

 

       [id number] Type 2 Problem                                           Commo

n



       70253  diabetes                                                  Spirit



              mellitus                                                  - [facility]



              with                                                    [facility],                                                    Medical



              unspecifie                                                  Center



              d whether                                                  



              long term                                                  



              insulin                                                  



              use                                                     

 

       [id number] On     Problem                                           Commo

n



       07     supplement                                                  Spirit



              al oxygen                                                  - [facility]



              therapy                                                  [facility]

 

       [id number] Generalize Problem                                           Com

mon



              d anxiety                                                  Spirit



              disorder                                                  - [facility]

 

       [id number] Type 2 Problem                                           Commo

n



       05     diabetes                                                  Spirit



              mellitus                                                  - [facility]



              with                                                    [facility]



              chronic                                                  Medical



              kidney                                                  Center



              disease                                                  

 

       [id number] COPD with Problem                                           Com

mon



              exacerbati                                                  Spirit



              on                                                      - [facility]



                                                                      [facility]

 

       Pleural Pleural Disease Active                                    [facility]



       effusion effusion                                                  Colleg

e



       on left on left                                                  of



                                                                      Medicin



                                                                      e

 

       Altered Altered Disease Resolve 0 2023             

  [facility]



       awareness, awareness,        d         00:00:00 12:25:28             

  [facility]



       transient transient               00:00:                             Medi

alexus



                                   00                                 Center







Allergies, Adverse Reactions, Alerts







       Allergy Allergy Status Severity Reaction(s) Onset  Inactive Treating Comm

ents 

Source



       Name   Type                        Date   Date   Clinician        

 

       NO KNOWN Drug   Active                                           [facility]



       ALLERGIE Class                                                   ity of



       S                                                              [facility]

 

       NO KNOWN Allergy Active                                           [facility]



       ALLERGIE                                                         



       S                                                              







Social History







           Social Habit Start Date Stop Date  Quantity   Comments   Source

 

           History of Tobacco                                             Common

 [facility] -



           Use                                                    [facility]

 

           History SDOH                                             [facility]



           Alcohol Std Drinks                                             Medica

l Center

 

           History [facility]                                             [facility]



           Alcohol Binge                                             Medical Tri[facility]

ter

 

           Alcohol intake 2022 Current               [facility]

es



                      00:00:00   00:00:00   non-drinker of            Medical Ce

nter



                                            alcohol               



                                            (finding)             

 

           History [facility] 2022 Quit 2017             [facility]



           Alcohol Comment 00:00:00   00:00:00                         Medical C

enter

 

           Exposure to 2022 Not sure              [facility] Colle

e



           SARS-CoV-2 (event) 00:00:00   17:54:00                         of Med

icine

 

           History [facility] 2019 1                     [facility]



           Alcohol Frequency 00:00:00   00:00:00                         [facility]

 

           Cigarettes smoked 2019                       [facility]



           current (pack per 00:00:00   00:00:00                         Medical

 Center



           day) - Reported                                             

 

           Cigarette  2019                       [facility]



           pack-years 00:00:00   00:00:00                         [facility]

 

           Tobacco use and 2019 Never used            [facility]

kes



           exposure   00:00:00   00:00:00                         [facility]

 

           Sex Assigned At 1960                       [facility]

kes



           Birth      00:00:00   00:00:00                         [facility]









                Smoking Status  Start Date      Stop Date       Source

 

                Former Smoker   2022-10-21 00:00:00 2022-10-21 00:00:00 Common S

pirit - [facility]







Medications







       Ordered Filled Start  Stop   Current Ordering Indication Dosage Frequency

 Signature

                    Comments            Components          Source



     Medication Medication Date Date Medication? Clinician                (SIG) 

          



     Name Name                                                   

 

     furosemide            Yes            40mg QD   Take 40 mg           C

[facility]



     (LASIX) 40      1-20                               by mouth           [facility]



     MG tablet      13:08:                               daily.           Medica

l



               25                                                Center

 

     folic acid            Yes            1mg  QD   Take 1 mg           [facility]

I [facility]



     (FOLVITE) 1      1-20                               by mouth           Luke

s



     MG tablet      13:08:                               daily.           Medica

l



               25                                                Center

 

     albuterol            Yes            1{puff}      Inhale 1           C

[facility]



     HFA       1-20                               puff by           [facility]



     (VENTOLIN      13:08:                               mouth via           Med

ical



     HFA) 90      25                                 inhaler           Center



     mcg/actuati                                         every 6           



     on inhaler                                         (six)           



                                                  hours as           



                                                  needed for           



                                                  Wheezing           



                                                  or             



                                                  Shortness           



                                                  of Breath.           

 

     acetaminoph      -0      Yes            1{tbl}      Take 1           CH

I St



     en-codeine      1-20                               tablet by           Luke

s



     (TYLENOL      13:08:                               mouth           Medical



     #3) 300-30      25                                 every 4           Center



     mg per                                         (four)           



     tablet                                         hours as           



                                                  needed for           



                                                  Pain.           

 

     ascorbic      -0      Yes            1000mg QD   Take 1,000           C

[facility]



     acid,      1-20                               mg by           [facility]



     vitamin C,      13:08:                               mouth           Medica

l



     (vitamin C)      25                                 daily.           Center



     1000 MG                                                        



     tablet                                                        

 

     thyroid,      -0      Yes            60mg QD   Take 60 mg           [facility]

 St



     pork, 60 mg      1-20                               by mouth           Luke

s



     Tab       13:08:                               every           Medical



               25                                 morning.           Center

 

     spironolact      0      Yes            100mg QD   Take 100           C

[facility]



     one       1-20                               mg by           [facility]



     (ALDACTONE)      13:08:                               mouth           Medic

al



     100 MG      25                                 daily.           Center



     tablet                                                        

 

     budesonide/      -0      Yes                 Q.5D Inhale by           C

[facility]



     formoterol      1-20                               mouth via           Luke

s



     fumarate      13:08:                               inhaler 2           Medi

alexus



     (SYMBICORT      25                                 (two)           Center



     INHL)                                         times           



                                                  daily.           

 

     ondansetron      0      Yes                      Take by           [facility]

 St



     (ZOFRAN) 8      1-20                               mouth           [facility]



     MG tablet      13:08:                               every 8           Medic

al



               25                                 (eight)           Center



                                                  hours as           



                                                  needed for           



                                                  Nausea.           

 

     insulin      0      Yes            45U  QD   Inject 45           [facility]

t



     degludec      1-20                               Units           [facility]



     (TRESIBA      13:08:                               subcutaneo           Med

ical



     U-100      25                                 us           Center



     INSULIN                                         daily.           



     SUBQ)                                                        

 

     cefdinir      -0      Yes            300mg Q.5D Take 1           [facility]



     (OMNICEF)      1-20                               capsule           [facility]



     300 MG      00:00:                               (300 mg           [facility]



     capsule      00                                 total) by           Center



                                                  mouth 2           



                                                  (two)           



                                                  times           



                                                  daily.           

 

     metroNIDAZO      -0      Yes            500mg [id number] Take 2        

   [facility]



     LE (FLAGYL)      1-20                          [id number] tablets          

 [facility]



     250 MG      00:00:                          3D   (500 mg           [facility]



     tablet      00                                 total) by           Center



                                                  mouth 3           



                                                  (three)           



                                                  times           



                                                  daily.           

 

     rifAXIMin      -0      Yes            550mg Q.5D Take 1           [facility]

t



     550 mg Tab      1-20                               tablet           [facility]



               00:00:                               (550 mg           [facility]



               00                                 total) by           Center



                                                  mouth 2           



                                                  (two)           



                                                  times           



                                                  daily.           

 

     busPIRone busPIRone 2022      No             1{table BID  busPIRone      

     



     HCl 10 MG HCl 10 MG 0-26                     t}        HCl 10 MG           



               00:00:                                              



               00                                                

 

     busPIRone busPIRone 2022      No             1{table BID  busPIRone      

     



     HCl 10 MG HCl 10 MG 0-26                     t}        HCl 10 MG           



               00:00:                                              



               00                                                

 

     busPIRone busPIRone 2022      No             1{table BID  busPIRone      

     



     HCl 10 MG HCl 10 MG 0-26                     t}        HCl 10 MG           



               00:00:                                              



               00                                                

 

     busPIRone busPIRone 2022      No             1{table BID  busPIRone      

     



     HCl 10 MG HCl 10 MG 0-26                     t}        HCl 10 MG           



               00:00:                                              



               00                                                

 

     busPIRone busPIRone 2022      No             1{table BID  busPIRone      

     



     HCl 10 MG HCl 10 MG 0-26                     t}        HCl 10 MG           



               00:00:                                              



               00                                                

 

     Polymyxin Polymyxin 2022- No             1{drop_ QID  Polymyxin     

      



     B-Trimethop B-Trimethop 6-16 06-23                into_af      B-Trimetho  

         



     rim  rim  00:00: 00:00                fected_      prim           



     14225-7.1 28458-9.1 00   :00                 eye}      89736-4.1           



     UNIT/ML UNIT/ML                                    UNIT/ML           

 

     Polymyxin Polymyxin 2022- No             1{drop_ QID  Polymyxin     

      



     B-Trimethop B-Trimethop 6-16 06-23                into_af      B-Trimetho  

         



     rim  rim  00:00: 00:00                fected_      prim           



     54004-7.1 38357-9.1 00   :00                 eye}      50533-9.1           



     UNIT/ML UNIT/ML                                    UNIT/ML           

 

     insulin            Yes            45U  QD   Inject 45           [facility]

t



     degludec      4-12                               Units           [facility]



     (TRESIBA      11:20:                               subcutaneo           Med

ical



     U-100      03                                 usly           Center



     INSULIN                                         daily.           



     SUBQ)                                                        

 

     insulin            Yes            45U  QD   Inject 45           [facility]

t



     degludec      4-12                               Units           [facility]



     (TRESIBA      11:20:                               subcutaneo           Med

ical



     U-100      03                                 usly           Center



     INSULIN                                         daily.           



     SUBQ)                                                        

 

     insulin            Yes            45U  QD   Inject 45           [facility]

t



     degludec      4-12                               Units           [facility]



     (TRESIBA      11:20:                               subcutaneo           Med

ical



     U-100      03                                 usly           Center



     INSULIN                                         daily.           



     SUBQ)                                                        

 

     insulin            Yes            45U  QD   Inject 45           [facility]

t



     degludec      4-12                               Units           [facility]



     (TRESIBA      11:20:                               subcutaneo           Med

ical



     U-100      03                                 usly           Center



     INSULIN                                         daily.           



     SUBQ)                                                        

 

     insulin            Yes            45U  QD   Inject 45           [facility]

t



     degludec      4-12                               Units           [facility]



     (TRESIBA      11:20:                               subcutaneo           Med

ical



     U-100      03                                 usly           Center



     INSULIN                                         daily.           



     SUBQ)                                                        

 

     furosemide            Yes            40mg QD   Take 40 mg           [facility]



     (LASIX) 40      4-11                               by mouth           [facility]



     MG tablet      11:34:                               daily.           [facility]

l



               01                                                Center

 

     folic acid            Yes            1mg  QD   Take 1 mg           [facility]



     (FOLVITE) 1      4-11                               by mouth           [facility]



     MG tablet      11:34:                               daily.           [facility]

l



               01                                                Center

 

     albuterol            Yes            1{puff}      Inhale 1           [facility]



     HFA       4-11                               puff by           [facility]



     (VENTOLIN      11:34:                               mouth via           Med

ical



     HFA) 90      01                                 inhaler           Center



     mcg/actuati                                         every 6           



     on inhaler                                         (six)           



                                                  hours as           



                                                  needed for           



                                                  Wheezing           



                                                  or             



                                                  Shortness           



                                                  of Breath.           

 

     acetaminoph            Yes            1{tbl}      Take 1           [facility]



     en-codeine      4-11                               tablet by           [facility]

[facility]



     (TYLENOL      11:34:                               mouth           [facility]



     #3) 300-30      01                                 every 4           Center



     mg per                                         (four)           



     tablet                                         hours as           



                                                  needed for           



                                                  Pain.           

 

     ascorbic            Yes            1000mg QD   Take 1,000           [facility]



     acid,      4-11                               mg by           [facility]



     vitamin C,      11:34:                               mouth           [facility]

l



     (vitamin C)      01                                 daily.           [place]



     1000 MG                                                        



     tablet                                                        

 

     thyroid,            Yes            60mg QD   Take 60 mg           [facility]



     pork, 60 mg      4-11                               by mouth           [facility]



     Tab       11:34:                               every           [facility]



               01                                 morning.           [place]

 

     spironolact            Yes            100mg QD   Take 100           [facility]



     one       4-11                               mg by           [facility]



     (ALDACTONE)      11:34:                               mouth           [facility]

al



     100 MG      01                                 daily.           [place]



     tablet                                                        

 

     budesonide/            Yes                 Q.5D Inhale by           [facility]



     formoterol      4-11                               mouth via           [facility]



     fumarate      11:34:                               inhaler 2           [facility]

alexus



     (SYMBICORT      01                                 (two)           Center



     IN[facility])                                         times           



                                                  daily.           

 

     ondansetron            Yes                      Take by           [facility]



     (ZOFRAN) 8      4-11                               mouth           [facility]



     MG tablet      11:34:                               every 8           [facility]

al



               01                                 (eight)           Center



                                                  hours as           



                                                  needed for           



                                                  Nausea.           

 

     furosemide            Yes            40mg QD   Take 40 mg           [facility]



     (LASIX) 40      4-11                               by mouth           [facility]



     MG tablet      11:34:                               daily.           [facility]

l



               01                                                Center

 

     folic acid            Yes            1mg  QD   Take 1 mg           [facility]



     (FOLVITE) 1      4-11                               by mouth           [facility]



     MG tablet      11:34:                               daily.           [facility]

l



               01                                                Center

 

     albuterol            Yes            1{puff}      Inhale 1           [facility]



     HFA       4-11                               puff by           [facility]



     (VENTOLIN      11:34:                               mouth via           Med

ical



     HFA) 90      01                                 inhaler           Center



     mcg/actuati                                         every 6           



     on inhaler                                         (six)           



                                                  hours as           



                                                  needed for           



                                                  Wheezing           



                                                  or             



                                                  Shortness           



                                                  of Breath.           

 

     acetaminoph            Yes            1{tbl}      Take 1           [facility]



     en-codeine      4-11                               tablet by           [facility]



     (TYLENOL      11:34:                               mouth           [facility]



     #3) 300-30      01                                 every 4           [facility]



     mg per                                         (four)           



     tablet                                         hours as           



                                                  needed for           



                                                  Pain.           

 

     ascorbic            Yes            1000mg QD   Take 1,000           [facility]



     acid,      4-11                               mg by           [facility]



     vitamin C,      11:34:                               mouth           [facility]

[facility]



     (vitamin C)      01                                 daily.           [facility]



     1000 MG                                                        



     tablet                                                        

 

     thyroid,            Yes            60mg QD   Take 60 mg           [facility]



     pork, 60 mg      4-11                               by mouth           [facility]



     Tab       11:34:                               every           [facility]



               01                                 morning.           [facility]

 

     spironolact            Yes            100mg QD   Take 100           [facility]



     one       4-11                               mg by           [facility]



     (ALDACTONE)      11:34:                               mouth           [facility]

al



     100 MG      01                                 daily.           Center



     tablet                                                        

 

     budesonide/            Yes                 Q.5D Inhale by           [facility]



     formoterol      4-11                               mouth via           [facility]



     fumarate      11:34:                               inhaler 2           [facility]

alexus



     (SYMBICORT      01                                 (two)           Center



     [facility])                                         times           



                                                  daily.           

 

     ondansetron            Yes                      Take by           [facility]



     (ZOFRAN) 8      4-11                               mouth           [facility]



     MG tablet      11:34:                               every 8           [facility]

al



               01                                 (eight)           Center



                                                  hours as           



                                                  needed for           



                                                  Nausea.           

 

     furosemide            Yes            40mg QD   Take 40 mg           [facility]



     (LASIX) 40      4-11                               by mouth           [facility]



     MG tablet      11:34:                               daily.           [facility]

l



               01                                                Center

 

     folic acid            Yes            1mg  QD   Take 1 mg           [facility]



     (FOLVITE) 1      4-11                               by mouth           [facility]



     MG tablet      11:34:                               daily.           [facility]

l



               01                                                Center

 

     albuterol            Yes            1{puff}      Inhale 1           [facility]



     HFA       4-11                               puff by           [facility]



     (VENTOLIN      11:34:                               mouth via           Med

ical



     HFA) 90      01                                 inhaler           Center



     mcg/actuati                                         every 6           



     on inhaler                                         (six)           



                                                  hours as           



                                                  needed for           



                                                  Wheezing           



                                                  or             



                                                  Shortness           



                                                  of Breath.           

 

     acetaminoph            Yes            1{tbl}      Take 1           [facility]



     en-codeine      4-11                               tablet by           [facility]



     (TYLENOL      11:34:                               mouth           [facility]



     #3) 300-30      01                                 every 4           Center



     mg per                                         (four)           



     tablet                                         hours as           



                                                  needed for           



                                                  Pain.           

 

     ascorbic            Yes            1000mg QD   Take 1,000           [facility]



     acid,      4-11                               mg by           [facility]



     vitamin C,      11:34:                               mouth           [facility]

[facility]



     (vitamin C)      01                                 daily.           [facility]



     1000 MG                                                        



     tablet                                                        

 

     thyroid,            Yes            60mg QD   Take 60 mg           [facility]



     pork, 60 mg      4-11                               by mouth           [facility]



     Tab       11:34:                               every           [facility]



               01                                 morning.           [facility]

 

     spironolact            Yes            100mg QD   Take 100           [facility]



     one       4-11                               mg by           [facility]



     (ALDACTONE)      11:34:                               mouth           [facility]

al



     100 MG      01                                 daily.           Center



     tablet                                                        

 

     budesonide/            Yes                 Q.5D Inhale by           [facility]



     formoterol      4-11                               mouth via           [facility]



     fumarate      11:34:                               inhaler 2           [facility]

alexus



     (SYMBICORT      01                                 (two)           Center



     [facility])                                         times           



                                                  daily.           

 

     ondansetron            Yes                      Take by           [facility]



     (ZOFRAN) 8      4-11                               mouth           [facility]



     MG tablet      11:34:                               every 8           [facility]

al



               01                                 (eight)           Center



                                                  hours as           



                                                  needed for           



                                                  Nausea.           

 

     furosemide            Yes            40mg QD   Take 40 mg           [facility]



     (LASIX) 40      4-11                               by mouth           [facility]



     MG tablet      11:34:                               daily.           [facility]

l



               01                                                [place]

 

     folic acid            Yes            1mg  QD   Take 1 mg           [facility]



     (FOLVITE) 1      4-11                               by mouth           [facility]



     MG tablet      11:34:                               daily.           [facility]

l



               01                                                [place]

 

     albuterol            Yes            1{puff}      Inhale 1           [facility]



     HFA       4-11                               puff by           [facility]



     (VENTOLIN      11:34:                               mouth via           Med

ical



     HFA) 90      01                                 inhaler           Center



     mcg/actuati                                         every 6           



     on inhaler                                         (six)           



                                                  hours as           



                                                  needed for           



                                                  Wheezing           



                                                  or             



                                                  Shortness           



                                                  of Breath.           

 

     acetaminoph            Yes            1{tbl}      Take 1           [facility]



     en-codeine      4-11                               tablet by           [facility]



     (TYLENOL      11:34:                               mouth           [facility]



     #3) 300-30      01                                 every 4           Center



     mg per                                         (four)           



     tablet                                         hours as           



                                                  needed for           



                                                  Pain.           

 

     ascorbic            Yes            1000mg QD   Take 1,000           [facility]



     acid,      4-11                               mg by           [facility]



     vitamin C,      11:34:                               mouth           [facility]

[facility]



     (vitamin C)      01                                 daily.           [place]



     1000 MG                                                        



     tablet                                                        

 

     thyroid,            Yes            60mg QD   Take 60 mg           [facility]



     pork, 60 mg      4-11                               by mouth           [facility]



     Tab       11:34:                               every           [facility]



               01                                 morning.           [place]

 

     spironolact            Yes            100mg QD   Take 100           [facility]



     one       4-11                               mg by           [facility]



     (ALDACTONE)      11:34:                               mouth           [facility]

al



     100 MG      01                                 daily.           Center



     tablet                                                        

 

     budesonide/            Yes                 Q.5D Inhale by           [facility]



     formoterol      4-11                               mouth via           [facility]

[facility]



     fumarate      11:34:                               inhaler 2           [facility]

alexus



     (SYMBICORT      01                                 (two)           Center



     [facility])                                         times           



                                                  daily.           

 

     ondansetron            Yes                      [facility]           [facility]



     (ZOFRAN) 8      4-11                               mouth           [facility]



     MG tablet      11:34:                               every 8           [facility]

al



               01                                 (eight)           Center



                                                  hours as           



                                                  needed for           



                                                  Nausea.           

 

     furosemide            Yes            40mg QD   Take 40 mg           [facility]

[facility]



     (LASIX) 40      4-11                               by [facility]           [facility]



     MG tablet      11:34:                               daily.           [facility]

l



               [address] [place] [address]

 

     folic acid            Yes            1mg  QD   Take 1 mg           [facility]

[facility]



     (FOLVITE) 1      4-11                               by mouth           [facility]

[facility]



     MG tablet      11:34:                               daily.           [facility]

l



               01                                                Center

 

     albuterol            Yes            1{puff}      Inhale 1           C

[facility]



     HFA       4-11                               puff by           [facility]



     (VENTOLIN      11:34:                               mouth via           Med

ical



     HFA) 90      01                                 inhaler           Center



     mcg/actuati                                         every 6           



     on inhaler                                         (six)           



                                                  hours as           



                                                  needed for           



                                                  Wheezing           



                                                  or             



                                                  Shortness           



                                                  of Breath.           

 

     acetaminoph            Yes            1{tbl}      Take 1           [facility]

[facility]



     en-codeine      4-11                               tablet by           [facility]

[facility]



     (TYLENOL      11:34:                               mouth           [facility]



     #3) 300-30      01                                 every 4           Center



     mg per                                         (four)           



     tablet                                         hours as           



                                                  needed for           



                                                  Pain.           

 

     ascorbic            Yes            1000mg QD   Take 1,000           [facility]

[facility]



     acid,      4-11                               mg by           [facility]



     vitamin C,      11:34:                               mouth           [facility]

l



     (vitamin C)      01                                 daily.           Center



     1000 MG                                                        



     tablet                                                        

 

     thyroid,            Yes            60mg QD   Take 60 mg           [facility]



     pork, 60 mg      4-11                               by mouth           [facility]

[facility]



     Tab       11:34:                               every           [facility]



               01                                 morning.           Center

 

     spironolact            Yes            100mg QD   Take 100           [facility]

[facility]



     one       4-11                               mg by           [facility]



     (ALDACTONE)      11:34:                               mouth           [facility]

al



     100 MG      01                                 daily.           Center



     tablet                                                        

 

     budesonide/            Yes                 Q.5D Inhale by           [facility]

[facility]



     formoterol      4-11                               mouth via           [facility]

[facility]



     fumarate      11:34:                               inhaler 2           Medi

alexus



     (SYMBICORT      01                                 (two)           Center



     INHL)                                         times           



                                                  daily.           

 

     ondansetron            Yes                      Take by           [facility]



     (ZOFRAN) 8      4-11                               mouth           [facility]



     MG tablet      11:34:                               every 8           [facility]

al



               01                                 (eight)           Center



                                                  hours as           



                                                  needed for           



                                                  Nausea.           

 

     albuterol      -0 202- No             1{puff}      Inhale 1           

[facility]



     HFA (ProAir      4-11 04-11                          puff by           [facility]

[facility]



     HFA) 90      08:38: 00:00                          mouth via           [facility]

[facility]



     mcg/actuati      32   :00                           inhaler           [facility]

[facility]



     on inhaler                                         every 6           



                                                  (six)           



                                                  hours as           



                                                  needed for           



                                                  Wheezing.           

 

     insulin      -0 2022- No                  QD   Inject           [facility]



     degludec      4-11 04-11                          subcutaneo           [facility]

[facility]



     (TRESIBA      08:38: 00:00                          usly           Medical



     FLEXTOUCH      32   :00                           daily.           Center



     U-100 SUBQ)                                                        

 

     albuterol      -2022- No             1{puff}      Inhale 1           

[facility]



     HFA (ProAir      4- 04-11                          puff by           [facility]

[facility]



     HFA) 90      08:38: 00:00                          mouth via           [facility]

[facility]us



     mcg/actuati      32   :00                           inhaler           [facility]

[facility]



     on inhaler                                         every 6           



                                                  (six)           



                                                  hours as           



                                                  needed for           



                                                  Wheezing.           

 

     insulin      -2022- No                  QD   Inject           [facility]



     degludec      -11                          subcutaneo           [facility]

[facility]



     (TRESIBA      08:38: 00:00                          usly           Medical



     FLEXTOUCH      32   :00                           daily.           Center



     U-100 SUBQ)                                                        

 

     albuterol      -2022- No             1{puff}      Inhale 1           

[facility]



     HFA (ProAir      4- 04-11                          puff by           [facility]

[facility]



     HFA) 90      08:38: 00:00                          mouth via           [facility]

[facility]



     mcg/actuati      32   :00                           inhaler           [facility]

[facility]



     on inhaler                                         every 6           



                                                  (six)           



                                                  hours as           



                                                  needed for           



                                                  Wheezing.           

 

     insulin      2022- No                  QD   Inject           [facility]



     degludec      -11                          subcutaneo           [facility]

[facility]



     (TRESIBA      08:38: 00:00                          usly           Medical



     FLEXTOUCH      32   :00                           daily.           Center



     U-100 SUBQ)                                                        

 

     albuterol      2022- No             1{puff}      Inhale 1           

[facility]



     HFA (ProAir      4 04-11                          puff by           [facility]

[facility]



     HFA) 90      08:38: 00:00                          mouth via           [facility]

[facility]



     mcg/actuati      32   :00                           inhaler           [facility]

[facility]



     on inhaler                                         every 6           



                                                  (six)           



                                                  hours as           



                                                  needed for           



                                                  Wheezing.           

 

     insulin      -2022- No                  QD   Inject           [facility]



     degludec       04-11                          subcutaneo           [facility]

[facility]



     (TRESIBA      08:38: 00:00                          usly           Medical



     FLEXTOUCH      32   :00                           daily.           Center



     U-100 SUBQ)                                                        

 

     albuterol      -2022- No             1{puff}      Inhale 1           

[facility]



     HFA (ProAir      4-11 04-11                          puff by           [facility]

[facility]



     HFA) 90      08:38: 00:00                          mouth via           [facility]

[facility]



     mcg/actuati      32   :00                           inhaler           [facility]

[facility]



     on inhaler                                         every 6           



                                                  (six)           



                                                  hours as           



                                                  needed for           



                                                  Wheezing.           

 

     insulin      2022- No                  QD   Inject           [facility]



     degludec                                subcutaneo           [facility]

[facility]



     (TRESIBA      08:38: 00:00                          usly           Medical



     FLEXTOUCH      32   :00                           daily.           Center



     U-100 SUBQ)                                                        

 

     albuterol      2022- No             1{puff}      Inhale 1           

[facility]



     HFA (ProAir                                puff by           [facility]



     HFA) 90      08:38: 00:00                          mouth via           [facility]

[facility]us



     mcg/actuati      32   :00                           inhaler           [facility]

[facility]



     on inhaler                                         every 6           



                                                  (six)           



                                                  hours as           



                                                  needed for           



                                                  Wheezing.           

 

     insulin      2022- No                  QD   Inject           [facility]



     degludec                                subcutaneo           [facility]

[facility]



     (TRESIBA      08:38: 00:00                          usly           Medical



     FLEXTOUCH      32   :00                           daily.           Center



     U-100 SUBQ)                                                        

 

     acetaminoph            Yes       [id number] 1{tbl}      Take 1      

     [facility]



     en-codeine      3-25                102            Tablet by           [facility]



     (TYLENOL      00:00:                               mouth           of



     #3) 300-30      00                                 every 6           [facility]

n



     MG per                                         hours as           e



     tablet                                         needed for           



                                                  Pain.           

 

     Ascorbic            Yes                      Take by           [facility]



     Acid      3-24                               mouth.           [facility]



     (VITAMIN C      13:25:                                              of



     ER OR)      20                                                [facility]



                                                                 e

 

     OXYGEN GAS            Yes            2L        2 L daily.           [facility]

[facility]



               3-24                                              [facility]



               13:24:                                              of



               18                                                [facility]



                                                                 e

 

     NP THYROID            Yes            60mg      Take 60 mg           [facility]

[facility]



     60 MG      2-22                               by mouth           [facility]



     tablet      00:00:                               daily.           of



               00                                                [facility]



                                                                 e

 

     Levothyroxi Levothyroxi 2020      No                  QD   Levothyrox    

       



     ne Sodium ne Sodium 0-21                               ine Sodium          

 



     25 MCG 25 MCG 00:00:                               25 MCG           



               00                                                

 

     Levothyroxi Levothyroxi 2020      No                  QD   Levothyrox    

       



     ne Sodium ne Sodium 0-21                               ine Sodium          

 



     25 MCG 25 MCG 00:00:                               25 MCG           



               00                                                

 

     Levothyroxi Levothyroxi 2020      No                  QD   Levothyrox    

       



     ne Sodium ne Sodium 0-21                               ine Sodium          

 



     25 MCG 25 MCG 00:00:                               25 MCG           



               00                                                

 

     Levothyroxi Levothyroxi 2020      No                  QD   Levothyrox    

       



     ne Sodium ne Sodium 0-21                               ine Sodium          

 



     25 MCG 25 MCG 00:00:                               25 MCG           



               00                                                

 

     Levothyroxi Levothyroxi 2020      No                  QD   Levothyrox    

       



     ne Sodium ne Sodium 0-21                               ine Sodium          

 



     25 MCG 25 MCG 00:00:                               25 MCG           



               00                                                

 

     Levothyroxi Levothyroxi 2020      No                  QD   Levothyrox    

       



     ne Sodium ne Sodium 0-21                               ine Sodium          

 



     25 MCG 25 MCG 00:00:                               25 MCG           



               00                                                

 

     Levothyroxi Levothyroxi 2020      No                  QD   Levothyrox    

       



     ne Sodium ne Sodium 0-21                               ine Sodium          

 



     25 MCG 25 MCG 00:00:                               25 MCG           



               00                                                

 

     Levothyroxi Levothyroxi 2020      No                  QD   Levothyrox    

       



     ne Sodium ne Sodium 0-21                               ine Sodium          

 



     25 MCG 25 MCG 00:00:                               25 MCG           



               00                                                

 

     Levothyroxi Levothyroxi 2020      No                  QD   Levothyrox    

       



     ne Sodium ne Sodium 0-21                               ine Sodium          

 



     25 MCG 25 MCG 00:00:                               25 MCG           



               00                                                

 

     Levothyroxi Levothyroxi 2020      No                  QD   Levothyrox    

       



     ne Sodium ne Sodium 0-21                               ine Sodium          

 



     25 MCG 25 MCG 00:00:                               25 MCG           



               00                                                

 

     Levothyroxi Levothyroxi 2020      No                  QD   Levothyrox    

       



     ne Sodium ne Sodium 0-21                               ine Sodium          

 



     25 MCG 25 MCG 00:00:                               25 MCG           



               00                                                

 

     Levothyroxi Levothyroxi 2020      No                  QD   Levothyrox    

       



     ne Sodium ne Sodium 0-21                               ine Sodium          

 



     25 MCG 25 MCG 00:00:                               25 MCG           



               00                                                

 

     Levothyroxi Levothyroxi 2020      No                  QD   Levothyrox    

       



     ne Sodium ne Sodium 0-21                               ine Sodium          

 



     25 MCG 25 MCG 00:00:                               25 MCG           



               00                                                

 

     Levothyroxi Levothyroxi 2020      No                  QD   Levothyrox    

       



     ne Sodium ne Sodium 0-21                               ine Sodium          

 



     25 MCG 25 MCG 00:00:                               25 MCG           



               00                                                

 

     Levothyroxi Levothyroxi 2020      No                  QD   Levothyrox    

       



     ne Sodium ne Sodium 0-21                               ine Sodium          

 



     25 MCG 25 MCG 00:00:                               25 MCG           



               00                                                

 

     Levothyroxi Levothyroxi 2020      No                  QD   Levothyrox    

       



     ne Sodium ne Sodium 0-21                               ine Sodium          

 



     25 MCG 25 MCG 00:00:                               25 MCG           



               00                                                

 

     Levothyroxi Levothyroxi 2020      No                  QD   Levothyrox    

       



     ne Sodium ne Sodium 0-21                               ine Sodium          

 



     25 MCG 25 MCG 00:00:                               25 MCG           



               00                                                

 

     Levothyroxi Levothyroxi 2020      No                  QD   Levothyrox    

       



     ne Sodium ne Sodium 0-21                               ine Sodium          

 



     25 MCG 25 MCG 00:00:                               25 MCG           



               00                                                

 

     Levothyroxi Levothyroxi 2020      No                  QD   Levothyrox    

       



     ne Sodium ne Sodium 0-21                               ine Sodium          

 



     25 MCG 25 MCG 00:00:                               25 MCG           



               00                                                

 

     Levothyroxi Levothyroxi 2020      No                  QD   Levothyrox    

       



     ne Sodium ne Sodium 0-21                               ine Sodium          

 



     25 MCG 25 MCG 00:00:                               25 MCG           



               00                                                

 

     Tradjenta Tradjenta       Yes  [facility]                1 tablet        

   [facility]



               7-20           [facility]                               [facility]



               00:00:                                              - [facility]



               00                                                [facility]

 

     Tresiba Tresiba       Yes  [facility]                Inject 15           

[facility]



     FlexTouch FlexTouch 7-20           [facility]                units           [facility]

rit



               00:00:                                              - [facility]



                                                               [facility]

 

     OXYGEN GAS      2019      Yes            2L        2 L daily.           [facility]

[facility]



               0-17                                              [facility]



               15:24:                                              of



               51                                                [facility]



                                                                 e

 

     tramadol            Yes       [id number] 50mg      Take 1 Tab         

  [facility]



     (ULTRAM) 50      9-30                               by mouth           [facility]

[facility]



     MG tablet      00:00:                               every 6           of



               00                                 hours as           [facility]



                                                  needed for           e



                                                  Pain.           

 

     tramadol      2022- No        [id number] 50mg      Take 1 Tab        

   [facility]



     (ULTRAM) 50      9-30 03-25                          by mouth           [facility]

lege



     MG tablet      00:00: 00:00                          every 6           of



               00   :00                           hours as           [facility]



                                                  needed for           e



                                                  Pain.           

 

     tiotropium            Yes            18ug      18 mcg by           [facility]

[facility]



     (SPIRIVA)      9-25                               Inhalation           [facility]

[facility]



     18 MCG      00:00:                               route.           of



     inhalation      00                                                [facility]



     capsule                                                        e

 

     tiotropium            Yes            18ug      18 mcg by           [facility]

[facility]



     (SPIRIVA)      9-25                               Inhalation           [facility]

[facility]



     18 MCG      00:00:                               route.           of



     inhalation      00                                                [facility]



     capsule                                                        e

 

     tiotropium      2022- No        Chronic 18ug QD   Inhale 1          

 [facility]



     (SPIRIVA)       04-11           obstructive           capsule          

 [facility]



     18 mcg      00:00: 00:00           pulmonary           (18 mcg           Me

dical



     inhalation      00   :00            disease           total) by           C

enter



     capsule                          with acute           mouth via           



                                   exacerbatio           inhaler           



                                   n (HCC)           daily.           

 

     tiotropium       No        Chronic 18ug QD   Inhale 1          

 [facility]



     (SPIRIVA)       04-11           obstructive           capsule          

 [facility]



     18 mcg      00:00: 00:00           pulmonary           (18 mcg           Me

dical



     inhalation      00   :00            disease           total) by           C

enter



     capsule                          with acute           mouth via           



                                   exacerbatio           inhaler           



                                   n ([facility])           daily.           

 

     tiotropium      2022- No        Chronic 18ug QD   Inhale 1          

 [facility]



     (SPIRIVA)      -           obstructive           capsule          

 [facility]



     18 mcg      00:00: 00:00           pulmonary           (18 mcg           Me

dical



     inhalation      00   :00            disease           total) by           C

enter



     capsule                          with acute           mouth via           



                                   exacerbatio           inhaler           



                                   n (Formerly [facility])           daily.           

 

     tiotropium      2022- No        Chronic 18ug QD   Inhale 1          

 [facility]



     (SPIRIVA)                 obstructive           capsule          

 [facility]



     18 mcg      00:00: 00:00           pulmonary           (18 mcg           Me

dical



     inhalation      00   :00            disease           total) by           C

enter



     capsule                          with acute           mouth via           



                                   exacerbatio           inhaler           



                                   n (Formerly [facility])           daily.           

 

     tiotropium      2022- No        Chronic 18ug QD   Inhale 1          

 [facility]



     (SPIRIVA)                 obstructive           capsule          

 [facility]



     18 mcg      00:00: 00:00           pulmonary           (18 mcg           Me

dical



     inhalation      00   :00            disease           total) by           C

enter



     capsule                          with acute           mouth via           



                                   exacerbatio           inhaler           



                                   n (Formerly [facility])           daily.           

 

     tiotropium      2022- No        Chronic 18ug QD   Inhale 1          

 [facility]



     (SPIRIVA)                 obstructive           capsule          

 [facility]



     18 mcg      00:00: 00:00           pulmonary           (18 mcg           Me

dical



     inhalation      00   :00            disease           total) by           C

enter



     capsule                          with acute           mouth via           



                                   exacerbatio           inhaler           



                                   n (Formerly [facility])           daily.           

 

     Cefepime      2019- No             2g        Inject 2 g           [facility]

[facility]



     HCl 2 g      9-25 10-19                          into the           [place]



     SOLR      00:00: 04:59                          vein Every           of



               00   :00                           8 hours.           [facility]



                                                                 e

 

     sodium      2019- No                       TAKE 1           [facility]



     chloride 1       10-17                          TABLET BY           [facility]

[facility]



     g tablet      00:00: 00:00                          MOUTH           of



               00   :00                           THREE           [facility]



                                                  TIMES           e



                                                  DAILY WITH           



                                                  MEALS FOR           



                                                  14 DAYS           

 

     levofloxaci            Yes                      TAKE 1           [facility]

or



     n         9-24                               TABLET BY           [place]



     (LEVAQUIN)      00:00:                               MOUTH ONCE           o

f



     750 MG      00                                 DAILY FOR           [facility]



     tablet                                         24 DAYS           e

 

     levofloxaci            Yes                      TAKE 1           [facility]

or



     n         9-24                               TABLET BY           [place]



     (LEVAQUIN)      00:00:                               MOUTH ONCE           o

f



     750 MG      00                                 DAILY FOR           [facility]



     tablet                                         24 DAYS           e

 

     lactulose            Yes       Constipatio 20g       Take 30         

  [facility]



     (CHRONULAC)      9-24                n,             mLs (20 g           [facility]

es



     20 gram/30      00:00:                unspecified           total) by      

     Medical



     mL solution      00                  constipatio           mouth 2         

  Center



                                   n type           (two)           



                                                  times           



                                                  daily as           



                                                  needed           



                                                  (constipat           



                                                  ion).           

 

     lactulose            Yes       Constipatio 20g       Take 30         

  [facility]



     (CHRONULAC)      9-24                n,             mLs (20 g           [facility]

es



     20 gram/30      00:00:                unspecified           total) by      

     Medical



     mL solution      00                  constipatio           mouth 2         

  Center



                                   n type           (two)           



                                                  times           



                                                  daily as           



                                                  needed           



                                                  (constipat           



                                                  ion).           

 

     lactulose            Yes       Constipatio 20g       Take 30         

  [facility]



     (CHRONULAC)      9-24                n,             mLs (20 g           [facility]

es



     20 gram/30      00:00:                unspecified           total) by      

     Medical



     mL solution      00                  constipatio           mouth 2         

  Center



                                   n type           (two)           



                                                  times           



                                                  daily as           



                                                  needed           



                                                  (constipat           



                                                  ion).           

 

     lactulose            Yes       Constipatio 20g       Take 30         

  [facility]



     (CHRONULAC)      9-24                n,             mLs (20 g           [facility]

es



     20 gram/30      00:00:                unspecified           total) by      

     Medical



     mL solution      00                  constipatio           mouth 2         

  Center



                                   n type           (two)           



                                                  times           



                                                  daily as           



                                                  needed           



                                                  (constipat           



                                                  ion).           

 

     lactulose            Yes       Constipatio 20g       Take 30         

  [facility]



     (CHRONULAC)      9-24                n,             mLs (20 g           [facility]

es



     20 gram/30      00:00:                unspecified           total) by      

     Medical



     mL solution      00                  constipatio           mouth 2         

  Center



                                   n type           (two)           



                                                  times           



                                                  daily as           



                                                  needed           



                                                  (constipat           



                                                  ion).           

 

     lactulose            Yes       Constipatio 20g       Take 30         

  [facility]



     (CHRONULAC)      9-24                n,             mLs (20 g           [facility]

es



     20 gram/30      00:00:                unspecified           total) by      

     Medical



     mL solution      00                  constipatio           mouth 2         

  Center



                                   n type           (two)           



                                                  times           



                                                  daily as           



                                                  needed           



                                                  (constipat           



                                                  ion).           

 

     levothyroxi      2022- No        Hypothyroid 25ug      Take 1       

    [facility]



     ne         04-11           ism,           tablet (25           [facility]



     (SYNTHROID,      00:00: 00:00           unspecified           mcg total)   

        [facility]



     LEVOTHROID)      00   :00            type           by mouth           Cent

er



     25 MCG                                         Every           



     tablet                                         morning on           



                                                  an empty           



                                                  stomach.           

 

     levothyroxi      2022- No        Hypothyroid 25ug      Take 1       

    [facility]



     ne        9-24 04-11           ism,           tablet (25           [facility]



     (SYNTHROID,      00:00: 00:00           unspecified           mcg total)   

        [facility]



     LEVOTHROID)      00   :00            type           by mouth           Cent

er



     25 MCG                                         Every           



     tablet                                         morning on           



                                                  an empty           



                                                  stomach.           

 

     levothyroxi      2022- No        Hypothyroid 25ug      Take 1       

    [facility]



     ne        9-24 04-11           ism,           tablet (25           [facility]



     (SYNTHROID,      00:00: 00:00           unspecified           mcg total)   

        [facility]



     LEVOTHROID)      00   :00            type           by mouth           Cent

er



     25 MCG                                         Every           



     tablet                                         morning on           



                                                  an empty           



                                                  stomach.           

 

     levothyroxi      2022- No        Hypothyroid 25ug      Take 1       

    [facility]



     ne        9-24 04-11           ism,           tablet (25           [facility]



     (SYNTHROID,      00:00: 00:00           unspecified           mcg total)   

        [facility]



     LEVOTHROID)      00   :00            type           by mouth           Cent

er



     25 MCG                                         Every           



     tablet                                         morning on           



                                                  an empty           



                                                  stomach.           

 

     levothyroxi      2022- No        Hypothyroid 25ug      Take 1       

    [facility]



     ne        9-24 04-11           ism,           tablet (25           [facility]



     (SYNTHROID,      00:00: 00:00           unspecified           mcg total)   

        Medical



     LEVOTHROID)      00   :00            type           by mouth           Cent

er



     25 MCG                                         Every           



     tablet                                         morning on           



                                                  an empty           



                                                  stomach.           

 

     levothyroxi      2022- No        Hypothyroid 25ug      Take 1       

    [facility]



     ne        9-24 04-11           ism,           tablet (25           [facility]



     (SYNTHROID,      00:00: 00:00           unspecified           mcg total)   

        Medical



     LEVOTHROID)      00   :00            type           by mouth           Cent

er



     25 MCG                                         Every           



     tablet                                         morning on           



                                                  an empty           



                                                  stomach.           

 

     doxycycline       2019- No             100mg      Take 100           

[facility]



     (MONODOX)      9-24 10-20                          mg by           [place]



     100 MG      00:00: 04:59                          mouth.           of



     capsule      00   :00                                          [facility]



                                                                 e

 

     dexamethaso            Yes                      TAKE 2           [facility]

or



     ne        9-                               TABLETS BY           [place]



     (DECADRON)      00:00:                               MOUTH           of



     4 MG tablet      00                                 TWICE           [facility]



                                                  DAILY           e

 

     dexamethaso      -      Yes                      TAKE 2           [facility]

or



     ne        9-                               TABLETS BY           [place]



     (DECADRON)      00:00:                               MOUTH           of



     4 MG tablet      00                                 TWICE           [facility]



                                                  DAILY           e

 

     albuterol            Yes                      USE 1 VIAL           [facility]

[facility]



     (PROVENTIL)      8-12                               IN             [facility]



     (2.5 mg/3      00:00:                               NEBULIZER           of



     mL) 0.083%      00                                 EVERY 4 TO           Med

icin



     nebulizer                                         6 HOURS AS           e



     solution                                         NEEDED           

 

     albuterol            Yes                      USE 1 VIAL           [facility]

[facility]



     (PROVENTIL)      8-12                               IN             [facility]



     (2.5 mg/3      00:00:                               NEBULIZER           of



     mL) 0.083%      00                                 EVERY 4 TO           Med

icin



     nebulizer                                         6 HOURS AS           e



     solution                                         NEEDED           

 

     furosemide            Yes            40mg      Take 40 mg           [facility]

[facility]



     (LASIX) 40      6-14                               by mouth           [facility]

[facility]



     MG tablet      00:00:                               daily.           of



               00                                                [facility]



                                                                 e

 

     spironolact            Yes            100mg      Take 100           [facility]

[facility]



     one       6-14                               mg by           [place]



     (ALDACTONE)      00:00:                               mouth           of



     100 MG      00                                 daily.           [facility]



     tablet                                                        e

 

     folic acid            Yes            1mg       Take 1 mg           [facility]

[facility]



     (FOLVITE) 1      6-14                               by mouth           [facility]

[facility]



     MG tablet      00:00:                               daily.           of



               00                                                [facility]



                                                                 e

 

     furosemide            Yes            40mg      Take 40 mg           [facility]

[facility]



     (LASIX) 40      6-14                               by mouth           [facility]

[facility]



     MG tablet      00:00:                               daily.           of



               00                                                [facility]



                                                                 e

 

     spironolact            Yes            100mg      Take 100           [facility]

[facility]



     one       6-14                               mg by           [place]



     (ALDACTONE)      00:00:                               mouth           of



     100 MG      00                                 daily.           [facility]



     tablet                                                        e

 

     folic acid            Yes            1mg       Take 1 mg           [facility]

[facility]



     (FOLVITE) 1      6-14                               by mouth           [facility]

[facility]



     MG tablet      00:00:                               daily.           of



               00                                                [facility]



                                                                 e

 

     Ipratropium Ipratropium           No             2.5{ml} TID  Ipratropiu   

        



     Bromide Bromide                                    m Bromide           



     0.02 % 0.02 %                                    0.02 %           

 

     Constulose Constulose           No             30{ml} BID  Constulose      

     



     10 GM/15ML 10 GM/15ML                                    10 GM/15ML        

   

 

     Tresiba Tresiba           No                       [facility]           



     FlexTouch FlexTouch                                    FlexTouch           



     200 UNIT/ UNIT/ML                                    200            



                                                  UNIT/ML           

 

     Folic Acid Folic Acid           No             1{table QD   Folic Acid     

      



     1 MG 1 MG                          t}        1 MG           

 

     ProAir HFA ProAir HFA           No             1{puff_ 6xD  ProAir HFA     

      



     108 (90 108 (90                          as_need      108 (90           



     Base) Base)                          ed}       Base)           



     MCG/ACT MCG/ACT                                    MCG/ACT           

 

     HYDROcodone HYDROcodone           No             1{table QID  HYDROcodon   

        



     -Acetaminop -Acetaminop                          t_as_ne      e-Acetamin   

        



     hen 5-325 hen 5-325                          eded}      ophen           



     MG   MG                                      5-325 MG           

 

     predniSONE predniSONE           No             1{table QD   predniSONE     

      



     10 MG 10 MG                          t}        10 MG           

 

     Spiriva Spiriva           No                       Spiriva           



     HandiHaler HandiHaler                                    [facility]        

   

 

     Cache Cache           No             1{table QD   Cache           



     Thyroid 60 Thyroid 60                          t_on_an      Thyroid 60     

      



     MG   MG                            _empty_      MG             



                                        stomach                     



                                        }                        

 

     Furosemide Furosemide           No             1{table BID  Furosemide     

      



     40 MG 40 MG                          t}        40 MG           

 

     Tresiba Tresiba           No                  QD   [facility]           



     FlexTouch FlexTouch                                    FlexTouch           



     200 UNIT/ UNIT/ML                                    200            



                                                  UNIT/ML           

 

     Spironolact Spironolact           No             1{table BID  Spironolac   

        



     one 100 MG one 100 MG                          t}        tone 100          

 



                                                  MG             

 

     Ipratropium Ipratropium           No             2.5{ml} TID  Ipratropiu   

        



     Bromide Bromide                                    m Bromide           



     0.02 % 0.02 %                                    0.02 %           

 

     Constulose Constulose           No             30{ml} BID  Constulose      

     



     10 GM/15ML 10 GM/15ML                                    10 GM/15ML        

   

 

     Tresiba Tresiba           No                       T[facility]iba           



     FlexTouch FlexTouch                                    FlexTouch           



     200 UNIT/ UNIT/ML                                    200            



                                                  UNIT/ML           

 

     Folic Acid Folic Acid           No             1{table QD   Folic Acid     

      



     1 MG 1 MG                          t}        1 MG           

 

     ProAir HFA ProAir HFA           No             1{puff_ 6xD  ProAir HFA     

      



     108 (90 108 (90                          as_need      108 (90           



     Base) Base)                          ed}       Base)           



     MCG/ACT MCG/ACT                                    MCG/ACT           

 

     HYDROcodone HYDROcodone           No             1{table QID  HYDROcodon   

        



     -Acetaminop -Acetaminop                          t_as_ne      e-Acetamin   

        



     hen 5-325 hen 5-325                          eded}      ophen           



     MG   MG                                      5-325 MG           

 

     predniSONE predniSONE           No             1{table QD   predniSONE     

      



     10 MG 10 MG                          t}        10 MG           

 

     [facility] Spiriva           No                       [facility]           



     HandiHaler HandiHaler                                    Hand[facility]        

   

 

     Cache Cache           No             1{table QD   Cache           



     Thyroid 60 Thyroid 60                          t_on_an      Thyroid 60     

      



     MG   MG                            _empty_      MG             



                                        stomach                     



                                        }                        

 

     Furosemide Furosemide           No             1{table BID  Furosemide     

      



     40 MG 40 MG                          t}        40 MG           

 

     Tresiba Tresiba           No                  QD   [facility]           



     FlexTouch FlexTouch                                    FlexTouch           



     200 UNIT/ UNIT/ML                                    200            



                                                  UNIT/ML           

 

     Spironolact Spironolact           No             1{table BID  Spironolac   

        



     one 100 MG one 100 MG                          t}        tone 100          

 



                                                  MG             

 

     Ipratropium Ipratropium           No             2.5{ml} TID  Ipratropiu   

        



     Bromide Bromide                                    m Bromide           



     0.02 % 0.02 %                                    0.02 %           

 

     Constulose Constulose           No             30{ml} BID  Constulose      

     



     10 GM/15ML 10 GM/15ML                                    10 GM/15ML        

   

 

     Tresiba Tresiba           No                       [facility]iba           



     FlexTouch FlexTouch                                    FlexTouch           



     200 UNIT/ UNIT/ML                                    200            



                                                  UNIT/ML           

 

     Folic Acid Folic Acid           No             1{table QD   Folic Acid     

      



     1 MG 1 MG                          t}        1 MG           

 

     ProAir HFA ProAir HFA           No             1{puff_ 6xD  ProAir HFA     

      



     108 (90 108 (90                          as_need      108 (90           



     Base) Base)                          ed}       Base)           



     MCG/ACT MCG/ACT                                    MCG/ACT           

 

     HYDROcodone HYDROcodone           No             1{table QID  HYDROcodon   

        



     -Acetaminop -Acetaminop                          t_as_ne      e-Acetamin   

        



     hen 5-325 hen 5-325                          eded}      ophen           



     MG   MG                                      5-325 MG           

 

     predniSONE predniSONE           No             1{table QD   predniSONE     

      



     10 MG 10 MG                          t}        10 MG           

 

     Spiriva Spiriva           No                       Spiriva           



     HandiHaler HandiHaler                                    HandiHaler        

   

 

     Cache Cache           No             1{table QD   Cache           



     Thyroid 60 Thyroid 60                          t_on_an      Thyroid 60     

      



     MG   MG                            _empty_      MG             



                                        stomach                     



                                        }                        

 

     Furosemide Furosemide           No             1{table BID  Furosemide     

      



     40 MG 40 MG                          t}        40 MG           

 

     Tresiba Tresiba           No                  QD   Tresiba           



     FlexTouch FlexTouch                                    FlexTouch           



     200 UNIT/ UNIT/ML                                    200            



                                                  UNIT/ML           

 

     Spironolact Spironolact           No             1{table BID  Spironolac   

        



     one 100 MG one 100 MG                          t}        tone 100          

 



                                                  MG             

 

     Ipratropium Ipratropium           No             2.5{ml} TID  Ipratropiu   

        



     Bromide Bromide                                    m Bromide           



     0.02 % 0.02 %                                    0.02 %           

 

     Constulose Constulose           No             30{ml} BID  Constulose      

     



     10 GM/15ML 10 GM/15ML                                    10 GM/15ML        

   

 

     Tresiba Tresiba           No                       Tresiba           



     FlexTouch FlexTouch                                    FlexTouch           



     200 UNIT/ UNIT/ML                                    200            



                                                  UNIT/ML           

 

     Folic Acid Folic Acid           No             1{table QD   Folic Acid     

      



     1 MG 1 MG                          t}        1 MG           

 

     ProAir HFA ProAir HFA           No             1{puff_ 6xD  ProAir HFA     

      



     108 (90 108 (90                          as_need      108 (90           



     Base) Base)                          ed}       Base)           



     MCG/ACT MCG/ACT                                    MCG/ACT           

 

     HYDROcodone HYDROcodone           No             1{table QID  HYDROcodon   

        



     -Acetaminop -Acetaminop                          t_as_ne      e-Acetamin   

        



     hen 5-325 hen 5-325                          eded}      ophen           



     MG   MG                                      5-325 MG           

 

     predniSONE predniSONE           No             1{table QD   predniSONE     

      



     10 MG 10 MG                          t}        10 MG           

 

     Spiriva Spiriva           No                       Spiriva           



     HandiHaler HandiHaler                                    HandiHaler        

   

 

     Cache Cache           No             1{table QD   Cache           



     Thyroid 60 Thyroid 60                          t_on_an      Thyroid 60     

      



     MG   MG                            _empty_      MG             



                                        stomach                     



                                        }                        

 

     Furosemide Furosemide           No             1{table BID  Furosemide     

      



     40 MG 40 MG                          t}        40 MG           

 

     Tresiba Tresiba           No                  QD   Tresiba           



     FlexTouch FlexTouch                                    FlexTouch           



     200 UNIT/ UNIT/ML                                    200            



                                                  UNIT/ML           

 

     Spironolact Spironolact           No             1{table BID  Spironolac   

        



     one 100 MG one 100 MG                          t}        tone 100          

 



                                                  MG             

 

     Ipratropium Ipratropium           No             2.5{ml} TID  Ipratropiu   

        



     Bromide Bromide                                    m Bromide           



     0.02 % 0.02 %                                    0.02 %           

 

     Tresiba Tresiba           No                       Tresiba           



     FlexTouch FlexTouch                                    FlexTouch           



     200 UNIT/ UNIT/ML                                    200            



                                                  UNIT/ML           

 

     Folic Acid Folic Acid           No             1{table QD   Folic Acid     

      



     1 MG 1 MG                          t}        1 MG           

 

     ProAir HFA ProAir HFA           No             1{puff_ 6xD  ProAir HFA     

      



     108 (90 108 (90                          as_need      108 (90           



     Base) Base)                          ed}       Base)           



     MCG/ACT MCG/ACT                                    MCG/ACT           

 

     Constulose Constulose           No             30{ml} BID  Constulose      

     



     10 GM/15ML 10 GM/15ML                                    10 GM/15ML        

   

 

     HYDROcodone HYDROcodone           No             1{table QID  HYDROcodon   

        



     -Acetaminop -Acetaminop                          t_as_ne      e-Acetamin   

        



     hen 5-325 hen 5-325                          eded}      ophen           



     MG   MG                                      5-325 MG           

 

     Spironolact Spironolact           No             1{table BID  Spironolac   

        



     one 100 MG one 100 MG                          t}        tone 100          

 



                                                  MG             

 

     Ipratropium Ipratropium           No             2.5{ml} TID  Ipratropiu   

        



     Bromide Bromide                                    m Bromide           



     0.02 % 0.02 %                                    0.02 %           

 

     Cache Cache           No             1{table QD   Cache           



     Thyroid 60 Thyroid 60                          t_on_an      Thyroid 60     

      



     MG   MG                            _empty_      MG             



                                        stomach                     



                                        }                        

 

     Symbicort Symbicort           Yes  [facility]                2 puffs           

Common



                              [facility]

 

     Ipratropium Ipratropium           Yes  [facility]                2.5 ml        

   Common



     Bromide Bromide                [facility]

 

     ProAir HFA ProAir HFA           Yes  [facility]                1 puff as       

    Common



                              [facility]                needed           [facility]

 

     Furosemide Furosemide           Yes  [facility]                1 tablet        

   Common



                              [facility]

 

     Spironolact Spironolact           Yes  [facility]                1 tablet      

     Common



     one  one                 [facility]

 

     Aspir-Low Aspir-Low           Yes  [facility]                1 tablet          

 [facility]

 

     Folic Acid Folic Acid           Yes  [facility]                1 tablet        

   Common



                              [facility]

 

     Constulose Constulose           Yes  [facility]                15 ml           

Common



                              [facility]

 

     Symbicort Symbicort           No             2{puffs QD   Symbicort        

   



     160-4.5 160-4.5                          }         160-4.5           



     MCG/ACT MCG/ACT                                    MCG/ACT           

 

     Furosemide Furosemide           No             1{table BID  Furosemide     

      



     40 MG 40 MG                          t}        40 MG           

 

     Furosemide Furosemide           No             1{table BID  Furosemide     

      



     40 MG 40 MG                          t}        40 MG           

 

     Folic Acid Folic Acid           No             1{table QD   Folic Acid     

      



     1 MG 1 MG                          t}        1 MG           

 

     Tresiba Tresiba           No                       Tresiba           



     FlexTouch FlexTouch                                    FlexTouch           



     200 UNIT/ UNIT/ML                                    200            



                                                  UNIT/ML           

 

     Spiriva Spiriva           No                       Spiriva           



     HandiHaler HandiHaler                                    HandiHaler        

   

 

     Ipratropium Ipratropium           No             2.5{ml} TID  Ipratropiu   

        



     Bromide Bromide                                    m Bromide           



     0.02 % 0.02 %                                    0.02 %           

 

     Spironolact Spironolact           No             1{table BID  Spironolac   

        



     one 100 MG one 100 MG                          t}        tone 100          

 



                                                  MG             

 

     Tresiba Tresiba           No                  QD   Tresiba           



     FlexTouch FlexTouch                                    FlexTouch           



     200 UNIT/ UNIT/ML                                    200            



                                                  UNIT/ML           

 

     Constulose Constulose           No             30{ml} BID  Constulose      

     



     10 GM/15ML 10 GM/15ML                                    10 GM/15ML        

   

 

     predniSONE predniSONE           No             1{table QD   predniSONE     

      



     10 MG 10 MG                          t}        10 MG           

 

     ProAir HFA ProAir HFA           No             1{puff_ 6xD  ProAir HFA     

      



     108 (90 108 (90                          as_need      108 (90           



     Base) Base)                          ed}       Base)           



     MCG/ACT MCG/ACT                                    MCG/ACT           

 

     Cache Cache           No             1{table QD   Cache           



     Thyroid 60 Thyroid 60                          t_on_an      Thyroid 60     

      



     MG   MG                            _empty_      MG             



                                        stomach                     



                                        }                        

 

     HYDROcodone HYDROcodone           No             1{table QID  HYDROcodon   

        



     -Acetaminop -Acetaminop                          t_as_ne      e-Acetamin   

        



     hen 5-325 hen 5-325                          eded}      ophen           



     MG   MG                                      5-325 MG           

 

     Symbicort Symbicort           No             2{puffs QD   Symbicort        

   



     80-4.5 80-4.5                          }         80-4.5           



     MCG/ACT MCG/ACT                                    MCG/ACT           

 

     Spironolact Spironolact           No             1{table BID  Spironolac   

        



     one 100 MG one 100 MG                          t}        tone 100          

 



                                                  MG             

 

     Furosemide Furosemide           No             1{table BID  Furosemide     

      



     40 MG 40 MG                          t}        40 MG           

 

     ProAir HFA ProAir HFA           No             1{puff_ 6xD  ProAir HFA     

      



     108 (90 108 (90                          as_need      108 (90           



     Base) Base)                          ed}       Base)           



     MCG/ACT MCG/ACT                                    MCG/ACT           

 

     Ipratropium Ipratropium           No             2.5{ml} TID  Ipratropiu   

        



     Bromide Bromide                                    m Bromide           



     0.02 % 0.02 %                                    0.02 %           

 

     Constulose Constulose           No             15{ml} BID  Constulose      

     



     10 GM/15ML 10 GM/15ML                                    10 GM/15ML        

   

 

     Tresiba Tresiba           No                  QD   Tresiba           



     FlexTouch FlexTouch                                    FlexTouch           



     200 UNIT/ UNIT/ML                                    200            



                                                  UNIT/ML           

 

     Folic Acid Folic Acid           No             1{table QD   Folic Acid     

      



     1 MG 1 MG                          t}        1 MG           

 

     Aspir-Low Aspir-Low           No             1{table QD   Aspir-Low        

   



     81 MG 81 MG                          t}        81 MG           

 

     Cache Cache           No             1{table QD   Cache           



     Thyroid 60 Thyroid 60                          t_on_an      Thyroid 60     

      



     MG   MG                            _empty_      MG             



                                        stomach                     



                                        }                        

 

     Symbicort Symbicort           No             2{puffs QD   Symbicort        

   



     80-4.5 80-4.5                          }         80-4.5           



     MCG/ACT MCG/ACT                                    MCG/ACT           

 

     Spironolact Spironolact           No             1{table BID  Spironolac   

        



     one 100 MG one 100 MG                          t}        tone 100          

 



                                                  MG             

 

     Furosemide Furosemide           No             1{table BID  Furosemide     

      



     40 MG 40 MG                          t}        40 MG           

 

     ProAir HFA ProAir HFA           No             1{puff_ 6xD  ProAir HFA     

      



     108 (90 108 (90                          as_need      108 (90           



     Base) Base)                          ed}       Base)           



     MCG/ACT MCG/ACT                                    MCG/ACT           

 

     Ipratropium Ipratropium           No             2.5{ml} TID  Ipratropiu   

        



     Bromide Bromide                                    m Bromide           



     0.02 % 0.02 %                                    0.02 %           

 

     Constulose Constulose           No             15{ml} BID  Constulose      

     



     10 GM/15ML 10 GM/15ML                                    10 GM/15ML        

   

 

     Tresiba Tresiba           No                  QD   Tresiba           



     FlexTouch FlexTouch                                    FlexTouch           



     200 UNIT/ UNIT/ML                                    200            



                                                  UNIT/ML           

 

     Folic Acid Folic Acid           No             1{table QD   Folic Acid     

      



     1 MG 1 MG                          t}        1 MG           

 

     Aspir-Low Aspir-Low           No             1{table QD   Aspir-Low        

   



     81 MG 81 MG                          t}        81 MG           

 

     Cache Cache           No             1{table QD   Cache           



     Thyroid 60 Thyroid 60                          t_on_an      Thyroid 60     

      



     MG   MG                            _empty_      MG             



                                        stomach                     



                                        }                        

 

     Symbicort Symbicort           No             2{puffs QD   Symbicort        

   



     80-4.5 80-4.5                          }         80-4.5           



     MCG/ACT MCG/ACT                                    MCG/ACT           

 

     Tresiba Tresiba           No                  QD   Tresiba           



     FlexTouch FlexTouch                                    FlexTouch           



     200 UNIT/ UNIT/ML                                    200            



                                                  UNIT/ML           

 

     Furosemide Furosemide           No             1{table BID  Furosemide     

      



     40 MG 40 MG                          t}        40 MG           

 

     ProAir HFA ProAir HFA           No             1{puff_ 6xD  ProAir HFA     

      



     108 (90 108 (90                          as_need      108 (90           



     Base) Base)                          ed}       Base)           



     MCG/ACT MCG/ACT                                    MCG/ACT           

 

     Ipratropium Ipratropium           No             2.5{ml} TID  Ipratropiu   

        



     Bromide Bromide                                    m Bromide           



     0.02 % 0.02 %                                    0.02 %           

 

     Constulose Constulose           No             15{ml} BID  Constulose      

     



     10 GM/15ML 10 GM/15ML                                    10 GM/15ML        

   

 

     Spironolact Spironolact           No             1{table BID  Spironolac   

        



     one 100 MG one 100 MG                          t}        tone 100          

 



                                                  MG             

 

     Folic Acid Folic Acid           No             1{table QD   Folic Acid     

      



     1 MG 1 MG                          t}        1 MG           

 

     Aspir-Low Aspir-Low           No             1{table QD   Aspir-Low        

   



     81 MG 81 MG                          t}        81 MG           

 

     Cache Cache           No             1{table QD   Cache           



     Thyroid 60 Thyroid 60                          t_on_an      Thyroid 60     

      



     MG   MG                            _empty_      MG             



                                        stomach                     



                                        }                        

 

     Tresiba Tresiba           No                  QD   Tresiba           



     FlexTouch FlexTouch                                    FlexTouch           



     200 UNIT/ UNIT/ML                                    200            



                                                  UNIT/ML           

 

     Folic Acid Folic Acid           No             1{table QD   Folic Acid     

      



     1 MG 1 MG                          t}        1 MG           

 

     Aspir-Low Aspir-Low           No             1{table QD   Aspir-Low        

   



     81 MG 81 MG                          t}        81 MG           

 

     ProAir HFA ProAir HFA           No             1{puff_ 6xD  ProAir HFA     

      



     108 (90 108 (90                          as_need      108 (90           



     Base) Base)                          ed}       Base)           



     MCG/ACT MCG/ACT                                    MCG/ACT           

 

     Symbicort Symbicort           No             2{puffs QD   Symbicort        

   



     160-4.5 160-4.5                          }         160-4.5           



     MCG/ACT MCG/ACT                                    MCG/ACT           

 

     Spironolact Spironolact           No             1{table BID  Spironolac   

        



     one 100 MG one 100 MG                          t}        tone 100          

 



                                                  MG             

 

     Constulose Constulose           No             15{ml} BID  Constulose      

     



     10 GM/15ML 10 GM/15ML                                    10 GM/15ML        

   

 

     Kathleen Cache           No             1{table QD   Cache           



     Thyroid 60 Thyroid 60                          t_on_an      Thyroid 60     

      



     MG   MG                            _empty_      MG             



                                        stomach                     



                                        }                        

 

     Ipratropium Ipratropium           No             2.5{ml} TID  Ipratropiu   

        



     Bromide Bromide                                    m Bromide           



     0.02 % 0.02 %                                    0.02 %           

 

     Furosemide Furosemide           No             1{table BID  Furosemide     

      



     40 MG 40 MG                          t}        40 MG           

 

     Tresiba Tresiba           No                  QD   Tresiba           



     FlexTouch FlexTouch                                    FlexTouch           



     200 UNIT/ UNIT/ML                                    200            



                                                  UNIT/ML           

 

     Folic Acid Folic Acid           No             1{table QD   Folic Acid     

      



     1 MG 1 MG                          t}        1 MG           

 

     Aspir-Low Aspir-Low           No             1{table QD   Aspir-Low        

   



     81 MG 81 MG                          t}        81 MG           

 

     ProAir HFA ProAir HFA           No             1{puff_ 6xD  ProAir HFA     

      



     108 (90 108 (90                          as_need      108 (90           



     Base) Base)                          ed}       Base)           



     MCG/ACT MCG/ACT                                    MCG/ACT           

 

     Symbicort Symbicort           No             2{puffs QD   Symbicort        

   



     160-4.5 160-4.5                          }         160-4.5           



     MCG/ACT MCG/ACT                                    MCG/ACT           

 

     Spironolact Spironolact           No             1{table BID  Spironolac   

        



     one 100 MG one 100 MG                          t}        tone 100          

 



                                                  MG             

 

     Constulose Constulose           No             15{ml} BID  Constulose      

     



     10 GM/15ML 10 GM/15ML                                    10 GM/15ML        

   

 

     Cache Cache           No             1{table QD   Cache           



     Thyroid 60 Thyroid 60                          t_on_an      Thyroid 60     

      



     MG   MG                            _empty_      MG             



                                        stomach                     



                                        }                        

 

     Ipratropium Ipratropium           No             2.5{ml} TID  Ipratropiu   

        



     Bromide Bromide                                    m Bromide           



     0.02 % 0.02 %                                    0.02 %           

 

     Furosemide Furosemide           No             1{table BID  Furosemide     

      



     40 MG 40 MG                          t}        40 MG           

 

     Tresiba Tresiba           No                  QD   Tresiba           



     FlexTouch FlexTouch                                    FlexTouch           



     200 UNIT/ UNIT/ML                                    200            



                                                  UNIT/ML           

 

     Folic Acid Folic Acid           No             1{table QD   Folic Acid     

      



     1 MG 1 MG                          t}        1 MG           

 

     Aspir-Low Aspir-Low           No             1{table QD   Aspir-Low        

   



     81 MG 81 MG                          t}        81 MG           

 

     ProAir HFA ProAir HFA           No             1{puff_ 6xD  ProAir HFA     

      



     108 (90 108 (90                          as_need      108 (90           



     Base) Base)                          ed}       Base)           



     MCG/ACT MCG/ACT                                    MCG/ACT           

 

     Symbicort Symbicort           No             2{puffs QD   Symbicort        

   



     160-4.5 160-4.5                          }         160-4.5           



     MCG/ACT MCG/ACT                                    MCG/ACT           

 

     Spironolact Spironolact           No             1{table BID  Spironolac   

        



     one 100 MG one 100 MG                          t}        tone 100          

 



                                                  MG             

 

     Constulose Constulose           No             15{ml} BID  Constulose      

     



     10 GM/15ML 10 GM/15ML                                    10 GM/15ML        

   

 

     Cache Cache           No             1{table QD   Cache           



     Thyroid 60 Thyroid 60                          t_on_an      Thyroid 60     

      



     MG   MG                            _empty_      MG             



                                        stomach                     



                                        }                        

 

     Ipratropium Ipratropium           No             2.5{ml} TID  Ipratropiu   

        



     Bromide Bromide                                    m Bromide           



     0.02 % 0.02 %                                    0.02 %           

 

     Furosemide Furosemide           No             1{table BID  Furosemide     

      



     40 MG 40 MG                          t}        40 MG           

 

     Tresiba Tresiba           No                  QD   Tresiba           



     FlexTouch FlexTouch                                    FlexTouch           



     200 UNIT/ UNIT/ML                                    200            



                                                  UNIT/ML           

 

     Folic Acid Folic Acid           No             1{table QD   Folic Acid     

      



     1 MG 1 MG                          t}        1 MG           

 

     Aspir-Low Aspir-Low           No             1{table QD   Aspir-Low        

   



     81 MG 81 MG                          t}        81 MG           

 

     ProAir HFA ProAir HFA           No             1{puff_ 6xD  ProAir HFA     

      



     108 (90 108 (90                          as_need      108 (90           



     Base) Base)                          ed}       Base)           



     MCG/ACT MCG/ACT                                    MCG/ACT           

 

     Symbicort Symbicort           No             2{puffs QD   Symbicort        

   



     160-4.5 160-4.5                          }         160-4.5           



     MCG/ACT MCG/ACT                                    MCG/ACT           

 

     Spironolact Spironolact           No             1{table BID  Spironolac   

        



     one 100 MG one 100 MG                          t}        tone 100          

 



                                                  MG             

 

     Constulose Constulose           No             15{ml} BID  Constulose      

     



     10 GM/15ML 10 GM/15ML                                    10 GM/15ML        

   

 

     Cache Cache           No             1{table QD   Cache           



     Thyroid 60 Thyroid 60                          t_on_an      Thyroid 60     

      



     MG   MG                            _empty_      MG             



                                        stomach                     



                                        }                        

 

     Ipratropium Ipratropium           No             2.5{ml} TID  Ipratropiu   

        



     Bromide Bromide                                    m Bromide           



     0.02 % 0.02 %                                    0.02 %           

 

     Furosemide Furosemide           No             1{table BID  Furosemide     

      



     40 MG 40 MG                          t}        40 MG           

 

     Tresiba Tresiba           No                  QD   Tresiba           



     FlexTouch FlexTouch                                    FlexTouch           



     200 UNIT/ UNIT/ML                                    200            



                                                  UNIT/ML           

 

     Folic Acid Folic Acid           No             1{table QD   Folic Acid     

      



     1 MG 1 MG                          t}        1 MG           

 

     Aspir-Low Aspir-Low           No             1{table QD   Aspir-Low        

   



     81 MG 81 MG                          t}        81 MG           

 

     ProAir HFA ProAir HFA           No             1{puff_ 6xD  ProAir HFA     

      



     108 (90 108 (90                          as_need      108 (90           



     Base) Base)                          ed}       Base)           



     MCG/ACT MCG/ACT                                    MCG/ACT           

 

     Symbicort Symbicort           No             2{puffs QD   Symbicort        

   



     160-4.5 160-4.5                          }         160-4.5           



     MCG/ACT MCG/ACT                                    MCG/ACT           

 

     Spironolact Spironolact           No             1{table BID  Spironolac   

        



     one 100 MG one 100 MG                          t}        tone 100          

 



                                                  MG             

 

     Constulose Constulose           No             15{ml} BID  Constulose      

     



     10 GM/15ML 10 GM/15ML                                    10 GM/15ML        

   

 

     [facility]           No             1{table QD   Cache           



     Thyroid 60 Thyroid 60                          t_on_an      Thyroid 60     

      



     MG   MG                            _empty_      MG             



                                        stomach                     



                                        }                        

 

     Ipratropium Ipratropium           No             2.5{ml} TID  Ipratropiu   

        



     Bromide Bromide                                    m Bromide           



     0.02 % 0.02 %                                    0.02 %           

 

     Furosemide Furosemide           No             1{table BID  Furosemide     

      



     40 MG 40 MG                          t}        40 MG           

 

     Tresiba Tresiba           No                  QD   Tresiba           



     FlexTouch FlexTouch                                    FlexTouch           



     200 UNIT/ UNIT/ML                                    200            



                                                  UNIT/ML           

 

     Folic Acid Folic Acid           No             1{table QD   Folic Acid     

      



     1 MG 1 MG                          t}        1 MG           

 

     Aspir-Low Aspir-Low           No             1{table QD   Aspir-Low        

   



     81 MG 81 MG                          t}        81 MG           

 

     ProAir HFA ProAir HFA           No             1{puff_ 6xD  ProAir HFA     

      



     108 (90 108 (90                          as_need      108 (90           



     Base) Base)                          ed}       Base)           



     MCG/ACT MCG/ACT                                    MCG/ACT           

 

     Symbicort Symbicort           No             2{puffs QD   Symbicort        

   



     160-4.5 160-4.5                          }         160-4.5           



     MCG/ACT MCG/ACT                                    MCG/ACT           

 

     Spironolact Spironolact           No             1{table BID  Spironolac   

        



     one 100 MG one 100 MG                          t}        tone 100          

 



                                                  MG             

 

     Constulose Constulose           No             15{ml} BID  Constulose      

     



     10 GM/15ML 10 GM/15ML                                    10 GM/15ML        

   

 

     [facility]           No             1{table QD   Cache           



     Thyroid 60 Thyroid 60                          t_on_an      Thyroid 60     

      



     MG   MG                            _empty_      MG             



                                        stomach                     



                                        }                        

 

     Ipratropium Ipratropium           No             2.5{ml} TID  Ipratropiu   

        



     Bromide Bromide                                    m Bromide           



     0.02 % 0.02 %                                    0.02 %           

 

     Furosemide Furosemide           No             1{table BID  Furosemide     

      



     40 MG 40 MG                          t}        40 MG           

 

     Tresiba Tresiba           No                  QD   Tresiba           



     FlexTouch FlexTouch                                    FlexTouch           



     200 UNIT/ UNIT/ML                                    200            



                                                  UNIT/ML           

 

     Folic Acid Folic Acid           No             1{table QD   Folic Acid     

      



     1 MG 1 MG                          t}        1 MG           

 

     Aspir-Low Aspir-Low           No             1{table QD   Aspir-Low        

   



     81 MG 81 MG                          t}        81 MG           

 

     ProAir HFA ProAir HFA           No             1{puff_ 6xD  ProAir HFA     

      



     108 (90 108 (90                          as_need      108 (90           



     Base) Base)                          ed}       Base)           



     MCG/ACT MCG/ACT                                    MCG/ACT           

 

     Symbicort Symbicort           No             2{puffs QD   Symbicort        

   



     160-4.5 160-4.5                          }         160-4.5           



     MCG/ACT MCG/ACT                                    MCG/ACT           

 

     Spironolact Spironolact           No             1{table BID  Spironolac   

        



     one 100 MG one 100 MG                          t}        tone 100          

 



                                                  MG             

 

     Constulose Constulose           No             15{ml} BID  Constulose      

     



     10 GM/15ML 10 GM/15ML                                    10 GM/15ML        

   

 

     Cache Cache           No             1{table QD   Cache           



     Thyroid 60 Thyroid 60                          t_on_an      Thyroid 60     

      



     MG   MG                            _empty_      MG             



                                        stomach                     



                                        }                        

 

     Ipratropium Ipratropium           No             2.5{ml} TID  Ipratropiu   

        



     Bromide Bromide                                    m Bromide           



     0.02 % 0.02 %                                    0.02 %           

 

     Furosemide Furosemide           No             1{table BID  Furosemide     

      



     40 MG 40 MG                          t}        40 MG           

 

     NP Thyroid NP Thyroid           No                       NP Thyroid        

   



     60 MG 60 MG                                    60 MG           

 

     Spironolact Spironolact           No             1{table BID  Spironolac   

        



     one 100 MG one 100 MG                          t}        tone 100          

 



                                                  MG             

 

     Cache Cache           No             1{table QD   Cache           



     Thyroid 60 Thyroid 60                          t_on_an      Thyroid 60     

      



     MG   MG                            _empty_      MG             



                                        stomach                     



                                        }                        

 

     Furosemide Furosemide           No             1{table BID  Furosemide     

      



     40 MG 40 MG                          t}        40 MG           

 

     Constulose Constulose           No             15{ml} BID  Constulose      

     



     10 GM/15ML 10 GM/15ML                                    10 GM/15ML        

   

 

     HYDROcodone HYDROcodone           No             1{table QID  HYDROcodon   

        



     -Acetaminop -Acetaminop                          t_as_ne      e-Acetamin   

        



     hen 5-325 hen 5-325                          eded}      ophen           



     MG   MG                                      5-325 MG           

 

     Folic Acid Folic Acid           No             1{table QD   Folic Acid     

      



     1 MG 1 MG                          t}        1 MG           

 

     Symbicort Symbicort           No             2{puffs QD   Symbicort        

   



     160-4.5 160-4.5                          }         160-4.5           



     MCG/ACT MCG/ACT                                    MCG/ACT           

 

     Ondansetron Ondansetron           No             1{table QD   Ondansetro   

        



     4 MG 4 MG                          t_on_th      n 4 MG           



                                        e_tongu                     



                                        e_and_a                     



                                        llow_to                     



                                        _dissol                     



                                        ve}                      

 

     Ipratropium Ipratropium           No             2.5{ml} TID  Ipratropiu   

        



     Bromide Bromide                                    m Bromide           



     0.02 % 0.02 %                                    0.02 %           

 

     Aspir-Low Aspir-Low           No             1{table QD   Aspir-Low        

   



     81 MG 81 MG                          t}        81 MG           

 

     Tresiba Tresiba           No                  QD   Tresiba           



     FlexTouch FlexTouch                                    FlexTouch           



     200 UNIT/ UNIT/ML                                    200            



                                                  UNIT/ML           

 

     ProAir HFA ProAir HFA           No             1{puff_ 6xD  ProAir HFA     

      



     108 (90 108 (90                          as_need      108 (90           



     Base) Base)                          ed}       Base)           



     MCG/ACT MCG/ACT                                    MCG/ACT           

 

     Folic Acid Folic Acid           No             1{table QD   Folic Acid     

      



     1 MG 1 MG                          t}        1 MG           

 

     Symbicort Symbicort           No             2{puffs QD   Symbicort        

   



     160-4.5 160-4.5                          }         160-4.5           



     MCG/ACT MCG/ACT                                    MCG/ACT           

 

     Cache Cache           No             1{table QD   Cache           



     Thyroid 60 Thyroid 60                          t_on_an      Thyroid 60     

      



     MG   MG                            _empty_      MG             



                                        stomach                     



                                        }                        

 

     Furosemide Furosemide           No             1{table BID  Furosemide     

      



     40 MG 40 MG                          t}        40 MG           

 

     Constulose Constulose           No             15{ml} BID  Constulose      

     



     10 GM/15ML 10 GM/15ML                                    10 GM/15ML        

   

 

     Aspir-Low Aspir-Low           No             1{table QD   Aspir-Low        

   



     81 MG 81 MG                          t}        81 MG           

 

     HYDROcodone HYDROcodone           No             1{table QID  HYDROcodon   

        



     -Acetaminop -Acetaminop                          t_as_ne      e-Acetamin   

        



     hen 5-325 hen 5-325                          eded}      ophen           



     MG   MG                                      5-325 MG           

 

     ProAir HFA ProAir HFA           No             1{puff_ 6xD  ProAir HFA     

      



     108 (90 108 (90                          as_need      108 (90           



     Base) Base)                          ed}       Base)           



     MCG/ACT MCG/ACT                                    MCG/ACT           

 

     NP Thyroid NP Thyroid           No                       NP Thyroid        

   



     60 MG 60 MG                                    60 MG           

 

     Ondansetron Ondansetron           No             1{table QD   Ondansetro   

        



     4 MG 4 MG                          t_on_th      n 4 MG           



                                        e_tongu                     



                                        e_and_a                     



                                        llow_to                     



                                        _dissol                     



                                        ve}                      

 

     Tresiba Tresiba           No                  QD   Tresiba           



     FlexTouch FlexTouch                                    FlexTouch           



     200 UNIT/ UNIT/ML                                    200            



                                                  UNIT/ML           

 

     Ipratropium Ipratropium           No             2.5{ml} TID  Ipratropiu   

        



     Bromide Bromide                                    m Bromide           



     0.02 % 0.02 %                                    0.02 %           

 

     Spironolact Spironolact           No                       Spironolac      

     



     one 100 MG one 100 MG                                    tone 100          

 



                                                  MG             

 

     Folic Acid Folic Acid           No             1{table QD   Folic Acid     

      



     1 MG 1 MG                          t}        1 MG           

 

     Symbicort Symbicort           No             2{puffs QD   Symbicort        

   



     160-4.5 160-4.5                          }         160-4.5           



     MCG/ACT MCG/ACT                                    MCG/ACT           

 

     Cache Cache           No             1{table QD   Cache           



     Thyroid 60 Thyroid 60                          t_on_an      Thyroid 60     

      



     MG   MG                            _empty_      MG             



                                        stomach                     



                                        }                        

 

     Furosemide Furosemide           No             1{table BID  Furosemide     

      



     40 MG 40 MG                          t}        40 MG           

 

     Constulose Constulose           No             15{ml} BID  Constulose      

     



     10 GM/15ML 10 GM/15ML                                    10 GM/15ML        

   

 

     Aspir-Low Aspir-Low           No             1{table QD   Aspir-Low        

   



     81 MG 81 MG                          t}        81 MG           

 

     HYDROcodone HYDROcodone           No             1{table QID  HYDROcodon   

        



     -Acetaminop -Acetaminop                          t_as_ne      e-Acetamin   

        



     hen 5-325 hen 5-325                          eded}      ophen           



     MG   MG                                      5-325 MG           

 

     ProAir HFA ProAir HFA           No             1{puff_ 6xD  ProAir HFA     

      



     108 (90 108 (90                          as_need      108 (90           



     Base) Base)                          ed}       Base)           



     MCG/ACT MCG/ACT                                    MCG/ACT           

 

     NP Thyroid NP Thyroid           No                       NP Thyroid        

   



     60 MG 60 MG                                    60 MG           

 

     Ondansetron Ondansetron           No             1{table QD   Ondansetro   

        



     4 MG 4 MG                          t_on_th      n 4 MG           



                                        e_tongu                     



                                        e_and_a                     



                                        llow_to                     



                                        _dissol                     



                                        ve}                      

 

     Tresiba Tresiba           No                  QD   Tresiba           



     FlexTouch FlexTouch                                    FlexTouch           



     200 UNIT/ UNIT/ML                                    200            



                                                  UNIT/ML           

 

     Ipratropium Ipratropium           No             2.5{ml} TID  Ipratropiu   

        



     Bromide Bromide                                    m Bromide           



     0.02 % 0.02 %                                    0.02 %           

 

     Spironolact Spironolact           No                       Spironolac      

     



     one 100 MG one 100 MG                                    tone 100          

 



                                                  MG             

 

     Folic Acid Folic Acid           No             1{table QD   Folic Acid     

      



     1 MG 1 MG                          t}        1 MG           

 

     Symbicort Symbicort           No             2{puffs QD   Symbicort        

   



     160-4.5 160-4.5                          }         160-4.5           



     MCG/ACT MCG/ACT                                    MCG/ACT           

 

     Cache Cache           No             1{table QD   Cache           



     Thyroid 60 Thyroid 60                          t_on_an      Thyroid 60     

      



     MG   MG                            _empty_      MG             



                                        stomach                     



                                        }                        

 

     Furosemide Furosemide           No             1{table BID  Furosemide     

      



     40 MG 40 MG                          t}        40 MG           

 

     Constulose Constulose           No             15{ml} BID  Constulose      

     



     10 GM/15ML 10 GM/15ML                                    10 GM/15ML        

   

 

     Aspir-Low Aspir-Low           No             1{table QD   Aspir-Low        

   



     81 MG 81 MG                          t}        81 MG           

 

     HYDROcodone HYDROcodone           No             1{table QID  HYDROcodon   

        



     -Acetaminop -Acetaminop                          t_as_ne      e-Acetamin   

        



     hen 5-325 hen 5-325                          eded}      ophen           



     MG   MG                                      5-325 MG           

 

     ProAir HFA ProAir HFA           No             1{puff_ 6xD  ProAir HFA     

      



     108 (90 108 (90                          as_need      108 (90           



     Base) Base)                          ed}       Base)           



     MCG/ACT MCG/ACT                                    MCG/ACT           

 

     NP Thyroid NP Thyroid           No                       NP Thyroid        

   



     60 MG 60 MG                                    60 MG           

 

     Ondansetron Ondansetron           No             1{table QD   Ondansetro   

        



     4 MG 4 MG                          t_on_th      n 4 MG           



                                        e_tongu                     



                                        e_and_a                     



                                        llow_to                     



                                        _dissol                     



                                        ve}                      

 

     Tresiba Tresiba           No                  QD   Tresiba           



     FlexTouch FlexTouch                                    FlexTouch           



     200 UNIT/ UNIT/ML                                    200            



                                                  UNIT/ML           

 

     Ipratropium Ipratropium           No             2.5{ml} TID  Ipratropiu   

        



     Bromide Bromide                                    m Bromide           



     0.02 % 0.02 %                                    0.02 %           

 

     Spironolact Spironolact           No                       Spironolac      

     



     one 100 MG one 100 MG                                    tone 100          

 



                                                  MG             

 

     Ondansetron Ondansetron           No             1{table QD   Ondansetro   

        



     4 MG 4 MG                          t_on_th      n 4 MG           



                                        e_tongu                     



                                        e_and_a                     



                                        llow_to                     



                                        _dissol                     



                                        ve}                      

 

     Spironolact Spironolact           No             1{table BID  Spironolac   

        



     one 100 MG one 100 MG                          t}        tone 100          

 



                                                  MG             

 

     Furosemide Furosemide           No             1{table BID  Furosemide     

      



     40 MG 40 MG                          t}        40 MG           

 

     Symbicort Symbicort           No             2{puffs QD   Symbicort        

   



     160-4.5 160-4.5                          }         160-4.5           



     MCG/ACT MCG/ACT                                    MCG/ACT           

 

     Ipratropium Ipratropium           No             2.5{ml} TID  Ipratropiu   

        



     Bromide Bromide                                    m Bromide           



     0.02 % 0.02 %                                    0.02 %           

 

     Tresiba Tresiba           No                  QD   Tresiba           



     FlexTouch FlexTouch                                    FlexTouch           



     200 UNIT/ UNIT/ML                                    200            



                                                  UNIT/ML           

 

     Spironolact Spironolact           No                       Spironolac      

     



     one 100 MG one 100 MG                                    tone 100          

 



                                                  MG             

 

     Aspir-Low Aspir-Low           No             1{table QD   Aspir-Low        

   



     81 MG 81 MG                          t}        81 MG           

 

     HYDROcodone HYDROcodone           No             1{table QID  HYDROcodon   

        



     -Acetaminop -Acetaminop                          t_as_ne      e-Acetamin   

        



     hen 5-325 hen 5-325                          eded}      ophen           



     MG   MG                                      5-325 MG           

 

     Folic Acid Folic Acid           No             1{table QD   Folic Acid     

      



     1 MG 1 MG                          t}        1 MG           

 

     ProAir HFA ProAir HFA           No             1{puff_ 6xD  ProAir HFA     

      



     108 (90 108 (90                          as_need      108 (90           



     Base) Base)                          ed}       Base)           



     MCG/ACT MCG/ACT                                    MCG/ACT           

 

     NP Thyroid NP Thyroid           No                       NP Thyroid        

   



     60 MG 60 MG                                    60 MG           

 

     Cache Cache           No             1{table QD   Cache           



     Thyroid 60 Thyroid 60                          t_on_an      Thyroid 60     

      



     MG   MG                            _empty_      MG             



                                        stomach                     



                                        }                        

 

     Ondansetron Ondansetron           No             1{table QD   Ondansetro   

        



     4 MG 4 MG                          t_on_th      n 4 MG           



                                        e_tongu                     



                                        e_and_a                     



                                        llow_to                     



                                        _dissol                     



                                        ve}                      

 

     Spironolact Spironolact           No             1{table BID  Spironolac   

        



     one 100 MG one 100 MG                          t}        tone 100          

 



                                                  MG             

 

     Furosemide Furosemide           No             1{table BID  Furosemide     

      



     40 MG 40 MG                          t}        40 MG           

 

     Symbicort Symbicort           No             2{puffs QD   Symbicort        

   



     160-4.5 160-4.5                          }         160-4.5           



     MCG/ACT MCG/ACT                                    MCG/ACT           

 

     Ipratropium Ipratropium           No             2.5{ml} TID  Ipratropiu   

        



     Bromide Bromide                                    m Bromide           



     0.02 % 0.02 %                                    0.02 %           

 

     Tresiba Tresiba           No                  QD   Tresiba           



     FlexTouch FlexTouch                                    FlexTouch           



     200 UNIT/ UNIT/ML                                    200            



                                                  UNIT/ML           

 

     Spironolact Spironolact           No                       Spironolac      

     



     one 100 MG one 100 MG                                    tone 100          

 



                                                  MG             

 

     Aspir-Low Aspir-Low           No             1{table QD   Aspir-Low        

   



     81 MG 81 MG                          t}        81 MG           

 

     HYDROcodone HYDROcodone           No             1{table QID  HYDROcodon   

        



     -Acetaminop -Acetaminop                          t_as_ne      e-Acetamin   

        



     hen 5-325 hen 5-325                          eded}      ophen           



     MG   MG                                      5-325 MG           

 

     Folic Acid Folic Acid           No             1{table QD   Folic Acid     

      



     1 MG 1 MG                          t}        1 MG           

 

     ProAir HFA ProAir HFA           No             1{puff_ 6xD  ProAir HFA     

      



     108 (90 108 (90                          as_need      108 (90           



     Base) Base)                          ed}       Base)           



     MCG/ACT MCG/ACT                                    MCG/ACT           

 

     NP Thyroid NP Thyroid           No                       NP Thyroid        

   



     60 MG 60 MG                                    60 MG           

 

     Cache Cache           No             1{table QD   Cache           



     Thyroid 60 Thyroid 60                          t_on_an      Thyroid 60     

      



     MG   MG                            _empty_      MG             



                                        stomach                     



                                        }                        

 

     Ondansetron Ondansetron           No             1{table QD   Ondansetro   

        



     4 MG 4 MG                          t_on_      n 4 MG           



                                        e_tongu                     



                                        e_and_a                     



                                        llow_to                     



                                        _dissol                     



                                        ve}                      

 

     Spironolact Spironolact           No             1{table BID  Spironolac   

        



     one 100 MG one 100 MG                          t}        tone 100          

 



                                                  MG             

 

     Furosemide Furosemide           No             1{table BID  Furosemide     

      



     40 MG 40 MG                          t}        40 MG           

 

     Symbicort Symbicort           No             2{puffs QD   Symbicort        

   



     160-4.5 160-4.5                          }         160-4.5           



     MCG/ACT MCG/ACT                                    MCG/ACT           

 

     Ipratropium Ipratropium           No             2.5{ml} TID  Ipratropiu   

        



     Bromide Bromide                                    m Bromide           



     0.02 % 0.02 %                                    0.02 %           

 

     Tresiba Tresiba           No                  QD   Tresiba           



     FlexTouch FlexTouch                                    FlexTouch           



     200 UNIT/ UNIT/ML                                    200            



                                                  UNIT/ML           

 

     Spironolact Spironolact           No                       Spironolac      

     



     one 100 MG one 100 MG                                    tone 100          

 



                                                  MG             

 

     Aspir-Low Aspir-Low           No             1{table QD   Aspir-Low        

   



     81 MG 81 MG                          t}        81 MG           

 

     HYDROcodone HYDROcodone           No             1{table QID  HYDROcodon   

        



     -Acetaminop -Acetaminop                          t_as_ne      e-Acetamin   

        



     hen 5-325 hen 5-325                          eded}      ophen           



     MG   MG                                      5-325 MG           

 

     Folic Acid Folic Acid           No             1{table QD   Folic Acid     

      



     1 MG 1 MG                          t}        1 MG           

 

     ProAir HFA ProAir HFA           No             1{puff_ 6xD  ProAir HFA     

      



     108 (90 108 (90                          as_need      108 (90           



     Base) Base)                          ed}       Base)           



     MCG/ACT MCG/ACT                                    MCG/ACT           

 

     NP Thyroid NP Thyroid           No                       NP Thyroid        

   



     60 MG 60 MG                                    60 MG           

 

     Cache Cache           No             1{table QD   Cache           



     Thyroid 60 Thyroid 60                          t_on_an      Thyroid 60     

      



     MG   MG                            _empty_      MG             



                                        stomach                     



                                        }                        

 

     Ondansetron Ondansetron           No             1{table QD   Ondansetro   

        



     4 MG 4 MG                          t_on_th      n 4 MG           



                                        e_tongu                     



                                        e_and_a                     



                                        llow_to                     



                                        _dissol                     



                                        ve}                      

 

     Spironolact Spironolact           No             1{table BID  Spironolac   

        



     one 100 MG one 100 MG                          t}        tone 100          

 



                                                  MG             

 

     Furosemide Furosemide           No             1{table BID  Furosemide     

      



     40 MG 40 MG                          t}        40 MG           

 

     Symbicort Symbicort           No             2{puffs QD   Symbicort        

   



     160-4.5 160-4.5                          }         160-4.5           



     MCG/ACT MCG/ACT                                    MCG/ACT           

 

     Ipratropium Ipratropium           No             2.5{ml} TID  Ipratropiu   

        



     Bromide Bromide                                    m Bromide           



     0.02 % 0.02 %                                    0.02 %           

 

     Tresiba Tresiba           No                  QD   Tresiba           



     FlexTouch FlexTouch                                    FlexTouch           



     200 UNIT/ UNIT/ML                                    200            



                                                  UNIT/ML           

 

     Spironolact Spironolact           No                       Spironolac      

     



     one 100 MG one 100 MG                                    tone 100          

 



                                                  MG             

 

     Aspir-Low Aspir-Low           No             1{table QD   Aspir-Low        

   



     81 MG 81 MG                          t}        81 MG           

 

     HYDROcodone HYDROcodone           No             1{table QID  HYDROcodon   

        



     -Acetaminop -Acetaminop                          t_as_ne      e-Acetamin   

        



     hen 5-325 hen 5-325                          eded}      ophen           



     MG   MG                                      5-325 MG           

 

     Folic Acid Folic Acid           No             1{table QD   Folic Acid     

      



     1 MG 1 MG                          t}        1 MG           

 

     ProAir HFA ProAir HFA           No             1{puff_ 6xD  ProAir HFA     

      



     108 (90 108 (90                          as_need      108 (90           



     Base) Base)                          ed}       Base)           



     MCG/ACT MCG/ACT                                    MCG/ACT           

 

     NP Thyroid NP Thyroid           No                       NP Thyroid        

   



     60 MG 60 MG                                    60 MG           

 

     Cache Cache           No             1{table QD   Cache           



     Thyroid 60 Thyroid 60                          t_on_an      Thyroid 60     

      



     MG   MG                            _empty_      MG             



                                        stomach                     



                                        }                        

 

     Spironolact Spironolact           No                       Spironolac      

     



     one 100 MG one 100 MG                                    tone 100          

 



                                                  MG             

 

     Symbicort Symbicort           No             2{puffs QD   Symbicort        

   



     160-4.5 160-4.5                          }         160-4.5           



     MCG/ACT MCG/ACT                                    MCG/ACT           

 

     Furosemide Furosemide           No             1{table BID  Furosemide     

      



     40 MG 40 MG                          t}        40 MG           

 

     ProAir HFA ProAir HFA           No             1{puff_ 6xD  ProAir HFA     

      



     108 (90 108 (90                          as_need      108 (90           



     Base) Base)                          ed}       Base)           



     MCG/ACT MCG/ACT                                    MCG/ACT           

 

     Aspir-Low Aspir-Low           No             1{table QD   Aspir-Low        

   



     81 MG 81 MG                          t}        81 MG           

 

     Tresiba Tresiba           No                  QD   Tresiba           



     FlexTouch FlexTouch                                    FlexTouch           



     200 UNIT/ UNIT/ML                                    200            



                                                  UNIT/ML           

 

     Ondansetron Ondansetron           No             1{table QD   Ondansetro   

        



     4 MG 4 MG                          t_on_th      n 4 MG           



                                        e_tongu                     



                                        e_and_a                     



                                        llow_to                     



                                        _dissol                     



                                        ve}                      

 

     Spironolact Spironolact           No             1{table BID  Spironolac   

        



     one 100 MG one 100 MG                          t}        tone 100          

 



                                                  MG             

 

     Ipratropium Ipratropium           No             2.5{ml} TID  Ipratropiu   

        



     Bromide Bromide                                    m Bromide           



     0.02 % 0.02 %                                    0.02 %           

 

     Cache Cache           No             1{table QD   Cache           



     Thyroid 60 Thyroid 60                          t_on_an      Thyroid 60     

      



     MG   MG                            _empty_      MG             



                                        stomach                     



                                        }                        

 

     Folic Acid Folic Acid           No             1{table QD   Folic Acid     

      



     1 MG 1 MG                          t}        1 MG           

 

     HYDROcodone HYDROcodone           No             1{table QID  HYDROcodon   

        



     -Acetaminop -Acetaminop                          t_as_ne      e-Acetamin   

        



     hen 5-325 hen 5-325                          eded}      ophen           



     MG   MG                                      5-325 MG           

 

     NP Thyroid NP Thyroid           No                       NP Thyroid        

   



     60 MG 60 MG                                    60 MG           

 

     Spironolact Spironolact           No                       Spironolac      

     



     one 100 MG one 100 MG                                    tone 100          

 



                                                  MG             

 

     Symbicort Symbicort           No             2{puffs QD   Symbicort        

   



     160-4.5 160-4.5                          }         160-4.5           



     MCG/ACT MCG/ACT                                    MCG/ACT           

 

     Furosemide Furosemide           No             1{table BID  Furosemide     

      



     40 MG 40 MG                          t}        40 MG           

 

     ProAir HFA ProAir HFA           No             1{puff_ 6xD  ProAir HFA     

      



     108 (90 108 (90                          as_need      108 (90           



     Base) Base)                          ed}       Base)           



     MCG/ACT MCG/ACT                                    MCG/ACT           

 

     Aspir-Low Aspir-Low           No             1{table QD   Aspir-Low        

   



     81 MG 81 MG                          t}        81 MG           

 

     Tresiba Tresiba           No                  QD   Tresiba           



     FlexTouch FlexTouch                                    FlexTouch           



     200 UNIT/ UNIT/ML                                    200            



                                                  UNIT/ML           

 

     Ondansetron Ondansetron           No             1{table QD   Ondansetro   

        



     4 MG 4 MG                          t_on_th      n 4 MG           



                                        e_tongu                     



                                        e_and_a                     



                                        llow_to                     



                                        _dissol                     



                                        ve}                      

 

     Spironolact Spironolact           No             1{table BID  Spironolac   

        



     one 100 MG one 100 MG                          t}        tone 100          

 



                                                  MG             

 

     Ipratropium Ipratropium           No             2.5{ml} TID  Ipratropiu   

        



     Bromide Bromide                                    m Bromide           



     0.02 % 0.02 %                                    0.02 %           

 

     Cache Cache           No             1{table QD   Cache           



     Thyroid 60 Thyroid 60                          t_on_an      Thyroid 60     

      



     MG   MG                            _empty_      MG             



                                        stomach                     



                                        }                        

 

     Folic Acid Folic Acid           No             1{table QD   Folic Acid     

      



     1 MG 1 MG                          t}        1 MG           

 

     HYDROcodone HYDROcodone           No             1{table QID  HYDROcodon   

        



     -Acetaminop -Acetaminop                          t_as_ne      e-Acetamin   

        



     hen 5-325 hen 5-325                          eded}      ophen           



     MG   MG                                      5-325 MG           

 

     NP Thyroid NP Thyroid           No                       NP Thyroid        

   



     60 MG 60 MG                                    60 MG           

 

     Tresiba Tresiba           No                       Tresiba           



     FlexTouch FlexTouch                                    FlexTouch           



     200 UNIT/ UNIT/ML                                    200            



                                                  UNIT/ML           

 

     Folic Acid Folic Acid           No             1{table QD   Folic Acid     

      



     1 MG 1 MG                          t}        1 MG           

 

     ProAir HFA ProAir HFA           No             1{puff_ 6xD  ProAir HFA     

      



     108 (90 108 (90                          as_need      108 (90           



     Base) Base)                          ed}       Base)           



     MCG/ACT MCG/ACT                                    MCG/ACT           

 

     Constulose Constulose           No             30{ml} BID  Constulose      

     



     10 GM/15ML 10 GM/15ML                                    10 GM/15ML        

   

 

     HYDROcodone HYDROcodone           No             1{table QID  HYDROcodon   

        



     -Acetaminop -Acetaminop                          t_as_ne      e-Acetamin   

        



     hen 5-325 hen 5-325                          eded}      ophen           



     MG   MG                                      5-325 MG           

 

     Spironolact Spironolact           No             1{table BID  Spironolac   

        



     one 100 MG one 100 MG                          t}        tone 100          

 



                                                  MG             

 

     Ipratropium Ipratropium           No             2.5{ml} TID  Ipratropiu   

        



     Bromide Bromide                                    m Bromide           



     0.02 % 0.02 %                                    0.02 %           

 

     Cache Cache           No             1{table QD   Cache           



     Thyroid 60 Thyroid 60                          t_on_an      Thyroid 60     

      



     MG   MG                            _empty_      MG             



                                        stomach                     



                                        }                        

 

     Symbicort Symbicort           No             2{puffs QD   Symbicort        

   



     160-4.5 160-4.5                          }         160-4.5           



     MCG/ACT MCG/ACT                                    MCG/ACT           

 

     Furosemide Furosemide           No             1{table BID  Furosemide     

      



     40 MG 40 MG                          t}        40 MG           

 

     Tresiba Tresiba           No                       Tresiba           



     FlexTouch FlexTouch                                    FlexTouch           



     200 UNIT/ UNIT/ML                                    200            



                                                  UNIT/ML           

 

     Folic Acid Folic Acid           No             1{table QD   Folic Acid     

      



     1 MG 1 MG                          t}        1 MG           

 

     ProAir HFA ProAir HFA           No             1{puff_ 6xD  ProAir HFA     

      



     108 (90 108 (90                          as_need      108 (90           



     Base) Base)                          ed}       Base)           



     MCG/ACT MCG/ACT                                    MCG/ACT           

 

     Constulose Constulose           No             30{ml} BID  Constulose      

     



     10 GM/15ML 10 GM/15ML                                    10 GM/15ML        

   

 

     HYDROcodone HYDROcodone           No             1{table QID  HYDROcodon   

        



     -Acetaminop -Acetaminop                          t_as_ne      e-Acetamin   

        



     hen 5-325 hen 5-325                          eded}      ophen           



     MG   MG                                      5-325 MG           

 

     Spironolact Spironolact           No             1{table BID  Spironolac   

        



     one 100 MG one 100 MG                          t}        tone 100          

 



                                                  MG             

 

     Ipratropium Ipratropium           No             2.5{ml} TID  Ipratropiu   

        



     Bromide Bromide                                    m Bromide           



     0.02 % 0.02 %                                    0.02 %           

 

     Cache Cache           No             1{table QD   Cache           



     Thyroid 60 Thyroid 60                          t_on_an      Thyroid 60     

      



     MG   MG                            _empty_      MG             



                                        stomach                     



                                        }                        

 

     Symbicort Symbicort           No             2{puffs QD   Symbicort        

   



     160-4.5 160-4.5                          }         160-4.5           



     MCG/ACT MCG/ACT                                    MCG/ACT           

 

     Furosemide Furosemide           No             1{table BID  Furosemide     

      



     40 MG 40 MG                          t}        40 MG           

 

     Furosemide Furosemide           No             1{table BID  Furosemide     

      



     40 MG 40 MG                          t}        40 MG           

 

     Folic Acid Folic Acid           No             1{table QD   Folic Acid     

      



     1 MG 1 MG                          t}        1 MG           

 

     Tresiba Tresiba           No                       Tresiba           



     FlexTouch FlexTouch                                    FlexTouch           



     200 UNIT/ UNIT/ML                                    200            



                                                  UNIT/ML           

 

     Spiriva Spiriva           No                       Spiriva           



     HandiHaler HandiHaler                                    HandiHaler        

   

 

     Ipratropium Ipratropium           No             2.5{ml} TID  Ipratropiu   

        



     Bromide Bromide                                    m Bromide           



     0.02 % 0.02 %                                    0.02 %           

 

     Spironolact Spironolact           No             1{table BID  Spironolac   

        



     one 100 MG one 100 MG                          t}        tone 100          

 



                                                  MG             

 

     Tresiba Tresiba           No                  QD   Tresiba           



     FlexTouch FlexTouch                                    FlexTouch           



     200 UNIT/ UNIT/ML                                    200            



                                                  UNIT/ML           

 

     Constulose Constulose           No             30{ml} BID  Constulose      

     



     10 GM/15ML 10 GM/15ML                                    10 GM/15ML        

   

 

     predniSONE predniSONE           No             1{table QD   predniSONE     

      



     10 MG 10 MG                          t}        10 MG           

 

     ProAir HFA ProAir HFA           No             1{puff_ 6xD  ProAir HFA     

      



     108 (90 108 (90                          as_need      108 (90           



     Base) Base)                          ed}       Base)           



     MCG/ACT MCG/ACT                                    MCG/ACT           

 

     Cache Cache           No             1{table QD   Cache           



     Thyroid 60 Thyroid 60                          t_on_an      Thyroid 60     

      



     MG   MG                            _empty_      MG             



                                        stomach                     



                                        }                        

 

     HYDROcodone HYDROcodone           No             1{table QID  HYDROcodon   

        



     -Acetaminop -Acetaminop                          t_as_ne      e-Acetamin   

        



     hen 5-325 hen 5-325                          eded}      ophen           



     MG   MG                                      5-325 MG           

 

     Constulose Constulose           No             30{ml} BID  Constulose      

     



     10 GM/15ML 10 GM/15ML                                    10 GM/15ML        

   

 

     Tresiba Tresiba           No                       Tresiba           



     FlexTouch FlexTouch                                    FlexTouch           



     200 UNIT/ UNIT/ML                                    200            



                                                  UNIT/ML           

 

     Folic Acid Folic Acid           No             1{table QD   Folic Acid     

      



     1 MG 1 MG                          t}        1 MG           

 

     ProAir HFA ProAir HFA           No             1{puff_ 6xD  ProAir HFA     

      



     108 (90 108 (90                          as_need      108 (90           



     Base) Base)                          ed}       Base)           



     MCG/ACT MCG/ACT                                    MCG/ACT           

 

     HYDROcodone HYDROcodone           No             1{table QID  HYDROcodon   

        



     -Acetaminop -Acetaminop                          t_as_ne      e-Acetamin   

        



     hen 5-325 hen 5-325                          eded}      ophen           



     MG   MG                                      5-325 MG           

 

     predniSONE predniSONE           No             1{table QD   predniSONE     

      



     10 MG 10 MG                          t}        10 MG           

 

     Spiriva Spiriva           No                       Spiriva           



     HandiHaler HandiHaler                                    HandiHaler        

   

 

     Cache Cache           No             1{table QD   Cache           



     Thyroid 60 Thyroid 60                          t_on_an      Thyroid 60     

      



     MG   MG                            _empty_      MG             



                                        stomach                     



                                        }                        

 

     Furosemide Furosemide           No             1{table BID  Furosemide     

      



     40 MG 40 MG                          t}        40 MG           

 

     Tresiba Tresiba           No                  QD   Tresiba           



     FlexTouch FlexTouch                                    FlexTouch           



     200 UNIT/ UNIT/ML                                    200            



                                                  UNIT/ML           

 

     Spironolact Spironolact           No             1{table BID  Spironolac   

        



     one 100 MG one 100 MG                          t}        tone 100          

 



                                                  MG             

 

     Constulose Constulose      2- No             30{ml} BID  Constulose     

      



     10 GM/15ML 10 GM/15ML      08-21                          10 GM/15ML       

    



                    00:00                                         



                    :00                                          

 

     Constulose Constulose      2- No             30{ml} BID  Constulose     

      



     10 GM/15ML 10 GM/15ML      08-21                          10 GM/15ML       

    



                    00:00                                         



                    :00                                          

 

     Constulose Constulose      2- No             30{ml} BID  Constulose     

      



     10 GM/15ML 10 GM/15ML      08-21                          10 GM/15ML       

    



                    00:00                                         



                    :00                                          

 

     Constulose Constulose      2- No             30{ml} BID  Constulose     

      



     10 GM/15ML 10 GM/15ML      08-21                          10 GM/15ML       

    



                    00:00                                         



                    :00                                          

 

     Constulose Constulose      2- No             30{ml} BID  Constulose     

      



     10 GM/15ML 10 GM/15ML      08-21                          10 GM/15ML       

    



                    00:00                                         



                    :00                                          

 

     Constulose Constulose      2- No             30{ml} BID  Constulose     

      



     10 GM/15ML 10 GM/15ML      08-21                          10 GM/15ML       

    



                    00:00                                         



                    :00                                          







Vital Signs







             Vital Name   Observation Time Observation Value Comments     Source

 

             HEIGHT       2023 09:00:00 188 cm                    

 

             WEIGHT       2023 09:00:00 100.3 kg                  

 

             HEIGHT       2023 09:00:00 188 cm                    

 

             WEIGHT       2023 09:00:00 100.3 kg                  

 

             HEIGHT       2023 09:00:00 188 cm                    

 

             WEIGHT       2023 09:00:00 100.3 kg                  

 

             height       2022-10-26 14:10:00 76 [in_i]                 [facility]

 

             weight       2022-10-26 14:10:00 231.9 [lb_av]              [facility] 

[facility]

 

             temperature  2022-10-26 14:10:00 97.6 [degF]               [facility]

[facility]

 

             bmi          2022-10-26 14:10:00 28.22 kg/m2               [facility]

 

             oximetry     2022-10-26 14:10:00 94 %                      [facility]

 

             respiratory rate 2022-10-26 14:10:00 16 /min                   Comm

on [facility]

 

             blood pressure 2022-10-26 14:10:00 143 mm[Hg]                [facility]

 [facility] -



             systolic                                            [facility]

 

             blood pressure 2022-10-26 14:10:00 73 mm[Hg]                 [facility]

 [facility] -



             diastolic                                           [facility]

 

             height       2022 09:40:00 76 [in_i]                 [facility]

 

             weight       2022 09:40:00 222.8 [lb_av]              [facility]

 

             temperature  2022 09:40:00 98.2 [degF]               [facility]

 

             bmi          2022 09:40:00 27.12 kg/m2               [facility]

 

             oximetry     2022 09:40:00 96 %                      [facility]

 

             respiratory rate 2022 09:40:00 17 /min                   Comm

on [facility]

 

             blood pressure 2022 09:40:00 140 mm[Hg]                [facility] -



             systolic                                            [facility]

 

             blood pressure 2022 09:40:00 78 mm[Hg]                 [facility]

 [facility] -



             diastolic                                           [facility]

 

             height       2022 14:40:00 76 [in_i]                 [facility]

[facility]

 

             weight       2022 14:40:00 208 [lb_av]               [facility]

 

             temperature  2022 14:40:00 98.6 [degF]               [facility]

 

             bmi          2022 14:40:00 25.32 kg/m2               [facility]

 

             oximetry     2022 14:40:00 96 %                      [facility]

 

             respiratory rate 2022 14:40:00 16 /min                   Comm

on [facility]

 

             blood pressure 2022 14:40:00 134 mm[Hg]                [facility]



             systolic                                            [facility]

 

             blood pressure 2022 14:40:00 70 mm[Hg]                 [facility]

 [facility] -



             diastolic                                           [facility]

 

             height       2022 10:30:00 76 [in_i]                 [facility]

[facility] -



                                                                 [facility]

 

             weight       2022 10:30:00 201.1 [lb_av]              [facility] 

[facility] -



                                                                 [facility]

 

             temperature  2022 10:30:00 98.0 [degF]               [facility]

[facility]

 

             bmi          2022 10:30:00 24.48 kg/m2               [facility]

[facility]

 

             oximetry     2022 10:30:00 90 %                      [facility]

[facility]

 

             respiratory rate 2022 10:30:00 16 /min                   Comm

on [facility]

 

             blood pressure 2022 10:30:00 129 mm[Hg]                [facility]

 [facility]



             systolic                                            [facility]

 

             blood pressure 2022 10:30:00 69 mm[Hg]                 [facility]

 [facility]



             diastolic                                           [facility]

 

             height       2022 14:20:00 76 [in_i]                 [facility]

[facility]

 

             weight       2022 14:20:00 225 [lb_av]               [facility]

[facility] -



                                                                 [facility]

 

             temperature  2022 14:20:00 97 [degF]                 [facility]

[facility] -



                                                                 [facility]

 

             bmi          2022 14:20:00 27.38 kg/m2               [facility]

[facility] -



                                                                 [facility]

 

             blood pressure 2022 14:20:00 129 mm[Hg]                [facility]

 [facility]



             systolic                                            [facility]

 

             blood pressure 2022 14:20:00 78 mm[Hg]                 [facility]

 [facility]



             diastolic                                           [facility]

 

             height       2022 14:10:00 76 [in_i]                 [facility]

 

             weight       2022 14:10:00 230.2 [lb_av]              [facility]

 

             temperature  2022 14:10:00 98.0 [degF]               [facility]

 

             bmi          2022 14:10:00 28.02 kg/m2               [facility]

 

             oximetry     2022 14:10:00 97 %                      [facility]

 

             respiratory rate 2022 14:10:00 18 /min                   Comm

on [facility]

 

             blood pressure 2022 14:10:00 132 mm[Hg]                [facility]

 [facility]



             systolic                                            [facility]

 

             blood pressure 2022 14:10:00 79 mm[Hg]                 [facility]



             diastolic                                           [facility]

 

             HEIGHT       2022 08:26:00 188 cm                    

 

             WEIGHT       2022 08:26:00 96.843 kg                 

 

             HEIGHT       2022 08:26:00 188 cm                    

 

             WEIGHT       2022 08:26:00 96.843 kg                 

 

             HEIGHT       2022 08:26:00 188 cm                    

 

             WEIGHT       2022 08:26:00 96.843 kg                 

 

             HEIGHT       2022 08:10:00 188 cm                    

 

             WEIGHT       2022 08:10:00 96.752 kg                 

 

             HEIGHT       2022 13:44:00 188 cm                    

 

             WEIGHT       2022 13:44:00 92.987 kg                 

 

             HEIGHT       2022 08:10:00 188 cm                    

 

             WEIGHT       2022 08:10:00 96.752 kg                 

 

             HEIGHT       2022 13:44:00 188 cm                    

 

             WEIGHT       2022 13:44:00 92.987 kg                 

 

             HEIGHT       2022 08:10:00 188 cm                    

 

             WEIGHT       2022 08:10:00 96.752 kg                 

 

             HEIGHT       2022 13:44:00 188 cm                    

 

             WEIGHT       2022 13:44:00 92.987 kg                 

 

             Systolic blood 2022 18:17:00 146 mm[Hg]                [facility]                                            Medicine

 

             Diastolic blood 2022 18:17:00 75 mm[Hg]                 [facility]                                            Medicine

 

             Heart rate   2022 18:17:00 95 /min                   [facility]

 

             Body temperature 2022 18:17:00 37.06 Renée                 [facility]

 

             Body height  2022 18:17:00 188 cm                    [facility]

 

             Body weight  2022 18:17:00 101.969 kg                [facility]

 

             BMI          2022 18:17:00 28.86 kg/m2               [facility]

 

             height       2022 15:50:00 76 [in_i]                 [facility]

 

             weight       2022 15:50:00 227.8 [lb_av]              [facility]

 

             temperature  2022 15:50:00 98.6 [degF]               [facility]

 

             bmi          2022 15:50:00 27.73 kg/m2               [facility]

 

             oximetry     2022 15:50:00 95 %                      [facility]

 

             respiratory rate 2022 15:50:00 16 /min                   Comm

on [facility]

 

             blood pressure 2022 15:50:00 134 mm[Hg]                [facility]

 [facility] -



             systolic                                            [facility]

 

             blood pressure 2022 15:50:00 78 mm[Hg]                 [facility]

 [facility] -



             diastolic                                           [facility]

 

             height       2022 15:30:00 76 [in_i]                 [facility]

 

             weight       2022 15:30:00 227.8 [lb_av]              [facility]

 

             temperature  2022 15:30:00 99.7 [degF]               [facility]

 

             bmi          2022 15:30:00 27.73 kg/m2               [facility]

 

             oximetry     2022 15:30:00 95 %                      [facility]

 

             blood pressure 2022 15:30:00 134 mm[Hg]                [facility]

 [facility] -



             systolic                                            [facility]

 

             blood pressure 2022 15:30:00 78 mm[Hg]                 [facility]

 [facility] -



             diastolic                                           [facility]

 

             height       2021-10-06 08:50:00 76 [in_i]                 [facility]

 

             weight       2021-10-06 08:50:00 224.1 [lb_av]              [facility]

 

             temperature  2021-10-06 08:50:00 97.3 [degF]               [facility]

 

             bmi          2021-10-06 08:50:00 27.28 kg/m2               [facility]

 

             oximetry     2021-10-06 08:50:00 96 %                      [facility]

 

             respiratory rate 2021-10-06 08:50:00 18 /min                   Comm

on [facility]

 

             blood pressure 2021-10-06 08:50:00 130 mm[Hg]                [facility]

 [facility] -



             systolic                                            [facility]

 

             blood pressure 2021-10-06 08:50:00 72 mm[Hg]                 [facility]

 [facility]



             diastolic                                           [facility]

 

             Systolic blood 2019-10-17 15:21:00 115 mm[Hg]                [facility]



             pressure                                            Medicine

 

             Diastolic blood 2019-10-17 15:21:00 67 mm[Hg]                 [facility]                                            Medicine

 

             Heart rate   2019-10-17 15:21:00 96 /min                   [facility]

 

             Body temperature 2019-10-17 15:21:00 36.28 Renée                 [facility]

 

             Body height  2019-10-17 15:21:00 188 cm                    [facility]

 

             Body weight  2019-10-17 15:21:00 102.059 kg                [facility]

 

             BMI          2019-10-17 15:21:00 28.89 kg/m2               [facility]

 

             Systolic blood 2019-10-17 15:21:00 115 mm[Hg]                [facility]                                            Medicine

 

             Diastolic blood 2019-10-17 15:21:00 67 mm[Hg]                 [facility]                                            Medicine

 

             Heart rate   2019-10-17 15:21:00 96 /min                   [facility]

 

             Body temperature 2019-10-17 15:21:00 36.28 Renée                 [facility]

 

             Body height  2019-10-17 15:21:00 188 cm                    [facility]

 

             Body weight  2019-10-17 15:21:00 102.059 kg                [facility]

 

             BMI          2019-10-17 15:21:00 28.89 kg/m2               [facility]

 

             Systolic blood 2023 11:00:00 127 mm[Hg]                [facility]

 

             Diastolic blood 2023 11:00:00 78 mm[Hg]                 [facility]

[facility]

 

             Heart rate   2023 11:00:00 102 /min                  [facility]

 

             Oxygen saturation in 2023 11:00:00 91 /min                   

[facility]



             Arterial blood by                                        Medical Ce

nter



             Pulse oximetry                                        

 

             Respiratory rate 2023 10:00:00 16 /min                   [facility]

 

             Body temperature 2023 09:00:00 36.94 Renée                 [facility]

 

             Body height  2023 09:00:00 188 cm                    [facility]

 

             Body weight  2023 09:00:00 100.3 kg                  [facility]

 

             BMI          2023 09:00:00 28.38 kg/m2               [facility]

 

             Systolic blood 2022 11:09:00 136 mm[Hg]                [facility]

 

             Diastolic blood 2022 11:09:00 69 mm[Hg]                 [facility]

[facility]

 

             Heart rate   2022 11:09:00 98 /min                   [facility]

 

             Body temperature 2022 11:09:00 36.67 Renée                 [facility]

 

             Respiratory rate 2022 11:09:00 18 /min                   [facility]

 

             Oxygen saturation in 2022 11:09:00 96 /min                   

[facility]



             Arterial blood by                                        Medical Ce

nter



             Pulse oximetry                                        

 

             Body height  2022 08:26:00 188 cm                    [facility]

 

             Body weight  2022 08:26:00 96.843 kg                 [facility]

 

             BMI          2022 08:26:00 27.41 kg/m2               [facility]







Procedures







                Procedure       Date / Time     Performing Clinician Source



                                Performed                       

 

                POCT-GLUCOSE METER 2023 06:48:00 [facility]

 

                POCT-GLUCOSE METER 2023 06:18:00 [facility]

 

                US ABDOMEN COMPLETE 2023 02:30:00 [facility]

 

                US DOPPLER      2023 02:30:00 [facility]

 

                POCT-GLUCOSE METER 2023 01:00:00 [facility]

 

                LACTIC ACID, VENOUS 2023 00:29:00 Alejandra Hong  [facility]

 

                POCT-GLUCOSE METER 2023 00:25:00 [facility]

 

                XR CHEST 1 VIEW PORTABLE 2023 18:15:00 Sophie [facility]

 

                POCT-GLUCOSE METER 2023 17:42:00 [facility]

 

                XR CHEST 1 VIEW PORTABLE 2023 17:20:00 Shai Barrios  [facility]

 

                POCT-GL[facility]COSE MET[facility]23 [facility]:00 [facility]

 

                ANTI-NUCLEAR ANTIBODY 2023 17:09:00 Maggie Cool [facility]

[facility]



                ([facility])                                           [facility]

 

                HEPATITIS B SURFACE 2023 [facility]09[facility]

 

                HEPATITIS C ANTIBODY 2023 17:09:00 Travon [facility]

 

                HEPATITIS B SURFACE 2023 17:09:00 [facility]

 

                [facility]23 [facility]09[facility]



                ANTIB[facility], [facility]

 

                HEPATITIS A ANTIBODY, IGG 2023 17:09:00 Maggie Cool

[facility]

 

                HEPATITIS A ANTIBODY, IGM 2023 17:09:00 Maggie Cool

[facility]

 

                HEPATITIS C PCR, 2023 17:09:00 Everardoedydaja [facility]

 

                CERULOPLASMIN   2023 17:09:00 [facility]

 

                LACTIC ACID, VENOUS 2023 17:09:00 HalAlejandra  [facility]

 

                JEWEL TITER AND PATTERN 2023 17:09:00 [facility]

 

                XR ABDOMEN/KUB 1 VIEW 2023 16:13:00 Sophie [facility]

 

                POCT-GLUCOSE METER 2023 15:10:00 [facility]

 

                POCT-GLUCOSE METER 2023 14:16:00 [facility]

 

                POCT-GLUCOSE METER 2023 14:06:00 [facility]

 

                BLOOD CULTURE   2023 13:28:[facility]

 

                BLOOD CULTURE   2023 13:27:[facility]

 

                CBC W/PLT COUNT & AUTO 2023 10:46:00 Sophie [facility]

 

                (CELLAVISION MANUAL DIFF) 2023 10:46:00 Sophie [facility]

 

                CBC W/PLT COUNT & AUTO 2023 10:46:00 [facility]

 

                PROTHROMBIN TIME/INR 2023 10:46:00 [facility]

 

                APTT            2023 10:46:00 Sophie [facility]

 

                LACTIC ACID, ARTERIAL 2023 10:46:[facility]

 

                TSH/FREE T4 IF INDICATED 2023 10:46:[facility]

 

                VITAMIN B12     2023 10:46:[facility]

 

                HEMOGLOBIN A1C  2023 10:46:[facility]

 

                T4, FREE        2023 10:46:[facility]

 

                IMMUNOGLOBULIN G (IGG) 2023 10:46:00 [facility]

 

                FERRITIN        2023 10:46:00 Travon [facility]

 

                IRON, TIBC, % SAT. 2023 10:46:00 [facility]



                (WITHOUT FERRITIN)                                 [facility]

 

                ALPHA FETOPROTEIN (AFP), 2023 10:46:00 Travon Maggie [facility]



                TUMOR MARKER                                    [facility]

 

                BASIC METABOLIC PANEL 2023 10:45:[facility]

 

                HEPATIC FUNCTION PANEL 2023 [facility]:45:00 [facility]

 

                LIPID PANEL     2023 10:45:00 Travon [facility]

 

                AMMONIA         2023 10:42:[facility]

 

                XR CHEST 1 VIEW PORTABLE 2023 09:[facility]



                / [facility]                                       [facility]

 

                B-TYPE NATRIURETIC FACTOR 2022 13:52:00                 [facility]



                (BNP)                                           [facility]

 

                POCT-GLUCOSE METER 2022 10:43:00 Elayne Dempsey [facility]

 

                REPORT OF PROCEDURE - 2022 10:33:46 Elayne Dempsey [facility]



                END[facility]COPY [facility]

 

                TISSUE EXAM     2022 10:01:00 [facility]

 

                COLONOSCOPY     2022 09:22:00 Ros[facility]

 

                COLONOSCOPY, WITH 2022 09:22:00 [facility]



                POLYPECTOMY                                     [facility]

 

                POCT-GLUCOSE METER 2022 08:51:00 [facility]

[facility]

 

                SARS-COV2/RT-PCR (\[facility] [place] [facility]\"" & 2022 06:52:31 [facility]



                REF LABS)                                       [facility]

 

                REPORT OF PROCEDURE - 2022 09:44:13 [facility]



                ENDOSCOPY [facility]

 

                REPORT OF PROCEDURE - 2022 09:43:23 [facility]



                ENDOSCOPY [facility]

 

                COLONOSCOPY     2022 08:47:00 [facility]

 

                EGD             2022 08:47:00 [facility]



                (ESOPHAGOGASTRODUODENOSCO                                 Medica

[facility]



                PY)                                             

 

                POCT-GLUCOSE METER 2022 08:11:00 [facility]

 

                COMPREHENSIVE METABOLIC 2022 20:06:00 Ingrid Alcazar [facility]

 

                CEA             2022 20:06:00 Ingrid Alcazar [facility]

 

                CBC W/AUTO DIFF WITH 2022 20:06:00 Ingrid Alcazar [facility]

 

                AFP TUMOR MARKER 2022 20:06:00 Ingrid Alcazar [facility]

 

                CANCER ANTIGEN 19-9 2022 20:06:00 Ingrid Alcazar [facility]







Plan of Care







             Planned Activity Planned Date Details      Comments     Source

 

             Future Scheduled 2032   Screening for malignant              

[facility]



             Test         00:00:00     neoplasm of colon              Medical Ce

nter



                                       (procedure) [code =              



                                       412349889]                

 

             Future Scheduled 2032   Screening for malignant              

[facility]



             Test         00:00:00     neoplasm of colon              Medical Ce

nter



                                       (procedure) [code =              



                                       387687540]                

 

             Future Scheduled 2032   Screening for malignant              

[facility]



             Test         00:00:00     neoplasm of colon              Medical Ce

nter



                                       (procedure) [code =              



                                       172479947]                

 

             Future Scheduled 2032   Screening for malignant              

[facility]



             Test         00:00:00     neoplasm of colon              Medical Ce

nter



                                       (procedure) [code =              



                                       [id number]]                

 

             Future Scheduled 2032   Screening for malignant              

[facility]



             Test         00:00:00     neoplasm of colon              Medical Ce

nter



                                       (procedure) [code =              



                                       458361167]                

 

             Future Scheduled 2032   Screening for malignant              

[facility]



             Test         00:00:00     neoplasm of colon              Medical Ce

nter



                                       (procedure) [code =              



                                       996457447]                

 

             Future Scheduled 2032   Screening for malignant              

[facility]



             Test         00:00:00     neoplasm of colon              Medical Ce

nter



                                       (procedure) [code =              



                                       308629740]                

 

             Future Scheduled 2032   Screening for malignant              

[facility]



             Test         00:00:00     neoplasm of colon              Medical Ce

nter



                                       (procedure) [code =              



                                       922524422]                

 

             Future Scheduled 2032   Screening for malignant              

[facility]



             Test         00:00:00     neoplasm of colon              Medical Ce

nter



                                       (procedure) [code =              



                                       020288568]                

 

             Future Scheduled 2032   Screening for malignant              

[facility]



             Test         00:00:00     neoplasm of colon              Medical Ce

nter



                                       (procedure) [code =              



                                       857773953]                

 

             Future Scheduled 2032   Screening for malignant              

[facility]



             Test         00:00:00     neoplasm of colon              Medical Ce

nter



                                       (procedure) [code =              



                                       445365694]                

 

             Future Scheduled 2032   Screening for malignant              

[facility]



             Test         00:00:00     neoplasm of colon              Medical Ce

nter



                                       (procedure) [code =              



                                       125157092]                

 

             Future Scheduled 2028   Lipid panel (procedure)              

[facility]



             Test         00:00:00     [code = 35017527]              Medical Ce

nter

 

             Future Scheduled 2023   Tobacco Cessation              [facility]



             Test         00:00:00     Counseling and              Medical Cente

r



                                       Screening (12+) [code =              



                                       Tobacco Cessation              



                                       Counseling and              



                                       Screening (12+)]              

 

             Future Scheduled 2023   Tobacco Cessation              [facility]



             Test         00:00:00     Counseling and              Medical Cente

r



                                       Screening (12+) [code =              



                                       Tobacco Cessation              



                                       Counseling and              



                                       Screening (12+)]              

 

             Future Scheduled 2023   Tobacco Cessation              [facility]



             Test         00:00:00     Counseling and              Medical Cente

r



                                       Screening (12+) [code =              



                                       Tobacco Cessation              



                                       Counseling and              



                                       Screening (12+)]              

 

             Future Scheduled 2023   Tobacco Cessation              [facility]



             Test         00:00:00     Counseling and              Medical Cente

r



                                       Screening (12+) [code =              



                                       Tobacco Cessation              



                                       Counseling and              



                                       Screening (12+)]              

 

             Future Scheduled 2023   DEPRESSION SCREENING              [facility]



             Test         00:00:00     (12+) [code =              Medical Center



                                       DEPRESSION SCREENING              



                                       (12+)]                    

 

             Future Scheduled 2023   DEPRESSION SCREENING              [facility]



             Test         00:00:00     (12+) [code =              Medical Center



                                       DEPRESSION SCREENING              



                                       (12+)]                    

 

             Future Scheduled 2022   INFLUENZA VACCINE (#1)              C

[facility]



             Test         00:00:00     [code = INFLUENZA              Medical Ce

nter



                                       VACCINE (#1)]              

 

             Future Scheduled 2022   INFLUENZA VACCINE (#1)              C

[facility]



             Test         00:00:00     [code = INFLUENZA              Medical Ce

nter



                                       VACCINE (#1)]              

 

             Future Scheduled 2022   INFLUENZA VACCINE (#1)              C

[facility]



             Test         00:00:00     [code = INFLUENZA              Medical Ce

nter



                                       VACCINE (#1)]              

 

             Future Scheduled 2022   INFLUENZA VACCINE (#1)              C

[facility]



             Test         00:00:00     [code = INFLUENZA              Medical Ce

nter



                                       VACCINE (#1)]              

 

             Future Scheduled 2022   INFLUENZA VACCINE (#1)              C

[facility]



             Test         00:00:00     [code = INFLUENZA              Medical Ce

nter



                                       VACCINE (#1)]              

 

             Future Scheduled 2022   INFLUENZA VACCINE (#1)              C

[facility]



             Test         00:00:00     [code = INFLUENZA              Medical Ce

nter



                                       VACCINE (#1)]              

 

             Future Scheduled 2022   Screening for malignant              

[facility]



             Test         11:27:55     neoplasm of colon              of Medicin

e



                                       (procedure) [code =              



                                       805268477]                

 

             Future Scheduled 2022   TETANUS SHOT (ADULT)              [facility]



             Test         11:27:55     [code = TETANUS SHOT              of Medi

cine



                                       (ADULT)]                  

 

             Future Scheduled 2022   BMI FOLLOW UP PLAN              [facility]



             Test         11:27:55     [code = BMI FOLLOW UP              of Med

icine



                                       PLAN]                     

 

             Future Scheduled 2022   Hepatitis C screening              Ba

[facility]



             Test         11:27:55     (procedure) [code =              of Medic

ine



                                       [id number]]                

 

             Future Scheduled 2022   Human immunodeficiency              B

[facility]



             Test         11:27:55     virus screening              of Medicine



                                       (procedure) [code =              



                                       690551691]                

 

             Future Scheduled 2022   Screening for malignant              

[facility]



             Test         11:27:55     neoplasm of lung              of Medicine



                                       (procedure) [code =              



                                       104576321]                

 

             Future Scheduled 2022   ZOSTER VACCINE (1 of 2)              

[facility]



             Test         11:27:55     [code = ZOSTER VACCINE              of Me

dicine



                                       (1 of 2)]                 

 

             Future Scheduled 2022   COVID-19 Vaccine (2 -              Ba

[facility]



             Test         11:27:55     Booster for Navdeep              of Medic

ine



                                       series) [code =              



                                       COVID-19 Vaccine (2 -              



                                       Booster for Navdeep              



                                       series)]                  

 

             Future Scheduled 2022   FLU VACCINE > 6 MONTHS              B

[facility]



             Test         11:27:55     [code = FLU VACCINE > 6              of M

edicine



                                       MONTHS]                   

 

             Future Scheduled 2022   IR PORT PLACEMENT EQUAL 1 Occurrences

 [facility]



             Test         14:40:38     OR > 5 YEARS [code = starting     of Medi

cine



                                       18906]       2022 until 



                                                    2023   

 

             Future Scheduled 2022   CT CHEST ABDOMEN PELVIS 1 Occurrences

 [facility]



             Test         14:19:01     W CONTRAST [code = starting     of Medici

ne



                                       82424-2]     2022 until 



                                                    2023   

 

             Future Scheduled 2022   WILD 1 [code = NOCPT] Ordered:     Ba

[facility]



             Test         14:18:00                  2022   of Medicine

 

             Future Scheduled 2022   TEMPUS XF LIQUID BIOPSY Ordered:     

[facility]



             Test         14:17:42     NGS [code = [id number]] 2022   of Med

icine

 

             Future Scheduled 2022   TEMPUS XT TISSUE NGS Ordered:     [facility]



             Test         14:17:42     [code = 93388218] 2022   of Medicin

e

 

             Future Scheduled 2022   DEPRESSION SCREENING              [facility]



             Test         00:00:00     (12+) [code =              Medical Center



                                       DEPRESSION SCREENING              



                                       (12+)]                    

 

             Future Scheduled 2022   DEPRESSION SCREENING              [facility]



             Test         00:00:00     (12+) [code =              Medical Center



                                       DEPRESSION SCREENING              



                                       (12+)]                    

 

             Future Scheduled 2022   DEPRESSION SCREENING              [facility]



             Test         00:00:00     (12+) [code =              Medical Center



                                       DEPRESSION SCREENING              



                                       (12+)]                    

 

             Future Scheduled 2022   DEPRESSION SCREENING              [facility]



             Test         00:00:00     (12+) [code =              [facility] Center



                                       DEPRESSION SCREENING              



                                       (12+)]                    

 

             Future Scheduled 2020   MEDICARE ANNUAL              [facility]



             Test         00:00:00     WELLNESS (YEAR 2 or              Medical 

Center



                                       FIRST YEAR if no IPPE)              



                                       [code = MEDICARE ANNUAL              



                                       WELLNESS (YEAR 2 or              



                                       FIRST YEAR if no IPPE)]              

 

             Future Scheduled 2020   MEDICARE ANNUAL              [facility]



             Test         00:00:00     WELLNESS (YEAR 2 or              Medical 

Center



                                       FIRST YEAR if no IPPE)              



                                       [code = MEDICARE ANNUAL              



                                       WELLNESS (YEAR 2 or              



                                       FIRST YEAR if no IPPE)]              

 

             Future Scheduled 2020   MEDICARE ANNUAL              [facility]



             Test         00:00:00     WELLNESS (YEAR 2 or              Medical 

Center



                                       FIRST YEAR if no IPPE)              



                                       [code = MEDICARE ANNUAL              



                                       WELLNESS (YEAR 2 or              



                                       FIRST YEAR if no IPPE)]              

 

             Future Scheduled 2020   MEDICARE ANNUAL              [facility]



             Test         00:00:00     WELLNESS (YEAR 2 or              Medical 

Center



                                       FIRST YEAR if no IPPE)              



                                       [code = MEDICARE ANNUAL              



                                       WELLNESS (YEAR 2 or              



                                       FIRST YEAR if no IPPE)]              

 

             Future Scheduled 2020   MEDICARE ANNUAL              [facility]



             Test         00:00:00     WELLNESS (YEAR 2 or              Medical 

Center



                                       FIRST YEAR if no IPPE)              



                                       [code = MEDICARE ANNUAL              



                                       WELLNESS (YEAR 2 or              



                                       FIRST YEAR if no IPPE)]              

 

             Future Scheduled 2020   MEDICARE ANNUAL              [facility]



             Test         00:00:00     WELLNESS (YEAR 2 or              Medical 

Center



                                       FIRST YEAR if no IPPE)              



                                       [code = MEDICARE ANNUAL              



                                       WELLNESS (YEAR 2 or              



                                       FIRST YEAR if no IPPE)]              

 

             Future Scheduled 2009   SHINGLES VACCINES (1 of              

[facility]



             Test         00:00:00     2) [code = SHINGLES              Medical 

Center



                                       VACCINES (1 of 2)]              

 

             Future Scheduled 2009   SHINGLES VACCINES (1 of              

[facility]



             Test         00:00:00     2) [code = SHINGLES              Medical 

Center



                                       VACCINES (1 of 2)]              

 

             Future Scheduled 2009   SHINGLES VACCINES (1 of              

[facility]



             Test         00:00:00     2) [code = SHINGLES              Medical 

Center



                                       VACCINES (1 of 2)]              

 

             Future Scheduled 2009   SHINGLES VACCINES (1 of              

[facility]



             Test         00:00:00     2) [code = SHINGLES              Medical 

Center



                                       VACCINES (1 of 2)]              

 

             Future Scheduled 2009   SHINGLES VACCINES (1 of              

[facility]



             Test         00:00:00     2) [code = SHINGLES              Medical 

Center



                                       VACCINES (1 of 2)]              

 

             Future Scheduled 2009   SHINGLES VACCINES (1 of              

[facility]



             Test         00:00:00     2) [code = SHINGLES              Medical 

Center



                                       VACCINES (1 of 2)]              

 

             Future Scheduled 1994   Lipid panel (procedure)              

[facility]



             Test         00:00:00     [code = [id number]]              Medical Ce

nter

 

             Future Scheduled 1994   Lipid panel (procedure)              

[facility]



             Test         00:00:00     [code = [id number]]              Medical Ce

nter

 

             Future Scheduled 1994   Lipid panel (procedure)              

[facility]



             Test         00:00:00     [code = [id number]]              Medical Ce

nter

 

             Future Scheduled 1994   Lipid panel (procedure)              

[facility]



             Test         00:00:00     [code = [id number]]              Medical Ce

nter

 

             Future Scheduled 1994   Lipid panel (procedure)              

[facility]



             Test         00:00:00     [code = [id number]]              Medical Ce

nter

 

             Future Scheduled 1978   DTAP/TDAP/TD VACCINES              [facility]



             Test         00:00:00     (1 - Tdap) [code =              Medical C

enter



                                       DTAP/TDAP/TD VACCINES              



                                       (1 - Tdap)]               

 

             Future Scheduled 1978   DTAP/TDAP/TD VACCINES              [facility]



             Test         00:00:00     (1 - Tdap) [code =              Medical C

enter



                                       DTAP/TDAP/TD VACCINES              



                                       (1 - Tdap)]               

 

             Future Scheduled 1978   DTAP/TDAP/TD VACCINES              [facility]



             Test         00:00:00     (1 - Tdap) [code =              Medical C

enter



                                       DTAP/TDAP/TD VACCINES              



                                       (1 - Tdap)]               

 

             Future Scheduled 1978   DTAP/TDAP/TD VACCINES              [facility]



             Test         00:00:00     (1 - Tdap) [code =              Medical C

enter



                                       DTAP/TDAP/TD VACCINES              



                                       (1 - Tdap)]               

 

             Future Scheduled 1978   DTAP/TDAP/TD VACCINES              [facility]



             Test         00:00:00     (1 - Tdap) [code =              Medical C

enter



                                       DTAP/TDAP/TD VACCINES              



                                       (1 - Tdap)]               

 

             Future Scheduled 1978   DTAP/TDAP/TD VACCINES              [facility]



             Test         00:00:00     (1 - Tdap) [code =              Medical C

enter



                                       DTAP/TDAP/TD VACCINES              



                                       (1 - Tdap)]               

 

             Future Scheduled 1977   HEPATITIS C SCREENING              [facility]



             Test         00:00:00     [code = HEPATITIS C              Medical 

Center



                                       SCREENING]                

 

             Future Scheduled 1977   HEPATITIS C SCREENING              [facility]



             Test         00:00:00     [code = HEPATITIS C              Medical 

Center



                                       SCREENING]                

 

             Future Scheduled 1977   HEPATITIS C SCREENING              [facility]



             Test         00:00:00     [code = HEPATITIS C              Medical 

Center



                                       SCREENING]                

 

             Future Scheduled 1977   HEPATITIS C SCREENING              [facility]



             Test         00:00:00     [code = HEPATITIS C              Medical 

Center



                                       SCREENING]                

 

             Future Scheduled 1977   HEPATITIS C SCREENING              [facility]



             Test         00:00:00     [code = HEPATITIS C              Medical 

Center



                                       SCREENING]                

 

             Future Scheduled 1965   PNEUMOCOCCAL VACCINE              [facility]



             Test         00:00:00     0-64 YRS (1 - PCV)              Medical C

enter



                                       [code = PNEUMOCOCCAL              



                                       VACCINE 0-64 YRS (1 -              



                                       PCV)]                     

 

             Future Scheduled 1965   PNEUMOCOCCAL VACCINE              [facility]



             Test         00:00:00     0-64 YRS (1 - PCV)              Medical C

enter



                                       [code = PNEUMOCOCCAL              



                                       VACCINE 0-64 YRS (1 -              



                                       PCV)]                     

 

             Future Scheduled 1965   PNEUMOCOCCAL VACCINE              [facility]



             Test         00:00:00     0-64 YRS (1 - PCV)              Medical C

enter



                                       [code = PNEUMOCOCCAL              



                                       VACCINE 0-64 YRS (1 -              



                                       PCV)]                     

 

             Future Scheduled 1965   PNEUMOCOCCAL VACCINE              [facility]



             Test         00:00:00     0-64 YRS (1 - PCV)              Medical C

enter



                                       [code = PNEUMOCOCCAL              



                                       VACCINE 0-64 YRS (1 -              



                                       PCV)]                     

 

             Future Scheduled 1965   PNEUMOCOCCAL VACCINE              [facility]



             Test         00:00:00     0-64 YRS (1 - PCV)              Medical C

enter



                                       [code = PNEUMOCOCCAL              



                                       VACCINE 0-64 YRS (1 -              



                                       PCV)]                     

 

             Future Scheduled 1965   PNEUMOCOCCAL VACCINE              [facility]



             Test         00:00:00     0-64 YRS (1 - PCV)              Medical C

enter



                                       [code = PNEUMOCOCCAL              



                                       VACCINE 0-64 YRS (1 -              



                                       PCV)]                     

 

             Future Scheduled 1959   COVID-19 VACCINE (#1)              [facility]



             Test         00:00:00     [code = COVID-19              [facility]

ter



                                       VACCINE (#1)]              

 

             Future Scheduled 1959   COVID-19 VACCINE (#1)              [facility]



             Test         00:00:00     [code = COVID-19              [facility]

ter



                                       VACCINE (#1)]              

 

             Future Scheduled 1959   COVID-19 VACCINE (#1)              [facility]



             Test         00:00:00     [code = COVID-19              [facility]

ter



                                       VACCINE (#1)]              

 

             Future Scheduled 1959   COVID-19 VACCINE (#1)              [facility]



             Test         00:00:00     [code = COVID-19              [facility]

ter



                                       VACCINE (#1)]              

 

             Future Scheduled 1959   COVID-19 VACCINE (#1)              [facility]



             Test         00:00:00     [code = COVID-19              [facility]

ter



                                       VACCINE (#1)]              

 

             Future Scheduled 1959   COVID-19 VACCINE (#1)              [facility]



             Test         00:00:00     [code = COVID-19              [facility]

ter



                                       VACCINE (#1)]              

 

             Future Scheduled 1959   CT Colonography (combo)              

[facility]



             Test         00:00:00     [code = CT Colonography              [facility]



                                       (combo)]                  

 

             Future Scheduled 1959   Screening for malignant              

[facility]



             Test         00:00:00     neoplasm of colon              Medical Ce

nter



                                       (procedure) [code =              



                                       530642940]                

 

             Future Scheduled 1959   Screening for malignant              

[facility]



             Test         00:00:00     neoplasm of colon              Medical Ce

nter



                                       (procedure) [code =              



                                       385921893]                

 

             Future Scheduled 1959   Sigmoidoscopy [code =              [facility]



             Test         00:00:00     Sigmoidoscopy]              [facility] Cent[facility]

r

 

             Future Scheduled 1959   CT Colonography (combo)              

[facility]



             Test         00:00:00     [code = CT Colonography              [facility]



                                       (combo)]                  

 

             Future Scheduled 1959   Screening for malignant              

[facility]



             Test         00:00:00     neoplasm of colon              Medical Ce

nter



                                       (procedure) [code =              



                                       512240066]                

 

             Future Scheduled 1959   Screening for malignant              

[facility]



             Test         00:00:00     neoplasm of colon              Medical Ce

nter



                                       (procedure) [code =              



                                       572031579]                

 

             Future Scheduled 1959   Sigmoidoscopy [code =              [facility]



             Test         00:00:00     Sigmoidoscopy]              [facility] Cent[facility]

r

 

             Future Scheduled 1959   CT Colonography (combo)              

[facility]



             Test         00:00:00     [code = CT Colonography              [facility]



                                       (combo)]                  

 

             Future Scheduled 1959   Screening for malignant              

[facility]



             Test         00:00:00     neoplasm of colon              Medical Ce

nter



                                       (procedure) [code =              



                                       827260852]                

 

             Future Scheduled 1959   Screening for malignant              

[facility]



             Test         00:00:00     neoplasm of colon              Medical Ce

nter



                                       (procedure) [code =              



                                       267737522]                

 

             Future Scheduled 1959   Sigmoidoscopy [code =              [facility]



             Test         00:00:00     Sigmoidoscopy]              [facility] Cent[facility]

r

 

             Future Scheduled 1959   CT Colonography (combo)              

[facility]



             Test         00:00:00     [code = CT Colonography              [facility]



                                       (combo)]                  

 

             Future Scheduled 1959   Screening for malignant              

[facility]



             Test         00:00:00     neoplasm of colon              Medical Ce

nter



                                       (procedure) [code =              



                                       370246695]                

 

             Future Scheduled 1959   Screening for malignant              

[facility]



             Test         00:00:00     neoplasm of colon              Medical Ce

nter



                                       (procedure) [code =              



                                       185393195]                

 

             Future Scheduled 1959   Sigmoidoscopy [code =              [facility]



             Test         00:00:00     Sigmoidoscopy]              [facility] Cent[facility]

r

 

             Future Scheduled 1959   CT Colonography (combo)              

[facility]



             Test         00:00:00     [code = CT Colonography              [facility]



                                       (combo)]                  

 

             Future Scheduled 1959   Screening for malignant              

[facility]



             Test         00:00:00     neoplasm of colon              Medical Ce

nter



                                       (procedure) [code =              



                                       588048793]                

 

             Future Scheduled 1959   Screening for malignant              

[facility]



             Test         00:00:00     neoplasm of colon              Medical Ce

nter



                                       (procedure) [code =              



                                       734056904]                

 

             Future Scheduled 1959   Sigmoidoscopy [code =              [facility]



             Test         00:00:00     Sigmoidoscopy]              [facility] Cent[facility]

r

 

             Future Scheduled 1959   CT Colonography (combo)              

[facility]



             Test         00:00:00     [code = CT Colonography              [facility]



                                       (combo)]                  

 

             Future Scheduled 1959   Screening for malignant              

[facility]



             Test         00:00:00     neoplasm of colon              Medical Ce

nter



                                       (procedure) [code =              



                                       609555867]                

 

             Future Scheduled 1959   Screening for malignant              

[facility]



             Test         00:00:00     neoplasm of colon              Medical Ce

nter



                                       (procedure) [code =              



                                       111718429]                

 

             Future Scheduled 1959   Sigmoidoscopy [code =              [facility]



             Test         00:00:00     Sigmoidoscopy]              [facility] Cent[facility]

r

 

             Future Scheduled              COLON CANCER SCREENING:              

[employer]



             Test                      COLONOSCOPY [code =              of Medic

ine



                                       COLON CANCER SCREENING:              



                                       COLONOSCOPY]              

 

             Future Scheduled              MEDICARE AWV [code =              [place]

[employer]



             Test                      MEDICARE AWV]              of Medicine

 

             Future Scheduled              TETANUS SHOT (ADULT)              [place]

[employer]



             Test                      [code = TETANUS SHOT              of Medi

cine



                                       (ADULT)]                  

 

             Future Scheduled              BMI FOLLOW UP PLAN              [employer]

[facility] [employer]



             Test                      [code = BMI FOLLOW UP              of Med

icine



                                       PLAN]                     

 

             Future Scheduled              HEPATITIS C SCREENING              [facility]

[employer]



             Test                      [code = HEPATITIS C              of Medic

ine



                                       SCREENING]                

 

             Future Scheduled              HIV SCREENING [code =              [facility]

[employer]



             Test                      HIV SCREENING]              of Medicine

 

             Future Scheduled              FLU VACCINE > 6 MONTHS              [facility]

[employer]



             Test                      [code = FLU VACCINE > 6              of M

edicine



                                       MONTHS]                   







Encounters







        Start   End     Encounter Admission Attending Care    Care    Encounter 

Source



        Date/Time Date/Time Type    Type    Clinicians Facility Department ID   

   

 

        2022-10-25         Outpatient         DempseyLUIS A  [facility]  [id number]

 Common



        11:32:01                         Monica                     [facility]

 

        2022-10-24         Outpatient         Roselyn  [facility]  [id number]

 Common



        11:29:00                         Monica                     [facility]

 

        2022         Outpatient         Dempsey,  [facility]  [facility]  [id number]

 Common



        09:27:01                         Monica                     [facility]

 

        2022         Outpatient         Dempsey,  [facility]  [facility]  [id number]

 Common



        08:39:00                         Monica                     [facility]

 

        2022         Outpatient         Dempsey,  [facility]  [facility]  [id number]

 Common



        13:39:01                         Monica                     [facility]

 

        2022         Outpatient         Dempsey,  [facility]  [facility]  [id number]

 Common



        08:18:01                         Monica                     [facility]

 

        2022         Outpatient         [facility], [facility]     [facility]     [id number]

 MD



        14:48:15                         PROVIDER                         Andrew negron

 

        2022         Outpatient         Dempsey,  [facility]  [facility]  [id number]

 Common



        09:46:02                         Monica                     [facility]

 

        2022         Outpatient         Dempsey,  [facility]  [facility]  [id number]

 Common



        14:39:33                         Monica                     [facility]

 

        2022         Outpatient         Dempsey,  [facility]  [facility]  [id number]

 Common



        14:38:53                         Monica                     [facility]

 

        2022         Outpatient         Dempsey,  [facility]  [facility]  [id number]

 Common



        13:10:59                         Monica                  [id number]   [facility]

 

        2022         Outpatient         Dempsey,  [facility]  [facility]  [id number]

 Common



        12:52:47                         Monica                  [id number]   [facility]

 

        2022         Outpatient         Dempsey,  [facility]  [facility]  [id number]

 Common



        11:32:03                         Monica                  [id number]   [facility]

 

        2022         Outpatient         Dempsey,  [facility]  [facility]  [id number]

 Common



        11:25:36                         Monica                  [id number]   [facility]

 

        2022         Outpatient         Dempsey,  ST[facility]C  [facility]  [id number]

 Common



        11:16:16                         Monica                  [id number]   [facility]

 

        2022         Outpatient         Dempsey,  STL[facility]C  [facility]  [id number]

 Common



        11:07:38                         Monica                  [id number]   [facility]

 

        2023 Hospital         [facility]   [id number]

[id number] [facility]



        08:12:00 12:00:00 Encounter         [facility]

 

        2023 Inpatient ER      [facility] 20

[id number] SLE



        08:12:00 12:00:00                 KULWINDER                          

 

        2023 Orders          Dru, [facility]   [id number]

[id number] [facility] 



        00:00:00 00:00:00 Only            [facility]

 

        2022 (TEL)                   STLMLC  STLMLC  [id number] Co

mmon



        00:00:00 00:00:00                                                 [facility]

 

        2022 (TEL)                   STLMLC  STLMLC  [id number] Co

mmon



        00:00:00 00:00:00                                                 [facility]

 

        2022 (TEL)                   STLMLC  STLMLC  [id number] Co

mmon



        00:00:00 00:00:00                                                 [facility]

 

        2022-10-26 2022-10-26 OFFICE                  STLMLC  STLMLC  [id number] Co

mmon



        00:00:00 00:00:00 VISIT                                           [facility]



                        ESTAB PT                                         - [facility]



                        LEVEL 4                                         [facility]

 

        2022-10-26 2022-10-26 (TEL)                   STLMLC  STLMLC  [id number] Co

mmon



        00:00:00 00:00:00                                                 [facility]

 

        2022 (HOSP F/U)                 STLMLC  STLMLC  [id number]

 Common



        00:00:00 00:00:00 [facility]



                        Follow Up                                         - [facility]

 

        2022 (TEL)                   STLMLC  STLMLC  [id number] Co

mmon



        00:00:00 00:00:00                                                 [facility]

 

        2022 [facility]   [id number]

[address] [facility]



        00:00:00 00:00:00 [facility]

 

        2022 [facility]   [id number]

[address] [facility]



        00:00:00 00:00:00 [facility]

 

        2022 OFFICE                  STLMLC  STLMLC  [id number] Co

mmon



        00:00:00 00:00:00 VISIT                                           [facility]



                        ESTAB PT                                         - [facility]



                        LEVEL 4                                         [facility]

 

        2022 OFFICE                  STLMLC  STLMLC  [id number] Co

mmon



        00:00:00 00:00:00 VISIT EST                                         Spir

it



                        PT LEVEL 3                                         - [facility]



                                                                        [facility]

 

        2022 (TEL)                   STLMLC  STLMLC  [id number] Co

mmon



        00:00:00 00:00:00                                                 [facility]

 

        2022-06-15 2022-06-15 (TEL)                   STLMLC  STLMLC  [id number] Co

mmon



        00:00:00 00:00:00                                                 [facility]

 

        2022 (TEL)                   STLMLC  STLMLC  [id number] Co

mmon



        00:00:00 00:00:00                                                 [facility]

 

        2022 (TEL)                   STLMLC  STLMLC  [id number] Co

mmon



        00:00:00 00:00:00                                                 [facility]

 

        2022 (EST.                   STLMLC  STLMLC  [id number] Co

mmon



        00:00:00 00:00:00 VIDEO) EST                                         Spi

rit



                        VIRTUAL                                         - [facility]



                        VIDEO                                           [facility]

 

        2022 (TEL)                   STLMLC  STLMLC  [id number] Co

mmon



        00:00:00 00:00:00                                                 [facility]

 

        2022 (TEL)                   STLMLC  STLMLC  [id number] Co

mmon



        00:00:00 00:00:00                                                 [facility]



                                                                        - [facility]



                                                                        [facility]

 

        2022 OFFICE                  STLMLC  STLMLC  [id number] Co

mmon



        00:00:00 00:00:00 VISIT                                           [facility]



                        ESTAB PT                                         - [facility]



                        LEVEL 4                                         [facility]

 

        2022 (TEL)                   STLMLC  STLMLC  [id number] Co

mmon



        00:00:00 00:00:00                                                 [facility]



                                                                        - [facility]



                                                                        [facility]

 

        2022 Outpatient         LORENZO LEDESMA    MED     750

[id number]    LORENZO



        13:34:00 23:59:00                 ROCHELLE                           

 

        2022 Outpatient         ROSELYN  [facility]     [id number]

[id number] [facility]



        09:01:46 09:03:16                 [facility]                         Zulema villegas



                                                                        of



                                                                        Medicin



                                                                        e

 

        2022 Outpatient [facility],  [facility]    Surgery [id number]

709 [facility]



        05:57:00 11:20:00                 [facility]                         

 

        2022 Hospital         [facility],  [facility]   [id number]

[address] [facility]



        05:57:00 11:20:00 Encounter         [facility]

 

        2022 [facility]  [facility]   [id number]

[address] [facility]



        05:57:00 11:20:00 Encounter         [facility]

 

        2022 Anesthesia         Yoshi, [facility]   [id number]

[id number] [facility]



        09:27:00 10:40:00 Event           [facility]

 

        2022 Anesthesia         Yoshi, [facility]   [id number]

[id number] [facility]



        09:27:00 10:40:00 Event           [facility]

 

        2022 Surgery         [facility],  [facility]   [id number]

[address] [facility]



        09:41:00 10:11:00                 [facility]

 

        2022 Surgery         [facility],  [facility]   [id number]

[address] [facility]



        09:41:00 10:11:00                 [facility]

 

        2022 Travel                  [facility]   [id number]

 [facility]



        00:00:00 00:00:00                                                 [facility]

 

        2022 Travel                  [facility]   [id number]

 [facility]



        00:00:00 00:00:00                                                 [facility]

 

        2022 Outpatient                 [facility]     [id number]

4 [facility]



        06:06:00 23:59:00                                                 Colleg

e



                                                                        of



                                                                        Medicin



                                                                        e

 

        2022 Outpatient [facility]    Surgery [id number]

481 [facility]



        06:06:00 11:15:00                 [facility]                           

 

        2022 [facility]   [id number]

[address] [facility]



        06:06:00 11:15:00 Encounter         [facility]

 

        2022 [facility]   [id number]

[address] [facility]



        06:06:00 11:15:00 Encounter         [facility]

 

        2022 Outpatient         [facility]PREET [facility]     [id number]

8 [facility]



        10:19:37 10:19:37                 KATRINA                           Colleg

e



                                                                        of



                                                                        Medicin



                                                                        e

 

        2022 Outpatient         HARPREET, [facility]     [id number]

7 [facility]



        10:17:26 10:17:26                 KATRINA                           Colleg

e



                                                                        of



                                                                        Medicin



                                                                        e

 

        2022 Anesthesia         [facility]   [id number]

[id number] [facility]



        08:52:00 09:46:00 Event           [facility]

 

        2022 Anesthesia         [facility]   [id number]

[id number] [facility]



        08:52:00 09:46:00 Event           [facility]

 

        2022 Surgery         [facility]   [id number]

[address] [facility]



        08:45:00 09:30:00                 [facility]

 

        2022 Surgery         [facility], [facility]   [id number]

[address] [facility]



        08:45:00 09:30:00                 [facility]

 

        2022 Travel                  [facility]   [id number]

 [facility]



        00:00:00 00:00:00                                                 [facility]

 

        2022 Travel                  [facility]   [id number]

 [facility]



        00:00:00 00:00:00                                                 [facility]

 

        2022 Outpatient [facility]              [facility]    [id number]

[id number] [facility]



        14:20:19 23:59:00                                                 

 

        2022 [facility]   [id number]

[address] [facility]



        13:00:00 23:59:00 Encounter                                         [facility]

 

        2022 [facility]   [id number]

[address] [facility]



        13:00:00 23:59:00 Encounter                                         [facility]

 

        2022 Travel                  [facility]   [id number]

 [facility]



        00:00:00 00:00:00                                                 [facility]

 

        2022 Travel                  [facility]   [id number]

 [facility]



        00:00:00 00:00:00                                                 [facility]

 

        2022 Office          INGE [facility]   [id number]

[id number] [facility]



        11:37:02 15:01:03 Visit           INGRID Tavares  350.1.13.21         Co

llege



                                                        0.2.7.2.686         of



                                                        [phone]         [facility]

eulogio



                                                        504             e

 

        2022 (TEL)                   STLMLC  STLMLC  [id number] Co

mmon



        00:00:00 00:00:00                                                 [facility]



                                                                        - [facility]



                                                                        [facility]

 

        2022 (TEL)                   STLMLC  STLMLC  [id number] Co

mmon



        00:00:00 00:00:00                                                 [facility]



                                                                        [facility]

 

        2022 Travel                  1.2.840.1 [id number]

[id number] [facility]



        00:00:00 00:00:00                         61171.1.1 350.1.13.41         

ity of



                                                3.412.2.7 2.2.7.3.698         Te

xas



                                                .[id number] 084.8           MD



                                                .8                      [facility]

 

        2022 (TEL)                   STLMLC  STLMLC  [id number] Co

mmon



        00:00:00 00:00:00                                                 [facility]

 

        2022 (TEL)                   STLMLC  STLMLC  [id number] Co

mmon



        00:00:00 00:00:00                                                 [facility]

 

        2022 Travel                  1.2.840.1 [id number] [id number]

[id number] Univers



        00:00:00 00:00:00                         91879.1.1 350.1.13.41         

ity of



                                                3.412.2.7 2.2.7.3.698         Te

xas



                                                .[id number] 084.8           MD



                                                .8                      [facility]

 

        2022 (TEL)                   STLMLC  STLMLC  [id number] Co

mmon



        00:00:00 00:00:00                                                 [facility]



                                                                         [facility]



                                                                        [facility]

 

        2022 OFFICE                  STLMLC  STLC  [id number] Co

mmon



        00:00:00 00:00:00 VISIT                                           [facility]



                        ESTAB PT                                         - [facility]



                        LEVEL 4                                         [facility]

 

        2022 SUB ANNUAL                 STLMLC  STLMLC  [id number]

 Common



        00:00:00 00:00:00 MCR                                             [facility]



                        WELLNESS                                         - [facility]



                        VISIT                                           [facility]

 

        2021 (TEL)                   STLMLC  STLMLC  [id number] Co

mmon



        00:00:00 00:00:00                                                 [facility]



                                                                        - [facility]



                                                                        [facility]

 

        2021 (TEL)                   STLMLC  STLMLC  [id number] Co

mmon



        00:00:00 00:00:00                                                 [facility]

 

        2021-10-06 2021-10-06 OFFICE                  STLMLC  STLMLC  [id number] Co

mmon



        00:00:00 00:00:00 VISIT                                           [facility] 4                                         [facility]

 

        2021 Outpatient                 STLMLC  STLMLC  [id number]

 Common



        00:00:00 00:00:00                                                 [facility]

 

        2021 Outpatient                 STLMLC  STLMLC  [id number]

 Common



        00:00:00 00:00:00                                                 [facility]

 

        2021 Outpatient                 STLMLC  STLMLC  [id number]

 Common



        00:00:00 00:00:00                                                 [facility]

 

        2021 Outpatient                 STLMLC  STLMLC  [id number]

 Common



        00:00:00 00:00:00                                                 [facility]

 

        2021 Outpatient                 STLMLC  STLMLC  [id number]

 Common



        00:00:00 00:00:00                                                 [facility]

 

        2021 Outpatient                 STLMLC  STLMLC  [id number]

 Common



        00:00:00 00:00:00                                                 [facility]

 

        2021 Outpatient                 STLMLC  STLMLC  [id number]

 Common



        00:00:00 00:00:00                                                 [facility]

 

        2021-01-15 2021-01-15 Outpatient                 STLMLC  STLMLC  [id number]

 Common



        00:00:00 00:00:00                                                 [facility]

 

        2021 Outpatient                 STLMLC  STLMLC  [id number]

 Common



        00:00:00 00:00:00                                                 [facility]

 

        2020-10-21 2020-10-21 Outpatient                 STLMLC  STLMLC  [id number]

 Common



        00:00:00 00:00:00                                                 [facility]

 

        2020-10-19 2020-10-19 Outpatient                 STLMLC  STLMLC  [id number]

 Common



        00:00:00 00:00:00                                                 [facility]

 

        2020-10-08 2020-10-08 Outpatient                 STLMLC  STLMLC  [id number]

 Common



        00:00:00 00:00:00                                                 [facility]

 

        2020 Outpatient                 [facility] [address]

[id number] Common



        13:15:00 13:15:00                         [place]   [address]         Spir

it



                                                Drive   [facility]

 

        2020 Outpatient                 [facility] [address]

[id number] Common



        13:45:00 13:45:00                         [place]   [address]         Spir

it



                                                Drive   [facility]

 

        2020 Outpatient                 [facility] [address]

[id number] Common



        08:33:00 08:33:00                         [place]   [address]         Spir

it



                                                Drive   [facility]

 

        2020-07-15 2020-07-15 Outpatient                 [facility] [address]

[id number] Common



        15:30:00 15:30:00                         [place]   [address]         Spir

it



                                                Drive   [facility]

 

        2020 Outpatient                 [facility] [address]

[id number] Common



        15:00:00 15:00:00                         [place]   [address]         Spir

it



                                                Drive   [facility]

 

        2020 Outpatient                 [facility] [address]

[id number] Common



        15:00:00 15:00:00                         [place]   [address]         Spir

it



                                                Drive   [facility]

 

        2020 Outpatient                 [facility] [address]

[id number] Common



        10:00:00 10:00:00                         [place]   [address]         Spir

it



                                                Drive   [facility]

 

        2020 Outpatient                 [facility] [address]

[address] Common



        13:17:00 13:17:00                         [place]   [address]         Spir

it



                                                Drive   [facility]

 

        2020 Outpatient                 [facility] [address]

[id number] Common



        11:00:00 11:00:00                         [place]   [address]         Spir

it



                                                Drive   [facility]

 

        2019 Emergency [facility]    ERT     1025

[id number] [facility]



        07:09:33 11:41:00                 , JACEK ontiveros of



                                                                        [facility]

 

        2019-10-17 2019-10-17 Office          PADMINI Weston     [id number] [id number]

598 



        09:26:46 14:06:09 Visit           Alex ANGUIANO AMBULATOR 350.1.13.21        

 



                                                Y       0.2.7.2.686         



                                                        [phone]         



                                                        840             

 

        2019-10-17 2019-10-17 Office          PADMINI Weston     1.2.840.114 [id number]

598 [facility]



        09:26:46 14:06:09 Visit           Alex ANGUIANO AMBULATOR 350.1.13.21        

 College



                                                Y       0.2.7.2.686         of



                                                        [phone]         [facility]



                                                        840             e







Results







           Test Description Test Time  Test Comments Results    Result Comments 

Source









                    BLOOD CULTURE       2023 15:04:57 









                      Test Item  Value      Reference Range Interpretation Comme

nts









             CULTURE (BEAKER) (test code = 1095) No growth in 5 days            

               



The specimen volume collected for this blood culture was below the optimum (10 
mL per bottle or 20 mL total). Use of lower volumes may adversely affect 
recovery and/or detection times of some organisms.BLOOD [id number] 
15:04:57





             Test Item    Value        Reference Range Interpretation Comments

 

             CULTURE (BEAKER) (test No growth in 5 days                         

  



             code = 1095)                                        



HEPATITIS C PCR, [id number] 12:06:28





             Test Item    Value        Reference Range Interpretation Comments

 

             HCV RESULT COMPONENT HCV RNA not detected HCV RNA not detected     

         



             (BEAKER) (test code =                                        



             2699)                                               



This test uses a Real-Time Polymerase Chain Reaction (RT-PCR) methodology and 
was performed using LAURA Ampliprep/LAURA TaqMan HCV test kit version 2.0 (Roche
[employer]).Reportable range for this assay is 15 - 100,000,000 IU 
per mL (1.18 - 8.00 Log IU/mL).JEWEL TITER AND [id number] 11:13:09





             Test Item    Value        Reference Range Interpretation Comments

 

             JEWEL TITER (BEAKER) (test code = :160                               

    



             1541)                                               

 

             JEWEL PATTERN (BEAKER) (test code = Speckled                         

      



             1781)                                               



ANTI-NUCLEAR ANTIBODY (JEWEL)2023 11:12:54





             Test Item    Value        Reference Range Interpretation Comments

 

             ANTI-NUCLEAR ANTIBODY (JEWEL) (BEAKER) Positive     Negative     A   

         



             (test code = 418)                                        



Test performed by IFA method.U/S, ABDOMINAL, [id number] 09:53:00Reason 
for exam:-&gt;cirrhosis, assess for ascites, clot, mass
************************************************************[facility] : 1960 Sex: 
M************************************************************FINAL REPORT 
PATIENT ID: [id number] Complete Abdominal Ultrasound with Doppler History: 
Cirrhosis, assess for ascites or vascular complication Comparison: none 
Findings: The study is obtained utilizing conventional grayscale imaging as well
as color flow with spectral analysis. The liver appears cirrhotic. No 
intrahepatic biliary dilation is appreciated. Within the right hepatic lobe, 
there is a mildly echogenic mass measuring 3.5 x 2.9 x 2.5 cm. The main portal 
vein appears patent, with hepatofugal flow consistent with portal hypertension. 
The main portal vein measures 8 mm in caliber. Gallbladder is notseen on the 
study. The common bile duct is also not well-seen on this study. Pancreas not 
well seen due to overlying bowel gas. Bilateral kidney demonstrates no 
hydronephrosis, shadowing calculus, or mass lesion. Right kidney measures 10.3 x
4.7 x 4.5cm. Left kidney measures 10.3 x 5.5 x 4.6cm. Normalsize spleen 
measuring 13.7 cm in craniocaudal dimension. Normal caliber of the visualized 
abdominal aorta. No apparent filling defect in the visualized inferior vena 
cava. Impression: 1. Cirrhosis.2. 3.5 x 2.9 x 2.5 cm indeterminant mildly 
echogenic right hepatic lobe mass. Recommend further characterization with MRI 
of the abdomen, liver mass protocol.3. Main portal vein appears patent, with 
hepatofugal flow consistent with portal hypertension. Signed: Yaakov Coats 
MDReport Verified Date/Time: 2023 09:53:05 Electronically signed by: YAAKOV COATS MD on 2023 09:53 AMU/S, DUPLEX, [id number] 09:53:00Reason
for exam:-&gt;cirrhosis, assess for ascites, clot, mass
********************************************************[facility] : 1960 Sex: 
M************************************************************FINAL REPORT 
PATIENT ID: [id number] Complete Abdominal Ultrasound with Doppler History: 
Cirrhosis, assess for ascites or vascular complication Comparison: none 
Findings: The study is obtained utilizing conventional grayscale imaging as well
as color flow with spectral analysis. The liver appears cirrhotic. No 
intrahepatic biliary dilation is appreciated. Within the right hepatic lobe, 
there is a mildly echogenic mass measuring 3.5 x 2.9 x 2.5 cm. The main portal 
vein appears patent, with hepatofugal flow consistent with portal hypertension. 
The main portal vein measures 8 mm in caliber. Gallbladder is notseen on the 
study. The common bile duct is also not well-seen on this study. Pancreas not 
well seen due to overlying bowel gas. Bilateral kidney demonstrates no 
hydronephrosis, shadowing calculus, or mass lesion. Right kidney measures 10.3 x
4.7 x 4.5cm. Left kidney measures 10.3 x 5.5 x 4.6cm. Normalsize spleen 
measuring 13.7 cm in craniocaudal dimension. Normal caliber of the visualized 
abdominal aorta. No apparent filling defect in the visualized inferior vena 
cava. Impression: 1. Cirrhosis.2. 3.5 x 2.9 x 2.5 cm indeterminant mildly 
echogenic right hepatic lobe mass. Recommend further characterization with MRI 
of the abdomen, liver mass protocol.3. Main portal vein appears patent, with 
hepatofugal flow consistent with portal hypertension. Signed: Yaakov Coats Verified Date/Time: 2023 09:53:05 Electronically signed by: YAAKOV COATS MD on 2023 09:53 AMPOC-Glucose [id number] 06:59:35





             Test Item    Value        Reference Range Interpretation Comments

 

             POC-Glucose Meter (test 120 mg/dL           H            : TE

STED AT [facility]



             code = 1538)                                        [address] [facility], [address]

30:



                                                                 /Techni

shelbi



                                                                 ID = [id number] for



                                                                 Lori Montgomery

 

             Lab Interpretation (test Abnormal                               



             code = 95514-1)                                        



[id number] 06:59:35





             Test Item    Value        Reference Range Interpretation Comments

 

             POC-GLUCOSE METER 120 mg/dL           H            : TESTED A

[facility]



             (BEAKER) (test code =                                        [facility],



             1538)                                               [address]:



                                                                 /Techni

shelbi ID



                                                                 = [id number] for Lori Ayala



POCT-GLUCOSE [id number] 06:31:00





             Test Item    Value        Reference Range Interpretation Comments

 

             POC-GLUCOSE METER 45 mg/dL            L            : TESTED A

[facility]



             (BEAKER) (test code =                                        [facility],



             1538)                                               [address]:



                                                                 /Techni

shelbi ID =



                                                                 [id number] for Aaron Madison



LACTIC ACID, [id number] 01:38:56





             Test Item    Value        Reference Range Interpretation Comments

 

             LACTATE BLOOD VENOUS 3.46 mmol/L  0.50-2.20    H            Specime

n markedly



             (2) (BEAKER) (test                                        hemolyzed



             code = 2872)                                        



 ID - TATIANA ODOMpecimejamil markedly ictericPOCT-GLUCOSE [id number] 
01:12:18





             Test Item    Value        Reference Range Interpretation Comments

 

             POC-GLUCOSE METER 101 mg/dL                        : TESTED A

T [facility] [address]



             (BEAKER) (test code =                                        [facility],



             1538)                                               [address]:



                                                                 /Techni

shelbi ID



                                                                 = [id number] for Aaron Domingo



POCT-GLUCOSE [id number] 00:36:45





             Test Item    Value        Reference Range Interpretation Comments

 

             POC-GLUCOSE METER 51 mg/dL            L            : TESTED A

T [facility] [address]



             (BEAKER) (test code =                                        [place],



             1538)                                               [address]:



                                                                 /Techni

shelbi ID =



                                                                 [id number] for Aaron Madison



RAD, CHEST, 1 VIEW, NON [id number] 19:34:00Reason for exam:-&gt;NG 
repositionedShould this be performed at the bedside?-&gt;Yes
************************************************************[facility] : 1960 Sex: 
M************************************************************FINAL REPORT 
PATIENT ID: [id number] CLINICAL INDICATION: NG repositioned Comparison: Same date 
at 1715 hours The tip of an enteric tube overlies the left upper quadrant in the
expected position of the stomach. The examination is otherwise similar to recent
previous. Signed: Adilson Vo [facility] Verified Date/Time: 2023 19:34:07
Electronically signed by: ADILSON VO M.D. on 2023 07:34 PMRAD, CHEST,
1 VIEW, NON [id number] 19:11:00Reason for exam:-&gt;NG placed, not 
visualized in stomach on KUBShould this be performed at the bedside?-&gt;Yes
************************************************************[facility] 1960 Sex: 
M************************************************************FINAL REPORT 
PATIENT ID: [id number] CLINICAL INDICATION: NG placed, not visualized in stomach 
on KUB Comparison: Same dated 0901 hours 2 frontal images the chest are 
submitted. A nasoenteric tube is partiallyvisualized at the superior margin of 
the examination, with the visualized portion looped in the neck, presumably 
within the pharynx. The tip is not visualized and is probably above the superior
margin of the examination. Repositioning is recommended. The cardiomediastinal 
contours are stable. Central pulmonary vascular congestion and bilateral 
parenchymal opacities are similar to previous. There is no pneumothorax. A right
IJ chest port remains in place. Signed: Adilson Vo MDReport Verified Date/T
juan carlos: 2023 19:11:48 Electronically signed by: ADILSON VO M.D. on 
2023 07:11 PMHEPATITIS B SURFACE [id number] 18:15:07





             Test Item    Value        Reference Range Interpretation Comments

 

             HEPATITIS B SURFACE ANTIBODY < mIU/mL     <8.0                     

 



             (BEAKER) (test code = 647)                                        



 ID - BSHEPATITIS A ANTIBODY, [id number] 18:11:33





             Test Item    Value        Reference Range Interpretation Comments

 

             HEPATITIS A IGG ANTIBODY (BEAKER) Nonreactive  Nonreactive         

      



             (test code = 2797)                                        



 ID - BSHEPATITIS C [id number] 18:11:32





             Test Item    Value        Reference Range Interpretation Comments

 

             HEPATITIS C ANTIBODY (BEAKER) Nonreactive  Nonreactive             

  



             (test code = 367)                                        



 ID - BSHEPATITIS A ANTIBODY, [id number] 18:11:32





             Test Item    Value        Reference Range Interpretation Comments

 

             HEPATITIS A IGM ANTIBODY (BEAKER) Nonreactive  Nonreactive         

      



             (test code = 498)                                        



 ID - BSHEPATITIS B CORE ANTIBODY, [id number] 18:11:32





             Test Item    Value        Reference Range Interpretation Comments

 

             HEPATITIS B CORE TOTAL ANTIBODY Nonreactive  Nonreactive           

    



             (KEON) (test code = 497)                                        



 ID - BSHEPATITIS B SURFACE [id number] 18:11:31





             Test Item    Value        Reference Range Interpretation Comments

 

             HEPATITIS B SURFACE ANTIGEN (2) Nonreactive  Nonreactive           

    



             (RAÚLAKER) (test code = 2585)                                        



Specimen is considered negative for HBsAg.POCT-GLUCOSE [id number] 17:53:35





             Test Item    Value        Reference Range Interpretation Comments

 

             POC-GLUCOSE METER 118 mg/dL           H            : TESTED A

T [facility] [address]



             (BEAKER) (test code =                                        [facility],



             1538)                                               [address]:



                                                                 /Techni

shelbi ID



                                                                 = [id number] for KN

FLOR (V),



                                                                 CAMMIE



LACTIC ACID, [id number] 17:46:45





             Test Item    Value        Reference Range Interpretation Comments

 

             LACTATE BLOOD VENOUS (2) (RAÚLAKER) 2.95 mmol/L  0.50-2.20    H      

      



             (test code = 2872)                                        



 ID - BSSpecimen markedly ictericPOCT-GLUCOSE [id number] 17:31:38





             Test Item    Value        Reference Range Interpretation Comments

 

             POC-GLUCOSE METER 43 mg/dL            L            : TESTED A

T [facility] [address]



             [facility] (test code =                                        [facility],



             1538)                                               [address]:



                                                                 /Techni

shelbi ID =



                                                                 [id number] for KNAP

P (V),



                                                                 CAMMIE



RAD, ABDOMEN/KUB, 1 VIEW [id number] 16:41:00Reason for exam:-&gt;NG placement
************************************************************[facility] : 1960  Sex: 
M************************************************************FINAL REPORT 
PATIENT ID: [id number] TECHNIQUE: RAD, ABDOMEN/KUB, 1 VIEW AP INDICATION: NG 
placement. COMPARISON: 2019 FINDINGS:No esophagogastric tube identified on 
this examination. Gaseous distention of the colon similar in appearance to the 
prior examination. Extent of gas within small bowel appearsto have decreased. 
Supine radiographs are insensitive for detection of free intraperitoneal air. 
IMPRESSION: 1. No esophagogastric tube identified.2. Decreased gaseous 
prominence of the small bowel with persistent gaseous distention of the colon. 
Findings suggest stable versus improving ileus. Continued radiographic follow-up
is suggested. Signed: Ronn Guerrero MDReport Verified Date/Time:  16:41:05 Electronically signed by: RONN GUERRERO MD on 
2023 04:41 PMALPHA FETOPROTEIN (AFP), TUMOR [id number] 16:29:11





             Test Item    Value        Reference Range Interpretation Comments

 

             ALPHA-FETOPROTEIN (BEAKER) (test 1048.0 ng/mL <10.0        H       

     



             code = 1094)                                        



 ID - BSIMMUNOGLOBULIN G (IGG)2023 16:09:07





             Test Item    Value        Reference Range Interpretation Comments

 

             IMMUNOGLOBULIN G (IGG) 2717 mg/dL   See_Comment  H             [Aut

omated message]



             (BEAKER) (test code =                                        The sy

stem which



             427)                                                generated this



                                                                 result transmit

froilan



                                                                 reference range

:



                                                                 540-1,822. The



                                                                 reference range

 was



                                                                 not used to



                                                                 interpret this



                                                                 result as



                                                                 normal/abnormal

.



 ID - BSPOCT-GLUCOSE [id number] 15:26:00





             Test Item    Value        Reference Range Interpretation Comments

 

             POC-GLUCOSE METER 59 mg/dL            L            : TESTED A

T [facility] [address]



             (BEAKER) (test code =                                        [facility],



             1538)                                               [address]:



                                                                 /Techni

shelbi ID =



                                                                 [id number] for KNAP

P (V),



                                                                 CAMMIE



POCT-GLUCOSE [id number] 15:20:01





             Test Item    Value        Reference Range Interpretation Comments

 

             POC-GLUCOSE METER 108 mg/dL                        : TESTED A

T [facility] [address]



             (BEAKER) (test code =                                        [facility],



             1538)                                               [address]:



                                                                 /Techni

shelbi ID



                                                                 = [id number] for KN

FLOR (V),



                                                                 CAMMIE



[id number] 15:06:36





             Test Item    Value        Reference Range Interpretation Comments

 

             FERRITIN (BEAKER) (test code = 576.49 ng/mL 5..00  H         

   



             361)                                                



 ID - BSIRON, TIBC, % SAT. (WITHOUT FERRITIN)2023 14:45:53





             Test Item    Value        Reference Range Interpretation Comments

 

             IRON (BEAKER) (test code = 547) 103.0 ug/dL  40.0-160.0            

    

 

             TOTAL IRON BINDING CAPACITY 164 ug/dL    250-450      L            



             (BEAKER) (test code = 769)                                        

 

             IRON % SATURATION (2) (BEAKER) 63 %         20-55        H         

   



             (test code = 2590)                                        



 ID - BSLIPID [id number] 14:45:32





             Test Item    Value        Reference Range Interpretation Comments

 

             TRIGLYCERIDES (BEAKER) (test code = 113 mg/dL                      

        



             540)                                                

 

             CHOLESTEROL (BEAKER) (test code = 76 mg/dL                         

      



             631)                                                

 

             HDL CHOLESTEROL (BEAKER) (test code 6 mg/dL                        

        



             = 976)                                              

 

             LDL CHOLESTEROL CALCULATED (BEAKER) 47 mg/dL                       

        



             (test code = 633)                                        



Triglyceride Reference Range: Low Risk &lt;150 Borderline 150-199 High Risk 200-
499 Very High Risk &gt;=500Cholesterol Reference Range: Low Risk &lt;200 
Borderline 200-239 High Risk &gt;240HDL Cholesterol Reference Range: Low Risk 
&gt;=60 High Risk &lt;40LDL Cholesterol Reference Range: Optimal &lt;100 Near 
Optimal 100-129 Borderline 130-159 High 160-189 Very High &gt;=190  ID -
KHOLE DSpecimen markedly ictericPOCT-GLUCOSE [id number] 14:18:53





             Test Item    Value        Reference Range Interpretation Comments

 

             POC-GLUCOSE METER 43 mg/dL            L            : TESTED A

T [facility] [address]



             (BEAKER) (test code =                                        [place],



             1538)                                               [address]:



                                                                 /Techni

shelbi ID =



                                                                 [id number] for KNAP

P (V),



                                                                 CAMMIE



T4, [id number] 12:38:02





             Test Item    Value        Reference Range Interpretation Comments

 

             FREE T4 (BEAKER) (test code = 655) 0.99 ng/dL   0.70-1.48          

       



 ID - KHLOE DBASIC METABOLIC [id number] 12:25:16





             Test Item    Value        Reference Range Interpretation Comments

 

             SODIUM (BEAKER) 126 meq/L    136-145      L            



             (test code = 381)                                        

 

             POTASSIUM    5.0 meq/L    3.5-5.1                   



             (BEAKER) (test                                        



             code = 379)                                         

 

             CHLORIDE (BEAKER) 88 meq/L            L            



             (test code = 382)                                        

 

             CO2 (BEAKER) 26 meq/L     22-29                     



             (test code = 355)                                        

 

             BLOOD UREA   21 mg/dL     7-21                      



             NITROGEN (BEAKER)                                        



             (test code = 354)                                        

 

             CREATININE   1.16 mg/dL   0.57-1.25                 



             (BEAKER) (test                                        



             code = 358)                                         

 

             GLUCOSE RANDOM 60 mg/dL            L            



             (BEAKER) (test                                        



             code = 652)                                         

 

             CALCIUM (BEAKER) 8.4 mg/dL    8.4-10.2                  



             (test code = 697)                                        

 

             EGFR (BEAKER) 72                                      Interpretatio

n of eGFR



             (test code = mL/min/1.73                            values Stage De

scription



             1092)        sq m                                   Result G1 Leeann

l or high



                                                                 >=90 G2 Mildly 

decreased



                                                                 60-89 G3a Mildl

y to



                                                                 moderately 45-5

9 G3b



                                                                 Moderately to s

everely



                                                                 30-44 G4 Severl

y decreased



                                                                 15-29 G5 Kidney

 failure



                                                                 <15Reported eGF

R is based



                                                                 on the CKD-EPI 





                                                                 equation that d

oes not use



                                                                 a race



                                                                 coefficientEsti

mated GFR



                                                                 is not as accur

ate as



                                                                 Creatinine Natty

carol in



                                                                 predicting glom

erular



                                                                 filtration rate

. Estimated



                                                                 GFR is not appl

icable for



                                                                 dialysis patien

ts



 ID - BSSpecimen markedly ictericHEPATIC FUNCTION [id number] 
12:25:16





             Test Item    Value        Reference Range Interpretation Comments

 

             TOTAL PROTEIN (BEAKER) (test code 7.7 gm/dL    6.0-8.3             

      



             = 770)                                              

 

             ALBUMIN (BEAKER) (test code = 2.4 g/dL     3.5-5.0      L          

  



             1145)                                               

 

             BILIRUBIN TOTAL (BEAKER) (test 15.1 mg/dL   0.2-1.2      H         

   



             code = 377)                                         

 

             BILIRUBIN DIRECT (BEAKER) (test 9.2 mg/dL    0.1-0.5      H        

    



             code = 706)                                         

 

             ALKALINE PHOSPHATASE (BEAKER) 263 U/L             H          

  



             (test code = 346)                                        

 

             AST (SGOT) (BEAKER) (test code = 136 U/L      5-34         H       

     



             353)                                                

 

             ALT (SGPT) (BEAKER) (test code = 50 U/L       6                 

     



             347)                                                



 ID - BSSpecimen markedly ictericHEMOGLOBIN [id number] 12:08:48





             Test Item    Value        Reference Range Interpretation Comments

 

             HEMOGLOBIN A1C 4.6 %        See_Comment                [Automated m

essage]



             ELECTROPHORESIS (KEON)                                        The

 system which



             (test code = 3811)                                        generated

 this result



                                                                 transmitted ref

erence



                                                                 range: <=5.6%. 

The



                                                                 reference range

 was



                                                                 not used to int

erpret



                                                                 this result as



                                                                 normal/abnormal

.



"The A1c is measured using a NGSP-certified method. HbA1c value equal to or 
greater than 6.5% as thediagnosis cutoff for diabetes. An HbA1c value of 5.7-
6.4% indicates increased risk for diabetes (prediabetes)." ID - RAD, 
CHEST, 1 VIEW, NON [id number] 12:08:00Reason for exam:-&gt;Hx COPD, arrival 
in SICUShould this be performed at the bedside?-&gt;Yes
************************************************************[facility] : 1960 Sex: 
M************************************************************FINAL REPORT 
PATIENT ID: [id number] INDICATION: Hx COPD, arrival in SICU COMPARISON: 2019 
TECHNIQUE: Single frontal view of the chest. FINDINGS: Lines, tubes, and 
devices: The right internal jugular veinportacatheter tip overlies the distal 
superior vena cava.Lungs and pleura: Bibasilar linear atelectasis. No focal 
consolidation. No pneumothorax.Heart and mediastinum: Normal heart size. 
Unremarkable mediastinal contours.Osseous structures: No acute 
abnormality.Other: None. IMPRESSION: No acute intrathoracic abnormality. Signed:
Ivon Johnson [facility] Verified Date/Time: 2023 12:08:28 Reading Location:
[facility] C013V Neuro Reading Room Electronically signed by: IVON JOHNSON MD on 2023 12:08 PMVITAMIN [id number] 12:02:10





             Test Item    Value        Reference Range Interpretation Comments

 

             VITAMIN B12 (BEAKER) (test code = > pg/mL      213-816      H      

      



             774)                                                



 ID - KHLOE DTSH/FREE T4 IF [id number] 12:01:08





             Test Item    Value        Reference Range Interpretation Comments

 

             THYROID STIMULATING HORMONE 5.396 uIU/mL 0.350-4.940  H            



             (BEAKER) (test code = 772)                                        



 ID - KHLOE DCBC W/PLT COUNT &amp; AUTO [id number] 
11:37:15





             Test Item    Value        Reference Range Interpretation Comments

 

             WHITE BLOOD CELL COUNT (BEAKER) 20.7 K/ L    3.5-10.5     H        

    



             (test code = 775)                                        

 

             RED BLOOD CELL COUNT (BEAKER) 4.28 M/ L    4.63-6.08    L          

  



             (test code = 761)                                        

 

             HEMOGLOBIN (BEAKER) (test code = 12.9 GM/DL   13.7-17.5    L       

     



             410)                                                

 

             HEMATOCRIT (BEAKER) (test code = 38.4 %       40.1-51.0    L       

     



             411)                                                

 

             MEAN CORPUSCULAR VOLUME (BEAKER) 90 fL        79-92                

     



             (test code = 753)                                        

 

             MEAN CORPUSCULAR HEMOGLOBIN 30.1 pg      25.7-32.2                 



             (BEAKER) (test code = 751)                                        

 

             MEAN CORPUSCULAR HEMOGLOBIN CONC 33.6 GM/DL   32.3-36.5            

     



             (BEAKER) (test code = 752)                                        

 

             RED CELL DISTRIBUTION WIDTH 18.5 %       11.6-14.4    H            



             (BEAKER) (test code = 412)                                        

 

             PLATELET COUNT (BEAKER) (test 162 K/CU MM  150-450                 

  



             code = 756)                                         

 

             MEAN PLATELET VOLUME (BEAKER) 9.1 fL       9.4-12.4     L          

  



             (test code = 754)                                        

 

             NUCLEATED RED BLOOD CELLS 0 /100 WBC   0-0                       



             (BEAKER) (test code = 413)                                        



(CELLAVISION MANUAL DIFF)2023 11:37:15





             Test Item    Value        Reference Range Interpretation Comments

 

             NEUTROPHILS - REL 81 %                                   



             (CELLAVISION)(BEAKER) (test code =                                 

       



             2816)                                               

 

             LYMPHOCYTES - REL 4 %                                    



             (CELLAVISION)(BEAKER) (test code =                                 

       



             2817)                                               

 

             MONOCYTES - REL 9 %                                    



             (CELLAVISION)(BEAKER) (test code =                                 

       



             2818)                                               

 

             EOSINOPHILS - REL 1 %                                    



             (CELLAVISION)(BEAKER) (test code =                                 

       



             2819)                                               

 

             BASOPHILS - REL 1 %                                    



             (CELLAVISION)(BEAKER) (test code =                                 

       



             2820)                                               

 

             BANDS - REL (CELLAVISION)(BEAKER) 3 %          0-10                

      



             (test code = 2826)                                        

 

             ATYPICAL LYMPHOCYTES - REL 1 %          0-0          H            



             (CELLAVISION)(BEAKER) (test code =                                 

       



             2829)                                               

 

             NEUTROPHILS - ABS 16.77 K/ul   1.78-5.38    H            



             (CELLAVISION)(BEAKER) (test code =                                 

       



             2830)                                               

 

             LYMPHOCYTES - ABS 0.83 K/ul    1.32-3.57    L            



             (CELLAVISION)(BEAKER) (test code =                                 

       



             2831)                                               

 

             MONOCYTES - ABS 1.86 K/uL    0.30-0.82    H            



             (CELLAVISION)(BEAKER) (test code =                                 

       



             2832)                                               

 

             EOSINOPHILS - ABS 0.21 K/uL    0.04-0.54                 



             (CELLAVISION)(BEAKER) (test code =                                 

       



             2834)                                               

 

             BASOPHILS - ABS 0.21 K/uL    0.01-0.08    H            



             (CELLAVISION)(BEAKER) (test code =                                 

       



             2835)                                               

 

             BANDS - ABS (CELLAVISION)(BEAKER) 0.62 K/uL    0.00-0.80           

      



             (test code = 2840)                                        

 

             ATYPICAL LYMPHOCYTES - ABS 0.21 K/uL    0.00-0.00    H            



             (CELLAVISION)(BEAKER) (test code =                                 

       



             4188)                                               

 

             TOTAL COUNTED (BEAKER) (test code 100                              

      



             = 1351)                                             

 

             WBC MORPHOLOGY (BEAKER) (test code Normal                          

       



             = 487)                                              

 

             PLT MORPHOLOGY (BEAKER) (test code Normal                          

       



             = 486)                                              

 

             POIKILOCYTES (BEAKER) (test code = 1+ few                          

       



             966)                                                

 

             KARISSA CELLS (BEAKER) (test code = 1+ few                            

     



             474)                                                

 

             ARTIFACT (CELLAVISION)(BEAKER) Present                             

   



             (test code = 3432)                                        

 

             PLATELET CONCENTRATION Adequate                               



             (CELLAVISION)(BEAKER) (test code =                                 

       



             6198)                                               



 ID - Ana Oliveira comments: Slide comments:Lactic Acid, 
[id number] 11:16:47





             Test Item    Value        Reference Range Interpretation Comments

 

             Lactate, Art (test code = 5.2 mmol/L   0.5-2.2      HH           



             2874)                                               

 

             KEEGAN (test code = KEEGAN)  ID -                           



                          KHLOE Roseimejamil                           



                          markedly icteric                           

 

             Lab Interpretation (test Abnormal                               



             code = 71984-3)                                        



[facility]CT[facility], [id number] 11:16:47





             Test Item    Value        Reference Range Interpretation Comments

 

             LACTATE BLOOD ARTERIAL (2) 5.2 mmol/L   0.5-2.2      HH           



             (BEAKER) (test code = 2874)                                        



 ID - KHLOE Roseimen markedly [id number] 11:14:15





             Test Item    Value        Reference Range Interpretation Comments

 

             PARTIAL THROMBOPLASTIN TIME 39.1 seconds 22.5-36.0    H            



             (BEAKER) (test code = 760)                                        



PROTHROMBIN TIME/[id number] 11:13:34





             Test Item    Value        Reference Range Interpretation Comments

 

             PROTIME (BEAKER) 22.2 seconds 11.9-14.2    H            



             (test code = 759)                                        

 

             INR (BEAKER) (test 2.09         See_Comment                [Automat

ed message]



             code = 370)                                         The system [employer]



                                                                 generated this 

result



                                                                 transmitted ref

erence



                                                                 range: <=5.90. 

The



                                                                 reference range

 was



                                                                 not used to int

erpret



                                                                 this result as



                                                                 normal/abnormal

.



RECOMMENDED COUMADIN/WARFARIN INR THERAPY RANGESSTANDARD DOSE: 2.0 - 3.0 
Includes: PROPHYLAXIS for venous thrombosis, systemic embolization; TREATMENT 
for venous thrombosis and/or pulmonary embolus.HIGH RISK: Target INR is 2.5-3.5 
for patients with mechanical heart valves.[id number] 10:56:23





             Test Item    Value        Reference Range Interpretation Comments

 

             AMMONIA (BEAKER) (test code = 348) 96 mol/L     18-72        H     

       



 ID - KHLOE DB-TYPE NATRIURETIC FACTOR (BNP)2022 23:54:52





             Test Item    Value        Reference Range Interpretation Comments

 

             B-TYPE NATRIURETIC PEPTIDE (BEAKER) 48 pg/mL     0-100             

        



             (test code = 700)                                        



 ID - Mairama [id number] 08:01:38





             Test Item    Value        Reference Range Interpretation Comments

 

             Case Report (test code Surgical Pathology                          

 



             = 104)       Report Case: [id number]                           



                          Authorizing Provider:                           



                          Elayne Dempsey MD Collected:                           



                          2022 10:01 AM                           



                          Ordering Location:                           



                          [facility]                           



                          Received: 2022                           



                          11:57 AM Services                           



                          Pathologist:                           



                          Homer Thomas MD                           



                          Specimens: A) - Large                           



                          Intestine, Colon -                           



                          Transverse, Bx mass                           



                          B) - Polyp, Colon -                           



                          Left/Descending, taken                           



                          by hot snare C) -                           



                          Polyp, Colon -                           



                          Left/Descending, x3                           



                          taken by hot snare  D)                           



                          - Polyp, Colon -                           



                          Sigmoid, x5 taken by                           



                          hot snare                              

 

             DIAGNOSIS (test code = m2csrKLtMGQei1iwNYDhqP                      

     



             3220)        FuZzEwMzNcZnRuYmpcdWMx                           



                          IHtccnRmMVxlcGljOTYwMV                           



                          fvuxWuYXPmaXQhO5Dmpgfu                           



                          GRsqUY0mMZ6nwWbnqWDsbD                           



                          DpNTYvObSvm3nar941mPFc                           



                          e3xeLHMNkghaxXm0bUiuS7                           



                          0nt6Y0OzppG13myZIfTXV3                           



                          QJEvSSPcjLImYDSvBQS1CI                           



                          DiuYGcQ8hsAPKlJI6vfdxr                           



                          LNlxTJmeSVPtzPV6VWZjgM                           



                          NlQ2LmRWXtYPobPDTzvpy8                           



                          PwBpHa0cjCOooWolKEkfDD                           



                          PzGNKlMZfaWPEpVkZwWE8v                           



                          CXVDB5LuOT5KIAMLIS5PYI                           



                          GGVdQRH0SUNhYKVOSRFT1L                           



                          VK5UE3KrZBCFD8APHOnbqV                           



                          YcVJBeKLJXFzFUB3pUNRRI                           



                          GRXVR2RKBvIJHp6EPJpvBF                           



                          CwYPMrEDBXXUYYXQOJW7IT                           



                          K0PPCzTGILVIKktZDQeVJo                           



                          BccGFyXHBhciBCLiBMQVJH                           



                          MDNANkOEN1GHPqZeUEBZU0                           



                          SZQqVVHzqrR57RQ61iTP5M                           



                          VEHtDTDTE6AQPQykwKYcGH                           



                          MmOHKFCLKUHFKPIXJTYI4U                           



                          UQSwYPLCGAtEIA8ZDTUcMP                           



                          GljapqOGFuAq0sTFYQO2Qf                           



                          TG2PEQXUBZ7LGBSLNXDSUU                           



                          0WLH4VQKWFPP1EIHGHCYfQ                           



                          IHggMywgQklPUFNZOlxwYX                           



                          IgICAtIFRVQlVMQVIgQURF                           



                          Gi4KMYjlHvDXA01UGfOZGI                           



                          krCGAdAHFtDAGOO2CSGJRu                           



                          G3UGVoOKPYXhKGKCEX8BZO                           



                          xwYXJccGFyIEQuIExBUkdF                           



                          RDyGAUKLVSpNLAlhB4rDZV                           



                          6CQGPMR3qGXeHFY4nTOAP7                           



                          FCNkROWBM7ZKGDcxzKOmOS                           



                          UpKNYJDERKRW0LPGpZM8UA                           



                          QNYLJR3DDBCnSJWPESaNWF                           



                          5URURccGFyICAgLSBIWVBF                           



                          TvFDZAAPZMZbCI5BNEPdVN                           



                          Fgip54VTB5ZfChl7C7PRL6                           



                          OTMdSFAyb1gnFSGaaXAzRg                           



                          EwMzNcZnRuYmpcdWMxXGRl                           



                          EiMph3ttr532lAUfy8yoZY                           



                          LeLpB7oCBjRRIjiFFiI084                           



                          JUVrOUska0inw8PzXWBkzW                           



                          Hqh3B8FXRUtcbgcMi9iGri                           



                          U08yq8S8JnanC1hvJMLhNG                           



                          JaO1VlNN4aKMFgJsa2MDG4                           



                          HAC7ARRzWJNoU0DkNV9cKL                           



                          KxgAHvSZl3y3imqBqwGJCj                           



                          GAM2a2jmVYyaoqMsDB7qud                           



                          5prHm7x4ptsmEkAJBmHLRz                           



                          kLZGAVOgW5OwfPxpKt8twH                           



                          x7bWgjLpfaVMZ2Mve6HX8w                           



                          av94tnx2rSzbVCSpawgqTe                           



                          O4PTdoZKNxvukmAVk7EHgd                           



                          CEHhePH9NORgnXFnT8XtGW                           



                          PxDL0qqss2ODS9NRdtPGTc                           



                          YjF6KWJseQFbOYEzxIklWN                           



                          zkc529KZH1BfLpZI9zX0Rh                           



                          t1L9uT9ckESxPWMxhQNhPn                           



                          XfMTTpfm4weAZoOWduq8Zt                           



                          AYD9qrI0rMCpgWWnZDAsKt                           



                          K0XNnfED5gfx47XNFnUUC3                           



                          qb4dyESjlChqgwXysFPtMC                           



                          uoK8ZpMPRvl081TLSyX6Ag                           



                          WCBgf8Y3osRyPbXdGZFvsP                           



                          I3urX9EFCkQX5bqhotz9oa                           



                          REwlVOmtHPDemzC7ikX6WE                           



                          QjsVXnX5EhvT5pQWJkZA3m                           



                          oywcj6blEOA1MAtvXQFqQV                           



                          I0OuGuKJYgv2Ctvim4XaSl                           



                          f6ZouWNyABubS36xb448BQ                           



                          UgznOdI4eebGPywkckkMBi                           



                          jsznCKcgpoV0SHMvZAgvdv                           



                          kmRRHbGYntG8jdAbUrBFHy                           



                          zHibVCgou7WcVQNnADXkCe                           



                          SlyZSnGICoZtv2OTYuzBEu                           



                          DJZhJhEnK7jyrmxwQyYAWD                           



                          Tac1ktI9ozpZTKoDZtY0Dh                           



                          UGhvbmUgTGluZTogNzEzLT                           



                          c6WR70KLgzOLNpsy85                           

 

             COMMENT (test code = t7cemTOtBBBrsLA3IpJxHX                        

   



             0296)        Uhi9isl9SmyLZzpCGtCHhs                           



                          wTPrmwHanx71qWW1aO14OH                           



                          9bOODiGmJ4ZSEysyS6Bqk9                           



                          LFMoKOSfkNBnY038e0dkb4                           



                          xtflIoyIT1mJimIWOccmgp                           



                          EeN3QUmbRCKheognCAm6UW                           



                          jbGKEhuUF3YYShhVSwV9Mu                           



                          JMYrZB2vrjf9UEF5EKxeDS                           



                          BiVoW8WHTdtIEzPARvqVnz                           



                          OQqqx260MLL9LnEtUDZeiv                           



                          RxjXgceI9jKcBxCPSEmO1a                           



                          ayBBMSBoYXMgYWRlcXVhdG                           



                          SklROek9TcX5YtwSUzPMTd                           



                          kTdsHq3oWGH6rBWuGWVtht                           



                          KhyUiapkjdz9U8VFefil7f                           



                          cGFyfQ==                               

 

             CPT Code(s) (test code y8evsFKyQKFpxRA5CwJcDR                      

     



             = 3357)      Pto6wth5OujOXneORzKLtp                           



                          qFGxmrCtyx15vZT8dK47BP                           



                          0sNOGnDoZ1TRDmlrZ7Cag9                           



                          ZSEpFHPsrSQiL665j7bed3                           



                          bypvSfmCD2iEqbNSTjwpoh                           



                          CmO6LBwyJUGuocpcPEo2ZO                           



                          wxIKAjxOT7QGEgrOGdV3Gr                           



                          LCYfCL0ipfr2GXQ2IRatWW                           



                          OgCaO9KBRllDOoXLCdwWye                           



                          HFzvq887PMR9LmEhIITtrk                           



                          EmfHwdyA0qHvSnDID3XHVe                           



                          BKG9RMCbQDd5MmNyCLZ5NE                           



                          I7UJO5YVTmiEPphD==                           

 

             GROSS DESCRIPTION (test f7ojbZGuGYXdwQJ6NzDwCB                     

      



             code = [id number]) Fkz1rzo5NqzRWouENyOPuf                          

 



                          lXVitoZlne62cZT6zX65ME                           



                          4sWJZdGwF3GEGtxuH1Nbb4                           



                          JXLuKZZfvLQfU500r1rhk0                           



                          bvgsYbjCT6GESdIMGbN5Dw                           



                          AT1uVUKveROmX04tbHXjNI                           



                          U5DJReXFGrjNSdVVTsEIS2                           



                          NSIenGUfF5spRAZrAS0zia                           



                          icETiyQKztCJJntWY1OBGa                           



                          wUOdP6YgPFYjELenXGKjic                           



                          q7OsTmNw7ojCYhuAtySQah                           



                          HLWcd1rnTGQmwUYnXXT6AA                           



                          bsfRCiEBFiZRTkDEh4IKVc                           



                          DFzneGIwKH4teFygOwcefW                           



                          lyz8YpsLGmIRthOEDrONRk                           



                          TOzoIXXnT0TTHYNkKcQ8Qg                           



                          S3LnTmLQr0BQk4SJ0YAxMd                           



                          GVArNMSqTCU6LJGuRBp5AQ                           



                          fjRL3LZJQ4UtW9ITh2JNN0                           



                          NTMyMSBcXHQgMiBcXGZsIF                           



                          smYrHJoohgfUPzVS3ehIpz                           



                          bGFpblxmczIwIEEuIExhcm                           



                          dcGOhyyDEabRzeTZjqI71m                           



                          y78sLFOYrcQou7ZxwnQhBh                           



                          xwYXJcZnMyMFxjZjEgUmVj                           



                          BEw8LGYnzJ5gQb3heMXlgR                           



                          9nvBBqHDfbZQR1qHNxGXSt                           



                          DKWnXNAlYZ80UYsxNJLefn                           



                          FtZSwgbWVkaWNhbCByZWNv                           



                          cmQgbnVtYmVyIGFuZCBcdT                           



                          pcKoTsXPo8D2jshxqdFLrp                           



                          oMTrqNrjRW9go1anpg36sy                           



                          Ays9CpywTuMCAya9TqqUDe                           



                          NRLmPhNregNuL34xl0bzcZ                           



                          Rso6IxkOMlnBwmkVHfpLOc                           



                          LXBpbmssIGlycmVndWxhci                           



                          Nnr2A2TZUbk5V7VDMpeiFt                           



                          vTKsgQEdPSQgBYM4PJThRX                           



                          U9LNXrHfDitHOxxkBtJ8vw                           



                          ZWdhdGUpLiAgVGhlIHNwZW                           



                          JheZBzNQueJBT3Ms9pmTEq                           



                          ZVXafdL9c6GpEKcwKZTxFf                           



                          qmVVLyY3DeL0BwgwHimZGi                           



                          SQPcvpAfg8hrEOE2HLNxnK                           



                          TecWAcYlKpEajgLTH4g5vb                           



                          OCBzmVZrLXO6IInwlECuBX                           



                          EwMDIgXFxkYiBPVlIgIiBa                           



                          MuGbJXX7WjXiVNy5QUsdJ2                           



                          YROYEaMGD2ENQ4HrypKsM7                           



                          MXu6OFFJEx3oEJX1VKmjFW                           



                          I8RFI6GuElRZtgtNGdDJze                           



                          ZmwgXFxmIEFyaWFsIFxcbm                           



                          C5HMWeAiNhS5ZdJOKrCGJw                           



                          bEhhFBRFp5svqpFvBKtzHn                           



                          ErENWpA3XhXOjkWy1pyHKl                           



                          JZIxRmFcV8WkNTFsR5Bvbi                           



                          LxZYbhJNHdym9kcPwfDFet                           



                          NfWpOPIkt3m9nCW4eXBmsF                           



                          N0yIEhqBhgRmCpGP7tjLZc                           



                          PX4dBVpyQHfpzuNbr1YlUF                           



                          93jJVupsIpnvBtXMR1XkAa                           



                          POisAZNcc6p8xVmcF32re6                           



                          2itDGdbWIpNUVoDX7mjV8p                           



                          NVThu5SxOQX5NQskyXEyop                           



                          JcJQJhIV0rPZHpqjIxz6Nd                           



                          JX6cNL14ySRtdMdoNWApke                           



                          2yqP8rHGSrnbIvR2SdVSLq                           



                          XN19a8zhKPYeQmWqyTced2                           



                          QdONZjXMncJS09STjzAmWz                           



                          jVPuCfSblRVcUdKsV56tzN                           



                          4rNClcpxCyWVLoQA5fFTRn                           



                          DYPsvSYwmU2wkiEefwFcgN                           



                          FvkJK2SQEymW1acH93zlSt                           



                          wwXSPI25NNYraDJlYSA4EV                           



                          5tSCReeelyHWYnIRGiESS9                           



                          UVvxmO60cHDmLNTgLFAjuA                           



                          JjzVylVuukgWhjz1IenXPa                           



                          XGlkIDUxMDAyIFxcZGIgT1                           



                          RMQMVvAqH5KzQ7DgNhJDr9                           



                          SEf2NU4AAiEpLAQwQZUjPF                           



                          W0BfMaYQc6BDwrWU8FGDO2                           



                          FvS5USyeVQP2ERDeNCHlTT                           



                          QgMiBcXGZsIFxcZiBBcmlh                           



                          dWJhWB8jpAythoFuREBcPK                           



                          JQSrRBd0h8kKfkR80er15g                           



                          PXJECOK6L5Cun4InyiMena                           



                          cuXHBhclxmczIwXGNmMSBS                           



                          JITpaMHnRVWvuyUxn0UtGT                           



                          xpbiBsYWJlbGVkIHdpdGgg                           



                          pEarCYYbsJtgkaWfU6S6iz                           



                          FmGU9oXVTvIXTuW2YhULTt                           



                          A29rLYWusC8lRMBwVC7kCW                           



                          e9PZBdWKUmKsjarV8kqONa                           



                          OCIbrK6dLTmvQuCfFIUsP9                           



                          FtEFygXqL6OjA1YGdzkfEo                           



                          uHBtm8Lrb82aujSaRBHuFP                           



                          Mhd14jfHA3foPeOhQbzNh2                           



                          aWPoATJ8HJ5lkTOlmL95CA                           



                          Nvjx3bAVMid1E6NKOxg2V9                           



                          ZSBmcmFnbWVudHMgKDMuMC                           



                          A0EEDcTBO1VVMnPBBacLHv                           



                          uhKlN7iwHKxlaCMnXiAiRP                           



                          fsHKhjlyovt5Emy9YrbGSh                           



                          u55szNH7kFLiaZHxUdNdG9                           



                          8pfwImrPMzIrgkTEO8ZNOi                           



                          kT9vw7fhofQ6XQ0rkHrnqa                           



                          MnbYCen2RmNmBwZG5hVLTw                           



                          MIAapWEdyT6srvSzqwCayS                           



                          AsoTA9UNOwYM74pMTrmNew                           



                          fJ9eEoBnNmDzVTCmhlmdJI                           



                          LkHO6cDRTcSIJ6ACEazaQY                           



                          wXIhJUIii0CopRndghVuTS                           



                          YuoN0noLKfLNKtpcWXAHP3                           



                          wM5wHRVqKXQ4CPGbfqASZF                           



                          mzD93zyFT8aQVyxODnWvEc                           



                          T45qwyYbNQGofoLKIydeH9                           



                          NjpVVor52wzWX8rYMwvQSt                           



                          SJArx2KwxBPvz3iygVqav5                           



                          VjdGVuZFxwYXJccGFyZFxz                           



                          wN3mVtSbq5omrBn3LWjgnl                           



                          E3AGOfcj47WWmpFTKnR6Ds                           



                          X3ZsVJjtXXX5NJOfQrWpKQ                           



                          MhSF6BKaObGIoVAXOgGBx4                           



                          BkF9ULh9HNAUPtJfHiZxCr                           



                          jrQCbnEKBhPRv9SUu6UZmZ                           



                          HmG9GII1PkF1UxYcWHQkWs                           



                          RhHKp6YWDiCTsvoBNqKCTi                           



                          RWReQYypEJidS12gVsVnSY                           



                          nbEyBlEO6lOV3znJPqOYEn                           



                          wN5zFR7zB0vuwF4aPU5guE                           



                          OlWUVhOxSjQ3MrWVJnR3Xb                           



                          nvPtBFgwAOGaut0ckXmyBG                           



                          bvQxIfJRWmi3g5yPH2lJJz                           



                          pER4xEMeiLizIcGiGE7crO                           



                          CyTZ2pASfvWCiajaRqe8Ga                           



                          IS15pVTqpmGzfkOvMCC8Zb                           



                          LlJGnwXWJtg6x2qZmrS15h                           



                          u02dl0dpcD6vHIW7UWW5CE                           



                          xxtdBedKSwj3Eol84rtfMp                           



                          MXTkUIXey77srXM1dqRoBk                           



                          TenYy2fDAfNER8HO0aqBcl                           



                          dskxc5BetCQsZWZlRTJmE7                           



                          UtCIPlVKWil3B9WBAaj8J7                           



                          ZSBmcmFnbWVudHMgKHJhbm                           



                          aqorovVqYzgTCdWfVjM96e                           



                          ZRKnVnwsE40xrR3eY9WyCM                           



                          Vbk0NaKTmwDR8mjF3xJNE0                           



                          LjUgeCAyLjEgeCAwLjQgY2                           



                          8tpH4sHTfvmpAyXDYvZR7o                           



                          FDUlZKWiPZHdPJY4PIBtGV                           



                          AbK6SsRZZvFCWui2B9AZQk                           



                          i4O1LAIxinLhbWUhzKRhyt                           



                          ScqDBnlxjxQFQ0QNCzxB9w                           



                          m9jcgtL8KV5ikNmnqaziFo                           



                          1rMFvmeVSwt8EyiWQhcARb                           



                          E4Y3NVZ7evMcY0RoQEGIpO                           



                          Ofi2KwB6mvSV0ldPZfy5Jk                           



                          dWn5mJSuWRJlmZnvYKb0DF                           



                          luIEQxLUQzLlxwYXJccGFy                           



                          KBthb6UycZIYYOgrvJCcme                           



                          UOxPf6QTrlEKQns7W6SVVn                           



                          nWfiTGFaM3CoC7PjljV7y0                           



                          baqCikw7NazJOpVX2bmKTg                           



                          fQ==                                   

 

             MICROSCOPIC DESCRIPTION l1wlhBLdXZTjvXU9AnSdAV                     

      



             (test code = 3371) Zgo8tcx6HfdFZxkAHkCHut                          

 



                          hKZuopWueg50bKM8kS77XX                           



                          2gLINuZcR0CNDomtW5Mbj4                           



                          ZKDbHZNshTXfP744i1bkl0                           



                          bcnxZliUT3pPxtVLFjlcvw                           



                          RjL5MNnjPCCwotsaZGe4YE                           



                          wbGLStaIQ8NZLwyVPgD2Xq                           



                          KFWuPZ5fnkf3WMB9VNrfVT                           



                          UyVqL8TCGhmDWpHHMupUyq                           



                          YBoaz032RDW4DhQnTICont                           



                          UnfUfudE3hRjOeOYXNuR5r                           



                          kIQryKc1g1tbFCIzERCbkK                           



                          TzhG1mslSdkA8fb6MjKJIf                           



                          ICScw3PlNTJpk98blKMjxM                           



                          RSVLSgHYP4AAFoUY0lL4S7                           



                          mXWlNPbdFTV3sZ2wPKUnoY                           



                          bcFSgraFcfc3ZgwQ8rDQWa                           



                          QMQ7yYCbGOTblBIhbJEpIG                           



                          RyUPLwvlTyb9aef0TagZ3j                           



                          bmcpLiBNSVNNQVRDSCBSRV                           



                          OOSBXrLB7INdklRJDUODEO                           



                          WlRbi90iDXJocKRYElisDE                           



                          DufDkrYC2oqR7elEvlbT9b                           



                          qZRxzUK9rgjxxqVysWw5dm                           



                          mqzCKfLDOdDSSTN8okMfjs                           



                          JQLlRV65UIS8BBUnFCXsoN                           



                          GiLGGvOQP7zTGri8Yyh29m                           



                          aYXqFA4SJT24WrMiGmNXep                           



                          OyY0KjNqWpRR48P7ylTRFs                           



                          NLdosfSeh7uelgzaATRrTP                           



                          aYLAW8QXvjCWbcnXLumUgz                           



                          MCAgbnVjbGVhciBleHByZX                           



                          XqyX5nWJLrweQLNZUaXqsl                           



                          ZYMkFA31ZUY2UIJcUOTfxO                           



                          MgGBIhCZK7tHFst5Wej57i                           



                          cGFyXHBhciBJbnRlcnByZX                           



                          NxuBxksytvzVTnKCLsUp5t                           



                          uF5fu4qwVVMre2DyeuZmwW                           



                          ArzzCjlWAqCKGulK0mMH4e                           



                          ZO7SOmDlus79RKjpeqtwTU                           



                          CdeR96JNOfh9WbQnhkuGH3                           



                          IIBcRJQpWjZraFEkq0ZcoT                           



                          NfoUs6FALpceJ2WTWcyEk5                           



                          oJ4jcRisSAcHG7elMNseWW                           



                          xwYXJ9                                 

 

             SPECIAL STUDIES (test h9jfcZCmZZNly5miSUOhpD                       

    



             code = 3376) FuZzEwMzNcZnRuYmpcdWMx                           



                          RLlazqXhJWouz9AaY3QbZy                           



                          AwMFxhbnNpXGRlZmxhbmcx                           



                          EUDjKZM5trQkDWOjYYwiIC                           



                          GkRYfgWe9aaPLcqHkgXqSa                           



                          BJIhl2vdljSCxbqszOz3i8                           



                          doIHViIhM0zHLrFJwtC6fr                           



                          miVteCLmN2NkhESueYu1j6                           



                          svCuOvGuI8xPTxKYjkH9yy                           



                          blFnyBCaBPIpICq7hD79FL                           



                          IhmW9cvZDaKWgqddAmFvY9                           



                          QLqdCLGeWoC2SQVvwVVpRO                           



                          RwZ6qpFGAuBQpkHRIyWJxf                           



                          lVZcGHA8rNoyx1U1jQQqvE                           



                          SqgUtfEaGqKbCnQqNTi9Po                           



                          DNr6qHrsZ2LhTRHjMtE3fB                           



                          QgUGFyYWdyYXBoIEZvbnQ7                           



                          nNaqbnDvp63mcHDaPGXiII                           



                          ZoWqDjeCvqNMQeYPOFl0Zn                           



                          zWdwZYF9uNi7rZrcIirtFG                           



                          F1Jjs6UR3ivn36dpx9iPro                           



                          MWPtcbmlUvS5KChaQUAjkr                           



                          okCDq6JUgjTBZieNI2DVJn                           



                          vYAmQ8ZpWEFkMU1ydba2XT                           



                          F2NQwqAZRpTvX8LDIdwSKx                           



                          IOIjiYebHSbtl861EWD8Tc                           



                          IxBY5gZ6Nxa9Y9mS1hlTSn                           



                          KYHytYZhEzKlVEBnpy1pxD                           



                          QbICnri1VhNNF8brD0tAJp                           



                          hNCcXFVjUO36Yiydz2SpKq                           



                          kha4UtN31qbAC7YDivv0rv                           



                          XK3vFnY7okRtTHmtb0fdpL                           



                          4xSwM0ICevKY6bBS3qBKXx                           



                          jS8ixgogGWNjHbGsnwcxYT                           



                          ExjIbkvjUvLq0inZlrDNR8                           



                          SUwcM5qedG1tHfD8SCtfG7                           



                          jigF1kBEy1MLgweHQ3ZICv                           



                          kI4rYV6znrdwo5wbYSehRV                           



                          xaKCZoabX2kqW6ZFBvzXNn                           



                          B5NoeW1sCBNlUL5gusdnf9                           



                          bpUPV5QCrjOQPdLAM0VaRx                           



                          DSOkd6Ifkzj4FbYjx1HrnX                           



                          CqLQtlN18ln035CPBxloLx                           



                          M4xcaBOruyfidKApinydIU                           



                          uczxG5GNGtTZGiZBbzBZAq                           



                          XGZzMjJcbGFuZzEwMzNcaG                           



                          ljaFxmMVxkYmNoXGYxXGxv                           



                          U0jbYkWpF6TmLLJcQdPrWT                           



                          rxZHqdfJLarPAxpBL4vV0b                           



                          YX2yCZNshVOfR5XuZXVwzr                           



                          QyzMZwYWD6pFSxwUCoDC9s                           



                          MRfxoSKuy7dus0JyQ3psnR                           



                          nusTD7YI4iDBDdNRUvGMnr                           



                          y2BaoT4zLgmisVWcqifuHQ                           



                          xmczIyXGxhbmcxMDMzXGhp                           



                          U7jgHoPlPFLzfKtnPSsid0                           



                          NoXGYxXGNmMlxmczIyXGx0                           



                          cmNoXHBhclxwYXJccGxhaW                           



                          3mCoJrSpPbXijhZH5aYCTg                           



                          K6wbkZIoWWDlUTIqE5pfGx                           



                          OzsV6bjSkoVZllUcTmVmKr                           



                          KsRPx689gf1kWDHmqPJlyk                           



                          SMkMTzsI0iEJgfFFazZQgm                           



                          oRCpFMmgl4jsYETqt5w6yN                           



                          NlRVCqmpImz6djOJnievDr                           



                          FPLwjCTxpBRlZILxs41aFJ                           



                          wgiVznwKvoCPUei1OgvGsw                           



                          r3TrPtNtHJszo6ArN25ohI                           



                          JvbCBzbGlkZXMgcnVuIGFs                           



                          u40xg6gbHCStHmP3xGGgpT                           



                          Q3kJXthMGom3CvmLrfTODu                           



                          o5sxJPMnqr1denlxgLHbe5                           



                          FlrL5fecnkMRjniBMdwvTq                           



                          NMSzk4u5dOGhLVUrJFRtNR                           



                          paaCd3CBSra734fg5wcvJ8                           



                          xTPgOMS3AEaxRPThGDGaxp                           



                          UgZXZhbHVhdGVkXHBsYWlu                           



                          XGYxXGZzMjJcbGFuZzEwMz                           



                          NcaGljaFxmMVxkYmNoXGYx                           



                          HFlzN8tkLvYuP2NjMOOgWr                           



                          CrzHPrU3xvxQRfICPvARbs                           



                          XGYxXGZzMjJcbGFuZzEwMz                           



                          NcaGljaFxmMVxkYmNoXGYx                           



                          UJvjN5uwLfYaR2JdPVUiWl                           



                          IgIFxwbGFpblxmMVxmczIy                           



                          WKnkbcsiQTDwUXisT9xrRr                           



                          PcKTBvdNdaPMmuo4RdPBTp                           



                          WUMzMfqsuhUpSRj1kqTdXN                           



                          BhclxwbGFpblxmMVxmczIy                           



                          FYmhvldkZIGbVSjoM2twKu                           



                          StCZJsnNtvGNket9StMDMb                           



                          XGNmMlxmczIyIEltbXVub2                           



                          req0AbI9ausPttqTL7CGOk                           



                          U6svbDFzrWG0GUZ6cE8jOS                           



                          ewheOdVTIne5ThBILfSMXu                           



                          ZmZ6gQ1bWQS7ZcBMdKrpNA                           



                          BsYWluXGYxXGZzMjJcbGFu                           



                          ZzEwMzNcaGljaFxmMVxkYm                           



                          AnOZPzOSbhU8cnTvQxA4Id                           



                          NLCnNqCyqNphGNzbMOp5Ga                           



                          xwbGFpblxmMVxmczIyXGxh                           



                          wowlTBDkMNbhT3opYiXvZZ                           



                          KznUxuKTqzd6LaHULeOWBo                           



                          MlxmczIyIHMgTWVkaWNhbC                           



                          AYDE35JNOvCQUzjRchyN3n                           



                          fWOPHJZxkoO0a5L8AZdjBL                           



                          WnUZc1HHygtfYtFWUizK5j                           



                          GJQjBG7jKEr4scZmUNCqy6                           



                          CcNB5wDYDneMBlEIC8TXMp                           



                          a0KjQ1Zmv8ZzPSQzWDOsgr                           



                          6cicNhNcFTtRRfWPSxdy16                           



                          MCLfJD4qC4ncUAGqQJUovv                           



                          SeeNFqz3PrVHKvoWD9gQIw                           



                          LX9XNpLKl69kBUOtMZHTpu                           



                          WhNFDkhGfqaJX0htX0kU1w                           



                          LiBUaGUgRkRBIGhhcyBkZX                           



                          Hlti7ixcWdLWGvSGHdo2Hr                           



                          lSBtkGSuzhLpA7Ygp9WuVQ                           



                          Gubs45SBlhuYHfsq32AJ8q                           



                          E4Hha1VhyH5aQFygFJWln0                           



                          GgfOYjwIVvXZMvq6NbG1zm                           



                          dxecMPsflIYhkZ7yFWXwIG                           



                          t8YLSju0WxSAVld6PfHuJy                           



                          toBcLHMyUEQuGRGtkX41ME                           



                          W7tIowhCsoyxJlPI3aKYRa                           



                          hkQxEQZmXSQxgK2dXAdfqk                           



                          RrHZOwrkL6l0D7IQqvUKGc                           



                          ewXdNkljWOS8kxQqfbD7cX                           



                          VvC3ygroycXVqkYUWod9Nr                           



                          yN4xbCWIjRRxb4QmlRXivL                           



                          FTiELiJM1ccoFtCY9cIYZ9                           



                          ODggKENMSUEtODgpIGFzIH                           



                          N8SCriBtfsQDY6evMbJHDs                           



                          g0DsXZhbT2crB03rnTwcjR                           



                          x5rPOynVivaFYecQZvUHCf                           



                          pcV9t7W7JRLpl1LamgmmSP                           



                          BsYWluXGYyXGZzMjJcbGFu                           



                          ZzEwMzNcaGljaFxmMlxkYm                           



                          TjECRpXNavM7dyGhClTgWs                           



                          FuevLCX5mZ==                           

 

             Gross assessment was [facility]                           



             performed at ([facility],                           



             = 2777)      Department of                           



                          Pathology, [address] [place] [address], Tel                           



                          [phone]                           

 

             Technical component was [facility]                          

 



             performed at ([facility],                           



             = 2778)      Department of                           



                          Pathology, [address] [place], [place] [address], Tel                           



                          [phone]                           

 

             Professional component [facility]                           



             was performed at ([facility],                           



             code = 2779) Department of                           



                          Pathology, [address] [place], [place] [address], Tel                           



                          [phone]                           



[id number]38





             Test Item    Value        Reference Range Interpretation Comments

 

             Case Report (test code Surgical Pathology                          

 



             = 104)       Report Case: [id number]                           



                          Authorizing Provider:                           



                          Elayne Dempsey MD Collected:                           



                          2022 10:01 AM                           



                          Ordering Location:                           



                          [facility]                           



                          Received: 2022                           



                          11:57 AM Services                           



                          Pathologist:                           



                          Homer Thomas MD                           



                          Specimens: A) - Large                           



                          Intestine, Colon -                           



                          Transverse, Bx mass B)                           



                          - Polyp, Colon -                           



                          Left/Descending, taken                           



                          by hot snare C) -                           



                          Polyp, Colon -                           



                          Left/Descending, x3                           



                          taken by hot snare D)                           



                          - Polyp, Colon -                           



                          Sigmoid, x5 taken by                           



                          hot snare                              

 

             DIAGNOSIS (test code = a3mniNJgMGKlo6dwZEIgnP                      

     



             3220)        FuZzEwMzNcZnRuYmpcdWMx                           



                          IHtccnRmMVxlcGljOTYwMV                           



                          ppelAqGJXmzASlN8Dunuzx                           



                          EFbnUT0pSK6izYuguYGouI                           



                          KlMWPsDeLtj9lys691hEYi                           



                          q8iqGCSGvdfjpEx5eQipL3                           



                          0pi7W9MrwtE75mqJNqBTC3                           



                          DGDzIRYwzTEbILVvSUE3QW                           



                          RaoYSuG0lqGGHySI9wexji                           



                          JYvvBOezIRErbKP9SCUhiH                           



                          FzL3LaLBZpSRjoGQWhrsm0                           



                          MeLzRu5myPVhaMzuVNpdTF                           



                          PnQRHoFLglALGeEzEgEU9o                           



                          MTFFB6DuRL6ZEOOCSC1OHV                           



                          SOZdKDV3UFMnMHSUQEGE7R                           



                          LJ3MM7IxUYMHL6ZPTVwjwA                           



                          CdIWWrHOCNIeZQP4uCECJX                           



                          UAVOY4VMXqLWXz0BTPmgPY                           



                          FhUJQuOESRNKEQSNGAP9OF                           



                          T8DJJgLNPQCCKrgOMGnTHg                           



                          BccGFyXHBhciBCLiBMQVJH                           



                          VPZEMhUTC5OFEvHyJRKUZ1                           



                          MQApFXOakmL73HR49pSB6B                           



                          RXMvUDJYJ2QLUSkfgKVaDK                           



                          RqBVWBHDEOXRGGHXBGIH3D                           



                          ZDZoEVPKGQrRMN0NMBTcXR                           



                          LdweeuRVQbUh2cRAJCO9Ed                           



                          OC7HLUFOIC9ZVISKIZFFIA                           



                          7QQM6RCDNUVD1ZAXBFPGvP                           



                          IHggMywgQklPUFNZOlxwYX                           



                          IgICAtIFRVQlVMQVIgQURF                           



                          Us7BKCskFbTMX78TFxTWKJ                           



                          ciXHSxUFIiEKGAU0LVPCHn                           



                          J5KEFsJROKTqGALHQI8ZJJ                           



                          xwYXJccGFyIEQuIExBUkdF                           



                          KWwDMPXSOArHBZisB6uKGE                           



                          1TEADFS1jBYsRDL6sTJUG7                           



                          JOTvWKBHU5FAEQxeiAWaPI                           



                          IgTVCSNEODIP3UWSrQB6BR                           



                          GWYCMH5OCHTaDOJKLLbIVS                           



                          5URURccGFyICAgLSBIWVBF                           



                          IaNKPWUQZCEyNA7ISBKzJY                           



                          Eyvm92DNX8SrEly0E2WBP3                           



                          XYVeJNFaf3hnMWDalGUvWl                           



                          EwMzNcZnRuYmpcdWMxXGRl                           



                          DqBrg4fvm323gJPkr1feCW                           



                          QeQjZ8zJVhBOOhxDZsK278                           



                          LLLnYSbch5ysr3CcKLHsrW                           



                          Dcu3Y3EWUZhvpccUb0gGcn                           



                          F06pq0Z6UooxK1fmVBWxKQ                           



                          XcH6KfUU9uQTPdCgw8DDC9                           



                          CWK7PXVkXHSrA7HyIS9tLS                           



                          PmgRGuIOb9v1ssjLutUBWa                           



                          XWU5e6bqFLkiokHqAJ0xxq                           



                          6xqTj7m4ianyLeGRCvPCQu                           



                          iUFZWVKnW8LwrEgfOv4xhL                           



                          y1kDmaKhawNYI6Umu0GC2h                           



                          yn35ecs4aVfbBIQjkobsFr                           



                          M6ILntFLLfgqliQLj8EDjl                           



                          GDIqkXN4LFRzcHTuV5YlPA                           



                          DfFP3ozrg7SGB5VLkaKLGm                           



                          NmI2OVBmtWYiESTlqDerMZ                           



                          iql316LCJ8GxIfCR8jN7Sn                           



                          g0R4vP3qqMDfCMInuDPkXz                           



                          BaQZDtnl6thJAxNFskc3Yi                           



                          BLL7wnJ8tNAokRLwSNYzPt                           



                          Y3ZLrlFO9nek35RMWwIPH1                           



                          af4jyFIceLunjpHcrJFqAK                           



                          quX4XbJIOtg637SVUiN4Ql                           



                          MHMbj7O1xaQtZwTuINTydU                           



                          U0pqF4XDJaND6drjycj9db                           



                          DVybTGpvOGJlejA1ksQ0WB                           



                          JltEAsE4LuaC9bSWSmMQ5j                           



                          zhfdb0ezUSX0GCgnZLBqAV                           



                          I3ZjJlPYEaa5Nbksd9UsAu                           



                          u9DqnNWrANdmQ17ee512YG                           



                          NqwfAqI0fsyBDcdwllgLAj                           



                          iqlrAJwassU4OKTdKDkfvs                           



                          bdXMMuSArsE7imMiHeFMNb                           



                          bDvjMXnvv6GrYISeUGVePw                           



                          NcrEGlIMVtBuq8GGSiiOZs                           



                          JLIkYjTiX5pwggwvKlJYGE                           



                          Rah8yjX3rubLIBtUBjS8Mi                           



                          UGhvbmUgTGluZTogNzEzLT                           



                          j2KL08GTjnWWDsty76                           

 

             COMMENT (test code = s1njcPBfJAPehTD0EzSpJM                        

   



             0076)        Zbg2giu5ZroTWoqBOaGLmu                           



                          uJWjwwYkpa08mQJ2tJ10WR                           



                          2sSDMoEjG9RQTmuxH1Pyh9                           



                          WMXoRMGvrJSaC660q7awi5                           



                          ispzHshUC3zDwaVELtpqya                           



                          YjT7VTkuOJFmdupqYRw8WX                           



                          czLWChhCR6JVNacNBuB7Tq                           



                          HRPzFZ8gazp2KYV1QAhjPV                           



                          DpWpT5ONYqiTHqUHWrjLrf                           



                          BMwwf245KBN6SjUeMULxoy                           



                          IdcLzdqY3jQkQqEVHRkX8n                           



                          ayBBMSBoYXMgYWRlcXVhdG                           



                          AyuFQwe1MjT7JogKPiNFIf                           



                          oOgaLv8dCSP4jFRkNVNxph                           



                          JraLclciduh2N9SItgwq6d                           



                          cGFyfQ==                               

 

             CPT Code(s) (test code h0swaWWwCFJprEH7ViAoBS                      

     



             = 3357)      Tbx1uip7RnoYArmVWcQLlb                           



                          fSFbfnUabw52vSY0eK66ZC                           



                          8dNNRpLzD1BPQacnA8Xwo3                           



                          NLNsNJFkpJAcG236t0oqp2                           



                          nvxdBayDR3uNtyWLZsrafs                           



                          WsJ0CIxpGGTeccsrJDk1QD                           



                          bvXWMraEJ1SMVljARuZ9Ed                           



                          YRXdIJ7ylsy3RDJ9LEepRO                           



                          EtWuP4MRJqcZKkHYGtdWzt                           



                          COqsx754WLE0PcPfZLHban                           



                          VioAnriK2iKjKkDGY6GDOi                           



                          MXI8VIBwBGq9SjEqVRU4ZL                           



                          L2WYH5GISrmJJvqF==                           

 

             GROSS DESCRIPTION (test y6uwvHNfQCBckZR9RbRrCM                     

      



             code = [id number]) Bke7wwq5TawHTgjQOhOYje                          

 



                          tQRmtxXvxy72iGA0fP85US                           



                          4uYAGiGxG5XTPwmjT2Jpn5                           



                          CLSyMVBpqTLoX999d9jtd3                           



                          hpbvFykWU5UGIhGPTiL2Eo                           



                          GG9iGOEkdRQnR44zaAGgIH                           



                          G8WAFaDYQvvCAkSVQpLWY8                           



                          ZBMbwJWqJ8dxSIKjWU7hxq                           



                          meDXftGUrzJXRuhPI1DNWn                           



                          aMOhO2BpZNLwTTakPKNyed                           



                          r3QaHrWo4zrNPvyJbdUEga                           



                          ITFmn1qtKTXuzDQeIDH7AP                           



                          zpuVRqGGHdYGIxBHj9ZCRn                           



                          RHsenYVaQN8ewRvwQdphyH                           



                          kmz6NimUArHFsfTLPzXVTi                           



                          EOyyJOMeA7RRGYAzUvH3Eg                           



                          X9KcYgKJc8XZc3WD7VPkYl                           



                          RZMhRHKjUMH6MDIvWXd5KX                           



                          rdFJ1QBZI9MsV4TLf3FZU1                           



                          NTMyMSBcXHQgMiBcXGZsIF                           



                          ojCzTCenztbCYhNJ9emJbc                           



                          bGFpblxmczIwIEEuIExhcm                           



                          qlBThshQQciBfmOCucL00w                           



                          v05lOPXMioTqz6AipsNxSr                           



                          xwYXJcZnMyMFxjZjEgUmVj                           



                          ZFr2JEBrdZ8rYm3zqLNpiO                           



                          3jgDDoUQhiOCQ0oISnBCJy                           



                          JCNdNQWlUL19IReqBXKykv                           



                          FtZSwgbWVkaWNhbCByZWNv                           



                          cmQgbnVtYmVyIGFuZCBcdT                           



                          bsZoKvKEo6Q2wvuhdjENoc                           



                          oWYjtOwuWW4uu6maiv51tn                           



                          Uvo0XzyfDyFXKqu3KvyQBx                           



                          TLQfOuIgamVdZ48zk2snxD                           



                          Xgk0JovUPqrWctvCPphCYs                           



                          LXBpbmssIGlycmVndWxhci                           



                          Eja9X0TKJhu5Y8NPPfenGv                           



                          jZBjmXVeSIMhBCV4WVLpAC                           



                          W2ZXRpIrWxsETzftQrM5ld                           



                          ZWdhdGUpLiAgVGhlIHNwZW                           



                          HqwEMsCSgsPOZ4Cd9goKZv                           



                          AIUjidS9k4XvSVibQAMrMa                           



                          ihFUWiZ7SkW1IutvWvuTZh                           



                          RUVkfgDpw5moLJM4CCScxY                           



                          SamBQnZaOhEdumAPT7i2mm                           



                          GVAakGRpNJU2SYldtOHlCL                           



                          EwMDIgXFxkYiBPVlIgIiBa                           



                          UhPuHLJ6IfBqEEd9IFotX5                           



                          HMHGQjZKD1HVO1CdyjSuK1                           



                          VWu0OGVDUy0iWEN7IEcfUN                           



                          Q6DHM2HfNlLLufiMZfKPdy                           



                          ZmwgXFxmIEFyaWFsIFxcbm                           



                          V9KPEtOeNtC4DjGVQpKAOs                           



                          zVzqPXGTb1xufpQyBOkeSq                           



                          SrXARcB7OqPMtoBi7hxMCg                           



                          RWJcVtMkQ7XsZQDuN6Dawg                           



                          IzWGyjZTIkdp3qcTvfRXfs                           



                          FwHyXMFqa8o5zXS2dGNhcF                           



                          A1hIVawEorZhQaKX6vcLDr                           



                          EC5hDLzdDHyzzpXrc4WyMV                           



                          63qLGjxiEfxaKiZTS6MjZi                           



                          XRyuRWPvc1q7tZfaX30ft6                           



                          6duPItqIKvYWEbRI3luG2s                           



                          DVCac3CqMOP9BBqhgRSkxz                           



                          DvTHZeJH6mAUVdxiQhe8Pf                           



                          FT2qDJ20jRPrzQxqWSBgsw                           



                          2qbB8sQDEgkpRrI3YdZDSj                           



                          GL41u1efRAExKzTyiLmyy0                           



                          UiAQWvDWjhJM29AXlsAbVe                           



                          aVDxErVohFYlGxAyJ48wxF                           



                          5xPSigirEkBVCoZI4gKPXp                           



                          WVIgtIVwoS5yyuLscrZmwL                           



                          QaqOP7FPWghE1kbN10hpGj                           



                          zyLABL36YZHdfBAuJUA0EN                           



                          7yOSNmdcpgWIBbUBLzXZD9                           



                          IPxwwN34nCRkPWSkLQJvfT                           



                          DxqJljUqpydMorf0AkfLZp                           



                          XGlkIDUxMDAyIFxcZGIgT1                           



                          ZEVHQiCsQ2LqM3ZsMmRGt4                           



                          VDp0XM7LOwZhLLJqOGFvWX                           



                          C2QxTwFDk5OUkfUE3SPVY4                           



                          UuJ7WRorVUC4JJOhCNHsHG                           



                          QgMiBcXGZsIFxcZiBBcmlh                           



                          eGGiIV5weXejlwXeXYVqYM                           



                          RQYbLCs1a3pPztP14lk38f                           



                          ZKMYOGM5Q1Ium3XnwdSwsl                           



                          cuXHBhclxmczIwXGNmMSBS                           



                          WQRygNQsXBTunzOji8BeIO                           



                          xpbiBsYWJlbGVkIHdpdGgg                           



                          bBizZEFkfKnphvOxP1E8dl                           



                          FqNN8xUEUtLWZvW9MzRBOn                           



                          X39cEZKweO7rUYRoKV4hDF                           



                          a9MLYiEVVsDroeyH5eqKAy                           



                          MVGodY8pOKavUnHdCYWyD4                           



                          RoVFmdIbA0JnW4SGazozZb                           



                          dTIoi3Rie21vgdCxVVXwRZ                           



                          Dkj80gvYY3vePyBkVmtCa3                           



                          cCDuJUI5HU7ecQRvhM61YG                           



                          Ycme2cSKInf0E0NMYba3N3                           



                          ZSBmcmFnbWVudHMgKDMuMC                           



                          U0DMDaYRX3MMTmEUSabBDj                           



                          lsBnM2xfRKwneYSnLnZeZM                           



                          wnWLsmishcx9Egq9UqsQHc                           



                          h43jbEO0xZYlnHApYhXiP7                           



                          3amcErhBVuMgvjKVK1CUDs                           



                          eZ2rs4mtemS0YR2mdNzzop                           



                          VqyABga5ApRlIuDE0tJAFx                           



                          BZRsdCYdlS4jasVwbhMizD                           



                          ZbqOP6LFVzPY89sWEniXqu                           



                          cU0vWdXzUgWoWAXoclocIH                           



                          UyZA5pICUtNUX0FYAsplOL                           



                          wQGlAKSlu1WmuRhrijCzBG                           



                          ProX5ijHQdKOYcvjQJFVK7                           



                          yP9pNAKqUHP0DVAfciYELN                           



                          cqM84tfJM9qDGpqXLfKcUy                           



                          F03ygqPjPAGkfvJXLuxnR7                           



                          EqkCPgh58emAE0iHIwcAOq                           



                          LFCqj0BdmSHgy8xrqLqvf9                           



                          VjdGVuZFxwYXJccGFyZFxz                           



                          bJ6kSmJyr9tdhIz3MTvkyt                           



                          Z2DHSobp18ZHgsPACnA5Cj                           



                          A0FzEXwnEQN8MWKxHlSwRZ                           



                          JiGB2YWaPrVVgGNMLhXXd6                           



                          CyZ1XQl9GFGQRaAkPmOuFn                           



                          qtFQtzEYMvEFd4UVh4OBgP                           



                          WlG7DWJ9PbO4YdAvACOcLc                           



                          WoUNn6AELpYOvojZTaVNSv                           



                          EEJiBIyzUNioJ21sObJvRJ                           



                          hmPrCrFC7vXJ7kwJQoHSRc                           



                          mE4mJG5wL1godM0uYL1xgF                           



                          StCFZwFnWfI1LdDUWuV6Oe                           



                          eoIuEMgeOTLgbw3omOwcJQ                           



                          eaBkFnMVNlt9l7iOS3aZYp                           



                          fXL1zSWkbOkfWcHlAP4jyL                           



                          QsJC7gMTywAYwbpfYui7Ic                           



                          FF40rULwmwEfweCpLEA6Fj                           



                          XaMBxsBFOuk8o9pAjeB05t                           



                          i16vh3rxsD4sNBH1VMQ3NE                           



                          npydAdrRUfr3Meb49djwXz                           



                          FORjNXOus51faCK6kjAcKi                           



                          IkxGx4jXNzIMV7QB9ofOjx                           



                          iufvy5QznZXxHNZpTJMeP7                           



                          ThNFQcVIUqh8G7JCFix5M6                           



                          ZSBmcmFnbWVudHMgKHJhbm                           



                          gonucfYlDkvBLxHsUwQ86v                           



                          ICIpQtldI08woN2iL8RkQU                           



                          Nzj5XbWYpnEV2fzJ3sEUK9                           



                          LjUgeCAyLjEgeCAwLjQgY2                           



                          7vlF1jYNemagLjKOYqMV8n                           



                          GGNfDXHsPURxMES8ITAiQI                           



                          VnV6TkNGBxUQFft6F9WXSx                           



                          g4E1IYLkmsTkcHOdlYOufd                           



                          QueGGssqabJJR1LLCcpH8s                           



                          m2bnmiT1ZG2rvJaohojbJb                           



                          1lHCsrgDJlt9FueDKrbMZa                           



                          Z2L0AJX9xxCuL7LzFRGLeQ                           



                          Peg7BiZ7kyID4fqIXia5Ko                           



                          vZo2zSMxYXTtfDbeZEb9MF                           



                          luIEQxLUQzLlxwYXJccGFy                           



                          HAkti9SrvLSCFOzleGYozr                           



                          DGyZe4PNkxTIWop3T9RCLs                           



                          xWpaGVPqO7UbY1NvuzA3i3                           



                          jdgAgxf5LunDOuKW4gfXRe                           



                          fQ==                                   

 

             MICROSCOPIC DESCRIPTION t8ehdGJjRBTlzIM9VnFmPG                     

      



             (test code = 3371) Fnv0fpp3TkdNLptMIrSWyl                          

 



                          mPPatbTgwn28eDP8hX60TA                           



                          4nYEPyFlZ1FIIfueN7Vkz7                           



                          PQQrZODijEPfX869u8lep9                           



                          pebnIjvLW2fNkzVYTrhutu                           



                          YtW8ARyaIITuqotoMPc4OP                           



                          lsALIhlXC9VDEhnKYyR6Hx                           



                          CMMpAH8vurk5OUO0IGasRZ                           



                          TnTfJ1QROdzLZqGZHvsDej                           



                          QDpap029RKI7PdYfSVCgxv                           



                          IjzTuwtM0pZpZkGLDMvF2b                           



                          zATuuKj9h0htHSOfYPNtzZ                           



                          PqtQ2ydeJibP4xk8OgWKAc                           



                          TDXec4OrDGPdh27lvMIsnY                           



                          ALQIOuJOC3VZAdSC0rE9V9                           



                          uXPzEBeqMKA0cX9qGOBpoI                           



                          teTLxjoLevg4XogV4oJOCy                           



                          WIG7wTVoZLMpzNFvwUIsPP                           



                          FzHPIrhfDun0dgo4BlwJ6b                           



                          bmcpLiBNSVNNQVRDSCBSRV                           



                          SNAWAzYT5DZitfXIESVOEK                           



                          BdRar75wDZFdpEYFHnlrZX                           



                          JleFbiLF2mwC4ueAvgfD9p                           



                          sJBemZM3etjbpyQeoGm7yf                           



                          dguCDrBWEjXZPGT5jvLhlw                           



                          XITmJD42NWD7JAAeMGJvaQ                           



                          IlMQMeGCL7oDByu0Ois83y                           



                          cRKtRC3GJK97MbAlNuEPtp                           



                          JlP6YiSnTrPP55C5qwWFGa                           



                          LYdirbHot5kalgiqCFIdPJ                           



                          zERWA3ZJkrLNvccBAzmGcl                           



                          MCAgbnVjbGVhciBleHByZX                           



                          QawD9zFWWxzfNBPRBxHknn                           



                          XHFxLC87FXW1QFJeYOHnsZ                           



                          EdBSDcWDK1gKQrf8Eyp65u                           



                          cGFyXHBhciBJbnRlcnByZX                           



                          QblCcznkskyXLuLCMrQa9v                           



                          nI6gl8ybNJBoj7HhucMmvO                           



                          SqudDwbTGdOINrlW1iMR5s                           



                          VE4EFuPywu71FOfmfapzEC                           



                          AdbC83MXLmp1RpCbejlMT9                           



                          KMLxSYQqVuCcyYRnl3JjxP                           



                          GmrFt7XKNdjuS2NETwgBv8                           



                          xW7ilCexROrKM0cbMGlnSV                           



                          xwYXJ9                                 

 

             SPECIAL STUDIES (test c7zqnRLmKMVlz6ybXLEdjK                       

    



             code = 3376) FuZzEwMzNcZnRuYmpcdWMx                           



                          RLrldtJxPTikp6RrM9DcNw                           



                          AwMFxhbnNpXGRlZmxhbmcx                           



                          QABaHZF1mmKmEJDaZSgjKD                           



                          VeBEfiLl1kgOYiaZjeGwNa                           



                          UENpx2zgaxZHdxiooLv0f1                           



                          vtUJOmYhB2tFWgJJavA7wt                           



                          imEbaCByL3MygGTkvRd5k7                           



                          xmWtPvQfY2nDQiKHnpB2nj                           



                          pxYyvEBiSJYeQPg4iS41AQ                           



                          PuaV0bwJHdJUggcrGwLoJ0                           



                          FCtlVVYlDgE7NEZerGMiGR                           



                          TsO7qhQFLdAAmdNZKrZOiy                           



                          gNTrOPB0hZvay7N2yKHtaP                           



                          HjrEgsHeLyQtGfZtVEc2Go                           



                          OJj3pJzrR1TfKNYuEsY6wG                           



                          QgUGFyYWdyYXBoIEZvbnQ7                           



                          zPohakRux01ifFPmBGFgAA                           



                          UmNiDkvLjmGFPaFHMMt2Xq                           



                          tKxeFDZ8qLx8cWvwYkqsLP                           



                          Y8Lgf4PF2baq38azv0eLbl                           



                          PBPevwpvKhP0NTxuOAOikq                           



                          maCVy3CItgECFrfMO2ZAWu                           



                          eRMmJ9DzZTToEX7fbqm7SG                           



                          E1VDunJWGkEsL5NAPzlPAo                           



                          AWZbaYhtBLzub704EFF5Ny                           



                          KjQT5qO7Bps2U7dU9ycPIy                           



                          LUJqkJLsXhNsRQRtve1znX                           



                          DpVKbqz5ZcIYV6nfW3dGUg                           



                          aHQgZNRkYJ41Jyrle1FkZp                           



                          ggl7ZjX76otUY5USiil3es                           



                          XQ3vTsS7spCkGGsdv8cazA                           



                          5gRuE5FXzfYC8vQU4lXPXh                           



                          rE3yafdkURBfKaNsqbagNW                           



                          ItbAguasTgHl7ukAgrDDI1                           



                          XTmgL8haoI8tKrF1CEffM7                           



                          gijG7fOVw8ZWbwlUH2TERe                           



                          sE4sJX7cyoxap8vwSXwjBL                           



                          ryESNfxuC1mmW7OMSpbOIa                           



                          O7CnhF2rSBUrYL3ufvuet7                           



                          ooBTX6CFinKHLtPSA6NoPl                           



                          XOZtd3Llefv2DsIxc2ChqE                           



                          ThVJpxE37vl884ENWydsPg                           



                          V6heqAClsbirqPAgofhtVE                           



                          mmxvC6PPWwEWSnVYksDHNt                           



                          XGZzMjJcbGFuZzEwMzNcaG                           



                          ljaFxmMVxkYmNoXGYxXGxv                           



                          S4icCnAqX1RbSJTfTsCrJX                           



                          rpAPbneLHpzVAesZA0dL2f                           



                          TA0cIJLrgFDwS8BxTHStdc                           



                          BecVKkRDK8qHYafJQyDK4k                           



                          BNnprJBsy9swb1WiX5uwbT                           



                          wdhCD6DQ5eWGPdPQLjDYwu                           



                          u8FctF1sCocyfLQglrbzMP                           



                          xmczIyXGxhbmcxMDMzXGhp                           



                          W2cyWkDhZZSqaOzoHMcwq2                           



                          NoXGYxXGNmMlxmczIyXGx0                           



                          cmNoXHBhclxwYXJccGxhaW                           



                          7pKaMjKzWfRzhrWH3gPCWr                           



                          J8dagXJqWKZfTLDiP6snFe                           



                          RgkP1srQfcZStePiVbWjMg                           



                          IwYNm839ip3eUTQznDJivp                           



                          HWgUWjmZ0vWMveIFdjJRac                           



                          xZStIBwcj7asYCGbx8p0cX                           



                          IyBVXxeiGmv3huSNocrcRa                           



                          KKPmgAPtoGEjHZAtk11hOL                           



                          wseCxsjJdvDCTor3UrnWhb                           



                          a6UwBlKyJPerr4EsX94qfD                           



                          JvbCBzbGlkZXMgcnVuIGFs                           



                          s25pd0ahDBBpOwU5wEGvkQ                           



                          Y3qTWrqWWzq6EirAudWDJv                           



                          f3egZMXnac7kwbrvtDOmg8                           



                          SdbU9ryibdBTopaNMkwbUf                           



                          JBWuw6p1kSBtMRCqLNSnBU                           



                          nxvRa6DVTgv742mr8kdeN9                           



                          vAWlERK2XMxxBXCfWEQezn                           



                          UgZXZhbHVhdGVkXHBsYWlu                           



                          XGYxXGZzMjJcbGFuZzEwMz                           



                          NcaGljaFxmMVxkYmNoXGYx                           



                          JUvpJ2piMyMgP2ZoJJFjDv                           



                          WpuKMgH8ajwBOpTZXcHWuh                           



                          XGYxXGZzMjJcbGFuZzEwMz                           



                          NcaGljaFxmMVxkYmNoXGYx                           



                          ONweN0ijRoWzR4KcMUBtAd                           



                          IgIFxwbGFpblxmMVxmczIy                           



                          DOlykfqmMBRvOAdyT2weLr                           



                          RxLKUpqZrrDTyxj4WeKTMm                           



                          KAFiHpeslgHvMGw4dhVzPW                           



                          BhclxwbGFpblxmMVxmczIy                           



                          IDxknlvuQKCiKObqV7pfFl                           



                          PfMRThlGkxPRfvz4YlNPKl                           



                          XGNmMlxmczIyIEltbXVub2                           



                          bkw2QbH0vikDgkoKS3SNMq                           



                          Y1zfgSKreWQ3EIH7mP9rIL                           



                          xiggShUVZkd6SiJIWmRYEx                           



                          FyW0vP6gKSI3HwJBxLnhLR                           



                          BsYWluXGYxXGZzMjJcbGFu                           



                          ZzEwMzNcaGljaFxmMVxkYm                           



                          XwUFTyJAfvY3tvXrVdJ4Sz                           



                          BURaPaRdkPjhURpdSDx1Oa                           



                          xwbGFpblxmMVxmczIyXGxh                           



                          junhDPWtIVslS7koWpAeUX                           



                          UxaNqkXJfbn2NjJJLgQHAe                           



                          MlxmczIyIHMgTWVkaWNhbC                           



                          PEFM89TDYpONExtMplpS8v                           



                          aEPCDGDxqtT1q4D4POflCK                           



                          DlQNk2MFxtsuPqTPTzbH8z                           



                          NUAbPU3hLIr6wvEkKTAau7                           



                          AuJA9bJXPzuLJcANP7QLPx                           



                          z5XfC9Lxb0DaYJUjUCQvcu                           



                          1wxwImGiHRsMByUCUwwg06                           



                          ZGDiQH4pH3pxGINfWUBowk                           



                          KjgSAcu3AkOWGpuUI8kFCf                           



                          EG3EPaBNt80gMEFkHHSHkw                           



                          OlIYQjrBnxxJM8fgZ2yA6r                           



                          LiBUaGUgRkRBIGhhcyBkZX                           



                          Chai5ehhThEVWcCHErs7Ff                           



                          rNPdjELrmxYeC7Mif2IkRK                           



                          Adyw79XDgkkCOkus22CQ1k                           



                          O4Yba8BvfQ5vCZqbCVPbo4                           



                          XswMJjsTXwKNVxv6LxJ2bf                           



                          wjjtMGjohWIpaF0oGBSjLM                           



                          h4YHEix2DkPXHks3FgRrSg                           



                          crFgTNUrZGNiYKIwrI84DS                           



                          L7dNaeyHcwzgWoEC4dUIZi                           



                          kkSfRSXdHFOwqM6nFHfinj                           



                          WnBCJkepT1v3U7QRysBXMw                           



                          bhWrSsbeMVH6ktLzyqZ5sJ                           



                          TpZ4pjydmzGMwuXFVkh6Hp                           



                          uC2zdUISjGQfp2QejSZnsW                           



                          DWvOJdUF5chdGjJH4lNIO2                           



                          ODggKENMSUEtODgpIGFzIH                           



                          X8FNvnNzawVDJ2mqXcAKSn                           



                          o5SyBLcaP6oiL84juJgaeA                           



                          f6xLTygEtseFGgtRSwFGXe                           



                          oqN4s3E7DYTck2FrsdrdHV                           



                          BsYWluXGYyXGZzMjJcbGFu                           



                          ZzEwMzNcaGljaFxmMlxkYm                           



                          XgAJLaDStiK5wkLkLsMjFv                           



                          PakrGRW0mK==                           

 

             Gross assessment was [facility]                           



             performed at ([facility],                           



             = 2777)      Department of                           



                          Pathology, [address] [place] [address], Tel                           



                          [phone]                           

 

             Technical component was [facility]                          

 



             performed at ([facility],                           



             = 2778)      Department of                           



                          Pathology, [address] [place] [address], Tel                           



                          [phone]                           

 

             Professional component [facility]                           



             was performed at ([facility],                           



             code = 2779) Department of                           



                          Pathology, [address] [place] [address], Tel                           



                          [phone]                           



[id number] 08:01:38





             Test Item    Value        Reference Range Interpretation Comments

 

             Case Report (test code Surgical Pathology                          

 



             = 104)       Report Case: [id number]                           



                           Authorizing Provider:                           



                          Elayne Dempsey MD Collected:                           



                          2022 10:01 AM                           



                          Ordering Location:                           



                          [facility]                           



                          Received: 2022                           



                          11:57 AM  Services                           



                          Pathologist:                           



                          Homer Thomas MD                           



                          Specimens: A) - Large                           



                          Intestine, Colon -                           



                          Transverse, Bx mass B)                           



                          - Polyp, Colon -                           



                          Left/Descending, taken                           



                          by hot snare  C) -                           



                          Polyp, Colon -                           



                          Left/Descending, x3                           



                          taken by hot snare D)                           



                          - Polyp, Colon -                           



                          Sigmoid, x5 taken by                           



                          hot snare                              

 

             DIAGNOSIS (test code = [id number]                      

     



             3220)        FuZzEwMzNcZnRuYmpcdWMx                           



                          IHtccnRmMVxlcGljOTYwMV                           



                          jjrjOyNVQbpVCgS7Uwfiqv                           



                          CGqlFX3kHM2pzObfxAEasG                           



                          WcMUNdZiKae8zfj768vHCd                           



                          w3wfYIWIlrdscTp9vWrvR9                           



                          7cf3Y2HehbZ62otOYkHAY4                           



                          IEHiDSDntOByYCXfZBA9KX                           



                          BpkXPwL6ktTCMpBK5pawco                           



                          KOkpWIawKXPhdMN9ZEGavK                           



                          LxJ8MgKNIcUUxgRTBttsy2                           



                          KkJzMi2prTHvoNpjVDbrUU                           



                          HuOBMfCXrvPSVoXmFbPT9w                           



                          FCPZQ7DqYA4DDVACKA8YFE                           



                          JCDdBQY9EXLkBYVWSOPC6E                           



                          DY5KZ3GsKQSFG2FBWGvnbM                           



                          AlHYGgCDMJYkBDF9hRAIOS                           



                          LRFKA7HZMxKSFl5WNDqeAB                           



                          IpEOHlXJQWPYAWQXSYU9MZ                           



                          R3SVIiQALKPSYioBOKcIFo                           



                          BccGFyXHBhciBCLiBMQVJH                           



                          EGAHOaZSS2NBAsLzKOJXQ6                           



                          XWAuBGJwyrG95XV41bQO1V                           



                          TSVtKWVIX0JTLZvzwVXsXP                           



                          VuTZFXUNHDCLPOCLWQFH1R                           



                          JKTbYRYHQZqTTK3HFWDdBP                           



                          LchtacPZXwZd5vODASH6Gm                           



                          IB2UCXOIFW0XIOJALGFJRV                           



                          1BFD8PSDPUOX6IXCAHVAoQ                           



                          IHggMywgQklPUFNZOlxwYX                           



                          IgICAtIFRVQlVMQVIgQURF                           



                          Op0LNLalHrJTQ73VDuOGNJ                           



                          scDNCwKGEyLEKIU2ZPQRFd                           



                          A0XITeZSRHGwUVNGQZ5BSK                           



                          xwYXJccGFyIEQuIExBUkdF                           



                          KIxXKJBHXNdVPYmiG0aCCD                           



                          3KKREWP6nPTgOID4eICLL3                           



                          GVNlNEMVE6QJTOljgTAeGC                           



                          MfATMHBVHVKR4GRHfET3IR                           



                          JJLLQK2JSTLjATYCOPtKHH                           



                          5URURccGFyICAgLSBIWVBF                           



                          GgCLLZVFVGUhYB5YVQJkID                           



                          Kwyh82NKT4BdOgl1R8IKM3                           



                          XNRgFLIqf7jwCWHvhCVaGr                           



                          EwMzNcZnRuYmpcdWMxXGRl                           



                          TlJmj3xqw632zNUlt0gbHP                           



                          LfXmC3rWSeGOPttNGnX378                           



                          XORiMDbeh6crv9OqMCNntN                           



                          Qkr9C3PZPXxhfhfAr6lWjx                           



                          R18lk9R9HwgoW9mjWGGnAH                           



                          OxF5NdAP2aTMQeZdj3OBT6                           



                          HYH7XWDaNTCjP7TpXK0zWW                           



                          LibZJxGGt2g7eqsHcxAOPc                           



                          STK6h7ctFVceuwWfLZ8atn                           



                          5zuQy6d6cjvqLzILCnSGCh                           



                          tFKIRECxP8OvnVokUi0ggJ                           



                          z6dQxaGcksTMW4Tql3FV0n                           



                          jf19xsn7bXwbFYQplypqMj                           



                          H9VZkmQTAyguhqEFl5WOtv                           



                          MXIzcZS5JCHkxFKqE9SlWR                           



                          SmVN3bzbl4AHS7LLmbRBNy                           



                          NeZ6NLAblCTaYYFioQnwEQ                           



                          wvz941NZH9VwQzKR9bP7Js                           



                          k4H8fH0hnDXtECTkePHfTb                           



                          NrLPRnbs4kiAWyFRwjx6Ft                           



                          ABZ4ffB6pXHsmBPqQFGbYj                           



                          Q9FLnxCC8vtg62QZMyXQK4                           



                          ud6ybTFmsDofwhChdTXnBG                           



                          isZ1JqLIYaa369VDSdD5Ky                           



                          RWYji8S6roUwAcBuXRRhwY                           



                          X0yfN6BURnSM0pjzuhf4fc                           



                          IIhuCXniEEUehvX1vbO4EM                           



                          McdLQvW7HucT1cZUZqMH2y                           



                          vellm7tqXYK1HCnrWFHmLU                           



                          N6QaDqFADyv5Edgoy4DaHd                           



                          z1DpkEWwBCrpB17ka556AI                           



                          LgknOvJ8zfbWLgqhfveCSs                           



                          fcwvAQotpqX7ANZcHVqipa                           



                          skGPJxQKrtS5fiNdEcGTFd                           



                          wWnxQLrus9XkGDWeKOHdNv                           



                          DynAJoRXHvCyh4SUKklFTf                           



                          JZQhFuEmL1onumfzLtUYXB                           



                          Tbc3zjZ4jluHKRvDQyL3Mt                           



                          UGhvbmUgTGluZTogNzEzLT                           



                          a4HU53YEqvXEFmjo55                           

 

             COMMENT (test code = [id number]                        

   



             3359)        Ffk9sdm7OtfJBxcZNpIBrs                           



                          cREmgjNkye43cBK4aT69JS                           



                          2aVDThFdG5COAbdoF7Hcx9                           



                          CBQuEQHjpAGwY436c6ras0                           



                          pdwnRqfGF2cQocHRXirysc                           



                          JcI6ACauQJKouvngTVs7EP                           



                          hdJOZprEA0CHOrrLVrT1Bn                           



                          TEBsPV1pknl9MAL3KNmoKS                           



                          GsWwA3MFOhcOMaMXEbvJar                           



                          PVyfu473EEV8RiOvRMEujz                           



                          JdgPgwdS3gWkOgWSAOmP5r                           



                          ayBBMSBoYXMgYWRlcXVhdG                           



                          SpaOSnd4GoW2SeaVFpCXZh                           



                          rUojLs4nOMC1fELuADAazf                           



                          FktDvnkxhiw5M3RTecpz2b                           



                          cGFyfQ==                               

 

             CPT Code(s) (test code [id number]                      

     



             = 3357)      Vih3idk9OerAXorIXtCBpt                           



                          wOZfjsTzsp30nVT8pF64EO                           



                          4vUEXiThX7UOMhbsB3Sbc9                           



                          XHEcNNOodAKpJ263j4vqs9                           



                          cossKlwDF4bPjxIFQyvytk                           



                          WyY5YSwnOADtkokiHWf8GU                           



                          izBQIlpGU3OBKgmHHdL6Uy                           



                          YCIdCA6mmju2ERC2ACxgOJ                           



                          XiIoK7OXBmxTGiLIMtdXyj                           



                          NPmrv753BIV7SmWvZRObbs                           



                          LhnHoumS1lSeQbDIW6WZBf                           



                          PLF6GENlQIp4AyWtZGT2HB                           



                          K0AAG1UAFltYJinV==                           

 

             GROSS DESCRIPTION (test z6rseCBzTNEsxTY6GnVfEU                     

      



             code = [id number]) Zvu3yqq0JixDBcgRDvPCmf                          

 



                          aWJileKndi37eAC3uN68MR                           



                          4nIVKuCiI8VGCykkK3Ukx3                           



                          UMAuEPPjbBHmZ970p4umy4                           



                          klnsLqyDG2PFHaIMPoG5Zo                           



                          QW8jBGMvrZJuG55knDZoAN                           



                          T6VLNwHKPiiEYoLEGhLPX0                           



                          BWJihCKtB8xyCKXjSQ8uhe                           



                          quOVztXUryGJCclSO0IQOd                           



                          nVReD5UvEQRyYSwbUYUamp                           



                          k1UtPbJf2ljCAbvQkpOCrq                           



                          WKYkk8wwEDPjpYBwWWD8TK                           



                          ogvRYtZXNpOSPdXKw8QMLi                           



                          IIxtsGXwUI4uvIeyAkwvjW                           



                          jtj0FqjTArZXhvYGJcUKNm                           



                          UAgzECTkI0BQWCKwCoS2Cn                           



                          H2BwYaISt5EBn1TZ7QTqGd                           



                          MLIaHFYiHMK1ZUFsJOi1VS                           



                          mtYD6RHNW6QjW1JYh7ELH6                           



                          NTMyMSBcXHQgMiBcXGZsIF                           



                          mySkQVgcpfkOZyHF2drAjh                           



                          bGFpblxmczIwIEEuIExhcm                           



                          iyNGfueHKttEncTUymJ91i                           



                          v67hTJTNyvFrn6AjhtZhNq                           



                          xwYXJcZnMyMFxjZjEgUmVj                           



                          CUe5ZWZsnH0dMw4lpYBpxN                           



                          8anXKoHSneWBU0qUHpDEDh                           



                          SSDfMRXoVS23SQakNWOqsd                           



                          FtZSwgbWVkaWNhbCByZWNv                           



                          cmQgbnVtYmVyIGFuZCBcdT                           



                          zyCjTaOBl2T0frdnytQTfl                           



                          bIZkfYivMQ7ta7kqnv41hf                           



                          Omm8FcwiNgOTFsx5UxzZDo                           



                          ZXZtQuJyhuVrX73oh3kysZ                           



                          Bkf2BgvOXvxXihnCDkxWJi                           



                          LXBpbmssIGlycmVndWxhci                           



                          Cio2J8TRDxq7P0ICGnfvKe                           



                          cUJdwZQlCLNlTTA4ORIeFA                           



                          G3PNJvKkSdlZAvbvSdK5ab                           



                          ZWdhdGUpLiAgVGhlIHNwZW                           



                          UppJWnFViqQUC1Ss4qpOTc                           



                          OETezrJ4k7XyBAirQEWnBj                           



                          wiUOWbQ8YjW5NalxXyyYUw                           



                          QQWmyaBaw1eiCCD2PNBdhL                           



                          AlgNOiVmUwKsikJRH3o7kv                           



                          XLPvjAPmGFT9YNgfoAQgGH                           



                          EwMDIgXFxkYiBPVlIgIiBa                           



                          FsBkLYP3HgUfWCa5CUqfU2                           



                          KPFEQrBJH6PRF5HeveRvH6                           



                          BIt9YYQXGv6pJVJ5HByzWR                           



                          M0FUZ2GdLeUViaaXTgMWkg                           



                          ZmwgXFxmIEFyaWFsIFxcbm                           



                          U5PXXeOhHbB5ScEITaBXZf                           



                          fNobEERFf4rjidPrFUzhIl                           



                          RtTSYgO9FzMHebUg1yfKXy                           



                          AHScSwLcW9IrNZNtQ6Jemt                           



                          LrJRuoYAAabd8idRpzKAxw                           



                          CbIiEXNby4q1zSV8yKGjxQ                           



                          O6zUAppBweQpMoOC3xoNOq                           



                          SQ6hWUqeTAydtyEcn0JeEO                           



                          26aPKoioEhssVuPGV0NlLw                           



                          IZxyQCSht2x4mHztO68xb3                           



                          2mdHCvvUUnWTRnPI6qwC0y                           



                          XZEsi3CwILK4QDiuqJPlrp                           



                          IzTLXjON8uQSKuxdVhi4Ua                           



                          AW0bMY74sBUnjUraBEXpfx                           



                          3vcY0yVNJksiWnE2AcMNEw                           



                          QD56v2ynJMGvWlVqqGpnh8                           



                          OsCHFkVByzKH77UBczUdSx                           



                          uFFfRoIawSMtLoHhR11bqL                           



                          8xEGgseuFxRXZkLQ7bXHSx                           



                          UWNfjYJioE9euyLwlxSivS                           



                          JumAF5LIRdbT3kbY22rhCk                           



                          wtEUZH37QPDqcCHeAYC1EA                           



                          4mXXKlrjjoZOUdUTNeLVC6                           



                          DElbwB27bDAdKHFbXFVvhP                           



                          YitXsrYdcitFnuf2JkxJUt                           



                          XGlkIDUxMDAyIFxcZGIgT1                           



                          NVPTUzIfR2VdQ9BaTyCNk1                           



                          HYy7WQ4GBpMfJJTiMILcXY                           



                          V4DrLjKYb0KXvbBW6AIKJ2                           



                          WmJ2MLmjCHY2LZVsDWKcAC                           



                          QgMiBcXGZsIFxcZiBBcmlh                           



                          gNJhIK9nzVhhasVcKJZzRS                           



                          EANlQQm5l0lVlcI78sm00o                           



                          UJRMOKE8F3Nun1TgwdKmza                           



                          cuXHBhclxmczIwXGNmMSBS                           



                          VHOscEKbDPGrtiJfc7YhCG                           



                          xpbiBsYWJlbGVkIHdpdGgg                           



                          vPvnDFAypGxuboGdL5C3ij                           



                          HlXD0fFEXeMSHpA1CkWVUb                           



                          O49cVQOriS0gIWVhKQ6pNS                           



                          a7VIRiJZArRufebN3dhNCw                           



                          YEBrdK7gCCsnApDnLPUdH0                           



                          ThQPvqMtI5RrN7SRhikgGt                           



                          hEVcv7Wmi29memUaHLVpZK                           



                          Brm38ijKI9vxYyEzUewBb9                           



                          fMUvYQJ2ZT0qdZHhnK36FO                           



                          Vhra5yDDHym9R1UVWny8A6                           



                          ZSBmcmFnbWVudHMgKDMuMC                           



                          J0SCWmMUN2RJQdVJQuzUCi                           



                          knIaL5lgPBjmtWHeObOkYI                           



                          tyXLclqdtqr5Qol3GmcSDk                           



                          p84lyWH5xVTfxAHlRpDbV6                           



                          7yfuWrbRQbDoflATV0KRQa                           



                          wF9hm1xngvV3WQ4ezQbvtq                           



                          RumYUmj9ZgZnUvBC0bUAWi                           



                          MRQxiSLbcS6qetAhjjYlrJ                           



                          UdvAC8VDJgZP71qTVwwMbi                           



                          wF1pNoIuAoVwMZUmtvybTC                           



                          NbFN8fQQCoRVN9BSOjlnIN                           



                          qQXtOSMzu9SreWtcrdKhWO                           



                          XxsT8opODsVOZedlQSJGL1                           



                          rU2yMWTgCET5TBDwzzFXTG                           



                          nzH06rmCG4sGOfmQRzRyTo                           



                          I60fozUvECGptsHRRsrpC3                           



                          DrcPHlb65jwGK8xTBepAPp                           



                          LZPun4WxdBCuf7lsiWgpv8                           



                          VjdGVuZFxwYXJccGFyZFxz                           



                          oP8gCdZpz9fyxPq1URldjh                           



                          C5BIOlvc51BUvzJXMwA6Lh                           



                          O8XqJXrjVFV4QFErRlNuGH                           



                          OaUZ0PJwLfSDeBKNEvCQr4                           



                          OcW9CYq6GYXCGdPqTdOkMs                           



                          ccQMgiMLUaWWc4JEv6RTcI                           



                          OwS7GJO2SiU4VqReDIFfZc                           



                          XeDLd0HRLnVEkngBTtQCQd                           



                          BXZePOjxJZuzP65hXbUjFD                           



                          fyRmBtTO8aRR6gtUGcXTAb                           



                          eP1aHB1wC7sndX3dFR0xiB                           



                          UiUSSbKkUmR8KrEZArV9Ja                           



                          zaLoFIkwKNEnkp4kwPixNV                           



                          dgJrCjAPVtk2v5gSK8xQDm                           



                          mNY7aGKydDicDvZvBE2xdM                           



                          YaCS3eBAefUJgxypOeo8Dy                           



                          WH02qGGcacHlgrZdPRF2Ye                           



                          AhABhoPIRdj0v7jSfkQ87i                           



                          s33tx7vwfK8xWVC7XQN0MD                           



                          fvmeKvoBVrx6Ktk06ssgXn                           



                          NSIbZPJnf56buBG3rzYyVg                           



                          NdyKf5eSWsYHP0HW5wyVen                           



                          orgwa3GclNBaRPVhCWGmM8                           



                          SmQEPrMGBrb1J6TWPpm1W8                           



                          ZSBmcmFnbWVudHMgKHJhbm                           



                          kptydcAvVtfWWaBkAfG53w                           



                          ACRgMsppH47jcN8tY9YzCA                           



                          Lna8QwDAxiZD7ddC8rUVP0                           



                          LjUgeCAyLjEgeCAwLjQgY2                           



                          1qcS1qQFuqjdNnYRDsDX5g                           



                          RWYlHPXeXIUsRFH5WQRnJU                           



                          JcM3ZpCTMvJLHdn3Z8GZOi                           



                          p4U4RQMyudYkrOLhlOHohz                           



                          McxEKqbsfhSYG6GSIzdL0c                           



                          w8bdyoM7VC4zgRmntbymEn                           



                          2nBLavoBFud7ExjVWksBGg                           



                          K0S4JOW6xsTnA6XiZXJYnS                           



                          Ajr6BnW7lnER9qxPLul4Cm                           



                          aOz2gWNsZALvkRjiCMq3MP                           



                          luIEQxLUQzLlxwYXJccGFy                           



                          WRpix9OdvVHWVXntqWJkvi                           



                          ZNhAr6ZIyjNECvu1K3MGHz                           



                          rDbnOXZyD9XyQ3LbiiC6x2                           



                          ybtWadj0LmvMPpEY5qnRFy                           



                          fQ==                                   

 

             MICROSCOPIC DESCRIPTION y9apyUVkJZLpoJL2XrFaIN                     

      



             (test code = 3371) Jrh1agi8GbxVSnbUKeOYhi                          

 



                          bKUruwIlka34yYS4nP73IO                           



                          9bEMMrYpS0MGLdgeK0Awu6                           



                          ZHVpFKQajJWfC855a8zie5                           



                          jrliOecZX4nTtuNBGdjohw                           



                          TaW7SVjgCHDipmyiXJd6IS                           



                          dbTMUqrVG9YPUokEIdE3Qm                           



                          ZVErUN7ycao5EUC9XYxqEN                           



                          IzTcS1VNNsdRSuZGAmoUxr                           



                          DDqbq223UAT7GoJxUIJhzu                           



                          DazAnlqF7pZmRbJPIDsB5m                           



                          cXFglUd8u6haVCNmYVPziA                           



                          CkpU8ihpKypL8ds0LqPCUd                           



                          EJEnf5YmZGQid77tfECkkW                           



                          LZVRBvFAB4SEByBM7cQ2N0                           



                          jXAtKPhqBSE5eV4lJSSdvZ                           



                          gcAShemGvym0FrqM8hTNXz                           



                          IHR9fPAsTAZdxYOonGClZY                           



                          NvOFIouaSay8hlq9LivH2v                           



                          bmcpLiBNSVNNQVRDSCBSRV                           



                          JCSZClZF2KTracMWSBFSBT                           



                          SfAvw52hUSVnwXODRvyiWU                           



                          ExlZnhTQ3swY6ceWufzR7e                           



                          tULaeRT8jovoiiLgfYw8mw                           



                          aumJIqHIOfUAWHL7ssFbks                           



                          XRJxDI42NXK1SAJlOMUbkC                           



                          JhHCTnVDJ8aUSco9Lpw78x                           



                          uLYpOY9VKY70NdLxOuSTha                           



                          YbT4NnCiYdSY83O6kbDBUm                           



                          BWamjjKfc2qcqsdvUXPaMR                           



                          cQWEH0GHceSWjhgZTpdIwo                           



                          MCAgbnVjbGVhciBleHByZX                           



                          YwxJ1iPCFxbnJSFLCsHsjd                           



                          TLHoPF39OQL6EQNkCNWnqR                           



                          KrGKEoJLG0zQZva7Cpj62s                           



                          cGFyXHBhciBJbnRlcnByZX                           



                          AnoIsmwzbhwVQoOJInKc5q                           



                          fU1nd5yxTQYqo6BpypRxxI                           



                          UezbLjaAJvXMQfsX3nFY0e                           



                          TH6SOuJvbg13WYjmxnvxCB                           



                          ZwtL52CKTbs7CqCvecjQG6                           



                          SQKhLIOwSuDzeZIck2TwjQ                           



                          ItyEz1WJNtdaS3WFGrlOw1                           



                          eB5fkGhrESjGG7zyNJinMO                           



                          xwYXJ9                                 

 

             SPECIAL STUDIES (test y8skvMPdEOWha3ilWCDwnY                       

    



             code = 3376) FuZzEwMzNcZnRuYmpcdWMx                           



                          OOyopjOfNKoig8MvX0MdVr                           



                          AwMFxhbnNpXGRlZmxhbmcx                           



                          VLSuJYL5xrIdMCOkXMdoFO                           



                          ZyRJmzMp3dgDRzgEnxPhVs                           



                          VLRxp6vwnfVOfzjeeNq1h2                           



                          lzPBUyYrG3fVYiWThoK0ov                           



                          alSpkIOvF4RztFVgqKj1q3                           



                          qhYjGtBrY6oPHvNNvrU0sh                           



                          nbIttIOwPOBnGEj7lP54VX                           



                          UrnC7onRGaRKddwiUrUmL8                           



                          KPxdRFGkSnD5EMQljYZiCA                           



                          JbH8bkKFFdYDnwDJYjCHyo                           



                          mHRmZSO9zHyun2I3fMJdhT                           



                          NhnFaeXqMvZgRbLhIHc3Oe                           



                          ZEs2wOyvQ7AxWMBhZnK4eX                           



                          QgUGFyYWdyYXBoIEZvbnQ7                           



                          qMrjdmGjf02qnLFdSSHyAZ                           



                          WzWpDpwTfsVLLtZQDJq7No                           



                          kVyrMXS5pBq2tCvuXatvAF                           



                          L0Not8EW0ogj24eol1uNyr                           



                          YKOyofysAiS2OYsfOZIzil                           



                          bdFKe1QKgeRDZldIZ1KVWf                           



                          kBSyK8QyKMNhBP9ebtj4DY                           



                          I3ACtxRCDoAjS8RXMrfCKq                           



                          ZQWcmWhiXOpnv031CDJ5Cz                           



                          CrGY4mB8Lhe8K8jA5vjUGs                           



                          NBGayJLfPxYaEZEoyv2wqB                           



                          DxRIyqp5XpMKO2knH9kHXu                           



                          fAXyLBZyCG46Sdxqq3QzUc                           



                          tqo5ZcE93heAW9AHhbf0iq                           



                          IJ0rQgA5gmSgJXbgv3szdG                           



                          6cEwK4IEngCS8aEL9cBQIz                           



                          uZ0fxjlnODSpDsPmeuhcNV                           



                          YrfNogdrThFs9ghTkpACN5                           



                          SFcpB5dfpU8vJoL4TXvwQ8                           



                          myoJ0yKPg0WUsyrUZ8MFDp                           



                          tE7iQB2nirpvz6juXWueZL                           



                          fsZKUqbjB7dgS9CBIqwQIz                           



                          G4RwkT5eGZTrWR0wcfohs8                           



                          vyCLS4BLlySLGqPRX0DbVu                           



                          NFTul9Unfts9LaJet3GmkU                           



                          TxOOjiC51pk534WARdttOv                           



                          O1oxeYSruguziGDoqizxWJ                           



                          hwruM9GQDtDACrBWvhHNCp                           



                          XGZzMjJcbGFuZzEwMzNcaG                           



                          ljaFxmMVxkYmNoXGYxXGxv                           



                          A2rmDfTtE1DtFGZeJkFuFM                           



                          gjWOgtmYRbhCDfpEC3iA3n                           



                          JJ7jEACitGKmA8GiCRXzcr                           



                          IuzMDlZKO1kNPujHVsHJ7x                           



                          ACkumYVvg0msz7ElY1tfgB                           



                          hesMS8SX5sKLIxXVJlXJkb                           



                          h7RunY3iTabosAPaldheNI                           



                          xmczIyXGxhbmcxMDMzXGhp                           



                          D2loXnUuKHJwkNbvPEyqv3                           



                          NoXGYxXGNmMlxmczIyXGx0                           



                          cmNoXHBhclxwYXJccGxhaW                           



                          1cThPxZsBaRnkcWI1qYBHr                           



                          Q0zyxYJmIGRnAIBlN1acEh                           



                          MlsQ3wnVejDVgtKzQuExDg                           



                          UgUXm646sv7iXNBcmXObio                           



                          XIfEXtfY7hSZhvLTuzLBpo                           



                          mCVaXTctk3ovEVIhc8p9mA                           



                          VgEBWjlxXgg5tcHJlwulQr                           



                          FOEgoZKgdZGyGIObp16sBT                           



                          yfsFxwbYkkONQpv2UhiOki                           



                          b1WkByGsATdyh0StM81lxQ                           



                          JvbCBzbGlkZXMgcnVuIGFs                           



                          i43kb0csSAMyUwT4dZMuxF                           



                          D4jRLiuSZui4NcnQylERGl                           



                          j4oyUDFkyw1nlsstlKRcq1                           



                          DsqE2uvjdcEXxpcDJpwyWv                           



                          YCEnf4j7zZNtUUBnYPDrTQ                           



                          lsgOc7PBEch618jv0armO0                           



                          pWOhBJG4RVrnKBHoSPJhgw                           



                          UgZXZhbHVhdGVkXHBsYWlu                           



                          XGYxXGZzMjJcbGFuZzEwMz                           



                          NcaGljaFxmMVxkYmNoXGYx                           



                          NTzrP8fjThFoC3VzYAIuXl                           



                          IncQNzR1wkyUEoHIZzVQmz                           



                          XGYxXGZzMjJcbGFuZzEwMz                           



                          NcaGljaFxmMVxkYmNoXGYx                           



                          SEohN6qhQwZmY5ZiTBZlFu                           



                          IgIFxwbGFpblxmMVxmczIy                           



                          PYopwsewHWMiLNorS6txAt                           



                          RpIEUzpEdiKRlec8JnVRJh                           



                          NYEzXbcymwRlXNx1viIiSM                           



                          BhclxwbGFpblxmMVxmczIy                           



                          WQtkxwsxMAGyCRywN9uqYt                           



                          TbWOKdlGniGOhac9DkROOq                           



                          XGNmMlxmczIyIEltbXVub2                           



                          wfa2RiV6birKwshOL4FQUp                           



                          I9ewyFRuvFB1YLP1yB2mWX                           



                          affsAaAJCij2RsINVpUAXe                           



                          HhW2uD7eCOI1SgEUrFiqLO                           



                          BsYWluXGYxXGZzMjJcbGFu                           



                          ZzEwMzNcaGljaFxmMVxkYm                           



                          WyHQKxVEdaA1zhGqPnA0Hg                           



                          AGHiPvAurGpbWYzfTZk2Ym                           



                          xwbGFpblxmMVxmczIyXGxh                           



                          kyvlCQFiXEevO3kmHqNsXP                           



                          IpoCqmESymk5ZtKOHyTUMe                           



                          MlxmczIyIHMgTWVkaWNhbC                           



                          YVPS50IMAmMZMbhBemdK8w                           



                          bWGTJAZnbbJ6t0U1BElbQD                           



                          LhRQr2QOfrhcCcPRSbhX8b                           



                          QWIsCE7bYZo6uoZiSZXkx7                           



                          FyUB2yYHSveFCiPRD0EGUx                           



                          b9ZoZ4Bjs4MoSWFuHOUwdr                           



                          7jbtBqImSJfCKhAJUruh74                           



                          CIYyCB1jQ9lgCXXdWEUbtw                           



                          EgpQQum1JfDQIrlAZ5uLDi                           



                          KT8FFvCJb23gEDAyOSCRya                           



                          CdMRFhdGjnsNK4ryO9lO1r                           



                          LiBUaGUgRkRBIGhhcyBkZX                           



                          Nvhg9vdkShBWIsVCGrd9Ic                           



                          rKCpyUIxucBsC3Zwk4JmIR                           



                          Rrfl43QYueoVGfql23WM8w                           



                          R4Fmj5WciN8iTUrkQYNio7                           



                          UpnTXqgPRmXAYyq7HgV6ur                           



                          gxpvVDchrALusT7vXHJtHM                           



                          s3OACzi6YoIPBbs7InZkOd                           



                          vdFwGDRsEKFxPUStlZ52TV                           



                          J2vQksbJaczyHxAB9uSENg                           



                          xlQsMZCpGBIfnV7pPJuonh                           



                          WwWSXrzoF6d6W4FHjeFUJa                           



                          gkWcNyrvWGG2eoZmslA4wQ                           



                          DqL8sjtwppTWhxXJZfu0Bh                           



                          kO7eyFNNdQEwj2FvkKZonD                           



                          FAtVWnWH8sgnLsSO6nXNM5                           



                          ODggKENMSUEtODgpIGFzIH                           



                          Z4XMuqJsdtJQX3vcFpOXNp                           



                          t0CsCXvaG3ylV68pxOvjeV                           



                          j7gILisYjomBGaoALyIQRw                           



                          nlF4n5E4IWHvd6DterkkCF                           



                          BsYWluXGYyXGZzMjJcbGFu                           



                          ZzEwMzNcaGljaFxmMlxkYm                           



                          StJVFuWXnzU9ijEzXiSiDw                           



                          UgfwDIS4xD==                           

 

             Gross assessment was [facility]                           



             performed at ([facility],                           



             = 2777)      Department of                           



                          Pathology, [address] [place] [address], Tel                           



                          [phone]                           

 

             Technical component was [facility]                          

 



             performed at ([facility],                           



             = 2778)      Department of                           



                          Pathology, [address] [place] [address], Tel                           



                          [phone]                           

 

             Professional component [facility]                           



             was performed at ([facility],                           



             code = 2779) Department of                           



                          Pathology, [address] [place], [place] [id number], Tel                           



                          [phone]                           



[id number] 08:01:38





             Test Item    Value        Reference Range Interpretation Comments

 

             Case Report (test code Surgical Pathology                          

 



             = 104)       Report Case: [id number]                           



                          Authorizing Provider:                           



                          Elayne Dempsey MD Collected:                           



                          2022 10:01 AM                           



                          Ordering Location:                           



                          [facility]                           



                          Received: 2022                           



                          11:57 AM Services                           



                          Pathologist:                           



                          Homer Thomas MD                           



                          Specimens: A) - Large                           



                          Intestine, Colon -                           



                          Transverse, Bx mass B)                           



                          - Polyp, Colon -                           



                          Left/Descending, taken                           



                          by hot snare C) -                           



                          Polyp, Colon -                           



                          Left/Descending, x3                           



                          taken by hot snare D)                           



                          - Polyp, Colon -                           



                          Sigmoid, x5 taken by                           



                          hot snare                              

 

             DIAGNOSIS (test code = k0mfhGReSYXdz3kgDSOrnE                      

     



             3220)        FuZzEwMzNcZnRuYmpcdWMx                           



                          IHtccnRmMVxlcGljOTYwMV                           



                          xfuyUtPIWmfDAiP5Twoozx                           



                          OGxgSO2vLE2soYtpuSOsvK                           



                          KzDIQiNqSda5fmi999lBYm                           



                          f0vsUCWMexbqkEu3hMnfK1                           



                          5fl0N8NfkuI75csEIgHQF8                           



                          XJWvTMQfoKRhCMRfXVZ6TN                           



                          RuwLTaZ2qpVTLfAB9btvzg                           



                          LNcfLHxoUFYyiMO7IGNbnY                           



                          KqP8XjTTWjWOozAKEvivs7                           



                          PlDoRc9yoSMdoBvrXOzuEY                           



                          NnGIRiIXxhSKLvCoFkZT1k                           



                          WLYRM0OgGB7TNSVXUV8KAQ                           



                          PRVyPQJ9XIHvGJRANNJD3X                           



                          GV4DY8ZuDISEB3HCZMsczC                           



                          IbEWWoYOKQBaFZN9rQRAWU                           



                          OYPTZ7SJZhJGXf9HFMxtEJ                           



                          AnHLDvQLLYBEMWKIJBS6AT                           



                          T3RXUpLIDKPZLunDVBzZUq                           



                          BccGFyXHBhciBCLiBMQVJH                           



                          JKMKVvBPN2SMWiNkIGWEC0                           



                          AJHrFGDtakW30FP67eJQ7N                           



                          QDWkPWHJR4OVSFwybVPxYP                           



                          KuUDRGMGNIRPOQFYFZHF3Y                           



                          FCDjVMCEFUuOZI3RTXJhVV                           



                          CmxxbmSWLyMg4hVHWWS3Il                           



                          TC0PVIJUUN2ICQMELFEECE                           



                          5LPV1MKNNLJO9SOCCXMIwS                           



                          IHggMywgQklPUFNZOlxwYX                           



                          IgICAtIFRVQlVMQVIgQURF                           



                          Gj0TTDlzGuQRO61UTpEBWT                           



                          qwVJGsFVCvEZMJM5BGHWLz                           



                          H4QMOnFGFIElKLAHZZ6GFF                           



                          xwYXJccGFyIEQuIExBUkdF                           



                          RLrVWTYEZTeOWJwwA5hBQZ                           



                          8KIJHJJ8nDZmCTH1fLADJ5                           



                          YUNcYIXAS6KIYWdsfPOwYZ                           



                          NkWAWSLDNNJJ4FQEgCP5DW                           



                          JRKUQE5QIIFaSTVPLWdMRI                           



                          5URURccGFyICAgLSBIWVBF                           



                          WbLZUPCMCCUjNG8OUZQnQV                           



                          Bkug68PKN2ZmLwt2H6NHZ0                           



                          QEHcHDBot3cuUAOzaHCjDf                           



                          EwMzNcZnRuYmpcdWMxXGRl                           



                          AiYlq8okv522mUKjc5ozAJ                           



                          RgLiI5dSNlPIYthDJiZ037                           



                          FBOpPQues3dzb3KkIBCsyD                           



                          Vnl4L9GMALqbvmgSx1uShc                           



                          P16nj1Q7IvbjR3ceGSRcJI                           



                          EwS2YrWY8jFWNoWuo6UUR4                           



                          IOP1OBIyVOWxX4MxLL2fYA                           



                          HaiKYyXUb6c9qfhIwhNZTf                           



                          UTS2a1fjRQeqrhNaMO8fmy                           



                          1rzHx0a5oeygMyBXOfJTJw                           



                          oXTKAMSqJ3KofUsxGo6rlK                           



                          q9oEocNqknSPU8Bxa5QK3k                           



                          le06xym6sGzsMUImjbivLg                           



                          F1BNkfVCAvvrlfFSa5MSpr                           



                          PUNxmZP8VJVvnBJaK5FpSP                           



                          ZpLH7zfgw7LTI7YFjjIWBj                           



                          TmM2IFGtpDFkRICqbYbwMP                           



                          elg910NBL8WtMzQN4dP0Rk                           



                          a6L9oX6ohKTwSJXvlDXvWv                           



                          IpAKRrne6szRNkTKfnf0Kc                           



                          CVN0ukH3vIPfqCUiNTSdDp                           



                          H9CMmdNI0qoi24ERXlGHQ0                           



                          wt9gvKCryXdamfTqgWAcBE                           



                          hgG2ZjQCVey145KHOtD3Dz                           



                          EIIdm6X8uoQrXiUsYXLkiB                           



                          U6pxP7DWPaMI7duitjh0id                           



                          ZKsvQFciWCIxywH8iuS1WX                           



                          NirLJwK5ZwvT1hLHOvGK9v                           



                          ptvma5tnLVI7XXviSXItFA                           



                          M1VdJlOERas8Keqhr5FhMz                           



                          t1AnpZJeXWjhP91xl433NT                           



                          AcazYnB6fdlMNarcjloPUu                           



                          qzkuHWwlxzD5LUSmNJqjuv                           



                          rfBHGpDKjaS6epQnGmQBXe                           



                          sYpbPOwla3YtJLQeYBGnMh                           



                          QuzGLhUYToHbk0QYJpbSHk                           



                          PCKcUfPsX6wfeamhNcWNXK                           



                          Azo6upJ4qywFLTuYPnZ1Wn                           



                          UGhvbmUgTGluZTogNzEzLT                           



                          g2FN98DByyVWAnmr92                           

 

             COMMENT (test code = k9lkuENbCKTvpNB2MiCtLJ                        

   



             6759)        Eih3zwc4QvfNBewPLwAOfc                           



                          fDBzrnTgiu87zIV8tD45EW                           



                          1uIFXgAfA0UTIqfxN3Xaq8                           



                          PFCrIGTbdNNrC962a3rsg9                           



                          cidrSnoAK7mLdtKLWtutgv                           



                          GjN1RWayORUglhotFJt0DT                           



                          ncWFDslJY6CCCxcEFtP8Ly                           



                          EULsXP3zstl4IWJ3HDvzRE                           



                          LnRqK8QBHkgDGiZSHejYxw                           



                          HCedz256LUX7CvVqXPShgv                           



                          MwsQarcB6kMxKvGHSHgE3x                           



                          ayBBMSBoYXMgYWRlcXVhdG                           



                          MvvLPlb1UwG8AtnGVjPJRd                           



                          gUcwZj5jBYX5jRYyDIAnya                           



                          DcnSnkyuthj8M6ENteue0l                           



                          cGFyfQ==                               

 

             CPT Code(s) (test code n8ppzXHbHOKmvDB4JiTtNG                      

     



             = 5461)      Oup8ljn9WtiKMoiIAdQUrt                           



                          pJPcbsTtal68mSH2nK71FP                           



                          3sSGKcRcE5TYBgmuU7Why0                           



                          FKVsPCJeqCTsV486k7wjd8                           



                          xwwlVhpGK8iZrqJSRmaopy                           



                          YnR3OLflQVHzfexoYSx2DH                           



                          voDKDlzFT9IBXptPDhT9Ls                           



                          YROdEW9mwsz4CCX4ASxlAZ                           



                          PjEiG9JIYdrEDbYVShnCdr                           



                          HWkub516NLJ0FaOnMEGlvg                           



                          IwkIdkpW0jJxBkAYI8KHOc                           



                          CZA8JUTbTXh7ApJiMUO8ZR                           



                          H3FQX2ARRzmMMfdC==                           

 

             GROSS DESCRIPTION (test b4rmxHUeVNIqwHW8WiGtIF                     

      



             code = [id number]) Auy6gar6OzlYNrqLAsDIlr                          

 



                          nMAqusRxuk38gDE9dR66DR                           



                          2yYMMzSqL1LAHbfcN8Cww3                           



                          PRHaLALxjGSkU305f4hsk5                           



                          yhmuQfcMG3WLIiIKScP2Ly                           



                          GB7jHGNbrNKrD33hnEJgRH                           



                          L3ATEjWPHcgVAmEGMzSQF1                           



                          KTCjfKGmU6ysWOOqHT9byg                           



                          ghGWojRJmwIONzePB9LOHy                           



                          kTFhW9CjVJRnBLztCHPtcx                           



                          s3KnBlHi8cqQXryLgvBJus                           



                          XRVpv7jiPMLrjJStRPD9HJ                           



                          vjsYTlDJPjLHJvVCg2SUGh                           



                          JWsylYCcQF3kcMwxTridjJ                           



                          jzp8SfyZHlDKqfKOLyCXGm                           



                          RDegKLNiY8CGFFQaEwK1Px                           



                          P9FyPyCGq2VBv6FQ9WTbPk                           



                          ECUbAMZcFIF1IMPfRXh3UH                           



                          xwIO9JBTU2LyD2WGl7DHP2                           



                          NTMyMSBcXHQgMiBcXGZsIF                           



                          ziLrEOfcjlgPRkNG0jiKbv                           



                          bGFpblxmczIwIEEuIExhcm                           



                          whINwdgDLazHgvEOwvZ31b                           



                          i73kCUVQktReh6RgikXfJv                           



                          xwYXJcZnMyMFxjZjEgUmVj                           



                          OZc6ODLmbE3lJn7xbUIicI                           



                          8ehZMfMNgzYBX0wNKpAXAu                           



                          KTXnIMFqCJ78XYzfINArwl                           



                          FtZSwgbWVkaWNhbCByZWNv                           



                          cmQgbnVtYmVyIGFuZCBcdT                           



                          fvZjCuLFm1E1rojlwxNYvl                           



                          gKCpuQdaBC8wr8dhha59hz                           



                          Bpr7MpkhTaIJFsf0TqyHDp                           



                          UYMiMaRicnMyP02dt7buhG                           



                          Tuu3XgcZVwcThiuOIikEYn                           



                          LXBpbmssIGlycmVndWxhci                           



                          Kcv6R7ZPWvl5K5SPBhosZb                           



                          rTQwuGVbDIWeBRN5NHKyRU                           



                          O1HURxTsKlbANlvxYqL9to                           



                          ZWdhdGUpLiAgVGhlIHNwZW                           



                          YpnXQmUOxoJSR6Ot8ayDAy                           



                          ZJThbhB3u9EqQEpaGRQrQy                           



                          gdYMKsX9TaW3SnvyFovHAp                           



                          ONKxauOkp2mxBMQ5THHhoE                           



                          TzeZNcUwIiJlxgZRN1b7pb                           



                          XLDgbMVpWRS0AVfubDRsWH                           



                          EwMDIgXFxkYiBPVlIgIiBa                           



                          SnIqMJI3KmElNUq6MKqeU4                           



                          JELSEiVQJ8YBY4RmceMeP1                           



                          MZi7NUAAPw8eRMN9FFheDM                           



                          L5HSX2StNwAHspdGBsFZlc                           



                          ZmwgXFxmIEFyaWFsIFxcbm                           



                          Q4TCYkYtCjX8MdWMHiEJVc                           



                          sXtjPTNZm4oirmGtQAxsFg                           



                          MuDEWbV6AhFBdcVj4azTHd                           



                          PIYhHwQpP6BuGMPyK1Kpqh                           



                          GdMSuvVYCnpq1wvLumGYhw                           



                          UpTbOJLuk2g5pZE8cSKfkC                           



                          M2dRYuwDedPqAwCV4sjODf                           



                          XU1nMTskRAcawcYme0ZxMJ                           



                          62jJFquuQubxFqYAW6XmCj                           



                          QVquAACtn3j9lDizT71ix6                           



                          0mfPRyvJIyWAZgHH4goD9j                           



                          FOFgk1ZmDNU9URkssUTggk                           



                          EkYXLeHR1rXXYxycVoj7Ge                           



                          TY6kUS61mXOehMpzXFGnfw                           



                          1hoA0vBMHuzvHjA6FmGCFz                           



                          KG18b6vpGAVkSiUupFjra7                           



                          EzEYRqALrkUH88FJsvScQg                           



                          sHDkNmPeiLIgGeIlE28blR                           



                          3xUXlzwaRmZHLyJD8fSHDy                           



                          OUIjjJRasZ9hrlMjejQgjL                           



                          CojFM3PALqmP8zoD09bqRj                           



                          zmPNHD57PVAujZJcOYL7WC                           



                          7wKIQrehdiYTKnNKMhOOP7                           



                          KJcbiM26mUIvVANjAOJmiT                           



                          XvpMhqSydelKpry0HmfXCh                           



                          XGlkIDUxMDAyIFxcZGIgT1                           



                          SXZTOnLsE8YjA9LtOmIXn8                           



                          VGq8UD4SSfVvSKOpSMRcJZ                           



                          I6UnFvPVp0ZWanOG3ALAJ6                           



                          PnL7TTnbAUX8XZByCLFeAC                           



                          QgMiBcXGZsIFxcZiBBcmlh                           



                          cAWcCP4iwGitsmHmABEbNH                           



                          DQPsKKx6y7hOgfH16va71g                           



                          DJLPHCZ8Z7Fej6FxsjTjks                           



                          cuXHBhclxmczIwXGNmMSBS                           



                          ZDAwfFGoLJTbwcBcw5HiSW                           



                          xpbiBsYWJlbGVkIHdpdGgg                           



                          gIieBYQpaWhjhrTfG1A2xk                           



                          EtTQ7kPUChXZDcN8GwKCGp                           



                          P10dPQZmwI8qDDQdGD4fWT                           



                          r0AVLzUTMwNbazoN7txYMf                           



                          DCXchK5aNLtsNgObEAXvS2                           



                          WgEZuiVqP8NrL2QRlsymBk                           



                          tXOjz5Qky58krlIcTLTsZK                           



                          Xeh53ezIV6zsApHyXqdVk4                           



                          zHQmEPK0DG1zaJUmkG35ST                           



                          Damt9eOUSqe9U0WVRgv1D7                           



                          ZSBmcmFnbWVudHMgKDMuMC                           



                          G5BYCvIHJ4BVXjGRAjyVXy                           



                          itSrQ2xeIBmveCGcUiIfZP                           



                          dpENaxdlgku7Dzw8KniYFd                           



                          j96wzKK7dCKtlFYfFcYkI8                           



                          5jsdFhlZKeXiliWII7MMCj                           



                          wW8xn7awgbC5DY3seNnedm                           



                          FlwHTud6HyZnRyQM9qSYHm                           



                          KLCbjTDvrT7goiOxthGfbY                           



                          CddXK3VDZkKA44iFGxhWzq                           



                          qE1yIpUrZgHiHTGgcavxEJ                           



                          LiTI1eKCYbDWE7GNWfgrKH                           



                          jMOhWBMgl8NttYhftuWyYY                           



                          XrsM6bmULgYCTzrsDNFIG5                           



                          nV8pDDIlIXD3YJFksbCBYU                           



                          alR77hbJY9uIWclRHqJnPu                           



                          X94gkvAqQBJickCQBryrU7                           



                          SlbKMgm29wyKN5fNXzmELx                           



                          KIGxm2MtfLIrp9nveMkni3                           



                          VjdGVuZFxwYXJccGFyZFxz                           



                          yZ2pRwRng4afhOf5HNdedm                           



                          Y6SJCjik67LIhoFZXoA7Wb                           



                          D1IrSMxuUBO8IZEeXvYmSK                           



                          CeAG9ABmHlYFxKZEFsLXk3                           



                          QeO0ULg7ZWDRFkXrClCrLn                           



                          poKVtlYSUmAHk4AYd9QJaZ                           



                          VzP6OWY6RqH6ZkUzYYMeXz                           



                          YcFTs2YPQzYObdyVDmALWg                           



                          EDVmPZxpBOafT72yXuSkNV                           



                          htNrJvGN6qGV0zdZKbQGCo                           



                          lP3jMW4zV4ratQ8sBX3dkW                           



                          VtIKFhTrCdS8SvPOWqZ2Cm                           



                          hkEqLLmuZXZkan8chBrwAN                           



                          ufTbMjWGBnz5p7dZF5nRJw                           



                          yEX6eCVrxVysRfCtZH5oaS                           



                          YtML3mCMchRQcixdJmq5Pt                           



                          AP02oEDhqcJefmYiFPE6Hp                           



                          WmUDiuRGOzp6x3mFxrE99h                           



                          j77fy1svnX3yMLQ6RDR3YB                           



                          jjewPhhXPvw5Mxh87bieNh                           



                          IZFxOGMam85suKN9tsPnNh                           



                          LvzNo7hYYcEEL3BL7gqYcs                           



                          sqgke8FyeEWzRIRkSXBtK2                           



                          IpRLKlAWBzt3B1NZUsw7U0                           



                          ZSBmcmFnbWVudHMgKHJhbm                           



                          ngawdfTmZhxMTpCsVcM80n                           



                          CTHlXuafF24ukI5nC3AzLA                           



                          Dka6VeHPbpAG1bfW5tHMZ2                           



                          LjUgeCAyLjEgeCAwLjQgY2                           



                          6axB8aGNwleqIkPDInIG7h                           



                          GIRwZCCwYZMsENA8YYOfYK                           



                          XdJ6CmXWOzZLPdq1K2OLBq                           



                          z7S5ZLGdquVtoATmpRVplq                           



                          ZehLVnhjohREM7BSFwpW8e                           



                          b4rfezR8VG2nvGdbbplqZa                           



                          2kHNgzwLAlf6IblHVrvJOa                           



                          T5S9ZAI2yiXaD3XbYSMJyF                           



                          Rep1ItJ8zkFL6wrSLva7Wo                           



                          yEc7zAWcHUTamFofQCl7ZS                           



                          luIEQxLUQzLlxwYXJccGFy                           



                          KFyup1YvbKLYGKdubCMaup                           



                          OUcCs5OYkjPWUih0I0BIQr                           



                          wJdnTKTdE9LoV1NlruC0b0                           



                          ojzZxxl8DdzQXnSQ9utBKl                           



                          fQ==                                   

 

             MICROSCOPIC DESCRIPTION k8iveBBwWLPieCR7GjZzZF                     

      



             (test code = 3371) Gsx6gox4RtoCJvhYGaLXhb                          

 



                          aQBqqoHmse96xZI9zK81BT                           



                          6vMWGxNfT9ZIEeawW4Wzg3                           



                          PKYfZEDyyZKgS964g4zws5                           



                          jqevTykLC0xRneKEEsenuh                           



                          YaW4MKubQXTkvlisCRh4AF                           



                          ruGIZvpLC5VCAddSXlP8Pf                           



                          TPYrYP8ornt6VCI5KYqlZT                           



                          JsKvQ7HWJdfVJcKQQcdAfz                           



                          OTyfh219XEU3AjMiUGNtyc                           



                          SbhEmzdC1rPpZfGGYZwH9n                           



                          fONewQb9n1gqFJFyZEVctY                           



                          YqkI0tvlWxzU6rx1NwIRLc                           



                          ZHNvp9TaKYIfq82cqLOcmR                           



                          TSHNYbNCO7CISqGV8cA5N8                           



                          pJQbRHrgXYQ7nT4eILNaiK                           



                          pdCMtpcDklp4EorO5mPPTk                           



                          HPI2jOQfTTFvwXBpwPIjPJ                           



                          RgSRLswrAgr7lha3GefQ5m                           



                          bmcpLiBNSVNNQVRDSCBSRV                           



                          IWRJUgZO5OAjaaHHXROAUL                           



                          SyRrr41aLGTngESZJmxuFP                           



                          OnrVcmGD0hgF8vlTspxG3l                           



                          iYBvvTZ7plrxfsUroLr3kj                           



                          vykYVbJNTsNYVFP1ioWiqb                           



                          CUBiKO43PBR4CMWiSDVhnO                           



                          NqOCHnFLW1yHBuf9Gqy14f                           



                          nMGvUP9SJY60CqHmNuCIxo                           



                          GnK6HjBpIvHT87H2miRKHl                           



                          TTpyxeYts0hvqwawOCJcGD                           



                          lRDPZ9UBxjOCfkzUUadNuc                           



                          MCAgbnVjbGVhciBleHByZX                           



                          DkiU4cUEKgteKSAZUtXxzt                           



                          ZCAhSH40CRB0PUXsECHdyZ                           



                          FiYWMeVDW9iLNms7Fvv99l                           



                          cGFyXHBhciBJbnRlcnByZX                           



                          AgoWzjfdmejIZlWKXlHg0y                           



                          pV3gx5maHOIfn2YcwnZizY                           



                          GeyiJejLWfHQZqkJ1sOB6i                           



                          EE4EUrLoxz05QCysmqqgGQ                           



                          PepM90HFRqb6LcJzecxPV4                           



                          MXJzNWClOsUzmAJpw8OfqT                           



                          LqrLy2XCThagK0SFFynXd8                           



                          hB8wmLkcIFkGP5kyCAlaLN                           



                          xwYXJ9                                 

 

             SPECIAL STUDIES (test p8wfhHElTUNli2hxVCCorH                       

    



             code = 3376) FuZzEwMzNcZnRuYmpcdWMx                           



                          MCijpePnKUcmp1DxT1OfNx                           



                          AwMFxhbnNpXGRlZmxhbmcx                           



                          KSBoEOQ1ojRrQHBeIWqbIX                           



                          EiDPwsYz4fnELsdXuuOgBy                           



                          EPUnc3debwBIxhkfwPg6z1                           



                          miFUBfSfO8aLCoGRuwW2zj                           



                          fbXmcCYdZ1UfgADlzVo3r8                           



                          igHsVtPkA0wVJaTYinF4xo                           



                          eoVsfICcHAMxZRq4gW46XQ                           



                          PwcS9fmPJpNQapknVfCsT1                           



                          SLjuSAHrHuT2PMWjlFVuAD                           



                          CwI0vhVFDdFCzqFIZeWEaz                           



                          tSFjJKH2cHfxs2X9qAWdqL                           



                          RnmJaqByLnEeQjGcUAc8Zo                           



                          MIh9iVptW1ArUMSxYeZ7aL                           



                          QgUGFyYWdyYXBoIEZvbnQ7                           



                          bHmhfcPmu12nmRKsLBTdXQ                           



                          JyVjZxxBdzHWZlVKNGc2Ix                           



                          wJorQDL1cIs2mFhhRfkrWE                           



                          C0Zms2HD0hmt52wtz8fNsz                           



                          WBNvbnuxGwQ4UIjfMJFfpa                           



                          enNDh5NGlcGVCfqNQ2CGHx                           



                          uWGuC1MkJXFnEA4ifgo8IV                           



                          A0MWllBNAnUpL0RKNtxUNs                           



                          GABoaNqxBSlni530QQQ6Cf                           



                          WsHA5yU0Gqz0B1jM6mhXDi                           



                          GGKyeGObToTtVYDdbo9gaM                           



                          UlOWrnj5NqZLP6tgW5qQPw                           



                          oERxFYNiET20Aozpm5HxKo                           



                          irq0AnC45pvED9BNbfx5lq                           



                          QI6qBfM9rnEuBMwfm4fiqH                           



                          9xGuL4SErfQW2fSL1rJAIw                           



                          vH9tzbjvWTZzDjVnmgpnHF                           



                          PckQwpmiDoHy3zrOedKZM6                           



                          BXuxG3hoaR3dKxL6SMngO2                           



                          qkrT3qBBr2IGjyjQA5PFJh                           



                          vP3oAI1icwdmv1ncIHbcUZ                           



                          ghEGAuwiP0zfH3GIGenQCc                           



                          D0DknH5bAWUsTQ3ooprbs3                           



                          miRRC0NKweLNClCIJ1UeJs                           



                          AYBuv2Ybxde9FaFgu1UndB                           



                          HcYTupZ03qv647ANGjgrTj                           



                          F0lotAMulswviYTvwuhfXV                           



                          ycvkX1HFIjAQEiTYqeBNJe                           



                          XGZzMjJcbGFuZzEwMzNcaG                           



                          ljaFxmMVxkYmNoXGYxXGxv                           



                          I5oqDvKvU4RsAIBlWtMbPP                           



                          wjRCqlrYJamFYdsIL8aT4z                           



                          CO5nHMSmfLYcY2ShXKNbjt                           



                          GrrGAeKBG6uIWjyDRwQA2p                           



                          AHugjCNgy1pmp7SjS7vgcO                           



                          ndePY2KT4hWXEbHAWbLScb                           



                          w4OghH4lFddrpKHanjcoXD                           



                          xmczIyXGxhbmcxMDMzXGhp                           



                          L7qgAiYuZFCasBbuVNcct6                           



                          NoXGYxXGNmMlxmczIyXGx0                           



                          cmNoXHBhclxwYXJccGxhaW                           



                          7uVfLcDcFvXkreBY4cVGPp                           



                          W3sxzUGcWZMgWEQiC2tnNq                           



                          CkbL2zhBpyFXkbKvEhFlEh                           



                          QzYZt193xd0iJMOxhTEret                           



                          EYiYIkoM0tMTlkEHehIPwx                           



                          uGYpWXrvc2rvBXOku0p9tM                           



                          VqXPFlxiLkh6nqIYpdqqBs                           



                          AMHjqWGriGJpHTCpl17kWT                           



                          wonSyatBgoGOHql1FttOuw                           



                          d3QcUwBkPAntu4UyI93meS                           



                          JvbCBzbGlkZXMgcnVuIGFs                           



                          b65hx3myENOmQaZ3hBArpA                           



                          E6cTQxjNZyp2IjyXsrLEGk                           



                          h1yyNCXxfs9zeucdiANzh3                           



                          WybM8jqsqiSLvfeGCqzoSd                           



                          UGDqg2r5ySDhPLBwTWCbQU                           



                          vyfYb6QYRoa547en7gxqP7                           



                          zCUaYFX2FEbqIRUwBWGevy                           



                          UgZXZhbHVhdGVkXHBsYWlu                           



                          XGYxXGZzMjJcbGFuZzEwMz                           



                          NcaGljaFxmMVxkYmNoXGYx                           



                          IElzL8yiXqJjT4UsMNDyAq                           



                          UcmUHrA3pglWMyFZGqDKmt                           



                          XGYxXGZzMjJcbGFuZzEwMz                           



                          NcaGljaFxmMVxkYmNoXGYx                           



                          IXygE3tdTuEqN7StUVGiAu                           



                          IgIFxwbGFpblxmMVxmczIy                           



                          WBghjrpbGIOsEVsrP2mxRf                           



                          CdCTLhiYppIMunz7QoNMTj                           



                          ULZbZvtasaWuIEn6sjCaYD                           



                          BhclxwbGFpblxmMVxmczIy                           



                          MPxqjsaoCIWgILhbE8hxJm                           



                          TpECYcsTuwBNruw8XnHSBf                           



                          XGNmMlxmczIyIEltbXVub2                           



                          prb2VmZ0mxqWqrwBE4HGOz                           



                          P5zpbZOpnCG8IRJ0kX7gZN                           



                          ivcyXoYTJbg2ScLZJaIJYq                           



                          VhO8xP2gOEB2HjJQtPxrKS                           



                          BsYWluXGYxXGZzMjJcbGFu                           



                          ZzEwMzNcaGljaFxmMVxkYm                           



                          LcKUQoNBlfL1fdYuSeP3Ko                           



                          LDTbLsVsvIagNIimBDz8Xk                           



                          xwbGFpblxmMVxmczIyXGxh                           



                          oituJHYiSLhuJ9kzScZtLD                           



                          JtvAimNSdhn4BuNIJpKXYz                           



                          MlxmczIyIHMgTWVkaWNhbC                           



                          QYSU80ARXtGOLriCykfU0f                           



                          vPKHXUXnsvD8c2B1LOhbAB                           



                          WpKBc9GGnbkqAbLFJlqT6w                           



                          BPUqNQ0rIJn6uwKwHBRmi8                           



                          EtYG5oNVPcuNFsDZB4PJTp                           



                          c1CoB5Urr1IrAOOqQCRuhx                           



                          2raqGvGeBZhHFiNJTnnp54                           



                          KCRnBZ7lN1hkZKWnBBLwuy                           



                          GbaCNbp2TrMDGrdTF4jJYb                           



                          QT2TDuXOq39nJJAkJNZEdr                           



                          QiAUFyyKgnkJD0wgB7zN0h                           



                          LiBUaGUgRkRBIGhhcyBkZX                           



                          Vnnf4vnjUqFPYxZFLbv8Um                           



                          xSJzmAQsiwGsA8Mbf6GcRR                           



                          Bjuo85NMtwaOMtjq54BL4d                           



                          P8Mov6OchP4kIZwoMSVxi1                           



                          DfhHDllNUtHHJml6YhM2rw                           



                          ttabFLuzzNWjpV7kMQAgXA                           



                          r0QDEsl1VqJSLtu3HqYrDr                           



                          isUrNNHmZYWnLSEyoB17CN                           



                          P8uLkqyPjajgUjSG6uYJXa                           



                          lmLsFQGzYYCgbH3pRYbkok                           



                          EzQMMschP6k9N6POlqYNFt                           



                          qrOmSdkrREU7pfWyfbQ6fR                           



                          VwC8ycbxtdREwuSTIiu3Ri                           



                          vE1qwDPQkZGmh2QgmGTdwG                           



                          IFzSFnLA7wyaTpRT7hVYP8                           



                          ODggKENMSUEtODgpIGFzIH                           



                          I1OYlgYumjWPH0ikYjAHLm                           



                          m5SgBOnpO1kvR15koYofeJ                           



                          w7qQPrzCfysQSsvGNrASKd                           



                          hhL3r3O7FSBbb6GcstcqIW                           



                          BsYWluXGYyXGZzMjJcbGFu                           



                          ZzEwMzNcaGljaFxmMlxkYm                           



                          RqSRWkNDcfL6czCiJtUsBw                           



                          LdaoEOA0jV==                           

 

             Gross assessment was [facility]                           



             performed at ([facility],                           



             = [phone])      Department of                           



                          Pathology, [address] [place] [id number], Tel                           



                          [phone]                           

 

             Technical component was [facility]                          

 



             performed at ([facility],                           



             = 2778)      Department of                           



                          Pathology, [address] [place] [id number], Tel                           



                          [phone]                           

 

             Professional component [facility]                           



             was performed at ([facility],                           



             code = 2779) Department of                           



                          Pathology, [address] [place] [address], Tel                           



                          [phone]                           



[id number] 08:01:38





             Test Item    Value        Reference Range Interpretation Comments

 

             Case Report (test code Surgical Pathology                          

 



             = 104)       Report Case: [id number]                           



                          Authorizing Provider:                           



                          Elayne Dempsey MD Collected:                           



                          2022 10:01 AM                           



                          Ordering Location:                           



                          [facility]                           



                          Received: 2022                           



                          11:57 AM Services                           



                          Pathologist:                           



                          Homer Thomas MD                           



                          Specimens: A) - Large                           



                          Intestine, Colon -                           



                          Transverse, Bx mass B)                           



                          - Polyp, Colon -                           



                          Left/Descending, taken                           



                          by hot snare C) -                           



                          Polyp, Colon -                           



                          Left/Descending, x3                           



                          taken by hot snare D)                           



                          - Polyp, Colon -                           



                          Sigmoid, x5 taken by                           



                          hot snare                              

 

             DIAGNOSIS (test code = [id number]                      

     



             3220)        FuZzEwMzNcZnRuYmpcdWMx                           



                          IHtccnRmMVxlcGljOTYwMV                           



                          szyzGgSGXoaKSoP8Qkjmhh                           



                          TFbhDO8fSP7epVcxaPOwjA                           



                          OcTYAcWcXkx0bhz370lVGj                           



                          q7gdGWLMyjmwkWu5dPpkO4                           



                          5oy4V0ClzbS19exLKcATS7                           



                          TAPkKMFoiRAvMZEgJZN9JZ                           



                          OndLEoO4pgZPZqNN7jxjru                           



                          EOpoJNlyTUSlhWE6PUGddM                           



                          XbG3GaTXNsKIjvYVXdxpc3                           



                          NlNjLb7gxUNppYduBBxgBX                           



                          EeRUAoSAlgFMChGmZaML5m                           



                          QKYUS7SwVN3CEZYIVG4MDF                           



                          HKWbBTX3APHiTPSIQSFS7H                           



                          HY6KL6PsWGXFF6SYEQakkX                           



                          FxBJXgOYOMDlNQW8bLNOIB                           



                          ISURH5EUZyUHPr8EHDfzOS                           



                          ChLZBsTQIIGFUGOTBCI7AF                           



                          B1ETJeOAEEBGXtdFMLuQPx                           



                          BccGFyXHBhciBCLiBMQVJH                           



                          ZLGFUbTXH0BIPiJrOKYUY2                           



                          SBFbALBazuV96SK81lTE1U                           



                          AVHrEADVU4REHMlktBPoLH                           



                          QbWFVALULTOKTSPZENTE9B                           



                          RAQoWEWIGXsHUH3JLLDaZQ                           



                          KnryvlWVVfFr5zPVYBG5No                           



                          GG7TAOXSOS0CFKCQALXKNX                           



                          1MAC3KSAOWKA4QOBLEBLgY                           



                          IHggMywgQklPUFNZOlxwYX                           



                          IgICAtIFRVQlVMQVIgQURF                           



                          Zw4LBWibOgVOI67IVzGXEK                           



                          npSBFoJARiJAXED2QFRZMe                           



                          S9AXPvGMCDRaRWQJSK8BYU                           



                          xwYXJccGFyIEQuIExBUkdF                           



                          SWiEZCTBAGyFJLseI6gLAY                           



                          7UCIZMD7uHQeSNU9bMYBZ4                           



                          ZZHaDCEMT6FVTGegcIEcRM                           



                          EnKZONIIEMIF6UAYsLZ1UA                           



                          FIXVSX1ESENhXRQMNGpTCB                           



                          5URURccGFyICAgLSBIWVBF                           



                          ZiJXQTLPXDDsZE6UKWHaTT                           



                          Nhkr42ITU7HtXvc6Y0CUD5                           



                          ZVXiSKVoo9fyRGImiADtHe                           



                          EwMzNcZnRuYmpcdWMxXGRl                           



                          QzTzp0vvy505uGKgx6nwCJ                           



                          KrMrV5qWTuGWDzeONgV780                           



                          INSrPPvbb5fen7ZsEFYhbP                           



                          Gwu4U5XYKPxvljmUy6kEch                           



                          O27nh5R6KwtyY1zrVZUoVZ                           



                          DcH1WsHT0fSPQqTai0EGH2                           



                          OMT4NEGvSAIjP6DgXO2wIS                           



                          XdsEUfVUx7i1suvAejFLAg                           



                          WTQ4h9daYOxojxOpKA3tlk                           



                          6fuWk5v8ydciApBEEkSUKp                           



                          wIWXJHBcY2HyxVboFn8bdY                           



                          f4zKvyDcixREI6Tqh4YO2u                           



                          gg34fks2lXcqPJHwngacCo                           



                          V1GMjuRWEuzrdiRUq6DOrs                           



                          GXNpuPB5RZCibQQqU2AwAG                           



                          MdHV3wzqt2DTT2CZpuMQQh                           



                          IxD2YBDerVOsMIOygAvvNA                           



                          seu450YLY2ZpCwUI6bH0Kt                           



                          n8E7lU6jpKOmOTUxaDJnKc                           



                          TaCLMbqp1euOWaAXzgq1Gb                           



                          BVH2cuC2pPYaaTJhQCNsIw                           



                          S4GCckPK8oof02WZGpBOG1                           



                          qm4ycZXklHihonVrnAFuRJ                           



                          npK4FmPQEsj251LDQwE1Ab                           



                          RNNcw2A2ioIyFsZeHYKlqU                           



                          S7rkK5XMLrFR0jtakfk1it                           



                          AZqgUApvPHFxscW5qrQ9HZ                           



                          JglSFdO5NcqX1tOKNlWF8b                           



                          cltza6orJII6BWeyIZAqJP                           



                          K7XuCkYRWjp5Deogh9UxNk                           



                          p7MklFXyMIhqH63af774PD                           



                          WkhbGkR3yzmWRgkpsivPYa                           



                          staaMAwjxbY4XSBdKObluy                           



                          hbNGCfNHulT4jqIcUhCXZb                           



                          qNrwXWeth9AuDROdGFJuCp                           



                          VqxLQmCMKkQuv5RYTggJKj                           



                          KOEgSxEqA0tvlstkEnIXYT                           



                          Enj8klN6qqyYFPcXOcE0Pe                           



                          UGhvbmUgTGluZTogNzEzLT                           



                          e6WL64RSmyOXBdxr83                           

 

             COMMENT (test code = j1ifbQNpHLNozYS3MuRbAJ                        

   



             5071)        Quk7yia9CkjOIdqQVkMMkm                           



                          bSNhodGebb89hHY5wV48QG                           



                          5wIPBxMsM0LUVrtxB3Ubb2                           



                          GGTpHJVbdJNxL974x0kve3                           



                          vgrwPwoOI0kUprFUOuiywn                           



                          ZxB0DEsfDYXfegkkBJa1MJ                           



                          ghXCDuqFP9XLHabBGeQ6Aj                           



                          TCNnGM7kwpp2PZX5JEhfQX                           



                          GeYgM8WOBdwSEgFCBhuQyb                           



                          JLqly416LGI8GsHnDGAnmx                           



                          CorChpoJ7jKsZiLJNXpC4u                           



                          ayBBMSBoYXMgYWRlcXVhdG                           



                          JisFLxm8HvL8KisXXnIHWr                           



                          jRmkCc3pVXW6uCYqDOBrkp                           



                          LnuStxtbmne6Z6ABgatd5k                           



                          cGFyfQ==                               

 

             CPT Code(s) (test code b8exaUMiCICwgQZ9KnRdDL                      

     



             = 9382)      Rcw9ydr0WdrWFjhPTvGNzd                           



                          wIGzggPrfx08vCK3aW22HH                           



                          5mQRLwTfS3XCZkioI4Qrz9                           



                          AXYlJHVcyQDeU107y5ztr7                           



                          xcktSbnPR6yEoiMCSvfakz                           



                          ItS9XYvoTMXwqnvfBAm4MG                           



                          myLIOizXR4CXOzmCYbA3Tg                           



                          CUDsHK8yxkd8CHV3QOrrKT                           



                          CqHrI5TYGssRTeNFUghTrk                           



                          BWzmx070DHM8QiIsPDRcln                           



                          LkvPwtnP6eBvWtPHG8DKVz                           



                          XYT2OSBmRPp5ZhUgKKW1TN                           



                          P1YIS4ZWMlxAAlpI==                           

 

             GROSS DESCRIPTION (test s9kyhUBtJKZubDG0FuSfVU                     

      



             code = [id number]) Jmc6rry4YtcQDaeJKaMUls                          

 



                          lOZbcvVqpz57yJG5qX68CJ                           



                          0nUBDmNvC5IASzepE5Oxu5                           



                          LQKgFXRrcVAyV323l0xrn2                           



                          pvhcBqsOR3XSQxCHYnJ8Gr                           



                          TV7kJRYjnVWnN38xzQCpKY                           



                          W1AABvLEFfbCLeHUUyEVZ5                           



                          MLLdgNVzH2vbYSSlBT4vuj                           



                          mhMXcyFFxpGZKdxHW4HMHx                           



                          rOChT7VaXNXzKReoIIHifw                           



                          q0GwGeLb4lpZQkyNlqNCyl                           



                          UPQzu6tiLUKdpONqTFZ8HG                           



                          ufrBTjGRHwJUBtSWy6ARJw                           



                          JSwvbSOkHG1laCsfKswvjW                           



                          xgq1YnjXOiVYcsLJZuMQPr                           



                          RAxcRKTrU5ZCPXKaXeT5Ib                           



                          G8WsQaNBa0QJw4MM7FYzDq                           



                          OLPsMQAaCLL6AEEjNJc5TI                           



                          gmOM2OYZM8TqH5UBe0VEY5                           



                          NTMyMSBcXHQgMiBcXGZsIF                           



                          ybCvBBtkkpjUFlSQ8qpBol                           



                          bGFpblxmczIwIEEuIExhcm                           



                          nmJZrcxZUevKywJCnoT98w                           



                          z52wVJVIaqQua7LscdJmQa                           



                          xwYXJcZnMyMFxjZjEgUmVj                           



                          IGv3JVPdpX2uOh5qqXEjfD                           



                          7bgGGlVAdeMNG2kMBsTCIs                           



                          SGXxNBGgXO82NXgxMGXdux                           



                          FtZSwgbWVkaWNhbCByZWNv                           



                          cmQgbnVtYmVyIGFuZCBcdT                           



                          stYjIhBIx7J0ixheskFQrw                           



                          aBMdqLxhZD4mq8hfqr07eg                           



                          Qmy2HyukEoFHJhd8ZcsKEf                           



                          AIOnRcAxxuRkT33vq0xotR                           



                          Ene3FfrHNlaBgmzDCdlFRq                           



                          LXBpbmssIGlycmVndWxhci                           



                          Ztg5U1YIKpd8Y1ZKAqujOo                           



                          rCYtnFXzPYLsXSD9JGPcOG                           



                          D1CNIcCpGunFPcusHjC3nu                           



                          ZWdhdGUpLiAgVGhlIHNwZW                           



                          UvmNTcILnqOIC8Ln0srGVz                           



                          XNYtdrC4h7AdUDazEYSyYi                           



                          ksSYYdE3CoP0QnjtVsrACl                           



                          AERbbqIlm8aeCAU6NHRcxN                           



                          JtlZLyBaMoBqjaDXM6q2vo                           



                          WECzrGUgHPJ5LJibuKQeTS                           



                          EwMDIgXFxkYiBPVlIgIiBa                           



                          IhUhECM4LrUaYTy7QYplW1                           



                          AYNFXqJSO7VBE8WgbkZqJ6                           



                          ISw5CQWVWk6hFCW2NLjkQI                           



                          Y6MNQ8TwMhVMsqeZBlOCdi                           



                          ZmwgXFxmIEFyaWFsIFxcbm                           



                          K8AMQwEqZqI3FtWTZjCUTb                           



                          kVaeDHIKw3nivzOaCLzrOq                           



                          MkCKPzA5WsROrhQf4reKYq                           



                          TEUyDhVhC2VlCYHxR8Heqe                           



                          UmSTdnUMWuvn3zsFkvWGui                           



                          GaPjVVMtp6b6tEG7kTPraZ                           



                          H4pYJvrFqrArBgJJ8cvKLm                           



                          LB7eRRssQRzdvfRgk2OrFT                           



                          82gPFbxzSlviMlSFV6BnGq                           



                          FCixUSAod1p5rVgcI95qc6                           



                          4ycSRsmFQiIVElZX3qfW7d                           



                          IYJrz9ZnMDC8NFqbwZMqtt                           



                          OdJKLqBN2lPVYgqfZcw7Kq                           



                          HX5sFV64lTAuiHriLADmbd                           



                          6ilW0qYLNthvAuD8SnKBEz                           



                          NM06z2uzBRXvThDkiOtca9                           



                          MuSHYiRQhuLN08CZcxJsOi                           



                          zWSmMdPquFFnAmJrF29vsS                           



                          8kPFrufrBbSSAuPM1eFGKb                           



                          DNOfbQIxtE7cihHusmDgdJ                           



                          RjgMA2DTVshH0fyX26rsZu                           



                          ntGBCK49OJMynGPdUBG9ES                           



                          8lKLNphdzzEQLiSOZiAXD6                           



                          HBwzaS50qISrJXHdSRLegZ                           



                          VkhXaxCtklzHcbm4IwbOAw                           



                          XGlkIDUxMDAyIFxcZGIgT1                           



                          IFZOXjAqQ9WnV0DrSiLYv8                           



                          AOq9CV0MXgErERGqSUUfOK                           



                          M7BaEjJWl5LWjhMN5QLZM9                           



                          KbT0UMexTUY4TKVbQLIhJL                           



                          QgMiBcXGZsIFxcZiBBcmlh                           



                          iGFjJM3xmEceunIqZLVeEI                           



                          LDYyUAu9d5dPlvW99cz79q                           



                          VHCCUQH0V2Dst8UopuHmdn                           



                          cuXHBhclxmczIwXGNmMSBS                           



                          JSFqgYSbIHOenbHdk5GcKI                           



                          xpbiBsYWJlbGVkIHdpdGgg                           



                          wRxvFHZqsEjjfzFmX7B2ja                           



                          AjAV6aNUWjYJFvY8NvZBHl                           



                          M41kLKKgbT7xZSWkZY4rBI                           



                          t6HMXeAJDxGuhioI0yiGVh                           



                          RWQcaE1sCIjrStQeZHDdK4                           



                          QqLXdhNaL3BmT6ATqvguJb                           



                          jRCat5Ian47xanQvYXFlLO                           



                          Bav01kwVB9clSuDmMoaAk2                           



                          cOUjQUH1WX0duQHukS89HW                           



                          Geuk0vZJKvd7R6JGRbj7I6                           



                          ZSBmcmFnbWVudHMgKDMuMC                           



                          S1BAAlGAK0SLImWOLqrGEz                           



                          nwVvA9jfTJlwnYPjDsZjKN                           



                          awACepnwqvz8Fcl8SqtZKa                           



                          v42mqDW3wOXajLHcAkZnP1                           



                          4aaiOjwKFfVdrwDYA8SPGl                           



                          lJ6nw4bumwD9YI7kfNpzpm                           



                          JgvSCod9XtKwRpXT6mFUKd                           



                          ETJnaYRycZ5izvSzfoKqmW                           



                          XnxCO4ONHrXY45bBZzsGcx                           



                          lD9rTsToPyXbOXUrjrhvYW                           



                          NkCE6gDNEfENA8JAYyhhPX                           



                          mMAbYDLmx1NzqVeqwdMfDZ                           



                          MgdX2nqVTtEGGwpeTCSSF2                           



                          jK1hRTGhAYZ1GEGjqlNTQV                           



                          aaZ84bqZP1zQDuzUIxSnFt                           



                          M06huiGuHSPqlfUPAkniR8                           



                          ZwaPIdq66orKX9rCNidWRy                           



                          MWHxy2PwjAFgn5cteCaqj3                           



                          VjdGVuZFxwYXJccGFyZFxz                           



                          wV1cLiRhy8jkwBw3GNpgkj                           



                          Q2JIDldg14SJwaFZImK4Lj                           



                          R3BaPVecPQD6PRWuWdIgQX                           



                          WbGP2BSlUnMGzIYPAlLPo3                           



                          AtE2EDg5WULCApGxFpQlAy                           



                          bhEXqtGFXxMLe7PWc3QQyQ                           



                          AnU4XBH1ObE9BmSbFAZcFy                           



                          LeJKn6PFRsBDvfgEJbJLGh                           



                          XGIhBOhxFPbbU99hMpBeCJ                           



                          zrUlOtXZ1aZH7jhZErYXUb                           



                          fZ8lVL0mH1pzhC6pVI2gaQ                           



                          JlXAReZzFaM7DqLBRoT3Tl                           



                          bwCsLTjjPWCpxf9goXfoWX                           



                          huXuZmNUIgx4k3lZI8pRNd                           



                          gRN0vXNbxSdrZfDzXE8geS                           



                          WuIC5iVKwnLXswemEkn0Ba                           



                          XF55sWAcvkNgslMgAHW7Hq                           



                          UlTManCNBrh8d0vUloE30c                           



                          d05vy7wgyQ6pHXX3NYP2MQ                           



                          iapkDusKZex5Yty97wyqLt                           



                          HNLnKLRff83aqEQ2njYoBm                           



                          XylXd7oHGkGHG1WA8vlRoy                           



                          lzvdc9ScsYExSHDtNCGdM1                           



                          BkFAZaODXib8I6DOHik3U4                           



                          ZSBmcmFnbWVudHMgKHJhbm                           



                          bimgbwPcIxwFInOeQtB39l                           



                          SHYjTewpQ58bfV4uV0MbOP                           



                          Sur8JnDBdpAU2hyZ0nOPU7                           



                          LjUgeCAyLjEgeCAwLjQgY2                           



                          5vnC7lTOsnpkOqMZJeNA4g                           



                          NTEwUQRbXDRuWIW7CXZbPC                           



                          JwJ1StNZLbMGTnj6L6LHRr                           



                          m3O6RTFprqOqyVXjiALhbj                           



                          CugGPgbhzcNFP6XQAswE2h                           



                          y5ogtmO5JY3huCcnyhncTj                           



                          5qKCrtpQUvi3YqzDAjlMGj                           



                          J6Q9OQU2kbAoF9DtLZKVnG                           



                          Lki1TaJ1zoVS9mxTOkm9Ru                           



                          sFl5oGSjPIFnbVsqFZb8UN                           



                          luIEQxLUQzLlxwYXJccGFy                           



                          KTjwu6SszBMXMMrkwCRkge                           



                          RHhVz3MPmpONDen1O7CDIh                           



                          cMxbZHEhT1BlZ1TxeiL7n0                           



                          jraOojq4SvvSQdMN8geMQu                           



                          fQ==                                   

 

             MICROSCOPIC DESCRIPTION v4ajwVMqNUUrnSY3EtEmFX                     

      



             (test code = 3371) Hns5loc2VqjKFgmTFiZOby                          

 



                          mJRnatRivm69xYF5gU52SZ                           



                          8bEOTnQvK3JFJxcwA3Vvu5                           



                          URUxBDWggHDoW261f0zkk9                           



                          lttvSueHV3bTywAPWrbdch                           



                          FpP8ZMaaUKJsxhqbLDe3JP                           



                          gvIFHpvGK9CIKseLXjV9Tj                           



                          OBGbSS2xddj3AMR4IOebSC                           



                          FbYlK8WCXbwVToWIAroNbc                           



                          PTwlt518TQN2TjUtAWHmkv                           



                          KaxRbdeM9jFcSpXAOWuF6y                           



                          jJBqoPx8z8eoRADoGPOkaV                           



                          XrtW0hnoGsqG1zb9ElAYYe                           



                          QHSih7RdZZYme14dgMBrhQ                           



                          GGOMKhCDJ4TZFiSN6sK6K5                           



                          kXIrJHszGYX7uW7rJELtnY                           



                          dhYConsOtiz7NypC5lWCZq                           



                          KQV2nSBkHBNswZEhmPTnQA                           



                          QyOWLmitSjz0mih7FxwB7u                           



                          bmcpLiBNSVNNQVRDSCBSRV                           



                          ZOKQVwFG8JSdwmCUBGRDJR                           



                          FoHny21xRPHxgXCTKkwnGN                           



                          QcvRlyYL2lhL1aaSgzmH4x                           



                          hJEylED7jctdtiPjcCr7kg                           



                          hhwGRwUICuXMYHM8fmPuyq                           



                          QHGsYO39ZRF1JEPaILYygD                           



                          DkUJOnAJZ5aHUik2Ywx26i                           



                          dOViNM3HAO01HoHcIuRZpo                           



                          WjR4PrXiKrYM87R2mrKEPh                           



                          UUqufvGaj5vsttzmDIHcNM                           



                          qTHLK3KTugDNlkhDOjzPdk                           



                          MCAgbnVjbGVhciBleHByZX                           



                          YoiW8rOELjwxCTGNTuXdcj                           



                          BYHyIP49DGJ2BCGgHSNnhW                           



                          YkGRKhAIC6sDXfb5Uxh21q                           



                          cGFyXHBhciBJbnRlcnByZX                           



                          KhyZamvzyqoDKmQZOaCo8m                           



                          fW7or0cxOPSpj6IlalDmaD                           



                          KmqmLotZNjMYZbiR9wQQ1s                           



                          FR7ABcJrem73XKfscxqtLX                           



                          TqhH58BOXio2IwBetpoUF4                           



                          SUYdQGKaQzMbxSXdq7SxqB                           



                          GlcPn2MHUtazP1KDOszHv6                           



                          sY0ygImdEWiRJ0cuYIxsVC                           



                          xwYXJ9                                 

 

             SPECIAL STUDIES (test d5hahJMtRSTqb7joRKJhbE                       

    



             code = 3376) FuZzEwMzNcZnRuYmpcdWMx                           



                          VFjnobGyTBcab7QbD0ZeDs                           



                          AwMFxhbnNpXGRlZmxhbmcx                           



                          BDEyLCU3lsQsIUCxYCmiFS                           



                          VcCTonPv4mgSBejLrzKaQa                           



                          KJZwh0iamkUFjlsykKj1i6                           



                          crJBWdJcV3yWNrPSmlN3bj                           



                          plAauIWxG0CgxYKhlQv7u0                           



                          fxXqVvZpA4lKRaEXgxQ9ju                           



                          ttIgcXRbWMTzSRr3uV44KG                           



                          NseP6suYEhLMzcqqGbBbP7                           



                          OYdfNBYdCcN5PQNhkBTtZK                           



                          VkP6tpGUGpJJkrHODoLKqg                           



                          dOLcYEB6nVicn4D4rPAzzQ                           



                          GsgThcFdScQiDyWbGTp1Qw                           



                          NWj0uThyS8GyHWNiBrM5nX                           



                          QgUGFyYWdyYXBoIEZvbnQ7                           



                          sEejaoClr74wdZCkQWOgXP                           



                          ImYmIoeKuwBFLiZBTZo7Dh                           



                          iGdsPLH7kZw0aDoqVrpzNG                           



                          B5Vyb1MM7ifq13cyg1dCvd                           



                          XCBionvyJuX6IPrgVULvlj                           



                          fdMZy5WXvnVWCpnND9ZFAu                           



                          pJLoP2YrOXPqTF8opwd3NV                           



                          D7RCqdKVKiJrA4POVifOOn                           



                          CIUapTuqRObvi384QAT5Pf                           



                          CcDF6gR0Gpz9F5bL1mwELx                           



                          GDZehHMeTnSyORHxwu9seE                           



                          ZuQYwqc4TyAGX4bsV0mIBm                           



                          ySZgBUIdPU61Uftxc7EcEk                           



                          bjz8OuC87jcAJ1EOqpx1ji                           



                          JF6rChB6jnHhYMquw2gkuF                           



                          9kNlM6IYftCA7kZY0jFLQm                           



                          lE6cmobkWGRaMaRwujtsFE                           



                          DtoKutfxEnWy8rdWcmXQN5                           



                          VRzcB0whnQ3nZvR7KVexW5                           



                          ordJ4bUKs9FEcevWV0FOXi                           



                          sU6vEA6txgacj7nuQYmrBG                           



                          byBMAkvtG5vkM6EOXzdXKv                           



                          A4WwxK2uOGMfRY0hrfvrx5                           



                          jsQFU7FYdeDXGrFMK6BbSu                           



                          VVFbu8Pgeqe2TmFxz5LyuK                           



                          VwSYaoK09oy932PGVetiXr                           



                          R9rlqNIecogvvQYcbajqIL                           



                          zgytL6NGUgIGOhLFzuDIEm                           



                          XGZzMjJcbGFuZzEwMzNcaG                           



                          ljaFxmMVxkYmNoXGYxXGxv                           



                          X3xoMwDqV3OkHKYnUcErLU                           



                          ofTAzzmTSuaOEpdZQ0uH2v                           



                          AI5xRSDshERtC6NmTCYkbt                           



                          KuzNCaGXP3zGVzuRIlDT1v                           



                          HYxzsNQpq3zsw9ZqG4useJ                           



                          elaWI2NU9hPBWwOSBvVFsq                           



                          l4LirY4bEzsjcJVfjvlpVZ                           



                          xmczIyXGxhbmcxMDMzXGhp                           



                          X1xjUrKrZETnfCzoCXfyz9                           



                          NoXGYxXGNmMlxmczIyXGx0                           



                          cmNoXHBhclxwYXJccGxhaW                           



                          9sOoLwJqMpVahfZG7qZMZp                           



                          S7cxrGNmEAVwEBFsZ4qkGj                           



                          ZfzT3snGquVDrkQzYjWcJu                           



                          QoNHc947ol7jNIYmuECuiw                           



                          QVsABnqO6bBGkwQYnsAZsd                           



                          hSSaPZezr8esKZJbt1a5cM                           



                          XyEKHjqrUlv2zeIYvdhxVc                           



                          PAKeuBHlhQFoFEDkj04eOD                           



                          opgMhgrKnrUIEjn3MtaRrz                           



                          u2KiJmFiEOzpf7LdZ54fqG                           



                          JvbCBzbGlkZXMgcnVuIGFs                           



                          y00pj2yvNMDoMrV6bRFjxF                           



                          E9uTJtjNNro1JdySphBXCy                           



                          q9ycLZFmjv7wbetzcTYgw3                           



                          CeiM7ofbjoYAvhnWBoyqKf                           



                          SQWjs2f0kWZaUAFoUCZxZA                           



                          haeQq9QXFdz562uk9bjwT8                           



                          yNVxXQK1JDcpVDFtKCSqun                           



                          UgZXZhbHVhdGVkXHBsYWlu                           



                          XGYxXGZzMjJcbGFuZzEwz                           



                          NcaGljaFxmMVxkYmNoXGYx                           



                          AYawT9wpPnPbH4WlLKBeUg                           



                          BgqOAmQ0rhmVDpQZAwVGck                           



                          XGYxXGZzMjJcbGFuZzEwMz                           



                          NcaGljaFxmMVxkYmNoXGYx                           



                          SJqgR3lhFjDoZ6XtONLjGw                           



                          IgIFxwbGFpblxmMVxmczIy                           



                          YJnrfjyqLVUvBKgpW2voUb                           



                          FjCKZjsWehCOrmd6InVLDd                           



                          BCFgBzkheuYtLLw1aiKvXB                           



                          BhclxwbGFpblxmMVxmczIy                           



                          IEhznopsETQjSPlnB4hgWs                           



                          LiSJLtuWijECdpv9ArNSSt                           



                          XGNmMlxmczIyIEltbXVub2                           



                          kvp1WmQ9emtQjkhWG9BJRw                           



                          O5gdoHCopZJ2RRZ0lM7aAB                           



                          akatNcEUHre1MfFUNjMBEl                           



                          CaN4lF1yZDT3HdKJxYnfKO                           



                          BsYWluXGYxXGZzMjJcbGFu                           



                          ZzEwMzNcaGljaFxmMVxkYm                           



                          MySZBoBIenO8xkXhHiW0Ff                           



                          SDVxJbMkoFooMQmiACh8Yq                           



                          xwbGFpblxmMVxmczIyXGxh                           



                          elkkFKTpENthB7baRaMiEX                           



                          TanMsyBBxki4NnJDQmNOHs                           



                          MlxmczIyIHMgTWVkaWNhbC                           



                          QVEY10WKVgIGCmsNxhaM7u                           



                          yPRNOWMsebQ7w0X6STkjKT                           



                          CbYRl9HDrjypOqOHJycD3j                           



                          CLKtLN6pPIx6ctCkITZiy2                           



                          DlRJ2vKKYgaTEeSGJ9YVVa                           



                          q7SsM6Tnd1JoGUCnXKJzxc                           



                          6lkrHsRtKLpEDcXTGzuv08                           



                          UGBnLO2iZ1wqNLQyRWYcca                           



                          RtbWUmo5XrBNUofHQ6vHPb                           



                          PT4HVpJRb98gJQLfJXMFpv                           



                          KtOCOvrRjdxWB0deE9tP9c                           



                          LiBUaGUgRkRBIGhhcyBkZX                           



                          Afbt3nvnRjIAJvPNStc7Hc                           



                          aHLheZVwpgAgG1Ali9DeVO                           



                          Klby51SYimqODvxv71DH0b                           



                          B4Bzi2WpjO5iCAcnBVQcm1                           



                          CjmQLtjMSwOQIik6WdK3io                           



                          xvrhHRcgqJKkeA6fRTGkIW                           



                          g8CUWfa7NvAFZnm0WwCiIc                           



                          ieScFDZwHUYyLRHygD46WF                           



                          D3kMmclYisobAhHK7nZXAo                           



                          bxQwUXIhENSavC8qQAwaku                           



                          IvZHGgbcE4b0U8YRfvTKBk                           



                          ebIuAljiSXN2tpTsqaD4dN                           



                          AdF8juyrbhMXnbWTQiw0Hp                           



                          aZ6fpPWCoTVyc4DmoQSuvO                           



                          JRjHDsJG3turIuOL9eMNQ2                           



                          ODggKENMSUEtODgpIGFzIH                           



                          O8JEykKujcCIF5lfTsVCDe                           



                          x4GqVXzyA8cmM97faSyciS                           



                          d3fXUfqPycyYTgfOBzFFJp                           



                          qcA7r0T7RPVup3FariuuPU                           



                          BsYWluXGYyXGZzMjJcbGFu                           



                          ZzEwMzNcaGljaFxmMlxkYm                           



                          YoRVVaMJcbP4bmYbHzCfPi                           



                          WxfyCOS6nJ==                           

 

             Gross assessment was [facility]                           



             performed at ([facility],                           



             = 2777)      Department of                           



                          Pathology, [address] [place] [id number], Tel                           



                          [phone]                           

 

             Technical component was [facility]                          

 



             performed at ([facility],                           



             = 2778)      Department of                           



                          Pathology, [address] [place] [id number], Tel                           



                          [phone]                           

 

             Professional component [facility]                           



             was performed at ([facility],                           



             code = 2779) Department of                           



                          Pathology, [address] [place] [address], Tel                           



                          [phone]                           



[id number]38





             Test Item    Value        Reference Range Interpretation Comments

 

             Case Report (test code Surgical Pathology                          

 



             = 104)       Report Case: [id number]                           



                          Authorizing Provider:                           



                          Elayne Dempsey MD Collected:                           



                          2022 10:01 AM                           



                          Ordering Location:                           



                          [facility]                           



                          Received: 2022                           



                          11:57 AM Services                           



                          Pathologist:                           



                          Homer Thomas MD                           



                          Specimens: A) - Large                           



                          Intestine, Colon -                           



                          Transverse, Bx mass                           



                          B) - Polyp, Colon -                           



                          Left/Descending, taken                           



                          by hot snare C) -                           



                          Polyp, Colon -                           



                          Left/Descending, x3                           



                          taken by hot snare  D)                           



                          - Polyp, Colon -                           



                          Sigmoid, x5 taken by                           



                          hot snare                              

 

             DIAGNOSIS (test code = [id number]                      

     



             3220)        FuZzEwMzNcZnRuYmpcdWMx                           



                          IHtccnRmMVxlcGljOTYwMV                           



                          pgynOhHVHjkQSjF6Suncuw                           



                          YIhdJT3yHN7nxMusgKXmfG                           



                          JmFSCbHlDbv6amu843sSVd                           



                          q4sjYMOTbitueAm6uAlzE6                           



                          0fx5F4OwpmB23fiMFhVUQ4                           



                          UDUuCVVpsXAjGYZfZFK6UM                           



                          IyqSUdC5woEDTgPT5gsofv                           



                          LAhgNViqZDEzqZL7WXGhoG                           



                          KlO0FnFCVxVFnhTAUznxk6                           



                          NgMnOj3vnEIiiSfqGRxjFP                           



                          SoMGXjNOkdYDFqBjOxOA5b                           



                          IBVGN2GkAH3XBWHTZR4SQG                           



                          GQRfESW6PGAlARACJGII0Q                           



                          IX1XV7NwJOSZS9LFCTitpW                           



                          NvZPRhXQYTPfMAC7gTWFHL                           



                          IGOGF9QMRkTWLt6PWZqdLG                           



                          FhZEMsGOCLFWVEBCGAE5ZH                           



                          S0XCDqMBSMTUYzsJJRwDXm                           



                          BccGFyXHBhciBCLiBMQVJH                           



                          CWQVHqJTN1NBSeMkYCVPW8                           



                          JXWoGEAxhiC98VH49aKS2Z                           



                          DQLfZKUKD4KBUBfyqPHlTB                           



                          KhYRIHBKWEUUCZQKYEFM4U                           



                          LXOjGYQLAInKCE6KSCTuVZ                           



                          FtlfycNASlOu6wQTABD4Ep                           



                          UZ1KIRGAPA2VYOOPECHFVV                           



                          9GFP4QZXPMPU7QYLUAUBfY                           



                          IHggMywgQklPUFNZOlxwYX                           



                          IgICAtIFRVQlVMQVIgQURF                           



                          Az1PPCrwFfXXI23XRkISZM                           



                          gxQVCnJBUbPMOKB9SPGXKq                           



                          N1MCLvOFSXSdZPSIZR1GIW                           



                          xwYXJccGFyIEQuIExBUkdF                           



                          BYbCKLCZHRkQCAnmU9yBSB                           



                          8VWDKIS4vAFaQFJ8aSVPN9                           



                          UHGoQGXED1RVOZbhxZEhLI                           



                          OrXRAKOLQYMW8KTWkKA4WR                           



                          VNROOP7TJCPmCTZCSQjRMX                           



                          5URURccGFyICAgLSBIWVBF                           



                          WuHIUGUBHUOkZT0ZOXNcDR                           



                          Vjmn18ADB8GdJmt6H7MSG3                           



                          FPRlCHLej1cwMBScvRCtTl                           



                          EwMzNcZnRuYmpcdWMxXGRl                           



                          InAhv2aul721gUNio3wyLR                           



                          PpFfW3hFBgLTVmpOBeC716                           



                          AEIfCWnnq4rtn2RuVFJrcI                           



                          Ysz9K5GPBSxbmuuXj5uNia                           



                          C51es1T6StoyM6wyLNXzWX                           



                          VjN5IdAT1zSRHuEja7BAA1                           



                          RIO8IBSgKAYbO1HcTO5dWP                           



                          HdoAEzDNs6s7cjcRniJMZo                           



                          KTF9v2tcNQrqrtXuRU8ppd                           



                          8xtOs8s7kcuoPkWLMrTZYp                           



                          pAQNLIQgL4ElpIgrDm2kpX                           



                          o4uAodQfizWOY5Cgk1EF4b                           



                          ht06qgg0oRfzUKPznjbpPz                           



                          U3IPhcXLDgigpwPPq9VWuk                           



                          DIUpiNV1GGEqhXFkN7CwKT                           



                          RnRE2pblx8DWD3NKqsQKSx                           



                          FpT8UTRygQHuEFXplQnnCQ                           



                          brq881SJF7DfPmSW4rM2Fo                           



                          e0S5vZ7wuWIdGKNizONjIp                           



                          RaIVFnkb0qfOBeFAphe5Nq                           



                          SPX8kuB0aVUxqQLfAGCiMc                           



                          Y4DFzrKD0nvj27OVFzLSV3                           



                          vq5jkAFboJkafaYccPXiLH                           



                          oqO3YdFOUgc877BDRnI0Hm                           



                          TQTpk8W2fdOcFpCpBDLutR                           



                          K6nlE8XYYkPC5slbmlp2wr                           



                          XMeySXadHOXstwE7qcC2ZR                           



                          SzgDYcX0HxkB5eKXXuHA1k                           



                          bzbpf7mfKUS2GFbsOGRlQE                           



                          D0WeGwKDJsg5Nxoam8UoTb                           



                          e9JouAGpTUktS79kn366DO                           



                          QapkBnN3jotBWgirajqWGa                           



                          yfaiOZbrjcV8VGIaOIqgpi                           



                          tgHSEfOBhfV1igIaHfCXBm                           



                          iFhrHKcdx6ZhGMMqXTNdJh                           



                          YaiNNqAFIfPiq0JMZtoZHb                           



                          DFYjCaIqM7gsepmdOyOOAO                           



                          Eiq0bgP8jciOLIdHKkO4Uo                           



                          UGhvbmUgTGluZTogNzEzLT                           



                          j9PW48MIgwDKCydn73                           

 

             COMMENT (test code = e7angYQrYJKwkQW4TiHdHB                        

   



             8025)        Zss6czy6AgvKDsmBAtJKji                           



                          cEZeidSdxp34dIY9jZ73CR                           



                          4mLZEcRnR7VTTakfO5Puf3                           



                          VOYhYKNmwQYzT839z1fiw2                           



                          lxkpEamJT3eCilRWZeijff                           



                          WhS5DDugGMSrpoguGGq1HH                           



                          irDHLeaFK1WUXiqZCqF2Ee                           



                          DDAlPG8pxct6HLI7IHcfDG                           



                          UpMbF3VJCwhJYqOCHsjIkl                           



                          GZlcm779TMD8ClAuYLVwkj                           



                          SarQlpoQ3kMoZiWTBGlJ4c                           



                          ayBBMSBoYXMgYWRlcXVhdG                           



                          LivJKxl1CmZ7WluMUbDQYe                           



                          mOpwIj4aAXE9jONxWBWpuw                           



                          AmlMlfrxmic4Y2VRkbtr7f                           



                          cGFyfQ==                               

 

             CPT Code(s) (test code l0uptJGaPNQcgOZ2FfIzET                      

     



             = 1136)      Rph6qnj5WmwDNvfUKzRQnw                           



                          gDCjydXyvk18bJN4rL30UN                           



                          5rSWXaQvI4WUSjfkK9Gpw4                           



                          HRCjEYRyfRLaB147q3piw8                           



                          topeHcuKK7fKzyUVQpzasn                           



                          IfU4HNfwGYLetrpaAXk7VY                           



                          pfHYYieBG5OETndWTnC8Hg                           



                          FYIyCO9rnvn4OWG7XPznLJ                           



                          ApGfP7RUUbnADsWJMhqBeo                           



                          CBjws958SXA5NyJkQNZord                           



                          WtpNtxkZ8hVzAbOOA8SPWa                           



                          QGC2UFWnWJs3NpCtUAL0NK                           



                          Q1ZVK3EXTsmWZeeE==                           

 

             GROSS DESCRIPTION (test l6wbsHGtHMXtnSP6AiScBH                     

      



             code = [id number]) Cia1qda7WiaYQpsCRvYHuv                          

 



                          lBRgckSggc53dOF7cW12CV                           



                          2nNEXjZfB4QKMkqcB8Ifi2                           



                          GXUuTSBngGZsF512s4pff7                           



                          caepKlrRR7WXMiKKZwL7Nw                           



                          XY7wIWOxnYByD20ghKZtVM                           



                          F3KBYmDJCbxDHwDOQvMRV2                           



                          YJNzlIYbT5thZOEmSK1sxq                           



                          uiNQhgCMhdHYRptIW6ZFCr                           



                          lPIcB1QuVHKfPUhdSHHxut                           



                          l0OdClUs7znNIslPzkYYkm                           



                          XEEpj9vtMSWiaMRcYAW6BU                           



                          zucPUgYTTeYOBzRDx0FHEk                           



                          MPbagFMuKX8buUjyZrjdnD                           



                          nrq4UhdGTsOQvbCRSfOORe                           



                          JQpvHSBcW9ZNFSXvRcW8Wo                           



                          D9YxWfOIy2UUe1QN5BPfQl                           



                          VFMnEXPeMVX2TGTwHOv6QP                           



                          fkUL9HIHF8SrM3PKo8CJM1                           



                          NTMyMSBcXHQgMiBcXGZsIF                           



                          rkQgBFhcibeXRrZV1grLha                           



                          bGFpblxmczIwIEEuIExhcm                           



                          nsZNrwqFDbbCnqUIucN46k                           



                          a85xQXMDevIlv5KffkPdHs                           



                          xwYXJcZnMyMFxjZjEgUmVj                           



                          ZGa9HSKtoZ3xJv3mbIJwoU                           



                          3rxQWuRWgoCAK3fKZaWTDq                           



                          GKIpZPJdDN45KBgsVLMfzl                           



                          FtZSwgbWVkaWNhbCByZWNv                           



                          cmQgbnVtYmVyIGFuZCBcdT                           



                          nyCuVpAUt7D1iphqweDSus                           



                          wQNarYwxPH2du1zpss45np                           



                          Jwk4PthbJsKFEki0QyjZPc                           



                          KCMhUuEfcoTaF71dq9jxgN                           



                          Jid7BbiGDokRgqpWBtrLAo                           



                          LXBpbmssIGlycmVndWxhci                           



                          Sik5D5IXDdk1K2BBKywzLa                           



                          cZCcvGQzRYHqYCX4MLFcZD                           



                          G0WLCtWoGszYGjdbKuU5sl                           



                          ZWdhdGUpLiAgVGhlIHNwZW                           



                          KfnPErHAhmGCJ3Fe8pySTg                           



                          KOFjtqI9u7UjEUfiUVEjIx                           



                          bfZITyF1ZlM8TfnqUflCFb                           



                          OSGxgqWfa9qiRRQ3WPKayE                           



                          QsbNBbInWxWqweCRV1b1jw                           



                          YGZmvVMeUGN9BUkgyZAlWC                           



                          EwMDIgXFxkYiBPVlIgIiBa                           



                          QeMcQXU8SfCdADf3EDjdA3                           



                          BCQOHlXZK7YVE8LiavBeJ7                           



                          ROp6SIVYHs5jYRZ8ALxqOB                           



                          Y9UQF7UoImCIsbqDTtWPxv                           



                          ZmwgXFxmIEFyaWFsIFxcbm                           



                          F2TAApUqAgJ5SxFGSdNQUp                           



                          oUpsXBEQm9sbbnEoCUoxPq                           



                          IcXLCzR6EqELxrLl2sbTBa                           



                          POSgKgBtB8BbJZSjF4Kefa                           



                          UfWHojDYCvae3niMqaNDxk                           



                          JpLqUDFav8u6mWH9sELamF                           



                          Q9dCNixEbfSkMeTR2smYDe                           



                          YO7aDCyuBFiuujKfi8RiQJ                           



                          82zEDeqzOgbcPoFMT0DrEo                           



                          MNsiGXRat2x4wEotI34sa9                           



                          1meSTezIZxQTVrVM4ttR1v                           



                          SQZir8CdTAE4QHnsyYMyip                           



                          KtCBOrUV5eTORzkdJis5Ru                           



                          NO2jYH20mPPtqPngRCHvbm                           



                          8zlO7sCKEhznHvS7BzFEXm                           



                          GF79j6mfRETeOjGuwUlcm1                           



                          NuGDTvBFaiUO25CWxeUuFc                           



                          fVYsXcZddPUuApRnK33zkX                           



                          0oMRucuxKgWSCtIT1fLTIe                           



                          JWQeuOOydM8zvfKhosJeuC                           



                          UjbGR5CWGwkJ3ndE29zdSc                           



                          hnEVKK03RMVdwOIgFSD1XI                           



                          6yCQKijwbtHCNoAMEkTIB9                           



                          JMclyW07iAMvIARbAILwrC                           



                          VmpDqhPikyxEevv3UuhFIh                           



                          XGlkIDUxMDAyIFxcZGIgT1                           



                          TJFFCjAbG0LjA5ZrHkJEq8                           



                          MAt5FH3LKaJxFZMvQLLiFK                           



                          A7FtEkEHh6JUjpSM5CZNU5                           



                          XhN7DPjaKJP6BGOeFVRuYX                           



                          QgMiBcXGZsIFxcZiBBcmlh                           



                          eWLvYO6wkDisclHoYPAhPC                           



                          KXDlNRn2c7kUsgX99br83h                           



                          KVFFVJG9Q2Dck7EboqStmx                           



                          cuXHBhclxmczIwXGNmMSBS                           



                          LBGfmXTvLKRuvrFly9PsWK                           



                          xpbiBsYWJlbGVkIHdpdGgg                           



                          wFhhCEJujTgbbzOiD1Y2qp                           



                          KcPF3sYKDnKJLhU3OnBLBf                           



                          T28mHZIdhT0xCACpZM8hOE                           



                          o3MREyJXAeBgexmR9ihUAq                           



                          HAMcgW9bYAyfWwDuKHQmT7                           



                          JoYMfvEuU9XwO9NBaxutFk                           



                          cEIub3Qkz68hnoQlOIKyOQ                           



                          Ykb25uwMT8gpSaXkOqiEf8                           



                          bHKwLTL0YE3cdAFkxB03PS                           



                          Pnsv9zVAVhg4B6IQZgc4M1                           



                          ZSBmcmFnbWVudHMgKDMuMC                           



                          S5WEFsNLU3BYOyJHZkfHJg                           



                          zjIkP7loWDvyiXZkKpNgPC                           



                          yyPHxgwjdzs9Qqf1AgqSGi                           



                          j57coCI1tHHoqZIkDqNzF6                           



                          4sehOouJXvCpajJJK3HYNc                           



                          rZ2bm7ufydX4CX2diEhael                           



                          NofQTmo4UxBnRgJD6jBDWf                           



                          JILknZNgjV6dlfJupgOljM                           



                          FbvBA4EAJcPJ19mEFhsJns                           



                          jL3oQgKbAxLdBKMcihjeAO                           



                          WrNU8cOKLwPDQ6ZTTbrlKD                           



                          cKClQEZmy4UqcMitjmCeVB                           



                          KkvW6biTEnTHVgttKSLOH4                           



                          nP8mBGTsAKN7QDZrajNFAX                           



                          teH87qpPF4aDRrtIXgAdYi                           



                          Q98xhxPmVCUziiEAAaduC5                           



                          DcuDNea69fnZH0pMZcvIMd                           



                          HFPqh0CmvOWyu1isfKnhj3                           



                          VjdGVuZFxwYXJccGFyZFxz                           



                          lZ2tBpAnc3jlxKd3ZMehbu                           



                          X4QIDhtz21LJxlNCIvM0Dl                           



                          M5YfDUndSZO0NLGzQgYiWO                           



                          RxKL9AKvZcVJvCKTYnZDo5                           



                          PiH1HVq5JAUBYiYxSwRoOb                           



                          gjDEnuVNGdILd0EZv9GSdR                           



                          QoD1HPS4OeH5SvCdRRUjUb                           



                          CdVQa0LIEsEVhfuMOyYQDo                           



                          VABcNFpgQOvwG52xAnImXF                           



                          zfAnDsRK8gCN3orQTbWURu                           



                          hN1aQL0vE3bhlR0tGZ2spF                           



                          UzYGBfSaLpE0HdPZZeN9Hu                           



                          aaRbKTdwDTEutq6bqQkcZW                           



                          rlHpMgRLRxz6l8pKH9bGKe                           



                          cMS0bHOtlCljKfDnPB1geH                           



                          ZcPJ7tJFyvLZzvqwZaw4Kr                           



                          MF03kWFffjGfyrVcEHN4Gb                           



                          BqBYqzZLCli6e1nLskK23s                           



                          i28nk7hgjA6nSWT7GIG5AX                           



                          xyezKbkZIin8Exo57skeIs                           



                          FIHaCGFjv99oaNN9lfEwJz                           



                          WnlQo0lFAgZOU0VD2mqCac                           



                          iyoqn1PmvWIrYRJkUKCoN3                           



                          WbLWNoLCZah7F1SMAdx9R6                           



                          ZSBmcmFnbWVudHMgKHJhbm                           



                          evbvpbFbGbhGMzMfOsT55q                           



                          IEUcWemyK45maX7hW2CbHH                           



                          Rnj2VkDIsgQU6wgR2xMQO9                           



                          LjUgeCAyLjEgeCAwLjQgY2                           



                          0vxJ2zZUnrvpIsDBNpAX2y                           



                          MMOwBBYdRLVcSCD6FGDbJC                           



                          BfK0NsQDKbGDMap8H3EZLc                           



                          f4E3GPQdptNyuVLkkSTqlj                           



                          SwfJHuvzhqKGN1OOGzyT2c                           



                          w7ecuuB3TD7fkGnnvvodXj                           



                          8dAZmtoPQhz6IwjMMhqLRy                           



                          U5A2KZK2pyByT5PmPIWTvR                           



                          Rsb9IxM8urPE3qxHVwp3We                           



                          mOy6rBQzNCNvrLleZJc4GD                           



                          luIEQxLUQzLlxwYXJccGFy                           



                          TRzxa3IagNBVFBasgYMsza                           



                          JDwYf3YTtlJTOru4W2OGBc                           



                          hJeeXPQjQ9NjS1DxrpE7s0                           



                          gfaGfxi6RxkIZkRN0vfVAf                           



                          fQ==                                   

 

             MICROSCOPIC DESCRIPTION y8norBNoYWLmyBE3WvUzTB                     

      



             (test code = 3371) Coe4kii5DdiWQuyMKnTTwg                          

 



                          tLWcihPsyn86aLE3bC84KH                           



                          8mKCPwGdR9ULScxbO9Kwt4                           



                          XNFzJAWwhWVjY131y9zqo9                           



                          pmpaXynWB0pRntJFQrmdrg                           



                          JvS9QXyuGMGfsifyCWi6HP                           



                          vgDNCngEL3VGXouONoD8Kt                           



                          YMJiYC1oaqo0CNJ4XGgyOH                           



                          QuYvW7UQTovRGrDRPjuFgg                           



                          AZerz203VOI1LoGpERXkbs                           



                          VdxSamkX9pQtEgXKOFoV3y                           



                          iOHqqMw3j7oxUXFlMLSfsE                           



                          EgbX8pdmRupR9ao3IfEFPp                           



                          GIJcb6DaCYGlj59cbFOrmS                           



                          IEHBJgROS1IBEeHD2fY0K9                           



                          tMIsRNvhOWY9fR5gIETmaR                           



                          jrNTgwjRtpf1AprT6bCFCk                           



                          XGS8vPBqCTLhjZZmpAAtIR                           



                          YoCOKrzxFjz2ydx8XjyD3p                           



                          bmcpLiBNSVNNQVRDSCBSRV                           



                          OAOSNiHS7DOasnMUCNGGEZ                           



                          GeHrm74jUMRkhGRHGatjNF                           



                          TzkXkwVF0ooP0bdMhisN4v                           



                          iNQpnDW4nxmczwNigWh5cr                           



                          dvrNWiIOObFTCDG7sgZeud                           



                          XDHzMI44RXR0NXExKPKpnY                           



                          PgVZXbPBZ7vIGmb4Wwx51w                           



                          oMIkJC8OMS92JdJxNyFYsd                           



                          AqD8DhXbFtGF19O8uaMPWg                           



                          LInplcSov6wujmudJTAxTX                           



                          aSSWJ5YAxyGBhagVCnvJcx                           



                          MCAgbnVjbGVhciBleHByZX                           



                          KyaS9aYSQamuGOSWRlQfrt                           



                          TGVqLP17CMI5UJUoIBIszN                           



                          AzKIXnMFH5qKNsm3Ssu21c                           



                          cGFyXHBhciBJbnRlcnByZX                           



                          OdvUthmndgwPWnIDCwEh5n                           



                          kR5ek2shCKEvl1HosiUdaS                           



                          VwjiPfrLVfTZGfqU3mSH6j                           



                          ET4KDmUqfa91NPilhbygPH                           



                          XumH70DRJps6CfLuqjzCS4                           



                          LORqTOPkWpTmxIRdp2QmbQ                           



                          CtkPn8BKSrwcR3IULgjAc9                           



                          uI0gfAssZXhTX1utEPkbLV                           



                          xwYXJ9                                 

 

             SPECIAL STUDIES (test c3vcwYXjOUBpp9vwYIFbuK                       

    



             code = 3376) FuZzEwMzNcZnRuYmpcdWMx                           



                          YMboucVkZEwez5MoZ8IlBw                           



                          AwMFxhbnNpXGRlZmxhbmcx                           



                          JFBoJKH7ztKaCYQtFEzuGP                           



                          RgRQvzSo3enBDbmNaoGkGb                           



                          WCEij2tdhcJVgipcdAb5o1                           



                          ubQWUpLuB6gAMiNEjeF0ur                           



                          fuEfeSDmG1YmvVTooCt6c9                           



                          ovXsJvDqX7lUQkALnoS0yx                           



                          ofZndUWpDGRaXLj7cA97QH                           



                          FvhT1ylQWlRDstgeIlHrJ2                           



                          KNkuTBPwVkW1DRTneAKxCO                           



                          TbJ2wgITDyLIiuBLGmERfu                           



                          gKXnVEL0rQrih5Q8pPVkzG                           



                          AnnRsiChEmIsVhXoGNx3Fk                           



                          MUx4pSayN5XlCGQmSeJ0kQ                           



                          QgUGFyYWdyYXBoIEZvbnQ7                           



                          mOumecQne24qvZUiHFOhMU                           



                          BlQdLprDguKNWsQFAOe3Pr                           



                          nZxmJOQ0wSw6jLjwMgxkJM                           



                          K3Qlc2VA7wpq79vnf1vCrw                           



                          VYPhngucPcB5CRylDSWlrs                           



                          cbDEf5DHeyNYKsfIF2MZXn                           



                          sKMjE1VnSMLyTT2cupy0BE                           



                          U7WUkjITKgSvR9EYDifLSw                           



                          TMTcaHghIDqbg653PUR3Rm                           



                          SsLI0uE7Jjo0C7sJ9ntUFk                           



                          OZQhoGJoXtWxYDCxvc6lgV                           



                          NmDYcpd8EoITP8vvI5iMXg                           



                          mFUjEDOqIK49Zdjsi9ZlJp                           



                          uct9VgP74bcCE2JPkny0vl                           



                          KJ7rYtB8feAwDXazj1xpxS                           



                          0uEvZ2MRyiZJ5oQJ1aXROx                           



                          uT5ycryaJOFmEkYwddmdMO                           



                          TydOomusVjEu7yfNdhWXS4                           



                          HYowH1qjbZ0gQiH6EFlfC7                           



                          nvfN9yOHd0QFrtcJM0XVHt                           



                          cB0qVO8lewial3ukMHpqWW                           



                          kjGNCwsmS9tgZ5JFQddKLn                           



                          C9VhqZ6sIBRfNI1cvnpik2                           



                          biWSL8SFhpHHMnPVQ0TuPw                           



                          PGWps6Tubfh4VdPiy7IfcS                           



                          OyNCanM37fp927EGWheiKm                           



                          K0pawMSrnxktvEMjpcojKM                           



                          feqjM9JXRjKYZdEWufPPLi                           



                          XGZzMjJcbGFuZzEwMzNcaG                           



                          ljaFxmMVxkYmNoXGYxXGxv                           



                          Y2kxXnVpD7JeXHIsRjWlZZ                           



                          cgMRvmlDQtbYDezMX6lP0j                           



                          GO3dWAJkfPBkS0IjJICxzm                           



                          DyzWQnKDW6yBQqgAKeKR6b                           



                          FFjdaZZjj5xww1FrD2iibE                           



                          uulUX4FJ5xVUWhAUWxUTxi                           



                          s2XbjW1bWoefsZJcegyfOO                           



                          xmczIyXGxhbmcxMDMzXGhp                           



                          T6iqVgXlMSSwbImoVWctf1                           



                          NoXGYxXGNmMlxmczIyXGx0                           



                          cmNoXHBhclxwYXJccGxhaW                           



                          4tYrLhKfItHakpMB0sJMGd                           



                          I0nzoBOpWYSmNKRsP7epOl                           



                          TcjL2niZwoUYbhQlCeYxQm                           



                          JvBJo734qi5jMGDmyLFmaw                           



                          IPiJQixQ2mSSuvSZwjGGbg                           



                          vTAvFBtha8jcKUQua0k1iL                           



                          AgEFLlevSqb4feOMovtqGd                           



                          VEXomCYwtKEuBGZxd98rVN                           



                          pzwAzzgMfqTHMzk0YzuHrn                           



                          n5ArSzSkWZzkm5AzV85fcA                           



                          JvbCBzbGlkZXMgcnVuIGFs                           



                          s99qn8neLRSoKvR6fFGfoG                           



                          D2nNKdbBRfn9MxiQjjOGTp                           



                          i3niFADvaw1kpwysfKMde4                           



                          AkxF9kynpbRCnprCScoeCo                           



                          NBKzj4d2sBBhFGCtRXJcSF                           



                          nifKp6ATWru556mx9padJ2                           



                          eRLgBZI0WLozLDMgZWOgpb                           



                          UgZXZhbHVhdGVkXHBsYWlu                           



                          XGYxXGZzMjJcbGFuZzEwMz                           



                          NcaGljaFxmMVxkYmNoXGYx                           



                          KIhfZ8arIqNfZ0PdZAEpNl                           



                          TrrHMhV3ujeWDkXEGyGNnm                           



                          XGYxXGZzMjJcbGFuZzEwMz                           



                          NcaGljaFxmMVxkYmNoXGYx                           



                          CAsoE5rhJgWvS7AnBHHvTv                           



                          IgIFxwbGFpblxmMVxmczIy                           



                          TGecpxbtIWJzWDhrR5zaSi                           



                          GqQCWlfDniHTdyr4QzTZEa                           



                          ZOBsRarpyqGfJPy1qmCsHY                           



                          BhclxwbGFpblxmMVxmczIy                           



                          XSmpkajqVKLuWHknC0xcWo                           



                          JsQAIqhCnxRSzqi2HuAVGj                           



                          XGNmMlxmczIyIEltbXVub2                           



                          uwg1FvV5pnvPmzoGP5YMCx                           



                          J1nniARonWS7XRD1fX4yVY                           



                          qfksTzQFFbi0FvVYYnCMOl                           



                          EiC9eE4fOXO1RxOGvYemVA                           



                          BsYWluXGYxXGZzMjJcbGFu                           



                          ZzEwMzNcaGljaFxmMVxkYm                           



                          RnJSBoTJzlL2jvVzSlB6Ie                           



                          JQVaLxBcyGawOFbaCZw2Ry                           



                          xwbGFpblxmMVxmczIyXGxh                           



                          gmzkQWZzTHlwS7jiQiJnVV                           



                          CrsDkvEEmkw9DjMVSpFVQc                           



                          MlxmczIyIHMgTWVkaWNhbC                           



                          ZFAB00JHKeCQJbgNutyW6t                           



                          gZXIFNDabpX4x5E8OYwhXF                           



                          HcKMi3APajwjKaUFOkxV5t                           



                          DYPoMU4fPUt6ngFvMHHcg7                           



                          YxZJ8hVXRmgUGtYYN4TOCd                           



                          l2XbM6Mbj9OvGQMpRMDfln                           



                          2mnnQkBrQUrHPcXXGcxt89                           



                          VDGiPC4bY8ybRDXiLBRxas                           



                          TmmQPwv8JiFURowGI8uZPu                           



                          RB5KPsFTj51dFOXnEIFMzh                           



                          FhQJGhiZmnuPI9mxT0iA4q                           



                          LiBUaGUgRkRBIGhhcyBkZX                           



                          Hacn6opcBgLHTwPWIea8If                           



                          jGAnbYOvyfLjV4Ypl6IrHK                           



                          Ixiw30GSunfJBkdg10MF4q                           



                          I2Etm3VrpS0yOIefLSTha2                           



                          IstUZotPReHLAio1EjL7mv                           



                          awllCItmvUIjhO8yHEMpCB                           



                          z0WAWzv6FmCZSjc0YxItRq                           



                          nrCqBVVeZPKpSHJtwE26DE                           



                          S7fRxluGavgbKgZG1mFHWw                           



                          zsYjVSLbAUVlzG9fAMxfvg                           



                          JlVCKtsvV2g7A7WAqsCONc                           



                          jhGsNqxiSWC8oqUqgcU0fB                           



                          ViX2syqyyhRSpzSBExf7Fo                           



                          sG1wcFVHeHXfh2MrbZGpvM                           



                          UVqDGcAN6bguDqSH0fQTD7                           



                          ODggKENMSUEtODgpIGFzIH                           



                          I7TBgtOlrjEUJ4daYwILSf                           



                          l4CcGQhiU6fxA54quEnwiS                           



                          m9lBUtmAiheDEgiANxOTLo                           



                          viI9u6E5LDZoc1HogexcPR                           



                          BsYWluXGYyXGZzMjJcbGFu                           



                          ZzEwMzNcaGljaFxmMlxkYm                           



                          PtYIDoYBxwT9peUxOcPePl                           



                          GdufYDK4vU==                           

 

             Gross assessment was [facility]                           



             performed at ([facility],                           



             = 2777)      Department of                           



                          Pathology, [address] [place] [address], Tel                           



                          [phone]                           

 

             Technical component was [facility]                          

 



             performed at ([facility],                           



             = 6733)      Department of                           



                          Pathology, [address] [place] [address], Tel                           



                          [phone]                           

 

             Professional component [facility]                           



             was performed at ([facility],                           



             code = 2772) Department of                           



                          Pathology, [address] [place] [address], Tel                           



                          [phone]                           



[id number] 08:01:38Surgical Pathology 
Report Case: [id number] Authorizing Provider: Elayne Dempsey MD Collected: 
2022 10:01 AM Ordering Location: [facility] Endoscopy Received: 2022
11:57 AM Services  Pathologist: Homer Thomas MD Specimens: A) - Large 
Intestine, Colon - Transverse, Bx mass B) - Polyp, Colon - Left/Descending, 
taken by hot snare C) - Polyp, Colon - Left/Descending, x3 taken by hotsnare D) 
- Polyp, Colon - Sigmoid, x5 taken by hot snare A. LARGE INTESTINE, TRANSVERSE 
COLON MASS, BIOPSY: - INVASIVE ADENOCARCINOMA - SEE MICROSCOPIC DESCRIPTION B. 
LARGE INTESTINE, DESCENDING COLON POLYP, BIOPSY: - TUBULAR ADENOMA, FRAGMENTED 
C. LARGE INTESTINE, DESCENDING COLON POLYP x 3, BIOPSY: - TUBULAR ADENOMA, 
FRAGMENTED - SESSILE SERRATED LESION D. LARGE INTESTINE, SIGMOID COLON POLYP x 
5, BIOPSY: - TUBULOVILLOUS ADENOMA, FRAGMENTED - HYPERPLASTIC POLYP Signing 
Pathologist Direct Phone Line: [phone] signed by Homer Thomas MD on 2022 at 8:01 AMBlock A1 has adequate tumor cellularity for 
future ancillary studies.88305 x 4, 67731, 28182 x 5A. Large Intestine, Colon - 
Transverse.Received in formalin labeled with the patient's name, medical record 
number and "large intestine-colon-transverse biopsy mass" and consists of 
multiple tan-pink, irregular soft tissue fragments (2.4 x 2.1 x 0.3 cm in 
aggregate). The specimen is submitted in toto in A1.B. Polyp, Colon - 
Left/Descending.Received in formalin labeled with the patient's name, medical 
record number and "polyp, colon-left descending-taken by hot snare" and consists
of multiple tan-pink, irregular, ovoid soft tissue fragment (2.4 x 2.1 x 0.4 cm 
in aggregate). The specimen is submitted in toto in B1.C. Polyp, Colon - 
Left/Descending.Received in formalin labeled with the patient's name, medical 
record number and "polyp, colon-left descending x3 taken by hot snare" and 
consists of multiple tan-yellow, ovoidsoft tissue fragments (3.0 x 2.1 x 0.4 cm 
in aggregate). The largest ovoid soft tissue fragment is bisected to show tan-
pink cut surface. The specimen is submitted entirely in C1-C2.Ink 
code:Blue-resection marginSection code:C1: Soft tissue fragmentsC2: Ovoid soft 
tissue, bisectedD. Polyp, Colon - Sigmoid.Received in formalin labeled with the 
patient's name, medical record number and "polyp, colon-sigmoid x5 taken by hot 
snare" and consists of multiple tan-pink, ovoid, pedunculated soft tissue fragme
nts (ranging from 0.1 cm - 1.8 cm in greatest dimension, 7.5 x 2.1 x 0.4 cm in 
aggregate). The largest pedunculated soft tissue fragment is quadrisected to 
show tan-pink, focal hemorrhagic cut surface.The specimen is submitted entirely 
in D1-D3.JESENIA Braswell studentSynaptophysin and chromogranin were 
performed on part A and were negative in tumor cells (all stains are with 
appropriate control staining). MISMATCH REPAIR (MMR) PROTEINS on Part 
A:Immunohistochemistry results:MSH-2: Intact nuclear expressionMSH-6: Intact 
nuclear expressionMLH-1: Intact nuclear expressionPMS-2: Intact nuclear 
expressionInterpretation:No loss of nuclear expression of MMR proteins: low 
probability of microsattelite instability-high (MSI-H). The interpretation of 
this case included the use of immunohistochemistry or special stains.Control 
Slides Examined: In-house known positive controls were evaluated along with the 
test tissue. These control slides run alongside of the patients sample show 
appropriate staining. Internal positive and negative controls when available are
evaluated Immunohistochemistry technical testing was performed at [facility], Pathology Laboratory where it was developed and its 
performance characteristics were determined. It has not been cleared or approved
by the U.S. Food and Drug Administration. The FDA has determined that such 
clearance or approval is not necessary. The test is used for clinical purposes. 
It should not be regarded as investigational or for research. This laboratory is
certified under the Clinical Laboratory Improvement Amendments of 1988(CLIA-88) 
as qualified to perform high complexity clinical laboratory testing.[facility], Department of Pathology, [address] [place] [address], Tel [phone], Department of 
Pathology, [address] [place] [address], Tel [phone] [facility]erman[facility], [facility] of Pathology, [address] [place] [address], Tel [phone]-Glucose [id number] 13:15:36





             Test Item    Value        Reference Range Interpretation Comments

 

             POC-Glucose Meter (test 121 mg/dL           H            : TE

STED AT [facility]



             code = 1538)                                        [address] [place] [address]

30:



                                                                 /Techni

shelbi



                                                                 ID = [id number] for



                                                                 Jonny, Shemek

e

 

             Lab Interpretation (test Abnormal                               



             code = 26536-8)                                        



[facility]-Glucose [id number] 13:15:36





             Test Item    Value        Reference Range Interpretation Comments

 

             POC-Glucose Meter (test 121 mg/dL           H            : TE

STED AT [facility]



             code = 1538)                                        [address] [place] [address]

30:



                                                                 /Techni

shelbi



                                                                 ID = [id number] for



                                                                 Jonny, Shemek

e

 

             Lab Interpretation (test Abnormal                               



             code = 94422-8)                                        



[facility] [id number] 13:15:36





             Test Item    Value        Reference Range Interpretation Comments

 

             POC-Glucose Meter (test 121 mg/dL           H            : TE

STED AT [facility]



             code = 1538)                                        [address] [place] [address]

30:



                                                                 /Techni

shelbi



                                                                 ID = [id number] for



                                                                 Jonny, Shemek

e

 

             Lab Interpretation (test Abnormal                               



             code = 50007-1)                                        



[facility] [id number] 13:15:36





             Test Item    Value        Reference Range Interpretation Comments

 

             POC-Glucose Meter (test 121 mg/dL           H            : TE

STED AT [facility]



             code = 1538)                                        [address] [facility], [address]

30:



                                                                 /Techni

shelbi



                                                                 ID = [id number] for



                                                                 Wyatt Fullerk

e

 

             Lab Interpretation (test Abnormal                               



             code = 98537-3)                                        



[facility] [id number] 13:15:36





             Test Item    Value        Reference Range Interpretation Comments

 

             POC-Glucose Meter (test 121 mg/dL           H            : TE

STED AT [facility]



             code = 1538)                                        [address] [facility], [address]

30:



                                                                 /Techni

shelbi



                                                                 ID = [id number] for



                                                                 Niki Fullermek

e

 

             Lab Interpretation (test Abnormal                               



             code = 10779-7)                                        



[facility] [id number] 13:15:36





             Test Item    Value        Reference Range Interpretation Comments

 

             POC-GLUCOSE METER 121 mg/dL           H            : TESTED A

T [facility] [address]



             (BEAKER) (test code =                                        [facility],



             1538)                                               [address]:



                                                                 /Techni

shelbi ID



                                                                 = [id number] for Colt De La Torre



POCT-GLUCOSE [id number] 09:02:57





             Test Item    Value        Reference Range Interpretation Comments

 

             POC-GLUCOSE METER 125 mg/dL           H            : TESTED A

T [facility] [address]



             (BEAKER) (test code =                                        [facility],



             1538)                                               [address]:



                                                                 /Techni

shelbi ID



                                                                 = [id number] for MAYANK OWUSU



SARS-CoV2/RT-PCR (Asymptomatic ONLY)2022 07:59:31





             Test Item    Value        Reference    Interpretation Comments



                                       Range                     

 

             SARS-COV2/RT-PCR Negative     Negative                  The SARS-Co

V-2



             (test code =                                        target nucleic



             24735-3)                                            acids are not



                                                                 detected in thi

s



                                                                 specimen. Negat

pablo



                                                                 results do not



                                                                 preclude SARS-C

oV-2



                                                                 infection and



                                                                 should not be u

sed



                                                                 as the sole bas

is



                                                                 for patient



                                                                 management



                                                                 decisions. Nega

tive



                                                                 results must be



                                                                 combined with



                                                                 clinical



                                                                 observations,



                                                                 patient history

,



                                                                 and epidemiolog

ical



                                                                 information. A



                                                                 false negative



                                                                 result may occu

r if



                                                                 a specimen is



                                                                 improperly



                                                                 collected,



                                                                 transported or



                                                                 handled. This S

ARS



                                                                 CoV-2 test is a



                                                                 rapid, real-roxanne

e



                                                                 RT-PCR test



                                                                 intended for th

e



                                                                 qualitative



                                                                 detection of



                                                                 nucleic acid fr

om



                                                                 SARS-CoV-2 in a



                                                                 nasopharyngeal 

swab



                                                                 specimen colle

froilan



                                                                 from individual

s



                                                                 suspected of



                                                                 COVID-19 by the

ir



                                                                 healthcare



                                                                 provider.

 

             KEEGAN (test code = This test has been                           



             KEEGAN)         authorized by FDA                           



                          under an EUA for                           



                          use by authorized                           



                          laboratories. This                           



                          test is only                           



                          authorized for the                           



                          duration of the                           



                          declaration that                           



                          circumstances                           



                          exist justifying                           



                          the authorization                           



                          of emergency use                           



                          of in vitro                            



                          diagnostic tests                           



                          for detection                           



                          and/or diagnosis                           



                          of COVID-19 under                           



                          Section 564(b)(1)                           



                          of the Federal                           



                          Food, Drug and                           



                          Cosmetic Act, 21                           



                          U.[place].                               



                          360bbb-3(b)(1),                           



                          unless the                             



                          authorization is                           



                          terminated or                           



                          revoked sooner.                           



                          Fact Sheet for                           



                          Healthcare                             



                          Providers:                             



                          https://www.[employer]/Documents/Xp                           



                          ert%20Xpress%20SAR                           



                          S%20CoV-2/Fact%20S                           



                          heets/302-3802%20S                           



                          ARS-COV-2%20HEALTH                           



                          CARE%20PROVIDERS%2                           



                          0FACT%20SHEET.pdf                           



                          Fact Sheet for                           



                          Healthcare                             



                          Patients:                              



                          https://www.[employer]/Documents/Xp                           



                          ert%20Xpress%20SAR                           



                          S%20CoV-2/Fact%20S                           



                          heets/302-3801%20S                           



                          ARS-COV-2%20PATIEN                           



                          T%20FACT%20SHEET.p                           



                          df                                     

 

             Lab Interpretation Normal                                 



             (test code =                                        



             40647-2)                                            



[facility] 
[facility]31





             Test Item    Value        Reference    Interpretation Comments



                                       Range                     

 

             SARS-COV2/RT-PCR Negative     Negative                  The SARS-Co

V-2



             (test code =                                        target nucleic



             23641-2)                                            acids are not



                                                                 detected in thi

s



                                                                 specimen. Negat

pablo



                                                                 results do not



                                                                 preclude SARS-C

oV-2



                                                                 infection and



                                                                 should not be u

sed



                                                                 as the sole bas

is



                                                                 for patient



                                                                 management



                                                                 decisions. Nega

tive



                                                                 results must be



                                                                 combined with



                                                                 clinical



                                                                 observations,



                                                                 patient history

,



                                                                 and epidemiolog

ical



                                                                 information. A



                                                                 false negative



                                                                 result may occu

r if



                                                                 a specimen is



                                                                 improperly



                                                                 collected,



                                                                 transported or



                                                                 handled. This S

ARS



                                                                 CoV-2 test is a



                                                                 rapid, real-roxanne

e



                                                                 RT-PCR test



                                                                 intended for th

e



                                                                 qualitative



                                                                 detection of



                                                                 nucleic acid fr

om



                                                                 SARS-CoV-2 in a



                                                                 nasopharyngeal 

swab



                                                                 specimen collec

froilan



                                                                 from individual

s



                                                                 suspected of



                                                                 COVID-19 by the

ir



                                                                 healthcare



                                                                 provider.

 

             KEEGAN (test code = This test has been                           



             KEEGAN)         authorized by FDA                           



                          under an EUA for                           



                          use by authorized                           



                          laboratories. This                           



                          test is only                           



                          authorized for the                           



                          duration of the                           



                          declaration that                           



                          circumstances                           



                          exist justifying                           



                          the authorization                           



                          of emergency use                           



                          of in vitro                            



                          diagnostic tests                           



                          for detection                           



                          and/or diagnosis                           



                          of COVID-19 under                           



                          Section 564(b)(1)                           



                          of the Federal                           



                          Food, Drug and                           



                          Cosmetic Act, 21                           



                          [place]                               



                          360bbb-3(b)(1),                           



                          unless the                             



                          authorization is                           



                          terminated or                           



                          revoked sooner.                           



                          Fact Sheet for                           



                          Healthcare                             



                          Providers:                             



                          https://[email or web address]                           



                          ert%20Xpress%20SAR                           



                          S%20CoV-2/Fact%20S                           



                          heets/302-3802%20S                           



                          ARS-COV-2%20HEALTH                           



                          CARE%20PROVIDERS%2                           



                          0FACT%20SHEET.pdf                           



                          Fact Sheet for                           



                          Healthcare                             



                          Patients:                              



                          https://[email or web address]                           



                          ert%20Xpress%20SAR                           



                          S%20CoV-2/Fact%20S                           



                          heets/302-3801%20S                           



                          ARS-COV-2%20PATIEN                           



                          T%20FACT%20SHEET.p                           



                          df                                     

 

             Lab Interpretation Normal                                 



             (test code =                                        



             46786-9)                                            



[facility] 
07:59:31





             Test Item    Value        Reference    Interpretation Comments



                                       Range                     

 

             SARS-COV2/RT-PCR Negative     Negative                  The SARS-Co

V-2



             (test code =                                        target nucleic



             80030-8)                                            acids are not



                                                                 detected in thi

s



                                                                 specimen. Negat

pablo



                                                                 results do not



                                                                 preclude SARS-C

oV-2



                                                                 infection and



                                                                 should not be u

sed



                                                                 as the sole bas

is



                                                                 for patient



                                                                 management



                                                                 decisions. Nega

tive



                                                                 results must be



                                                                 combined with



                                                                 clinical



                                                                 observations,



                                                                 patient history

,



                                                                 and epidemiolog

ical



                                                                 information. A



                                                                 false negative



                                                                 result may occu

r if



                                                                 a specimen is



                                                                 improperly



                                                                 collected,



                                                                 transported or



                                                                 handled. This S

ARS



                                                                 CoV-2 test is a



                                                                 rapid, real-roxanne

e



                                                                 RT-PCR test



                                                                 intended for th

e



                                                                 qualitative



                                                                 detection of



                                                                 nucleic acid fr

om



                                                                 SARS-CoV-2 in a



                                                                 nasopharyngeal 

swab



                                                                 specimen collec

froilan



                                                                 from individual

s



                                                                 suspected of



                                                                 COVID-19 by the

ir



                                                                 healthcare



                                                                 provider.

 

             KEEGAN (test code = This test has been                           



             KEEGAN)         authorized by FDA                           



                          under an EUA for                           



                          use by authorized                           



                          laboratories. This                           



                          test is only                           



                          authorized for the                           



                          duration of the                           



                          declaration that                           



                          circumstances                           



                          exist justifying                           



                          the authorization                           



                          of emergency use                           



                          of in vitro                            



                          diagnostic tests                           



                          for detection                           



                          and/or diagnosis                           



                          of COVID-19 under                           



                          Section 564(b)(1)                           



                          of the Federal                           



                          Food, Drug and                           



                          Cosmetic Act, 21                           



                          [place]                               



                          360bbb-3(b)(1),                           



                          unless the                             



                          authorization is                           



                          terminated or                           



                          revoked sooner.                           



                          Fact Sheet for                           



                          Healthcare                             



                          Providers:                             



                          https://[email or web address]/[email or web address]                           



                          ert%20Xpress%20SAR                           



                          S%20CoV-2/Fact%20S                           



                          heets/302-3802%20S                           



                          ARS-COV-2%20HEALTH                           



                          CARE%20PROVIDERS%2                           



                          0FACT%20SHEET.pdf                           



                          Fact Sheet for                           



                          Healthcare                             



                          Patients:                              



                          https://[email or web address]                           



                          ert%20Xpress%20SAR                           



                          S%20CoV-2/Fact%20S                           



                          heets/302-3801%20S                           



                          ARS-COV-2%20PATIEN                           



                          T%20FACT%20SHEET.p                           



                          df                                     

 

             Lab Interpretation Normal                                 



             (test code =                                        



             55034-5)                                            



[facility] 
[facility]:59:31





             Test Item    Value        Reference    Interpretation Comments



                                       Range                     

 

             SARS-COV2/RT-PCR Negative     Negative                  The SARS-Co

V-2



             (test code =                                        target nucleic



             15644-5)                                            acids are not



                                                                 detected in thi

s



                                                                 specimen. Negat

pablo



                                                                 results do not



                                                                 preclude SARS-C

oV-2



                                                                 infection and



                                                                 should not be u

sed



                                                                 as the sole bas

is



                                                                 for patient



                                                                 management



                                                                 decisions. Nega

tive



                                                                 results must be



                                                                 combined with



                                                                 clinical



                                                                 observations,



                                                                 patient history

,



                                                                 and epidemiolog

ical



                                                                 information. A



                                                                 false negative



                                                                 result may occu

r if



                                                                 a specimen is



                                                                 improperly



                                                                 collected,



                                                                 transported or



                                                                 handled. This S

ARS



                                                                 CoV-2 test is a



                                                                 rapid, real-roxanne

e



                                                                 RT-PCR test



                                                                 intended for th

e



                                                                 qualitative



                                                                 detection of



                                                                 nucleic acid fr

om



                                                                 SARS-CoV-2 in a



                                                                 nasopharyngeal 

swab



                                                                 specimen collec

froilan



                                                                 from individual

s



                                                                 suspected of



                                                                 COVID-19 by the

ir



                                                                 healthcare



                                                                 provider.

 

             KEEGAN (test code = This test has been                           



             KEEGAN)         authorized by FDA                           



                          under an EUA for                           



                          use by authorized                           



                          laboratories. This                           



                          test is only                           



                          authorized for the                           



                          duration of the                           



                          declaration that                           



                          circumstances                           



                          exist justifying                           



                          the authorization                           



                          of emergency use                           



                          of in vitro                            



                          diagnostic tests                           



                          for detection                           



                          and/or diagnosis                           



                          of COVID-19 under                           



                          Section 564(b)(1)                           



                          of the Federal                           



                          Food, Drug and                           



                          Cosmetic Act, 21                           



                          [place]                               



                          360bbb-3(b)(1),                           



                          unless the                             



                          authorization is                           



                          terminated or                           



                          revoked sooner.                           



                          Fact Sheet for                           



                          Healthcare                             



                          Providers:                             



                          https://[email or web address]                           



                          ert%20Xpress%20SAR                           



                          S%20CoV-2/Fact%20S                           



                          heets/302-3802%20S                           



                          ARS-COV-2%20HEALTH                           



                          CARE%20PROVIDERS%2                           



                          0FACT%20SHEET.pdf                           



                          Fact Sheet for                           



                          Healthcare                             



                          Patients:                              



                          https://[email or web address]                           



                          ert%20Xpress%20SAR                           



                          S%20CoV-2/Fact%20S                           



                          heets/302-3801%20S                           



                          ARS-COV-2%20PATIEN                           



                          T%20FACT%20SHEET.p                           



                          df                                     

 

             Lab Interpretation Normal                                 



             (test code =                                        



             47495-1)                                            



[facility] 
[facility]:59:31





             Test Item    Value        Reference    Interpretation Comments



                                       Range                     

 

             SARS-COV2/RT-PCR Negative     Negative                  The SARS-Co

V-2



             (test code =                                        target nucleic



             27844-8)                                            acids are not



                                                                 detected in thi

s



                                                                 specimen. Negat

pablo



                                                                 results do not



                                                                 preclude SARS-C

oV-2



                                                                 infection and



                                                                 should not be u

sed



                                                                 as the sole bas

is



                                                                 for patient



                                                                 management



                                                                 decisions. Nega

tive



                                                                 results must be



                                                                 combined with



                                                                 clinical



                                                                 observations,



                                                                 patient history

,



                                                                 and epidemiolog

ical



                                                                 information. A



                                                                 false negative



                                                                 result may occu

r if



                                                                 a specimen is



                                                                 improperly



                                                                 collected,



                                                                 transported or



                                                                 handled. This S

ARS



                                                                 CoV-2 test is a



                                                                 rapid, real-roxanne

e



                                                                 RT-PCR test



                                                                 intended for th

e



                                                                 qualitative



                                                                 detection of



                                                                 nucleic acid fr

om



                                                                 SARS-CoV-2 in a



                                                                 nasopharyngeal 

swab



                                                                 specimen collec

froilan



                                                                 from individual

s



                                                                 suspected of



                                                                 COVID-19 by the

ir



                                                                 healthcare



                                                                 provider.

 

             KEEGAN (test code = This test has been                           



             KEEGAN)         authorized by FDA                           



                          under an EUA for                           



                          use by authorized                           



                          laboratories. This                           



                          test is only                           



                          authorized for the                           



                          duration of the                           



                          declaration that                           



                          circumstances                           



                          exist justifying                           



                          the authorization                           



                          of emergency use                           



                          of in vitro                            



                          diagnostic tests                           



                          for detection                           



                          and/or diagnosis                           



                          of COVID-19 under                           



                          Section 564(b)(1)                           



                          of the Federal                           



                          Food, Drug and                           



                          Cosmetic Act, 21                           



                          U.[place]                               



                          360bbb-3(b)(1),                           



                          unless the                             



                          authorization is                           



                          terminated or                           



                          revoked sooner.                           



                          Fact Sheet for                           



                          Healthcare                             



                          Providers:                             



                          https://www.[employer]/Documents/Xp                           



                          ert%20Xpress%20SAR                           



                          S%20CoV-2/Fact%20S                           



                          heets/302-3802%20S                           



                          ARS-COV-2%20HEALTH                           



                          CARE%20PROVIDERS%2                           



                          0FACT%20SHEET.pdf                           



                          Fact Sheet for                           



                          Healthcare                             



                          Patients:                              



                          https://www.[employer]/Documents/Xp                           



                          ert%20Xpress%20SAR                           



                          S%20CoV-2/Fact%20S                           



                          heets/302-3801%20S                           



                          ARS-COV-2%20PATIEN                           



                          T%20FACT%20SHEET.p                           



                          df                                     

 

             Lab Interpretation Normal                                 



             (test code =                                        



             82132-6)                                            



[facility] 
07:59:31





             Test Item    Value        Reference    Interpretation Comments



                                       Range                     

 

             SARS-COV2/RT-PCR Negative     Negative                  The SARS-Co

V-2



             (test code =                                        target nucleic



             44609-4)                                            acids are not



                                                                 detected in thi

s



                                                                 specimen. Negat

pablo



                                                                 results do not



                                                                 preclude SARS-C

oV-2



                                                                 infection and



                                                                 should not be u

sed



                                                                 as the sole bas

is



                                                                 for patient



                                                                 management



                                                                 decisions. Nega

tive



                                                                 results must be



                                                                 combined with



                                                                 clinical



                                                                 observations,



                                                                 patient history

,



                                                                 and epidemiolog

ical



                                                                 information. A



                                                                 false negative



                                                                 result may occu

r if



                                                                 a specimen is



                                                                 improperly



                                                                 collected,



                                                                 transported or



                                                                 handled. This S

ARS



                                                                 CoV-2 test is a



                                                                 rapid, real-roxanne

e



                                                                 RT-PCR test



                                                                 intended for th

e



                                                                 qualitative



                                                                 detection of



                                                                 nucleic acid fr

om



                                                                 SARS-CoV-2 in a



                                                                 nasopharyngeal 

swab



                                                                 specimen collec

froilan



                                                                 from individual

s



                                                                 suspected of



                                                                 COVID-19 by the

ir



                                                                 healthcare



                                                                 provider.

 

             KEEGAN (test code = This test has been                           



             KEEGAN)         authorized by FDA                           



                          under an EUA for                           



                          use by authorized                           



                          laboratories. This                           



                          test is only                           



                          authorized for the                           



                          duration of the                           



                          declaration that                           



                          circumstances                           



                          exist justifying                           



                          the authorization                           



                          of emergency use                           



                          of in vitro                            



                          diagnostic tests                           



                          for detection                           



                          and/or diagnosis                           



                          of COVID-19 under                           



                          Section 564(b)(1)                           



                          of the Federal                           



                          Food, Drug and                           



                          Cosmetic Act, 21                           



                          U.S[place]                               



                          360bbb-3(b)(1),                           



                          unless the                             



                          authorization is                           



                          terminated or                           



                          revoked sooner.                           



                          Fact Sheet for                           



                          Healthcare                             



                          Providers:                             



                          https://www.[employer]/Documents/Xp                           



                          ert%20Xpress%20SAR                           



                          S%20CoV-2/Fact%20S                           



                          heets/302-3802%20S                           



                          ARS-COV-2%20HEALTH                           



                          CARE%20PROVIDERS%2                           



                          0FACT%20SHEET.pdf                           



                          Fact Sheet for                           



                          Healthcare                             



                          Patients:                              



                          https://www.[employer]/Documents/Xp                           



                          ert%20Xpress%20SAR                           



                          S%20CoV-2/Fact%20S                           



                          heets/302-3801%20S                           



                          ARS-COV-2%20PATIEN                           



                          T%20FACT%20SHEET.p                           



                          df                                     

 

             Lab Interpretation Normal                                 



             (test code =                                        



             07342-7)                                            



[facility]"" &amp; REF LABS)2022 
07:59:31





             Test Item    Value        Reference Range Interpretation Comments

 

             SARS-COV2/RT-PCR Negative     Negative                  The SARS-Co

V-2 target



             (test code =                                        nucleic acids a

re not



             3095017)                                            detected in thi

s specimen.



                                                                 Negative result

s do not



                                                                 preclude SARS-C

oV-2



                                                                 infection and s

hould not be



                                                                 used as the sol

e basis for



                                                                 patient managem

ent



                                                                 decisions. Nega

tive results



                                                                 must be combine

d with



                                                                 clinical observ

ations,



                                                                 patient history

, and



                                                                 epidemiological

 information.



                                                                 A false negativ

e result may



                                                                 occur if a spec

imen is



                                                                 improperly pina

ected,



                                                                 transported or 

handled. This



                                                                 SARS CoV-2 test

 is a rapid,



                                                                 real-time RT-PC

R test



                                                                 intended for th

e qualitative



                                                                 detection of nu

cleic acid



                                                                 from SARS-CoV-2

 in a



                                                                 nasopharyngeal 

swab specimen



                                                                 collected from 

individuals



                                                                 suspected of CO

VID-19 by



                                                                 their healthcar

e provider.



This test has been authorized by FDA under an EUA for use by authorized 
laboratories. This test is only authorized for the duration of the declaration 
that circumstances exist justifying the authorization of emergency use of in 
vitro diagnostic tests for detection and/or diagnosis of COVID-19 under Section 
564(b)(1) of the Federal Food, Drug and Cosmetic Act, 21 U.S.C. 360bbb-3(b)(1), 
unless the authorization is terminated or revoked sooner. Fact Sheet for 
Healthcare Providers: https://www.[employer]
m/Documents/Xpert%20Xpress%20SARS%20CoV-2/Fact%20Sheets/302-3802%95GUDE-OFI-6%20

HEALTHCARE%20PROVIDERS%20FACT%20SHEET.pdf Fact Sheet for Healthcare Patients: 
https://www.[employer]/Documents/Xpert%20Xp
ress%20SARS%20CoV-2/Fact%20Sheets/302-3801%54TENF-IVG-5%20PATIENT%20FACT%20SHEET

.pdfPOCT-GLUCOSE [id number] 08:23:46





             Test Item    Value        Reference Range Interpretation Comments

 

             POC-GLUCOSE METER 106 mg/dL                        : TESTED A

T [facility] 7200



             (BEAKER) (test code                                        [facility],



             = 1538)                                             [place] [address]

0:



                                                                 /Techni

shelbi ID =



                                                                 [id number] for RICHELLE DIMAS



[id number] 09:23:02





             Test Item    Value        Reference    Interpretation Comments



                                       Range                     

 

             CEA (test code              See_Commen   H             In otherwise

 healthy smokers, 95%



             = 2039-6)                 t                         of patients fal

l in the rangeof



                                                                 0.0-5.5 ng/mL. 

NOTE: Methodology is



                                                                 Roche Laura



                                                                 Electrochemilum

inescenceImmunoassay



                                                                 (ECLIA). Unless

 Otherwise Indicated,



                                                                 All Testing Per

formed At: Clinical



                                                                 Pathology [address]

 [address] Laboratory



                                                                 Director: Bonifacio Carey M.D.



                                                                 CLIA Number [id number]

[id number] [employer]



                                                                 Accreditation N

o. [id number]



                                                                 [Automated mess

age] The system which



                                                                 generated this 

result transmitted



                                                                 reference range

: <=3.8 NG/ML. The



                                                                 reference range

 was not used to



                                                                 interpret this 

result as



                                                                 normal/abnormal

.

 

             Lab          Abnormal                               



             Interpretation                                        



             (test code =                                        



             65618-0)                                            



[facility] [id number] 09:23:02





             Test Item    Value        Reference Range Interpretation Comments

 

             AFP-TUMOR MARKER (test              See_Comment                Unle

ss Otherwise



             code = 65318-4)                                        Indicated, A

ll Testing



                                                                 Performed At: [facility]

[facility]



                                                                 [facility]

atories,



                                                                 [address] 

[place]



                                                                 [address] Laborator

y Director:



                                                                 Bonifacio negron M.D.



                                                                 CLIA Number [id number]

[id number] [employer]



                                                                 Accreditation N

o. [id number]



                                                                 [Automated mess

age] The



                                                                 system which ge

nerated



                                                                 this result tra

nsmitted



                                                                 reference range

: <=8.30



                                                                 NG/ML. The refe

rence range



                                                                 was not used to

 interpret



                                                                 this result as



                                                                 normal/abnormal

.



[facility] [id number] 09:23:02





             Test Item    Value        Reference    Interpretation Comments



                                       Range                     

 

             CA 19-9 (test code >20531       See_Comment  H              NOTE: M

ethodology is Roche



             = 24108-3)                                          Laura



                                                                 Electrochemilum

inescence



                                                                 Immunoassay (EC

PIETER). Values



                                                                 obtained with d

ifferent



                                                                 assays/manufact

urers cannot



                                                                 be used interch

angeably.



                                                                 Results should 

not be used



                                                                 as sole basis t

o establish



                                                                 the presence or

 absence of



                                                                 malignancy. Unl

ess Otherwise



                                                                 Indicated, All 

Testing



                                                                 Performed At: C

[facility]



                                                                 Pathology [facility], [address] [place] [address]



                                                                 Laboratory Dire

ctor: Bonifacio Carey M.D.

 CLIA Number



                                                                 [id number] [place] 

[place]reditation



                                                                 [place]. [id number] [A

utomated



                                                                 message] The sy

stem which



                                                                 generated this 

result



                                                                 transmitted ref

erence range:



                                                                 <35 U/ML. The r

eference



                                                                 range was not u

sed to



                                                                 interpret this 

result as



                                                                 normal/abnormal

.

 

             Lab Interpretation Abnormal                               



             (test code =                                        



             61435-6)                                            



[facility] [id number] 08:41:12





             Test Item    Value        Reference Range Interpretation Comments

 

             GLUCOSE (test code =              See_Comment  H             [Autom

ated message]



             7955-7)                                             The system [employer]



                                                                 generated this 

result



                                                                 transmitted ref

erence



                                                                 range: 70 - 99 

MG/DL.



                                                                 The reference r

veena



                                                                 was not used to



                                                                 interpret this 

result



                                                                 as normal/abnor

mal.

 

             BLOOD UREA NITROGEN              See_Comment                [Automa

froilan message]



             (test code = 3091-6)                                        The sys

tem which



                                                                 generated this 

result



                                                                 transmitted ref

erence



                                                                 range: 8 - 23 M

G/DL.



                                                                 The reference r

veena



                                                                 was not used to



                                                                 interpret this 

result



                                                                 as normal/abnor

mal.

 

             CREATININE (test code =              See_Comment                [Au

tomated message]



             2160-0)                                             The system [employer]



                                                                 generated this 

result



                                                                 transmitted ref

erence



                                                                 range: 0.80 - 1

.40



                                                                 MG/DL. The refe

rence



                                                                 range was not u

sed to



                                                                 interpret this 

result



                                                                 as normal/abnor

mal.

 

             EGFR (test code =              See_Comment  L             [Automate

d message]



             33914-3)                                            The system [employer]



                                                                 generated this 

result



                                                                 transmitted ref

erence



                                                                 range: >60



                                                                 ML/MIN/1.73. Th

e



                                                                 reference range

 was



                                                                 not used to int

erpret



                                                                 this result as



                                                                 normal/abnormal

.

 

             BUN/CREAT RATIO (test              See_Comment                [Auto

mated message]



             code = 3097-3)                                        The system 

[employer]



                                                                 generated this 

result



                                                                 transmitted ref

erence



                                                                 range: 6 - 28 R

ATIO.



                                                                 The reference r

veena



                                                                 was not used to



                                                                 interpret this 

result



                                                                 as normal/abnor

mal.

 

             SODIUM (test code =              See_Comment                [Automa

froilan message]



             2951-2)                                             The system [employer]



                                                                 generated this 

result



                                                                 transmitted ref

erence



                                                                 range: 133 - 14

6



                                                                 MEQ/L. The refe

rence



                                                                 range was not u

sed to



                                                                 interpret this 

result



                                                                 as normal/abnor

mal.

 

             POTASSIUM (test code =              See_Comment                [Aut

omated message]



             2823-3)                                             The system [employer]



                                                                 generated this 

result



                                                                 transmitted ref

erence



                                                                 range: 3.5 - 5.

4



                                                                 MEQ/L. The refe

rence



                                                                 range was not u

sed to



                                                                 interpret this 

result



                                                                 as normal/abnor

mal.

 

             CHLORIDE (test code =              See_Comment                [Auto

mated message]



             2075-0)                                             The system [employer]



                                                                 generated this 

result



                                                                 transmitted ref

erence



                                                                 range: 95 - 107

 MEQ/L.



                                                                 The reference r

veena



                                                                 was not used to



                                                                 interpret this 

result



                                                                 as normal/abnor

mal.

 

             CO2 (test code =              See_Comment                [Automated

 message]



             1963-8)                                             The system [employer]



                                                                 generated this 

result



                                                                 transmitted ref

erence



                                                                 range: 19 - 31 

MEQ/L.



                                                                 The reference r

veena



                                                                 was not used to



                                                                 interpret this 

result



                                                                 as normal/abnor

mal.

 

             CALCIUM (test code =              See_Comment                [Autom

ated message]



             88345-9)                                            The system [facility]

[place]



                                                                 generated this 

result



                                                                 transmitted ref

erence



                                                                 range: 8.5 - 10

.5



                                                                 MG/DL. The refe

rence



                                                                 range was not u

sed to



                                                                 interpret this 

result



                                                                 as normal/abnor

mal.

 

             PROTEIN TOTAL (test              See_Comment                [Automa

froilan message]



             code = 2885-2)                                        The system 

[employer]



                                                                 generated this 

result



                                                                 transmitted ref

erence



                                                                 range: 6.1 - 8.

3 G/DL.



                                                                 The reference r

veena



                                                                 was not used to



                                                                 interpret this 

result



                                                                 as normal/abnor

mal.

 

             ALBUMIN (test code =              See_Comment                [Autom

ated message]



             95542-2)                                            The system [employer]



                                                                 generated this 

result



                                                                 transmitted ref

erence



                                                                 range: 3.5 - 5.

2 G/DL.



                                                                 The reference r

veena



                                                                 was not used to



                                                                 interpret this 

result



                                                                 as normal/abnor

mal.

 

             GLOBULINS, SERUM, TOTAL              See_Comment                [Au

tomated message]



             (test code = 46451-9)                                        The sy

stem which



                                                                 generated this 

result



                                                                 transmitted ref

erence



                                                                 range: 1.9 - 3.

7 G/DL.



                                                                 The reference r

veena



                                                                 was not used to



                                                                 interpret this 

result



                                                                 as normal/abnor

mal.

 

             A/G RATIO (test code =              See_Comment                [Aut

omated message]



             1759-0)                                             The system [employer]



                                                                 generated this 

result



                                                                 transmitted ref

erence



                                                                 range: 1.0 - 2.

6



                                                                 RATIO. The refe

rence



                                                                 range was not u

sed to



                                                                 interpret this 

result



                                                                 as normal/abnor

mal.

 

             BILIRUBIN TOTAL (test              See_Comment                [Auto

mated message]



             code = 1975-2)                                        The system 

[employer]



                                                                 generated this 

result



                                                                 transmitted ref

erence



                                                                 range: <=1.2 MG

/DL.



                                                                 The reference r

veena



                                                                 was not used to



                                                                 interpret this 

result



                                                                 as normal/abnor

mal.

 

             ALKALINE PHOSPHATASE 210 U/L             H            



             (test code = 6768-6)                                        

 

             AST (SGOT) (test code = 56 U/L       9-50         H            



             1920-8)                                             

 

             ALT (SGPT) (test code = 30 U/L       5-50                       Unl

ess Otherwise



             1744-2)                                             Indicated, All 

Testing



                                                                 Performed At: [facility]

[facility]



                                                                 [facility]



                                                                 Laboratories, [address] [place] [address]



                                                                 [id number] Laborator

y



                                                                 Director: Bonifacio Carey M.D.

 CLIA



                                                                 Number [id number]

03 Cap



                                                                 Accreditation N

o.



                                                                 [id number]

 

             Lab Interpretation Abnormal                               



             (test code = 82105-3)                                        



[facility] W/AUTO DIFF WITH [id number] 07:37:08





             Test Item    Value        Reference Range Interpretation Comments

 

             WHITE BLOOD CELL COUNT              See_Comment                [Aut

omated message]



             (test code = 79663-2)                                        The sy

stem which



                                                                 generated this 

result



                                                                 transmitted ref

erence



                                                                 range: 3.5 - 11

.0



                                                                 K/UL. The refer

ence



                                                                 range was not u

sed to



                                                                 interpret this 

result



                                                                 as normal/abnor

mal.

 

             RED BLOOD CELL COUNT              See_Comment                [Autom

ated message]



             (test code = 22938-6)                                        The sy

stem which



                                                                 generated this 

result



                                                                 transmitted ref

erence



                                                                 range: 4.50 - 6

.10



                                                                 M/UL. The refer

ence



                                                                 range was not u

sed to



                                                                 interpret this 

result



                                                                 as normal/abnor

mal.

 

             HEMOGLOBIN (test code =              See_Comment                [Au

tomated message]



             718-7)                                              The system whic

h



                                                                 generated this 

result



                                                                 transmitted ref

erence



                                                                 range: 13.5 - 1

7.0



                                                                 G/DL. The refer

ence



                                                                 range was not u

sed to



                                                                 interpret this 

result



                                                                 as normal/abnor

mal.

 

             HEMATOCRIT (test code = 40.7 %       40.0-51.0                 



             42702-9)                                            

 

             MEAN CORPUSCULAR VOLUME 82.7 fL      80.0-99.0                 



             (test code = 63403-1)                                        

 

             MEAN CORPUSCULAR 28.0 PG      25.0-33.0                 



             HEMOGLOBIN (test code =                                        



             54354-6)                                            

 

             MEAN CORPUSCULAR              See_Comment                [Automated

 message]



             HEMOGLOBIN CONC (test                                        The sy

stem which



             code = 28540-3)                                        generated th

is result



                                                                 transmitted ref

erence



                                                                 range: 31.0 - 3

6.0



                                                                 G/DL. The refer

ence



                                                                 range was not u

sed to



                                                                 interpret this 

result



                                                                 as normal/abnor

mal.

 

             RED CELL DISTRIBUTION 13.9 %       11.5-15.0                 



             WIDTH (test code =                                        



             95264-5)                                            

 

             NEUTROPHILS % (test code 78.6 %                                 



             = 61846-5)                                          

 

             LYMPHOCYTES % (test code 7.7 %                                  



             = 01831-0)                                          

 

             MONOCYTES % (test code = 11.1 %                                 



             26485-3)                                            

 

             EOSINOPHILS % (test code 1.4 %                                  



             = 06214-0)                                          

 

             BASOPHILS % (test code = 0.8 %                                  



             43906-2)                                            

 

             IMMATURE GRANULOCYTES 0.4 %                                  



             (test code = 52582-0)                                        

 

             NUCLEATED RBC'S              See_Comment                Unless Othe

rwise



             MYELOPEROX STAIN (test                                        Indic

ated, All



             code = 16205-7)                                        Testing Perf

ormed At:



                                                                 [facility]

[facility], [address] [place] [address], [place]



                                                                 [id number] Laborator

y



                                                                 Director: JOE ScanlonIA



                                                                 Number [id number]

03 Cap



                                                                 Accreditation N

o.



                                                                 [id number] [Autom

ated



                                                                 message] The sy

stem



                                                                 which generated

 this



                                                                 result transmit

froilan



                                                                 reference range

: 0.00



                                                                 - 0.11 K/UL. Th

e



                                                                 reference range

 was



                                                                 not used to int

erpret



                                                                 this result as



                                                                 normal/abnormal

.

 

             PLATELET COUNT (test              See_Comment                [Autom

ated message]



             code = 21901-8)                                        The system w

hich



                                                                 generated this 

result



                                                                 transmitted ref

erence



                                                                 range: 130 - 40

0



                                                                 K/UL. The refer

ence



                                                                 range was not u

sed to



                                                                 interpret this 

result



                                                                 as normal/abnor

mal.

 

             NEUTROPHILS ABSOLUTE              See_Comment  H             [Autom

ated message]



             COUNT (test code =                                        The syste

m which



             92597-6)                                            generated this 

result



                                                                 transmitted ref

erence



                                                                 range: 1.50 - 7

.50



                                                                 K/UL. The refer

ence



                                                                 range was not u

sed to



                                                                 interpret this 

result



                                                                 as normal/abnor

mal.

 

             LYMPHOCYTES ABSOLUTE              See_Comment  L             [Autom

ated message]



             COUNT (test code =                                        The syste

m which



             03710-3)                                            generated this 

result



                                                                 transmitted ref

erence



                                                                 range: 1.00 - 4

.00



                                                                 K/UL. The refer

ence



                                                                 range was not u

sed to



                                                                 interpret this 

result



                                                                 as normal/abnor

mal.

 

             MONOCYTES ABSOLUTE COUNT              See_Comment  H             [A

utomated message]



             (test code = 28805-7)                                        The sy

stem which



                                                                 generated this 

result



                                                                 transmitted ref

erence



                                                                 range: 0.20 - 1

.00



                                                                 K/UL. The refer

ence



                                                                 range was not u

sed to



                                                                 interpret this 

result



                                                                 as normal/abnor

mal.

 

             BASOPHILS ABSOLUTE COUNT              See_Comment                [A

utomated message]



             (test code = 06436-0)                                        The sy

stem which



                                                                 generated this 

result



                                                                 transmitted ref

erence



                                                                 range: 0.00 - 0

.20



                                                                 K/UL. The refer

ence



                                                                 range was not u

sed to



                                                                 interpret this 

result



                                                                 as normal/abnor

mal.

 

             IMMATURE GRANS (ABS)              See_Comment                [Autom

ated message]



             (test code = 9987)                                        The syste

m which



                                                                 generated this 

result



                                                                 transmitted ref

erence



                                                                 range: 0.00 - 0

.10



                                                                 K/UL. The refer

ence



                                                                 range was not u

sed to



                                                                 interpret this 

result



                                                                 as normal/abnor

mal.

 

             Lab Interpretation (test Abnormal                               



             code = 07087-9)                                        



[facility](CELLAVISION MANUAL DIFF)2019 09:14:00





             Test Item    Value        Reference Range Interpretation Comments

 

             NEUTROPHILS - REL 81 %                                   



             (CELLAVISION)(BEAKER) (test code =                                 

       



             2816)                                               

 

             LYMPHOCYTES - REL 7 %                                    



             (CELLAVISION)(BEAKER) (test code =                                 

       



             2817)                                               

 

             MONOCYTES - REL 7 %                                    



             (CELLAVISION)(BEAKER) (test code =                                 

       



             2818)                                               

 

             EOSINOPHILS - REL 2 %                                    



             (CELLAVISION)(BEAKER) (test code =                                 

       



             2819)                                               

 

             MYELOCYTES - REL 2 %          0-0          H            



             (CELLAVISION)(BEAKER) (test code =                                 

       



             2822)                                               

 

             ATYPICAL LYMPHOCYTES - REL 1 %          0-0          H            



             (CELLAVISION)(BEAKER) (test code =                                 

       



             2829)                                               

 

             NEUTROPHILS - ABS 10.85 K/ul   1.78-5.38    H            



             (CELLAVISION)(BEAKER) (test code =                                 

       



             2830)                                               

 

             LYMPHOCYTES - ABS 0.94 K/ul    1.32-3.57    L            



             (CELLAVISION)(BEAKER) (test code =                                 

       



             2831)                                               

 

             MONOCYTES - ABS 0.94 K/uL    0.30-0.82    H            



             (CELLAVISION)(BEAKER) (test code =                                 

       



             2832)                                               

 

             EOSINOPHILS - ABS 0.27 K/uL    0.04-0.54                 



             (CELLAVISION)(BEAKER) (test code =                                 

       



             2834)                                               

 

             MYELOCYTES-ABS 0.27 K/uL    0.00-0.00    H            



             (CELLAVISION)(BEAKER) (test code =                                 

       



             2837)                                               

 

             ATYPICAL LYMPHOCYTES - ABS 0.13 K/uL    0.00-0.00    H            



             (CELLAVISION)(BEAKER) (test code =                                 

       



             2858)                                               

 

             TOTAL COUNTED (BEAKER) (test code 100                              

      



             = 1351)                                             

 

             RBC MORPHOLOGY (BEAKER) (test code Normal                          

       



             = 762)                                              

 

             SMUDGE CELLS (BEAKER) (test code = Present                         

       



             1371)                                               

 

             GIANT PLATELETS (BEAKER) (test Present                             

   



             code = 313)                                         

 

             PLATELET CONCENTRATION Decreased                              



             (CELLAVISION)(BEAKER) (test code =                                 

       



             3438)                                               



Received comment: User comments: Slide comments:RAD, CHEST, 1 VIEW, NON DEPT
2019 08:25:00Reason for exam:-&gt;left effusionShould this be performed at
the bedside?-&gt;YesFINAL REPORT PATIENT ID: [id number] Chest AP portable 
Comparison exam: 2019 History provided: Follow-up pleural effusion Left 
chest tube unchanged in place. No pneumothorax. No significant effusionsare 
evident. Nonspecific coarsening of markings in the left lower lobe. PICC line in
good position. Signed: Alex Morrisort Verified Date/Time: 2019 
08:25:12 Reading Location: [facility] Reading Room Electronically 
signed by: ALEX MORRIS on 2019 08:25 AMCOMPREHENSIVE METABOLIC PANEL
2019 06:40:00





             Test Item    Value        Reference Range Interpretation Comments

 

             TOTAL PROTEIN 4.8 gm/dL    6.0-8.3      L            



             (BEAKER) (test code =                                        



             770)                                                

 

             ALBUMIN (BEAKER) 2.4 g/dL     3.5-5.0      L            



             (test code = 1145)                                        

 

             ALKALINE PHOSPHATASE 141 U/L                          



             (BEAKER) (test code =                                        



             346)                                                

 

             BILIRUBIN TOTAL 3.9 mg/dL    0.2-1.2      H            



             (BEAKER) (test code =                                        



             377)                                                

 

             SODIUM (BEAKER) (test 123 meq/L    136-145      L            



             code = 381)                                         

 

             POTASSIUM (BEAKER) 3.6 meq/L    3.5-5.1                   



             (test code = 379)                                        

 

             CHLORIDE (BEAKER) 90 meq/L            L            



             (test code = 382)                                        

 

             CO2 (BEAKER) (test 29 meq/L     22-29                     



             code = 355)                                         

 

             BLOOD UREA NITROGEN 14 mg/dL     7-21                      



             (BEAKER) (test code =                                        



             354)                                                

 

             CREATININE (BEAKER) 0.80 mg/dL   0.57-1.25                 



             (test code = 358)                                        

 

             GLUCOSE RANDOM 152 mg/dL           H            



             (BEAKER) (test code =                                        



             652)                                                

 

             CALCIUM (BEAKER) 7.3 mg/dL    8.4-10.2     L            



             (test code = 697)                                        

 

             AST (SGOT) (BEAKER) 39 U/L       5-34         H            



             (test code = 353)                                        

 

             ALT (SGPT) (BEAKER) 23 U/L       6-55                      



             (test code = 347)                                        

 

             EGFR (BEAKER) (test 99 mL/min/1.73                           ESTIMA

FROILAN GFR IS



             code = 1092) sq m                                   NOT AS ACCURATE

 AS



                                                                 CREATININE



                                                                 CLEARANCE IN



                                                                 PREDICTING



                                                                 GLOMERULAR



                                                                 FILTRATION RATE

.



                                                                 ESTIMATED GFR I

S



                                                                 NOT APPLICABLE 

FOR



                                                                 DIALYSIS PATIEN

TS.



Specimen slightly [id number] 06:39:00





             Test Item    Value        Reference Range Interpretation Comments

 

             PHOSPHORUS (BEAKER) (test code = 2.1 mg/dL    2.3-4.7      L       

     



             604)                                                



[id number] 06:39:00





             Test Item    Value        Reference Range Interpretation Comments

 

             MAGNESIUM (BEAKER) (test code = 1.8 mg/dL    1.6-2.6               

    



             627)                                                



B-TYPE NATRIURETIC FACTOR (BNP)2019 06:04:00





             Test Item    Value        Reference Range Interpretation Comments

 

             B-TYPE NATRIURETIC PEPTIDE (BEAKER) 97 pg/mL     0-100             

        



             (test code = 700)                                        



CBC W/PLT COUNT &amp; AUTO [id number] 05:57:00





             Test Item    Value        Reference Range Interpretation Comments

 

             WHITE BLOOD CELL COUNT (BEAKER) 13.4 K/ L    3.5-10.5     H        

    



             (test code = 775)                                        

 

             RED BLOOD CELL COUNT (BEAKER) 3.17 M/ L    4.63-6.08    L          

  



             (test code = 761)                                        

 

             HEMOGLOBIN (BEAKER) (test code = 10.3 GM/DL   13.7-17.5    L       

     



             410)                                                

 

             HEMATOCRIT (BEAKER) (test code = 29.4 %       40.1-51.0    L       

     



             411)                                                

 

             MEAN CORPUSCULAR VOLUME (BEAKER) 92.7 fL      79.0-92.2    H       

     



             (test code = 753)                                        

 

             MEAN CORPUSCULAR HEMOGLOBIN 32.5 pg      25.7-32.2    H            



             (BEAKER) (test code = 751)                                        

 

             MEAN CORPUSCULAR HEMOGLOBIN CONC 35.0 GM/DL   32.3-36.5            

     



             (BEAKER) (test code = 752)                                        

 

             RED CELL DISTRIBUTION WIDTH 13.9 %       11.6-14.4                 



             (BEAKER) (test code = 412)                                        

 

             PLATELET COUNT (BEAKER) (test code 75 K/CU MM   150-450      L     

       



             = 756)                                              

 

             MEAN PLATELET VOLUME (BEAKER) 8.9 fL       9.4-12.4     L          

  



             (test code = 754)                                        

 

             NUCLEATED RED BLOOD CELLS (BEAKER) 0 /100 WBC   0-0                

       



             (test code = 413)                                        



CALCIUM, [id number] 05:44:00





             Test Item    Value        Reference Range Interpretation Comments

 

             CALCIUM IONIZED (BEAKER) (test 0.97 mmol/L  1.12-1.27    L         

   



             code = 698)                                         

 

             PH, BLOOD (BEAKER) (test code = 7.50                               

    



             1810)                                               



BODY FLUID CULTURE + GRAM [id number] 11:10:00





             Test Item    Value        Reference    Interpretation Comments



                                       Range                     

 

             CULTURE (BEAKER)                           A            <1+ Coagula

se



             (test code = 1095)                                        negative



                                                                 Staphylococcus

 

             CULTURE (BEAKER) COAGULASE NEGATIVE              A            <1+ P

seudomonas



             (test code = 1095) STAPHYLOCOCCUS                           aerugin

jennifer

 

             Clindamycin (test                           S            



             code = 10)                                          

 

             Erythromycin (test                           R            



             code = 4)                                           

 

             Linezolid (test code                           S            



             = 40)                                               

 

             Oxacillin (test code                           R            



             = 14)                                               

 

             Rifampin (test code =                           S            



             43)                                                 

 

             Tetracycline (test                           S            



             code = 2)                                           

 

             Trimethoprim +                           S            



             Sulfamethoxazole                                        



             (test code = 47)                                        

 

             Vancomycin (test code                           S            



             = 13)                                               

 

             GRAM STAIN RESULT 3+ WBCs                                



             (BEAKER) (test code =                                        



             1123)                                               

 

             GRAM STAIN RESULT <1+ gram positive                           



             (BEAKER) (test code = cocci in pairs and                           



             127495)      clusters                               



CBC W/PLT COUNT &amp; AUTO [id number] 09:58:00





             Test Item    Value        Reference Range Interpretation Comments

 

             WHITE BLOOD CELL COUNT (BEAKER) 10.7 K/ L    3.5-10.5     H        

    



             (test code = 775)                                        

 

             RED BLOOD CELL COUNT (BEAKER) 3.15 M/ L    4.63-6.08    L          

  



             (test code = 761)                                        

 

             HEMOGLOBIN (BEAKER) (test code = 10.2 GM/DL   13.7-17.5    L       

     



             410)                                                

 

             HEMATOCRIT (BEAKER) (test code = 28.9 %       40.1-51.0    L       

     



             411)                                                

 

             MEAN CORPUSCULAR VOLUME (BEAKER) 91.7 fL      79.0-92.2            

     



             (test code = 753)                                        

 

             MEAN CORPUSCULAR HEMOGLOBIN 32.4 pg      25.7-32.2    H            



             (BEAKER) (test code = 751)                                        

 

             MEAN CORPUSCULAR HEMOGLOBIN CONC 35.3 GM/DL   32.3-36.5            

     



             (BEAKER) (test code = 752)                                        

 

             RED CELL DISTRIBUTION WIDTH 13.7 %       11.6-14.4                 



             (BEAKER) (test code = 412)                                        

 

             PLATELET COUNT (BEAKER) (test code 57 K/CU MM   150-450      L     

       



             = 756)                                              

 

             MEAN PLATELET VOLUME (BEAKER) 8.8 fL       9.4-12.4     L          

  



             (test code = 754)                                        

 

             NUCLEATED RED BLOOD CELLS (BEAKER) 0 /100 WBC   0-0                

       



             (test code = 413)                                        



(CELLAVISION MANUAL DIFF)2019 09:58:00





             Test Item    Value        Reference Range Interpretation Comments

 

             NEUTROPHILS - REL 78 %                                   



             (CELLAVISION)(BEAKER) (test code =                                 

       



             2816)                                               

 

             LYMPHOCYTES - REL 5 %                                    



             (CELLAVISION)(BEAKER) (test code =                                 

       



             2817)                                               

 

             MONOCYTES - REL 9 %                                    



             (CELLAVISION)(BEAKER) (test code =                                 

       



             2818)                                               

 

             EOSINOPHILS - REL 2 %                                    



             (CELLAVISION)(BEAKER) (test code =                                 

       



             2819)                                               

 

             METAMYELOCYTES - REL 1 %          0-0          H            



             (CELLAVISION)(BEAKER) (test code =                                 

       



             2821)                                               

 

             BANDS - REL (CELLAVISION)(BEAKER) 4 %          0-10                

      



             (test code = 2826)                                        

 

             BLASTS - REL (CELLAVISION)(BEAKER) 1 %          0-0          H     

       



             (test code = 2827)                                        

 

             NEUTROPHILS - ABS 8.35 K/ul    1.78-5.38    H            



             (CELLAVISION)(BEAKER) (test code =                                 

       



             2830)                                               

 

             LYMPHOCYTES - ABS 0.54 K/ul    1.32-3.57    L            



             (CELLAVISION)(BEAKER) (test code =                                 

       



             2831)                                               

 

             MONOCYTES - ABS 0.96 K/uL    0.30-0.82    H            



             (CELLAVISION)(BEAKER) (test code =                                 

       



             2832)                                               

 

             EOSINOPHILS - ABS 0.21 K/uL    0.04-0.54                 



             (CELLAVISION)(BEAKER) (test code =                                 

       



             2834)                                               

 

             METAMYELOCYTES - ABS 0.11 K/uL    0.00-0.00    H            



             (CELLAVISION)(BEAKER) (test code =                                 

       



             2836)                                               

 

             BANDS - ABS (CELLAVISION)(BEAKER) 0.43 K/uL    0.00-0.80           

      



             (test code = 2840)                                        

 

             BLASTS - ABS (CELLAVISION)(BEAKER) 0.11 K/uL    0.00-0.00    H     

       



             (test code = 2845)                                        

 

             TOTAL COUNTED (BEAKER) (test code = 100                            

        



             1351)                                               

 

             WBC MORPHOLOGY (BEAKER) (test code Normal                          

       



             = 487)                                              

 

             PLT MORPHOLOGY (BEAKER) (test code Normal                          

       



             = 486)                                              

 

             ANISOCYTOSIS (BEAKER) (test code = 1+ few                          

       



             961)                                                

 

             POIKILOCYTES (BEAKER) (test code = 1+ few                          

       



             966)                                                

 

             OVALOCYTES (BEAKER) (test code = 1+ few                            

     



             477)                                                

 

             BASOPHILIC STIPPLING (BEAKER) (test Present                        

        



             code = 473)                                         

 

             ARTIFACT (CELLAVISION)(BEAKER) Present                             

   



             (test code = 3432)                                        

 

             PLATELET CONCENTRATION Decreased                              



             (CELLAVISION)(BEAKER) (test code =                                 

       



             3438)                                               



Received comment: User comments: Slide comments: WBC: SEGMENTED WITH TOXIC 
GRANULATIONS [id number] 08:30:00





             Test Item    Value        Reference Range Interpretation Comments

 

             PHOSPHORUS (BEAKER) (test code = 2.0 mg/dL    2.3-4.7      L       

     



             604)                                                



RAD, CHEST, 1 VIEW, NON [id number] 08:21:00Reason for exam:-&gt;left 
effusionShould this be performed at the bedside?-&gt;YesFINAL REPORT PATIENT ID:
[id number] TECHNIQUE: Frontal chest radiograph dated 2019. CLINICAL HISTORY: 
Left effusion COMPARISON STUDY: Chest radiographs and chest CT both dated 
2019 IMPRESSION:Right-sided PICC and left-sided chest tube are unchanged. 
No change in small left hydropneumothorax. Multiple rounded densities project 
over the left lower lobe of unclear etiology possibly something external to the 
patient. Cardiomediastinal silhouette is normal in size. No pulmonary edema. No 
fracture. Signed: Ann King MDReport Verified Date/Time: 2019 
08:21:33 Reading Location: [facility] Reading Room Electronically signed by: 
ANN KING MD on 2019 08:21 AMCOMPREHENSIVE METABOLIC PANEL
2019 05:34:00





             Test Item    Value        Reference Range Interpretation Comments

 

             TOTAL PROTEIN 4.9 gm/dL    6.0-8.3      L            



             (BEAKER) (test code =                                        



             770)                                                

 

             ALBUMIN (BEAKER) 2.7 g/dL     3.5-5.0      L            



             (test code = 1145)                                        

 

             ALKALINE PHOSPHATASE 137 U/L                          



             (BEAKER) (test code =                                        



             346)                                                

 

             BILIRUBIN TOTAL 5.5 mg/dL    0.2-1.2      H            



             (BEAKER) (test code =                                        



             377)                                                

 

             SODIUM (BEAKER) (test 123 meq/L    136-145      L            



             code = 381)                                         

 

             POTASSIUM (BEAKER) 3.9 meq/L    3.5-5.1                   



             (test code = 379)                                        

 

             CHLORIDE (BEAKER) 89 meq/L            L            



             (test code = 382)                                        

 

             CO2 (BEAKER) (test 31 meq/L     22-29        H            



             code = 355)                                         

 

             BLOOD UREA NITROGEN 15 mg/dL     7-21                      



             (BEAKER) (test code =                                        



             354)                                                

 

             CREATININE (BEAKER) 0.68 mg/dL   0.57-1.25                 



             (test code = 358)                                        

 

             GLUCOSE RANDOM 104 mg/dL                        



             (BEAKER) (test code =                                        



             652)                                                

 

             CALCIUM (BEAKER) 7.7 mg/dL    8.4-10.2     L            



             (test code = 697)                                        

 

             AST (SGOT) (BEAKER) 47 U/L       5-34         H            



             (test code = 353)                                        

 

             ALT (SGPT) (BEAKER) 25 U/L       6-55                      



             (test code = 347)                                        

 

             EGFR (BEAKER) (test 119                                    ESTIMATE

D GFR IS



             code = 1092) mL/min/1.73 sq                           NOT AS ACCURA

TE AS



                          m                                      CREATININE



                                                                 CLEARANCE IN



                                                                 PREDICTING



                                                                 GLOMERULAR



                                                                 FILTRATION RATE

.



                                                                 ESTIMATED GFR I

S



                                                                 NOT APPLICABLE 

FOR



                                                                 DIALYSIS PATIEN

TS.



Specimen moderately [id number] 05:04:00





             Test Item    Value        Reference Range Interpretation Comments

 

             MAGNESIUM (BEAKER) (test code = 1.8 mg/dL    1.6-2.6               

    



             627)                                                



CT, CHEST, WITH [id number] 15:37:00Reason for exam:-&gt;reevaluate 
pleural effusion and left lung abscessFINAL REPORT PATIENT ID: [id number] CT of 
the Chest dated 2019 COMPARISON: 2019 CLINICAL INFORMATION: 
Pleural effusionreevaluate pleural effusion and left lung abscess Comment: Axial
images of the chest were obtained from thoracic inlet to the upper abdomen with 
intravenous contrast. This exam was performed according to our departmental 
dose-optimization program, which includes automated exposure control, adjustment
of the mA and/or kV according to patient size and/or use of interactive 
reconstruction technique. Heart is normal in size. There is small pericardial 
effusion. Great vessels are unremarkable. No adenopathy in the mediastinum or 
perihilar region. Trachea and mainstem bronchi are patent. Trace bilateral 
pleural effusion is present. There is interval significant decreasein the amount
of left pleural effusion. There is small amount of air present in the left 
pleural space. The pigtail catheter is seen in the left pleural space. 
Atelectasis is seen in the lingula, rightmid, and both lower lobes. Cavitary 
lesion is seen in the superior segment of the left lower lobe measuring 
approximately 2.8 x 3.3 cm. Visualized upper abdomen demonstrates cirrhotic 
liver with trace ascites. Spleen is minimally enlarged. Impression: 1. Interval 
decrease in the amount of left pleuraleffusion with residual left 
hydropneumothorax.2. Trace right pleural effusion.3. Cavitary lesion in the 
superior segment of the left lower lobe may represent abscess.4. Cirrhosis with 
splenomegaly and ascites. Signed: Lucita Nailseport Verified Date/Time: 
2019 15:37:27 Reading Location: [facility] C013Y CT Body Reading Room 
Electronically signed by: LUCITA NAILS M.D. on 2019 03:37 PMELECTROLYTES
2019 13:41:00





             Test Item    Value        Reference Range Interpretation Comments

 

             SODIUM (BEAKER) (test code = 381) 119 meq/L    136-145      LL     

      

 

             POTASSIUM (BEAKER) (test code = 4.1 meq/L    3.5-5.1               

    



             379)                                                

 

             CHLORIDE (BEAKER) (test code = 382) 85 meq/L            L    

        

 

             CO2 (BEAKER) (test code = 355) 29 meq/L     22-29                  

   



Page [phone] with results. Thank you.CBC W/PLT COUNT &amp; AUTO 
[id number] 10:30:00





             Test Item    Value        Reference Range Interpretation Comments

 

             WHITE BLOOD CELL COUNT (BEAKER) 11.3 K/ L    3.5-10.5     H        

    



             (test code = 775)                                        

 

             RED BLOOD CELL COUNT (BEAKER) 3.25 M/ L    4.63-6.08    L          

  



             (test code = 761)                                        

 

             HEMOGLOBIN (BEAKER) (test code = 10.6 GM/DL   13.7-17.5    L       

     



             410)                                                

 

             HEMATOCRIT (BEAKER) (test code = 29.8 %       40.1-51.0    L       

     



             411)                                                

 

             MEAN CORPUSCULAR VOLUME (BEAKER) 91.7 fL      79.0-92.2            

     



             (test code = 753)                                        

 

             MEAN CORPUSCULAR HEMOGLOBIN 32.6 pg      25.7-32.2    H            



             (BEAKER) (test code = 751)                                        

 

             MEAN CORPUSCULAR HEMOGLOBIN CONC 35.6 GM/DL   32.3-36.5            

     



             (BEAKER) (test code = 752)                                        

 

             RED CELL DISTRIBUTION WIDTH 13.5 %       11.6-14.4                 



             (BEAKER) (test code = 412)                                        

 

             PLATELET COUNT (BEAKER) (test code 51 K/CU MM   150-450      L     

       



             = 756)                                              

 

             MEAN PLATELET VOLUME (BEAKER) 8.3 fL       9.4-12.4     L          

  



             (test code = 754)                                        

 

             NUCLEATED RED BLOOD CELLS (BEAKER) 0 /100 WBC   0-0                

       



             (test code = 413)                                        



(CELLAVISION MANUAL DIFF)2019 10:30:00





             Test Item    Value        Reference Range Interpretation Comments

 

             NEUTROPHILS - REL 82 %                                   



             (CELLAVISION)(BEAKER) (test code =                                 

       



             2816)                                               

 

             LYMPHOCYTES - REL 2 %                                    



             (CELLAVISION)(BEAKER) (test code =                                 

       



             2817)                                               

 

             MONOCYTES - REL 9 %                                    



             (CELLAVISION)(BEAKER) (test code =                                 

       



             2818)                                               

 

             METAMYELOCYTES - REL 2 %          0-0          H            



             (CELLAVISION)(BEAKER) (test code =                                 

       



             2821)                                               

 

             PROMYELOCYTES - REL 2 %          0-0          H            



             (CELLAVSION)(BEAKER) (test code =                                  

      



             2825)                                               

 

             BANDS - REL (CELLAVISION)(BEAKER) 2 %          0-10                

      



             (test code = 2826)                                        

 

             ATYPICAL LYMPHOCYTES - REL 1 %          0-0          H            



             (CELLAVISION)(BEAKER) (test code =                                 

       



             2829)                                               

 

             NEUTROPHILS - ABS 9.27 K/ul    1.78-5.38    H            



             (CELLAVISION)(BEAKER) (test code =                                 

       



             2830)                                               

 

             LYMPHOCYTES - ABS 0.23 K/ul    1.32-3.57    L            



             (CELLAVISION)(BEAKER) (test code =                                 

       



             2831)                                               

 

             MONOCYTES - ABS 1.02 K/uL    0.30-0.82    H            



             (CELLAVISION)(BEAKER) (test code =                                 

       



             2832)                                               

 

             METAMYELOCYTES - ABS 0.23 K/uL    0.00-0.00    H            



             (CELLAVISION)(BEAKER) (test code =                                 

       



             2836)                                               

 

             PROMYELOCYTES - ABS 0.23 K/uL    0.00-0.00    H            



             (CELLAVISION)(BEAKER) (test code =                                 

       



             2838)                                               

 

             BANDS - ABS (CELLAVISION)(BEAKER) 0.23 K/uL    0.00-0.80           

      



             (test code = 2840)                                        

 

             ATYPICAL LYMPHOCYTES - ABS 0.11 K/uL    0.00-0.00    H            



             (CELLAVISION)(BEAKER) (test code =                                 

       



             2858)                                               

 

             TOTAL COUNTED (BEAKER) (test code = 100                            

        



             1351)                                               

 

             WBC MORPHOLOGY (BEAKER) (test code Normal                          

       



             = 487)                                              

 

             LARGE PLT(BEAKER) (test code = Present                             

   



             2156)                                               

 

             BASOPHILIC STIPPLING (BEAKER) (test Present                        

        



             code = 473)                                         

 

             ARTIFACT (CELLAVISION)(BEAKER) Present                             

   



             (test code = 3432)                                        

 

             PLATELET CONCENTRATION Decreased                              



             (CELLAVISION)(BEAKER) (test code =                                 

       



             3438)                                               



Received comment: User comments: Slide comments: WBC: SEGMENTED WITH TOXIC 
GRANULATIONS PRESENTRAD, CHEST, 1 VIEW, NON [id number] 07:48:00Reason for 
exam:-&gt;left effusionShould this be performed at the bedside?-&gt;YesFINAL 
REPORT PATIENT ID: [id number] RAD, CHEST, 1 VIEW, NON DEPT INDICATION: left 
effusion COMPARISON:Prior day's exam FINDINGS: Portable frontal view of the 
chest. IMPRESSION: Support Lines: PICC tip overlies the SVC. Left pleural 
catheter is again noted. Lungs and pleura: Bibasilar airspace disease is 
unchanged. Superimposed trace left effusion. Left apical pneumothorax is 
decreased in the interim. Heart and mediastinum: Stable contours. Additional 
findings: None. Signed: Trina Fenton MDReport Verified Date/Time: 2019 
07:48:14 Reading Location: [facility] Radiology Reading Room Electronically 
signed by: TRINA FENTON MD on 2019 07:48 AMCALCIUM, IONIZED
2019 05:58:00





             Test Item    Value        Reference Range Interpretation Comments

 

             CALCIUM IONIZED (BEAKER) (test 0.96 mmol/L  1.12-1.27    L         

   



             code = 698)                                         

 

             PH, BLOOD (BEAKER) (test code = 7.53                               

    



             1810)                                               



COMPREHENSIVE METABOLIC [id number] 05:51:00





             Test Item    Value        Reference Range Interpretation Comments

 

             TOTAL PROTEIN 5.2 gm/dL    6.0-8.3      L            



             (BEAKER) (test code =                                        



             770)                                                

 

             ALBUMIN (BEAKER) 2.6 g/dL     3.5-5.0      L            



             (test code = 1145)                                        

 

             ALKALINE PHOSPHATASE 144 U/L                          



             (BEAKER) (test code =                                        



             346)                                                

 

             BILIRUBIN TOTAL 6.3 mg/dL    0.2-1.2      H            



             (BEAKER) (test code =                                        



             377)                                                

 

             SODIUM (BEAKER) (test 119 meq/L    136-145      LL           



             code = 381)                                         

 

             POTASSIUM (BEAKER) 4.2 meq/L    3.5-5.1                   



             (test code = 379)                                        

 

             CHLORIDE (BEAKER) 86 meq/L            L            



             (test code = 382)                                        

 

             CO2 (BEAKER) (test 26 meq/L     22-29                     



             code = 355)                                         

 

             BLOOD UREA NITROGEN 23 mg/dL     7-21         H            



             (BEAKER) (test code =                                        



             354)                                                

 

             CREATININE (BEAKER) 0.81 mg/dL   0.57-1.25                 



             (test code = 358)                                        

 

             GLUCOSE RANDOM 103 mg/dL                        



             (BEAKER) (test code =                                        



             652)                                                

 

             CALCIUM (BEAKER) 7.6 mg/dL    8.4-10.2     L            



             (test code = 697)                                        

 

             AST (SGOT) (BEAKER) 46 U/L       5-34         H            



             (test code = 353)                                        

 

             ALT (SGPT) (BEAKER) 21 U/L       6-55                      



             (test code = 347)                                        

 

             EGFR (BEAKER) (test 97 mL/min/1.73                           ESTIMA

FROILAN GFR IS



             code = 1092) sq m                                   NOT AS ACCURATE

 AS



                                                                 CREATININE



                                                                 CLEARANCE IN



                                                                 PREDICTING



                                                                 GLOMERULAR



                                                                 FILTRATION RATE

.



                                                                 ESTIMATED GFR I

S



                                                                 NOT APPLICABLE 

FOR



                                                                 DIALYSIS PATIEN

TS.



Specimen moderately [id number] 05:07:00





             Test Item    Value        Reference Range Interpretation Comments

 

             CORTISOL, TOTAL (BEAKER) (test code 9.6 ug/dL    3.7-19.4          

        



             = 2755)                                             



[id number] 04:57:00





             Test Item    Value        Reference Range Interpretation Comments

 

             PHOSPHORUS (BEAKER) (test code = 2.0 mg/dL    2.3-4.7      L       

     



             604)                                                



[id number] 04:57:00





             Test Item    Value        Reference Range Interpretation Comments

 

             MAGNESIUM (BEAKER) (test code = 1.7 mg/dL    1.6-2.6               

    



             627)                                                



[id number] 22:08:00





             Test Item    Value        Reference Range Interpretation Comments

 

             SODIUM (BEAKER) (test 119 meq/L    136-145      LL           



             code = 381)                                         

 

             POTASSIUM (BEAKER) 4.6 meq/L    3.5-5.1                   Specimen 

slightly



             (test code = 379)                                        hemolyzed

 

             CHLORIDE (BEAKER) 86 meq/L            L            



             (test code = 382)                                        

 

             CO2 (BEAKER) (test 27 meq/L     22-29                     



             code = 355)                                         



Call K &gt; 5.5POCT-GLUCOSE [id number] 12:59:00





             Test Item    Value        Reference Range Interpretation Comments

 

             POC-GLUCOSE METER 95 mg/dL                         TESTED AT 

[facility] [address]



             (BEAKER) (test code =                                        [place] [address]



             1538)                                               



T4, [id number] 10:25:00





             Test Item    Value        Reference Range Interpretation Comments

 

             FREE T4 (BEAKER) (test code = 655) 0.65 ng/dL   0.70-1.48    L     

       



CBC W/PLT COUNT &amp; AUTO [id number] 10:21:00





             Test Item    Value        Reference Range Interpretation Comments

 

             WHITE BLOOD CELL COUNT (BEAKER) 11.8 K/ L    3.5-10.5     H        

    



             (test code = 775)                                        

 

             RED BLOOD CELL COUNT (BEAKER) 3.74 M/ L    4.63-6.08    L          

  



             (test code = 761)                                        

 

             HEMOGLOBIN (BEAKER) (test code = 12.2 GM/DL   13.7-17.5    L       

     



             410)                                                

 

             HEMATOCRIT (BEAKER) (test code = 33.9 %       40.1-51.0    L       

     



             411)                                                

 

             MEAN CORPUSCULAR VOLUME (BEAKER) 90.6 fL      79.0-92.2            

     



             (test code = 753)                                        

 

             MEAN CORPUSCULAR HEMOGLOBIN 32.6 pg      25.7-32.2    H            



             (BEAKER) (test code = 751)                                        

 

             MEAN CORPUSCULAR HEMOGLOBIN CONC 36.0 GM/DL   32.3-36.5            

     



             (BEAKER) (test code = 752)                                        

 

             RED CELL DISTRIBUTION WIDTH 13.7 %       11.6-14.4                 



             (BEAKER) (test code = 412)                                        

 

             PLATELET COUNT (BEAKER) (test code 79 K/CU MM   150-450      L     

       



             = 756)                                              

 

             MEAN PLATELET VOLUME (BEAKER) 8.9 fL       9.4-12.4     L          

  



             (test code = 754)                                        

 

             NUCLEATED RED BLOOD CELLS (BEAKER) 0 /100 WBC   0-0                

       



             (test code = 413)                                        



(CELLAVISION MANUAL DIFF)2019 10:21:00





             Test Item    Value        Reference Range Interpretation Comments

 

             NEUTROPHILS - REL 82 %                                   



             (CELLAVISION)(BEAKER) (test code =                                 

       



             2816)                                               

 

             LYMPHOCYTES - REL 4 %                                    



             (CELLAVISION)(BEAKER) (test code =                                 

       



             2817)                                               

 

             MONOCYTES - REL 13 %                                   



             (CELLAVISION)(BEAKER) (test code =                                 

       



             2818)                                               

 

             ATYPICAL LYMPHOCYTES - REL 1 %          0-0          H            



             (CELLAVISION)(BEAKER) (test code =                                 

       



             2829)                                               

 

             NEUTROPHILS - ABS 9.68 K/ul    1.78-5.38    H            



             (CELLAVISION)(BEAKER) (test code =                                 

       



             2830)                                               

 

             LYMPHOCYTES - ABS 0.47 K/ul    1.32-3.57    L            



             (CELLAVISION)(BEAKER) (test code =                                 

       



             2831)                                               

 

             MONOCYTES - ABS 1.53 K/uL    0.30-0.82    H            



             (CELLAVISION)(BEAKER) (test code =                                 

       



             2832)                                               

 

             ATYPICAL LYMPHOCYTES - ABS 0.12 K/uL    0.00-0.00    H            



             (CELLAVISION)(BEAKER) (test code =                                 

       



             2858)                                               

 

             TOTAL COUNTED (BEAKER) (test code = 100                            

        



             1351)                                               

 

             RBC MORPHOLOGY (BEAKER) (test code Normal                          

       



             = 762)                                              

 

             WBC MORPHOLOGY (BEAKER) (test code Normal                          

       



             = 487)                                              

 

             PLT MORPHOLOGY (BEAKER) (test code Normal                          

       



             = 486)                                              

 

             ARTIFACT (CELLAVISION)(BEAKER) Present                             

   



             (test code = 3432)                                        

 

             PLATELET CONCENTRATION Decreased                              



             (CELLAVISION)(BEAKER) (test code =                                 

       



             3438)                                               



Received comment: User comments: Slide comments:POCT-GLUCOSE [id number] 
09:58:00





             Test Item    Value        Reference Range Interpretation Comments

 

             POC-GLUCOSE METER 104 mg/dL                        TESTED AT 

[facility] [address]



             (BEAKER) (test code =                                        [address] [place]



             1538)                                               [address]



RAD, CHEST, 1 VIEW, NON [id number] 09:33:00Reason for exam:-
&gt;effusionShould this be performed at the bedside?-&gt;YesFINAL REPORT PATIENT
ID: [id number] Comparison: 2019 TECHNIQUE: Single view of the chest FINDINGS:
Bilateral interstitial and airspace opacities are stable. Small left pleural 
thickening with adjacent airspace disease identified. A previous left-sided 
pneumothorax has decreased. Left pleural drainage catheters again noted. Right-
sided PICC line is stable. Signed: Ronaldo Mireles MDReport Verified Date/Time: 
2019 09:33:21 Reading Location: [address] Transitional Reading Room 
Electronically signed by: RONALDO MIRELES M.D. on 2019 09:33 AMCOMPREHENSIVE
METABOLIC [id number] 08:59:00





             Test Item    Value        Reference Range Interpretation Comments

 

             TOTAL PROTEIN 5.4 gm/dL    6.0-8.3      L            Specimen sligh

tly



             (BEAKER) (test code =                                        hemoly

zed



             770)                                                

 

             ALBUMIN (BEAKER) 2.1 g/dL     3.5-5.0      L            Specimen sl

ightly



             (test code = 1145)                                        hemolyzed

 

             ALKALINE PHOSPHATASE 153 U/L             H            



             (BEAKER) (test code =                                        



             346)                                                

 

             BILIRUBIN TOTAL 5.0 mg/dL    0.2-1.2      H            Specimen sli

ghtly



             (BEAKER) (test code =                                        hemoly

zed



             377)                                                

 

             SODIUM (BEAKER) (test 117 meq/L    136-145      LL           



             code = 381)                                         

 

             POTASSIUM (BEAKER) 5.5 meq/L    3.5-5.1      H            Specimen 

slightly



             (test code = 379)                                        hemolyzed

 

             CHLORIDE (BEAKER) 84 meq/L            L            



             (test code = 382)                                        

 

             CO2 (BEAKER) (test 26 meq/L     22-29                     



             code = 355)                                         

 

             BLOOD UREA NITROGEN 32 mg/dL     7-21         H            



             (BEAKER) (test code =                                        



             354)                                                

 

             CREATININE (BEAKER) 0.85 mg/dL   0.57-1.25                 Specimen

 slightly



             (test code = 358)                                        hemolyzed

 

             GLUCOSE RANDOM 104 mg/dL                        



             (BEAKER) (test code =                                        



             652)                                                

 

             CALCIUM (BEAKER) 7.6 mg/dL    8.4-10.2     L            



             (test code = 697)                                        

 

             AST (SGOT) (BEAKER) 51 U/L       5-34         H            Specimen

 slightly



             (test code = 353)                                        hemolyzed

 

             ALT (SGPT) (BEAKER) 23 U/L       6-55                      Specimen

 slightly



             (test code = 347)                                        hemolyzed

 

             EGFR (BEAKER) (test 92 mL/min/1.73                           ESTIMA

FROILAN GFR IS



             code = 1092) sq m                                   NOT AS ACCURATE

 AS



                                                                 CREATININE



                                                                 CLEARANCE IN



                                                                 PREDICTING



                                                                 GLOMERULAR



                                                                 FILTRATION RATE

.



                                                                 ESTIMATED GFR I

S



                                                                 NOT APPLICABLE 

FOR



                                                                 DIALYSIS PATIEN

TS.



Specimen moderately [id number] 08:55:00





             Test Item    Value        Reference Range Interpretation Comments

 

             MAGNESIUM (BEAKER) 1.9 mg/dL    1.6-2.6                   Specimen 

slightly



             (test code = 627)                                        hemolyzed



[id number] 08:55:00





             Test Item    Value        Reference Range Interpretation Comments

 

             PHOSPHORUS (BEAKER) 2.5 mg/dL    2.3-4.7                   Specimen

 slightly



             (test code = 604)                                        hemolyzed



TSH/FREE T4 IF [id number] 08:39:00





             Test Item    Value        Reference Range Interpretation Comments

 

             THYROID STIMULATING HORMONE 8.07 uIU/mL  0.35-4.94    H            



             (BEAKER) (test code = 772)                                        



CALCIUM, [id number] 08:06:00





             Test Item    Value        Reference Range Interpretation Comments

 

             CALCIUM IONIZED (BEAKER) (test 1.00 mmol/L  1.12-1.27    L         

   



             code = 698)                                         

 

             PH, BLOOD (BEAKER) (test code = 7.48                               

    



             1810)                                               



[id number] 23:47:00





             Test Item    Value        Reference Range Interpretation Comments

 

             SODIUM (BEAKER) (test code = 381) 117 meq/L    136-145      LL     

      

 

             POTASSIUM (BEAKER) (test code = 5.3 meq/L    3.5-5.1      H        

    



             379)                                                

 

             CHLORIDE (BEAKER) (test code = 382) 84 meq/L            L    

        

 

             CO2 (BEAKER) (test code = 355) 27 meq/L     22-29                  

   



Call K &gt; [id number]-GLUCOSE [id number] 21:18:00





             Test Item    Value        Reference Range Interpretation Comments

 

             POC-GLUCOSE METER 145 mg/dL           H            TESTED AT 

[facility] [address]



             (BEAKER) (test code =                                        [place]



             [address]38)                                               [id number]



[id number] 18:57:00





             Test Item    Value        Reference Range Interpretation Comments

 

             CORTISOL, TOTAL (BEAKER) (test 18.1 ug/dL   3.7-19.4               

   



             code = 9485)                                        



[id number] 18:34:00





             Test Item    Value        Reference Range Interpretation Comments

 

             SODIUM (BEAKER) (test code = 381) 116 meq/L    136-145      LL     

      

 

             POTASSIUM (BEAKER) (test code = 6.0 meq/L    3.5-5.1      HH       

    



             379)                                                

 

             CHLORIDE (BEAKER) (test code = 382) 82 meq/L            L    

        

 

             CO2 (BEAKER) (test code = 355) 30 meq/L     22-29        H         

   



Call [id number]-GLUCOSE [id number] 18:20:00





             Test Item    Value        Reference Range Interpretation Comments

 

             POC-GLUCOSE METER 141 mg/dL           H            TESTED AT 

[facility] [address]



             (BEAKER) (test code =                                        [place]



             [address])                                               [address]



POCT-GLUCOSE [id number] 13:00:00





             Test Item    Value        Reference Range Interpretation Comments

 

             POC-GLUCOSE METER 122 mg/dL           H            TESTED AT 

[facility] [address]



             (BEAKER) (test code =                                        [place]



             1538)                                               [address]



CBC W/PLT COUNT &amp; AUTO [id number] 10:34:00





             Test Item    Value        Reference Range Interpretation Comments

 

             WHITE BLOOD CELL COUNT (BEAKER) 15.5 K/ L    3.5-10.5     H        

    



             (test code = 775)                                        

 

             RED BLOOD CELL COUNT (BEAKER) 4.04 M/ L    4.63-6.08    L          

  



             (test code = 761)                                        

 

             HEMOGLOBIN (BEAKER) (test code = 13.3 GM/DL   13.7-17.5    L       

     



             410)                                                

 

             HEMATOCRIT (BEAKER) (test code = 36.5 %       40.1-51.0    L       

     



             411)                                                

 

             MEAN CORPUSCULAR VOLUME (BEAKER) 90.3 fL      79.0-92.2            

     



             (test code = 753)                                        

 

             MEAN CORPUSCULAR HEMOGLOBIN 32.9 pg      25.7-32.2    H            



             (BEAKER) (test code = 751)                                        

 

             MEAN CORPUSCULAR HEMOGLOBIN CONC 36.4 GM/DL   32.3-36.5            

     



             (BEAKER) (test code = 752)                                        

 

             RED CELL DISTRIBUTION WIDTH 13.5 %       11.6-14.4                 



             (BEAKER) (test code = 412)                                        

 

             PLATELET COUNT (BEAKER) (test code 65 K/CU MM   150-450      L     

       



             = 756)                                              

 

             MEAN PLATELET VOLUME (BEAKER) 9.0 fL       9.4-12.4     L          

  



             (test code = 754)                                        

 

             NUCLEATED RED BLOOD CELLS (BEAKER) 0 /100 WBC   0-0                

       



             (test code = 413)                                        



(CELLAVISION MANUAL DIFF)2019 10:34:00





             Test Item    Value        Reference Range Interpretation Comments

 

             NEUTROPHILS - REL 78 %                                   



             (CELLAVISION)(BEAKER) (test code                                   

     



             = 2816)                                             

 

             LYMPHOCYTES - REL 4 %                                    



             (CELLAVISION)(BEAKER) (test code                                   

     



             = 2817)                                             

 

             MONOCYTES - REL 14 %                                   



             (CELLAVISION)(BEAKER) (test code                                   

     



             = 2818)                                             

 

             EOSINOPHILS - REL 2 %                                    



             (CELLAVISION)(BEAKER) (test code                                   

     



             = 2819)                                             

 

             BANDS - REL (CELLAVISION)(BEAKER) 2 %          0-10                

      



             (test code = 2826)                                        

 

             NEUTROPHILS - ABS 12.09 K/ul   1.78-5.38    H            



             (CELLAVISION)(BEAKER) (test code                                   

     



             = 2830)                                             

 

             LYMPHOCYTES - ABS 0.62 K/ul    1.32-3.57    L            



             (CELLAVISION)(BEAKER) (test code                                   

     



             = 2831)                                             

 

             MONOCYTES - ABS 2.17 K/uL    0.30-0.82    H            



             (CELLAVISION)(BEAKER) (test code                                   

     



             = 2832)                                             

 

             EOSINOPHILS - ABS 0.31 K/uL    0.04-0.54                 



             (CELLAVISION)(BEAKER) (test code                                   

     



             = 2834)                                             

 

             BANDS - ABS (CELLAVISION)(BEAKER) 0.31 K/uL    0.00-0.80           

      



             (test code = 2840)                                        

 

             TOTAL COUNTED (BEAKER) (test code 100                              

      



             = 1351)                                             

 

             WBC MORPHOLOGY (BEAKER) (test Normal                               

  



             code = 487)                                         

 

             PLT MORPHOLOGY (BEAKER) (test Normal                               

  



             code = 486)                                         

 

             POLYCHROMATOPHILLIC RBCS(BEAKER) 1+ few                            

     



             (test code = 478)                                        

 

             POIKILOCYTES (BEAKER) (test code 2+ moderate                       

     



             = 966)                                              

 

             KARISSA CELLS (BEAKER) (test code = 2+ moderate                       

     



             474)                                                

 

             BASOPHILIC STIPPLING (BEAKER) Present                              

  



             (test code = 473)                                        

 

             ARTIFACT (CELLAVISION)(BEAKER) Present                             

   



             (test code = 3432)                                        

 

             PLATELET CONCENTRATION Decreased                              



             (CELLAVISION)(BEAKER) (test code                                   

     



             = 3438)                                             



Received comment: User comments: Slide comments:RAD, CHEST, 1 VIEW, NON DEPT
2019 08:40:00Reason for exam:-&gt;left effusionShould this be performed at
the bedside?-&gt;YesFINAL REPORT PATIENT ID: [id number] Portable chest. CLINICAL 
HISTORY: left effusion. COMPARISON STUDY:Chest x-ray from yesterday. FINDINGS: 
The cardiac silhouette is unremarkable. The pulmonary parenchyma demonstrates 
increased interstitial markings with atelectatic changes in the lung bases. A 
left-sided pigtail catheter chest tube remains and there has been development of
a loculated small basilar pneumothorax. A right PICC line remains. Degenerative 
changes are noted. IMPRESSION: Development a small left-sided basilar 
pneumothorax. No other significant change. Signed: Beckie King
fied Date/Time: 2019 08:40:30 Reading Location: [address] Consult
Reading Room Electronically signed by: BECKIE KING M.D. on 2019 08:40 
AMPOCT-GLUCOSE [id number] 08:39:00





             Test Item    Value        Reference Range Interpretation Comments

 

             POC-GLUCOSE METER 110 mg/dL                        TESTED AT 

[facility] [address]



             [facility] ([facility]                                        [place]



             [address])                                               [address]



COMPREHENSIVE METABOLIC [id number] 06:32:00





             Test Item    Value        Reference Range Interpretation Comments

 

             TOTAL PROTEIN 5.5 gm/dL    6.0-8.3      L            



             (BEAKER) (test code =                                        



             770)                                                

 

             ALBUMIN (BEAKER) 1.9 g/dL     3.5-5.0      L            



             (test code = 1145)                                        

 

             ALKALINE PHOSPHATASE 134 U/L                          



             (BEAKER) (test code =                                        



             346)                                                

 

             BILIRUBIN TOTAL 4.4 mg/dL    0.2-1.2      H            



             (BEAKER) (test code =                                        



             377)                                                

 

             SODIUM (BEAKER) (test 116 meq/L    136-145      LL           



             code = 381)                                         

 

             POTASSIUM (BEAKER) 5.6 meq/L    3.5-5.1      H            



             (test code = 379)                                        

 

             CHLORIDE (BEAKER) 83 meq/L            L            



             (test code = 382)                                        

 

             CO2 (BEAKER) (test 27 meq/L     22-29                     



             code = 355)                                         

 

             BLOOD UREA NITROGEN 34 mg/dL     7-21         H            



             (BEAKER) (test code =                                        



             354)                                                

 

             CREATININE (BEAKER) 0.89 mg/dL   0.57-1.25                 



             (test code = 358)                                        

 

             GLUCOSE RANDOM 109 mg/dL           H            



             (BEAKER) (test code =                                        



             652)                                                

 

             CALCIUM (BEAKER) 7.4 mg/dL    8.4-10.2     L            



             (test code = 697)                                        

 

             AST (SGOT) (BEAKER) 31 U/L       5-34                      



             (test code = 353)                                        

 

             ALT (SGPT) (BEAKER) 18 U/L       6-55                      



             (test code = 347)                                        

 

             EGFR (BEAKER) (test 87 mL/min/1.73                           ESTIMA

FROILAN GFR IS



             code = 1092) sq m                                   NOT AS ACCURATE

 AS



                                                                 CREATININE



                                                                 CLEARANCE IN



                                                                 PREDICTING



                                                                 GLOMERULAR



                                                                 FILTRATION RATE

.



                                                                 ESTIMATED GFR I

S



                                                                 NOT APPLICABLE 

FOR



                                                                 DIALYSIS PATIEN

TS.



Specimen moderately ictericPOCT-GLUCOSE [id number] 21:12:00





             Test Item    Value        Reference Range Interpretation Comments

 

             POC-GLUCOSE METER 114 mg/dL           H            TESTED AT 

[place] [address]



             ([facility]) (test code =                                        [facility]



             [address]38)                                               [address]



OSMOLALITY, [id number] 18:43:00





             Test Item    Value        Reference Range Interpretation Comments

 

             OSMOLALITY URINE (BEAKER) (test 694 mOsm/kg  40-1,400              

    



             code = 614)                                         



SODIUM, RANDOM [id number] 18:02:00





             Test Item    Value        Reference Range Interpretation Comments

 

             SODIUM URINE (BEAKER) (test code = < meq/L                         

       



             243)                                                



Reference Range: No NormalsPOCT-GLUCOSE [id number] 16:06:00





             Test Item    Value        Reference Range Interpretation Comments

 

             POC-GLUCOSE METER 145 mg/dL           H            TESTED AT 

[place] [address]



             ([facility]) (test code =                                        [facility]



             1538)                                               [address]



RAD, ABDOMEN/KUB, 1 VIEW [id number] 12:51:00Reason for exam:-&gt;no BM in 13 
days. abdominal distension. concern for ileusFINAL REPORT PATIENT ID: [id number] 
RAD, ABDOMEN/KUB, 1 VIEW AP CLINICAL INDICATION: no BM in 13 days.abdominal 
distension. concern for ileus COMPARISON: None TECHNIQUE: 24 radiographs of the 
abdomen. IMPRESSION: The bowel gas pattern demonstrates gaseous distention 
without dilation. No pneumatosis. Appearance may reflect ileus. Excreted 
contrast is present in the urinary bladder. The regional skeleton is intact. 
Signed: JR Reji, Kulwinder Zaidi Verified Date/Time: 2019 
12:51:21 Reading Location: [facility] Reading Room Electronically 
signed by: KULWINDER MENDOZA on 2019 12:51 PMOSMOLALITY, SERUM
2019 12:47:00





             Test Item    Value        Reference Range Interpretation Comments

 

             OSMOLALITY, SERUM (BEAKER) (test 258 mOsm/kg  275-295      L       

     



             code = 615)                                         



POCT-GLUCOSE [id number] 12:35:00





             Test Item    Value        Reference Range Interpretation Comments

 

             POC-GLUCOSE METER 93 mg/dL                         TESTED AT 

[facility] [address]



             (BEAKER) (test code =                                        [place]

[place] [address]



             1538)                                               



CBC W/PLT COUNT &amp; AUTO [id number] 10:10:00





             Test Item    Value        Reference Range Interpretation Comments

 

             WHITE BLOOD CELL COUNT (BEAKER) 19.5 K/ L    3.5-10.5     H        

    



             (test code = 775)                                        

 

             RED BLOOD CELL COUNT (BEAKER) 4.18 M/ L    4.63-6.08    L          

  



             (test code = 761)                                        

 

             HEMOGLOBIN (BEAKER) (test code = 13.3 GM/DL   13.7-17.5    L       

     



             410)                                                

 

             HEMATOCRIT (BEAKER) (test code = 37.8 %       40.1-51.0    L       

     



             411)                                                

 

             MEAN CORPUSCULAR VOLUME (BEAKER) 90.4 fL      79.0-92.2            

     



             (test code = 753)                                        

 

             MEAN CORPUSCULAR HEMOGLOBIN 31.8 pg      25.7-32.2                 



             (BEAKER) (test code = 751)                                        

 

             MEAN CORPUSCULAR HEMOGLOBIN CONC 35.2 GM/DL   32.3-36.5            

     



             (BEAKER) (test code = 752)                                        

 

             RED CELL DISTRIBUTION WIDTH 13.5 %       11.6-14.4                 



             (BEAKER) (test code = 412)                                        

 

             PLATELET COUNT (BEAKER) (test code 60 K/CU MM   150-450      L     

       



             = 756)                                              

 

             MEAN PLATELET VOLUME (BEAKER) 9.4 fL       9.4-12.4                

  



             (test code = 754)                                        

 

             NUCLEATED RED BLOOD CELLS (BEAKER) 0 /100 WBC   0-0                

       



             (test code = 413)                                        



(CELLAVISION MANUAL DIFF)2019 10:10:00





             Test Item    Value        Reference Range Interpretation Comments

 

             NEUTROPHILS - REL 86 %                                   



             (CELLAVISION)(BEAKER) (test code =                                 

       



             2816)                                               

 

             LYMPHOCYTES - REL 1 %                                    



             (CELLAVISION)(BEAKER) (test code =                                 

       



             2817)                                               

 

             MONOCYTES - REL 6 %                                    



             (CELLAVISION)(BEAKER) (test code =                                 

       



             2818)                                               

 

             EOSINOPHILS - REL 3 %                                    



             (CELLAVISION)(BEAKER) (test code =                                 

       



             2819)                                               

 

             BANDS - REL (CELLAVISION)(BEAKER) 3 %          0-10                

      



             (test code = 2826)                                        

 

             BLASTS - REL (CELLAVISION)(BEAKER) 1 %          0-0          H     

       



             (test code = 2827)                                        

 

             NEUTROPHILS - ABS 16.77 K/ul   1.78-5.38    H            



             (CELLAVISION)(BEAKER) (test code =                                 

       



             2830)                                               

 

             LYMPHOCYTES - ABS 0.20 K/ul    1.32-3.57    L            



             (CELLAVISION)(BEAKER) (test code =                                 

       



             2831)                                               

 

             MONOCYTES - ABS 1.17 K/uL    0.30-0.82    H            



             (CELLAVISION)(BEAKER) (test code =                                 

       



             2832)                                               

 

             EOSINOPHILS - ABS 0.59 K/uL    0.04-0.54    H            



             (CELLAVISION)(BEAKER) (test code =                                 

       



             2834)                                               

 

             BANDS - ABS (CELLAVISION)(BEAKER) 0.59 K/uL    0.00-0.80           

      



             (test code = 2840)                                        

 

             BLASTS - ABS (CELLAVISION)(BEAKER) 0.20 K/uL    0.00-0.00    H     

       



             (test code = 2845)                                        

 

             TOTAL COUNTED (BEAKER) (test code 100                              

      



             = 1351)                                             

 

             PLT MORPHOLOGY (BEAKER) (test code Normal                          

       



             = 486)                                              

 

             SMUDGE CELLS (BEAKER) (test code = Present                         

       



             1371)                                               

 

             POIKILOCYTES (BEAKER) (test code = 1+ few                          

       



             966)                                                

 

             PLATELET CONCENTRATION Decreased                              



             (CELLAVISION)(BEAKER) (test code =                                 

       



             3438)                                               



Received comment: User comments: Slide comments:RAD, CHEST, 1 VIEW, NON DEPT
2019 08:53:00Reason for exam:-&gt;left effusionShould this be performed at
the bedside?-&gt;YesFINAL REPORT PATIENT ID: [id number] RAD, CHEST, 1 VIEW, NON 
DEPT INDICATION: left effusion COMPARISON:Prior day's exam FINDINGS: Portable 
frontal view of the chest. IMPRESSION: Limited by patient positioning.Support 
Lines: Stable. Lungs and pleura: Interstitial congestion on the left slightly 
greater than the right and unchanged from prior. Small left effusion. No visible
pneumothorax.Heart and mediastinum: Stable contours. Additional findings: None. 
Signed: JR Mendoza Robert MDReport VerifiedDate/Time: 2019 08:53:19
Reading Location: [facility] Radiology Reading Room Electronicallysigned by: 
KULWINDER MENDOZA on 2019 08:53 AMPOCT-GLUCOSE [id number] 
07:58:00





             Test Item    Value        Reference Range Interpretation Comments

 

             POC-GLUCOSE METER 95 mg/dL                         TESTED AT 

[facility] [address]



             (BEAKER) (test code =                                        [address] [facility] [address]



             1538)                                               



COMPREHENSIVE METABOLIC [id number] 05:41:00





             Test Item    Value        Reference Range Interpretation Comments

 

             TOTAL PROTEIN 5.4 gm/dL    6.0-8.3      L            



             (BEAKER) (test code =                                        



             770)                                                

 

             ALBUMIN (BEAKER) 1.9 g/dL     3.5-5.0      L            



             (test code = 1145)                                        

 

             ALKALINE PHOSPHATASE 126 U/L                          



             (BEAKER) (test code =                                        



             346)                                                

 

             BILIRUBIN TOTAL 4.9 mg/dL    0.2-1.2      H            



             (BEAKER) (test code =                                        



             377)                                                

 

             SODIUM (BEAKER) (test 117 meq/L    136-145      LL           



             code = 381)                                         

 

             POTASSIUM (BEAKER) 5.3 meq/L    3.5-5.1      H            



             (test code = 379)                                        

 

             CHLORIDE (BEAKER) 83 meq/L            L            



             (test code = 382)                                        

 

             CO2 (BEAKER) (test 28 meq/L     22-29                     



             code = 355)                                         

 

             BLOOD UREA NITROGEN 29 mg/dL     7-21         H            



             (BEAKER) (test code =                                        



             354)                                                

 

             CREATININE (BEAKER) 0.90 mg/dL   0.57-1.25                 



             (test code = 358)                                        

 

             GLUCOSE RANDOM 99 mg/dL                         



             (BEAKER) (test code =                                        



             652)                                                

 

             CALCIUM (BEAKER) 7.7 mg/dL    8.4-10.2     L            



             (test code = 697)                                        

 

             AST (SGOT) ([facility]) 28 U/L       5-34                      



             (test code = 353)                                        

 

             ALT (SGPT) ([facility]) 18 U/L       6-55                      



             (test code = 347)                                        

 

             EGFR ([facility]) (test 86 mL/min/1.73                           ESTIMA

FROILAN GFR IS



             code = 1092) sq m                                   NOT AS ACCURATE

 AS



                                                                 CREATININE



                                                                 CLEARANCE IN



                                                                 PREDICTING



                                                                 GLOMERULAR



                                                                 FILTRATION RATE

.



                                                                 ESTIMATED GFR I

S



                                                                 NOT APPLICABLE 

FOR



                                                                 DIALYSIS PATIEN

TS.



Specimen moderately ictericPOCT-GLUCOSE [id number] 21:08:00





             Test Item    Value        Reference Range Interpretation Comments

 

             POC-GLUCOSE METER 92 mg/dL                         TESTED AT 

[place] [address]



             ([facility]) (test code =                                        [address] [place] [facility] [address]



             1538)                                               



RAD, CHEST, 1 VIEW, NON [id number] 17:40:00Reason for exam:-&gt;post chest 
tube placementFINAL REPORT PATIENT ID: [id number] INDICATION: post chest tube 
placement TECHNIQUE: Chest radiograph,single view, portable technique. FINDINGS 
/ IMPRESSION: Left pleural effusion has decreased in size,since 10:25 AM, after 
pleural tube placement. No pneumothorax demonstrated. Right PICC line again not
ed terminating at the cavoatrial junction. Signed: Bertram De Souza MDReport 
Verified Date/Time: 2019 17:40:02 Reading Location: [address] Consult 
Reading Room Electronically signed by: BERTRAM DE SOUZA M.D. on 
2019 05:40 PMPOCT-GLUCOSE [id number] 17:33:00





             Test Item    Value        Reference Range Interpretation Comments

 

             POC-GLUCOSE METER 116 mg/dL           H            TESTED AT 

[place] [address]



             ([facility]) (test code =                                        [address] [facility]



             1538)                                               [address]



POCT-GLUCOSE [id number] 15:19:00





             Test Item    Value        Reference Range Interpretation Comments

 

             POC-GLUCOSE METER 85 mg/dL                         TESTED AT 

[place] [address]



             ([facility]) (test code =                                        [address] [place] [facility] [address]



             1538)                                               



CT, DRAINAGE, CHEST TUBE [id number] 14:49:00Reason for exam:-
&gt;loculated left pleural effusion, please place large bore pigtail if effusion
noted on CT scanAnesthesia:-&gt;NoneFINAL REPORT PATIENT ID: [id number] PROCEDURE:
CT-guided placement of a left pleural catheter. Dose modulation, iterative 
reconstruction, and/or weight-based adjustment of the mA/kV was utilized to 
reduce the radiation dose to as low as reasonably achievable. INDICATION: 
Loculated left pleural effusion.COMPARISON: CT from earlier today. SEDATION: 
Intravenous moderate sedation was administered by radiology nursing and 
monitored under the direction of the undersigned radiologist. The patient's 
vital signs were monitored throughout the procedure and recorded in the 
patient's medical record by radiologynursing. Total intraservice time of 
sedation was 25 minutes. MEDICATIONS: 0.5 mg Versed, 25 mcg fentanyl 
DESCRIPTION: After obtaining informed written consent, the patient was brought 
to the procedure room and placed in the supine position. Preliminary CT scan 
revealed a large left pleural effusion. Aclear path to the collection was 
identified. The overlying skin was prepped and draped in the usual,sterile 
fashion and local 2% lidocaine anesthesia was administered. Under CT guidance, a
19-gauge needle was placed. After placement of a wire and serial dilation, a 12 
French catheter was placed into the pleural fluid. The catheter was affixed to 
the skin and connected to a vacuum container. 1000 mL of clear red fluid was 
aspirated. Samples were placed on this side table and no clotting was noted. Sa
mples were sent for analysis. Post procedure scan showed decrease in size with 
left effusion and no immediate complications. IMPRESSION: Successful placement 
of a 12 French left chest tube. Signed: Vivien Lopez MDReport Verified Date/Time: 
2019 14:49:30 Reading Location: [address] Body Reading Room 
Electronically signed by: VIVIEN LOPEZ MD on 2019 02:49 PMCT, CHEST, 
WITH [id number] 13:11:00Reason for exam:-&gt;evaluate loculated left 
pleural effusion seen on xrayFINAL REPORT PATIENT ID: [id number] TECHNIQUE: CT 
scan of the chest WITH intravenous contrast. Dose modulation, iterative 
reconstruction, and/or weight-based adjustment of the mA/kV was utilized to 
reduce the radiation dose to as low as reasonably achievable. INDICATION: 
evaluate loculated left pleural effusion seen on xrayevaluate loculated left 
pleural effusion seen on xray. COMPARISON: None. FINDINGS: LINES/TUBES: A right 
PICC has its tip at the superior cavoatrial junction. LUNGS AND AIRWAYS: Mild
right basilar subsegmental atelectasis. There is an area of cavitation with a 
fluid fluid level in superior segment of the left lower lobe which measures 4 cm
PLEURA: Trace right pleural effusion. HEART AND MEDIASTINUM: The visualized 
thyroid gland is normal. No significant mediastinal, hilar, or axillary 
lymphadenopathy. The heart and pericardium are within normal limits. Severe 
coronary arterial calcifications. SOFT TISSUES AND BONES: Bilateral 
gynecomastia. Old, healed right 10th and 12th rib fractures. Old, healed left 
10th rib fracture. UPPER ABDOMEN: Nodular, cirrhotic liver. Partially visualized
upper abdominal varices. A periumbilical vein is recanalized. IMPRESSION: 
1.There is a large left sided loculated pleural effusion with a mildly thickened
pleura and some intermixed gas. This is concerning for an empyema. 2.The air-
fluid level with surrounding lung in the superior segment of the left lower lobe
is most likely a lung abscess. A cavitary lung nodule (either from malignancy or
fungal infection) is considered less likely. A follow-up chest CT is recommended
in three months to document decrease in size and exclude cavitary malignancy. 
3.Cirrhosis with findings of portal hypertension. Signed: Vivien Lopez MDReport 
Verified Date/Time: 2019 13:11:18 Reading Location: [facility] C013Y CT Body 
Reading Room Electronically signed by: VIVIEN LOPEZ MD on 2019 01:11 
PMPOCT-GLUCOSE [id number] 11:22:00





             Test Item    Value        Reference Range Interpretation Comments

 

             POC-GLUCOSE METER 81 mg/dL                         TESTED AT 

[place] [address]



             ([facility]) (test code =                                        [address] [facility] [address]



             1538)                                               



RAD, CHEST, 1 VIEW, NON [id number] 10:59:00Reason for exam:-&gt;eval 
effusionShould this be performed at the bedside?-&gt;YesFINAL REPORT PATIENT ID:
[id number] RAD, CHEST, 1 VIEW, NON DEPT INDICATION: eval effusion COMPARISON:Prior
day's exam FINDINGS: Portable frontal view of the chest. IMPRESSION: Limited by 
patient rotation.Support Lines: A right PICC terminates over the superior vena 
cava. Lungs and pleura: Large left effusion. Right costophrenic sulcus is 
excluded from view. No pneumothorax.Heart and mediastinum: Unremarkable where 
visible.Additional findings: None. A CT evaluation is currently pending. Signed:
JR Mendoza Robert MDReport Verified Date/Time: 2019 10:59:34 
Reading Location: [facility] Radiology Reading Room Electronically signed by:
KULWINDER MENDOZA on 2019 10:59 AMCOMPREHENSIVE METABOLIC PANEL
2019 07:33:00





             Test Item    Value        Reference Range Interpretation Comments

 

             TOTAL PROTEIN 5.8 gm/dL    6.0-8.3      L            Specimen sligh

tly



             (BEAKER) (test code =                                        hemoly

zed



             770)                                                

 

             ALBUMIN (BEAKER) 2.0 g/dL     3.5-5.0      L            Specimen sl

ightly



             (test code = 1145)                                        hemolyzed

 

             ALKALINE PHOSPHATASE 130 U/L                          



             (BEAKER) (test code =                                        



             346)                                                

 

             BILIRUBIN TOTAL 4.6 mg/dL    0.2-1.2      H            Specimen sli

ghtly



             (BEAKER) (test code =                                        hemoly

zed



             377)                                                

 

             SODIUM (BEAKER) (test 117 meq/L    136-145      LL           



             code = 381)                                         

 

             POTASSIUM (BEAKER) 5.3 meq/L    3.5-5.1      H            Specimen 

slightly



             (test code = 379)                                        hemolyzed

 

             CHLORIDE (BEAKER) 84 meq/L            L            



             (test code = 382)                                        

 

             CO2 (BEAKER) (test 28 meq/L     22-29                     



             code = 355)                                         

 

             BLOOD UREA NITROGEN 22 mg/dL     7-21         H            



             (BEAKER) (test code =                                        



             354)                                                

 

             CREATININE (BEAKER) 0.86 mg/dL   0.57-1.25                 Specimen

 slightly



             (test code = 358)                                        hemolyzed

 

             GLUCOSE RANDOM 90 mg/dL                         



             (BEAKER) (test code =                                        



             652)                                                

 

             CALCIUM (BEAKER) 7.9 mg/dL    8.4-10.2     L            



             (test code = 697)                                        

 

             AST (SGOT) (BEAKER) 33 U/L       5-34                      Specimen

 slightly



             (test code = 353)                                        hemolyzed

 

             ALT (SGPT) (BEAKER) 20 U/L       6-55                      Specimen

 slightly



             (test code = 347)                                        hemolyzed

 

             EGFR (BEAKER) (test 91 mL/min/1.73                           ESTIMA

FROILAN GFR IS



             code = 1092) sq m                                   NOT AS ACCURATE

 AS



                                                                 CREATININE



                                                                 CLEARANCE IN



                                                                 PREDICTING



                                                                 GLOMERULAR



                                                                 FILTRATION RATE

.



                                                                 ESTIMATED GFR I

S



                                                                 NOT APPLICABLE 

FOR



                                                                 DIALYSIS PATIEN

TS.



Specimen moderately ictericPOCT-GLUCOSE [id number] 05:46:00





             Test Item    Value        Reference Range Interpretation Comments

 

             POC-GLUCOSE METER 93 mg/dL                         TESTED AT 

[facility] [address]



             (BEAKER) (test code =                                        [place] [address]



             1538)                                               



PROTHROMBIN [id number] 05:30:00





             Test Item    Value        Reference Range Interpretation Comments

 

             PROTIME (BEAKER) (test code = 16.9 seconds 11.9-14.2    H          

  



             759)                                                

 

             INR (BEAKER) (test code = 370) 1.4          <=5.9                  

   



Effective 2019: PT Reference Range ChangeNew: 11.9-14.2 Previous: 11.7-
14.7RECOMMENDED COUMADIN/WARFARIN INR THERAPY RANGESSTANDARD DOSE: 2.0-3.0 
Includes: PROPHYLAXIS for venous thrombosis, systemic embolization; TREATMENT 
for venous thrombosis and/or pulmonary embolus.HIGH RISK: Target INR is 2.5-3.5 
for patients wiht mechanical heart valves.CBC W/PLT COUNT &amp; AUTO 
[id number] 05:27:00





             Test Item    Value        Reference Range Interpretation Comments

 

             WHITE BLOOD CELL COUNT (BEAKER) 23.2 K/ L    3.5-10.5     H        

    



             (test code = 775)                                        

 

             RED BLOOD CELL COUNT (BEAKER) 4.75 M/ L    4.63-6.08               

  



             (test code = 761)                                        

 

             HEMOGLOBIN (BEAKER) (test code = 15.3 GM/DL   13.7-17.5            

     



             410)                                                

 

             HEMATOCRIT (BEAKER) (test code = 43.1 %       40.1-51.0            

     



             411)                                                

 

             MEAN CORPUSCULAR VOLUME (BEAKER) 90.7 fL      79.0-92.2            

     



             (test code = 753)                                        

 

             MEAN CORPUSCULAR HEMOGLOBIN 32.2 pg      25.7-32.2                 



             (BEAKER) (test code = 751)                                        

 

             MEAN CORPUSCULAR HEMOGLOBIN CONC 35.5 GM/DL   32.3-36.5            

     



             (BEAKER) (test code = 752)                                        

 

             RED CELL DISTRIBUTION WIDTH 13.3 %       11.6-14.4                 



             (BEAKER) (test code = 412)                                        

 

             PLATELET COUNT (BEAKER) (test code 87 K/CU MM   150-450      L     

       



             = 756)                                              

 

             MEAN PLATELET VOLUME (BEAKER) 8.9 fL       9.4-12.4     L          

  



             (test code = 754)                                        

 

             NUCLEATED RED BLOOD CELLS (BEAKER) 0 /100 WBC   0-0                

       



             (test code = 413)                                        

 

             NEUTROPHILS RELATIVE PERCENT 86 %                                  

 



             (BEAKER) (test code = 429)                                        

 

             LYMPHOCYTES RELATIVE PERCENT 3 %                                   

 



             (BEAKER) (test code = 430)                                        

 

             MONOCYTES RELATIVE PERCENT 9 %                                    



             (BEAKER) (test code = 431)                                        

 

             EOSINOPHILS RELATIVE PERCENT 0 %                                   

 



             (BEAKER) (test code = 432)                                        

 

             BASOPHILS RELATIVE PERCENT 0 %                                    



             (BEAKER) (test code = 437)                                        

 

             NEUTROPHILS ABSOLUTE COUNT 19.87 K/ L   1.78-5.38    H            



             (BEAKER) (test code = 670)                                        

 

             LYMPHOCYTES ABSOLUTE COUNT 0.73 K/ L    1.32-3.57    L            



             (BEAKER) (test code = 414)                                        

 

             MONOCYTES ABSOLUTE COUNT (BEAKER) 2.16 K/ L    0.30-0.82    H      

      



             (test code = 415)                                        

 

             EOSINOPHILS ABSOLUTE COUNT 0.06 K/ L    0.04-0.54                 



             (BEAKER) (test code = 416)                                        

 

             BASOPHILS ABSOLUTE COUNT (BEAKER) 0.04 K/ L    0.01-0.08           

      



             (test code = 417)                                        

 

             IMMATURE GRANULOCYTES-RELATIVE 1 %          0-1                    

   



             PERCENT (BEAKER) (test code =                                      

  



             2801)                                               



POCT-GLUCOSE [id number] 23:34:00





             Test Item    Value        Reference Range Interpretation Comments

 

             POC-GLUCOSE METER 94 mg/dL                         TESTED AT 

[facility] [address]



             ([facility]) (test code =                                        [place] [address]



             1538)

## 2023-01-25 NOTE — XMS REPORT
Clinical Summary

                           Created on:2023



Patient:Omkar Chung

Sex:Male

:1960

External Reference #:[id number]





Demographics







                          Address                   [address]



                                                    [place] [address]

 

                          Mobile Phone              [phone]

 

                          Home Phone                [phone]

 

                          Email Address             [email or web address]

 

                          Preferred Language        English

 

                          Marital Status            

 

                          Jew Affiliation     Unknown

 

                          Race                      White

 

                          Ethnic Group              Not  or 









Author







                          Organization              [facility]

[facility]

 

                          Address                   [address] [place] [address]









Support







                Name            Relationship    Address         Phone

 

                Gabby Villatoro    Unavailable     Unavailable     [phone]









Care Team Providers







                    Name                Role                Phone

 

                    Moris Guajardo MD Unavailable         [phone]









Allergies

Not on File



Medications

Not on file



Active Problems

Not on file



Encounters







             Date         Type         Specialty    Care Team    Description

 

             2022   Travel                                 



after 2022



Social History







             Tobacco Use  Types        Packs/Day    Years Used   Date

 

             Smoking Tobacco: Never Assessed                                    

    









                          Sex Assigned at Birth     Date Recorded

 

                          Not on file               







Last Filed Vital Signs

Not on file



Plan of Treatment

Not on file



Results

Not on fileafter 2022



Insurance







          Payer     Benefit Plan / Subscriber ID Effective Dates Phone     Addre

ss   Type



                    Group                                             

 

           [facility]   [employer] [id number]  Effective for            [place]

X [id number]



                                        Medicare



                    [place] [address] 









           Guarantor Name Account Type Relation to Date of Birth Phone      Bill

ing



                                 Patient                          Address

 

           Omkar Chung Personal/Family Self       1960 [phone] [address]



                                                       (Home)     [address]







                                                                  [place]



                                                                  [address]

 

           Omkar Chung Personal/Family Self       1960 [phone] [address]



                                                       (Home)     [place]



                                                                  [address]







Care Teams







             Team Member  Relationship Specialty    Start Date   End Date

 

                                        Moris Guajardo MD



                PCP - External Referring General Surgery 3/11/22         



                                        [address]



                                                                



                                        [address]                                        



                                        [address] [phone] (Work)



                                                                



             [phone] (Fax)

## 2025-03-06 NOTE — RAD REPORT
Aware      EXAM DESCRIPTION:  RAD - Chest Single View - 11/8/2019 9:24 am

 

CLINICAL HISTORY:  Shortness of breath, pneumonia, COPD

 

COMPARISON:  September 18

 

TECHNIQUE:  AP portable chest image was obtained 0915 hours .

 

FINDINGS:  Lungs are fibrotic as a baseline. No focal infiltrative process identifiable. Left pleural
 effusion is present but significantly improved from September. Minimal right pleural effusion is pre
sent. Trachea is midline. Heart and vasculature are normal. No pneumothorax. No acute bony abnormalit
y seen. No acute aortic findings suspected.

 

IMPRESSION:  Small left pleural effusion significantly improved from September imaging.

 

Small right pleural effusion.

 

Fibrotic lung change with no focal lung parenchymal process.